# Patient Record
Sex: FEMALE | Race: WHITE | Employment: OTHER | URBAN - NONMETROPOLITAN AREA
[De-identification: names, ages, dates, MRNs, and addresses within clinical notes are randomized per-mention and may not be internally consistent; named-entity substitution may affect disease eponyms.]

---

## 2017-01-05 ENCOUNTER — TELEPHONE (OUTPATIENT)
Dept: PRIMARY CARE CLINIC | Age: 82
End: 2017-01-05

## 2017-01-12 ENCOUNTER — TELEPHONE (OUTPATIENT)
Dept: PRIMARY CARE CLINIC | Age: 82
End: 2017-01-12

## 2017-01-30 ENCOUNTER — OFFICE VISIT (OUTPATIENT)
Dept: PRIMARY CARE CLINIC | Age: 82
End: 2017-01-30
Payer: MEDICARE

## 2017-01-30 VITALS
WEIGHT: 165 LBS | HEART RATE: 71 BPM | DIASTOLIC BLOOD PRESSURE: 60 MMHG | HEIGHT: 63 IN | BODY MASS INDEX: 29.23 KG/M2 | TEMPERATURE: 97.3 F | SYSTOLIC BLOOD PRESSURE: 133 MMHG | OXYGEN SATURATION: 96 % | RESPIRATION RATE: 16 BRPM

## 2017-01-30 DIAGNOSIS — I82.402 ACUTE DEEP VEIN THROMBOSIS (DVT) OF LEFT LOWER EXTREMITY, UNSPECIFIED VEIN (HCC): Primary | ICD-10-CM

## 2017-01-30 PROCEDURE — 1090F PRES/ABSN URINE INCON ASSESS: CPT | Performed by: NURSE PRACTITIONER

## 2017-01-30 PROCEDURE — G8420 CALC BMI NORM PARAMETERS: HCPCS | Performed by: NURSE PRACTITIONER

## 2017-01-30 PROCEDURE — G8484 FLU IMMUNIZE NO ADMIN: HCPCS | Performed by: NURSE PRACTITIONER

## 2017-01-30 PROCEDURE — 99213 OFFICE O/P EST LOW 20 MIN: CPT | Performed by: NURSE PRACTITIONER

## 2017-01-30 PROCEDURE — G8427 DOCREV CUR MEDS BY ELIG CLIN: HCPCS | Performed by: NURSE PRACTITIONER

## 2017-01-30 PROCEDURE — 1036F TOBACCO NON-USER: CPT | Performed by: NURSE PRACTITIONER

## 2017-01-30 PROCEDURE — G8399 PT W/DXA RESULTS DOCUMENT: HCPCS | Performed by: NURSE PRACTITIONER

## 2017-01-30 PROCEDURE — 1123F ACP DISCUSS/DSCN MKR DOCD: CPT | Performed by: NURSE PRACTITIONER

## 2017-01-30 PROCEDURE — 4040F PNEUMOC VAC/ADMIN/RCVD: CPT | Performed by: NURSE PRACTITIONER

## 2017-02-20 RX ORDER — ERGOCALCIFEROL 1.25 MG/1
CAPSULE ORAL
Qty: 12 CAPSULE | Refills: 3 | Status: SHIPPED | OUTPATIENT
Start: 2017-02-20 | End: 2018-02-12 | Stop reason: SDUPTHER

## 2017-02-24 ENCOUNTER — OFFICE VISIT (OUTPATIENT)
Dept: PRIMARY CARE CLINIC | Age: 82
End: 2017-02-24
Payer: MEDICARE

## 2017-02-24 VITALS
SYSTOLIC BLOOD PRESSURE: 140 MMHG | OXYGEN SATURATION: 96 % | TEMPERATURE: 97 F | HEART RATE: 72 BPM | RESPIRATION RATE: 18 BRPM | BODY MASS INDEX: 28.88 KG/M2 | HEIGHT: 63 IN | WEIGHT: 163 LBS | DIASTOLIC BLOOD PRESSURE: 70 MMHG

## 2017-02-24 DIAGNOSIS — R06.2 WHEEZING: Primary | ICD-10-CM

## 2017-02-24 DIAGNOSIS — H02.9 EYELID LESION: ICD-10-CM

## 2017-02-24 PROCEDURE — 4040F PNEUMOC VAC/ADMIN/RCVD: CPT | Performed by: NURSE PRACTITIONER

## 2017-02-24 PROCEDURE — G8420 CALC BMI NORM PARAMETERS: HCPCS | Performed by: NURSE PRACTITIONER

## 2017-02-24 PROCEDURE — 96372 THER/PROPH/DIAG INJ SC/IM: CPT | Performed by: NURSE PRACTITIONER

## 2017-02-24 PROCEDURE — 94640 AIRWAY INHALATION TREATMENT: CPT | Performed by: NURSE PRACTITIONER

## 2017-02-24 PROCEDURE — 1036F TOBACCO NON-USER: CPT | Performed by: NURSE PRACTITIONER

## 2017-02-24 PROCEDURE — 99214 OFFICE O/P EST MOD 30 MIN: CPT | Performed by: NURSE PRACTITIONER

## 2017-02-24 PROCEDURE — G8399 PT W/DXA RESULTS DOCUMENT: HCPCS | Performed by: NURSE PRACTITIONER

## 2017-02-24 PROCEDURE — 1090F PRES/ABSN URINE INCON ASSESS: CPT | Performed by: NURSE PRACTITIONER

## 2017-02-24 PROCEDURE — G8427 DOCREV CUR MEDS BY ELIG CLIN: HCPCS | Performed by: NURSE PRACTITIONER

## 2017-02-24 PROCEDURE — G8484 FLU IMMUNIZE NO ADMIN: HCPCS | Performed by: NURSE PRACTITIONER

## 2017-02-24 PROCEDURE — 1123F ACP DISCUSS/DSCN MKR DOCD: CPT | Performed by: NURSE PRACTITIONER

## 2017-02-24 RX ORDER — ALBUTEROL SULFATE 1.25 MG/3ML
1 SOLUTION RESPIRATORY (INHALATION) EVERY 6 HOURS PRN
Qty: 360 ML | Refills: 3 | Status: SHIPPED | OUTPATIENT
Start: 2017-02-24 | End: 2017-06-09 | Stop reason: SDUPTHER

## 2017-02-24 RX ORDER — ACYCLOVIR 400 MG/1
400 TABLET ORAL
Qty: 56 TABLET | Refills: 1 | Status: SHIPPED | OUTPATIENT
Start: 2017-02-24 | End: 2017-04-20 | Stop reason: SDUPTHER

## 2017-02-24 RX ORDER — PREDNISONE 10 MG/1
10 TABLET ORAL DAILY
Qty: 10 TABLET | Refills: 0 | Status: SHIPPED | OUTPATIENT
Start: 2017-02-24 | End: 2017-03-06

## 2017-02-24 RX ORDER — IPRATROPIUM BROMIDE AND ALBUTEROL SULFATE 2.5; .5 MG/3ML; MG/3ML
1 SOLUTION RESPIRATORY (INHALATION) ONCE
Status: COMPLETED | OUTPATIENT
Start: 2017-02-24 | End: 2017-02-24

## 2017-02-24 RX ORDER — TRIAMCINOLONE ACETONIDE 40 MG/ML
40 INJECTION, SUSPENSION INTRA-ARTICULAR; INTRAMUSCULAR ONCE
Status: COMPLETED | OUTPATIENT
Start: 2017-02-24 | End: 2017-02-24

## 2017-02-24 RX ORDER — AMOXICILLIN AND CLAVULANATE POTASSIUM 875; 125 MG/1; MG/1
TABLET, FILM COATED ORAL
Qty: 20 TABLET | Refills: 0 | Status: SHIPPED | OUTPATIENT
Start: 2017-02-24 | End: 2017-06-09 | Stop reason: ALTCHOICE

## 2017-02-24 RX ADMIN — IPRATROPIUM BROMIDE AND ALBUTEROL SULFATE 1 AMPULE: 2.5; .5 SOLUTION RESPIRATORY (INHALATION) at 09:56

## 2017-02-24 RX ADMIN — TRIAMCINOLONE ACETONIDE 40 MG: 40 INJECTION, SUSPENSION INTRA-ARTICULAR; INTRAMUSCULAR at 10:22

## 2017-03-02 RX ORDER — GABAPENTIN 300 MG/1
CAPSULE ORAL
Qty: 270 CAPSULE | Refills: 0 | Status: SHIPPED | OUTPATIENT
Start: 2017-03-02 | End: 2017-06-04 | Stop reason: SDUPTHER

## 2017-03-04 ASSESSMENT — ENCOUNTER SYMPTOMS
EYE PAIN: 1
EYE REDNESS: 1
WHEEZING: 1
COUGH: 1

## 2017-03-13 RX ORDER — OMEPRAZOLE 40 MG/1
CAPSULE, DELAYED RELEASE ORAL
Qty: 90 CAPSULE | Refills: 3 | Status: SHIPPED | OUTPATIENT
Start: 2017-03-13 | End: 2018-03-22 | Stop reason: SDUPTHER

## 2017-03-17 ENCOUNTER — TELEPHONE (OUTPATIENT)
Dept: PRIMARY CARE CLINIC | Age: 82
End: 2017-03-17

## 2017-03-23 ENCOUNTER — HOSPITAL ENCOUNTER (OUTPATIENT)
Dept: INFUSION THERAPY | Age: 82
Setting detail: INFUSION SERIES
Discharge: HOME | End: 2017-03-23
Payer: MEDICARE

## 2017-03-23 VITALS
OXYGEN SATURATION: 98 % | RESPIRATION RATE: 17 BRPM | TEMPERATURE: 96.3 F | HEART RATE: 53 BPM | SYSTOLIC BLOOD PRESSURE: 171 MMHG | DIASTOLIC BLOOD PRESSURE: 70 MMHG

## 2017-03-23 PROCEDURE — 96365 THER/PROPH/DIAG IV INF INIT: CPT

## 2017-03-23 PROCEDURE — 6360000002 HC RX W HCPCS: Performed by: FAMILY MEDICINE

## 2017-03-23 PROCEDURE — 2580000003 HC RX 258: Performed by: FAMILY MEDICINE

## 2017-03-23 RX ADMIN — FERRIC CARBOXYMALTOSE INJECTION 750 MG: 50 INJECTION, SOLUTION INTRAVENOUS at 11:17

## 2017-03-31 ENCOUNTER — HOSPITAL ENCOUNTER (OUTPATIENT)
Dept: INFUSION THERAPY | Age: 82
Setting detail: INFUSION SERIES
Discharge: HOME | End: 2017-03-31
Payer: MEDICARE

## 2017-03-31 VITALS
HEART RATE: 68 BPM | SYSTOLIC BLOOD PRESSURE: 142 MMHG | TEMPERATURE: 97.5 F | DIASTOLIC BLOOD PRESSURE: 60 MMHG | WEIGHT: 160 LBS | HEIGHT: 63 IN | RESPIRATION RATE: 18 BRPM | BODY MASS INDEX: 28.35 KG/M2

## 2017-03-31 PROCEDURE — 2580000003 HC RX 258: Performed by: FAMILY MEDICINE

## 2017-03-31 PROCEDURE — 96365 THER/PROPH/DIAG IV INF INIT: CPT

## 2017-03-31 PROCEDURE — 6360000002 HC RX W HCPCS: Performed by: FAMILY MEDICINE

## 2017-03-31 RX ADMIN — FERRIC CARBOXYMALTOSE INJECTION 750 MG: 50 INJECTION, SOLUTION INTRAVENOUS at 13:20

## 2017-04-21 RX ORDER — ACYCLOVIR 400 MG/1
TABLET ORAL
Qty: 56 TABLET | Refills: 0 | Status: SHIPPED | OUTPATIENT
Start: 2017-04-21 | End: 2017-05-20 | Stop reason: SDUPTHER

## 2017-04-28 ENCOUNTER — OFFICE VISIT (OUTPATIENT)
Dept: PRIMARY CARE CLINIC | Age: 82
End: 2017-04-28
Payer: MEDICARE

## 2017-04-28 VITALS
WEIGHT: 167 LBS | HEART RATE: 63 BPM | SYSTOLIC BLOOD PRESSURE: 130 MMHG | TEMPERATURE: 96.8 F | RESPIRATION RATE: 16 BRPM | BODY MASS INDEX: 29.59 KG/M2 | HEIGHT: 63 IN | DIASTOLIC BLOOD PRESSURE: 70 MMHG

## 2017-04-28 DIAGNOSIS — Z45.2 ENCOUNTER FOR CARE RELATED TO PORT-A-CATH: ICD-10-CM

## 2017-04-28 DIAGNOSIS — Z95.828 PORT CATHETER IN PLACE: ICD-10-CM

## 2017-04-28 DIAGNOSIS — E61.1 LOW SERUM IRON: Primary | ICD-10-CM

## 2017-04-28 DIAGNOSIS — Z85.038 PERSONAL HISTORY OF COLON CANCER: ICD-10-CM

## 2017-04-28 PROCEDURE — 99213 OFFICE O/P EST LOW 20 MIN: CPT | Performed by: NURSE PRACTITIONER

## 2017-04-28 PROCEDURE — G8420 CALC BMI NORM PARAMETERS: HCPCS | Performed by: NURSE PRACTITIONER

## 2017-04-28 PROCEDURE — G8427 DOCREV CUR MEDS BY ELIG CLIN: HCPCS | Performed by: NURSE PRACTITIONER

## 2017-04-28 PROCEDURE — 1036F TOBACCO NON-USER: CPT | Performed by: NURSE PRACTITIONER

## 2017-04-28 PROCEDURE — G8399 PT W/DXA RESULTS DOCUMENT: HCPCS | Performed by: NURSE PRACTITIONER

## 2017-04-28 PROCEDURE — 1090F PRES/ABSN URINE INCON ASSESS: CPT | Performed by: NURSE PRACTITIONER

## 2017-04-28 PROCEDURE — 4040F PNEUMOC VAC/ADMIN/RCVD: CPT | Performed by: NURSE PRACTITIONER

## 2017-04-28 PROCEDURE — 1123F ACP DISCUSS/DSCN MKR DOCD: CPT | Performed by: NURSE PRACTITIONER

## 2017-04-28 RX ORDER — FOLIC ACID 1 MG/1
TABLET ORAL
Refills: 3 | COMMUNITY
Start: 2017-04-11 | End: 2018-05-07

## 2017-05-22 RX ORDER — ACYCLOVIR 400 MG/1
TABLET ORAL
Qty: 56 TABLET | Refills: 0 | Status: SHIPPED | OUTPATIENT
Start: 2017-05-22 | End: 2017-06-19 | Stop reason: SDUPTHER

## 2017-06-05 RX ORDER — GABAPENTIN 300 MG/1
CAPSULE ORAL
Qty: 270 CAPSULE | Refills: 3 | Status: SHIPPED | OUTPATIENT
Start: 2017-06-05 | End: 2018-06-17 | Stop reason: SDUPTHER

## 2017-06-09 ENCOUNTER — OFFICE VISIT (OUTPATIENT)
Dept: PRIMARY CARE CLINIC | Age: 82
End: 2017-06-09
Payer: MEDICARE

## 2017-06-09 VITALS
BODY MASS INDEX: 29.23 KG/M2 | TEMPERATURE: 96.5 F | SYSTOLIC BLOOD PRESSURE: 156 MMHG | OXYGEN SATURATION: 97 % | HEIGHT: 63 IN | WEIGHT: 165 LBS | DIASTOLIC BLOOD PRESSURE: 82 MMHG | HEART RATE: 64 BPM

## 2017-06-09 DIAGNOSIS — E55.9 VITAMIN D DEFICIENCY: ICD-10-CM

## 2017-06-09 DIAGNOSIS — E78.1 HYPERTRIGLYCERIDEMIA: ICD-10-CM

## 2017-06-09 DIAGNOSIS — I10 ESSENTIAL HYPERTENSION: ICD-10-CM

## 2017-06-09 DIAGNOSIS — J41.8 MIXED SIMPLE AND MUCOPURULENT CHRONIC BRONCHITIS (HCC): Primary | ICD-10-CM
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
## 2017-06-09 DIAGNOSIS — Z95.828 PORTACATH IN PLACE: ICD-10-CM

## 2017-06-09 DIAGNOSIS — N28.9 RENAL INSUFFICIENCY: ICD-10-CM

## 2017-06-09 LAB
ALBUMIN SERPL-MCNC: 4.2 G/DL (ref 3.5–5.2)
ALP BLD-CCNC: 80 U/L (ref 35–104)
ALT SERPL-CCNC: 13 U/L (ref 5–33)
ANION GAP SERPL CALCULATED.3IONS-SCNC: 17 MMOL/L (ref 7–19)
AST SERPL-CCNC: 21 U/L (ref 5–32)
BILIRUB SERPL-MCNC: 0.4 MG/DL (ref 0.2–1.2)
BUN BLDV-MCNC: 21 MG/DL (ref 8–23)
CALCIUM SERPL-MCNC: 9.3 MG/DL (ref 8.8–10.2)
CHLORIDE BLD-SCNC: 102 MMOL/L (ref 98–111)
CHOLESTEROL, TOTAL: 183 MG/DL (ref 160–199)
CO2: 21 MMOL/L (ref 22–29)
CREAT SERPL-MCNC: 1.2 MG/DL (ref 0.5–0.9)
GFR NON-AFRICAN AMERICAN: 43
GLUCOSE BLD-MCNC: 91 MG/DL (ref 74–109)
HCT VFR BLD CALC: 35.7 % (ref 37–47)
HDLC SERPL-MCNC: 50 MG/DL (ref 65–121)
HEMOGLOBIN: 12.3 G/DL (ref 12–16)
LDL CHOLESTEROL CALCULATED: 76 MG/DL
MCH RBC QN AUTO: 33.2 PG (ref 27–31)
MCHC RBC AUTO-ENTMCNC: 34.5 G/DL (ref 33–37)
MCV RBC AUTO: 96.2 FL (ref 81–99)
PDW BLD-RTO: 15.7 % (ref 11.5–14.5)
PLATELET # BLD: 282 K/UL (ref 130–400)
PMV BLD AUTO: 10.8 FL (ref 9.4–12.3)
POTASSIUM SERPL-SCNC: 4.5 MMOL/L (ref 3.5–5)
RBC # BLD: 3.71 M/UL (ref 4.2–5.4)
SODIUM BLD-SCNC: 140 MMOL/L (ref 136–145)
TOTAL PROTEIN: 6.8 G/DL (ref 6.6–8.7)
TRIGL SERPL-MCNC: 287 MG/DL (ref 150–199)
VITAMIN D 25-HYDROXY: 25 NG/ML
WBC # BLD: 6.8 K/UL (ref 4.8–10.8)

## 2017-06-09 PROCEDURE — G8926 SPIRO NO PERF OR DOC: HCPCS | Performed by: NURSE PRACTITIONER

## 2017-06-09 PROCEDURE — 4040F PNEUMOC VAC/ADMIN/RCVD: CPT | Performed by: NURSE PRACTITIONER

## 2017-06-09 PROCEDURE — 1123F ACP DISCUSS/DSCN MKR DOCD: CPT | Performed by: NURSE PRACTITIONER

## 2017-06-09 PROCEDURE — 36415 COLL VENOUS BLD VENIPUNCTURE: CPT | Performed by: NURSE PRACTITIONER

## 2017-06-09 PROCEDURE — 1036F TOBACCO NON-USER: CPT | Performed by: NURSE PRACTITIONER

## 2017-06-09 PROCEDURE — G8399 PT W/DXA RESULTS DOCUMENT: HCPCS | Performed by: NURSE PRACTITIONER

## 2017-06-09 PROCEDURE — 1090F PRES/ABSN URINE INCON ASSESS: CPT | Performed by: NURSE PRACTITIONER

## 2017-06-09 PROCEDURE — G8420 CALC BMI NORM PARAMETERS: HCPCS | Performed by: NURSE PRACTITIONER

## 2017-06-09 PROCEDURE — G8427 DOCREV CUR MEDS BY ELIG CLIN: HCPCS | Performed by: NURSE PRACTITIONER

## 2017-06-09 PROCEDURE — 3023F SPIROM DOC REV: CPT | Performed by: NURSE PRACTITIONER

## 2017-06-09 PROCEDURE — 99214 OFFICE O/P EST MOD 30 MIN: CPT | Performed by: NURSE PRACTITIONER

## 2017-06-09 PROCEDURE — 96372 THER/PROPH/DIAG INJ SC/IM: CPT | Performed by: NURSE PRACTITIONER

## 2017-06-09 RX ORDER — DOXYCYCLINE HYCLATE 100 MG/1
100 CAPSULE ORAL 2 TIMES DAILY
Qty: 20 CAPSULE | Refills: 0 | Status: SHIPPED | OUTPATIENT
Start: 2017-06-09 | End: 2017-06-19

## 2017-06-09 RX ORDER — TRIAMCINOLONE ACETONIDE 40 MG/ML
40 INJECTION, SUSPENSION INTRA-ARTICULAR; INTRAMUSCULAR ONCE
Status: COMPLETED | OUTPATIENT
Start: 2017-06-09 | End: 2017-06-09

## 2017-06-09 RX ORDER — ALBUTEROL SULFATE 1.25 MG/3ML
1 SOLUTION RESPIRATORY (INHALATION) EVERY 6 HOURS PRN
Qty: 360 ML | Refills: 3 | Status: SHIPPED | OUTPATIENT
Start: 2017-06-09 | End: 2019-07-30

## 2017-06-09 RX ADMIN — TRIAMCINOLONE ACETONIDE 40 MG: 40 INJECTION, SUSPENSION INTRA-ARTICULAR; INTRAMUSCULAR at 15:48

## 2017-06-09 ASSESSMENT — ENCOUNTER SYMPTOMS
GASTROINTESTINAL NEGATIVE: 1
COUGH: 1

## 2017-06-16 ENCOUNTER — OFFICE VISIT (OUTPATIENT)
Dept: PRIMARY CARE CLINIC | Age: 82
End: 2017-06-16
Payer: MEDICARE

## 2017-06-16 VITALS
WEIGHT: 163.7 LBS | RESPIRATION RATE: 16 BRPM | OXYGEN SATURATION: 94 % | DIASTOLIC BLOOD PRESSURE: 60 MMHG | HEIGHT: 63 IN | HEART RATE: 70 BPM | SYSTOLIC BLOOD PRESSURE: 160 MMHG | BODY MASS INDEX: 29 KG/M2 | TEMPERATURE: 98.2 F

## 2017-06-16 DIAGNOSIS — J40 BRONCHITIS: Primary | ICD-10-CM

## 2017-06-16 PROCEDURE — 99213 OFFICE O/P EST LOW 20 MIN: CPT | Performed by: NURSE PRACTITIONER

## 2017-06-16 PROCEDURE — G8427 DOCREV CUR MEDS BY ELIG CLIN: HCPCS | Performed by: NURSE PRACTITIONER

## 2017-06-16 PROCEDURE — 1123F ACP DISCUSS/DSCN MKR DOCD: CPT | Performed by: NURSE PRACTITIONER

## 2017-06-16 PROCEDURE — 1090F PRES/ABSN URINE INCON ASSESS: CPT | Performed by: NURSE PRACTITIONER

## 2017-06-16 PROCEDURE — G8419 CALC BMI OUT NRM PARAM NOF/U: HCPCS | Performed by: NURSE PRACTITIONER

## 2017-06-16 PROCEDURE — G8399 PT W/DXA RESULTS DOCUMENT: HCPCS | Performed by: NURSE PRACTITIONER

## 2017-06-16 PROCEDURE — 1036F TOBACCO NON-USER: CPT | Performed by: NURSE PRACTITIONER

## 2017-06-16 PROCEDURE — 4040F PNEUMOC VAC/ADMIN/RCVD: CPT | Performed by: NURSE PRACTITIONER

## 2017-06-16 ASSESSMENT — ENCOUNTER SYMPTOMS: COUGH: 1

## 2017-06-19 RX ORDER — ACYCLOVIR 400 MG/1
TABLET ORAL
Qty: 56 TABLET | Refills: 5 | Status: SHIPPED | OUTPATIENT
Start: 2017-06-19 | End: 2017-09-12 | Stop reason: SDUPTHER

## 2017-07-17 RX ORDER — ACYCLOVIR 400 MG/1
TABLET ORAL
Qty: 56 TABLET | Refills: 1 | Status: SHIPPED | OUTPATIENT
Start: 2017-07-17 | End: 2017-08-08 | Stop reason: SDUPTHER

## 2017-08-08 ENCOUNTER — OFFICE VISIT (OUTPATIENT)
Dept: PRIMARY CARE CLINIC | Age: 82
End: 2017-08-08

## 2017-08-08 VITALS
HEART RATE: 60 BPM | SYSTOLIC BLOOD PRESSURE: 171 MMHG | DIASTOLIC BLOOD PRESSURE: 69 MMHG | TEMPERATURE: 97.1 F | RESPIRATION RATE: 20 BRPM | BODY MASS INDEX: 29.26 KG/M2 | WEIGHT: 165.2 LBS

## 2017-08-08 DIAGNOSIS — Z45.2 ENCOUNTER FOR CARE RELATED TO PORT-A-CATH: Primary | ICD-10-CM

## 2017-08-08 DIAGNOSIS — Z95.828 PORT-A-CATH IN PLACE: ICD-10-CM

## 2017-08-08 DIAGNOSIS — Z91.81 AT HIGH RISK FOR FALLS: ICD-10-CM

## 2017-09-05 RX ORDER — TRIAMTERENE AND HYDROCHLOROTHIAZIDE 37.5; 25 MG/1; MG/1
TABLET ORAL
Qty: 90 TABLET | Refills: 5 | Status: SHIPPED | OUTPATIENT
Start: 2017-09-05 | End: 2018-12-04 | Stop reason: SDUPTHER

## 2017-09-08 ENCOUNTER — OFFICE VISIT (OUTPATIENT)
Dept: PRIMARY CARE CLINIC | Age: 82
End: 2017-09-08
Payer: MEDICARE

## 2017-09-08 VITALS
TEMPERATURE: 98.3 F | SYSTOLIC BLOOD PRESSURE: 130 MMHG | BODY MASS INDEX: 30.22 KG/M2 | OXYGEN SATURATION: 98 % | RESPIRATION RATE: 16 BRPM | WEIGHT: 170.6 LBS | DIASTOLIC BLOOD PRESSURE: 82 MMHG | HEART RATE: 60 BPM

## 2017-09-08 DIAGNOSIS — B37.2 YEAST DERMATITIS: Primary | ICD-10-CM

## 2017-09-08 DIAGNOSIS — R30.0 BURNING WITH URINATION: ICD-10-CM

## 2017-09-08 LAB
BILIRUBIN, POC: ABNORMAL
BLOOD URINE, POC: ABNORMAL
CLARITY, POC: CLEAR
COLOR, POC: YELLOW
GLUCOSE URINE, POC: ABNORMAL
KETONES, POC: ABNORMAL
LEUKOCYTE EST, POC: ABNORMAL
NITRITE, POC: ABNORMAL
PH, POC: 6
PROTEIN, POC: ABNORMAL
SPECIFIC GRAVITY, POC: 1
UROBILINOGEN, POC: 0.2

## 2017-09-08 PROCEDURE — 99213 OFFICE O/P EST LOW 20 MIN: CPT | Performed by: NURSE PRACTITIONER

## 2017-09-08 PROCEDURE — G8427 DOCREV CUR MEDS BY ELIG CLIN: HCPCS | Performed by: NURSE PRACTITIONER

## 2017-09-08 PROCEDURE — 1036F TOBACCO NON-USER: CPT | Performed by: NURSE PRACTITIONER

## 2017-09-08 PROCEDURE — 1090F PRES/ABSN URINE INCON ASSESS: CPT | Performed by: NURSE PRACTITIONER

## 2017-09-08 PROCEDURE — 81002 URINALYSIS NONAUTO W/O SCOPE: CPT | Performed by: NURSE PRACTITIONER

## 2017-09-08 PROCEDURE — 4040F PNEUMOC VAC/ADMIN/RCVD: CPT | Performed by: NURSE PRACTITIONER

## 2017-09-08 PROCEDURE — 1123F ACP DISCUSS/DSCN MKR DOCD: CPT | Performed by: NURSE PRACTITIONER

## 2017-09-08 PROCEDURE — G8399 PT W/DXA RESULTS DOCUMENT: HCPCS | Performed by: NURSE PRACTITIONER

## 2017-09-08 PROCEDURE — G8417 CALC BMI ABV UP PARAM F/U: HCPCS | Performed by: NURSE PRACTITIONER

## 2017-09-08 RX ORDER — FLUCONAZOLE 100 MG/1
100 TABLET ORAL DAILY
Qty: 7 TABLET | Refills: 0 | Status: SHIPPED | OUTPATIENT
Start: 2017-09-08 | End: 2017-09-15

## 2017-09-08 RX ORDER — NYSTATIN 100000 [USP'U]/G
POWDER TOPICAL
Qty: 1 BOTTLE | Refills: 3 | Status: SHIPPED | OUTPATIENT
Start: 2017-09-08 | End: 2018-05-07

## 2017-09-08 RX ORDER — ACETAMINOPHEN AND CODEINE PHOSPHATE 300; 30 MG/1; MG/1
TABLET ORAL
Qty: 24 TABLET | Refills: 0 | Status: SHIPPED | OUTPATIENT
Start: 2017-09-08 | End: 2018-01-19 | Stop reason: SDUPTHER

## 2017-09-12 RX ORDER — ACYCLOVIR 400 MG/1
TABLET ORAL
Qty: 56 TABLET | Refills: 3 | Status: SHIPPED | OUTPATIENT
Start: 2017-09-12 | End: 2018-01-03 | Stop reason: SDUPTHER

## 2017-09-26 ENCOUNTER — OFFICE VISIT (OUTPATIENT)
Dept: PRIMARY CARE CLINIC | Age: 82
End: 2017-09-26
Payer: MEDICARE

## 2017-09-26 VITALS
WEIGHT: 169.8 LBS | DIASTOLIC BLOOD PRESSURE: 78 MMHG | SYSTOLIC BLOOD PRESSURE: 130 MMHG | TEMPERATURE: 96.5 F | BODY MASS INDEX: 30.09 KG/M2 | RESPIRATION RATE: 16 BRPM | HEIGHT: 63 IN | HEART RATE: 80 BPM

## 2017-09-26 DIAGNOSIS — Z95.828 PORT-A-CATH IN PLACE: ICD-10-CM

## 2017-09-26 DIAGNOSIS — N18.30 CHRONIC KIDNEY DISEASE, STAGE 3 (MODERATE): Primary | ICD-10-CM

## 2017-09-26 DIAGNOSIS — Z12.31 SCREENING MAMMOGRAM, ENCOUNTER FOR: ICD-10-CM

## 2017-09-26 DIAGNOSIS — M10.371 ACUTE GOUT DUE TO RENAL IMPAIRMENT INVOLVING TOE OF RIGHT FOOT: ICD-10-CM

## 2017-09-26 DIAGNOSIS — I87.2 VENOUS INSUFFICIENCY: ICD-10-CM

## 2017-09-26 DIAGNOSIS — Z45.2 ENCOUNTER FOR CARE RELATED TO PORT-A-CATH: ICD-10-CM

## 2017-09-26 LAB
ALBUMIN SERPL-MCNC: 3.7 G/DL (ref 3.5–5.2)
ALP BLD-CCNC: 67 U/L (ref 35–104)
ALT SERPL-CCNC: 16 U/L (ref 5–33)
ANION GAP SERPL CALCULATED.3IONS-SCNC: 17 MMOL/L (ref 7–19)
AST SERPL-CCNC: 26 U/L (ref 5–32)
BILIRUB SERPL-MCNC: 0.3 MG/DL (ref 0.2–1.2)
BUN BLDV-MCNC: 26 MG/DL (ref 8–23)
CALCIUM SERPL-MCNC: 9.2 MG/DL (ref 8.8–10.2)
CHLORIDE BLD-SCNC: 100 MMOL/L (ref 98–111)
CO2: 22 MMOL/L (ref 22–29)
CREAT SERPL-MCNC: 1.4 MG/DL (ref 0.5–0.9)
GFR NON-AFRICAN AMERICAN: 36
GLUCOSE BLD-MCNC: 101 MG/DL (ref 74–109)
HCT VFR BLD CALC: 38 % (ref 37–47)
HEMOGLOBIN: 12.6 G/DL (ref 12–16)
MCH RBC QN AUTO: 32.6 PG (ref 27–31)
MCHC RBC AUTO-ENTMCNC: 33.2 G/DL (ref 33–37)
MCV RBC AUTO: 98.4 FL (ref 81–99)
PDW BLD-RTO: 12.3 % (ref 11.5–14.5)
PLATELET # BLD: 231 K/UL (ref 130–400)
PMV BLD AUTO: 10.5 FL (ref 9.4–12.3)
POTASSIUM SERPL-SCNC: 4.7 MMOL/L (ref 3.5–5)
RBC # BLD: 3.86 M/UL (ref 4.2–5.4)
SODIUM BLD-SCNC: 139 MMOL/L (ref 136–145)
TOTAL PROTEIN: 6.5 G/DL (ref 6.6–8.7)
URIC ACID, SERUM: 8.7 MG/DL (ref 2.4–5.7)
WBC # BLD: 6.9 K/UL (ref 4.8–10.8)

## 2017-09-26 PROCEDURE — 99213 OFFICE O/P EST LOW 20 MIN: CPT | Performed by: NURSE PRACTITIONER

## 2017-09-26 PROCEDURE — 1090F PRES/ABSN URINE INCON ASSESS: CPT | Performed by: NURSE PRACTITIONER

## 2017-09-26 PROCEDURE — G8417 CALC BMI ABV UP PARAM F/U: HCPCS | Performed by: NURSE PRACTITIONER

## 2017-09-26 PROCEDURE — 1036F TOBACCO NON-USER: CPT | Performed by: NURSE PRACTITIONER

## 2017-09-26 PROCEDURE — 4040F PNEUMOC VAC/ADMIN/RCVD: CPT | Performed by: NURSE PRACTITIONER

## 2017-09-26 PROCEDURE — 96372 THER/PROPH/DIAG INJ SC/IM: CPT | Performed by: NURSE PRACTITIONER

## 2017-09-26 PROCEDURE — G8427 DOCREV CUR MEDS BY ELIG CLIN: HCPCS | Performed by: NURSE PRACTITIONER

## 2017-09-26 PROCEDURE — 1123F ACP DISCUSS/DSCN MKR DOCD: CPT | Performed by: NURSE PRACTITIONER

## 2017-09-26 PROCEDURE — 36415 COLL VENOUS BLD VENIPUNCTURE: CPT | Performed by: NURSE PRACTITIONER

## 2017-09-26 PROCEDURE — G8399 PT W/DXA RESULTS DOCUMENT: HCPCS | Performed by: NURSE PRACTITIONER

## 2017-09-26 RX ORDER — FLUCONAZOLE 150 MG/1
150 TABLET ORAL ONCE
Qty: 1 TABLET | Refills: 0 | Status: SHIPPED | OUTPATIENT
Start: 2017-09-26 | End: 2017-09-26

## 2017-09-26 RX ORDER — TRIAMCINOLONE ACETONIDE 40 MG/ML
40 INJECTION, SUSPENSION INTRA-ARTICULAR; INTRAMUSCULAR ONCE
Status: COMPLETED | OUTPATIENT
Start: 2017-09-26 | End: 2017-09-26

## 2017-09-26 RX ORDER — METHYLPREDNISOLONE 4 MG/1
TABLET ORAL
Qty: 1 KIT | Refills: 0 | Status: SHIPPED | OUTPATIENT
Start: 2017-09-26 | End: 2017-10-02

## 2017-09-26 RX ORDER — TRIAMCINOLONE ACETONIDE 1 MG/G
CREAM TOPICAL
Qty: 1 TUBE | Refills: 1 | Status: SHIPPED | OUTPATIENT
Start: 2017-09-26 | End: 2020-04-25

## 2017-09-26 RX ADMIN — TRIAMCINOLONE ACETONIDE 40 MG: 40 INJECTION, SUSPENSION INTRA-ARTICULAR; INTRAMUSCULAR at 15:52

## 2017-09-29 ENCOUNTER — HOSPITAL ENCOUNTER (OUTPATIENT)
Dept: WOMENS IMAGING | Age: 82
Discharge: HOME | End: 2017-09-29
Payer: MEDICARE

## 2017-09-29 DIAGNOSIS — Z12.31 SCREENING MAMMOGRAM, ENCOUNTER FOR: ICD-10-CM

## 2017-09-29 PROCEDURE — 77063 BREAST TOMOSYNTHESIS BI: CPT

## 2017-10-20 ENCOUNTER — NURSE ONLY (OUTPATIENT)
Dept: PRIMARY CARE CLINIC | Age: 82
End: 2017-10-20
Payer: MEDICARE

## 2017-10-20 DIAGNOSIS — Z23 NEED FOR INFLUENZA VACCINATION: Primary | ICD-10-CM

## 2017-10-20 PROCEDURE — G0008 ADMIN INFLUENZA VIRUS VAC: HCPCS | Performed by: FAMILY MEDICINE

## 2017-10-20 PROCEDURE — 90688 IIV4 VACCINE SPLT 0.5 ML IM: CPT | Performed by: FAMILY MEDICINE

## 2017-12-01 ENCOUNTER — OFFICE VISIT (OUTPATIENT)
Dept: PRIMARY CARE CLINIC | Age: 82
End: 2017-12-01
Payer: MEDICARE

## 2017-12-01 VITALS
SYSTOLIC BLOOD PRESSURE: 120 MMHG | WEIGHT: 170 LBS | RESPIRATION RATE: 18 BRPM | HEIGHT: 63 IN | OXYGEN SATURATION: 96 % | BODY MASS INDEX: 30.12 KG/M2 | TEMPERATURE: 96.7 F | HEART RATE: 60 BPM | DIASTOLIC BLOOD PRESSURE: 79 MMHG

## 2017-12-01 DIAGNOSIS — S70.11XA CONTUSION OF RIGHT HIP AND THIGH, INITIAL ENCOUNTER: ICD-10-CM

## 2017-12-01 DIAGNOSIS — Z45.2 ENCOUNTER FOR CARE RELATED TO PORT-A-CATH: ICD-10-CM

## 2017-12-01 DIAGNOSIS — S70.01XA CONTUSION OF RIGHT HIP AND THIGH, INITIAL ENCOUNTER: ICD-10-CM

## 2017-12-01 DIAGNOSIS — Z95.828 PORT CATHETER IN PLACE: ICD-10-CM

## 2017-12-01 DIAGNOSIS — M1A.3720 CHRONIC GOUT OF LEFT FOOT DUE TO RENAL IMPAIRMENT WITHOUT TOPHUS: ICD-10-CM

## 2017-12-01 DIAGNOSIS — S50.01XA CONTUSION OF RIGHT ELBOW, INITIAL ENCOUNTER: ICD-10-CM
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
## 2017-12-01 DIAGNOSIS — N18.30 CHRONIC KIDNEY DISEASE, STAGE III (MODERATE) (HCC): ICD-10-CM

## 2017-12-01 LAB
ANION GAP SERPL CALCULATED.3IONS-SCNC: 15 MMOL/L (ref 7–19)
BUN BLDV-MCNC: 24 MG/DL (ref 8–23)
CALCIUM SERPL-MCNC: 9.3 MG/DL (ref 8.8–10.2)
CHLORIDE BLD-SCNC: 99 MMOL/L (ref 98–111)
CO2: 25 MMOL/L (ref 22–29)
CREAT SERPL-MCNC: 1.1 MG/DL (ref 0.5–0.9)
CREATININE URINE: 36.5 MG/DL (ref 4.2–622)
GFR NON-AFRICAN AMERICAN: 48
GLUCOSE BLD-MCNC: 98 MG/DL (ref 74–109)
HCT VFR BLD CALC: 39.6 % (ref 37–47)
HEMOGLOBIN: 12.9 G/DL (ref 12–16)
MCH RBC QN AUTO: 31.4 PG (ref 27–31)
MCHC RBC AUTO-ENTMCNC: 32.6 G/DL (ref 33–37)
MCV RBC AUTO: 96.4 FL (ref 81–99)
MICROALBUMIN UR-MCNC: <1.2 MG/DL (ref 0–19)
MICROALBUMIN/CREAT UR-RTO: NORMAL MG/G
PDW BLD-RTO: 12.7 % (ref 11.5–14.5)
PLATELET # BLD: 252 K/UL (ref 130–400)
PMV BLD AUTO: 10.1 FL (ref 9.4–12.3)
POTASSIUM SERPL-SCNC: 4 MMOL/L (ref 3.5–5)
RBC # BLD: 4.11 M/UL (ref 4.2–5.4)
SODIUM BLD-SCNC: 139 MMOL/L (ref 136–145)
WBC # BLD: 7.4 K/UL (ref 4.8–10.8)

## 2017-12-01 PROCEDURE — G8427 DOCREV CUR MEDS BY ELIG CLIN: HCPCS | Performed by: NURSE PRACTITIONER

## 2017-12-01 PROCEDURE — 1036F TOBACCO NON-USER: CPT | Performed by: NURSE PRACTITIONER

## 2017-12-01 PROCEDURE — 36415 COLL VENOUS BLD VENIPUNCTURE: CPT | Performed by: NURSE PRACTITIONER

## 2017-12-01 PROCEDURE — 4040F PNEUMOC VAC/ADMIN/RCVD: CPT | Performed by: NURSE PRACTITIONER

## 2017-12-01 PROCEDURE — 1123F ACP DISCUSS/DSCN MKR DOCD: CPT | Performed by: NURSE PRACTITIONER

## 2017-12-01 PROCEDURE — G8417 CALC BMI ABV UP PARAM F/U: HCPCS | Performed by: NURSE PRACTITIONER

## 2017-12-01 PROCEDURE — 99214 OFFICE O/P EST MOD 30 MIN: CPT | Performed by: NURSE PRACTITIONER

## 2017-12-01 PROCEDURE — G8484 FLU IMMUNIZE NO ADMIN: HCPCS | Performed by: NURSE PRACTITIONER

## 2017-12-01 PROCEDURE — G8399 PT W/DXA RESULTS DOCUMENT: HCPCS | Performed by: NURSE PRACTITIONER

## 2017-12-01 PROCEDURE — 1090F PRES/ABSN URINE INCON ASSESS: CPT | Performed by: NURSE PRACTITIONER

## 2017-12-01 RX ORDER — METHYLPREDNISOLONE 4 MG/1
TABLET ORAL
Qty: 1 KIT | Refills: 0 | Status: SHIPPED | OUTPATIENT
Start: 2017-12-01 | End: 2017-12-07

## 2017-12-01 NOTE — PROGRESS NOTES
Spoke to Giovani odonnell at [facility] and she asked to fax referral. PT is aware that [facility] with contact her to setup dates and times to get started with home health. PT voiced understanding that if she has not heard anything from them in a couple of days, that she will call them and if she has any trouble she will give us a call. Referral was faxed to Giovani odonnell.

## 2017-12-01 NOTE — PROGRESS NOTES
EXCISION      TOTAL KNEE ARTHROPLASTY      UPPER GASTROINTESTINAL ENDOSCOPY  10-    UPPER GASTROINTESTINAL ENDOSCOPY  3/2013    [facility]: peptic stricture dilated to 48F; HH; small antral mucosal elevation    UPPER GASTROINTESTINAL ENDOSCOPY  12/2014    [facility]: dilation of esophageal stricture    VARICOSE VEIN SURGERY Left 03/14/2014  [facility]    Left GSV Ablation    VARICOSE VEIN SURGERY Right 04/04/2014  [facility]    Right GSV Ablation       Family History   Problem Relation Age of Onset    Cancer Mother      Cervical Cancer    Cancer Brother      Esophageal cancer    Diabetes Brother     Esophageal Cancer Brother     Diabetes Sister     Colon Cancer Neg Hx     Colon Polyps Neg Hx     Liver Cancer Neg Hx     Liver Disease Neg Hx     Rectal Cancer Neg Hx     Stomach Cancer Neg Hx        Social History   Substance Use Topics    Smoking status: Never Smoker    Smokeless tobacco: Never Used    Alcohol use No      Current Outpatient Prescriptions   Medication Sig Dispense Refill    methylPREDNISolone (MEDROL DOSEPACK) 4 MG tablet Take by mouth. 1 kit 0    metoprolol tartrate (LOPRESSOR) 25 MG tablet TAKE ONE TABLET BY MOUTH TWICE DAILY FOR BLOOD PRESSURE AND HEART 180 tablet 3    triamcinolone (KENALOG) 0.1 % cream Apply topically 2 times daily.  External vagina 1 Tube 1    acyclovir (ZOVIRAX) 400 MG tablet TAKE 1 TABLET BY MOUTH FIVE TIMES DAILY FOR 1 WEEK THEN TAKE 1 TABLET BY MOUTH BACK TWICE DAILY 56 tablet 3    acetaminophen-codeine (TYLENOL #3) 300-30 MG per tablet TAKE 1 TABLET BY MOUTH THREE TIMES DAILY AS NEEDED FOR SEVERE PAIN 24 tablet 0    nystatin (MYCOSTATIN) 354484 UNIT/GM powder Apply 2 times a day to dry skin 1 Bottle 3    triamterene-hydrochlorothiazide (MAXZIDE-25) 37.5-25 MG per tablet TAKE 1 TABLET BY MOUTH DAILY 90 tablet 5    rivaroxaban (XARELTO) 20 MG TABS tablet Take 1 tablet by mouth daily (with breakfast) For treatment of DVT 30 tablet 11    gabapentin (NEURONTIN) right elbow, initial encounter  Internal Referral to Home Health   4. 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 Chronic kidney disease, stage III (moderate)  Basic Metabolic Panel    CBC    Microalbumin / Creatinine Urine Ratio   6. Encounter for care related to Port-a-Cath     7. Port catheter in place  MI CATH IMPL VASC ACCESS PORTAL       Plan:   I took her right hip through full range of motion. She was able to lift and bend the hip without pain. The tenderness was on palpation on the bruise. I'm going to go ahead and order physical therapy because this is the second time she is reported a fall. She was complaining of one of her toes hurting but it was not swollen at all. The Medrol Dosepak would help if it was gout and will help the hip and elbow pain. Patient given educational materials - see patient instructions. Discussed use, benefit, and side effects of prescribed medications. All patient questions answered. Pt voiced understanding. Reviewed health maintenance. Instructed to continue current medications, diet and exercise. Patient agreed with treatment plan. Follow up as directed. MEDICATIONS:  Orders Placed This Encounter   Medications    methylPREDNISolone (MEDROL DOSEPACK) 4 MG tablet     Sig: Take by mouth. Dispense:  1 kit     Refill:  0         ORDERS:  Orders Placed This Encounter   Procedures    Basic Metabolic Panel    CBC    Microalbumin / Creatinine Urine Ratio    Internal Referral to [employer] [facility] [place]       Follow-up:  Return in about 3 months (around 3/1/2018) for port flush.     PATIENT INSTRUCTIONS:  Patient Instructions   PT for falls, balance and training  Labs for Dr Johan Gallego      Electronically signed by Dolly Gupta CNP on 12/1/2017 at 5:51 PM

## 2017-12-11 ENCOUNTER — OFFICE VISIT (OUTPATIENT)
Dept: PRIMARY CARE CLINIC | Age: 82
End: 2017-12-11
Payer: MEDICARE

## 2017-12-11 VITALS
WEIGHT: 169 LBS | OXYGEN SATURATION: 98 % | SYSTOLIC BLOOD PRESSURE: 124 MMHG | BODY MASS INDEX: 29.95 KG/M2 | HEIGHT: 63 IN | RESPIRATION RATE: 16 BRPM | TEMPERATURE: 97.3 F | HEART RATE: 70 BPM | DIASTOLIC BLOOD PRESSURE: 60 MMHG

## 2017-12-11 DIAGNOSIS — M25.551 RIGHT HIP PAIN: ICD-10-CM
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
## 2017-12-11 PROCEDURE — G8484 FLU IMMUNIZE NO ADMIN: HCPCS | Performed by: NURSE PRACTITIONER

## 2017-12-11 PROCEDURE — G8417 CALC BMI ABV UP PARAM F/U: HCPCS | Performed by: NURSE PRACTITIONER

## 2017-12-11 PROCEDURE — G8399 PT W/DXA RESULTS DOCUMENT: HCPCS | Performed by: NURSE PRACTITIONER

## 2017-12-11 PROCEDURE — 1036F TOBACCO NON-USER: CPT | Performed by: NURSE PRACTITIONER

## 2017-12-11 PROCEDURE — G8427 DOCREV CUR MEDS BY ELIG CLIN: HCPCS | Performed by: NURSE PRACTITIONER

## 2017-12-11 PROCEDURE — 99213 OFFICE O/P EST LOW 20 MIN: CPT | Performed by: NURSE PRACTITIONER

## 2017-12-11 PROCEDURE — 1090F PRES/ABSN URINE INCON ASSESS: CPT | Performed by: NURSE PRACTITIONER

## 2017-12-11 PROCEDURE — 4040F PNEUMOC VAC/ADMIN/RCVD: CPT | Performed by: NURSE PRACTITIONER

## 2017-12-11 PROCEDURE — 1123F ACP DISCUSS/DSCN MKR DOCD: CPT | Performed by: NURSE PRACTITIONER

## 2017-12-11 RX ORDER — FEBUXOSTAT 40 MG/1
TABLET ORAL
COMMUNITY
Start: 2017-12-08 | End: 2018-03-26 | Stop reason: CLARIF

## 2017-12-11 NOTE — PROGRESS NOTES
[facility]  [address] [facility] [address]  [address]  Dept: [phone]  Dept Fax: [phone]  Loc: [phone]    Gena Dixon is a [de-identified] y.o. female who presents today for her medical conditions/complaints as noted below. Gena Dixon is c/o of Follow-up (Patient presents today for a follow up on her right hip.)        HPI:     HPI   Chief Complaint   Patient presents with    Follow-up     Patient presents today for a follow up on her right hip. She was seen on  and reported a fall at home. At that time she had full range of motion of her right hip. We did order physical therapy. Therapy has been there a house twice to work with her. She has continued hip and pelvic pain when she lies in bed very long or when she goes from a sitting to standing position. The bruise is still palpable on her right hip.   Past Medical History:   Diagnosis Date    Bronchitis     Colon cancer ([address]     Colon polyps     Constipation     Deep vein blood clot of left lower extremity (HCC)     Left leg     Depression     DVT (deep venous thrombosis) (HCC)     Dysphagia     Fibrocystic breast disease     Fibromyalgia     GERD (gastroesophageal reflux disease)     reflux with hx of esophageal stricture    Headache     History of colon cancer     Hormone replacement therapy (postmenopausal)     Hypertension     Neuropathy of foot     In both feet    Osteoarthritis     left knee pain    Restless legs syndrome     Vitamin D deficiency       Past Surgical History:   Procedure Laterality Date    APPENDECTOMY      BREAST BIOPSY      CATARACT REMOVAL       SECTION      CHOLECYSTECTOMY      COLECTOMY  partial    COLON SURGERY      COLONOSCOPY  2010    COLONOSCOPY  2012    Dr [facility]: unremarkable enterocolonic anastamosis; hyperplastic polyps    COLONOSCOPY  3/2013    [facility]: unremarkable post-surgical colon    COLONOSCOPY  3/11/16 Dr Santi Croft colon anastomosis, 5 yr recall    FOOT SURGERY  right foot    HAND SURGERY Right     Dr Tamika Azevedo GASTROINTESTINAL ENDOSCOPY  10-    UPPER GASTROINTESTINAL ENDOSCOPY  3/2013    [facility]: peptic stricture dilated to 48F; HH; small antral mucosal elevation    UPPER GASTROINTESTINAL ENDOSCOPY  12/2014    [facility]: dilation of esophageal stricture    VARICOSE VEIN SURGERY Left 03/14/2014  [facility]    Left GSV Ablation    VARICOSE VEIN SURGERY Right 04/04/2014  [facility]    Right GSV Ablation       Family History   Problem Relation Age of Onset    Cancer Mother      Cervical Cancer    Cancer Brother      Esophageal cancer    Diabetes Brother     Esophageal Cancer Brother     Diabetes Sister     Colon Cancer Neg Hx     Colon Polyps Neg Hx     Liver Cancer Neg Hx     Liver Disease Neg Hx     Rectal Cancer Neg Hx     Stomach Cancer Neg Hx        Social History   Substance Use Topics    Smoking status: Never Smoker    Smokeless tobacco: Never Used    Alcohol use No      Current Outpatient Prescriptions   Medication Sig Dispense Refill    ULORIC 40 MG TABS tablet       metoprolol tartrate (LOPRESSOR) 25 MG tablet TAKE ONE TABLET BY MOUTH TWICE DAILY FOR BLOOD PRESSURE AND HEART 180 tablet 3    triamcinolone (KENALOG) 0.1 % cream Apply topically 2 times daily.  External vagina 1 Tube 1    acyclovir (ZOVIRAX) 400 MG tablet TAKE 1 TABLET BY MOUTH FIVE TIMES DAILY FOR 1 WEEK THEN TAKE 1 TABLET BY MOUTH BACK TWICE DAILY 56 tablet 3    acetaminophen-codeine (TYLENOL #3) 300-30 MG per tablet TAKE 1 TABLET BY MOUTH THREE TIMES DAILY AS NEEDED FOR SEVERE PAIN 24 tablet 0    nystatin (MYCOSTATIN) 003078 UNIT/GM powder Apply 2 times a day to dry skin 1 Bottle 3    triamterene-hydrochlorothiazide (MAXZIDE-25) 37.5-25 MG per tablet TAKE 1 TABLET BY MOUTH DAILY 90 tablet 5    rivaroxaban (XARELTO) 20 MG TABS tablet Take 1 tablet by mouth daily (with breakfast) For treatment of DVT 30 tablet 11    albuterol (ACCUNEB) 1.25 MG/3ML nebulizer solution Inhale 3 mLs into the lungs every 6 hours as needed for Wheezing Dx J41.8 360 mL 3    gabapentin (NEURONTIN) 300 MG capsule TAKE 1 CAPSULE BY MOUTH THREE TIMES DAILY FOR NERVE PAIN AND LEG CRAMPS 270 capsule 3    folic acid (FOLVITE) 1 MG tablet TK 1 T PO D  3    omeprazole (PRILOSEC) 40 MG delayed release capsule TAKE 1 CAPSULE BY MOUTH DAILY 90 capsule 3    Nebulizers (AIRIAL COMPACT MINI NEBULIZER) MISC 1 Units by Does not apply route 4 times daily 1 each 0    vitamin D (ERGOCALCIFEROL) 00059 UNITS CAPS capsule TAKE 1 CAPSULE BY MOUTH ONCE A WEEK 12 capsule 3    losartan (COZAAR) 100 MG tablet TAKE 1 TABLET BY MOUTH EVERY MORNING FOR BLOOD PRESSURE 90 tablet 3    dorzolamide-timolol (COSOPT) 22.3-6.8 MG/ML ophthalmic solution PLACE 1 DROP IN RIGHT EYE BID  6    Magnesium Oxide (MAG- PO) Take by mouth      LOTEMAX 0.5 % ophthalmic gel   4    vitamin B-12 (CYANOCOBALAMIN) 1000 MCG tablet Take 1,000 mcg by mouth once a week        No current facility-administered medications for this visit. Allergies   Allergen Reactions    Zoloft [Sertraline Hcl] Other (See Comments)     Patient states that she doesn't want this medication anymore because she hallucinated and saw spiders. Patient weaned herself off and after she quit taking the medication, the hallucinations stopped. Health Maintenance   Topic Date Due    DTaP/Tdap/Td vaccine (3 - Td) 12/23/2025    Colon cancer screen colonoscopy  03/11/2026    Zostavax vaccine  Addressed    DEXA (modify frequency per FRAX score)  Completed    Flu vaccine  Completed    Pneumococcal low/med risk  Completed       Subjective:      Review of Systems   Constitutional: Positive for activity change.    Musculoskeletal:        Hip and pelvic pain       Objective:     Physical Exam   Constitutional: She is oriented to Follow-up:  Return if symptoms worsen or fail to improve. PATIENT INSTRUCTIONS:  Patient Instructions   Have xray of hip and pelvis.     Electronically signed by Sarita Araiza CNP on 12/11/2017 at 2:54 PM

## 2018-01-03 RX ORDER — ACYCLOVIR 400 MG/1
TABLET ORAL
Qty: 56 TABLET | Refills: 0 | Status: SHIPPED | OUTPATIENT
Start: 2018-01-03 | End: 2018-02-04 | Stop reason: SDUPTHER

## 2018-01-19 ENCOUNTER — OFFICE VISIT (OUTPATIENT)
Dept: PRIMARY CARE CLINIC | Age: 83
End: 2018-01-19
Payer: MEDICARE

## 2018-01-19 VITALS
BODY MASS INDEX: 30.48 KG/M2 | TEMPERATURE: 97.1 F | OXYGEN SATURATION: 97 % | DIASTOLIC BLOOD PRESSURE: 73 MMHG | RESPIRATION RATE: 16 BRPM | WEIGHT: 172 LBS | HEART RATE: 67 BPM | HEIGHT: 63 IN | SYSTOLIC BLOOD PRESSURE: 120 MMHG

## 2018-01-19 DIAGNOSIS — M25.551 RIGHT HIP PAIN: ICD-10-CM

## 2018-01-19 DIAGNOSIS — M25.659 DECREASED RANGE OF MOTION OF HIP: ICD-10-CM
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
## 2018-01-19 PROCEDURE — G8484 FLU IMMUNIZE NO ADMIN: HCPCS | Performed by: NURSE PRACTITIONER

## 2018-01-19 PROCEDURE — G8427 DOCREV CUR MEDS BY ELIG CLIN: HCPCS | Performed by: NURSE PRACTITIONER

## 2018-01-19 PROCEDURE — 1036F TOBACCO NON-USER: CPT | Performed by: NURSE PRACTITIONER

## 2018-01-19 PROCEDURE — G8399 PT W/DXA RESULTS DOCUMENT: HCPCS | Performed by: NURSE PRACTITIONER

## 2018-01-19 PROCEDURE — G8417 CALC BMI ABV UP PARAM F/U: HCPCS | Performed by: NURSE PRACTITIONER

## 2018-01-19 PROCEDURE — 99213 OFFICE O/P EST LOW 20 MIN: CPT | Performed by: NURSE PRACTITIONER

## 2018-01-19 PROCEDURE — 1090F PRES/ABSN URINE INCON ASSESS: CPT | Performed by: NURSE PRACTITIONER

## 2018-01-19 PROCEDURE — 4040F PNEUMOC VAC/ADMIN/RCVD: CPT | Performed by: NURSE PRACTITIONER

## 2018-01-19 PROCEDURE — 1123F ACP DISCUSS/DSCN MKR DOCD: CPT | Performed by: NURSE PRACTITIONER

## 2018-01-19 RX ORDER — ACETAMINOPHEN AND CODEINE PHOSPHATE 300; 30 MG/1; MG/1
TABLET ORAL
Qty: 12 TABLET | Refills: 0 | Status: SHIPPED | OUTPATIENT
Start: 2018-01-19 | End: 2018-02-19 | Stop reason: ALTCHOICE

## 2018-01-19 NOTE — PROGRESS NOTES
[address] [address] [address] [facility] [address]  [address]  Dept: [phone]  Dept Fax: [phone]  Loc: [phone]    Frantz Saravia is a [de-identified] y.o. female who presents today for her medical conditions/complaints as noted below. Frantz Saravia is c/o of Head Injury (patient presents today because she was putting a large candle up and it hit her in the head about 3 weeks ago. Patient states that she really didnt want to come about that but when she was talking to her insurance rep he suggested that she come and be seen because she is on blood thinner. Patient states that she has been having headaches since the hit to the head. Patient also states that since she is here, she is still having some right hip pain. )        HPI:     HPI   Chief Complaint   Patient presents with    Head Injury     patient presents today because she was putting a large candle up and it hit her in the head about 3 weeks ago. Patient states that she really didnt want to come about that but when she was talking to her insurance rep he suggested that she come and be seen because she is on blood thinner. Patient states that she has been having headaches since the hit to the head. Patient also states that since she is here, she is still having some right hip pain. She cant cross her right leg over left or lift her right leg without assistance. Home health did come and work with her on falls and hip pain but no improvement. She did take tylenol 3 with improvement.   Past Medical History:   Diagnosis Date    Bronchitis     [address] [address]     Colon polyps     Constipation     Deep vein blood clot of left lower extremity (HCC)     Left leg     Depression     DVT (deep venous thrombosis) (HCC)     Dysphagia     Fibrocystic breast disease     Fibromyalgia     GERD (gastroesophageal reflux disease)     reflux with hx of esophageal stricture    Headache(784.0)     History of colon cancer 2000

## 2018-01-22 ENCOUNTER — TELEPHONE (OUTPATIENT)
Dept: PRIMARY CARE CLINIC | Age: 83
End: 2018-01-22

## 2018-01-22 NOTE — TELEPHONE ENCOUNTER
PT called stating her blood thinner medication, Xarelto, is too expensive and she can not afford it. PT requests something else be called into her pharmacy that is cheaper.

## 2018-01-24 DIAGNOSIS — M25.551 RIGHT HIP PAIN: ICD-10-CM
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
## 2018-01-24 DIAGNOSIS — M25.659 DECREASED RANGE OF MOTION OF HIP: ICD-10-CM

## 2018-01-24 NOTE — TELEPHONE ENCOUNTER
Francisco Fleming,  Please call  Wilfrido Prosper. Please ask her who put her on the Xarelto to start with. We can give her samples until we figure out who put her on it and if they want us to change it. Let me know.  Thank you

## 2018-01-25 DIAGNOSIS — M87.051 AVASCULAR NECROSIS OF BONE OF HIP, RIGHT (HCC): Primary | ICD-10-CM

## 2018-02-05 RX ORDER — ACYCLOVIR 400 MG/1
TABLET ORAL
Qty: 56 TABLET | Refills: 0 | Status: SHIPPED | OUTPATIENT
Start: 2018-02-05 | End: 2018-03-06 | Stop reason: SDUPTHER

## 2018-02-12 ENCOUNTER — OFFICE VISIT (OUTPATIENT)
Dept: PRIMARY CARE CLINIC | Age: 83
End: 2018-02-12
Payer: MEDICARE

## 2018-02-12 VITALS
BODY MASS INDEX: 30.48 KG/M2 | SYSTOLIC BLOOD PRESSURE: 138 MMHG | OXYGEN SATURATION: 98 % | WEIGHT: 172 LBS | HEIGHT: 63 IN | HEART RATE: 58 BPM | DIASTOLIC BLOOD PRESSURE: 76 MMHG | TEMPERATURE: 97.7 F

## 2018-02-12 DIAGNOSIS — G89.29 CHRONIC RIGHT-SIDED LOW BACK PAIN WITH RIGHT-SIDED SCIATICA: Primary | ICD-10-CM

## 2018-02-12 DIAGNOSIS — B37.2 YEAST DERMATITIS: ICD-10-CM

## 2018-02-12 DIAGNOSIS — M54.41 CHRONIC RIGHT-SIDED LOW BACK PAIN WITH RIGHT-SIDED SCIATICA: Primary | ICD-10-CM

## 2018-02-12 DIAGNOSIS — M87.051 AVASCULAR NECROSIS OF BONE OF RIGHT HIP (HCC): ICD-10-CM

## 2018-02-12 PROCEDURE — 1090F PRES/ABSN URINE INCON ASSESS: CPT | Performed by: NURSE PRACTITIONER

## 2018-02-12 PROCEDURE — 1036F TOBACCO NON-USER: CPT | Performed by: NURSE PRACTITIONER

## 2018-02-12 PROCEDURE — G8484 FLU IMMUNIZE NO ADMIN: HCPCS | Performed by: NURSE PRACTITIONER

## 2018-02-12 PROCEDURE — 4040F PNEUMOC VAC/ADMIN/RCVD: CPT | Performed by: NURSE PRACTITIONER

## 2018-02-12 PROCEDURE — G8417 CALC BMI ABV UP PARAM F/U: HCPCS | Performed by: NURSE PRACTITIONER

## 2018-02-12 PROCEDURE — G8427 DOCREV CUR MEDS BY ELIG CLIN: HCPCS | Performed by: NURSE PRACTITIONER

## 2018-02-12 PROCEDURE — 1123F ACP DISCUSS/DSCN MKR DOCD: CPT | Performed by: NURSE PRACTITIONER

## 2018-02-12 PROCEDURE — G8399 PT W/DXA RESULTS DOCUMENT: HCPCS | Performed by: NURSE PRACTITIONER

## 2018-02-12 PROCEDURE — 99214 OFFICE O/P EST MOD 30 MIN: CPT | Performed by: NURSE PRACTITIONER

## 2018-02-12 RX ORDER — HYDROCODONE BITARTRATE AND ACETAMINOPHEN 5; 325 MG/1; MG/1
1 TABLET ORAL 2 TIMES DAILY PRN
Qty: 60 TABLET | Refills: 0 | Status: SHIPPED | OUTPATIENT
Start: 2018-02-12 | End: 2018-02-15

## 2018-02-12 RX ORDER — FLUCONAZOLE 100 MG/1
100 TABLET ORAL DAILY
Qty: 7 TABLET | Refills: 0 | Status: SHIPPED | OUTPATIENT
Start: 2018-02-12 | End: 2018-02-19 | Stop reason: ALTCHOICE

## 2018-02-12 RX ORDER — ERGOCALCIFEROL 1.25 MG/1
CAPSULE ORAL
Qty: 12 CAPSULE | Refills: 0 | Status: SHIPPED | OUTPATIENT
Start: 2018-02-12 | End: 2018-04-28 | Stop reason: SDUPTHER

## 2018-02-12 ASSESSMENT — ENCOUNTER SYMPTOMS: BACK PAIN: 1

## 2018-02-12 NOTE — PROGRESS NOTES
[facility]  [address]  [address]  Dept: [phone]  Dept Fax: [phone]  Loc: [phone]    Evan Vargas is a 82 y.o. female who presents today for her medical conditions/complaints as noted below.   Evan Vargas is c/o of Hip Pain (PT states her right hip hurts) and Back Pain (PT states the pain radiates from her lower back to her right hip)        HPI:     HPI   Chief Complaint   Patient presents with    Hip Pain     PT states her right hip hurts    Back Pain     PT states the pain radiates from her lower back to her right hip       Past Medical History:   Diagnosis Date    Bronchitis     Colon cancer ([address]     Colon polyps     Constipation     Deep vein blood clot of left lower extremity (HCC)     Left leg     Depression     DVT (deep venous thrombosis) (HCC)     Dysphagia     Fibrocystic breast disease     Fibromyalgia     GERD (gastroesophageal reflux disease)     reflux with hx of esophageal stricture    Headache(784.0)     History of colon cancer     Hormone replacement therapy (postmenopausal)     Hypertension     Neuropathy of foot     In both feet    Osteoarthritis     left knee pain    Restless legs syndrome     Vitamin D deficiency       Past Surgical History:   Procedure Laterality Date    APPENDECTOMY      BREAST BIOPSY      CATARACT REMOVAL       SECTION      CHOLECYSTECTOMY      COLECTOMY  partial    COLON SURGERY      COLONOSCOPY  2010    COLONOSCOPY  2012    Dr Cyrus Galicia: unremarkable enterocolonic anastamosis; hyperplastic polyps    COLONOSCOPY  3/2013    [facility]: unremarkable post-surgical colon    COLONOSCOPY  3/11/16    Dr Marletta Osgood colon anastomosis, 5 yr recall    FOOT SURGERY  right foot    HAND SURGERY Right     Dr Viktoria Galvan GASTROINTESTINAL ENDOSCOPY  10-    UPPER up to 3 days . Take lowest dose possible to manage pain. Dispense:  60 tablet     Refill:  0    fluconazole (DIFLUCAN) 100 MG tablet     Sig: Take 1 tablet by mouth daily for 7 days     Dispense:  7 tablet     Refill:  0         ORDERS:  Orders Placed This Encounter   Procedures    External Referral To Pain Clinic       Follow-up:  Return in about 3 months (around 5/12/2018), or if symptoms worsen or fail to improve, for 1 wk port flush. PATIENT INSTRUCTIONS:  Patient Instructions   We will do pain meds till you see pain management, Dr Carlota Cervantes  Stop the cream on rash, dry area well with blow dryer on cool and may apply powder see dr Harding and take the diflucan. Continue all meds.     Electronically signed by Alma Browne CNP on 2/12/2018 at 6:17 PM

## 2018-02-19 ENCOUNTER — OFFICE VISIT (OUTPATIENT)
Dept: PRIMARY CARE CLINIC | Age: 83
End: 2018-02-19
Payer: MEDICARE

## 2018-02-19 VITALS
SYSTOLIC BLOOD PRESSURE: 128 MMHG | OXYGEN SATURATION: 96 % | HEIGHT: 63 IN | DIASTOLIC BLOOD PRESSURE: 70 MMHG | HEART RATE: 62 BPM | BODY MASS INDEX: 30.12 KG/M2 | RESPIRATION RATE: 16 BRPM | WEIGHT: 170 LBS | TEMPERATURE: 97.2 F

## 2018-02-19 DIAGNOSIS — Z45.2 ENCOUNTER FOR CARE RELATED TO PORT-A-CATH: Primary | ICD-10-CM

## 2018-02-19 DIAGNOSIS — Z95.828 PORTACATH IN PLACE: ICD-10-CM

## 2018-02-19 PROCEDURE — 96523 IRRIG DRUG DELIVERY DEVICE: CPT | Performed by: NURSE PRACTITIONER

## 2018-02-19 RX ORDER — FLUCONAZOLE 150 MG/1
TABLET ORAL
Qty: 2 TABLET | Refills: 0 | Status: SHIPPED | OUTPATIENT
Start: 2018-02-19 | End: 2018-03-26 | Stop reason: CLARIF

## 2018-02-19 NOTE — PROGRESS NOTES
GASTROINTESTINAL ENDOSCOPY  3/2013    [facility]: peptic stricture dilated to 48F; HH; small antral mucosal elevation    UPPER GASTROINTESTINAL ENDOSCOPY  12/2014    [facility]: dilation of esophageal stricture    VARICOSE VEIN SURGERY Left 03/14/2014  [address]    Left GSV Ablation    VARICOSE VEIN SURGERY Right 04/04/2014  [address]    Right GSV Ablation       Vitals 2/19/2018 2/12/2018 1/19/2018 12/11/2017 12/1/2017 6/38/3532   SYSTOLIC 715 116 [phone] 275   DIASTOLIC 70 76 73 60 79 78   Site - Right Arm Right Arm Right Arm Right Arm Right Arm   Position - Sitting Sitting Sitting Sitting Sitting   Cuff Size - Medium Adult Medium Adult Medium Adult Medium Adult Medium Adult   Pulse 62 58 67 70 60 80   Temp 97.2 97.7 97.1 97.3 96.7 96.5   Resp 16 - 16 16 18 16   Weight 170 lb 172 lb 172 lb 169 lb 170 lb 169 lb 12.8 oz   Height 5' 3\" 5' 3\" 5' 3\" 5' 3\" 5' 3\" 5' 3\"   BMI (wt*703/ht~2) 30.11 kg/m2 30.47 kg/m2 30.47 kg/m2 29.93 kg/m2 30.11 kg/m2 30.08 kg/m2   Some recent data might be hidden       Family History   Problem Relation Age of Onset    Cancer Mother      Cervical Cancer    Cancer Brother      Esophageal cancer    Diabetes Brother     Esophageal Cancer Brother     Diabetes Sister     Colon Cancer Neg Hx     Colon Polyps Neg Hx     Liver Cancer Neg Hx     Liver Disease Neg Hx     Rectal Cancer Neg Hx     Stomach Cancer Neg Hx        Social History   Substance Use Topics    Smoking status: Never Smoker    Smokeless tobacco: Never Used    Alcohol use No      Current Outpatient Prescriptions   Medication Sig Dispense Refill    fluconazole (DIFLUCAN) 150 MG tablet Take one now and repeat in 1 wk if needed 2 tablet 0    vitamin D (ERGOCALCIFEROL) 17769 units CAPS capsule TAKE 1 CAPSULE BY MOUTH 1 TIME A WEEK 12 capsule 0    acyclovir (ZOVIRAX) 400 MG tablet TAKE 1 TABLET BY MOUTH FIVE TIMES DAILY FOR 1 WEEK THEN TAKE 1 TABLET BY MOUTH BACK TWICE DAILY (Patient taking differently: Take 1 tablet by mouth twice a day) 56 tablet 0    losartan (COZAAR) 100 MG tablet TAKE 1 TABLET BY MOUTH EVERY MORNING FOR BLOOD PRESSURE 90 tablet 3    ULORIC 40 MG TABS tablet       metoprolol tartrate (LOPRESSOR) 25 MG tablet TAKE ONE TABLET BY MOUTH TWICE DAILY FOR BLOOD PRESSURE AND HEART 180 tablet 3    triamcinolone (KENALOG) 0.1 % cream Apply topically 2 times daily. External vagina 1 Tube 1    nystatin (MYCOSTATIN) 711321 UNIT/GM powder Apply 2 times a day to dry skin 1 Bottle 3    triamterene-hydrochlorothiazide (MAXZIDE-25) 37.5-25 MG per tablet TAKE 1 TABLET BY MOUTH DAILY 90 tablet 5    rivaroxaban (XARELTO) 20 MG TABS tablet Take 1 tablet by mouth daily (with breakfast) For treatment of DVT 30 tablet 11    albuterol (ACCUNEB) 1.25 MG/3ML nebulizer solution Inhale 3 mLs into the lungs every 6 hours as needed for Wheezing Dx J41.8 360 mL 3    gabapentin (NEURONTIN) 300 MG capsule TAKE 1 CAPSULE BY MOUTH THREE TIMES DAILY FOR NERVE PAIN AND LEG CRAMPS 270 capsule 3    folic acid (FOLVITE) 1 MG tablet TK 1 T PO D  3    omeprazole (PRILOSEC) 40 MG delayed release capsule TAKE 1 CAPSULE BY MOUTH DAILY 90 capsule 3    Nebulizers (AIRIAL COMPACT MINI NEBULIZER) MISC 1 Units by Does not apply route 4 times daily 1 each 0    dorzolamide-timolol (COSOPT) 22.3-6.8 MG/ML ophthalmic solution PLACE 1 DROP IN RIGHT EYE BID  6    Magnesium Oxide (MAG- PO) Take by mouth      LOTEMAX 0.5 % ophthalmic gel   4    vitamin B-12 (CYANOCOBALAMIN) 1000 MCG tablet Take 1,000 mcg by mouth once a week        No current facility-administered medications for this visit. Allergies   Allergen Reactions    Zoloft [Sertraline Hcl] Other (See Comments)     Patient states that she doesn't want this medication anymore because she hallucinated and saw spiders. Patient weaned herself off and after she quit taking the medication, the hallucinations stopped.           Health Maintenance   Topic Date Due    Potassium monitoring  12/01/2018    and exercise. Patient agreed with treatment plan. Follow up as directed. MEDICATIONS:  Orders Placed This Encounter   Medications    fluconazole (DIFLUCAN) 150 MG tablet     Sig: Take one now and repeat in 1 wk if needed     Dispense:  2 tablet     Refill:  0         ORDERS:  Orders Placed This Encounter   Procedures    OK IRRIG IMPLANTED DRUG DELIVERY DEVICE       Follow-up:  Return in about 3 months (around 5/19/2018) for port only. PATIENT INSTRUCTIONS:  There are no Patient Instructions on file for this visit.   Electronically signed by Stephen Carmen CNP on 2/19/2018 at 2:44 PM

## 2018-03-06 RX ORDER — ACYCLOVIR 400 MG/1
TABLET ORAL
Qty: 56 TABLET | Refills: 0 | Status: SHIPPED | OUTPATIENT
Start: 2018-03-06 | End: 2018-04-04 | Stop reason: SDUPTHER

## 2018-03-23 RX ORDER — OMEPRAZOLE 40 MG/1
CAPSULE, DELAYED RELEASE ORAL
Qty: 90 CAPSULE | Refills: 1 | Status: SHIPPED | OUTPATIENT
Start: 2018-03-23 | End: 2018-09-18 | Stop reason: SDUPTHER

## 2018-03-26 ENCOUNTER — OFFICE VISIT (OUTPATIENT)
Dept: PRIMARY CARE CLINIC | Age: 83
End: 2018-03-26
Payer: MEDICARE

## 2018-03-26 VITALS
OXYGEN SATURATION: 98 % | TEMPERATURE: 97.7 F | HEIGHT: 63 IN | RESPIRATION RATE: 16 BRPM | DIASTOLIC BLOOD PRESSURE: 78 MMHG | SYSTOLIC BLOOD PRESSURE: 134 MMHG | HEART RATE: 62 BPM | WEIGHT: 174 LBS | BODY MASS INDEX: 30.83 KG/M2

## 2018-03-26 DIAGNOSIS — N18.30 STAGE 3 CHRONIC KIDNEY DISEASE (HCC): ICD-10-CM

## 2018-03-26 DIAGNOSIS — D50.9 IRON DEFICIENCY ANEMIA, UNSPECIFIED IRON DEFICIENCY ANEMIA TYPE: Primary | ICD-10-CM

## 2018-03-26 DIAGNOSIS — I10 ESSENTIAL HYPERTENSION: ICD-10-CM

## 2018-03-26 DIAGNOSIS — I87.2 VENOUS INSUFFICIENCY: ICD-10-CM

## 2018-03-26 DIAGNOSIS — Z85.038 PERSONAL HISTORY OF COLON CANCER: ICD-10-CM

## 2018-03-26 LAB
ALBUMIN SERPL-MCNC: 4.2 G/DL (ref 3.5–5.2)
ALP BLD-CCNC: 70 U/L (ref 35–104)
ALT SERPL-CCNC: 18 U/L (ref 5–33)
ANION GAP SERPL CALCULATED.3IONS-SCNC: 14 MMOL/L (ref 7–19)
AST SERPL-CCNC: 16 U/L (ref 5–32)
BASOPHILS ABSOLUTE: 0 K/UL (ref 0–0.2)
BASOPHILS RELATIVE PERCENT: 0.5 % (ref 0–1)
BILIRUB SERPL-MCNC: <0.2 MG/DL (ref 0.2–1.2)
BUN BLDV-MCNC: 19 MG/DL (ref 8–23)
CALCIUM SERPL-MCNC: 9.3 MG/DL (ref 8.8–10.2)
CHLORIDE BLD-SCNC: 100 MMOL/L (ref 98–111)
CO2: 26 MMOL/L (ref 22–29)
CREAT SERPL-MCNC: 1 MG/DL (ref 0.5–0.9)
EOSINOPHILS ABSOLUTE: 0.4 K/UL (ref 0–0.6)
EOSINOPHILS RELATIVE PERCENT: 6.5 % (ref 0–5)
FERRITIN: 289.9 NG/ML (ref 13–150)
GFR NON-AFRICAN AMERICAN: 53
GLUCOSE BLD-MCNC: 115 MG/DL (ref 74–109)
HCT VFR BLD CALC: 40.8 % (ref 37–47)
HEMOGLOBIN: 13.1 G/DL (ref 12–16)
IRON SATURATION: 17 % (ref 14–50)
IRON: 55 UG/DL (ref 37–145)
LYMPHOCYTES ABSOLUTE: 2.2 K/UL (ref 1.1–4.5)
LYMPHOCYTES RELATIVE PERCENT: 36.4 % (ref 20–40)
MCH RBC QN AUTO: 30.9 PG (ref 27–31)
MCHC RBC AUTO-ENTMCNC: 32.1 G/DL (ref 33–37)
MCV RBC AUTO: 96.2 FL (ref 81–99)
MONOCYTES ABSOLUTE: 0.6 K/UL (ref 0–0.9)
MONOCYTES RELATIVE PERCENT: 9.4 % (ref 0–10)
NEUTROPHILS ABSOLUTE: 2.8 K/UL (ref 1.5–7.5)
NEUTROPHILS RELATIVE PERCENT: 46.9 % (ref 50–65)
PDW BLD-RTO: 13 % (ref 11.5–14.5)
PLATELET # BLD: 246 K/UL (ref 130–400)
PMV BLD AUTO: 9.5 FL (ref 9.4–12.3)
POTASSIUM SERPL-SCNC: 4.4 MMOL/L (ref 3.5–5)
RBC # BLD: 4.24 M/UL (ref 4.2–5.4)
SODIUM BLD-SCNC: 140 MMOL/L (ref 136–145)
TOTAL IRON BINDING CAPACITY: 326 UG/DL (ref 250–400)
TOTAL PROTEIN: 6.8 G/DL (ref 6.6–8.7)
VITAMIN B-12: 224 PG/ML (ref 211–946)
WBC # BLD: 6 K/UL (ref 4.8–10.8)

## 2018-03-26 PROCEDURE — 4040F PNEUMOC VAC/ADMIN/RCVD: CPT | Performed by: NURSE PRACTITIONER

## 2018-03-26 PROCEDURE — 99213 OFFICE O/P EST LOW 20 MIN: CPT | Performed by: NURSE PRACTITIONER

## 2018-03-26 PROCEDURE — 1123F ACP DISCUSS/DSCN MKR DOCD: CPT | Performed by: NURSE PRACTITIONER

## 2018-03-26 PROCEDURE — G8417 CALC BMI ABV UP PARAM F/U: HCPCS | Performed by: NURSE PRACTITIONER

## 2018-03-26 PROCEDURE — G8427 DOCREV CUR MEDS BY ELIG CLIN: HCPCS | Performed by: NURSE PRACTITIONER

## 2018-03-26 PROCEDURE — G8399 PT W/DXA RESULTS DOCUMENT: HCPCS | Performed by: NURSE PRACTITIONER

## 2018-03-26 PROCEDURE — 1090F PRES/ABSN URINE INCON ASSESS: CPT | Performed by: NURSE PRACTITIONER

## 2018-03-26 PROCEDURE — G8482 FLU IMMUNIZE ORDER/ADMIN: HCPCS | Performed by: NURSE PRACTITIONER

## 2018-03-26 PROCEDURE — 1036F TOBACCO NON-USER: CPT | Performed by: NURSE PRACTITIONER

## 2018-03-26 PROCEDURE — 36415 COLL VENOUS BLD VENIPUNCTURE: CPT | Performed by: NURSE PRACTITIONER

## 2018-03-26 ASSESSMENT — PATIENT HEALTH QUESTIONNAIRE - PHQ9
2. FEELING DOWN, DEPRESSED OR HOPELESS: 0
SUM OF ALL RESPONSES TO PHQ QUESTIONS 1-9: 0
1. LITTLE INTEREST OR PLEASURE IN DOING THINGS: 0
SUM OF ALL RESPONSES TO PHQ9 QUESTIONS 1 & 2: 0

## 2018-03-26 ASSESSMENT — ENCOUNTER SYMPTOMS: SHORTNESS OF BREATH: 0

## 2018-03-26 NOTE — PROGRESS NOTES
MEDICATIONS:  No orders of the defined types were placed in this encounter. ORDERS:  Orders Placed This Encounter   Procedures    IRON AND TIBC    CBC Auto Differential    Ferritin    Vitamin B12    Comprehensive Metabolic Panel       Follow-up:  No Follow-up on file.     PATIENT INSTRUCTIONS:  Patient Instructions   Stay off xarelto for 2 days for injection  We will call you with labs    Electronically signed by Cynthia Hines CNP on 3/26/2018 at 6:30 PM

## 2018-04-04 RX ORDER — ACYCLOVIR 400 MG/1
TABLET ORAL
Qty: 56 TABLET | Refills: 0 | Status: SHIPPED | OUTPATIENT
Start: 2018-04-04 | End: 2018-05-04 | Stop reason: SDUPTHER

## 2018-04-30 RX ORDER — ERGOCALCIFEROL 1.25 MG/1
CAPSULE ORAL
Qty: 13 CAPSULE | Refills: 0 | Status: SHIPPED | OUTPATIENT
Start: 2018-04-30 | End: 2018-07-28 | Stop reason: SDUPTHER

## 2018-04-30 RX ORDER — ERGOCALCIFEROL 1.25 MG/1
CAPSULE ORAL
Qty: 12 CAPSULE | Refills: 0 | Status: SHIPPED | OUTPATIENT
Start: 2018-04-30 | End: 2018-04-30 | Stop reason: SDUPTHER

## 2018-05-04 RX ORDER — ACYCLOVIR 400 MG/1
TABLET ORAL
Qty: 56 TABLET | Refills: 0 | Status: SHIPPED | OUTPATIENT
Start: 2018-05-04 | End: 2018-06-02 | Stop reason: SDUPTHER

## 2018-05-07 ENCOUNTER — OFFICE VISIT (OUTPATIENT)
Dept: PRIMARY CARE CLINIC | Age: 83
End: 2018-05-07
Payer: MEDICARE

## 2018-05-07 VITALS
RESPIRATION RATE: 16 BRPM | DIASTOLIC BLOOD PRESSURE: 60 MMHG | HEART RATE: 64 BPM | WEIGHT: 169 LBS | HEIGHT: 63 IN | BODY MASS INDEX: 29.95 KG/M2 | OXYGEN SATURATION: 97 % | TEMPERATURE: 97.8 F | SYSTOLIC BLOOD PRESSURE: 133 MMHG

## 2018-05-07 DIAGNOSIS — D50.9 IRON DEFICIENCY ANEMIA, UNSPECIFIED IRON DEFICIENCY ANEMIA TYPE: ICD-10-CM

## 2018-05-07 DIAGNOSIS — Z85.038 PERSONAL HISTORY OF COLON CANCER: Primary | ICD-10-CM

## 2018-05-07 DIAGNOSIS — I10 ESSENTIAL HYPERTENSION: ICD-10-CM

## 2018-05-07 PROCEDURE — G8427 DOCREV CUR MEDS BY ELIG CLIN: HCPCS | Performed by: NURSE PRACTITIONER

## 2018-05-07 PROCEDURE — 99214 OFFICE O/P EST MOD 30 MIN: CPT | Performed by: NURSE PRACTITIONER

## 2018-05-07 PROCEDURE — G8399 PT W/DXA RESULTS DOCUMENT: HCPCS | Performed by: NURSE PRACTITIONER

## 2018-05-07 PROCEDURE — G8417 CALC BMI ABV UP PARAM F/U: HCPCS | Performed by: NURSE PRACTITIONER

## 2018-05-07 PROCEDURE — 1090F PRES/ABSN URINE INCON ASSESS: CPT | Performed by: NURSE PRACTITIONER

## 2018-05-07 PROCEDURE — 1036F TOBACCO NON-USER: CPT | Performed by: NURSE PRACTITIONER

## 2018-05-07 PROCEDURE — 1123F ACP DISCUSS/DSCN MKR DOCD: CPT | Performed by: NURSE PRACTITIONER

## 2018-05-07 PROCEDURE — 4040F PNEUMOC VAC/ADMIN/RCVD: CPT | Performed by: NURSE PRACTITIONER

## 2018-05-07 RX ORDER — HYDROCODONE BITARTRATE AND ACETAMINOPHEN 5; 325 MG/1; MG/1
TABLET ORAL
Refills: 0 | COMMUNITY
Start: 2018-04-04 | End: 2018-09-26 | Stop reason: DRUGHIGH

## 2018-05-07 RX ORDER — CHOLECALCIFEROL (VITAMIN D3) 125 MCG
500 CAPSULE ORAL DAILY
Qty: 30 TABLET | Refills: 3 | Status: SHIPPED | OUTPATIENT
Start: 2018-05-07 | End: 2018-09-18 | Stop reason: SDUPTHER

## 2018-05-07 RX ORDER — METHYLPREDNISOLONE 4 MG/1
TABLET ORAL
Qty: 1 KIT | Refills: 0 | Status: SHIPPED | OUTPATIENT
Start: 2018-05-07 | End: 2018-05-13

## 2018-05-07 ASSESSMENT — ENCOUNTER SYMPTOMS
DIARRHEA: 1
EYES NEGATIVE: 1
RESPIRATORY NEGATIVE: 1

## 2018-06-11 DIAGNOSIS — C18.9 MALIGNANT NEOPLASM OF COLON, UNSPECIFIED PART OF COLON (HCC): Primary | ICD-10-CM

## 2018-06-13 ENCOUNTER — CONSULT (OUTPATIENT)
Dept: ONCOLOGY | Facility: CLINIC | Age: 83
End: 2018-06-13

## 2018-06-13 ENCOUNTER — APPOINTMENT (OUTPATIENT)
Dept: LAB | Facility: HOSPITAL | Age: 83
End: 2018-06-13

## 2018-06-13 VITALS
RESPIRATION RATE: 16 BRPM | DIASTOLIC BLOOD PRESSURE: 82 MMHG | SYSTOLIC BLOOD PRESSURE: 138 MMHG | WEIGHT: 176.1 LBS | HEART RATE: 64 BPM | OXYGEN SATURATION: 98 % | TEMPERATURE: 97.8 F | HEIGHT: 63 IN | BODY MASS INDEX: 31.2 KG/M2

## 2018-06-13 DIAGNOSIS — C18.9 MALIGNANT NEOPLASM OF COLON, UNSPECIFIED PART OF COLON (HCC): Primary | ICD-10-CM

## 2018-06-13 LAB
ALBUMIN SERPL-MCNC: 3.9 G/DL (ref 3.5–5)
ALBUMIN/GLOB SERPL: 1.4 G/DL (ref 1.1–2.5)
ALP SERPL-CCNC: 82 U/L (ref 24–120)
ALT SERPL W P-5'-P-CCNC: 27 U/L (ref 0–54)
ANION GAP SERPL CALCULATED.3IONS-SCNC: 11 MMOL/L (ref 4–13)
AST SERPL-CCNC: 22 U/L (ref 7–45)
BASOPHILS # BLD AUTO: 0.06 10*3/MM3 (ref 0–0.2)
BASOPHILS NFR BLD AUTO: 1 % (ref 0–2)
BILIRUB SERPL-MCNC: 0.3 MG/DL (ref 0.1–1)
BUN BLD-MCNC: 25 MG/DL (ref 5–21)
BUN/CREAT SERPL: 20.3 (ref 7–25)
CALCIUM SPEC-SCNC: 9.1 MG/DL (ref 8.4–10.4)
CHLORIDE SERPL-SCNC: 106 MMOL/L (ref 98–110)
CO2 SERPL-SCNC: 25 MMOL/L (ref 24–31)
CREAT BLD-MCNC: 1.23 MG/DL (ref 0.5–1.4)
DEPRECATED RDW RBC AUTO: 46.2 FL (ref 40–54)
EOSINOPHIL # BLD AUTO: 0.33 10*3/MM3 (ref 0–0.7)
EOSINOPHIL NFR BLD AUTO: 5.5 % (ref 0–4)
ERYTHROCYTE [DISTWIDTH] IN BLOOD BY AUTOMATED COUNT: 13.4 % (ref 12–15)
GFR SERPL CREATININE-BSD FRML MDRD: 42 ML/MIN/1.73
GLOBULIN UR ELPH-MCNC: 2.7 GM/DL
GLUCOSE BLD-MCNC: 124 MG/DL (ref 70–100)
HCT VFR BLD AUTO: 34.4 % (ref 37–47)
HGB BLD-MCNC: 11.6 G/DL (ref 12–16)
HOLD SPECIMEN: NORMAL
HOLD SPECIMEN: NORMAL
IMM GRANULOCYTES # BLD: 0.02 10*3/MM3 (ref 0–0.03)
IMM GRANULOCYTES NFR BLD: 0.3 % (ref 0–5)
LYMPHOCYTES # BLD AUTO: 1.98 10*3/MM3 (ref 0.72–4.86)
LYMPHOCYTES NFR BLD AUTO: 32.8 % (ref 15–45)
MCH RBC QN AUTO: 31.5 PG (ref 28–32)
MCHC RBC AUTO-ENTMCNC: 33.7 G/DL (ref 33–36)
MCV RBC AUTO: 93.5 FL (ref 82–98)
MONOCYTES # BLD AUTO: 0.57 10*3/MM3 (ref 0.19–1.3)
MONOCYTES NFR BLD AUTO: 9.5 % (ref 4–12)
NEUTROPHILS # BLD AUTO: 3.07 10*3/MM3 (ref 1.87–8.4)
NEUTROPHILS NFR BLD AUTO: 50.9 % (ref 39–78)
NRBC BLD MANUAL-RTO: 0 /100 WBC (ref 0–0)
PLATELET # BLD AUTO: 245 10*3/MM3 (ref 130–400)
PMV BLD AUTO: 9.7 FL (ref 6–12)
POTASSIUM BLD-SCNC: 3.9 MMOL/L (ref 3.5–5.3)
PROT SERPL-MCNC: 6.6 G/DL (ref 6.3–8.7)
RBC # BLD AUTO: 3.68 10*6/MM3 (ref 4.2–5.4)
SODIUM BLD-SCNC: 142 MMOL/L (ref 135–145)
WBC NRBC COR # BLD: 6.03 10*3/MM3 (ref 4.8–10.8)

## 2018-06-13 PROCEDURE — 99204 OFFICE O/P NEW MOD 45 MIN: CPT | Performed by: INTERNAL MEDICINE

## 2018-06-13 PROCEDURE — 36415 COLL VENOUS BLD VENIPUNCTURE: CPT

## 2018-06-13 PROCEDURE — 85025 COMPLETE CBC W/AUTO DIFF WBC: CPT | Performed by: INTERNAL MEDICINE

## 2018-06-13 PROCEDURE — 80053 COMPREHEN METABOLIC PANEL: CPT | Performed by: INTERNAL MEDICINE

## 2018-06-13 RX ORDER — GABAPENTIN 300 MG/1
CAPSULE ORAL
COMMUNITY
Start: 2017-06-05 | End: 2022-02-24

## 2018-06-13 RX ORDER — ALBUTEROL SULFATE 1.25 MG/3ML
1.25 SOLUTION RESPIRATORY (INHALATION)
COMMUNITY
Start: 2017-06-09 | End: 2022-02-24

## 2018-06-13 RX ORDER — OMEPRAZOLE 40 MG/1
CAPSULE, DELAYED RELEASE ORAL
COMMUNITY
Start: 2018-03-23

## 2018-06-13 RX ORDER — CHOLECALCIFEROL (VITAMIN D3) 125 MCG
CAPSULE ORAL
COMMUNITY
Start: 2018-05-07 | End: 2019-05-07

## 2018-06-13 RX ORDER — ERGOCALCIFEROL 1.25 MG/1
CAPSULE ORAL
COMMUNITY
Start: 2018-04-30

## 2018-06-13 RX ORDER — ACYCLOVIR 400 MG/1
TABLET ORAL
COMMUNITY
Start: 2018-06-04 | End: 2022-02-24

## 2018-06-13 RX ORDER — TRIAMTERENE AND HYDROCHLOROTHIAZIDE 37.5; 25 MG/1; MG/1
TABLET ORAL
COMMUNITY
Start: 2017-09-05 | End: 2022-02-24

## 2018-06-13 RX ORDER — HYDROCODONE BITARTRATE AND ACETAMINOPHEN 5; 325 MG/1; MG/1
TABLET ORAL
COMMUNITY
Start: 2018-04-04 | End: 2022-02-24

## 2018-06-13 RX ORDER — LOSARTAN POTASSIUM 100 MG/1
TABLET ORAL
COMMUNITY
Start: 2018-01-11 | End: 2022-02-24

## 2018-06-13 NOTE — PROGRESS NOTES
[facility]  HEMATOLOGY & ONCOLOGY        Subjective     VISIT DIAGNOSIS:   Encounter Diagnosis   Name Primary?   • Malignant neoplasm of colon, unspecified part of colon Yes       REASON FOR VISIT:     Chief Complaint   Patient presents with   • Colon Cancer     establish care        HEMATOLOGY / ONCOLOGY HISTORY:   Oncology/Hematology History    Colon ca dz 2015  2013 dz with meningioma. Followed by neurologist. PET 2017 no evidence of recurrence.        Overlapping malignant neoplasm of colon    6/20/2018 Initial Diagnosis     Overlapping malignant neoplasm of colon        [No treatment plan]  Cancer Staging Information:  Cancer Staging  No matching staging information was found for the patient.      INTERVAL HISTORY  Patient ID: Megan Murillo is a 83 y.o. year old female Cancer Staging  Pt changing provider. Wants to be closer to home. No issues or concerns. Had unprovoked  dvt in 2017. Hypercoagulable workup negative      Review of Systems   Constitutional: Negative.    HENT: Negative.    Eyes: Negative.    Respiratory: Negative.    Cardiovascular: Negative.    Gastrointestinal: Negative.    Endocrine: Negative.    Genitourinary: Negative.    Musculoskeletal: Negative.    Skin: Negative.    Neurological: Negative.    Hematological: Negative.    Psychiatric/Behavioral: Negative.             Medications:    Current Outpatient Prescriptions   Medication Sig Dispense Refill   • acyclovir (ZOVIRAX) 400 MG tablet TAKE 1 TABLET BY MOUTH FIVE TIMES DAILY FOR 1 WEEK THEN TAKE 1 TABLET BY MOUTH BACK TWICE DAILY     • albuterol (ACCUNEB) 1.25 MG/3ML nebulizer solution Inhale 1.25 mg.     • gabapentin (NEURONTIN) 300 MG capsule TAKE 1 CAPSULE BY MOUTH THREE TIMES DAILY FOR NERVE PAIN AND LEG CRAMPS     • HYDROcodone-acetaminophen (NORCO) 5-325 MG per tablet TK 1 T PO BID PRN FOR PAIN     • losartan (COZAAR) 100 MG tablet TAKE 1 TABLET BY MOUTH EVERY MORNING FOR BLOOD PRESSURE     • metoprolol tartrate  (LOPRESSOR) 25 MG tablet TAKE ONE TABLET BY MOUTH TWICE DAILY FOR BLOOD PRESSURE AND HEART     • omeprazole (priLOSEC) 40 MG capsule TAKE 1 CAPSULE BY MOUTH DAILY     • rivaroxaban (XARELTO) 20 MG tablet Take 20 mg by mouth.     • triamterene-hydrochlorothiazide (MAXZIDE-25) 37.5-25 MG per tablet TAKE 1 TABLET BY MOUTH DAILY     • vitamin B-12 (CYANOCOBALAMIN) 500 MCG tablet Take  by mouth.     • vitamin D (ERGOCALCIFEROL) 30339 units capsule capsule TAKE 1 CAPSULE BY MOUTH 1 TIME A WEEK       No current facility-administered medications for this visit.        ALLERGIES:    Allergies   Allergen Reactions   • Sertraline Hcl Other (See Comments)     Patient states that she doesn't want this medication anymore because she hallucinated and saw spiders. Patient weaned herself off and after she quit taking the medication, the hallucinations stopped.        Objective      Vitals:    06/13/18 1306   BP: 138/82   Pulse: 64   Resp: 16   Temp: 97.8 °F (36.6 °C)   SpO2: 98%       Current Status 6/13/2018   ECOG score 1       General Appearance: Patient is awake, alert, oriented and in no acute distress. Patient is welldeveloped, wellnourished, and appears stated age.  HEENT: Normocephalic. Sclerae clear, conjunctiva pink, extraocular movements intact, pupils, round, reactive to light and  accommodation. Mouth and throat are clear with moist oral mucosa.  NECK: Supple, no jugular venous distention, thyroid not enlarged.  LYMPH: No cervical, supraclavicular, axillary, or inguinal lymphadenopathy.  CHEST: Equal bilateral expansion, AP  diameter normal, resonant percussion note  LUNGS: Good air movement, no rales, rhonchi, rubs or wheezes with auscultation  CARDIO: Regular sinus rhythm, no murmurs, gallops or rubs.  ABDOMEN: Nondistended, soft, No tenderness, no guarding, no rebound, No hepatosplenomegaly. No abdominal masses. Bowel sounds positive. No hernia  GENITALIA: Not examined.  BREASTS: Not examined.  MUSKEL: No joint  swelling, decreased motion, or inflammation  EXTREMS: No edema, clubbing, cyanosis, No varicose veins.  NEURO: Grossly nonfocal, Gait is coordinated and smooth, Cognition is preserved.  SKIN: No rashes, no ecchymoses, no petechia.  PSYCH: Oriented to time, place and person. Memory is preserved. Mood and affect appear normal      RECENT LABS:  Appointment on 06/13/2018   Component Date Value Ref Range Status   • Extra Tube 06/13/2018 Hold for add-ons.   Final    Auto resulted.   • Extra Tube 06/13/2018 Hold for add-ons.   Final    Auto resulted.   Orders Only on 06/11/2018   Component Date Value Ref Range Status   • Glucose 06/13/2018 124* 70 - 100 mg/dL Final   • BUN 06/13/2018 25* 5 - 21 mg/dL Final   • Creatinine 06/13/2018 1.23  0.50 - 1.40 mg/dL Final   • Sodium 06/13/2018 142  135 - 145 mmol/L Final   • Potassium 06/13/2018 3.9  3.5 - 5.3 mmol/L Final   • Chloride 06/13/2018 106  98 - 110 mmol/L Final   • CO2 06/13/2018 25.0  24.0 - 31.0 mmol/L Final   • Calcium 06/13/2018 9.1  8.4 - 10.4 mg/dL Final   • Total Protein 06/13/2018 6.6  6.3 - 8.7 g/dL Final   • Albumin 06/13/2018 3.90  3.50 - 5.00 g/dL Final   • ALT (SGPT) 06/13/2018 27  0 - 54 U/L Final   • AST (SGOT) 06/13/2018 22  7 - 45 U/L Final   • Alkaline Phosphatase 06/13/2018 82  24 - 120 U/L Final   • Total Bilirubin 06/13/2018 0.3  0.1 - 1.0 mg/dL Final   • eGFR Non African Amer 06/13/2018 42* >60 mL/min/1.73 Final   • Globulin 06/13/2018 2.7  gm/dL Final   • A/G Ratio 06/13/2018 1.4  1.1 - 2.5 g/dL Final   • BUN/Creatinine Ratio 06/13/2018 20.3  7.0 - 25.0 Final   • Anion Gap 06/13/2018 11.0  4.0 - 13.0 mmol/L Final   • WBC 06/13/2018 6.03  4.80 - 10.80 10*3/mm3 Final   • RBC 06/13/2018 3.68* 4.20 - 5.40 10*6/mm3 Final   • Hemoglobin 06/13/2018 11.6* 12.0 - 16.0 g/dL Final   • Hematocrit 06/13/2018 34.4* 37.0 - 47.0 % Final   • MCV 06/13/2018 93.5  82.0 - 98.0 fL Final   • MCH 06/13/2018 31.5  28.0 - 32.0 pg Final   • MCHC 06/13/2018 33.7  33.0 - 36.0  g/dL Final   • RDW 06/13/2018 13.4  12.0 - 15.0 % Final   • RDW-SD 06/13/2018 46.2  40.0 - 54.0 fl Final   • MPV 06/13/2018 9.7  6.0 - 12.0 fL Final   • Platelets 06/13/2018 245  130 - 400 10*3/mm3 Final   • Neutrophil % 06/13/2018 50.9  39.0 - 78.0 % Final   • Lymphocyte % 06/13/2018 32.8  15.0 - 45.0 % Final   • Monocyte % 06/13/2018 9.5  4.0 - 12.0 % Final   • Eosinophil % 06/13/2018 5.5* 0.0 - 4.0 % Final   • Basophil % 06/13/2018 1.0  0.0 - 2.0 % Final   • Immature Grans % 06/13/2018 0.3  0.0 - 5.0 % Final   • Neutrophils, Absolute 06/13/2018 3.07  1.87 - 8.40 10*3/mm3 Final   • Lymphocytes, Absolute 06/13/2018 1.98  0.72 - 4.86 10*3/mm3 Final   • Monocytes, Absolute 06/13/2018 0.57  0.19 - 1.30 10*3/mm3 Final   • Eosinophils, Absolute 06/13/2018 0.33  0.00 - 0.70 10*3/mm3 Final   • Basophils, Absolute 06/13/2018 0.06  0.00 - 0.20 10*3/mm3 Final   • Immature Grans, Absolute 06/13/2018 0.02  0.00 - 0.03 10*3/mm3 Final   • nRBC 06/13/2018 0.0  0.0 - 0.0 /100 WBC Final       RADIOLOGY:  No results found.         Assessment/Plan 82 yo female with recurrent colon ca, dz 2009, s/p CMT with CAPOX, then Irinotecan/KRN antibody. Abdominal wall recurrence 2010,May, s/p resection. Since then JOSE. She is here to establish care.    1. CRC: JOSE    2. Anemia: h/o LOPEZ. Will check iron panel and go from there.    3. CRI: stage III. Sees a nephrologist     4. Diarrhea: since sx. stable       Time Spent: 60 minutes; greater than  50% of time was spent in patient counseling and care coordination.     Carrington Fields MD    6/13/2018    1:53 PM

## 2018-06-19 ENCOUNTER — OFFICE VISIT (OUTPATIENT)
Dept: PRIMARY CARE CLINIC | Age: 83
End: 2018-06-19
Payer: MEDICARE

## 2018-06-19 VITALS
OXYGEN SATURATION: 96 % | HEIGHT: 63 IN | BODY MASS INDEX: 31.54 KG/M2 | DIASTOLIC BLOOD PRESSURE: 70 MMHG | WEIGHT: 178 LBS | RESPIRATION RATE: 22 BRPM | HEART RATE: 76 BPM | TEMPERATURE: 98.2 F | SYSTOLIC BLOOD PRESSURE: 166 MMHG

## 2018-06-19 DIAGNOSIS — I87.2 VENOUS INSUFFICIENCY: ICD-10-CM

## 2018-06-19 DIAGNOSIS — R60.0 LOWER EXTREMITY EDEMA: Primary | ICD-10-CM

## 2018-06-19 PROCEDURE — G8399 PT W/DXA RESULTS DOCUMENT: HCPCS | Performed by: FAMILY MEDICINE

## 2018-06-19 PROCEDURE — 1123F ACP DISCUSS/DSCN MKR DOCD: CPT | Performed by: FAMILY MEDICINE

## 2018-06-19 PROCEDURE — 4040F PNEUMOC VAC/ADMIN/RCVD: CPT | Performed by: FAMILY MEDICINE

## 2018-06-19 PROCEDURE — G8417 CALC BMI ABV UP PARAM F/U: HCPCS | Performed by: FAMILY MEDICINE

## 2018-06-19 PROCEDURE — G8427 DOCREV CUR MEDS BY ELIG CLIN: HCPCS | Performed by: FAMILY MEDICINE

## 2018-06-19 PROCEDURE — 1090F PRES/ABSN URINE INCON ASSESS: CPT | Performed by: FAMILY MEDICINE

## 2018-06-19 PROCEDURE — 1036F TOBACCO NON-USER: CPT | Performed by: FAMILY MEDICINE

## 2018-06-19 PROCEDURE — 99213 OFFICE O/P EST LOW 20 MIN: CPT | Performed by: FAMILY MEDICINE

## 2018-06-19 RX ORDER — FUROSEMIDE 20 MG/1
20 TABLET ORAL DAILY PRN
Qty: 30 TABLET | Refills: 3 | Status: SHIPPED | OUTPATIENT
Start: 2018-06-19 | End: 2022-02-23 | Stop reason: SDUPTHER

## 2018-06-20 PROBLEM — C18.8 OVERLAPPING MALIGNANT NEOPLASM OF COLON: Status: ACTIVE | Noted: 2018-06-20

## 2018-06-20 PROBLEM — I82.5Y2 CHRONIC DEEP VEIN THROMBOSIS (DVT) OF PROXIMAL VEIN OF LEFT LOWER EXTREMITY: Status: ACTIVE | Noted: 2018-06-20

## 2018-06-24 ASSESSMENT — ENCOUNTER SYMPTOMS
ABDOMINAL PAIN: 0
VOMITING: 0
COUGH: 0
BACK PAIN: 0
WHEEZING: 0
EYE DISCHARGE: 0
DIARRHEA: 0
NAUSEA: 0
COLOR CHANGE: 0

## 2018-06-25 RX ORDER — RIVAROXABAN 20 MG/1
TABLET, FILM COATED ORAL
Qty: 30 TABLET | Refills: 0 | Status: SHIPPED | OUTPATIENT
Start: 2018-06-25 | End: 2018-07-25 | Stop reason: SDUPTHER

## 2018-06-26 ENCOUNTER — OFFICE VISIT (OUTPATIENT)
Dept: PRIMARY CARE CLINIC | Age: 83
End: 2018-06-26
Payer: MEDICARE

## 2018-06-26 VITALS
DIASTOLIC BLOOD PRESSURE: 80 MMHG | WEIGHT: 171 LBS | TEMPERATURE: 97.6 F | OXYGEN SATURATION: 98 % | HEIGHT: 63 IN | SYSTOLIC BLOOD PRESSURE: 136 MMHG | BODY MASS INDEX: 30.3 KG/M2 | HEART RATE: 65 BPM

## 2018-06-26 DIAGNOSIS — I10 ESSENTIAL HYPERTENSION: ICD-10-CM

## 2018-06-26 DIAGNOSIS — R60.0 BILATERAL LEG EDEMA: ICD-10-CM

## 2018-06-26 DIAGNOSIS — R93.89 ABNORMAL MRI: ICD-10-CM

## 2018-06-26 DIAGNOSIS — G89.29 CHRONIC RIGHT-SIDED LOW BACK PAIN WITH RIGHT-SIDED SCIATICA: ICD-10-CM

## 2018-06-26 DIAGNOSIS — M25.551 RIGHT HIP PAIN: ICD-10-CM

## 2018-06-26 DIAGNOSIS — R53.83 FATIGUE, UNSPECIFIED TYPE: Primary | ICD-10-CM

## 2018-06-26 DIAGNOSIS — Z95.828 PORTACATH IN PLACE: ICD-10-CM

## 2018-06-26 DIAGNOSIS — M54.41 CHRONIC RIGHT-SIDED LOW BACK PAIN WITH RIGHT-SIDED SCIATICA: ICD-10-CM

## 2018-06-26 DIAGNOSIS — Z45.2 ENCOUNTER FOR CARE RELATED TO PORT-A-CATH: ICD-10-CM

## 2018-06-26 PROCEDURE — G8427 DOCREV CUR MEDS BY ELIG CLIN: HCPCS | Performed by: NURSE PRACTITIONER

## 2018-06-26 PROCEDURE — G8399 PT W/DXA RESULTS DOCUMENT: HCPCS | Performed by: NURSE PRACTITIONER

## 2018-06-26 PROCEDURE — 36415 COLL VENOUS BLD VENIPUNCTURE: CPT | Performed by: NURSE PRACTITIONER

## 2018-06-26 PROCEDURE — G8417 CALC BMI ABV UP PARAM F/U: HCPCS | Performed by: NURSE PRACTITIONER

## 2018-06-26 PROCEDURE — 1036F TOBACCO NON-USER: CPT | Performed by: NURSE PRACTITIONER

## 2018-06-26 PROCEDURE — 4040F PNEUMOC VAC/ADMIN/RCVD: CPT | Performed by: NURSE PRACTITIONER

## 2018-06-26 PROCEDURE — 1123F ACP DISCUSS/DSCN MKR DOCD: CPT | Performed by: NURSE PRACTITIONER

## 2018-06-26 PROCEDURE — 96372 THER/PROPH/DIAG INJ SC/IM: CPT | Performed by: NURSE PRACTITIONER

## 2018-06-26 PROCEDURE — 99215 OFFICE O/P EST HI 40 MIN: CPT | Performed by: NURSE PRACTITIONER

## 2018-06-26 PROCEDURE — 1090F PRES/ABSN URINE INCON ASSESS: CPT | Performed by: NURSE PRACTITIONER

## 2018-06-26 RX ORDER — KETOROLAC TROMETHAMINE 30 MG/ML
60 INJECTION, SOLUTION INTRAMUSCULAR; INTRAVENOUS ONCE
Status: COMPLETED | OUTPATIENT
Start: 2018-06-26 | End: 2018-06-26

## 2018-06-26 RX ORDER — PREDNISONE 10 MG/1
TABLET ORAL
Qty: 18 TABLET | Refills: 0 | Status: SHIPPED | OUTPATIENT
Start: 2018-06-26 | End: 2018-07-10 | Stop reason: CLARIF

## 2018-06-26 RX ORDER — TRIAMCINOLONE ACETONIDE 40 MG/ML
40 INJECTION, SUSPENSION INTRA-ARTICULAR; INTRAMUSCULAR ONCE
Status: COMPLETED | OUTPATIENT
Start: 2018-06-26 | End: 2018-06-26

## 2018-06-26 RX ADMIN — KETOROLAC TROMETHAMINE 60 MG: 30 INJECTION, SOLUTION INTRAMUSCULAR; INTRAVENOUS at 17:53

## 2018-06-26 RX ADMIN — TRIAMCINOLONE ACETONIDE 40 MG: 40 INJECTION, SUSPENSION INTRA-ARTICULAR; INTRAMUSCULAR at 17:53

## 2018-06-26 ASSESSMENT — PATIENT HEALTH QUESTIONNAIRE - PHQ9
2. FEELING DOWN, DEPRESSED OR HOPELESS: 0
SUM OF ALL RESPONSES TO PHQ9 QUESTIONS 1 & 2: 0
SUM OF ALL RESPONSES TO PHQ QUESTIONS 1-9: 0
1. LITTLE INTEREST OR PLEASURE IN DOING THINGS: 0

## 2018-06-27 ENCOUNTER — TELEPHONE (OUTPATIENT)
Dept: PRIMARY CARE CLINIC | Age: 83
End: 2018-06-27

## 2018-06-27 LAB
ALBUMIN SERPL-MCNC: 4 G/DL (ref 3.5–5.2)
ALP BLD-CCNC: 73 U/L (ref 35–104)
ALT SERPL-CCNC: 16 U/L (ref 5–33)
ANION GAP SERPL CALCULATED.3IONS-SCNC: 20 MMOL/L (ref 7–19)
AST SERPL-CCNC: 16 U/L (ref 5–32)
BASOPHILS ABSOLUTE: 0.1 K/UL (ref 0–0.2)
BASOPHILS RELATIVE PERCENT: 0.8 % (ref 0–1)
BILIRUB SERPL-MCNC: <0.2 MG/DL (ref 0.2–1.2)
BUN BLDV-MCNC: 23 MG/DL (ref 8–23)
CALCIUM SERPL-MCNC: 9.7 MG/DL (ref 8.8–10.2)
CHLORIDE BLD-SCNC: 99 MMOL/L (ref 98–111)
CO2: 22 MMOL/L (ref 22–29)
CREAT SERPL-MCNC: 1.2 MG/DL (ref 0.5–0.9)
EOSINOPHILS ABSOLUTE: 0.4 K/UL (ref 0–0.6)
EOSINOPHILS RELATIVE PERCENT: 6.5 % (ref 0–5)
GFR NON-AFRICAN AMERICAN: 43
GLUCOSE BLD-MCNC: 137 MG/DL (ref 74–109)
HCT VFR BLD CALC: 38.4 % (ref 37–47)
HEMOGLOBIN: 12.4 G/DL (ref 12–16)
LYMPHOCYTES ABSOLUTE: 2.4 K/UL (ref 1.1–4.5)
LYMPHOCYTES RELATIVE PERCENT: 36.8 % (ref 20–40)
MCH RBC QN AUTO: 31.2 PG (ref 27–31)
MCHC RBC AUTO-ENTMCNC: 32.3 G/DL (ref 33–37)
MCV RBC AUTO: 96.5 FL (ref 81–99)
MONOCYTES ABSOLUTE: 0.7 K/UL (ref 0–0.9)
MONOCYTES RELATIVE PERCENT: 10 % (ref 0–10)
NEUTROPHILS ABSOLUTE: 3 K/UL (ref 1.5–7.5)
NEUTROPHILS RELATIVE PERCENT: 45.6 % (ref 50–65)
PDW BLD-RTO: 13.2 % (ref 11.5–14.5)
PLATELET # BLD: 264 K/UL (ref 130–400)
PMV BLD AUTO: 10.9 FL (ref 9.4–12.3)
POTASSIUM SERPL-SCNC: 4.1 MMOL/L (ref 3.5–5)
RBC # BLD: 3.98 M/UL (ref 4.2–5.4)
SODIUM BLD-SCNC: 141 MMOL/L (ref 136–145)
TOTAL PROTEIN: 6.9 G/DL (ref 6.6–8.7)
WBC # BLD: 6.5 K/UL (ref 4.8–10.8)

## 2018-06-28 DIAGNOSIS — R93.89 ABNORMAL MRI: ICD-10-CM

## 2018-06-28 DIAGNOSIS — M25.551 RIGHT HIP PAIN: Primary | ICD-10-CM

## 2018-06-28 ASSESSMENT — ENCOUNTER SYMPTOMS
BACK PAIN: 1
RESPIRATORY NEGATIVE: 1
DIARRHEA: 1

## 2018-07-02 DIAGNOSIS — M25.551 RIGHT HIP PAIN: ICD-10-CM

## 2018-07-02 DIAGNOSIS — R93.89 ABNORMAL MRI: ICD-10-CM

## 2018-07-10 ENCOUNTER — OFFICE VISIT (OUTPATIENT)
Dept: PRIMARY CARE CLINIC | Age: 83
End: 2018-07-10
Payer: MEDICARE

## 2018-07-10 VITALS
HEART RATE: 73 BPM | OXYGEN SATURATION: 97 % | WEIGHT: 171 LBS | BODY MASS INDEX: 30.3 KG/M2 | TEMPERATURE: 97.7 F | HEIGHT: 63 IN | SYSTOLIC BLOOD PRESSURE: 142 MMHG | DIASTOLIC BLOOD PRESSURE: 64 MMHG

## 2018-07-10 DIAGNOSIS — M54.41 CHRONIC RIGHT-SIDED LOW BACK PAIN WITH RIGHT-SIDED SCIATICA: Primary | ICD-10-CM

## 2018-07-10 DIAGNOSIS — G89.29 CHRONIC RIGHT-SIDED LOW BACK PAIN WITH RIGHT-SIDED SCIATICA: Primary | ICD-10-CM

## 2018-07-10 PROCEDURE — G8417 CALC BMI ABV UP PARAM F/U: HCPCS | Performed by: NURSE PRACTITIONER

## 2018-07-10 PROCEDURE — 1036F TOBACCO NON-USER: CPT | Performed by: NURSE PRACTITIONER

## 2018-07-10 PROCEDURE — G8427 DOCREV CUR MEDS BY ELIG CLIN: HCPCS | Performed by: NURSE PRACTITIONER

## 2018-07-10 PROCEDURE — 1123F ACP DISCUSS/DSCN MKR DOCD: CPT | Performed by: NURSE PRACTITIONER

## 2018-07-10 PROCEDURE — 4040F PNEUMOC VAC/ADMIN/RCVD: CPT | Performed by: NURSE PRACTITIONER

## 2018-07-10 PROCEDURE — G8399 PT W/DXA RESULTS DOCUMENT: HCPCS | Performed by: NURSE PRACTITIONER

## 2018-07-10 PROCEDURE — 1101F PT FALLS ASSESS-DOCD LE1/YR: CPT | Performed by: NURSE PRACTITIONER

## 2018-07-10 PROCEDURE — 99214 OFFICE O/P EST MOD 30 MIN: CPT | Performed by: NURSE PRACTITIONER

## 2018-07-10 PROCEDURE — 1090F PRES/ABSN URINE INCON ASSESS: CPT | Performed by: NURSE PRACTITIONER

## 2018-07-10 ASSESSMENT — PATIENT HEALTH QUESTIONNAIRE - PHQ9
SUM OF ALL RESPONSES TO PHQ9 QUESTIONS 1 & 2: 0
2. FEELING DOWN, DEPRESSED OR HOPELESS: 0
SUM OF ALL RESPONSES TO PHQ QUESTIONS 1-9: 0
1. LITTLE INTEREST OR PLEASURE IN DOING THINGS: 0

## 2018-07-25 RX ORDER — RIVAROXABAN 20 MG/1
TABLET, FILM COATED ORAL
Qty: 30 TABLET | Refills: 3 | Status: SHIPPED | OUTPATIENT
Start: 2018-07-25 | End: 2018-12-10 | Stop reason: SDUPTHER

## 2018-07-26 NOTE — PROGRESS NOTES
[facility]  [address] [place]  [address]  Dept: [phone]  Dept Fax: [phone]  Loc: [phone]    Georgia Siegel is a 82 y.o. female who presents today for her medical conditions/complaints as noted below. Georgia Siegel is c/o of Other (2 month f/u)        HPI:     HPI   Chief Complaint   Patient presents with    Other     2 month f/u   Her back is some better but she is still having lots of pain. She has seen orthopedic pain management in the past.  She has not seen them recently.     Past Medical History:   Diagnosis Date    Bronchitis     Colon cancer ([address]     Colon polyps     Constipation     Deep vein blood clot of left lower extremity (HCC)     Left leg     Depression     DVT (deep venous thrombosis) (HCC)     Dysphagia     Fibrocystic breast disease     Fibromyalgia     GERD (gastroesophageal reflux disease)     reflux with hx of esophageal stricture    Headache(784.0)     History of colon cancer     Hormone replacement therapy (postmenopausal)     Hypertension     Neuropathy of foot     In both feet    Osteoarthritis     left knee pain    Restless legs syndrome     Vitamin D deficiency       Past Surgical History:   Procedure Laterality Date    APPENDECTOMY      BREAST BIOPSY      CATARACT REMOVAL       SECTION      CHOLECYSTECTOMY      COLECTOMY  partial    COLON SURGERY      COLONOSCOPY  2010    COLONOSCOPY  2012    Dr Jalloh Erps: unremarkable enterocolonic anastamosis; hyperplastic polyps    COLONOSCOPY  3/2013    [facility]: unremarkable post-surgical colon    COLONOSCOPY  3/11/16    Dr Natalee Milligan colon anastomosis, 5 yr recall    FOOT SURGERY  right foot    HAND SURGERY Right     Dr Adenike Rosales ENDOSCOPY  10-    UPPER GASTROINTESTINAL END[facility]  3[facility]2013    [facility]    DTaP/Tdap/Td vaccine (3 - Td) 12/23/2025    Colon cancer screen colonoscopy  03/11/2026    DEXA (modify frequency per FRAX score)  Completed    Pneumococcal low/med risk  Completed       Subjective:      Review of Systems    Objective:     Physical Exam   Constitutional: She is oriented to person, place, and time. She appears well-developed and well-nourished. HENT:   Head: Normocephalic. Right Ear: External ear normal.   Left Ear: External ear normal.   Eyes: Pupils are equal, round, and reactive to light. Neck: Normal range of motion. Cardiovascular: Normal rate, regular rhythm, normal heart sounds and intact distal pulses. Pulmonary/Chest: Effort normal and breath sounds normal.   Musculoskeletal:        Lumbar back: She exhibits decreased range of motion, tenderness and pain. She exhibits no bony tenderness, no swelling, no edema, no deformity, no laceration, no spasm and normal pulse. Neurological: She is alert and oriented to person, place, and time. Skin: Skin is warm and dry. Psychiatric: She has a normal mood and affect. Her behavior is normal. Judgment and thought content normal.   Nursing note and vitals reviewed. BP (!) 142/64 (Site: Left Arm, Position: Sitting, Cuff Size: Medium Adult)   Pulse 73   Temp 97.7 °F (36.5 °C) (Temporal)   Ht 5' 3\" (1.6 m)   Wt 171 lb (77.6 kg)   SpO2 97%   BMI 30.29 kg/m²     Assessment:       Diagnosis Orders   1. Chronic right-sided low back pain with right-sided sciatica         Plan:   Send a message to the orthopedic pain center trying to get her in sooner for an injection. At this point she is not a candidate a surgery due to other health issues. I reviewed the hip MRI with her which has not changed in 6 months there was a question about avascular necrosis. It has not gotten any worse. Patient given educational materials - see patient instructions. Discussed use, benefit, and side effects of prescribed medications.   All patient questions answered. Pt voiced understanding. Reviewed health maintenance. Instructed to continue current medications, diet and exercise. Patient agreed with treatment plan. Follow up as directed. MEDICATIONS:  No orders of the defined types were placed in this encounter. ORDERS:  No orders of the defined types were placed in this encounter. Follow-up:  No Follow-up on file. PATIENT INSTRUCTIONS:  Patient Instructions   See if Dr Joy Riggs can see her next week to help with hip and back pain  May take the norco sparingly for pain until she sees him again. May discuss tramadol for pain, is not as addicting. Electronically signed by Rodolfo Lanes, APRN - CNP on 7/26/2018 at 9:20 AM    EMR Dragon/transcription disclaimer:  Much of this encounter note is electronic transcription/translation of spoken language to printed texts. The electronic translation of spoken language may be erroneous, or at times, nonsensical words or phrases may be inadvertently transcribed.   Although I have reviewed the note for such errors, some may still exist.

## 2018-07-31 RX ORDER — ERGOCALCIFEROL 1.25 MG/1
CAPSULE ORAL
Qty: 13 CAPSULE | Refills: 0 | Status: SHIPPED | OUTPATIENT
Start: 2018-07-31 | End: 2018-11-04 | Stop reason: SDUPTHER

## 2018-08-13 ENCOUNTER — OFFICE VISIT (OUTPATIENT)
Dept: PRIMARY CARE CLINIC | Age: 83
End: 2018-08-13
Payer: MEDICARE

## 2018-08-13 VITALS
DIASTOLIC BLOOD PRESSURE: 70 MMHG | SYSTOLIC BLOOD PRESSURE: 120 MMHG | HEIGHT: 63 IN | BODY MASS INDEX: 30.12 KG/M2 | WEIGHT: 170 LBS

## 2018-08-13 DIAGNOSIS — Z95.828 PORTACATH IN PLACE: ICD-10-CM

## 2018-08-13 DIAGNOSIS — Z45.2 ENCOUNTER FOR CARE RELATED TO PORT-A-CATH: ICD-10-CM

## 2018-08-13 DIAGNOSIS — R42 VERTIGO: Primary | ICD-10-CM

## 2018-08-13 PROCEDURE — 1090F PRES/ABSN URINE INCON ASSESS: CPT | Performed by: NURSE PRACTITIONER

## 2018-08-13 PROCEDURE — 4040F PNEUMOC VAC/ADMIN/RCVD: CPT | Performed by: NURSE PRACTITIONER

## 2018-08-13 PROCEDURE — 1123F ACP DISCUSS/DSCN MKR DOCD: CPT | Performed by: NURSE PRACTITIONER

## 2018-08-13 PROCEDURE — G8399 PT W/DXA RESULTS DOCUMENT: HCPCS | Performed by: NURSE PRACTITIONER

## 2018-08-13 PROCEDURE — 99213 OFFICE O/P EST LOW 20 MIN: CPT | Performed by: NURSE PRACTITIONER

## 2018-08-13 PROCEDURE — 1101F PT FALLS ASSESS-DOCD LE1/YR: CPT | Performed by: NURSE PRACTITIONER

## 2018-08-13 PROCEDURE — G8417 CALC BMI ABV UP PARAM F/U: HCPCS | Performed by: NURSE PRACTITIONER

## 2018-08-13 PROCEDURE — 1036F TOBACCO NON-USER: CPT | Performed by: NURSE PRACTITIONER

## 2018-08-13 PROCEDURE — G8428 CUR MEDS NOT DOCUMENT: HCPCS | Performed by: NURSE PRACTITIONER

## 2018-08-13 NOTE — PATIENT INSTRUCTIONS
account, please click on the \"Sign Up Now\" link. Current as of: October 9, 2017  Content Version: 11.7  © 4949-6778 [employer]. Care instructions adapted under license by [facility] ([facility]). If you have questions about a medical condition or this instruction, always ask your healthcare professional. [address] any warranty or liability for your use of this information.

## 2018-08-13 NOTE — PROGRESS NOTES
Susanne: peptic stricture dilated to 48F; HH; small antral mucosal elevation    UPPER GASTROINTESTINAL ENDOSCOPY  12/2014    [facility]: dilation of esophageal stricture    VARICOSE VEIN SURGERY Left 03/14/2014  [facility]    Left GSV Ablation    VARICOSE VEIN SURGERY Right 04/04/2014  [facility]    Right GSV Ablation       Vitals 8/13/2018 7/10/2018 6/26/2018 6/26/2018 6/19/2018 6/2/9720   SYSTOLIC 182 135 408 757 020 214   DIASTOLIC 70 64 80 88 70 60   Site - Left Arm - Right Arm Right Arm Left Arm   Position - Sitting - Sitting Sitting Sitting   Cuff Size - Medium Adult - Large Adult Medium Adult Medium Adult   Pulse - 73 - 65 76 64   Temp - 97.7 - 97.6 98.2 97.8   Resp - - - - 22 16   Weight 170 lb 171 lb - 171 lb 178 lb 169 lb   Height 5' 3\" 5' 3\" - 5' 3\" 5' 3\" 5' 3\"   BMI (wt*703/ht~2) 30.11 kg/m2 30.29 kg/m2 - 30.29 kg/m2 31.53 kg/m2 29.93 kg/m2   Some recent data might be hidden       Family History   Problem Relation Age of Onset    Cancer Mother         Cervical Cancer    Cancer Brother         Esophageal cancer    Diabetes Brother     Esophageal Cancer Brother     Diabetes Sister     Colon Cancer Neg Hx     Colon Polyps Neg Hx     Liver Cancer Neg Hx     Liver Disease Neg Hx     Rectal Cancer Neg Hx     Stomach Cancer Neg Hx        Social History   Substance Use Topics    Smoking status: Never Smoker    Smokeless tobacco: Never Used    Alcohol use No      Current Outpatient Prescriptions   Medication Sig Dispense Refill    vitamin D (ERGOCALCIFEROL) 72741 units CAPS capsule TAKE 1 CAPSULE BY MOUTH 1 TIME A WEEK 13 capsule 0    XARELTO 20 MG TABS tablet TAKE ONE TABLET BY MOUTH DAILY WITH BREAKFAST 30 tablet 3    furosemide (LASIX) 20 MG tablet Take 1 tablet by mouth daily as needed (swelling) 30 tablet 3    gabapentin (NEURONTIN) 300 MG capsule TAKE 1 CAPSULE BY MOUTH THREE TIMES DAILY FOR NERVE PAIN AND LEG CRAMPS 90 capsule 2    acyclovir (ZOVIRAX) 400 MG tablet TAKE 1 TABLET BY MOUTH FIVE TIMES DAILY FOR 1 WEEK THEN TAKE 1 TABLET BY MOUTH BACK TWICE DAILY (Patient taking differently: TAKE 1 TABLET BY MOUTH BACK TWICE DAILY) 56 tablet 5    HYDROcodone-acetaminophen (NORCO) 5-325 MG per tablet TK 1 T PO BID PRN FOR PAIN  0    vitamin B-12 (CYANOCOBALAMIN) 500 MCG tablet Take 1 tablet by mouth daily 30 tablet 3    omeprazole (PRILOSEC) 40 MG delayed release capsule TAKE 1 CAPSULE BY MOUTH DAILY 90 capsule 1    losartan (COZAAR) 100 MG tablet TAKE 1 TABLET BY MOUTH EVERY MORNING FOR BLOOD PRESSURE 90 tablet 3    metoprolol tartrate (LOPRESSOR) 25 MG tablet TAKE ONE TABLET BY MOUTH TWICE DAILY FOR BLOOD PRESSURE AND HEART 180 tablet 3    triamcinolone (KENALOG) 0.1 % cream Apply topically 2 times daily. External vagina (Patient taking differently: Apply topically 2 times daily as needed Apply topically 2 times daily. External vagina) 1 Tube 1    triamterene-hydrochlorothiazide (MAXZIDE-25) 37.5-25 MG per tablet TAKE 1 TABLET BY MOUTH DAILY 90 tablet 5    albuterol (ACCUNEB) 1.25 MG/3ML nebulizer solution Inhale 3 mLs into the lungs every 6 hours as needed for Wheezing Dx J41.8 360 mL 3    Nebulizers ([employer] COMPACT MINI NEBULIZER) MISC 1 Units by Does not apply route 4 times daily 1 each 0    dorzolamide-timolol (COSOPT) 22.3-6.8 MG/ML ophthalmic solution PLACE 1 DROP IN RIGHT EYE BID  6    Magnesium Oxide (MAG- PO) Take by mouth      LOTEMAX 0.5 % ophthalmic gel   4     No current facility-administered medications for this visit. Allergies   Allergen Reactions    Zoloft [Sertraline Hcl] Other (See Comments)     Patient states that she doesn't want this medication anymore because she hallucinated and saw spiders. Patient weaned herself off and after she quit taking the medication, the hallucinations stopped.           Health Maintenance   Topic Date Due    Shingles Vaccine (1 of 2 - 2 Dose Series) 02/01/1987    Flu vaccine (1) 09/01/2018    Potassium monitoring  06/26/2019    Creatinine monitoring  06/26/2019    DTaP/Tdap/Td vaccine (3 - Td) 12/23/2025    Colon cancer screen colonoscopy  03/11/2026    DEXA (modify frequency per FRAX score)  Completed    Pneumococcal low/med risk  Completed       Subjective:      Review of Systems   Constitutional: Negative. HENT: Negative. Neurological: Positive for dizziness. Psychiatric/Behavioral: Negative. Objective:     Physical Exam   Constitutional: She is oriented to person, place, and time. She appears well-developed and well-nourished. HENT:   Head: Normocephalic. Right Ear: Tympanic membrane and external ear normal.   Left Ear: Tympanic membrane and external ear normal.   Nose: Nose normal.   Eyes: Pupils are equal, round, and reactive to light. Neck: Normal range of motion. Cardiovascular: Normal rate, regular rhythm, normal heart sounds and intact distal pulses. Pulmonary/Chest: Effort normal and breath sounds normal.   Neurological: She is alert and oriented to person, place, and time. Dizziness is worse lying on the left side   Skin: Skin is warm and dry. Psychiatric: She has a normal mood and affect. Her behavior is normal. Judgment and thought content normal.   Nursing note and vitals reviewed. /70   Ht 5' 3\" (1.6 m)   Wt 170 lb (77.1 kg)   Breastfeeding? No   BMI 30.11 kg/m²   Venous Access Procedure Note  Indication: maintenance flush    Procedure: The patient was placed in the appropriate position and the skin over the puncture site was prepped with betadine and draped in a sterile fashion. Intravenous access was obtained in right chest port with a Bliss needle and the site was secured appropriately. 3 cc of blood with withdrawn and discarded. The port was flushed with 10cc NS and then 3 cc heparin flush. The patient tolerated the procedure well. Complications: None    Assessment:       Diagnosis Orders   1. Vertigo     2.  Encounter for care related to Corey Kwon

## 2018-09-18 RX ORDER — CHOLECALCIFEROL (VITAMIN D3) 125 MCG
500 CAPSULE ORAL DAILY
Qty: 30 TABLET | Refills: 0 | Status: SHIPPED | OUTPATIENT
Start: 2018-09-18 | End: 2018-10-19 | Stop reason: SDUPTHER

## 2018-09-25 ENCOUNTER — HOSPITAL ENCOUNTER (EMERGENCY)
Age: 83
Discharge: HOME | End: 2018-09-26
Attending: EMERGENCY MEDICINE
Payer: MEDICARE

## 2018-09-25 DIAGNOSIS — N28.1 RENAL CYST: ICD-10-CM

## 2018-09-25 DIAGNOSIS — R07.9 CHEST PAIN OF UNKNOWN ETIOLOGY: Primary | ICD-10-CM

## 2018-09-25 LAB
APTT: 30.1 SEC (ref 26–36.2)
BASOPHILS ABSOLUTE: 0.1 K/UL (ref 0–0.2)
BASOPHILS RELATIVE PERCENT: 0.8 % (ref 0–1)
EOSINOPHILS ABSOLUTE: 0.4 K/UL (ref 0–0.6)
EOSINOPHILS RELATIVE PERCENT: 5.8 % (ref 0–5)
HCT VFR BLD CALC: 38.9 % (ref 37–47)
HEMOGLOBIN: 12.6 G/DL (ref 12–16)
LYMPHOCYTES ABSOLUTE: 2.7 K/UL (ref 1.1–4.5)
LYMPHOCYTES RELATIVE PERCENT: 35.7 % (ref 20–40)
MCH RBC QN AUTO: 30.2 PG (ref 27–31)
MCHC RBC AUTO-ENTMCNC: 32.4 G/DL (ref 33–37)
MCV RBC AUTO: 93.3 FL (ref 81–99)
MONOCYTES ABSOLUTE: 0.8 K/UL (ref 0–0.9)
MONOCYTES RELATIVE PERCENT: 10.7 % (ref 0–10)
NEUTROPHILS ABSOLUTE: 3.5 K/UL (ref 1.5–7.5)
NEUTROPHILS RELATIVE PERCENT: 46.7 % (ref 50–65)
PDW BLD-RTO: 13.3 % (ref 11.5–14.5)
PERFORMED ON: NORMAL
PLATELET # BLD: 234 K/UL (ref 130–400)
PMV BLD AUTO: 9.3 FL (ref 9.4–12.3)
POC TROPONIN I: 0 NG/ML (ref 0–0.08)
RBC # BLD: 4.17 M/UL (ref 4.2–5.4)
WBC # BLD: 7.5 K/UL (ref 4.8–10.8)

## 2018-09-25 PROCEDURE — 99285 EMERGENCY DEPT VISIT HI MDM: CPT

## 2018-09-25 PROCEDURE — 93005 ELECTROCARDIOGRAM TRACING: CPT

## 2018-09-25 ASSESSMENT — PAIN SCALES - GENERAL: PAINLEVEL_OUTOF10: 5

## 2018-09-26 ENCOUNTER — APPOINTMENT (OUTPATIENT)
Dept: GENERAL RADIOLOGY | Age: 83
End: 2018-09-26
Payer: MEDICARE

## 2018-09-26 ENCOUNTER — APPOINTMENT (OUTPATIENT)
Dept: CT IMAGING | Age: 83
End: 2018-09-26
Payer: MEDICARE

## 2018-09-26 VITALS
RESPIRATION RATE: 18 BRPM | WEIGHT: 160 LBS | HEIGHT: 63 IN | HEART RATE: 57 BPM | BODY MASS INDEX: 28.35 KG/M2 | OXYGEN SATURATION: 93 % | SYSTOLIC BLOOD PRESSURE: 112 MMHG | TEMPERATURE: 97.9 F | DIASTOLIC BLOOD PRESSURE: 58 MMHG

## 2018-09-26 LAB
ALBUMIN SERPL-MCNC: 4 G/DL (ref 3.5–5.2)
ALP BLD-CCNC: 71 U/L (ref 35–104)
ALT SERPL-CCNC: 15 U/L (ref 5–33)
ANION GAP SERPL CALCULATED.3IONS-SCNC: 11 MMOL/L (ref 7–19)
AST SERPL-CCNC: 16 U/L (ref 5–32)
BILIRUB SERPL-MCNC: 0.3 MG/DL (ref 0.2–1.2)
BUN BLDV-MCNC: 28 MG/DL (ref 8–23)
CALCIUM SERPL-MCNC: 9.5 MG/DL (ref 8.8–10.2)
CHLORIDE BLD-SCNC: 98 MMOL/L (ref 98–111)
CO2: 28 MMOL/L (ref 22–29)
CREAT SERPL-MCNC: 1.3 MG/DL (ref 0.5–0.9)
D DIMER: <0.27 UG/ML FEU (ref 0–0.48)
GFR NON-AFRICAN AMERICAN: 39
GLUCOSE BLD-MCNC: 115 MG/DL (ref 74–109)
POTASSIUM SERPL-SCNC: 4 MMOL/L (ref 3.5–5)
SODIUM BLD-SCNC: 137 MMOL/L (ref 136–145)
TOTAL PROTEIN: 6.5 G/DL (ref 6.6–8.7)
TROPONIN: <0.01 NG/ML (ref 0–0.03)
TROPONIN: <0.01 NG/ML (ref 0–0.03)

## 2018-09-26 PROCEDURE — 71275 CT ANGIOGRAPHY CHEST: CPT

## 2018-09-26 PROCEDURE — 2580000003 HC RX 258: Performed by: EMERGENCY MEDICINE

## 2018-09-26 PROCEDURE — 85025 COMPLETE CBC W/AUTO DIFF WBC: CPT

## 2018-09-26 PROCEDURE — 85379 FIBRIN DEGRADATION QUANT: CPT

## 2018-09-26 PROCEDURE — 85730 THROMBOPLASTIN TIME PARTIAL: CPT

## 2018-09-26 PROCEDURE — 36415 COLL VENOUS BLD VENIPUNCTURE: CPT

## 2018-09-26 PROCEDURE — 80053 COMPREHEN METABOLIC PANEL: CPT

## 2018-09-26 PROCEDURE — 71045 X-RAY EXAM CHEST 1 VIEW: CPT

## 2018-09-26 PROCEDURE — 96375 TX/PRO/DX INJ NEW DRUG ADDON: CPT

## 2018-09-26 PROCEDURE — 96374 THER/PROPH/DIAG INJ IV PUSH: CPT

## 2018-09-26 PROCEDURE — 6360000002 HC RX W HCPCS: Performed by: EMERGENCY MEDICINE

## 2018-09-26 PROCEDURE — 99284 EMERGENCY DEPT VISIT MOD MDM: CPT | Performed by: EMERGENCY MEDICINE

## 2018-09-26 PROCEDURE — 84484 ASSAY OF TROPONIN QUANT: CPT

## 2018-09-26 PROCEDURE — 6360000004 HC RX CONTRAST MEDICATION: Performed by: EMERGENCY MEDICINE

## 2018-09-26 RX ORDER — ONDANSETRON 2 MG/ML
4 INJECTION INTRAMUSCULAR; INTRAVENOUS EVERY 30 MIN PRN
Status: DISCONTINUED | OUTPATIENT
Start: 2018-09-26 | End: 2018-09-26 | Stop reason: HOSPADM

## 2018-09-26 RX ORDER — HYDROCODONE BITARTRATE AND ACETAMINOPHEN 5; 325 MG/1; MG/1
1 TABLET ORAL EVERY 6 HOURS PRN
Qty: 10 TABLET | Refills: 0 | Status: SHIPPED | OUTPATIENT
Start: 2018-09-26 | End: 2018-09-29

## 2018-09-26 RX ORDER — MORPHINE SULFATE/0.9% NACL/PF 1 MG/ML
2 SYRINGE (ML) INJECTION
Status: DISCONTINUED | OUTPATIENT
Start: 2018-09-26 | End: 2018-09-26 | Stop reason: HOSPADM

## 2018-09-26 RX ORDER — 0.9 % SODIUM CHLORIDE 0.9 %
500 INTRAVENOUS SOLUTION INTRAVENOUS ONCE
Status: COMPLETED | OUTPATIENT
Start: 2018-09-26 | End: 2018-09-26

## 2018-09-26 RX ADMIN — IOPAMIDOL 90 ML: 755 INJECTION, SOLUTION INTRAVENOUS at 01:07

## 2018-09-26 RX ADMIN — Medication 2 MG: at 01:30

## 2018-09-26 RX ADMIN — ONDANSETRON 4 MG: 2 INJECTION INTRAMUSCULAR; INTRAVENOUS at 01:29

## 2018-09-26 RX ADMIN — SODIUM CHLORIDE 500 ML: 9 INJECTION, SOLUTION INTRAVENOUS at 02:44

## 2018-09-26 ASSESSMENT — ENCOUNTER SYMPTOMS
SINUS PRESSURE: 0
ABDOMINAL PAIN: 0
VOICE CHANGE: 0
FACIAL SWELLING: 0
WHEEZING: 0
NAUSEA: 0
APNEA: 0
CONSTIPATION: 0
SORE THROAT: 0
BLOOD IN STOOL: 0
DIARRHEA: 0
COUGH: 0
SHORTNESS OF BREATH: 0
EYE DISCHARGE: 0
CHOKING: 0

## 2018-09-26 ASSESSMENT — PAIN SCALES - GENERAL: PAINLEVEL_OUTOF10: 8

## 2018-09-26 ASSESSMENT — HEART SCORE: ECG: 0

## 2018-09-26 NOTE — ED NOTES
Pt called for her ride     [facility]  09/26/18 2725

## 2018-09-26 NOTE — ED PROVIDER NOTES
was performed and is negative except as noted above in the HPI.        PAST MEDICAL HISTORY     Past Medical History:   Diagnosis Date    Bronchitis     Colon cancer ([address]     Colon polyps     Constipation     Deep vein blood clot of left lower extremity (HCC)     Left leg     Depression     DVT (deep venous thrombosis) (HCC)     Dysphagia     Fibrocystic breast disease     Fibromyalgia     GERD (gastroesophageal reflux disease)     reflux with hx of esophageal stricture    Headache(784.0)     History of colon cancer 2000    Hormone replacement therapy (postmenopausal)     Hypertension     Neuropathy of foot     In both feet    Osteoarthritis     left knee pain    Restless legs syndrome     Vitamin D deficiency          SURGICAL HISTORY       Past Surgical History:   Procedure Laterality Date    APPENDECTOMY      BREAST BIOPSY      CATARACT REMOVAL       SECTION      CHOLECYSTECTOMY      COLECTOMY  partial    COLON SURGERY      COLONOSCOPY  2010    COLONOSCOPY  2012    Dr Vincent Bhatt: unremarkable enterocolonic anastamosis; hyperplastic polyps    COLONOSCOPY  3/2013    [facility]: unremarkable post-surgical colon    COLONOSCOPY  3/11/16    Dr Gaby Machado colon anastomosis, 5 yr recall    FOOT SURGERY  right foot    HAND SURGERY Right     Dr Ezekiel Alvarezmo GASTROINTESTINAL ENDOSCOPY  10-    UPPER GASTROINTESTINAL ENDOSCOPY  3/2013    [facility]ley: peptic stricture dilated to 48F; HH; small antral mucosal elevation    UPPER GASTROINTESTINAL ENDOSCOPY  2014    Maurilio: dilation of esophageal stricture    VARICOSE VEIN SURGERY Left 2014  [facility]    Left GSV Ablation    VARICOSE VEIN SURGERY Right 2014  [facility]    Right GSV Ablation         CURRENT MEDICATIONS       Previous Medications    ACYCLOVIR (ZOVIRAX) 400 MG TABLET    TAKE 1 TABLET BY MOUTH FIVE TIMES DAILY FOR 1 WEEK THEN TAKE 1 TABLET BY MOUTH BACK TWICE DAILY    ALBUTEROL (ACCUNEB) 1.25 MG/3ML NEBULIZER SOLUTION    Inhale 3 mLs into the lungs every 6 hours as needed for Wheezing Dx J41.8    DORZOLAMIDE-TIMOLOL (COSOPT) 22.3-6.8 MG/ML OPHTHALMIC SOLUTION    PLACE 1 DROP IN RIGHT EYE BID    FUROSEMIDE (LASIX) 20 MG TABLET    Take 1 tablet by mouth daily as needed (swelling)    GABAPENTIN (NEURONTIN) 300 MG CAPSULE    TAKE 1 CAPSULE BY MOUTH THREE TIMES DAILY FOR NERVE PAIN AND LEG CRAMPS    LOSARTAN (COZAAR) 100 MG TABLET    TAKE 1 TABLET BY MOUTH EVERY MORNING FOR BLOOD PRESSURE    LOTEMAX 0.5 % OPHTHALMIC GEL        METOPROLOL TARTRATE (LOPRESSOR) 25 MG TABLET    TAKE ONE TABLET BY MOUTH TWICE DAILY FOR BLOOD PRESSURE AND HEART    NEBULIZERS (AIRIAL COMPACT MINI NEBULIZER) MISC    1 Units by Does not apply route 4 times daily    OMEPRAZOLE (PRILOSEC) 40 MG DELAYED RELEASE CAPSULE    TAKE 1 CAPSULE BY MOUTH DAILY    TRIAMCINOLONE (KENALOG) 0.1 % CREAM    Apply topically 2 times daily.  External vagina    TRIAMTERENE-HYDROCHLOROTHIAZIDE (MAXZIDE-25) 37.5-25 MG PER TABLET    TAKE 1 TABLET BY MOUTH DAILY    VITAMIN B-12 (CYANOCOBALAMIN) 500 MCG TABLET    TAKE 1 TABLET BY MOUTH DAILY    VITAMIN D (ERGOCALCIFEROL) 98455 UNITS CAPS CAPSULE    TAKE 1 CAPSULE BY MOUTH 1 TIME A WEEK    XARELTO 20 MG TABS TABLET    TAKE ONE TABLET BY MOUTH DAILY WITH BREAKFAST       ALLERGIES     Zoloft [sertraline hcl]    FAMILY HISTORY       Family History   Problem Relation Age of Onset    Cancer Mother         Cervical Cancer    Cancer Brother         Esophageal cancer    Diabetes Brother     Esophageal Cancer Brother     Diabetes Sister     Colon Cancer Neg Hx     Colon Polyps Neg Hx     Liver Cancer Neg Hx     Liver Disease Neg Hx     Rectal Cancer Neg Hx     Stomach Cancer Neg Hx           SOCIAL HISTORY       Social History     Social History    Marital status:      Spouse name: N/A    Number of children: N/A    Years of education: Nursing note and vitals reviewed. DIAGNOSTIC RESULTS     EKG: All EKG's are interpreted by the Emergency Department Physician who either signs or Co-signs this chart in the absence of a cardiologist.    Normal sinus rhythm Q waves present no evidence of acute ischemia ×2    RADIOLOGY:   Non-plain film images such as CT, Ultrasound and MRI are read by the radiologist. Plain radiographic images are visualized and preliminarily interpreted by the emergency physician with the below findings:    See the acute process on the chest x-ray. Stat radiology interpreted the CTA is negative. He did not mention the supersized renal cyst on the left side. I don't see any obvious complicating factors in the upper abdomen including on the CT of the chest. In addition there is a cyst in the liver. Official report will be produced today. Interpretation per the Radiologist below, if available at the time of this note:    XR CHEST PORTABLE    (Results Pending)   CTA PULMONARY W CONTRAST    (Results Pending)         ED BEDSIDE ULTRASOUND:   Performed by ED Physician - none    LABS:  Labs Reviewed   COMPREHENSIVE METABOLIC PANEL - Abnormal; Notable for the following:        Result Value    Glucose 115 (*)     BUN 28 (*)     CREATININE 1.3 (*)     GFR Non- 39 (*)     Total Protein 6.5 (*)     All other components within normal limits   CBC WITH AUTO DIFFERENTIAL - Abnormal; Notable for the following:     RBC 4.17 (*)     MCHC 32.4 (*)     MPV 9.3 (*)     Neutrophils % 46.7 (*)     Monocytes % 10.7 (*)     Eosinophils % 5.8 (*)     All other components within normal limits   APTT   TROPONIN   D-DIMER, QUANTITATIVE   TROPONIN   POCT TROPONIN       All other labs were within normal range or not returned as of this dictation.     EMERGENCY DEPARTMENT COURSE and DIFFERENTIAL DIAGNOSIS/MDM:   Vitals:    Vitals:    09/26/18 0303 09/26/18 0331 09/26/18 0402 09/26/18 0502   BP: 106/67 (!) 105/56 (!) 107/57 (!) 101/58

## 2018-09-28 LAB
EKG P AXIS: 65 DEGREES
EKG P AXIS: 79 DEGREES
EKG P-R INTERVAL: 188 MS
EKG P-R INTERVAL: 194 MS
EKG Q-T INTERVAL: 414 MS
EKG Q-T INTERVAL: 424 MS
EKG QRS DURATION: 80 MS
EKG QRS DURATION: 84 MS
EKG QTC CALCULATION (BAZETT): 419 MS
EKG QTC CALCULATION (BAZETT): 431 MS
EKG T AXIS: 52 DEGREES
EKG T AXIS: 58 DEGREES

## 2018-10-16 ENCOUNTER — OFFICE VISIT (OUTPATIENT)
Dept: PRIMARY CARE CLINIC | Age: 83
End: 2018-10-16
Payer: MEDICARE

## 2018-10-16 VITALS
WEIGHT: 171 LBS | BODY MASS INDEX: 30.3 KG/M2 | DIASTOLIC BLOOD PRESSURE: 60 MMHG | OXYGEN SATURATION: 95 % | HEIGHT: 63 IN | TEMPERATURE: 96.8 F | RESPIRATION RATE: 16 BRPM | HEART RATE: 64 BPM | SYSTOLIC BLOOD PRESSURE: 122 MMHG

## 2018-10-16 DIAGNOSIS — M79.7 FIBROMYALGIA: Primary | ICD-10-CM

## 2018-10-16 DIAGNOSIS — Z12.31 ENCOUNTER FOR SCREENING MAMMOGRAM FOR MALIGNANT NEOPLASM OF BREAST: ICD-10-CM

## 2018-10-16 DIAGNOSIS — I10 ESSENTIAL HYPERTENSION: ICD-10-CM

## 2018-10-16 DIAGNOSIS — Z23 NEED FOR INFLUENZA VACCINATION: ICD-10-CM

## 2018-10-16 PROCEDURE — 99213 OFFICE O/P EST LOW 20 MIN: CPT | Performed by: NURSE PRACTITIONER

## 2018-10-16 PROCEDURE — G8427 DOCREV CUR MEDS BY ELIG CLIN: HCPCS | Performed by: NURSE PRACTITIONER

## 2018-10-16 PROCEDURE — G8399 PT W/DXA RESULTS DOCUMENT: HCPCS | Performed by: NURSE PRACTITIONER

## 2018-10-16 PROCEDURE — 1123F ACP DISCUSS/DSCN MKR DOCD: CPT | Performed by: NURSE PRACTITIONER

## 2018-10-16 PROCEDURE — 4040F PNEUMOC VAC/ADMIN/RCVD: CPT | Performed by: NURSE PRACTITIONER

## 2018-10-16 PROCEDURE — 1090F PRES/ABSN URINE INCON ASSESS: CPT | Performed by: NURSE PRACTITIONER

## 2018-10-16 PROCEDURE — 90662 IIV NO PRSV INCREASED AG IM: CPT | Performed by: NURSE PRACTITIONER

## 2018-10-16 PROCEDURE — G8417 CALC BMI ABV UP PARAM F/U: HCPCS | Performed by: NURSE PRACTITIONER

## 2018-10-16 PROCEDURE — 1101F PT FALLS ASSESS-DOCD LE1/YR: CPT | Performed by: NURSE PRACTITIONER

## 2018-10-16 PROCEDURE — G8482 FLU IMMUNIZE ORDER/ADMIN: HCPCS | Performed by: NURSE PRACTITIONER

## 2018-10-16 PROCEDURE — 1036F TOBACCO NON-USER: CPT | Performed by: NURSE PRACTITIONER

## 2018-10-16 PROCEDURE — G0008 ADMIN INFLUENZA VIRUS VAC: HCPCS | Performed by: NURSE PRACTITIONER

## 2018-10-16 RX ORDER — NYSTATIN 100000 [USP'U]/G
POWDER TOPICAL
Qty: 1 BOTTLE | Refills: 3 | Status: SHIPPED | OUTPATIENT
Start: 2018-10-16 | End: 2018-12-18

## 2018-10-16 ASSESSMENT — ENCOUNTER SYMPTOMS: BACK PAIN: 1

## 2018-10-16 NOTE — PROGRESS NOTES
up as directed. MEDICATIONS:  Orders Placed This Encounter   Medications    nystatin (MYCOSTATIN) 278474 UNIT/GM powder     Sig: Apply 2 times a day to dry skin     Dispense:  1 Bottle     Refill:  3     Quality & Risk Score Accuracy    Last edited 10/16/18 16:38 CDT by EARLE Pathak CNP         ORDERS:  Orders Placed This Encounter   Procedures    Mammography screening bilateral    INFLUENZA, HIGH DOSE, 65 YRS +, IM, PF, PREFILL SYR, 0.5ML (FLUZONE HD)       Follow-up:  No Follow-up on file. PATIENT INSTRUCTIONS:  There are no Patient Instructions on file for this visit. Electronically signed by EAREL Pathak CNP on 10/19/2018 at 5:21 PM    EMR Dragon/transcription disclaimer:  Much of thisencounter note is electronic transcription/translation of spoken language to printed texts. The electronic translation of spoken language may be erroneous, or at times, nonsensical words or phrases may be inadvertentlytranscribed.   Although I have reviewed the note for such errors, some may still exist.

## 2018-10-26 ENCOUNTER — HOSPITAL ENCOUNTER (OUTPATIENT)
Dept: WOMENS IMAGING | Age: 83
Discharge: HOME | End: 2018-10-26
Payer: MEDICARE

## 2018-10-26 DIAGNOSIS — Z12.31 ENCOUNTER FOR SCREENING MAMMOGRAM FOR MALIGNANT NEOPLASM OF BREAST: ICD-10-CM

## 2018-10-26 PROCEDURE — 77063 BREAST TOMOSYNTHESIS BI: CPT

## 2018-11-01 ENCOUNTER — OFFICE VISIT (OUTPATIENT)
Dept: PRIMARY CARE CLINIC | Age: 83
End: 2018-11-01
Payer: MEDICARE

## 2018-11-01 VITALS
DIASTOLIC BLOOD PRESSURE: 82 MMHG | HEART RATE: 66 BPM | OXYGEN SATURATION: 96 % | SYSTOLIC BLOOD PRESSURE: 117 MMHG | TEMPERATURE: 96.8 F | RESPIRATION RATE: 16 BRPM | WEIGHT: 172.6 LBS | BODY MASS INDEX: 30.57 KG/M2

## 2018-11-01 DIAGNOSIS — Z91.81 AT HIGH RISK FOR FALLS: ICD-10-CM
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
## 2018-11-01 DIAGNOSIS — R31.9 HEMATURIA, UNSPECIFIED TYPE: ICD-10-CM

## 2018-11-01 DIAGNOSIS — R10.12 LEFT UPPER QUADRANT PAIN: Primary | ICD-10-CM

## 2018-11-01 LAB
ALBUMIN SERPL-MCNC: 3.8 G/DL (ref 3.5–5.2)
ALP BLD-CCNC: 80 U/L (ref 35–104)
ALT SERPL-CCNC: 15 U/L (ref 5–33)
ANION GAP SERPL CALCULATED.3IONS-SCNC: 16 MMOL/L (ref 7–19)
APPEARANCE FLUID: CLEAR
AST SERPL-CCNC: 16 U/L (ref 5–32)
BILIRUB SERPL-MCNC: <0.2 MG/DL (ref 0.2–1.2)
BILIRUBIN, POC: ABNORMAL
BLOOD URINE, POC: ABNORMAL
BUN BLDV-MCNC: 24 MG/DL (ref 8–23)
CALCIUM SERPL-MCNC: 9.6 MG/DL (ref 8.8–10.2)
CHLORIDE BLD-SCNC: 103 MMOL/L (ref 98–111)
CLARITY, POC: CLEAR
CO2: 23 MMOL/L (ref 22–29)
COLOR, POC: YELLOW
CREAT SERPL-MCNC: 1.3 MG/DL (ref 0.5–0.9)
GFR NON-AFRICAN AMERICAN: 39
GLUCOSE BLD-MCNC: 145 MG/DL (ref 74–109)
GLUCOSE URINE, POC: ABNORMAL
HCT VFR BLD CALC: 38.8 % (ref 37–47)
HEMOGLOBIN: 12.5 G/DL (ref 12–16)
KETONES, POC: ABNORMAL
LEUKOCYTE EST, POC: ABNORMAL
MCH RBC QN AUTO: 30.1 PG (ref 27–31)
MCHC RBC AUTO-ENTMCNC: 32.2 G/DL (ref 33–37)
MCV RBC AUTO: 93.5 FL (ref 81–99)
NITRITE, POC: ABNORMAL
PDW BLD-RTO: 13.8 % (ref 11.5–14.5)
PH, POC: 5
PLATELET # BLD: 289 K/UL (ref 130–400)
PMV BLD AUTO: 10.7 FL (ref 9.4–12.3)
POTASSIUM SERPL-SCNC: 3.7 MMOL/L (ref 3.5–5)
PROTEIN, POC: ABNORMAL
RBC # BLD: 4.15 M/UL (ref 4.2–5.4)
SODIUM BLD-SCNC: 142 MMOL/L (ref 136–145)
SPECIFIC GRAVITY, POC: 1.02
TOTAL PROTEIN: 6.9 G/DL (ref 6.6–8.7)
UROBILINOGEN, POC: 0.2
WBC # BLD: 7.6 K/UL (ref 4.8–10.8)

## 2018-11-01 PROCEDURE — 4040F PNEUMOC VAC/ADMIN/RCVD: CPT | Performed by: NURSE PRACTITIONER

## 2018-11-01 PROCEDURE — G8427 DOCREV CUR MEDS BY ELIG CLIN: HCPCS | Performed by: NURSE PRACTITIONER

## 2018-11-01 PROCEDURE — G8417 CALC BMI ABV UP PARAM F/U: HCPCS | Performed by: NURSE PRACTITIONER

## 2018-11-01 PROCEDURE — 99213 OFFICE O/P EST LOW 20 MIN: CPT | Performed by: NURSE PRACTITIONER

## 2018-11-01 PROCEDURE — 1101F PT FALLS ASSESS-DOCD LE1/YR: CPT | Performed by: NURSE PRACTITIONER

## 2018-11-01 PROCEDURE — 1090F PRES/ABSN URINE INCON ASSESS: CPT | Performed by: NURSE PRACTITIONER

## 2018-11-01 PROCEDURE — G8399 PT W/DXA RESULTS DOCUMENT: HCPCS | Performed by: NURSE PRACTITIONER

## 2018-11-01 PROCEDURE — 1036F TOBACCO NON-USER: CPT | Performed by: NURSE PRACTITIONER

## 2018-11-01 PROCEDURE — 36415 COLL VENOUS BLD VENIPUNCTURE: CPT | Performed by: NURSE PRACTITIONER

## 2018-11-01 PROCEDURE — G8482 FLU IMMUNIZE ORDER/ADMIN: HCPCS | Performed by: NURSE PRACTITIONER

## 2018-11-01 PROCEDURE — 1123F ACP DISCUSS/DSCN MKR DOCD: CPT | Performed by: NURSE PRACTITIONER

## 2018-11-01 PROCEDURE — 81002 URINALYSIS NONAUTO W/O SCOPE: CPT | Performed by: NURSE PRACTITIONER

## 2018-11-01 RX ORDER — CIPROFLOXACIN 250 MG/1
250 TABLET, FILM COATED ORAL 2 TIMES DAILY
Qty: 6 TABLET | Refills: 0 | Status: SHIPPED | OUTPATIENT
Start: 2018-11-01 | End: 2018-11-04

## 2018-11-02 ENCOUNTER — HOSPITAL ENCOUNTER (OUTPATIENT)
Dept: GENERAL RADIOLOGY | Age: 83
Discharge: HOME | End: 2018-11-02
Payer: MEDICARE

## 2018-11-02 ENCOUNTER — HOSPITAL ENCOUNTER (OUTPATIENT)
Dept: ULTRASOUND IMAGING | Age: 83
Discharge: HOME | End: 2018-11-02
Payer: MEDICARE

## 2018-11-02 DIAGNOSIS — R10.12 LEFT UPPER QUADRANT PAIN: ICD-10-CM
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
## 2018-11-02 PROCEDURE — 76705 ECHO EXAM OF ABDOMEN: CPT

## 2018-11-02 PROCEDURE — 71100 X-RAY EXAM RIBS UNI 2 VIEWS: CPT

## 2018-11-03 LAB — URINE CULTURE, ROUTINE: NORMAL

## 2018-11-05 RX ORDER — ERGOCALCIFEROL 1.25 MG/1
CAPSULE ORAL
Qty: 13 CAPSULE | Refills: 0 | Status: SHIPPED | OUTPATIENT
Start: 2018-11-05 | End: 2019-02-04 | Stop reason: SDUPTHER

## 2018-11-15 NOTE — PROGRESS NOTES
Patient weaned herself off and after she quit taking the medication, the hallucinations stopped. Health Maintenance   Topic Date Due    Shingles Vaccine (1 of 2 - 2 Dose Series) 02/01/1987    Potassium monitoring  11/01/2019    Creatinine monitoring  11/01/2019    DTaP/Tdap/Td vaccine (3 - Td) 12/23/2025    Colon cancer screen colonoscopy  03/11/2026    DEXA (modify frequency per FRAX score)  Completed    Flu vaccine  Completed    Pneumococcal low/med risk  Completed       Subjective:      Review of Systems   Constitutional: Negative. Neurological: Positive for dizziness. Fall   Psychiatric/Behavioral: The patient is nervous/anxious. Objective:     Physical Exam   Constitutional: She is oriented to person, place, and time. She appears well-developed and well-nourished. HENT:   Head: Normocephalic. Right Ear: External ear normal.   Left Ear: External ear normal.   Eyes: Pupils are equal, round, and reactive to light. Neck: Normal range of motion. Cardiovascular: Normal rate, regular rhythm, normal heart sounds and intact distal pulses. Pulmonary/Chest: Effort normal and breath sounds normal.   Abdominal: Soft. Bowel sounds are normal. There is no hepatosplenomegaly, splenomegaly or hepatomegaly. There is tenderness in the left upper quadrant. There is no rigidity, no rebound, no guarding, no CVA tenderness, no tenderness at McBurney's point and negative Bryson's sign. Neurological: She is alert and oriented to person, place, and time. Skin: Skin is warm and dry. Psychiatric: She has a normal mood and affect. Her behavior is normal. Judgment and thought content normal.   Nursing note and vitals reviewed. /82 (Site: Left Upper Arm, Position: Sitting, Cuff Size: Medium Adult)   Pulse 66   Temp 96.8 °F (36 °C) (Temporal)   Resp 16   Wt 172 lb 9.6 oz (78.3 kg)   SpO2 96%   Breastfeeding?  No   BMI 30.57 kg/m²   Results for POC orders placed in visit on

## 2018-11-26 RX ORDER — CHOLECALCIFEROL (VITAMIN D3) 125 MCG
500 CAPSULE ORAL DAILY
Qty: 30 TABLET | Refills: 0 | Status: SHIPPED | OUTPATIENT
Start: 2018-11-26 | End: 2018-12-29 | Stop reason: SDUPTHER

## 2018-12-04 RX ORDER — TRIAMTERENE AND HYDROCHLOROTHIAZIDE 37.5; 25 MG/1; MG/1
TABLET ORAL
Qty: 90 TABLET | Refills: 0 | Status: SHIPPED | OUTPATIENT
Start: 2018-12-04 | End: 2019-03-03 | Stop reason: SDUPTHER

## 2018-12-18 ENCOUNTER — OFFICE VISIT (OUTPATIENT)
Dept: PRIMARY CARE CLINIC | Age: 83
End: 2018-12-18
Payer: MEDICARE

## 2018-12-18 VITALS
WEIGHT: 171 LBS | BODY MASS INDEX: 30.3 KG/M2 | RESPIRATION RATE: 16 BRPM | HEIGHT: 63 IN | TEMPERATURE: 97.1 F | HEART RATE: 68 BPM | DIASTOLIC BLOOD PRESSURE: 60 MMHG | SYSTOLIC BLOOD PRESSURE: 132 MMHG

## 2018-12-18 DIAGNOSIS — Z85.038 PERSONAL HISTORY OF COLON CANCER: ICD-10-CM

## 2018-12-18 DIAGNOSIS — Z95.828 PORT-A-CATH IN PLACE: ICD-10-CM

## 2018-12-18 DIAGNOSIS — I10 ESSENTIAL HYPERTENSION: ICD-10-CM

## 2018-12-18 DIAGNOSIS — Z86.718 HISTORY OF DVT (DEEP VEIN THROMBOSIS): ICD-10-CM

## 2018-12-18 DIAGNOSIS — Z45.2 ENCOUNTER FOR CARE RELATED TO PORT-A-CATH: Primary | ICD-10-CM

## 2018-12-18 PROCEDURE — 1090F PRES/ABSN URINE INCON ASSESS: CPT | Performed by: NURSE PRACTITIONER

## 2018-12-18 PROCEDURE — G8427 DOCREV CUR MEDS BY ELIG CLIN: HCPCS | Performed by: NURSE PRACTITIONER

## 2018-12-18 PROCEDURE — 99213 OFFICE O/P EST LOW 20 MIN: CPT | Performed by: NURSE PRACTITIONER

## 2018-12-18 PROCEDURE — G8399 PT W/DXA RESULTS DOCUMENT: HCPCS | Performed by: NURSE PRACTITIONER

## 2018-12-18 PROCEDURE — 96523 IRRIG DRUG DELIVERY DEVICE: CPT | Performed by: NURSE PRACTITIONER

## 2018-12-18 PROCEDURE — G8482 FLU IMMUNIZE ORDER/ADMIN: HCPCS | Performed by: NURSE PRACTITIONER

## 2018-12-18 PROCEDURE — 4040F PNEUMOC VAC/ADMIN/RCVD: CPT | Performed by: NURSE PRACTITIONER

## 2018-12-18 PROCEDURE — 1101F PT FALLS ASSESS-DOCD LE1/YR: CPT | Performed by: NURSE PRACTITIONER

## 2018-12-18 PROCEDURE — G8417 CALC BMI ABV UP PARAM F/U: HCPCS | Performed by: NURSE PRACTITIONER

## 2018-12-18 PROCEDURE — 1036F TOBACCO NON-USER: CPT | Performed by: NURSE PRACTITIONER

## 2018-12-18 PROCEDURE — 1123F ACP DISCUSS/DSCN MKR DOCD: CPT | Performed by: NURSE PRACTITIONER

## 2018-12-18 ASSESSMENT — ENCOUNTER SYMPTOMS: BACK PAIN: 1

## 2018-12-31 RX ORDER — CHOLECALCIFEROL (VITAMIN D3) 125 MCG
500 CAPSULE ORAL DAILY
Qty: 30 TABLET | Refills: 0 | Status: SHIPPED | OUTPATIENT
Start: 2018-12-31 | End: 2019-02-04 | Stop reason: SDUPTHER

## 2019-01-04 ENCOUNTER — TELEPHONE (OUTPATIENT)
Dept: PRIMARY CARE CLINIC | Age: 84
End: 2019-01-04

## 2019-01-05 ENCOUNTER — HOSPITAL ENCOUNTER (EMERGENCY)
Age: 84
Discharge: HOME | End: 2019-01-05
Attending: FAMILY MEDICINE
Payer: MEDICARE

## 2019-01-05 VITALS
SYSTOLIC BLOOD PRESSURE: 151 MMHG | RESPIRATION RATE: 16 BRPM | OXYGEN SATURATION: 93 % | WEIGHT: 160 LBS | TEMPERATURE: 97 F | HEART RATE: 80 BPM | HEIGHT: 63 IN | BODY MASS INDEX: 28.35 KG/M2 | DIASTOLIC BLOOD PRESSURE: 54 MMHG

## 2019-01-05 DIAGNOSIS — L03.115 CELLULITIS OF RIGHT LOWER EXTREMITY: Primary | ICD-10-CM

## 2019-01-05 DIAGNOSIS — R60.9 PERIPHERAL EDEMA: ICD-10-CM

## 2019-01-05 LAB
ALBUMIN SERPL-MCNC: 3.8 G/DL (ref 3.5–5.2)
ALP BLD-CCNC: 78 U/L (ref 35–104)
ALT SERPL-CCNC: 12 U/L (ref 5–33)
ANION GAP SERPL CALCULATED.3IONS-SCNC: 13 MMOL/L (ref 7–19)
APTT: 66.5 SEC (ref 26–36.2)
AST SERPL-CCNC: 14 U/L (ref 5–32)
BASOPHILS ABSOLUTE: 0 K/UL (ref 0–0.2)
BASOPHILS RELATIVE PERCENT: 0.5 % (ref 0–1)
BILIRUB SERPL-MCNC: <0.2 MG/DL (ref 0.2–1.2)
BUN BLDV-MCNC: 29 MG/DL (ref 8–23)
C-REACTIVE PROTEIN: 2.89 MG/DL (ref 0–0.5)
CALCIUM SERPL-MCNC: 9.2 MG/DL (ref 8.8–10.2)
CHLORIDE BLD-SCNC: 97 MMOL/L (ref 98–111)
CO2: 28 MMOL/L (ref 22–29)
CREAT SERPL-MCNC: 1.4 MG/DL (ref 0.5–0.9)
EOSINOPHILS ABSOLUTE: 0.3 K/UL (ref 0–0.6)
EOSINOPHILS RELATIVE PERCENT: 3.7 % (ref 0–5)
GFR NON-AFRICAN AMERICAN: 36
GLUCOSE BLD-MCNC: 115 MG/DL (ref 74–109)
HCT VFR BLD CALC: 37.9 % (ref 37–47)
HEMOGLOBIN: 12.2 G/DL (ref 12–16)
INR BLD: 2.18 (ref 0.88–1.18)
LYMPHOCYTES ABSOLUTE: 2.4 K/UL (ref 1.1–4.5)
LYMPHOCYTES RELATIVE PERCENT: 28.5 % (ref 20–40)
MCH RBC QN AUTO: 29.1 PG (ref 27–31)
MCHC RBC AUTO-ENTMCNC: 32.2 G/DL (ref 33–37)
MCV RBC AUTO: 90.5 FL (ref 81–99)
MONOCYTES ABSOLUTE: 1 K/UL (ref 0–0.9)
MONOCYTES RELATIVE PERCENT: 11.4 % (ref 0–10)
NEUTROPHILS ABSOLUTE: 4.6 K/UL (ref 1.5–7.5)
NEUTROPHILS RELATIVE PERCENT: 55.7 % (ref 50–65)
PDW BLD-RTO: 13.2 % (ref 11.5–14.5)
PLATELET # BLD: 269 K/UL (ref 130–400)
PMV BLD AUTO: 9.4 FL (ref 9.4–12.3)
POTASSIUM SERPL-SCNC: 3.6 MMOL/L (ref 3.5–5)
PRO-BNP: 108 PG/ML (ref 0–1800)
PROTHROMBIN TIME: 23.5 SEC (ref 12–14.6)
RBC # BLD: 4.19 M/UL (ref 4.2–5.4)
SODIUM BLD-SCNC: 138 MMOL/L (ref 136–145)
TOTAL PROTEIN: 6.7 G/DL (ref 6.6–8.7)
WBC # BLD: 8.3 K/UL (ref 4.8–10.8)

## 2019-01-05 PROCEDURE — 80053 COMPREHEN METABOLIC PANEL: CPT

## 2019-01-05 PROCEDURE — 85730 THROMBOPLASTIN TIME PARTIAL: CPT

## 2019-01-05 PROCEDURE — 85610 PROTHROMBIN TIME: CPT

## 2019-01-05 PROCEDURE — 6360000002 HC RX W HCPCS: Performed by: EMERGENCY MEDICINE

## 2019-01-05 PROCEDURE — 86140 C-REACTIVE PROTEIN: CPT

## 2019-01-05 PROCEDURE — 6360000002 HC RX W HCPCS: Performed by: FAMILY MEDICINE

## 2019-01-05 PROCEDURE — 96375 TX/PRO/DX INJ NEW DRUG ADDON: CPT

## 2019-01-05 PROCEDURE — 83880 ASSAY OF NATRIURETIC PEPTIDE: CPT

## 2019-01-05 PROCEDURE — 99283 EMERGENCY DEPT VISIT LOW MDM: CPT

## 2019-01-05 PROCEDURE — 36415 COLL VENOUS BLD VENIPUNCTURE: CPT

## 2019-01-05 PROCEDURE — 93970 EXTREMITY STUDY: CPT

## 2019-01-05 PROCEDURE — 2580000003 HC RX 258: Performed by: FAMILY MEDICINE

## 2019-01-05 PROCEDURE — 96374 THER/PROPH/DIAG INJ IV PUSH: CPT

## 2019-01-05 PROCEDURE — 85025 COMPLETE CBC W/AUTO DIFF WBC: CPT

## 2019-01-05 RX ORDER — SULFAMETHOXAZOLE AND TRIMETHOPRIM 800; 160 MG/1; MG/1
1 TABLET ORAL 2 TIMES DAILY
Qty: 14 TABLET | Refills: 0 | Status: SHIPPED | OUTPATIENT
Start: 2019-01-05 | End: 2019-01-12

## 2019-01-05 RX ORDER — HEPARIN SODIUM (PORCINE) LOCK FLUSH IV SOLN 100 UNIT/ML 100 UNIT/ML
100 SOLUTION INTRAVENOUS ONCE
Status: DISCONTINUED | OUTPATIENT
Start: 2019-01-05 | End: 2019-01-05

## 2019-01-05 RX ORDER — DOXYCYCLINE HYCLATE 100 MG/1
100 CAPSULE ORAL 2 TIMES DAILY
COMMUNITY
End: 2019-01-14

## 2019-01-05 RX ORDER — HEPARIN SODIUM (PORCINE) LOCK FLUSH IV SOLN 100 UNIT/ML 100 UNIT/ML
500 SOLUTION INTRAVENOUS ONCE
Status: COMPLETED | OUTPATIENT
Start: 2019-01-05 | End: 2019-01-05

## 2019-01-05 RX ADMIN — HEPARIN 500 UNITS: 100 SYRINGE at 18:40

## 2019-01-05 RX ADMIN — CEFTRIAXONE 1 G: 1 INJECTION, POWDER, FOR SOLUTION INTRAMUSCULAR; INTRAVENOUS at 13:47

## 2019-01-05 ASSESSMENT — ENCOUNTER SYMPTOMS
NAUSEA: 0
SINUS PAIN: 0
COUGH: 0
VOMITING: 0
RHINORRHEA: 0
ABDOMINAL PAIN: 0
BACK PAIN: 0
WHEEZING: 0
DIARRHEA: 0
CONSTIPATION: 0
CHEST TIGHTNESS: 0
SHORTNESS OF BREATH: 0
COLOR CHANGE: 0

## 2019-01-05 ASSESSMENT — PAIN DESCRIPTION - LOCATION: LOCATION: FOOT

## 2019-01-05 ASSESSMENT — PAIN DESCRIPTION - PAIN TYPE: TYPE: ACUTE PAIN

## 2019-01-05 ASSESSMENT — PAIN SCALES - GENERAL: PAINLEVEL_OUTOF10: 8

## 2019-01-05 ASSESSMENT — PAIN DESCRIPTION - ORIENTATION: ORIENTATION: LEFT;RIGHT

## 2019-01-08 ENCOUNTER — OFFICE VISIT (OUTPATIENT)
Dept: PRIMARY CARE CLINIC | Age: 84
End: 2019-01-08
Payer: MEDICARE

## 2019-01-08 VITALS
HEIGHT: 63 IN | HEART RATE: 72 BPM | RESPIRATION RATE: 16 BRPM | BODY MASS INDEX: 29.95 KG/M2 | SYSTOLIC BLOOD PRESSURE: 132 MMHG | TEMPERATURE: 96.8 F | WEIGHT: 169 LBS | DIASTOLIC BLOOD PRESSURE: 60 MMHG

## 2019-01-08 DIAGNOSIS — L03.115 CELLULITIS OF RIGHT FOOT: Primary | ICD-10-CM

## 2019-01-08 PROCEDURE — G8399 PT W/DXA RESULTS DOCUMENT: HCPCS | Performed by: NURSE PRACTITIONER

## 2019-01-08 PROCEDURE — G8427 DOCREV CUR MEDS BY ELIG CLIN: HCPCS | Performed by: NURSE PRACTITIONER

## 2019-01-08 PROCEDURE — 1036F TOBACCO NON-USER: CPT | Performed by: NURSE PRACTITIONER

## 2019-01-08 PROCEDURE — G8482 FLU IMMUNIZE ORDER/ADMIN: HCPCS | Performed by: NURSE PRACTITIONER

## 2019-01-08 PROCEDURE — 4040F PNEUMOC VAC/ADMIN/RCVD: CPT | Performed by: NURSE PRACTITIONER

## 2019-01-08 PROCEDURE — G8417 CALC BMI ABV UP PARAM F/U: HCPCS | Performed by: NURSE PRACTITIONER

## 2019-01-08 PROCEDURE — 1090F PRES/ABSN URINE INCON ASSESS: CPT | Performed by: NURSE PRACTITIONER

## 2019-01-08 PROCEDURE — 1101F PT FALLS ASSESS-DOCD LE1/YR: CPT | Performed by: NURSE PRACTITIONER

## 2019-01-08 PROCEDURE — 1123F ACP DISCUSS/DSCN MKR DOCD: CPT | Performed by: NURSE PRACTITIONER

## 2019-01-08 PROCEDURE — 99213 OFFICE O/P EST LOW 20 MIN: CPT | Performed by: NURSE PRACTITIONER

## 2019-01-14 ENCOUNTER — HOSPITAL ENCOUNTER (OUTPATIENT)
Dept: VASCULAR LAB | Age: 84
Discharge: HOME | End: 2019-01-14
Payer: MEDICARE

## 2019-01-14 ENCOUNTER — OFFICE VISIT (OUTPATIENT)
Dept: PRIMARY CARE CLINIC | Age: 84
End: 2019-01-14
Payer: MEDICARE

## 2019-01-14 VITALS
WEIGHT: 168 LBS | RESPIRATION RATE: 16 BRPM | HEIGHT: 63 IN | SYSTOLIC BLOOD PRESSURE: 139 MMHG | DIASTOLIC BLOOD PRESSURE: 61 MMHG | TEMPERATURE: 96.7 F | HEART RATE: 66 BPM | BODY MASS INDEX: 29.77 KG/M2

## 2019-01-14 DIAGNOSIS — I73.9 CLAUDICATION OF BOTH LOWER EXTREMITIES (HCC): ICD-10-CM

## 2019-01-14 DIAGNOSIS — M79.671 PAIN IN BOTH FEET: ICD-10-CM

## 2019-01-14 DIAGNOSIS — R09.89 PROLONGED CAPILLARY REFILL TIME: ICD-10-CM

## 2019-01-14 DIAGNOSIS — R09.89 PROLONGED CAPILLARY REFILL TIME: Primary | ICD-10-CM

## 2019-01-14 DIAGNOSIS — M79.672 PAIN IN BOTH FEET: ICD-10-CM

## 2019-01-14 PROCEDURE — G8417 CALC BMI ABV UP PARAM F/U: HCPCS | Performed by: NURSE PRACTITIONER

## 2019-01-14 PROCEDURE — 1101F PT FALLS ASSESS-DOCD LE1/YR: CPT | Performed by: NURSE PRACTITIONER

## 2019-01-14 PROCEDURE — 1123F ACP DISCUSS/DSCN MKR DOCD: CPT | Performed by: NURSE PRACTITIONER

## 2019-01-14 PROCEDURE — 4040F PNEUMOC VAC/ADMIN/RCVD: CPT | Performed by: NURSE PRACTITIONER

## 2019-01-14 PROCEDURE — G8399 PT W/DXA RESULTS DOCUMENT: HCPCS | Performed by: NURSE PRACTITIONER

## 2019-01-14 PROCEDURE — G8427 DOCREV CUR MEDS BY ELIG CLIN: HCPCS | Performed by: NURSE PRACTITIONER

## 2019-01-14 PROCEDURE — 99213 OFFICE O/P EST LOW 20 MIN: CPT | Performed by: NURSE PRACTITIONER

## 2019-01-14 PROCEDURE — 93923 UPR/LXTR ART STDY 3+ LVLS: CPT

## 2019-01-14 PROCEDURE — 1090F PRES/ABSN URINE INCON ASSESS: CPT | Performed by: NURSE PRACTITIONER

## 2019-01-14 PROCEDURE — 1036F TOBACCO NON-USER: CPT | Performed by: NURSE PRACTITIONER

## 2019-01-14 PROCEDURE — G8482 FLU IMMUNIZE ORDER/ADMIN: HCPCS | Performed by: NURSE PRACTITIONER

## 2019-01-15 ENCOUNTER — OFFICE VISIT (OUTPATIENT)
Dept: VASCULAR SURGERY | Age: 84
End: 2019-01-15
Payer: MEDICARE

## 2019-01-15 VITALS
RESPIRATION RATE: 18 BRPM | HEART RATE: 78 BPM | TEMPERATURE: 96 F | SYSTOLIC BLOOD PRESSURE: 153 MMHG | DIASTOLIC BLOOD PRESSURE: 77 MMHG

## 2019-01-15 DIAGNOSIS — I73.9 PVD (PERIPHERAL VASCULAR DISEASE) (HCC): Primary | ICD-10-CM

## 2019-01-15 PROCEDURE — 1036F TOBACCO NON-USER: CPT | Performed by: PHYSICIAN ASSISTANT

## 2019-01-15 PROCEDURE — G8427 DOCREV CUR MEDS BY ELIG CLIN: HCPCS | Performed by: PHYSICIAN ASSISTANT

## 2019-01-15 PROCEDURE — G8399 PT W/DXA RESULTS DOCUMENT: HCPCS | Performed by: PHYSICIAN ASSISTANT

## 2019-01-15 PROCEDURE — 1090F PRES/ABSN URINE INCON ASSESS: CPT | Performed by: PHYSICIAN ASSISTANT

## 2019-01-15 PROCEDURE — 99203 OFFICE O/P NEW LOW 30 MIN: CPT | Performed by: PHYSICIAN ASSISTANT

## 2019-01-15 PROCEDURE — G8482 FLU IMMUNIZE ORDER/ADMIN: HCPCS | Performed by: PHYSICIAN ASSISTANT

## 2019-01-15 PROCEDURE — 1123F ACP DISCUSS/DSCN MKR DOCD: CPT | Performed by: PHYSICIAN ASSISTANT

## 2019-01-15 PROCEDURE — G8417 CALC BMI ABV UP PARAM F/U: HCPCS | Performed by: PHYSICIAN ASSISTANT

## 2019-01-15 PROCEDURE — 4040F PNEUMOC VAC/ADMIN/RCVD: CPT | Performed by: PHYSICIAN ASSISTANT

## 2019-01-15 PROCEDURE — 1101F PT FALLS ASSESS-DOCD LE1/YR: CPT | Performed by: PHYSICIAN ASSISTANT

## 2019-02-04 RX ORDER — ERGOCALCIFEROL 1.25 MG/1
CAPSULE ORAL
Qty: 13 CAPSULE | Refills: 0 | Status: SHIPPED | OUTPATIENT
Start: 2019-02-04 | End: 2019-07-27 | Stop reason: SDUPTHER

## 2019-02-04 RX ORDER — CHOLECALCIFEROL (VITAMIN D3) 125 MCG
500 CAPSULE ORAL DAILY
Qty: 30 TABLET | Refills: 0 | Status: SHIPPED | OUTPATIENT
Start: 2019-02-04 | End: 2019-03-06 | Stop reason: SDUPTHER

## 2019-02-08 ENCOUNTER — OFFICE VISIT (OUTPATIENT)
Dept: PRIMARY CARE CLINIC | Age: 84
End: 2019-02-08
Payer: MEDICARE

## 2019-02-08 VITALS
BODY MASS INDEX: 30.65 KG/M2 | TEMPERATURE: 97.5 F | DIASTOLIC BLOOD PRESSURE: 84 MMHG | SYSTOLIC BLOOD PRESSURE: 130 MMHG | OXYGEN SATURATION: 97 % | HEART RATE: 68 BPM | HEIGHT: 63 IN | RESPIRATION RATE: 16 BRPM | WEIGHT: 173 LBS

## 2019-02-08 DIAGNOSIS — G60.9 HEREDITARY AND IDIOPATHIC NEUROPATHY: ICD-10-CM

## 2019-02-08 DIAGNOSIS — M79.674 GREAT TOE PAIN, RIGHT: Primary | ICD-10-CM

## 2019-02-08 DIAGNOSIS — E55.9 VITAMIN D DEFICIENCY: ICD-10-CM

## 2019-02-08 DIAGNOSIS — G57.93 NEUROPATHIC PAIN OF BOTH FEET: ICD-10-CM

## 2019-02-08 DIAGNOSIS — M25.572 LEFT ANKLE PAIN, UNSPECIFIED CHRONICITY: ICD-10-CM

## 2019-02-08 DIAGNOSIS — N28.9 RENAL INSUFFICIENCY: ICD-10-CM

## 2019-02-08 DIAGNOSIS — I87.2 VENOUS INSUFFICIENCY: ICD-10-CM

## 2019-02-08 LAB
ALBUMIN SERPL-MCNC: 4 G/DL (ref 3.5–5.2)
ALP BLD-CCNC: 82 U/L (ref 35–104)
ALT SERPL-CCNC: 14 U/L (ref 5–33)
ANION GAP SERPL CALCULATED.3IONS-SCNC: 17 MMOL/L (ref 7–19)
AST SERPL-CCNC: 16 U/L (ref 5–32)
BILIRUB SERPL-MCNC: <0.2 MG/DL (ref 0.2–1.2)
BUN BLDV-MCNC: 21 MG/DL (ref 8–23)
CALCIUM SERPL-MCNC: 9.5 MG/DL (ref 8.8–10.2)
CHLORIDE BLD-SCNC: 103 MMOL/L (ref 98–111)
CO2: 22 MMOL/L (ref 22–29)
CREAT SERPL-MCNC: 1.1 MG/DL (ref 0.5–0.9)
FOLATE: 11.5 NG/ML (ref 4.8–37.3)
GFR NON-AFRICAN AMERICAN: 48
GLUCOSE BLD-MCNC: 136 MG/DL (ref 74–109)
HCT VFR BLD CALC: 39.2 % (ref 37–47)
HEMOGLOBIN: 12.1 G/DL (ref 12–16)
MCH RBC QN AUTO: 28.8 PG (ref 27–31)
MCHC RBC AUTO-ENTMCNC: 30.9 G/DL (ref 33–37)
MCV RBC AUTO: 93.3 FL (ref 81–99)
PDW BLD-RTO: 14.2 % (ref 11.5–14.5)
PLATELET # BLD: 285 K/UL (ref 130–400)
PMV BLD AUTO: 9.8 FL (ref 9.4–12.3)
POTASSIUM SERPL-SCNC: 4.3 MMOL/L (ref 3.5–5)
RBC # BLD: 4.2 M/UL (ref 4.2–5.4)
SODIUM BLD-SCNC: 142 MMOL/L (ref 136–145)
TOTAL PROTEIN: 7.2 G/DL (ref 6.6–8.7)
TSH SERPL DL<=0.05 MIU/L-ACNC: 1.76 UIU/ML (ref 0.27–4.2)
VITAMIN B-12: 517 PG/ML (ref 211–946)
VITAMIN D 25-HYDROXY: 38.9 NG/ML
WBC # BLD: 6.6 K/UL (ref 4.8–10.8)

## 2019-02-08 PROCEDURE — 4040F PNEUMOC VAC/ADMIN/RCVD: CPT | Performed by: FAMILY MEDICINE

## 2019-02-08 PROCEDURE — 1090F PRES/ABSN URINE INCON ASSESS: CPT | Performed by: FAMILY MEDICINE

## 2019-02-08 PROCEDURE — G8427 DOCREV CUR MEDS BY ELIG CLIN: HCPCS | Performed by: FAMILY MEDICINE

## 2019-02-08 PROCEDURE — G8417 CALC BMI ABV UP PARAM F/U: HCPCS | Performed by: FAMILY MEDICINE

## 2019-02-08 PROCEDURE — 99214 OFFICE O/P EST MOD 30 MIN: CPT | Performed by: FAMILY MEDICINE

## 2019-02-08 PROCEDURE — 36415 COLL VENOUS BLD VENIPUNCTURE: CPT | Performed by: FAMILY MEDICINE

## 2019-02-08 PROCEDURE — 1101F PT FALLS ASSESS-DOCD LE1/YR: CPT | Performed by: FAMILY MEDICINE

## 2019-02-08 PROCEDURE — G8399 PT W/DXA RESULTS DOCUMENT: HCPCS | Performed by: FAMILY MEDICINE

## 2019-02-08 PROCEDURE — 1123F ACP DISCUSS/DSCN MKR DOCD: CPT | Performed by: FAMILY MEDICINE

## 2019-02-08 PROCEDURE — G8482 FLU IMMUNIZE ORDER/ADMIN: HCPCS | Performed by: FAMILY MEDICINE

## 2019-02-08 PROCEDURE — 1036F TOBACCO NON-USER: CPT | Performed by: FAMILY MEDICINE

## 2019-02-08 ASSESSMENT — PATIENT HEALTH QUESTIONNAIRE - PHQ9
SUM OF ALL RESPONSES TO PHQ QUESTIONS 1-9: 0
SUM OF ALL RESPONSES TO PHQ QUESTIONS 1-9: 0
2. FEELING DOWN, DEPRESSED OR HOPELESS: 0
1. LITTLE INTEREST OR PLEASURE IN DOING THINGS: 0
SUM OF ALL RESPONSES TO PHQ9 QUESTIONS 1 & 2: 0

## 2019-02-09 ASSESSMENT — ENCOUNTER SYMPTOMS
GASTROINTESTINAL NEGATIVE: 1
RESPIRATORY NEGATIVE: 1

## 2019-02-12 ENCOUNTER — TELEPHONE (OUTPATIENT)
Dept: VASCULAR SURGERY | Age: 84
End: 2019-02-12

## 2019-02-13 ENCOUNTER — TELEPHONE (OUTPATIENT)
Dept: PRIMARY CARE CLINIC | Age: 84
End: 2019-02-13

## 2019-02-18 DIAGNOSIS — G62.9 PERIPHERAL POLYNEUROPATHY: Primary | ICD-10-CM

## 2019-02-18 DIAGNOSIS — R20.9 COLD RIGHT FOOT: ICD-10-CM

## 2019-02-19 ENCOUNTER — TELEPHONE (OUTPATIENT)
Dept: NEUROLOGY | Age: 84
End: 2019-02-19

## 2019-02-19 ENCOUNTER — TELEPHONE (OUTPATIENT)
Dept: PRIMARY CARE CLINIC | Age: 84
End: 2019-02-19

## 2019-03-04 RX ORDER — TRIAMTERENE AND HYDROCHLOROTHIAZIDE 37.5; 25 MG/1; MG/1
TABLET ORAL
Qty: 90 TABLET | Refills: 0 | Status: SHIPPED | OUTPATIENT
Start: 2019-03-04 | End: 2019-05-30 | Stop reason: SDUPTHER

## 2019-03-14 ENCOUNTER — OFFICE VISIT (OUTPATIENT)
Dept: PRIMARY CARE CLINIC | Age: 84
End: 2019-03-14
Payer: MEDICARE

## 2019-03-14 VITALS
HEART RATE: 65 BPM | BODY MASS INDEX: 30.83 KG/M2 | HEIGHT: 63 IN | TEMPERATURE: 97.6 F | OXYGEN SATURATION: 96 % | DIASTOLIC BLOOD PRESSURE: 82 MMHG | SYSTOLIC BLOOD PRESSURE: 120 MMHG | WEIGHT: 174 LBS

## 2019-03-14 DIAGNOSIS — R06.02 SHORT OF BREATH ON EXERTION: Primary | ICD-10-CM

## 2019-03-14 DIAGNOSIS — Z85.038 PERSONAL HISTORY OF COLON CANCER: ICD-10-CM

## 2019-03-14 DIAGNOSIS — N18.30 CHRONIC KIDNEY DISEASE, STAGE III (MODERATE) (HCC): ICD-10-CM

## 2019-03-14 DIAGNOSIS — I10 ESSENTIAL HYPERTENSION: ICD-10-CM

## 2019-03-14 DIAGNOSIS — Z00.00 ROUTINE GENERAL MEDICAL EXAMINATION AT A HEALTH CARE FACILITY: ICD-10-CM

## 2019-03-14 DIAGNOSIS — I47.1 PAROXYSMAL SUPRAVENTRICULAR TACHYCARDIA (HCC): ICD-10-CM

## 2019-03-14 PROCEDURE — 4040F PNEUMOC VAC/ADMIN/RCVD: CPT | Performed by: NURSE PRACTITIONER

## 2019-03-14 PROCEDURE — G8482 FLU IMMUNIZE ORDER/ADMIN: HCPCS | Performed by: NURSE PRACTITIONER

## 2019-03-14 PROCEDURE — G0439 PPPS, SUBSEQ VISIT: HCPCS | Performed by: NURSE PRACTITIONER

## 2019-03-14 RX ORDER — GABAPENTIN 300 MG/1
300 CAPSULE ORAL 3 TIMES DAILY PRN
Refills: 2 | COMMUNITY
Start: 2019-01-22 | End: 2019-03-19 | Stop reason: SDUPTHER

## 2019-03-14 ASSESSMENT — LIFESTYLE VARIABLES: HOW OFTEN DO YOU HAVE A DRINK CONTAINING ALCOHOL: 0

## 2019-03-14 ASSESSMENT — ANXIETY QUESTIONNAIRES: GAD7 TOTAL SCORE: 0

## 2019-03-14 ASSESSMENT — PATIENT HEALTH QUESTIONNAIRE - PHQ9
SUM OF ALL RESPONSES TO PHQ QUESTIONS 1-9: 2
SUM OF ALL RESPONSES TO PHQ QUESTIONS 1-9: 2

## 2019-03-18 ENCOUNTER — HOSPITAL ENCOUNTER (OUTPATIENT)
Dept: NON INVASIVE DIAGNOSTICS | Age: 84
Discharge: HOME | End: 2019-03-18
Payer: MEDICARE

## 2019-03-18 DIAGNOSIS — Z85.038 PERSONAL HISTORY OF COLON CANCER: ICD-10-CM

## 2019-03-18 DIAGNOSIS — I10 ESSENTIAL HYPERTENSION: ICD-10-CM

## 2019-03-18 DIAGNOSIS — R06.02 SHORT OF BREATH ON EXERTION: ICD-10-CM

## 2019-03-18 LAB
LV EF: 55 %
LVEF MODALITY: NORMAL

## 2019-03-18 PROCEDURE — 93306 TTE W/DOPPLER COMPLETE: CPT

## 2019-03-19 ENCOUNTER — OFFICE VISIT (OUTPATIENT)
Dept: NEUROSURGERY | Age: 84
End: 2019-03-19
Payer: MEDICARE

## 2019-03-19 VITALS
OXYGEN SATURATION: 98 % | WEIGHT: 177 LBS | SYSTOLIC BLOOD PRESSURE: 148 MMHG | DIASTOLIC BLOOD PRESSURE: 60 MMHG | HEART RATE: 60 BPM | BODY MASS INDEX: 31.36 KG/M2 | HEIGHT: 63 IN

## 2019-03-19 DIAGNOSIS — M79.604 PAIN IN BOTH LOWER EXTREMITIES: Primary | ICD-10-CM

## 2019-03-19 DIAGNOSIS — M79.605 PAIN IN BOTH LOWER EXTREMITIES: Primary | ICD-10-CM

## 2019-03-19 DIAGNOSIS — R20.2 NUMBNESS AND TINGLING OF BOTH FEET: ICD-10-CM

## 2019-03-19 DIAGNOSIS — R20.0 NUMBNESS AND TINGLING OF BOTH FEET: ICD-10-CM

## 2019-03-19 DIAGNOSIS — M79.604 PAIN IN BOTH LOWER EXTREMITIES: ICD-10-CM

## 2019-03-19 DIAGNOSIS — M79.605 PAIN IN BOTH LOWER EXTREMITIES: ICD-10-CM

## 2019-03-19 LAB
ALBUMIN SERPL-MCNC: 4.2 G/DL (ref 3.5–5.2)
ALP BLD-CCNC: 79 U/L (ref 35–104)
ALT SERPL-CCNC: 13 U/L (ref 5–33)
ANION GAP SERPL CALCULATED.3IONS-SCNC: 12 MMOL/L (ref 7–19)
AST SERPL-CCNC: 15 U/L (ref 5–32)
BASOPHILS ABSOLUTE: 0.1 K/UL (ref 0–0.2)
BASOPHILS RELATIVE PERCENT: 0.7 % (ref 0–1)
BILIRUB SERPL-MCNC: <0.2 MG/DL (ref 0.2–1.2)
BUN BLDV-MCNC: 24 MG/DL (ref 8–23)
C-REACTIVE PROTEIN: 0.17 MG/DL (ref 0–0.5)
CALCIUM SERPL-MCNC: 9.6 MG/DL (ref 8.8–10.2)
CHLORIDE BLD-SCNC: 102 MMOL/L (ref 98–111)
CO2: 26 MMOL/L (ref 22–29)
CREAT SERPL-MCNC: 1 MG/DL (ref 0.5–0.9)
EOSINOPHILS ABSOLUTE: 0.5 K/UL (ref 0–0.6)
EOSINOPHILS RELATIVE PERCENT: 7.2 % (ref 0–5)
GFR NON-AFRICAN AMERICAN: 53
GLUCOSE BLD-MCNC: 104 MG/DL (ref 74–109)
HCT VFR BLD CALC: 36.5 % (ref 37–47)
HEMOGLOBIN: 11.9 G/DL (ref 12–16)
LYMPHOCYTES ABSOLUTE: 2.7 K/UL (ref 1.1–4.5)
LYMPHOCYTES RELATIVE PERCENT: 36.2 % (ref 20–40)
MCH RBC QN AUTO: 29.4 PG (ref 27–31)
MCHC RBC AUTO-ENTMCNC: 32.6 G/DL (ref 33–37)
MCV RBC AUTO: 90.1 FL (ref 81–99)
MONOCYTES ABSOLUTE: 0.7 K/UL (ref 0–0.9)
MONOCYTES RELATIVE PERCENT: 8.7 % (ref 0–10)
NEUTROPHILS ABSOLUTE: 3.5 K/UL (ref 1.5–7.5)
NEUTROPHILS RELATIVE PERCENT: 46.9 % (ref 50–65)
PDW BLD-RTO: 14.6 % (ref 11.5–14.5)
PLATELET # BLD: 247 K/UL (ref 130–400)
PMV BLD AUTO: 9.8 FL (ref 9.4–12.3)
POTASSIUM SERPL-SCNC: 4.7 MMOL/L (ref 3.5–5)
RBC # BLD: 4.05 M/UL (ref 4.2–5.4)
SEDIMENTATION RATE, ERYTHROCYTE: 14 MM/HR (ref 0–25)
SODIUM BLD-SCNC: 140 MMOL/L (ref 136–145)
T4 FREE: 1.1 NG/DL (ref 0.9–1.7)
TOTAL PROTEIN: 7.2 G/DL (ref 6.6–8.7)
TSH SERPL DL<=0.05 MIU/L-ACNC: 1.2 UIU/ML (ref 0.27–4.2)
VITAMIN B-12: 544 PG/ML (ref 211–946)
WBC # BLD: 7.5 K/UL (ref 4.8–10.8)

## 2019-03-19 PROCEDURE — 1036F TOBACCO NON-USER: CPT | Performed by: NURSE PRACTITIONER

## 2019-03-19 PROCEDURE — G8417 CALC BMI ABV UP PARAM F/U: HCPCS | Performed by: NURSE PRACTITIONER

## 2019-03-19 PROCEDURE — G8399 PT W/DXA RESULTS DOCUMENT: HCPCS | Performed by: NURSE PRACTITIONER

## 2019-03-19 PROCEDURE — G8427 DOCREV CUR MEDS BY ELIG CLIN: HCPCS | Performed by: NURSE PRACTITIONER

## 2019-03-19 PROCEDURE — 1101F PT FALLS ASSESS-DOCD LE1/YR: CPT | Performed by: NURSE PRACTITIONER

## 2019-03-19 PROCEDURE — 4040F PNEUMOC VAC/ADMIN/RCVD: CPT | Performed by: NURSE PRACTITIONER

## 2019-03-19 PROCEDURE — 1123F ACP DISCUSS/DSCN MKR DOCD: CPT | Performed by: NURSE PRACTITIONER

## 2019-03-19 PROCEDURE — 99214 OFFICE O/P EST MOD 30 MIN: CPT | Performed by: NURSE PRACTITIONER

## 2019-03-19 PROCEDURE — G8482 FLU IMMUNIZE ORDER/ADMIN: HCPCS | Performed by: NURSE PRACTITIONER

## 2019-03-19 PROCEDURE — 1090F PRES/ABSN URINE INCON ASSESS: CPT | Performed by: NURSE PRACTITIONER

## 2019-03-21 ENCOUNTER — TELEPHONE (OUTPATIENT)
Dept: NEUROSURGERY | Age: 84
End: 2019-03-21

## 2019-03-23 LAB
ALBUMIN SERPL-MCNC: 4.2 G/DL (ref 3.75–5.01)
ALPHA-1-GLOBULIN: 0.31 G/DL (ref 0.19–0.46)
ALPHA-2-GLOBULIN: 0.8 G/DL (ref 0.48–1.05)
BETA GLOBULIN: 1.01 G/DL (ref 0.48–1.1)
GAMMA GLOBULIN: 0.99 G/DL (ref 0.62–1.51)
PROTEIN ELECTROPHORESIS, SERUM: NORMAL
SPE/IFE INTERPRETATION: NORMAL
TOTAL PROTEIN: 7.3 G/DL (ref 6–8.3)

## 2019-04-03 ENCOUNTER — TELEPHONE (OUTPATIENT)
Dept: NEUROSURGERY | Age: 84
End: 2019-04-03

## 2019-04-03 ENCOUNTER — TELEPHONE (OUTPATIENT)
Dept: PRIMARY CARE CLINIC | Age: 84
End: 2019-04-03

## 2019-04-03 ENCOUNTER — OFFICE VISIT (OUTPATIENT)
Dept: PRIMARY CARE CLINIC | Age: 84
End: 2019-04-03
Payer: MEDICARE

## 2019-04-03 VITALS
HEART RATE: 57 BPM | SYSTOLIC BLOOD PRESSURE: 130 MMHG | TEMPERATURE: 98 F | BODY MASS INDEX: 31.01 KG/M2 | WEIGHT: 175 LBS | HEIGHT: 63 IN | DIASTOLIC BLOOD PRESSURE: 80 MMHG | OXYGEN SATURATION: 97 %

## 2019-04-03 DIAGNOSIS — N28.9 RENAL INSUFFICIENCY: ICD-10-CM

## 2019-04-03 DIAGNOSIS — I10 ESSENTIAL HYPERTENSION: Primary | ICD-10-CM

## 2019-04-03 DIAGNOSIS — C18.9 MALIGNANT NEOPLASM OF COLON, UNSPECIFIED PART OF COLON (HCC): ICD-10-CM

## 2019-04-03 DIAGNOSIS — M87.00 AVASCULAR NECROSIS (HCC): ICD-10-CM

## 2019-04-03 DIAGNOSIS — M25.551 RIGHT HIP PAIN: ICD-10-CM

## 2019-04-03 PROCEDURE — G8427 DOCREV CUR MEDS BY ELIG CLIN: HCPCS | Performed by: NURSE PRACTITIONER

## 2019-04-03 PROCEDURE — G8399 PT W/DXA RESULTS DOCUMENT: HCPCS | Performed by: NURSE PRACTITIONER

## 2019-04-03 PROCEDURE — G8417 CALC BMI ABV UP PARAM F/U: HCPCS | Performed by: NURSE PRACTITIONER

## 2019-04-03 PROCEDURE — 99213 OFFICE O/P EST LOW 20 MIN: CPT | Performed by: NURSE PRACTITIONER

## 2019-04-03 PROCEDURE — 4040F PNEUMOC VAC/ADMIN/RCVD: CPT | Performed by: NURSE PRACTITIONER

## 2019-04-03 PROCEDURE — 1123F ACP DISCUSS/DSCN MKR DOCD: CPT | Performed by: NURSE PRACTITIONER

## 2019-04-03 PROCEDURE — 1090F PRES/ABSN URINE INCON ASSESS: CPT | Performed by: NURSE PRACTITIONER

## 2019-04-03 PROCEDURE — 1036F TOBACCO NON-USER: CPT | Performed by: NURSE PRACTITIONER

## 2019-04-03 RX ORDER — GABAPENTIN 300 MG/1
300 CAPSULE ORAL 3 TIMES DAILY
COMMUNITY
Start: 2019-04-02 | End: 2019-05-14

## 2019-04-03 NOTE — PROGRESS NOTES
[facility]  [address]  [id number]  Dept: [phone]  Dept Fax: [phone]  Loc: [phone]    Ramiro Bellamy is a 82 y.o. female who presents today for her medical conditions/complaints as noted below. Ramiro Bellamy is c/o of Hip Pain and Foot Pain (both feet)        HPI:     HPI   Chief Complaint   Patient presents with    Hip Pain    Foot Pain     both feet     She is on gabapentin and recently saw all the neurology nurse practitioner. She is seeing pain management at the [facility]. Her last hip injection was 312 and due again in 3 months. She can not walk on the hip. She uses a walker and sometime cane. This was plan from neurology: she has not been set up for test yet. PLAN:  1. Records from recent MRI lumbar spine at [facility]. If she hasn't had this done, will plan to order MRI lumbar spine   2. Neuropathy lab work as listed above   3. NCS/EMG BLE   4. Continue Gabapentin, further increases as needed   5. Return in about 3 months (around 2019) for follow up, sooner if worsening.     Past Medical History:   Diagnosis Date    Bronchitis     Colon cancer ([address]     Colon polyps     Constipation     Deep vein blood clot of left lower extremity (HCC)     Left leg     Depression     DVT (deep venous thrombosis) (HCC)     Dysphagia     Fibrocystic breast disease     Fibromyalgia     GERD (gastroesophageal reflux disease)     reflux with hx of esophageal stricture    Headache(784.0)     History of colon cancer     Hormone replacement therapy (postmenopausal)     Hypertension     Neuropathy of foot     In both feet    Osteoarthritis     left knee pain    Restless legs syndrome     Vitamin D deficiency       Past Surgical History:   Procedure Laterality Date    APPENDECTOMY      BREAST BIOPSY      CATARACT REMOVAL       SECTION      CHOLECYSTECTOMY      COLECTOMY  partial    COLON SURGERY      COLONOSCOPY  12-    COLONOSCOPY  2-    Dr Claudean Gentry: unremarkable enterocolonic anastamosis; hyperplastic polyps    COLONOSCOPY  3/2013    [facility]: unremarkable post-surgical colon    COLONOSCOPY  3/11/16    Dr Traci Hanna colon anastomosis, 5 yr recall    FOOT SURGERY  right foot    HAND SURGERY Right     Dr Gretta Taveras ENDOSCOPY  10-    UPPER GASTROINTESTINAL ENDOSCOPY  3/2013    [facility]: peptic stricture dilated to 48F; HH; small antral mucosal elevation    UPPER GASTROINTESTINAL ENDOSCOPY  12/2014    Maurilio: dilation of esophageal stricture    VARICOSE VEIN SURGERY Left [facility]/14/2014  [facility]    Left GSV Ablation    [place]ICO[place]  [facility]    Right GSV Ablation       Vitals 4/3/2019 3/19/2019 3/14/2019 2/8/2019 1/15/2019 0/85/3753   SYSTOLIC 356 231 418 150 930 585   DIASTOLIC 80 60 82 84 77 74   Site - - - - - -   Position - - - - - -   Cuff Size - - - - - -   Pulse 57 60 65 68 78 75   Temp 98 - 97.6 97.5 - 96   Resp - - - 16 18 18   Weight 175 lb 177 lb 174 lb 173 lb - -   Height 5' 3\" 5' 3\" 5' 3\" 5' 3\" - -   BMI (wt*703/ht~2) 31 kg/m2 31.35 kg/m2 30.82 kg/m2 30.64 kg/m2 - -   Pain Level - - - - - -   Some recent data might be hidden       Family History   Problem Relation Age of Onset    Cancer Mother         Cervical Cancer    Cancer Brother         Esophageal cancer    Diabetes Brother     Esophageal Cancer Brother     Diabetes Sister     Colon Cancer Neg Hx     Colon Polyps Neg Hx     Liver Cancer Neg Hx     Liver Disease Neg Hx     Rectal Cancer Neg Hx     Stomach Cancer Neg Hx        Social History     Tobacco Use    Smoking status: Never Smoker    Smokeless tobacco: Never Used   Substance Use Topics    Alcohol use: No      Current Outpatient Medications   Medication Sig Dispense Refill    gabapentin (NEURONTIN) 300 MG capsule Take 300 mg by mouth 3 times daily.  omeprazole (PRILOSEC) 40 MG delayed release capsule TAKE 1 CAPSULE BY MOUTH DAILY 90 capsule 3    vitamin B-12 (CYANOCOBALAMIN) 500 MCG tablet TAKE 1 TABLET BY MOUTH DAILY 30 tablet 11    triamterene-hydrochlorothiazide (MAXZIDE-25) 37.5-25 MG per tablet TAKE 1 TABLET BY MOUTH DAILY 90 tablet 0    vitamin D (ERGOCALCIFEROL) 98870 units CAPS capsule TAKE 1 CAPSULE BY MOUTH 1 TIME A WEEK 13 capsule 0    losartan (COZAAR) 100 MG tablet TAKE 1 TABLET BY MOUTH EVERY MORNING FOR BLOOD PRESSURE 90 tablet 3    metoprolol tartrate (LOPRESSOR) 25 MG tablet TAKE ONE TABLET BY MOUTH TWICE DAILY FOR BLOOD PRESSURE AND HEART 180 tablet 3    furosemide (LASIX) 20 MG tablet Take 1 tablet by mouth daily as needed (swelling) 30 tablet 3    acyclovir (ZOVIRAX) 400 MG tablet TAKE 1 TABLET BY MOUTH FIVE TIMES DAILY FOR 1 WEEK THEN TAKE 1 TABLET BY MOUTH BACK TWICE DAILY (Patient taking differently: TAKE 1 TABLET BY MOUTH BACK TWICE DAILY) 56 tablet 5    triamcinolone (KENALOG) 0.1 % cream Apply topically 2 times daily. External vagina (Patient taking differently: Apply topically 2 times daily as needed Apply topically 2 times daily. External vagina) 1 Tube 1    albuterol (ACCUNEB) 1.25 MG/3ML nebulizer solution Inhale 3 mLs into the lungs every 6 hours as needed for Wheezing Dx J41.8 360 mL 3    Nebulizers (AIRIAL COMPACT MINI NEBULIZER) MISC 1 Units by Does not apply route 4 times daily 1 each 0    dorzolamide-timolol (COSOPT) 22.3-6.8 MG/ML ophthalmic solution PLACE 1 DROP IN RIGHT EYE BID  6    gabapentin (NEURONTIN) 300 MG capsule TAKE 1 CAPSULE BY MOUTH THREE TIMES DAILY FOR NERVE PAIN AND LEG CRAMPS 90 capsule 2     No current facility-administered medications for this visit. Allergies   Allergen Reactions    Zoloft [Sertraline Hcl] Other (See Comments)     Patient states that she doesn't want this medication anymore because she hallucinated and saw spiders.  Patient weaned herself off and after she quit taking the medication, the hallucinations stopped. Patient states that she doesn't want this medication anymore because she hallucinated and saw spiders. Patient weaned herself off and after she quit taking the medication, the hallucinations stopped. Health Maintenance   Topic Date Due    Shingles Vaccine (1 of 2) 02/01/1987    Potassium monitoring  04/04/2020    Creatinine monitoring  04/04/2020    DTaP/Tdap/Td vaccine (3 - Td) 12/23/2025    Colon cancer screen colonoscopy  03/11/2026    DEXA (modify frequency per FRAX score)  Completed    Flu vaccine  Completed    Pneumococcal 65+ years Vaccine  Completed       Subjective:      Review of Systems   Constitutional: Positive for activity change and fatigue. Cardiovascular:        Htn   Genitourinary: Negative. Musculoskeletal:        Bilat hip pain. Neurological: Negative. Hematological: Negative. Psychiatric/Behavioral: Positive for dysphoric mood. Negative for decreased concentration. Objective:     Physical Exam   Constitutional: She is oriented to person, place, and time. She appears well-developed and well-nourished. HENT:   Head: Normocephalic. Right Ear: External ear normal.   Left Ear: External ear normal.   Eyes: Pupils are equal, round, and reactive to light. Neck: Normal range of motion. Cardiovascular: Normal rate, regular rhythm, normal heart sounds and intact distal pulses. Pulmonary/Chest: Effort normal and breath sounds normal.   Musculoskeletal:        Right hip: She exhibits decreased range of motion and decreased strength. She exhibits no tenderness, no bony tenderness, no swelling, no crepitus, no deformity and no laceration. Left hip: She exhibits decreased range of motion and decreased strength. She exhibits no tenderness, no bony tenderness, no swelling, no crepitus, no deformity and no laceration. Neurological: She is alert and oriented to person, place, and time. Using walker for ambulation   Skin: Skin is warm and dry. Psychiatric: She has a normal mood and affect. Her behavior is normal. Judgment and thought content normal.   Nursing note and vitals reviewed. /80   Pulse 57   Temp 98 °F (36.7 °C) (Temporal)   Ht 5' 3\" (1.6 m)   Wt 175 lb (79.4 kg)   SpO2 97%   BMI 31.00 kg/m²     Assessment:       Diagnosis Orders   1. Essential hypertension  CBC Auto Differential   2. Renal insufficiency  Comprehensive Metabolic Panel    CBC Auto Differential   3. Malignant neoplasm of colon, unspecified part of colon ([address]  CEA   4. Avascular necrosis [address] [address]  MRI Lower Extremity Right W JT WO Contrast    External Referral To Orthopedic Surgery   5. Right hip pain  MRI Lower Extremity Right W JT WO Contrast    External Referral To Orthopedic Surgery       Plan:        Patient given educational materials -see patient instructions. Discussed use, benefit, and side effects of prescribed medications. All patient questions answered. Pt voiced understanding. Reviewed health maintenance. Instructed to continue currentmedications, diet and exercise. Patient agreed with treatment plan. Follow up as directed. MEDICATIONS:  No orders of the defined types were placed in this encounter. ORDERS:  Orders Placed This Encounter   Procedures    MRI Lower Extremity Right W JT WO Contrast    CEA    Comprehensive Metabolic Panel    CBC Auto Differential    External Referral To Orthopedic Surgery       Follow-up:  No follow-ups on file. PATIENT INSTRUCTIONS:  Patient Instructions   Continue with neurology  Continue with pain management  MRI of the hip  If need surgery would set up with Dr. Sravan Odonnell    Electronically signed by EARLE Islas CNP on 4/8/2019 at 12:02 PM    EMR Dragon/transcription disclaimer:  Much of thisencounter note is electronic transcription/translation of spoken language to printed texts.   The electronic translation of spoken language may be erroneous, or at times, nonsensical words or phrases may be inadvertentlytranscribed.   Although I have reviewed the note for such errors, some may still exist.

## 2019-04-03 NOTE — PATIENT INSTRUCTIONS
Continue with neurology  Continue with pain management  MRI of the hip  If need surgery would set up with Dr. Shayna Huang

## 2019-04-03 NOTE — TELEPHONE ENCOUNTER
Called and spoke with the patient to let her know that her Nerve conduction study has been scheduled for 4- at 8 am for a 830 am test. Instructed the patient to refrain from putting on any lotions or creams; and to go to outpatient registration and they will help her from there. Patient voiced understanding to all.

## 2019-04-04 ENCOUNTER — NURSE ONLY (OUTPATIENT)
Dept: PRIMARY CARE CLINIC | Age: 84
End: 2019-04-04
Payer: MEDICARE

## 2019-04-04 DIAGNOSIS — Z85.038 PERSONAL HISTORY OF COLON CANCER: ICD-10-CM

## 2019-04-04 DIAGNOSIS — N28.9 RENAL INSUFFICIENCY: ICD-10-CM

## 2019-04-04 DIAGNOSIS — C18.9 MALIGNANT NEOPLASM OF COLON, UNSPECIFIED PART OF COLON (HCC): Primary | ICD-10-CM

## 2019-04-04 LAB
ALBUMIN SERPL-MCNC: 4.1 G/DL (ref 3.5–5.2)
ALP BLD-CCNC: 86 U/L (ref 35–104)
ALT SERPL-CCNC: 16 U/L (ref 5–33)
ANION GAP SERPL CALCULATED.3IONS-SCNC: 16 MMOL/L (ref 7–19)
AST SERPL-CCNC: 20 U/L (ref 5–32)
BASOPHILS ABSOLUTE: 0.1 K/UL (ref 0–0.2)
BASOPHILS RELATIVE PERCENT: 0.9 % (ref 0–1)
BILIRUB SERPL-MCNC: 0.3 MG/DL (ref 0.2–1.2)
BUN BLDV-MCNC: 22 MG/DL (ref 8–23)
CALCIUM SERPL-MCNC: 9.5 MG/DL (ref 8.8–10.2)
CEA: 3 NG/ML (ref 0–4.7)
CHLORIDE BLD-SCNC: 102 MMOL/L (ref 98–111)
CO2: 22 MMOL/L (ref 22–29)
CREAT SERPL-MCNC: 1 MG/DL (ref 0.5–0.9)
EOSINOPHILS ABSOLUTE: 0.5 K/UL (ref 0–0.6)
EOSINOPHILS RELATIVE PERCENT: 6.9 % (ref 0–5)
GFR NON-AFRICAN AMERICAN: 53
GLUCOSE BLD-MCNC: 126 MG/DL (ref 74–109)
HCT VFR BLD CALC: 42.6 % (ref 37–47)
HEMOGLOBIN: 13.2 G/DL (ref 12–16)
LYMPHOCYTES ABSOLUTE: 2.8 K/UL (ref 1.1–4.5)
LYMPHOCYTES RELATIVE PERCENT: 36.6 % (ref 20–40)
MCH RBC QN AUTO: 29.7 PG (ref 27–31)
MCHC RBC AUTO-ENTMCNC: 31 G/DL (ref 33–37)
MCV RBC AUTO: 95.9 FL (ref 81–99)
MONOCYTES ABSOLUTE: 0.7 K/UL (ref 0–0.9)
MONOCYTES RELATIVE PERCENT: 9.2 % (ref 0–10)
NEUTROPHILS ABSOLUTE: 3.5 K/UL (ref 1.5–7.5)
NEUTROPHILS RELATIVE PERCENT: 46.1 % (ref 50–65)
PDW BLD-RTO: 14.6 % (ref 11.5–14.5)
PLATELET # BLD: 268 K/UL (ref 130–400)
PMV BLD AUTO: 10.5 FL (ref 9.4–12.3)
POTASSIUM SERPL-SCNC: 4.4 MMOL/L (ref 3.5–5)
RBC # BLD: 4.44 M/UL (ref 4.2–5.4)
SODIUM BLD-SCNC: 140 MMOL/L (ref 136–145)
TOTAL PROTEIN: 7.1 G/DL (ref 6.6–8.7)
WBC # BLD: 7.5 K/UL (ref 4.8–10.8)

## 2019-04-04 PROCEDURE — 36415 COLL VENOUS BLD VENIPUNCTURE: CPT | Performed by: FAMILY MEDICINE

## 2019-04-08 DIAGNOSIS — M87.00 AVASCULAR NECROSIS (HCC): ICD-10-CM

## 2019-04-08 DIAGNOSIS — M25.551 RIGHT HIP PAIN: ICD-10-CM

## 2019-04-18 ENCOUNTER — HOSPITAL ENCOUNTER (OUTPATIENT)
Dept: NEUROLOGY | Age: 84
Discharge: HOME | End: 2019-04-18
Payer: MEDICARE

## 2019-04-18 DIAGNOSIS — M79.604 PAIN IN BOTH LOWER EXTREMITIES: ICD-10-CM

## 2019-04-18 DIAGNOSIS — R20.0 NUMBNESS AND TINGLING OF BOTH FEET: ICD-10-CM

## 2019-04-18 DIAGNOSIS — R20.2 NUMBNESS AND TINGLING OF BOTH FEET: ICD-10-CM

## 2019-04-18 DIAGNOSIS — M79.605 PAIN IN BOTH LOWER EXTREMITIES: ICD-10-CM

## 2019-04-18 PROCEDURE — 95886 MUSC TEST DONE W/N TEST COMP: CPT | Performed by: PSYCHIATRY & NEUROLOGY

## 2019-04-18 PROCEDURE — 95910 NRV CNDJ TEST 7-8 STUDIES: CPT

## 2019-04-18 PROCEDURE — 95910 NRV CNDJ TEST 7-8 STUDIES: CPT | Performed by: PSYCHIATRY & NEUROLOGY

## 2019-04-18 PROCEDURE — 95886 MUSC TEST DONE W/N TEST COMP: CPT

## 2019-05-14 ENCOUNTER — OFFICE VISIT (OUTPATIENT)
Dept: PRIMARY CARE CLINIC | Age: 84
End: 2019-05-14
Payer: MEDICARE

## 2019-05-14 VITALS
HEIGHT: 63 IN | TEMPERATURE: 97.8 F | BODY MASS INDEX: 31.36 KG/M2 | WEIGHT: 177 LBS | HEART RATE: 64 BPM | OXYGEN SATURATION: 98 % | SYSTOLIC BLOOD PRESSURE: 124 MMHG | DIASTOLIC BLOOD PRESSURE: 66 MMHG

## 2019-05-14 DIAGNOSIS — Z45.2 ENCOUNTER FOR CARE RELATED TO PORT-A-CATH: Primary | ICD-10-CM

## 2019-05-14 DIAGNOSIS — Z95.828 PORT-A-CATH IN PLACE: ICD-10-CM

## 2019-05-14 PROCEDURE — 96523 IRRIG DRUG DELIVERY DEVICE: CPT | Performed by: NURSE PRACTITIONER

## 2019-05-14 RX ORDER — GABAPENTIN 100 MG/1
400 CAPSULE ORAL 3 TIMES DAILY
COMMUNITY
Start: 2019-05-13 | End: 2019-07-11

## 2019-05-14 NOTE — PROGRESS NOTES
 UPPER GASTROINTESTINAL ENDOSCOPY  12/2014    [facility]: dilation of esophageal stricture    VARICOSE VEIN SURGERY Left 03/14/2014  [facility] [address]    Left GSV Ablation    VARICOSE [place]IN [place]  [facility] [place]    Right GSV Ablation       Vitals 5/14/2019 4/3/2019 3/19/2019 3/14/2019 2/8/2019 7/21/5523   SYSTOLIC 393 595 [phone] 033   DIASTOLIC 66 80 60 82 84 77   Pulse 64 57 60 65 68 78   Temp 97.8 98 - 97.6 97.5 -   Resp - - - - 16 18   SpO2 98 97 98 96 97 -   Weight 177 lb 175 lb 177 lb 174 lb 173 lb -   Height 5' 3\" 5' 3\" 5' 3\" 5' 3\" 5' 3\" -   BMI (wt*703/ht~2) 31.35 kg/m2 31 kg/m2 31.35 kg/m2 30.82 kg/m2 30.64 kg/m2 -   Pain Level - - - - - -   Some recent data might be hidden       Family History   Problem Relation Age of Onset    Cancer Mother         Cervical Cancer    Cancer Brother         Esophageal cancer    Diabetes Brother     Esophageal Cancer Brother     Diabetes Sister     Colon Cancer Neg Hx     Colon Polyps Neg Hx     Liver Cancer Neg Hx     Liver Disease Neg Hx     Rectal Cancer Neg Hx     Stomach Cancer Neg Hx        Social History     Tobacco Use    Smoking status: Never Smoker    Smokeless tobacco: Never Used   Substance Use Topics    Alcohol use: No      Current Outpatient Medications   Medication Sig Dispense Refill    gabapentin (NEURONTIN) 100 MG capsule Take 400 mg by mouth 3 times daily.       omeprazole (PRILOSEC) 40 MG delayed release capsule TAKE 1 CAPSULE BY MOUTH DAILY 90 capsule 3    vitamin B-12 (CYANOCOBALAMIN) 500 MCG tablet TAKE 1 TABLET BY MOUTH DAILY 30 tablet 11    triamterene-hydrochlorothiazide (MAXZIDE-25) 37.5-25 MG per tablet TAKE 1 TABLET BY MOUTH DAILY 90 tablet 0    vitamin D (ERGOCALCIFEROL) 38457 units CAPS capsule TAKE 1 CAPSULE BY MOUTH 1 TIME A WEEK 13 capsule 0    losartan (COZAAR) 100 MG tablet TAKE 1 TABLET BY MOUTH EVERY MORNING FOR BLOOD PRESSURE 90 tablet 3    metoprolol tartrate (LOPRESSOR) 25 MG tablet TAKE ONE TABLET BY MOUTH TWICE Pneumococcal 65+ years Vaccine  Completed       Subjective:      Review of Systems   Constitutional: Negative. Psychiatric/Behavioral: Negative. Objective:     Physical Exam   Constitutional: She is oriented to person, place, and time. She appears well-developed and well-nourished. Neck: Normal range of motion. Cardiovascular: Normal rate. Neurological: She is alert and oriented to person, place, and time. Skin: Skin is warm and dry. Psychiatric: She has a normal mood and affect. Her behavior is normal. Judgment and thought content normal.   Nursing note and vitals reviewed. /66   Pulse 64   Temp 97.8 °F (36.6 °C) (Temporal)   Ht 5' 3\" (1.6 m)   Wt 177 lb (80.3 kg)   SpO2 98%   BMI 31.35 kg/m²     Venous Access Procedure Note  Indication: maintenance flush    Procedure: The patient was placed in the appropriate position and the skin over the puncture site was prepped with betadine and draped in a sterile fashion. Intravenous access was obtained in right chest port with a Bliss needle and the site was secured appropriately. 3 cc of blood with withdrawn and discarded. The port was flushed with 10cc NS and then 3 cc heparin flush. The patient tolerated the procedure well. Complications: None      Assessment:       Diagnosis Orders   1. Encounter for care related to Port-a-Cath     2. Port-A-Cath in place         Plan:        Patient given educational materials -see patient instructions. Discussed use, benefit, and side effects of prescribed medications. All patient questions answered. Pt voiced understanding. Reviewed health maintenance. Instructed to continue currentmedications, diet and exercise. Patient agreed with treatment plan. Follow up as directed. MEDICATIONS:  No orders of the defined types were placed in this encounter. ORDERS:  No orders of the defined types were placed in this encounter. Follow-up:  No follow-ups on file.     PATIENT INSTRUCTIONS:  There are no Patient Instructions on file for this visit. Electronically signed by EARLE Noland CNP on 5/14/2019 at 3:51 PM    EMR Dragon/transcription disclaimer:  Much of thisencounter note is electronic transcription/translation of spoken language to printed texts. The electronic translation of spoken language may be erroneous, or at times, nonsensical words or phrases may be inadvertentlytranscribed.   Although I have reviewed the note for such errors, some may still exist.

## 2019-06-10 ENCOUNTER — OFFICE VISIT (OUTPATIENT)
Dept: PRIMARY CARE CLINIC | Age: 84
End: 2019-06-10
Payer: MEDICARE

## 2019-06-10 VITALS
WEIGHT: 171 LBS | RESPIRATION RATE: 16 BRPM | TEMPERATURE: 97.1 F | BODY MASS INDEX: 30.3 KG/M2 | DIASTOLIC BLOOD PRESSURE: 66 MMHG | HEIGHT: 63 IN | HEART RATE: 68 BPM | SYSTOLIC BLOOD PRESSURE: 144 MMHG

## 2019-06-10 DIAGNOSIS — M79.89 PAIN AND SWELLING OF LEFT LOWER LEG: ICD-10-CM

## 2019-06-10 DIAGNOSIS — M79.662 PAIN AND SWELLING OF LEFT LOWER LEG: ICD-10-CM

## 2019-06-10 DIAGNOSIS — Z86.718 HISTORY OF DVT (DEEP VEIN THROMBOSIS): Primary | ICD-10-CM

## 2019-06-10 PROCEDURE — G8417 CALC BMI ABV UP PARAM F/U: HCPCS | Performed by: NURSE PRACTITIONER

## 2019-06-10 PROCEDURE — 1036F TOBACCO NON-USER: CPT | Performed by: NURSE PRACTITIONER

## 2019-06-10 PROCEDURE — G8427 DOCREV CUR MEDS BY ELIG CLIN: HCPCS | Performed by: NURSE PRACTITIONER

## 2019-06-10 PROCEDURE — G8399 PT W/DXA RESULTS DOCUMENT: HCPCS | Performed by: NURSE PRACTITIONER

## 2019-06-10 PROCEDURE — 1123F ACP DISCUSS/DSCN MKR DOCD: CPT | Performed by: NURSE PRACTITIONER

## 2019-06-10 PROCEDURE — 99213 OFFICE O/P EST LOW 20 MIN: CPT | Performed by: NURSE PRACTITIONER

## 2019-06-10 PROCEDURE — 4040F PNEUMOC VAC/ADMIN/RCVD: CPT | Performed by: NURSE PRACTITIONER

## 2019-06-10 PROCEDURE — 1090F PRES/ABSN URINE INCON ASSESS: CPT | Performed by: NURSE PRACTITIONER

## 2019-06-10 RX ORDER — GABAPENTIN 400 MG/1
CAPSULE ORAL
Refills: 1 | COMMUNITY
Start: 2019-06-03 | End: 2019-06-10

## 2019-06-10 RX ORDER — CEPHALEXIN 500 MG/1
500 CAPSULE ORAL 3 TIMES DAILY
Qty: 21 CAPSULE | Refills: 0 | Status: SHIPPED | OUTPATIENT
Start: 2019-06-10 | End: 2019-06-17

## 2019-06-10 RX ORDER — TRAMADOL HYDROCHLORIDE 50 MG/1
TABLET ORAL
Refills: 1 | COMMUNITY
Start: 2019-05-13 | End: 2020-04-25

## 2019-06-10 NOTE — PROGRESS NOTES
[facility]  [address] [facility] [address]    Dept: [phone]  Dept Fax: [phone]  Loc: [phone]    Lilia Uribe is a 82 y.o. female who presents today for her medical conditions/complaints as noted below. Lilia Uribe is c/o of Laceration (patient presents today with c/o skin laceration on left leg that happened about 2 weeks ago. Wednesday AM she woke up with redness up to her knee and had some pain in that leg as well. )        HPI:     HPI   Chief Complaint   Patient presents with    Laceration     patient presents today with c/o skin laceration on left leg that happened about 2 weeks ago. Wednesday AM she woke up with redness up to her knee and had some pain in that leg as well.    she has not been taking her blood thinners on a regular basis but got scared and took some of her leftover xarelto last 2 days.      Past Medical History:   Diagnosis Date    Bronchitis     Colon cancer ([address]     Colon polyps     Constipation     Deep vein blood clot of left lower extremity (HCC)     Left leg     Depression     DVT (deep venous thrombosis) (HCC)     Dysphagia     Fibrocystic breast disease     Fibromyalgia     GERD (gastroesophageal reflux disease)     reflux with hx of esophageal stricture    Headache(784.0)     History of colon cancer     Hormone replacement therapy (postmenopausal)     Hypertension     Neuropathy of foot     In both feet    Osteoarthritis     left knee pain    Restless legs syndrome     Vitamin D deficiency       Past Surgical History:   Procedure Laterality Date    APPENDECTOMY      BREAST BIOPSY      CATARACT REMOVAL       SECTION      CHOLECYSTECTOMY      COLECTOMY  partial    COLON SURGERY      COLONOSCOPY  2010    COLONOSCOPY  2012    Dr Shivam Reinoso: unremarkable enterocolonic anastamosis; hyperplastic polyps    COLONOSCOPY  3/2013    [facility]: unremarkable post-surgical colon  COLONOSCOPY  3/11/16    Dr Danny Soriano colon anastomosis, 5 yr recall    FOOT SURGERY  right foot    HAND SURGERY Right     Dr Junious Lundborg GASTROINTESTINAL ENDOSCOPY  10-    UPPER GASTROINTESTINAL ENDOSCOPY  3/2013    [facility]: peptic stricture dilated to 48F; HH; small antral mucosal elevation    UPPER GASTROINTESTINAL ENDOSCOPY  12/2014    [facility]: dilation of esophageal stricture    VARICOSE VEIN SURGERY Left [facility]/14/2014  [facility] [address]    Left GSV Ablation    [place]  [facility] [place]       Vitals 6/10/2019 5/14/2019 4/3/2019 3/19/2019 3/14/2019 2/9/2381   SYSTOLIC 149 295 291 598 514 162   DIASTOLIC 66 66 80 60 82 84   Pulse 68 64 57 60 65 68   Temp 97.1 97.8 98 - 97.6 97.5   Resp 16 - - - - 16   SpO2 - 98 97 98 96 97   Weight 171 lb 177 lb 175 lb 177 lb 174 lb 173 lb   Height 5' 3\" 5' 3\" 5' 3\" 5' 3\" 5' 3\" 5' 3\"   BMI (wt*703/ht~2) 30.29 kg/m2 31.35 kg/m2 31 kg/m2 31.35 kg/m2 30.82 kg/m2 30.64 kg/m2   Pain Level - - - - - -   Some recent data might be hidden       Family History   Problem Relation Age of Onset    Cancer Mother         Cervical Cancer    Cancer Brother         Esophageal cancer    Diabetes Brother     Esophageal Cancer Brother     Diabetes Sister     Colon Cancer Neg Hx     Colon Polyps Neg Hx     Liver Cancer Neg Hx     Liver Disease Neg Hx     Rectal Cancer Neg Hx     Stomach Cancer Neg Hx        Social History     Tobacco Use    Smoking status: Never Smoker    Smokeless tobacco: Never Used   Substance Use Topics    Alcohol use: No      Current Outpatient Medications   Medication Sig Dispense Refill    traMADol (ULTRAM) 50 MG tablet TK 1 T PO Q 12 PRN  1    cephALEXin (KEFLEX) 500 MG capsule Take 1 capsule by mouth 3 times daily for 7 days 21 capsule 0    triamterene-hydrochlorothiazide (MAXZIDE-25) 37.5-25 MG per tablet TAKE 1 TABLET BY MOUTH DAILY 90 tablet 3    gabapentin (NEURONTIN) 100 MG capsule Take 400 mg by mouth 3 times daily.  omeprazole (PRILOSEC) 40 MG delayed release capsule TAKE 1 CAPSULE BY MOUTH DAILY 90 capsule 3    vitamin B-12 (CYANOCOBALAMIN) 500 MCG tablet TAKE 1 TABLET BY MOUTH DAILY 30 tablet 11    vitamin D (ERGOCALCIFEROL) 25570 units CAPS capsule TAKE 1 CAPSULE BY MOUTH 1 TIME A WEEK 13 capsule 0    losartan (COZAAR) 100 MG tablet TAKE 1 TABLET BY MOUTH EVERY MORNING FOR BLOOD PRESSURE 90 tablet 3    metoprolol tartrate (LOPRESSOR) 25 MG tablet TAKE ONE TABLET BY MOUTH TWICE DAILY FOR BLOOD PRESSURE AND HEART 180 tablet 3    furosemide (LASIX) 20 MG tablet Take 1 tablet by mouth daily as needed (swelling) 30 tablet 3    acyclovir (ZOVIRAX) 400 MG tablet TAKE 1 TABLET BY MOUTH FIVE TIMES DAILY FOR 1 WEEK THEN TAKE 1 TABLET BY MOUTH BACK TWICE DAILY (Patient taking differently: TAKE 1 TABLET BY MOUTH BACK TWICE DAILY) 56 tablet 5    triamcinolone (KENALOG) 0.1 % cream Apply topically 2 times daily. External vagina (Patient taking differently: Apply topically 2 times daily as needed Apply topically 2 times daily. External vagina) 1 Tube 1    albuterol (ACCUNEB) 1.25 MG/3ML nebulizer solution Inhale 3 mLs into the lungs every 6 hours as needed for Wheezing Dx J41.8 360 mL 3    Nebulizers (AIRIAL COMPACT MINI NEBULIZER) MISC 1 Units by Does not apply route 4 times daily 1 each 0    dorzolamide-timolol (COSOPT) 22.3-6.8 MG/ML ophthalmic solution PLACE 1 DROP IN RIGHT EYE BID  6     No current facility-administered medications for this visit. Allergies   Allergen Reactions    Zoloft [Sertraline Hcl] Other (See Comments)     Patient states that she doesn't want this medication anymore because she hallucinated and saw spiders. Patient weaned herself off and after she quit taking the medication, the hallucinations stopped. Patient states that she doesn't want this medication anymore because she hallucinated and saw spiders. Patient agreed with treatment plan. Follow up as directed. MEDICATIONS:  Orders Placed This Encounter   Medications    cephALEXin (KEFLEX) 500 MG capsule     Sig: Take 1 capsule by mouth 3 times daily for 7 days     Dispense:  21 capsule     Refill:  0         ORDERS:  Orders Placed This Encounter   Procedures    VL EXTREMITY VENOUS LEFT       Follow-up:  Return if symptoms worsen or fail to improve. PATIENT INSTRUCTIONS:  Patient Instructions   Have scan of leg  Keep leg elevated  Start antibiotics  Keep bandaid on area if it opens. Electronically signed by EARLE Soto CNP on 6/10/2019 at 6:20 PM    EMR Dragon/transcription disclaimer:  Much of thisencounter note is electronic transcription/translation of spoken language to printed texts. The electronic translation of spoken language may be erroneous, or at times, nonsensical words or phrases may be inadvertentlytranscribed.   Although I have reviewed the note for such errors, some may still exist.

## 2019-06-11 ENCOUNTER — HOSPITAL ENCOUNTER (OUTPATIENT)
Dept: VASCULAR LAB | Age: 84
Discharge: HOME | End: 2019-06-11
Payer: MEDICARE

## 2019-06-11 ENCOUNTER — OFFICE VISIT (OUTPATIENT)
Dept: PRIMARY CARE CLINIC | Age: 84
End: 2019-06-11
Payer: MEDICARE

## 2019-06-11 VITALS
RESPIRATION RATE: 16 BRPM | WEIGHT: 171 LBS | HEIGHT: 63 IN | BODY MASS INDEX: 30.3 KG/M2 | TEMPERATURE: 96.5 F | HEART RATE: 60 BPM | DIASTOLIC BLOOD PRESSURE: 75 MMHG | SYSTOLIC BLOOD PRESSURE: 148 MMHG

## 2019-06-11 DIAGNOSIS — Z86.718 HISTORY OF DVT (DEEP VEIN THROMBOSIS): ICD-10-CM

## 2019-06-11 DIAGNOSIS — M79.89 PAIN AND SWELLING OF LEFT LOWER LEG: ICD-10-CM

## 2019-06-11 DIAGNOSIS — I82.412 ACUTE DEEP VEIN THROMBOSIS (DVT) OF FEMORAL VEIN OF LEFT LOWER EXTREMITY (HCC): Primary | ICD-10-CM

## 2019-06-11 DIAGNOSIS — M79.662 PAIN AND SWELLING OF LEFT LOWER LEG: ICD-10-CM

## 2019-06-11 PROCEDURE — G8427 DOCREV CUR MEDS BY ELIG CLIN: HCPCS | Performed by: NURSE PRACTITIONER

## 2019-06-11 PROCEDURE — 1036F TOBACCO NON-USER: CPT | Performed by: NURSE PRACTITIONER

## 2019-06-11 PROCEDURE — 93971 EXTREMITY STUDY: CPT

## 2019-06-11 PROCEDURE — 1090F PRES/ABSN URINE INCON ASSESS: CPT | Performed by: NURSE PRACTITIONER

## 2019-06-11 PROCEDURE — 99213 OFFICE O/P EST LOW 20 MIN: CPT | Performed by: NURSE PRACTITIONER

## 2019-06-11 PROCEDURE — G8417 CALC BMI ABV UP PARAM F/U: HCPCS | Performed by: NURSE PRACTITIONER

## 2019-06-11 PROCEDURE — 1123F ACP DISCUSS/DSCN MKR DOCD: CPT | Performed by: NURSE PRACTITIONER

## 2019-06-11 PROCEDURE — 4040F PNEUMOC VAC/ADMIN/RCVD: CPT | Performed by: NURSE PRACTITIONER

## 2019-06-11 PROCEDURE — G8399 PT W/DXA RESULTS DOCUMENT: HCPCS | Performed by: NURSE PRACTITIONER

## 2019-06-11 NOTE — PROGRESS NOTES
[facility]  [address] [facility] [address]  [address]  Dept: [phone]  Dept Fax: [phone]  Loc: [phone]    Rasheed West is a 82 y.o. female who presents today for her medical conditions/complaints as noted below. Rasheed West is c/o of Follow-up (patient presents today for follow up on Venous scan)        HPI:     HPI   Chief Complaint   Patient presents with    Follow-up     patient presents today for follow up on Venous scan   I had seen her yesterday and she told me she had take extra xarelto she had for the past 3 days fearing she had another blood clot in her left leg. Her oncologist let her stop the xarelto a few months ago. Sees Dr Edgard Howard in [place].   Today she has a confirmed bBlood clot common femoral,left leg  Past Medical History:   Diagnosis Date    Bronchitis     Colon cancer ([address]     Colon polyps     Constipation     Deep vein blood clot of left lower extremity (HCC)     Left leg     Depression     DVT (deep venous thrombosis) (HCC)     Dysphagia     Fibrocystic breast disease     Fibromyalgia     GERD (gastroesophageal reflux disease)     reflux with hx of esophageal stricture    Headache(784.0)     History of colon cancer     Hormone replacement therapy (postmenopausal)     Hypertension     Neuropathy of foot     In both feet    Osteoarthritis     left knee pain    Restless legs syndrome     Vitamin D deficiency       Past Surgical History:   Procedure Laterality Date    APPENDECTOMY      BREAST BIOPSY      CATARACT REMOVAL       SECTION      CHOLECYSTECTOMY      COLECTOMY  partial    COLON SURGERY      COLONOSCOPY  2010    COLONOSCOPY  2012    Dr Pop Velázquez: unremarkable enterocolonic anastamosis; hyperplastic polyps    COLONOSCOPY  3/[facility]    [facility]: unremarkable post-surgical colon    COLONOSCOPY  3/11/16    Dr Anuj Ruvalcaba colon anastomosis, 5 yr recall    FOOT SURGERY  right foot    HAND SURGERY Right     Dr Davi Wise GASTROINTESTINAL ENDOSCOPY  10-    UPPER GASTROINTESTINAL ENDOSCOPY  3/2013    [facility]: peptic stricture dilated to 48F; HH; small antral mucosal elevation    UPPER GASTROINTESTINAL ENDOSCOPY  12/2014    [facility]: dilation of esophageal stricture    VARICOSE VEIN SURGERY Left 03/14/2014  [facility]    Left GSV Ablation    VARICOSE VEIN SURGERY Right 04/04/2014  [facility]    Right GSV Ablation       Vitals 6/11/2019 6/10/2019 5/14/2019 4/3/2019 3/19/2019 3/11/2588   SYSTOLIC 164 674 037 830 639 220   DIASTOLIC 75 66 66 80 60 82   Pulse 60 68 64 57 60 65   Temp 96.5 97.1 97.8 98 - 97.6   Resp 16 16 - - - -   SpO2 - - 98 97 98 96   Weight 171 lb 171 lb 177 lb 175 lb 177 lb 174 lb   Height 5' 3\" 5' 3\" 5' 3\" 5' 3\" 5' 3\" 5' 3\"   BMI (wt*703/ht~2) 30.29 kg/m2 30.29 kg/m2 31.35 kg/m2 31 kg/m2 31.35 kg/m2 30.82 kg/m2   Pain Level - - - - - -   Some recent data might be hidden       Family History   Problem Relation Age of Onset    Cancer Mother         Cervical Cancer    Cancer Brother         Esophageal cancer    Diabetes Brother     Esophageal Cancer Brother     Diabetes Sister     Colon Cancer Neg Hx     Colon Polyps Neg Hx     Liver Cancer Neg Hx     Liver Disease Neg Hx     Rectal Cancer Neg Hx     Stomach Cancer Neg Hx        Social History     Tobacco Use    Smoking status: Never Smoker    Smokeless tobacco: Never Used   Substance Use Topics    Alcohol use: No      Current Outpatient Medications   Medication Sig Dispense Refill    traMADol (ULTRAM) 50 MG tablet TK 1 T PO Q 12 PRN  1    cephALEXin (KEFLEX) 500 MG capsule Take 1 capsule by mouth 3 times daily for 7 days 21 capsule 0    triamterene-hydrochlorothiazide (MAXZIDE-25) 37.5-25 MG per tablet TAKE 1 TABLET BY MOUTH DAILY 90 tablet 3    gabapentin (NEURONTIN) 100 MG capsule Take 400 mg by mouth 3 times daily.       omeprazole (PRILOSEC) 40 MG delayed release capsule TAKE 1 CAPSULE BY MOUTH DAILY 90 capsule 3    vitamin B-12 (CYANOCOBALAMIN) 500 MCG tablet TAKE 1 TABLET BY MOUTH DAILY 30 tablet 11    vitamin D (ERGOCALCIFEROL) 79369 units CAPS capsule TAKE 1 CAPSULE BY MOUTH 1 TIME A WEEK 13 capsule 0    losartan (COZAAR) 100 MG tablet TAKE 1 TABLET BY MOUTH EVERY MORNING FOR BLOOD PRESSURE 90 tablet 3    metoprolol tartrate (LOPRESSOR) 25 MG tablet TAKE ONE TABLET BY MOUTH TWICE DAILY FOR BLOOD PRESSURE AND HEART 180 tablet 3    furosemide (LASIX) 20 MG tablet Take 1 tablet by mouth daily as needed (swelling) 30 tablet 3    acyclovir (ZOVIRAX) 400 MG tablet TAKE 1 TABLET BY MOUTH FIVE TIMES DAILY FOR 1 WEEK THEN TAKE 1 TABLET BY MOUTH BACK TWICE DAILY (Patient taking differently: TAKE 1 TABLET BY MOUTH BACK TWICE DAILY) 56 tablet 5    triamcinolone (KENALOG) 0.1 % cream Apply topically 2 times daily. External vagina (Patient taking differently: Apply topically 2 times daily as needed Apply topically 2 times daily. External vagina) 1 Tube 1    albuterol (ACCUNEB) 1.25 MG/3ML nebulizer solution Inhale 3 mLs into the lungs every 6 hours as needed for Wheezing Dx J41.8 360 mL 3    Nebulizers (AIRIAL COMPACT MINI NEBULIZER) MISC 1 Units by Does not apply route 4 times daily 1 each 0    dorzolamide-timolol (COSOPT) 22.3-6.8 MG/ML ophthalmic solution PLACE 1 DROP IN RIGHT EYE BID  6     No current facility-administered medications for this visit. Allergies   Allergen Reactions    Zoloft [Sertraline Hcl] Other (See Comments)     Patient states that she doesn't want this medication anymore because she hallucinated and saw spiders. Patient weaned herself off and after she quit taking the medication, the hallucinations stopped. Patient states that she doesn't want this medication anymore because she hallucinated and saw spiders.  Patient weaned herself off and after she quit taking the medication, the hallucinations stopped. Health Maintenance   Topic Date Due    Shingles Vaccine (1 of 2) [birth year]01/1987    Potassium monitoring  04/04/2020    Creatinine monitoring  04/04/2020    DTaP/Tdap/Td vaccine (3 - Td) 12/23/2025    Colon cancer screen colonoscopy  03/11/2026    DEXA (modify frequency per FRAX score)  Completed    Flu vaccine  Completed    Pneumococcal 65+ years Vaccine  Completed       Subjective:      Review of Systems   Constitutional: Negative. Musculoskeletal:        Left leg swelling       Objective:     Physical Exam   Constitutional: She is oriented to person, place, and time. She appears well-developed and well-nourished. HENT:   Head: Normocephalic. Neck: Normal range of motion. Cardiovascular: Normal rate, regular rhythm and normal heart sounds. Neurological: She is alert and oriented to person, place, and time. Skin: Skin is warm and dry. Psychiatric: She has a normal mood and affect. Her behavior is normal. Judgment and thought content normal.   Nursing note and vitals reviewed. BP (!) 148/75   Pulse 60   Temp 96.5 °F (35.8 °C) (Temporal)   Resp 16   Ht 5' 3\" (1.6 m)   Wt 171 lb (77.6 kg)   BMI 30.29 kg/m²     Assessment:       Diagnosis Orders   1. Acute deep vein thrombosis (DVT) of femoral vein of left lower extremity ([address]         Plan:   Gave her a sample starter pack for dvt. She is seeing oncologist Friday and we will fax a copy of report to him     Patient given educational materials -see patient instructions. Discussed use, benefit, and side effects of prescribed medications. All patient questions answered. Pt voiced understanding. Reviewed health maintenance. Instructed to continue currentmedications, diet and exercise. Patient agreed with treatment plan. Follow up as directed. MEDICATIONS:  No orders of the defined types were placed in this encounter. ORDERS:  No orders of the defined types were placed in this encounter.       Follow-up:  No follow-ups on file. PATIENT INSTRUCTIONS:  There are no Patient Instructions on file for this visit. Electronically signed by EARLE Myers CNP on 6/12/2019 at 10:04 PM    EMR Dragon/transcription disclaimer:  Much of thisencounter note is electronic transcription/translation of spoken language to printed texts. The electronic translation of spoken language may be erroneous, or at times, nonsensical words or phrases may be inadvertentlytranscribed.   Although I have reviewed the note for such errors, some may still exist.

## 2019-06-12 PROBLEM — I82.412 ACUTE DEEP VEIN THROMBOSIS (DVT) OF FEMORAL VEIN OF LEFT LOWER EXTREMITY (HCC): Status: ACTIVE | Noted: 2019-06-12

## 2019-06-21 ENCOUNTER — OFFICE VISIT (OUTPATIENT)
Dept: URGENT CARE | Age: 84
End: 2019-06-21
Payer: MEDICARE

## 2019-06-21 VITALS
SYSTOLIC BLOOD PRESSURE: 160 MMHG | DIASTOLIC BLOOD PRESSURE: 72 MMHG | RESPIRATION RATE: 18 BRPM | HEIGHT: 63 IN | WEIGHT: 169.4 LBS | HEART RATE: 74 BPM | BODY MASS INDEX: 30.02 KG/M2 | OXYGEN SATURATION: 98 % | TEMPERATURE: 98.1 F

## 2019-06-21 DIAGNOSIS — Z86.718 HISTORY OF DEEP VENOUS THROMBOSIS (DVT) OF DISTAL VEIN OF LEFT LOWER EXTREMITY: ICD-10-CM

## 2019-06-21 DIAGNOSIS — L03.032 CELLULITIS OF TOE OF LEFT FOOT: Primary | ICD-10-CM

## 2019-06-21 PROCEDURE — 99213 OFFICE O/P EST LOW 20 MIN: CPT | Performed by: NURSE PRACTITIONER

## 2019-06-21 RX ORDER — CEPHALEXIN 500 MG/1
500 CAPSULE ORAL 2 TIMES DAILY
Qty: 20 CAPSULE | Refills: 0 | Status: ON HOLD | OUTPATIENT
Start: 2019-06-21 | End: 2019-07-03 | Stop reason: HOSPADM

## 2019-06-21 ASSESSMENT — ENCOUNTER SYMPTOMS
COUGH: 0
EYES NEGATIVE: 1
SORE THROAT: 0
ABDOMINAL PAIN: 0
ALLERGIC/IMMUNOLOGIC NEGATIVE: 1
SHORTNESS OF BREATH: 0
CONSTIPATION: 0
DIARRHEA: 0
CHEST TIGHTNESS: 0
SINUS PRESSURE: 0
COLOR CHANGE: 1
VOMITING: 0
BACK PAIN: 0

## 2019-06-21 NOTE — PROGRESS NOTES
[id number] [address]  Dept: [phone]  Loc: [phone]    Stephany Gaxiola is a 82 y.o. female who presents today for her medical conditions/complaintsas noted below. Stephany Gaxiola is c/o of Foot Pain (left)        HPI:     HPI   Sharon Adams is here with complaint of redness, warmth and swelling to her left great toe. She has had pain in the toe today and is taking Xarelto for a DVT in the left leg that was found 2 weeks ago. She denies fever an chills and has had a hx of cellulitis in the left foot/ leg in the past. She is taking Xarelto bid daily for DVT.     Past Medical History:   Diagnosis Date    Bronchitis     Colon cancer ([address]     Colon polyps     Constipation     Deep vein blood clot of left lower extremity (HCC)     Left leg     Depression     DVT (deep venous thrombosis) (HCC)     Dysphagia     Fibrocystic breast disease     Fibromyalgia     GERD (gastroesophageal reflux disease)     reflux with hx of esophageal stricture    Headache(784.0)     History of colon cancer     Hormone replacement therapy (postmenopausal)     Hypertension     Neuropathy of foot     In both feet    Osteoarthritis     left knee pain    Restless legs syndrome     Vitamin D deficiency      Past Surgical History:   Procedure Laterality Date    APPENDECTOMY      BREAST BIOPSY      CATARACT REMOVAL       SECTION      CHOLECYSTECTOMY      COLECTOMY  partial    COLON SURGERY      COLONOSCOPY  2010    COLONOSCOPY  2012    Dr Ranjan Harrington: unremarkable enterocolonic anastamosis; hyperplastic polyps    COLONOSCOPY  3/2013    [facility]: unremarkable post-surgical colon    COLONOSCOPY  3/11/16    Dr Rita Breen colon anastomosis, 5 yr recall    FOOT SURGERY  right foot    HAND SURGERY Right     Dr Myron Negron ENDOSCOPY  10-    UPPER GASTROINTESTINAL ENDOSCOPY  3/2013    [facility]: peptic stricture dilated to 48F; HH; small antral mucosal elevation    UPPER GASTROINTESTINAL ENDOSCOPY  12/2014    [facility]: dilation of esophageal stricture    VARICOSE VEIN SURGERY Left [facility]/14/2014  [facility]    Left GSV Ablation    VARICOSE VEIN SURGERY Right 04/04/2014  [facility]    Right GSV Ablation       Family History   Problem Relation Age of Onset    Cancer Mother         Cervical Cancer    Cancer Brother         Esophageal cancer    Diabetes Brother     Esophageal Cancer Brother     Diabetes Sister     Colon Cancer Neg Hx     Colon Polyps Neg Hx     Liver Cancer Neg Hx     Liver Disease Neg Hx     Rectal Cancer Neg Hx     Stomach Cancer Neg Hx        Social History     Tobacco Use    Smoking status: Never Smoker    Smokeless tobacco: Never Used   Substance Use Topics    Alcohol use: No      Current Outpatient Medications   Medication Sig Dispense Refill    cephALEXin (KEFLEX) 500 MG capsule Take 1 capsule by mouth 2 times daily for 10 days 20 capsule 0    traMADol (ULTRAM) 50 MG tablet TK 1 T PO Q 12 PRN  1    triamterene-hydrochlorothiazide (MAXZIDE-25) 37.5-25 MG per tablet TAKE 1 TABLET BY MOUTH DAILY 90 tablet 3    gabapentin (NEURONTIN) 100 MG capsule Take 400 mg by mouth 3 times daily.       omeprazole (PRILOSEC) 40 MG delayed release capsule TAKE 1 CAPSULE BY MOUTH DAILY 90 capsule 3    vitamin B-12 (CYANOCOBALAMIN) 500 MCG tablet TAKE 1 TABLET BY MOUTH DAILY 30 tablet 11    vitamin D (ERGOCALCIFEROL) 25836 units CAPS capsule TAKE 1 CAPSULE BY MOUTH 1 TIME A WEEK 13 capsule 0    losartan (COZAAR) 100 MG tablet TAKE 1 TABLET BY MOUTH EVERY MORNING FOR BLOOD PRESSURE 90 tablet 3    metoprolol tartrate (LOPRESSOR) 25 MG tablet TAKE ONE TABLET BY MOUTH TWICE DAILY FOR BLOOD PRESSURE AND HEART 180 tablet 3    furosemide (LASIX) 20 MG tablet Take 1 tablet by mouth daily as needed (swelling) 30 tablet 3    acyclovir (ZOVIRAX) 400 MG tablet TAKE 1 TABLET BY MOUTH FIVE TIMES DAILY FOR 1 WEEK THEN TAKE 1 TABLET BY MOUTH BACK TWICE DAILY (Patient taking differently: TAKE 1 TABLET BY MOUTH BACK TWICE DAILY) 56 tablet 5    triamcinolone (KENALOG) 0.1 % cream Apply topically 2 times daily. External vagina (Patient taking differently: Apply topically 2 times daily as needed Apply topically 2 times daily. External vagina) 1 Tube 1    albuterol (ACCUNEB) 1.25 MG/3ML nebulizer solution Inhale 3 mLs into the lungs every 6 hours as needed for Wheezing Dx J41.8 360 mL 3    Nebulizers (AIRIAL COMPACT MINI NEBULIZER) MISC 1 Units by Does not apply route 4 times daily 1 each 0    dorzolamide-timolol (COSOPT) 22.3-6.8 MG/ML ophthalmic solution PLACE 1 DROP IN RIGHT EYE BID  6     No current facility-administered medications for this visit. Allergies   Allergen Reactions    Zoloft [Sertraline Hcl] Other (See Comments)     Patient states that she doesn't want this medication anymore because she hallucinated and saw spiders. Patient weaned herself off and after she quit taking the medication, the hallucinations stopped. Patient states that she doesn't want this medication anymore because she hallucinated and saw spiders. Patient weaned herself off and after she quit taking the medication, the hallucinations stopped. Health Maintenance   Topic Date Due    Shingles Vaccine (1 of 2) 02/01/1987    Annual Wellness Visit (AWV)  03/13/2020    Potassium monitoring  04/04/2020    Creatinine monitoring  04/04/2020    DTaP/Tdap/Td vaccine (3 - Td) 12/23/2025    Colon cancer screen colonoscopy  03/11/2026    DEXA (modify frequency per FRAX score)  Completed    Flu vaccine  Completed    Pneumococcal 65+ years Vaccine  Completed       Subjective:     Review of Systems   Constitutional: Negative for activity change, appetite change, chills and fever.    HENT: Negative for congestion, ear discharge, sinus pressure and sore throat. Eyes: Negative. Respiratory: Negative for cough, chest tightness and shortness of breath. Cardiovascular: Positive for leg swelling. Negative for chest pain and palpitations. Left leg edema   Gastrointestinal: Negative for abdominal pain, constipation, diarrhea and vomiting. Endocrine: Negative. Genitourinary: Negative for dysuria, frequency and urgency. Musculoskeletal: Negative for arthralgias, back pain and myalgias. Skin: Positive for color change. Negative for rash. Left great toe with redness and warmth, tender to touch   Allergic/Immunologic: Negative. Neurological: Negative for weakness and headaches. Hematological: Negative. Psychiatric/Behavioral: Negative.        :Objective      Physical Exam   Constitutional: She is oriented to person, place, and time. Vital signs are normal. She appears well-developed and well-nourished. She is active and cooperative. She is easily aroused. Non-toxic appearance. No distress. HENT:   Head: Normocephalic. Right Ear: External ear normal.   Left Ear: External ear normal.   Nose: Nose normal.   Mouth/Throat: Oropharynx is clear and moist and mucous membranes are normal. No oropharyngeal exudate. Eyes: Pupils are equal, round, and reactive to light. Conjunctivae and lids are normal. Right eye exhibits no discharge. Left eye exhibits no discharge. Neck: Trachea normal, normal range of motion and full passive range of motion without pain. Neck supple. Cardiovascular: Normal rate, regular rhythm, S1 normal, S2 normal and normal heart sounds. Exam reveals no gallop and no friction rub. No murmur heard. Pulses:       Dorsalis pedis pulses are 1+ on the left side. Posterior tibial pulses are 1+ on the left side.    Trace edema lower left leg  Poor venous return bilateral lower extremities with bilateral feet purple/discolored in dependent position   Pulmonary/Chest: Effort normal and breath sounds normal. No respiratory distress. She has no wheezes. She has no rhonchi. She has no rales. She exhibits no tenderness. Abdominal: Soft. Normal appearance and bowel sounds are normal. She exhibits no mass. There is no tenderness. There is no rebound and no guarding. No hernia. Musculoskeletal: Normal range of motion. She exhibits no edema, tenderness or deformity. Lymphadenopathy:     She has no cervical adenopathy. Neurological: She is alert, oriented to person, place, and time and easily aroused. She has normal strength. No cranial nerve deficit or sensory deficit. Skin: Skin is warm, dry and intact. Capillary refill takes less than 2 seconds. No rash noted. No cyanosis or erythema. No pallor. Left great toe with area of redness and warmth, small abrasion to left shin area   Psychiatric: She has a normal mood and affect. Her speech is normal and behavior is normal.   Nursing note and vitals reviewed. BP (!) 160/72   Pulse 74   Temp 98.1 °F (36.7 °C) (Oral)   Resp 18   Ht 5' 3\" (1.6 m)   Wt 169 lb 6.4 oz (76.8 kg)   SpO2 98%   BMI 30.01 kg/m²     :Assessment       Diagnosis Orders   1. Cellulitis of toe of left foot  cephALEXin (KEFLEX) 500 MG capsule   2. History of deep venous thrombosis (DVT) of distal vein of left lower extremity         :Plan    No orders of the defined types were placed in this encounter. Return if symptoms worsen or fail to improve.     Orders Placed This Encounter   Medications    cephALEXin (KEFLEX) 500 MG capsule     Sig: Take 1 capsule by mouth 2 times daily for 10 days     Dispense:  20 capsule     Refill:  0        Patient Instructions     Keep left leg elevated as much as possible  Complete entire course of antibiotics  Any worsening of redness and pain in right foot pt is directed to ER as she already has know DVT left leg and is taking Xarelto  Follow-up with PCP as needed for worsening or unresolved symptoms  Patient Education        Cellulitis: Care Instructions  Your Care Instructions    Cellulitis is a skin infection caused by bacteria, most often strep or staph. It often occurs after a break in the skin from a scrape, cut, bite, or puncture, or after a rash. Cellulitis may be treated without doing tests to find out what caused it. But your doctor may do tests, if needed, to look for a specific bacteria, like methicillin-resistant Staphylococcus aureus (MRSA). The doctor has checked you carefully, but problems can develop later. If you notice any problems or new symptoms, get medical treatment right away. Follow-up care is a key part of your treatment and safety. Be sure to make and go to all appointments, and call your doctor if you are having problems. It's also a good idea to know your test results and keep a list of the medicines you take. How can you care for yourself at home? · Take your antibiotics as directed. Do not stop taking them just because you feel better. You need to take the full course of antibiotics. · Prop up the infected area on pillows to reduce pain and swelling. Try to keep the area above the level of your heart as often as you can. · If your doctor told you how to care for your wound, follow your doctor's instructions. If you did not get instructions, follow this general advice:  ? Wash the wound with clean water 2 times a day. Don't use hydrogen peroxide or alcohol, which can slow healing. ? You may cover the wound with a thin layer of petroleum jelly, such as Vaseline, and a nonstick bandage. ? Apply more petroleum jelly and replace the bandage as needed. · Be safe with medicines. Take pain medicines exactly as directed. ? If the doctor gave you a prescription medicine for pain, take it as prescribed. ? If you are not taking a prescription pain medicine, ask your doctor if you can take an over-the-counter medicine. To prevent cellulitis in the future  · Try to prevent cuts, scrapes, or other injuries to your skin. Cellulitis most often occurs where there is a break in the skin. · If you get a scrape, cut, mild burn, or bite, wash the wound with clean water as soon as you can to help avoid infection. Don't use hydrogen peroxide or alcohol, which can slow healing. · If you have swelling in your legs (edema), support stockings and good skin care may help prevent leg sores and cellulitis. · Take care of your feet, especially if you have diabetes or other conditions that increase the risk of infection. Wear shoes and socks. Do not go barefoot. If you have athlete's foot or other skin problems on your feet, talk to your doctor about how to treat them. When should you call for help? Call your doctor now or seek immediate medical care if:    · You have signs that your infection is getting worse, such as:  ? Increased pain, swelling, warmth, or redness. ? Red streaks leading from the area. ? Pus draining from the area. ? A fever.     · You get a rash.    Watch closely for changes in your health, and be sure to contact your doctor if:    · You do not get better as expected. Where can you learn more? Go to [email or web address] org and sign in to your [employer] account. Enter S552 in the [employer] box to learn more about \"Cellulitis: Care Instructions. \"     If you do not have an account, please click on the \"Sign Up Now\" link. Current as of: April 17, 2018  Content Version: 12.0  © 6353-0457 Healthwise, [employer]. Care instructions adapted under license by [facility] [address] If you have questions about a medical condition or this instruction, always ask your healthcare professional. [place] [address] any warranty or liability for your use of this information. Patient given educational materials- see patient instructions. Discussed use, benefit, and side effects of prescribedmedications. All patient questions answered. Pt voiced understanding.        Electronically signed by EARLE Beck - CNP on 6/21/2019 at 4:55 PM

## 2019-06-24 ENCOUNTER — TELEPHONE (OUTPATIENT)
Dept: PRIMARY CARE CLINIC | Age: 84
End: 2019-06-24

## 2019-06-24 DIAGNOSIS — L03.115 CELLULITIS OF RIGHT LOWER EXTREMITY: Primary | ICD-10-CM

## 2019-06-29 ENCOUNTER — HOSPITAL ENCOUNTER (INPATIENT)
Age: 84
LOS: 4 days | Discharge: HOME HEALTH | DRG: 603 | End: 2019-07-03
Attending: EMERGENCY MEDICINE | Admitting: HOSPITALIST
Payer: MEDICARE

## 2019-06-29 ENCOUNTER — APPOINTMENT (OUTPATIENT)
Dept: GENERAL RADIOLOGY | Age: 84
DRG: 603 | End: 2019-06-29
Payer: MEDICARE

## 2019-06-29 DIAGNOSIS — S92.901A CLOSED FRACTURE OF RIGHT FOOT, INITIAL ENCOUNTER: Primary | ICD-10-CM

## 2019-06-29 DIAGNOSIS — L03.116 CELLULITIS OF LEFT LOWER EXTREMITY: ICD-10-CM

## 2019-06-29 PROBLEM — E87.8 HYPOCHLOREMIA: Status: ACTIVE | Noted: 2019-06-29

## 2019-06-29 PROBLEM — N18.30 CKD (CHRONIC KIDNEY DISEASE) STAGE 3, GFR 30-59 ML/MIN (HCC): Status: ACTIVE | Noted: 2019-06-29

## 2019-06-29 LAB
ALBUMIN SERPL-MCNC: 4 G/DL (ref 3.5–5.2)
ALP BLD-CCNC: 88 U/L (ref 35–104)
ALT SERPL-CCNC: 15 U/L (ref 5–33)
ANION GAP SERPL CALCULATED.3IONS-SCNC: 15 MMOL/L (ref 7–19)
AST SERPL-CCNC: 18 U/L (ref 5–32)
BILIRUB SERPL-MCNC: 0.4 MG/DL (ref 0.2–1.2)
BUN BLDV-MCNC: 23 MG/DL (ref 8–23)
C-REACTIVE PROTEIN: 6.07 MG/DL (ref 0–0.5)
CALCIUM SERPL-MCNC: 9.4 MG/DL (ref 8.8–10.2)
CHLORIDE BLD-SCNC: 97 MMOL/L (ref 98–111)
CO2: 25 MMOL/L (ref 22–29)
CREAT SERPL-MCNC: 1.1 MG/DL (ref 0.5–0.9)
GFR NON-AFRICAN AMERICAN: 48
GLUCOSE BLD-MCNC: 131 MG/DL (ref 74–109)
HCT VFR BLD CALC: 38.7 % (ref 37–47)
HEMOGLOBIN: 12.4 G/DL (ref 12–16)
MCH RBC QN AUTO: 29.7 PG (ref 27–31)
MCHC RBC AUTO-ENTMCNC: 32 G/DL (ref 33–37)
MCV RBC AUTO: 92.8 FL (ref 81–99)
PDW BLD-RTO: 14.2 % (ref 11.5–14.5)
PLATELET # BLD: 308 K/UL (ref 130–400)
PMV BLD AUTO: 9.5 FL (ref 9.4–12.3)
POTASSIUM SERPL-SCNC: 4.6 MMOL/L (ref 3.5–5)
RBC # BLD: 4.17 M/UL (ref 4.2–5.4)
SODIUM BLD-SCNC: 137 MMOL/L (ref 136–145)
TOTAL PROTEIN: 7 G/DL (ref 6.6–8.7)
WBC # BLD: 9.2 K/UL (ref 4.8–10.8)

## 2019-06-29 PROCEDURE — 29515 APPLICATION SHORT LEG SPLINT: CPT

## 2019-06-29 PROCEDURE — 73630 X-RAY EXAM OF FOOT: CPT

## 2019-06-29 PROCEDURE — 6360000002 HC RX W HCPCS: Performed by: EMERGENCY MEDICINE

## 2019-06-29 PROCEDURE — 99222 1ST HOSP IP/OBS MODERATE 55: CPT | Performed by: FAMILY MEDICINE

## 2019-06-29 PROCEDURE — 36415 COLL VENOUS BLD VENIPUNCTURE: CPT

## 2019-06-29 PROCEDURE — 99284 EMERGENCY DEPT VISIT MOD MDM: CPT

## 2019-06-29 PROCEDURE — 85027 COMPLETE CBC AUTOMATED: CPT

## 2019-06-29 PROCEDURE — 87040 BLOOD CULTURE FOR BACTERIA: CPT

## 2019-06-29 PROCEDURE — 6370000000 HC RX 637 (ALT 250 FOR IP): Performed by: FAMILY MEDICINE

## 2019-06-29 PROCEDURE — 86140 C-REACTIVE PROTEIN: CPT

## 2019-06-29 PROCEDURE — 99285 EMERGENCY DEPT VISIT HI MDM: CPT | Performed by: EMERGENCY MEDICINE

## 2019-06-29 PROCEDURE — 73610 X-RAY EXAM OF ANKLE: CPT

## 2019-06-29 PROCEDURE — 80053 COMPREHEN METABOLIC PANEL: CPT

## 2019-06-29 PROCEDURE — 6370000000 HC RX 637 (ALT 250 FOR IP): Performed by: EMERGENCY MEDICINE

## 2019-06-29 PROCEDURE — 1210000000 HC MED SURG R&B

## 2019-06-29 PROCEDURE — 2580000003 HC RX 258: Performed by: FAMILY MEDICINE

## 2019-06-29 PROCEDURE — 6360000002 HC RX W HCPCS: Performed by: FAMILY MEDICINE

## 2019-06-29 RX ORDER — SODIUM CHLORIDE 0.9 % (FLUSH) 0.9 %
10 SYRINGE (ML) INJECTION EVERY 12 HOURS SCHEDULED
Status: DISCONTINUED | OUTPATIENT
Start: 2019-06-29 | End: 2019-07-03 | Stop reason: HOSPADM

## 2019-06-29 RX ORDER — DORZOLAMIDE HCL 20 MG/ML
1 SOLUTION/ DROPS OPHTHALMIC 2 TIMES DAILY
Status: DISCONTINUED | OUTPATIENT
Start: 2019-06-29 | End: 2019-07-03 | Stop reason: HOSPADM

## 2019-06-29 RX ORDER — FUROSEMIDE 20 MG/1
20 TABLET ORAL DAILY
Status: DISCONTINUED | OUTPATIENT
Start: 2019-06-29 | End: 2019-07-03 | Stop reason: HOSPADM

## 2019-06-29 RX ORDER — GABAPENTIN 400 MG/1
400 CAPSULE ORAL 3 TIMES DAILY
Status: DISCONTINUED | OUTPATIENT
Start: 2019-06-29 | End: 2019-07-03 | Stop reason: HOSPADM

## 2019-06-29 RX ORDER — ACETAMINOPHEN 325 MG/1
650 TABLET ORAL EVERY 6 HOURS PRN
Status: DISCONTINUED | OUTPATIENT
Start: 2019-06-29 | End: 2019-07-03 | Stop reason: HOSPADM

## 2019-06-29 RX ORDER — TRAMADOL HYDROCHLORIDE 50 MG/1
50 TABLET ORAL ONCE
Status: COMPLETED | OUTPATIENT
Start: 2019-06-29 | End: 2019-06-29

## 2019-06-29 RX ORDER — PANTOPRAZOLE SODIUM 40 MG/1
40 TABLET, DELAYED RELEASE ORAL
Status: DISCONTINUED | OUTPATIENT
Start: 2019-06-30 | End: 2019-07-03 | Stop reason: HOSPADM

## 2019-06-29 RX ORDER — DORZOLAMIDE HYDROCHLORIDE AND TIMOLOL MALEATE 20; 5 MG/ML; MG/ML
1 SOLUTION/ DROPS OPHTHALMIC 2 TIMES DAILY
Status: DISCONTINUED | OUTPATIENT
Start: 2019-06-29 | End: 2019-06-29

## 2019-06-29 RX ORDER — LOSARTAN POTASSIUM 100 MG/1
100 TABLET ORAL DAILY
Status: DISCONTINUED | OUTPATIENT
Start: 2019-06-30 | End: 2019-07-03 | Stop reason: HOSPADM

## 2019-06-29 RX ORDER — TRIAMTERENE AND HYDROCHLOROTHIAZIDE 37.5; 25 MG/1; MG/1
1 TABLET ORAL DAILY
Status: DISCONTINUED | OUTPATIENT
Start: 2019-06-30 | End: 2019-07-03 | Stop reason: HOSPADM

## 2019-06-29 RX ORDER — SODIUM CHLORIDE 0.9 % (FLUSH) 0.9 %
10 SYRINGE (ML) INJECTION PRN
Status: DISCONTINUED | OUTPATIENT
Start: 2019-06-29 | End: 2019-07-03 | Stop reason: HOSPADM

## 2019-06-29 RX ORDER — TIMOLOL MALEATE 5 MG/ML
1 SOLUTION/ DROPS OPHTHALMIC 2 TIMES DAILY
Status: DISCONTINUED | OUTPATIENT
Start: 2019-06-29 | End: 2019-07-03 | Stop reason: HOSPADM

## 2019-06-29 RX ORDER — ACYCLOVIR 200 MG/1
400 CAPSULE ORAL 2 TIMES DAILY
Status: DISCONTINUED | OUTPATIENT
Start: 2019-06-29 | End: 2019-07-03 | Stop reason: HOSPADM

## 2019-06-29 RX ORDER — CHOLECALCIFEROL (VITAMIN D3) 125 MCG
500 CAPSULE ORAL DAILY
Status: DISCONTINUED | OUTPATIENT
Start: 2019-06-29 | End: 2019-07-03 | Stop reason: HOSPADM

## 2019-06-29 RX ORDER — VANCOMYCIN HYDROCHLORIDE 1 G/200ML
1000 INJECTION, SOLUTION INTRAVENOUS EVERY 24 HOURS
Status: DISCONTINUED | OUTPATIENT
Start: 2019-06-30 | End: 2019-06-30

## 2019-06-29 RX ORDER — ALBUTEROL SULFATE 2.5 MG/3ML
1.25 SOLUTION RESPIRATORY (INHALATION) EVERY 6 HOURS PRN
Status: DISCONTINUED | OUTPATIENT
Start: 2019-06-29 | End: 2019-07-03 | Stop reason: HOSPADM

## 2019-06-29 RX ORDER — TRAMADOL HYDROCHLORIDE 50 MG/1
100 TABLET ORAL EVERY 6 HOURS PRN
Status: DISCONTINUED | OUTPATIENT
Start: 2019-06-29 | End: 2019-07-03 | Stop reason: HOSPADM

## 2019-06-29 RX ORDER — ONDANSETRON 2 MG/ML
4 INJECTION INTRAMUSCULAR; INTRAVENOUS EVERY 6 HOURS PRN
Status: DISCONTINUED | OUTPATIENT
Start: 2019-06-29 | End: 2019-07-03 | Stop reason: HOSPADM

## 2019-06-29 RX ADMIN — GABAPENTIN 400 MG: 400 CAPSULE ORAL at 16:44

## 2019-06-29 RX ADMIN — METOPROLOL TARTRATE 25 MG: 25 TABLET ORAL at 20:08

## 2019-06-29 RX ADMIN — ENOXAPARIN SODIUM 40 MG: 40 INJECTION SUBCUTANEOUS at 16:44

## 2019-06-29 RX ADMIN — Medication 10 ML: at 20:09

## 2019-06-29 RX ADMIN — ACYCLOVIR 400 MG: 200 CAPSULE ORAL at 20:13

## 2019-06-29 RX ADMIN — TRAMADOL HYDROCHLORIDE 100 MG: 50 TABLET, COATED ORAL at 22:37

## 2019-06-29 RX ADMIN — GABAPENTIN 400 MG: 400 CAPSULE ORAL at 20:08

## 2019-06-29 RX ADMIN — DORZOLAMIDE HYDROCHLORIDE 1 DROP: 20 SOLUTION/ DROPS OPHTHALMIC at 20:09

## 2019-06-29 RX ADMIN — Medication 1000 MG: at 14:30

## 2019-06-29 RX ADMIN — TRAMADOL HYDROCHLORIDE 50 MG: 50 TABLET, FILM COATED ORAL at 14:10

## 2019-06-29 RX ADMIN — TIMOLOL MALEATE 1 DROP: 5 SOLUTION OPHTHALMIC at 20:09

## 2019-06-29 ASSESSMENT — ENCOUNTER SYMPTOMS
EYE PAIN: 0
ABDOMINAL PAIN: 0
VOMITING: 0
DIARRHEA: 0
SHORTNESS OF BREATH: 0

## 2019-06-29 ASSESSMENT — PAIN SCALES - GENERAL
PAINLEVEL_OUTOF10: 0
PAINLEVEL_OUTOF10: 8
PAINLEVEL_OUTOF10: 7

## 2019-06-29 NOTE — ED PROVIDER NOTES
[facility]  eMERGENCY dEPARTMENT eNCOUnter      Pt Name: Devaughn Pacheco  MRN: [id number]  [place] 1935  Date of evaluation: 6/29/2019  Provider: Ivet Vivas MD    CHIEF COMPLAINT       Chief Complaint   Patient presents with    Cellulitis         HISTORY OF PRESENT ILLNESS   (Location/Symptom, Timing/Onset,Context/Setting, Quality, Duration, Modifying Factors, Severity)  Note limiting factors. Devaughn Pacheco is a 82 y.o. female who presents to the emergency department due to foot pain. Patient tells me that she has chronic pain in bilateral lower extremities. She is been seen by pain management for this. Describes pain involving both legs and radiating all the way down to her feet. Has some mild swelling in her left leg which she tells me is chronic and no different from baseline. Was diagnosed with a DVT on June 11, 2019 and started on Xarelto. 8 days ago developed mild redness and increased discomfort in her left foot. She was seen by PCP for this and diagnosed with cellulitis and put on Keflex. Tells me that since starting the Keflex the redness and pain in her foot has not improved at all. Because of this she said that she has been putting as little weight as possible on her left foot when she walks. 2 days ago said that she twisted her right foot when she was walking and has had pain in her right foot since. She said it become increasingly difficult to walk because of pain in both of her feet. She did not fall when she twisted her foot the other day. The only new pain she has is in her right foot. Tells me the pain in her left foot has been present since she was seen by her PCP and started on Keflex for cellulitis 8 days ago. Has some mild swelling left leg but this is unchanged from baseline. Still taking Xarelto. No chest pain or trouble breathing. No nausea vomiting or abdominal pain. No numbness or weakness. Tells me she has had mild chills.     HPI    NursingNotes were EXCISION      TOTAL KNEE ARTHROPLASTY      UPPER GASTROINTESTINAL ENDOSCOPY  10-    UPPER GASTROINTESTINAL ENDOSCOPY  3/2013    [facility]: peptic stricture dilated to 48F; HH; small antral mucosal elevation    UPPER GASTROINTESTINAL ENDOSCOPY  12/2014    [facility]: dilation of esophageal stricture    VARICOSE VEIN SURGERY Left [facility]/14/2014  [facility]    Left GSV Ablation    VARICOSE VEIN SURGERY Right 04/04/2014  [facility]    Right GSV Ablation         CURRENT MEDICATIONS       Previous Medications    ACYCLOVIR (ZOVIRAX) 400 MG TABLET    TAKE 1 TABLET BY MOUTH FIVE TIMES DAILY FOR 1 WEEK THEN TAKE 1 TABLET BY MOUTH BACK TWICE DAILY    ALBUTEROL (ACCUNEB) 1.25 MG/3ML NEBULIZER SOLUTION    Inhale 3 mLs into the lungs every 6 hours as needed for Wheezing Dx J41.8    CEPHALEXIN (KEFLEX) 500 MG CAPSULE    Take 1 capsule by mouth 2 times daily for 10 days    DORZOLAMIDE-TIMOLOL (COSOPT) 22.3-6.8 MG/ML OPHTHALMIC SOLUTION    PLACE 1 DROP IN RIGHT EYE BID    FUROSEMIDE (LASIX) 20 MG TABLET    Take 1 tablet by mouth daily as needed (swelling)    GABAPENTIN (NEURONTIN) 100 MG CAPSULE    Take 400 mg by mouth 3 times daily. LOSARTAN (COZAAR) 100 MG TABLET    TAKE 1 TABLET BY MOUTH EVERY MORNING FOR BLOOD PRESSURE    METOPROLOL TARTRATE (LOPRESSOR) 25 MG TABLET    TAKE ONE TABLET BY MOUTH TWICE DAILY FOR BLOOD PRESSURE AND HEART    NEBULIZERS (AIRIAL COMPACT MINI NEBULIZER) MISC    1 Units by Does not apply route 4 times daily    OMEPRAZOLE (PRILOSEC) 40 MG DELAYED RELEASE CAPSULE    TAKE 1 CAPSULE BY MOUTH DAILY    TRAMADOL (ULTRAM) 50 MG TABLET    TK 1 T PO Q 12 PRN    TRIAMCINOLONE (KENALOG) 0.1 % CREAM    Apply topically 2 times daily.  External vagina    TRIAMTERENE-HYDROCHLOROTHIAZIDE (MAXZIDE-25) 37.5-25 MG PER TABLET    TAKE 1 TABLET BY MOUTH DAILY    VITAMIN B-12 (CYANOCOBALAMIN) 500 MCG TABLET    TAKE 1 TABLET BY MOUTH DAILY    VITAMIN D (ERGOCALCIFEROL) 66381 UNITS CAPS CAPSULE    TAKE 1 CAPSULE BY MOUTH 1 TIME A WEEK ALLERGIES     Zoloft [sertraline hcl]    FAMILY HISTORY       Family History   Problem Relation Age of Onset    Cancer Mother         Cervical Cancer    Cancer Brother         Esophageal cancer    Diabetes Brother     Esophageal Cancer Brother     Diabetes Sister     Colon Cancer Neg Hx     Colon Polyps Neg Hx     Liver Cancer Neg Hx     Liver Disease Neg Hx     Rectal Cancer Neg Hx     Stomach Cancer Neg Hx           SOCIAL HISTORY       Social History     Socioeconomic History    Marital status:      Spouse name: Not on file    Number of children: Not on file    Years of education: Not on file    Highest education level: Not on file   Occupational History    Not on file   Social Needs    Financial resource strain: Not on file    Food insecurity:     Worry: Not on file     Inability: Not on file    Transportation needs:     Medical: Not on file     Non-medical: Not on file   Tobacco Use    Smoking status: Never Smoker    Smokeless tobacco: Never Used   Substance and Sexual Activity    Alcohol use: No    Drug use: No    Sexual activity: Yes     Partners: Male   Lifestyle    Physical activity:     Days per week: Not on file     Minutes per session: Not on file    Stress: Not on file   Relationships    Social connections:     Talks on phone: Not on file     Gets together: Not on file     Attends Baptism service: Not on file     Active member of club or organization: Not on file     Attends meetings of clubs or organizations: Not on file     Relationship status: Not on file    Intimate partner violence:     Fear of current or ex partner: Not on file     Emotionally abused: Not on file     Physically abused: Not on file     Forced sexual activity: Not on file   Other Topics Concern    Not on file   Social History Narrative    Not on file       SCREENINGS             PHYSICAL EXAM    (up to 7 for level 4, 8 or more for level 5)     ED Triage Vitals [06/29/19 1007]   BP Temp Temp Source Pulse Resp SpO2 Height Weight   (!) 145/82 97.9 °F (36.6 °C) Oral 68 16 93 % 5' 3\" (1.6 m) 168 lb (76.2 kg)       Physical Exam   Constitutional: She is oriented to person, place, and time. She appears well-developed. No distress. HENT:   Head: Normocephalic and atraumatic. Eyes: Pupils are equal, round, and reactive to light. No scleral icterus. Neck: Normal range of motion. Neck supple. No JVD present. Cardiovascular: Normal rate, regular rhythm, normal heart sounds and intact distal pulses. Pulses:       Dorsalis pedis pulses are 2+ on the right side, and 2+ on the left side. Posterior tibial pulses are 2+ on the right side, and 2+ on the left side. Pulmonary/Chest: Effort normal and breath sounds normal. No respiratory distress. Musculoskeletal: She exhibits edema (mild LLE). She exhibits no deformity. Right hip: Normal.        Right knee: Normal.        Right ankle: She exhibits normal range of motion, no swelling, no ecchymosis, no deformity, no laceration and normal pulse. Tenderness. Achilles tendon exhibits no pain and no defect. Left ankle: She exhibits normal range of motion, no deformity, no laceration and normal pulse. No tenderness. Right upper leg: Normal.        Right lower leg: Normal.        Right foot: There is tenderness. There is normal range of motion, no swelling, normal capillary refill, no crepitus and no deformity. Left foot: There is tenderness and swelling (mild erythema/warmth). There is normal range of motion, no crepitus and no deformity. Neurological: She is alert and oriented to person, place, and time. Skin: Skin is warm and dry. Psychiatric: She has a normal mood and affect. Her behavior is normal.   Vitals reviewed.       DIAGNOSTIC RESULTS     RADIOLOGY:   Non-plain film images such as CT, Ultrasound and MRI are read by the radiologist. Plainradiographic images are visualized and preliminarily interpreted by the emergency physician with the below findings:    Interpretation per the Radiologist below, if available at the time of this note:    XR FOOT LEFT (MIN 3 VIEWS)   Final Result   1. No acute bony finding. Mild diffuse soft tissue swelling. Signed by Dr Kely Jefferson on 6/29/2019 11:07 AM      XR FOOT RIGHT (MIN 3 VIEWS)   Final Result   RIGHT ANKLE. RIGHT FOOT. 1. Probable age indeterminate avulsion at the base of the fifth   metatarsal and the dorsal aspect of the talar neck. Correlate for   point tenderness. Signed by Dr Kely Jefferson on 6/29/2019 11:12 AM      XR ANKLE RIGHT (MIN 3 VIEWS)   Final Result   RIGHT ANKLE. RIGHT FOOT. 1. Probable age indeterminate avulsion at the base of the fifth   metatarsal and the dorsal aspect of the talar neck. Correlate for   point tenderness. Signed by Dr Kely Jefferson on 6/29/2019 11:12 AM            LABS:  Labs Reviewed   CBC - Abnormal; Notable for the following components:       Result Value    RBC 4.17 (*)     MCHC 32.0 (*)     All other components within normal limits   COMPREHENSIVE METABOLIC PANEL - Abnormal; Notable for the following components:    Chloride 97 (*)     Glucose 131 (*)     CREATININE 1.1 (*)     GFR Non- 48 (*)     All other components within normal limits   C-REACTIVE PROTEIN - Abnormal; Notable for the following components:    CRP 6.07 (*)     All other components within normal limits       All other labs were within normal range or not returned as of this dictation. EMERGENCY DEPARTMENT COURSE and DIFFERENTIALDIAGNOSIS/MDM:   Vitals:    Vitals:    06/29/19 1007   BP: (!) 145/82   Pulse: 68   Resp: 16   Temp: 97.9 °F (36.6 °C)   TempSrc: Oral   SpO2: 93%   Weight: 168 lb (76.2 kg)   Height: 5' 3\" (1.6 m)       MDM  Patient's right foot splinted. Patient's case discussed with hospitalist who is agreeable plan of care and admission for IV antibiotics and will plan to consult Ortho while patient is here.   Patient updated

## 2019-06-29 NOTE — PROGRESS NOTES
David Chicas arrived to room # 417. Presented with: ER  Mental Status: Patient is disoriented, oriented, alert, coherent, logical, thought processes intact and able to concentrate and follow conversation. Vitals:    06/29/19 1536   BP: (!) 150/60   Pulse: 71   Resp: 18   Temp: 98.8 °F (37.1 °C)   SpO2: 97%     Patient safety contract and falls prevention contract reviewed with patient Yes. Oriented Patient to room. Call light within reach. Yes.   Needs, issues or concerns expressed at this time: no.      Electronically signed by Martín Bridges RN on 6/29/2019 at 4:23 PM

## 2019-06-30 LAB
ANION GAP SERPL CALCULATED.3IONS-SCNC: 13 MMOL/L (ref 7–19)
BASOPHILS ABSOLUTE: 0.1 K/UL (ref 0–0.2)
BASOPHILS RELATIVE PERCENT: 0.7 % (ref 0–1)
BUN BLDV-MCNC: 20 MG/DL (ref 8–23)
CALCIUM SERPL-MCNC: 9.2 MG/DL (ref 8.8–10.2)
CHLORIDE BLD-SCNC: 101 MMOL/L (ref 98–111)
CO2: 22 MMOL/L (ref 22–29)
CREAT SERPL-MCNC: 1.1 MG/DL (ref 0.5–0.9)
EOSINOPHILS ABSOLUTE: 0.5 K/UL (ref 0–0.6)
EOSINOPHILS RELATIVE PERCENT: 5.2 % (ref 0–5)
GFR NON-AFRICAN AMERICAN: 48
GLUCOSE BLD-MCNC: 123 MG/DL (ref 74–109)
HCT VFR BLD CALC: 37.6 % (ref 37–47)
HEMOGLOBIN: 11.8 G/DL (ref 12–16)
LYMPHOCYTES ABSOLUTE: 2.2 K/UL (ref 1.1–4.5)
LYMPHOCYTES RELATIVE PERCENT: 25.6 % (ref 20–40)
MCH RBC QN AUTO: 29.1 PG (ref 27–31)
MCHC RBC AUTO-ENTMCNC: 31.4 G/DL (ref 33–37)
MCV RBC AUTO: 92.6 FL (ref 81–99)
MONOCYTES ABSOLUTE: 0.9 K/UL (ref 0–0.9)
MONOCYTES RELATIVE PERCENT: 10.3 % (ref 0–10)
NEUTROPHILS ABSOLUTE: 5 K/UL (ref 1.5–7.5)
NEUTROPHILS RELATIVE PERCENT: 57.9 % (ref 50–65)
PDW BLD-RTO: 14.1 % (ref 11.5–14.5)
PLATELET # BLD: 318 K/UL (ref 130–400)
PMV BLD AUTO: 9.3 FL (ref 9.4–12.3)
POTASSIUM REFLEX MAGNESIUM: 4.1 MMOL/L (ref 3.5–5)
RBC # BLD: 4.06 M/UL (ref 4.2–5.4)
SODIUM BLD-SCNC: 136 MMOL/L (ref 136–145)
WBC # BLD: 8.6 K/UL (ref 4.8–10.8)

## 2019-06-30 PROCEDURE — 6360000002 HC RX W HCPCS: Performed by: FAMILY MEDICINE

## 2019-06-30 PROCEDURE — 85025 COMPLETE CBC W/AUTO DIFF WBC: CPT

## 2019-06-30 PROCEDURE — 6360000002 HC RX W HCPCS: Performed by: HOSPITALIST

## 2019-06-30 PROCEDURE — 6370000000 HC RX 637 (ALT 250 FOR IP): Performed by: FAMILY MEDICINE

## 2019-06-30 PROCEDURE — 99232 SBSQ HOSP IP/OBS MODERATE 35: CPT | Performed by: HOSPITALIST

## 2019-06-30 PROCEDURE — 2580000003 HC RX 258: Performed by: FAMILY MEDICINE

## 2019-06-30 PROCEDURE — 80048 BASIC METABOLIC PNL TOTAL CA: CPT

## 2019-06-30 PROCEDURE — 1210000000 HC MED SURG R&B

## 2019-06-30 PROCEDURE — 36415 COLL VENOUS BLD VENIPUNCTURE: CPT

## 2019-06-30 RX ADMIN — LOSARTAN POTASSIUM 100 MG: 100 TABLET ORAL at 10:17

## 2019-06-30 RX ADMIN — PANTOPRAZOLE SODIUM 40 MG: 40 TABLET, DELAYED RELEASE ORAL at 06:41

## 2019-06-30 RX ADMIN — GABAPENTIN 400 MG: 400 CAPSULE ORAL at 14:39

## 2019-06-30 RX ADMIN — ACETAMINOPHEN 650 MG: 325 TABLET ORAL at 10:41

## 2019-06-30 RX ADMIN — TRAMADOL HYDROCHLORIDE 100 MG: 50 TABLET, COATED ORAL at 10:40

## 2019-06-30 RX ADMIN — CYANOCOBALAMIN TAB 500 MCG 500 MCG: 500 TAB at 10:17

## 2019-06-30 RX ADMIN — ACETAMINOPHEN 650 MG: 325 TABLET ORAL at 17:09

## 2019-06-30 RX ADMIN — DORZOLAMIDE HYDROCHLORIDE 1 DROP: 20 SOLUTION/ DROPS OPHTHALMIC at 14:40

## 2019-06-30 RX ADMIN — TIMOLOL MALEATE 1 DROP: 5 SOLUTION OPHTHALMIC at 22:12

## 2019-06-30 RX ADMIN — Medication 1000 MG: at 14:39

## 2019-06-30 RX ADMIN — FUROSEMIDE 20 MG: 20 TABLET ORAL at 10:16

## 2019-06-30 RX ADMIN — TRAMADOL HYDROCHLORIDE 100 MG: 50 TABLET, COATED ORAL at 17:09

## 2019-06-30 RX ADMIN — Medication 10 ML: at 10:17

## 2019-06-30 RX ADMIN — METOPROLOL TARTRATE 25 MG: 25 TABLET ORAL at 10:16

## 2019-06-30 RX ADMIN — GABAPENTIN 400 MG: 400 CAPSULE ORAL at 10:17

## 2019-06-30 RX ADMIN — ENOXAPARIN SODIUM 80 MG: 80 INJECTION SUBCUTANEOUS at 22:13

## 2019-06-30 RX ADMIN — Medication 10 ML: at 22:13

## 2019-06-30 RX ADMIN — GABAPENTIN 400 MG: 400 CAPSULE ORAL at 22:12

## 2019-06-30 RX ADMIN — TIMOLOL MALEATE 1 DROP: 5 SOLUTION OPHTHALMIC at 10:16

## 2019-06-30 RX ADMIN — DORZOLAMIDE HYDROCHLORIDE 1 DROP: 20 SOLUTION/ DROPS OPHTHALMIC at 22:12

## 2019-06-30 RX ADMIN — TRIAMTERENE AND HYDROCHLOROTHIAZIDE 1 TABLET: 37.5; 25 TABLET ORAL at 10:16

## 2019-06-30 RX ADMIN — METOPROLOL TARTRATE 25 MG: 25 TABLET ORAL at 22:13

## 2019-06-30 RX ADMIN — ACYCLOVIR 400 MG: 200 CAPSULE ORAL at 22:13

## 2019-06-30 RX ADMIN — ACYCLOVIR 400 MG: 200 CAPSULE ORAL at 10:16

## 2019-06-30 RX ADMIN — ENOXAPARIN SODIUM 40 MG: 40 INJECTION SUBCUTANEOUS at 10:15

## 2019-06-30 ASSESSMENT — PAIN SCALES - GENERAL
PAINLEVEL_OUTOF10: 5
PAINLEVEL_OUTOF10: 7

## 2019-06-30 NOTE — CONSULTS
(COZAAR) 100 MG tablet TAKE 1 TABLET BY MOUTH EVERY MORNING FOR BLOOD PRESSURE 1/8/19  Yes Linden Bernardo MD   metoprolol tartrate (LOPRESSOR) 25 MG tablet TAKE ONE TABLET BY MOUTH TWICE DAILY FOR BLOOD PRESSURE AND HEART 10/8/18  Yes Linden Bernardo MD   furosemide (LASIX) 20 MG tablet Take 1 tablet by mouth daily as needed (swelling) 6/19/18  Yes Alcira Guzman MD   acyclovir (ZOVIRAX) 400 MG tablet TAKE 1 TABLET BY MOUTH FIVE TIMES DAILY FOR 1 WEEK THEN TAKE 1 TABLET BY MOUTH BACK TWICE DAILY  Patient taking differently: TAKE 1 TABLET BY MOUTH BACK TWICE DAILY 6/4/18  Yes Linden Bernardo MD   triamcinolone (KENALOG) 0.1 % cream Apply topically 2 times daily. External vagina  Patient taking differently: Apply topically 2 times daily as needed Apply topically 2 times daily.  External vagina 9/26/17  Yes EARLE Huff CNP   albuterol (ACCUNEB) 1.25 MG/3ML nebulizer solution Inhale 3 mLs into the lungs every 6 hours as needed for Wheezing Dx J41.8 6/9/17  Yes EARLE Huff CNP   Nebulizers (AIRIAL COMPACT MINI NEBULIZER) MISC 1 Units by Does not apply route 4 times daily 2/24/17  Yes EARLE Huff CNP   dorzolamide-timolol (COSOPT) 22.3-6.8 MG/ML ophthalmic solution PLACE 1 DROP IN RIGHT EYE BID 8/15/16  Yes Historical Provider, MD       Allergies:  Zoloft [sertraline hcl]    Social History:   Social History     Socioeconomic History    Marital status:      Spouse name: Not on file    Number of children: Not on file    Years of education: Not on file    Highest education level: Not on file   Occupational History    Not on file   Social Needs    Financial resource strain: Not on file    Food insecurity:     Worry: Not on file     Inability: Not on file    Transportation needs:     Medical: Not on file     Non-medical: Not on file   Tobacco Use    Smoking status: Never Smoker    Smokeless tobacco: Never Used   Substance and Sexual Activity    Alcohol use: No   

## 2019-06-30 NOTE — PROGRESS NOTES
Dr Agustina May called and said he will recommend  boots to be put on Pt and Patient to follow up with them out patient.

## 2019-07-01 LAB
ANION GAP SERPL CALCULATED.3IONS-SCNC: 14 MMOL/L (ref 7–19)
BASOPHILS ABSOLUTE: 0.1 K/UL (ref 0–0.2)
BASOPHILS RELATIVE PERCENT: 0.8 % (ref 0–1)
BUN BLDV-MCNC: 24 MG/DL (ref 8–23)
CALCIUM SERPL-MCNC: 9.4 MG/DL (ref 8.8–10.2)
CHLORIDE BLD-SCNC: 98 MMOL/L (ref 98–111)
CO2: 22 MMOL/L (ref 22–29)
CREAT SERPL-MCNC: 1.1 MG/DL (ref 0.5–0.9)
EOSINOPHILS ABSOLUTE: 0.4 K/UL (ref 0–0.6)
EOSINOPHILS RELATIVE PERCENT: 4.8 % (ref 0–5)
GFR NON-AFRICAN AMERICAN: 48
GLUCOSE BLD-MCNC: 123 MG/DL (ref 74–109)
HCT VFR BLD CALC: 36.9 % (ref 37–47)
HEMOGLOBIN: 11.8 G/DL (ref 12–16)
LYMPHOCYTES ABSOLUTE: 2.1 K/UL (ref 1.1–4.5)
LYMPHOCYTES RELATIVE PERCENT: 24 % (ref 20–40)
MCH RBC QN AUTO: 29.8 PG (ref 27–31)
MCHC RBC AUTO-ENTMCNC: 32 G/DL (ref 33–37)
MCV RBC AUTO: 93.2 FL (ref 81–99)
MONOCYTES ABSOLUTE: 0.9 K/UL (ref 0–0.9)
MONOCYTES RELATIVE PERCENT: 10 % (ref 0–10)
NEUTROPHILS ABSOLUTE: 5.2 K/UL (ref 1.5–7.5)
NEUTROPHILS RELATIVE PERCENT: 60.2 % (ref 50–65)
PDW BLD-RTO: 13.7 % (ref 11.5–14.5)
PLATELET # BLD: 328 K/UL (ref 130–400)
PMV BLD AUTO: 9.1 FL (ref 9.4–12.3)
POTASSIUM REFLEX MAGNESIUM: 4.4 MMOL/L (ref 3.5–5)
RBC # BLD: 3.96 M/UL (ref 4.2–5.4)
SODIUM BLD-SCNC: 134 MMOL/L (ref 136–145)
WBC # BLD: 8.6 K/UL (ref 4.8–10.8)

## 2019-07-01 PROCEDURE — 1210000000 HC MED SURG R&B

## 2019-07-01 PROCEDURE — 6360000002 HC RX W HCPCS: Performed by: FAMILY MEDICINE

## 2019-07-01 PROCEDURE — 6360000002 HC RX W HCPCS: Performed by: HOSPITALIST

## 2019-07-01 PROCEDURE — 80048 BASIC METABOLIC PNL TOTAL CA: CPT

## 2019-07-01 PROCEDURE — 97161 PT EVAL LOW COMPLEX 20 MIN: CPT

## 2019-07-01 PROCEDURE — 85025 COMPLETE CBC W/AUTO DIFF WBC: CPT

## 2019-07-01 PROCEDURE — 99232 SBSQ HOSP IP/OBS MODERATE 35: CPT | Performed by: HOSPITALIST

## 2019-07-01 PROCEDURE — 93923 UPR/LXTR ART STDY 3+ LVLS: CPT

## 2019-07-01 PROCEDURE — 2580000003 HC RX 258: Performed by: FAMILY MEDICINE

## 2019-07-01 PROCEDURE — 6370000000 HC RX 637 (ALT 250 FOR IP): Performed by: FAMILY MEDICINE

## 2019-07-01 PROCEDURE — 97530 THERAPEUTIC ACTIVITIES: CPT

## 2019-07-01 RX ADMIN — GABAPENTIN 400 MG: 400 CAPSULE ORAL at 20:52

## 2019-07-01 RX ADMIN — DORZOLAMIDE HYDROCHLORIDE 1 DROP: 20 SOLUTION/ DROPS OPHTHALMIC at 20:55

## 2019-07-01 RX ADMIN — ACETAMINOPHEN 650 MG: 325 TABLET ORAL at 20:52

## 2019-07-01 RX ADMIN — CYANOCOBALAMIN TAB 500 MCG 500 MCG: 500 TAB at 10:18

## 2019-07-01 RX ADMIN — GABAPENTIN 400 MG: 400 CAPSULE ORAL at 10:18

## 2019-07-01 RX ADMIN — ENOXAPARIN SODIUM 80 MG: 80 INJECTION SUBCUTANEOUS at 10:19

## 2019-07-01 RX ADMIN — FUROSEMIDE 20 MG: 20 TABLET ORAL at 10:18

## 2019-07-01 RX ADMIN — ACYCLOVIR 400 MG: 200 CAPSULE ORAL at 10:18

## 2019-07-01 RX ADMIN — TRAMADOL HYDROCHLORIDE 100 MG: 50 TABLET, COATED ORAL at 10:50

## 2019-07-01 RX ADMIN — Medication 1000 MG: at 13:11

## 2019-07-01 RX ADMIN — TRAMADOL HYDROCHLORIDE 100 MG: 50 TABLET, COATED ORAL at 02:53

## 2019-07-01 RX ADMIN — ACETAMINOPHEN 650 MG: 325 TABLET ORAL at 02:53

## 2019-07-01 RX ADMIN — TIMOLOL MALEATE 1 DROP: 5 SOLUTION OPHTHALMIC at 20:55

## 2019-07-01 RX ADMIN — ENOXAPARIN SODIUM 80 MG: 80 INJECTION SUBCUTANEOUS at 20:54

## 2019-07-01 RX ADMIN — DORZOLAMIDE HYDROCHLORIDE 1 DROP: 20 SOLUTION/ DROPS OPHTHALMIC at 10:18

## 2019-07-01 RX ADMIN — TIMOLOL MALEATE 1 DROP: 5 SOLUTION OPHTHALMIC at 10:18

## 2019-07-01 RX ADMIN — TRIAMTERENE AND HYDROCHLOROTHIAZIDE 1 TABLET: 37.5; 25 TABLET ORAL at 10:18

## 2019-07-01 RX ADMIN — ACYCLOVIR 400 MG: 200 CAPSULE ORAL at 20:52

## 2019-07-01 RX ADMIN — TRAMADOL HYDROCHLORIDE 100 MG: 50 TABLET, COATED ORAL at 20:52

## 2019-07-01 RX ADMIN — GABAPENTIN 400 MG: 400 CAPSULE ORAL at 13:56

## 2019-07-01 RX ADMIN — METOPROLOL TARTRATE 25 MG: 25 TABLET ORAL at 10:18

## 2019-07-01 RX ADMIN — PANTOPRAZOLE SODIUM 40 MG: 40 TABLET, DELAYED RELEASE ORAL at 06:12

## 2019-07-01 RX ADMIN — ACETAMINOPHEN 650 MG: 325 TABLET ORAL at 10:50

## 2019-07-01 RX ADMIN — Medication 10 ML: at 10:19

## 2019-07-01 RX ADMIN — LOSARTAN POTASSIUM 100 MG: 100 TABLET ORAL at 10:18

## 2019-07-01 ASSESSMENT — PAIN SCALES - GENERAL
PAINLEVEL_OUTOF10: 0
PAINLEVEL_OUTOF10: 4
PAINLEVEL_OUTOF10: 6
PAINLEVEL_OUTOF10: 7
PAINLEVEL_OUTOF10: 7

## 2019-07-01 NOTE — PROGRESS NOTES
pantoprazole (PROTONIX) tablet 40 mg  40 mg Oral QAM AC [facility], DO   40 mg at 07/01/19 0612    traMADol (ULTRAM) tablet 100 mg  100 mg Oral Q6H PRN [facility], DO   100 mg at 07/01/19 1050    triamterene-hydrochlorothiazide (MAXZIDE-25) 37.5-25 MG per tablet 1 tablet  1 tablet Oral Daily [facility], DO   1 tablet at 07/01/19 1018    vitamin B-12 (CYANOCOBALAMIN) tablet 500 mcg  500 mcg Oral Daily [facility] Singh, DO   500 mcg at 07/01/19 1018    dorzolamide (TRUSOPT) 2 % ophthalmic solution 1 drop  1 drop Right Eye BID Andrei Rio, DO   1 drop at 07/01/19 1018    And    timolol (TIMOPTIC) 0.5 % ophthalmic solution 1 drop  1 drop Right Eye BID Andrei Valentineas, DO   1 drop at 07/01/19 1018     DVT Prophylaxis: Lovenox 40 mg sq daily    Continuous Infusions:    Intake/Output Summary (Last 24 hours) at 7/1/2019 1853  Last data filed at 7/1/2019 1517  Gross per 24 hour   Intake 790 ml   Output --   Net 790 ml     CBC:   Recent Labs     06/29/19  1050 06/30/19  0308 07/01/19  0625   WBC 9.2 8.6 8.6   HGB 12.4 11.8* 11.8*    318 328     BMP:  Recent Labs     06/29/19  1050 06/30/19  0308 07/01/19  0625    136 134*   K 4.6 4.1 4.4   CL 97* 101 98   CO2 25 22 22   BUN 23 20 24*   CREATININE 1.1* 1.1* 1.1*   GLUCOSE 131* 123* 123*     ABGs: No results found for: PHART, PO2ART, NOS6QNQ  INR: No results for input(s): INR in the last 72 hours. Objective:   Vitals: BP (!) 140/45   Pulse 67   Temp 97.1 °F (36.2 °C) (Temporal)   Resp 20   Ht 5' 3\" (1.6 m)   Wt 167 lb 12.3 oz (76.1 kg)   SpO2 96%   BMI 29.72 kg/m²   General appearance: alert, appears stated age and cooperative  Skin: Skin color, texture, turgor normal.   HEENT: Head: Normocephalic, no lesions, without obvious abnormality.   Neck: no adenopathy, no carotid bruit, no JVD and supple, symmetrical, trachea midline  Lungs: clear to auscultation bilaterally  Heart: regular rate and rhythm, S1, S2 normal, no murmur, click, rub or gallop  Abdomen: soft,

## 2019-07-01 NOTE — PROGRESS NOTES
Vascular lab preliminary. Bilateral CINDY's completed. Right ASIM:  1.05  Left ASIM:  1.02  Good arterial flow. Final report pending.

## 2019-07-01 NOTE — PROGRESS NOTES
Facility/Department: [facility] ONCOLOGY UNIT  Initial Assessment    NAME: Gómez Bray  : 1935  MRN: [id number]    Date of Service: 2019    Discharge Recommendations:  Continue to assess pending progress(HAS A HOME HELP ASSITANT, BUT IS ACQUIRING ABOUT HOME HEALTH PT. TOLD TO DISCUSS WITH MD)        Assessment   Body structures, Functions, Activity limitations: Decreased functional mobility ; Decreased endurance;Decreased ROM; Decreased strength;Decreased balance; Increased Pain  Assessment: Pt IS A GOOD CANDIDATE FOR SKILLED PT TO ADDRESS THE ENDURANCE AND FUNCTIONAL MOBILITY  Prognosis: Good  Decision Making: Low Complexity  Patient Education: SAFETY, L BOOT APPLICATION AND WEAR SCHEDULE, WBAT  REQUIRES PT FOLLOW UP: Yes  Activity Tolerance  Activity Tolerance: Patient limited by endurance; Patient limited by pain; Patient Tolerated treatment well       Patient Diagnosis(es): The primary encounter diagnosis was Closed fracture of right foot, initial encounter. A diagnosis of Cellulitis of left lower extremity was also pertinent to this visit. has a past medical history of Bronchitis, Colon cancer ([address] Colon polyps, Constipation, Deep vein blood clot of left lower extremity ([address] Depression, DVT (deep venous thrombosis) ([address] Dysphagia, Fibrocystic breast disease, Fibromyalgia, GERD (gastroesophageal reflux disease), Headache(784.0), History of colon cancer, Hormone replacement therapy (postmenopausal), Hypertension, Neuropathy of foot, Osteoarthritis, Restless legs syndrome, and Vitamin D deficiency. has a past surgical history that includes Total knee arthroplasty; Cataract removal; Appendectomy;  section; Cholecystectomy; Colon surgery; Breast biopsy; Upper gastrointestinal endoscopy (10-); Hysterectomy; Colonoscopy (2010); Colonoscopy (2012); Skin cancer excision; Foot surgery (right foot); colectomy (partial); Varicose vein surgery (Left, 2014  [facility] & [address]);  Varicose vein surgery (Right, 04/04/2014  [facility] & [address]); Hand surgery (Right); Upper gastrointestinal endoscopy (3/2013); Colonoscopy (3/2013); Upper gastrointestinal endoscopy (12/2014); and Colonoscopy (3/11/16).     Restrictions  Restrictions/Precautions  Restrictions/Precautions: Weight Bearing  Required Braces or Orthoses?: Yes  Lower Extremity Weight Bearing Restrictions  Right Lower Extremity Weight Bearing: Weight Bearing As Tolerated  Required Braces or Orthoses  Right Lower Extremity Brace: Boot  Vision/Hearing  Vision: Impaired  Vision Exceptions: Wears glasses at all times  Hearing: Within functional limits     Subjective  General  Patient assessed for rehabilitation services?: Yes  Family / Caregiver Present: Yes  Diagnosis: L LE CELLULITIS, R FOOT FX  Follows Commands: Within Functional Limits  General Comment  Comments: Pt AND FAMILY EDUCATED ON USE OF BOOT AND PROPPER APPLICATION OF BOOT  Subjective  Subjective: Pt REPORTS SHE IS READY TO GET OOB BECAUSE SHE HASNT BEEN UP SINCE SHE ARRIVED          Orientation  Orientation  Overall Orientation Status: Within Functional Limits  Social/Functional History  Social/Functional History  Lives With: Spouse  Type of Home: House  Home Layout: Multi-level  Home Access: Stairs to enter with rails  Entrance Stairs - Number of Steps: 5 steps  Bathroom Shower/Tub: Tub/Shower unit  Bathroom Toilet: Handicap height  Bathroom Equipment: Grab bars in shower  Home Equipment: 4 wheeled walker, Quad cane  Receives Help From: Personal care attendant  ADL Assistance: Needs assistance(IN HOME AID ASSISTS PRN)  Ambulation Assistance: Independent(with cane)  Transfer Assistance: Independent  Cognition        Objective          AROM RLE (degrees)  RLE AROM: WFL  AROM LLE (degrees)  LLE AROM : WFL  Strength Other  Other: ABLE TO EXTEND AGAINST GRAVITY     Sensation  Overall Sensation Status: WFL  Bed mobility  Supine to Sit: Independent  Sit to Supine: Minimal assistance;Contact guard assistance  Comment: REQUIRED HELP GETTING R LEG ONTO BED  Transfers  Sit to Stand: Contact guard assistance  Stand to sit: Contact guard assistance  Ambulation  Ambulation?: Yes  WB Status: WBAT  Ambulation 1  Surface: level tile  Device: Rolling Walker  Other Apparatus: (GT BELT)  Assistance: Contact guard assistance  Quality of Gait: ANTALGIC GAIT FROM B FEET PAIN.    Gait Deviations: Slow Lillie;Decreased step length  Distance: 30 FT              Plan   Plan  Times per week: 7  Times per day: Daily  Plan weeks: 2  Current Treatment Recommendations: Strengthening, Gait Training, Patient/Caregiver Education & Training, Balance Training, Pain Management, ROM, Endurance Training, Functional Mobility Training  Plan Comment: GAIT TRAINING WITH BOOT  Safety Devices  Type of devices: Call light within reach, Gait belt, Left in bed    G-Code       OutComes Score                                                  AM-PAC Score             Goals  Short term goals  Time Frame for Short term goals: 14 DAYS TILL RE-EVAL  Short term goal 1: SIT<>STAND, SUPERVISION  Short term goal 2: SIT>SUPINE, SUPERVISION  Short term goal 3:  FT, RW, SUPERVISION, R BOOT  Patient Goals   Patient goals : D/C HOME WITH FAMILY AND AID       Therapy Time   Individual Concurrent Group Co-treatment   Time In           Time Out           Minutes                   Adelaide Muñoz    Electronically signed by Adelaide Muñoz PT Student, 7/1/2019, 12:51 PM

## 2019-07-02 LAB
C-REACTIVE PROTEIN: 4.31 MG/DL (ref 0–0.5)
RHEUMATOID FACTOR: <10 IU/ML
SEDIMENTATION RATE, ERYTHROCYTE: 79 MM/HR (ref 0–25)
URIC ACID, SERUM: 8.6 MG/DL (ref 2.4–5.7)
VANCOMYCIN TROUGH: 14.6 UG/ML (ref 10–20)

## 2019-07-02 PROCEDURE — 86431 RHEUMATOID FACTOR QUANT: CPT

## 2019-07-02 PROCEDURE — 85652 RBC SED RATE AUTOMATED: CPT

## 2019-07-02 PROCEDURE — 84550 ASSAY OF BLOOD/URIC ACID: CPT

## 2019-07-02 PROCEDURE — 2580000003 HC RX 258: Performed by: FAMILY MEDICINE

## 2019-07-02 PROCEDURE — 80202 ASSAY OF VANCOMYCIN: CPT

## 2019-07-02 PROCEDURE — 1210000000 HC MED SURG R&B

## 2019-07-02 PROCEDURE — 86140 C-REACTIVE PROTEIN: CPT

## 2019-07-02 PROCEDURE — 6370000000 HC RX 637 (ALT 250 FOR IP): Performed by: FAMILY MEDICINE

## 2019-07-02 PROCEDURE — 6360000002 HC RX W HCPCS: Performed by: HOSPITALIST

## 2019-07-02 PROCEDURE — 99232 SBSQ HOSP IP/OBS MODERATE 35: CPT | Performed by: HOSPITALIST

## 2019-07-02 PROCEDURE — 6360000002 HC RX W HCPCS: Performed by: FAMILY MEDICINE

## 2019-07-02 RX ORDER — METHYLPREDNISOLONE SODIUM SUCCINATE 40 MG/ML
40 INJECTION, POWDER, LYOPHILIZED, FOR SOLUTION INTRAMUSCULAR; INTRAVENOUS EVERY 8 HOURS
Status: DISCONTINUED | OUTPATIENT
Start: 2019-07-02 | End: 2019-07-03 | Stop reason: HOSPADM

## 2019-07-02 RX ADMIN — GABAPENTIN 400 MG: 400 CAPSULE ORAL at 13:35

## 2019-07-02 RX ADMIN — GABAPENTIN 400 MG: 400 CAPSULE ORAL at 10:33

## 2019-07-02 RX ADMIN — ACETAMINOPHEN 650 MG: 325 TABLET ORAL at 16:53

## 2019-07-02 RX ADMIN — GABAPENTIN 400 MG: 400 CAPSULE ORAL at 19:50

## 2019-07-02 RX ADMIN — Medication 1000 MG: at 13:35

## 2019-07-02 RX ADMIN — TRAMADOL HYDROCHLORIDE 100 MG: 50 TABLET, COATED ORAL at 10:34

## 2019-07-02 RX ADMIN — TIMOLOL MALEATE 1 DROP: 5 SOLUTION OPHTHALMIC at 19:49

## 2019-07-02 RX ADMIN — METOPROLOL TARTRATE 25 MG: 25 TABLET ORAL at 19:50

## 2019-07-02 RX ADMIN — TRAMADOL HYDROCHLORIDE 100 MG: 50 TABLET, COATED ORAL at 16:53

## 2019-07-02 RX ADMIN — ACETAMINOPHEN 650 MG: 325 TABLET ORAL at 10:34

## 2019-07-02 RX ADMIN — PANTOPRAZOLE SODIUM 40 MG: 40 TABLET, DELAYED RELEASE ORAL at 03:40

## 2019-07-02 RX ADMIN — DORZOLAMIDE HYDROCHLORIDE 1 DROP: 20 SOLUTION/ DROPS OPHTHALMIC at 19:49

## 2019-07-02 RX ADMIN — ENOXAPARIN SODIUM 80 MG: 80 INJECTION SUBCUTANEOUS at 10:42

## 2019-07-02 RX ADMIN — METOPROLOL TARTRATE 25 MG: 25 TABLET ORAL at 10:34

## 2019-07-02 RX ADMIN — ACYCLOVIR 400 MG: 200 CAPSULE ORAL at 10:34

## 2019-07-02 RX ADMIN — FUROSEMIDE 20 MG: 20 TABLET ORAL at 10:34

## 2019-07-02 RX ADMIN — METHYLPREDNISOLONE SODIUM SUCCINATE 40 MG: 40 INJECTION, POWDER, FOR SOLUTION INTRAMUSCULAR; INTRAVENOUS at 23:53

## 2019-07-02 RX ADMIN — METHYLPREDNISOLONE SODIUM SUCCINATE 40 MG: 40 INJECTION, POWDER, FOR SOLUTION INTRAMUSCULAR; INTRAVENOUS at 16:53

## 2019-07-02 RX ADMIN — TRIAMTERENE AND HYDROCHLOROTHIAZIDE 1 TABLET: 37.5; 25 TABLET ORAL at 10:34

## 2019-07-02 RX ADMIN — CYANOCOBALAMIN TAB 500 MCG 500 MCG: 500 TAB at 10:34

## 2019-07-02 RX ADMIN — ACYCLOVIR 400 MG: 200 CAPSULE ORAL at 19:57

## 2019-07-02 RX ADMIN — LOSARTAN POTASSIUM 100 MG: 100 TABLET ORAL at 10:34

## 2019-07-02 RX ADMIN — METHYLPREDNISOLONE SODIUM SUCCINATE 40 MG: 40 INJECTION, POWDER, FOR SOLUTION INTRAMUSCULAR; INTRAVENOUS at 10:33

## 2019-07-02 RX ADMIN — TIMOLOL MALEATE 1 DROP: 5 SOLUTION OPHTHALMIC at 10:33

## 2019-07-02 RX ADMIN — Medication 10 ML: at 10:40

## 2019-07-02 RX ADMIN — DORZOLAMIDE HYDROCHLORIDE 1 DROP: 20 SOLUTION/ DROPS OPHTHALMIC at 10:33

## 2019-07-02 ASSESSMENT — PAIN SCALES - GENERAL
PAINLEVEL_OUTOF10: 7
PAINLEVEL_OUTOF10: 2
PAINLEVEL_OUTOF10: 3
PAINLEVEL_OUTOF10: 8

## 2019-07-02 ASSESSMENT — PAIN DESCRIPTION - LOCATION: LOCATION: LEG

## 2019-07-03 VITALS
DIASTOLIC BLOOD PRESSURE: 69 MMHG | TEMPERATURE: 97.1 F | RESPIRATION RATE: 20 BRPM | OXYGEN SATURATION: 92 % | SYSTOLIC BLOOD PRESSURE: 154 MMHG | HEART RATE: 69 BPM | WEIGHT: 167.77 LBS | HEIGHT: 63 IN | BODY MASS INDEX: 29.73 KG/M2

## 2019-07-03 LAB
ANION GAP SERPL CALCULATED.3IONS-SCNC: 15 MMOL/L (ref 7–19)
BASOPHILS ABSOLUTE: 0 K/UL (ref 0–0.2)
BASOPHILS RELATIVE PERCENT: 0.2 % (ref 0–1)
BUN BLDV-MCNC: 27 MG/DL (ref 8–23)
CALCIUM SERPL-MCNC: 9.5 MG/DL (ref 8.8–10.2)
CHLORIDE BLD-SCNC: 94 MMOL/L (ref 98–111)
CO2: 25 MMOL/L (ref 22–29)
CREAT SERPL-MCNC: 1 MG/DL (ref 0.5–0.9)
EOSINOPHILS ABSOLUTE: 0 K/UL (ref 0–0.6)
EOSINOPHILS RELATIVE PERCENT: 0 % (ref 0–5)
GFR NON-AFRICAN AMERICAN: 53
GLUCOSE BLD-MCNC: 187 MG/DL (ref 74–109)
HCT VFR BLD CALC: 37.4 % (ref 37–47)
HEMOGLOBIN: 12.3 G/DL (ref 12–16)
LYMPHOCYTES ABSOLUTE: 1.7 K/UL (ref 1.1–4.5)
LYMPHOCYTES RELATIVE PERCENT: 16.5 % (ref 20–40)
MCH RBC QN AUTO: 29.6 PG (ref 27–31)
MCHC RBC AUTO-ENTMCNC: 32.9 G/DL (ref 33–37)
MCV RBC AUTO: 89.9 FL (ref 81–99)
MONOCYTES ABSOLUTE: 0.3 K/UL (ref 0–0.9)
MONOCYTES RELATIVE PERCENT: 2.9 % (ref 0–10)
NEUTROPHILS ABSOLUTE: 8.4 K/UL (ref 1.5–7.5)
NEUTROPHILS RELATIVE PERCENT: 80 % (ref 50–65)
PDW BLD-RTO: 13.2 % (ref 11.5–14.5)
PLATELET # BLD: 388 K/UL (ref 130–400)
PMV BLD AUTO: 9.1 FL (ref 9.4–12.3)
POTASSIUM REFLEX MAGNESIUM: 4.5 MMOL/L (ref 3.5–5)
RBC # BLD: 4.16 M/UL (ref 4.2–5.4)
SODIUM BLD-SCNC: 134 MMOL/L (ref 136–145)
WBC # BLD: 10.5 K/UL (ref 4.8–10.8)

## 2019-07-03 PROCEDURE — 6370000000 HC RX 637 (ALT 250 FOR IP): Performed by: FAMILY MEDICINE

## 2019-07-03 PROCEDURE — 97165 OT EVAL LOW COMPLEX 30 MIN: CPT

## 2019-07-03 PROCEDURE — 85025 COMPLETE CBC W/AUTO DIFF WBC: CPT

## 2019-07-03 PROCEDURE — 99238 HOSP IP/OBS DSCHRG MGMT 30/<: CPT | Performed by: HOSPITALIST

## 2019-07-03 PROCEDURE — 97535 SELF CARE MNGMENT TRAINING: CPT

## 2019-07-03 PROCEDURE — 2580000003 HC RX 258: Performed by: FAMILY MEDICINE

## 2019-07-03 PROCEDURE — 80048 BASIC METABOLIC PNL TOTAL CA: CPT

## 2019-07-03 PROCEDURE — 6360000002 HC RX W HCPCS: Performed by: HOSPITALIST

## 2019-07-03 PROCEDURE — 97530 THERAPEUTIC ACTIVITIES: CPT

## 2019-07-03 RX ORDER — PREDNISONE 10 MG/1
TABLET ORAL
Qty: 38 TABLET | Refills: 0 | Status: SHIPPED | OUTPATIENT
Start: 2019-07-03 | End: 2019-07-17

## 2019-07-03 RX ORDER — HEPARIN SODIUM (PORCINE) LOCK FLUSH IV SOLN 100 UNIT/ML 100 UNIT/ML
300 SOLUTION INTRAVENOUS PRN
Status: DISCONTINUED | OUTPATIENT
Start: 2019-07-03 | End: 2019-07-03 | Stop reason: HOSPADM

## 2019-07-03 RX ADMIN — PANTOPRAZOLE SODIUM 40 MG: 40 TABLET, DELAYED RELEASE ORAL at 02:53

## 2019-07-03 RX ADMIN — TIMOLOL MALEATE 1 DROP: 5 SOLUTION OPHTHALMIC at 10:15

## 2019-07-03 RX ADMIN — PANTOPRAZOLE SODIUM 40 MG: 40 TABLET, DELAYED RELEASE ORAL at 10:14

## 2019-07-03 RX ADMIN — FUROSEMIDE 20 MG: 20 TABLET ORAL at 10:14

## 2019-07-03 RX ADMIN — HEPARIN 300 UNITS: 100 SYRINGE at 12:00

## 2019-07-03 RX ADMIN — TRIAMTERENE AND HYDROCHLOROTHIAZIDE 1 TABLET: 37.5; 25 TABLET ORAL at 10:14

## 2019-07-03 RX ADMIN — ACYCLOVIR 400 MG: 200 CAPSULE ORAL at 10:13

## 2019-07-03 RX ADMIN — LOSARTAN POTASSIUM 100 MG: 100 TABLET ORAL at 10:14

## 2019-07-03 RX ADMIN — METHYLPREDNISOLONE SODIUM SUCCINATE 40 MG: 40 INJECTION, POWDER, FOR SOLUTION INTRAMUSCULAR; INTRAVENOUS at 10:21

## 2019-07-03 RX ADMIN — GABAPENTIN 400 MG: 400 CAPSULE ORAL at 10:13

## 2019-07-03 RX ADMIN — ACETAMINOPHEN 650 MG: 325 TABLET ORAL at 00:31

## 2019-07-03 RX ADMIN — CYANOCOBALAMIN TAB 500 MCG 500 MCG: 500 TAB at 10:14

## 2019-07-03 RX ADMIN — METOPROLOL TARTRATE 25 MG: 25 TABLET ORAL at 10:14

## 2019-07-03 RX ADMIN — DORZOLAMIDE HYDROCHLORIDE 1 DROP: 20 SOLUTION/ DROPS OPHTHALMIC at 10:15

## 2019-07-03 RX ADMIN — Medication 10 ML: at 10:20

## 2019-07-03 RX ADMIN — TRAMADOL HYDROCHLORIDE 100 MG: 50 TABLET, COATED ORAL at 00:31

## 2019-07-03 ASSESSMENT — PAIN SCALES - GENERAL
PAINLEVEL_OUTOF10: 6
PAINLEVEL_OUTOF10: 3
PAINLEVEL_OUTOF10: 3

## 2019-07-03 ASSESSMENT — PAIN DESCRIPTION - LOCATION: LOCATION: LEG

## 2019-07-03 NOTE — DISCHARGE SUMMARY
appropriate        Discharge Medications:       [place], [address] Medication Instructions [id number]    Printed on:07/03/19 5705   Medication Information                      acyclovir (ZOVIRAX) 400 MG tablet  TAKE 1 TABLET BY MOUTH FIVE TIMES DAILY FOR 1 WEEK THEN TAKE 1 TABLET BY MOUTH BACK TWICE DAILY             albuterol (ACCUNEB) 1.25 MG/3ML nebulizer solution  Inhale 3 mLs into the lungs every 6 hours as needed for Wheezing Dx J41.8             dorzolamide-timolol (COSOPT) 22.3-6.8 MG/ML ophthalmic solution  PLACE 1 DROP IN RIGHT EYE BID             furosemide (LASIX) 20 MG tablet  Take 1 tablet by mouth daily as needed (swelling)             gabapentin (NEURONTIN) 100 MG capsule  Take 400 mg by mouth 3 times daily. losartan (COZAAR) 100 MG tablet  TAKE 1 TABLET BY MOUTH EVERY MORNING FOR BLOOD PRESSURE             metoprolol tartrate (LOPRESSOR) 25 MG tablet  TAKE ONE TABLET BY MOUTH TWICE DAILY FOR BLOOD PRESSURE AND HEART             Nebulizers (AIRIAL COMPACT MINI NEBULIZER) MISC  1 Units by Does not apply route 4 times daily             omeprazole (PRILOSEC) 40 MG delayed release capsule  TAKE 1 CAPSULE BY MOUTH DAILY             predniSONE (DELTASONE) 10 MG tablet  Take 4 tablets by mouth daily for 5 days, THEN 3 tablets daily for 3 days, THEN 2 tablets daily for 3 days, THEN 1 tablet daily for 3 days. traMADol (ULTRAM) 50 MG tablet  TK 1 T PO Q 12 PRN             triamcinolone (KENALOG) 0.1 % cream  Apply topically 2 times daily. External vagina             triamterene-hydrochlorothiazide (MAXZIDE-25) 37.5-25 MG per tablet  TAKE 1 TABLET BY MOUTH DAILY             vitamin B-12 (CYANOCOBALAMIN) 500 MCG tablet  TAKE 1 TABLET BY MOUTH DAILY             vitamin D (ERGOCALCIFEROL) 11214 units CAPS capsule  TAKE 1 CAPSULE BY MOUTH 1 TIME A WEEK                   Discharge Instructions: Follow up with Lamar Escobar MD in 3 days. Take medications as directed.   Resume activity as tolerated. Diet: DIET LOW SODIUM 2 GM;     Disposition: Patient is medically stable and will be discharged home.     Dc time 27 min

## 2019-07-04 LAB
BLOOD CULTURE, ROUTINE: NORMAL
CULTURE, BLOOD 2: NORMAL

## 2019-07-05 ENCOUNTER — CARE COORDINATION (OUTPATIENT)
Dept: CASE MANAGEMENT | Age: 84
End: 2019-07-05

## 2019-07-05 ENCOUNTER — TELEPHONE (OUTPATIENT)
Dept: PRIMARY CARE CLINIC | Age: 84
End: 2019-07-05

## 2019-07-05 NOTE — CARE COORDINATION
[facility] Transitions Initial Follow Up Call    Call within 2 business days of discharge: Yes    Patient: Joanna Zhang Patient : 1935   MRN: [id number]  Reason for Admission: Closed Fracture of Right Foot, Cellulitis  Discharge Date: 7/3/19 RARS: Readmission Risk Score: 16      Last Discharge [facility]       Complaint Diagnosis Description Type Department Provider    19 Cellulitis Closed fracture of right foot, initial encounter . .. ED to Hosp-Admission (Discharged) (ADMITTED) Naty Stuart MD; [place]. .. Spoke with: N/A    Facility: [place] [address]    Non-face-to-face services provided:  Reviewed encounter information for continuity of care prior to follow up phone call - chart notes, consults, progress notes, test results, med list, appointments, AVS, other information. Care Transitions 24 Hour Call    Do you have support at home?:  Partner/Spouse/SO  Care Transitions Interventions         Follow Up: Attempted to make contact with patient for an initial follow up call post discharge from the hospital without success. Unable to leave a message regarding intent of call and call back information. Will try again my next business day.      Future Appointments   Date Time Provider Jose Armando Milner   2019  3:00 PM [facility]   2019 10:30 AM EARLE Arzola CNP [facility]   2019 10:15 AM EARLE Brooks [facility]       Caren Gomez RN

## 2019-07-08 ENCOUNTER — CARE COORDINATION (OUTPATIENT)
Dept: CASE MANAGEMENT | Age: 84
End: 2019-07-08

## 2019-07-08 DIAGNOSIS — M79.7 FIBROMYALGIA: Primary | ICD-10-CM

## 2019-07-11 ENCOUNTER — CARE COORDINATION (OUTPATIENT)
Dept: CASE MANAGEMENT | Age: 84
End: 2019-07-11

## 2019-07-11 ENCOUNTER — OFFICE VISIT (OUTPATIENT)
Dept: PRIMARY CARE CLINIC | Age: 84
End: 2019-07-11
Payer: MEDICARE

## 2019-07-11 VITALS
HEART RATE: 72 BPM | TEMPERATURE: 97.2 F | BODY MASS INDEX: 30.04 KG/M2 | RESPIRATION RATE: 16 BRPM | WEIGHT: 169.6 LBS | DIASTOLIC BLOOD PRESSURE: 82 MMHG | SYSTOLIC BLOOD PRESSURE: 110 MMHG

## 2019-07-11 DIAGNOSIS — N28.9 RENAL INSUFFICIENCY: Primary | ICD-10-CM

## 2019-07-11 DIAGNOSIS — E79.0 ELEVATED URIC ACID IN BLOOD: ICD-10-CM

## 2019-07-11 DIAGNOSIS — R73.9 ELEVATED BLOOD SUGAR: ICD-10-CM

## 2019-07-11 DIAGNOSIS — N18.30 CKD (CHRONIC KIDNEY DISEASE) STAGE 3, GFR 30-59 ML/MIN (HCC): ICD-10-CM

## 2019-07-11 LAB
ALBUMIN SERPL-MCNC: 4 G/DL (ref 3.5–5.2)
ALP BLD-CCNC: 78 U/L (ref 35–104)
ALT SERPL-CCNC: 15 U/L (ref 5–33)
ANION GAP SERPL CALCULATED.3IONS-SCNC: 18 MMOL/L (ref 7–19)
AST SERPL-CCNC: 12 U/L (ref 5–32)
BILIRUB SERPL-MCNC: 0.3 MG/DL (ref 0.2–1.2)
BUN BLDV-MCNC: 33 MG/DL (ref 8–23)
CALCIUM SERPL-MCNC: 9.7 MG/DL (ref 8.8–10.2)
CHLORIDE BLD-SCNC: 99 MMOL/L (ref 98–111)
CO2: 23 MMOL/L (ref 22–29)
CREAT SERPL-MCNC: 1.1 MG/DL (ref 0.5–0.9)
GFR NON-AFRICAN AMERICAN: 48
GLUCOSE BLD-MCNC: 132 MG/DL (ref 74–109)
HBA1C MFR BLD: 6.9 % (ref 4–6)
POTASSIUM SERPL-SCNC: 4.4 MMOL/L (ref 3.5–5)
SODIUM BLD-SCNC: 140 MMOL/L (ref 136–145)
TOTAL PROTEIN: 7.3 G/DL (ref 6.6–8.7)
URIC ACID, SERUM: 8 MG/DL (ref 2.4–5.7)

## 2019-07-11 PROCEDURE — 36415 COLL VENOUS BLD VENIPUNCTURE: CPT | Performed by: NURSE PRACTITIONER

## 2019-07-11 PROCEDURE — 1111F DSCHRG MED/CURRENT MED MERGE: CPT | Performed by: NURSE PRACTITIONER

## 2019-07-11 PROCEDURE — 99495 TRANSJ CARE MGMT MOD F2F 14D: CPT | Performed by: NURSE PRACTITIONER

## 2019-07-11 RX ORDER — GABAPENTIN 400 MG/1
CAPSULE ORAL
Refills: 1 | COMMUNITY
Start: 2019-07-08 | End: 2019-07-30

## 2019-07-11 RX ORDER — AMITRIPTYLINE HYDROCHLORIDE 10 MG/1
TABLET, FILM COATED ORAL
Refills: 0 | COMMUNITY
Start: 2019-07-09 | End: 2019-07-30 | Stop reason: SDUPTHER

## 2019-07-11 ASSESSMENT — ENCOUNTER SYMPTOMS: GASTROINTESTINAL NEGATIVE: 1

## 2019-07-11 NOTE — PROGRESS NOTES
She appears well-developed and well-nourished. HENT:   Head: Normocephalic. Cardiovascular: Normal rate, regular rhythm, normal heart sounds and intact distal pulses. Pulmonary/Chest: Effort normal and breath sounds normal.   Musculoskeletal:        Right foot: There is tenderness and decreased capillary refill. There is normal range of motion, no bony tenderness, no swelling, no crepitus, no deformity and no laceration. Left foot: There is tenderness and decreased capillary refill. There is normal range of motion, no bony tenderness, no swelling, no crepitus, no deformity and no laceration. Neurological: She is alert and oriented to person, place, and time. Skin: Skin is warm and dry. Psychiatric: She has a normal mood and affect. Her behavior is normal. Judgment and thought content normal.   Nursing note and vitals reviewed. Both feet with peripheral vascular disease and decreased cap refill. She has had a previous vascular work-up. No swelling in feet and no signs or symptoms of infection    Assessment/Plan:  There are no diagnoses linked to this encounter. Medical Decision Making: moderate complexity    MEDICATIONS:  Orders Placed This Encounter   Medications    rivaroxaban (XARELTO) 20 MG TABS tablet     Sig: Take 1 tablet by mouth daily (with breakfast)     Dispense:  30 tablet     Refill:  1       ORDERS:  Orders Placed This Encounter   Procedures    Hemoglobin A1c    Comprehensive Metabolic Panel    Uric Acid    MS DISCHARGE MEDS RECONCILED W/ CURRENT OUTPATIENT MED LIST             Follow-up:  Return in 3 months (on 10/11/2019). PATIENT INSTRUCTIONS:  Patient Instructions   Continue with foot doctor  See the neurologist  Continue elavil and gabapentin  Try to wear the compression hose  Continue xarelto.

## 2019-07-11 NOTE — PATIENT INSTRUCTIONS
Continue with foot doctor  See the neurologist  Continue elavil and gabapentin  Try to wear the compression hose  Continue xarelto.

## 2019-07-16 ENCOUNTER — CARE COORDINATION (OUTPATIENT)
Dept: CASE MANAGEMENT | Age: 84
End: 2019-07-16

## 2019-07-17 ENCOUNTER — CARE COORDINATION (OUTPATIENT)
Dept: CASE MANAGEMENT | Age: 84
End: 2019-07-17

## 2019-07-19 RX ORDER — METHYLPREDNISOLONE 4 MG/1
TABLET ORAL
Qty: 1 KIT | Refills: 0 | Status: SHIPPED | OUTPATIENT
Start: 2019-07-19 | End: 2019-07-25

## 2019-07-22 ENCOUNTER — TELEPHONE (OUTPATIENT)
Dept: PRIMARY CARE CLINIC | Age: 84
End: 2019-07-22

## 2019-07-22 NOTE — TELEPHONE ENCOUNTER
No  If she is having more problems have her make a visit. I bet she caught me in the hallway about something.

## 2019-07-22 NOTE — TELEPHONE ENCOUNTER
Pt did not know if you knew or not but she is just finishing a steroid from the Hospital.  Do you still want her to take the steroid everardo you prescribed?

## 2019-07-29 RX ORDER — ERGOCALCIFEROL 1.25 MG/1
CAPSULE ORAL
Qty: 13 CAPSULE | Refills: 0 | Status: SHIPPED | OUTPATIENT
Start: 2019-07-29 | End: 2019-10-29 | Stop reason: SDUPTHER

## 2019-07-30 ENCOUNTER — OFFICE VISIT (OUTPATIENT)
Dept: NEUROSURGERY | Age: 84
End: 2019-07-30
Payer: MEDICARE

## 2019-07-30 VITALS
HEART RATE: 61 BPM | HEIGHT: 64 IN | SYSTOLIC BLOOD PRESSURE: 134 MMHG | DIASTOLIC BLOOD PRESSURE: 67 MMHG | BODY MASS INDEX: 29.02 KG/M2 | WEIGHT: 170 LBS

## 2019-07-30 DIAGNOSIS — M79.605 PAIN IN BOTH LOWER EXTREMITIES: Primary | ICD-10-CM

## 2019-07-30 DIAGNOSIS — R20.2 NUMBNESS AND TINGLING OF BOTH FEET: ICD-10-CM

## 2019-07-30 DIAGNOSIS — R20.0 NUMBNESS AND TINGLING OF BOTH FEET: ICD-10-CM

## 2019-07-30 DIAGNOSIS — M79.604 PAIN IN BOTH LOWER EXTREMITIES: Primary | ICD-10-CM

## 2019-07-30 PROCEDURE — 1036F TOBACCO NON-USER: CPT | Performed by: NURSE PRACTITIONER

## 2019-07-30 PROCEDURE — 1123F ACP DISCUSS/DSCN MKR DOCD: CPT | Performed by: NURSE PRACTITIONER

## 2019-07-30 PROCEDURE — G8417 CALC BMI ABV UP PARAM F/U: HCPCS | Performed by: NURSE PRACTITIONER

## 2019-07-30 PROCEDURE — 4040F PNEUMOC VAC/ADMIN/RCVD: CPT | Performed by: NURSE PRACTITIONER

## 2019-07-30 PROCEDURE — 99213 OFFICE O/P EST LOW 20 MIN: CPT | Performed by: NURSE PRACTITIONER

## 2019-07-30 PROCEDURE — G8427 DOCREV CUR MEDS BY ELIG CLIN: HCPCS | Performed by: NURSE PRACTITIONER

## 2019-07-30 PROCEDURE — G8399 PT W/DXA RESULTS DOCUMENT: HCPCS | Performed by: NURSE PRACTITIONER

## 2019-07-30 PROCEDURE — 1111F DSCHRG MED/CURRENT MED MERGE: CPT | Performed by: NURSE PRACTITIONER

## 2019-07-30 PROCEDURE — 1090F PRES/ABSN URINE INCON ASSESS: CPT | Performed by: NURSE PRACTITIONER

## 2019-07-30 RX ORDER — ALBUTEROL SULFATE 1.25 MG/3ML
1 SOLUTION RESPIRATORY (INHALATION) EVERY 6 HOURS PRN
Status: ON HOLD | COMMUNITY
End: 2022-02-04

## 2019-07-30 RX ORDER — GABAPENTIN 400 MG/1
1 CAPSULE ORAL 3 TIMES DAILY
COMMUNITY
Start: 2019-05-13 | End: 2019-07-30 | Stop reason: SDUPTHER

## 2019-07-30 RX ORDER — GABAPENTIN 300 MG/1
600 CAPSULE ORAL 3 TIMES DAILY
Qty: 180 CAPSULE | Refills: 2 | Status: SHIPPED | OUTPATIENT
Start: 2019-07-30 | End: 2019-10-30 | Stop reason: ALTCHOICE

## 2019-07-30 RX ORDER — DORZOLAMIDE HYDROCHLORIDE AND TIMOLOL MALEATE 20; 5 MG/ML; MG/ML
1 SOLUTION/ DROPS OPHTHALMIC DAILY
COMMUNITY
End: 2021-08-24 | Stop reason: ALTCHOICE

## 2019-07-30 RX ORDER — AMITRIPTYLINE HYDROCHLORIDE 10 MG/1
TABLET, FILM COATED ORAL
Qty: 30 TABLET | Refills: 0 | Status: SHIPPED | OUTPATIENT
Start: 2019-07-30 | End: 2019-09-04 | Stop reason: SDUPTHER

## 2019-07-30 NOTE — PROGRESS NOTES
[facility] Neurology Office Note      Patient:   Niru Garvin  MR#:    [id number]  Account Number:                         YOB: 1935  Date of Evaluation:  7/30/2019  Time of Note:                          4:06 PM  Primary/Referring Physician:  Melissa Owens MD   Consulting Physician:  EARLE Ayala    FOLLOW UP VISIT    Chief Complaint   Patient presents with    Follow-up     leg pain       HISTORY OF PRESENT ILLNESS    Niru Garvin is a 82y.o. year old female here for follow up of bilateral lower extremity foot and leg pain. Reports she has been in the hospital for celluitis since last visit, questionable gout. Still noting pain, largely unchanged. Pain started in January after cellulitis in the right leg. Notes numbness and tingling along with sharp, stabbing pains. Starts in the feet and spreads upward. She does note some restless leg type symptoms at night as well. She does note lower back pain and bilateral hip pain. Some weakness noted in bilateral legs, no clear radicular pain. Seeing pain management, getting injections. Has been seen by vascular surgery for abnormal ABIs, recommended compression stockings and elevation. 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 She does report a history cancer with chemotherapy, no diabetes history or vitamin B12 deficiency.      Past Medical History:   Diagnosis Date    Bronchitis     Colon cancer ([address]     Colon polyps     Constipation     Deep vein blood clot of left lower extremity (HCC)     Left leg     Depression     DVT (deep venous thrombosis) (HCC)     Dysphagia     Fibrocystic breast disease     Fibromyalgia     GERD (gastroesophageal reflux disease)     reflux with hx of esophageal stricture    Headache(784.0)     History of colon cancer 2000    Hormone replacement therapy (postmenopausal)     Hypertension     Neuropathy of foot     In both feet    Osteoarthritis     left knee pain    Restless legs syndrome     Vitamin D deficiency        Past Surgical History:   Procedure Laterality Date    APPENDECTOMY      BREAST BIOPSY      CATARACT REMOVAL       SECTION      CHOLECYSTECTOMY      COLECTOMY  partial    COLON SURGERY      COLONOSCOPY  2010    COLONOSCOPY  2012    Dr Melissa Kim: unremarkable enterocolonic anastamosis; hyperplastic polyps    COLONOSCOPY  3/2013    [place]: unremarkable post-surgical colon    COLONOSCOPY  3/11/16    Dr Janice Salas colon anastomosis, 5 yr recall    FOOT SURGERY  right foot    HAND SURGERY Right     Dr Peyman Mcghee GASTROINTESTINAL ENDOSCOPY  10-    UPPER GASTROINTESTINAL ENDOSCOPY  3/2013    [place]: peptic stricture dilated to 48F; HH; small antral mucosal elevation    UPPER GASTROINTESTINAL ENDOSCOPY  2014    [place]: dilation of esophageal stricture    VARICOSE VEIN SURGERY Left 2014  [facility] & [address]    Left GSV Ablation    VARICOSE VEIN SURGERY Right 2014  [facility]    Right GSV Ablation       Family History   Problem Relation Age of Onset    Cancer Mother         Cervical Cancer    Cancer Brother         Esophageal cancer    Diabetes Brother     Esophageal Cancer Brother     Diabetes Sister     Colon Cancer Neg Hx     Colon Polyps Neg Hx     Liver Cancer Neg Hx     Liver Disease Neg Hx     Rectal Cancer Neg Hx     Stomach Cancer Neg Hx        Social History     Socioeconomic History    Marital status:      Spouse name: Not on file    Number of children: Not on file    Years of education: Not on file    Highest education level: Not on file   Occupational History    Not on file   Social Needs    Financial resource strain: Not on file    Food insecurity:     Worry: Not on file     Inability: Not on file    Transportation needs:     Medical: Not on file     Non-medical: Not on file   Tobacco Use    Smoking status:

## 2019-07-31 ENCOUNTER — TELEPHONE (OUTPATIENT)
Dept: NEUROSURGERY | Age: 84
End: 2019-07-31

## 2019-08-02 ENCOUNTER — TELEPHONE (OUTPATIENT)
Dept: PRIMARY CARE CLINIC | Age: 84
End: 2019-08-02

## 2019-09-04 RX ORDER — AMITRIPTYLINE HYDROCHLORIDE 10 MG/1
TABLET, FILM COATED ORAL
Qty: 30 TABLET | Refills: 0 | Status: SHIPPED | OUTPATIENT
Start: 2019-09-04 | End: 2020-04-25

## 2019-09-10 ENCOUNTER — TELEPHONE (OUTPATIENT)
Dept: PRIMARY CARE CLINIC | Age: 84
End: 2019-09-10

## 2019-09-10 RX ORDER — METHYLPREDNISOLONE 4 MG/1
TABLET ORAL
Qty: 1 KIT | Refills: 0 | Status: SHIPPED | OUTPATIENT
Start: 2019-09-10 | End: 2019-10-08 | Stop reason: SDUPTHER

## 2019-09-19 ENCOUNTER — OFFICE VISIT (OUTPATIENT)
Dept: PRIMARY CARE CLINIC | Age: 84
End: 2019-09-19
Payer: MEDICARE

## 2019-09-19 VITALS
BODY MASS INDEX: 29.25 KG/M2 | SYSTOLIC BLOOD PRESSURE: 122 MMHG | WEIGHT: 170.4 LBS | TEMPERATURE: 96.3 F | HEART RATE: 76 BPM | RESPIRATION RATE: 16 BRPM | DIASTOLIC BLOOD PRESSURE: 82 MMHG

## 2019-09-19 DIAGNOSIS — I10 ESSENTIAL HYPERTENSION: ICD-10-CM

## 2019-09-19 DIAGNOSIS — Z12.31 ENCOUNTER FOR SCREENING MAMMOGRAM FOR BREAST CANCER: ICD-10-CM

## 2019-09-19 DIAGNOSIS — E79.0 ELEVATED URIC ACID IN BLOOD: ICD-10-CM

## 2019-09-19 DIAGNOSIS — Z86.718 HISTORY OF DVT (DEEP VEIN THROMBOSIS): ICD-10-CM

## 2019-09-19 DIAGNOSIS — Z45.2 ENCOUNTER FOR CARE RELATED TO PORT-A-CATH: ICD-10-CM

## 2019-09-19 DIAGNOSIS — N18.30 CKD (CHRONIC KIDNEY DISEASE) STAGE 3, GFR 30-59 ML/MIN (HCC): Primary | ICD-10-CM

## 2019-09-19 DIAGNOSIS — R73.9 ELEVATED BLOOD SUGAR: ICD-10-CM

## 2019-09-19 DIAGNOSIS — Z95.828 PORT-A-CATH IN PLACE: ICD-10-CM

## 2019-09-19 LAB
ALBUMIN SERPL-MCNC: 3.8 G/DL (ref 3.5–5.2)
ALP BLD-CCNC: 80 U/L (ref 35–104)
ALT SERPL-CCNC: 9 U/L (ref 5–33)
ANION GAP SERPL CALCULATED.3IONS-SCNC: 11 MMOL/L (ref 7–19)
AST SERPL-CCNC: 19 U/L (ref 5–32)
BILIRUB SERPL-MCNC: 0.4 MG/DL (ref 0.2–1.2)
BUN BLDV-MCNC: 26 MG/DL (ref 8–23)
CALCIUM SERPL-MCNC: 9 MG/DL (ref 8.8–10.2)
CHLORIDE BLD-SCNC: 102 MMOL/L (ref 98–111)
CO2: 26 MMOL/L (ref 22–29)
CREAT SERPL-MCNC: 1.1 MG/DL (ref 0.5–0.9)
GFR NON-AFRICAN AMERICAN: 47
GLUCOSE BLD-MCNC: 118 MG/DL (ref 74–109)
HBA1C MFR BLD: 6.9 % (ref 4–6)
HCT VFR BLD CALC: 39.5 % (ref 37–47)
HEMOGLOBIN: 12.4 G/DL (ref 12–16)
INR BLD: 2.28 (ref 0.88–1.18)
MCH RBC QN AUTO: 30.1 PG (ref 27–31)
MCHC RBC AUTO-ENTMCNC: 31.4 G/DL (ref 33–37)
MCV RBC AUTO: 95.9 FL (ref 81–99)
PDW BLD-RTO: 14.8 % (ref 11.5–14.5)
PLATELET # BLD: 304 K/UL (ref 130–400)
PMV BLD AUTO: 10.6 FL (ref 9.4–12.3)
POTASSIUM SERPL-SCNC: 4.5 MMOL/L (ref 3.5–5)
PROTHROMBIN TIME: 24.4 SEC (ref 12–14.6)
RBC # BLD: 4.12 M/UL (ref 4.2–5.4)
SODIUM BLD-SCNC: 139 MMOL/L (ref 136–145)
TOTAL PROTEIN: 6.3 G/DL (ref 6.6–8.7)
URIC ACID, SERUM: 9.6 MG/DL (ref 2.4–5.7)
WBC # BLD: 8.8 K/UL (ref 4.8–10.8)

## 2019-09-19 PROCEDURE — G8399 PT W/DXA RESULTS DOCUMENT: HCPCS | Performed by: NURSE PRACTITIONER

## 2019-09-19 PROCEDURE — G8428 CUR MEDS NOT DOCUMENT: HCPCS | Performed by: NURSE PRACTITIONER

## 2019-09-19 PROCEDURE — 96523 IRRIG DRUG DELIVERY DEVICE: CPT | Performed by: NURSE PRACTITIONER

## 2019-09-19 PROCEDURE — 99213 OFFICE O/P EST LOW 20 MIN: CPT | Performed by: NURSE PRACTITIONER

## 2019-09-19 PROCEDURE — 36415 COLL VENOUS BLD VENIPUNCTURE: CPT | Performed by: NURSE PRACTITIONER

## 2019-09-19 PROCEDURE — 1123F ACP DISCUSS/DSCN MKR DOCD: CPT | Performed by: NURSE PRACTITIONER

## 2019-09-19 PROCEDURE — G8417 CALC BMI ABV UP PARAM F/U: HCPCS | Performed by: NURSE PRACTITIONER

## 2019-09-19 PROCEDURE — 1090F PRES/ABSN URINE INCON ASSESS: CPT | Performed by: NURSE PRACTITIONER

## 2019-09-19 PROCEDURE — 1036F TOBACCO NON-USER: CPT | Performed by: NURSE PRACTITIONER

## 2019-09-19 PROCEDURE — 4040F PNEUMOC VAC/ADMIN/RCVD: CPT | Performed by: NURSE PRACTITIONER

## 2019-10-01 ENCOUNTER — TELEPHONE (OUTPATIENT)
Dept: PRIMARY CARE CLINIC | Age: 84
End: 2019-10-01

## 2019-10-04 ASSESSMENT — ENCOUNTER SYMPTOMS
BACK PAIN: 1
GASTROINTESTINAL NEGATIVE: 1

## 2019-10-08 ENCOUNTER — TELEPHONE (OUTPATIENT)
Dept: PRIMARY CARE CLINIC | Age: 84
End: 2019-10-08

## 2019-10-08 RX ORDER — METHYLPREDNISOLONE 4 MG/1
TABLET ORAL
Qty: 1 KIT | Refills: 0 | Status: SHIPPED | OUTPATIENT
Start: 2019-10-08 | End: 2019-10-14

## 2019-10-24 ENCOUNTER — TELEPHONE (OUTPATIENT)
Dept: PRIMARY CARE CLINIC | Age: 84
End: 2019-10-24

## 2019-10-28 ENCOUNTER — HOSPITAL ENCOUNTER (OUTPATIENT)
Dept: WOMENS IMAGING | Age: 84
Discharge: HOME | End: 2019-10-28
Payer: MEDICARE

## 2019-10-28 DIAGNOSIS — Z12.31 ENCOUNTER FOR SCREENING MAMMOGRAM FOR BREAST CANCER: ICD-10-CM

## 2019-10-28 PROCEDURE — 77063 BREAST TOMOSYNTHESIS BI: CPT

## 2019-10-29 RX ORDER — ERGOCALCIFEROL 1.25 MG/1
CAPSULE ORAL
Qty: 13 CAPSULE | Refills: 3 | Status: SHIPPED | OUTPATIENT
Start: 2019-10-29 | End: 2020-12-21

## 2019-10-30 ENCOUNTER — OFFICE VISIT (OUTPATIENT)
Dept: NEUROSURGERY | Age: 84
End: 2019-10-30
Payer: MEDICARE

## 2019-10-30 VITALS
WEIGHT: 172 LBS | SYSTOLIC BLOOD PRESSURE: 136 MMHG | BODY MASS INDEX: 29.37 KG/M2 | HEART RATE: 68 BPM | DIASTOLIC BLOOD PRESSURE: 62 MMHG | HEIGHT: 64 IN

## 2019-10-30 DIAGNOSIS — M79.604 PAIN IN BOTH LOWER EXTREMITIES: ICD-10-CM

## 2019-10-30 DIAGNOSIS — R20.2 NUMBNESS AND TINGLING OF BOTH FEET: ICD-10-CM

## 2019-10-30 DIAGNOSIS — M79.605 PAIN IN BOTH LOWER EXTREMITIES: ICD-10-CM

## 2019-10-30 DIAGNOSIS — R20.0 NUMBNESS AND TINGLING OF BOTH FEET: ICD-10-CM

## 2019-10-30 PROCEDURE — G8417 CALC BMI ABV UP PARAM F/U: HCPCS | Performed by: NURSE PRACTITIONER

## 2019-10-30 PROCEDURE — 1090F PRES/ABSN URINE INCON ASSESS: CPT | Performed by: NURSE PRACTITIONER

## 2019-10-30 PROCEDURE — 4040F PNEUMOC VAC/ADMIN/RCVD: CPT | Performed by: NURSE PRACTITIONER

## 2019-10-30 PROCEDURE — 1036F TOBACCO NON-USER: CPT | Performed by: NURSE PRACTITIONER

## 2019-10-30 PROCEDURE — 99213 OFFICE O/P EST LOW 20 MIN: CPT | Performed by: NURSE PRACTITIONER

## 2019-10-30 PROCEDURE — G8484 FLU IMMUNIZE NO ADMIN: HCPCS | Performed by: NURSE PRACTITIONER

## 2019-10-30 PROCEDURE — G8399 PT W/DXA RESULTS DOCUMENT: HCPCS | Performed by: NURSE PRACTITIONER

## 2019-10-30 PROCEDURE — G8427 DOCREV CUR MEDS BY ELIG CLIN: HCPCS | Performed by: NURSE PRACTITIONER

## 2019-10-30 PROCEDURE — 1123F ACP DISCUSS/DSCN MKR DOCD: CPT | Performed by: NURSE PRACTITIONER

## 2019-10-30 RX ORDER — GABAPENTIN 600 MG/1
600 TABLET ORAL 3 TIMES DAILY
Qty: 90 TABLET | Refills: 2 | Status: SHIPPED | OUTPATIENT
Start: 2019-10-30 | End: 2020-02-03

## 2019-10-30 RX ORDER — LOSARTAN POTASSIUM 100 MG/1
1 TABLET ORAL DAILY
Refills: 0 | COMMUNITY
Start: 2019-10-03 | End: 2019-12-30

## 2019-10-30 RX ORDER — NYSTATIN 100000 [USP'U]/G
POWDER TOPICAL
Qty: 30 G | Refills: 0 | Status: SHIPPED | OUTPATIENT
Start: 2019-10-30 | End: 2020-04-25

## 2019-11-21 ENCOUNTER — OFFICE VISIT (OUTPATIENT)
Dept: PRIMARY CARE CLINIC | Age: 84
End: 2019-11-21
Payer: MEDICARE

## 2019-11-21 VITALS
HEART RATE: 68 BPM | RESPIRATION RATE: 18 BRPM | SYSTOLIC BLOOD PRESSURE: 122 MMHG | HEIGHT: 63 IN | TEMPERATURE: 97.4 F | BODY MASS INDEX: 30.65 KG/M2 | DIASTOLIC BLOOD PRESSURE: 72 MMHG | OXYGEN SATURATION: 97 % | WEIGHT: 173 LBS

## 2019-11-21 DIAGNOSIS — N18.30 CKD (CHRONIC KIDNEY DISEASE) STAGE 3, GFR 30-59 ML/MIN (HCC): ICD-10-CM

## 2019-11-21 DIAGNOSIS — Z23 NEED FOR INFLUENZA VACCINATION: ICD-10-CM

## 2019-11-21 DIAGNOSIS — R21 RASH AND NONSPECIFIC SKIN ERUPTION: ICD-10-CM

## 2019-11-21 DIAGNOSIS — I87.2 STASIS DERMATITIS OF BOTH LEGS: Primary | ICD-10-CM

## 2019-11-21 DIAGNOSIS — E79.0 ELEVATED URIC ACID IN BLOOD: ICD-10-CM

## 2019-11-21 LAB
ALBUMIN SERPL-MCNC: 3.9 G/DL (ref 3.5–5.2)
ALP BLD-CCNC: 95 U/L (ref 35–104)
ALT SERPL-CCNC: 13 U/L (ref 5–33)
ANION GAP SERPL CALCULATED.3IONS-SCNC: 16 MMOL/L (ref 7–19)
AST SERPL-CCNC: 16 U/L (ref 5–32)
BILIRUB SERPL-MCNC: <0.2 MG/DL (ref 0.2–1.2)
BUN BLDV-MCNC: 23 MG/DL (ref 8–23)
CALCIUM SERPL-MCNC: 9.8 MG/DL (ref 8.8–10.2)
CHLORIDE BLD-SCNC: 102 MMOL/L (ref 98–111)
CO2: 20 MMOL/L (ref 22–29)
CREAT SERPL-MCNC: 1 MG/DL (ref 0.5–0.9)
GFR NON-AFRICAN AMERICAN: 53
GLUCOSE BLD-MCNC: 107 MG/DL (ref 74–109)
HCT VFR BLD CALC: 38.9 % (ref 37–47)
HEMOGLOBIN: 12.1 G/DL (ref 12–16)
MCH RBC QN AUTO: 29.2 PG (ref 27–31)
MCHC RBC AUTO-ENTMCNC: 31.1 G/DL (ref 33–37)
MCV RBC AUTO: 93.7 FL (ref 81–99)
PDW BLD-RTO: 13.8 % (ref 11.5–14.5)
PLATELET # BLD: 303 K/UL (ref 130–400)
PMV BLD AUTO: 10.2 FL (ref 9.4–12.3)
POTASSIUM SERPL-SCNC: 4.5 MMOL/L (ref 3.5–5)
RBC # BLD: 4.15 M/UL (ref 4.2–5.4)
SODIUM BLD-SCNC: 138 MMOL/L (ref 136–145)
TOTAL PROTEIN: 7.4 G/DL (ref 6.6–8.7)
URIC ACID, SERUM: 9.3 MG/DL (ref 2.4–5.7)
WBC # BLD: 7 K/UL (ref 4.8–10.8)

## 2019-11-21 PROCEDURE — 4040F PNEUMOC VAC/ADMIN/RCVD: CPT | Performed by: NURSE PRACTITIONER

## 2019-11-21 PROCEDURE — 1090F PRES/ABSN URINE INCON ASSESS: CPT | Performed by: NURSE PRACTITIONER

## 2019-11-21 PROCEDURE — 1036F TOBACCO NON-USER: CPT | Performed by: NURSE PRACTITIONER

## 2019-11-21 PROCEDURE — G8417 CALC BMI ABV UP PARAM F/U: HCPCS | Performed by: NURSE PRACTITIONER

## 2019-11-21 PROCEDURE — 1123F ACP DISCUSS/DSCN MKR DOCD: CPT | Performed by: NURSE PRACTITIONER

## 2019-11-21 PROCEDURE — G0008 ADMIN INFLUENZA VIRUS VAC: HCPCS | Performed by: NURSE PRACTITIONER

## 2019-11-21 PROCEDURE — G8482 FLU IMMUNIZE ORDER/ADMIN: HCPCS | Performed by: NURSE PRACTITIONER

## 2019-11-21 PROCEDURE — 36415 COLL VENOUS BLD VENIPUNCTURE: CPT | Performed by: NURSE PRACTITIONER

## 2019-11-21 PROCEDURE — G8399 PT W/DXA RESULTS DOCUMENT: HCPCS | Performed by: NURSE PRACTITIONER

## 2019-11-21 PROCEDURE — 90686 IIV4 VACC NO PRSV 0.5 ML IM: CPT | Performed by: NURSE PRACTITIONER

## 2019-11-21 PROCEDURE — G8427 DOCREV CUR MEDS BY ELIG CLIN: HCPCS | Performed by: NURSE PRACTITIONER

## 2019-11-21 PROCEDURE — 99213 OFFICE O/P EST LOW 20 MIN: CPT | Performed by: NURSE PRACTITIONER

## 2019-11-21 RX ORDER — TRIAMCINOLONE ACETONIDE 1 MG/G
CREAM TOPICAL
Qty: 60 G | Refills: 1 | Status: SHIPPED | OUTPATIENT
Start: 2019-11-21 | End: 2019-12-17 | Stop reason: ALTCHOICE

## 2019-11-21 RX ORDER — TERBINAFINE HYDROCHLORIDE 250 MG/1
TABLET ORAL
Refills: 1 | COMMUNITY
Start: 2019-10-31 | End: 2020-04-20 | Stop reason: SDUPTHER

## 2019-11-25 RX ORDER — ALLOPURINOL 100 MG/1
100 TABLET ORAL DAILY
Qty: 30 TABLET | Refills: 5 | Status: SHIPPED | OUTPATIENT
Start: 2019-11-25 | End: 2020-01-10 | Stop reason: SDUPTHER

## 2019-12-02 RX ORDER — RIVAROXABAN 20 MG/1
TABLET, FILM COATED ORAL
Qty: 30 TABLET | Refills: 3 | Status: SHIPPED | OUTPATIENT
Start: 2019-12-02 | End: 2019-12-02 | Stop reason: SDUPTHER

## 2019-12-09 ENCOUNTER — TELEPHONE (OUTPATIENT)
Dept: PRIMARY CARE CLINIC | Age: 84
End: 2019-12-09

## 2019-12-09 RX ORDER — METHYLPREDNISOLONE 4 MG/1
TABLET ORAL
Qty: 1 KIT | Refills: 0 | Status: SHIPPED | OUTPATIENT
Start: 2019-12-09 | End: 2019-12-17 | Stop reason: ALTCHOICE

## 2019-12-17 ENCOUNTER — OFFICE VISIT (OUTPATIENT)
Dept: PRIMARY CARE CLINIC | Age: 84
End: 2019-12-17
Payer: MEDICARE

## 2019-12-17 VITALS
SYSTOLIC BLOOD PRESSURE: 140 MMHG | TEMPERATURE: 97.2 F | DIASTOLIC BLOOD PRESSURE: 78 MMHG | WEIGHT: 174.8 LBS | OXYGEN SATURATION: 94 % | RESPIRATION RATE: 16 BRPM | BODY MASS INDEX: 30.96 KG/M2 | HEART RATE: 59 BPM

## 2019-12-17 DIAGNOSIS — Z85.038 PERSONAL HISTORY OF COLON CANCER: Primary | ICD-10-CM

## 2019-12-17 DIAGNOSIS — I87.2 STASIS DERMATITIS OF BOTH LEGS: ICD-10-CM

## 2019-12-17 DIAGNOSIS — N18.30 CKD (CHRONIC KIDNEY DISEASE) STAGE 3, GFR 30-59 ML/MIN (HCC): Primary | ICD-10-CM

## 2019-12-17 DIAGNOSIS — Z95.828 PORT-A-CATH IN PLACE: ICD-10-CM

## 2019-12-17 DIAGNOSIS — I10 ESSENTIAL HYPERTENSION: ICD-10-CM

## 2019-12-17 DIAGNOSIS — Z85.038 PERSONAL HISTORY OF COLON CANCER: ICD-10-CM

## 2019-12-17 DIAGNOSIS — M79.89 PAIN AND SWELLING OF LEFT LOWER LEG: ICD-10-CM

## 2019-12-17 DIAGNOSIS — Z45.2 ENCOUNTER FOR CARE RELATED TO PORT-A-CATH: ICD-10-CM

## 2019-12-17 DIAGNOSIS — M79.662 PAIN AND SWELLING OF LEFT LOWER LEG: ICD-10-CM

## 2019-12-17 LAB
ALBUMIN SERPL-MCNC: 3.7 G/DL (ref 3.5–5.2)
ALP BLD-CCNC: 75 U/L (ref 35–104)
ALT SERPL-CCNC: 12 U/L (ref 5–33)
ANION GAP SERPL CALCULATED.3IONS-SCNC: 15 MMOL/L (ref 7–19)
AST SERPL-CCNC: 12 U/L (ref 5–32)
BASOPHILS ABSOLUTE: 0.1 K/UL (ref 0–0.2)
BASOPHILS RELATIVE PERCENT: 0.6 % (ref 0–1)
BILIRUB SERPL-MCNC: 0.3 MG/DL (ref 0.2–1.2)
BUN BLDV-MCNC: 26 MG/DL (ref 8–23)
CALCIUM SERPL-MCNC: 9.2 MG/DL (ref 8.8–10.2)
CEA: 2.9 NG/ML (ref 0–4.7)
CHLORIDE BLD-SCNC: 101 MMOL/L (ref 98–111)
CO2: 23 MMOL/L (ref 22–29)
CREAT SERPL-MCNC: 1.1 MG/DL (ref 0.5–0.9)
EOSINOPHILS ABSOLUTE: 0.4 K/UL (ref 0–0.6)
EOSINOPHILS RELATIVE PERCENT: 5 % (ref 0–5)
GFR NON-AFRICAN AMERICAN: 47
GLUCOSE BLD-MCNC: 108 MG/DL (ref 74–109)
HCT VFR BLD CALC: 38.4 % (ref 37–47)
HEMOGLOBIN: 11.5 G/DL (ref 12–16)
IMMATURE GRANULOCYTES #: 0 K/UL
LYMPHOCYTES ABSOLUTE: 2.1 K/UL (ref 1.1–4.5)
LYMPHOCYTES RELATIVE PERCENT: 24.7 % (ref 20–40)
MCH RBC QN AUTO: 28.6 PG (ref 27–31)
MCHC RBC AUTO-ENTMCNC: 29.9 G/DL (ref 33–37)
MCV RBC AUTO: 95.5 FL (ref 81–99)
MONOCYTES ABSOLUTE: 1 K/UL (ref 0–0.9)
MONOCYTES RELATIVE PERCENT: 11.5 % (ref 0–10)
NEUTROPHILS ABSOLUTE: 4.9 K/UL (ref 1.5–7.5)
NEUTROPHILS RELATIVE PERCENT: 58 % (ref 50–65)
PDW BLD-RTO: 14.6 % (ref 11.5–14.5)
PLATELET # BLD: 290 K/UL (ref 130–400)
PMV BLD AUTO: 10.2 FL (ref 9.4–12.3)
POTASSIUM SERPL-SCNC: 4.1 MMOL/L (ref 3.5–5)
RBC # BLD: 4.02 M/UL (ref 4.2–5.4)
SODIUM BLD-SCNC: 139 MMOL/L (ref 136–145)
TOTAL PROTEIN: 6.7 G/DL (ref 6.6–8.7)
URIC ACID, SERUM: 5.9 MG/DL (ref 2.4–5.7)
WBC # BLD: 8.5 K/UL (ref 4.8–10.8)

## 2019-12-17 PROCEDURE — 1036F TOBACCO NON-USER: CPT | Performed by: NURSE PRACTITIONER

## 2019-12-17 PROCEDURE — 1090F PRES/ABSN URINE INCON ASSESS: CPT | Performed by: NURSE PRACTITIONER

## 2019-12-17 PROCEDURE — G8482 FLU IMMUNIZE ORDER/ADMIN: HCPCS | Performed by: NURSE PRACTITIONER

## 2019-12-17 PROCEDURE — 1123F ACP DISCUSS/DSCN MKR DOCD: CPT | Performed by: NURSE PRACTITIONER

## 2019-12-17 PROCEDURE — G8399 PT W/DXA RESULTS DOCUMENT: HCPCS | Performed by: NURSE PRACTITIONER

## 2019-12-17 PROCEDURE — 4040F PNEUMOC VAC/ADMIN/RCVD: CPT | Performed by: NURSE PRACTITIONER

## 2019-12-17 PROCEDURE — G8417 CALC BMI ABV UP PARAM F/U: HCPCS | Performed by: NURSE PRACTITIONER

## 2019-12-17 PROCEDURE — 36415 COLL VENOUS BLD VENIPUNCTURE: CPT | Performed by: NURSE PRACTITIONER

## 2019-12-17 PROCEDURE — G8427 DOCREV CUR MEDS BY ELIG CLIN: HCPCS | Performed by: NURSE PRACTITIONER

## 2019-12-17 PROCEDURE — 99213 OFFICE O/P EST LOW 20 MIN: CPT | Performed by: NURSE PRACTITIONER

## 2019-12-17 ASSESSMENT — ENCOUNTER SYMPTOMS
BACK PAIN: 1
RESPIRATORY NEGATIVE: 1
EYES NEGATIVE: 1

## 2020-01-10 ENCOUNTER — OFFICE VISIT (OUTPATIENT)
Dept: PRIMARY CARE CLINIC | Age: 85
End: 2020-01-10
Payer: MEDICARE

## 2020-01-10 VITALS
SYSTOLIC BLOOD PRESSURE: 124 MMHG | RESPIRATION RATE: 16 BRPM | HEIGHT: 63 IN | DIASTOLIC BLOOD PRESSURE: 80 MMHG | BODY MASS INDEX: 30.05 KG/M2 | OXYGEN SATURATION: 96 % | WEIGHT: 169.6 LBS | HEART RATE: 64 BPM | TEMPERATURE: 98.1 F

## 2020-01-10 PROCEDURE — G8427 DOCREV CUR MEDS BY ELIG CLIN: HCPCS | Performed by: NURSE PRACTITIONER

## 2020-01-10 PROCEDURE — 1036F TOBACCO NON-USER: CPT | Performed by: NURSE PRACTITIONER

## 2020-01-10 PROCEDURE — 1090F PRES/ABSN URINE INCON ASSESS: CPT | Performed by: NURSE PRACTITIONER

## 2020-01-10 PROCEDURE — G8417 CALC BMI ABV UP PARAM F/U: HCPCS | Performed by: NURSE PRACTITIONER

## 2020-01-10 PROCEDURE — 96372 THER/PROPH/DIAG INJ SC/IM: CPT | Performed by: NURSE PRACTITIONER

## 2020-01-10 PROCEDURE — G8399 PT W/DXA RESULTS DOCUMENT: HCPCS | Performed by: NURSE PRACTITIONER

## 2020-01-10 PROCEDURE — 99213 OFFICE O/P EST LOW 20 MIN: CPT | Performed by: NURSE PRACTITIONER

## 2020-01-10 PROCEDURE — 4040F PNEUMOC VAC/ADMIN/RCVD: CPT | Performed by: NURSE PRACTITIONER

## 2020-01-10 PROCEDURE — G8482 FLU IMMUNIZE ORDER/ADMIN: HCPCS | Performed by: NURSE PRACTITIONER

## 2020-01-10 PROCEDURE — 1123F ACP DISCUSS/DSCN MKR DOCD: CPT | Performed by: NURSE PRACTITIONER

## 2020-01-10 RX ORDER — ALLOPURINOL 100 MG/1
200 TABLET ORAL DAILY
Qty: 60 TABLET | Refills: 5 | Status: SHIPPED | OUTPATIENT
Start: 2020-01-10 | End: 2021-02-23

## 2020-01-10 RX ORDER — PREDNISONE 10 MG/1
TABLET ORAL
Qty: 18 TABLET | Refills: 0 | Status: SHIPPED | OUTPATIENT
Start: 2020-01-10 | End: 2020-04-25

## 2020-01-10 RX ORDER — TRIAMCINOLONE ACETONIDE 40 MG/ML
40 INJECTION, SUSPENSION INTRA-ARTICULAR; INTRAMUSCULAR ONCE
Status: COMPLETED | OUTPATIENT
Start: 2020-01-10 | End: 2020-01-10

## 2020-01-10 RX ADMIN — TRIAMCINOLONE ACETONIDE 40 MG: 40 INJECTION, SUSPENSION INTRA-ARTICULAR; INTRAMUSCULAR at 12:13

## 2020-01-10 ASSESSMENT — PATIENT HEALTH QUESTIONNAIRE - PHQ9
SUM OF ALL RESPONSES TO PHQ QUESTIONS 1-9: 0
2. FEELING DOWN, DEPRESSED OR HOPELESS: 0
SUM OF ALL RESPONSES TO PHQ QUESTIONS 1-9: 0
SUM OF ALL RESPONSES TO PHQ9 QUESTIONS 1 & 2: 0
1. LITTLE INTEREST OR PLEASURE IN DOING THINGS: 0

## 2020-01-10 NOTE — PROGRESS NOTES
[address] [address] [place] [address]  Dept: [phone]  Dept Fax: [phone]  Loc: [phone]    Jean Marie Emmanuel is a 82 y.o. female who presents today for her medical conditions/complaints as noted below. Jean Marie Emmanuel is c/o of Cough; Back Pain (upper back); and Finger Pain (right pointer finger)        HPI:     HPI   Chief Complaint   Patient presents with    Cough    Back Pain     upper back    Finger Pain     right pointer finger   she has had gout in finger for 3 weeks, she  She is on xarelto.   Past Medical History:   Diagnosis Date    Bronchitis     Colon cancer ([address]     Colon polyps     Constipation     Deep vein blood clot of left lower extremity (HCC)     Left leg     Depression     DVT (deep venous thrombosis) (HCC)     Dysphagia     Fibrocystic breast disease     Fibromyalgia     GERD (gastroesophageal reflux disease)     reflux with hx of esophageal stricture    Headache(784.0)     History of colon cancer     Hormone replacement therapy (postmenopausal)     Hypertension     Neuropathy of foot     In both feet    Osteoarthritis     left knee pain    Restless legs syndrome     Vitamin D deficiency       Past Surgical History:   Procedure Laterality Date    APPENDECTOMY      BREAST BIOPSY      CATARACT REMOVAL       SECTION      CHOLECYSTECTOMY      COLECTOMY  partial    COLON SURGERY      COLONOSCOPY  2010    COLONOSCOPY  2012    Dr Davila : unremarkable enterocolonic anastamosis; hyperplastic polyps    COLONOSCOPY  3/2013    [facility]: unremarkable post-surgical colon    COLONOSCOPY  3/11/16    Dr Katelynn Dial colon anastomosis, 5 yr recall    FOOT SURGERY  right foot    HAND SURGERY Right     Dr Winchester Congress ENDOSCOPY  10-    UPPER GASTROINTESTINAL ENDOSCOPY  3/2013 [facility]: peptic stricture dilated to 48F; HH; small antral mucosal elevation    UPPER GASTROINTESTINAL ENDOSCOPY  12/2014    [facility]: dilation of esophageal stricture    VARICOSE VEIN SURGERY Left 03/14/2014  [address]    Left GSV Ablation    VARICOSE VEIN SURGERY Right 04/04/2014  [address]    Right GSV Ablation       Vitals 1/10/2020 12/17/2019 11/21/2019 10/30/2019 9/19/2019 5/03/8342   SYSTOLIC 384 735 845 219 968 251   DIASTOLIC 80 78 72 62 82 67   Site - Left Upper Arm - - Left Upper Arm -   Position - Sitting - - Sitting -   Cuff Size - Medium Adult - - Medium Adult -   Pulse 64 59 68 68 76 61   Temp 98.1 97.2 97.4 - 96.3 -   Resp 16 16 18 - 16 -   SpO2 96 94 97 - - -   Weight 169 lb 9.6 oz 174 lb 12.8 oz 173 lb 172 lb 170 lb 6.4 oz -   Height 5' 3\" - 5' 3\" 5' 3.5\" - -   BMI (wt*703/ht~2) 30.04 kg/m2 - 30.64 kg/m2 29.99 kg/m2 - -   Pain Level - - - - - -   Some recent data might be hidden       Family History   Problem Relation Age of Onset    Cancer Mother         Cervical Cancer    Cancer Brother         Esophageal cancer    Diabetes Brother     Esophageal Cancer Brother     Diabetes Sister     Colon Cancer Neg Hx     Colon Polyps Neg Hx     Liver Cancer Neg Hx     Liver Disease Neg Hx     Rectal Cancer Neg Hx     Stomach Cancer Neg Hx        Social History     Tobacco Use    Smoking status: Never Smoker    Smokeless tobacco: Never Used   Substance Use Topics    Alcohol use: No      Current Outpatient Medications   Medication Sig Dispense Refill    predniSONE (DELTASONE) 10 MG tablet Days 1-3 take 1 PO TID, days 4-6 take 1 PO BID, days 7-9 take 1 PO daily.  18 tablet 0    allopurinol (ZYLOPRIM) 100 MG tablet Take 2 tablets by mouth daily For gout -prevention 60 tablet 5    losartan (COZAAR) 100 MG tablet TAKE 1 TABLET BY MOUTH EVERY MORNING FOR BLOOD PRESSURE 90 tablet 3    rivaroxaban (XARELTO) 20 MG TABS tablet Take 1 tablet by mouth daily (with breakfast) 30 tablet 3    terbinafine (LAMISIL) 250 MG tablet TAKE ONE TABLET PO ONCE DAILY  1    gabapentin (NEURONTIN) 600 MG tablet Take 1 tablet by mouth 3 times daily for 90 days. 90 tablet 2    nystatin (NYSTATIN) 281543 UNIT/GM powder APPLY TWICE DAILY TO DRY SKIN 30 g 0    vitamin D (ERGOCALCIFEROL) 1.25 MG (43184 UT) CAPS capsule TAKE 1 CAPSULE BY MOUTH 1 TIME A WEEK 13 capsule 3    metoprolol tartrate (LOPRESSOR) 25 MG tablet TAKE ONE TABLET BY MOUTH TWICE DAILY FOR BLOOD PRESSURE AND HEART 180 tablet 3    amitriptyline (ELAVIL) 10 MG tablet TAKE 1 TABLET BY MOUTH EVERY NIGHT AT BEDTIME 30 tablet 0    dorzolamide-timolol (COSOPT) 22.3-6.8 MG/ML ophthalmic solution Place 1 Dose into both eyes daily      triamterene-hydrochlorothiazide (DYAZIDE) 50-25 MG per capsule Take 1 capsule by mouth daily      traMADol (ULTRAM) 50 MG tablet TK 1 T PO Q 12 PRN  1    omeprazole (PRILOSEC) 40 MG delayed release capsule TAKE 1 CAPSULE BY MOUTH DAILY 90 capsule 3    vitamin B-12 (CYANOCOBALAMIN) 500 MCG tablet TAKE 1 TABLET BY MOUTH DAILY 30 tablet 11    furosemide (LASIX) 20 MG tablet Take 1 tablet by mouth daily as needed (swelling) 30 tablet 3    acyclovir (ZOVIRAX) 400 MG tablet TAKE 1 TABLET BY MOUTH FIVE TIMES DAILY FOR 1 WEEK THEN TAKE 1 TABLET BY MOUTH BACK TWICE DAILY (Patient taking differently: TAKE 1 TABLET BY MOUTH BACK TWICE DAILY) 56 tablet 5    triamcinolone (KENALOG) 0.1 % cream Apply topically 2 times daily. External vagina (Patient taking differently: Apply topically 2 times daily as needed Apply topically 2 times daily. External vagina) 1 Tube 1    Nebulizers (AIRIAL COMPACT MINI NEBULIZER) MISC 1 Units by Does not apply route 4 times daily 1 each 0    lidocaine 4 % external patch Place 1 patch onto the skin daily 30 patch 0    albuterol (ACCUNEB) 1.25 MG/3ML nebulizer solution Inhale 1 mL into the lungs daily       No current facility-administered medications for this visit.       Allergies   Allergen Reactions    Zoloft [Sertraline

## 2020-01-10 NOTE — PATIENT INSTRUCTIONS
Steroid shot today , start oral tomorrow   Should help gout and pleurisy pain  If any chest pain go to ER. Get the cxr soon.    Increase the allupurinol to 200mg a day  Recheck cmp labs in feb and uric acid

## 2020-01-15 ENCOUNTER — HOSPITAL ENCOUNTER (OUTPATIENT)
Dept: GENERAL RADIOLOGY | Age: 85
Discharge: HOME | End: 2020-01-15
Payer: MEDICARE

## 2020-01-15 PROCEDURE — 71046 X-RAY EXAM CHEST 2 VIEWS: CPT

## 2020-01-16 ENCOUNTER — TELEPHONE (OUTPATIENT)
Dept: PRIMARY CARE CLINIC | Age: 85
End: 2020-01-16

## 2020-01-16 RX ORDER — LIDOCAINE 4 G/G
1 PATCH TOPICAL DAILY
Qty: 30 PATCH | Refills: 0 | Status: SHIPPED | OUTPATIENT
Start: 2020-01-16 | End: 2020-02-15

## 2020-01-16 NOTE — TELEPHONE ENCOUNTER
I did send some lidocaine patches but I would prefer she go to the emergency room if she is having chest pain.

## 2020-01-21 ENCOUNTER — TELEPHONE (OUTPATIENT)
Dept: PRIMARY CARE CLINIC | Age: 85
End: 2020-01-21

## 2020-01-21 NOTE — TELEPHONE ENCOUNTER
Patient called stating that her hand is not any better. She wants to know what she needs to do. She saw you on 1/10 for gout.

## 2020-01-21 NOTE — TELEPHONE ENCOUNTER
Patient notified and voiced understanding. Patient says that she is still short of breath and has pain in her chest and back. Since her chest x-ray was normal she wanted to know what else could be done.

## 2020-01-21 NOTE — TELEPHONE ENCOUNTER
Call her and have put an x-ray and she can go by [facility] and have the x-ray done in the morning and I will let her know something by the afternoon.

## 2020-01-23 NOTE — TELEPHONE ENCOUNTER
Patient notified and voiced understanding. Patient says that she is feeling some better today but if she gets worse she will go to the ER.

## 2020-01-28 ASSESSMENT — ENCOUNTER SYMPTOMS: COUGH: 1

## 2020-02-03 RX ORDER — GABAPENTIN 600 MG/1
TABLET ORAL
Qty: 90 TABLET | Refills: 0 | Status: SHIPPED | OUTPATIENT
Start: 2020-02-03 | End: 2020-03-04 | Stop reason: SDUPTHER

## 2020-02-03 NOTE — TELEPHONE ENCOUNTER
Requested Prescriptions     Pending Prescriptions Disp Refills    gabapentin (NEURONTIN) 600 MG tablet [Pharmacy Med Name: GABAPENTIN 600MG TABLETS] 90 tablet      Sig: TAKE 1 TABLET BY MOUTH THREE TIMES DAILY       Last Office Visit:  10/30/2019  Next Office Visit:  3/4/2020  Last Medication Refill:  10/30/2019 with 2 refills   Peri Serge up to date:   1/2020    *RX updated to reflect   2/3/2020 fill date*

## 2020-02-14 ENCOUNTER — TELEPHONE (OUTPATIENT)
Dept: PRIMARY CARE CLINIC | Age: 85
End: 2020-02-14

## 2020-02-20 ENCOUNTER — OFFICE VISIT (OUTPATIENT)
Dept: PRIMARY CARE CLINIC | Age: 85
End: 2020-02-20
Payer: MEDICARE

## 2020-02-20 VITALS
TEMPERATURE: 97.3 F | OXYGEN SATURATION: 96 % | SYSTOLIC BLOOD PRESSURE: 110 MMHG | BODY MASS INDEX: 28.85 KG/M2 | HEIGHT: 64 IN | WEIGHT: 169 LBS | RESPIRATION RATE: 18 BRPM | HEART RATE: 56 BPM | DIASTOLIC BLOOD PRESSURE: 78 MMHG

## 2020-02-20 LAB
BASOPHILS ABSOLUTE: 0.1 K/UL (ref 0–0.2)
BASOPHILS RELATIVE PERCENT: 0.7 % (ref 0–1)
CALCIUM SERPL-MCNC: 9 MG/DL (ref 8.8–10.2)
EOSINOPHILS ABSOLUTE: 0.3 K/UL (ref 0–0.6)
EOSINOPHILS RELATIVE PERCENT: 3 % (ref 0–5)
HCT VFR BLD CALC: 39 % (ref 37–47)
HEMOGLOBIN: 11.8 G/DL (ref 12–16)
IMMATURE GRANULOCYTES #: 0 K/UL
LYMPHOCYTES ABSOLUTE: 2.7 K/UL (ref 1.1–4.5)
LYMPHOCYTES RELATIVE PERCENT: 32.2 % (ref 20–40)
MCH RBC QN AUTO: 28.8 PG (ref 27–31)
MCHC RBC AUTO-ENTMCNC: 30.3 G/DL (ref 33–37)
MCV RBC AUTO: 95.1 FL (ref 81–99)
MONOCYTES ABSOLUTE: 0.8 K/UL (ref 0–0.9)
MONOCYTES RELATIVE PERCENT: 9.2 % (ref 0–10)
NEUTROPHILS ABSOLUTE: 4.6 K/UL (ref 1.5–7.5)
NEUTROPHILS RELATIVE PERCENT: 54.7 % (ref 50–65)
PDW BLD-RTO: 15.4 % (ref 11.5–14.5)
PLATELET # BLD: 300 K/UL (ref 130–400)
PMV BLD AUTO: 10.5 FL (ref 9.4–12.3)
RBC # BLD: 4.1 M/UL (ref 4.2–5.4)
VITAMIN D 25-HYDROXY: 45 NG/ML
WBC # BLD: 8.4 K/UL (ref 4.8–10.8)

## 2020-02-20 PROCEDURE — 96523 IRRIG DRUG DELIVERY DEVICE: CPT | Performed by: NURSE PRACTITIONER

## 2020-02-20 RX ORDER — HYDROCODONE BITARTRATE AND ACETAMINOPHEN 7.5; 325 MG/1; MG/1
1 TABLET ORAL EVERY 6 HOURS PRN
COMMUNITY
Start: 2020-02-10 | End: 2020-09-22

## 2020-02-20 NOTE — PROGRESS NOTES
[address] [address] [place] [address]  Dept: [phone]  Dept Fax: [phone]  Loc: [phone]    Federico Tucker is a 82 y.o. female who presents today for her medical conditions/complaints as noted below.   Federico Tucker is c/o of [facility]        HPI:     HPI   Chief Complaint   Patient presents with    [facility]   She is here to have her port flushed she needs some labs drawn for the orthopedic [place] because she has been told she has low vitamin D and needs calcium replacement  Past Medical History:   Diagnosis Date    Bronchitis     Colon cancer ([address]     Colon polyps     Constipation     Deep vein blood clot of left lower extremity (HCC)     Left leg     Depression     DVT (deep venous thrombosis) (HCC)     Dysphagia     Fibrocystic breast disease     Fibromyalgia     GERD (gastroesophageal reflux disease)     reflux with hx of esophageal stricture    Headache(784.0)     History of colon cancer     Hormone replacement therapy (postmenopausal)     Hypertension     Neuropathy of foot     In both feet    Osteoarthritis     left knee pain    Restless legs syndrome     Vitamin D deficiency       Past Surgical History:   Procedure Laterality Date    APPENDECTOMY      BREAST BIOPSY      CATARACT REMOVAL       SECTION      CHOLECYSTECTOMY      COLECTOMY  partial    COLON SURGERY      COLONOSCOPY  2010    COLONOSCOPY  2012    Dr Maycol Newell: unremarkable enterocolonic anastamosis; hyperplastic polyps    COLONOSCOPY  3/2013    [facility]: unremarkable post-surgical colon    COLONOSCOPY  3/11/16    Dr Gordon Cummings colon anastomosis, 5 yr recall    FOOT SURGERY  right foot    HAND SURGERY Right     Dr Cris Guerrero ENDOSCOPY  10-    UPPER GASTROINTESTINAL ENDOSCOPY  3/2013    [facility]: peptic stricture tablet TAKE 1 TABLET BY MOUTH EVERY MORNING FOR BLOOD PRESSURE 90 tablet 3    rivaroxaban (XARELTO) 20 MG TABS tablet Take 1 tablet by mouth daily (with breakfast) 30 tablet 3    terbinafine (LAMISIL) 250 MG tablet TAKE ONE TABLET PO ONCE DAILY  1    nystatin (NYSTATIN) 818893 UNIT/GM powder APPLY TWICE DAILY TO DRY SKIN 30 g 0    vitamin D (ERGOCALCIFEROL) 1.25 MG (29949 UT) CAPS capsule TAKE 1 CAPSULE BY MOUTH 1 TIME A WEEK 13 capsule 3    metoprolol tartrate (LOPRESSOR) 25 MG tablet TAKE ONE TABLET BY MOUTH TWICE DAILY FOR BLOOD PRESSURE AND HEART 180 tablet 3    amitriptyline (ELAVIL) 10 MG tablet TAKE 1 TABLET BY MOUTH EVERY NIGHT AT BEDTIME 30 tablet 0    albuterol (ACCUNEB) 1.25 MG/3ML nebulizer solution Inhale 1 mL into the lungs daily      dorzolamide-timolol (COSOPT) 22.3-6.8 MG/ML ophthalmic solution Place 1 Dose into both eyes daily      triamterene-hydrochlorothiazide (DYAZIDE) 50-25 MG per capsule Take 1 capsule by mouth daily      traMADol (ULTRAM) 50 MG tablet TK 1 T PO Q 12 PRN  1    omeprazole (PRILOSEC) 40 MG delayed release capsule TAKE 1 CAPSULE BY MOUTH DAILY 90 capsule 3    vitamin B-12 (CYANOCOBALAMIN) 500 MCG tablet TAKE 1 TABLET BY MOUTH DAILY 30 tablet 11    furosemide (LASIX) 20 MG tablet Take 1 tablet by mouth daily as needed (swelling) 30 tablet 3    acyclovir (ZOVIRAX) 400 MG tablet TAKE 1 TABLET BY MOUTH FIVE TIMES DAILY FOR 1 WEEK THEN TAKE 1 TABLET BY MOUTH BACK TWICE DAILY (Patient taking differently: TAKE 1 TABLET BY MOUTH BACK TWICE DAILY) 56 tablet 5    triamcinolone (KENALOG) 0.1 % cream Apply topically 2 times daily. External vagina (Patient taking differently: Apply topically 2 times daily as needed Apply topically 2 times daily. External vagina) 1 Tube 1    Nebulizers (AIRSpodly COMPACT MINI NEBULIZER) MISC 1 Units by Does not apply route 4 times daily 1 each 0     No current facility-administered medications for this visit.       Allergies   Allergen Reactions

## 2020-03-02 RX ORDER — OMEPRAZOLE 40 MG/1
CAPSULE, DELAYED RELEASE ORAL
Qty: 90 CAPSULE | Refills: 3 | Status: SHIPPED | OUTPATIENT
Start: 2020-03-02 | End: 2020-12-21

## 2020-03-02 RX ORDER — TRIAMTERENE AND HYDROCHLOROTHIAZIDE 37.5; 25 MG/1; MG/1
TABLET ORAL
Qty: 90 TABLET | Refills: 3 | Status: SHIPPED | OUTPATIENT
Start: 2020-03-02 | End: 2021-03-09 | Stop reason: SDUPTHER

## 2020-03-04 ENCOUNTER — OFFICE VISIT (OUTPATIENT)
Dept: NEUROSURGERY | Age: 85
End: 2020-03-04
Payer: MEDICARE

## 2020-03-04 VITALS
BODY MASS INDEX: 30.41 KG/M2 | HEART RATE: 53 BPM | OXYGEN SATURATION: 97 % | HEIGHT: 63 IN | DIASTOLIC BLOOD PRESSURE: 62 MMHG | WEIGHT: 171.6 LBS | SYSTOLIC BLOOD PRESSURE: 145 MMHG

## 2020-03-04 LAB
AMPHETAMINE SCREEN, URINE: NORMAL
BARBITURATE SCREEN, URINE: NORMAL
BENZODIAZEPINE SCREEN, URINE: NORMAL
BUPRENORPHINE URINE: NORMAL
COCAINE METABOLITE SCREEN URINE: NORMAL
GABAPENTIN SCREEN, URINE: NORMAL
MDMA URINE: NORMAL
METHADONE SCREEN, URINE: NORMAL
METHAMPHETAMINE, URINE: NORMAL
OPIATE SCREEN URINE: NORMAL
OXYCODONE SCREEN URINE: NORMAL
PHENCYCLIDINE SCREEN URINE: NORMAL
PROPOXYPHENE SCREEN, URINE: NORMAL
THC SCREEN, URINE: NORMAL
TRICYCLIC ANTIDEPRESSANTS, UR: NORMAL

## 2020-03-04 PROCEDURE — 1036F TOBACCO NON-USER: CPT | Performed by: NURSE PRACTITIONER

## 2020-03-04 PROCEDURE — 80305 DRUG TEST PRSMV DIR OPT OBS: CPT | Performed by: NURSE PRACTITIONER

## 2020-03-04 PROCEDURE — 1090F PRES/ABSN URINE INCON ASSESS: CPT | Performed by: NURSE PRACTITIONER

## 2020-03-04 PROCEDURE — G8482 FLU IMMUNIZE ORDER/ADMIN: HCPCS | Performed by: NURSE PRACTITIONER

## 2020-03-04 PROCEDURE — 99213 OFFICE O/P EST LOW 20 MIN: CPT | Performed by: NURSE PRACTITIONER

## 2020-03-04 PROCEDURE — G8427 DOCREV CUR MEDS BY ELIG CLIN: HCPCS | Performed by: NURSE PRACTITIONER

## 2020-03-04 PROCEDURE — G8417 CALC BMI ABV UP PARAM F/U: HCPCS | Performed by: NURSE PRACTITIONER

## 2020-03-04 PROCEDURE — 1123F ACP DISCUSS/DSCN MKR DOCD: CPT | Performed by: NURSE PRACTITIONER

## 2020-03-04 PROCEDURE — G8399 PT W/DXA RESULTS DOCUMENT: HCPCS | Performed by: NURSE PRACTITIONER

## 2020-03-04 PROCEDURE — 4040F PNEUMOC VAC/ADMIN/RCVD: CPT | Performed by: NURSE PRACTITIONER

## 2020-03-04 RX ORDER — GABAPENTIN 600 MG/1
TABLET ORAL
Qty: 90 TABLET | Refills: 5 | Status: SHIPPED | OUTPATIENT
Start: 2020-03-04 | End: 2020-09-10

## 2020-03-04 NOTE — PROGRESS NOTES
Smoker    Smokeless tobacco: Never Used   Substance and Sexual Activity    Alcohol use: No    Drug use: No    Sexual activity: Yes     Partners: Male   Lifestyle    Physical activity:     Days per week: Not on file     Minutes per session: Not on file    Stress: Not on file   Relationships    Social connections:     Talks on phone: Not on file     Gets together: Not on file     Attends [employer] service: Not on file     Active member of club or organization: Not on file     Attends meetings of clubs or organizations: Not on file     Relationship status: Not on file    Intimate partner violence:     Fear of current or ex partner: Not on file     Emotionally abused: Not on file     Physically abused: Not on file     Forced sexual activity: Not on file   Other Topics Concern    Not on file   Social History Narrative    Not on file       Current Outpatient Medications   Medication Sig Dispense Refill    omeprazole (PRILOSEC) 40 MG delayed release capsule TAKE 1 CAPSULE BY MOUTH DAILY 90 capsule 3    triamterene-hydrochlorothiazide (MAXZIDE-25) 37.5-25 MG per tablet TAKE 1 TABLET BY MOUTH DAILY 90 tablet 3    HYDROcodone-acetaminophen (NORCO) 7.5-325 MG per tablet TK 1 T PO Q 12 H PRN P      gabapentin (NEURONTIN) 600 MG tablet TAKE 1 TABLET BY MOUTH THREE TIMES DAILY 90 tablet 0    predniSONE (DELTASONE) 10 MG tablet Days 1-3 take 1 PO TID, days 4-6 take 1 PO BID, days 7-9 take 1 PO daily.  18 tablet 0    allopurinol (ZYLOPRIM) 100 MG tablet Take 2 tablets by mouth daily For gout -prevention 60 tablet 5    losartan (COZAAR) 100 MG tablet TAKE 1 TABLET BY MOUTH EVERY MORNING FOR BLOOD PRESSURE 90 tablet 3    rivaroxaban (XARELTO) 20 MG TABS tablet Take 1 tablet by mouth daily (with breakfast) 30 tablet 3    terbinafine (LAMISIL) 250 MG tablet TAKE ONE TABLET PO ONCE DAILY  1    nystatin (NYSTATIN) 242193 UNIT/GM powder APPLY TWICE DAILY TO DRY SKIN 30 g 0    vitamin D (ERGOCALCIFEROL) 1.25 MG (33124 UT) CAPS capsule TAKE 1 CAPSULE BY MOUTH 1 TIME A WEEK 13 capsule 3    metoprolol tartrate (LOPRESSOR) 25 MG tablet TAKE ONE TABLET BY MOUTH TWICE DAILY FOR BLOOD PRESSURE AND HEART 180 tablet 3    amitriptyline (ELAVIL) 10 MG tablet TAKE 1 TABLET BY MOUTH EVERY NIGHT AT BEDTIME 30 tablet 0    albuterol (ACCUNEB) 1.25 MG/3ML nebulizer solution Inhale 1 mL into the lungs daily      dorzolamide-timolol (COSOPT) 22.3-6.8 MG/ML ophthalmic solution Place 1 Dose into both eyes daily      traMADol (ULTRAM) 50 MG tablet TK 1 T PO Q 12 PRN  1    vitamin B-12 (CYANOCOBALAMIN) 500 MCG tablet TAKE 1 TABLET BY MOUTH DAILY 30 tablet 11    furosemide (LASIX) 20 MG tablet Take 1 tablet by mouth daily as needed (swelling) 30 tablet 3    acyclovir (ZOVIRAX) 400 MG tablet TAKE 1 TABLET BY MOUTH FIVE TIMES DAILY FOR 1 WEEK THEN TAKE 1 TABLET BY MOUTH BACK TWICE DAILY (Patient taking differently: TAKE 1 TABLET BY MOUTH BACK TWICE DAILY) 56 tablet 5    triamcinolone (KENALOG) 0.1 % cream Apply topically 2 times daily. External vagina (Patient taking differently: Apply topically 2 times daily as needed Apply topically 2 times daily. External vagina) 1 Tube 1    Nebulizers (AIRIAL COMPACT MINI NEBULIZER) MISC 1 Units by Does not apply route 4 times daily 1 each 0     No current facility-administered medications for this visit. Allergies   Allergen Reactions    Zoloft [Sertraline Hcl] Other (See Comments)     Patient states that she doesn't want this medication anymore because she hallucinated and saw spiders. Patient weaned herself off and after she quit taking the medication, the hallucinations stopped. Patient states that she doesn't want this medication anymore because she hallucinated and saw spiders. Patient weaned herself off and after she quit taking the medication, the hallucinations stopped. REVIEW OF SYSTEMS  Constitutional: []? Fever []? Sweat []? Chills []? Recent Injury [x]?  Denies all unless cyanosis or edema  Skin - Warm, dry, and intact. No rash, erythema, or pallor  Psychiatric - Mood, affect, and behavior appear normal      NEUROLOGICAL EXAM     Mental status   [x]Awake, alert, oriented   [x]Affect attention and concentration appear appropriate  [x]Recent and remote memory appears unremarkable  [x]Speech normal without dysarthria or aphasia, comprehension and repetition intact. COMMENTS: mild cognitive impairment     Cranial Nerves [x]No VF deficit to confrontation,  no papilledema on fundoscopic exam.  [x]PERRLA, EOMI, no nystagmus, conjugate eye movements, no ptosis  [x]Face symmetric  [x]Facial sensation intact  [x]Tongue midline no atrophy or fasciculations present  [x]Palate midline, hearing to finger rub normal bilaterally  [x]Shoulder shrug and SCM testing normal bilaterally  COMMENTS:    Motor   []5/5 strength x 4 extremities  [x]Normal bulk and tone  [x]No tremor present  [x]No rigidity or bradykinesia noted  COMMENTS: 4/5 left dorsiflexion; 4/5 bilateral hip flexors    Sensory  []Sensation intact to light touch, pin prick, vibration, and proprioception BLE  [x]Sensation intact to light touch, pin prick, vibration, and proprioception BUE  COMMENTS: decreased PP, vibration BLE    Coordination [x]FTN normal bilaterally   [x]HTS normal bilaterally  [x]GRICELDA normal bilaterally.    COMMENTS:   Reflexes  []Symmetric and non-pathological  [x]Toes down going bilaterally  [x]No clonus present  COMMENTS: hypoactive throughout    Gait                  []Normal steady gait    []Ataxic    []Spastic     []Magnetic     []Shuffling  COMMENTS: antalgic        LABS RECORD AND IMAGING REVIEW (As below and per HPI)    Lab Results   Component Value Date    [id number] 544 03/19/2019     Lab Results   Component Value Date    WBC 8.4 02/20/2020    HGB 11.8 (L) 02/20/2020    HCT 39.0 02/20/2020    MCV 95.1 02/20/2020     02/20/2020     Lab Results   Component Value Date     12/17/2019    K 4.1

## 2020-03-09 RX ORDER — CHOLECALCIFEROL (VITAMIN D3) 125 MCG
500 CAPSULE ORAL DAILY
Qty: 30 TABLET | Refills: 11 | Status: SHIPPED | OUTPATIENT
Start: 2020-03-09 | End: 2021-03-09

## 2020-04-20 ENCOUNTER — OFFICE VISIT (OUTPATIENT)
Dept: PRIMARY CARE CLINIC | Age: 85
End: 2020-04-20
Payer: MEDICARE

## 2020-04-20 VITALS
SYSTOLIC BLOOD PRESSURE: 130 MMHG | HEIGHT: 64 IN | TEMPERATURE: 97.6 F | BODY MASS INDEX: 29.02 KG/M2 | HEART RATE: 67 BPM | DIASTOLIC BLOOD PRESSURE: 72 MMHG | WEIGHT: 170 LBS | OXYGEN SATURATION: 95 % | RESPIRATION RATE: 16 BRPM

## 2020-04-20 PROBLEM — C18.9 MALIGNANT NEOPLASM OF COLON (HCC): Status: ACTIVE | Noted: 2020-04-20

## 2020-04-20 PROCEDURE — G8417 CALC BMI ABV UP PARAM F/U: HCPCS | Performed by: NURSE PRACTITIONER

## 2020-04-20 PROCEDURE — G8399 PT W/DXA RESULTS DOCUMENT: HCPCS | Performed by: NURSE PRACTITIONER

## 2020-04-20 PROCEDURE — 1090F PRES/ABSN URINE INCON ASSESS: CPT | Performed by: NURSE PRACTITIONER

## 2020-04-20 PROCEDURE — 99213 OFFICE O/P EST LOW 20 MIN: CPT | Performed by: NURSE PRACTITIONER

## 2020-04-20 PROCEDURE — 1036F TOBACCO NON-USER: CPT | Performed by: NURSE PRACTITIONER

## 2020-04-20 PROCEDURE — 4040F PNEUMOC VAC/ADMIN/RCVD: CPT | Performed by: NURSE PRACTITIONER

## 2020-04-20 PROCEDURE — 96523 IRRIG DRUG DELIVERY DEVICE: CPT | Performed by: NURSE PRACTITIONER

## 2020-04-20 PROCEDURE — 1123F ACP DISCUSS/DSCN MKR DOCD: CPT | Performed by: NURSE PRACTITIONER

## 2020-04-20 PROCEDURE — G8427 DOCREV CUR MEDS BY ELIG CLIN: HCPCS | Performed by: NURSE PRACTITIONER

## 2020-04-20 RX ORDER — TERBINAFINE HYDROCHLORIDE 250 MG/1
TABLET ORAL
Qty: 90 TABLET | Refills: 0 | Status: SHIPPED | OUTPATIENT
Start: 2020-04-20 | End: 2020-12-09 | Stop reason: ALTCHOICE

## 2020-04-20 ASSESSMENT — ENCOUNTER SYMPTOMS: EYES NEGATIVE: 1

## 2020-04-20 NOTE — PROGRESS NOTES
enterocolonic anastamosis; hyperplastic polyps    COLONOSCOPY  3/2013    [facility]: unremarkable post-surgical colon    COLONOSCOPY  3/11/16    Dr Aliyah Sparks colon anastomosis, 5 yr recall    FOOT SURGERY  right foot    HAND SURGERY Right     Dr Gautam Maier ENDOSCOPY  10-    UPPER GASTROINTESTINAL ENDOSCOPY  3/2013    [facility]: peptic stricture dilated to 48F; HH; small antral mucosal elevation    UPPER GASTROINTESTINAL ENDOSCOPY  12/2014    [facility]: dilation of esophageal stricture    VARICOSE VEIN SURGERY Left 03/14/[address]  [address]    Left GSV Ablation    VARICOSE VEIN SURGERY Right 04/04/2014  [address]    Right GSV Ablation       Vitals 4/20/2020 3/4/2020 2/20/2020 1/10/2020 12/17/2019 61/73/4513   SYSTOLIC 851 726 497 400 278 397   DIASTOLIC 72 62 78 80 78 72   Site - - Left Upper Arm - Left Upper Arm -   Position - - Sitting - Sitting -   Cuff Size - - Large Adult - Medium Adult -   Pulse 67 53 56 64 59 68   Temp 97.6 - 97.3 98.1 97.2 97.4   Resp 16 - 18 16 16 18   SpO2 95 97 96 96 94 97   Weight 170 lb 171 lb 9.6 oz 169 lb 169 lb 9.6 oz 174 lb 12.8 oz 173 lb   Height 5' 4\" 5' 3\" 5' 4\" 5' 3\" - 5' 3\"   BMI (wt*703/ht~2) 29.18 kg/m2 30.39 kg/m2 29.01 kg/m2 30.04 kg/m2 - 30.64 kg/m2   Pain Level - - - - - -   Some recent data might be hidden       Family History   Problem Relation Age of Onset    Cancer Mother         Cervical Cancer    Cancer Brother         Esophageal cancer    Diabetes Brother     Esophageal Cancer Brother     Diabetes Sister     Colon Cancer Neg Hx     Colon Polyps Neg Hx     Liver Cancer Neg Hx     Liver Disease Neg Hx     Rectal Cancer Neg Hx     Stomach Cancer Neg Hx        Social History     Tobacco Use    Smoking status: Never Smoker    Smokeless tobacco: Never Used   Substance Use Topics    Alcohol use: No      Current Outpatient Medications   Medication Sig Dispense Cardiovascular:      Rate and Rhythm: Normal rate and regular rhythm. Heart sounds: Normal heart sounds. Pulmonary:      Effort: Pulmonary effort is normal.      Breath sounds: Normal breath sounds. Skin:     General: Skin is warm and dry. Neurological:      Mental Status: She is alert and oriented to person, place, and time. Psychiatric:         Behavior: Behavior normal.         Thought Content: Thought content normal.         Judgment: Judgment normal.       /72   Pulse 67   Temp 97.6 °F (36.4 °C)   Resp 16   Ht 5' 4\" (1.626 m)   Wt 170 lb (77.1 kg)   SpO2 95%   BMI 29.18 kg/m²   Venous Access Procedure Note  Indication: maintenance flush    Procedure: The patient was placed in the appropriate position and the skin over the puncture site was prepped with betadine and draped in a sterile fashion. Intravenous access was obtained in right chest port with a Bliss needle and the site was secured appropriately. 3 cc of blood with withdrawn and discarded. The port was flushed with 10cc NS and then 3 cc heparin flush. The patient tolerated the procedure well. Complications: None    Assessment:       Diagnosis Orders   1. Stasis dermatitis of both legs     2. Port-A-Cath in place  OR [facility] IMPLANTED DRUG DELIVERY DEVICE   3. Encounter for care related to Port-a-Cath  OR [facility] DEVICE   4. History of DVT (deep vein thrombosis)     5. Malignant neoplasm of colon, unspecified part of colon ([facility] [address]     6. Acute deep vein thrombosis (DVT) of femoral vein of left lower extremity (HCC)           Plan:   More than 50% of the time was spent counseling and coordinating care for a total time of 15 min face to face. I reviewed her last labs and her liver function was fine. We will go ahead and renew the Lamisil and asked see her again in 2 months for the port/we can draw the CMP for that. I have encouraged her to go ahead and get the over-the-counter medicine as well. She is also out of her Xarelto blood thinner and needs samples if we have any. I did provide her with a months worth today. I reviewed her labs from the last visit which showed all of her cancer labs are negative. We did forward those to her oncologist.     Patient given educational materials -see patient instructions. Discussed use, benefit, and side effects of prescribed medications. All patient questions answered. Pt voiced understanding. Reviewed health maintenance. Instructed to continue currentmedications, diet and exercise. Patient agreed with treatment plan. Follow up as directed. MEDICATIONS:  Orders Placed This Encounter   Medications    terbinafine (LAMISIL) 250 MG tablet     Sig: TAKE ONE TABLET PO ONCE DAILY     Dispense:  90 tablet     Refill:  0         ORDERS:  Orders Placed This Encounter   Procedures    NM [facility] IMPLANTED DRUG DELIVERY DEVICE       Follow-up:  Return in about 2 months (around 6/20/2020). PATIENT INSTRUCTIONS:  Patient Instructions   Get the otc cream for legs  Start back on the pill lamisil and we can check labs in 2 months cmp      Electronically signed by EARLE Albarado CNP on 4/20/2020 at 10:30 AM    EMR Dragon/transcription disclaimer:  Much of thisencounter note is electronic transcription/translation of spoken language to printed texts. The electronic translation of spoken language may be erroneous, or at times, nonsensical words or phrases may be inadvertentlytranscribed.   Although I have reviewed the note for such errors, some may still exist.

## 2020-04-25 ENCOUNTER — HOSPITAL ENCOUNTER (EMERGENCY)
Age: 85
Discharge: HOME | End: 2020-04-25
Attending: EMERGENCY MEDICINE
Payer: MEDICARE

## 2020-04-25 VITALS
SYSTOLIC BLOOD PRESSURE: 146 MMHG | HEART RATE: 75 BPM | OXYGEN SATURATION: 97 % | WEIGHT: 170 LBS | BODY MASS INDEX: 30.12 KG/M2 | HEIGHT: 63 IN | TEMPERATURE: 98.7 F | RESPIRATION RATE: 20 BRPM | DIASTOLIC BLOOD PRESSURE: 78 MMHG

## 2020-04-25 LAB
ALBUMIN SERPL-MCNC: 3.7 G/DL (ref 3.5–5.2)
ALP BLD-CCNC: 73 U/L (ref 35–104)
ALT SERPL-CCNC: 13 U/L (ref 5–33)
ANION GAP SERPL CALCULATED.3IONS-SCNC: 13 MMOL/L (ref 7–19)
AST SERPL-CCNC: 13 U/L (ref 5–32)
BASOPHILS ABSOLUTE: 0.1 K/UL (ref 0–0.2)
BASOPHILS RELATIVE PERCENT: 0.6 % (ref 0–1)
BILIRUB SERPL-MCNC: 0.3 MG/DL (ref 0.2–1.2)
BUN BLDV-MCNC: 22 MG/DL (ref 8–23)
CALCIUM SERPL-MCNC: 9 MG/DL (ref 8.8–10.2)
CHLORIDE BLD-SCNC: 100 MMOL/L (ref 98–111)
CO2: 25 MMOL/L (ref 22–29)
CREAT SERPL-MCNC: 1 MG/DL (ref 0.5–0.9)
EOSINOPHILS ABSOLUTE: 0.4 K/UL (ref 0–0.6)
EOSINOPHILS RELATIVE PERCENT: 5.4 % (ref 0–5)
GFR NON-AFRICAN AMERICAN: 53
GLUCOSE BLD-MCNC: 106 MG/DL (ref 74–109)
HCT VFR BLD CALC: 34.9 % (ref 37–47)
HEMOGLOBIN: 10.9 G/DL (ref 12–16)
IMMATURE GRANULOCYTES #: 0 K/UL
LYMPHOCYTES ABSOLUTE: 2.1 K/UL (ref 1.1–4.5)
LYMPHOCYTES RELATIVE PERCENT: 26.6 % (ref 20–40)
MCH RBC QN AUTO: 28.1 PG (ref 27–31)
MCHC RBC AUTO-ENTMCNC: 31.2 G/DL (ref 33–37)
MCV RBC AUTO: 89.9 FL (ref 81–99)
MONOCYTES ABSOLUTE: 0.9 K/UL (ref 0–0.9)
MONOCYTES RELATIVE PERCENT: 11.1 % (ref 0–10)
NEUTROPHILS ABSOLUTE: 4.5 K/UL (ref 1.5–7.5)
NEUTROPHILS RELATIVE PERCENT: 56.1 % (ref 50–65)
PDW BLD-RTO: 14.4 % (ref 11.5–14.5)
PLATELET # BLD: 248 K/UL (ref 130–400)
PMV BLD AUTO: 9.6 FL (ref 9.4–12.3)
POTASSIUM REFLEX MAGNESIUM: 4.2 MMOL/L (ref 3.5–5)
RBC # BLD: 3.88 M/UL (ref 4.2–5.4)
SODIUM BLD-SCNC: 138 MMOL/L (ref 136–145)
TOTAL PROTEIN: 6.4 G/DL (ref 6.6–8.7)
WBC # BLD: 8 K/UL (ref 4.8–10.8)

## 2020-04-25 PROCEDURE — 85025 COMPLETE CBC W/AUTO DIFF WBC: CPT

## 2020-04-25 PROCEDURE — 6360000002 HC RX W HCPCS: Performed by: EMERGENCY MEDICINE

## 2020-04-25 PROCEDURE — 80053 COMPREHEN METABOLIC PANEL: CPT

## 2020-04-25 PROCEDURE — 96375 TX/PRO/DX INJ NEW DRUG ADDON: CPT

## 2020-04-25 PROCEDURE — 99284 EMERGENCY DEPT VISIT MOD MDM: CPT

## 2020-04-25 PROCEDURE — 96374 THER/PROPH/DIAG INJ IV PUSH: CPT

## 2020-04-25 PROCEDURE — 2580000003 HC RX 258: Performed by: EMERGENCY MEDICINE

## 2020-04-25 PROCEDURE — 36415 COLL VENOUS BLD VENIPUNCTURE: CPT

## 2020-04-25 PROCEDURE — 99999 PR OFFICE/OUTPT VISIT,PROCEDURE ONLY: CPT | Performed by: EMERGENCY MEDICINE

## 2020-04-25 PROCEDURE — 93971 EXTREMITY STUDY: CPT

## 2020-04-25 RX ORDER — HEPARIN SODIUM (PORCINE) LOCK FLUSH IV SOLN 100 UNIT/ML 100 UNIT/ML
300 SOLUTION INTRAVENOUS ONCE
Status: COMPLETED | OUTPATIENT
Start: 2020-04-25 | End: 2020-04-25

## 2020-04-25 RX ORDER — SULFAMETHOXAZOLE AND TRIMETHOPRIM 800; 160 MG/1; MG/1
1 TABLET ORAL 2 TIMES DAILY
Qty: 20 TABLET | Refills: 0 | Status: SHIPPED | OUTPATIENT
Start: 2020-04-25 | End: 2020-05-05

## 2020-04-25 RX ORDER — CEPHALEXIN 500 MG/1
500 CAPSULE ORAL 4 TIMES DAILY
Qty: 40 CAPSULE | Refills: 0 | Status: SHIPPED | OUTPATIENT
Start: 2020-04-25 | End: 2020-05-05

## 2020-04-25 RX ADMIN — CEFAZOLIN 1 G: 1 INJECTION, POWDER, FOR SOLUTION INTRAMUSCULAR; INTRAVENOUS at 19:08

## 2020-04-25 RX ADMIN — HEPARIN 300 UNITS: 100 SYRINGE at 21:02

## 2020-04-25 ASSESSMENT — ENCOUNTER SYMPTOMS
ABDOMINAL PAIN: 0
RHINORRHEA: 0
SHORTNESS OF BREATH: 0
VOICE CHANGE: 0
COUGH: 0
EYE REDNESS: 0
EYE PAIN: 0
VOMITING: 0
DIARRHEA: 0

## 2020-04-25 ASSESSMENT — PAIN DESCRIPTION - ORIENTATION: ORIENTATION: LEFT;RIGHT

## 2020-04-25 ASSESSMENT — PAIN SCALES - GENERAL: PAINLEVEL_OUTOF10: 2

## 2020-04-25 ASSESSMENT — PAIN DESCRIPTION - LOCATION: LOCATION: LEG

## 2020-04-25 ASSESSMENT — PAIN DESCRIPTION - DESCRIPTORS: DESCRIPTORS: PATIENT UNABLE TO DESCRIBE

## 2020-04-25 NOTE — ED PROVIDER NOTES
[facility] EMERGENCY DEPT  EMERGENCY DEPARTMENT ENCOUNTER      Pt Name: Dawna Dakin  MRN: [id number]  [place] 1935  Date of evaluation: 4/25/2020  Provider: Yudi Yanez MD    [address] [place] [address]       Chief Complaint   Patient presents with    Rash     BILAT LEGS         HISTORY OF PRESENT ILLNESS   (Location/Symptom, Timing/Onset,Context/Setting, Quality, Duration, Modifying Factors, Severity)  Note limiting factors. Dawna Dakin is a 82 y.o. female who presents to the emergency department with complaint of lower extremity rash, pain, and swelling. Patient states she initially had a wound to her anterior left lower extremity 2 years ago that never healed. Over the last several weeks, she has had surrounding redness to the area. She was started on antifungal cream and oral medication 1 week ago by her primary care provider and has had continued worsening since then. Mainly symptoms are in the left lower extremity, but there is also a similar rash to the right lower extremity. 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 Denies any fevers or other systemic symptoms but states the pain in her left leg has become more severe and feels deeper recently. HPI    NursingNotes were reviewed. REVIEW OF SYSTEMS    (2-9 systems for level 4, 10 or more for level 5)     Review of Systems   Constitutional: Negative for fatigue and fever. HENT: Negative for congestion, rhinorrhea and voice change. Eyes: Negative for pain and redness. Respiratory: Negative for cough and shortness of breath. Cardiovascular: Positive for leg swelling. Negative for chest pain. Gastrointestinal: Negative for abdominal pain, diarrhea and vomiting. Endocrine: Negative. Genitourinary: Negative. Musculoskeletal: Positive for myalgias. Negative for arthralgias and gait problem. Skin: Positive for rash. Negative for wound.    Neurological: RESULTS     EKG: All EKG's are interpreted by the Emergency Department Physician who either signs or Co-signs this chart in the absence of a cardiologist.      RADIOLOGY:   Non-plain film images such as CT, Ultrasound and MRI are read by the radiologist. Jose Oxford images are visualized and preliminarily interpreted by the emergency physician with the below findings:      Interpretation per the Radiologist below, if available at the time of this note:    VL Extremity Venous Left               ED BEDSIDE ULTRASOUND:   Performed by ED Physician - none    LABS:  Labs Reviewed   CBC WITH AUTO DIFFERENTIAL - Abnormal; Notable for the following components:       Result Value    RBC 3.88 (*)     Hemoglobin 10.9 (*)     Hematocrit 34.9 (*)     MCHC 31.2 (*)     Monocytes % 11.1 (*)     Eosinophils % 5.4 (*)     All other components within normal limits   COMPREHENSIVE METABOLIC PANEL W/ REFLEX TO MG FOR LOW K - Abnormal; Notable for the following components:    CREATININE 1.0 (*)     GFR Non- 53 (*)     Total Protein 6.4 (*)     All other components within normal limits       All other labs were within normal range or not returned as of this dictation. Medications   ceFAZolin (ANCEF) 1 g in sterile water 10 mL IV syringe (1 g Intravenous Given 4/25/20 1908)   heparin flush 100 UNIT/ML injection 300 Units (300 Units Intravenous Given 4/25/20 2102)       EMERGENCY DEPARTMENT COURSE and DIFFERENTIALDIAGNOSIS/MDM:   Vitals:    Vitals:    04/25/20 1747 04/25/20 1945   BP: (!) 168/64 (!) 146/78   Pulse: 78 75   Resp: 20 20   Temp: 98.7 °F (37.1 °C)    TempSrc: Oral    SpO2: 97% 97%   Weight: 170 lb (77.1 kg)    Height: 5' 3\" (1.6 m)        MDM  Labs here are unremarkable. Ultrasound shows no signs of DVT. Stable for discharge home with antibiotics for cellulitis treatment. Patient is agreeable.   Advised return precautions if symptoms worsening after 24 to 48 hours on antibiotics or if there is no change after 2-3 days on the antibiotics. Patient to continue antifungals as it does seem possible that patient had an initial fungal dermatitis that then secondarily became infected. Evaluation and work-up here revealed no signs of emergent or life-threatening pathology that would necessitate admission for further work-up or management at this time. It is felt to be stable for discharge home with return precautions for worsening of the condition or development of new concerning symptoms. Patient was encouraged to follow-up with their primary care doctor in the appropriate timeframe. Necessary prescriptions and information have been provided for treatment at home. Patient voices understanding and agreement with the plan. CONSULTS:  None    PROCEDURES:  Unless otherwise notedbelow, none     Procedures      FINAL IMPRESSION     1. Left leg pain    2. Leg swelling    3. Cellulitis of left lower extremity    4. History of DVT in adulthood    5.  On continuous oral anticoagulation          DISPOSITION/PLAN   DISPOSITION        PATIENT REFERRED TO:  [facility] EMERGENCY DEPT  [facility]  [phone]    If symptoms worsen    Ganesh Moscoso MD  [address] [id number] [phone]      As needed      DISCHARGE MEDICATIONS:  Discharge Medication List as of 4/25/2020  8:27 PM      START taking these medications    Details   cephALEXin (KEFLEX) 500 MG capsule Take 1 capsule by mouth 4 times daily for 10 days, Disp-40 capsule, R-0Print      sulfamethoxazole-trimethoprim (BACTRIM DS) 800-160 MG per tablet Take 1 tablet by mouth 2 times daily for 10 days, Disp-20 tablet, R-0Print                (Please note that portions of this note were completed with a voice recognition program.  Efforts were made to edit the dictations butoccasionally words are mis-transcribed.)    Susy Beltre MD (electronically signed)  AttendingEmergency Physician          Devang Dunaway Ml Evans MD  04/25/20 2137       Baldemar Eilzondo., MD  04/25/20 2139

## 2020-04-27 ENCOUNTER — CARE COORDINATION (OUTPATIENT)
Dept: CARE COORDINATION | Age: 85
End: 2020-04-27

## 2020-04-27 NOTE — CARE COORDINATION
Patient contacted regarding recent discharge and COVID-19 risk   Care Transition Nurse/ Ambulatory Care Manager contacted the patient by telephone to perform post discharge assessment. Verified name and  with patient as identifiers. Patient has following risk factors of: DM, Age, Seen in ED. CTN/ACM reviewed discharge instructions, medical action plan and red flags related to discharge diagnosis. Reviewed and educated them on any new and changed medications related to discharge diagnosis. Advised obtaining a 90-day supply of all daily and as-needed medications. Education provided regarding infection prevention, and signs and symptoms of COVID-19 and when to seek medical attention with patient who verbalized understanding. Discussed exposure protocols and quarantine from [address] you at higher risk for severe illness  and given an opportunity for questions and concerns. The patient agrees to contact the COVID-19 hotline [phone] or PCP office for questions related to their healthcare. CTN/ACM provided contact information for future reference. Patient/family/caregiver given information for [facility] and agrees to enroll no  Patient's preferred e-mail:    Patient's preferred phone number:   Based on Loop alert triggers, patient will be contacted by nurse care manager for worsening symptoms. Spoke with Barbie Waite via phone call today for ED f/u. She has been taking her medications, but the abx are making her nauseous. She is still having some pain, swelling and redness in her BLE but feels they are slightly improving. States this all began about 1 year ago with a wound on her ankle that would not heal.  She does not see [facility], but is agreeable to me sending a message to her PCP to inquire if this is something that she may benefit from. We discussed her risk factors for COVID-19, symptoms to watch for, cough/hand hygiene, home sanitization, and social distancing practices.   Provided

## 2020-04-30 ENCOUNTER — TELEPHONE (OUTPATIENT)
Dept: PRIMARY CARE CLINIC | Age: 85
End: 2020-04-30

## 2020-05-01 ENCOUNTER — OFFICE VISIT (OUTPATIENT)
Dept: PRIMARY CARE CLINIC | Age: 85
End: 2020-05-01
Payer: MEDICARE

## 2020-05-01 VITALS
RESPIRATION RATE: 16 BRPM | HEART RATE: 60 BPM | WEIGHT: 170.2 LBS | BODY MASS INDEX: 29.06 KG/M2 | HEIGHT: 64 IN | OXYGEN SATURATION: 97 % | SYSTOLIC BLOOD PRESSURE: 122 MMHG | DIASTOLIC BLOOD PRESSURE: 82 MMHG | TEMPERATURE: 97.7 F

## 2020-05-01 PROCEDURE — G8417 CALC BMI ABV UP PARAM F/U: HCPCS | Performed by: NURSE PRACTITIONER

## 2020-05-01 PROCEDURE — G8427 DOCREV CUR MEDS BY ELIG CLIN: HCPCS | Performed by: NURSE PRACTITIONER

## 2020-05-01 PROCEDURE — 1036F TOBACCO NON-USER: CPT | Performed by: NURSE PRACTITIONER

## 2020-05-01 PROCEDURE — 4040F PNEUMOC VAC/ADMIN/RCVD: CPT | Performed by: NURSE PRACTITIONER

## 2020-05-01 PROCEDURE — 1123F ACP DISCUSS/DSCN MKR DOCD: CPT | Performed by: NURSE PRACTITIONER

## 2020-05-01 PROCEDURE — 1090F PRES/ABSN URINE INCON ASSESS: CPT | Performed by: NURSE PRACTITIONER

## 2020-05-01 PROCEDURE — 99214 OFFICE O/P EST MOD 30 MIN: CPT | Performed by: NURSE PRACTITIONER

## 2020-05-01 PROCEDURE — G8399 PT W/DXA RESULTS DOCUMENT: HCPCS | Performed by: NURSE PRACTITIONER

## 2020-05-04 ENCOUNTER — OFFICE VISIT (OUTPATIENT)
Dept: PRIMARY CARE CLINIC | Age: 85
End: 2020-05-04
Payer: MEDICARE

## 2020-05-04 VITALS
BODY MASS INDEX: 28.99 KG/M2 | WEIGHT: 169.8 LBS | OXYGEN SATURATION: 95 % | RESPIRATION RATE: 16 BRPM | HEIGHT: 64 IN | HEART RATE: 63 BPM | SYSTOLIC BLOOD PRESSURE: 118 MMHG | TEMPERATURE: 97.4 F | DIASTOLIC BLOOD PRESSURE: 80 MMHG

## 2020-05-04 PROBLEM — J41.8 MIXED SIMPLE AND MUCOPURULENT CHRONIC BRONCHITIS (HCC): Status: ACTIVE | Noted: 2020-05-04

## 2020-05-04 PROCEDURE — G8417 CALC BMI ABV UP PARAM F/U: HCPCS | Performed by: NURSE PRACTITIONER

## 2020-05-04 PROCEDURE — 3023F SPIROM DOC REV: CPT | Performed by: NURSE PRACTITIONER

## 2020-05-04 PROCEDURE — 1090F PRES/ABSN URINE INCON ASSESS: CPT | Performed by: NURSE PRACTITIONER

## 2020-05-04 PROCEDURE — G8399 PT W/DXA RESULTS DOCUMENT: HCPCS | Performed by: NURSE PRACTITIONER

## 2020-05-04 PROCEDURE — 1123F ACP DISCUSS/DSCN MKR DOCD: CPT | Performed by: NURSE PRACTITIONER

## 2020-05-04 PROCEDURE — 99214 OFFICE O/P EST MOD 30 MIN: CPT | Performed by: NURSE PRACTITIONER

## 2020-05-04 PROCEDURE — G8926 SPIRO NO PERF OR DOC: HCPCS | Performed by: NURSE PRACTITIONER

## 2020-05-04 PROCEDURE — 1036F TOBACCO NON-USER: CPT | Performed by: NURSE PRACTITIONER

## 2020-05-04 PROCEDURE — 4040F PNEUMOC VAC/ADMIN/RCVD: CPT | Performed by: NURSE PRACTITIONER

## 2020-05-04 PROCEDURE — G8427 DOCREV CUR MEDS BY ELIG CLIN: HCPCS | Performed by: NURSE PRACTITIONER

## 2020-05-04 NOTE — PROGRESS NOTES
[address] [place] [address] Shira Cloud [address]  Dept: [phone]  Dept Fax: [phone]  Loc: [phone]    Marcos Agustin is a 82 y.o. female who presents today for her medical conditions/complaints as noted below. Marcos Agustin is c/o of Follow-up        HPI:     HPI   Chief Complaint   Patient presents with    Follow-up   Her caregiver is with her today. They have been changing the dressing on her left lower leg once a day cleansing and reapplying Silvadene and a compression dressing. The caregiver thinks it looks much better. Some have the skin has sloughed off. The patient has not had any fever. The patient says the area does hurt at times after she has had the dressing changed. She is not had any lung problems and not had any palpitations. He has not had any fever or congestion. She is basically quarantine herself away from other people. Her caregiver wears a mask when she comes to her house. She did start her Lamisil back for the lower legs. She just started it on Friday.   Past Medical History:   Diagnosis Date    Bronchitis     Colon cancer ([address]     Colon polyps     Constipation     Deep vein blood clot of left lower extremity (HCC)     Left leg     Depression     DVT (deep venous thrombosis) (HCC)     Dysphagia     Fibrocystic breast disease     Fibromyalgia     GERD (gastroesophageal reflux disease)     reflux with hx of esophageal stricture    Headache(784.0)     History of colon cancer     Hormone replacement therapy (postmenopausal)     Hypertension     Neuropathy of foot     In both feet    Osteoarthritis     left knee pain    Restless legs syndrome     Vitamin D deficiency       Past Surgical History:   Procedure Laterality Date    APPENDECTOMY      BREAST BIOPSY      CATARACT REMOVAL       SECTION      CHOLECYSTECTOMY      COLECTOMY  partial    COLON SURGERY      COLONOSCOPY  2010    1 Units by Does not apply route 4 times daily 1 each 0     No current facility-administered medications for this visit. Allergies   Allergen Reactions    Zoloft [Sertraline Hcl] Other (See Comments)     Patient states that she doesn't want this medication anymore because she hallucinated and saw spiders. Patient weaned herself off and after she quit taking the medication, the hallucinations stopped. Patient states that she doesn't want this medication anymore because she hallucinated and saw spiders. Patient weaned herself off and after she quit taking the medication, the hallucinations stopped. Health Maintenance   Topic Date Due    Shingles Vaccine (1 of 2) 02/01/1987    Annual Wellness Visit (AWV)  05/29/2019    Potassium monitoring  04/25/2021    Creatinine monitoring  04/25/2021    DTaP/Tdap/Td vaccine (3 - Td) 12/23/2025    Colon cancer screen colonoscopy  03/11/2026    DEXA (modify frequency per FRAX score)  Completed    Flu vaccine  Completed    Pneumococcal 65+ years Vaccine  Completed    Hepatitis A vaccine  Aged Out    Hepatitis B vaccine  Aged Out    Hib vaccine  Aged Out    Meningococcal (ACWY) vaccine  Aged Out       Subjective:      Review of Systems   Constitutional: Negative. Cardiovascular: Positive for leg swelling. Skin: Positive for wound. Psychiatric/Behavioral: Negative. Objective:     Physical Exam  Vitals signs and nursing note reviewed. Constitutional:       Appearance: She is well-developed. HENT:      Head: Normocephalic. Eyes:      Pupils: Pupils are equal, round, and reactive to light. Neck:      Musculoskeletal: Normal range of motion. Cardiovascular:      Rate and Rhythm: Normal rate and regular rhythm. Heart sounds: Normal heart sounds. Pulmonary:      Effort: Pulmonary effort is normal.      Breath sounds: Normal breath sounds. Skin:     General: Skin is warm and dry.           Neurological:      Mental Status: She is She is to finish the oral antibiotic and stay on the Lamisil. We will continue the Silvadene changes for now. Patient given educational materials -see patient instructions. Discussed use, benefit, and side effects of prescribed medications. All patient questions answered. Pt voiced understanding. Reviewed health maintenance. Instructed to continue currentmedications, diet and exercise. Patient agreed with treatment plan. Follow up as directed. MEDICATIONS:  No orders of the defined types were placed in this encounter. ORDERS:  No orders of the defined types were placed in this encounter. Follow-up:  No follow-ups on file. PATIENT INSTRUCTIONS:  There are no Patient Instructions on file for this visit. Electronically signed by EARLE Dowell CNP on 5/4/2020 at 11:04 AM    EMR Dragon/transcription disclaimer:  Much of thisencounter note is electronic transcription/translation of spoken language to printed texts. The electronic translation of spoken language may be erroneous, or at times, nonsensical words or phrases may be inadvertentlytranscribed.   Although I have reviewed the note for such errors, some may still exist.

## 2020-05-07 ENCOUNTER — OFFICE VISIT (OUTPATIENT)
Dept: PRIMARY CARE CLINIC | Age: 85
End: 2020-05-07
Payer: MEDICARE

## 2020-05-07 ENCOUNTER — HOSPITAL ENCOUNTER (OUTPATIENT)
Dept: WOUND CARE | Age: 85
Discharge: HOME | End: 2020-05-07
Payer: MEDICARE

## 2020-05-07 VITALS
TEMPERATURE: 97.3 F | WEIGHT: 170 LBS | DIASTOLIC BLOOD PRESSURE: 72 MMHG | HEIGHT: 64 IN | HEART RATE: 58 BPM | SYSTOLIC BLOOD PRESSURE: 144 MMHG | BODY MASS INDEX: 29.02 KG/M2 | RESPIRATION RATE: 18 BRPM

## 2020-05-07 VITALS
WEIGHT: 170 LBS | OXYGEN SATURATION: 93 % | SYSTOLIC BLOOD PRESSURE: 112 MMHG | DIASTOLIC BLOOD PRESSURE: 73 MMHG | RESPIRATION RATE: 18 BRPM | HEART RATE: 73 BPM | TEMPERATURE: 97.7 F | HEIGHT: 64 IN | BODY MASS INDEX: 29.02 KG/M2

## 2020-05-07 PROBLEM — I83.11 LIPODERMATOSCLEROSIS OF BOTH LOWER EXTREMITIES DUE TO VARICOSE VEINS: Status: ACTIVE | Noted: 2020-05-07

## 2020-05-07 PROBLEM — I83.12 LIPODERMATOSCLEROSIS OF BOTH LOWER EXTREMITIES DUE TO VARICOSE VEINS: Status: ACTIVE | Noted: 2020-05-07

## 2020-05-07 PROBLEM — B36.9 FUNGAL DERMATITIS: Status: ACTIVE | Noted: 2020-05-07

## 2020-05-07 PROCEDURE — 1036F TOBACCO NON-USER: CPT | Performed by: NURSE PRACTITIONER

## 2020-05-07 PROCEDURE — 1123F ACP DISCUSS/DSCN MKR DOCD: CPT | Performed by: NURSE PRACTITIONER

## 2020-05-07 PROCEDURE — 99213 OFFICE O/P EST LOW 20 MIN: CPT

## 2020-05-07 PROCEDURE — 99213 OFFICE O/P EST LOW 20 MIN: CPT | Performed by: NURSE PRACTITIONER

## 2020-05-07 PROCEDURE — 99215 OFFICE O/P EST HI 40 MIN: CPT | Performed by: NURSE PRACTITIONER

## 2020-05-07 PROCEDURE — G8417 CALC BMI ABV UP PARAM F/U: HCPCS | Performed by: NURSE PRACTITIONER

## 2020-05-07 PROCEDURE — G8427 DOCREV CUR MEDS BY ELIG CLIN: HCPCS | Performed by: NURSE PRACTITIONER

## 2020-05-07 PROCEDURE — G8399 PT W/DXA RESULTS DOCUMENT: HCPCS | Performed by: NURSE PRACTITIONER

## 2020-05-07 PROCEDURE — 4040F PNEUMOC VAC/ADMIN/RCVD: CPT | Performed by: NURSE PRACTITIONER

## 2020-05-07 PROCEDURE — 1090F PRES/ABSN URINE INCON ASSESS: CPT | Performed by: NURSE PRACTITIONER

## 2020-05-07 ASSESSMENT — PAIN DESCRIPTION - DESCRIPTORS: DESCRIPTORS: PATIENT UNABLE TO DESCRIBE

## 2020-05-07 ASSESSMENT — PAIN DESCRIPTION - LOCATION: LOCATION: LEG

## 2020-05-07 ASSESSMENT — VISUAL ACUITY: OU: 1

## 2020-05-07 ASSESSMENT — PAIN SCALES - GENERAL: PAINLEVEL_OUTOF10: 2

## 2020-05-07 ASSESSMENT — ENCOUNTER SYMPTOMS: COLOR CHANGE: 1

## 2020-05-07 ASSESSMENT — PAIN DESCRIPTION - ORIENTATION: ORIENTATION: LEFT;RIGHT

## 2020-05-07 NOTE — PROGRESS NOTES
Inhale 1 mL into the lungs daily      dorzolamide-timolol (COSOPT) 22.3-6.8 MG/ML ophthalmic solution Place 1 Dose into both eyes daily      furosemide (LASIX) 20 MG tablet Take 1 tablet by mouth daily as needed (swelling) 30 tablet 3    acyclovir (ZOVIRAX) 400 MG tablet TAKE 1 TABLET BY MOUTH FIVE TIMES DAILY FOR 1 WEEK THEN TAKE 1 TABLET BY MOUTH BACK TWICE DAILY (Patient taking differently: TAKE 1 TABLET BY MOUTH BACK TWICE DAILY) 56 tablet 5    Nebulizers (AIRIAL COMPACT MINI NEBULIZER) MISC 1 Units by Does not apply route 4 times daily 1 each 0     No current facility-administered medications for this encounter.       Allergies: Zoloft [sertraline hcl]  Past Medical History:   Diagnosis Date    Bronchitis     [facility] [address] [address]     Colon polyps     Constipation     Deep vein blood clot of left lower extremity (HCC)     Left leg     Depression     DVT (deep venous thrombosis) (HCC)     Dysphagia     Fibrocystic breast disease     Fibromyalgia     GERD (gastroesophageal reflux disease)     reflux with hx of esophageal stricture    Headache(784.0)     History of colon cancer     Hormone replacement therapy (postmenopausal)     Hypertension     Neuropathy of foot     In both feet    Osteoarthritis     left knee pain    Restless legs syndrome     Vitamin D deficiency        Past Surgical History:   Procedure Laterality Date    APPENDECTOMY      BREAST BIOPSY      CATARACT REMOVAL       SECTION      CHOLECYSTECTOMY      COLECTOMY  partial    COLON SURGERY      COLONOSCOPY  2010    COLONOSCOPY  2012    Dr Hoa Dutta: unremarkable enterocolonic anastamosis; hyperplastic polyps    [facility] unremark[facility] colon    COLONOSCOPY  3/11/16    Dr Vimal Kraus colon anastomosis, 5 yr recall    FOOT SURGERY  right foot    HAND SURGERY Right     Dr Hernando Barnett study    Planfor wound - Dress per physician order  Treatment:     Compression : Yes   Offloading : No     Dressing Orders: Left lower leg:   Wash with soap and water. Apply Restore AG to wound bed. Cover with [employer]. Apply Zinc Coflex Unnaboot from toes to knee. Change once weekly . Discussed importance of leg elevation and wound care plan of care. Patient understanding and questions answered. I spent a total of  60 minutes face to face with the patient. Over 75% of that time was spent on counseling and care coordination. Patient was told that if symptoms worsen or new symptoms develop they are to go to the emergency department immediately. Patient was educated on diagnosis and treatment plan. All of patient's questions were answered, and the patient understands the discharge plan. Discussed appropriate home care of this wound. Wound redressed. Patient instructions were given.   Recommend no smoking  Offloading instructions given

## 2020-05-07 NOTE — PROGRESS NOTES
BY MOUTH DAILY 30 tablet 11    gabapentin (NEURONTIN) 600 MG tablet TAKE 1 TABLET BY MOUTH THREE TIMES DAILY 90 tablet 5    omeprazole (PRILOSEC) 40 MG delayed release capsule TAKE 1 CAPSULE BY MOUTH DAILY 90 capsule 3    triamterene-hydrochlorothiazide (MAXZIDE-25) 37.5-25 MG per tablet TAKE 1 TABLET BY MOUTH DAILY 90 tablet 3    HYDROcodone-acetaminophen (NORCO) 7.5-325 MG per tablet TK 1 T PO Q 12 H PRN P      allopurinol (ZYLOPRIM) 100 MG tablet Take 2 tablets by mouth daily For gout -prevention 60 tablet 5    losartan (COZAAR) 100 MG tablet TAKE 1 TABLET BY MOUTH EVERY MORNING FOR BLOOD PRESSURE 90 tablet 3    rivaroxaban (XARELTO) 20 MG TABS tablet Take 1 tablet by mouth daily (with breakfast) 30 tablet 3    vitamin D (ERGOCALCIFEROL) 1.25 MG (42923 UT) CAPS capsule TAKE 1 CAPSULE BY MOUTH 1 TIME A WEEK 13 capsule 3    metoprolol tartrate (LOPRESSOR) 25 MG tablet TAKE ONE TABLET BY MOUTH TWICE DAILY FOR BLOOD PRESSURE AND HEART 180 tablet 3    albuterol (ACCUNEB) 1.25 MG/3ML nebulizer solution Inhale 1 mL into the lungs daily      dorzolamide-timolol (COSOPT) 22.3-6.8 MG/ML ophthalmic solution Place 1 Dose into both eyes daily      furosemide (LASIX) 20 MG tablet Take 1 tablet by mouth daily as needed (swelling) 30 tablet 3    acyclovir (ZOVIRAX) 400 MG tablet TAKE 1 TABLET BY MOUTH FIVE TIMES DAILY FOR 1 WEEK THEN TAKE 1 TABLET BY MOUTH BACK TWICE DAILY (Patient taking differently: TAKE 1 TABLET BY MOUTH BACK TWICE DAILY) 56 tablet 5    Nebulizers (AIRIAL COMPACT MINI NEBULIZER) MISC 1 Units by Does not apply route 4 times daily 1 each 0     No current facility-administered medications for this visit. Allergies   Allergen Reactions    Zoloft [Sertraline Hcl] Other (See Comments)     Patient states that she doesn't want this medication anymore because she hallucinated and saw spiders. Patient weaned herself off and after she quit taking the medication, the hallucinations stopped.    Patient states that she doesn't want this medication anymore because she hallucinated and saw spiders. Patient weaned herself off and after she quit taking the medication, the hallucinations stopped. Health Maintenance   Topic Date Due    Shingles Vaccine (1 of 2) 02/01/1987    Annual Wellness Visit (AWV)  05/29/2019    Potassium monitoring  04/25/2021    Creatinine monitoring  04/25/2021    DTaP/Tdap/Td vaccine (3 - Td) 12/23/2025    Colon cancer screen colonoscopy  03/11/2026    DEXA (modify frequency per FRAX score)  Completed    Flu vaccine  Completed    Pneumococcal 65+ years Vaccine  Completed    Hepatitis A vaccine  Aged Out    Hepatitis B vaccine  Aged Out    Hib vaccine  Aged Out    Meningococcal (ACWY) vaccine  Aged Out       Subjective:      Review of Systems   Constitutional: Negative. Skin: Positive for rash and wound. Objective:     Physical Exam  Vitals signs and nursing note reviewed. Constitutional:       Appearance: She is well-developed. HENT:      Head: Normocephalic. Eyes:      Pupils: Pupils are equal, round, and reactive to light. Neck:      Musculoskeletal: Normal range of motion. Cardiovascular:      Rate and Rhythm: Normal rate. Skin:     General: Skin is warm and dry. Neurological:      Mental Status: She is alert and oriented to person, place, and time. Psychiatric:         Behavior: Behavior normal.         Thought Content: Thought content normal.         Judgment: Judgment normal.       /73   Pulse 73   Temp 97.7 °F (36.5 °C) (Temporal)   Resp 18   Ht 5' 3.5\" (1.613 m)   Wt 170 lb (77.1 kg)   SpO2 93%   BMI 29.64 kg/m²   See pic above in [facility] addendum  Assessment:       Diagnosis Orders   1. Stasis dermatitis of both legs     2. Cellulitis of left leg     3. Fungal skin infection           Plan:   More than 50% of the time was spent counseling and coordinating care for a total time of 15min face to face.   I did call over

## 2020-05-12 ENCOUNTER — CARE COORDINATION (OUTPATIENT)
Dept: CARE COORDINATION | Age: 85
End: 2020-05-12

## 2020-05-13 ENCOUNTER — HOSPITAL ENCOUNTER (OUTPATIENT)
Dept: WOUND CARE | Age: 85
Discharge: HOME | End: 2020-05-13
Payer: MEDICARE

## 2020-05-13 ENCOUNTER — HOSPITAL ENCOUNTER (OUTPATIENT)
Dept: NON INVASIVE DIAGNOSTICS | Age: 85
Discharge: HOME | End: 2020-05-13
Payer: MEDICARE

## 2020-05-13 VITALS
BODY MASS INDEX: 29.02 KG/M2 | HEART RATE: 63 BPM | HEIGHT: 64 IN | WEIGHT: 170 LBS | DIASTOLIC BLOOD PRESSURE: 69 MMHG | TEMPERATURE: 96.8 F | SYSTOLIC BLOOD PRESSURE: 140 MMHG | RESPIRATION RATE: 20 BRPM

## 2020-05-13 PROCEDURE — 99213 OFFICE O/P EST LOW 20 MIN: CPT

## 2020-05-13 PROCEDURE — 99213 OFFICE O/P EST LOW 20 MIN: CPT | Performed by: NURSE PRACTITIONER

## 2020-05-13 PROCEDURE — 93971 EXTREMITY STUDY: CPT

## 2020-05-13 RX ORDER — METHYLPREDNISOLONE 4 MG/1
TABLET ORAL
Qty: 1 KIT | Refills: 0 | Status: SHIPPED | OUTPATIENT
Start: 2020-05-13 | End: 2020-05-20

## 2020-05-13 NOTE — PROGRESS NOTES
[address]   Progress Note and Procedure Note      Alma Rosa Ivey RECORD NUMBER:  [id number]  AGE: 82 y.o. GENDER: female  : 1935  EPISODE DATE:  2020    Subjective:     Chief Complaint   Patient presents with    Wound Check     bilateral leg wound         HISTORY of PRESENT ILLNESS TOMMY Devlin Husbands is a 82 y.o. female who presents today for wound/ulcer evaluation.    History of Wound Context: follow up left leg  Ulcer Identification:  Ulcer Type: venous and dermatitis  Contributing Factors: edema and venous stasis  PAST MEDICAL HISTORY        Diagnosis Date    Bronchitis     Colon cancer (HCC)     Colon polyps     Constipation     Deep vein blood clot of left lower extremity (HCC)     Left leg     Depression     DVT (deep venous thrombosis) (HCC)     Dysphagia     Fibrocystic breast disease     Fibromyalgia     GERD (gastroesophageal reflux disease)     reflux with hx of esophageal stricture    Headache(784.0)     History of colon cancer     Hormone replacement therapy (postmenopausal)     Hypertension     Neuropathy of foot     In both feet    Osteoarthritis     left knee pain    Restless legs syndrome     Vitamin D deficiency        PAST SURGICAL HISTORY    Past Surgical History:   Procedure Laterality Date    APPENDECTOMY      BREAST BIOPSY      CATARACT REMOVAL       SECTION      CHOLECYSTECTOMY      COLECTOMY  partial    COLON SURGERY      COLONOSCOPY  2010    COLONOSCOPY  2012    Dr Irene Fry: unremarkable enterocolonic anastamosis; hyperplastic polyps    COLONOSCOPY  3/2013    [facility]: unremarkable post-surgical colon    COLONOSCOPY  3/11/16    Dr Scott Ospina colon anastomosis, 5 yr recall    FOOT SURGERY  right foot    HAND SURGERY Right     Dr Hernandez Matter ENDOSCOPY  10-    UPPER GASTROINTESTINAL ENDOSCOPY  3/2013    [facility]: peptic stricture dilated to 48F; HH; small antral mucosal elevation    UPPER GASTROINTESTINAL ENDOSCOPY  12/2014    [facility]: dilation of esophageal stricture    VARICOSE VEIN SURGERY Left [facility]/14/2014  [facility]    Left GSV Ablation    VARICOSE VEIN SURGERY Right 04/04/2014  [facility]    Right GSV Ablation       FAMILY HISTORY    Family History   Problem Relation Age of Onset    Cancer Mother         Cervical Cancer    Cancer Brother         Esophageal cancer    Diabetes Brother     Esophageal Cancer Brother     Diabetes Sister     Colon Cancer Neg Hx     Colon Polyps Neg Hx     Liver Cancer Neg Hx     Liver Disease Neg Hx     Rectal Cancer Neg Hx     Stomach Cancer Neg Hx        SOCIAL HISTORY    Social History     Tobacco Use    Smoking status: Never Smoker    Smokeless tobacco: Never Used   Substance Use Topics    Alcohol use: No    Drug use: No       ALLERGIES    Allergies   Allergen Reactions    Zoloft [Sertraline Hcl] Other (See Comments)     Patient states that she doesn't want this medication anymore because she hallucinated and saw spiders. Patient weaned herself off and after she quit taking the medication, the hallucinations stopped. Patient states that she doesn't want this medication anymore because she hallucinated and saw spiders. Patient weaned herself off and after she quit taking the medication, the hallucinations stopped.           MEDICATIONS    Current Outpatient Medications on File Prior to Encounter   Medication Sig Dispense Refill    terbinafine (LAMISIL) 250 MG tablet TAKE ONE TABLET PO ONCE DAILY 90 tablet 0    vitamin B-12 (CYANOCOBALAMIN) 500 MCG tablet TAKE 1 TABLET BY MOUTH DAILY 30 tablet 11    gabapentin (NEURONTIN) 600 MG tablet TAKE 1 TABLET BY MOUTH THREE TIMES DAILY 90 tablet 5    omeprazole (PRILOSEC) 40 MG delayed release capsule TAKE 1 CAPSULE BY MOUTH DAILY 90 capsule 3    triamterene-hydrochlorothiazide (MAXZIDE-25) 37.5-25 MG per acute distress  Skin: warm and dry, no rash or erythema  Head: normocephalic and atraumatic  Eyes: pupils equal, round, and reactive to light, extraocular eye movements intact, conjunctivae normal  ENT: tympanic membrane, external ear and ear canal normal bilaterally, nose without deformity, nasal mucosa and turbinates normal without polyps  Neck: supple and non-tender without mass, no thyromegaly or thyroid nodules, no cervical lymphadenopathy  Pulmonary/Chest: clear to auscultation bilaterally- no wheezes, rales or rhonchi, normal air movement, no respiratory distress  Extremities: no cyanosis, clubbing or edema  Musculoskeletal: normal range of motion, no joint swelling, deformity or tenderness  Neurologic: reflexes normal and symmetric, no cranial nerve deficit, gait, coordination and speech normal      Assessment:      Patient Active Problem List   Diagnosis Code    Personal history of colon cancer Z85.038    Family history of esophageal cancer Z80.0    Fibromyalgia M79.7    Renal bruit R09.89    Varicose veins of left lower extremity with inflammation, 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 ([facility]) I83.221, L97.121    Venous insufficiency I87.2    Hypertriglyceridemia E78.1    Dysphagia R13.10    Paroxysmal supraventricular tachycardia ([facility]) I47.1    Vitamin D deficiency E55.9    Encounter for care related to Port-a-Cath Z45.2    Essential hypertension I10    History of colon polyps Z86.010    Port-A-Cath in place Z95.828    History of DVT (deep vein thrombosis) Z86.718    Avascular necrosis ([facility]) M87.00    Pain in both lower extremities M79.604, M79.605    Numbness and tingling of both feet R20.0, R20.2    Acute deep vein thrombosis (DVT) of femoral vein of left lower extremity ([facility]) I82.412    Cellulitis of left lower limb L03. 116    Fracture of right foot S92.901A    Hypochloremia E87.8    CKD (chronic kidney disease) stage 3, GFR 30-59 ml/min ([facility]) N18.3    Malignant

## 2020-05-13 NOTE — PLAN OF CARE
Multilayer Compression Wrap   (Not Unna) Below the Knee    NAME:  Crista Vang  YOB: 1935  MEDICAL RECORD NUMBER:  [id number]  DATE:  5/13/2020       [x] Removed old Multilayer wrap if indicated and wash leg with mild soap/water.  [x] Applied moisturizing agent to dry skin as needed.  [x] Applied primary and secondary dressing as ordered    [x] Applied multilayered dressing below the knee to Left lower leg(s).  [x] Instructed patient/caregiver not to remove dressing and to keep it clean and dry.  [x] Instructed patient/caregiver on complications to report to provider, such as pain, numbness in toes, heavy drainage, and slippage of dressing.  [x] Instructed patient on purpose of compression dressing and on activity and exercise recommendations.     Electronically signed by Leatha Burns RN on 5/13/2020 at 3:47 PM

## 2020-05-20 ENCOUNTER — HOSPITAL ENCOUNTER (OUTPATIENT)
Dept: WOUND CARE | Age: 85
Discharge: HOME | End: 2020-05-20
Payer: MEDICARE

## 2020-05-20 VITALS
RESPIRATION RATE: 18 BRPM | HEART RATE: 59 BPM | WEIGHT: 170 LBS | HEIGHT: 64 IN | SYSTOLIC BLOOD PRESSURE: 136 MMHG | BODY MASS INDEX: 29.02 KG/M2 | DIASTOLIC BLOOD PRESSURE: 55 MMHG | TEMPERATURE: 97.1 F

## 2020-05-20 PROCEDURE — 99213 OFFICE O/P EST LOW 20 MIN: CPT

## 2020-05-20 PROCEDURE — 99212 OFFICE O/P EST SF 10 MIN: CPT | Performed by: NURSE PRACTITIONER

## 2020-05-20 RX ORDER — TIZANIDINE 2 MG/1
2 TABLET ORAL EVERY 6 HOURS PRN
Qty: 30 TABLET | Refills: 0 | Status: SHIPPED | OUTPATIENT
Start: 2020-05-20 | End: 2020-08-20

## 2020-05-20 ASSESSMENT — PAIN SCALES - GENERAL: PAINLEVEL_OUTOF10: 0

## 2020-05-20 NOTE — PROGRESS NOTES
release capsule TAKE 1 CAPSULE BY MOUTH DAILY 90 capsule 3    triamterene-hydrochlorothiazide (MAXZIDE-25) 37.5-25 MG per tablet TAKE 1 TABLET BY MOUTH DAILY 90 tablet 3    HYDROcodone-acetaminophen (NORCO) 7.5-325 MG per tablet TK 1 T PO Q 12 H PRN P      allopurinol (ZYLOPRIM) 100 MG tablet Take 2 tablets by mouth daily For gout -prevention 60 tablet 5    losartan (COZAAR) 100 MG tablet TAKE 1 TABLET BY MOUTH EVERY MORNING FOR BLOOD PRESSURE 90 tablet 3    rivaroxaban (XARELTO) 20 MG TABS tablet Take 1 tablet by mouth daily (with breakfast) 30 tablet 3    vitamin D (ERGOCALCIFEROL) 1.25 MG (34261 UT) CAPS capsule TAKE 1 CAPSULE BY MOUTH 1 TIME A WEEK 13 capsule 3    metoprolol tartrate (LOPRESSOR) 25 MG tablet TAKE ONE TABLET BY MOUTH TWICE DAILY FOR BLOOD PRESSURE AND HEART 180 tablet 3    albuterol (ACCUNEB) 1.25 MG/3ML nebulizer solution Inhale 1 mL into the lungs daily      dorzolamide-timolol (COSOPT) 22.3-6.8 MG/ML ophthalmic solution Place 1 Dose into both eyes daily      furosemide (LASIX) 20 MG tablet Take 1 tablet by mouth daily as needed (swelling) 30 tablet 3    acyclovir (ZOVIRAX) 400 MG tablet TAKE 1 TABLET BY MOUTH FIVE TIMES DAILY FOR 1 WEEK THEN TAKE 1 TABLET BY MOUTH BACK TWICE DAILY (Patient taking differently: TAKE 1 TABLET BY MOUTH BACK TWICE DAILY) 56 tablet 5    Nebulizers (AIRIAL COMPACT MINI NEBULIZER) MISC 1 Units by Does not apply route 4 times daily 1 each 0     No current facility-administered medications on file prior to encounter. REVIEW OF SYSTEMS    A comprehensive review of systems was negative.     Objective:      BP (!) 136/55   Pulse 59   Temp 97.1 °F (36.2 °C) (Temporal)   Resp 18   Ht 5' 3.5\" (1.613 m)   Wt 170 lb (77.1 kg)   BMI 29.64 kg/m²     Wt Readings from Last 3 Encounters:   05/20/20 170 lb (77.1 kg)   05/13/20 170 lb (77.1 kg)   05/07/20 170 lb (77.1 kg)       PHYSICAL EXAM    General Appearance: alert and oriented to person, place and time,

## 2020-05-20 NOTE — LETTER
Wound Assessment Intact; Pink 5/20/2020  9:13 AM   Drainage Amount Moderate 5/20/2020  9:13 AM   Drainage Description Serous 5/20/2020  9:13 AM   Odor None 5/20/2020  9:13 AM   Margins Attached edges 5/20/2020  9:13 AM   Janelle-wound Assessment Dry; Intact 5/20/2020  9:13 AM   [place]%Wound Bed 50 5/20/2020  9:13 AM   Red%Wound Bed 50 5/20/2020  9:13 AM   Number of days: 13                           Supplies Requested :        ADDITIONAL ITEMS:  [] Gloves Small  [x] Gloves Medium [] Gloves Large [] Gloves Alonzo Hun  [] Tape 1\" [x] Tape 2\" [] Tape 3\"  [] Medipore Tape  [x] Saline  [] Skin Prep   [] Adhesive Remover   [] Cotton Tip Applicators   [x] Other: Circaids or [address] Wrap  Patient Wound(s) Debrided: [] Yes   [x] No    Debribement Type: none    Debridement Date: none    Patient currently being seen by Home Health: [] Yes   [x] No    Duration for needed supplies:  []15  [x]30  []60  []90 Days    Provider Information:      PROVIDER'S NAME: EARLE Perez    NPI: [id number]

## 2020-05-27 ENCOUNTER — HOSPITAL ENCOUNTER (OUTPATIENT)
Dept: WOUND CARE | Age: 85
Discharge: HOME | End: 2020-05-27
Payer: MEDICARE

## 2020-05-27 VITALS
RESPIRATION RATE: 18 BRPM | TEMPERATURE: 96.3 F | HEIGHT: 64 IN | DIASTOLIC BLOOD PRESSURE: 74 MMHG | BODY MASS INDEX: 29.02 KG/M2 | WEIGHT: 170 LBS | SYSTOLIC BLOOD PRESSURE: 141 MMHG | HEART RATE: 64 BPM

## 2020-05-27 PROCEDURE — 99213 OFFICE O/P EST LOW 20 MIN: CPT

## 2020-05-27 PROCEDURE — 99213 OFFICE O/P EST LOW 20 MIN: CPT | Performed by: NURSE PRACTITIONER

## 2020-05-27 RX ORDER — PREDNISONE 20 MG/1
40 TABLET ORAL DAILY
Qty: 28 TABLET | Refills: 0 | Status: SHIPPED | OUTPATIENT
Start: 2020-05-27 | End: 2020-06-10

## 2020-05-27 ASSESSMENT — PAIN DESCRIPTION - DESCRIPTORS: DESCRIPTORS: BURNING

## 2020-05-27 ASSESSMENT — PAIN DESCRIPTION - LOCATION: LOCATION: LEG

## 2020-05-27 ASSESSMENT — PAIN DESCRIPTION - ORIENTATION: ORIENTATION: LEFT;RIGHT

## 2020-05-27 ASSESSMENT — PAIN DESCRIPTION - PAIN TYPE: TYPE: CHRONIC PAIN

## 2020-05-27 ASSESSMENT — PAIN DESCRIPTION - ONSET: ONSET: ON-GOING

## 2020-05-27 ASSESSMENT — PAIN SCALES - GENERAL: PAINLEVEL_OUTOF10: 7

## 2020-05-27 ASSESSMENT — PAIN DESCRIPTION - FREQUENCY: FREQUENCY: INTERMITTENT

## 2020-05-27 NOTE — PROGRESS NOTES
extremities due to varicose veins              Treatment Note please see attached Discharge Instructions    In my professional opinion this patient would benefit from HBO Therapy: No    Written patient dismissal instructions given to patient and signed by patient or POA. Ms. Schmidt's wound is worsening after the steroids were completed. I am unsure what inflammatory process is going on. I know that dermatology biopsied her wound finding fungus, she is on an anti-fungal cream and oral medication. I want to try a high dose of steroids to keep her on to see if we can clear this condition up. I am questioning if I need to send her back to dermatology? I am going to continue compression for her venous insufficiency and pedal edema. I will have her follow up in 1 week and I want the family to contact me if her legs do not improve quickly with higher dose of steroids. Discharge Instructions       Visit Discharge/Physician Orders    Discharge condition: Stable    Discharge to: Home    Left via:Private automobile    Accompanied by:  caregiver    ECF/HHA: none    Dressing Orders: Left lower leg:   Wash with soap and water. Apply Adaptic to wound bed. Secure with kerlix. Apply Setopress (brown squares) from toes to knee. Change daily . Treatment Orders: Protein rich diet (unless restricted by your physician); Multivitamin daily; Elevate legs when sitting, avoid standing for long periods of time. Take steriods as prescribed. [facility] followup visit _____1 week________________________  (Please note your next appointment above and if you are unable to keep, kindly give a 24 hour notice.  Thank you.)          If you experience any of the following, please call the [address] [place] [address] Road during business hours:    * Increase in Pain  * Temperature over 101  * Increase in drainage from your wound  * Drainage with a foul odor  * Bleeding  * Increase in swelling  * Need for compression bandage changes due to slippage,

## 2020-05-28 RX ORDER — TRIAMCINOLONE ACETONIDE 1 MG/G
CREAM TOPICAL
Qty: 240 G | Refills: 3 | Status: SHIPPED | OUTPATIENT
Start: 2020-05-28 | End: 2020-06-11

## 2020-06-03 ENCOUNTER — HOSPITAL ENCOUNTER (OUTPATIENT)
Dept: WOUND CARE | Age: 85
Discharge: HOME | End: 2020-06-03
Payer: MEDICARE

## 2020-06-03 VITALS
BODY MASS INDEX: 29.02 KG/M2 | HEART RATE: 59 BPM | TEMPERATURE: 97.4 F | RESPIRATION RATE: 18 BRPM | WEIGHT: 170 LBS | DIASTOLIC BLOOD PRESSURE: 68 MMHG | HEIGHT: 64 IN | SYSTOLIC BLOOD PRESSURE: 140 MMHG

## 2020-06-03 PROCEDURE — 99212 OFFICE O/P EST SF 10 MIN: CPT | Performed by: NURSE PRACTITIONER

## 2020-06-03 PROCEDURE — 99213 OFFICE O/P EST LOW 20 MIN: CPT

## 2020-06-03 ASSESSMENT — PAIN DESCRIPTION - LOCATION: LOCATION: LEG

## 2020-06-03 ASSESSMENT — PAIN DESCRIPTION - PAIN TYPE: TYPE: CHRONIC PAIN

## 2020-06-03 ASSESSMENT — PAIN SCALES - GENERAL: PAINLEVEL_OUTOF10: 3

## 2020-06-03 ASSESSMENT — PAIN DESCRIPTION - ORIENTATION: ORIENTATION: LEFT;RIGHT

## 2020-06-03 ASSESSMENT — PAIN DESCRIPTION - DESCRIPTORS: DESCRIPTORS: BURNING

## 2020-06-03 ASSESSMENT — PAIN DESCRIPTION - FREQUENCY: FREQUENCY: INTERMITTENT

## 2020-06-03 ASSESSMENT — PAIN DESCRIPTION - ONSET: ONSET: ON-GOING

## 2020-06-03 NOTE — PROGRESS NOTES
ARTHROPLASTY      UPPER GASTROINTESTINAL ENDOSCOPY  10-    UPPER GASTROINTESTINAL ENDOSCOPY  3/2013    [facility]: peptic stricture dilated to 48F; HH; small antral mucosal elevation    UPPER GASTROINTESTINAL ENDOSCOPY  12/2014    [facility]: dilation of esophageal stricture    VARICOSE VEIN SURGERY Left [facility]/14/2014  [facility]    Left GSV Ablation    VARICOSE VEIN SURGERY Right 04/04/2014  [facility]    Right GSV Ablation       FAMILY HISTORY    Family History   Problem Relation Age of Onset    Cancer Mother         Cervical Cancer    Cancer Brother         Esophageal cancer    Diabetes Brother     Esophageal Cancer Brother     Diabetes Sister     Colon Cancer Neg Hx     Colon Polyps Neg Hx     Liver Cancer Neg Hx     Liver Disease Neg Hx     Rectal Cancer Neg Hx     Stomach Cancer Neg Hx        SOCIAL HISTORY    Social History     Tobacco Use    Smoking status: Never Smoker    Smokeless tobacco: Never Used   Substance Use Topics    Alcohol use: No    Drug use: No       ALLERGIES    Allergies   Allergen Reactions    Zoloft [Sertraline Hcl] Other (See Comments)     Patient states that she doesn't want this medication anymore because she hallucinated and saw spiders. Patient weaned herself off and after she quit taking the medication, the hallucinations stopped. Patient states that she doesn't want this medication anymore because she hallucinated and saw spiders. Patient weaned herself off and after she quit taking the medication, the hallucinations stopped.           MEDICATIONS    Current Outpatient Medications on File Prior to Encounter   Medication Sig Dispense Refill    triamcinolone (KENALOG) 0.1 % cream APPLY TOPICALLY TWICE DAILY TO LEGS 240 g 3    predniSONE (DELTASONE) 20 MG tablet Take 2 tablets by mouth daily for 14 days 28 tablet 0    tiZANidine (ZANAFLEX) 2 MG tablet Take 1 tablet by mouth every 6 hours as needed (1 or 2 tablets as needed for relief) 30 tablet 0    silver sulfADIAZINE (SILVADENE) 1 % cream To leg Apply topically daily. 60 g 0    terbinafine (LAMISIL) 250 MG tablet TAKE ONE TABLET PO ONCE DAILY 90 tablet 0    vitamin B-12 (CYANOCOBALAMIN) 500 MCG tablet TAKE 1 TABLET BY MOUTH DAILY 30 tablet 11    gabapentin (NEURONTIN) 600 MG tablet TAKE 1 TABLET BY MOUTH THREE TIMES DAILY 90 tablet 5    omeprazole (PRILOSEC) 40 MG delayed release capsule TAKE 1 CAPSULE BY MOUTH DAILY 90 capsule 3    triamterene-hydrochlorothiazide (MAXZIDE-25) 37.5-25 MG per tablet TAKE 1 TABLET BY MOUTH DAILY 90 tablet 3    HYDROcodone-acetaminophen (NORCO) 7.5-325 MG per tablet TK 1 T PO Q 12 H PRN P      allopurinol (ZYLOPRIM) 100 MG tablet Take 2 tablets by mouth daily For gout -prevention 60 tablet 5    losartan (COZAAR) 100 MG tablet TAKE 1 TABLET BY MOUTH EVERY MORNING FOR BLOOD PRESSURE 90 tablet 3    rivaroxaban (XARELTO) 20 MG TABS tablet Take 1 tablet by mouth daily (with breakfast) 30 tablet 3    vitamin D (ERGOCALCIFEROL) 1.25 MG (60260 UT) CAPS capsule TAKE 1 CAPSULE BY MOUTH 1 TIME A WEEK 13 capsule 3    metoprolol tartrate (LOPRESSOR) 25 MG tablet TAKE ONE TABLET BY MOUTH TWICE DAILY FOR BLOOD PRESSURE AND HEART 180 tablet 3    albuterol (ACCUNEB) 1.25 MG/3ML nebulizer solution Inhale 1 mL into the lungs daily      dorzolamide-timolol (COSOPT) 22.3-6.8 MG/ML ophthalmic solution Place 1 Dose into both eyes daily      furosemide (LASIX) 20 MG tablet Take 1 tablet by mouth daily as needed (swelling) 30 tablet 3    acyclovir (ZOVIRAX) 400 MG tablet TAKE 1 TABLET BY MOUTH FIVE TIMES DAILY FOR 1 WEEK THEN TAKE 1 TABLET BY MOUTH BACK TWICE DAILY (Patient taking differently: TAKE 1 TABLET BY MOUTH BACK TWICE DAILY) 56 tablet 5    Nebulizers (AIRIAL COMPACT MINI NEBULIZER) MISC 1 Units by Does not apply route 4 times daily 1 each 0     No current facility-administered medications on file prior to encounter.         REVIEW OF SYSTEMS    A comprehensive review of systems was negative.     Objective:      BP (!) 140/68   Pulse 59   Temp 97.4 °F (36.3 °C) (Temporal)   Resp 18   Ht 5' 3.5\" (1.613 m)   Wt 170 lb (77.1 kg)   BMI 29.64 kg/m²     Wt Readings from Last 3 Encounters:   06/03/20 170 lb (77.1 kg)   05/27/20 170 lb (77.1 kg)   05/20/20 170 lb (77.1 kg)       PHYSICAL EXAM    General Appearance: alert and oriented to person, place and time, well developed and well- nourished, in no acute distress  Skin: warm and dry, no rash or erythema  Head: normocephalic and atraumatic  Eyes: pupils equal, round, and reactive to light, extraocular eye movements intact, conjunctivae normal  ENT: tympanic membrane, external ear and ear canal normal bilaterally, nose without deformity, nasal mucosa and turbinates normal without polyps  Neck: supple and non-tender without mass, no thyromegaly or thyroid nodules, no cervical lymphadenopathy  Pulmonary/Chest: clear to auscultation bilaterally- no wheezes, rales or rhonchi, normal air movement, no respiratory distress  Extremities: no cyanosis, clubbing or edema  Musculoskeletal: normal range of motion, no joint swelling, deformity or tenderness  Neurologic: reflexes normal and symmetric, no cranial nerve deficit, gait, coordination and speech normal      Assessment:      Patient Active Problem List   Diagnosis Code    Personal history of colon cancer Z85.038    Family history of esophageal cancer Z80.0    Fibromyalgia M79.7    Renal bruit R09.89    Varicose veins of left lower extremity with inflammation, 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 (HCC) I83.221, L97.121    Venous insufficiency I87.2    Hypertriglyceridemia E78.1    Dysphagia R13.10    Paroxysmal supraventricular tachycardia (HCC) I47.1    Vitamin D deficiency E55.9    Encounter for care related to Port-a-Cath Z45.2    Essential hypertension I10    History of colon polyps Z86.010    Port-A-Cath in place Z95.828    History of DVT (deep vein thrombosis) Z86.718  Avascular necrosis ([facility]) M87.00    Pain in both lower extremities M79.604, M79.605    Numbness and tingling of both feet R20.0, R20.2    Acute deep vein thrombosis (DVT) of femoral vein of left lower extremity ([facility]) I82.412    Cellulitis of left lower limb L03. 116    Fracture of right foot S92.901A    Hypochloremia E87.8    CKD (chronic kidney disease) stage 3, GFR 30-59 ml/min ([facility]) N18.3    Malignant neoplasm of colon ([facility]) C18.9    Mixed simple and mucopurulent chronic bronchitis ([facility]) J41.8    Fungal dermatitis B36.9    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 I83.11, I83.12                 Wound 05/07/20 Leg Left; Anterior WOUND 1 LEFT LOWER LEG VENOUS  (Active)   Wound Image    6/3/2020  8:46 AM   Wound Venous 6/3/2020  8:46 AM   Dressing Status Old drainage; Intact 6/3/2020  8:46 AM   Dressing Changed Changed/New 6/3/2020  8:46 AM   Wound Cleansed Soap and water 6/3/2020  8:46 AM   Wound Length (cm) 0.1 cm 6/3/2020  8:46 AM   Wound Width (cm) 0.1 cm 6/3/2020  8:46 AM   Wound Depth (cm) 0.1 cm 6/3/2020  8:46 AM   Wound Surface Area (cm^2) 0.01 cm^2 6/3/2020  8:46 AM   Change in Wound Size % (l*w) 99.97 6/3/2020  8:46 AM   Wound Volume (cm^3) 0 cm^3 6/3/2020  8:46 AM   Wound Healing % 100 6/3/2020  8:46 AM   Post-Procedure Length (cm) 0.5 cm 5/20/2020  9:13 AM   Post-Procedure Width (cm) 0.5 cm 5/20/2020  9:13 AM   Post-Procedure Depth (cm) 0.1 cm 5/20/2020  9:13 AM   Post-Procedure Surface Area (cm^2) 0.25 cm^2 5/20/2020  9:13 AM   Post-Procedure Volume (cm^3) 0.02 cm^3 5/20/2020  9:13 AM   Wound Assessment Pink 6/3/2020  8:46 AM   Drainage Amount Small 6/3/2020  8:46 AM   Drainage Description Serous 6/3/2020  8:46 AM   Odor None 6/3/2020  8:46 AM   Margins Attached edges 6/3/2020  8:46 AM   Janelle-wound Assessment Dry; Intact 6/3/2020  8:46 AM   Non-staged Wound Description Full thickness 6/3/2020  8:46 AM   [place]%Wound Bed 45 6/3/2020  8:46 AM   Red%Wound Bed 45 6/3/2020

## 2020-06-04 RX ORDER — PREDNISONE 20 MG/1
20 TABLET ORAL DAILY
Qty: 10 TABLET | Refills: 0 | Status: SHIPPED | OUTPATIENT
Start: 2020-06-04 | End: 2020-06-18

## 2020-06-11 ENCOUNTER — HOSPITAL ENCOUNTER (OUTPATIENT)
Dept: WOUND CARE | Age: 85
Discharge: HOME | End: 2020-06-11
Payer: MEDICARE

## 2020-06-11 VITALS
HEART RATE: 55 BPM | TEMPERATURE: 97.3 F | BODY MASS INDEX: 29.02 KG/M2 | WEIGHT: 170 LBS | SYSTOLIC BLOOD PRESSURE: 165 MMHG | RESPIRATION RATE: 20 BRPM | DIASTOLIC BLOOD PRESSURE: 69 MMHG | HEIGHT: 64 IN

## 2020-06-11 PROCEDURE — 99213 OFFICE O/P EST LOW 20 MIN: CPT

## 2020-06-11 PROCEDURE — 99212 OFFICE O/P EST SF 10 MIN: CPT | Performed by: NURSE PRACTITIONER

## 2020-06-11 RX ORDER — PREDNISONE 1 MG/1
5 TABLET ORAL DAILY
Qty: 7 TABLET | Refills: 0 | Status: SHIPPED | OUTPATIENT
Start: 2020-06-11 | End: 2020-06-18

## 2020-06-11 NOTE — PROGRESS NOTES
[address]   Progress Note and Procedure Note      Alma Rosa Ivey RECORD NUMBER:  [id number]  AGE: 82 y.o. GENDER: female  : 1935  EPISODE DATE:  2020    Subjective:     Chief Complaint   Patient presents with    Wound Check     left leg follow up-healed         HISTORY of PRESENT ILLNESS HPI     Bob Leiva is a 82 y.o. female who presents today for wound/ulcer evaluation.    History of Wound Context: left leg follow up      PAST MEDICAL HISTORY        Diagnosis Date    Bronchitis     Colon cancer ([address]     Colon polyps     Constipation     Deep vein blood clot of left lower extremity (HCC)     Left leg     Depression     DVT (deep venous thrombosis) (HCC)     Dysphagia     Fibrocystic breast disease     Fibromyalgia     GERD (gastroesophageal reflux disease)     reflux with hx of esophageal stricture    Headache(784.0)     History of colon cancer     Hormone replacement therapy (postmenopausal)     Hypertension     Neuropathy of foot     In both feet    Osteoarthritis     left knee pain    Restless legs syndrome     Vitamin D deficiency        PAST SURGICAL HISTORY    Past Surgical History:   Procedure Laterality Date    APPENDECTOMY      BREAST BIOPSY      CATARACT REMOVAL       SECTION      CHOLECYSTECTOMY      COLECTOMY  partial    COLON SURGERY      COLONOSCOPY  2010    COLONOSCOPY  2012    Dr Goran Blackwood: unremarkable enterocolonic anastamosis; hyperplastic polyps    COLONOSCOPY  3/2013    [facility]: unremarkable post-surgical colon    COLONOSCOPY  3/11/16    Dr Tate Gonzalez colon anastomosis, 5 yr recall    FOOT SURGERY  right foot    HAND SURGERY Right     Dr Mariaa Llanes ENDOSCOPY  10-    UPPER GASTROINTESTINAL ENDOSCOPY  3/2013    [facility]: peptic stricture dilated to 48F; HH; small antral mucosal well developed and well- nourished, in no acute distress  Skin: warm and dry, no rash or erythema  Head: normocephalic and atraumatic  Eyes: pupils equal, round, and reactive to light, extraocular eye movements intact, conjunctivae normal  ENT: tympanic membrane, external ear and ear canal normal bilaterally, nose without deformity, nasal mucosa and turbinates normal without polyps  Neck: supple and non-tender without mass, no thyromegaly or thyroid nodules, no cervical lymphadenopathy  Pulmonary/Chest: clear to auscultation bilaterally- no wheezes, rales or rhonchi, normal air movement, no respiratory distress  Extremities: no cyanosis, clubbing or edema  Musculoskeletal: normal range of motion, no joint swelling, deformity or tenderness  Neurologic: reflexes normal and symmetric, no cranial nerve deficit, gait, coordination and speech normal      Assessment:      Patient Active Problem List   Diagnosis Code    Personal history of colon cancer Z85.038    Family history of esophageal cancer Z80.0    Fibromyalgia M79.7    Renal bruit R09.89    Varicose veins of left lower extremity with inflammation, 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 (HCC) I83.221, L97.121    Venous insufficiency I87.2    Hypertriglyceridemia E78.1    Dysphagia R13.10    Paroxysmal supraventricular tachycardia (HCC) I47.1    Vitamin D deficiency E55.9    Encounter for care related to Port-a-Cath Z45.2    Essential hypertension I10    History of colon polyps Z86.010    Port-A-Cath in place Z95.828    History of DVT (deep vein thrombosis) Z86.718    Avascular necrosis (HCC) M87.00    Pain in both lower extremities M79.604, M79.605    Numbness and tingling of both feet R20.0, R20.2    Acute deep vein thrombosis (DVT) of femoral vein of left lower extremity ([facility]) I82.412    Cellulitis of left lower limb L03. 116    Fracture of right foot S92.901A    Hypochloremia E87.8    CKD (chronic kidney disease) stage 3, GFR Therapy: No    Written patient dismissal instructions given to patient and signed by patient or POA. Ms. Elizabeth Record looks great we will begin to wean her off of her steroids. She will follow up with dermatology next week and Connie Runner APRN. Discharge [address] and Hyperbaric Oxygen Therapy   Physician Orders and Discharge Instructions  [address] [place]  [place], [place]  Telephone: [phone]    NAME:  Snow Somers OF BIRTH:  1935  MEDICAL RECORD NUMBER:  [id number]  DATE:  6/11/2020    Discharge condition: Stable    Discharge to: Home    Left via:Private automobile    Accompanied by:  caregiver    ECF/HHA: none    Dressing Orders: Left lower leg:   Wash with soap and water. Apply Adaptic to wound bed. Secure with kerlix. Apply Setopress (brown squares) from toes to knee. Change daily . Treatment Orders: Protein rich diet (unless restricted by your physician); Multivitamin daily; Elevate legs when sitting, avoid standing for long periods of time. Take steriods as prescribed. Please purchase compression stockings at [facility] (Ask for Kenya Louis to measure your legs for appropriate sizing)  [address] [facility]. [facility], [address]    Or     Please purchase compression stockings at [place] [id number] (ask for an associate to measure your leg)  [facility]  [address] [facility], [place], [address]    [facility] followup visit _______as needed- follow up with dermatology and Jayna OG______________________  (Please note your next appointment above and if you are unable to keep, kindly give a 24 hour notice.  Thank you.)          If you experience any of the following, please call the [address] during business hours:    * Increase in Pain  * Temperature over 101  * Increase in drainage from your wound  * Drainage with a foul odor  * Bleeding  * Increase in

## 2020-06-11 NOTE — PLAN OF CARE
Problem: Wound:  Goal: Will show signs of wound healing; wound closure and no evidence of infection  Description: Will show signs of wound healing; wound closure and no evidence of infection  Outcome: Ongoing     Problem: Venous:  Goal: Signs of wound healing will improve  Description: Signs of wound healing will improve  Outcome: Ongoing     Problem: Compression therapy:  Goal: Will be free from complications associated with compression therapy  Description: Will be free from complications associated with compression therapy  Outcome: Ongoing

## 2020-06-14 ENCOUNTER — APPOINTMENT (OUTPATIENT)
Dept: CT IMAGING | Age: 85
End: 2020-06-14
Payer: MEDICARE

## 2020-06-14 ENCOUNTER — HOSPITAL ENCOUNTER (EMERGENCY)
Age: 85
Discharge: HOME | End: 2020-06-14
Payer: MEDICARE

## 2020-06-14 ENCOUNTER — APPOINTMENT (OUTPATIENT)
Dept: GENERAL RADIOLOGY | Age: 85
End: 2020-06-14
Payer: MEDICARE

## 2020-06-14 VITALS
RESPIRATION RATE: 18 BRPM | WEIGHT: 170 LBS | DIASTOLIC BLOOD PRESSURE: 74 MMHG | TEMPERATURE: 97.6 F | SYSTOLIC BLOOD PRESSURE: 144 MMHG | BODY MASS INDEX: 30.12 KG/M2 | OXYGEN SATURATION: 96 % | HEART RATE: 68 BPM | HEIGHT: 63 IN

## 2020-06-14 LAB
ALBUMIN SERPL-MCNC: 3.5 G/DL (ref 3.5–5.2)
ALP BLD-CCNC: 68 U/L (ref 35–104)
ALT SERPL-CCNC: 27 U/L (ref 5–33)
ANION GAP SERPL CALCULATED.3IONS-SCNC: 10 MMOL/L (ref 7–19)
AST SERPL-CCNC: 25 U/L (ref 5–32)
BASOPHILS ABSOLUTE: 0 K/UL (ref 0–0.2)
BASOPHILS RELATIVE PERCENT: 0.4 % (ref 0–1)
BILIRUB SERPL-MCNC: <0.2 MG/DL (ref 0.2–1.2)
BUN BLDV-MCNC: 24 MG/DL (ref 8–23)
CALCIUM SERPL-MCNC: 8.8 MG/DL (ref 8.8–10.2)
CHLORIDE BLD-SCNC: 103 MMOL/L (ref 98–111)
CO2: 24 MMOL/L (ref 22–29)
CREAT SERPL-MCNC: 0.9 MG/DL (ref 0.5–0.9)
EOSINOPHILS ABSOLUTE: 0.4 K/UL (ref 0–0.6)
EOSINOPHILS RELATIVE PERCENT: 3.5 % (ref 0–5)
GFR NON-AFRICAN AMERICAN: 60
GLUCOSE BLD-MCNC: 189 MG/DL (ref 74–109)
HCT VFR BLD CALC: 35.4 % (ref 37–47)
HEMOGLOBIN: 10.9 G/DL (ref 12–16)
IMMATURE GRANULOCYTES #: 0 K/UL
INR BLD: 1.48 (ref 0.88–1.18)
LYMPHOCYTES ABSOLUTE: 3.5 K/UL (ref 1.1–4.5)
LYMPHOCYTES RELATIVE PERCENT: 33.7 % (ref 20–40)
MCH RBC QN AUTO: 27.9 PG (ref 27–31)
MCHC RBC AUTO-ENTMCNC: 30.8 G/DL (ref 33–37)
MCV RBC AUTO: 90.8 FL (ref 81–99)
MONOCYTES ABSOLUTE: 0.8 K/UL (ref 0–0.9)
MONOCYTES RELATIVE PERCENT: 8 % (ref 0–10)
NEUTROPHILS ABSOLUTE: 5.6 K/UL (ref 1.5–7.5)
NEUTROPHILS RELATIVE PERCENT: 54.1 % (ref 50–65)
PDW BLD-RTO: 16.2 % (ref 11.5–14.5)
PLATELET # BLD: 257 K/UL (ref 130–400)
PMV BLD AUTO: 9.8 FL (ref 9.4–12.3)
POTASSIUM SERPL-SCNC: 4.4 MMOL/L (ref 3.5–5)
PROTHROMBIN TIME: 18.1 SEC (ref 12–14.6)
RBC # BLD: 3.9 M/UL (ref 4.2–5.4)
SODIUM BLD-SCNC: 137 MMOL/L (ref 136–145)
TOTAL PROTEIN: 6 G/DL (ref 6.6–8.7)
WBC # BLD: 10.3 K/UL (ref 4.8–10.8)

## 2020-06-14 PROCEDURE — 73502 X-RAY EXAM HIP UNI 2-3 VIEWS: CPT

## 2020-06-14 PROCEDURE — 99284 EMERGENCY DEPT VISIT MOD MDM: CPT

## 2020-06-14 PROCEDURE — 36415 COLL VENOUS BLD VENIPUNCTURE: CPT

## 2020-06-14 PROCEDURE — 6370000000 HC RX 637 (ALT 250 FOR IP): Performed by: NURSE PRACTITIONER

## 2020-06-14 PROCEDURE — 93971 EXTREMITY STUDY: CPT

## 2020-06-14 PROCEDURE — 85610 PROTHROMBIN TIME: CPT

## 2020-06-14 PROCEDURE — 72131 CT LUMBAR SPINE W/O DYE: CPT

## 2020-06-14 PROCEDURE — 85025 COMPLETE CBC W/AUTO DIFF WBC: CPT

## 2020-06-14 PROCEDURE — 80053 COMPREHEN METABOLIC PANEL: CPT

## 2020-06-14 RX ORDER — HYDROCODONE BITARTRATE AND ACETAMINOPHEN 10; 325 MG/1; MG/1
1 TABLET ORAL ONCE
Status: COMPLETED | OUTPATIENT
Start: 2020-06-14 | End: 2020-06-14

## 2020-06-14 RX ADMIN — HYDROCODONE BITARTRATE AND ACETAMINOPHEN 1 TABLET: 10; 325 TABLET ORAL at 14:58

## 2020-06-14 ASSESSMENT — ENCOUNTER SYMPTOMS
ABDOMINAL PAIN: 0
BOWEL INCONTINENCE: 0
BACK PAIN: 1

## 2020-06-14 NOTE — ED PROVIDER NOTES
was reviewed and negative.        PAST MEDICAL HISTORY     Past Medical History:   Diagnosis Date    Bronchitis     Colon cancer ([address]     Colon polyps     Constipation     Deep vein blood clot of left lower extremity (HCC)     Left leg     Depression     DVT (deep venous thrombosis) (HCC)     Dysphagia     Fibrocystic breast disease     Fibromyalgia     GERD (gastroesophageal reflux disease)     reflux with hx of esophageal stricture    Headache(784.0)     History of colon cancer     Hormone replacement therapy (postmenopausal)     Hypertension     Neuropathy of foot     In both feet    Osteoarthritis     left knee pain    Restless legs syndrome     Vitamin D deficiency          SURGICALHISTORY       Past Surgical History:   Procedure Laterality Date    APPENDECTOMY      BREAST BIOPSY      CATARACT REMOVAL       SECTION      CHOLECYSTECTOMY      COLECTOMY  partial    COLON SURGERY      COLONOSCOPY  2010    COLONOSCOPY  2012    Dr Gael Schaffernt: unremarkable enterocolonic anastamosis; hyperplastic polyps    [facility]    COLONOSCOPY  3/11/16    Dr Mauro Feng colon anastomosis, 5 yr recall    FOOT SURGERY  right foot    HAND SURGERY Right     Dr Mila Benson ENDOSCOPY  10-    UPPER GASTROINTESTINAL ENDOSCOPY  3/2013    [facility]: peptic stricture dilated to 48F; HH; small antral mucosal elevation    UPPER GASTROINTESTINAL ENDOSCOPY  2014    [facility]: dilation of esophageal stricture    VARICOSE VEIN SURGERY Left 2014  [address]    Left GSV Ablation    VARICOSE VEIN SURGERY Right 2014  [address]    Right GSV Ablation         CURRENT MEDICATIONS       Discharge Medication List as of 2020  5:15 PM      CONTINUE these medications which have NOT CHANGED    Details   tiZANidine (ZANAFLEX) 2 MG tablet

## 2020-06-18 ENCOUNTER — TELEPHONE (OUTPATIENT)
Dept: NEUROLOGY | Age: 85
End: 2020-06-18

## 2020-06-24 ENCOUNTER — OFFICE VISIT (OUTPATIENT)
Dept: NEUROSURGERY | Age: 85
End: 2020-06-24
Payer: MEDICARE

## 2020-06-24 VITALS
BODY MASS INDEX: 30.12 KG/M2 | SYSTOLIC BLOOD PRESSURE: 189 MMHG | HEART RATE: 71 BPM | HEIGHT: 63 IN | WEIGHT: 170 LBS | DIASTOLIC BLOOD PRESSURE: 88 MMHG

## 2020-06-24 PROCEDURE — 99214 OFFICE O/P EST MOD 30 MIN: CPT | Performed by: NURSE PRACTITIONER

## 2020-06-24 PROCEDURE — G8417 CALC BMI ABV UP PARAM F/U: HCPCS | Performed by: NURSE PRACTITIONER

## 2020-06-24 PROCEDURE — 1036F TOBACCO NON-USER: CPT | Performed by: NURSE PRACTITIONER

## 2020-06-24 PROCEDURE — G8427 DOCREV CUR MEDS BY ELIG CLIN: HCPCS | Performed by: NURSE PRACTITIONER

## 2020-06-24 PROCEDURE — 1123F ACP DISCUSS/DSCN MKR DOCD: CPT | Performed by: NURSE PRACTITIONER

## 2020-06-24 PROCEDURE — 4040F PNEUMOC VAC/ADMIN/RCVD: CPT | Performed by: NURSE PRACTITIONER

## 2020-06-24 PROCEDURE — G8399 PT W/DXA RESULTS DOCUMENT: HCPCS | Performed by: NURSE PRACTITIONER

## 2020-06-24 PROCEDURE — 1090F PRES/ABSN URINE INCON ASSESS: CPT | Performed by: NURSE PRACTITIONER

## 2020-06-24 RX ORDER — METHOCARBAMOL 500 MG/1
500 TABLET, FILM COATED ORAL 4 TIMES DAILY
Qty: 40 TABLET | Refills: 1 | Status: SHIPPED | OUTPATIENT
Start: 2020-06-24 | End: 2020-07-02

## 2020-06-24 NOTE — PROGRESS NOTES
Drug use: No    Sexual activity: Yes     Partners: Male   Lifestyle    Physical activity     Days per week: Not on file     Minutes per session: Not on file    Stress: Not on file   Relationships    Social connections     Talks on phone: Not on file     Gets together: Not on file     Attends Jainism service: Not on file     Active member of club or organization: Not on file     Attends meetings of clubs or organizations: Not on file     Relationship status: Not on file    Intimate partner violence     Fear of current or ex partner: Not on file     Emotionally abused: Not on file     Physically abused: Not on file     Forced sexual activity: Not on file   Other Topics Concern    Not on file   Social History Narrative    Not on file       Current Outpatient Medications   Medication Sig Dispense Refill    methocarbamol (ROBAXIN) 500 MG tablet Take 1 tablet by mouth 4 times daily for 20 days 40 tablet 1    tiZANidine (ZANAFLEX) 2 MG tablet Take 1 tablet by mouth every 6 hours as needed (1 or 2 tablets as needed for relief) 30 tablet 0    terbinafine (LAMISIL) 250 MG tablet TAKE ONE TABLET PO ONCE DAILY 90 tablet 0    vitamin B-12 (CYANOCOBALAMIN) 500 MCG tablet TAKE 1 TABLET BY MOUTH DAILY 30 tablet 11    gabapentin (NEURONTIN) 600 MG tablet TAKE 1 TABLET BY MOUTH THREE TIMES DAILY 90 tablet 5    omeprazole (PRILOSEC) 40 MG delayed release capsule TAKE 1 CAPSULE BY MOUTH DAILY 90 capsule 3    triamterene-hydrochlorothiazide (MAXZIDE-25) 37.5-25 MG per tablet TAKE 1 TABLET BY MOUTH DAILY 90 tablet 3    HYDROcodone-acetaminophen (NORCO) 7.5-325 MG per tablet TK 1 T PO Q 12 H PRN P      allopurinol (ZYLOPRIM) 100 MG tablet Take 2 tablets by mouth daily For gout -prevention 60 tablet 5    losartan (COZAAR) 100 MG tablet TAKE 1 TABLET BY MOUTH EVERY MORNING FOR BLOOD PRESSURE 90 tablet 3    rivaroxaban (XARELTO) 20 MG TABS tablet Take 1 tablet by mouth daily (with breakfast) 30 tablet 3    vitamin D []?Abdominal Pain  []? Constipation  []? Diarrhea  []? Dark Bloody Stools  [x]? Denies all unless marked  Psychiatric/Behavioral:[]? Depression []? Anxiety [x]? Denies all unless marked  Genitourinary:   []? Frequency  []? Urgency  []? Incontinence []? Pain with Urination  [x]? Denies all unless marked  Extremities: []? Pain  []? Swelling  [x]? Denies all unless marked  Musculoskeletal: []? Muscle Pain  []? Joint Pain  []? Arthritis []? Muscle Cramps []? Muscle Twitches  [x]? Denies all unless marked  Sleep: []? Insomnia []? Snoring []? Restless Legs []? Sleep Apnea  []? Daytime Sleepiness  [x]? Denies all unless marked  Skin:[]? Rash []? Skin Discoloration [x]? Denies all unless marked   Neurological: []? Visual Disturbance/Memory Loss []? Loss of Balance []? Slurred Speech/Weakness []? Seizures  []? Vertigo/Dizziness [x]? Denies all unless marked     The MA has completed the ROS with the patient. I have reviewed it in its' entirety with the patient and agree with the documentation. PHYSICAL EXAM  BP (!) 189/88   Pulse 71   Ht 5' 3\" (1.6 m)   Wt 170 lb (77.1 kg)   BMI 30.11 kg/m²       Constitutional - No acute distress    HEENT- Conjunctiva normal.  No scars, masses, or lesions over external nose or ears, no neck masses noted, no jugular vein distension, no bruit  Cardiac- Regular rate and rhythm  Pulmonary- Good expansion, normal effort without use of accessory muscles  Musculoskeletal - No significant wasting of muscles noted, no bony deformities  Extremities - No clubbing, cyanosis or edema  Skin - Warm, dry, and intact. No rash, erythema, or pallor  Psychiatric - Mood, affect, and behavior appear normal      NEUROLOGICAL EXAM     Mental status   [x]Awake, alert, oriented   [x]Affect attention and concentration appear appropriate  [x]Recent and remote memory appears unremarkable  [x]Speech normal without dysarthria or aphasia, comprehension and repetition intact.    COMMENTS: mild cognitive impairment

## 2020-06-25 ENCOUNTER — TELEPHONE (OUTPATIENT)
Dept: NEUROSURGERY | Age: 85
End: 2020-06-25

## 2020-06-25 ENCOUNTER — HOSPITAL ENCOUNTER (OUTPATIENT)
Dept: MRI IMAGING | Age: 85
Discharge: HOME | End: 2020-06-25
Payer: MEDICARE

## 2020-06-25 PROCEDURE — 72158 MRI LUMBAR SPINE W/O & W/DYE: CPT

## 2020-06-25 PROCEDURE — 6360000004 HC RX CONTRAST MEDICATION: Performed by: NURSE PRACTITIONER

## 2020-06-25 PROCEDURE — A9577 INJ MULTIHANCE: HCPCS | Performed by: NURSE PRACTITIONER

## 2020-06-25 RX ADMIN — GADOBENATE DIMEGLUMINE 15 ML: 529 INJECTION, SOLUTION INTRAVENOUS at 12:30

## 2020-06-29 ENCOUNTER — OFFICE VISIT (OUTPATIENT)
Dept: PRIMARY CARE CLINIC | Age: 85
End: 2020-06-29
Payer: MEDICARE

## 2020-06-29 VITALS
TEMPERATURE: 97.4 F | HEIGHT: 63 IN | RESPIRATION RATE: 16 BRPM | SYSTOLIC BLOOD PRESSURE: 122 MMHG | OXYGEN SATURATION: 97 % | WEIGHT: 170 LBS | DIASTOLIC BLOOD PRESSURE: 60 MMHG | BODY MASS INDEX: 30.12 KG/M2 | HEART RATE: 62 BPM

## 2020-06-29 LAB
ALBUMIN SERPL-MCNC: 3.5 G/DL (ref 3.5–5.2)
ALP BLD-CCNC: 113 U/L (ref 35–104)
ALT SERPL-CCNC: 11 U/L (ref 5–33)
ANION GAP SERPL CALCULATED.3IONS-SCNC: 14 MMOL/L (ref 7–19)
AST SERPL-CCNC: 14 U/L (ref 5–32)
BASOPHILS ABSOLUTE: 0 K/UL (ref 0–0.2)
BASOPHILS RELATIVE PERCENT: 0.5 % (ref 0–1)
BILIRUB SERPL-MCNC: <0.2 MG/DL (ref 0.2–1.2)
BUN BLDV-MCNC: 16 MG/DL (ref 8–23)
CALCIUM SERPL-MCNC: 9 MG/DL (ref 8.8–10.2)
CHLORIDE BLD-SCNC: 100 MMOL/L (ref 98–111)
CO2: 23 MMOL/L (ref 22–29)
CREAT SERPL-MCNC: 0.8 MG/DL (ref 0.5–0.9)
EOSINOPHILS ABSOLUTE: 0.3 K/UL (ref 0–0.6)
EOSINOPHILS RELATIVE PERCENT: 4.4 % (ref 0–5)
GFR NON-AFRICAN AMERICAN: >60
GLUCOSE BLD-MCNC: 98 MG/DL (ref 74–109)
HCT VFR BLD CALC: 38.7 % (ref 37–47)
HEMOGLOBIN: 11.8 G/DL (ref 12–16)
IMMATURE GRANULOCYTES #: 0 K/UL
LYMPHOCYTES ABSOLUTE: 1.8 K/UL (ref 1.1–4.5)
LYMPHOCYTES RELATIVE PERCENT: 27.5 % (ref 20–40)
MCH RBC QN AUTO: 28 PG (ref 27–31)
MCHC RBC AUTO-ENTMCNC: 30.5 G/DL (ref 33–37)
MCV RBC AUTO: 91.9 FL (ref 81–99)
MONOCYTES ABSOLUTE: 0.7 K/UL (ref 0–0.9)
MONOCYTES RELATIVE PERCENT: 10.5 % (ref 0–10)
NEUTROPHILS ABSOLUTE: 3.8 K/UL (ref 1.5–7.5)
NEUTROPHILS RELATIVE PERCENT: 56.8 % (ref 50–65)
PDW BLD-RTO: 15.7 % (ref 11.5–14.5)
PLATELET # BLD: 290 K/UL (ref 130–400)
PMV BLD AUTO: 10.8 FL (ref 9.4–12.3)
POTASSIUM SERPL-SCNC: 4.2 MMOL/L (ref 3.5–5)
RBC # BLD: 4.21 M/UL (ref 4.2–5.4)
SODIUM BLD-SCNC: 137 MMOL/L (ref 136–145)
TOTAL PROTEIN: 6.1 G/DL (ref 6.6–8.7)
WBC # BLD: 6.7 K/UL (ref 4.8–10.8)

## 2020-06-29 PROCEDURE — 1036F TOBACCO NON-USER: CPT | Performed by: NURSE PRACTITIONER

## 2020-06-29 PROCEDURE — G8427 DOCREV CUR MEDS BY ELIG CLIN: HCPCS | Performed by: NURSE PRACTITIONER

## 2020-06-29 PROCEDURE — G8417 CALC BMI ABV UP PARAM F/U: HCPCS | Performed by: NURSE PRACTITIONER

## 2020-06-29 PROCEDURE — 1090F PRES/ABSN URINE INCON ASSESS: CPT | Performed by: NURSE PRACTITIONER

## 2020-06-29 PROCEDURE — 1123F ACP DISCUSS/DSCN MKR DOCD: CPT | Performed by: NURSE PRACTITIONER

## 2020-06-29 PROCEDURE — 4040F PNEUMOC VAC/ADMIN/RCVD: CPT | Performed by: NURSE PRACTITIONER

## 2020-06-29 PROCEDURE — G8399 PT W/DXA RESULTS DOCUMENT: HCPCS | Performed by: NURSE PRACTITIONER

## 2020-06-29 PROCEDURE — 99213 OFFICE O/P EST LOW 20 MIN: CPT | Performed by: NURSE PRACTITIONER

## 2020-06-29 PROCEDURE — 96523 IRRIG DRUG DELIVERY DEVICE: CPT | Performed by: NURSE PRACTITIONER

## 2020-06-29 ASSESSMENT — ENCOUNTER SYMPTOMS
BACK PAIN: 1
GASTROINTESTINAL NEGATIVE: 1

## 2020-06-29 NOTE — PROGRESS NOTES
tablet Take 1 tablet by mouth every 6 hours as needed (1 or 2 tablets as needed for relief) 30 tablet 0    terbinafine (LAMISIL) 250 MG tablet TAKE ONE TABLET PO ONCE DAILY 90 tablet 0    vitamin B-12 (CYANOCOBALAMIN) 500 MCG tablet TAKE 1 TABLET BY MOUTH DAILY 30 tablet 11    gabapentin (NEURONTIN) 600 MG tablet TAKE 1 TABLET BY MOUTH THREE TIMES DAILY 90 tablet 5    omeprazole (PRILOSEC) 40 MG delayed release capsule TAKE 1 CAPSULE BY MOUTH DAILY 90 capsule 3    triamterene-hydrochlorothiazide (MAXZIDE-25) 37.5-25 MG per tablet TAKE 1 TABLET BY MOUTH DAILY 90 tablet 3    HYDROcodone-acetaminophen (NORCO) 7.5-325 MG per tablet TK 1 T PO Q 12 H PRN P      allopurinol (ZYLOPRIM) 100 MG tablet Take 2 tablets by mouth daily For gout -prevention 60 tablet 5    losartan (COZAAR) 100 MG tablet TAKE 1 TABLET BY MOUTH EVERY MORNING FOR BLOOD PRESSURE 90 tablet 3    rivaroxaban (XARELTO) 20 MG TABS tablet Take 1 tablet by mouth daily (with breakfast) 30 tablet 3    vitamin D (ERGOCALCIFEROL) 1.25 MG (05353 UT) CAPS capsule TAKE 1 CAPSULE BY MOUTH 1 TIME A WEEK 13 capsule 3    metoprolol tartrate (LOPRESSOR) 25 MG tablet TAKE ONE TABLET BY MOUTH TWICE DAILY FOR BLOOD PRESSURE AND HEART 180 tablet 3    albuterol (ACCUNEB) 1.25 MG/3ML nebulizer solution Inhale 1 mL into the lungs daily      dorzolamide-timolol (COSOPT) 22.3-6.8 MG/ML ophthalmic solution Place 1 Dose into both eyes daily      furosemide (LASIX) 20 MG tablet Take 1 tablet by mouth daily as needed (swelling) 30 tablet 3    acyclovir (ZOVIRAX) 400 MG tablet TAKE 1 TABLET BY MOUTH FIVE TIMES DAILY FOR 1 WEEK THEN TAKE 1 TABLET BY MOUTH BACK TWICE DAILY (Patient taking differently: TAKE 1 TABLET BY MOUTH BACK TWICE DAILY) 56 tablet 5    Nebulizers (AIRInvenQuery COMPACT MINI NEBULIZER) MISC 1 Units by Does not apply route 4 times daily 1 each 0     No current facility-administered medications for this visit.       Allergies   Allergen Reactions    Zoloft Comprehensive Metabolic Panel    LA IRRIG IMPLANTED DRUG DELIVERY DEVICE       Follow-up:  Return for 2 months. PATIENT INSTRUCTIONS:  Patient Instructions   Continue xarelto  appt with Dr monzon  We will see how labs are if we should continue the lamisil. Electronically signed by EARLE Hyde CNP on 6/29/2020 at 6:09 PM    EMR Dragon/transcription disclaimer:  Much of thisencounter note is electronic transcription/translation of spoken language to printed texts. The electronic translation of spoken language may be erroneous, or at times, nonsensical words or phrases may be inadvertentlytranscribed.   Although I have reviewed the note for such errors, some may still exist.

## 2020-06-30 ENCOUNTER — PREP FOR PROCEDURE (OUTPATIENT)
Dept: NEUROSURGERY | Age: 85
End: 2020-06-30

## 2020-06-30 ENCOUNTER — OFFICE VISIT (OUTPATIENT)
Dept: NEUROSURGERY | Age: 85
End: 2020-06-30
Payer: MEDICARE

## 2020-06-30 VITALS
SYSTOLIC BLOOD PRESSURE: 145 MMHG | WEIGHT: 170 LBS | HEART RATE: 65 BPM | OXYGEN SATURATION: 97 % | HEIGHT: 63 IN | BODY MASS INDEX: 30.12 KG/M2 | DIASTOLIC BLOOD PRESSURE: 68 MMHG

## 2020-06-30 PROCEDURE — G8399 PT W/DXA RESULTS DOCUMENT: HCPCS | Performed by: NEUROLOGICAL SURGERY

## 2020-06-30 PROCEDURE — G8417 CALC BMI ABV UP PARAM F/U: HCPCS | Performed by: NEUROLOGICAL SURGERY

## 2020-06-30 PROCEDURE — 1036F TOBACCO NON-USER: CPT | Performed by: NEUROLOGICAL SURGERY

## 2020-06-30 PROCEDURE — G8427 DOCREV CUR MEDS BY ELIG CLIN: HCPCS | Performed by: NEUROLOGICAL SURGERY

## 2020-06-30 PROCEDURE — 1123F ACP DISCUSS/DSCN MKR DOCD: CPT | Performed by: NEUROLOGICAL SURGERY

## 2020-06-30 PROCEDURE — 1090F PRES/ABSN URINE INCON ASSESS: CPT | Performed by: NEUROLOGICAL SURGERY

## 2020-06-30 PROCEDURE — 4040F PNEUMOC VAC/ADMIN/RCVD: CPT | Performed by: NEUROLOGICAL SURGERY

## 2020-06-30 PROCEDURE — 99205 OFFICE O/P NEW HI 60 MIN: CPT | Performed by: NEUROLOGICAL SURGERY

## 2020-06-30 RX ORDER — SODIUM CHLORIDE 0.9 % (FLUSH) 0.9 %
10 SYRINGE (ML) INJECTION EVERY 12 HOURS SCHEDULED
Status: CANCELLED | OUTPATIENT
Start: 2020-06-30

## 2020-06-30 RX ORDER — SODIUM CHLORIDE 0.9 % (FLUSH) 0.9 %
10 SYRINGE (ML) INJECTION PRN
Status: CANCELLED | OUTPATIENT
Start: 2020-06-30

## 2020-06-30 ASSESSMENT — ENCOUNTER SYMPTOMS
RESPIRATORY NEGATIVE: 1
EYES NEGATIVE: 1
BACK PAIN: 1
GASTROINTESTINAL NEGATIVE: 1

## 2020-06-30 NOTE — H&P (VIEW-ONLY)
[facility]  H&P      Chief Complaint   Patient presents with    Referral - General    Back Pain    Leg Pain       HISTORY OF PRESENT ILLNESS:    Palak Packer is a 82 y.o. female with severe back pain of over the last year who presents with severe back and bilateral lower extremity pain that has progressed over the last few weeks. The pain does radiate into right anterior thighs and anterolateral thighs . Her pain is mostly located in the legs. The patient complains of numbness in her feet she has a history of neuropathy. Her pain is worsened when going from a seated to standing position. Her pain is worsened with walking. Her pain is worsened when lying flat. Overall, indicative that the patient does have a mechanical nature to their pain. She states that 20% of her pain is located in the back and 80% is leg pain. She see Dr. Katarina Bearden with pain management. She was scheduled for an injection. The patient has underwent a non-operative treatment course that has included:  NSAIDs   APAP  Muscle Relaxers  Opiates - Norco 7.5 for about 2-3 months      Of note she does not use tobacco and does take blood thinning medications, XARELTO for DVT left leg. She denies any LLE pain or swelling today. She has a history of colon cancer, she is s/p multiple operations in [place], s/p chemotharpy.                  Past Medical History:   Diagnosis Date    Bronchitis     Colon cancer ([facility] [address]     Colon polyps     Constipation     Deep vein blood clot of left lower extremity (HCC)     Left leg     Depression     DVT (deep venous thrombosis) (HCC)     Dysphagia     Fibrocystic breast disease     Fibromyalgia     GERD (gastroesophageal reflux disease)     reflux with hx of esophageal stricture    Headache(784.0)     History of colon cancer 2000    Hormone replacement therapy (postmenopausal)     Hypertension     Neuropathy of foot     In both feet    Osteoarthritis     left knee pain    Restless legs syndrome     Vitamin D deficiency        Past Surgical History:   Procedure Laterality Date    APPENDECTOMY      BREAST BIOPSY      CATARACT REMOVAL       SECTION      CHOLECYSTECTOMY      COLECTOMY  partial    COLON SURGERY      COLONOSCOPY  2010    COLONOSCOPY  2012    Dr Ana Laura Reyes: unremarkable enterocolonic anastamosis; hyperplastic polyps    COLONOSCOPY  3/2013    [facility]: unremarkable post-surgical colon    COLONOSCOPY  3/11/16    Dr Ashkan Roberson colon anastomosis, 5 yr recall    FOOT SURGERY  right foot    HAND SURGERY Right     Dr Martín Gann GASTROINTESTINAL ENDOSCOPY  10-    UPPER GASTROINTESTINAL ENDOSCOPY  3/2013    [place]: peptic stricture dilated to 48F; HH; small antral mucosal elevation    UPPER GASTROINTESTINAL ENDOSCOPY  2014    Maurilio: dilation of esophageal stricture    VARICOSE VEIN SURGERY Left 2014  [facility] & [address]    Left GSV Ablation    [place]  [facility] [place]    Right GSV Ablation       Current Outpatient Medications   Medication Sig Dispense Refill    methocarbamol (ROBAXIN) 500 MG tablet Take 1 tablet by mouth 4 times daily for 20 days 40 tablet 1    tiZANidine (ZANAFLEX) 2 MG tablet Take 1 tablet by mouth every 6 hours as needed (1 or 2 tablets as needed for relief) 30 tablet 0    terbinafine (LAMISIL) 250 MG tablet TAKE ONE TABLET PO ONCE DAILY 90 tablet 0    vitamin B-12 (CYANOCOBALAMIN) 500 MCG tablet TAKE 1 TABLET BY MOUTH DAILY 30 tablet 11    gabapentin (NEURONTIN) 600 MG tablet TAKE 1 TABLET BY MOUTH THREE TIMES DAILY 90 tablet 5    omeprazole (PRILOSEC) 40 MG delayed release capsule TAKE 1 CAPSULE BY MOUTH DAILY 90 capsule 3    triamterene-hydrochlorothiazide (MAXZIDE-25) 37.5-25 MG per tablet TAKE 1 TABLET BY MOUTH DAILY 90 tablet 3    HYDROcodone-acetaminophen (NORCO) 7.5-325 MG per tablet TK 1 T PO Q 12 H PRN P      allopurinol (ZYLOPRIM) 100 MG tablet Take 2 tablets by mouth daily For gout -prevention 60 tablet 5    losartan (COZAAR) 100 MG tablet TAKE 1 TABLET BY MOUTH EVERY MORNING FOR BLOOD PRESSURE 90 tablet 3    rivaroxaban (XARELTO) 20 MG TABS tablet Take 1 tablet by mouth daily (with breakfast) 30 tablet 3    vitamin D (ERGOCALCIFEROL) 1.25 MG (36584 UT) CAPS capsule TAKE 1 CAPSULE BY MOUTH 1 TIME A WEEK 13 capsule 3    metoprolol tartrate (LOPRESSOR) 25 MG tablet TAKE ONE TABLET BY MOUTH TWICE DAILY FOR BLOOD PRESSURE AND HEART 180 tablet 3    albuterol (ACCUNEB) 1.25 MG/3ML nebulizer solution Inhale 1 mL into the lungs daily      dorzolamide-timolol (COSOPT) 22.3-6.8 MG/ML ophthalmic solution Place 1 Dose into both eyes daily      furosemide (LASIX) 20 MG tablet Take 1 tablet by mouth daily as needed (swelling) 30 tablet 3    acyclovir (ZOVIRAX) 400 MG tablet TAKE 1 TABLET BY MOUTH FIVE TIMES DAILY FOR 1 WEEK THEN TAKE 1 TABLET BY MOUTH BACK TWICE DAILY (Patient taking differently: TAKE 1 TABLET BY MOUTH BACK TWICE DAILY) 56 tablet 5    Nebulizers (AIRIAL COMPACT MINI NEBULIZER) MISC 1 Units by Does not apply route 4 times daily 1 each 0     No current facility-administered medications for this visit. Allergies:  Zoloft [sertraline hcl]    Social History:   Social History     Tobacco Use   Smoking Status Never Smoker   Smokeless Tobacco Never Used     Social History     Substance and Sexual Activity   Alcohol Use No         Family History:   Family History   Problem Relation Age of Onset    Cancer Mother         Cervical Cancer    Cancer Brother         Esophageal cancer    Diabetes Brother     Esophageal Cancer Brother     Diabetes Sister     Colon Cancer Neg Hx     Colon Polyps Neg Hx     Liver Cancer Neg Hx     Liver Disease Neg Hx     Rectal Cancer Neg Hx     Stomach Cancer Neg Hx        REVIEW OF SYSTEMS:  Constitutional: Negative. HENT: Negative. Eyes: Negative. Respiratory: Negative. Cardiovascular: Negative. Gastrointestinal: Negative. Genitourinary: Negative. Musculoskeletal: Positive for back pain, joint pain and myalgias. Skin: Negative. Neurological: Positive for tingling. Psychiatric/Behavioral: Negative. PHYSICAL EXAM:  Vitals:    06/30/20 0937   BP: (!) 145/68   Pulse: 65   SpO2: 97%     Constitutional: appears well-developed and well-nourished. Eyes - conjunctiva normal.  Pupils react to light  Ear, nose, throat - hearing intact to finger rub, No scars, masses, or lesions over external nose or ears, no atrophy oftongue  Neck- symmetric, no masses noted, no jugular vein distension  Respiration- chest wall appears symmetric, good expansion, normal effort without use of accessory muscles  Musculoskeletal - no significant wasting of muscles noted, no bony deformities, gait no gross ataxia  Extremities- no clubbing, cyanosis oredema  Skin - warm, dry, and intact.  + LLE erythema noted from resolving cellulitis, no swell or calf tenderness  Psychiatric - mood, affect, and behavior appear normal.     Neurologic Examination  Awake, Alert and oriented x 4  Normal speech pattern, following commands    Motor:  RIGHT: iliopsoas 4/5 pain limited    knee flexor 5/5    knee extension 5/5    EHL/dorsiflexion 5/5    plantar flexion 5/5    LEFT:   iliopsoas 4/5 pain limited    knee flexor 5/5    knee extension 5/5    EHL/dorsiflexion 5/5    plantar flexion 5/5    Deficits to light touch or pinprick sensation bilateral lower extremities, neuorpathy  Reflexes are 1+ and symmetric    No myofacial tenderness to palpation  Unable to ambulate due to extreme pain        DATA and IMAGING:    Nursing/pcp notes, imaging, labs, and vitals reviewed.      PT,OT and/or speech notes reviewed    Lab Results   Component Value Date    WBC 6.7 06/29/2020    HGB 11.8 (L) 06/29/2020    HCT 38.7 06/29/2020    MCV 91.9 06/29/2020     06/29/2020     Lab Results L5   vertebral body which is likely reactive from fracture. Severe multilevel degenerative changes characterized by diffuse disc   desiccation, multilevel disc height loss, multiple disc bulges, mixed   Modic type endplate changes, endplate osteophytes, and facet   arthropathy. Effects upon the central canal and neural foramen are   described below. L1-L2: Central canal and neural foramina are widely patent. L2-L3: Disc bulge causes mild narrowing the central canal. Along with   facet arthropathy, there is moderate RIGHT and mild LEFT neural   foraminal narrowing. L3-L4: Disc bulge and facet arthropathy cause moderate narrowing of   the central canal. The same factors cause moderate to severe RIGHT   neural foraminal narrowing. Mild LEFT neural foraminal narrowing. L4-L5: Large disc bulge and severe facet arthropathy cause severe   central canal stenosis with partial CSF effacement. The same factors   cause moderate to severe RIGHT and LEFT neural foraminal narrowing   with apparent abutment of both exiting L4 nerve roots. L5-S1: Disc bulge and facet arthropathy cause mild narrowing the   central canal. The same factors cause severe LEFT neural foraminal   stenosis with possible impingement of the exiting LEFT L5 nerve root. Only mild RIGHT neural foraminal narrowing.       Impression   1.  Acute L5 compression fracture with involvement of the anterior and   posterior vertebral body but no evidence of posterior element   involvement. Approximate 15% height loss. No change in appearance   compared to a CT from 6/14/2020. No other fracture identified. 2.  Severe multilevel degenerative change, as described above. Notably, there is severe central canal stenosis at L4-L5 and severe   LEFT neural foraminal stenosis at L5-S1. 3.  Tiny 3 mm enhancing nodule located along one of the nerve roots of   the cauda equina.  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 Recommend MRI of the brain to exclude CNS neoplasm. Signed by Dr Vesta Catherine on 6/25/2020 1:06 PM       I have personally reviewed these images and my interpretation is: There is an acute compression fracture of the L5 vertebral body with about 15% height loss. Behind the L2 vertebral body there is a small 2-3 mm spherical mass that involves one of the nerve rootlets - likely benign     There is a levoscoliosis  There is DDD throughout the lumbar spine  L3-4 mild right foraminal stenosis  L4-5 mild to moderate canal stenosis      ASSESSMENT:    Ochoa Soriano is a 82 y.o. female with severe low back and lower extremity pain. ICD-10-CM    1. [facility] S32.050A    2. Neuropathy G62.9    3. Pain in both lower extremities M79.604     M79.605    4. Other chronic pain  G89.29    5. Numbness and tingling of both feet R20.0     R20.2        PLAN:  We have discussed and reviewed the results of the MRI with Mrs. Schmidt at length. We explained that she has an acute compression fracture at L5 that is resulting in some her severe pain. I explained that the severe back pain could be addressed with a kyphoplasty but the lower extremity pain would likely not improve. We discussed more conservative treatments as well. She states she cannot walk due to severe pain. She states she would like to move forward with the kyphoplasty at L5. We will need to send a specimend due to her history of colon cancer. She will need to stop her Xarelto at least 2-3 days prior to the procedure. Note: A total of >50% (35 minutes) of 60 minutes was spent discussing the pathophysiology and treatment and/or coordination of care of the above diagnoses.       Jimmy Chavez,

## 2020-06-30 NOTE — H&P
Restless legs syndrome     Vitamin D deficiency        Past Surgical History:   Procedure Laterality Date    APPENDECTOMY      BREAST BIOPSY      CATARACT REMOVAL       SECTION      CHOLECYSTECTOMY      COLECTOMY  partial    COLON SURGERY      COLONOSCOPY  2010    COLONOSCOPY  2012    Dr Katy Armstrong: unremarkable enterocolonic anastamosis; hyperplastic polyps    COLONOSCOPY  3/2013    [facility]: unremarkable post-surgical colon    COLONOSCOPY  3/11/16    Dr Juan Ramon Valle colon anastomosis, 5 yr recall    FOOT SURGERY  right foot    HAND SURGERY Right     Dr Roselyn Frazier GASTROINTESTINAL ENDOSCOPY  10-    UPPER GASTROINTESTINAL ENDOSCOPY  3/2013    [place]: peptic stricture dilated to 48F; HH; small antral mucosal elevation    UPPER GASTROINTESTINAL ENDOSCOPY  2014    [facility]: dilation of esophageal stricture    VARICOSE VEIN SURGERY Left 2014  [facility] & [address]    Left GSV Ablation    VARICOSE VEIN SURGERY Right 2014  [facility] [place]    Right GSV Ablation       Current Outpatient Medications   Medication Sig Dispense Refill    methocarbamol (ROBAXIN) 500 MG tablet Take 1 tablet by mouth 4 times daily for 20 days 40 tablet 1    tiZANidine (ZANAFLEX) 2 MG tablet Take 1 tablet by mouth every 6 hours as needed (1 or 2 tablets as needed for relief) 30 tablet 0    terbinafine (LAMISIL) 250 MG tablet TAKE ONE TABLET PO ONCE DAILY 90 tablet 0    vitamin B-12 (CYANOCOBALAMIN) 500 MCG tablet TAKE 1 TABLET BY MOUTH DAILY 30 tablet 11    gabapentin (NEURONTIN) 600 MG tablet TAKE 1 TABLET BY MOUTH THREE TIMES DAILY 90 tablet 5    omeprazole (PRILOSEC) 40 MG delayed release capsule TAKE 1 CAPSULE BY MOUTH DAILY 90 capsule 3    triamterene-hydrochlorothiazide (MAXZIDE-25) 37.5-25 MG per tablet TAKE 1 TABLET BY MOUTH DAILY 90 tablet 3    HYDROcodone-acetaminophen (NORCO) 7.5-325 MG per tablet TK 1 T PO Q 12 H PRN Respiratory: Negative. Cardiovascular: Negative. Gastrointestinal: Negative. Genitourinary: Negative. Musculoskeletal: Positive for back pain, joint pain and myalgias. Skin: Negative. Neurological: Positive for tingling. Psychiatric/Behavioral: Negative. PHYSICAL EXAM:  Vitals:    06/30/20 0937   BP: (!) 145/68   Pulse: 65   SpO2: 97%     Constitutional: appears well-developed and well-nourished. Eyes - conjunctiva normal.  Pupils react to light  Ear, nose, throat - hearing intact to finger rub, No scars, masses, or lesions over external nose or ears, no atrophy oftongue  Neck- symmetric, no masses noted, no jugular vein distension  Respiration- chest wall appears symmetric, good expansion, normal effort without use of accessory muscles  Musculoskeletal - no significant wasting of muscles noted, no bony deformities, gait no gross ataxia  Extremities- no clubbing, cyanosis oredema  Skin - warm, dry, and intact.  + LLE erythema noted from resolving cellulitis, no swell or calf tenderness  Psychiatric - mood, affect, and behavior appear normal.     Neurologic Examination  Awake, Alert and oriented x 4  Normal speech pattern, following commands    Motor:  RIGHT: iliopsoas 4/5 pain limited    knee flexor 5/5    knee extension 5/5    EHL/dorsiflexion 5/5    plantar flexion 5/5    LEFT:   iliopsoas 4/5 pain limited    knee flexor 5/5    knee extension 5/5    EHL/dorsiflexion 5/5    plantar flexion 5/5    Deficits to light touch or pinprick sensation bilateral lower extremities, neuorpathy  Reflexes are 1+ and symmetric    No myofacial tenderness to palpation  Unable to ambulate due to extreme pain        DATA and IMAGING:    Nursing/pcp notes, imaging, labs, and vitals reviewed.      PT,OT and/or speech notes reviewed    Lab Results   Component Value Date    WBC 6.7 06/29/2020    HGB 11.8 (L) 06/29/2020    HCT 38.7 06/29/2020    MCV 91.9 06/29/2020     06/29/2020     Lab Results Component Value Date     06/29/2020    K 4.2 06/29/2020     06/29/2020    CO2 23 06/29/2020    BUN 16 06/29/2020    CREATININE 0.8 06/29/2020    GLUCOSE 98 06/29/2020    CALCIUM 9.0 06/29/2020    PROT 6.1 (L) 06/29/2020    LABALBU 3.5 06/29/2020    BILITOT <0.2 06/29/2020    ALKPHOS 113 (H) 06/29/2020    AST 14 06/29/2020    ALT 11 06/29/2020    LABGLOM >60 06/29/2020    GLOB 2.5 10/12/2016     Lab Results   Component Value Date    INR 1.48 (H) 06/14/2020    INR 2.28 (H) 09/19/2019    INR 2.18 (H) 01/05/2019    PROTIME 18.1 (H) 06/14/2020    PROTIME 24.4 (H) 09/19/2019    PROTIME 23.5 (H) 01/05/2019     MRI LUMBAR SPINE W WO CONTRAST - 6/25/2020 10:25 AM   Indication: Severe back pain, LEFT leg pain, difficulty walking,   compression fracture of L5   Comparison: CT 6/14/2020   Findings: This report assumes 5 lumbar-type vertebral bodies. Levocurvature of the lumbar spine centered at L3-L4. Lumbar lordosis   and alignment are otherwise maintained. No acute subluxations. Redemonstrated acute fracture involving the superior endplate of L5   involving both the anterior and posterior vertebral body with   approximate 15% height loss. Associated increased or signal/marrow   edema is present. Degree of height loss appears unchanged from the CT   performed 6/14/2020. No evidence of extension into the posterior   elements. No other acute or chronic vertebral body fracture   identified. L2 vertebral body hemangioma is noted. No other focal vertebral body   lesion. The conus terminates at L2 and demonstrates normal signal   intensity. There is a 3 mm enhancing nodule located along the nerve   roots of the cauda equina (series 11 image 80). No paravertebral soft tissue edema. Large bilateral renal cysts are   partially imaged. A LEFT renal cyst measures 8.3 cm in greatest   dimension. Normal caliber abdominal aorta.  On administration of IV   contrast. There is soft tissue enhancement surrounding the L5   vertebral body which is likely reactive from fracture. Severe multilevel degenerative changes characterized by diffuse disc   desiccation, multilevel disc height loss, multiple disc bulges, mixed   Modic type endplate changes, endplate osteophytes, and facet   arthropathy. Effects upon the central canal and neural foramen are   described below. L1-L2: Central canal and neural foramina are widely patent. L2-L3: Disc bulge causes mild narrowing the central canal. Along with   facet arthropathy, there is moderate RIGHT and mild LEFT neural   foraminal narrowing. L3-L4: Disc bulge and facet arthropathy cause moderate narrowing of   the central canal. The same factors cause moderate to severe RIGHT   neural foraminal narrowing. Mild LEFT neural foraminal narrowing. L4-L5: Large disc bulge and severe facet arthropathy cause severe   central canal stenosis with partial CSF effacement. The same factors   cause moderate to severe RIGHT and LEFT neural foraminal narrowing   with apparent abutment of both exiting L4 nerve roots. L5-S1: Disc bulge and facet arthropathy cause mild narrowing the   central canal. The same factors cause severe LEFT neural foraminal   stenosis with possible impingement of the exiting LEFT L5 nerve root. Only mild RIGHT neural foraminal narrowing.       Impression   1.  Acute L5 compression fracture with involvement of the anterior and   posterior vertebral body but no evidence of posterior element   involvement. Approximate 15% height loss. No change in appearance   compared to a CT from 6/14/2020. No other fracture identified. 2.  Severe multilevel degenerative change, as described above. Notably, there is severe central canal stenosis at L4-L5 and severe   LEFT neural foraminal stenosis at L5-S1. 3.  Tiny 3 mm enhancing nodule located along one of the nerve roots of   the cauda equina.  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

## 2020-06-30 NOTE — PROGRESS NOTES
[facility]  Office Visit      Chief Complaint   Patient presents with    Referral - General    Back Pain    Leg Pain       HISTORY OF PRESENT ILLNESS:    Lebron Pearson is a 82 y.o. female with severe back pain of over the last year who presents with severe back and bilateral lower extremity pain that has progressed over the last few weeks. The pain does radiate into right anterior thighs and anterolateral thighs . Her pain is mostly located in the legs. The patient complains of numbness in her feet she has a history of neuropathy. Her pain is worsened when going from a seated to standing position. Her pain is worsened with walking. Her pain is worsened when lying flat. Overall, indicative that the patient does have a mechanical nature to their pain. She states that 20% of her pain is located in the back and 80% is leg pain. She see Dr. Marc Griggs with pain management. She was scheduled for an injection. The patient has underwent a non-operative treatment course that has included:  NSAIDs   APAP  Muscle Relaxers  Opiates - Norco 7.5 for about 2-3 months      Of note she does not use tobacco and does take blood thinning medications, XARELTO for DVT left leg. She denies any LLE pain or swelling today. She has a history of colon cancer, she is s/p multiple operations in [place], s/p chemotharpy.                  Past Medical History:   Diagnosis Date    Bronchitis     Colon cancer ([facility] [address]     Colon polyps     Constipation     Deep vein blood clot of left lower extremity (HCC)     Left leg     Depression     DVT (deep venous thrombosis) (HCC)     Dysphagia     Fibrocystic breast disease     Fibromyalgia     GERD (gastroesophageal reflux disease)     reflux with hx of esophageal stricture    Headache(784.0)     History of colon cancer 2000    Hormone replacement therapy (postmenopausal)     Hypertension     Neuropathy of foot     In both feet    Osteoarthritis     left knee pain    Restless legs syndrome     Vitamin D deficiency        Past Surgical History:   Procedure Laterality Date    APPENDECTOMY      BREAST BIOPSY      CATARACT REMOVAL       SECTION      CHOLECYSTECTOMY      COLECTOMY  partial    COLON SURGERY      COLONOSCOPY  2010    COLONOSCOPY  2012    Dr Katy Armstrong: unremarkable enterocolonic anastamosis; hyperplastic polyps    COLONOSCOPY  3/2013    [facility]: unremarkable post-surgical colon    COLONOSCOPY  3/11/16    Dr Juan Ramon Valle colon anastomosis, 5 yr recall    FOOT SURGERY  right foot    HAND SURGERY Right     Dr Roselyn Frazier GASTROINTESTINAL ENDOSCOPY  10-    UPPER GASTROINTESTINAL ENDOSCOPY  3/2013    Bodnicole: peptic stricture dilated to 48F; HH; small antral mucosal elevation    UPPER GASTROINTESTINAL ENDOSCOPY  2014    Maurilio: dilation of esophageal stricture    VARICOSE VEIN SURGERY Left 2014  [facility] & [address]    Left GSV Ablation    VARICOSE VEIN SURGERY Right 2014  [facility]    Right GSV Ablation       Current Outpatient Medications   Medication Sig Dispense Refill    methocarbamol (ROBAXIN) 500 MG tablet Take 1 tablet by mouth 4 times daily for 20 days 40 tablet 1    tiZANidine (ZANAFLEX) 2 MG tablet Take 1 tablet by mouth every 6 hours as needed (1 or 2 tablets as needed for relief) 30 tablet 0    terbinafine (LAMISIL) 250 MG tablet TAKE ONE TABLET PO ONCE DAILY 90 tablet 0    vitamin B-12 (CYANOCOBALAMIN) 500 MCG tablet TAKE 1 TABLET BY MOUTH DAILY 30 tablet 11    gabapentin (NEURONTIN) 600 MG tablet TAKE 1 TABLET BY MOUTH THREE TIMES DAILY 90 tablet 5    omeprazole (PRILOSEC) 40 MG delayed release capsule TAKE 1 CAPSULE BY MOUTH DAILY 90 capsule 3    triamterene-hydrochlorothiazide (MAXZIDE-25) 37.5-25 MG per tablet TAKE 1 TABLET BY MOUTH DAILY 90 tablet 3    HYDROcodone-acetaminophen (NORCO) 7.5-325 MG per tablet TK 1 T PO Q 12 H PRN P      allopurinol (ZYLOPRIM) 100 MG tablet Take 2 tablets by mouth daily For gout -prevention 60 tablet 5    losartan (COZAAR) 100 MG tablet TAKE 1 TABLET BY MOUTH EVERY MORNING FOR BLOOD PRESSURE 90 tablet 3    rivaroxaban (XARELTO) 20 MG TABS tablet Take 1 tablet by mouth daily (with breakfast) 30 tablet 3    vitamin D (ERGOCALCIFEROL) 1.25 MG (43035 UT) CAPS capsule TAKE 1 CAPSULE BY MOUTH 1 TIME A WEEK 13 capsule 3    metoprolol tartrate (LOPRESSOR) 25 MG tablet TAKE ONE TABLET BY MOUTH TWICE DAILY FOR BLOOD PRESSURE AND HEART 180 tablet 3    albuterol (ACCUNEB) 1.25 MG/3ML nebulizer solution Inhale 1 mL into the lungs daily      dorzolamide-timolol (COSOPT) 22.3-6.8 MG/ML ophthalmic solution Place 1 Dose into both eyes daily      furosemide (LASIX) 20 MG tablet Take 1 tablet by mouth daily as needed (swelling) 30 tablet 3    acyclovir (ZOVIRAX) 400 MG tablet TAKE 1 TABLET BY MOUTH FIVE TIMES DAILY FOR 1 WEEK THEN TAKE 1 TABLET BY MOUTH BACK TWICE DAILY (Patient taking differently: TAKE 1 TABLET BY MOUTH BACK TWICE DAILY) 56 tablet 5    Nebulizers (AIRIAL COMPACT MINI NEBULIZER) MISC 1 Units by Does not apply route 4 times daily 1 each 0     No current facility-administered medications for this visit. Allergies:  Zoloft [sertraline hcl]    Social History:   Social History     Tobacco Use   Smoking Status Never Smoker   Smokeless Tobacco Never Used     Social History     Substance and Sexual Activity   Alcohol Use No         Family History:   Family History   Problem Relation Age of Onset    Cancer Mother         Cervical Cancer    Cancer Brother         Esophageal cancer    Diabetes Brother     Esophageal Cancer Brother     Diabetes Sister     Colon Cancer Neg Hx     Colon Polyps Neg Hx     Liver Cancer Neg Hx     Liver Disease Neg Hx     Rectal Cancer Neg Hx     Stomach Cancer Neg Hx        REVIEW OF SYSTEMS:  Constitutional: Negative. HENT: Negative. Eyes: Negative.

## 2020-07-01 ENCOUNTER — TELEPHONE (OUTPATIENT)
Dept: NEUROSURGERY | Age: 85
End: 2020-07-01

## 2020-07-01 NOTE — TELEPHONE ENCOUNTER
Neurosurgical New Patient Questionnaire         Reason for referral?         Back pain    Who is completing questionnaire? Patient     Has the patient had any previous spinal/brain surgeries? no      MRI images obtained?        yes      If yes,  where was the MRI performed?      [facility]    Note: If scan was performed at a facility other than [facility], the disk will need to be brought to appointment. Patient agreed to bring disk?      no     What part of the body?       lumbar     When was it performed? 6-    Has the patient had a NCV/EMG?  no      Physical Therapy?         no        Pain Management? yes     If yes, where was pain management therapy? ?     Is the patient still under contract with pain management? yes     Who is the physician?        tai jordan     Is the patient currently taking anything for pain control?   norco 7.5/325 mg tid     Employment Status? retired         Symptoms?           Low back pain radiating down bilat legs causing foot numbness and weakness, -s/p lasix 40 IVP x1, R insulin 7 U x2 ; k+ decreased to 6.7 -- now in 5s  -monitor on telemetry

## 2020-07-02 ENCOUNTER — HOSPITAL ENCOUNTER (OUTPATIENT)
Dept: PREADMISSION TESTING | Age: 85
Discharge: HOME | End: 2020-07-06
Payer: MEDICARE

## 2020-07-02 ENCOUNTER — OFFICE VISIT (OUTPATIENT)
Age: 85
End: 2020-07-02

## 2020-07-02 ENCOUNTER — TELEPHONE (OUTPATIENT)
Dept: NEUROSURGERY | Age: 85
End: 2020-07-02

## 2020-07-02 VITALS — WEIGHT: 170 LBS | HEIGHT: 63 IN | BODY MASS INDEX: 30.12 KG/M2

## 2020-07-02 VITALS — HEART RATE: 61 BPM | OXYGEN SATURATION: 96 % | TEMPERATURE: 97.9 F

## 2020-07-02 PROCEDURE — 93005 ELECTROCARDIOGRAM TRACING: CPT

## 2020-07-02 RX ORDER — ACYCLOVIR 400 MG/1
400 TABLET ORAL 2 TIMES DAILY
COMMUNITY
End: 2021-11-16 | Stop reason: SDUPTHER

## 2020-07-02 NOTE — PROGRESS NOTES
Patient was not seen//assessed by provider in the flu clinic today. COVID swab was order in compliance with requirement for testing prior to surgery or invasive procedure. Nasopharyngeal swab was collected and ordered through [employer] vendor.

## 2020-07-03 LAB
EKG P AXIS: 53 DEGREES
EKG P-R INTERVAL: 176 MS
EKG Q-T INTERVAL: 430 MS
EKG QRS DURATION: 76 MS
EKG QTC CALCULATION (BAZETT): 429 MS
EKG T AXIS: 67 DEGREES
SARS-COV-2, PCR: NOT DETECTED

## 2020-07-03 PROCEDURE — 93010 ELECTROCARDIOGRAM REPORT: CPT | Performed by: INTERNAL MEDICINE

## 2020-07-06 ENCOUNTER — HOSPITAL ENCOUNTER (OUTPATIENT)
Age: 85
Setting detail: OUTPATIENT SURGERY
Discharge: HOME | End: 2020-07-06
Attending: NEUROLOGICAL SURGERY | Admitting: NEUROLOGICAL SURGERY
Payer: MEDICARE

## 2020-07-06 ENCOUNTER — ANESTHESIA EVENT (OUTPATIENT)
Dept: OPERATING ROOM | Age: 85
End: 2020-07-06
Payer: MEDICARE

## 2020-07-06 ENCOUNTER — APPOINTMENT (OUTPATIENT)
Dept: GENERAL RADIOLOGY | Age: 85
End: 2020-07-06
Attending: NEUROLOGICAL SURGERY
Payer: MEDICARE

## 2020-07-06 ENCOUNTER — ANESTHESIA (OUTPATIENT)
Dept: OPERATING ROOM | Age: 85
End: 2020-07-06
Payer: MEDICARE

## 2020-07-06 VITALS
DIASTOLIC BLOOD PRESSURE: 46 MMHG | OXYGEN SATURATION: 100 % | SYSTOLIC BLOOD PRESSURE: 88 MMHG | TEMPERATURE: 95.2 F | RESPIRATION RATE: 26 BRPM

## 2020-07-06 VITALS
BODY MASS INDEX: 30.12 KG/M2 | HEIGHT: 63 IN | WEIGHT: 170 LBS | TEMPERATURE: 97 F | OXYGEN SATURATION: 100 % | HEART RATE: 76 BPM | RESPIRATION RATE: 16 BRPM | DIASTOLIC BLOOD PRESSURE: 77 MMHG | SYSTOLIC BLOOD PRESSURE: 174 MMHG

## 2020-07-06 LAB
ABO/RH: NORMAL
ALBUMIN SERPL-MCNC: 3.7 G/DL (ref 3.5–5.2)
ALP BLD-CCNC: 93 U/L (ref 35–104)
ALT SERPL-CCNC: 11 U/L (ref 5–33)
ANION GAP SERPL CALCULATED.3IONS-SCNC: 10 MMOL/L (ref 7–19)
ANTIBODY SCREEN: NORMAL
APTT: 46.5 SEC (ref 26–36.2)
AST SERPL-CCNC: 13 U/L (ref 5–32)
BILIRUB SERPL-MCNC: 0.3 MG/DL (ref 0.2–1.2)
BUN BLDV-MCNC: 13 MG/DL (ref 8–23)
CALCIUM SERPL-MCNC: 9.6 MG/DL (ref 8.8–10.2)
CHLORIDE BLD-SCNC: 105 MMOL/L (ref 98–111)
CO2: 26 MMOL/L (ref 22–29)
CREAT SERPL-MCNC: 0.7 MG/DL (ref 0.5–0.9)
GFR NON-AFRICAN AMERICAN: >60
GLUCOSE BLD-MCNC: 120 MG/DL (ref 74–109)
INR BLD: 0.93 (ref 0.88–1.18)
POTASSIUM SERPL-SCNC: 4 MMOL/L (ref 3.5–4.9)
PROTHROMBIN TIME: 12.3 SEC (ref 12–14.6)
SODIUM BLD-SCNC: 141 MMOL/L (ref 136–145)
TOTAL PROTEIN: 6.5 G/DL (ref 6.6–8.7)

## 2020-07-06 PROCEDURE — 2580000003 HC RX 258: Performed by: NURSE ANESTHETIST, CERTIFIED REGISTERED

## 2020-07-06 PROCEDURE — 2500000003 HC RX 250 WO HCPCS: Performed by: NURSE ANESTHETIST, CERTIFIED REGISTERED

## 2020-07-06 PROCEDURE — 6360000002 HC RX W HCPCS: Performed by: NURSE ANESTHETIST, CERTIFIED REGISTERED

## 2020-07-06 PROCEDURE — 7100000011 HC PHASE II RECOVERY - ADDTL 15 MIN: Performed by: NEUROLOGICAL SURGERY

## 2020-07-06 PROCEDURE — 86900 BLOOD TYPING SEROLOGIC ABO: CPT

## 2020-07-06 PROCEDURE — 7100000000 HC PACU RECOVERY - FIRST 15 MIN: Performed by: NEUROLOGICAL SURGERY

## 2020-07-06 PROCEDURE — 88307 TISSUE EXAM BY PATHOLOGIST: CPT

## 2020-07-06 PROCEDURE — 7100000001 HC PACU RECOVERY - ADDTL 15 MIN: Performed by: NEUROLOGICAL SURGERY

## 2020-07-06 PROCEDURE — 85730 THROMBOPLASTIN TIME PARTIAL: CPT

## 2020-07-06 PROCEDURE — C1894 INTRO/SHEATH, NON-LASER: HCPCS | Performed by: NEUROLOGICAL SURGERY

## 2020-07-06 PROCEDURE — 36415 COLL VENOUS BLD VENIPUNCTURE: CPT

## 2020-07-06 PROCEDURE — 86901 BLOOD TYPING SEROLOGIC RH(D): CPT

## 2020-07-06 PROCEDURE — 88311 DECALCIFY TISSUE: CPT

## 2020-07-06 PROCEDURE — 6370000000 HC RX 637 (ALT 250 FOR IP): Performed by: NEUROLOGICAL SURGERY

## 2020-07-06 PROCEDURE — C1713 ANCHOR/SCREW BN/BN,TIS/BN: HCPCS | Performed by: NEUROLOGICAL SURGERY

## 2020-07-06 PROCEDURE — 2720000010 HC SURG SUPPLY STERILE: Performed by: NEUROLOGICAL SURGERY

## 2020-07-06 PROCEDURE — 3600000015 HC SURGERY LEVEL 5 ADDTL 15MIN: Performed by: NEUROLOGICAL SURGERY

## 2020-07-06 PROCEDURE — 7100000010 HC PHASE II RECOVERY - FIRST 15 MIN: Performed by: NEUROLOGICAL SURGERY

## 2020-07-06 PROCEDURE — 2709999900 HC NON-CHARGEABLE SUPPLY: Performed by: NEUROLOGICAL SURGERY

## 2020-07-06 PROCEDURE — 6360000002 HC RX W HCPCS: Performed by: NEUROLOGICAL SURGERY

## 2020-07-06 PROCEDURE — 86850 RBC ANTIBODY SCREEN: CPT

## 2020-07-06 PROCEDURE — 3700000001 HC ADD 15 MINUTES (ANESTHESIA): Performed by: NEUROLOGICAL SURGERY

## 2020-07-06 PROCEDURE — 3209999900 FLUORO FOR SURGICAL PROCEDURES

## 2020-07-06 PROCEDURE — 3600000005 HC SURGERY LEVEL 5 BASE: Performed by: NEUROLOGICAL SURGERY

## 2020-07-06 PROCEDURE — 3700000000 HC ANESTHESIA ATTENDED CARE: Performed by: NEUROLOGICAL SURGERY

## 2020-07-06 PROCEDURE — 85610 PROTHROMBIN TIME: CPT

## 2020-07-06 PROCEDURE — 22514 PERQ VERTEBRAL AUGMENTATION: CPT | Performed by: NEUROLOGICAL SURGERY

## 2020-07-06 PROCEDURE — 80053 COMPREHEN METABOLIC PANEL: CPT

## 2020-07-06 DEVICE — BONE CEMENT C01B HV-R WITH MIXER  US
Type: IMPLANTABLE DEVICE | Site: SPINE LUMBAR | Status: FUNCTIONAL
Brand: KYPHON® HV-R® BONE CEMENT AND KYPHON® MIXER PACK

## 2020-07-06 DEVICE — CEMENT C01A KYPHX HV-R BONE CEMENT EN
Type: IMPLANTABLE DEVICE | Site: SPINE LUMBAR | Status: FUNCTIONAL
Brand: KYPHON® HV-R® BONE CEMENT

## 2020-07-06 RX ORDER — ROCURONIUM BROMIDE 10 MG/ML
INJECTION, SOLUTION INTRAVENOUS PRN
Status: DISCONTINUED | OUTPATIENT
Start: 2020-07-06 | End: 2020-07-06 | Stop reason: SDUPTHER

## 2020-07-06 RX ORDER — LABETALOL HYDROCHLORIDE 5 MG/ML
5 INJECTION, SOLUTION INTRAVENOUS EVERY 10 MIN PRN
Status: DISCONTINUED | OUTPATIENT
Start: 2020-07-06 | End: 2020-07-06 | Stop reason: HOSPADM

## 2020-07-06 RX ORDER — HYDROMORPHONE HYDROCHLORIDE 1 MG/ML
0.25 INJECTION, SOLUTION INTRAMUSCULAR; INTRAVENOUS; SUBCUTANEOUS EVERY 5 MIN PRN
Status: DISCONTINUED | OUTPATIENT
Start: 2020-07-06 | End: 2020-07-06 | Stop reason: HOSPADM

## 2020-07-06 RX ORDER — METOCLOPRAMIDE HYDROCHLORIDE 5 MG/ML
10 INJECTION INTRAMUSCULAR; INTRAVENOUS
Status: DISCONTINUED | OUTPATIENT
Start: 2020-07-06 | End: 2020-07-06 | Stop reason: HOSPADM

## 2020-07-06 RX ORDER — ONDANSETRON 2 MG/ML
INJECTION INTRAMUSCULAR; INTRAVENOUS PRN
Status: DISCONTINUED | OUTPATIENT
Start: 2020-07-06 | End: 2020-07-06 | Stop reason: SDUPTHER

## 2020-07-06 RX ORDER — MORPHINE SULFATE 4 MG/ML
4 INJECTION, SOLUTION INTRAMUSCULAR; INTRAVENOUS EVERY 5 MIN PRN
Status: DISCONTINUED | OUTPATIENT
Start: 2020-07-06 | End: 2020-07-06 | Stop reason: HOSPADM

## 2020-07-06 RX ORDER — FENTANYL CITRATE 50 UG/ML
INJECTION, SOLUTION INTRAMUSCULAR; INTRAVENOUS PRN
Status: DISCONTINUED | OUTPATIENT
Start: 2020-07-06 | End: 2020-07-06 | Stop reason: SDUPTHER

## 2020-07-06 RX ORDER — MEPERIDINE HYDROCHLORIDE 50 MG/ML
12.5 INJECTION INTRAMUSCULAR; INTRAVENOUS; SUBCUTANEOUS EVERY 5 MIN PRN
Status: DISCONTINUED | OUTPATIENT
Start: 2020-07-06 | End: 2020-07-06 | Stop reason: HOSPADM

## 2020-07-06 RX ORDER — KETOROLAC TROMETHAMINE 30 MG/ML
INJECTION, SOLUTION INTRAMUSCULAR; INTRAVENOUS PRN
Status: DISCONTINUED | OUTPATIENT
Start: 2020-07-06 | End: 2020-07-06 | Stop reason: SDUPTHER

## 2020-07-06 RX ORDER — SODIUM CHLORIDE 0.9 % (FLUSH) 0.9 %
10 SYRINGE (ML) INJECTION PRN
Status: DISCONTINUED | OUTPATIENT
Start: 2020-07-06 | End: 2020-07-06 | Stop reason: HOSPADM

## 2020-07-06 RX ORDER — SODIUM CHLORIDE, SODIUM LACTATE, POTASSIUM CHLORIDE, CALCIUM CHLORIDE 600; 310; 30; 20 MG/100ML; MG/100ML; MG/100ML; MG/100ML
INJECTION, SOLUTION INTRAVENOUS CONTINUOUS PRN
Status: DISCONTINUED | OUTPATIENT
Start: 2020-07-06 | End: 2020-07-06 | Stop reason: SDUPTHER

## 2020-07-06 RX ORDER — MORPHINE SULFATE 4 MG/ML
2 INJECTION, SOLUTION INTRAMUSCULAR; INTRAVENOUS EVERY 5 MIN PRN
Status: DISCONTINUED | OUTPATIENT
Start: 2020-07-06 | End: 2020-07-06 | Stop reason: HOSPADM

## 2020-07-06 RX ORDER — HYDROCODONE BITARTRATE AND ACETAMINOPHEN 7.5; 325 MG/1; MG/1
1 TABLET ORAL
Status: COMPLETED | OUTPATIENT
Start: 2020-07-06 | End: 2020-07-06

## 2020-07-06 RX ORDER — DIPHENHYDRAMINE HYDROCHLORIDE 50 MG/ML
12.5 INJECTION INTRAMUSCULAR; INTRAVENOUS
Status: DISCONTINUED | OUTPATIENT
Start: 2020-07-06 | End: 2020-07-06 | Stop reason: HOSPADM

## 2020-07-06 RX ORDER — LIDOCAINE HYDROCHLORIDE 10 MG/ML
INJECTION, SOLUTION EPIDURAL; INFILTRATION; INTRACAUDAL; PERINEURAL PRN
Status: DISCONTINUED | OUTPATIENT
Start: 2020-07-06 | End: 2020-07-06 | Stop reason: SDUPTHER

## 2020-07-06 RX ORDER — SODIUM CHLORIDE 0.9 % (FLUSH) 0.9 %
10 SYRINGE (ML) INJECTION EVERY 12 HOURS SCHEDULED
Status: DISCONTINUED | OUTPATIENT
Start: 2020-07-06 | End: 2020-07-06 | Stop reason: HOSPADM

## 2020-07-06 RX ORDER — PROMETHAZINE HYDROCHLORIDE 25 MG/ML
6.25 INJECTION, SOLUTION INTRAMUSCULAR; INTRAVENOUS
Status: DISCONTINUED | OUTPATIENT
Start: 2020-07-06 | End: 2020-07-06 | Stop reason: HOSPADM

## 2020-07-06 RX ORDER — SUCCINYLCHOLINE CHLORIDE 20 MG/ML
INJECTION INTRAMUSCULAR; INTRAVENOUS PRN
Status: DISCONTINUED | OUTPATIENT
Start: 2020-07-06 | End: 2020-07-06 | Stop reason: SDUPTHER

## 2020-07-06 RX ORDER — DEXAMETHASONE SODIUM PHOSPHATE 10 MG/ML
INJECTION, SOLUTION INTRAMUSCULAR; INTRAVENOUS PRN
Status: DISCONTINUED | OUTPATIENT
Start: 2020-07-06 | End: 2020-07-06 | Stop reason: SDUPTHER

## 2020-07-06 RX ORDER — ENALAPRILAT 2.5 MG/2ML
1.25 INJECTION INTRAVENOUS
Status: DISCONTINUED | OUTPATIENT
Start: 2020-07-06 | End: 2020-07-06 | Stop reason: HOSPADM

## 2020-07-06 RX ORDER — HYDRALAZINE HYDROCHLORIDE 20 MG/ML
5 INJECTION INTRAMUSCULAR; INTRAVENOUS EVERY 10 MIN PRN
Status: DISCONTINUED | OUTPATIENT
Start: 2020-07-06 | End: 2020-07-06 | Stop reason: HOSPADM

## 2020-07-06 RX ORDER — HYDROMORPHONE HYDROCHLORIDE 1 MG/ML
0.5 INJECTION, SOLUTION INTRAMUSCULAR; INTRAVENOUS; SUBCUTANEOUS EVERY 5 MIN PRN
Status: DISCONTINUED | OUTPATIENT
Start: 2020-07-06 | End: 2020-07-06 | Stop reason: HOSPADM

## 2020-07-06 RX ORDER — PROPOFOL 10 MG/ML
INJECTION, EMULSION INTRAVENOUS PRN
Status: DISCONTINUED | OUTPATIENT
Start: 2020-07-06 | End: 2020-07-06 | Stop reason: SDUPTHER

## 2020-07-06 RX ADMIN — ONDANSETRON HYDROCHLORIDE 4 MG: 2 INJECTION, SOLUTION INTRAMUSCULAR; INTRAVENOUS at 09:51

## 2020-07-06 RX ADMIN — Medication 2 G: at 09:55

## 2020-07-06 RX ADMIN — SODIUM CHLORIDE, SODIUM LACTATE, POTASSIUM CHLORIDE, AND CALCIUM CHLORIDE: 600; 310; 30; 20 INJECTION, SOLUTION INTRAVENOUS at 09:44

## 2020-07-06 RX ADMIN — KETOROLAC TROMETHAMINE 30 MG: 30 INJECTION, SOLUTION INTRAMUSCULAR; INTRAVENOUS at 09:51

## 2020-07-06 RX ADMIN — SUGAMMADEX 200 MG: 100 INJECTION, SOLUTION INTRAVENOUS at 10:45

## 2020-07-06 RX ADMIN — LIDOCAINE HYDROCHLORIDE 5 ML: 10 INJECTION, SOLUTION EPIDURAL; INFILTRATION; INTRACAUDAL; PERINEURAL at 09:51

## 2020-07-06 RX ADMIN — HYDRALAZINE HYDROCHLORIDE 5 MG: 20 INJECTION INTRAMUSCULAR; INTRAVENOUS at 11:34

## 2020-07-06 RX ADMIN — FENTANYL CITRATE 50 MCG: 50 INJECTION INTRAMUSCULAR; INTRAVENOUS at 09:51

## 2020-07-06 RX ADMIN — HYDROMORPHONE HYDROCHLORIDE 0.25 MG: 1 INJECTION, SOLUTION INTRAMUSCULAR; INTRAVENOUS; SUBCUTANEOUS at 11:54

## 2020-07-06 RX ADMIN — SODIUM CHLORIDE, SODIUM LACTATE, POTASSIUM CHLORIDE, AND CALCIUM CHLORIDE: 600; 310; 30; 20 INJECTION, SOLUTION INTRAVENOUS at 10:45

## 2020-07-06 RX ADMIN — ROCURONIUM BROMIDE 10 MG: 10 INJECTION, SOLUTION INTRAVENOUS at 09:51

## 2020-07-06 RX ADMIN — ROCURONIUM BROMIDE 40 MG: 10 INJECTION, SOLUTION INTRAVENOUS at 09:57

## 2020-07-06 RX ADMIN — HYDROMORPHONE HYDROCHLORIDE 0.25 MG: 1 INJECTION, SOLUTION INTRAMUSCULAR; INTRAVENOUS; SUBCUTANEOUS at 11:12

## 2020-07-06 RX ADMIN — HYDROCODONE BITARTRATE AND ACETAMINOPHEN 1 TABLET: 7.5; 325 TABLET ORAL at 12:34

## 2020-07-06 RX ADMIN — HYDROMORPHONE HYDROCHLORIDE 0.25 MG: 1 INJECTION, SOLUTION INTRAMUSCULAR; INTRAVENOUS; SUBCUTANEOUS at 11:35

## 2020-07-06 RX ADMIN — FENTANYL CITRATE 25 MCG: 50 INJECTION INTRAMUSCULAR; INTRAVENOUS at 10:50

## 2020-07-06 RX ADMIN — SUCCINYLCHOLINE CHLORIDE 100 MG: 20 INJECTION, SOLUTION INTRAMUSCULAR; INTRAVENOUS at 09:51

## 2020-07-06 RX ADMIN — PROPOFOL 100 MG: 10 INJECTION, EMULSION INTRAVENOUS at 09:51

## 2020-07-06 RX ADMIN — DEXAMETHASONE SODIUM PHOSPHATE 10 MG: 10 INJECTION, SOLUTION INTRAMUSCULAR; INTRAVENOUS at 09:51

## 2020-07-06 ASSESSMENT — PAIN DESCRIPTION - FREQUENCY
FREQUENCY: CONTINUOUS
FREQUENCY: INTERMITTENT

## 2020-07-06 ASSESSMENT — PAIN DESCRIPTION - DESCRIPTORS
DESCRIPTORS: SORE;BURNING
DESCRIPTORS: SORE
DESCRIPTORS: ACHING

## 2020-07-06 ASSESSMENT — PAIN SCALES - GENERAL
PAINLEVEL_OUTOF10: 6
PAINLEVEL_OUTOF10: 2
PAINLEVEL_OUTOF10: 6
PAINLEVEL_OUTOF10: 5
PAINLEVEL_OUTOF10: 4
PAINLEVEL_OUTOF10: 6
PAINLEVEL_OUTOF10: 6

## 2020-07-06 ASSESSMENT — PAIN DESCRIPTION - ONSET
ONSET: AWAKENED FROM SLEEP
ONSET: AWAKENED FROM SLEEP

## 2020-07-06 ASSESSMENT — PAIN DESCRIPTION - PAIN TYPE
TYPE: SURGICAL PAIN
TYPE: SURGICAL PAIN

## 2020-07-06 ASSESSMENT — PAIN DESCRIPTION - LOCATION
LOCATION: BACK
LOCATION: BACK

## 2020-07-06 ASSESSMENT — PAIN - FUNCTIONAL ASSESSMENT
PAIN_FUNCTIONAL_ASSESSMENT: PREVENTS OR INTERFERES SOME ACTIVE ACTIVITIES AND ADLS
PAIN_FUNCTIONAL_ASSESSMENT: 0-10

## 2020-07-06 NOTE — ANESTHESIA PRE PROCEDURE
Department of Anesthesiology  Preprocedure Note       Name:  Tera Pardo   Age:  82 y.o.  :  1935                                          MRN:  [id number]         Date:  2020      Surgeon: Sana Cai):  Darshana Wong DO    Procedure: Procedure(s):  KYPHOPLASTY L5    Medications prior to admission:   Prior to Admission medications    Medication Sig Start Date End Date Taking? Authorizing Provider   tiZANidine (ZANAFLEX) 2 MG tablet Take 1 tablet by mouth every 6 hours as needed (1 or 2 tablets as needed for relief) 20  Yes EARLE Ball CNP   terbinafine (LAMISIL) 250 MG tablet TAKE ONE TABLET PO ONCE DAILY 20  Yes EARLE Fish CNP   vitamin B-12 (CYANOCOBALAMIN) 500 MCG tablet TAKE 1 TABLET BY MOUTH DAILY 3/9/20 3/9/21 Yes Allison Campos MD   gabapentin (NEURONTIN) 600 MG tablet TAKE 1 TABLET BY MOUTH THREE TIMES DAILY  Patient taking differently: Take 600 mg by mouth 3 times daily. 3/4/20 8/4/20 Yes EARLE Carrero   omeprazole (PRILOSEC) 40 MG delayed release capsule TAKE 1 CAPSULE BY MOUTH DAILY 3/2/20  Yes Allison Campos MD   triamterene-hydrochlorothiazide (MAXZIDE-25) 37.5-25 MG per tablet TAKE 1 TABLET BY MOUTH DAILY 3/2/20  Yes Allison Campos MD   HYDROcodone-acetaminophen (NORCO) 7.5-325 MG per tablet Take 1 tablet by mouth every 6 hours as needed.   2/10/20  Yes Historical Provider, MD   allopurinol (ZYLOPRIM) 100 MG tablet Take 2 tablets by mouth daily For gout -prevention 1/10/20  Yes EARLE Fish CNP   losartan (COZAAR) 100 MG tablet TAKE 1 TABLET BY MOUTH EVERY MORNING FOR BLOOD PRESSURE  Patient taking differently: Take 100 mg by mouth daily  19  Yes Allison Campos MD   rivaroxaban (XARELTO) 20 MG TABS tablet Take 1 tablet by mouth daily (with breakfast) 19  Yes Allison Campos MD   vitamin D (ERGOCALCIFEROL) 1.25 MG (92849 UT) CAPS capsule TAKE 1 CAPSULE BY MOUTH 1 TIME A WEEK 10/29/19  Yes Allison Campos MD   metoprolol tartrate (LOPRESSOR) 25 MG tablet TAKE ONE TABLET BY MOUTH TWICE DAILY FOR BLOOD PRESSURE AND HEART 10/3/19  Yes EARLE Villegas CNP   dorzolamide-timolol (COSOPT) 22.3-6.8 MG/ML ophthalmic solution Place 1 Dose into both eyes daily   Yes Historical Provider, MD   furosemide (LASIX) 20 MG tablet Take 1 tablet by mouth daily as needed (swelling) 6/19/18  Yes Alcira Guzman MD   Nebulizers (AIRIAL COMPACT MINI NEBULIZER) MISC 1 Units by Does not apply route 4 times daily 2/24/17  Yes EARLE Villegas CNP   acyclovir (ZOVIRAX) 400 MG tablet Take 400 mg by mouth 2 times daily history of shingles in her eye    Historical Provider, MD   albuterol (ACCUNEB) 1.25 MG/3ML nebulizer solution Inhale 1 mL into the lungs every 6 hours as needed     Historical Provider, MD       Current medications:    Current Facility-Administered Medications   Medication Dose Route Frequency Provider Last Rate Last Dose    sodium chloride flush 0.9 % injection 10 mL  10 mL Intravenous 2 times per day Jose Robertonatalia Hicks, DO        sodium chloride flush 0.9 % injection 10 mL  10 mL Intravenous PRN Jose Roberto Hicks, DO        HYDROmorphone HCl PF (DILAUDID) injection 0.25 mg  0.25 mg Intravenous Q5 Min PRN Roselee Kiang, APRN - CRNA        HYDROmorphone HCl PF (DILAUDID) injection 0.5 mg  0.5 mg Intravenous Q5 Min PRN Roselee Kiang, APRN - CRNA        morphine injection 2 mg  2 mg Intravenous Q5 Min PRN Roselee Kiang, APRN - CRNA        morphine injection 4 mg  4 mg Intravenous Q5 Min PRN Roselee Kiang, APRN - CRNA        diphenhydrAMINE (BENADRYL) injection 12.5 mg  12.5 mg Intravenous Once PRN Roselee Kiang, APRN - CRNA        promethazine (PHENERGAN) injection 6.25 mg  6.25 mg Intravenous Once PRN Roselee Kiang, APRN - CRNA        metoclopramide (REGLAN) injection 10 mg  10 mg Intravenous Once PRN Roselee Kiang, APRN - CRNA        labetalol (NORMODYNE;TRANDATE) injection 5 mg  5 mg Intravenous Q10 Min PRN Rose Felling, APRN - CRNA        hydrALAZINE (APRESOLINE) injection 5 mg  5 mg Intravenous Q10 Min PRN Rose Felling, APRN - CRNA        enalaprilat (VASOTEC) injection 1.25 mg  1.25 mg Intravenous Once PRN [place] Felling, APRN - CRNA        meperidine (DEMEROL) injection 12.5 mg  12.5 mg Intravenous Q5 Min PRN [place] Felling, APRN - CRNA         Facility-Administered Medications Ordered in Other Encounters   Medication Dose Route Frequency Provider Last Rate Last Dose    propofol injection    PRN Rose Felling, APRN - CRNA   100 mg at 07/06/20 0951    lidocaine PF 1 % injection    PRN [place] Felling, APRN - CRNA   5 mL at 07/06/20 0951    rocuronium (ZEMURON) injection    PRN Rose Felling, APRN - CRNA   40 mg at 07/06/20 0957    succinylcholine (ANECTINE) injection    PRN Rose Felling, APRN - CRNA   100 mg at 07/06/20 0951    fentaNYL (SUBLIMAZE) injection    PRN Rose Felling, APRN - CRNA   50 mcg at 07/06/20 0951    dexamethasone (PF) (DECADRON) injection    PRN Rose Felling, APRN - CRNA   10 mg at 07/06/20 0951    ketorolac (TORADOL) injection    PRN [place] Felling, APRN - CRNA   30 mg at 07/06/20 0951    ondansetron (ZOFRAN) injection    PRN [place] Felling, APRN - CRNA   4 mg at 07/06/20 3245    lactated ringers infusion    Continuous PRN Rose Felling, APRN - CRNA           Allergies: Allergies   Allergen Reactions    Zoloft [Sertraline Hcl] Other (See Comments)     Patient states that she doesn't want this medication anymore because she hallucinated and saw spiders. Patient weaned herself off and after she quit taking the medication, the hallucinations stopped. Patient states that she doesn't want this medication anymore because she hallucinated and saw spiders. Patient weaned herself off and after she quit taking the medication, the hallucinations stopped.           Problem List:    Patient Active Problem List   Diagnosis Code    Personal history of colon cancer Z85.038    Family history of esophageal cancer Z80.0    Fibromyalgia M79.7    Renal bruit R09.89    Varicose veins of left lower extremity with inflammation, 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 ([facility] [address] I83.221, L97.121    Venous insufficiency I87.2    Hypertriglyceridemia E78.1    Dysphagia R13.10    Paroxysmal supraventricular tachycardia (HCC) I47.1    Vitamin D deficiency E55.9    Encounter for care related to Port-a-Cath Z45.2    Essential hypertension I10    History of colon polyps Z86.010    Port-A-Cath in place Z95.828    History of DVT (deep vein thrombosis) Z86.718    Avascular necrosis (HCC) M87.00    Pain in both lower extremities M79.604, M79.605    Numbness and tingling of both feet R20.0, R20.2    Acute deep vein thrombosis (DVT) of femoral vein of left lower extremity (HCC) I82.412    Cellulitis of left lower limb L03. 116    Fracture of right foot S92.901A    Hypochloremia E87.8    CKD (chronic kidney disease) stage 3, GFR 30-59 ml/min ([facility]) N18.3    Malignant neoplasm of colon (HCC) C18.9    Mixed simple and mucopurulent chronic bronchitis (HCC) J41.8    Fungal dermatitis B36.9    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 I83.11, I83.12       Past Medical History:        Diagnosis Date    Bronchitis     Colon cancer ([facility]     Colon polyps     Constipation     Deep vein blood clot of left lower extremity (HCC)     Left leg     Depression     DVT (deep venous thrombosis) (HCC)     Dysphagia     Fibrocystic breast disease     Fibromyalgia     GERD (gastroesophageal reflux disease)     reflux with hx of esophageal stricture    Headache(784.0)     History of colon cancer 2000    Hormone replacement therapy (postmenopausal)     Hypertension     Neuropathy of foot     In both feet    Osteoarthritis     left knee pain    Restless legs syndrome     Vitamin D deficiency        Past Surgical History:        Procedure Laterality Date    APPENDECTOMY      BREAST BIOPSY      CATARACT REMOVAL       SECTION      CHOLECYSTECTOMY      COLECTOMY  partial    COLON SURGERY      COLONOSCOPY  2010    COLONOSCOPY  2012    Dr Gem Escobedo: unremarkable enterocolonic anastamosis; hyperplastic polyps    COLONOSCOPY  3/2013    [place]: unremarkable post-surgical colon    COLONOSCOPY  3/11/16    Dr Allyssa Davila colon anastomosis, 5 yr recall    FOOT SURGERY  right foot    HAND SURGERY Right     Dr Soler Dense ENDOSCOPY  10-    UPPER GASTROINTESTINAL ENDOSCOPY  3/2013    Bodnarch: peptic stricture dilated to 48F; HH; small antral mucosal elevation    UPPER GASTROINTESTINAL ENDOSCOPY  2014    Maurilio: dilation of esophageal stricture    VARICOSE VEIN SURGERY Left 2014  [facility] & [address]    Left GSV Ablation    VARICOSE VEIN SURGERY Right 2014  [address]    Right GSV Ablation       Social History:    Social History     Tobacco Use    Smoking status: Never Smoker    Smokeless tobacco: Never Used   Substance Use Topics    Alcohol use:  No                                Counseling given: Not Answered      Vital Signs (Current):   Vitals:    20 0921   Pulse: 64   Resp: 20   Temp: 98.1 °F (36.7 °C)   TempSrc: Tympanic   SpO2: 99%   Weight: 170 lb (77.1 kg)   Height: 5' 3\" (1.6 m)                                              BP Readings from Last 3 Encounters:   20 (!) 153/71   20 (!) 145/68   20 122/60       NPO Status: Time of last liquid consumption: 2300                        Time of last solid consumption:                         Date of last liquid consumption: 20                        Date of last solid food consumption: 20    BMI:   Wt Readings from Last 3 Encounters:   20 170 lb (77.1 kg)   20 170 lb (77.1 kg)   20 170 lb (77.1 kg)     Body mass index is 30.11 kg/m². CBC:   Lab Results   Component Value Date    WBC 7.8 06/30/2020    RBC 4.38 06/30/2020    HGB 12.1 06/30/2020    HCT 40.1 06/30/2020    MCV 91.6 06/30/2020    RDW 15.6 06/30/2020     06/30/2020       CMP:   Lab Results   Component Value Date     07/06/2020    K 4.0 07/06/2020    K 4.2 04/25/2020     07/06/2020    CO2 26 07/06/2020    BUN 13 07/06/2020    CREATININE 0.7 07/06/2020    LABGLOM >60 07/06/2020    GLUCOSE 120 07/06/2020    PROT 6.5 07/06/2020    PROT 6.9 10/31/2012    CALCIUM 9.6 07/06/2020    BILITOT 0.3 07/06/2020    ALKPHOS 93 07/06/2020    AST 13 07/06/2020    ALT 11 07/06/2020       POC Tests: No results for input(s): POCGLU, POCNA, POCK, POCCL, POCBUN, POCHEMO, POCHCT in the last 72 hours.     Coags:   Lab Results   Component Value Date    PROTIME 12.3 07/06/2020    PROTIME 15.92 12/02/2011    INR 0.93 07/06/2020    APTT 46.5 07/06/2020       HCG (If Applicable): No results found for: PREGTESTUR, PREGSERUM, HCG, HCGQUANT     ABGs: No results found for: PHART, PO2ART, ZEH2AJK, MKW8HFW, BEART, V1QQVEOH     Type & Screen (If Applicable):  No results found for: LABABO, LABRH    Drug/Infectious Status (If Applicable):  No results found for: HIV, HEPCAB    COVID-19 Screening (If Applicable):   Lab Results   Component Value Date    COVID19 Not Detected 07/02/2020         Anesthesia Evaluation  Patient summary reviewed  Airway: Mallampati: II  TM distance: >3 FB     Mouth opening: > = 3 FB Dental:    (+) upper dentures and partials      Pulmonary:Negative Pulmonary ROS and normal exam                               Cardiovascular:    (+) hypertension: no interval change,          Beta Blocker:  Dose within 24 Hrs         Neuro/Psych:   (+) neuromuscular disease:, headaches: cluster headaches, tension headaches, psychiatric history: stable without treatment             ROS comment: Fibromyalgia  GI/Hepatic/Renal:   (+) GERD: no interval change,           Endo/Other: Negative Endo/Other ROS                    Abdominal:           Vascular:   + DVT, .        ROS comment: Left leg x2 . Anesthesia Plan      general     ASA 2       Induction: intravenous. MIPS: Postoperative opioids intended and Prophylactic antiemetics administered. Anesthetic plan and risks discussed with patient.                       EARLE Tatum - CRNA   7/6/2020

## 2020-07-06 NOTE — INTERVAL H&P NOTE
Update History & Physical    The patient's History and Physical of June 30, 2020 was reviewed with the patient and I examined the patient. There was no change. The surgical site was confirmed by the patient and me. Plan: The risks, benefits, expected outcome, and alternative to the recommended procedure have been discussed with the patient. Patient understands and wants to proceed with the procedure.      Electronically signed by Talita Hastings DO on 7/6/2020 at 9:16 AM

## 2020-07-06 NOTE — BRIEF OP NOTE
Brief Postoperative Note      Patient: Antonieta Millville  YOB: 1935  MRN: [id number]    Date of Procedure: 2020    Pre-Op Diagnosis: L5 compression fracture    Post-Op Diagnosis: Same       Procedure(s):  KYPHOPLASTY L5    Surgeon(s):  Ev Hall DO    Assistant:  * No surgical staff found *    Anesthesia: General    Estimated Blood Loss (mL): Minimal    Complications: None    Specimens:   ID Type Source Tests Collected by Time Destination   A : L5 Vertebral Body Bone Spine SURGICAL PATHOLOGY Ev Hall DO 2020 1045        Implants:  Implant Name Type Inv. Item Serial No.  Lot No. LRB No. Used Action   KIT CEMENT BONE W/KYPHON MIXER TUBE FUNNL Cement KIT CEMENT BONE W/KYPHON MIXER TUBE [address] [id number] N/A 1 Implanted   CEMENT [facility] [employer] SPINAL KT Spine CEMENT [address] [facility] [id number] N/A 1 Implanted         Drains: * No LDAs found *    Findings: No major issues. 3 cc placed.     Electronically signed by Ev Hall DO on 2020 at 10:46 AM

## 2020-07-06 NOTE — ANESTHESIA POSTPROCEDURE EVALUATION
Department of Anesthesiology  Postprocedure Note    Patient: Yony Barrera  MRN: [id number]  YOB: 1935  Date of evaluation: 7/6/2020  Time:  11:03 AM     Procedure Summary     Date:  07/06/20 Room / Location:  [address]    Anesthesia Start:  2666 Anesthesia Stop:  7084    Procedure:  KYPHOPLASTY L5 (N/A ) Diagnosis:  (L5 compression fracture)    Surgeon:  Alfredo Burton DO Responsible Provider:  EARLE Villa CRNA    Anesthesia Type:  general ASA Status:  2          Anesthesia Type: No value filed. Abbie Phase I: Abbie Score: 9    Abbie Phase II:      Last vitals: Reviewed and per EMR flowsheets.        Anesthesia Post Evaluation    Patient location during evaluation: bedside  Patient participation: complete - patient participated  Level of consciousness: awake and alert  Pain score: 0  Airway patency: patent  Nausea & Vomiting: no nausea  Complications: no  Cardiovascular status: hemodynamically stable  Respiratory status: acceptable  Hydration status: euvolemic

## 2020-07-06 NOTE — OP NOTE
Operative Note    NEUROSURGICAL DEPARTMENT REPORT       DATE OF SERVICE: 7/6/2020     PREOPERATIVE DIAGNOSES     Compression fracture L5     POSTOPERATIVE DIAGNOSES    Same     OPERATIVE PROCEDURES  Percutaneous vertebral augmentation (kyphoplasty) L5     SURGEON  Audi Rodriguez,      FIRST ASSIST    None    Estimated blood loss:  Minimal     DESCRIPTION OF PROCEDURE    The patient was brought to the operating room where sedation was introduced. The patient was positioned on the open table in the prone position. All pressure points were carefully checked and padded. The back was clipped and prepared for 10 minutes with Betadine scrub, Betadine paint and DuraPrep. The patient was draped in the usual sterile fashion. The C-arm fluoroscope was draped. After the patient's skin was prepped and draped, the L5 level was localized fluoroscopically and marked. Local was injected. A 15 blade was used to create a small stab incision. A pedicle accessing needle was inserted into the stab incision and using AP and lateral fluoroscopy navigated through the pedicle into the vertebral body. This process was repeated again on the contralateral side. The balloon device was tested and then subsequently inserted through both needles. Under x-ray guidance the balloons were confirmed to be in good condition and subsequently inflated. They were then removed. 3-4 cc of polymethmethacrylate (PMMA) was inserted into the vetebral body. Once a satisfactory amount of PMMA was placed it was packed and the needles were removed. The wound was closed in layered fashion. A sterile dressing was applied. The patient was returned to the stretcher in the supine position and then returned recovery room in a stable condition.

## 2020-07-10 ENCOUNTER — HOSPITAL ENCOUNTER (OUTPATIENT)
Dept: LAB | Age: 85
Discharge: HOME | End: 2020-07-10
Payer: MEDICARE

## 2020-07-10 ENCOUNTER — OFFICE VISIT (OUTPATIENT)
Dept: PRIMARY CARE CLINIC | Age: 85
End: 2020-07-10
Payer: MEDICARE

## 2020-07-10 VITALS
RESPIRATION RATE: 16 BRPM | SYSTOLIC BLOOD PRESSURE: 134 MMHG | HEART RATE: 64 BPM | HEIGHT: 63 IN | DIASTOLIC BLOOD PRESSURE: 63 MMHG | OXYGEN SATURATION: 98 % | BODY MASS INDEX: 30.3 KG/M2 | TEMPERATURE: 98.1 F | WEIGHT: 171 LBS

## 2020-07-10 LAB — SARS-COV-2, NAAT: NOT DETECTED

## 2020-07-10 PROCEDURE — 99215 OFFICE O/P EST HI 40 MIN: CPT | Performed by: NURSE PRACTITIONER

## 2020-07-10 PROCEDURE — 1123F ACP DISCUSS/DSCN MKR DOCD: CPT | Performed by: NURSE PRACTITIONER

## 2020-07-10 PROCEDURE — 1036F TOBACCO NON-USER: CPT | Performed by: NURSE PRACTITIONER

## 2020-07-10 PROCEDURE — G8399 PT W/DXA RESULTS DOCUMENT: HCPCS | Performed by: NURSE PRACTITIONER

## 2020-07-10 PROCEDURE — 4040F PNEUMOC VAC/ADMIN/RCVD: CPT | Performed by: NURSE PRACTITIONER

## 2020-07-10 PROCEDURE — G8427 DOCREV CUR MEDS BY ELIG CLIN: HCPCS | Performed by: NURSE PRACTITIONER

## 2020-07-10 PROCEDURE — G8417 CALC BMI ABV UP PARAM F/U: HCPCS | Performed by: NURSE PRACTITIONER

## 2020-07-10 PROCEDURE — 1090F PRES/ABSN URINE INCON ASSESS: CPT | Performed by: NURSE PRACTITIONER

## 2020-07-10 RX ORDER — METHOCARBAMOL 500 MG/1
TABLET, FILM COATED ORAL
COMMUNITY
Start: 2020-07-02 | End: 2020-08-20

## 2020-07-10 ASSESSMENT — ENCOUNTER SYMPTOMS
RESPIRATORY NEGATIVE: 1
BACK PAIN: 1
GASTROINTESTINAL NEGATIVE: 1

## 2020-07-10 NOTE — PROGRESS NOTES
1 [facility]  [address]  [address] [address]  Dept: [phone]  Dept Fax: [phone]  Loc: [phone]    Patel Flood is a 82 y.o. female who presents today for her medical conditions/complaints as noted below. Patel Flood is c/o of Advice Only (patient presents today because she just had back surgery and has some questions for Jayna. Patient states that she has problems with cellulitis as well. )        HPI:   To face visit done today. Dr. Vicky Andino was present in the office. HPI   Chief Complaint   Patient presents with    Advice Only     patient presents today because she just had back surgery and has some questions for Jayna. Patient states that she has problems with cellulitis as well. The patient fell on June 14 and had an L5 compression fracture. She saw neurosurgery shortly after that on 6/24/2020 and surgery was scheduled for a kyphoplasty. I have also seen the patient since that time on follow-up on her venous stasis dermatitis. Her legs were healing well until recently she got a skin tear on her left leg and it has some pus coming out of it today. She would like me to look at that. The patient had a COVID test 1 week ago that was negative for her to have the procedure of the kyphoplasty. She has not had any exposure to anyone except for her paid caregiver and  since then. The patient is tearful today and that she is still having a lot of pain in her legs and has not been ambulatory since the surgery on July 6. She said she did not have any physical therapy set up and at this point she is willing to go inpatient somewhere for rehab. She cannot care for herself. Her  is also in his [de-identified] and they have no family here. She spends most of her day sitting up in a chair and transferring to the wheelchair only to go to the bathroom she cannot walk given the pain that she is in.   He has seen pain management and does have pain medication which she is almost out of. She also has a muscle relaxer she sometimes takes. That her legs feel extremely weak like she cannot stand on them and she feels like she needs to be in rehab where someone can help her to do simple tasks even go to the bathroom. Pain is in her lower back and in both of her upper legs. It was the same as before she had the surgery. She denies any fever. She denies any nausea vomiting or diarrhea.   Past Medical History:   Diagnosis Date    Bronchitis     Colon cancer ([address]     Colon polyps     Constipation     Deep vein blood clot of left lower extremity (HCC)     Left leg     Depression     DVT (deep venous thrombosis) (HCC)     Dysphagia     Fibrocystic breast disease     Fibromyalgia     GERD (gastroesophageal reflux disease)     reflux with hx of esophageal stricture    Headache(784.0)     History of colon cancer     Hormone replacement therapy (postmenopausal)     Hypertension     Neuropathy of foot     In both feet    Osteoarthritis     left knee pain    Restless legs syndrome     Vitamin D deficiency       Past Surgical History:   Procedure Laterality Date    APPENDECTOMY      BREAST BIOPSY      CATARACT REMOVAL       SECTION      CHOLECYSTECTOMY      COLECTOMY  partial    COLON SURGERY      COLONOSCOPY  2010    COLONOSCOPY  2012    Dr Hillary Hammans: unremarkable enterocolonic anastamosis; hyperplastic polyps    COLONOSCOPY  3/2013    [facility]: unremarkable post-surgical colon    COLONOSCOPY  3/11/16    Dr Hilaria Luong colon anastomosis, 5 yr recall    FOOT SURGERY  right foot    HAND SURGERY Right     Dr Esteves Shoulder N/A 2020    KYPHOPLASTY L5 performed by Mary Slade DO at [address]      TOTAL KNEE ARTHROPLASTY      UPPER GASTROINTESTINAL ENDOSCOPY  10-    UPPER GASTROINTESTINAL ENDOSCOPY  3/2013    [facility]: peptic stricture dilated to 48F; HH; small antral mucosal elevation    UPPER GASTROINTESTINAL ENDOSCOPY  12/2014    [facility]: dilation of esophageal stricture    VARICOSE VEIN SURGERY Left 03/14/2014  [facility]    Left GSV Ablation    VARICOSE VEIN SURGERY Right 04/04/2014  [facility]    Right GSV Ablation       Vitals 7/10/2020 7/6/2020 7/6/2020 7/6/2020 7/6/2020 7/6/5276   SYSTOLIC 489 - - - - -   DIASTOLIC 63 - - - - -   Pulse 64 - - - - -   Temp 98.1 - - - 95.2 95.4   Resp 16 26 18 4 31 17   SpO2 98 100 100 100 100 99   Weight 171 lb - - - - -   Height 5' 3\" - - - - -   BMI (wt*703/ht~2) 30.29 kg/m2 - - - - -   Some recent data might be hidden       Family History   Problem Relation Age of Onset    Cancer Mother         Cervical Cancer    Cancer Brother         Esophageal cancer    Diabetes Brother     Esophageal Cancer Brother     Diabetes Sister     Colon Cancer Neg Hx     Colon Polyps Neg Hx     Liver Cancer Neg Hx     Liver Disease Neg Hx     Rectal Cancer Neg Hx     Stomach Cancer Neg Hx        Social History     Tobacco Use    Smoking status: Never Smoker    Smokeless tobacco: Never Used   Substance Use Topics    Alcohol use: No      Current Outpatient Medications   Medication Sig Dispense Refill    methocarbamol (ROBAXIN) 500 MG tablet TK 1 T PO QID FOR 20 DAYS      acyclovir (ZOVIRAX) 400 MG tablet Take 400 mg by mouth 2 times daily history of shingles in her eye      tiZANidine (ZANAFLEX) 2 MG tablet Take 1 tablet by mouth every 6 hours as needed (1 or 2 tablets as needed for relief) 30 tablet 0    terbinafine (LAMISIL) 250 MG tablet TAKE ONE TABLET PO ONCE DAILY 90 tablet 0    vitamin B-12 (CYANOCOBALAMIN) 500 MCG tablet TAKE 1 TABLET BY MOUTH DAILY 30 tablet 11    gabapentin (NEURONTIN) 600 MG tablet TAKE 1 TABLET BY MOUTH THREE TIMES DAILY (Patient taking differently: Take 600 mg by mouth 3 times daily. ) 90 tablet 5    omeprazole (PRILOSEC) 40 MG delayed release capsule TAKE 1 CAPSULE BY MOUTH DAILY 90 capsule 3    (1) 09/01/2020    Potassium monitoring  07/06/2021    Creatinine monitoring  07/06/2021    DTaP/Tdap/Td vaccine (3 - Td) 12/23/2025    Colon cancer screen colonoscopy  03/11/2026    DEXA (modify frequency per FRAX score)  Completed    Pneumococcal 65+ years Vaccine  Completed    Hepatitis A vaccine  Aged Out    Hepatitis B vaccine  Aged Out    Hib vaccine  Aged Out    Meningococcal (ACWY) vaccine  Aged Out       Subjective:      Review of Systems   Constitutional: Positive for activity change and fatigue. Negative for fever. HENT: Negative. Respiratory: Negative. Cardiovascular: Negative. Gastrointestinal: Negative. Musculoskeletal: Positive for back pain. Skin: Positive for rash. Skin tear leg   Neurological: Positive for weakness. Psychiatric/Behavioral: Positive for dysphoric mood. The patient is nervous/anxious. Objective:     Physical Exam  Vitals signs and nursing note reviewed. Constitutional:       Appearance: She is well-developed. HENT:      Head: Normocephalic. Eyes:      Pupils: Pupils are equal, round, and reactive to light. Neck:      Musculoskeletal: Normal range of motion. Cardiovascular:      Rate and Rhythm: Normal rate and regular rhythm. Heart sounds: Normal heart sounds. Pulmonary:      Effort: Pulmonary effort is normal.      Breath sounds: Normal breath sounds. Musculoskeletal:      Lumbar back: She exhibits decreased range of motion, tenderness and pain. She exhibits no bony tenderness, no swelling, no edema, no deformity, no laceration, no spasm and normal pulse. Skin:     General: Skin is warm and dry. Neurological:      General: No focal deficit present. Mental Status: She is alert and oriented to person, place, and time. Psychiatric:         Mood and Affect: Mood normal.         Behavior: Behavior normal.         Thought Content:  Thought content normal.         Judgment: Judgment normal.      Comments: Tearful discussing fatigue and pain       /63   Pulse 64   Temp 98.1 °F (36.7 °C) (Temporal)   Resp 16   Ht 5' 3\" (1.6 m)   Wt 171 lb (77.6 kg)   SpO2 98%   BMI 30.29 kg/m²     Assessment:       Diagnosis Orders   1. Physical deconditioning  External Referral To Physical Therapy   2. Cellulitis of left lower limb  External Referral To Physical Therapy   3. Compression fracture of L5 vertebra with delayed healing, subsequent encounter  External Referral To Physical Therapy   4. COVID-19 ruled out by laboratory testing  COVID-19 Ambulatory         Plan:   More than 50% of the time was spent counseling and coordinating care for a total time of 60min face to face. The patient lives here in this area and would like to go to [address] [place] [address] for rehabilitation. I did call [facility] the  there and gave her the patient's information and they do have a bed available. The patient would need to get COVID tested today before she can enter the nursing home has to be within the last 48 hours the one 1 week ago will not suffice. I did call and spoke with the  at the hospital Dr. Be Muro in the ER charge nurse Tuyet Liao and was able to facilitate a same-day COVID test so that the patient can be admitted into the nursing home today For rehab. I have sent the patient over to the hospital for the COVID-19 test.  Fax all the information over to [facility] at [address] [place] [address] for rehabilitation. Patient given educational materials -see patient instructions. Discussed use, benefit, and side effects of prescribed medications. All patient questions answered. Pt voiced understanding. Reviewed health maintenance. Instructed to continue currentmedications, diet and exercise. Patient agreed with treatment plan. Follow up as directed. MEDICATIONS:  No orders of the defined types were placed in this encounter.         ORDERS:  Orders Placed This Encounter   Procedures    COVID-19 Ambulatory    COVID-19    External Referral To Physical Therapy       Follow-up:  No follow-ups on file. PATIENT INSTRUCTIONS:  There are no Patient Instructions on file for this visit. Electronically signed by EARLE Cisneros CNP on 7/10/2020 at 12:44 PM    EMR Dragon/transcription disclaimer:  Much of thisencounter note is electronic transcription/translation of spoken language to printed texts. The electronic translation of spoken language may be erroneous, or at times, nonsensical words or phrases may be inadvertentlytranscribed.   Although I have reviewed the note for such errors, some may still exist.

## 2020-07-13 LAB
GRAM STAIN RESULT: ABNORMAL
ORGANISM: ABNORMAL
ORGANISM: ABNORMAL
WOUND/ABSCESS: ABNORMAL
WOUND/ABSCESS: ABNORMAL

## 2020-08-20 ENCOUNTER — APPOINTMENT (OUTPATIENT)
Dept: GENERAL RADIOLOGY | Age: 85
End: 2020-08-20
Payer: MEDICARE

## 2020-08-20 ENCOUNTER — HOSPITAL ENCOUNTER (EMERGENCY)
Age: 85
Discharge: HOME | End: 2020-08-20
Attending: EMERGENCY MEDICINE
Payer: MEDICARE

## 2020-08-20 VITALS
DIASTOLIC BLOOD PRESSURE: 83 MMHG | HEIGHT: 63 IN | SYSTOLIC BLOOD PRESSURE: 158 MMHG | TEMPERATURE: 99 F | RESPIRATION RATE: 21 BRPM | OXYGEN SATURATION: 95 % | WEIGHT: 165 LBS | BODY MASS INDEX: 29.23 KG/M2 | HEART RATE: 77 BPM

## 2020-08-20 PROCEDURE — 96372 THER/PROPH/DIAG INJ SC/IM: CPT

## 2020-08-20 PROCEDURE — 73502 X-RAY EXAM HIP UNI 2-3 VIEWS: CPT

## 2020-08-20 PROCEDURE — 99283 EMERGENCY DEPT VISIT LOW MDM: CPT

## 2020-08-20 PROCEDURE — 99284 EMERGENCY DEPT VISIT MOD MDM: CPT

## 2020-08-20 PROCEDURE — 6360000002 HC RX W HCPCS: Performed by: EMERGENCY MEDICINE

## 2020-08-20 RX ORDER — CYCLOBENZAPRINE HCL 10 MG
10 TABLET ORAL NIGHTLY PRN
Qty: 10 TABLET | Refills: 0 | Status: SHIPPED | OUTPATIENT
Start: 2020-08-20 | End: 2020-08-30

## 2020-08-20 RX ORDER — ORPHENADRINE CITRATE 30 MG/ML
60 INJECTION INTRAMUSCULAR; INTRAVENOUS ONCE
Status: COMPLETED | OUTPATIENT
Start: 2020-08-20 | End: 2020-08-20

## 2020-08-20 RX ADMIN — ORPHENADRINE CITRATE 60 MG: 30 INJECTION INTRAMUSCULAR; INTRAVENOUS at 13:46

## 2020-08-20 ASSESSMENT — PAIN DESCRIPTION - LOCATION: LOCATION: HIP

## 2020-08-20 ASSESSMENT — PAIN SCALES - GENERAL: PAINLEVEL_OUTOF10: 9

## 2020-08-20 ASSESSMENT — PAIN DESCRIPTION - PROGRESSION: CLINICAL_PROGRESSION: GRADUALLY WORSENING

## 2020-08-20 ASSESSMENT — ENCOUNTER SYMPTOMS
GASTROINTESTINAL NEGATIVE: 1
RESPIRATORY NEGATIVE: 1

## 2020-08-20 ASSESSMENT — PAIN DESCRIPTION - ORIENTATION: ORIENTATION: LEFT

## 2020-08-20 ASSESSMENT — PAIN DESCRIPTION - PAIN TYPE: TYPE: ACUTE PAIN

## 2020-08-20 NOTE — ED PROVIDER NOTES
[facility] EMERGENCY DEPT  EMERGENCY DEPARTMENT ENCOUNTER      Pt Name: Kamryn Tanner  MRN: [id number]  [place] 1935  Date of evaluation: 8/20/2020  Provider: Seema Min MD    [address] [place] [address]       Chief Complaint   Patient presents with    Hip Pain     Pt was giving self a sponge bath, felt hip pop on left side and it has been hurting ever since. Happened at 58         HISTORY OF PRESENT ILLNESS   (Location/Symptom, Timing/Onset, Context/Setting, Quality, Duration, Modifying Factors, Severity)  Note limiting factors. Kamryn Tanner is a 82 y.o. female who presents to the emergency department     Pt presents with hip pain after she sat in chair. Pt reports pain in right hip and thigh for several weeks. Today while sitting down in chair she had sudden severe pain to left hip. Trouble getting up after. Nursing Notes were reviewed. REVIEW OF SYSTEMS    (2-9 systems for level 4, 10 or more for level 5)     Review of Systems   Constitutional: Negative. HENT: Negative. Respiratory: Negative. Cardiovascular: Negative. Gastrointestinal: Negative. Neurological: Negative. Except as noted above the remainder of the review of systems was reviewed and negative.        PAST MEDICAL HISTORY     Past Medical History:   Diagnosis Date    Bronchitis     [facility] [address] [address]     Colon polyps     Constipation     Deep vein blood clot of left lower extremity (HCC)     Left leg     Depression     DVT (deep venous thrombosis) (HCC)     Dysphagia     Fibrocystic breast disease     Fibromyalgia     GERD (gastroesophageal reflux disease)     reflux with hx of esophageal stricture    Headache(784.0)     History of colon cancer 2000    Hormone replacement therapy (postmenopausal)     Hypertension     Neuropathy of foot     In both feet    Osteoarthritis     left knee pain    Restless legs syndrome     Vitamin D deficiency          SURGICAL HISTORY       Past Surgical History:   Procedure Laterality Date    APPENDECTOMY      BREAST BIOPSY      CATARACT REMOVAL       SECTION      CHOLECYSTECTOMY      COLECTOMY  partial    COLON SURGERY      COLONOSCOPY  2010    COLONOSCOPY  2012    Dr Raza Alonso: unremarkable enterocolonic anastamosis; hyperplastic polyps    COLONOSCOPY  3/2013    [facility]: unremarkable post-surgical colon    COLONOSCOPY  3/11/16    Dr Peter Meade colon anastomosis, 5 yr recall    FOOT SURGERY  right foot    HAND SURGERY Right     Dr Davie Marie N/A 2020    KYPHOPLASTY L5 performed by Darshana Wong DO at [facility] TOTAL KNEE ARTHROPLASTY      UPPER GASTROINTESTINAL ENDOSCOPY  10-    UPPER GASTROINTESTINAL ENDOSCOPY  3/2013    [facility]: peptic stricture dilated to 48F; HH; small antral mucosal elevation    UPPER GASTROINTESTINAL ENDOSCOPY  2014    [facility]: dilation of esophageal stricture    VARICOSE VEIN SURGERY Left 2014  [facility]    Left GSV Ablation    VARICOSE VEIN SURGERY Right 2014  [facility]    Right GSV Ablation         CURRENT MEDICATIONS       Previous Medications    ACYCLOVIR (ZOVIRAX) 400 MG TABLET    Take 400 mg by mouth 2 times daily history of shingles in her eye    ALBUTEROL (ACCUNEB) 1.25 MG/3ML NEBULIZER SOLUTION    Inhale 1 mL into the lungs every 6 hours as needed     ALLOPURINOL (ZYLOPRIM) 100 MG TABLET    Take 2 tablets by mouth daily For gout -prevention    DORZOLAMIDE-TIMOLOL (COSOPT) 22.3-6.8 MG/ML OPHTHALMIC SOLUTION    Place 1 Dose into both eyes daily    FUROSEMIDE (LASIX) 20 MG TABLET    Take 1 tablet by mouth daily as needed (swelling)    GABAPENTIN (NEURONTIN) 600 MG TABLET    TAKE 1 TABLET BY MOUTH THREE TIMES DAILY    HYDROCODONE-ACETAMINOPHEN (NORCO) 7.5-325 MG PER TABLET    Take 1 tablet by mouth every 6 hours as needed.      LOSARTAN (COZAAR) 100 MG TABLET    TAKE 1 TABLET BY MOUTH EVERY MORNING FOR BLOOD PRESSURE    METOPROLOL TARTRATE (LOPRESSOR) 25 MG TABLET    TAKE ONE TABLET BY MOUTH TWICE DAILY FOR BLOOD PRESSURE AND HEART    NEBULIZERS (AIRIAL COMPACT MINI NEBULIZER) MISC    1 Units by Does not apply route 4 times daily    OMEPRAZOLE (PRILOSEC) 40 MG DELAYED RELEASE CAPSULE    TAKE 1 CAPSULE BY MOUTH DAILY    RIVAROXABAN (XARELTO) 20 MG TABS TABLET    Take 1 tablet by mouth daily (with breakfast)    TERBINAFINE (LAMISIL) 250 MG TABLET    TAKE ONE TABLET PO ONCE DAILY    TRIAMTERENE-HYDROCHLOROTHIAZIDE (MAXZIDE-25) 37.5-25 MG PER TABLET    TAKE 1 TABLET BY MOUTH DAILY    VITAMIN B-12 (CYANOCOBALAMIN) 500 MCG TABLET    TAKE 1 TABLET BY MOUTH DAILY    VITAMIN D (ERGOCALCIFEROL) 1.25 MG (79035 UT) CAPS CAPSULE    TAKE 1 CAPSULE BY MOUTH 1 TIME A WEEK       ALLERGIES     Zoloft [sertraline hcl]    FAMILY HISTORY       Family History   Problem Relation Age of Onset    Cancer Mother         Cervical Cancer    Cancer Brother         Esophageal cancer    Diabetes Brother     Esophageal Cancer Brother     Diabetes Sister     Colon Cancer Neg Hx     Colon Polyps Neg Hx     Liver Cancer Neg Hx     Liver Disease Neg Hx     Rectal Cancer Neg Hx     Stomach Cancer Neg Hx           SOCIAL HISTORY       Social History     Socioeconomic History    Marital status:      Spouse name: None    Number of children: None    Years of education: None    Highest education level: None   Occupational History    None   Social Needs    Financial resource strain: None    Food insecurity     Worry: None     Inability: None    Transportation needs     Medical: None     Non-medical: None   Tobacco Use    Smoking status: Never Smoker    Smokeless tobacco: Never Used   Substance and Sexual Activity    Alcohol use: No    Drug use: No    Sexual activity: Yes     Partners: Male   Lifestyle    Physical activity     Days per week: None     Minutes per session: None    Stress: None   Relationships    Social connections     Talks on phone: None Gets together: None     Attends Holiness service: None     Active member of club or organization: None     Attends meetings of clubs or organizations: None     Relationship status: None    Intimate partner violence     Fear of current or ex partner: None     Emotionally abused: None     Physically abused: None     Forced sexual activity: None   Other Topics Concern    None   Social History Narrative    None       SCREENINGS        Johana Coma Scale  Eye Opening: Spontaneous  Best Verbal Response: Oriented  Best Motor Response: Obeys commands  Johana Coma Scale Score: 15               PHYSICAL EXAM    (up to 7 for level 4, 8 or more for level 5)     ED Triage Vitals [08/20/20 1142]   BP Temp Temp Source Pulse Resp SpO2 Height Weight   (!) 158/83 99 °F (37.2 °C) Oral 71 17 93 % 5' 3\" (1.6 m) 165 lb (74.8 kg)       Physical Exam  Vitals signs and nursing note reviewed. Constitutional:       Appearance: Normal appearance. She is obese. HENT:      Head: Normocephalic and atraumatic. Neck:      Musculoskeletal: Neck supple. Cardiovascular:      Rate and Rhythm: Normal rate and regular rhythm. Pulmonary:      Effort: Pulmonary effort is normal.      Breath sounds: Normal breath sounds. Abdominal:      General: Abdomen is flat. Palpations: Abdomen is soft. Musculoskeletal:         General: Tenderness present. Skin:     General: Skin is warm and dry. Comments: Tenderness over left lateral hip. Pain with ROM   Neurological:      Mental Status: She is alert.    Psychiatric:         Mood and Affect: Mood normal.         Behavior: Behavior normal.         DIAGNOSTIC RESULTS     EKG: All EKG's are interpreted by the Emergency Department Physician who either signs or Co-signs this chart in the absence of a cardiologist.        RADIOLOGY:   Non-plain film images such as CT, Ultrasound and MRI are read by the radiologist. Plain radiographic images are visualized and preliminarily interpreted by the emergency physician with the below findings:        Interpretation per the Radiologist below, if available at the time of this note:    XR HIP 2-3 VW W PELVIS LEFT   Final Result   Impression:    1. No visualized fracture or malalignment. Bilateral mild hip joint   osteoarthritis. Signed by Dr Evelyne Castro on 8/20/2020 1:18 PM            ED BEDSIDE ULTRASOUND:   Performed by ED Physician - none    LABS:  Labs Reviewed - No data to display    All other labs were within normal range or not returned as of this dictation. EMERGENCY DEPARTMENT COURSE and DIFFERENTIAL DIAGNOSIS/MDM:   Vitals:    Vitals:    08/20/20 1142 08/20/20 1149   BP: (!) 158/83    Pulse: 71 70   Resp: 17    Temp: 99 °F (37.2 °C)    TempSrc: Oral    SpO2: 93%    Weight: 165 lb (74.8 kg)    Height: 5' 3\" (1.6 m)            MDM  Number of Diagnoses or Management Options     Amount and/or Complexity of Data Reviewed  Tests in the radiology section of CPT®: ordered and reviewed  Independent visualization of images, tracings, or specimens: yes    Risk of Complications, Morbidity, and/or Mortality  Presenting problems: low  Diagnostic procedures: low    Patient Progress  Patient progress: improved        REASSESSMENT          CRITICAL CARE TIME       CONSULTS:  None    PROCEDURES:  Unless otherwise noted below, none     Procedures        FINAL IMPRESSION      1. Strain of left hip, initial encounter          DISPOSITION/PLAN   DISPOSITION        PATIENT REFERRED TO:  Helena Cohen MD  [address] [place], [place] [id number] [phone]    In 3 days  If symptoms worsen      DISCHARGE MEDICATIONS:  New Prescriptions    CYCLOBENZAPRINE (FLEXERIL) 10 MG TABLET    Take 1 tablet by mouth nightly as needed for Muscle spasms     Controlled Substances Monitoring:     RX Monitoring 3/4/2020   Attestation The Prescription Monitoring Report for this patient was reviewed today.    Periodic Controlled Substance Monitoring Possible medication side

## 2020-08-20 NOTE — ED NOTES
Bed: 11  Expected date:   Expected time:   Means of arrival:   Comments:  [address] Frank Lopez, RN  08/20/20 9708

## 2020-08-21 ENCOUNTER — OFFICE VISIT (OUTPATIENT)
Dept: PRIMARY CARE CLINIC | Age: 85
End: 2020-08-21
Payer: MEDICARE

## 2020-08-21 VITALS
BODY MASS INDEX: 29.8 KG/M2 | RESPIRATION RATE: 16 BRPM | SYSTOLIC BLOOD PRESSURE: 150 MMHG | HEART RATE: 67 BPM | WEIGHT: 168.2 LBS | DIASTOLIC BLOOD PRESSURE: 62 MMHG | OXYGEN SATURATION: 95 % | TEMPERATURE: 96.9 F

## 2020-08-21 LAB
ALBUMIN SERPL-MCNC: 3.4 G/DL (ref 3.5–5.2)
ALP BLD-CCNC: 80 U/L (ref 35–104)
ALT SERPL-CCNC: 8 U/L (ref 5–33)
ANION GAP SERPL CALCULATED.3IONS-SCNC: 15 MMOL/L (ref 7–19)
AST SERPL-CCNC: 10 U/L (ref 5–32)
BASOPHILS ABSOLUTE: 0 K/UL (ref 0–0.2)
BASOPHILS RELATIVE PERCENT: 0.6 % (ref 0–1)
BILIRUB SERPL-MCNC: <0.2 MG/DL (ref 0.2–1.2)
BUN BLDV-MCNC: 16 MG/DL (ref 8–23)
CALCIUM SERPL-MCNC: 9 MG/DL (ref 8.8–10.2)
CHLORIDE BLD-SCNC: 104 MMOL/L (ref 98–111)
CO2: 22 MMOL/L (ref 22–29)
CREAT SERPL-MCNC: 0.8 MG/DL (ref 0.5–0.9)
EOSINOPHILS ABSOLUTE: 0.3 K/UL (ref 0–0.6)
EOSINOPHILS RELATIVE PERCENT: 5.1 % (ref 0–5)
GFR AFRICAN AMERICAN: >59
GFR NON-AFRICAN AMERICAN: >60
GLUCOSE BLD-MCNC: 138 MG/DL (ref 74–109)
HCT VFR BLD CALC: 32.4 % (ref 37–47)
HEMOGLOBIN: 9.9 G/DL (ref 12–16)
IMMATURE GRANULOCYTES #: 0 K/UL
LYMPHOCYTES ABSOLUTE: 1.7 K/UL (ref 1.1–4.5)
LYMPHOCYTES RELATIVE PERCENT: 26.1 % (ref 20–40)
MCH RBC QN AUTO: 27.4 PG (ref 27–31)
MCHC RBC AUTO-ENTMCNC: 30.6 G/DL (ref 33–37)
MCV RBC AUTO: 89.8 FL (ref 81–99)
MONOCYTES ABSOLUTE: 0.7 K/UL (ref 0–0.9)
MONOCYTES RELATIVE PERCENT: 9.8 % (ref 0–10)
NEUTROPHILS ABSOLUTE: 3.9 K/UL (ref 1.5–7.5)
NEUTROPHILS RELATIVE PERCENT: 58.1 % (ref 50–65)
PDW BLD-RTO: 14.8 % (ref 11.5–14.5)
PLATELET # BLD: 361 K/UL (ref 130–400)
PMV BLD AUTO: 10 FL (ref 9.4–12.3)
POTASSIUM SERPL-SCNC: 4.1 MMOL/L (ref 3.5–5)
RBC # BLD: 3.61 M/UL (ref 4.2–5.4)
SODIUM BLD-SCNC: 141 MMOL/L (ref 136–145)
TOTAL PROTEIN: 6.1 G/DL (ref 6.6–8.7)
WBC # BLD: 6.7 K/UL (ref 4.8–10.8)

## 2020-08-21 PROCEDURE — 4040F PNEUMOC VAC/ADMIN/RCVD: CPT | Performed by: NURSE PRACTITIONER

## 2020-08-21 PROCEDURE — 36415 COLL VENOUS BLD VENIPUNCTURE: CPT | Performed by: NURSE PRACTITIONER

## 2020-08-21 PROCEDURE — 96523 IRRIG DRUG DELIVERY DEVICE: CPT | Performed by: NURSE PRACTITIONER

## 2020-08-21 PROCEDURE — 99214 OFFICE O/P EST MOD 30 MIN: CPT | Performed by: NURSE PRACTITIONER

## 2020-08-21 PROCEDURE — G8427 DOCREV CUR MEDS BY ELIG CLIN: HCPCS | Performed by: NURSE PRACTITIONER

## 2020-08-21 PROCEDURE — G8417 CALC BMI ABV UP PARAM F/U: HCPCS | Performed by: NURSE PRACTITIONER

## 2020-08-21 PROCEDURE — G8399 PT W/DXA RESULTS DOCUMENT: HCPCS | Performed by: NURSE PRACTITIONER

## 2020-08-21 PROCEDURE — 1036F TOBACCO NON-USER: CPT | Performed by: NURSE PRACTITIONER

## 2020-08-21 PROCEDURE — 1123F ACP DISCUSS/DSCN MKR DOCD: CPT | Performed by: NURSE PRACTITIONER

## 2020-08-21 PROCEDURE — 1090F PRES/ABSN URINE INCON ASSESS: CPT | Performed by: NURSE PRACTITIONER

## 2020-08-21 RX ORDER — SULFAMETHOXAZOLE AND TRIMETHOPRIM 800; 160 MG/1; MG/1
1 TABLET ORAL 2 TIMES DAILY
Qty: 14 TABLET | Refills: 0 | Status: SHIPPED | OUTPATIENT
Start: 2020-08-21 | End: 2020-08-28

## 2020-08-21 RX ORDER — PREDNISONE 10 MG/1
TABLET ORAL
Qty: 18 TABLET | Refills: 0 | Status: SHIPPED | OUTPATIENT
Start: 2020-08-21 | End: 2020-09-22

## 2020-08-21 ASSESSMENT — ENCOUNTER SYMPTOMS
RESPIRATORY NEGATIVE: 1
GASTROINTESTINAL NEGATIVE: 1
EYES NEGATIVE: 1

## 2020-08-21 NOTE — PATIENT INSTRUCTIONS
Start steroid today  Use pain meds as needed  Start the antibiotics  Continue with the aquafor wrap. Continue the lamasil fungal meds  appt with Dr Harding, have him send me records.    Consider PT  1-2 wks

## 2020-08-21 NOTE — PROGRESS NOTES
[address] [facility]  [address]  [address] [address]  Dept: [phone]  Dept Fax: [phone]  Loc: [phone]    Michael Boucher is a 82 y.o. female who presents today for her medical conditions/complaints as noted below. Michael Boucher is c/o of Mediport (needas a flush); Cellulitis (on leg again); and Hip Pain (sat down \"wrong\" and hurt left hip went to the ER)        HPI:     HPI   Chief Complaint   Patient presents with   Giana Macias     needas a flush    Cellulitis     on leg again    Hip Pain     sat down \"wrong\" and hurt left hip went to the ER   She did go to the emergency room last night and had an x-ray of the hip which was negative. She was in the nursing home until recently when she came home. She reports that she did get to feeling better in the nursing home and got more strength but she could not leave her room because she was quarantined there and did not get much physical therapy. The main reason she went to the nursing home was for rehab for her fractured spine. Since she has been home she has not been able to get up and walk she just transfers herself from the wheelchair to the bed and she is sleeping in a recliner. Her caregiver is with her today and says that she has a stage I decubitus on her buttocks because she cannot turn her self and cannot get up and down so she is always sitting in a chair. Does have an appointment with Dr. Harding on August 26 about the rash.   Past Medical History:   Diagnosis Date    Bronchitis     Colon cancer ([facility] [address]     Colon polyps     Constipation     Deep vein blood clot of left lower extremity (HCC)     Left leg     Depression     DVT (deep venous thrombosis) (HCC)     Dysphagia     Fibrocystic breast disease     Fibromyalgia     GERD (gastroesophageal reflux disease)     reflux with hx of esophageal stricture    Headache(784.0)     History of colon cancer 2000    Hormone replacement therapy (postmenopausal)  Colon Cancer Neg Hx     Colon Polyps Neg Hx     Liver Cancer Neg Hx     Liver Disease Neg Hx     Rectal Cancer Neg Hx     Stomach Cancer Neg Hx        Social History     Tobacco Use    Smoking status: Never Smoker    Smokeless tobacco: Never Used   Substance Use Topics    Alcohol use: No      Current Outpatient Medications   Medication Sig Dispense Refill    predniSONE (DELTASONE) 10 MG tablet Days 1-3 take 1 PO TID, days 4-6 take 1 PO BID, days 7-9 take 1 PO daily. 18 tablet 0    sulfamethoxazole-trimethoprim (BACTRIM DS;SEPTRA DS) 800-160 MG per tablet Take 1 tablet by mouth 2 times daily for 7 days 14 tablet 0   [address] Does not apply route 1 each 0    cyclobenzaprine (FLEXERIL) 10 MG tablet Take 1 tablet by mouth nightly as needed for Muscle spasms 10 tablet 0    acyclovir (ZOVIRAX) 400 MG tablet Take 400 mg by mouth 2 times daily history of shingles in her eye      terbinafine (LAMISIL) 250 MG tablet TAKE ONE TABLET PO ONCE DAILY 90 tablet 0    vitamin B-12 (CYANOCOBALAMIN) 500 MCG tablet TAKE 1 TABLET BY MOUTH DAILY 30 tablet 11    gabapentin (NEURONTIN) 600 MG tablet TAKE 1 TABLET BY MOUTH THREE TIMES DAILY (Patient taking differently: Take 600 mg by mouth 3 times daily. ) 90 tablet 5    omeprazole (PRILOSEC) 40 MG delayed release capsule TAKE 1 CAPSULE BY MOUTH DAILY 90 capsule 3    triamterene-hydrochlorothiazide (MAXZIDE-25) 37.5-25 MG per tablet TAKE 1 TABLET BY MOUTH DAILY 90 tablet 3    HYDROcodone-acetaminophen (NORCO) 7.5-325 MG per tablet Take 1 tablet by mouth every 6 hours as needed.        allopurinol (ZYLOPRIM) 100 MG tablet Take 2 tablets by mouth daily For gout -prevention 60 tablet 5    losartan (COZAAR) 100 MG tablet TAKE 1 TABLET BY MOUTH EVERY MORNING FOR BLOOD PRESSURE (Patient taking differently: Take 100 mg by mouth daily ) 90 tablet 3    rivaroxaban (XARELTO) 20 MG TABS tablet Take 1 tablet by mouth daily (with breakfast) 30 tablet 3    vitamin D (ERGOCALCIFEROL) 1.25 MG (01667 UT) CAPS capsule TAKE 1 CAPSULE BY MOUTH 1 TIME A WEEK 13 capsule 3    metoprolol tartrate (LOPRESSOR) 25 MG tablet TAKE ONE TABLET BY MOUTH TWICE DAILY FOR BLOOD PRESSURE AND HEART 180 tablet 3    albuterol (ACCUNEB) 1.25 MG/3ML nebulizer solution Inhale 1 mL into the lungs every 6 hours as needed       dorzolamide-timolol (COSOPT) 22.3-6.8 MG/ML ophthalmic solution Place 1 Dose into both eyes daily      furosemide (LASIX) 20 MG tablet Take 1 tablet by mouth daily as needed (swelling) 30 tablet 3    Nebulizers (AIRIAL COMPACT MINI NEBULIZER) MISC 1 Units by Does not apply route 4 times daily 1 each 0     No current facility-administered medications for this visit. Allergies   Allergen Reactions    Zoloft [Sertraline Hcl] Other (See Comments)     Patient states that she doesn't want this medication anymore because she hallucinated and saw spiders. Patient weaned herself off and after she quit taking the medication, the hallucinations stopped. Patient states that she doesn't want this medication anymore because she hallucinated and saw spiders. Patient weaned herself off and after she quit taking the medication, the hallucinations stopped. Health Maintenance   Topic Date Due    Shingles Vaccine (1 of 2) 02/01/1987    Annual Wellness Visit (AWV)  05/29/2019    Flu vaccine (1) 09/01/2020    Potassium monitoring  07/06/2021    Creatinine monitoring  07/06/2021    DTaP/Tdap/Td vaccine (3 - Td) 12/23/2025    Colon cancer screen colonoscopy  03/11/2026    DEXA (modify frequency per FRAX score)  Completed    Pneumococcal 65+ years Vaccine  Completed    Hepatitis A vaccine  Aged Out    Hepatitis B vaccine  Aged Out    Hib vaccine  Aged Out    Meningococcal (ACWY) vaccine  Aged Out       Subjective:      Review of Systems   Constitutional: Positive for activity change and fatigue. Negative for fever. HENT: Negative. Eyes: Negative.     Respiratory: Negative. Cardiovascular: Negative. Gastrointestinal: Negative. Genitourinary: Negative. Skin: Positive for rash and wound. Neurological: Positive for weakness. Psychiatric/Behavioral: Positive for dysphoric mood. The patient is nervous/anxious. Objective:     Physical Exam  Vitals signs and nursing note reviewed. Constitutional:       Appearance: She is well-developed. Comments: Elderly female sitting up in wheel chair. HENT:      Head: Normocephalic. Right Ear: External ear normal.      Left Ear: External ear normal.   Eyes:      Pupils: Pupils are equal, round, and reactive to light. Neck:      Musculoskeletal: Normal range of motion. Cardiovascular:      Rate and Rhythm: Normal rate and regular rhythm. Heart sounds: Normal heart sounds. Pulmonary:      Effort: Pulmonary effort is normal.      Breath sounds: Normal breath sounds. Skin:     General: Skin is warm and dry. Neurological:      Mental Status: She is alert and oriented to person, place, and time. Psychiatric:         Behavior: Behavior normal.         Thought Content: Thought content normal.         Judgment: Judgment normal.       BP (!) 150/62 (Site: Left Upper Arm, Position: Sitting, Cuff Size: Medium Adult)   Pulse 67   Temp 96.9 °F (36.1 °C) (Temporal)   Resp 16   Wt 168 lb 3.2 oz (76.3 kg)   SpO2 95%   Breastfeeding No   BMI 29.80 kg/m²   Venous Access Procedure Note  Indication: maintenance flush    Procedure: The patient was placed in the appropriate position and the skin over the puncture site was prepped with betadine and draped in a sterile fashion. Intravenous access was obtained in right chest port with a Bliss needle and the site was secured appropriately. 3 cc of blood with withdrawn and discarded. Blood was drawn for cbc and cmp The port was flushed with 10cc NS and then 3 cc heparin flush. The patient tolerated the procedure well.     Complications: None    Assessment:       Diagnosis Orders   1. Cellulitis of left lower limb     2. Stasis dermatitis of both legs     3. Port-A-Cath in place  [place] [facility] IMPLANTED DRUG DELIVERY DEVICE   4. CKD (chronic kidney disease) stage 3, GFR 30-59 ml/min ([facility])  CBC Auto Differential    Comprehensive Metabolic Panel   5. Encounter for care related to Port-a-Cath  [place] [address] DEVICE   6. Medication management  CBC Auto Differential    Comprehensive Metabolic Panel   7. Pressure injury of sacral region, stage 1     8. Fungal dermatitis     9. Physical deconditioning     10. Compression fracture of L5 vertebra with delayed healing, subsequent encounter           Plan:   More than 50% of the time was spent counseling and coordinating care for a total time of 35 min face to face. Given the fact she is still very immobile after going to the nursing home for rehab I am going to try to get her hospital bed to get her out of the recliner. She also has a stage I decubitus and those feet need to be elevated. She is unable to move herself easily to change positions so the hospital bed would be an asset to getting her off of her back. The patient requires positioning of her body that is not feasible in an ordinary bed in order to take pain and get the pressure off of her decubitus. Patient given educational materials -see patient instructions. Discussed use, benefit, and side effects of prescribed medications. All patient questions answered. Pt voiced understanding. Reviewed health maintenance. Instructed to continue currentmedications, diet and exercise. Patient agreed with treatment plan. Follow up as directed. MEDICATIONS:  Orders Placed This Encounter   Medications    predniSONE (DELTASONE) 10 MG tablet     Sig: Days 1-3 take 1 PO TID, days 4-6 take 1 PO BID, days 7-9 take 1 PO daily.      Dispense:  18 tablet     Refill:  0    sulfamethoxazole-trimethoprim (BACTRIM DS;SEPTRA DS) 800-160 MG per tablet     Sig: Take 1 tablet by mouth 2 times daily for 7 days     Dispense:  14 tablet     Refill:  0   [facility] Bed MISC     Sig: by Does not apply route     Dispense:  1 each     Refill:  0         ORDERS:  Orders Placed This Encounter   Procedures    CBC Auto Differential    Comprehensive Metabolic Panel    [place] [facility] IMPLANTED DRUG DELIVERY DEVICE       Follow-up:  No follow-ups on file. PATIENT INSTRUCTIONS:  Patient Instructions   Start steroid today  Use pain meds as needed  Start the antibiotics  Continue with the aquafor wrap. Continue the lamasil fungal meds  appt with Dr Harding, have him send me records. Consider PT  1-2 wks    Electronically signed by EARLE Barker CNP on 8/21/2020 at 2:02 PM    EMR Dragon/transcription disclaimer:  Much of thisencounter note is electronic transcription/translation of spoken language to printed texts. The electronic translation of spoken language may be erroneous, or at times, nonsensical words or phrases may be inadvertentlytranscribed.   Although I have reviewed the note for such errors, some may still exist.

## 2020-08-26 ENCOUNTER — TELEPHONE (OUTPATIENT)
Dept: PRIMARY CARE CLINIC | Age: 85
End: 2020-08-26

## 2020-08-26 NOTE — TELEPHONE ENCOUNTER
Maria Del Carmen Rueda called and stated that she needs notes for pt for a hospital bed she stated that she is faxing over on what needs to be filled out, and you need to either do option 1 2 or 3 and you have to do option 4, I will look for the fax.

## 2020-09-04 ENCOUNTER — OFFICE VISIT (OUTPATIENT)
Dept: NEUROSURGERY | Age: 85
End: 2020-09-04
Payer: MEDICARE

## 2020-09-04 ENCOUNTER — NURSE ONLY (OUTPATIENT)
Dept: PRIMARY CARE CLINIC | Age: 85
End: 2020-09-04
Payer: MEDICARE

## 2020-09-04 VITALS
HEART RATE: 61 BPM | HEIGHT: 63 IN | WEIGHT: 168 LBS | SYSTOLIC BLOOD PRESSURE: 201 MMHG | OXYGEN SATURATION: 96 % | BODY MASS INDEX: 29.77 KG/M2 | DIASTOLIC BLOOD PRESSURE: 85 MMHG

## 2020-09-04 PROCEDURE — 4040F PNEUMOC VAC/ADMIN/RCVD: CPT | Performed by: NEUROLOGICAL SURGERY

## 2020-09-04 PROCEDURE — 1123F ACP DISCUSS/DSCN MKR DOCD: CPT | Performed by: NEUROLOGICAL SURGERY

## 2020-09-04 PROCEDURE — 1036F TOBACCO NON-USER: CPT | Performed by: NEUROLOGICAL SURGERY

## 2020-09-04 PROCEDURE — G8427 DOCREV CUR MEDS BY ELIG CLIN: HCPCS | Performed by: NEUROLOGICAL SURGERY

## 2020-09-04 PROCEDURE — 99213 OFFICE O/P EST LOW 20 MIN: CPT | Performed by: NEUROLOGICAL SURGERY

## 2020-09-04 PROCEDURE — 36415 COLL VENOUS BLD VENIPUNCTURE: CPT | Performed by: FAMILY MEDICINE

## 2020-09-04 PROCEDURE — 1090F PRES/ABSN URINE INCON ASSESS: CPT | Performed by: NEUROLOGICAL SURGERY

## 2020-09-04 PROCEDURE — G8417 CALC BMI ABV UP PARAM F/U: HCPCS | Performed by: NEUROLOGICAL SURGERY

## 2020-09-04 PROCEDURE — G8399 PT W/DXA RESULTS DOCUMENT: HCPCS | Performed by: NEUROLOGICAL SURGERY

## 2020-09-04 RX ORDER — TIZANIDINE 4 MG/1
4 TABLET ORAL 3 TIMES DAILY PRN
Qty: 90 TABLET | Refills: 2 | Status: SHIPPED | OUTPATIENT
Start: 2020-09-04 | End: 2020-12-03

## 2020-09-04 ASSESSMENT — ENCOUNTER SYMPTOMS
EYES NEGATIVE: 1
RESPIRATORY NEGATIVE: 1
GASTROINTESTINAL NEGATIVE: 1

## 2020-09-04 NOTE — PROGRESS NOTES
[address] [facility] Office Visit          Chief Complaint   Patient presents with    Follow-up    Lower Back Pain    Extremity Weakness       HISTORY OF PRESENT ILLNESS:      Gamaliel Melgar is a 82 y.o. female who underwent a L5 kyphoplasty for a compression fracture and severe back pain on 2020 and now she is 2 months out from her surgery. Prior to surgery she complained of severe back, bilateral lower extremity pain with radiation into the right anterior thigh. She complained of numbness in her feet due to neuropathy. Unfortunately she failed to follow up with her wound check. Today she states that her back pain is improved. She states \"it's my legs I'm having problems with\". She states she has severe BLE muscle spasms R>L.            Past Medical History:   Diagnosis Date    Bronchitis     Colon cancer ([address]     Colon polyps     Constipation     Deep vein blood clot of left lower extremity (HCC)     Left leg     Depression     DVT (deep venous thrombosis) (HCC)     Dysphagia     Fibrocystic breast disease     Fibromyalgia     GERD (gastroesophageal reflux disease)     reflux with hx of esophageal stricture    Headache(784.0)     History of colon cancer     Hormone replacement therapy (postmenopausal)     Hypertension     Neuropathy of foot     In both feet    Osteoarthritis     left knee pain    Restless legs syndrome     Vitamin D deficiency        Past Surgical History:   Procedure Laterality Date    APPENDECTOMY      BREAST BIOPSY      CATARACT REMOVAL       SECTION      CHOLECYSTECTOMY      COLECTOMY  partial    COLON SURGERY      COLONOSCOPY  2010    COLONOSCOPY  2012    Dr Jaya Donato: unremarkable enterocolonic anastamosis; hyperplastic polyps    COLONOSCOPY  3/2013    [place]: unremarkable post-surgical colon    COLONOSCOPY  3/11/16    Dr Brenda Burch colon anastomosis, 5 yr recall    FOOT SURGERY  right foot    -prevention, Disp: 60 tablet, Rfl: 5    losartan (COZAAR) 100 MG tablet, TAKE 1 TABLET BY MOUTH EVERY MORNING FOR BLOOD PRESSURE (Patient taking differently: Take 100 mg by mouth daily ), Disp: 90 tablet, Rfl: 3    rivaroxaban (XARELTO) 20 MG TABS tablet, Take 1 tablet by mouth daily (with breakfast), Disp: 30 tablet, Rfl: 3    vitamin D (ERGOCALCIFEROL) 1.25 MG (42717 UT) CAPS capsule, TAKE 1 CAPSULE BY MOUTH 1 TIME A WEEK, Disp: 13 capsule, Rfl: 3    metoprolol tartrate (LOPRESSOR) 25 MG tablet, TAKE ONE TABLET BY MOUTH TWICE DAILY FOR BLOOD PRESSURE AND HEART, Disp: 180 tablet, Rfl: 3    albuterol (ACCUNEB) 1.25 MG/3ML nebulizer solution, Inhale 1 mL into the lungs every 6 hours as needed , Disp: , Rfl:     dorzolamide-timolol (COSOPT) 22.3-6.8 MG/ML ophthalmic solution, Place 1 Dose into both eyes daily, Disp: , Rfl:     furosemide (LASIX) 20 MG tablet, Take 1 tablet by mouth daily as needed (swelling), Disp: 30 tablet, Rfl: 3    Nebulizers (AIRIAL COMPACT MINI NEBULIZER) MISC, 1 Units by Does not apply route 4 times daily, Disp: 1 each, Rfl: 0  Zoloft [sertraline hcl]    Social History  Social History     Tobacco Use   Smoking Status Never Smoker   Smokeless Tobacco Never Used     Social History     Substance and Sexual Activity   Alcohol Use No         Family History   Problem Relation Age of Onset    Cancer Mother         Cervical Cancer    Cancer Brother         Esophageal cancer    Diabetes Brother     Esophageal Cancer Brother     Diabetes Sister     Colon Cancer Neg Hx     Colon Polyps Neg Hx     Liver Cancer Neg Hx     Liver Disease Neg Hx     Rectal Cancer Neg Hx     Stomach Cancer Neg Hx        Review of Systems:  Constitutional: Negative. HENT: Negative. Eyes: Negative. Respiratory: Negative. Cardiovascular: Negative. Gastrointestinal: Negative. Genitourinary: Negative. Musculoskeletal: Negative. Skin: Negative. Neurological: Positive for weakness. Endo/Heme/Allergies: Negative. Psychiatric/Behavioral: Negative. PHYSICAL EXAM:  Vitals:    09/04/20 1044   BP: (!) 201/85   Pulse:    SpO2:      Constitutional: The patient appears well-developed andwell-nourished. Eyes - conjunctiva normal.  Conjugate gaze  Ear, nose, throat -No scars, masses, or lesions over external nose or ears, no atrophy of tongue  Neck-symmetric, no masses noted, no jugular vein distension  Respiration- chest wall appears symmetric, goodexpansion, normal effort without use of accessory muscles  Musculoskeletal - no significant wasting of muscles noted, no bony deformities, gait no gross ataxia  Extremities-no clubbing, cyanosis or edema  Skin- warm, dry, and intact. No rash, erythema, or pallor.   Psychiatric - mood, affect, and behavior appear normal.     Neurologic Examination  Awake, Alert andoriented x 3  Normal speech pattern, following commands  Motor:  RIGHT:      iliopsoas 4+/5    knee flexor 5/5    knee extension 5/5    EHL/dorsiflexion 5/5    plantar flexion 5/5    LEFT:     iliopsoas 5/5    knee flexor 5/5    knee extension 5/5    EHL/dorsiflexion 5/5    plantar flexion 5/5      No deficits to light touch or pinprick sensation    Normal Gait pattern      Wound:  C/D/I     DATA and IMAGING:    Lab Results   Component Value Date    WBC 6.7 08/21/2020    HGB 9.9 (L) 08/21/2020    HCT 32.4 (L) 08/21/2020    MCV 89.8 08/21/2020     08/21/2020     Lab Results   Component Value Date     08/21/2020    K 4.1 08/21/2020     08/21/2020    CO2 22 08/21/2020    BUN 16 08/21/2020    CREATININE 0.8 08/21/2020    GLUCOSE 138 (H) 08/21/2020    CALCIUM 9.0 08/21/2020    PROT 6.1 (L) 08/21/2020    LABALBU 3.4 (L) 08/21/2020    BILITOT <0.2 08/21/2020    ALKPHOS 80 08/21/2020    AST 10 08/21/2020    ALT 8 08/21/2020    LABGLOM >60 08/21/2020    GFRAA >59 08/21/2020    GLOB 2.5 10/12/2016     Lab Results   Component Value Date    INR 0.93 07/06/2020    INR 2.83 (H)

## 2020-09-06 LAB
ANA IGG, ELISA: NORMAL
ENA TO SSB (LA) ANTIBODY: 0 AU/ML (ref 0–40)
SSA 52 (RO) (ENA) AB, IGG: 0 AU/ML (ref 0–40)
SSA 60 (RO) (ENA) AB, IGG: 1 AU/ML (ref 0–40)

## 2020-09-10 ENCOUNTER — OFFICE VISIT (OUTPATIENT)
Dept: NEUROSURGERY | Age: 85
End: 2020-09-10
Payer: MEDICARE

## 2020-09-10 ENCOUNTER — TELEPHONE (OUTPATIENT)
Dept: PRIMARY CARE CLINIC | Age: 85
End: 2020-09-10

## 2020-09-10 VITALS
DIASTOLIC BLOOD PRESSURE: 45 MMHG | HEIGHT: 64 IN | WEIGHT: 170 LBS | BODY MASS INDEX: 29.02 KG/M2 | HEART RATE: 54 BPM | SYSTOLIC BLOOD PRESSURE: 125 MMHG

## 2020-09-10 DIAGNOSIS — M79.605 PAIN IN BOTH LOWER EXTREMITIES: ICD-10-CM

## 2020-09-10 DIAGNOSIS — R20.2 NUMBNESS AND TINGLING OF BOTH FEET: ICD-10-CM

## 2020-09-10 DIAGNOSIS — M79.604 PAIN IN BOTH LOWER EXTREMITIES: ICD-10-CM

## 2020-09-10 DIAGNOSIS — R20.0 NUMBNESS AND TINGLING OF BOTH FEET: ICD-10-CM

## 2020-09-10 LAB
C-REACTIVE PROTEIN: 0.19 MG/DL (ref 0–0.5)
MAGNESIUM: 1.9 MG/DL (ref 1.6–2.4)
RHEUMATOID FACTOR: <10 IU/ML
SEDIMENTATION RATE, ERYTHROCYTE: 18 MM/HR (ref 0–25)
TOTAL CK: 33 U/L (ref 26–192)
VITAMIN B-12: 660 PG/ML (ref 211–946)

## 2020-09-10 PROCEDURE — 99213 OFFICE O/P EST LOW 20 MIN: CPT | Performed by: NURSE PRACTITIONER

## 2020-09-10 PROCEDURE — G8417 CALC BMI ABV UP PARAM F/U: HCPCS | Performed by: NURSE PRACTITIONER

## 2020-09-10 PROCEDURE — 1123F ACP DISCUSS/DSCN MKR DOCD: CPT | Performed by: NURSE PRACTITIONER

## 2020-09-10 PROCEDURE — 1090F PRES/ABSN URINE INCON ASSESS: CPT | Performed by: NURSE PRACTITIONER

## 2020-09-10 PROCEDURE — 1036F TOBACCO NON-USER: CPT | Performed by: NURSE PRACTITIONER

## 2020-09-10 PROCEDURE — G8427 DOCREV CUR MEDS BY ELIG CLIN: HCPCS | Performed by: NURSE PRACTITIONER

## 2020-09-10 PROCEDURE — G8399 PT W/DXA RESULTS DOCUMENT: HCPCS | Performed by: NURSE PRACTITIONER

## 2020-09-10 PROCEDURE — 4040F PNEUMOC VAC/ADMIN/RCVD: CPT | Performed by: NURSE PRACTITIONER

## 2020-09-10 RX ORDER — GABAPENTIN 600 MG/1
600 TABLET ORAL 3 TIMES DAILY
COMMUNITY
End: 2020-09-21

## 2020-09-10 NOTE — PROGRESS NOTES
REVIEW OF SYSTEMS    Constitutional: []Fever []Sweat []Chills [] Recent Injury [x] Denies all unless marked  HEENT:[]Headache  [] Head Injury/Hearing Loss  [] Sore Throat  [] Ear Ache/Dizziness  [x] Denies all unless marked  Spine:  [x] Neck pain  [x] Back pain  [] Sciaticia  [x] Denies all unless marked  Cardiovascular:[]Heart Disease []Chest Pain [] Palpitations  [x] Denies all unless marked  Pulmonary: []Shortness of Breath []Cough   [x] Denies all unless marke  Gastrointestinal: []Nausea  []Vomiting  []Abdominal Pain  []Constipation  []Diarrhea  []Dark Bloody Stools  [x] Denies all unless marked  Psychiatric/Behavioral:[x] Depression [x] Anxiety [x] Denies all unless marked  Genitourinary:   [] Frequency  [] Urgency  [] Incontinence [] Pain with Urination  [x] Denies all unless marked  Extremities: [x]Pain  [x]Swelling  [x] Denies all unless marked  Musculoskeletal: [x] Muscle Pain  [x] Joint Pain  [] Arthritis [x] Muscle Cramps [x] Muscle Twitches  [x] Denies all unless marked  Sleep: [] Insomnia [] Snoring [] Restless Legs [] Sleep Apnea  [] Daytime Sleepiness  [x] Denies all unless marked  Skin:[] Rash [] Skin Discoloration [x] Denies all unless marked   Neurological: []Visual Disturbance/Memory Loss [] Loss of Balance [] Slurred Speech/Weakness [] Seizures  [] Vertigo/Dizziness [x] Denies all unless marked

## 2020-09-10 NOTE — PROGRESS NOTES
[facility] Neurology Office Note      Patient:   Annamae Felty  MR#:    [id number]  Account Number:                         YOB: 1935  Date of Evaluation:  9/10/2020  Time of Note:                          1:29 PM  Primary/Referring Physician:  Te Valencia MD   Consulting Physician:  EARLE Arenas    FOLLOW UP VISIT    Chief Complaint   Patient presents with    Follow-up    Leg Pain     pt c/o severe pain       HISTORY OF PRESENT ILLNESS    Annamae Felty is a 82y.o. year old female here for follow up. She is s/p L5 kyphoplasty which has helped with lower back pain. She has noted worsening leg pain and spasms recently. Right leg pain is worse then left leg. Pain is on the top of her thigh with radiation into the great toe. She notes bilateral hip pain as well. Weakness noted in BLE, using a wheelchair more often. She is not on a statin. No new medications. Muscle relaxer is somewhat helpful. She denies incontinence or change in bladder/bowel. She denies saddle anesthesia. She follows with Dr. Harding for skin rash. Pain began after cellulitis in January 2019. Has seen vascular for abnormal ABIs, compression stockings and elevation recommended. She has a prior history stomach cancer about 10 years ago, treated with chemotherapy. She denies vitamin B12 deficiency or diabetes history.       Past Medical History:   Diagnosis Date    Bronchitis     Colon cancer ([facility] [address]     Colon polyps     Constipation     Deep vein blood clot of left lower extremity (HCC)     Left leg     Depression     DVT (deep venous thrombosis) (HCC)     Dysphagia     Fibrocystic breast disease     Fibromyalgia     GERD (gastroesophageal reflux disease)     reflux with hx of esophageal stricture    Headache(784.0)     History of colon cancer 2000    Hormone replacement therapy (postmenopausal)     Hypertension     Neuropathy of foot     In both feet    Osteoarthritis     left knee pain    Restless legs syndrome     file     Non-medical: Not on file   Tobacco Use    Smoking status: Never Smoker    Smokeless tobacco: Never Used   Substance and Sexual Activity    Alcohol use: No    Drug use: No    Sexual activity: Yes     Partners: Male   Lifestyle    Physical activity     Days per week: Not on file     Minutes per session: Not on file    Stress: Not on file   Relationships    Social connections     Talks on phone: Not on file     Gets together: Not on file     Attends Bahai service: Not on file     Active member of club or organization: Not on file     Attends meetings of clubs or organizations: Not on file     Relationship status: Not on file    Intimate partner violence     Fear of current or ex partner: Not on file     Emotionally abused: Not on file     Physically abused: Not on file     Forced sexual activity: Not on file   Other Topics Concern    Not on file   Social History Narrative    Not on file       Current Outpatient Medications   Medication Sig Dispense Refill    gabapentin (NEURONTIN) 600 MG tablet Take 600 mg by mouth 3 times daily.  tiZANidine (ZANAFLEX) 4 MG tablet Take 1 tablet by mouth 3 times daily as needed (muscle spasms) 90 tablet 2    predniSONE (DELTASONE) 10 MG tablet Days 1-3 take 1 PO TID, days 4-6 take 1 PO BID, days 7-9 take 1 PO daily.  18 tablet [address] by Does not apply route 1 each 0    acyclovir (ZOVIRAX) 400 MG tablet Take 400 mg by mouth 2 times daily history of shingles in her eye      terbinafine (LAMISIL) 250 MG tablet TAKE ONE TABLET PO ONCE DAILY 90 tablet 0    vitamin B-12 (CYANOCOBALAMIN) 500 MCG tablet TAKE 1 TABLET BY MOUTH DAILY 30 tablet 11    omeprazole (PRILOSEC) 40 MG delayed release capsule TAKE 1 CAPSULE BY MOUTH DAILY 90 capsule 3    triamterene-hydrochlorothiazide (MAXZIDE-25) 37.5-25 MG per tablet TAKE 1 TABLET BY MOUTH DAILY 90 tablet 3    HYDROcodone-acetaminophen (NORCO) 7.5-325 MG per tablet Take 1 tablet by mouth every 6 hours as needed.  allopurinol (ZYLOPRIM) 100 MG tablet Take 2 tablets by mouth daily For gout -prevention 60 tablet 5    losartan (COZAAR) 100 MG tablet TAKE 1 TABLET BY MOUTH EVERY MORNING FOR BLOOD PRESSURE (Patient taking differently: Take 100 mg by mouth daily ) 90 tablet 3    rivaroxaban (XARELTO) 20 MG TABS tablet Take 1 tablet by mouth daily (with breakfast) 30 tablet 3    vitamin D (ERGOCALCIFEROL) 1.25 MG (36390 UT) CAPS capsule TAKE 1 CAPSULE BY MOUTH 1 TIME A WEEK 13 capsule 3    metoprolol tartrate (LOPRESSOR) 25 MG tablet TAKE ONE TABLET BY MOUTH TWICE DAILY FOR BLOOD PRESSURE AND HEART 180 tablet 3    albuterol (ACCUNEB) 1.25 MG/3ML nebulizer solution Inhale 1 mL into the lungs every 6 hours as needed       dorzolamide-timolol (COSOPT) 22.3-6.8 MG/ML ophthalmic solution Place 1 Dose into both eyes daily      furosemide (LASIX) 20 MG tablet Take 1 tablet by mouth daily as needed (swelling) 30 tablet 3    Nebulizers ([employer] COMPACT MINI NEBULIZER) MISC 1 Units by Does not apply route 4 times daily 1 each 0     No current facility-administered medications for this visit. Allergies   Allergen Reactions    Zoloft [Sertraline Hcl] Other (See Comments)     Patient states that she doesn't want this medication anymore because she hallucinated and saw spiders. Patient weaned herself off and after she quit taking the medication, the hallucinations stopped. Patient states that she doesn't want this medication anymore because she hallucinated and saw spiders. Patient weaned herself off and after she quit taking the medication, the hallucinations stopped. REVIEW OF SYSTEMS  Constitutional: []? Fever []? Sweat []? Chills []? Recent Injury [x]? Denies all unless marked  HEENT:[]? Headache  []? Head Injury/Hearing Loss  []? Sore Throat  []? Ear Ache/Dizziness  [x]? Denies all unless marked  Spine:  [x]? Neck pain  [x]? Back pain  []? Sciaticia  [x]?  Denies all unless marked  Cardiovascular:[]? Heart Disease []? Chest Pain []? Palpitations  [x]? Denies all unless marked  Pulmonary: []? Shortness of Breath []? Cough   [x]? Denies all unless marke  Gastrointestinal: []? Nausea  []? Vomiting  []? Abdominal Pain  []? Constipation  []? Diarrhea  []? Dark Bloody Stools  [x]? Denies all unless marked  Psychiatric/Behavioral:[x]? Depression [x]? Anxiety [x]? Denies all unless marked  Genitourinary:   []? Frequency  []? Urgency  []? Incontinence []? Pain with Urination  [x]? Denies all unless marked  Extremities: [x]? Pain  [x]? Swelling  [x]? Denies all unless marked  Musculoskeletal: [x]? Muscle Pain  [x]? Joint Pain  []? Arthritis [x]? Muscle Cramps [x]? Muscle Twitches  [x]? Denies all unless marked  Sleep: []? Insomnia []? Snoring []? Restless Legs []? Sleep Apnea  []? Daytime Sleepiness  [x]? Denies all unless marked  Skin:[]? Rash []? Skin Discoloration [x]? Denies all unless marked   Neurological: []? Visual Disturbance/Memory Loss []? Loss of Balance []? Slurred Speech/Weakness []? Seizures  []? Vertigo/Dizziness [x]? Denies all unless marked     The MA has completed the ROS with the patient. I have reviewed it in its' entirety with the patient and agree with the documentation. PHYSICAL EXAM  BP (!) 125/45   Pulse 54   Ht 5' 4\" (1.626 m)   Wt 170 lb (77.1 kg)   BMI 29.18 kg/m²       Constitutional - No acute distress    HEENT- Conjunctiva normal.  No scars, masses, or lesions over external nose or ears, no neck masses noted, no jugular vein distension, no bruit  Cardiac- Regular rate and rhythm  Pulmonary- Good expansion, normal effort without use of accessory muscles  Musculoskeletal - No significant wasting of muscles noted, no bony deformities  Extremities - No clubbing, cyanosis or edema  Skin - Warm, dry, and intact.   No rash, erythema, or pallor  Psychiatric - Mood, affect, and behavior appear normal      NEUROLOGICAL EXAM     Mental status   [x]Awake, alert, oriented [x]Affect attention and concentration appear appropriate  [x]Recent and remote memory appears unremarkable  [x]Speech normal without dysarthria or aphasia, comprehension and repetition intact. COMMENTS: mild cognitive impairment     Cranial Nerves [x]No VF deficit to confrontation,  no papilledema on fundoscopic exam.  [x]PERRLA, EOMI, no nystagmus, conjugate eye movements, no ptosis  [x]Face symmetric  [x]Facial sensation intact  [x]Tongue midline no atrophy or fasciculations present  [x]Palate midline, hearing to finger rub normal bilaterally  [x]Shoulder shrug and SCM testing normal bilaterally  COMMENTS:    Motor   []5/5 strength x 4 extremities  [x]Normal bulk and tone  [x]No tremor present  [x]No rigidity or bradykinesia noted  COMMENTS: 3/5 BLE, pain limiting     Sensory  []Sensation intact to light touch, pin prick, vibration, and proprioception BLE  [x]Sensation intact to light touch, pin prick, vibration, and proprioception BUE  COMMENTS: decreased PP, vibration BLE    Coordination [x]FTN normal bilaterally   [x]HTS normal bilaterally  [x]GRICELDA normal bilaterally.    COMMENTS:   Reflexes  []Symmetric and non-pathological  [x]Toes down going bilaterally  [x]No clonus present  COMMENTS: hypoactive throughout    Gait                  []Normal steady gait    []Ataxic    []Spastic     []Magnetic     []Shuffling  COMMENTS: antalgic; in wheelchair        LABS RECORD AND IMAGING REVIEW (As below and per HPI)    Lab Results   Component Value Date    [id number] 660 09/10/2020     Lab Results   Component Value Date    WBC 6.7 08/21/2020    HGB 9.9 (L) 08/21/2020    HCT 32.4 (L) 08/21/2020    MCV 89.8 08/21/2020     08/21/2020     Lab Results   Component Value Date     08/21/2020    K 4.1 08/21/2020     08/21/2020    CO2 22 08/21/2020    BUN 16 08/21/2020    CREATININE 0.8 08/21/2020    GLUCOSE 138 (H) 08/21/2020    CALCIUM 9.0 08/21/2020    PROT 6.1 (L) 08/21/2020    LABALBU 3.4 (L) 08/21/2020 identified. 2.  Severe multilevel degenerative change, as described above. Notably, there is severe central canal stenosis at L4-L5 and severe    LEFT neural foraminal stenosis at L5-S1. 3.  Tiny 3 mm enhancing nodule located along one of the nerve roots of    the cauda equina. 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 Recommend MRI of the brain to exclude CNS neoplasm. Signed by Dr Carlos Mccloud on 6/25/2020 1:06 PM      X-ray hips (8/2020)-   Impression    Impression:    1. No visualized fracture or malalignment. Bilateral mild hip joint    osteoarthritis. Signed by Dr Gómez Vasquez on 8/20/2020 1:18 PM         Reviewed neurosurgery records     ASSESSMENT:    Arya Yanez is a 82y.o. year old female here for follow up of BLE pain and muscle spasms. Exam is largely unchanged from prior. Back pain improved since L5 kyphoplasty. On Neurontin and Norco which has been somewhat beneficial. Noting more muscle spasms recently. Will plan for further work up with lab work today and NCS. ICD-10-CM    1. Pain in both lower extremities  M79.604 C-Reactive Protein    M79.605 Electrophoresis Protein, Serum without Reflex to Immunofixation     Rheumatoid Factor     Sedimentation Rate     Vitamin B12     Nerve Conduction Test with EMG     Myoglobin, Serum     CK     Magnesium     Aldolase   2. Numbness and tingling of both feet  R20.0 C-Reactive Protein    R20.2 Electrophoresis Protein, Serum without Reflex to Immunofixation     Rheumatoid Factor     Sedimentation Rate     Vitamin B12     Nerve Conduction Test with EMG     Myoglobin, Serum     CK     Magnesium     Aldolase   3. Pain of both hip joints  M25.551 Kartik Hampton MD, Orthopaedic Surgery, [place]    M25.552        PLAN:  1. NCS/EMG BLE   2. Lab work as listed above   3. Continue follow up with pain management as previously scheduled   4.  Orthopedic referral, patient requesting Dr. Ravi Cyr. 5. Continue follow up with Dr. Harding, dermatology, as previously scheduled   6. Follow up in 6 weeks, sooner with any worsening     EARLE Douglass    Note:  A total of >50% (>8 minutes) of 15 minutes was spent discussing the pathophysiology and treatment and/or coordination of care of the above diagnoses. This dictation was generated by voice recognition computer software. Although all attempts are made to edit the dictation for accuracy, there may be errors in the transcription that are not intended.

## 2020-09-10 NOTE — TELEPHONE ENCOUNTER
Pt states [facility] has not returned her Phone call. She is trying to request her pain med. She states you told her to call if she had trouble getting it.

## 2020-09-13 LAB
ALDOLASE: 3.4 U/L (ref 1.5–8.1)
MYOGLOBIN: 33 NG/ML (ref 25–58)

## 2020-09-14 LAB
ALBUMIN SERPL-MCNC: 3.4 G/DL (ref 3.75–5.01)
ALPHA-1-GLOBULIN: 0.32 G/DL (ref 0.19–0.46)
ALPHA-2-GLOBULIN: 0.77 G/DL (ref 0.48–1.05)
BETA GLOBULIN: 0.87 G/DL (ref 0.48–1.1)
GAMMA GLOBULIN: 0.64 G/DL (ref 0.62–1.51)
PROTEIN ELECTROPHORESIS, SERUM: ABNORMAL
SPE/IFE INTERPRETATION: ABNORMAL
TOTAL PROTEIN: 6 G/DL (ref 6.3–8.2)

## 2020-09-15 ENCOUNTER — TELEPHONE (OUTPATIENT)
Dept: NEUROSURGERY | Age: 85
End: 2020-09-15

## 2020-09-15 NOTE — PROGRESS NOTES
Can you call the patient and let her know that I ordered an MRI brain? I was reviewing Dr. Tori Aranda notes and looked back over records here as well. He recommended her to see neurosurgery at [facility] and get a lumbar spine MRI. She has seen neurosurgery here so if she wants to keep following with them here that would be fine. [facility] then we can fax the most recent MRI lumbar spine which was done 6/2020.

## 2020-09-15 NOTE — TELEPHONE ENCOUNTER
EARLE iQu at 9/15/2020 10:30 AM     Status: Signed       Can you call the patient and let her know that I ordered an MRI brain? I was reviewing Dr. Eduin Baker notes and looked back over records here as well. He recommended her to see neurosurgery at [facility] and get a lumbar spine MRI. She has seen neurosurgery here so if she wants to keep following with them here that would be fine. If she wants to see [facility] spine team then we can fax the most recent MRI lumbar spine which was done 6/2020. From: Joy Mejia MA   Sent: 9/15/2020  11:07 AM CDT   To: EARLE Qiu     She wants to do whats best. She does not want to repeat MRI d/t pain while she was having it done. She wanted to know if there was anyway you could call and talk to Dr. Saurav Leonardo and explain to him that she had already had it done and dont want to have another one. Please advise. EARLE Qiu [place]               Just call over to [facility] and let them know she has had a recent MRI lumbar spine and we can fax the results if they would like. Make sure you save this documentation in the chart. Ari Wilkinson called and spoke with Remington Santo ( Dr. Eduin Baker nurse). I had faxed over MRI L-spine when I faxed referral records but I am going to re-fax the report. She said she was going to cancel the Mri order there so that the pt dont have to re-do. And she states as far as who the pt sees to follow up doesn't matter. I have tried to call the pt back and was unable to reach her.

## 2020-09-17 ENCOUNTER — HOSPITAL ENCOUNTER (OUTPATIENT)
Dept: NEUROLOGY | Age: 85
Discharge: HOME | End: 2020-09-17
Payer: MEDICARE

## 2020-09-17 PROCEDURE — 95909 NRV CNDJ TST 5-6 STUDIES: CPT

## 2020-09-17 PROCEDURE — 95909 NRV CNDJ TST 5-6 STUDIES: CPT | Performed by: PSYCHIATRY & NEUROLOGY

## 2020-09-21 NOTE — TELEPHONE ENCOUNTER
Requested Prescriptions     Pending Prescriptions Disp Refills    gabapentin (NEURONTIN) 600 MG tablet [Pharmacy Med Name: GABAPENTIN 600MG TABLETS] 90 tablet 2     Sig: TAKE 1 TABLET BY MOUTH THREE TIMES DAILY       Last Office Visit:  9/10/2020  Next Office Visit:  10/23/2020  Last Medication Refill:3/4/20 with 5 refills     Enoch up to date: 9/9/20     *RX updated to reflect   9/21/20  fill date*

## 2020-09-22 ENCOUNTER — OFFICE VISIT (OUTPATIENT)
Dept: PRIMARY CARE CLINIC | Age: 85
End: 2020-09-22
Payer: MEDICARE

## 2020-09-22 VITALS
WEIGHT: 160.4 LBS | HEART RATE: 60 BPM | RESPIRATION RATE: 18 BRPM | HEIGHT: 64 IN | TEMPERATURE: 97.8 F | DIASTOLIC BLOOD PRESSURE: 70 MMHG | BODY MASS INDEX: 27.39 KG/M2 | OXYGEN SATURATION: 98 % | SYSTOLIC BLOOD PRESSURE: 122 MMHG

## 2020-09-22 LAB
ALBUMIN SERPL-MCNC: 4 G/DL (ref 3.5–5.2)
ALP BLD-CCNC: 69 U/L (ref 35–104)
ALT SERPL-CCNC: 10 U/L (ref 5–33)
ANION GAP SERPL CALCULATED.3IONS-SCNC: 14 MMOL/L (ref 7–19)
AST SERPL-CCNC: 11 U/L (ref 5–32)
BASOPHILS ABSOLUTE: 0 K/UL (ref 0–0.2)
BASOPHILS RELATIVE PERCENT: 0.3 % (ref 0–1)
BILIRUB SERPL-MCNC: <0.2 MG/DL (ref 0.2–1.2)
BUN BLDV-MCNC: 22 MG/DL (ref 8–23)
CALCIUM SERPL-MCNC: 9.5 MG/DL (ref 8.8–10.2)
CEA: 2.5 NG/ML (ref 0–4.7)
CHLORIDE BLD-SCNC: 105 MMOL/L (ref 98–111)
CO2: 24 MMOL/L (ref 22–29)
CREAT SERPL-MCNC: 1 MG/DL (ref 0.5–0.9)
EOSINOPHILS ABSOLUTE: 0.3 K/UL (ref 0–0.6)
EOSINOPHILS RELATIVE PERCENT: 3.6 % (ref 0–5)
GFR AFRICAN AMERICAN: >59
GFR NON-AFRICAN AMERICAN: 53
GLUCOSE BLD-MCNC: 94 MG/DL (ref 74–109)
HCT VFR BLD CALC: 38.5 % (ref 37–47)
HEMOGLOBIN: 11.3 G/DL (ref 12–16)
IMMATURE GRANULOCYTES #: 0 K/UL
LYMPHOCYTES ABSOLUTE: 2.1 K/UL (ref 1.1–4.5)
LYMPHOCYTES RELATIVE PERCENT: 22.3 % (ref 20–40)
MCH RBC QN AUTO: 26.2 PG (ref 27–31)
MCHC RBC AUTO-ENTMCNC: 29.4 G/DL (ref 33–37)
MCV RBC AUTO: 89.3 FL (ref 81–99)
MONOCYTES ABSOLUTE: 1 K/UL (ref 0–0.9)
MONOCYTES RELATIVE PERCENT: 10.8 % (ref 0–10)
NEUTROPHILS ABSOLUTE: 5.9 K/UL (ref 1.5–7.5)
NEUTROPHILS RELATIVE PERCENT: 62.7 % (ref 50–65)
PDW BLD-RTO: 15.5 % (ref 11.5–14.5)
PLATELET # BLD: 317 K/UL (ref 130–400)
PMV BLD AUTO: 9.7 FL (ref 9.4–12.3)
POTASSIUM SERPL-SCNC: 4.4 MMOL/L (ref 3.5–5)
RBC # BLD: 4.31 M/UL (ref 4.2–5.4)
SODIUM BLD-SCNC: 143 MMOL/L (ref 136–145)
TOTAL PROTEIN: 6.7 G/DL (ref 6.6–8.7)
WBC # BLD: 9.4 K/UL (ref 4.8–10.8)

## 2020-09-22 PROCEDURE — 1123F ACP DISCUSS/DSCN MKR DOCD: CPT | Performed by: NURSE PRACTITIONER

## 2020-09-22 PROCEDURE — 4040F PNEUMOC VAC/ADMIN/RCVD: CPT | Performed by: NURSE PRACTITIONER

## 2020-09-22 PROCEDURE — 36415 COLL VENOUS BLD VENIPUNCTURE: CPT | Performed by: NURSE PRACTITIONER

## 2020-09-22 PROCEDURE — G8399 PT W/DXA RESULTS DOCUMENT: HCPCS | Performed by: NURSE PRACTITIONER

## 2020-09-22 PROCEDURE — 1090F PRES/ABSN URINE INCON ASSESS: CPT | Performed by: NURSE PRACTITIONER

## 2020-09-22 PROCEDURE — G8417 CALC BMI ABV UP PARAM F/U: HCPCS | Performed by: NURSE PRACTITIONER

## 2020-09-22 PROCEDURE — 99214 OFFICE O/P EST MOD 30 MIN: CPT | Performed by: NURSE PRACTITIONER

## 2020-09-22 PROCEDURE — G8427 DOCREV CUR MEDS BY ELIG CLIN: HCPCS | Performed by: NURSE PRACTITIONER

## 2020-09-22 PROCEDURE — 1036F TOBACCO NON-USER: CPT | Performed by: NURSE PRACTITIONER

## 2020-09-22 RX ORDER — GABAPENTIN 600 MG/1
TABLET ORAL
Qty: 90 TABLET | Refills: 2 | Status: SHIPPED | OUTPATIENT
Start: 2020-09-22 | End: 2021-01-13

## 2020-09-22 ASSESSMENT — ENCOUNTER SYMPTOMS
GASTROINTESTINAL NEGATIVE: 1
EYES NEGATIVE: 1
RESPIRATORY NEGATIVE: 1
BACK PAIN: 1

## 2020-09-22 NOTE — PATIENT INSTRUCTIONS
We will get the CT with contrast set up after we get her lab work back  Labs today.   If CEA is elevated will consult oncology again  Continue with neurology, keep your appointment with pain management, keep your appointment with orthopedic [place],

## 2020-09-22 NOTE — PROGRESS NOTES
Take 1 tablet by mouth 3 times daily as needed (muscle spasms) 90 tablet 2   [address] by Does not apply route 1 each 0    acyclovir (ZOVIRAX) 400 MG tablet Take 400 mg by mouth 2 times daily history of shingles in her eye      terbinafine (LAMISIL) 250 MG tablet TAKE ONE TABLET PO ONCE DAILY 90 tablet 0    vitamin B-12 (CYANOCOBALAMIN) 500 MCG tablet TAKE 1 TABLET BY MOUTH DAILY 30 tablet 11    omeprazole (PRILOSEC) 40 MG delayed release capsule TAKE 1 CAPSULE BY MOUTH DAILY 90 capsule 3    triamterene-hydrochlorothiazide (MAXZIDE-25) 37.5-25 MG per tablet TAKE 1 TABLET BY MOUTH DAILY 90 tablet 3    allopurinol (ZYLOPRIM) 100 MG tablet Take 2 tablets by mouth daily For gout -prevention 60 tablet 5    losartan (COZAAR) 100 MG tablet TAKE 1 TABLET BY MOUTH EVERY MORNING FOR BLOOD PRESSURE (Patient taking differently: Take 100 mg by mouth daily ) 90 tablet 3    rivaroxaban (XARELTO) 20 MG TABS tablet Take 1 tablet by mouth daily (with breakfast) 30 tablet 3    vitamin D (ERGOCALCIFEROL) 1.25 MG (55746 UT) CAPS capsule TAKE 1 CAPSULE BY MOUTH 1 TIME A WEEK 13 capsule 3    metoprolol tartrate (LOPRESSOR) 25 MG tablet TAKE ONE TABLET BY MOUTH TWICE DAILY FOR BLOOD PRESSURE AND HEART 180 tablet 3    albuterol (ACCUNEB) 1.25 MG/3ML nebulizer solution Inhale 1 mL into the lungs every 6 hours as needed       dorzolamide-timolol (COSOPT) 22.3-6.8 MG/ML ophthalmic solution Place 1 Dose into both eyes daily      furosemide (LASIX) 20 MG tablet Take 1 tablet by mouth daily as needed (swelling) 30 tablet 3    Nebulizers ([employer] COMPACT MINI NEBULIZER) MISC 1 Units by Does not apply route 4 times daily 1 each 0     No current facility-administered medications for this visit. Allergies   Allergen Reactions    Zoloft [Sertraline Hcl] Other (See Comments)     Patient states that she doesn't want this medication anymore because she hallucinated and saw spiders.  Patient weaned herself off and after she quit taking the medication, the hallucinations stopped. Patient states that she doesn't want this medication anymore because she hallucinated and saw spiders. Patient weaned herself off and after she quit taking the medication, the hallucinations stopped. Health Maintenance   Topic Date Due    Shingles Vaccine (1 of 2) 02/01/1987    Annual Wellness Visit (AWV)  05/29/2019    Flu vaccine (1) 09/01/2020    Potassium monitoring  08/21/2021    Creatinine monitoring  08/21/2021    DTaP/Tdap/Td vaccine (3 - Td) 12/23/2025    Colon cancer screen colonoscopy  03/11/2026    DEXA (modify frequency per FRAX score)  Completed    Pneumococcal 65+ years Vaccine  Completed    Hepatitis A vaccine  Aged Out    Hepatitis B vaccine  Aged Out    Hib vaccine  Aged Out    Meningococcal (ACWY) vaccine  Aged Out       Subjective:      Review of Systems   Constitutional: Positive for fatigue and fever. HENT: Negative. Eyes: Negative. Respiratory: Negative. Cardiovascular: Negative. Gastrointestinal: Negative. Genitourinary: Negative. Musculoskeletal: Positive for arthralgias and back pain. Skin: Positive for rash. Neurological: Negative. Psychiatric/Behavioral: Negative. Objective:     Physical Exam  Vitals signs and nursing note reviewed. Constitutional:       Appearance: She is well-developed. Comments: Sitting in wheelchair   HENT:      Head: Normocephalic. Right Ear: External ear normal.      Left Ear: External ear normal.   Eyes:      Pupils: Pupils are equal, round, and reactive to light. Neck:      Musculoskeletal: Normal range of motion. Cardiovascular:      Rate and Rhythm: Normal rate and regular rhythm. Heart sounds: Normal heart sounds. Pulmonary:      Effort: Pulmonary effort is normal.      Breath sounds: Normal breath sounds. Skin:     General: Skin is warm and dry.           Neurological:      Mental Status: She is alert and oriented to person, place, and time. Psychiatric:         Behavior: Behavior normal.         Thought Content: Thought content normal.         Judgment: Judgment normal.       /70   Pulse 60   Temp 97.8 °F (36.6 °C)   Resp 18   Ht 5' 4\" (1.626 m)   Wt 160 lb 6.4 oz (72.8 kg)   SpO2 98%   BMI 27.53 kg/m²     Assessment:       Diagnosis Orders   1. Hepatic cyst  CTA ABDOMEN PELVIS W CONTRAST    Comprehensive Metabolic Panel   2. History of colon cancer  CTA ABDOMEN PELVIS W CONTRAST    CEA    Comprehensive Metabolic Panel    CBC Auto Differential   3. Fatigue, unspecified type  CTA ABDOMEN PELVIS W CONTRAST    Comprehensive Metabolic Panel    CBC Auto Differential   4. Weight loss  CTA ABDOMEN PELVIS W CONTRAST    CEA    CBC Auto Differential   5. Right lateral abdominal pain  CTA ABDOMEN PELVIS W CONTRAST         Plan:   More than 50% of the time was spent counseling and coordinating care for a total time of 35 min face to face. Patient given educational materials -see patient instructions. Discussed use, benefit, and side effects of prescribed medications. All patient questions answered. Pt voiced understanding. Reviewed health maintenance. Instructed to continue currentmedications, diet and exercise. Patient agreed with treatment plan. Follow up as directed. MEDICATIONS:  No orders of the defined types were placed in this encounter. ORDERS:  Orders Placed This Encounter   Procedures    CTA ABDOMEN PELVIS W CONTRAST    CEA    Comprehensive Metabolic Panel    CBC Auto Differential       Follow-up:  Return for has upcoming appt for port flush. PATIENT INSTRUCTIONS:  Patient Instructions   We will get the CT with contrast set up after we get her lab work back  Labs today.   If CEA is elevated will consult oncology again  Continue with neurology, keep your appointment with pain management, keep your appointment with orthopedic [place],    Electronically signed by EARLE Payton CNP on

## 2020-09-24 ENCOUNTER — TELEPHONE (OUTPATIENT)
Dept: PRIMARY CARE CLINIC | Age: 85
End: 2020-09-24

## 2020-09-25 ENCOUNTER — HOSPITAL ENCOUNTER (OUTPATIENT)
Dept: CT IMAGING | Age: 85
Discharge: HOME | End: 2020-09-25
Payer: MEDICARE

## 2020-09-25 PROCEDURE — 6360000004 HC RX CONTRAST MEDICATION: Performed by: NURSE PRACTITIONER

## 2020-09-25 PROCEDURE — 74177 CT ABD & PELVIS W/CONTRAST: CPT

## 2020-09-25 RX ADMIN — IOPAMIDOL 90 ML: 755 INJECTION, SOLUTION INTRAVENOUS at 10:33

## 2020-10-05 ENCOUNTER — HOSPITAL ENCOUNTER (OUTPATIENT)
Dept: MRI IMAGING | Age: 85
Discharge: HOME | End: 2020-10-05
Payer: MEDICARE

## 2020-10-05 PROCEDURE — A9577 INJ MULTIHANCE: HCPCS | Performed by: NURSE PRACTITIONER

## 2020-10-05 PROCEDURE — 6360000004 HC RX CONTRAST MEDICATION: Performed by: NURSE PRACTITIONER

## 2020-10-05 PROCEDURE — 70553 MRI BRAIN STEM W/O & W/DYE: CPT

## 2020-10-05 RX ADMIN — GADOBENATE DIMEGLUMINE 13 ML: 529 INJECTION, SOLUTION INTRAVENOUS at 09:46

## 2020-10-13 ENCOUNTER — OFFICE VISIT (OUTPATIENT)
Dept: PRIMARY CARE CLINIC | Age: 85
End: 2020-10-13
Payer: MEDICARE

## 2020-10-13 VITALS
BODY MASS INDEX: 28.35 KG/M2 | RESPIRATION RATE: 16 BRPM | DIASTOLIC BLOOD PRESSURE: 80 MMHG | SYSTOLIC BLOOD PRESSURE: 120 MMHG | WEIGHT: 160 LBS | TEMPERATURE: 96.7 F | OXYGEN SATURATION: 98 % | HEIGHT: 63 IN | HEART RATE: 58 BPM

## 2020-10-13 PROCEDURE — 1090F PRES/ABSN URINE INCON ASSESS: CPT | Performed by: NURSE PRACTITIONER

## 2020-10-13 PROCEDURE — 96523 IRRIG DRUG DELIVERY DEVICE: CPT | Performed by: NURSE PRACTITIONER

## 2020-10-13 PROCEDURE — 99213 OFFICE O/P EST LOW 20 MIN: CPT | Performed by: NURSE PRACTITIONER

## 2020-10-13 PROCEDURE — 1036F TOBACCO NON-USER: CPT | Performed by: NURSE PRACTITIONER

## 2020-10-13 PROCEDURE — G8484 FLU IMMUNIZE NO ADMIN: HCPCS | Performed by: NURSE PRACTITIONER

## 2020-10-13 PROCEDURE — 1123F ACP DISCUSS/DSCN MKR DOCD: CPT | Performed by: NURSE PRACTITIONER

## 2020-10-13 PROCEDURE — G0008 ADMIN INFLUENZA VIRUS VAC: HCPCS | Performed by: NURSE PRACTITIONER

## 2020-10-13 PROCEDURE — 90694 VACC AIIV4 NO PRSRV 0.5ML IM: CPT | Performed by: NURSE PRACTITIONER

## 2020-10-13 PROCEDURE — G8399 PT W/DXA RESULTS DOCUMENT: HCPCS | Performed by: NURSE PRACTITIONER

## 2020-10-13 PROCEDURE — 96372 THER/PROPH/DIAG INJ SC/IM: CPT | Performed by: NURSE PRACTITIONER

## 2020-10-13 PROCEDURE — 4040F PNEUMOC VAC/ADMIN/RCVD: CPT | Performed by: NURSE PRACTITIONER

## 2020-10-13 PROCEDURE — G8417 CALC BMI ABV UP PARAM F/U: HCPCS | Performed by: NURSE PRACTITIONER

## 2020-10-13 PROCEDURE — G8427 DOCREV CUR MEDS BY ELIG CLIN: HCPCS | Performed by: NURSE PRACTITIONER

## 2020-10-13 RX ORDER — KETOROLAC TROMETHAMINE 30 MG/ML
60 INJECTION, SOLUTION INTRAMUSCULAR; INTRAVENOUS ONCE
Status: COMPLETED | OUTPATIENT
Start: 2020-10-13 | End: 2020-10-13

## 2020-10-13 RX ORDER — PREDNISONE 10 MG/1
10 TABLET ORAL DAILY
Qty: 10 TABLET | Refills: 0 | Status: SHIPPED | OUTPATIENT
Start: 2020-10-13 | End: 2020-10-23

## 2020-10-13 RX ADMIN — KETOROLAC TROMETHAMINE 60 MG: 30 INJECTION, SOLUTION INTRAMUSCULAR; INTRAVENOUS at 10:55

## 2020-10-13 ASSESSMENT — ENCOUNTER SYMPTOMS: BACK PAIN: 1

## 2020-10-13 NOTE — PROGRESS NOTES
Venous Access Procedure Note  Indication: maintenance flush    Procedure: The patient was placed in the appropriate position and the skin over the puncture site was prepped with betadine and draped in a sterile fashion. Intravenous access was obtained in right  chest port with a Bliss needle and the site was secured appropriately. 3 cc of blood with withdrawn and discarded. The port was flushed with 10cc NS and then 3 cc heparin flush. The patient tolerated the procedure well.     Complications: None

## 2020-10-13 NOTE — PROGRESS NOTES
[address] [facility]  [address] [place] [address]  [address] [place] [address]  Dept: [phone]  Dept Fax: [phone]  Loc: [phone]    Óscar Dean is a 82 y.o. female who presents today for her medical conditions/complaints as noted below. Óscar Dean is c/o of [facility] (patient presents today for port flush. )        HPI:     HPI   Chief Complaint   Patient presents with    [facility]     patient presents today for port flush.    while she is here she wants to talk about her back pain radiating to her right leg. She is still seeing orthopedic and taking pain pills but not getting relief. She would like to get a shot for back and right leg pain today to see if it helps. When she is on steriods she does feel better.    Past Medical History:   Diagnosis Date    Bronchitis     Colon cancer ([address]     Colon polyps     Constipation     Deep vein blood clot of left lower extremity (HCC)     Left leg     Depression     DVT (deep venous thrombosis) (HCC)     Dysphagia     Fibrocystic breast disease     Fibromyalgia     GERD (gastroesophageal reflux disease)     reflux with hx of esophageal stricture    Headache(784.0)     History of colon cancer     Hormone replacement therapy (postmenopausal)     Hypertension     Neuropathy of foot     In both feet    Osteoarthritis     left knee pain    Restless legs syndrome     Vitamin D deficiency       Past Surgical History:   Procedure Laterality Date    APPENDECTOMY      BREAST BIOPSY      CATARACT REMOVAL       SECTION      CHOLECYSTECTOMY      COLECTOMY  partial    COLON SURGERY      COLONOSCOPY  2010    COLONOSCOPY  2012    Dr Rudean Dakins: unremarkable enterocolonic anastamosis; hyperplastic polyps    COLONOSCOPY  3/2013    [facility]: unremarkable post-surgical colon    COLONOSCOPY  3/11/16    Dr Patel Fus colon anastomosis, 5 yr recall    FOOT SURGERY  right foot    HAND SURGERY Right     Dr Tollie Sever HYSTERECTOMY      KYPHOSIS SURGERY N/A 7/6/2020    KYPHOPLASTY L5 performed by Esther Lowry DO at [facility] TOTAL KNEE ARTHROPLASTY      UPPER GASTROINTESTINAL ENDOSCOPY  10-    UPPER GASTROINTESTINAL ENDOSCOPY  3/2013    [facility]: peptic stricture dilated to 48F; HH; small antral mucosal elevation    UPPER GASTROINTESTINAL ENDOSCOPY  12/2014    [facility]: dilation of esophageal stricture    VARICOSE VEIN SURGERY Left 03/14/2014  [facility]    Left GSV Ablation    VARICOSE VEIN SURGERY Right 04/04/2014  [facility]    Right GSV Ablation       Vitals 10/13/2020 9/22/2020 9/10/2020 9/4/2020 9/4/2020 7/37/2812   SYSTOLIC 861 [phone]   DIASTOLIC 80 70 45 85 75 62   Site - - - - - Left Upper Arm   Position - - - - - Sitting   Cuff Size - - - - - Medium Adult   Pulse 58 60 54 - 61 67   Temp 96.7 97.8 - - - 96.9   Resp 16 18 - - - 16   SpO2 98 98 - - 96 95   Weight 160 lb 160 lb 6.4 oz 170 lb - 168 lb 168 lb 3.2 oz   Height 5' 3\" 5' 4\" 5' 4\" - 5' 3\" -   Body mass index 28.34 kg/m2 27.53 kg/m2 29.18 kg/m2 - 29.76 kg/m2 -   Pain Level - - - - - -   Some recent data might be hidden       Family History   Problem Relation Age of Onset    Cancer Mother         Cervical Cancer    Cancer Brother         Esophageal cancer    Diabetes Brother     Esophageal Cancer Brother     Diabetes Sister     Colon Cancer Neg Hx     Colon Polyps Neg Hx     Liver Cancer Neg Hx     Liver Disease Neg Hx     Rectal Cancer Neg Hx     Stomach Cancer Neg Hx        Social History     Tobacco Use    Smoking status: Never Smoker    Smokeless tobacco: Never Used   Substance Use Topics    Alcohol use: No      Current Outpatient Medications   Medication Sig Dispense Refill    predniSONE (DELTASONE) 10 MG tablet Take 1 tablet by mouth daily for 10 days 10 tablet 0    gabapentin (NEURONTIN) 600 MG tablet TAKE 1 TABLET BY MOUTH THREE TIMES DAILY 90 tablet 2    tiZANidine (ZANAFLEX) 4 MG tablet Take 1 tablet by mouth 3 times daily as needed (muscle spasms) 90 tablet 2   [address] by Does not apply route 1 each 0    acyclovir (ZOVIRAX) 400 MG tablet Take 400 mg by mouth 2 times daily history of shingles in her eye      terbinafine (LAMISIL) 250 MG tablet TAKE ONE TABLET PO ONCE DAILY 90 tablet 0    vitamin B-12 (CYANOCOBALAMIN) 500 MCG tablet TAKE 1 TABLET BY MOUTH DAILY 30 tablet 11    omeprazole (PRILOSEC) 40 MG delayed release capsule TAKE 1 CAPSULE BY MOUTH DAILY 90 capsule 3    triamterene-hydrochlorothiazide (MAXZIDE-25) 37.5-25 MG per tablet TAKE 1 TABLET BY MOUTH DAILY 90 tablet 3    allopurinol (ZYLOPRIM) 100 MG tablet Take 2 tablets by mouth daily For gout -prevention 60 tablet 5    losartan (COZAAR) 100 MG tablet TAKE 1 TABLET BY MOUTH EVERY MORNING FOR BLOOD PRESSURE (Patient taking differently: Take 100 mg by mouth daily ) 90 tablet 3    rivaroxaban (XARELTO) 20 MG TABS tablet Take 1 tablet by mouth daily (with breakfast) 30 tablet 3    vitamin D (ERGOCALCIFEROL) 1.25 MG (30141 UT) CAPS capsule TAKE 1 CAPSULE BY MOUTH 1 TIME A WEEK 13 capsule 3    metoprolol tartrate (LOPRESSOR) 25 MG tablet TAKE ONE TABLET BY MOUTH TWICE DAILY FOR BLOOD PRESSURE AND HEART 180 tablet 3    albuterol (ACCUNEB) 1.25 MG/3ML nebulizer solution Inhale 1 mL into the lungs every 6 hours as needed       dorzolamide-timolol (COSOPT) 22.3-6.8 MG/ML ophthalmic solution Place 1 Dose into both eyes daily      furosemide (LASIX) 20 MG tablet Take 1 tablet by mouth daily as needed (swelling) 30 tablet 3    Nebulizers (AIRIAL COMPACT MINI NEBULIZER) MISC 1 Units by Does not apply route 4 times daily 1 each 0     No current facility-administered medications for this visit. Allergies   Allergen Reactions    Zoloft [Sertraline Hcl] Other (See Comments)     Patient states that she doesn't want this medication anymore because she hallucinated and saw spiders.  Patient weaned herself off and after she quit taking the medication, the hallucinations stopped. Patient states that she doesn't want this medication anymore because she hallucinated and saw spiders. Patient weaned herself off and after she quit taking the medication, the hallucinations stopped. Health Maintenance   Topic Date Due    Shingles Vaccine (1 of 2) 02/01/1987    Annual Wellness Visit (AWV)  05/29/2019    Potassium monitoring  09/22/2021    Creatinine monitoring  09/22/2021    DTaP/Tdap/Td vaccine (3 - Td) 12/23/2025    Colon cancer screen colonoscopy  03/11/2026    DEXA (modify frequency per FRAX score)  Completed    Flu vaccine  Completed    Pneumococcal 65+ years Vaccine  Completed    Hepatitis A vaccine  Aged Out    Hepatitis B vaccine  Aged Out    Hib vaccine  Aged Out    Meningococcal (ACWY) vaccine  Aged Out       Subjective:      Review of Systems   Constitutional: Positive for activity change. Musculoskeletal: Positive for back pain. Psychiatric/Behavioral: Negative. Objective:     Physical Exam  Vitals signs and nursing note reviewed. Constitutional:       Appearance: She is well-developed. Comments: Elderly lady sitting up in [facility]   HENT:      Head: Normocephalic. Right Ear: External ear normal.   Eyes:      Pupils: Pupils are equal, round, and reactive to light. Neck:      Musculoskeletal: Normal range of motion. Cardiovascular:      Rate and Rhythm: Normal rate and regular rhythm. Heart sounds: Normal heart sounds. Pulmonary:      Effort: Pulmonary effort is normal.      Breath sounds: Normal breath sounds. Skin:     General: Skin is warm and dry. Neurological:      Mental Status: She is alert and oriented to person, place, and time. Psychiatric:         Behavior: Behavior normal.         Thought Content:  Thought content normal.         Judgment: Judgment normal.       /80   Pulse 58   Temp 96.7 °F (35.9 °C) (Temporal)   Resp 16   Ht 5' 3\" (1.6 m)   Wt 160 lb (72.6 kg)   SpO2 98%   Breastfeeding No   BMI 28.34 kg/m²     Assessment:       Diagnosis Orders   1. Compression fracture of L5 vertebra with delayed healing, subsequent encounter     2. Pain  ketorolac (TORADOL) injection 60 mg   3. Need for influenza vaccination  INFLUENZA, QUADV, ADJUVANTED, 65 YRS =, IM, PF, PREFILL SYR, 0.5ML (FLUAD)   4. Encounter for care related to Port-a-Cath  [place] [facility] DEVICE   5. Port-A-Cath in place  CA [facility] IMPLANTED DRUG DELIVERY DEVICE         Plan:   More than 50% of the time was spent counseling and coordinating care for a total time of 15min face to face. Patient given educational materials -see patient instructions. Discussed use, benefit, and side effects of prescribed medications. All patient questions answered. Pt voiced understanding. Reviewed health maintenance. Instructed to continue currentmedications, diet and exercise. Patient agreed with treatment plan. Follow up as directed. MEDICATIONS:  Orders Placed This Encounter   Medications    predniSONE (DELTASONE) 10 MG tablet     Sig: Take 1 tablet by mouth daily for 10 days     Dispense:  10 tablet     Refill:  0    ketorolac (TORADOL) injection 60 mg         ORDERS:  Orders Placed This Encounter   Procedures    INFLUENZA, QUADV, ADJUVANTED, 72 YRS =, IM, PF, PREFILL SYR, 0.5ML (FLUAD)    CA IRRIG IMPLANTED DRUG DELIVERY DEVICE       Follow-up:  No follow-ups on file. PATIENT INSTRUCTIONS:  There are no Patient Instructions on file for this visit. Electronically signed by EARLE Mcgee CNP on 10/13/2020 at 5:09 PM    EMR Dragon/transcription disclaimer:  Much of thisencounter note is electronic transcription/translation of spoken language to printed texts. The electronic translation of spoken language may be erroneous, or at times, nonsensical words or phrases may be inadvertentlytranscribed.   Although I have reviewed the note for such errors, some may still exist.

## 2020-10-15 ENCOUNTER — TELEPHONE (OUTPATIENT)
Dept: NEUROSURGERY | Age: 85
End: 2020-10-15

## 2020-10-15 ENCOUNTER — TELEPHONE (OUTPATIENT)
Dept: PRIMARY CARE CLINIC | Age: 85
End: 2020-10-15

## 2020-10-15 NOTE — TELEPHONE ENCOUNTER
Called patient to r/s appointment due to provider being out of office, unable to [place]. Will call back

## 2020-10-20 ENCOUNTER — OFFICE VISIT (OUTPATIENT)
Dept: PRIMARY CARE CLINIC | Age: 85
End: 2020-10-20
Payer: MEDICARE

## 2020-10-20 VITALS
TEMPERATURE: 97.3 F | HEART RATE: 71 BPM | HEIGHT: 63 IN | RESPIRATION RATE: 18 BRPM | DIASTOLIC BLOOD PRESSURE: 80 MMHG | BODY MASS INDEX: 28.35 KG/M2 | SYSTOLIC BLOOD PRESSURE: 133 MMHG | WEIGHT: 160 LBS | OXYGEN SATURATION: 99 %

## 2020-10-20 PROCEDURE — G8399 PT W/DXA RESULTS DOCUMENT: HCPCS | Performed by: NURSE PRACTITIONER

## 2020-10-20 PROCEDURE — 1123F ACP DISCUSS/DSCN MKR DOCD: CPT | Performed by: NURSE PRACTITIONER

## 2020-10-20 PROCEDURE — G8417 CALC BMI ABV UP PARAM F/U: HCPCS | Performed by: NURSE PRACTITIONER

## 2020-10-20 PROCEDURE — 99213 OFFICE O/P EST LOW 20 MIN: CPT | Performed by: NURSE PRACTITIONER

## 2020-10-20 PROCEDURE — G8484 FLU IMMUNIZE NO ADMIN: HCPCS | Performed by: NURSE PRACTITIONER

## 2020-10-20 PROCEDURE — 1036F TOBACCO NON-USER: CPT | Performed by: NURSE PRACTITIONER

## 2020-10-20 PROCEDURE — G8427 DOCREV CUR MEDS BY ELIG CLIN: HCPCS | Performed by: NURSE PRACTITIONER

## 2020-10-20 PROCEDURE — 4040F PNEUMOC VAC/ADMIN/RCVD: CPT | Performed by: NURSE PRACTITIONER

## 2020-10-20 PROCEDURE — 1090F PRES/ABSN URINE INCON ASSESS: CPT | Performed by: NURSE PRACTITIONER

## 2020-10-20 RX ORDER — CLINDAMYCIN HYDROCHLORIDE 300 MG/1
300 CAPSULE ORAL 3 TIMES DAILY
Qty: 30 CAPSULE | Refills: 0 | Status: SHIPPED | OUTPATIENT
Start: 2020-10-20 | End: 2020-10-30

## 2020-10-20 ASSESSMENT — ENCOUNTER SYMPTOMS
EYES NEGATIVE: 1
FACIAL SWELLING: 1
RESPIRATORY NEGATIVE: 1
GASTROINTESTINAL NEGATIVE: 1

## 2020-10-20 NOTE — PROGRESS NOTES
[address] [facility]  [address] [place] [address]  [address] [place] [address]  Dept: [phone]  Dept Fax: [phone]  Loc: [phone]    Dana Rodrigues is a 82 y.o. female who presents today for her medical conditions/complaints as noted below. Dana Rodrigues is c/o of Facial Swelling (patient presents today with c/o right sided facial swelling that started yesterday. Patient states that she has an appt tomorrow with Dr. Telma Malone for her back. )        HPI:     HPI   Chief Complaint   Patient presents with    Facial Swelling     patient presents today with c/o right sided facial swelling that started yesterday. Patient states that she has an appt tomorrow with Dr. Telma Malone for her back. mouth Has been really dry she denies having any bad teeth. She denies any fever.   Past Medical History:   Diagnosis Date    Bronchitis     Colon cancer ([address]     Colon polyps     Constipation     Deep vein blood clot of left lower extremity (HCC)     Left leg     Depression     DVT (deep venous thrombosis) (HCC)     Dysphagia     Fibrocystic breast disease     Fibromyalgia     GERD (gastroesophageal reflux disease)     reflux with hx of esophageal stricture    Headache(784.0)     History of colon cancer     Hormone replacement therapy (postmenopausal)     Hypertension     Neuropathy of foot     In both feet    Osteoarthritis     left knee pain    Restless legs syndrome     Vitamin D deficiency       Past Surgical History:   Procedure Laterality Date    APPENDECTOMY      BREAST BIOPSY      CATARACT REMOVAL       SECTION      CHOLECYSTECTOMY      COLECTOMY  partial    COLON SURGERY      COLONOSCOPY  2010    COLONOSCOPY  2012    Dr Bam Ardon: unremarkable enterocolonic anastamosis; hyperplastic polyps    COLONOSCOPY  3/2013    [facility]: unremarkable post-surgical colon    COLONOSCOPY  3/11/16    Dr Rosalina Borjas colon anastomosis, 5 yr recall    FOOT 2 times daily 180 tablet 3    predniSONE (DELTASONE) 10 MG tablet Take 1 tablet by mouth daily for 10 days 10 tablet 0    gabapentin (NEURONTIN) 600 MG tablet TAKE 1 TABLET BY MOUTH THREE TIMES DAILY 90 tablet 2    tiZANidine (ZANAFLEX) 4 MG tablet Take 1 tablet by mouth 3 times daily as needed (muscle spasms) 90 tablet 2   [address] by Does not apply route 1 each 0    acyclovir (ZOVIRAX) 400 MG tablet Take 400 mg by mouth 2 times daily history of shingles in her eye      terbinafine (LAMISIL) 250 MG tablet TAKE ONE TABLET PO ONCE DAILY 90 tablet 0    vitamin B-12 (CYANOCOBALAMIN) 500 MCG tablet TAKE 1 TABLET BY MOUTH DAILY 30 tablet 11    omeprazole (PRILOSEC) 40 MG delayed release capsule TAKE 1 CAPSULE BY MOUTH DAILY 90 capsule 3    triamterene-hydrochlorothiazide (MAXZIDE-25) 37.5-25 MG per tablet TAKE 1 TABLET BY MOUTH DAILY 90 tablet 3    allopurinol (ZYLOPRIM) 100 MG tablet Take 2 tablets by mouth daily For gout -prevention 60 tablet 5    losartan (COZAAR) 100 MG tablet TAKE 1 TABLET BY MOUTH EVERY MORNING FOR BLOOD PRESSURE (Patient taking differently: Take 100 mg by mouth daily ) 90 tablet 3    rivaroxaban (XARELTO) 20 MG TABS tablet Take 1 tablet by mouth daily (with breakfast) 30 tablet 3    vitamin D (ERGOCALCIFEROL) 1.25 MG (02447 UT) CAPS capsule TAKE 1 CAPSULE BY MOUTH 1 TIME A WEEK 13 capsule 3    albuterol (ACCUNEB) 1.25 MG/3ML nebulizer solution Inhale 1 mL into the lungs every 6 hours as needed       dorzolamide-timolol (COSOPT) 22.3-6.8 MG/ML ophthalmic solution Place 1 Dose into both eyes daily      furosemide (LASIX) 20 MG tablet Take 1 tablet by mouth daily as needed (swelling) 30 tablet 3    Nebulizers (AIRIAL COMPACT MINI NEBULIZER) MISC 1 Units by Does not apply route 4 times daily 1 each 0     No current facility-administered medications for this visit.       Allergies   Allergen Reactions    Zoloft [Sertraline Hcl] Other (See Comments)     Patient states that she breath sounds. Skin:     General: Skin is warm and dry. Neurological:      Mental Status: She is alert and oriented to person, place, and time. Psychiatric:         Behavior: Behavior normal.         Thought Content: Thought content normal.         Judgment: Judgment normal.       There were no vitals taken for this visit. Assessment:       Diagnosis Orders   1. Parotiditis           Plan:   More than 50% of the time was spent counseling and coordinating care for a total time of 15 min face to face. Patient given educational materials -see patient instructions. Discussed use, benefit, and side effects of prescribed medications. All patient questions answered. Pt voiced understanding. Reviewed health maintenance. Instructed to continue currentmedications, diet and exercise. Patient agreed with treatment plan. Follow up as directed. MEDICATIONS:  Orders Placed This Encounter   Medications    clindamycin (CLEOCIN) 300 MG capsule     Sig: Take 1 capsule by mouth 3 times daily for 10 days     Dispense:  30 capsule     Refill:  0         ORDERS:  No orders of the defined types were placed in this encounter. Follow-up:  No follow-ups on file. PATIENT INSTRUCTIONS:  Patient Instructions       Patient Education        Parotitis: Care Instructions  Your Care Instructions     Parotitis is a painful swelling of your parotid glands, which are salivary glands located between the ear and jaw. The most common cause is a virus, such as mumps, herpes, or Sylvester-Barr. Bacterial infections, diabetes, tumors or stones in the saliva glands, and tooth problems also may cause parotitis. Follow-up care is a key part of your treatment and safety. Be sure to make and go to all appointments, and call your doctor if you are having problems. It's also a good idea to know your test results and keep a list of the medicines you take. How can you care for yourself at home?   · Use an over-the-counter pain medicine if needed, such as acetaminophen (Tylenol), ibuprofen (Advil, Motrin), or naproxen (Aleve). Be safe with medicines. Read and follow all instructions on the label. Do not give aspirin to anyone younger than 20. It has been linked to Reye syndrome, a serious illness. · Put an ice or heat pack (whichever feels better) on the swollen jaw for 10 to 20 minutes at a time. Put a thin cloth between the ice or heat pack and the skin. · Suck on ice chips or ice treats such as Popsicles. Eat soft foods that do not have to be chewed much. · If your doctor prescribed antibiotics, take them as directed. Do not stop taking them just because you feel better. You need to take the full course of antibiotics. To prevent tooth problems  · Brush and floss every day, and have regular dental checkups. · Eat a healthy diet, and avoid sugary foods and drinks. · Do not smoke or use spit tobacco. Tobacco use slows your ability to heal. It also increases your risk for gum disease and cancer of the mouth and throat. If you need help quitting, talk to your doctor about stop-smoking programs and medicines. These can increase your chances of quitting for good. When should you call for help? Call 911 anytime you think you may need emergency care. For example, call if:    · You have trouble breathing. Call your doctor now or seek immediate medical care if:    · You have new or worse symptoms of infection, such as:  ? Increased pain, swelling, warmth, or redness. ? Red streaks leading from the area. ? Pus draining from the area. ? A fever.     · You have new pain, or the pain gets worse. Watch closely for changes in your health, and be sure to contact your doctor if:    · You do not feel better as expected. Where can you learn more? Go to https://chpepiceweb.health-partners. org and sign in to your [employer] account. Enter F769 in the [facility] box to learn more about \"Parotitis: Care Instructions. \"     If you do not have an account, please click on the \"Sign Up Now\" link. Current as of: April 15, 2020               Content Version: 12.6  © 2006-2020 [employer]. Care instructions adapted under license by [facility] ([facility]). If you have questions about a medical condition or this instruction, always ask your healthcare professional. [place] [address] any warranty or liability for your use of this information. Electronically signed by EARLE May CNP on 10/20/2020 at 9:53 AM    EMR Dragon/transcription disclaimer:  Much of thisencounter note is electronic transcription/translation of spoken language to printed texts. The electronic translation of spoken language may be erroneous, or at times, nonsensical words or phrases may be inadvertentlytranscribed.   Although I have reviewed the note for such errors, some may still exist.

## 2020-10-20 NOTE — PATIENT INSTRUCTIONS
Patient Education        Parotitis: Care Instructions  Your Care Instructions     Parotitis is a painful swelling of your parotid glands, which are salivary glands located between the ear and jaw. The most common cause is a virus, such as mumps, herpes, or Sylvester-Barr. Bacterial infections, diabetes, tumors or stones in the saliva glands, and tooth problems also may cause parotitis. Follow-up care is a key part of your treatment and safety. Be sure to make and go to all appointments, and call your doctor if you are having problems. It's also a good idea to know your test results and keep a list of the medicines you take. How can you care for yourself at home? · Use an over-the-counter pain medicine if needed, such as acetaminophen (Tylenol), ibuprofen (Advil, Motrin), or naproxen (Aleve). Be safe with medicines. Read and follow all instructions on the label. Do not give aspirin to anyone younger than 20. It has been linked to Reye syndrome, a serious illness. · Put an ice or heat pack (whichever feels better) on the swollen jaw for 10 to 20 minutes at a time. Put a thin cloth between the ice or heat pack and the skin. · Suck on ice chips or ice treats such as Popsicles. Eat soft foods that do not have to be chewed much. · If your doctor prescribed antibiotics, take them as directed. Do not stop taking them just because you feel better. You need to take the full course of antibiotics. To prevent tooth problems  · Brush and floss every day, and have regular dental checkups. · Eat a healthy diet, and avoid sugary foods and drinks. · Do not smoke or use spit tobacco. Tobacco use slows your ability to heal. It also increases your risk for gum disease and cancer of the mouth and throat. If you need help quitting, talk to your doctor about stop-smoking programs and medicines. These can increase your chances of quitting for good. When should you call for help?    Call 911 anytime you think you may need emergency care. For example, call if:    · You have trouble breathing. Call your doctor now or seek immediate medical care if:    · You have new or worse symptoms of infection, such as:  ? Increased pain, swelling, warmth, or redness. ? Red streaks leading from the area. ? Pus draining from the area. ? A fever.     · You have new pain, or the pain gets worse. Watch closely for changes in your health, and be sure to contact your doctor if:    · You do not feel better as expected. Where can you learn more? Go to https://[email or web address].[employer]. org and sign in to your [employer] account. Enter K402 in the [facility] box to learn more about \"Parotitis: Care Instructions. \"     If you do not have an account, please click on the \"Sign Up Now\" link. Current as of: April 15, 2020               Content Version: 12.6  © 8327-9103 [employer]. Care instructions adapted under license by [facility] ([facility]). If you have questions about a medical condition or this instruction, always ask your healthcare professional. [address] any warranty or liability for your use of this information.

## 2020-10-28 ENCOUNTER — OFFICE VISIT (OUTPATIENT)
Dept: NEUROSURGERY | Age: 85
End: 2020-10-28
Payer: MEDICARE

## 2020-10-28 VITALS
BODY MASS INDEX: 28.35 KG/M2 | HEIGHT: 63 IN | WEIGHT: 160 LBS | OXYGEN SATURATION: 96 % | DIASTOLIC BLOOD PRESSURE: 46 MMHG | SYSTOLIC BLOOD PRESSURE: 110 MMHG | HEART RATE: 59 BPM | TEMPERATURE: 97.7 F

## 2020-10-28 PROCEDURE — 1123F ACP DISCUSS/DSCN MKR DOCD: CPT | Performed by: NURSE PRACTITIONER

## 2020-10-28 PROCEDURE — G8484 FLU IMMUNIZE NO ADMIN: HCPCS | Performed by: NURSE PRACTITIONER

## 2020-10-28 PROCEDURE — G8417 CALC BMI ABV UP PARAM F/U: HCPCS | Performed by: NURSE PRACTITIONER

## 2020-10-28 PROCEDURE — 99213 OFFICE O/P EST LOW 20 MIN: CPT | Performed by: NURSE PRACTITIONER

## 2020-10-28 PROCEDURE — 4040F PNEUMOC VAC/ADMIN/RCVD: CPT | Performed by: NURSE PRACTITIONER

## 2020-10-28 PROCEDURE — G8427 DOCREV CUR MEDS BY ELIG CLIN: HCPCS | Performed by: NURSE PRACTITIONER

## 2020-10-28 PROCEDURE — G8399 PT W/DXA RESULTS DOCUMENT: HCPCS | Performed by: NURSE PRACTITIONER

## 2020-10-28 PROCEDURE — 1090F PRES/ABSN URINE INCON ASSESS: CPT | Performed by: NURSE PRACTITIONER

## 2020-10-28 PROCEDURE — 1036F TOBACCO NON-USER: CPT | Performed by: NURSE PRACTITIONER

## 2020-10-28 RX ORDER — HYDROCODONE BITARTRATE AND ACETAMINOPHEN 10; 325 MG/1; MG/1
1 TABLET ORAL EVERY 6 HOURS PRN
Status: ON HOLD | COMMUNITY
Start: 2020-10-21 | End: 2022-02-08 | Stop reason: SDUPTHER

## 2020-10-28 RX ORDER — DULOXETIN HYDROCHLORIDE 20 MG/1
20 CAPSULE, DELAYED RELEASE ORAL DAILY
Qty: 30 CAPSULE | Refills: 2 | Status: SHIPPED | OUTPATIENT
Start: 2020-10-28 | End: 2021-01-05 | Stop reason: SDUPTHER

## 2020-10-28 NOTE — PROGRESS NOTES
[id number] [facility] Neurology Office Note      Patient:   Jermaine Parks  MR#:    [id number]  Account Number:                         YOB: 1935  Date of Evaluation:  10/28/2020  Time of Note:                          12:33 PM  Primary/Referring Physician:  Deysi Christy MD   Consulting Physician:  EARLE Guerrero    FOLLOW UP VISIT    Chief Complaint   Patient presents with    Follow-up    Leg Pain     C/O R leg pain       HISTORY OF PRESENT ILLNESS    Jermaine Parks is a 82y.o. year old female here for follow up of back pain, leg pain, neuropathy. She has seen [facility], referred to pain management there. She has seen Dr. Danielle Delgadillo as well, got injections in bilateral hips with little improvement. Symptomatically doing about the same. Right leg pain is worse then left leg. Pain is on the top of her thigh with radiation into the great toe. She notes bilateral hip pain as well. Weakness noted in BLE, using a wheelchair more often. She is not on a statin. No new medications. Muscle relaxer is somewhat helpful. She denies incontinence or change in bladder/bowel. She denies saddle anesthesia. She follows with Dr. Harding for skin rash. Pain began after cellulitis in January 2019. Has seen vascular for abnormal ABIs, compression stockings and elevation recommended. She has a prior history stomach cancer about 10 years ago, treated with chemotherapy. She denies vitamin B12 deficiency or diabetes history. S/p L5 kyphoplasty which has helped with lower back pain.     Past Medical History:   Diagnosis Date    Bronchitis     Colon cancer ([facility] [address]     Colon polyps     Constipation     Deep vein blood clot of left lower extremity (HCC)     Left leg     Depression     DVT (deep venous thrombosis) (HCC)     Dysphagia     Fibrocystic breast disease     Fibromyalgia     GERD (gastroesophageal reflux disease)     reflux with hx of esophageal stricture    Headache(784.0)     History of colon cancer 2000    Hormone replacement therapy (postmenopausal)     Hypertension     Neuropathy of foot     In both feet    Osteoarthritis     left knee pain    Restless legs syndrome     Vitamin D deficiency        Past Surgical History:   Procedure Laterality Date    APPENDECTOMY      BREAST BIOPSY      CATARACT REMOVAL       SECTION      CHOLECYSTECTOMY      COLECTOMY  partial    COLON SURGERY      COLONOSCOPY  2010    COLONOSCOPY  2012    Dr Carmen Mcmullen: unremarkable enterocolonic anastamosis; hyperplastic polyps    COLONOSCOPY  3/2013    [facility]: unremarkable post-surgical colon    COLONOSCOPY  3/11/16    Dr Rita Aponte colon anastomosis, 5 yr recall    FOOT SURGERY  right foot    HAND SURGERY Right     Dr Mendez Snow N/A 2020    KYPHOPLASTY L5 performed by Bronwyn Sandhoff, DO at [address]      TOTAL KNEE ARTHROPLASTY      UPPER GASTROINTESTINAL ENDOSCOPY  10-    UPPER GASTROINTESTINAL ENDOSCOPY  3/2013    [facility]: peptic stricture dilated to 48F; HH; small antral mucosal elevation    UPPER GASTROINTESTINAL ENDOSCOPY  2014    [facility]: dilation of esophageal stricture    VARICOSE VEIN SURGERY Left 2014  [facility] [address]    Left GSV Ablation    VARICOSE VEIN SURGERY Right 2014  [facility]    Right GSV Ablation       Family History   Problem Relation Age of Onset    Cancer Mother         Cervical Cancer    Cancer Brother         Esophageal cancer    Diabetes Brother     Esophageal Cancer Brother     Diabetes Sister     Colon Cancer Neg Hx     Colon Polyps Neg Hx     Liver Cancer Neg Hx     Liver Disease Neg Hx     Rectal Cancer Neg Hx     Stomach Cancer Neg Hx        Social History     Socioeconomic History    Marital status:      Spouse name: Not on file    Number of children: Not on file    Years of education: Not on file    Highest education level: Not on file   Occupational History    Not on file   Social Needs    Financial resource strain: Not on file    Food insecurity     Worry: Not on file     Inability: Not on file    Transportation needs     Medical: Not on file     Non-medical: Not on file   Tobacco Use    Smoking status: Never Smoker    Smokeless tobacco: Never Used   Substance and Sexual Activity    Alcohol use: No    Drug use: No    Sexual activity: Yes     Partners: Male   Lifestyle    Physical activity     Days per week: Not on file     Minutes per session: Not on file    Stress: Not on file   Relationships    Social connections     Talks on phone: Not on file     Gets together: Not on file     Attends [employer] service: Not on file     Active member of club or organization: Not on file     Attends meetings of clubs or organizations: Not on file     Relationship status: Not on file    Intimate partner violence     Fear of current or ex partner: Not on file     Emotionally abused: Not on file     Physically abused: Not on file     Forced sexual activity: Not on file   Other Topics Concern    Not on file   Social History Narrative    Not on file       Current Outpatient Medications   Medication Sig Dispense Refill    HYDROcodone-acetaminophen (NORCO)  MG per tablet Take 1 tablet by mouth every 6 hours.       DULoxetine (CYMBALTA) 20 MG extended release capsule Take 1 capsule by mouth daily 30 capsule 2    clindamycin (CLEOCIN) 300 MG capsule Take 1 capsule by mouth 3 times daily for 10 days 30 capsule 0    metoprolol tartrate (LOPRESSOR) 25 MG tablet Take 1 tablet by mouth 2 times daily 180 tablet 3    gabapentin (NEURONTIN) 600 MG tablet TAKE 1 TABLET BY MOUTH THREE TIMES DAILY 90 tablet 2    tiZANidine (ZANAFLEX) 4 MG tablet Take 1 tablet by mouth 3 times daily as needed (muscle spasms) 90 tablet 2   [facility] MIS by Does not apply route 1 each 0    acyclovir (ZOVIRAX) 400 MG tablet Take 400 mg by mouth 2 times daily history of shingles in her eye      terbinafine (LAMISIL) 250 MG tablet TAKE ONE TABLET PO ONCE DAILY 90 tablet 0    vitamin B-12 (CYANOCOBALAMIN) 500 MCG tablet TAKE 1 TABLET BY MOUTH DAILY 30 tablet 11    omeprazole (PRILOSEC) 40 MG delayed release capsule TAKE 1 CAPSULE BY MOUTH DAILY 90 capsule 3    triamterene-hydrochlorothiazide (MAXZIDE-25) 37.5-25 MG per tablet TAKE 1 TABLET BY MOUTH DAILY 90 tablet 3    allopurinol (ZYLOPRIM) 100 MG tablet Take 2 tablets by mouth daily For gout -prevention 60 tablet 5    losartan (COZAAR) 100 MG tablet TAKE 1 TABLET BY MOUTH EVERY MORNING FOR BLOOD PRESSURE (Patient taking differently: Take 100 mg by mouth daily ) 90 tablet 3    rivaroxaban (XARELTO) 20 MG TABS tablet Take 1 tablet by mouth daily (with breakfast) 30 tablet 3    vitamin D (ERGOCALCIFEROL) 1.25 MG (77985 UT) CAPS capsule TAKE 1 CAPSULE BY MOUTH 1 TIME A WEEK 13 capsule 3    albuterol (ACCUNEB) 1.25 MG/3ML nebulizer solution Inhale 1 mL into the lungs every 6 hours as needed       dorzolamide-timolol (COSOPT) 22.3-6.8 MG/ML ophthalmic solution Place 1 Dose into both eyes daily      furosemide (LASIX) 20 MG tablet Take 1 tablet by mouth daily as needed (swelling) 30 tablet 3    Nebulizers (AIRIAL COMPACT MINI NEBULIZER) MISC 1 Units by Does not apply route 4 times daily 1 each 0     No current facility-administered medications for this visit. Allergies   Allergen Reactions    Zoloft [Sertraline Hcl] Other (See Comments)     Patient states that she doesn't want this medication anymore because she hallucinated and saw spiders. Patient weaned herself off and after she quit taking the medication, the hallucinations stopped. Patient states that she doesn't want this medication anymore because she hallucinated and saw spiders. Patient weaned herself off and after she quit taking the medication, the hallucinations stopped. REVIEW OF SYSTEMS  Constitutional: []? Fever []? Sweat []? Chills []? Recent Injury [x]?  Denies all unless marked  HEENT:[]? Headache  []? Head Injury/Hearing Loss  []? Sore Throat  []? Ear Ache/Dizziness  [x]? Denies all unless marked  Spine:  []? Neck pain  []? Back pain  []? Sciaticia  [x]? Denies all unless marked  Cardiovascular:[]? Heart Disease []? Chest Pain []? Palpitations  [x]? Denies all unless marked  Pulmonary: []? Shortness of Breath []? Cough   [x]? Denies all unless marke  Gastrointestinal: []? Nausea  []? Vomiting  []? Abdominal Pain  []? Constipation  []? Diarrhea  []? Dark Bloody Stools  [x]? Denies all unless marked  Psychiatric/Behavioral:[]? Depression []? Anxiety [x]? Denies all unless marked  Genitourinary:   []? Frequency  []? Urgency  []? Incontinence []? Pain with Urination  [x]? Denies all unless marked  Extremities: [x]? Pain  []? Swelling  []? Denies all unless marked  Musculoskeletal: []? Muscle Pain  []? Joint Pain  []? Arthritis []? Muscle Cramps []? Muscle Twitches  [x]? Denies all unless marked  Sleep: []? Insomnia []? Snoring []? Restless Legs []? Sleep Apnea  []? Daytime Sleepiness  [x]? Denies all unless marked  Skin:[]? Rash []? Skin Discoloration [x]? Denies all unless marked   Neurological: []? Visual Disturbance/Memory Loss []? Loss of Balance []? Slurred Speech/Weakness []? Seizures  []? Vertigo/Dizziness [x]? Denies all unless marked     The MA has completed the ROS with the patient. I have reviewed it in its' entirety with the patient and agree with the documentation.      PHYSICAL EXAM  BP (!) 110/46   Pulse 59   Temp 97.7 °F (36.5 °C)   Ht 5' 3\" (1.6 m)   Wt 160 lb (72.6 kg)   SpO2 96%   BMI 28.34 kg/m²       Constitutional - No acute distress    HEENT- Conjunctiva normal.  No scars, masses, or lesions over external nose or ears, no neck masses noted, no jugular vein distension, no bruit  Cardiac- Regular rate and rhythm  Pulmonary- Good expansion, normal effort without use of accessory muscles  Musculoskeletal - No significant wasting of muscles noted, no bony deformities  Extremities - No clubbing, cyanosis or edema  Skin - Warm, dry, and intact. No rash, erythema, or pallor  Psychiatric - Mood, affect, and behavior appear normal      NEUROLOGICAL EXAM     Mental status   [x]Awake, alert, oriented   [x]Affect attention and concentration appear appropriate  [x]Recent and remote memory appears unremarkable  [x]Speech normal without dysarthria or aphasia, comprehension and repetition intact. COMMENTS: mild cognitive impairment     Cranial Nerves [x]No VF deficit to confrontation,  no papilledema on fundoscopic exam.  [x]PERRLA, EOMI, no nystagmus, conjugate eye movements, no ptosis  [x]Face symmetric  [x]Facial sensation intact  [x]Tongue midline no atrophy or fasciculations present  [x]Palate midline, hearing to finger rub normal bilaterally  [x]Shoulder shrug and SCM testing normal bilaterally  COMMENTS:    Motor   []5/5 strength x 4 extremities  [x]Normal bulk and tone  [x]No tremor present  [x]No rigidity or bradykinesia noted  COMMENTS: 3/5 BLE, pain limiting     Sensory  []Sensation intact to light touch, pin prick, vibration, and proprioception BLE  [x]Sensation intact to light touch, pin prick, vibration, and proprioception BUE  COMMENTS: decreased PP, vibration BLE    Coordination [x]FTN normal bilaterally   [x]HTS normal bilaterally  [x]GRICELDA normal bilaterally.    COMMENTS:   Reflexes  []Symmetric and non-pathological  [x]Toes down going bilaterally  [x]No clonus present  COMMENTS: hypoactive throughout    Gait                  []Normal steady gait    []Ataxic    []Spastic     []Magnetic     []Shuffling  COMMENTS: antalgic; in wheelchair        LABS RECORD AND IMAGING REVIEW (As below and per HPI)    Lab Results   Component Value Date    [id number] 660 09/10/2020     Lab Results   Component Value Date    WBC 9.4 09/22/2020    HGB 11.3 (L) 09/22/2020    HCT 38.5 09/22/2020    MCV 89.3 09/22/2020     09/22/2020     Lab Results   Component Value Date    NA 143 09/22/2020    K 4.4 09/22/2020     09/22/2020    CO2 24 09/22/2020    BUN 22 09/22/2020    CREATININE 1.0 (H) 09/22/2020    GLUCOSE 94 09/22/2020    CALCIUM 9.5 09/22/2020    PROT 6.7 09/22/2020    LABALBU 4.0 09/22/2020    BILITOT <0.2 09/22/2020    ALKPHOS 69 09/22/2020    AST 11 09/22/2020    ALT 10 09/22/2020    LABGLOM 53 (A) 09/22/2020    GFRAA >59 09/22/2020    GLOB 2.5 10/12/2016     Lab Results   Component Value Date    CHOL 183 06/09/2017    TRIG 287 (H) 06/09/2017    HDL 50 (L) 06/09/2017    LDLCALC 76 06/09/2017     Lab Results   Component Value Date    TSH 1.200 03/19/2019    T4FREE 1.1 03/19/2019     Lab Results   Component Value Date    CRP 0.19 09/10/2020    SEDRATE 18 09/10/2020      Reviewed PCP and vascular notes. Lower extremity vascular study (1/2019)- mildly diminished flow to the bilateral lower extremity arterial system at rest. Patient likely has disease in bilateral tibial arterial segments. MRI thoracic spine (1/2019)- no spinal canal stenosis. Moderate spinal canal stenosis at L1/L2, partially imaged     EMG/NCS (4/2019)- moderate sensorimotor primarily axonal polyneuropathy    CT lumbar spine (6/2020)-   Impression    Impression:    1. There is mild levocurvature of the lumbar spine. Degenerative disc    disease is noted throughout the lumbar column. 2. There is an acute mild compression deformity involving the superior    endplate of L5. There is no involvement of the posterior elements or    compromise of the spinal canal.    3. Central and foraminal stenosis at multiple levels related to    bulging of the disc and disc protrusions combined with facet and    ligamentous hypertrophy and endplate spurring. .    4. I spoke with Martínez Perez in the emergency room at 3:40 PM    concerning the finding of an acute superior endplate fracture at L5.     Signed by Dr Naima Dael on 6/14/2020 3:43 PM      MRI lumbar spine (6/2020)-   Impression    1.  Acute L5 compression fracture with involvement of the anterior and    posterior vertebral body but no evidence of posterior element    involvement. Approximate 15% height loss. No change in appearance    compared to a CT from 2020. No other fracture identified. 2.  Severe multilevel degenerative change, as described above. Notably, there is severe central canal stenosis at L4-L5 and severe    LEFT neural foraminal stenosis at L5-S1. 3.  Tiny 3 mm enhancing nodule located along one of the nerve roots of    the cauda equina. 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 Recommend MRI of the brain to exclude CNS neoplasm. Signed by Dr Bingham Form on 2020 1:06 PM      X-ray hips (2020)-   Impression    Impression:    1. No visualized fracture or malalignment. Bilateral mild hip joint    osteoarthritis. Signed by Dr Bulmaro Harris on 2020 1:18 PM         NCS/EMG (2020)- mild to moderate, axonal, sensorimotor polyneuropathy. Left peroneal motor abnormalities felt technical- superimposed peroneal neuropathy or lumbosacral radiculopathy not excluded but abnormalities are opposite of the most symptomatic side which would suggest more of a technical source. Unable to perform needle portion of the study due to BLE cellulitis. MRI brain (10/2020)-   Impression    1. 1 cm extra-axial enhancing mass along the anterior right tentorium    most compatible with a small meningioma. 2. Moderate chronic white matter ischemic changes. 3. Otherwise negative MR imaging of the brain    Signed by Dr Darren Herrera on 10/5/2020 10:26 AM      Reviewed neurosurgery records     ASSESSMENT:    Behzad Shannon is a 82y.o. year old female here for follow up of BLE pain and muscle spasms. Largely unchanged from prior. MRI brain with small meningioma and . Hip x-ray with osteoarthritis. NCS with moderate sensorimotor polyneuropathy.  MRI lumbar spine with compression fracture (now s/p kyphoplasty) and several areas of moderate NF narrowing and canal stenosis. Lab work unremarkable. She has seen orthopedic for hip pain, now s/p injections. She was then referred to spine at [facility] who recommended pain management. She is scheduled to see pain management at [facility] [facility] in early November. Reviewed imaging here with neurosurgery as well. Suspect that pain and gait changes are multi factorial with neuropathy, lower back pain, arthritis all playing a role. Will continue Neurontin and add low dose Cymbalta today. ICD-10-CM    1. Neuropathy  G62.9    2. Pain in both lower extremities  M79.604     M79.605    3. Pain of both hip joints  M25.551     M25.552      PLAN:  1. Continue Neurontin 600mg TID. Discussed side effects with patient. 2. Add low dose Cymbalta 20mg daily. Discussed side effects with patient. 3. Continue with pain management as previously scheduled   4. Continue follow up with Dr. Harding, dermatology, as previously scheduled   5. Repeat MRI brain in 1 year to re-assess meningioma  6. Will let patient know that she can discuss MRI lumbar spine with neurosurgery team here as well if she would like to   7. Follow up in 3 months, sooner with any worsening     EARLE Lo    Note:  A total of >50% (>8 minutes) of 15 minutes was spent discussing the pathophysiology and treatment and/or coordination of care of the above diagnoses.

## 2020-10-28 NOTE — PROGRESS NOTES
REVIEW OF SYSTEMS    Constitutional: []Fever []Sweat []Chills [] Recent Injury [x] Denies all unless marked  HEENT:[]Headache  [] Head Injury/Hearing Loss  [] Sore Throat  [] Ear Ache/Dizziness  [x] Denies all unless marked  Spine:  [] Neck pain  [] Back pain  [] Sciaticia  [x] Denies all unless marked  Cardiovascular:[]Heart Disease []Chest Pain [] Palpitations  [x] Denies all unless marked  Pulmonary: []Shortness of Breath []Cough   [x] Denies all unless marke  Gastrointestinal: []Nausea  []Vomiting  []Abdominal Pain  []Constipation  []Diarrhea  []Dark Bloody Stools  [x] Denies all unless marked  Psychiatric/Behavioral:[] Depression [] Anxiety [x] Denies all unless marked  Genitourinary:   [] Frequency  [] Urgency  [] Incontinence [] Pain with Urination  [x] Denies all unless marked  Extremities: [x]Pain  []Swelling  [] Denies all unless marked  Musculoskeletal: [] Muscle Pain  [] Joint Pain  [] Arthritis [] Muscle Cramps [] Muscle Twitches  [x] Denies all unless marked  Sleep: [] Insomnia [] Snoring [] Restless Legs [] Sleep Apnea  [] Daytime Sleepiness  [x] Denies all unless marked  Skin:[] Rash [] Skin Discoloration [x] Denies all unless marked   Neurological: []Visual Disturbance/Memory Loss [] Loss of Balance [] Slurred Speech/Weakness [] Seizures  [] Vertigo/Dizziness [x] Denies all unless marked

## 2020-10-28 NOTE — Clinical Note
I tried to call the patient twice but there was no answer. Can you try to call her in a little bit and let her know that she can see the neurosurgery team here if to discuss the MRI lumbar spine/recommendations from them if she would like. Thanks!

## 2020-10-29 ENCOUNTER — TELEPHONE (OUTPATIENT)
Dept: NEUROSURGERY | Age: 85
End: 2020-10-29

## 2020-10-29 NOTE — TELEPHONE ENCOUNTER
----- Message from EARLE Medina sent at 10/28/2020  2:15 PM CDT -----  I tried to call the patient twice but there was no answer. Can you try to call her in a little bit and let her know that she can see the neurosurgery team here if to discuss the MRI lumbar spine/recommendations from them if she would like. Thanks!

## 2020-10-29 NOTE — TELEPHONE ENCOUNTER
Called pt to let her know she had an appointment set up with our neurosurgeon  On Nov. 17, at 1345. Pt had no questions and voiced understanding.

## 2020-10-29 NOTE — TELEPHONE ENCOUNTER
Called pt to inform her that she would be able to be seen by our neurosurgery team at this time. Pt stated she wanted to be seen by them. I informed pt we would call her with an appointment.  Pt voiced understanding

## 2020-11-10 ENCOUNTER — HOSPITAL ENCOUNTER (OUTPATIENT)
Dept: WOMENS IMAGING | Age: 85
Discharge: HOME | End: 2020-11-10
Payer: MEDICARE

## 2020-11-10 PROCEDURE — 77063 BREAST TOMOSYNTHESIS BI: CPT

## 2020-11-17 ENCOUNTER — OFFICE VISIT (OUTPATIENT)
Dept: NEUROSURGERY | Age: 85
End: 2020-11-17
Payer: MEDICARE

## 2020-11-17 VITALS
BODY MASS INDEX: 28.35 KG/M2 | WEIGHT: 160 LBS | OXYGEN SATURATION: 94 % | DIASTOLIC BLOOD PRESSURE: 77 MMHG | HEART RATE: 80 BPM | HEIGHT: 63 IN | SYSTOLIC BLOOD PRESSURE: 174 MMHG

## 2020-11-17 PROCEDURE — 99215 OFFICE O/P EST HI 40 MIN: CPT | Performed by: NEUROLOGICAL SURGERY

## 2020-11-17 PROCEDURE — 1036F TOBACCO NON-USER: CPT | Performed by: NEUROLOGICAL SURGERY

## 2020-11-17 PROCEDURE — 1090F PRES/ABSN URINE INCON ASSESS: CPT | Performed by: NEUROLOGICAL SURGERY

## 2020-11-17 PROCEDURE — 4040F PNEUMOC VAC/ADMIN/RCVD: CPT | Performed by: NEUROLOGICAL SURGERY

## 2020-11-17 PROCEDURE — G8417 CALC BMI ABV UP PARAM F/U: HCPCS | Performed by: NEUROLOGICAL SURGERY

## 2020-11-17 PROCEDURE — G8399 PT W/DXA RESULTS DOCUMENT: HCPCS | Performed by: NEUROLOGICAL SURGERY

## 2020-11-17 PROCEDURE — 1123F ACP DISCUSS/DSCN MKR DOCD: CPT | Performed by: NEUROLOGICAL SURGERY

## 2020-11-17 PROCEDURE — G8484 FLU IMMUNIZE NO ADMIN: HCPCS | Performed by: NEUROLOGICAL SURGERY

## 2020-11-17 PROCEDURE — G8427 DOCREV CUR MEDS BY ELIG CLIN: HCPCS | Performed by: NEUROLOGICAL SURGERY

## 2020-11-17 ASSESSMENT — ENCOUNTER SYMPTOMS
GASTROINTESTINAL NEGATIVE: 1
RESPIRATORY NEGATIVE: 1
EYES NEGATIVE: 1

## 2020-11-17 NOTE — H&P
[facility] Neurosurgery  H&P          Chief Complaint   Patient presents with    Follow-up    Results    Leg Pain       HISTORY OF PRESENT ILLNESS:      Isaac Mitchell is a 82 y.o. female who underwent a L5 kyphoplasty for a compression fracture and severe back pain on 2020. Prior to surgery she complained of severe back, bilateral lower extremity pain with radiation into the right anterior thigh. She complained of numbness in her feet due to neuropathy. Unfortunately she failed to follow up with her wound check due to Covid. Today Mrs. Alexandro Velasquez returns to clinic to discuss her back and leg pain. She states that she cannot walk very far before she develops RLE pain that radiates into the right hip, anterolateral thigh, to the knee, and down to the dorsum of the foot. The pain will improve when sitting and resting for a few minutes. She uses a walker and states that she cannot walk without it. She states the low back pain has improved since the kyphoplasty. She is on Xarelto for history of DVT.        Past Medical History:   Diagnosis Date    Bronchitis     Colon cancer ([facility] [address]     Colon polyps     Constipation     Deep vein blood clot of left lower extremity (HCC)     Left leg     Depression     DVT (deep venous thrombosis) (HCC)     Dysphagia     Fibrocystic breast disease     Fibromyalgia     GERD (gastroesophageal reflux disease)     reflux with hx of esophageal stricture    Headache(784.0)     History of colon cancer     Hormone replacement therapy (postmenopausal)     Hypertension     Neuropathy of foot     In both feet    Osteoarthritis     left knee pain    Restless legs syndrome     Vitamin D deficiency        Past Surgical History:   Procedure Laterality Date    APPENDECTOMY      BREAST BIOPSY      CATARACT REMOVAL       SECTION      CHOLECYSTECTOMY      COLECTOMY  partial    COLON SURGERY      COLONOSCOPY  2010    COLONOSCOPY  2012     CAPSULE BY MOUTH DAILY, Disp: 90 capsule, Rfl: 3    triamterene-hydrochlorothiazide (MAXZIDE-25) 37.5-25 MG per tablet, TAKE 1 TABLET BY MOUTH DAILY, Disp: 90 tablet, Rfl: 3    allopurinol (ZYLOPRIM) 100 MG tablet, Take 2 tablets by mouth daily For gout -prevention, Disp: 60 tablet, Rfl: 5    losartan (COZAAR) 100 MG tablet, TAKE 1 TABLET BY MOUTH EVERY MORNING FOR BLOOD PRESSURE (Patient taking differently: Take 100 mg by mouth daily ), Disp: 90 tablet, Rfl: 3    rivaroxaban (XARELTO) 20 MG TABS tablet, Take 1 tablet by mouth daily (with breakfast), Disp: 30 tablet, Rfl: 3    vitamin D (ERGOCALCIFEROL) 1.25 MG (70888 UT) CAPS capsule, TAKE 1 CAPSULE BY MOUTH 1 TIME A WEEK, Disp: 13 capsule, Rfl: 3    albuterol (ACCUNEB) 1.25 MG/3ML nebulizer solution, Inhale 1 mL into the lungs every 6 hours as needed , Disp: , Rfl:     dorzolamide-timolol (COSOPT) 22.3-6.8 MG/ML ophthalmic solution, Place 1 Dose into both eyes daily, Disp: , Rfl:     furosemide (LASIX) 20 MG tablet, Take 1 tablet by mouth daily as needed (swelling), Disp: 30 tablet, Rfl: 3    Nebulizers (AIRIAL COMPACT MINI NEBULIZER) MISC, 1 Units by Does not apply route 4 times daily, Disp: 1 each, Rfl: 0  Zoloft [sertraline hcl]    Social History  Social History     Tobacco Use   Smoking Status Never Smoker   Smokeless Tobacco Never Used     Social History     Substance and Sexual Activity   Alcohol Use No         Family History   Problem Relation Age of Onset    Cancer Mother         Cervical Cancer    Cancer Brother         Esophageal cancer    Diabetes Brother     Esophageal Cancer Brother     Diabetes Sister     Colon Cancer Neg Hx     Colon Polyps Neg Hx     Liver Cancer Neg Hx     Liver Disease Neg Hx     Rectal Cancer Neg Hx     Stomach Cancer Neg Hx        Review of Systems:  Constitutional: Negative. HENT: Negative. Eyes: Negative. Respiratory: Negative. Cardiovascular: Negative. Gastrointestinal: Negative. Genitourinary: Negative. Musculoskeletal: Negative. Skin: Negative. Neurological: Positive for weakness. Endo/Heme/Allergies: Negative. Psychiatric/Behavioral: Negative. PHYSICAL EXAM:  Vitals:    11/17/20 1420   BP: (!) 174/77   Pulse: 80   SpO2: 94%     Constitutional: The patient appears well-developed andwell-nourished. Eyes - conjunctiva normal.  Conjugate gaze  Ear, nose, throat -No scars, masses, or lesions over external nose or ears, no atrophy of tongue  Neck-symmetric, no masses noted, no jugular vein distension  Respiration- chest wall appears symmetric, goodexpansion, normal effort without use of accessory muscles  Musculoskeletal - no significant wasting of muscles noted, no bony deformities, gait no gross ataxia  Extremities-no clubbing, cyanosis or edema  Skin- warm, dry, and intact. No rash, erythema, or pallor.   Psychiatric - mood, affect, and behavior appear normal.     Neurologic Examination  Awake, Alert andoriented x 3  Normal speech pattern, following commands  Motor:  RIGHT:      iliopsoas 4-/5    knee flexor 5/5    knee extension 5/5    EHL/dorsiflexion 5/5    plantar flexion 5/5    LEFT:     iliopsoas 5/5    knee flexor 5/5    knee extension 5/5    EHL/dorsiflexion 5/5    plantar flexion 5/5      No deficits to light touch or pinprick sensation    Transported in a wheelchair today       Wound:  Healed    DATA and IMAGING:    Lab Results   Component Value Date    WBC 9.4 09/22/2020    HGB 11.3 (L) 09/22/2020    HCT 38.5 09/22/2020    MCV 89.3 09/22/2020     09/22/2020     Lab Results   Component Value Date     09/22/2020    K 4.4 09/22/2020     09/22/2020    CO2 24 09/22/2020    BUN 22 09/22/2020    CREATININE 1.0 (H) 09/22/2020    GLUCOSE 94 09/22/2020    CALCIUM 9.5 09/22/2020    PROT 6.7 09/22/2020    LABALBU 4.0 09/22/2020    BILITOT <0.2 09/22/2020    ALKPHOS 69 09/22/2020    AST 11 09/22/2020    ALT 10 09/22/2020    LABGLOM 53 (A) 09/22/2020 GFRAA >59 09/22/2020    GLOB 2.5 10/12/2016     Lab Results   Component Value Date    INR 0.93 07/06/2020    INR 2.83 (H) 06/30/2020    INR 1.48 (H) 06/14/2020    PROTIME 12.3 07/06/2020    PROTIME 30.5 (H) 06/30/2020    PROTIME 18.1 (H) 06/14/2020    No results found. MRI LUMBAR SPINE W WO CONTRAST - 6/25/2020 10:25 AM    Indication: Severe back pain, LEFT leg pain, difficulty walking,    compression fracture of L5    Comparison: CT 6/14/2020    Findings: This report assumes 5 lumbar-type vertebral bodies. Levocurvature of the lumbar spine centered at L3-L4. Lumbar lordosis    and alignment are otherwise maintained. No acute subluxations. Redemonstrated acute fracture involving the superior endplate of L5    involving both the anterior and posterior vertebral body with    approximate 15% height loss. Associated increased or signal/marrow    edema is present. Degree of height loss appears unchanged from the CT    performed 6/14/2020. No evidence of extension into the posterior    elements. No other acute or chronic vertebral body fracture    identified. L2 vertebral body hemangioma is noted. No other focal vertebral body    lesion. The conus terminates at L2 and demonstrates normal signal    intensity. There is a 3 mm enhancing nodule located along the nerve    roots of the cauda equina (series 11 image 80). No paravertebral soft tissue edema. Large bilateral renal cysts are    partially imaged. A LEFT renal cyst measures 8.3 cm in greatest    dimension. Normal caliber abdominal aorta. On administration of IV    contrast. There is soft tissue enhancement surrounding the L5    vertebral body which is likely reactive from fracture. Severe multilevel degenerative changes characterized by diffuse disc    desiccation, multilevel disc height loss, multiple disc bulges, mixed    Modic type endplate changes, endplate osteophytes, and facet    arthropathy.  Effects upon the central canal and neural foramen are    described below. L1-L2: Central canal and neural foramina are widely patent. L2-L3: Disc bulge causes mild narrowing the central canal. Along with    facet arthropathy, there is moderate RIGHT and mild LEFT neural    foraminal narrowing. L3-L4: Disc bulge and facet arthropathy cause moderate narrowing of    the central canal. The same factors cause moderate to severe RIGHT    neural foraminal narrowing. Mild LEFT neural foraminal narrowing. L4-L5: Large disc bulge and severe facet arthropathy cause severe    central canal stenosis with partial CSF effacement. The same factors    cause moderate to severe RIGHT and LEFT neural foraminal narrowing    with apparent abutment of both exiting L4 nerve roots. L5-S1: Disc bulge and facet arthropathy cause mild narrowing the    central canal. The same factors cause severe LEFT neural foraminal    stenosis with possible impingement of the exiting LEFT L5 nerve root. Only mild RIGHT neural foraminal narrowing.         Impression    1.  Acute L5 compression fracture with involvement of the anterior and    posterior vertebral body but no evidence of posterior element    involvement. Approximate 15% height loss. No change in appearance    compared to a CT from 6/14/2020. No other fracture identified. 2.  Severe multilevel degenerative change, as described above. Notably, there is severe central canal stenosis at L4-L5 and severe    LEFT neural foraminal stenosis at L5-S1. 3.  Tiny 3 mm enhancing nodule located along one of the nerve roots of    the cauda equina. 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 Recommend MRI of the brain to exclude CNS neoplasm. Signed by Dr Marjory Cooks on 6/25/2020 1:06 PM      I have personally reviewed the images and my interpretation is:   There is an acute compression fracture of the L5 vertebral body with about 15% height loss.    Behind the L2 vertebral body there is a small 2-3 mm spherical mass that involves one of the nerve rootlets - likely benign     There is a levoscoliosis  There is DDD throughout the lumbar spine  L3-4 mild right foraminal stenosis  L4-5 moderate to severe canal stenosis        ASSESSMENT:   Anthony Whitaker is a 82 y.o. female who underwent a L5 kyphoplasty for a compression fracture and severe back pain on 7/6/2020 . PLAN:  -We have again discussed and reviewed the results of the MRI lumbar spine with Mrs. Schmidt at length. We explained that she does have significant narrowing at L4-5 that is likely the culprit of her RLE pain. We discussed operative versus non-operative treatments. She verbalizes understanding and would like to move forward with surgery ASAP. She will need a decompressive laminectomy of L4-5 using minimally invasive technique, we will use a right side approach. She will need to stop her Xarelto at least 3 days prior to surgery and may resume afterwards      We discussed risks, complications and expectations, including but not limited to infection, paralysis, bowel and bladder dysfunction, need for revision procedure, persistent pain, spinal fluid leak, stroke and death. In addition, the benefits of the surgery were thoroughly discussed and the patient demonstrated a deep understanding. The patient wishes to proceed with surgical intervention. We will schedule for surgery in the near future. The patient was counseled at length about the risks of [place] Covid-19 during their perioperative period and any recovery window from their procedure. The patient was made aware that [place] Covid-19  may worsen their prognosis for recovering from their procedure  and lend to a higher morbidity and/or mortality risk. All material risks, benefits, and reasonable alternatives including postponing the procedure were discussed.  The patient does wish to proceed with the procedure at

## 2020-11-17 NOTE — H&P (VIEW-ONLY)
[facility]  H&P          Chief Complaint   Patient presents with    Follow-up    Results    Leg Pain       HISTORY OF PRESENT ILLNESS:      Dora Pritchard is a 82 y.o. female who underwent a L5 kyphoplasty for a compression fracture and severe back pain on 2020. Prior to surgery she complained of severe back, bilateral lower extremity pain with radiation into the right anterior thigh. She complained of numbness in her feet due to neuropathy. Unfortunately she failed to follow up with her wound check due to Covid. Today Mrs. Tri Haines returns to clinic to discuss her back and leg pain. She states that she cannot walk very far before she develops RLE pain that radiates into the right hip, anterolateral thigh, to the knee, and down to the dorsum of the foot. The pain will improve when sitting and resting for a few minutes. She uses a walker and states that she cannot walk without it. She states the low back pain has improved since the kyphoplasty. She is on Xarelto for history of DVT.        Past Medical History:   Diagnosis Date    Bronchitis     Colon cancer ([facility] [address]     Colon polyps     Constipation     Deep vein blood clot of left lower extremity (HCC)     Left leg     Depression     DVT (deep venous thrombosis) (HCC)     Dysphagia     Fibrocystic breast disease     Fibromyalgia     GERD (gastroesophageal reflux disease)     reflux with hx of esophageal stricture    Headache(784.0)     History of colon cancer     Hormone replacement therapy (postmenopausal)     Hypertension     Neuropathy of foot     In both feet    Osteoarthritis     left knee pain    Restless legs syndrome     Vitamin D deficiency        Past Surgical History:   Procedure Laterality Date    APPENDECTOMY      BREAST BIOPSY      CATARACT REMOVAL       SECTION      CHOLECYSTECTOMY      COLECTOMY  partial    COLON SURGERY      COLONOSCOPY  2010    COLONOSCOPY  2012 Dr Reina Aparicio: unremarkable enterocolonic anastamosis; hyperplastic polyps    COLONOSCOPY  3/2013    [facility]: unremarkable post-surgical colon    COLONOSCOPY  3/11/16    Dr Smith Torres colon anastomosis, 5 yr recall    FOOT SURGERY  right foot    HAND SURGERY Right     Dr Dominique Galarza N/A 7/6/2020    KYPHOPLASTY L5 performed by Leigh Ann Bull DO at [address] [facility] TOTAL KNEE ARTHROPLASTY      UPPER GASTROINTESTINAL ENDOSCOPY  10-    UPPER GASTROINTESTINAL ENDOSCOPY  3/2013    [facility]: peptic stricture dilated to 48F; HH; small antral mucosal elevation    UPPER GASTROINTESTINAL ENDOSCOPY  12/2014    [facility]: dilation of esophageal stricture    VARICOSE VEIN SURGERY Left 03/14/2014  [facility] & [address]    Left GSV Ablation    VARICOSE VEIN SURGERY Right 04/04/2014  [facility] [place]    Right GSV Ablation        Medications    Current Outpatient Medications:     HYDROcodone-acetaminophen (NORCO)  MG per tablet, Take 1 tablet by mouth every 6 hours. , Disp: , Rfl:     DULoxetine (CYMBALTA) 20 MG extended release capsule, Take 1 capsule by mouth daily, Disp: 30 capsule, Rfl: 2    metoprolol tartrate (LOPRESSOR) 25 MG tablet, Take 1 tablet by mouth 2 times daily, Disp: 180 tablet, Rfl: 3    gabapentin (NEURONTIN) 600 MG tablet, TAKE 1 TABLET BY MOUTH THREE TIMES DAILY, Disp: 90 tablet, Rfl: 2    tiZANidine (ZANAFLEX) 4 MG tablet, Take 1 tablet by mouth 3 times daily as needed (muscle spasms), Disp: 90 tablet, Rfl: 2    Hospital Bed [facility], by Does not apply route, Disp: 1 each, Rfl: 0    acyclovir (ZOVIRAX) 400 MG tablet, Take 400 mg by mouth 2 times daily history of shingles in her eye, Disp: , Rfl:     terbinafine (LAMISIL) 250 MG tablet, TAKE ONE TABLET PO ONCE DAILY, Disp: 90 tablet, Rfl: 0    vitamin B-12 (CYANOCOBALAMIN) 500 MCG tablet, TAKE 1 TABLET BY MOUTH DAILY, Disp: 30 tablet, Rfl: 11   omeprazole (PRILOSEC) 40 MG delayed release capsule, TAKE 1 CAPSULE BY MOUTH DAILY, Disp: 90 capsule, Rfl: 3    triamterene-hydrochlorothiazide (MAXZIDE-25) 37.5-25 MG per tablet, TAKE 1 TABLET BY MOUTH DAILY, Disp: 90 tablet, Rfl: 3    allopurinol (ZYLOPRIM) 100 MG tablet, Take 2 tablets by mouth daily For gout -prevention, Disp: 60 tablet, Rfl: 5    losartan (COZAAR) 100 MG tablet, TAKE 1 TABLET BY MOUTH EVERY MORNING FOR BLOOD PRESSURE (Patient taking differently: Take 100 mg by mouth daily ), Disp: 90 tablet, Rfl: 3    rivaroxaban (XARELTO) 20 MG TABS tablet, Take 1 tablet by mouth daily (with breakfast), Disp: 30 tablet, Rfl: 3    vitamin D (ERGOCALCIFEROL) 1.25 MG (77337 UT) CAPS capsule, TAKE 1 CAPSULE BY MOUTH 1 TIME A WEEK, Disp: 13 capsule, Rfl: 3    albuterol (ACCUNEB) 1.25 MG/3ML nebulizer solution, Inhale 1 mL into the lungs every 6 hours as needed , Disp: , Rfl:     dorzolamide-timolol (COSOPT) 22.3-6.8 MG/ML ophthalmic solution, Place 1 Dose into both eyes daily, Disp: , Rfl:     furosemide (LASIX) 20 MG tablet, Take 1 tablet by mouth daily as needed (swelling), Disp: 30 tablet, Rfl: 3    Nebulizers (AIRIAL COMPACT MINI NEBULIZER) MISC, 1 Units by Does not apply route 4 times daily, Disp: 1 each, Rfl: 0  Zoloft [sertraline hcl]    Social History  Social History     Tobacco Use   Smoking Status Never Smoker   Smokeless Tobacco Never Used     Social History     Substance and Sexual Activity   Alcohol Use No         Family History   Problem Relation Age of Onset    Cancer Mother         Cervical Cancer    Cancer Brother         Esophageal cancer    Diabetes Brother     Esophageal Cancer Brother     Diabetes Sister     Colon Cancer Neg Hx     Colon Polyps Neg Hx     Liver Cancer Neg Hx     Liver Disease Neg Hx     Rectal Cancer Neg Hx     Stomach Cancer Neg Hx        Review of Systems:  Constitutional: Negative. HENT: Negative. Eyes: Negative. Respiratory: Negative. Cardiovascular: Negative. Gastrointestinal: Negative. Genitourinary: Negative. Musculoskeletal: Negative. Skin: Negative. Neurological: Positive for weakness. Endo/Heme/Allergies: Negative. Psychiatric/Behavioral: Negative. PHYSICAL EXAM:  Vitals:    11/17/20 1420   BP: (!) 174/77   Pulse: 80   SpO2: 94%     Constitutional: The patient appears well-developed andwell-nourished. Eyes  conjunctiva normal.  Conjugate gaze  Ear, nose, throat -No scars, masses, or lesions over external nose or ears, no atrophy of tongue  Neck-symmetric, no masses noted, no jugular vein distension  Respiration- chest wall appears symmetric, goodexpansion, normal effort without use of accessory muscles  Musculoskeletal  no significant wasting of muscles noted, no bony deformities, gait no gross ataxia  Extremities-no clubbing, cyanosis or edema  Skin warm, dry, and intact. No rash, erythema, or pallor.   Psychiatric  mood, affect, and behavior appear normal.     Neurologic Examination  Awake, Alert andoriented x 3  Normal speech pattern, following commands  Motor:  RIGHT:      iliopsoas 4-/5    knee flexor 5/5    knee extension 5/5    EHL/dorsiflexion 5/5    plantar flexion 5/5    LEFT:     iliopsoas 5/5    knee flexor 5/5    knee extension 5/5    EHL/dorsiflexion 5/5    plantar flexion 5/5      No deficits to light touch or pinprick sensation    Transported in a wheelchair today       Wound:  Healed    DATA and IMAGING:    Lab Results   Component Value Date    WBC 9.4 09/22/2020    HGB 11.3 (L) 09/22/2020    HCT 38.5 09/22/2020    MCV 89.3 09/22/2020     09/22/2020     Lab Results   Component Value Date     09/22/2020    K 4.4 09/22/2020     09/22/2020    CO2 24 09/22/2020    BUN 22 09/22/2020    CREATININE 1.0 (H) 09/22/2020    GLUCOSE 94 09/22/2020    CALCIUM 9.5 09/22/2020    PROT 6.7 09/22/2020    LABALBU 4.0 09/22/2020    BILITOT <0.2 09/22/2020    ALKPHOS 69 09/22/2020 AST 11 09/22/2020    ALT 10 09/22/2020    LABGLOM 53 (A) 09/22/2020    GFRAA >59 09/22/2020    GLOB 2.5 10/12/2016     Lab Results   Component Value Date    INR 0.93 07/06/2020    INR 2.83 (H) 06/30/2020    INR 1.48 (H) 06/14/2020    PROTIME 12.3 07/06/2020    PROTIME 30.5 (H) 06/30/2020    PROTIME 18.1 (H) 06/14/2020    No results found. MRI LUMBAR SPINE W WO CONTRAST - 6/25/2020 10:25 AM    Indication: Severe back pain, LEFT leg pain, difficulty walking,    compression fracture of L5    Comparison: CT 6/14/2020    Findings: This report assumes 5 lumbar-type vertebral bodies. Levocurvature of the lumbar spine centered at L3-L4. Lumbar lordosis    and alignment are otherwise maintained. No acute subluxations. Redemonstrated acute fracture involving the superior endplate of L5    involving both the anterior and posterior vertebral body with    approximate 15% height loss. Associated increased or signal/marrow    edema is present. Degree of height loss appears unchanged from the CT    performed 6/14/2020. No evidence of extension into the posterior    elements. No other acute or chronic vertebral body fracture    identified. L2 vertebral body hemangioma is noted. No other focal vertebral body    lesion. The conus terminates at L2 and demonstrates normal signal    intensity. There is a 3 mm enhancing nodule located along the nerve    roots of the cauda equina (series 11 image 80). No paravertebral soft tissue edema. Large bilateral renal cysts are    partially imaged. A LEFT renal cyst measures 8.3 cm in greatest    dimension. Normal caliber abdominal aorta. On administration of IV    contrast. There is soft tissue enhancement surrounding the L5    vertebral body which is likely reactive from fracture.     Severe multilevel degenerative changes characterized by diffuse disc    desiccation, multilevel disc height loss, multiple disc bulges, mixed Modic type endplate changes, endplate osteophytes, and facet    arthropathy. Effects upon the central canal and neural foramen are    described below. L1-L2: Central canal and neural foramina are widely patent. L2-L3: Disc bulge causes mild narrowing the central canal. Along with    facet arthropathy, there is moderate RIGHT and mild LEFT neural    foraminal narrowing. L3-L4: Disc bulge and facet arthropathy cause moderate narrowing of    the central canal. The same factors cause moderate to severe RIGHT    neural foraminal narrowing. Mild LEFT neural foraminal narrowing. L4-L5: Large disc bulge and severe facet arthropathy cause severe    central canal stenosis with partial CSF effacement. The same factors    cause moderate to severe RIGHT and LEFT neural foraminal narrowing    with apparent abutment of both exiting L4 nerve roots. L5-S1: Disc bulge and facet arthropathy cause mild narrowing the    central canal. The same factors cause severe LEFT neural foraminal    stenosis with possible impingement of the exiting LEFT L5 nerve root. Only mild RIGHT neural foraminal narrowing.         Impression    1.  Acute L5 compression fracture with involvement of the anterior and    posterior vertebral body but no evidence of posterior element    involvement. Approximate 15% height loss. No change in appearance    compared to a CT from 6/14/2020. No other fracture identified. 2.  Severe multilevel degenerative change, as described above. Notably, there is severe central canal stenosis at L4-L5 and severe    LEFT neural foraminal stenosis at L5-S1. 3.  Tiny 3 mm enhancing nodule located along one of the nerve roots of    the cauda equina. 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 Recommend MRI of the brain to exclude CNS neoplasm.     Signed by Dr Marissa Ann on 6/25/2020 1:06 PM I have personally reviewed the images and my interpretation is: There is an acute compression fracture of the L5 vertebral body with about 15% height loss. Behind the L2 vertebral body there is a small 2-3 mm spherical mass that involves one of the nerve rootlets - likely benign     There is a levoscoliosis  There is DDD throughout the lumbar spine  L3-4 mild right foraminal stenosis  L4-5 moderate to severe canal stenosis        ASSESSMENT:   Elva Pena is a 82 y.o. female who underwent a L5 kyphoplasty for a compression fracture and severe back pain on 7/6/2020 . PLAN:  -We have again discussed and reviewed the results of the MRI lumbar spine with Mrs. Schmidt at length. We explained that she does have significant narrowing at L4-5 that is likely the culprit of her RLE pain. We discussed operative versus non-operative treatments. She verbalizes understanding and would like to move forward with surgery ASAP. She will need a decompressive laminectomy of L4-5 using minimally invasive technique, we will use a right side approach. She will need to stop her Xarelto at least 3 days prior to surgery and may resume afterwards      We discussed risks, complications and expectations, including but not limited to infection, paralysis, bowel and bladder dysfunction, need for revision procedure, persistent pain, spinal fluid leak, stroke and death. In addition, the benefits of the surgery were thoroughly discussed and the patient demonstrated a deep understanding. The patient wishes to proceed with surgical intervention. We will schedule for surgery in the near future. The patient was counseled at length about the risks of [place] Covid-19 during their perioperative period and any recovery window from their procedure. The patient was made aware that [place] Covid-19  may worsen their prognosis for recovering from their procedure  and lend to a higher morbidity and/or mortality risk. All material risks, benefits, and reasonable alternatives including postponing the procedure were discussed. The patient does wish to proceed with the procedure at this time. ICD-10-CM    1. Spinal stenosis of lumbar region with neurogenic claudication  M48.062    2. Lumbar foraminal stenosis  M48.061    3. DDD (degenerative disc disease), lumbar  M51.36    4.  Right leg pain  M79.604         Lucia Estrada,

## 2020-11-17 NOTE — PROGRESS NOTES
[facility] Office Visit          Chief Complaint   Patient presents with    Follow-up    Results    Leg Pain       HISTORY OF PRESENT ILLNESS:      Casi Mcknight is a 82 y.o. female who underwent a L5 kyphoplasty for a compression fracture and severe back pain on 2020. Prior to surgery she complained of severe back, bilateral lower extremity pain with radiation into the right anterior thigh. She complained of numbness in her feet due to neuropathy. Unfortunately she failed to follow up with her wound check due to Covid. Today Mrs. Samantha Prado returns to clinic to discuss her back and leg pain. She states that she cannot walk very far before she develops RLE pain that radiates into the right hip, anterolateral thigh, to the knee, and down to the dorsum of the foot. The pain will improve when sitting and resting for a few minutes. She uses a walker and states that she cannot walk without it. She states the low back pain has improved since the kyphoplasty. She is on Xarelto for history of DVT.        Past Medical History:   Diagnosis Date    Bronchitis     Colon cancer ([address]     Colon polyps     Constipation     Deep vein blood clot of left lower extremity (HCC)     Left leg     Depression     DVT (deep venous thrombosis) (HCC)     Dysphagia     Fibrocystic breast disease     Fibromyalgia     GERD (gastroesophageal reflux disease)     reflux with hx of esophageal stricture    Headache(784.0)     History of colon cancer     Hormone replacement therapy (postmenopausal)     Hypertension     Neuropathy of foot     In both feet    Osteoarthritis     left knee pain    Restless legs syndrome     Vitamin D deficiency        Past Surgical History:   Procedure Laterality Date    APPENDECTOMY      BREAST BIOPSY      CATARACT REMOVAL       SECTION      CHOLECYSTECTOMY      COLECTOMY  partial    COLON SURGERY      COLONOSCOPY  2010    COLONOSCOPY  2-    Dr Haque Staff: unremarkable enterocolonic anastamosis; hyperplastic polyps    COLONOSCOPY  3/2013    [facility]: unremarkable post-surgical colon    COLONOSCOPY  3/11/16    Dr Olivia Carter colon anastomosis, 5 yr recall    FOOT SURGERY  right foot    HAND SURGERY Right     Dr Alfonzo Pugh N/A 7/6/2020    KYPHOPLASTY L5 performed by Vi Blanco DO at [address] TOTAL KNEE ARTHROPLASTY      UPPER GASTROINTESTINAL ENDOSCOPY  10-    UPPER GASTROINTESTINAL ENDOSCOPY  3/2013    [facility]: peptic stricture dilated to 48F; HH; small antral mucosal elevation    UPPER GASTROINTESTINAL ENDOSCOPY  12/2014    [facility]: dilation of esophageal stricture    VARICOSE VEIN SURGERY Left 03/14/2014  [facility] & [address]    Left GSV Ablation    VARICOSE VEIN SURGERY Right 04/04/2014  [facility]    Right GSV Ablation        Medications    Current Outpatient Medications:     HYDROcodone-acetaminophen (NORCO)  MG per tablet, Take 1 tablet by mouth every 6 hours. , Disp: , Rfl:     DULoxetine (CYMBALTA) 20 MG extended release capsule, Take 1 capsule by mouth daily, Disp: 30 capsule, Rfl: 2    metoprolol tartrate (LOPRESSOR) 25 MG tablet, Take 1 tablet by mouth 2 times daily, Disp: 180 tablet, Rfl: 3    gabapentin (NEURONTIN) 600 MG tablet, TAKE 1 TABLET BY MOUTH THREE TIMES DAILY, Disp: 90 tablet, Rfl: 2    tiZANidine (ZANAFLEX) 4 MG tablet, Take 1 tablet by mouth 3 times daily as needed (muscle spasms), Disp: 90 tablet, Rfl: 2    Hospital Bed MISC, by Does not apply route, Disp: 1 each, Rfl: 0    acyclovir (ZOVIRAX) 400 MG tablet, Take 400 mg by mouth 2 times daily history of shingles in her eye, Disp: , Rfl:     terbinafine (LAMISIL) 250 MG tablet, TAKE ONE TABLET PO ONCE DAILY, Disp: 90 tablet, Rfl: 0    vitamin B-12 (CYANOCOBALAMIN) 500 MCG tablet, TAKE 1 TABLET BY MOUTH DAILY, Disp: 30 tablet, Rfl: 11    omeprazole (PRILOSEC) 40 MG delayed Gastrointestinal: Negative. Genitourinary: Negative. Musculoskeletal: Negative. Skin: Negative. Neurological: Positive for weakness. Endo/Heme/Allergies: Negative. Psychiatric/Behavioral: Negative. PHYSICAL EXAM:  Vitals:    11/17/20 1420   BP: (!) 174/77   Pulse: 80   SpO2: 94%     Constitutional: The patient appears well-developed andwell-nourished. Eyes - conjunctiva normal.  Conjugate gaze  Ear, nose, throat -No scars, masses, or lesions over external nose or ears, no atrophy of tongue  Neck-symmetric, no masses noted, no jugular vein distension  Respiration- chest wall appears symmetric, goodexpansion, normal effort without use of accessory muscles  Musculoskeletal - no significant wasting of muscles noted, no bony deformities, gait no gross ataxia  Extremities-no clubbing, cyanosis or edema  Skin- warm, dry, and intact. No rash, erythema, or pallor.   Psychiatric - mood, affect, and behavior appear normal.     Neurologic Examination  Awake, Alert andoriented x 3  Normal speech pattern, following commands  Motor:  RIGHT:      iliopsoas 4-/5    knee flexor 5/5    knee extension 5/5    EHL/dorsiflexion 5/5    plantar flexion 5/5    LEFT:     iliopsoas 5/5    knee flexor 5/5    knee extension 5/5    EHL/dorsiflexion 5/5    plantar flexion 5/5      No deficits to light touch or pinprick sensation    Transported in a wheelchair today       Wound:  Healed    DATA and IMAGING:    Lab Results   Component Value Date    WBC 9.4 09/22/2020    HGB 11.3 (L) 09/22/2020    HCT 38.5 09/22/2020    MCV 89.3 09/22/2020     09/22/2020     Lab Results   Component Value Date     09/22/2020    K 4.4 09/22/2020     09/22/2020    CO2 24 09/22/2020    BUN 22 09/22/2020    CREATININE 1.0 (H) 09/22/2020    GLUCOSE 94 09/22/2020    CALCIUM 9.5 09/22/2020    PROT 6.7 09/22/2020    LABALBU 4.0 09/22/2020    BILITOT <0.2 09/22/2020    ALKPHOS 69 09/22/2020    AST 11 09/22/2020    ALT 10 09/22/2020 LABGLOM 53 (A) 09/22/2020    GFRAA >59 09/22/2020    GLOB 2.5 10/12/2016     Lab Results   Component Value Date    INR 0.93 07/06/2020    INR 2.83 (H) 06/30/2020    INR 1.48 (H) 06/14/2020    PROTIME 12.3 07/06/2020    PROTIME 30.5 (H) 06/30/2020    PROTIME 18.1 (H) 06/14/2020    No results found. MRI LUMBAR SPINE W WO CONTRAST - 6/25/2020 10:25 AM    Indication: Severe back pain, LEFT leg pain, difficulty walking,    compression fracture of L5    Comparison: CT 6/14/2020    Findings: This report assumes 5 lumbar-type vertebral bodies. Levocurvature of the lumbar spine centered at L3-L4. Lumbar lordosis    and alignment are otherwise maintained. No acute subluxations. Redemonstrated acute fracture involving the superior endplate of L5    involving both the anterior and posterior vertebral body with    approximate 15% height loss. Associated increased or signal/marrow    edema is present. Degree of height loss appears unchanged from the CT    performed 6/14/2020. No evidence of extension into the posterior    elements. No other acute or chronic vertebral body fracture    identified. L2 vertebral body hemangioma is noted. No other focal vertebral body    lesion. The conus terminates at L2 and demonstrates normal signal    intensity. There is a 3 mm enhancing nodule located along the nerve    roots of the cauda equina (series 11 image 80). No paravertebral soft tissue edema. Large bilateral renal cysts are    partially imaged. A LEFT renal cyst measures 8.3 cm in greatest    dimension. Normal caliber abdominal aorta. On administration of IV    contrast. There is soft tissue enhancement surrounding the L5    vertebral body which is likely reactive from fracture. Severe multilevel degenerative changes characterized by diffuse disc    desiccation, multilevel disc height loss, multiple disc bulges, mixed    Modic type endplate changes, endplate osteophytes, and facet    arthropathy.  Effects upon the central canal and neural foramen are    described below. L1-L2: Central canal and neural foramina are widely patent. L2-L3: Disc bulge causes mild narrowing the central canal. Along with    facet arthropathy, there is moderate RIGHT and mild LEFT neural    foraminal narrowing. L3-L4: Disc bulge and facet arthropathy cause moderate narrowing of    the central canal. The same factors cause moderate to severe RIGHT    neural foraminal narrowing. Mild LEFT neural foraminal narrowing. L4-L5: Large disc bulge and severe facet arthropathy cause severe    central canal stenosis with partial CSF effacement. The same factors    cause moderate to severe RIGHT and LEFT neural foraminal narrowing    with apparent abutment of both exiting L4 nerve roots. L5-S1: Disc bulge and facet arthropathy cause mild narrowing the    central canal. The same factors cause severe LEFT neural foraminal    stenosis with possible impingement of the exiting LEFT L5 nerve root. Only mild RIGHT neural foraminal narrowing.         Impression    1.  Acute L5 compression fracture with involvement of the anterior and    posterior vertebral body but no evidence of posterior element    involvement. Approximate 15% height loss. No change in appearance    compared to a CT from 6/14/2020. No other fracture identified. 2.  Severe multilevel degenerative change, as described above. Notably, there is severe central canal stenosis at L4-L5 and severe    LEFT neural foraminal stenosis at L5-S1. 3.  Tiny 3 mm enhancing nodule located along one of the nerve roots of    the cauda equina. 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 Recommend MRI of the brain to exclude CNS neoplasm. Signed by Dr Yvon Hendrix on 6/25/2020 1:06 PM      I have personally reviewed the images and my interpretation is:   There is an acute compression fracture of the L5 vertebral body with about 15% height loss. Behind the L2 vertebral body there is a small 2-3 mm spherical mass that involves one of the nerve rootlets - likely benign     There is a levoscoliosis  There is DDD throughout the lumbar spine  L3-4 mild right foraminal stenosis  L4-5 moderate to severe canal stenosis        ASSESSMENT:   Casi Mcknight is a 82 y.o. female who underwent a L5 kyphoplasty for a compression fracture and severe back pain on 7/6/2020 . PLAN:  -We have again discussed and reviewed the results of the MRI lumbar spine with Mrs. Schmidt at length. We explained that she does have significant narrowing at L4-5 that is likely the culprit of her RLE pain. We discussed operative versus non-operative treatments. She verbalizes understanding and would like to move forward with surgery ASAP. She will need a decompressive laminectomy of L4-5 using minimally invasive technique, we will use a right side approach. She will need to stop her Xarelto at least 3 days prior to surgery and may resume afterwards      We discussed risks, complications and expectations, including but not limited to infection, paralysis, bowel and bladder dysfunction, need for revision procedure, persistent pain, spinal fluid leak, stroke and death. In addition, the benefits of the surgery were thoroughly discussed and the patient demonstrated a deep understanding. The patient wishes to proceed with surgical intervention. We will schedule for surgery in the near future. The patient was counseled at length about the risks of [place] Covid-19 during their perioperative period and any recovery window from their procedure. The patient was made aware that [place] Covid-19  may worsen their prognosis for recovering from their procedure  and lend to a higher morbidity and/or mortality risk. All material risks, benefits, and reasonable alternatives including postponing the procedure were discussed.  The patient does wish to proceed with the procedure at this time. ICD-10-CM    1. Spinal stenosis of lumbar region with neurogenic claudication  M48.062    2. Lumbar foraminal stenosis  M48.061    3. DDD (degenerative disc disease), lumbar  M51.36    4.  Right leg pain  M79.604         [facility]

## 2020-11-17 NOTE — PROGRESS NOTES
Review of Systems   Constitutional: Negative. HENT: Negative. Eyes: Negative. Respiratory: Negative. Cardiovascular: Negative. Gastrointestinal: Negative. Genitourinary: Negative. Musculoskeletal: Positive for myalgias. Skin: Negative. Neurological: Negative. Endo/Heme/Allergies: Negative. Psychiatric/Behavioral: Negative.

## 2020-11-20 NOTE — TELEPHONE ENCOUNTER
Pt is requesting refills on Xarelto but stated she wasn't sure what dosage and I see it hasn't been ordered since December of 2019. Just wanted to verify this was right for pt.

## 2020-11-24 ENCOUNTER — OFFICE VISIT (OUTPATIENT)
Dept: PRIMARY CARE CLINIC | Age: 85
End: 2020-11-24
Payer: MEDICARE

## 2020-11-24 VITALS
DIASTOLIC BLOOD PRESSURE: 80 MMHG | WEIGHT: 156 LBS | RESPIRATION RATE: 16 BRPM | OXYGEN SATURATION: 98 % | HEIGHT: 64 IN | HEART RATE: 74 BPM | SYSTOLIC BLOOD PRESSURE: 112 MMHG | BODY MASS INDEX: 26.63 KG/M2 | TEMPERATURE: 97.3 F

## 2020-11-24 PROCEDURE — 4040F PNEUMOC VAC/ADMIN/RCVD: CPT | Performed by: NURSE PRACTITIONER

## 2020-11-24 PROCEDURE — 1123F ACP DISCUSS/DSCN MKR DOCD: CPT | Performed by: NURSE PRACTITIONER

## 2020-11-24 PROCEDURE — G8484 FLU IMMUNIZE NO ADMIN: HCPCS | Performed by: NURSE PRACTITIONER

## 2020-11-24 PROCEDURE — G8427 DOCREV CUR MEDS BY ELIG CLIN: HCPCS | Performed by: NURSE PRACTITIONER

## 2020-11-24 PROCEDURE — 96523 IRRIG DRUG DELIVERY DEVICE: CPT | Performed by: NURSE PRACTITIONER

## 2020-11-24 PROCEDURE — G8399 PT W/DXA RESULTS DOCUMENT: HCPCS | Performed by: NURSE PRACTITIONER

## 2020-11-24 PROCEDURE — 1036F TOBACCO NON-USER: CPT | Performed by: NURSE PRACTITIONER

## 2020-11-24 PROCEDURE — G8417 CALC BMI ABV UP PARAM F/U: HCPCS | Performed by: NURSE PRACTITIONER

## 2020-11-24 PROCEDURE — 99213 OFFICE O/P EST LOW 20 MIN: CPT | Performed by: NURSE PRACTITIONER

## 2020-11-24 PROCEDURE — 1090F PRES/ABSN URINE INCON ASSESS: CPT | Performed by: NURSE PRACTITIONER

## 2020-11-24 ASSESSMENT — ENCOUNTER SYMPTOMS
EYES NEGATIVE: 1
GASTROINTESTINAL NEGATIVE: 1
RESPIRATORY NEGATIVE: 1

## 2020-11-24 NOTE — PROGRESS NOTES
[address] [facility]  [address] [place] [address]  [address] [place] [address]  Dept: [phone]  Dept Fax: [phone]  Loc: [phone]    Abilio Baig is a 82 y.o. female who presents today for her medical conditions/complaints as noted below. Abilio Baig is c/o of Mediport (port flush)        HPI:     HPI   Chief Complaint   Patient presents with    Mediport     port flush   The patient is going to be having back surgery on . She is still having leg pain and back pain. She is unable to walk very far. She has lost a significant amount of weight about 15 pounds in the last few months from lack of appetite. She still has her personal caregiver she is with her today. She is also here to have her port flushed.   She is not due for blood work she is having it just prior to surgery  Past Medical History:   Diagnosis Date    Bronchitis     Colon cancer ([address]     Colon polyps     Constipation     Deep vein blood clot of left lower extremity (HCC)     Left leg     Depression     DVT (deep venous thrombosis) (HCC)     Dysphagia     Fibrocystic breast disease     Fibromyalgia     GERD (gastroesophageal reflux disease)     reflux with hx of esophageal stricture    Headache(784.0)     History of colon cancer     Hormone replacement therapy (postmenopausal)     Hypertension     Neuropathy of foot     In both feet    Osteoarthritis     left knee pain    Restless legs syndrome     Vitamin D deficiency       Past Surgical History:   Procedure Laterality Date    APPENDECTOMY      BREAST BIOPSY      CATARACT REMOVAL       SECTION      CHOLECYSTECTOMY      COLECTOMY  partial    COLON SURGERY      COLONOSCOPY  2010    COLONOSCOPY  2012    Dr Pina Darnell: unremarkable enterocolonic anastamosis; hyperplastic polyps    COLONOSCOPY  3/2013    [facility]: unremarkable post-surgical colon    COLONOSCOPY  3/11/16    Dr Alyson Hancock colon anastomosis, 5 yr recall    FOOT SURGERY  right foot    HAND SURGERY Right     Dr Victoria Dwyer N/A 7/6/2020    KYPHOPLASTY L5 performed by Caitlin Ray DO at [address]      TOTAL KNEE ARTHROPLASTY      UPPER GASTROINTESTINAL ENDOSCOPY  10-    UPPER GASTROINTESTINAL ENDOSCOPY  3/2013    [facility]: peptic stricture dilated to 48F; HH; small antral mucosal elevation    UPPER GASTROINTESTINAL ENDOSCOPY  12/2014    [facility]: dilation of esophageal stricture    VARICOSE VEIN SURGERY Left 03/14/2014  [address]    Left GSV Ablation    VARICOSE VEIN SURGERY Right 04/04/2014  [address]    Right GSV Ablation       Vitals 11/24/2020 11/17/2020 10/28/2020 10/20/2020 10/13/2020 7/77/0027   SYSTOLIC 080 571 486 [phone]   DIASTOLIC 80 77 46 80 80 70   Site - - - - - -   Position - - - - - -   Cuff Size - - - - - -   Pulse 74 80 59 71 58 60   Temp 97.3 - 97.7 97.3 96.7 97.8   Resp 16 - - 18 16 18   SpO2 98 94 96 99 98 98   Weight 156 lb 160 lb 160 lb 160 lb 160 lb 160 lb 6.4 oz   Height 5' 4\" 5' 3\" 5' 3\" 5' 3\" 5' 3\" 5' 4\"   Body mass index 26.77 kg/m2 28.34 kg/m2 28.34 kg/m2 28.34 kg/m2 28.34 kg/m2 27.53 kg/m2   Pain Level - - - - - -   Some recent data might be hidden       Family History   Problem Relation Age of Onset    Cancer Mother         Cervical Cancer    Cancer Brother         Esophageal cancer    Diabetes Brother     Esophageal Cancer Brother     Diabetes Sister     Colon Cancer Neg Hx     Colon Polyps Neg Hx     Liver Cancer Neg Hx     Liver Disease Neg Hx     Rectal Cancer Neg Hx     Stomach Cancer Neg Hx        Social History     Tobacco Use    Smoking status: Never Smoker    Smokeless tobacco: Never Used   Substance Use Topics    Alcohol use: No      Current Outpatient Medications   Medication Sig Dispense Refill    rivaroxaban (XARELTO) 20 MG TABS tablet Take 1 tablet by mouth daily (with breakfast) 30 tablet 3    HYDROcodone-acetaminophen (NORCO)  MG per tablet Take 1 tablet by mouth every 6 hours.  DULoxetine (CYMBALTA) 20 MG extended release capsule Take 1 capsule by mouth daily 30 capsule 2    metoprolol tartrate (LOPRESSOR) 25 MG tablet Take 1 tablet by mouth 2 times daily 180 tablet 3    gabapentin (NEURONTIN) 600 MG tablet TAKE 1 TABLET BY MOUTH THREE TIMES DAILY 90 tablet 2    tiZANidine (ZANAFLEX) 4 MG tablet Take 1 tablet by mouth 3 times daily as needed (muscle spasms) 90 tablet 2   [address] by Does not apply route 1 each 0    acyclovir (ZOVIRAX) 400 MG tablet Take 400 mg by mouth 2 times daily history of shingles in her eye      terbinafine (LAMISIL) 250 MG tablet TAKE ONE TABLET PO ONCE DAILY 90 tablet 0    vitamin B-12 (CYANOCOBALAMIN) 500 MCG tablet TAKE 1 TABLET BY MOUTH DAILY 30 tablet 11    omeprazole (PRILOSEC) 40 MG delayed release capsule TAKE 1 CAPSULE BY MOUTH DAILY 90 capsule 3    triamterene-hydrochlorothiazide (MAXZIDE-25) 37.5-25 MG per tablet TAKE 1 TABLET BY MOUTH DAILY 90 tablet 3    allopurinol (ZYLOPRIM) 100 MG tablet Take 2 tablets by mouth daily For gout -prevention 60 tablet 5    losartan (COZAAR) 100 MG tablet TAKE 1 TABLET BY MOUTH EVERY MORNING FOR BLOOD PRESSURE (Patient taking differently: Take 100 mg by mouth daily ) 90 tablet 3    vitamin D (ERGOCALCIFEROL) 1.25 MG (81128 UT) CAPS capsule TAKE 1 CAPSULE BY MOUTH 1 TIME A WEEK 13 capsule 3    albuterol (ACCUNEB) 1.25 MG/3ML nebulizer solution Inhale 1 mL into the lungs every 6 hours as needed       dorzolamide-timolol (COSOPT) 22.3-6.8 MG/ML ophthalmic solution Place 1 Dose into both eyes daily      furosemide (LASIX) 20 MG tablet Take 1 tablet by mouth daily as needed (swelling) 30 tablet 3    Nebulizers (AIRIAL COMPACT MINI NEBULIZER) MISC 1 Units by Does not apply route 4 times daily 1 each 0     No current facility-administered medications for this visit.       Allergies   Allergen Reactions    Zoloft [Sertraline Hcl] Other (See Comments)     Patient states that she doesn't want this medication anymore because she hallucinated and saw spiders. Patient weaned herself off and after she quit taking the medication, the hallucinations stopped. Patient states that she doesn't want this medication anymore because she hallucinated and saw spiders. Patient weaned herself off and after she quit taking the medication, the hallucinations stopped. Health Maintenance   Topic Date Due    Shingles Vaccine (1 of 2) 02/01/1987    Annual Wellness Visit (AWV)  05/29/2019    Potassium monitoring  09/22/2021    Creatinine monitoring  09/22/2021    DTaP/Tdap/Td vaccine (3 - Td) 12/23/2025    Colon cancer screen colonoscopy  03/11/2026    DEXA (modify frequency per FRAX score)  Completed    Flu vaccine  Completed    Pneumococcal 65+ years Vaccine  Completed    Hepatitis A vaccine  Aged Out    Hepatitis B vaccine  Aged Out    Hib vaccine  Aged Out    Meningococcal (ACWY) vaccine  Aged Out       Subjective:      Review of Systems   Constitutional: Negative. HENT: Negative. Eyes: Negative. Respiratory: Negative. Cardiovascular: Negative. Gastrointestinal: Negative. Genitourinary: Negative. Musculoskeletal: Negative. Skin: Negative. Neurological: Negative. Psychiatric/Behavioral: Negative. Objective:     Physical Exam  Constitutional:       Appearance: Normal appearance. She is well-developed. Comments: Sitting up in wheelchair. HENT:      Head: Normocephalic. Neck:      Musculoskeletal: Normal range of motion. Cardiovascular:      Rate and Rhythm: Normal rate. Pulmonary:      Effort: Pulmonary effort is normal.   Skin:     General: Skin is warm and dry. Neurological:      General: No focal deficit present. Mental Status: She is alert and oriented to person, place, and time.    Psychiatric:         Mood and Affect: Mood normal.         Behavior: Behavior normal.         Thought Content: Thought content normal.         Judgment: Judgment normal.       /80   Pulse 74   Temp 97.3 °F (36.3 °C) (Temporal)   Resp 16   Ht 5' 4\" (1.626 m)   Wt 156 lb (70.8 kg)   SpO2 98%   BMI 26.78 kg/m²   Venous Access Procedure Note  Indication: maintenance flush    Procedure: The patient was placed in the appropriate position and the skin over the puncture site was prepped with betadine and draped in a sterile fashion. Intravenous access was obtained in right chest port with a Bliss needle and the site was secured appropriately. 3 cc of blood with withdrawn and discarded. The port was flushed with 10cc NS and then 3 cc heparin flush. The patient tolerated the procedure well. Complications: None    Assessment:       Diagnosis Orders   1. Weight loss     2. Encounter for care related to Port-a-Cath  [facility] DEVICE   3. Essential hypertension     4. History of DVT (deep vein thrombosis)     5. Port-A-Cath in place  [facility]PLANTED DRUG DELIVERY DEVICE         Plan:   More than 50% of the time was spent counseling and coordinating care for a total time of 15min face to face. I did review the notes from Dr. Janette Avendaño and she is going to have surgery in December. I have encouraged her to try to drink 1 Ensure a day to get her strength back and maintain her protein. I did give her some samples today. Patient given educational materials -see patient instructions. Discussed use, benefit, and side effects of prescribed medications. All patient questions answered. Pt voiced understanding. Reviewed health maintenance. Instructed to continue currentmedications, diet and exercise. Patient agreed with treatment plan. Follow up as directed. MEDICATIONS:  No orders of the defined types were placed in this encounter. ORDERS:  Orders Placed This Encounter   Procedures    [place] [facility] IMPLANTED DRUG DELIVERY DEVICE       Follow-up:  No follow-ups on file.     PATIENT INSTRUCTIONS:  There are no Patient Instructions on file for this visit. Electronically signed by EARLE Smith CNP on 11/24/2020 at 11:17 AM    EMR Dragon/transcription disclaimer:  Much of thisencounter note is electronic transcription/translation of spoken language to printed texts. The electronic translation of spoken language may be erroneous, or at times, nonsensical words or phrases may be inadvertentlytranscribed.   Although I have reviewed the note for such errors, some may still exist.

## 2020-12-01 ENCOUNTER — HOSPITAL ENCOUNTER (EMERGENCY)
Age: 85
Discharge: HOME | End: 2020-12-01
Attending: EMERGENCY MEDICINE
Payer: MEDICARE

## 2020-12-01 ENCOUNTER — APPOINTMENT (OUTPATIENT)
Dept: GENERAL RADIOLOGY | Age: 85
End: 2020-12-01
Payer: MEDICARE

## 2020-12-01 ENCOUNTER — APPOINTMENT (OUTPATIENT)
Dept: CT IMAGING | Age: 85
End: 2020-12-01
Payer: MEDICARE

## 2020-12-01 VITALS
SYSTOLIC BLOOD PRESSURE: 130 MMHG | RESPIRATION RATE: 16 BRPM | HEIGHT: 63 IN | BODY MASS INDEX: 27.29 KG/M2 | HEART RATE: 77 BPM | DIASTOLIC BLOOD PRESSURE: 84 MMHG | TEMPERATURE: 98.3 F | OXYGEN SATURATION: 95 % | WEIGHT: 154 LBS

## 2020-12-01 PROCEDURE — 96375 TX/PRO/DX INJ NEW DRUG ADDON: CPT

## 2020-12-01 PROCEDURE — 73502 X-RAY EXAM HIP UNI 2-3 VIEWS: CPT

## 2020-12-01 PROCEDURE — 72192 CT PELVIS W/O DYE: CPT

## 2020-12-01 PROCEDURE — 6360000002 HC RX W HCPCS: Performed by: EMERGENCY MEDICINE

## 2020-12-01 PROCEDURE — 73700 CT LOWER EXTREMITY W/O DYE: CPT

## 2020-12-01 PROCEDURE — 96374 THER/PROPH/DIAG INJ IV PUSH: CPT

## 2020-12-01 PROCEDURE — 99284 EMERGENCY DEPT VISIT MOD MDM: CPT

## 2020-12-01 RX ORDER — ONDANSETRON 2 MG/ML
4 INJECTION INTRAMUSCULAR; INTRAVENOUS ONCE
Status: COMPLETED | OUTPATIENT
Start: 2020-12-01 | End: 2020-12-01

## 2020-12-01 RX ORDER — MORPHINE SULFATE 4 MG/ML
4 INJECTION, SOLUTION INTRAMUSCULAR; INTRAVENOUS ONCE
Status: COMPLETED | OUTPATIENT
Start: 2020-12-01 | End: 2020-12-01

## 2020-12-01 RX ADMIN — ONDANSETRON HYDROCHLORIDE 4 MG: 2 SOLUTION INTRAMUSCULAR; INTRAVENOUS at 18:39

## 2020-12-01 RX ADMIN — MORPHINE SULFATE 4 MG: 4 INJECTION, SOLUTION INTRAMUSCULAR; INTRAVENOUS at 18:40

## 2020-12-01 ASSESSMENT — PAIN SCALES - GENERAL
PAINLEVEL_OUTOF10: 6
PAINLEVEL_OUTOF10: 8
PAINLEVEL_OUTOF10: 5

## 2020-12-01 ASSESSMENT — PAIN DESCRIPTION - ORIENTATION: ORIENTATION: RIGHT

## 2020-12-01 ASSESSMENT — PAIN DESCRIPTION - LOCATION: LOCATION: HIP

## 2020-12-01 ASSESSMENT — ENCOUNTER SYMPTOMS: BACK PAIN: 0

## 2020-12-01 NOTE — ED NOTES
Bed: 17  Expected date:   Expected time:   Means of arrival:   Comments:  GL fall      Hong Enciso RN  12/01/20 2843

## 2020-12-02 NOTE — ED NOTES
Patient states that she is comfortable going home, spouse called for      Suzanne Chaney, AUBREE  12/01/20 8517

## 2020-12-09 ENCOUNTER — HOSPITAL ENCOUNTER (OUTPATIENT)
Dept: PREADMISSION TESTING | Age: 85
Discharge: HOME | End: 2020-12-13
Payer: MEDICARE

## 2020-12-09 ENCOUNTER — HOSPITAL ENCOUNTER (OUTPATIENT)
Dept: GENERAL RADIOLOGY | Age: 85
Discharge: HOME | End: 2020-12-09
Payer: MEDICARE

## 2020-12-09 LAB
ABO/RH: NORMAL
ALBUMIN SERPL-MCNC: 3.9 G/DL (ref 3.5–5.2)
ALP BLD-CCNC: 91 U/L (ref 35–104)
ALT SERPL-CCNC: 14 U/L (ref 5–33)
ANION GAP SERPL CALCULATED.3IONS-SCNC: 12 MMOL/L (ref 7–19)
ANTIBODY SCREEN: NORMAL
APTT: 23.6 SEC (ref 26–36.2)
AST SERPL-CCNC: 17 U/L (ref 5–32)
BACTERIA: NEGATIVE /HPF
BILIRUB SERPL-MCNC: <0.2 MG/DL (ref 0.2–1.2)
BILIRUBIN URINE: NEGATIVE
BLOOD, URINE: NEGATIVE
BUN BLDV-MCNC: 14 MG/DL (ref 8–23)
CALCIUM SERPL-MCNC: 9.5 MG/DL (ref 8.8–10.2)
CHLORIDE BLD-SCNC: 103 MMOL/L (ref 98–111)
CLARITY: CLEAR
CO2: 24 MMOL/L (ref 22–29)
COLOR: YELLOW
CREAT SERPL-MCNC: 0.7 MG/DL (ref 0.5–0.9)
CRYSTALS, UA: ABNORMAL /HPF
EKG P AXIS: 58 DEGREES
EKG P-R INTERVAL: 174 MS
EKG Q-T INTERVAL: 374 MS
EKG QRS DURATION: 74 MS
EKG QTC CALCULATION (BAZETT): 403 MS
EKG T AXIS: 63 DEGREES
EPITHELIAL CELLS, UA: 3 /HPF (ref 0–5)
GFR AFRICAN AMERICAN: >59
GFR NON-AFRICAN AMERICAN: >60
GLUCOSE BLD-MCNC: 113 MG/DL (ref 74–109)
GLUCOSE URINE: NEGATIVE MG/DL
HCT VFR BLD CALC: 40.3 % (ref 37–47)
HEMOGLOBIN: 12.3 G/DL (ref 12–16)
HYALINE CASTS: 2 /HPF (ref 0–8)
INR BLD: 0.94 (ref 0.88–1.18)
KETONES, URINE: ABNORMAL MG/DL
LEUKOCYTE ESTERASE, URINE: ABNORMAL
MCH RBC QN AUTO: 25.7 PG (ref 27–31)
MCHC RBC AUTO-ENTMCNC: 30.5 G/DL (ref 33–37)
MCV RBC AUTO: 84.1 FL (ref 81–99)
NITRITE, URINE: NEGATIVE
PDW BLD-RTO: 15.6 % (ref 11.5–14.5)
PH UA: 5.5 (ref 5–8)
PLATELET # BLD: 337 K/UL (ref 130–400)
PMV BLD AUTO: 9.9 FL (ref 9.4–12.3)
POTASSIUM SERPL-SCNC: 4.1 MMOL/L (ref 3.5–5)
PROTEIN UA: NEGATIVE MG/DL
PROTHROMBIN TIME: 12.5 SEC (ref 12–14.6)
RBC # BLD: 4.79 M/UL (ref 4.2–5.4)
RBC UA: 1 /HPF (ref 0–4)
SODIUM BLD-SCNC: 139 MMOL/L (ref 136–145)
SPECIFIC GRAVITY UA: 1.02 (ref 1–1.03)
TOTAL PROTEIN: 6.8 G/DL (ref 6.6–8.7)
UROBILINOGEN, URINE: 0.2 E.U./DL
WBC # BLD: 9.5 K/UL (ref 4.8–10.8)
WBC UA: 4 /HPF (ref 0–5)

## 2020-12-09 PROCEDURE — 81001 URINALYSIS AUTO W/SCOPE: CPT

## 2020-12-09 PROCEDURE — 85610 PROTHROMBIN TIME: CPT

## 2020-12-09 PROCEDURE — 86901 BLOOD TYPING SEROLOGIC RH(D): CPT

## 2020-12-09 PROCEDURE — 86900 BLOOD TYPING SEROLOGIC ABO: CPT

## 2020-12-09 PROCEDURE — 85730 THROMBOPLASTIN TIME PARTIAL: CPT

## 2020-12-09 PROCEDURE — 93005 ELECTROCARDIOGRAM TRACING: CPT | Performed by: ANESTHESIOLOGY

## 2020-12-09 PROCEDURE — 86850 RBC ANTIBODY SCREEN: CPT

## 2020-12-09 PROCEDURE — 85027 COMPLETE CBC AUTOMATED: CPT

## 2020-12-09 PROCEDURE — 71046 X-RAY EXAM CHEST 2 VIEWS: CPT

## 2020-12-09 PROCEDURE — U0003 INFECTIOUS AGENT DETECTION BY NUCLEIC ACID (DNA OR RNA); SEVERE ACUTE RESPIRATORY SYNDROME CORONAVIRUS 2 (SARS-COV-2) (CORONAVIRUS DISEASE [COVID-19]), AMPLIFIED PROBE TECHNIQUE, MAKING USE OF HIGH THROUGHPUT TECHNOLOGIES AS DESCRIBED BY CMS-2020-01-R: HCPCS

## 2020-12-09 PROCEDURE — 80053 COMPREHEN METABOLIC PANEL: CPT

## 2020-12-10 LAB — SARS-COV-2, NAA: NOT DETECTED

## 2020-12-11 ENCOUNTER — TELEPHONE (OUTPATIENT)
Dept: NEUROSURGERY | Age: 85
End: 2020-12-11

## 2020-12-11 NOTE — TELEPHONE ENCOUNTER
Called and spoke with the patient. Reminded her of her surgery on Monday December 14th at 12:00 noon. Advised patient to arrive by 10:30 am to register. Patient voiced understanding to all.

## 2020-12-14 ENCOUNTER — HOSPITAL ENCOUNTER (OUTPATIENT)
Age: 85
Setting detail: OUTPATIENT SURGERY
Discharge: HOME | End: 2020-12-14
Attending: NEUROLOGICAL SURGERY | Admitting: NEUROLOGICAL SURGERY
Payer: MEDICARE

## 2020-12-14 ENCOUNTER — ANESTHESIA EVENT (OUTPATIENT)
Dept: OPERATING ROOM | Age: 85
End: 2020-12-14
Payer: MEDICARE

## 2020-12-14 ENCOUNTER — ANESTHESIA (OUTPATIENT)
Dept: OPERATING ROOM | Age: 85
End: 2020-12-14
Payer: MEDICARE

## 2020-12-14 VITALS
SYSTOLIC BLOOD PRESSURE: 171 MMHG | BODY MASS INDEX: 29.06 KG/M2 | TEMPERATURE: 97.1 F | WEIGHT: 164 LBS | HEART RATE: 84 BPM | DIASTOLIC BLOOD PRESSURE: 81 MMHG | HEIGHT: 63 IN | OXYGEN SATURATION: 97 % | RESPIRATION RATE: 16 BRPM

## 2020-12-14 VITALS
TEMPERATURE: 97 F | DIASTOLIC BLOOD PRESSURE: 66 MMHG | OXYGEN SATURATION: 100 % | RESPIRATION RATE: 5 BRPM | SYSTOLIC BLOOD PRESSURE: 146 MMHG

## 2020-12-14 LAB
ABO/RH: NORMAL
ANTIBODY SCREEN: NORMAL

## 2020-12-14 PROCEDURE — 3700000000 HC ANESTHESIA ATTENDED CARE: Performed by: NEUROLOGICAL SURGERY

## 2020-12-14 PROCEDURE — 7100000001 HC PACU RECOVERY - ADDTL 15 MIN: Performed by: NEUROLOGICAL SURGERY

## 2020-12-14 PROCEDURE — 7100000011 HC PHASE II RECOVERY - ADDTL 15 MIN: Performed by: NEUROLOGICAL SURGERY

## 2020-12-14 PROCEDURE — 6360000002 HC RX W HCPCS: Performed by: NEUROLOGICAL SURGERY

## 2020-12-14 PROCEDURE — 7100000010 HC PHASE II RECOVERY - FIRST 15 MIN: Performed by: NEUROLOGICAL SURGERY

## 2020-12-14 PROCEDURE — 2580000003 HC RX 258: Performed by: ANESTHESIOLOGY

## 2020-12-14 PROCEDURE — 2709999900 HC NON-CHARGEABLE SUPPLY: Performed by: NEUROLOGICAL SURGERY

## 2020-12-14 PROCEDURE — 6370000000 HC RX 637 (ALT 250 FOR IP): Performed by: NEUROLOGICAL SURGERY

## 2020-12-14 PROCEDURE — 86850 RBC ANTIBODY SCREEN: CPT

## 2020-12-14 PROCEDURE — 3700000001 HC ADD 15 MINUTES (ANESTHESIA): Performed by: NEUROLOGICAL SURGERY

## 2020-12-14 PROCEDURE — 86900 BLOOD TYPING SEROLOGIC ABO: CPT

## 2020-12-14 PROCEDURE — 6360000002 HC RX W HCPCS

## 2020-12-14 PROCEDURE — 3600000005 HC SURGERY LEVEL 5 BASE: Performed by: NEUROLOGICAL SURGERY

## 2020-12-14 PROCEDURE — 2580000003 HC RX 258: Performed by: NEUROLOGICAL SURGERY

## 2020-12-14 PROCEDURE — 3600000015 HC SURGERY LEVEL 5 ADDTL 15MIN: Performed by: NEUROLOGICAL SURGERY

## 2020-12-14 PROCEDURE — 2500000003 HC RX 250 WO HCPCS: Performed by: NURSE ANESTHETIST, CERTIFIED REGISTERED

## 2020-12-14 PROCEDURE — 6360000002 HC RX W HCPCS: Performed by: ANESTHESIOLOGY

## 2020-12-14 PROCEDURE — 7100000000 HC PACU RECOVERY - FIRST 15 MIN: Performed by: NEUROLOGICAL SURGERY

## 2020-12-14 PROCEDURE — C1769 GUIDE WIRE: HCPCS | Performed by: NEUROLOGICAL SURGERY

## 2020-12-14 PROCEDURE — 2720000010 HC SURG SUPPLY STERILE: Performed by: NEUROLOGICAL SURGERY

## 2020-12-14 PROCEDURE — 36415 COLL VENOUS BLD VENIPUNCTURE: CPT

## 2020-12-14 PROCEDURE — 6360000002 HC RX W HCPCS: Performed by: NURSE ANESTHETIST, CERTIFIED REGISTERED

## 2020-12-14 PROCEDURE — 63047 LAM FACETEC & FORAMOT LUMBAR: CPT | Performed by: NEUROLOGICAL SURGERY

## 2020-12-14 PROCEDURE — 86901 BLOOD TYPING SEROLOGIC RH(D): CPT

## 2020-12-14 RX ORDER — MORPHINE SULFATE 4 MG/ML
4 INJECTION, SOLUTION INTRAMUSCULAR; INTRAVENOUS
Status: DISCONTINUED | OUTPATIENT
Start: 2020-12-14 | End: 2020-12-14 | Stop reason: HOSPADM

## 2020-12-14 RX ORDER — ROCURONIUM BROMIDE 10 MG/ML
INJECTION, SOLUTION INTRAVENOUS PRN
Status: DISCONTINUED | OUTPATIENT
Start: 2020-12-14 | End: 2020-12-14 | Stop reason: SDUPTHER

## 2020-12-14 RX ORDER — PROMETHAZINE HYDROCHLORIDE 25 MG/ML
6.25 INJECTION, SOLUTION INTRAMUSCULAR; INTRAVENOUS
Status: DISCONTINUED | OUTPATIENT
Start: 2020-12-14 | End: 2020-12-14 | Stop reason: HOSPADM

## 2020-12-14 RX ORDER — SCOLOPAMINE TRANSDERMAL SYSTEM 1 MG/1
1 PATCH, EXTENDED RELEASE TRANSDERMAL ONCE
Status: DISCONTINUED | OUTPATIENT
Start: 2020-12-14 | End: 2020-12-14 | Stop reason: HOSPADM

## 2020-12-14 RX ORDER — HYDROMORPHONE HYDROCHLORIDE 1 MG/ML
0.25 INJECTION, SOLUTION INTRAMUSCULAR; INTRAVENOUS; SUBCUTANEOUS EVERY 5 MIN PRN
Status: DISCONTINUED | OUTPATIENT
Start: 2020-12-14 | End: 2020-12-14 | Stop reason: HOSPADM

## 2020-12-14 RX ORDER — MORPHINE SULFATE 4 MG/ML
2 INJECTION, SOLUTION INTRAMUSCULAR; INTRAVENOUS EVERY 5 MIN PRN
Status: DISCONTINUED | OUTPATIENT
Start: 2020-12-14 | End: 2020-12-14 | Stop reason: HOSPADM

## 2020-12-14 RX ORDER — HYDROMORPHONE HYDROCHLORIDE 1 MG/ML
0.5 INJECTION, SOLUTION INTRAMUSCULAR; INTRAVENOUS; SUBCUTANEOUS EVERY 5 MIN PRN
Status: DISCONTINUED | OUTPATIENT
Start: 2020-12-14 | End: 2020-12-14 | Stop reason: HOSPADM

## 2020-12-14 RX ORDER — HYDRALAZINE HYDROCHLORIDE 20 MG/ML
5 INJECTION INTRAMUSCULAR; INTRAVENOUS EVERY 10 MIN PRN
Status: DISCONTINUED | OUTPATIENT
Start: 2020-12-14 | End: 2020-12-14 | Stop reason: HOSPADM

## 2020-12-14 RX ORDER — DIPHENHYDRAMINE HYDROCHLORIDE 50 MG/ML
12.5 INJECTION INTRAMUSCULAR; INTRAVENOUS
Status: DISCONTINUED | OUTPATIENT
Start: 2020-12-14 | End: 2020-12-14 | Stop reason: HOSPADM

## 2020-12-14 RX ORDER — ONDANSETRON 2 MG/ML
INJECTION INTRAMUSCULAR; INTRAVENOUS PRN
Status: DISCONTINUED | OUTPATIENT
Start: 2020-12-14 | End: 2020-12-14 | Stop reason: SDUPTHER

## 2020-12-14 RX ORDER — LABETALOL HYDROCHLORIDE 5 MG/ML
5 INJECTION, SOLUTION INTRAVENOUS EVERY 10 MIN PRN
Status: DISCONTINUED | OUTPATIENT
Start: 2020-12-14 | End: 2020-12-14 | Stop reason: HOSPADM

## 2020-12-14 RX ORDER — LIDOCAINE HYDROCHLORIDE 10 MG/ML
1 INJECTION, SOLUTION EPIDURAL; INFILTRATION; INTRACAUDAL; PERINEURAL
Status: DISCONTINUED | OUTPATIENT
Start: 2020-12-14 | End: 2020-12-14 | Stop reason: HOSPADM

## 2020-12-14 RX ORDER — MIDAZOLAM HYDROCHLORIDE 1 MG/ML
2 INJECTION INTRAMUSCULAR; INTRAVENOUS
Status: DISCONTINUED | OUTPATIENT
Start: 2020-12-14 | End: 2020-12-14 | Stop reason: HOSPADM

## 2020-12-14 RX ORDER — METOCLOPRAMIDE HYDROCHLORIDE 5 MG/ML
10 INJECTION INTRAMUSCULAR; INTRAVENOUS
Status: DISCONTINUED | OUTPATIENT
Start: 2020-12-14 | End: 2020-12-14 | Stop reason: HOSPADM

## 2020-12-14 RX ORDER — SODIUM CHLORIDE 0.9 % (FLUSH) 0.9 %
10 SYRINGE (ML) INJECTION PRN
Status: DISCONTINUED | OUTPATIENT
Start: 2020-12-14 | End: 2020-12-14 | Stop reason: HOSPADM

## 2020-12-14 RX ORDER — SODIUM CHLORIDE, SODIUM LACTATE, POTASSIUM CHLORIDE, CALCIUM CHLORIDE 600; 310; 30; 20 MG/100ML; MG/100ML; MG/100ML; MG/100ML
INJECTION, SOLUTION INTRAVENOUS CONTINUOUS
Status: DISCONTINUED | OUTPATIENT
Start: 2020-12-14 | End: 2020-12-14 | Stop reason: HOSPADM

## 2020-12-14 RX ORDER — DEXAMETHASONE SODIUM PHOSPHATE 10 MG/ML
INJECTION, SOLUTION INTRAMUSCULAR; INTRAVENOUS PRN
Status: DISCONTINUED | OUTPATIENT
Start: 2020-12-14 | End: 2020-12-14 | Stop reason: SDUPTHER

## 2020-12-14 RX ORDER — FENTANYL CITRATE 50 UG/ML
50 INJECTION, SOLUTION INTRAMUSCULAR; INTRAVENOUS
Status: DISCONTINUED | OUTPATIENT
Start: 2020-12-14 | End: 2020-12-14 | Stop reason: HOSPADM

## 2020-12-14 RX ORDER — METHYLPREDNISOLONE ACETATE 40 MG/ML
INJECTION, SUSPENSION INTRA-ARTICULAR; INTRALESIONAL; INTRAMUSCULAR; SOFT TISSUE PRN
Status: DISCONTINUED | OUTPATIENT
Start: 2020-12-14 | End: 2020-12-14 | Stop reason: ALTCHOICE

## 2020-12-14 RX ORDER — SODIUM CHLORIDE 0.9 % (FLUSH) 0.9 %
10 SYRINGE (ML) INJECTION EVERY 12 HOURS SCHEDULED
Status: DISCONTINUED | OUTPATIENT
Start: 2020-12-14 | End: 2020-12-14 | Stop reason: HOSPADM

## 2020-12-14 RX ORDER — PROPOFOL 10 MG/ML
INJECTION, EMULSION INTRAVENOUS PRN
Status: DISCONTINUED | OUTPATIENT
Start: 2020-12-14 | End: 2020-12-14 | Stop reason: SDUPTHER

## 2020-12-14 RX ORDER — HYDROCODONE BITARTRATE AND ACETAMINOPHEN 10; 325 MG/1; MG/1
1 TABLET ORAL
Status: COMPLETED | OUTPATIENT
Start: 2020-12-14 | End: 2020-12-14

## 2020-12-14 RX ORDER — MEPERIDINE HYDROCHLORIDE 50 MG/ML
12.5 INJECTION INTRAMUSCULAR; INTRAVENOUS; SUBCUTANEOUS EVERY 5 MIN PRN
Status: DISCONTINUED | OUTPATIENT
Start: 2020-12-14 | End: 2020-12-14 | Stop reason: HOSPADM

## 2020-12-14 RX ORDER — MORPHINE SULFATE 4 MG/ML
4 INJECTION, SOLUTION INTRAMUSCULAR; INTRAVENOUS EVERY 5 MIN PRN
Status: DISCONTINUED | OUTPATIENT
Start: 2020-12-14 | End: 2020-12-14 | Stop reason: HOSPADM

## 2020-12-14 RX ORDER — LIDOCAINE HYDROCHLORIDE 10 MG/ML
INJECTION, SOLUTION EPIDURAL; INFILTRATION; INTRACAUDAL; PERINEURAL PRN
Status: DISCONTINUED | OUTPATIENT
Start: 2020-12-14 | End: 2020-12-14 | Stop reason: SDUPTHER

## 2020-12-14 RX ORDER — EPHEDRINE SULFATE 50 MG/ML
INJECTION, SOLUTION INTRAVENOUS PRN
Status: DISCONTINUED | OUTPATIENT
Start: 2020-12-14 | End: 2020-12-14 | Stop reason: SDUPTHER

## 2020-12-14 RX ORDER — HYDROCODONE BITARTRATE AND ACETAMINOPHEN 10; 325 MG/1; MG/1
TABLET ORAL
Status: DISCONTINUED
Start: 2020-12-14 | End: 2020-12-14 | Stop reason: HOSPADM

## 2020-12-14 RX ORDER — FENTANYL CITRATE 50 UG/ML
INJECTION, SOLUTION INTRAMUSCULAR; INTRAVENOUS PRN
Status: DISCONTINUED | OUTPATIENT
Start: 2020-12-14 | End: 2020-12-14 | Stop reason: SDUPTHER

## 2020-12-14 RX ADMIN — SUGAMMADEX 300 MG: 100 INJECTION, SOLUTION INTRAVENOUS at 13:33

## 2020-12-14 RX ADMIN — EPHEDRINE SULFATE 20 MG: 50 INJECTION INTRAMUSCULAR; INTRAVENOUS; SUBCUTANEOUS at 13:10

## 2020-12-14 RX ADMIN — ROCURONIUM BROMIDE 20 MG: 10 INJECTION, SOLUTION INTRAVENOUS at 12:57

## 2020-12-14 RX ADMIN — PROPOFOL 100 MG: 10 INJECTION, EMULSION INTRAVENOUS at 12:26

## 2020-12-14 RX ADMIN — MEPERIDINE HYDROCHLORIDE 12.5 MG: 50 INJECTION, SOLUTION INTRAMUSCULAR; INTRAVENOUS; SUBCUTANEOUS at 14:31

## 2020-12-14 RX ADMIN — LIDOCAINE HYDROCHLORIDE 50 MG: 10 INJECTION, SOLUTION EPIDURAL; INFILTRATION; INTRACAUDAL; PERINEURAL at 12:26

## 2020-12-14 RX ADMIN — ONDANSETRON HYDROCHLORIDE 4 MG: 2 INJECTION, SOLUTION INTRAMUSCULAR; INTRAVENOUS at 13:33

## 2020-12-14 RX ADMIN — SODIUM CHLORIDE, SODIUM LACTATE, POTASSIUM CHLORIDE, AND CALCIUM CHLORIDE: 600; 310; 30; 20 INJECTION, SOLUTION INTRAVENOUS at 11:22

## 2020-12-14 RX ADMIN — FENTANYL CITRATE 50 MCG: 50 INJECTION INTRAMUSCULAR; INTRAVENOUS at 12:22

## 2020-12-14 RX ADMIN — EPHEDRINE SULFATE 20 MG: 50 INJECTION INTRAMUSCULAR; INTRAVENOUS; SUBCUTANEOUS at 13:06

## 2020-12-14 RX ADMIN — EPHEDRINE SULFATE 10 MG: 50 INJECTION INTRAMUSCULAR; INTRAVENOUS; SUBCUTANEOUS at 13:13

## 2020-12-14 RX ADMIN — HYDROCODONE BITARTRATE AND ACETAMINOPHEN 1 TABLET: 10; 325 TABLET ORAL at 15:12

## 2020-12-14 RX ADMIN — HYDRALAZINE HYDROCHLORIDE 5 MG: 20 INJECTION INTRAMUSCULAR; INTRAVENOUS at 14:31

## 2020-12-14 RX ADMIN — FENTANYL CITRATE 50 MCG: 50 INJECTION INTRAMUSCULAR; INTRAVENOUS at 12:59

## 2020-12-14 RX ADMIN — DEXAMETHASONE SODIUM PHOSPHATE 8 MG: 10 INJECTION, SOLUTION INTRAMUSCULAR; INTRAVENOUS at 12:35

## 2020-12-14 RX ADMIN — HYDROMORPHONE HYDROCHLORIDE 0.5 MG: 1 INJECTION, SOLUTION INTRAMUSCULAR; INTRAVENOUS; SUBCUTANEOUS at 14:02

## 2020-12-14 RX ADMIN — MEPERIDINE HYDROCHLORIDE 12.5 MG: 50 INJECTION, SOLUTION INTRAMUSCULAR; INTRAVENOUS; SUBCUTANEOUS at 14:14

## 2020-12-14 RX ADMIN — MEPERIDINE HYDROCHLORIDE 12.5 MG: 50 INJECTION, SOLUTION INTRAMUSCULAR; INTRAVENOUS; SUBCUTANEOUS at 14:25

## 2020-12-14 RX ADMIN — Medication 2 G: at 12:37

## 2020-12-14 RX ADMIN — MEPERIDINE HYDROCHLORIDE 12.5 MG: 50 INJECTION, SOLUTION INTRAMUSCULAR; INTRAVENOUS; SUBCUTANEOUS at 14:19

## 2020-12-14 RX ADMIN — ROCURONIUM BROMIDE 50 MG: 10 INJECTION, SOLUTION INTRAVENOUS at 12:26

## 2020-12-14 ASSESSMENT — PAIN DESCRIPTION - LOCATION
LOCATION: INCISION;BACK
LOCATION: INCISION;BACK

## 2020-12-14 ASSESSMENT — PAIN SCALES - GENERAL
PAINLEVEL_OUTOF10: 8
PAINLEVEL_OUTOF10: 7
PAINLEVEL_OUTOF10: 8
PAINLEVEL_OUTOF10: 7
PAINLEVEL_OUTOF10: 7
PAINLEVEL_OUTOF10: 8
PAINLEVEL_OUTOF10: 8

## 2020-12-14 ASSESSMENT — PAIN DESCRIPTION - PAIN TYPE
TYPE: SURGICAL PAIN
TYPE: SURGICAL PAIN

## 2020-12-14 ASSESSMENT — PAIN DESCRIPTION - ORIENTATION
ORIENTATION: LOWER
ORIENTATION: RIGHT

## 2020-12-14 ASSESSMENT — PAIN DESCRIPTION - DESCRIPTORS
DESCRIPTORS: ACHING
DESCRIPTORS: ACHING

## 2020-12-14 ASSESSMENT — ENCOUNTER SYMPTOMS: SHORTNESS OF BREATH: 0

## 2020-12-14 ASSESSMENT — LIFESTYLE VARIABLES: SMOKING_STATUS: 0

## 2020-12-14 NOTE — ANESTHESIA POSTPROCEDURE EVALUATION
Department of Anesthesiology  Postprocedure Note    Patient: Chencho Johnson  MRN: [id number]  YOB: 1935  Date of evaluation: 12/14/2020  Time:  1:49 PM     Procedure Summary     Date: 12/14/20 Room / Location: [address]    Anesthesia Start: 1222 Anesthesia Stop:     Procedure: RIGHT L 4-5 DECOMPRESSIVE LAMINECTOMY (Right ) Diagnosis: (BACK PAIN)    Surgeons: Christine Mayo DO Responsible Provider: EARLE Reza CRNA    Anesthesia Type: general ASA Status: 4          Anesthesia Type: general    Abbie Phase I: Abbie Score: 10    Abbie Phase II:      Last vitals: Reviewed and per EMR flowsheets.        Anesthesia Post Evaluation    Patient location during evaluation: PACU  Patient participation: complete - patient participated  Level of consciousness: awake  Airway patency: patent  Nausea & Vomiting: no nausea and no vomiting  Complications: no  Cardiovascular status: hemodynamically stable  Respiratory status: acceptable and spontaneous ventilation  Hydration status: euvolemic

## 2020-12-14 NOTE — OP NOTE
Operative Note    NEUROSURGICAL DEPARTMENT REPORT     NAME OF SURGEON/: DAGO CASTILLO DO     DATE OF SERVICE: 12/14/2020     PREOPERATIVE DIAGNOSES    1. Lumbar spinal stenosis L4-5 with radiculopathy, recalcitrant to nonoperatively treatments. POSTOPERATIVE DIAGNOSES    same     OPERATIVE PROCEDURES  L4 and L5 hemilaminotomies with L4-5 medial facetectomy  for decompression of the thecal sac and nerve roots utilizing the operating microscope,the tubular retractor and microdissection technique. SURGEON  Nadia Rouse. DO Waldemar     FIRST ASSIST    Hilda Rosario, KELI    Estimated blood loss:  Minimal     DESCRIPTION OF PROCEDURE    The patient was brought to the operating room where general endotracheal anesthesia was introduced was positioned on the operating table in the prone position with the [facility] frame in place. All pressure points were carefully checked and padded. The C-arm fluoroscope was positioned and draped. Operating microscope was positioned and draped. The lumbosacral region was clipped and prepared for 10 minutes with Betadine scrub, Betadine paint and DuraPrep. The patient was draped in the usual sterile fashion. After the patient's skin was prepped and draped, the level of the L4-5 disc space was localized fluoroscopically utilizing a 22-gauge needle. A 16 mm incision was made 1.5 cm off midline at this level. Incision was carried out to subcutaneous tissue. The underlying fascia was penetrated with a K wire. Sequential dilators were inserted over the K wire followed by placement of a 5 cm length 16 mm diameter tubular retractor. A tubular retractor was connected to the articulating arm. The dilators were removed the operating microscope was bought brought into the field. Under microscopic view, L4 and L5 hemilaminotomies as well as a L4-5 medial facetectomy was carried out. Bone removal was performed utilizing various Kerrison rongeurs as well as the high-speed drill.  The ligamentum flavum was opened and was resected with a fine-angled Kerrison rongeur. This allowed exposure of the dural sac and the nerve roots. There was a small rent noted in the outer layer of the dura. The arachnoid was not torn so there was no CSF leak. To prevent a leak a small amount of fibrin glue was applied. Inspection with the ball tip probe revealed that the thecal sac and nerve roots to be completely decompressed. The wound was then copiously irrigated with antibiotic solution and meticulous hemostasis was assured. 40 mL of Depo-Medrol was applied. The tubular retractor was removed. The wound was closed in layered fashion. A sterile dressing was applied. The patient was returned to the stretcher in the supine position and extubated. The patient was then returned recovery room in a stable condition.

## 2020-12-14 NOTE — BRIEF OP NOTE
Brief Postoperative Note      Patient: Gloria Fay  YOB: 1935  MRN: [id number]    Date of Procedure: 12/14/2020    Pre-Op Diagnosis: Severe lumbar stenosis L4-5 with right L5 radiculopathy    Post-Op Diagnosis: Same       Procedure(s):  RIGHT L 4-5 DECOMPRESSIVE LAMINECTOMY    Surgeon(s):  Prabha Yanez DO    Assistant:  * No surgical staff found *    Anesthesia: General    Estimated Blood Loss (mL): less than 50     Complications: Other: Small rent into outside layer dura but not through arachnoid. Not a complete CSF leak. Specimens:   * No specimens in log *    Implants:  * No implants in log *      Drains: * No LDAs found *    Findings: Severe facet hypertrophy. Thecal sac decompressed nicely.       Electronically signed by Prabha Yanez DO on 12/14/2020 at 1:34 PM

## 2020-12-14 NOTE — PROGRESS NOTES
REINFORCED GAUZE DRESSING OVER WHAT APPEARS TO BE DRYING SKIN TEAR WITH GAUZE AND PAPER TAPE OVER FRONT RIGHT FOREARM. STATES SHE HAS SIGNIFICANTLY LESS PAIN THAN BEFORE SURGERY. VOIDED POST OP. NO ACUTE DISTRESS NOTED.

## 2020-12-14 NOTE — PROGRESS NOTES
ATTEMPTED TO CALL DR. CASTILLO'S OFFICE X2 TO SET UP ONE WEEK APPOINTMENT - DETAILED MESSAGED LEFT TO CALL PATIENT NAME AND MRN GIVEN AND NEED FOR 1 WEEK FOLLOW UP APPOINTMENT. PATIENT AND  DIRECTED TO CALL IF THEY DO NOT HEAR FROM OFFICE TOMORROW, VERBALIZES UNDERSTANDING.

## 2020-12-14 NOTE — INTERVAL H&P NOTE
Update History & Physical    The patient's History and Physical of November 17, 2020 was reviewed with the patient and I examined the patient. There was no change. The surgical site was confirmed by the patient and me. Plan: The risks, benefits, expected outcome, and alternative to the recommended procedure have been discussed with the patient. Patient understands and wants to proceed with the procedure.      Electronically signed by Jose Kitchen DO on 12/14/2020 at 11:39 AM

## 2020-12-14 NOTE — ANESTHESIA PRE PROCEDURE
Department of Anesthesiology  Preprocedure Note       Name:  Laurie Bill   Age:  82 y.o.  :  1935                                          MRN:  [id number]         Date:  2020      Surgeon: Marilynn Chau):  Vi Blanco DO    Procedure: Procedure(s):  RIGHT L 4-5 DECOMPRESSIVE LAMINECTOMY    Medications prior to admission:   Prior to Admission medications    Medication Sig Start Date End Date Taking? Authorizing Provider   rivaroxaban (XARELTO) 20 MG TABS tablet Take 1 tablet by mouth daily (with breakfast) 20   Sarah El MD   HYDROcodone-acetaminophen (NORCO)  MG per tablet Take 1 tablet by mouth every 6 hours.  10/21/20   Historical Provider, MD   DULoxetine (CYMBALTA) 20 MG extended release capsule Take 1 capsule by mouth daily 10/28/20   EARLE Pisano   metoprolol tartrate (LOPRESSOR) 25 MG tablet Take 1 tablet by mouth 2 times daily 10/16/20   Sarah El MD   gabapentin (NEURONTIN) 600 MG tablet TAKE 1 TABLET BY MOUTH THREE TIMES DAILY 20  Prabhu Craven [place] [facility] by Does not apply route 20   EARLE Pryor CNP   acyclovir (ZOVIRAX) 400 MG tablet Take 400 mg by mouth 2 times daily history of shingles in her eye    Historical Provider, MD   vitamin B-12 (CYANOCOBALAMIN) 500 MCG tablet TAKE 1 TABLET BY MOUTH DAILY 3/9/20 3/9/21  Sarah El MD   omeprazole (PRILOSEC) 40 MG delayed release capsule TAKE 1 CAPSULE BY MOUTH DAILY 3/2/20   Sarah El MD   triamterene-hydrochlorothiazide (MAXZIDE-25) 37.5-25 MG per tablet TAKE 1 TABLET BY MOUTH DAILY 3/2/20   Sarah El MD   allopurinol (ZYLOPRIM) 100 MG tablet Take 2 tablets by mouth daily For gout -prevention  Patient taking differently: Take 200 mg by mouth daily as needed For gout -prevention 1/10/20   EARLE Pryor CNP   losartan (COZAAR) 100 MG tablet TAKE 1 TABLET BY MOUTH EVERY MORNING FOR BLOOD PRESSURE  Patient taking differently: Take 100 mg by mouth daily  12/30/19   Netta Vela MD   vitamin D (ERGOCALCIFEROL) 1.25 MG (77631 UT) CAPS capsule TAKE 1 CAPSULE BY MOUTH 1 TIME A WEEK 10/29/19   Netta Vela MD   albuterol (ACCUNEB) 1.25 MG/3ML nebulizer solution Inhale 1 mL into the lungs every 6 hours as needed     Historical Provider, MD   dorzolamide-timolol (COSOPT) 22.3-6.8 MG/ML ophthalmic solution Place 1 Dose into both eyes daily    Historical Provider, MD   furosemide (LASIX) 20 MG tablet Take 1 tablet by mouth daily as needed (swelling) 6/19/18   Chase Guzman MD       Current medications:    No current facility-administered medications for this encounter. Allergies: Allergies   Allergen Reactions    Zoloft [Sertraline Hcl] Other (See Comments)     Patient states that she doesn't want this medication anymore because she hallucinated and saw spiders. Patient weaned herself off and after she quit taking the medication, the hallucinations stopped. Patient states that she doesn't want this medication anymore because she hallucinated and saw spiders. Patient weaned herself off and after she quit taking the medication, the hallucinations stopped.           Problem List:    Patient Active Problem List   Diagnosis Code    Personal history of colon cancer Z85.038    Family history of esophageal cancer Z80.0    Fibromyalgia M79.7    Renal bruit R09.89    Varicose veins of left lower extremity with inflammation, 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 ([facility] [address] I83.221, L97.121    Venous insufficiency I87.2    Hypertriglyceridemia E78.1    Dysphagia R13.10    Paroxysmal supraventricular tachycardia (HCC) I47.1    Vitamin D deficiency E55.9    Encounter for care related to Port-a-Cath Z45.2    Essential hypertension I10    History of colon polyps Z86.010    Port-A-Cath in place Z95.828    History of DVT (deep vein thrombosis) Z86.718    Avascular necrosis ([facility] [address] M87.00    Pain in both lower extremities M79.604, M79.605  Numbness and tingling of both feet R20.0, R20.2    Acute deep vein thrombosis (DVT) of femoral vein of left lower extremity (HCC) I82.412    Cellulitis of left lower limb L03. 116    Fracture of right foot S92.901A    Hypochloremia E87.8    CKD (chronic kidney disease) stage 3, GFR 30-59 ml/min N18.30    Malignant neoplasm of colon (HCC) C18.9    Mixed simple and mucopurulent chronic bronchitis (HCC) J41.8    Fungal dermatitis B36.9    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 I83.11, I83.12       Past Medical History:        Diagnosis Date    Bronchitis     Colon cancer ([facility] [address]     Colon polyps     Constipation     Deep vein blood clot of left lower extremity (HCC)     Left leg     Depression     DVT (deep venous thrombosis) (HCC)     Dysphagia     Fibrocystic breast disease     Fibromyalgia     GERD (gastroesophageal reflux disease)     reflux with hx of esophageal stricture    Headache(784.0)     History of colon cancer     Hormone replacement therapy (postmenopausal)     Hypertension     Neuropathy of foot     In both feet    Osteoarthritis     left knee pain    Restless legs syndrome     Vitamin D deficiency        Past Surgical History:        Procedure Laterality Date    APPENDECTOMY      BREAST BIOPSY      CATARACT REMOVAL       SECTION      CHOLECYSTECTOMY      COLECTOMY  partial    COLON SURGERY      COLONOSCOPY  2010    COLONOSCOPY  2012    Dr Purnima Boykin: unremarkable enterocolonic anastamosis; hyperplastic polyps    COLONOSCOPY  3/2013    [facility]: unremarkable post-surgical colon    COLONOSCOPY  3/11/16    Dr Ledy Wall colon anastomosis, 5 yr recall    FOOT SURGERY  right foot    HAND SURGERY Right     Dr Dov Haddad N/A 2020    KYPHOPLASTY L5 performed by Hillary Franc[facility] DO at [facility] OR    SKIN CANCER EXCISION      TOTAL KNEE ARTHROPLASTY      UPPER GASTROINTESTINAL ENDOSCOPY  10-    UPPER GASTROINTESTINAL ENDOSCOPY  3/2013    [facility]: peptic stricture dilated to 48F; HH; small antral mucosal elevation    UPPER GASTROINTESTINAL ENDOSCOPY  12/2014    [facility]: dilation of esophageal stricture    VARICOSE VEIN SURGERY Left 03/14/2014  [address]    Left GSV Ablation    VARICOSE VEIN SURGERY Right 04/04/2014  [address]    Right GSV Ablation       Social History:    Social History     Tobacco Use    Smoking status: Never Smoker    Smokeless tobacco: Never Used   Substance Use Topics    Alcohol use: No                                Counseling given: Not Answered      Vital Signs (Current): There were no vitals filed for this visit. BP Readings from Last 3 Encounters:   12/01/20 130/84   11/24/20 112/80   11/17/20 (!) 174/77       NPO Status:                                                                                 BMI:   Wt Readings from Last 3 Encounters:   12/01/20 154 lb (69.9 kg)   11/24/20 156 lb (70.8 kg)   11/17/20 160 lb (72.6 kg)     There is no height or weight on file to calculate BMI.    CBC:   Lab Results   Component Value Date    WBC 9.5 12/09/2020    RBC 4.79 12/09/2020    HGB 12.3 12/09/2020    HCT 40.3 12/09/2020    MCV 84.1 12/09/2020    RDW 15.6 12/09/2020     12/09/2020       CMP:   Lab Results   Component Value Date     12/09/2020    K 4.1 12/09/2020    K 4.2 04/25/2020     12/09/2020    CO2 24 12/09/2020    BUN 14 12/09/2020    CREATININE 0.7 12/09/2020    GFRAA >59 12/09/2020    LABGLOM >60 12/09/2020    GLUCOSE 113 12/09/2020    PROT 6.8 12/09/2020    PROT 6.9 10/31/2012    CALCIUM 9.5 12/09/2020    BILITOT <0.2 12/09/2020    ALKPHOS 91 12/09/2020    AST 17 12/09/2020    ALT 14 12/09/2020       POC Tests: No results for input(s): POCGLU, POCNA, POCK, POCCL, POCBUN, POCHEMO, POCHCT in the last 72 hours.     Coags:   Lab Results   Component Value Date    PROTIME 12.5 12/09/2020    PROTIME 15.92 12/02/2011    INR 0.94 12/09/2020    APTT 23.6 12/09/2020       HCG (If Applicable): No results found for: PREGTESTUR, PREGSERUM, HCG, HCGQUANT     ABGs: No results found for: PHART, PO2ART, XVK7OPY, HED2LEE, BEART, D7EMUVLU     Type & Screen (If Applicable):  No results found for: LABABO, LABRH    Drug/Infectious Status (If Applicable):  No results found for: HIV, HEPCAB    COVID-19 Screening (If Applicable):   Lab Results   Component Value Date    COVID19 NOT DETECTED 12/09/2020    COVID19 Not Detected 07/02/2020         Anesthesia Evaluation  Patient summary reviewed and Nursing notes reviewed no history of anesthetic complications:   Airway: Mallampati: II  TM distance: >3 FB   Neck ROM: full  Mouth opening: > = 3 FB Dental: normal exam   (+) edentulous      Pulmonary:Negative Pulmonary ROS and normal exam  breath sounds clear to auscultation      (-) shortness of breath and not a current smoker          Patient did not smoke on day of surgery. Cardiovascular:    (+) hypertension:,     (-) CAD,  angina and  CHF    NYHA Classification: III  ECG reviewed  Rhythm: regular  Rate: normal           Beta Blocker:  Dose within 24 Hrs         Neuro/Psych:   Negative Neuro/Psych ROS  (+) neuromuscular disease:, headaches:, psychiatric history:depression/anxiety    (-) seizures and CVA            ROS comment: Wheelchair bound due to pain  GI/Hepatic/Renal: Neg GI/Hepatic/Renal ROS  (+) GERD:,      (-) hiatal hernia       Endo/Other: Negative Endo/Other ROS   (+) blood dyscrasia: anticoagulation therapy:., malignancy/cancer. Pt had PAT visit. Abdominal:       Abdomen: soft. Vascular:   + PVD, aortic or cerebral, DVT, . Anesthesia Plan      general     ASA 4     (Iv zofran within 30 min of closing )  Induction: intravenous. BIS  MIPS: Postoperative opioids intended and Prophylactic antiemetics administered.   Anesthetic plan and risks discussed with

## 2020-12-21 RX ORDER — OMEPRAZOLE 40 MG/1
40 CAPSULE, DELAYED RELEASE ORAL DAILY
Qty: 90 CAPSULE | Refills: 3 | Status: SHIPPED | OUTPATIENT
Start: 2020-12-21 | End: 2022-03-28

## 2020-12-21 RX ORDER — ERGOCALCIFEROL 1.25 MG/1
50000 CAPSULE ORAL WEEKLY
Qty: 12 CAPSULE | Refills: 1 | Status: SHIPPED | OUTPATIENT
Start: 2020-12-21 | End: 2021-06-22

## 2020-12-22 ENCOUNTER — TELEPHONE (OUTPATIENT)
Dept: NEUROSURGERY | Age: 85
End: 2020-12-22

## 2020-12-22 NOTE — TELEPHONE ENCOUNTER
Patient stopped by for us to check her wound. Her wound looked well and she did not complain of pain or anything else, just a bit of itching. Per samuel, the wound looks well with just a bit of redness because of the adhesive. She can now shower and get the wound wet. I explained to please dry the wound thoroughly after showering. No bending and no lifting more than 5 pounds. Patient understood and will return to office in 3 weeks for a check up.

## 2021-01-05 ENCOUNTER — OFFICE VISIT (OUTPATIENT)
Dept: PRIMARY CARE CLINIC | Age: 86
End: 2021-01-05
Payer: MEDICARE

## 2021-01-05 VITALS
TEMPERATURE: 97.4 F | HEART RATE: 87 BPM | RESPIRATION RATE: 18 BRPM | BODY MASS INDEX: 29.06 KG/M2 | SYSTOLIC BLOOD PRESSURE: 122 MMHG | WEIGHT: 164 LBS | HEIGHT: 63 IN | OXYGEN SATURATION: 93 % | DIASTOLIC BLOOD PRESSURE: 70 MMHG

## 2021-01-05 DIAGNOSIS — Z45.2 ENCOUNTER FOR CARE RELATED TO PORT-A-CATH: ICD-10-CM

## 2021-01-05 DIAGNOSIS — Z95.828 PORT-A-CATH IN PLACE: ICD-10-CM

## 2021-01-05 DIAGNOSIS — M25.551 RIGHT HIP PAIN: ICD-10-CM

## 2021-01-05 DIAGNOSIS — I10 ESSENTIAL HYPERTENSION: Primary | ICD-10-CM

## 2021-01-05 PROCEDURE — 1090F PRES/ABSN URINE INCON ASSESS: CPT | Performed by: NURSE PRACTITIONER

## 2021-01-05 PROCEDURE — 4040F PNEUMOC VAC/ADMIN/RCVD: CPT | Performed by: NURSE PRACTITIONER

## 2021-01-05 PROCEDURE — 1036F TOBACCO NON-USER: CPT | Performed by: NURSE PRACTITIONER

## 2021-01-05 PROCEDURE — 99213 OFFICE O/P EST LOW 20 MIN: CPT | Performed by: NURSE PRACTITIONER

## 2021-01-05 PROCEDURE — 96372 THER/PROPH/DIAG INJ SC/IM: CPT | Performed by: NURSE PRACTITIONER

## 2021-01-05 PROCEDURE — G8399 PT W/DXA RESULTS DOCUMENT: HCPCS | Performed by: NURSE PRACTITIONER

## 2021-01-05 PROCEDURE — G8484 FLU IMMUNIZE NO ADMIN: HCPCS | Performed by: NURSE PRACTITIONER

## 2021-01-05 PROCEDURE — G8427 DOCREV CUR MEDS BY ELIG CLIN: HCPCS | Performed by: NURSE PRACTITIONER

## 2021-01-05 PROCEDURE — 1123F ACP DISCUSS/DSCN MKR DOCD: CPT | Performed by: NURSE PRACTITIONER

## 2021-01-05 PROCEDURE — G8417 CALC BMI ABV UP PARAM F/U: HCPCS | Performed by: NURSE PRACTITIONER

## 2021-01-05 RX ORDER — KETOROLAC TROMETHAMINE 30 MG/ML
60 INJECTION, SOLUTION INTRAMUSCULAR; INTRAVENOUS ONCE
Status: COMPLETED | OUTPATIENT
Start: 2021-01-05 | End: 2021-01-05

## 2021-01-05 RX ORDER — DULOXETIN HYDROCHLORIDE 20 MG/1
20 CAPSULE, DELAYED RELEASE ORAL DAILY
Qty: 30 CAPSULE | Refills: 5 | Status: SHIPPED | OUTPATIENT
Start: 2021-01-05 | End: 2021-01-28 | Stop reason: SDUPTHER

## 2021-01-05 RX ADMIN — KETOROLAC TROMETHAMINE 60 MG: 30 INJECTION, SOLUTION INTRAMUSCULAR; INTRAVENOUS at 10:40

## 2021-01-05 ASSESSMENT — ENCOUNTER SYMPTOMS
BACK PAIN: 1
GASTROINTESTINAL NEGATIVE: 1

## 2021-01-05 NOTE — PROGRESS NOTES
[address] [facility]  [address] [place] [address]  [address] [place] [address]  Dept: [phone]  Dept Fax: [phone]  Loc: [phone]    Melisa Larios is a 82 y.o. female who presents today for her medical conditions/complaints as noted below. Melisa Larios is c/o of [facility]        HPI:     HPI   Chief Complaint   Patient presents with   Justin Gallon   right hip pain, seeing Dr Jasmyne Link and having mri tomorrow. Had back surgery and doing well with it. She is still very fatigued. She is on her blood thinner medication. She would like a shot today of Toradol for the hip pain if we can get her 1.   Past Medical History:   Diagnosis Date    Bronchitis     Colon cancer ([facility] [address]     Colon polyps     Constipation     Deep vein blood clot of left lower extremity (HCC)     Left leg     Depression     DVT (deep venous thrombosis) (HCC)     Dysphagia     Fibrocystic breast disease     Fibromyalgia     GERD (gastroesophageal reflux disease)     reflux with hx of esophageal stricture    Headache(784.0)     History of colon cancer     Hormone replacement therapy (postmenopausal)     Hypertension     Neuropathy of foot     In both feet    Osteoarthritis     left knee pain    Restless legs syndrome     Vitamin D deficiency       Past Surgical History:   Procedure Laterality Date    APPENDECTOMY      BREAST BIOPSY      CATARACT REMOVAL       SECTION      CHOLECYSTECTOMY      COLECTOMY  partial    COLON SURGERY      COLONOSCOPY  2010    COLONOSCOPY  2012    Dr Andrade Shells: unremarkable enterocolonic anastamosis; hyperplastic polyps    COLONOSCOPY  3/2013    [facility]: unremarkable post-surgical colon    COLONOSCOPY  3/11/16    Dr Chika Foster colon anastomosis, 5 yr recall    FOOT SURGERY  right foot    HAND SURGERY Right     Dr El Console N/A 2020    KYPHOPLASTY L5 performed by Doug Butler DO at [address] [place] [address] LAMINECTOMY Right 12/14/2020    RIGHT L 4-5 DECOMPRESSIVE LAMINECTOMY performed by Leonor Tapia DO at [address]      TOTAL KNEE ARTHROPLASTY      UPPER GASTROINTESTINAL ENDOSCOPY  10-    UPPER GASTROINTESTINAL ENDOSCOPY  3/2013    [facility]ley: peptic stricture dilated to 48F; HH; small antral mucosal elevation    UPPER GASTROINTESTINAL ENDOSCOPY  12/2014    Mauriloi: dilation of esophageal stricture    VARICOSE VEIN SURGERY Left 03/14/2014  [facility] [address]    Left GSV Ablation    VARICOSE VEIN SURGERY Right [facility]/04/2014  [facility]    Right GSV Ablation       Vitals 1/5/2021 12/14/2020 12/14/2020 12/14/2020 12/14/2020 00/69/4568   SYSTOLIC 236 - - - - -   DIASTOLIC 70 - - - - -   Pulse 87 - - - - -   Temp 97.4 - - - - -   Resp 18 5 8 8 8 8   SpO2 93 100 100 97 100 100   Weight 164 lb - - - - -   Height 5' 3\" - - - - -   Body mass index 29.05 kg/m2 - - - - -   Some recent data might be hidden       Family History   Problem Relation Age of Onset    Cancer Mother         Cervical Cancer    Cancer Brother         Esophageal cancer    Diabetes Brother     Esophageal Cancer Brother     Diabetes Sister     Colon Cancer Neg Hx     Colon Polyps Neg Hx     Liver Cancer Neg Hx     Liver Disease Neg Hx     Rectal Cancer Neg Hx     Stomach Cancer Neg Hx        Social History     Tobacco Use    Smoking status: Never Smoker    Smokeless tobacco: Never Used   Substance Use Topics    Alcohol use: No      Current Outpatient Medications   Medication Sig Dispense Refill    DULoxetine (CYMBALTA) 20 MG extended release capsule Take 1 capsule by mouth daily 30 capsule 5    omeprazole (PRILOSEC) 40 MG delayed release capsule Take 1 capsule by mouth daily 90 capsule 3    vitamin D (ERGOCALCIFEROL) 1.25 MG (73081 UT) CAPS capsule Take 1 capsule by mouth once a week 12 capsule 1    rivaroxaban (XARELTO) 20 MG TABS tablet Take 1 tablet by mouth daily (with breakfast) 30 tablet 3    HYDROcodone-acetaminophen she quit taking the medication, the hallucinations stopped. Health Maintenance   Topic Date Due    Shingles Vaccine (1 of 2) 02/01/1987    Annual Wellness Visit (AWV)  05/29/2019    Potassium monitoring  12/09/2021    Creatinine monitoring  12/09/2021    DTaP/Tdap/Td vaccine (3 - Td) 12/23/2025    Colon cancer screen colonoscopy  03/11/2026    DEXA (modify frequency per FRAX score)  Completed    Flu vaccine  Completed    Pneumococcal 65+ years Vaccine  Completed    Hepatitis A vaccine  Aged Out    Hepatitis B vaccine  Aged Out    Hib vaccine  Aged Out    Meningococcal (ACWY) vaccine  Aged Out       Subjective:      Review of Systems   Constitutional: Positive for activity change. HENT: Negative. Gastrointestinal: Negative. Musculoskeletal: Positive for back pain. Pain hip right   Psychiatric/Behavioral: Negative. Objective:     Physical Exam  Vitals signs and nursing note reviewed. Constitutional:       Appearance: She is well-developed. Comments: Sitting up in wheelchair   HENT:      Head: Normocephalic. Right Ear: External ear normal.      Left Ear: External ear normal.   Eyes:      Pupils: Pupils are equal, round, and reactive to light. Neck:      Musculoskeletal: Normal range of motion. Cardiovascular:      Rate and Rhythm: Normal rate and regular rhythm. Heart sounds: Normal heart sounds. Pulmonary:      Effort: Pulmonary effort is normal.      Breath sounds: Normal breath sounds. Skin:     General: Skin is warm and dry. Neurological:      Mental Status: She is alert and oriented to person, place, and time. Psychiatric:         Behavior: Behavior normal.         Thought Content: Thought content normal.         Judgment: Judgment normal.       /70   Pulse 87   Temp 97.4 °F (36.3 °C) (Temporal)   Resp 18   Ht 5' 3\" (1.6 m)   Wt 164 lb (74.4 kg) Comment: used patients last weight. She is hurting today.   SpO2 93%   Breastfeeding No   BMI 29.05 kg/m²   Venous Access Procedure Note  Indication: maintenance flush    Procedure: The patient was placed in the appropriate position and the skin over the puncture site was prepped with betadine and draped in a sterile fashion. Intravenous access was obtained in right  chest port with a Bliss needle and the site was secured appropriately. 3 cc of blood with withdrawn and discarded. The port was flushed with 10cc NS and then 3 cc heparin flush. The patient tolerated the procedure well. Complications: None    Assessment:       Diagnosis Orders   1. Essential hypertension     2. Encounter for care related to Port-a-Cath  ME Port [place]   3. Port-A-Cath in place  ME IRRIG IMPLANTED DRUG DELIVERY DEVICE   4. Right hip pain           Plan:   More than 50% of the time was spent counseling and coordinating care for a total time of 30 min face to face. Patient given educational materials -see patient instructions. Discussed use, benefit, and side effects of prescribed medications. All patient questions answered. Pt voiced understanding. Reviewed health maintenance. Instructed to continue currentmedications, diet and exercise. Patient agreed with treatment plan. Follow up as directed. MEDICATIONS:  Orders Placed This Encounter   Medications    DULoxetine (CYMBALTA) 20 MG extended release capsule     Sig: Take 1 capsule by mouth daily     Dispense:  30 capsule     Refill:  5    ketorolac (TORADOL) injection 60 mg         ORDERS:  Orders Placed This Encounter   Procedures    ME IRRIG IMPLANTED DRUG DELIVERY DEVICE       Follow-up:  No follow-ups on file. PATIENT INSTRUCTIONS:  There are no Patient Instructions on file for this visit. Electronically signed by EARLE Quarles CNP on 1/5/2021 at 12:18 PM    EMR Dragon/transcription disclaimer:  Much of thisencounter note is electronic transcription/translation of spoken language to printed texts.   The electronic translation of spoken language may be erroneous, or at times, nonsensical words or phrases may be inadvertentlytranscribed.   Although I have reviewed the note for such errors, some may still exist.

## 2021-01-07 ENCOUNTER — HOSPITAL ENCOUNTER (EMERGENCY)
Age: 86
Discharge: HOME | End: 2021-01-07
Attending: EMERGENCY MEDICINE
Payer: MEDICARE

## 2021-01-07 VITALS
RESPIRATION RATE: 18 BRPM | HEART RATE: 72 BPM | SYSTOLIC BLOOD PRESSURE: 148 MMHG | OXYGEN SATURATION: 97 % | HEIGHT: 63 IN | TEMPERATURE: 98.1 F | BODY MASS INDEX: 28.35 KG/M2 | WEIGHT: 160 LBS | DIASTOLIC BLOOD PRESSURE: 74 MMHG

## 2021-01-07 DIAGNOSIS — M87.051 AVASCULAR NECROSIS OF FEMORAL HEAD, RIGHT (HCC): Primary | ICD-10-CM

## 2021-01-07 PROCEDURE — 6370000000 HC RX 637 (ALT 250 FOR IP): Performed by: NURSE PRACTITIONER

## 2021-01-07 PROCEDURE — 96372 THER/PROPH/DIAG INJ SC/IM: CPT

## 2021-01-07 PROCEDURE — 6360000002 HC RX W HCPCS: Performed by: NURSE PRACTITIONER

## 2021-01-07 PROCEDURE — 99284 EMERGENCY DEPT VISIT MOD MDM: CPT

## 2021-01-07 RX ORDER — HYDROMORPHONE HYDROCHLORIDE 1 MG/ML
0.5 INJECTION, SOLUTION INTRAMUSCULAR; INTRAVENOUS; SUBCUTANEOUS ONCE
Status: COMPLETED | OUTPATIENT
Start: 2021-01-07 | End: 2021-01-07

## 2021-01-07 RX ORDER — ONDANSETRON 4 MG/1
4 TABLET, ORALLY DISINTEGRATING ORAL ONCE
Status: COMPLETED | OUTPATIENT
Start: 2021-01-07 | End: 2021-01-07

## 2021-01-07 RX ADMIN — ONDANSETRON 4 MG: 4 TABLET, ORALLY DISINTEGRATING ORAL at 10:38

## 2021-01-07 RX ADMIN — HYDROMORPHONE HYDROCHLORIDE 0.5 MG: 1 INJECTION, SOLUTION INTRAMUSCULAR; INTRAVENOUS; SUBCUTANEOUS at 10:38

## 2021-01-07 ASSESSMENT — PAIN SCALES - GENERAL: PAINLEVEL_OUTOF10: 9

## 2021-01-07 ASSESSMENT — ENCOUNTER SYMPTOMS: VOMITING: 0

## 2021-01-07 NOTE — ED PROVIDER NOTES
[facility] EMERGENCY DEPT  eMERGENCY dEPARTMENT eNCOUnter      Pt Name: Sandrine Rodrigues  MRN: [id number]  [place] 1935  Date of evaluation: 1/7/2021  Provider: EARLE Cortez    CHIEF COMPLAINT       Chief Complaint   Patient presents with    Leg Pain     had appt at [facility] this am at [phone], but couldn't make it. HISTORY OF PRESENT ILLNESS   (Location/Symptom, Timing/Onset,Context/Setting, Quality, Duration, Modifying Factors, Severity)  Note limiting factors. Sandrine Rodrigues is a 82 y.o. female who presents to the emergency department with right hip pain that has been chronic in nature. Patient has seen Dr. Aurelia Karimi. She tells me she had a MRI of her hip done yesterday at the orthopedic [facility]. She takes Lortabs but has not had relief of the pain today. She does have a follow-up appointment with Dr. Aurelia Karimi on the 13th. She has been walking at home with a walker. This is gone on for several months. She denies a fever. The history is provided by the patient. Leg Pain  This is a chronic problem. The problem occurs constantly. The problem has been gradually worsening. NursingNotes were reviewed. REVIEW OF SYSTEMS    (2-9 systems for level 4, 10 or more for level 5)     Review of Systems   Constitutional: Negative for fever. Gastrointestinal: Negative for vomiting. Musculoskeletal: Positive for arthralgias and myalgias. Except as noted above the remainder of the review of systems was reviewed and negative.        PAST MEDICAL HISTORY     Past Medical History:   Diagnosis Date    Bronchitis     Colon cancer ([address]     Colon polyps     Constipation     Deep vein blood clot of left lower extremity (HCC)     Left leg     Depression     DVT (deep venous thrombosis) (HCC)     Dysphagia     Fibrocystic breast disease     Fibromyalgia     GERD (gastroesophageal reflux disease)     reflux with hx of esophageal stricture    Headache(784.0)     History of colon cancer 2000    Hormone replacement therapy (postmenopausal)     Hypertension     Neuropathy of foot     In both feet    Osteoarthritis     left knee pain    Restless legs syndrome     Vitamin D deficiency          SURGICALHISTORY       Past Surgical History:   Procedure Laterality Date    APPENDECTOMY      BREAST BIOPSY      CATARACT REMOVAL       SECTION      CHOLECYSTECTOMY      COLECTOMY  partial    COLON SURGERY      COLONOSCOPY  2010    COLONOSCOPY  2012    Dr Sean Kennedy: unremarkable enterocolonic anastamosis; hyperplastic polyps    COLONOSCOPY  3/2013    [facility]: unremarkable post-surgical colon    COLONOSCOPY  3/11/16    Dr Denece Apgar colon anastomosis, 5 yr recall    FOOT SURGERY  right foot    HAND SURGERY Right     Dr Pilar James N/A 2020    KYPHOPLASTY L5 performed by Hema Tam DO at [address]  [place] Right 2020    RIGHT L 4-5 DECOMPRESSIVE LAMINECTOMY performed by Hema Tam DO at [address] [facility] TOTAL KNEE ARTHROPLASTY      UPPER GASTROINTESTINAL ENDOSCOPY  10-    UPPER GASTROINTESTINAL ENDOSCOPY  3/2013    Anshul: peptic stricture dilated to 48F; HH; small antral mucosal elevation    UPPER GASTROINTESTINAL ENDOSCOPY  2014    Maurilio: dilation of esophageal stricture    VARICOSE VEIN SURGERY Left 2014  [address]    Left GSV Ablation    VARICOSE VEIN SURGERY Right 2014  [address]    Right GSV Ablation         CURRENT MEDICATIONS       Discharge Medication List as of 2021 11:56 AM      CONTINUE these medications which have NOT CHANGED    Details   DULoxetine (CYMBALTA) 20 MG extended release capsule Take 1 capsule by mouth daily, Disp-30 capsule, R-5Normal      omeprazole (PRILOSEC) 40 MG delayed release capsule Take 1 capsule by mouth daily, Disp-90 capsule, R-3Normal      vitamin D (ERGOCALCIFEROL) 1.25 MG (57505 UT) CAPS capsule Take 1 capsule by mouth once a week, Disp-12 capsule, R-1Normal      rivaroxaban (XARELTO) 20 MG TABS tablet Take 1 tablet by mouth daily (with breakfast), Disp-30 tablet,R-3Normal      HYDROcodone-acetaminophen (NORCO)  MG per tablet Take 1 tablet by mouth every 6 hours as needed for Pain.  Historical Med      metoprolol tartrate (LOPRESSOR) 25 MG tablet Take 1 tablet by mouth 2 times daily, Disp-180 tablet,R-3Normal      Hospital Bed [facility] Starting Fri 8/21/2020, Disp-1 each,R-0, Print      acyclovir (ZOVIRAX) 400 MG tablet Take 400 mg by mouth 2 times daily history of shingles in her eyeHistorical Med      vitamin B-12 (CYANOCOBALAMIN) 500 MCG tablet TAKE 1 TABLET BY MOUTH DAILY, Disp-30 tablet, R-11Normal      triamterene-hydrochlorothiazide (MAXZIDE-25) 37.5-25 MG per tablet TAKE 1 TABLET BY MOUTH DAILY, Disp-90 tablet, R-3Normal      losartan (COZAAR) 100 MG tablet TAKE 1 TABLET BY MOUTH EVERY MORNING FOR BLOOD PRESSURE, Disp-90 tablet, R-3Normal      dorzolamide-timolol (COSOPT) 22.3-6.8 MG/ML ophthalmic solution Place 1 Dose into both eyes dailyHistorical Med      furosemide (LASIX) 20 MG tablet Take 1 tablet by mouth daily as needed (swelling), Disp-30 tablet, R-3Normal      gabapentin (NEURONTIN) 600 MG tablet TAKE 1 TABLET BY MOUTH THREE TIMES DAILY, Disp-90 tablet, R-2Normal      allopurinol (ZYLOPRIM) 100 MG tablet Take 2 tablets by mouth daily For gout -prevention, Disp-60 tablet, R-5Normal      albuterol (ACCUNEB) 1.25 MG/3ML nebulizer solution Inhale 1 mL into the lungs every 6 hours as needed for Wheezing Historical Med             ALLERGIES     Zoloft [sertraline hcl]    FAMILY HISTORY       Family History   Problem Relation Age of Onset    Cancer Mother         Cervical Cancer    Cancer Brother         Esophageal cancer    Diabetes Brother     Esophageal Cancer Brother     Diabetes Sister     Colon Cancer Neg Hx     Colon Polyps Neg Hx     Liver Cancer Neg Hx     Liver Disease Neg Hx     Rectal Cancer Neg Hx     Stomach Cancer Neg Hx           SOCIAL HISTORY       Social History     Socioeconomic History    Marital status:      Spouse name: None    Number of children: None    Years of education: None    Highest education level: None   Occupational History    None   Social Needs    Financial resource strain: None    Food insecurity     Worry: None     Inability: None    Transportation needs     Medical: None     Non-medical: None   Tobacco Use    Smoking status: Never Smoker    Smokeless tobacco: Never Used   Substance and Sexual Activity    Alcohol use: No    Drug use: No    Sexual activity: Yes     Partners: Male   Lifestyle    Physical activity     Days per week: None     Minutes per session: None    Stress: None   Relationships    Social connections     Talks on phone: None     Gets together: None     Attends Tenriism service: None     Active member of club or organization: None     Attends meetings of clubs or organizations: None     Relationship status: None    Intimate partner violence     Fear of current or ex partner: None     Emotionally abused: None     Physically abused: None     Forced sexual activity: None   Other Topics Concern    None   Social History Narrative    None       SCREENINGS    Johana Coma Scale  Eye Opening: Spontaneous  Best Verbal Response: Oriented  Best Motor Response: Obeys commands  Johana Coma Scale Score: 15 @FLOW([id number])@      PHYSICAL EXAM    (up to 7 for level 4, 8 or more for level 5)     ED Triage Vitals [01/07/21 0908]   BP Temp Temp src Pulse Resp SpO2 Height Weight   (!) 161/85 98.2 °F (36.8 °C) -- 74 22 98 % 5' 3\" (1.6 m) 160 lb (72.6 kg)       Physical Exam  Vitals signs and nursing note reviewed. Constitutional:       Appearance: She is well-developed. HENT:      Head: Normocephalic and atraumatic. Eyes:      General: No scleral icterus. Right eye: No discharge. Left eye: No discharge.    Neck:      Musculoskeletal: Normal range of motion and neck supple. Cardiovascular:      Rate and Rhythm: Normal rate. Pulmonary:      Effort: No respiratory distress. Musculoskeletal:      Right hip: She exhibits decreased range of motion, decreased strength, tenderness and bony tenderness. She exhibits no swelling. Comments: 2+ dorsalis pedis. Neurological:      Mental Status: She is alert. Psychiatric:         Behavior: Behavior normal.         DIAGNOSTIC RESULTS     EKG: All EKG's are interpreted by the Emergency Department Physician who either signs or Co-signsthis chart in the absence of a cardiologist.        RADIOLOGY:   Non-plain filmimages such as CT, Ultrasound and MRI are read by the radiologist. Plain radiographic images are visualized and preliminarily interpreted by the emergency physician with the below findings:      Interpretation per the Radiologist below, if available at the time of this note:    No orders to display         ED BEDSIDEULTRASOUND:   Performed by ED Physician -none    LABS:  Labs Reviewed - No data to display    All other labs were within normal range or not returned as of this dictation. EMERGENCY DEPARTMENT COURSE and DIFFERENTIALDIAGNOSIS/MDM:   Vitals:    Vitals:    01/07/21 0908 01/07/21 1000 01/07/21 1100 01/07/21 1212   BP: (!) 161/85 (!) 156/82 (!) 152/78 (!) 148/74   Pulse: 74 76 74 72   Resp: 22 20 18 18   Temp: 98.2 °F (36.8 °C)   98.1 °F (36.7 °C)   TempSrc:    Oral   SpO2: 98% 98% 99% 97%   Weight: 160 lb (72.6 kg)      Height: 5' 3\" (1.6 m)              MDM MRI from the orthopedic [place] done yesterday shows a small curvilinear subchondral  fracture involving the right femoral head anteriorly and superiorly. There is avascular necrosis. Texted this information to Dr. Duran Dumont who is the orthopedic on-call. He states that she may follow-up with Dr. Moncho Clements on the 13th and use her walker at home.   Patient was advised to try to keep her weight off the right hip as much as possible until she can follow-up. Pain medication here has improved her pain quite a bit. Pt is ok with this plan      CONSULTS:  None    PROCEDURES:  Unless otherwise noted below, none     Procedures    FINAL IMPRESSION      1.  Avascular necrosis of femoral head, right [facility])        DISPOSITION/PLAN   DISPOSITION        PATIENT REFERRED TO:  Zander Melton MD  [address]  [phone]      as Scheduled      DISCHARGE MEDICATIONS:  Discharge Medication List as of 1/7/2021 11:56 AM             (Please note that portions of this note were completed with a voice recognitionprogram.  Efforts were made to edit the dictations but occasionally words are mis-transcribed.)    EARLE Waggoner (electronically signed)          EARLE Waggoner  01/09/21 6924

## 2021-01-11 DIAGNOSIS — R20.0 NUMBNESS AND TINGLING OF BOTH FEET: ICD-10-CM

## 2021-01-11 DIAGNOSIS — M79.604 PAIN IN BOTH LOWER EXTREMITIES: ICD-10-CM

## 2021-01-11 DIAGNOSIS — M79.605 PAIN IN BOTH LOWER EXTREMITIES: ICD-10-CM

## 2021-01-11 DIAGNOSIS — R20.2 NUMBNESS AND TINGLING OF BOTH FEET: ICD-10-CM

## 2021-01-12 RX ORDER — GABAPENTIN 600 MG/1
600 TABLET ORAL 3 TIMES DAILY
Qty: 90 TABLET | Refills: 0 | OUTPATIENT
Start: 2021-01-12 | End: 2021-02-11

## 2021-01-13 DIAGNOSIS — R20.2 NUMBNESS AND TINGLING OF BOTH FEET: ICD-10-CM

## 2021-01-13 DIAGNOSIS — R20.0 NUMBNESS AND TINGLING OF BOTH FEET: ICD-10-CM

## 2021-01-13 DIAGNOSIS — M79.605 PAIN IN BOTH LOWER EXTREMITIES: ICD-10-CM

## 2021-01-13 DIAGNOSIS — M79.604 PAIN IN BOTH LOWER EXTREMITIES: ICD-10-CM

## 2021-01-13 RX ORDER — GABAPENTIN 600 MG/1
TABLET ORAL
Qty: 90 TABLET | Refills: 1 | Status: SHIPPED | OUTPATIENT
Start: 2021-01-13 | End: 2021-03-17

## 2021-01-13 NOTE — TELEPHONE ENCOUNTER
Requested Prescriptions     Pending Prescriptions Disp Refills    gabapentin (NEURONTIN) 600 MG tablet [Pharmacy Med Name: GABAPENTIN 600MG TABLETS] 90 tablet      Sig: TAKE 1 TABLET BY MOUTH THREE TIMES DAILY       Last Office Visit:  12/21/2020  Next Office Visit:  1/28/2021  Last Medication Refill:  9/22/2020 2 refills  Vandana Serene up to date:  10/27/2020    *RX updated to reflect   1/13/2021  fill date*

## 2021-01-14 ENCOUNTER — OFFICE VISIT (OUTPATIENT)
Dept: NEUROSURGERY | Age: 86
End: 2021-01-14

## 2021-01-14 VITALS
BODY MASS INDEX: 29.23 KG/M2 | HEIGHT: 63 IN | DIASTOLIC BLOOD PRESSURE: 78 MMHG | OXYGEN SATURATION: 97 % | HEART RATE: 81 BPM | WEIGHT: 165 LBS | SYSTOLIC BLOOD PRESSURE: 171 MMHG

## 2021-01-14 DIAGNOSIS — Z98.890 S/P LUMBAR LAMINECTOMY: Primary | ICD-10-CM

## 2021-01-14 DIAGNOSIS — M79.604 RIGHT LEG PAIN: ICD-10-CM

## 2021-01-14 PROCEDURE — 99024 POSTOP FOLLOW-UP VISIT: CPT | Performed by: NEUROLOGICAL SURGERY

## 2021-01-14 RX ORDER — LOSARTAN POTASSIUM 100 MG/1
100 TABLET ORAL DAILY
Qty: 90 TABLET | Refills: 3 | Status: SHIPPED | OUTPATIENT
Start: 2021-01-14 | End: 2021-11-09 | Stop reason: ALTCHOICE

## 2021-01-14 ASSESSMENT — ENCOUNTER SYMPTOMS
RESPIRATORY NEGATIVE: 1
EYES NEGATIVE: 1
GASTROINTESTINAL NEGATIVE: 1

## 2021-01-14 NOTE — PROGRESS NOTES
Review of Systems   Constitutional: Negative. HENT: Negative. Eyes: Negative. Respiratory: Negative. Cardiovascular: Negative. Gastrointestinal: Negative. Genitourinary: Negative. Musculoskeletal: Positive for joint pain and myalgias. Skin: Negative. Neurological: Negative. Endo/Heme/Allergies: Negative. Psychiatric/Behavioral: Negative.

## 2021-01-14 NOTE — PROGRESS NOTES
18 [facility] Office Visit          Chief Complaint   Patient presents with    Follow-up    Leg Pain    Hip Pain       HISTORY OF PRESENT ILLNESS:      Marlene Shah is a 82 y.o. 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 Today she is here for her 1 month follow up. Initially her pain had improved. She states today she has severe right hip pain that radiates into the right anterolateral thigh, lateral leg and to the dorsum of the foot. When she came in for her 1 week wound check and she was feeling fine.   She states that it was sometime after that when she developed right knee pain, went to see Dr. Diana Cortes and then he ordered a MRI of the right hip        Past Medical History:   Diagnosis Date    Bronchitis     Colon cancer ([address]     Colon polyps     Constipation     Deep vein blood clot of left lower extremity (HCC)     Left leg     Depression     DVT (deep venous thrombosis) (HCC)     Dysphagia     Fibrocystic breast disease     Fibromyalgia     GERD (gastroesophageal reflux disease)     reflux with hx of esophageal stricture    Headache(784.0)     History of colon cancer     Hormone replacement therapy (postmenopausal)     Hypertension     Neuropathy of foot     In both feet    Osteoarthritis     left knee pain    Restless legs syndrome     Vitamin D deficiency        Past Surgical History:   Procedure Laterality Date    APPENDECTOMY      BREAST BIOPSY      CATARACT REMOVAL       SECTION      CHOLECYSTECTOMY      COLECTOMY  partial    COLON SURGERY      COLONOSCOPY  2010    COLONOSCOPY  2012    Dr Eileen Bliss: unremarkable enterocolonic anastamosis; hyperplastic polyps    COLONOSCOPY  3/2013    [facility]: unremarkable post-surgical colon    COLONOSCOPY  3/11/16    Dr Almanza Fearing colon anastomosis, 5 yr recall    FOOT SURGERY right foot    HAND SURGERY Right     Dr Shawna Lima N/A 7/6/2020    KYPHOPLASTY L5 performed by Yasir Falk DO at [address]  [place] Right 12/14/2020    RIGHT L 4-5 DECOMPRESSIVE LAMINECTOMY performed by Yasir Falk DO at [address] [facility] TOTAL KNEE ARTHROPLASTY      UPPER GASTROINTESTINAL ENDOSCOPY  10-    UPPER GASTROINTESTINAL ENDOSCOPY  3/2013    [facility]: peptic stricture dilated to 48F; HH; small antral mucosal elevation    UPPER GASTROINTESTINAL ENDOSCOPY  12/2014    [facility]: dilation of esophageal stricture    VARICOSE VEIN SURGERY Left 03/14/2014  [facility]    Left GSV Ablation    VARICOSE VEIN SURGERY Right 04/04/2014  [facility]    Right GSV Ablation        Medications    Current Outpatient Medications:     losartan (COZAAR) 100 MG tablet, Take 1 tablet by mouth daily, Disp: 90 tablet, Rfl: 3    gabapentin (NEURONTIN) 600 MG tablet, TAKE 1 TABLET BY MOUTH THREE TIMES DAILY, Disp: 90 tablet, Rfl: 1    DULoxetine (CYMBALTA) 20 MG extended release capsule, Take 1 capsule by mouth daily, Disp: 30 capsule, Rfl: 5    omeprazole (PRILOSEC) 40 MG delayed release capsule, Take 1 capsule by mouth daily, Disp: 90 capsule, Rfl: 3    vitamin D (ERGOCALCIFEROL) 1.25 MG (67442 UT) CAPS capsule, Take 1 capsule by mouth once a week, Disp: 12 capsule, Rfl: 1    rivaroxaban (XARELTO) 20 MG TABS tablet, Take 1 tablet by mouth daily (with breakfast), Disp: 30 tablet, Rfl: 3    HYDROcodone-acetaminophen (NORCO)  MG per tablet, Take 1 tablet by mouth every 6 hours as needed for Pain. , Disp: , Rfl:     metoprolol tartrate (LOPRESSOR) 25 MG tablet, Take 1 tablet by mouth 2 times daily, Disp: 180 tablet, Rfl: 3    Hospital Bed MISC, by Does not apply route, Disp: 1 each, Rfl: 0    acyclovir (ZOVIRAX) 400 MG tablet, Take 400 mg by mouth 2 times daily history of shingles in her eye, Disp: , Rfl:     vitamin B-12 (CYANOCOBALAMIN) 500 MCG tablet, TAKE 1 TABLET BY MOUTH DAILY, Disp: 30 tablet, Rfl: 11    triamterene-hydrochlorothiazide (MAXZIDE-25) 37.5-25 MG per tablet, TAKE 1 TABLET BY MOUTH DAILY (Patient taking differently: Take 1 tablet by mouth daily ), Disp: 90 tablet, Rfl: 3    allopurinol (ZYLOPRIM) 100 MG tablet, Take 2 tablets by mouth daily For gout -prevention (Patient taking differently: Take 200 mg by mouth daily as needed For gout -prevention), Disp: 60 tablet, Rfl: 5    albuterol (ACCUNEB) 1.25 MG/3ML nebulizer solution, Inhale 1 mL into the lungs every 6 hours as needed for Wheezing , Disp: , Rfl:     dorzolamide-timolol (COSOPT) 22.3-6.8 MG/ML ophthalmic solution, Place 1 Dose into both eyes daily, Disp: , Rfl:     furosemide (LASIX) 20 MG tablet, Take 1 tablet by mouth daily as needed (swelling), Disp: 30 tablet, Rfl: 3  Zoloft [sertraline hcl]    Social History  Social History     Tobacco Use   Smoking Status Never Smoker   Smokeless Tobacco Never Used     Social History     Substance and Sexual Activity   Alcohol Use No         Family History   Problem Relation Age of Onset    Cancer Mother         Cervical Cancer    Cancer Brother         Esophageal cancer    Diabetes Brother     Esophageal Cancer Brother     Diabetes Sister     Colon Cancer Neg Hx     Colon Polyps Neg Hx     Liver Cancer Neg Hx     Liver Disease Neg Hx     Rectal Cancer Neg Hx     Stomach Cancer Neg Hx        Review of Systems:  Constitutional: Negative. HENT: Negative. Eyes: Negative. Respiratory: Negative. Cardiovascular: Negative. Gastrointestinal: Negative. Genitourinary: Negative. Musculoskeletal: Positive for joint pain and myalgias. Skin: Negative. Neurological: Negative. Endo/Heme/Allergies: Negative. Psychiatric/Behavioral: Negative. PHYSICAL EXAM:  Vitals:    01/14/21 1409   BP: (!) 171/78   Pulse: 81   SpO2: 97%     Constitutional: The patient appears well-developed andwell-nourished.    Eyes - conjunctiva normal.  Conjugate gaze  Ear, nose, throat -No scars, masses, or lesions over external nose or ears, no atrophy of tongue  Neck-symmetric, no masses noted, no jugular vein distension  Respiration- chest wall appears symmetric, goodexpansion, normal effort without use of accessory muscles  Musculoskeletal - no significant wasting of muscles noted, no bony deformities, gait no gross ataxia  Extremities-no clubbing, cyanosis or edema  Skin- warm, dry, and intact. No rash, erythema, or pallor. Psychiatric - mood, affect, and behavior appear normal.     Neurologic Examination  Awake, Alert andoriented x 3  Normal speech pattern, following commands  Motor 5/5 all extremities  No deficits to light touch or pinprick sensation    Normal Gait pattern      Wound:  C/D/I     DATA and IMAGING:    Lab Results   Component Value Date    WBC 9.5 12/09/2020    HGB 12.3 12/09/2020    HCT 40.3 12/09/2020    MCV 84.1 12/09/2020     12/09/2020     Lab Results   Component Value Date     12/09/2020    K 4.1 12/09/2020     12/09/2020    CO2 24 12/09/2020    BUN 14 12/09/2020    CREATININE 0.7 12/09/2020    GLUCOSE 113 (H) 12/09/2020    CALCIUM 9.5 12/09/2020    PROT 6.8 12/09/2020    LABALBU 3.9 12/09/2020    BILITOT <0.2 12/09/2020    ALKPHOS 91 12/09/2020    AST 17 12/09/2020    ALT 14 12/09/2020    LABGLOM >60 12/09/2020    GFRAA >59 12/09/2020    GLOB 2.5 10/12/2016     Lab Results   Component Value Date    INR 0.94 12/09/2020    INR 0.93 07/06/2020    INR 2.83 (H) 06/30/2020    PROTIME 12.5 12/09/2020    PROTIME 12.3 07/06/2020    PROTIME 30.5 (H) 06/30/2020    No results found. ASSESSMENT   82year old female s/p decompressive laminectomy L4-5 with severe RLE pain highly suspicious to be associated with her hip pathology. PLAN:  Placed call to Dr. Janell Reno to discuss. Also spoke with Dr. Devonte Molina. He reviewed films and feels pain may be associated with the femoral head fracture and AVN. May consider doing an injection to see if pain improves. They are going to see her back in the next few days. No diagnosis found.      Aravind Schilling, DO

## 2021-01-28 ENCOUNTER — OFFICE VISIT (OUTPATIENT)
Dept: NEUROSURGERY | Age: 86
End: 2021-01-28
Payer: MEDICARE

## 2021-01-28 VITALS
OXYGEN SATURATION: 96 % | DIASTOLIC BLOOD PRESSURE: 75 MMHG | TEMPERATURE: 97.6 F | WEIGHT: 165 LBS | HEIGHT: 63 IN | BODY MASS INDEX: 29.23 KG/M2 | HEART RATE: 69 BPM | SYSTOLIC BLOOD PRESSURE: 183 MMHG

## 2021-01-28 DIAGNOSIS — M25.551 RIGHT HIP PAIN: ICD-10-CM

## 2021-01-28 DIAGNOSIS — G62.9 NEUROPATHY: Primary | ICD-10-CM

## 2021-01-28 DIAGNOSIS — M79.604 RIGHT LEG PAIN: ICD-10-CM

## 2021-01-28 PROCEDURE — 99213 OFFICE O/P EST LOW 20 MIN: CPT | Performed by: NURSE PRACTITIONER

## 2021-01-28 PROCEDURE — 1090F PRES/ABSN URINE INCON ASSESS: CPT | Performed by: NURSE PRACTITIONER

## 2021-01-28 PROCEDURE — G8417 CALC BMI ABV UP PARAM F/U: HCPCS | Performed by: NURSE PRACTITIONER

## 2021-01-28 PROCEDURE — 4040F PNEUMOC VAC/ADMIN/RCVD: CPT | Performed by: NURSE PRACTITIONER

## 2021-01-28 PROCEDURE — G8399 PT W/DXA RESULTS DOCUMENT: HCPCS | Performed by: NURSE PRACTITIONER

## 2021-01-28 PROCEDURE — 1123F ACP DISCUSS/DSCN MKR DOCD: CPT | Performed by: NURSE PRACTITIONER

## 2021-01-28 PROCEDURE — G8484 FLU IMMUNIZE NO ADMIN: HCPCS | Performed by: NURSE PRACTITIONER

## 2021-01-28 PROCEDURE — 1036F TOBACCO NON-USER: CPT | Performed by: NURSE PRACTITIONER

## 2021-01-28 PROCEDURE — G8427 DOCREV CUR MEDS BY ELIG CLIN: HCPCS | Performed by: NURSE PRACTITIONER

## 2021-01-28 RX ORDER — TRIAMCINOLONE ACETONIDE 1 MG/G
CREAM TOPICAL
COMMUNITY
Start: 2021-01-08 | End: 2021-02-23

## 2021-01-28 RX ORDER — BETAMETHASONE VALERATE 0.1 %
LOTION (ML) TOPICAL
COMMUNITY
Start: 2021-01-19 | End: 2021-08-24 | Stop reason: ALTCHOICE

## 2021-01-28 RX ORDER — TIZANIDINE 4 MG/1
TABLET ORAL
COMMUNITY
Start: 2021-01-18 | End: 2021-12-15

## 2021-01-28 RX ORDER — DULOXETIN HYDROCHLORIDE 20 MG/1
20 CAPSULE, DELAYED RELEASE ORAL DAILY
Qty: 30 CAPSULE | Refills: 5 | Status: SHIPPED | OUTPATIENT
Start: 2021-01-28 | End: 2021-07-14

## 2021-01-28 NOTE — PROGRESS NOTES
[facility] Office Note      Patient:   Suly Carlisle  MR#:    [id number]  Account Number:                         YOB: 1935  Date of Evaluation:  1/28/2021  Time of Note:                          1:02 PM  Primary/Referring Physician:  Tom Rendon MD   Consulting Physician:  EARLE Lovett    FOLLOW UP VISIT    Chief Complaint   Patient presents with    3 Month Follow-Up     c/o hip pain radiating down into knee    Hip Pain       HISTORY OF PRESENT ILLNESS    Suly Carlisle is a 82y.o. year old female here for follow up of back pain, leg pain and neuropathy. She has noted improvement in back pain, s/p right L4/5 decompressive laminectomy. Has noted worsening right hip pain. She did have a right hip injection after last visit with neurosurgery, no clear improvement. Right hip pain with radiation of pain into the right groin and knee. Noting muscle spasms as well in the right leg. She denies left hip or leg pain. Notes weakness in BLE, using a wheelchair more so now. Pain worsens with walking, only able to take several steps at a time, using a walker. Neuropathy symptoms unchanged. On Neurontin 600 TID. On Cymbalta 20mg daily as well. She follows with Dr. Harding for skin rash. Pain began after cellulitis in January 2019. Has seen vascular for abnormal ABIs, compression stockings and elevation recommended. She has a prior history stomach cancer about 10 years ago, treated with chemotherapy. She denies vitamin B12 deficiency or diabetes history. S/p L5 kyphoplasty as well. No other complaints.      Past Medical History:   Diagnosis Date    Bronchitis     Colon cancer ([address]     Colon polyps     Constipation     Deep vein blood clot of left lower extremity (HCC)     Left leg     Depression     DVT (deep venous thrombosis) (HCC)     Dysphagia     Fibrocystic breast disease     Fibromyalgia     GERD (gastroesophageal reflux disease)     reflux with hx of esophageal stricture    Headache(784.0)     History of colon cancer     Hormone replacement therapy (postmenopausal)     Hypertension     Neuropathy of foot     In both feet    Osteoarthritis     left knee pain    Restless legs syndrome     Vitamin D deficiency        Past Surgical History:   Procedure Laterality Date    APPENDECTOMY      BREAST BIOPSY      CATARACT REMOVAL       SECTION      CHOLECYSTECTOMY      COLECTOMY  partial    COLON SURGERY      COLONOSCOPY  2010    COLONOSCOPY  2012    Dr Omega Faust: unremarkable enterocolonic anastamosis; hyperplastic polyps    COLONOSCOPY  3/2013    [facility]: unremarkable post-surgical colon    COLONOSCOPY  3/11/16    Dr Mari Sanchez colon anastomosis, 5 yr recall    FOOT SURGERY  right foot    HAND SURGERY Right     Dr Kelvin Resendiz N/A 2020    KYPHOPLASTY L5 performed by Fareed Joseph DO at [address] [facility]  [place] Right 2020    RIGHT L 4-5 DECOMPRESSIVE LAMINECTOMY performed by Fareed Joseph DO at [address] [facility] TOTAL KNEE ARTHROPLASTY      UPPER GASTROINTESTINAL ENDOSCOPY  10-    UPPER GASTROINTESTINAL ENDOSCOPY  3/2013    [facility]: peptic stricture dilated to 48F; HH; small antral mucosal elevation    UPPER GASTROINTESTINAL ENDOSCOPY  2014    [facility]: dilation of esophageal stricture    [facility]ICOSE VEIN SURGERY Left 2014  [facility]    Left GSV Ablation    VARICOSE VEIN SURGERY Right 2014  [facility]    Right GSV Ablation       Family History   Problem Relation Age of Onset    Cancer Mother         Cervical Cancer    Cancer Brother         Esophageal cancer    Diabetes Brother     Esophageal Cancer Brother     Diabetes Sister     Colon Cancer Neg Hx     Colon Polyps Neg Hx     Liver Cancer Neg Hx     Liver Disease Neg Hx     Rectal Cancer Neg Hx     Stomach Cancer Neg Hx        Social History     Socioeconomic History    Marital status:  Spouse name: Not on file    Number of children: Not on file    Years of education: Not on file    Highest education level: Not on file   Occupational History    Not on file   Social Needs    Financial resource strain: Not on file    Food insecurity     Worry: Not on file     Inability: Not on file    Transportation needs     Medical: Not on file     Non-medical: Not on file   Tobacco Use    Smoking status: Never Smoker    Smokeless tobacco: Never Used   Substance and Sexual Activity    Alcohol use: No    Drug use: No    Sexual activity: Yes     Partners: Male   Lifestyle    Physical activity     Days per week: Not on file     Minutes per session: Not on file    Stress: Not on file   Relationships    Social connections     Talks on phone: Not on file     Gets together: Not on file     Attends [employer] service: Not on file     Active member of club or organization: Not on file     Attends meetings of clubs or organizations: Not on file     Relationship status: Not on file    Intimate partner violence     Fear of current or ex partner: Not on file     Emotionally abused: Not on file     Physically abused: Not on file     Forced sexual activity: Not on file   Other Topics Concern    Not on file   Social History Narrative    Not on file       Current Outpatient Medications   Medication Sig Dispense Refill    tiZANidine (ZANAFLEX) 4 MG tablet TAKE 1 TABLET BY MOUTH THREE TIMES DAILY AS NEEDED.  NOT TO EXCEED 3 DOSES IN 24 HOURS      betamethasone valerate (VALISONE) 0.1 % lotion APPLY EXTERNALLY TO THE SCALP ONCE EVERY DAY      triamcinolone (KENALOG) 0.1 % cream APPLY TOPICALLY TO LEGS TWICE DAILY AS DIRECTED      DULoxetine (CYMBALTA) 20 MG extended release capsule Take 1 capsule by mouth daily 30 capsule 5    losartan (COZAAR) 100 MG tablet Take 1 tablet by mouth daily 90 tablet 3    gabapentin (NEURONTIN) 600 MG tablet TAKE 1 TABLET BY MOUTH THREE TIMES DAILY 90 tablet 1    omeprazole (PRILOSEC) 40 MG delayed release capsule Take 1 capsule by mouth daily 90 capsule 3    vitamin D (ERGOCALCIFEROL) 1.25 MG (99729 UT) CAPS capsule Take 1 capsule by mouth once a week 12 capsule 1    rivaroxaban (XARELTO) 20 MG TABS tablet Take 1 tablet by mouth daily (with breakfast) 30 tablet 3    HYDROcodone-acetaminophen (NORCO)  MG per tablet Take 1 tablet by mouth every 6 hours as needed for Pain.  metoprolol tartrate (LOPRESSOR) 25 MG tablet Take 1 tablet by mouth 2 times daily 180 tablet 3   [address] Does not apply route 1 each 0    acyclovir (ZOVIRAX) 400 MG tablet Take 400 mg by mouth 2 times daily history of shingles in her eye      vitamin B-12 (CYANOCOBALAMIN) 500 MCG tablet TAKE 1 TABLET BY MOUTH DAILY 30 tablet 11    triamterene-hydrochlorothiazide (MAXZIDE-25) 37.5-25 MG per tablet TAKE 1 TABLET BY MOUTH DAILY (Patient taking differently: Take 1 tablet by mouth daily ) 90 tablet 3    allopurinol (ZYLOPRIM) 100 MG tablet Take 2 tablets by mouth daily For gout -prevention (Patient taking differently: Take 200 mg by mouth daily as needed For gout -prevention) 60 tablet 5    albuterol (ACCUNEB) 1.25 MG/3ML nebulizer solution Inhale 1 mL into the lungs every 6 hours as needed for Wheezing       dorzolamide-timolol (COSOPT) 22.3-6.8 MG/ML ophthalmic solution Place 1 Dose into both eyes daily      furosemide (LASIX) 20 MG tablet Take 1 tablet by mouth daily as needed (swelling) 30 tablet 3     No current facility-administered medications for this visit. Allergies   Allergen Reactions    Zoloft [Sertraline Hcl] Other (See Comments)     Patient states that she doesn't want this medication anymore because she hallucinated and saw spiders. Patient weaned herself off and after she quit taking the medication, the hallucinations stopped. Patient states that she doesn't want this medication anymore because she hallucinated and saw spiders.  Patient weaned herself off and without use of accessory muscles  Musculoskeletal - No significant wasting of muscles noted, no bony deformities  Extremities - No clubbing, cyanosis or edema  Skin - Warm, dry, and intact. No rash, erythema, or pallor  Psychiatric - Mood, affect, and behavior appear normal      NEUROLOGICAL EXAM     Mental status   [x]Awake, alert, oriented   [x]Affect attention and concentration appear appropriate  [x]Recent and remote memory appears unremarkable  [x]Speech normal without dysarthria or aphasia, comprehension and repetition intact. COMMENTS: mild cognitive impairment     Cranial Nerves [x]No VF deficit to confrontation,  no papilledema on fundoscopic exam.  [x]PERRLA, EOMI, no nystagmus, conjugate eye movements, no ptosis  [x]Face symmetric  [x]Facial sensation intact  [x]Tongue midline no atrophy or fasciculations present  [x]Palate midline, hearing to finger rub normal bilaterally  [x]Shoulder shrug and SCM testing normal bilaterally  COMMENTS:    Motor   []5/5 strength x 4 extremities  [x]Normal bulk and tone  [x]No tremor present  [x]No rigidity or bradykinesia noted  COMMENTS: 3/5 RLE, pain limiting; 4/5 LLE     Sensory  []Sensation intact to light touch, pin prick, vibration, and proprioception BLE  [x]Sensation intact to light touch, pin prick, vibration, and proprioception BUE  COMMENTS: decreased PP, vibration BLE    Coordination [x]FTN normal bilaterally   [x]HTS normal bilaterally  [x]GRICELDA normal bilaterally.    COMMENTS:   Reflexes  []Symmetric and non-pathological  [x]Toes down going bilaterally  [x]No clonus present  COMMENTS: hypoactive throughout    Gait                  []Normal steady gait    []Ataxic    []Spastic     []Magnetic     []Shuffling  COMMENTS: antalgic; in wheelchair        LABS RECORD AND IMAGING REVIEW (As below and per HPI)    Lab Results   Component Value Date    [id number] 660 09/10/2020     Lab Results   Component Value Date    WBC 9.5 12/09/2020    HGB 12.3 12/09/2020    HCT 40.3 12/09/2020    MCV 84.1 12/09/2020     12/09/2020     Lab Results   Component Value Date     12/09/2020    K 4.1 12/09/2020     12/09/2020    CO2 24 12/09/2020    BUN 14 12/09/2020    CREATININE 0.7 12/09/2020    GLUCOSE 113 (H) 12/09/2020    CALCIUM 9.5 12/09/2020    PROT 6.8 12/09/2020    LABALBU 3.9 12/09/2020    BILITOT <0.2 12/09/2020    ALKPHOS 91 12/09/2020    AST 17 12/09/2020    ALT 14 12/09/2020    LABGLOM >60 12/09/2020    GFRAA >59 12/09/2020    GLOB 2.5 10/12/2016     Lab Results   Component Value Date    CHOL 183 06/09/2017    TRIG 287 (H) 06/09/2017    HDL 50 (L) 06/09/2017    LDLCALC 76 06/09/2017     Lab Results   Component Value Date    TSH 1.200 03/19/2019    T4FREE 1.1 03/19/2019     Lab Results   Component Value Date    CRP 0.19 09/10/2020    SEDRATE 18 09/10/2020      Reviewed PCP and vascular notes. Lower extremity vascular study (1/2019)- mildly diminished flow to the bilateral lower extremity arterial system at rest. Patient likely has disease in bilateral tibial arterial segments. MRI thoracic spine (1/2019)- no spinal canal stenosis. Moderate spinal canal stenosis at L1/L2, partially imaged     EMG/NCS (4/2019)- moderate sensorimotor primarily axonal polyneuropathy    CT lumbar spine (6/2020)-   Impression    Impression:    1. There is mild levocurvature of the lumbar spine. Degenerative disc    disease is noted throughout the lumbar column. 2. There is an acute mild compression deformity involving the superior    endplate of L5. There is no involvement of the posterior elements or    compromise of the spinal canal.    3. Central and foraminal stenosis at multiple levels related to    bulging of the disc and disc protrusions combined with facet and    ligamentous hypertrophy and endplate spurring. .    4. I spoke with Danny Gomez in the emergency room at 3:40 PM    concerning the finding of an acute superior endplate fracture at L5.     Signed by Dr Ajit Jackson Babar on 6/14/2020 3:43 PM      MRI lumbar spine (6/2020)-   Impression    1.  Acute L5 compression fracture with involvement of the anterior and    posterior vertebral body but no evidence of posterior element    involvement. Approximate 15% height loss. No change in appearance    compared to a CT from 6/14/2020. No other fracture identified. 2.  Severe multilevel degenerative change, as described above. Notably, there is severe central canal stenosis at L4-L5 and severe    LEFT neural foraminal stenosis at L5-S1. 3.  Tiny 3 mm enhancing nodule located along one of the nerve roots of    the cauda equina. 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 Recommend MRI of the brain to exclude CNS neoplasm. Signed by Dr Ulises Cook on 6/25/2020 1:06 PM      X-ray hips (8/2020)-   Impression    Impression:    1. No visualized fracture or malalignment. Bilateral mild hip joint    osteoarthritis. Signed by Dr Mahesh aPrks on 8/20/2020 1:18 PM         NCS/EMG (9/2020)- mild to moderate, axonal, sensorimotor polyneuropathy. Left peroneal motor abnormalities felt technical- superimposed peroneal neuropathy or lumbosacral radiculopathy not excluded but abnormalities are opposite of the most symptomatic side which would suggest more of a technical source. Unable to perform needle portion of the study due to BLE cellulitis. MRI brain (10/2020)-   Impression    1. 1 cm extra-axial enhancing mass along the anterior right tentorium    most compatible with a small meningioma. 2. Moderate chronic white matter ischemic changes. 3. Otherwise negative MR imaging of the brain    Signed by Dr Prabhu Dash on 10/5/2020 10:26 AM      Reviewed neurosurgery records     ASSESSMENT:    Lev Citizen is a 82y.o. year old female here for follow up of neuropathy, back pain, right hip/groin pain.  Back pain has improved since decompressive

## 2021-01-28 NOTE — PROGRESS NOTES
REVIEW OF SYSTEMS    Constitutional: []Fever []Sweats []Chills [] Recent Injury [x] Denies all unless marked  HEENT:[]Headache  [] Head Injury [] Hearing Loss  [] Sore Throat  [] Ear Ache [x] Denies all unless marked  Spine:  [] Neck pain  [] Back pain  [] Sciaticia  [x] Denies all unless marked  Cardiovascular:[]Heart Disease []Palpitations [] Chest Pain   [x] Denies all unless marked  Pulmonary: []Shortness of Breath []Cough   [x] Denies all unless marked  Psychiatric/Behavioral:[] Depression [] Anxiety [x] Denies all unless marked  Gastrointestinal: []Nausea  []Vomiting  []Abdominal Pain  []Constipation  []Diarrhea  [x] Denies all unless marked  Genitourinary:   [] Frequency  [] Urgency  [] Dysuria [] Incontinence  [x] Denies all unless marked  Extremities: [x]Pain  []Swelling  [x] Denies all unless marked  Musculoskeletal: [x] Myalgias  [x] Joint Pain  [] Arthritis [] Muscle Cramps [] Muscle Twitches  [] Denies all unless marked  Sleep: []Insomnia[]Snoring []Restless Legs  []Sleep Apnea  []Daytime Sleepiness  [x] Denies all unless marked  Skin:[] Rash [] Color Change [x] Denies all unless marked   Neurological:[]Visual Disturbance [] Memory Loss []Loss of Balance []Slurred Speech []Weakness []Seizures  [] Dizziness [x] Denies all unless marked

## 2021-02-23 ENCOUNTER — OFFICE VISIT (OUTPATIENT)
Dept: PRIMARY CARE CLINIC | Age: 86
End: 2021-02-23
Payer: MEDICARE

## 2021-02-23 VITALS
SYSTOLIC BLOOD PRESSURE: 132 MMHG | HEIGHT: 63 IN | OXYGEN SATURATION: 98 % | RESPIRATION RATE: 16 BRPM | HEART RATE: 95 BPM | TEMPERATURE: 97.8 F | DIASTOLIC BLOOD PRESSURE: 84 MMHG | BODY MASS INDEX: 26.97 KG/M2 | WEIGHT: 152.2 LBS

## 2021-02-23 DIAGNOSIS — R20.0 NUMBNESS AND TINGLING OF BOTH FEET: ICD-10-CM

## 2021-02-23 DIAGNOSIS — Z95.828 PORT-A-CATH IN PLACE: ICD-10-CM

## 2021-02-23 DIAGNOSIS — M79.605 PAIN IN BOTH LOWER EXTREMITIES: Primary | ICD-10-CM

## 2021-02-23 DIAGNOSIS — Z45.2 ENCOUNTER FOR CARE RELATED TO PORT-A-CATH: ICD-10-CM

## 2021-02-23 DIAGNOSIS — M79.604 PAIN IN BOTH LOWER EXTREMITIES: Primary | ICD-10-CM

## 2021-02-23 DIAGNOSIS — R63.4 WEIGHT LOSS: ICD-10-CM

## 2021-02-23 DIAGNOSIS — R20.2 NUMBNESS AND TINGLING OF BOTH FEET: ICD-10-CM

## 2021-02-23 PROCEDURE — G8417 CALC BMI ABV UP PARAM F/U: HCPCS | Performed by: NURSE PRACTITIONER

## 2021-02-23 PROCEDURE — 1090F PRES/ABSN URINE INCON ASSESS: CPT | Performed by: NURSE PRACTITIONER

## 2021-02-23 PROCEDURE — 99024 POSTOP FOLLOW-UP VISIT: CPT | Performed by: NURSE PRACTITIONER

## 2021-02-23 PROCEDURE — 1036F TOBACCO NON-USER: CPT | Performed by: NURSE PRACTITIONER

## 2021-02-23 PROCEDURE — G8427 DOCREV CUR MEDS BY ELIG CLIN: HCPCS | Performed by: NURSE PRACTITIONER

## 2021-02-23 PROCEDURE — G8399 PT W/DXA RESULTS DOCUMENT: HCPCS | Performed by: NURSE PRACTITIONER

## 2021-02-23 PROCEDURE — 1123F ACP DISCUSS/DSCN MKR DOCD: CPT | Performed by: NURSE PRACTITIONER

## 2021-02-23 PROCEDURE — 4040F PNEUMOC VAC/ADMIN/RCVD: CPT | Performed by: NURSE PRACTITIONER

## 2021-02-23 PROCEDURE — G8484 FLU IMMUNIZE NO ADMIN: HCPCS | Performed by: NURSE PRACTITIONER

## 2021-02-23 RX ORDER — PERMETHRIN 50 MG/G
CREAM TOPICAL
COMMUNITY
Start: 2021-01-28 | End: 2021-05-26

## 2021-02-23 ASSESSMENT — PATIENT HEALTH QUESTIONNAIRE - PHQ9: SUM OF ALL RESPONSES TO PHQ9 QUESTIONS 1 & 2: 1

## 2021-02-23 ASSESSMENT — ENCOUNTER SYMPTOMS: BACK PAIN: 1

## 2021-02-23 NOTE — PROGRESS NOTES
[address] [facility]  [address] [place] [address]  [address] [place] [address]  Dept: [phone]  Dept Fax: [phone]  Loc: [phone]    Lev Dsouza is a 82 y.o. female who presents today for her medical conditions/complaints as noted below. Lev Dsouza is c/o of 1 Month Follow-Up (Pt is here for 6 week follo wup to have her port flushed. ) and Leg Pain (Pt states her leg pain started this morning after she had to bend down and get something. It is her right leg. )        HPI:     HPI   Chief Complaint   Patient presents with    1 Month Follow-Up     Pt is here for 6 week follo wup to have her port flushed.  Leg Pain     Pt states her leg pain started this morning after she had to bend down and get something. It is her right leg. she did see Dr kohler for second opinion on right hip and leg pain and was given a steroid injeciton in the hip. She is on gabapentin for this. She has lost weight but not on purpose. She denies any change in bowel habits. she has been feeling well.    Past Medical History:   Diagnosis Date    Bronchitis     Colon cancer ([address]     Colon polyps     Constipation     Deep vein blood clot of left lower extremity (HCC)     Left leg     Depression     DVT (deep venous thrombosis) (HCC)     Dysphagia     Fibrocystic breast disease     Fibromyalgia     GERD (gastroesophageal reflux disease)     reflux with hx of esophageal stricture    Headache(784.0)     History of colon cancer     Hormone replacement therapy (postmenopausal)     Hypertension     Neuropathy of foot     In both feet    Osteoarthritis     left knee pain    Restless legs syndrome     Vitamin D deficiency       Past Surgical History:   Procedure Laterality Date    APPENDECTOMY      BREAST BIOPSY      CATARACT REMOVAL       SECTION      CHOLECYSTECTOMY      COLECTOMY  partial    COLON SURGERY      COLONOSCOPY  2010    COLONOSCOPY  2012    Dr Patel [place]ing: unremarkable enterocolonic anastamosis; hyperplastic polyps    COLONOSCOPY  3/2013    [place]: unremarkable post-surgical colon    COLONOSCOPY  3/11/16    Dr Misbah Acevedo colon anastomosis, 5 yr recall    FOOT SURGERY  right foot    HAND SURGERY Right     Dr Steven Dwyer N/A 7/6/2020    KYPHOPLASTY L5 performed by Disha Dorantes DO at [address]  [place] Right 12/14/2020    RIGHT L 4-5 DECOMPRESSIVE LAMINECTOMY performed by Disha Dorantes DO at [address] [facility] TOTAL KNEE ARTHROPLASTY      UPPER GASTROINTESTINAL ENDOSCOPY  10-    UPPER GASTROINTESTINAL ENDOSCOPY  3/2013    [place]: peptic stricture dilated to 48F; HH; small antral mucosal elevation    UPPER GASTROINTESTINAL ENDOSCOPY  12/2014    Maurilio: dilation of esophageal stricture    VARICOSE VEIN SURGERY Left 03/14/2014  [facility]    Left GSV Ablation    VARICOSE VEIN SURGERY Right 04/04/2014  [facility]    Right GSV Ablation       Vitals 2/23/2021 1/28/2021 1/14/2021 1/7/2021 1/7/2021 1/6/2693   SYSTOLIC 815 620 [phone] 027   DIASTOLIC 84 75 78 74 78 82   Site Left Upper Arm - - - - -   Position Sitting - - - - -   Cuff Size Medium Adult - - - - -   Pulse 95 69 81 72 74 76   Temp 97.8 97.6 - 98.1 - -   Resp 16 - - 18 18 20   SpO2 98 96 97 97 99 98   Weight 152 lb 3.2 oz 165 lb 165 lb - - -   Height 5' 3\" 5' 3\" 5' 3\" - - -   Body mass index 26.96 kg/m2 29.23 kg/m2 29.23 kg/m2 - - -   Pain Level - - - 2 - -   Some recent data might be hidden       Family History   Problem Relation Age of Onset    Cancer Mother         Cervical Cancer    Cancer Brother         Esophageal cancer    Diabetes Brother     Esophageal Cancer Brother     Diabetes Sister     Colon Cancer Neg Hx     Colon Polyps Neg Hx     Liver Cancer Neg Hx     Liver Disease Neg Hx     Rectal Cancer Neg Hx     Stomach Cancer Neg Hx        Social History     Tobacco Use    Smoking status: Never Smoker    tablet 3     No current facility-administered medications for this visit. Allergies   Allergen Reactions    Zoloft [Sertraline Hcl] Other (See Comments)     Patient states that she doesn't want this medication anymore because she hallucinated and saw spiders. Patient weaned herself off and after she quit taking the medication, the hallucinations stopped. Patient states that she doesn't want this medication anymore because she hallucinated and saw spiders. Patient weaned herself off and after she quit taking the medication, the hallucinations stopped. Health Maintenance   Topic Date Due    COVID-19 Vaccine (1 of 2) 02/01/1951    Shingles Vaccine (1 of 2) 02/01/1987    Annual Wellness Visit (AWV)  05/29/2019    Potassium monitoring  12/09/2021    Creatinine monitoring  12/09/2021    DTaP/Tdap/Td vaccine (3 - Td) 12/23/2025    Colon cancer screen colonoscopy  03/11/2026    DEXA (modify frequency per FRAX score)  Completed    Flu vaccine  Completed    Pneumococcal 65+ years Vaccine  Completed    Hepatitis A vaccine  Aged Out    Hepatitis B vaccine  Aged Out    Hib vaccine  Aged Out    Meningococcal (ACWY) vaccine  Aged Out       Subjective:      Review of Systems   Constitutional: Positive for activity change and unexpected weight change. Musculoskeletal: Positive for back pain. Right hip and leg pain       Objective:     Physical Exam  Vitals signs and nursing note reviewed. Constitutional:       Appearance: She is well-developed. HENT:      Head: Normocephalic. Right Ear: External ear normal.      Left Ear: External ear normal.   Eyes:      Pupils: Pupils are equal, round, and reactive to light. Neck:      Musculoskeletal: Normal range of motion. Cardiovascular:      Rate and Rhythm: Normal rate and regular rhythm. Heart sounds: Normal heart sounds. Pulmonary:      Effort: Pulmonary effort is normal.      Breath sounds: Normal breath sounds.    Skin: General: Skin is warm and dry. Neurological:      Mental Status: She is alert and oriented to person, place, and time. Psychiatric:         Behavior: Behavior normal.         Thought Content: Thought content normal.         Judgment: Judgment normal.       /84 (Site: Left Upper Arm, Position: Sitting, Cuff Size: Medium Adult)   Pulse 95   Temp 97.8 °F (36.6 °C) (Temporal)   Resp 16   Ht 5' 3\" (1.6 m)   Wt 152 lb 3.2 oz (69 kg)   SpO2 98%   BMI 26.96 kg/m²   Venous Access Procedure Note  Indication: maintenance flush    Procedure: The patient was placed in the appropriate position and the skin over the puncture site was prepped with betadine and draped in a sterile fashion. Intravenous access was obtained in right  chest port with a Bliss needle and the site was secured appropriately. The port was flushed with 10cc NS and then 3 cc heparin flush. The patient tolerated the procedure well. Complications: None    Assessment:       Diagnosis Orders   1. Pain in both lower extremities     2. Numbness and tingling of both feet     3. Encounter for care related to Port-a-Cath  AR [facility] DEVICE   4. Port-A-Cath in place  AR [facility] IMPLANTED DRUG DELIVERY DEVICE   5. Weight loss           Plan:   More than 50% of the time was spent counseling and coordinating care for a total time of 20min face to face. She was also here for Port-A-Cath flush and we did that as well. She did mention the weight loss until after we done the port flush it was not time for any blood work but I will get some blood work in 8 weeks when she comes back for her next flush. If she loses more weight she is to let me know and we will check at the end. She is on Xarelto as a blood thinner. She has not noticed any blood in her stool. Patient given educational materials -see patient instructions. Discussed use, benefit, and side effects of prescribed medications. All patient questions answered.   Pt voiced understanding. Reviewed health maintenance. Instructed to continue currentmedications, diet and exercise. Patient agreed with treatment plan. Follow up as directed. MEDICATIONS:  No orders of the defined types were placed in this encounter. ORDERS:  Orders Placed This Encounter   Procedures    [place] IRRIG IMPLANTED DRUG DELIVERY DEVICE       Follow-up:  Return for 8 wks for mediare wellness , fasting labs and port flush. PATIENT INSTRUCTIONS:  There are no Patient Instructions on file for this visit. Electronically signed by EARLE Armas CNP on 2/23/2021 at 1:39 PM    EMR Dragon/transcription disclaimer:  Much of thisencounter note is electronic transcription/translation of spoken language to printed texts. The electronic translation of spoken language may be erroneous, or at times, nonsensical words or phrases may be inadvertentlytranscribed.   Although I have reviewed the note for such errors, some may still exist.

## 2021-03-04 DIAGNOSIS — Z12.11 SCREENING FOR COLON CANCER: Primary | ICD-10-CM

## 2021-03-09 ENCOUNTER — OFFICE VISIT (OUTPATIENT)
Dept: PRIMARY CARE CLINIC | Age: 86
End: 2021-03-09
Payer: MEDICARE

## 2021-03-09 VITALS
HEART RATE: 56 BPM | BODY MASS INDEX: 26.93 KG/M2 | DIASTOLIC BLOOD PRESSURE: 82 MMHG | WEIGHT: 152 LBS | RESPIRATION RATE: 16 BRPM | TEMPERATURE: 97.2 F | OXYGEN SATURATION: 96 % | SYSTOLIC BLOOD PRESSURE: 122 MMHG | HEIGHT: 63 IN

## 2021-03-09 DIAGNOSIS — Z86.718 HISTORY OF DVT (DEEP VEIN THROMBOSIS): ICD-10-CM

## 2021-03-09 DIAGNOSIS — M79.605 PAIN IN BOTH LOWER EXTREMITIES: ICD-10-CM

## 2021-03-09 DIAGNOSIS — M79.604 PAIN IN BOTH LOWER EXTREMITIES: ICD-10-CM

## 2021-03-09 DIAGNOSIS — I10 ESSENTIAL HYPERTENSION: Primary | ICD-10-CM

## 2021-03-09 DIAGNOSIS — M25.551 RIGHT HIP PAIN: ICD-10-CM

## 2021-03-09 PROCEDURE — G8417 CALC BMI ABV UP PARAM F/U: HCPCS | Performed by: NURSE PRACTITIONER

## 2021-03-09 PROCEDURE — 4040F PNEUMOC VAC/ADMIN/RCVD: CPT | Performed by: NURSE PRACTITIONER

## 2021-03-09 PROCEDURE — G8484 FLU IMMUNIZE NO ADMIN: HCPCS | Performed by: NURSE PRACTITIONER

## 2021-03-09 PROCEDURE — 99214 OFFICE O/P EST MOD 30 MIN: CPT | Performed by: NURSE PRACTITIONER

## 2021-03-09 PROCEDURE — 1036F TOBACCO NON-USER: CPT | Performed by: NURSE PRACTITIONER

## 2021-03-09 PROCEDURE — 1090F PRES/ABSN URINE INCON ASSESS: CPT | Performed by: NURSE PRACTITIONER

## 2021-03-09 PROCEDURE — G8399 PT W/DXA RESULTS DOCUMENT: HCPCS | Performed by: NURSE PRACTITIONER

## 2021-03-09 PROCEDURE — G8427 DOCREV CUR MEDS BY ELIG CLIN: HCPCS | Performed by: NURSE PRACTITIONER

## 2021-03-09 PROCEDURE — 1123F ACP DISCUSS/DSCN MKR DOCD: CPT | Performed by: NURSE PRACTITIONER

## 2021-03-09 RX ORDER — CYANOCOBALAMIN (VITAMIN B-12) 500 MCG
TABLET ORAL
Qty: 30 TABLET | Refills: 11 | Status: SHIPPED | OUTPATIENT
Start: 2021-03-09

## 2021-03-09 RX ORDER — TRIAMTERENE AND HYDROCHLOROTHIAZIDE 37.5; 25 MG/1; MG/1
TABLET ORAL
Qty: 90 TABLET | Refills: 3 | Status: ON HOLD | OUTPATIENT
Start: 2021-03-09 | End: 2022-01-20

## 2021-03-09 NOTE — PATIENT INSTRUCTIONS
Losartan 100mg a day  Metoprolol 25mg twice a day  Add the triam/hctz 37.5/25 every morning  Monitor your blood pressure every a.m And once random during the day. Record them. The goal is top number under 140 and bottom number under 80. if not at goal in 3 days will increase the metoprolol. Refer to GI for right stomach/hip.

## 2021-03-09 NOTE — PROGRESS NOTES
[address] [facility]  [address] [address] [place] [address]  [address] [place] [address]  Dept: [phone]  Dept Fax: [phone]  Loc: [phone]    Cierra Mariee is a 82 y.o. female who presents today for her medical conditions/complaints as noted below. Cierra Mariee is c/o of Dizziness (Pt states she has been feeling really dizzy lately and having hot flashes. She states BP has been elevated. ) and Hip Pain (PT states her left hip/leg is still hurting. She states she had an injection done last wednesday. )        HPI:     HPI   Chief Complaint   Patient presents with    Dizziness     Pt states she has been feeling really dizzy lately and having hot flashes. She states BP has been elevated.  Hip Pain     PT states her left hip/leg is still hurting. She states she had an injection done last wednesday. losartan 100mg is once a day  Lopressor is 25mg twice a day  She has not been taking the triam/hctz  She is still seeing [facility] for the hip- and it is giving her a lot of pain. She went to Dr Savannah Sandoval for a 2nd opinion and they think her pain is from her stomach. She has her caregiver with her again today.    Past Medical History:   Diagnosis Date    Bronchitis     Colon cancer ([address]     Colon polyps     Constipation     Deep vein blood clot of left lower extremity (HCC)     Left leg     Depression     DVT (deep venous thrombosis) (HCC)     Dysphagia     Fibrocystic breast disease     Fibromyalgia     GERD (gastroesophageal reflux disease)     reflux with hx of esophageal stricture    Headache(784.0)     History of colon cancer     Hormone replacement therapy (postmenopausal)     Hypertension     Neuropathy of foot     In both feet    Osteoarthritis     left knee pain    Restless legs syndrome     Vitamin D deficiency       Past Surgical History:   Procedure Laterality Date    APPENDECTOMY      BREAST BIOPSY      CATARACT REMOVAL       SECTION      CHOLECYSTECTOMY      COLECTOMY  partial    COLON SURGERY      COLONOSCOPY  12-    COLONOSCOPY  2-    Dr Sam Arias: unremarkable enterocolonic anastamosis; hyperplastic polyps    COLONOSCOPY  3/2013    [place]: unremarkable post-surgical colon    COLONOSCOPY  3/11/16    Dr Raysa Garcia colon anastomosis, 5 yr recall    FOOT SURGERY  right foot    HAND SURGERY Right     Dr Gibbs Finely N/A 7/6/2020    KYPHOPLASTY L5 performed by Anita Grover DO at [address]  [place] Right 12/14/2020    RIGHT L 4-5 DECOMPRESSIVE LAMINECTOMY performed by Anita Grover DO at [address] [facility] TOTAL KNEE ARTHROPLASTY      UPPER GASTROINTESTINAL ENDOSCOPY  10-    UPPER GASTROINTESTINAL ENDOSCOPY  3/2013    [place]: peptic stricture dilated to 48F; HH; small antral mucosal elevation    UPPER GASTROINTESTINAL ENDOSCOPY  12/2014    Maurilio: dilation of esophageal stricture    VARICOSE VEIN SURGERY Left [facility]/14/2014  [facility] [address]    Left GSV Ablation    VARICOSE VEIN SURGERY Right 04/04/2014  [facility]    Right GSV Ablation       Vitals 3/10/2021 3/9/2021 2/23/2021 1/28/2021 1/14/2021 1/1/9880   SYSTOLIC 273 238 [phone]   DIASTOLIC 62 82 84 75 78 74   Site - Left Upper Arm Left Upper Arm - - -   Position - Sitting Sitting - - -   Cuff Size - Medium Adult Medium Adult - - -   Pulse 84 56 95 69 81 72   Temp - 97.2 97.8 97.6 - 98.1   Resp - 16 16 - - 18   SpO2 96 96 98 96 97 97   Weight 149 lb 6.4 oz 152 lb 152 lb 3.2 oz 165 lb 165 lb -   Height 5' 3\" 5' 3\" 5' 3\" 5' 3\" 5' 3\" -   Body mass index 26.46 kg/m2 26.92 kg/m2 26.96 kg/m2 29.23 kg/m2 29.23 kg/m2 -   Pain Level - - - - - 2   Some recent data might be hidden       Family History   Problem Relation Age of Onset    Cancer Mother         Cervical Cancer    Cancer Brother         Esophageal cancer    Diabetes Brother     Esophageal Cancer Brother     Diabetes Sister     Colon Cancer Neg Hx     Colon Polyps Neg Hx     Liver Cancer Neg Hx     Liver Disease Neg Hx     Rectal Cancer Neg Hx     Stomach Cancer Neg Hx        Social History     Tobacco Use    Smoking status: Never Smoker    Smokeless tobacco: Never Used   Substance Use Topics    Alcohol use: No      Current Outpatient Medications   Medication Sig Dispense Refill    Cyanocobalamin (B-12) 500 MCG TABS TAKE 1 TABLET BY MOUTH DAILY 30 tablet 11    triamterene-hydroCHLOROthiazide (MAXZIDE-25) 37.5-25 MG per tablet TAKE 1 TABLET BY MOUTH DAILY 90 tablet 3    permethrin (ELIMITE) 5 % cream       tiZANidine (ZANAFLEX) 4 MG tablet TAKE 1 TABLET BY MOUTH THREE TIMES DAILY AS NEEDED. NOT TO EXCEED 3 DOSES IN 24 HOURS      betamethasone valerate (VALISONE) 0.1 % lotion APPLY EXTERNALLY TO THE SCALP ONCE EVERY DAY      DULoxetine (CYMBALTA) 20 MG extended release capsule Take 1 capsule by mouth daily 30 capsule 5    losartan (COZAAR) 100 MG tablet Take 1 tablet by mouth daily 90 tablet 3    omeprazole (PRILOSEC) 40 MG delayed release capsule Take 1 capsule by mouth daily 90 capsule 3    vitamin D (ERGOCALCIFEROL) 1.25 MG (03520 UT) CAPS capsule Take 1 capsule by mouth once a week 12 capsule 1    rivaroxaban (XARELTO) 20 MG TABS tablet Take 1 tablet by mouth daily (with breakfast) 30 tablet 3    HYDROcodone-acetaminophen (NORCO)  MG per tablet Take 1 tablet by mouth every 6 hours as needed for Pain.        metoprolol tartrate (LOPRESSOR) 25 MG tablet Take 1 tablet by mouth 2 times daily 180 tablet 3   [address] Does not apply route 1 each 0    acyclovir (ZOVIRAX) 400 MG tablet Take 400 mg by mouth 2 times daily history of shingles in her eye      albuterol (ACCUNEB) 1.25 MG/3ML nebulizer solution Inhale 1 mL into the lungs every 6 hours as needed for Wheezing       dorzolamide-timolol (COSOPT) 22.3-6.8 MG/ML ophthalmic solution Place 1 Dose into both eyes daily      furosemide (LASIX) 20 MG tablet Take 1 tablet by mouth daily as needed (swelling) 30 tablet 3    gabapentin (NEURONTIN) 600 MG tablet TAKE 1 TABLET BY MOUTH THREE TIMES DAILY 90 tablet 5     No current facility-administered medications for this visit. Allergies   Allergen Reactions    Zoloft [Sertraline Hcl] Other (See Comments)     Patient states that she doesn't want this medication anymore because she hallucinated and saw spiders. Patient weaned herself off and after she quit taking the medication, the hallucinations stopped. Patient states that she doesn't want this medication anymore because she hallucinated and saw spiders. Patient weaned herself off and after she quit taking the medication, the hallucinations stopped. Health Maintenance   Topic Date Due    COVID-19 Vaccine (1) Never done    Shingles Vaccine (1 of 2) Never done   [employer] Visit (AWV)  Never done    Potassium monitoring  12/09/2021    Creatinine monitoring  12/09/2021    DTaP/Tdap/Td vaccine (3 - Td) 12/23/2025    Colon cancer screen colonoscopy  03/11/2026    DEXA (modify frequency per FRAX score)  Completed    Flu vaccine  Completed    Pneumococcal 65+ years Vaccine  Completed    Hepatitis A vaccine  Aged Out    Hepatitis B vaccine  Aged Out    Hib vaccine  Aged Out    Meningococcal (ACWY) vaccine  Aged Out       Subjective:      Review of Systems   Constitutional: Positive for activity change. Endocrine: Positive for cold intolerance and heat intolerance. Musculoskeletal: Positive for arthralgias and back pain. Hip pain   Psychiatric/Behavioral: Positive for dysphoric mood. Negative for self-injury and suicidal ideas. The patient is nervous/anxious. Objective:     Physical Exam  Vitals signs and nursing note reviewed. Constitutional:       Appearance: She is well-developed. Comments: Sitting in wheel chair. HENT:      Head: Normocephalic.       Right Ear: External ear normal.      Left Ear: External ear normal.   Eyes:      Pupils: Pupils are equal, round, and reactive to light. Neck:      Musculoskeletal: Normal range of motion. Cardiovascular:      Rate and Rhythm: Normal rate and regular rhythm. Heart sounds: Normal heart sounds. Pulmonary:      Effort: Pulmonary effort is normal.      Breath sounds: Normal breath sounds. Abdominal:      General: Bowel sounds are normal.      Tenderness: There is no abdominal tenderness. Musculoskeletal:      Right lower leg: Edema present. Left lower leg: Edema present. Skin:     General: Skin is warm and dry. Capillary Refill: Capillary refill takes less than 2 seconds. Neurological:      General: No focal deficit present. Mental Status: She is alert and oriented to person, place, and time. Psychiatric:         Behavior: Behavior normal.         Thought Content: Thought content normal.         Judgment: Judgment normal.       /82 (Site: Left Upper Arm, Position: Sitting, Cuff Size: Medium Adult)   Pulse 56   Temp 97.2 °F (36.2 °C) (Temporal)   Resp 16   Ht 5' 3\" (1.6 m)   Wt 152 lb (68.9 kg)   SpO2 96%   BMI 26.93 kg/m²     Assessment:       Diagnosis Orders   1. Essential hypertension     2. Pain in both lower extremities     3. Right hip pain     4. History of DVT (deep vein thrombosis)           Plan:   More than 50% of the time was spent counseling and coordinating care for a total time of 30min face to face. Patient given educational materials -see patient instructions. Discussed use, benefit, and side effects of prescribed medications. All patient questions answered. Pt voiced understanding. Reviewed health maintenance. Instructed to continue currentmedications, diet and exercise. Patient agreed with treatment plan. Follow up as directed.    MEDICATIONS:  Orders Placed This Encounter   Medications    triamterene-hydroCHLOROthiazide (MAXZIDE-25) 37.5-25 MG per tablet     Sig: TAKE 1 TABLET BY MOUTH DAILY     Dispense:  90 tablet     Refill:  3 ORDERS:  No orders of the defined types were placed in this encounter. Follow-up:  Return if symptoms worsen or fail to improve. PATIENT INSTRUCTIONS:  Patient Instructions   Losartan 100mg a day  Metoprolol 25mg twice a day  Add the triam/hctz 37.5/25 every morning  Monitor your blood pressure every a.m And once random during the day. Record them. The goal is top number under 140 and bottom number under 80. if not at goal in 3 days will increase the metoprolol. Refer to GI for right stomach/hip. Electronically signed by EARLE Cordero CNP on 3/22/2021 at 8:59 PM    EMR Dragon/transcription disclaimer:  Much of thisencounter note is electronic transcription/translation of spoken language to printed texts. The electronic translation of spoken language may be erroneous, or at times, nonsensical words or phrases may be inadvertentlytranscribed.   Although I have reviewed the note for such errors, some may still exist.

## 2021-03-10 ENCOUNTER — OFFICE VISIT (OUTPATIENT)
Dept: GASTROENTEROLOGY | Age: 86
End: 2021-03-10
Payer: MEDICARE

## 2021-03-10 VITALS
HEIGHT: 63 IN | DIASTOLIC BLOOD PRESSURE: 62 MMHG | WEIGHT: 149.4 LBS | OXYGEN SATURATION: 96 % | BODY MASS INDEX: 26.47 KG/M2 | HEART RATE: 84 BPM | SYSTOLIC BLOOD PRESSURE: 152 MMHG

## 2021-03-10 DIAGNOSIS — Z85.038 HISTORY OF COLON CANCER: ICD-10-CM

## 2021-03-10 DIAGNOSIS — R10.9 RIGHT SIDED ABDOMINAL PAIN: Primary | ICD-10-CM

## 2021-03-10 DIAGNOSIS — Z90.49 HISTORY OF PARTIAL COLECTOMY: ICD-10-CM

## 2021-03-10 PROCEDURE — G8484 FLU IMMUNIZE NO ADMIN: HCPCS | Performed by: NURSE PRACTITIONER

## 2021-03-10 PROCEDURE — G8417 CALC BMI ABV UP PARAM F/U: HCPCS | Performed by: NURSE PRACTITIONER

## 2021-03-10 PROCEDURE — G8399 PT W/DXA RESULTS DOCUMENT: HCPCS | Performed by: NURSE PRACTITIONER

## 2021-03-10 PROCEDURE — 1090F PRES/ABSN URINE INCON ASSESS: CPT | Performed by: NURSE PRACTITIONER

## 2021-03-10 PROCEDURE — G8427 DOCREV CUR MEDS BY ELIG CLIN: HCPCS | Performed by: NURSE PRACTITIONER

## 2021-03-10 PROCEDURE — 4040F PNEUMOC VAC/ADMIN/RCVD: CPT | Performed by: NURSE PRACTITIONER

## 2021-03-10 PROCEDURE — 99214 OFFICE O/P EST MOD 30 MIN: CPT | Performed by: NURSE PRACTITIONER

## 2021-03-10 PROCEDURE — 1123F ACP DISCUSS/DSCN MKR DOCD: CPT | Performed by: NURSE PRACTITIONER

## 2021-03-10 PROCEDURE — 1036F TOBACCO NON-USER: CPT | Performed by: NURSE PRACTITIONER

## 2021-03-10 ASSESSMENT — ENCOUNTER SYMPTOMS
ABDOMINAL DISTENTION: 0
DIARRHEA: 0
ABDOMINAL PAIN: 1
BACK PAIN: 1
CONSTIPATION: 0
SHORTNESS OF BREATH: 0
COUGH: 0
RECTAL PAIN: 0
CHEST TIGHTNESS: 0
BLOOD IN STOOL: 0
VOMITING: 0
NAUSEA: 0
SORE THROAT: 0
VOICE CHANGE: 0

## 2021-03-10 NOTE — PROGRESS NOTES
Subjective:      Patient ID: Leticia Kinney is a 82 y.o. female  MD Nathaly Phoenix MD    HPI  Chief Complaint   Patient presents with    Abdominal Pain       Pt with history of colon cancer, colon polyps. Partial colectomy completed 2010  Last colonoscopy 3/2016. Abdominal Pain  Patient complains of abdominal pain. The pain is located right side of abdomen extending down to her knee. The pain is described as nearly constant, sharp or shock like. Onset was several months ago. Seemed to begin soon after lumbar surgery. Aggravating factors include nothing. Alleviating factors include lying down with heat. Associated symptoms include dizziness, falling, hot flashes. The patient denies constipation, diarrhea, fever, hematochezia and melena. She is on chronic anticoagulation - xarelto - for history of blood clots. No problems with clots for over one year. Assessment:      1. Right sided abdominal pain    2. History of colon cancer    3. History of partial colectomy            Plan:      Schedule colonoscopy    Clearance for discontinuing anticoagulants needed before scheduling procedure. Increased r isks may be associated with discontinuation of this medication including cva, tia, cardiac event. I have discussed this with patient. Patient voices understanding and agrees to proceed with scheduling. Instruct on bowel prep. Nothing to eat or drink after midnight the day of the exam.  Unable to drive for 24 hours after the procedure. No aspirin or nonsteroidal anti-inflammatories for 5 days before procedure. I have discussed the benefits, alternatives, and risks (including bleeding, perforation and death)  for pursuing Endoscopy (EGD/Colonscopy/EUS/ERCP) with the patient and they are willing to continue. We also discussed the need for anesthesia, IV access, proper dietary changes, medication changes if necessary, and need for bowel prep (if ordered) prior to their Endoscopic procedure. They are aware they must have someone accompany them to their scheduled procedure to drive them home - they agree to the above and are willing to continue.      Plan for anesthesia: MAC              Family History   Problem Relation Age of Onset    Cancer Mother         Cervical Cancer    Cancer Brother         Esophageal cancer    Diabetes Brother     Esophageal Cancer Brother     Diabetes Sister     Colon Cancer Neg Hx     Colon Polyps Neg Hx     Liver Cancer Neg Hx     Liver Disease Neg Hx     Rectal Cancer Neg Hx     Stomach Cancer Neg Hx        Past Medical History:   Diagnosis Date    Bronchitis     Colon cancer ([address]     Colon polyps     Constipation     Deep vein blood clot of left lower extremity (HCC)     Left leg     Depression     DVT (deep venous thrombosis) (HCC)     Dysphagia     Fibrocystic breast disease     Fibromyalgia     GERD (gastroesophageal reflux disease)     reflux with hx of esophageal stricture    Headache(784.0)     History of colon cancer     Hormone replacement therapy (postmenopausal)     Hypertension     Neuropathy of foot     In both feet    Osteoarthritis     left knee pain    Restless legs syndrome     Vitamin D deficiency        Past Surgical History:   Procedure Laterality Date    APPENDECTOMY      BREAST BIOPSY      CATARACT REMOVAL       SECTION      CHOLECYSTECTOMY      COLECTOMY  partial    COLON SURGERY      COLONOSCOPY  2010    COLONOSCOPY  2012    Dr Yadiel Tracey: unremarkable enterocolonic anastamosis; hyperplastic polyps    COLONOSCOPY  3/[facility]    [facility]: unremarkable post-surgical colon    COLONOSCOPY  3/11/16    Dr Saúl Leiva colon anastomosis, 5 yr recall    FOOT SURGERY  right foot    HAND SURGERY Right     Dr Esther Cooper N/A 2020    KYPHOPLASTY L5 performed by Austin Gunn DO at [address] Right 2020    RIGHT L 4-5 DECOMPRESSIVE LAMINECTOMY performed by Juan Alas DO at [address] [facility] TOTAL KNEE ARTHROPLASTY      UPPER GASTROINTESTINAL ENDOSCOPY  10-    UPPER GASTROINTESTINAL ENDOSCOPY  3/2013    [place]: peptic stricture dilated to 48F; HH; small antral mucosal elevation    UPPER GASTROINTESTINAL ENDOSCOPY  12/2014    [facility]: dilation of esophageal stricture    VARICOSE VEIN SURGERY Left 03/14/2014  [facility]    Left GSV Ablation    VARICOSE VEIN SURGERY Right 04/04/2014  [facility]    Right GSV Ablation       Current Outpatient Medications   Medication Sig Dispense Refill    Cyanocobalamin (B-12) 500 MCG TABS TAKE 1 TABLET BY MOUTH DAILY 30 tablet 11    triamterene-hydroCHLOROthiazide (MAXZIDE-25) 37.5-25 MG per tablet TAKE 1 TABLET BY MOUTH DAILY 90 tablet 3    permethrin (ELIMITE) 5 % cream       tiZANidine (ZANAFLEX) 4 MG tablet TAKE 1 TABLET BY MOUTH THREE TIMES DAILY AS NEEDED. NOT TO EXCEED 3 DOSES IN 24 HOURS      betamethasone valerate (VALISONE) 0.1 % lotion APPLY EXTERNALLY TO THE SCALP ONCE EVERY DAY      DULoxetine (CYMBALTA) 20 MG extended release capsule Take 1 capsule by mouth daily 30 capsule 5    losartan (COZAAR) 100 MG tablet Take 1 tablet by mouth daily 90 tablet 3    gabapentin (NEURONTIN) 600 MG tablet TAKE 1 TABLET BY MOUTH THREE TIMES DAILY 90 tablet 1    omeprazole (PRILOSEC) 40 MG delayed release capsule Take 1 capsule by mouth daily 90 capsule 3    vitamin D (ERGOCALCIFEROL) 1.25 MG (91486 UT) CAPS capsule Take 1 capsule by mouth once a week 12 capsule 1    rivaroxaban (XARELTO) 20 MG TABS tablet Take 1 tablet by mouth daily (with breakfast) 30 tablet 3    HYDROcodone-acetaminophen (NORCO)  MG per tablet Take 1 tablet by mouth every 6 hours as needed for Pain.        metoprolol tartrate (LOPRESSOR) 25 MG tablet Take 1 tablet by mouth 2 times daily 180 tablet 3   [address] by Does not apply route 1 each 0    acyclovir (ZOVIRAX) 400 MG tablet Take 400 mg by mouth 2 times daily history of shingles in her eye      albuterol (ACCUNEB) 1.25 MG/3ML nebulizer solution Inhale 1 mL into the lungs every 6 hours as needed for Wheezing       dorzolamide-timolol (COSOPT) 22.3-6.8 MG/ML ophthalmic solution Place 1 Dose into both eyes daily      furosemide (LASIX) 20 MG tablet Take 1 tablet by mouth daily as needed (swelling) 30 tablet 3     No current facility-administered medications for this visit. Allergies   Allergen Reactions    Zoloft [Sertraline Hcl] Other (See Comments)     Patient states that she doesn't want this medication anymore because she hallucinated and saw spiders. Patient weaned herself off and after she quit taking the medication, the hallucinations stopped. Patient states that she doesn't want this medication anymore because she hallucinated and saw spiders. Patient weaned herself off and after she quit taking the medication, the hallucinations stopped. reports that she has never smoked. She has never used smokeless tobacco. She reports that she does not drink alcohol or use drugs. Review of Systems   Constitutional: Negative for appetite change, fever and unexpected weight change. HENT: Negative for sore throat and voice change. Respiratory: Negative for cough, chest tightness and shortness of breath. Cardiovascular: Negative for chest pain, palpitations and leg swelling. Gastrointestinal: Positive for abdominal pain. Negative for abdominal distention, blood in stool, constipation, diarrhea, nausea, rectal pain and vomiting. Reflux   Endocrine:        Hot flashes   Musculoskeletal: Positive for arthralgias, back pain and gait problem. Skin: Negative for pallor, rash and wound. Neurological: Positive for dizziness and weakness. Negative for light-headedness. Hematological: Negative for adenopathy. Does not bruise/bleed easily. All other systems reviewed and are negative.       Objective:   Physical

## 2021-03-10 NOTE — PATIENT INSTRUCTIONS
Schedule colonoscopy. No aspirin, ibuprofen, naproxen, fish oil or vitamin E for 5 days before procedure. Do not eat or drink after midnight the day of the procedure. Allowed medications can be taken with a small sip of water. Please review your prep instructions for allowed medications. You will not be able to drive for 24 hours after the procedure due to sedation. Bring a  with you the day of the procedure. If you are on blood thinners, clearance from the prescribing physician will be obtained before your procedure is scheduled. If it is determined it is not safe to hold these medications for a short time an alternative procedure for evaluation may be recommended. Risks of colonoscopy include, but are not limited to, perforation, bleeding, and infection, Risk of perforation and bleeding are increased if there is a polyp removed. Anesthesia risks will be reviewed with you before the procedure by a member of the anesthesia department. Your physician may also schedule a follow up appointment with the nurse practitioner to discuss pathology, symptoms or to check if you have had any problems related to your procedure. If you prefer not to return to the office after your procedure please discuss this with your physician on the day of your colonoscopy. The physician will talk with you and/or your family after the procedure is completed. Final recommendations are based on the pathologist report if biopsies or specimens are taken. For Colonoscopy: You will be given specific directions regarding restrictions to diet and bowel prep instructions including laxatives. Please read these instructions one week prior to your scheduled procedure to ensure that you are prepared. If you have any questions regarding these instructions please call our office Mon through Fri from 8:00 am to 4:00 pm.     Follow prep instructions provided for bowel prep. Take all of the bowel prep as directed.  If you are having problems with nausea, stop your prep for 30-45 min to allow the nausea to subside before resuming your prep. It is important to drink plenty of fluids throughout the day before taking your laxatives. This will help to protect your kidneys, prevent dehydration and maximize the effect of the bowel prep. If polyps are removed during the procedure they will be sent to a pathologist for analysis. Unless you have a follow up appointment scheduled, you will be notified by mail of the pathology results within 4 weeks. If you have not received results after 4 weeks you may call the office to obtain this information. Your diet before a colonoscopy bowel preparation is very important to ensure a successful colon exam. It is recommended to consider certain changes to your diet three to four days prior to the procedure. Remember that your bowels need to be empty for the exam.    What foods are good to eat? Cut down on heavy solid foods three to four days before the procedure and start introducing lighter meals to your diet. The following food suggestions are a good part of your diet before a colonoscopy bowel preparation. Light meat that is easily digestible such as chicken (without the skin)   Potatoes without skin   Cheese   Eggs   A light meal of steamed white fish   Light clear soups    Foods and drinks to avoid  Avoid foods that contain too much fiber. Stay clear of dark colored beverages. They can stick to the walls of the digestive tract and make it difficult to differentiate from blood. Some of these foods are:  Red meat, rice, nuts and vegetables   Milk, other milk based fluids and cream   Most fruit and puddings   Whole grain pasta   Cereals, bran and seeds   Colored beverages, especially those that are red or purple in color   Red colored Jell-O   On the day before the colonoscopy, continue to drink plenty of clear fluids.  It is important   to keep yourself hydrated before the exam. Please follow all instructions as provided for cleansing the bowel. Failure to have an adequately prepped colon may cause you to have incomplete exam with further testing required.      [email or web address]

## 2021-03-12 ENCOUNTER — TELEPHONE (OUTPATIENT)
Dept: NEUROSURGERY | Age: 86
End: 2021-03-12

## 2021-03-12 NOTE — TELEPHONE ENCOUNTER
Patient called  Stating she  Wanted to inform us of something,  States she hs been taking gabapentin 3 times daily for awhile. States lately she has been having problems with her skin and was told she might need to come off of the gabapentin. Patient wants to make sure its ok for her to cut down to  2 pills daily. I spoke to Dr MARY monzon and he states this will be ok. I called patient and informed her of this. Patient will do this for 2 weeks and then call us back for further instructions.

## 2021-03-17 DIAGNOSIS — R20.2 NUMBNESS AND TINGLING OF BOTH FEET: ICD-10-CM

## 2021-03-17 DIAGNOSIS — R20.0 NUMBNESS AND TINGLING OF BOTH FEET: ICD-10-CM

## 2021-03-17 DIAGNOSIS — M79.605 PAIN IN BOTH LOWER EXTREMITIES: ICD-10-CM

## 2021-03-17 DIAGNOSIS — M79.604 PAIN IN BOTH LOWER EXTREMITIES: ICD-10-CM

## 2021-03-17 RX ORDER — GABAPENTIN 600 MG/1
TABLET ORAL
Qty: 90 TABLET | Refills: 5 | Status: SHIPPED | OUTPATIENT
Start: 2021-03-17 | End: 2021-05-28

## 2021-03-18 ENCOUNTER — IMMUNIZATION (OUTPATIENT)
Dept: VACCINE CLINIC | Facility: HOSPITAL | Age: 86
End: 2021-03-18

## 2021-03-18 PROCEDURE — 0011A: CPT | Performed by: OBSTETRICS & GYNECOLOGY

## 2021-03-18 PROCEDURE — 91301 HC SARSCO02 VAC 100MCG/0.5ML IM: CPT | Performed by: OBSTETRICS & GYNECOLOGY

## 2021-03-22 ASSESSMENT — ENCOUNTER SYMPTOMS: BACK PAIN: 1

## 2021-03-25 ENCOUNTER — OFFICE VISIT (OUTPATIENT)
Age: 86
End: 2021-03-25

## 2021-03-25 DIAGNOSIS — Z11.59 SCREENING FOR VIRAL DISEASE: Primary | ICD-10-CM

## 2021-03-25 LAB — SARS-COV-2, PCR: NOT DETECTED

## 2021-03-25 PROCEDURE — 99999 PR OFFICE/OUTPT VISIT,PROCEDURE ONLY: CPT | Performed by: NURSE PRACTITIONER

## 2021-03-29 ENCOUNTER — HOSPITAL ENCOUNTER (OUTPATIENT)
Age: 86
Setting detail: OUTPATIENT SURGERY
Discharge: HOME | End: 2021-03-29
Attending: INTERNAL MEDICINE | Admitting: INTERNAL MEDICINE
Payer: MEDICARE

## 2021-03-29 ENCOUNTER — ANESTHESIA (OUTPATIENT)
Dept: OPERATING ROOM | Age: 86
End: 2021-03-29

## 2021-03-29 ENCOUNTER — HOSPITAL ENCOUNTER (OUTPATIENT)
Age: 86
Setting detail: SPECIMEN
Discharge: HOME | End: 2021-03-29
Payer: MEDICARE

## 2021-03-29 ENCOUNTER — APPOINTMENT (OUTPATIENT)
Dept: OPERATING ROOM | Age: 86
End: 2021-03-29

## 2021-03-29 ENCOUNTER — ANESTHESIA EVENT (OUTPATIENT)
Dept: OPERATING ROOM | Age: 86
End: 2021-03-29

## 2021-03-29 VITALS
OXYGEN SATURATION: 100 % | SYSTOLIC BLOOD PRESSURE: 141 MMHG | RESPIRATION RATE: 20 BRPM | WEIGHT: 140 LBS | BODY MASS INDEX: 24.8 KG/M2 | HEIGHT: 63 IN | HEART RATE: 90 BPM | TEMPERATURE: 97.6 F | DIASTOLIC BLOOD PRESSURE: 81 MMHG

## 2021-03-29 VITALS — SYSTOLIC BLOOD PRESSURE: 148 MMHG | OXYGEN SATURATION: 98 % | DIASTOLIC BLOOD PRESSURE: 70 MMHG

## 2021-03-29 PROCEDURE — 45380 COLONOSCOPY AND BIOPSY: CPT | Performed by: INTERNAL MEDICINE

## 2021-03-29 PROCEDURE — 45380 COLONOSCOPY AND BIOPSY: CPT

## 2021-03-29 PROCEDURE — 88305 TISSUE EXAM BY PATHOLOGIST: CPT

## 2021-03-29 PROCEDURE — G8907 PT DOC NO EVENTS ON DISCHARG: HCPCS

## 2021-03-29 PROCEDURE — G8918 PT W/O PREOP ORDER IV AB PRO: HCPCS

## 2021-03-29 RX ORDER — LIDOCAINE HYDROCHLORIDE 10 MG/ML
INJECTION, SOLUTION INFILTRATION; PERINEURAL PRN
Status: DISCONTINUED | OUTPATIENT
Start: 2021-03-29 | End: 2021-03-29 | Stop reason: SDUPTHER

## 2021-03-29 RX ORDER — SODIUM CHLORIDE 9 MG/ML
INJECTION, SOLUTION INTRAVENOUS CONTINUOUS
Status: DISCONTINUED | OUTPATIENT
Start: 2021-03-29 | End: 2021-03-29 | Stop reason: HOSPADM

## 2021-03-29 RX ORDER — HEPARIN SODIUM,PORCINE 10 UNIT/ML
3 VIAL (ML) INTRAVENOUS PRN
Status: DISCONTINUED | OUTPATIENT
Start: 2021-03-29 | End: 2021-03-29 | Stop reason: HOSPADM

## 2021-03-29 RX ORDER — PROPOFOL 10 MG/ML
INJECTION, EMULSION INTRAVENOUS PRN
Status: DISCONTINUED | OUTPATIENT
Start: 2021-03-29 | End: 2021-03-29 | Stop reason: SDUPTHER

## 2021-03-29 RX ORDER — PROPOFOL 10 MG/ML
INJECTION, EMULSION INTRAVENOUS PRN
Status: DISCONTINUED | OUTPATIENT
Start: 2021-03-29 | End: 2021-03-29

## 2021-03-29 RX ADMIN — Medication 30 UNITS: at 11:12

## 2021-03-29 RX ADMIN — PROPOFOL 100 MG: 10 INJECTION, EMULSION INTRAVENOUS at 10:39

## 2021-03-29 RX ADMIN — SODIUM CHLORIDE: 9 INJECTION, SOLUTION INTRAVENOUS at 10:14

## 2021-03-29 RX ADMIN — LIDOCAINE HYDROCHLORIDE 40 MG: 10 INJECTION, SOLUTION INFILTRATION; PERINEURAL at 10:39

## 2021-03-29 ASSESSMENT — PAIN - FUNCTIONAL ASSESSMENT: PAIN_FUNCTIONAL_ASSESSMENT: 0-10

## 2021-03-29 NOTE — ANESTHESIA PRE PROCEDURE
(ELIMITE) 5 % cream  1/28/21   Historical Provider, MD   betamethasone valerate (VALISONE) 0.1 % lotion APPLY EXTERNALLY TO THE SCALP ONCE EVERY DAY 1/19/21   Historical Provider, MD   DULoxetine (CYMBALTA) 20 MG extended release capsule Take 1 capsule by mouth daily 1/28/21   EARLE Gotti   rivaroxaban (XARELTO) 20 MG TABS tablet Take 1 tablet by mouth daily (with breakfast) 11/20/20   Randa Caraballo MD   [address] [place] [address] by Does not apply route 8/21/20   EARLE Celestin - CNP   albuterol (ACCUNEB) 1.25 MG/3ML nebulizer solution Inhale 1 mL into the lungs every 6 hours as needed for Wheezing     Historical Provider, MD       Current medications:    Current Facility-Administered Medications   Medication Dose Route Frequency Provider Last Rate Last Admin    0.9 % sodium chloride infusion   Intravenous Continuous Carmelo Montague MD           Allergies: Allergies   Allergen Reactions    Zoloft [Sertraline Hcl] Other (See Comments)     Patient states that she doesn't want this medication anymore because she hallucinated and saw spiders. Patient weaned herself off and after she quit taking the medication, the hallucinations stopped. Patient states that she doesn't want this medication anymore because she hallucinated and saw spiders. Patient weaned herself off and after she quit taking the medication, the hallucinations stopped.           Problem List:    Patient Active Problem List   Diagnosis Code    Personal history of colon cancer Z85.038    Family history of esophageal cancer Z80.0    Fibromyalgia M79.7    Renal bruit R09.89    Varicose veins of left lower extremity with inflammation, 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 ([facility] [address] I83.221, L97.121    Venous insufficiency I87.2    Hypertriglyceridemia E78.1    Dysphagia R13.10    Paroxysmal supraventricular tachycardia (HCC) I47.1    Vitamin D deficiency E55.9    Encounter for care related to Port-a-Cath Z45.2    Essential hypertension I10    History of colon polyps Z86.010    Port-A-Cath in place Z95.828    History of DVT (deep vein thrombosis) Z86.718    Avascular necrosis (HCC) M87.00    Pain in both lower extremities M79.604, M79.605    Numbness and tingling of both feet R20.0, R20.2    Acute deep vein thrombosis (DVT) of femoral vein of left lower extremity (HCC) I82.412    Cellulitis of left lower limb L03. 116    Fracture of right foot S92.901A    Hypochloremia E87.8    CKD (chronic kidney disease) stage 3, GFR 30-59 ml/min N18.30    Malignant neoplasm of colon (HCC) C18.9    Mixed simple and mucopurulent chronic bronchitis (HCC) J41.8    Fungal dermatitis B36.9    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 I83.11, I83.12       Past Medical History:        Diagnosis Date    Bronchitis     Colon cancer ([facility] [address]     Colon polyps     Constipation     Deep vein blood clot of left lower extremity (HCC)     Left leg     Depression     DVT (deep venous thrombosis) (HCC)     Dysphagia     Fibrocystic breast disease     Fibromyalgia     GERD (gastroesophageal reflux disease)     reflux with hx of esophageal stricture    Headache(784.0)     History of colon cancer     Hormone replacement therapy (postmenopausal)     Hypertension     Neuropathy of foot     In both feet    Osteoarthritis     left knee pain    Restless legs syndrome     Vitamin D deficiency        Past Surgical History:        Procedure Laterality Date    APPENDECTOMY      BREAST BIOPSY      CATARACT REMOVAL       SECTION      CHOLECYSTECTOMY      COLECTOMY  partial    COLON SURGERY      COLONOSCOPY  2010    COLONOSCOPY  2012    Dr Lucy Smith: unremarkable enterocolonic anastamosis; hyperplastic polyps    COLONOSCOPY  3/2013    [facility]: unremarkable post-surgical colon    COLONOSCOPY  3/11/16    Dr Liv Minor colon anastomosis, 5 yr recall    FOOT SURGERY  right foot    HAND SURGERY Right     Dr Lois Grant N/A 7/6/2020    KYPHOPLASTY L5 performed by Sylvie Ayala DO at [address]  [place] Right 12/14/2020    RIGHT L 4-5 DECOMPRESSIVE LAMINECTOMY performed by Sylvie Ayala DO at [address] [facility] TOTAL KNEE ARTHROPLASTY      UPPER GASTROINTESTINAL ENDOSCOPY  10-    UPPER GASTROINTESTINAL ENDOSCOPY  3/2013    [place]: peptic stricture dilated to 48F; HH; small antral mucosal elevation    UPPER GASTROINTESTINAL ENDOSCOPY  12/2014    [facility]: dilation of esophageal stricture    VARICOSE VEIN SURGERY Left 03/14/2014  [address]    Left GSV Ablation    VARICOSE VEIN SURGERY Right 04/04/2014  [address]    Right GSV Ablation       Social History:    Social History     Tobacco Use    Smoking status: Never Smoker    Smokeless tobacco: Never Used   Substance Use Topics    Alcohol use: No                                Counseling given: Not Answered      Vital Signs (Current):   Vitals:    03/29/21 0930   BP: (!) 157/90   Pulse: 99   Resp: 20   Temp: 97.6 °F (36.4 °C)   TempSrc: Tympanic   SpO2: 99%   Weight: 140 lb (63.5 kg)   Height: 5' 3\" (1.6 m)                                              BP Readings from Last 3 Encounters:   03/29/21 (!) 157/90   03/10/21 (!) 152/62   03/09/21 122/82       NPO Status:                                                                                 BMI:   Wt Readings from Last 3 Encounters:   03/29/21 140 lb (63.5 kg)   03/10/21 149 lb 6.4 oz (67.8 kg)   03/09/21 152 lb (68.9 kg)     Body mass index is 24.8 kg/m².     CBC:   Lab Results   Component Value Date    WBC 9.5 12/09/2020    RBC 4.79 12/09/2020    HGB 12.3 12/09/2020    HCT 40.3 12/09/2020    MCV 84.1 12/09/2020    RDW 15.6 12/09/2020     12/09/2020       CMP:   Lab Results   Component Value Date     12/09/2020    K 4.1 12/09/2020    K 4.2 04/25/2020     12/09/2020    CO2 24 12/09/2020    BUN 14 12/09/2020 CREATININE 0.7 12/09/2020    GFRAA >59 12/09/2020    LABGLOM >60 12/09/2020    GLUCOSE 113 12/09/2020    PROT 6.8 12/09/2020    PROT 6.9 10/31/2012    CALCIUM 9.5 12/09/2020    BILITOT <0.2 12/09/2020    ALKPHOS 91 12/09/2020    AST 17 12/09/2020    ALT 14 12/09/2020       POC Tests: No results for input(s): POCGLU, POCNA, POCK, POCCL, POCBUN, POCHEMO, POCHCT in the last 72 hours. Coags:   Lab Results   Component Value Date    PROTIME 12.5 12/09/2020    PROTIME 15.92 12/02/2011    INR 0.94 12/09/2020    APTT 23.6 12/09/2020       HCG (If Applicable): No results found for: PREGTESTUR, PREGSERUM, HCG, HCGQUANT     ABGs: No results found for: PHART, PO2ART, FFP2PKL, EJZ6SQG, BEART, E6OEOMOQ     Type & Screen (If Applicable):  No results found for: LABABO, LABRH    Drug/Infectious Status (If Applicable):  No results found for: HIV, HEPCAB    COVID-19 Screening (If Applicable):   Lab Results   Component Value Date    COVID19 Not Detected 03/25/2021           Anesthesia Evaluation  Patient summary reviewed and Nursing notes reviewed  Airway: Mallampati: II  TM distance: >3 FB   Neck ROM: full  Mouth opening: > = 3 FB Dental: normal exam         Pulmonary:Negative Pulmonary ROS and normal exam                               Cardiovascular:    (+) hypertension:, dysrhythmias: SVT,          Beta Blocker:  Dose within 24 Hrs         Neuro/Psych:   (+) neuromuscular disease:, headaches:, psychiatric history:            GI/Hepatic/Renal:   (+) GERD:, renal disease: CRI, bowel prep,          ROS comment: H/o partial colectomy- colon CA. Endo/Other:    (+) malignancy/cancer (h/o colon CA s/p colon resection). Abdominal:           Vascular:   + DVT, . Anesthesia Plan      general and TIVA     ASA 3       Induction: intravenous. Anesthetic plan and risks discussed with patient.                     EARLE Thibodeaux - CRNA   3/29/2021

## 2021-03-29 NOTE — H&P
Patient Name: Deanna Herrera  : 1935  MRN: [id number]  DATE: 21    Allergies: Allergies   Allergen Reactions    Zoloft [Sertraline Hcl] Other (See Comments)     Patient states that she doesn't want this medication anymore because she hallucinated and saw spiders. Patient weaned herself off and after she quit taking the medication, the hallucinations stopped. Patient states that she doesn't want this medication anymore because she hallucinated and saw spiders. Patient weaned herself off and after she quit taking the medication, the hallucinations stopped. ENDOSCOPY  History and Physical    Procedure:    [x] Diagnostic Colonoscopy       [] Screening Colonoscopy  [] EGD      [] ERCP      [] EUS       [] Other    [x] Previous office notes/History and Physical reviewed from the patients chart. Please see EMR for further details of HPI. I have examined the patient's status immediately prior to the procedure and:      Indications/HPI:    [x]Abdominal Pain   [x]Cancer- GI/Lung     []Fhx of colon CA/polyps  []History of Polyps  []Barretts            []Melena  []Abnormal Imaging              []Dysphagia              []Persistent Pneumonia   []Anemia                            []Food Impaction        [x]History of Polyps  [] GI Bleed             []Pulmonary nodule/Mass   []Change in bowel habits []Heartburn/Reflux  []Rectal Bleed (BRBPR)  []Chest Pain - Non Cardiac []Heme (+) Stool []Ulcers  []Constipation  []Hemoptysis  []Varices  []Diarrhea  []Hypoxemia    []Nausea/Vomiting   []Screening   []Crohns/Colitis  []Other:     Anesthesia:   [x] MAC [] Moderate Sedation   [] General   [] None     ROS: 12 pt Review of Symptoms was negative unless mentioned above    Medications:   Prior to Admission medications    Medication Sig Start Date End Date Taking?  Authorizing Provider   gabapentin (NEURONTIN) 600 MG tablet TAKE 1 TABLET BY MOUTH THREE TIMES DAILY 3/17/21 4/16/21  EARLE Jasso Cyanocobalamin (B-12) 500 MCG TABS TAKE 1 TABLET BY MOUTH DAILY 3/9/21   Georgia Calabrese MD   triamterene-hydroCHLOROthiazide (MAXZIDE-25) 37.5-25 MG per tablet TAKE 1 TABLET BY MOUTH DAILY 3/9/21   Avalon Сергей APRN - CNP   permethrin (ELIMITE) 5 % cream  1/28/21   Historical Provider, MD   tiZANidine (ZANAFLEX) 4 MG tablet TAKE 1 TABLET BY MOUTH THREE TIMES DAILY AS NEEDED. NOT TO EXCEED 3 DOSES IN 24 HOURS 1/18/21   Historical Provider, MD   betamethasone valerate (VALISONE) 0.1 % lotion APPLY EXTERNALLY TO THE SCALP ONCE EVERY DAY 1/19/21   Historical Provider, MD   DULoxetine (CYMBALTA) 20 MG extended release capsule Take 1 capsule by mouth daily 1/28/21   EARLE Alex   losartan (COZAAR) 100 MG tablet Take 1 tablet by mouth daily 1/14/21   Georgia Calabrese MD   omeprazole (PRILOSEC) 40 MG delayed release capsule Take 1 capsule by mouth daily 12/21/20   Georgia Calabrese MD   vitamin D (ERGOCALCIFEROL) 1.25 MG (32811 UT) CAPS capsule Take 1 capsule by mouth once a week 12/21/20   Georgia Calabrese MD   rivaroxaban (XARELTO) 20 MG TABS tablet Take 1 tablet by mouth daily (with breakfast) 11/20/20   Georgia Calabrese MD   HYDROcodone-acetaminophen (NORCO)  MG per tablet Take 1 tablet by mouth every 6 hours as needed for Pain.   10/21/20   Historical Provider, MD   metoprolol tartrate (LOPRESSOR) 25 MG tablet Take 1 tablet by mouth 2 times daily 10/16/20   Georgia Calabrese MD   Hospital Bed [facility] by Does not apply route 8/21/20   Lilli EARLE Rushing CNP   acyclovir (ZOVIRAX) 400 MG tablet Take 400 mg by mouth 2 times daily history of shingles in her eye    Historical Provider, MD   albuterol (ACCUNEB) 1.25 MG/3ML nebulizer solution Inhale 1 mL into the lungs every 6 hours as needed for Wheezing     Historical Provider, MD   dorzolamide-timolol (COSOPT) 22.3-6.8 MG/ML ophthalmic solution Place 1 Dose into both eyes daily    Historical Provider, MD   furosemide (LASIX) 20 MG tablet Take 1 tablet by mouth daily as needed (swelling) 18   Cyndi Amin MD       Past Medical History:  Past Medical History:   Diagnosis Date    Bronchitis     Colon cancer ([address]     Colon polyps     Constipation     Deep vein blood clot of left lower extremity (HCC)     Left leg     Depression     DVT (deep venous thrombosis) (HCC)     Dysphagia     Fibrocystic breast disease     Fibromyalgia     GERD (gastroesophageal reflux disease)     reflux with hx of esophageal stricture    Headache(784.0)     History of colon cancer 2000    Hormone replacement therapy (postmenopausal)     Hypertension     Neuropathy of foot     In both feet    Osteoarthritis     left knee pain    Restless legs syndrome     Vitamin D deficiency        Past Surgical History:  Past Surgical History:   Procedure Laterality Date    APPENDECTOMY      BREAST BIOPSY      CATARACT REMOVAL       SECTION      CHOLECYSTECTOMY      COLECTOMY  partial    COLON SURGERY      COLONOSCOPY  2010    COLONOSCOPY  2012    Dr Homer Prader: unremarkable enterocolonic anastamosis; hyperplastic polyps    COLONOSCOPY  3/2013    [facility]: unremarkable post-surgical colon    COLONOSCOPY  3/11/16    Dr Anibal Olivera colon anastomosis, 5 yr recall    FOOT SURGERY  right foot    HAND SURGERY Right     Dr Betty Graham N/A 2020    KYPHOPLASTY L5 performed by Noman Liz DO at [address] [facility]  [place] Right 2020    RIGHT L 4-5 DECOMPRESSIVE LAMINECTOMY performed by Noman Liz DO at [address] [facility] TOTAL KNEE ARTHROPLASTY      UPPER GASTROINTESTINAL ENDOSCOPY  10-    UPPER GASTROINTESTINAL ENDOSCOPY  3/2013    [facility]: peptic stricture dilated to 48F; HH; small antral mucosal elevation    UPPER GASTROINTESTINAL ENDOSCOPY  2014    [facility]: dilation of esophageal stricture    VARICOSE VEIN SURGERY Left 2014  [facility] & [address]    Left GSV Ablation  VARICOSE VEIN SURGERY Right 04/04/2014  [facility]    Right GSV Ablation       Social History:  Social History     Tobacco Use    Smoking status: Never Smoker    Smokeless tobacco: Never Used   Substance Use Topics    Alcohol use: No    Drug use: No       Vital Signs: There were no vitals filed for this visit. Physical Exam:  Cardiac:  [x]WNL  []Comments:  Pulmonary:  [x]WNL   []Comments:  Neuro/Mental Status:  [x]WNL  []Comments:  Abdominal:  [x]WNL    []Comments:  Other:   []WNL  []Comments:    Informed Consent:  The risks and benefits of the procedure have been discussed with either the patient or if they cannot consent, their representative. Assessment:  Patient examined and appropriate for planned sedation and procedure. Plan:  Proceed with planned sedation and procedure as above. Ml Mendoza am scribing for and in the presence of Dr. Nadine Cruz MD.  Electronically signed by Michael Anderson RN on 3/29/2021 at 9:28 AM    I personally performed the services described in this documentation as scribed by Uzair Mckee, and it appears accurate and complete.      Nadine Cruz MD  3/29/2021

## 2021-03-29 NOTE — OP NOTE
Patient: Herb Lopez : 1935  Med Rec#: [id number] Acc#: [id number]   Primary Care Provider Po Collado MD    Date of Procedure:  3/29/2021    Endoscopist: Isiah Gilmore MD    Referring Provider: Po Collado MD, EARLE Savage    Operation Performed: Colonoscopy with biopsy    Indications: Abdominal pain, diarrhea, hx of colon CA    Anesthesia:  Sedation was administered by anesthesia who monitored the patient during the procedure. I met with Herb Lopez prior to procedure. We discussed the procedure itself, and I have discussed the risks of endoscopy (including-- but not limited to-- pain, discomfort, bleeding potentially requiring second endoscopic procedure and/or blood transfusion, organ perforation requiring operative repair, damage to organs near the colon, infection, aspiration, cardiopulmonary/allergic reaction), benefits, indications to endoscopy. Additionally, we discussed options other than colonoscopy. The patient expressed understanding. All questions answered. The patient decided to proceed with the procedure. Signed informed consent was placed on the chart. Blood Loss: minimal    Withdrawal time: > 6 min  Bowel Prep: adequate     Complications: no immediate complications    DESCRIPTION OF PROCEDURE:     A time out was performed. After written informed consent was obtained, the patient was placed in the left lateral position. The perianal area was inspected, and a digital rectal exam was performed. A rectal exam was performed: normal tone, no palpable lesions. At this point, a forward viewing Olympus colonoscope was inserted into the anus and carefully advanced to the right-sided anastomosis. The cecum was identified by the ileocecal valve and the appendiceal orific. The colonoscope was then slowly withdrawn with careful inspection of the mucosa in a linear and circumferential fashion. The scope was retroflexed in the rectum.  Suction was utilized during the procedure to

## 2021-03-29 NOTE — ANESTHESIA POSTPROCEDURE EVALUATION
Department of Anesthesiology  Postprocedure Note    Patient: Whitney Hernandez  MRN: [id number]  YOB: 1935  Date of evaluation: 3/29/2021  Time:  10:49 AM     Procedure Summary     Date: 03/29/21 Room / Location: [facility] ENDO 02 / [address] [place] [address]    Anesthesia Start: 1031 Anesthesia Stop:     Procedures:       COLONOSCOPY DIAGNOSTIC (N/A Abdomen)      COLONOSCOPY POLYPECTOMY SNARE/COLD BIOPSY Diagnosis: (HX COLON CA, RIGHT LAT ABD PAIN, HX PART COLECT)    Surgeons: Carmelo Montague MD Responsible Provider: EARLE Ramirez CRNA    Anesthesia Type: general, TIVA ASA Status: 3          Anesthesia Type: No value filed. Abbie Phase I:      Abbie Phase II:      Last vitals: Reviewed and per EMR flowsheets.        Anesthesia Post Evaluation    Patient location during evaluation: bedside  Patient participation: complete - patient participated  Level of consciousness: sleepy but conscious  Pain score: 0  Airway patency: patent  Nausea & Vomiting: no nausea and no vomiting  Complications: no  Cardiovascular status: hemodynamically stable and blood pressure returned to baseline  Respiratory status: acceptable and nasal cannula  Hydration status: stable

## 2021-04-15 ENCOUNTER — IMMUNIZATION (OUTPATIENT)
Dept: VACCINE CLINIC | Facility: HOSPITAL | Age: 86
End: 2021-04-15

## 2021-04-15 PROCEDURE — 0012A: CPT | Performed by: OBSTETRICS & GYNECOLOGY

## 2021-04-15 PROCEDURE — 91301 HC SARSCO02 VAC 100MCG/0.5ML IM: CPT | Performed by: OBSTETRICS & GYNECOLOGY

## 2021-04-20 ENCOUNTER — OFFICE VISIT (OUTPATIENT)
Dept: PRIMARY CARE CLINIC | Age: 86
End: 2021-04-20
Payer: MEDICARE

## 2021-04-20 VITALS
HEART RATE: 64 BPM | BODY MASS INDEX: 26.05 KG/M2 | HEIGHT: 63 IN | WEIGHT: 147 LBS | TEMPERATURE: 96.1 F | DIASTOLIC BLOOD PRESSURE: 60 MMHG | OXYGEN SATURATION: 96 % | RESPIRATION RATE: 18 BRPM | SYSTOLIC BLOOD PRESSURE: 123 MMHG

## 2021-04-20 DIAGNOSIS — I10 ESSENTIAL HYPERTENSION: Primary | ICD-10-CM

## 2021-04-20 DIAGNOSIS — F32.A DEPRESSION, UNSPECIFIED DEPRESSION TYPE: ICD-10-CM

## 2021-04-20 DIAGNOSIS — I47.1 PAROXYSMAL SUPRAVENTRICULAR TACHYCARDIA (HCC): ICD-10-CM

## 2021-04-20 DIAGNOSIS — I83.221: ICD-10-CM

## 2021-04-20 DIAGNOSIS — R23.2 HOT FLASHES: ICD-10-CM

## 2021-04-20 DIAGNOSIS — Z45.2 ENCOUNTER FOR CARE RELATED TO PORT-A-CATH: ICD-10-CM

## 2021-04-20 DIAGNOSIS — M25.551 RIGHT HIP PAIN: ICD-10-CM

## 2021-04-20 DIAGNOSIS — Z95.828 PORT-A-CATH IN PLACE: ICD-10-CM

## 2021-04-20 DIAGNOSIS — C18.9 MALIGNANT NEOPLASM OF COLON, UNSPECIFIED PART OF COLON (HCC): ICD-10-CM

## 2021-04-20 DIAGNOSIS — E61.1 IRON DEFICIENCY: ICD-10-CM

## 2021-04-20 DIAGNOSIS — E78.1 HYPERTRIGLYCERIDEMIA: ICD-10-CM

## 2021-04-20 DIAGNOSIS — E55.9 VITAMIN D DEFICIENCY: ICD-10-CM

## 2021-04-20 DIAGNOSIS — F41.9 ANXIETY DISORDER, UNSPECIFIED TYPE: ICD-10-CM

## 2021-04-20 DIAGNOSIS — L97.121: ICD-10-CM

## 2021-04-20 DIAGNOSIS — E53.8 B12 DEFICIENCY: ICD-10-CM

## 2021-04-20 DIAGNOSIS — Z00.00 ROUTINE GENERAL MEDICAL EXAMINATION AT A HEALTH CARE FACILITY: ICD-10-CM

## 2021-04-20 DIAGNOSIS — J41.8 MIXED SIMPLE AND MUCOPURULENT CHRONIC BRONCHITIS (HCC): ICD-10-CM

## 2021-04-20 LAB
ALBUMIN SERPL-MCNC: 3.6 G/DL (ref 3.5–5.2)
ALP BLD-CCNC: 94 U/L (ref 35–104)
ALT SERPL-CCNC: 9 U/L (ref 5–33)
ANION GAP SERPL CALCULATED.3IONS-SCNC: 15 MMOL/L (ref 7–19)
AST SERPL-CCNC: 15 U/L (ref 5–32)
BASOPHILS ABSOLUTE: 0.1 K/UL (ref 0–0.2)
BASOPHILS RELATIVE PERCENT: 0.6 % (ref 0–1)
BILIRUB SERPL-MCNC: <0.2 MG/DL (ref 0.2–1.2)
BUN BLDV-MCNC: 19 MG/DL (ref 8–23)
CALCIUM SERPL-MCNC: 9.5 MG/DL (ref 8.8–10.2)
CEA: 4.4 NG/ML (ref 0–4.7)
CHLORIDE BLD-SCNC: 100 MMOL/L (ref 98–111)
CHOLESTEROL, TOTAL: 174 MG/DL (ref 160–199)
CO2: 22 MMOL/L (ref 22–29)
CREAT SERPL-MCNC: 0.9 MG/DL (ref 0.5–0.9)
EOSINOPHILS ABSOLUTE: 0.6 K/UL (ref 0–0.6)
EOSINOPHILS RELATIVE PERCENT: 7 % (ref 0–5)
GFR AFRICAN AMERICAN: >59
GFR NON-AFRICAN AMERICAN: 60
GLUCOSE BLD-MCNC: 108 MG/DL (ref 74–109)
HCT VFR BLD CALC: 40 % (ref 37–47)
HDLC SERPL-MCNC: 36 MG/DL (ref 65–121)
HEMOGLOBIN: 12.5 G/DL (ref 12–16)
IMMATURE GRANULOCYTES #: 0 K/UL
IRON SATURATION: 21 % (ref 14–50)
IRON: 75 UG/DL (ref 37–145)
LDL CHOLESTEROL CALCULATED: 81 MG/DL
LYMPHOCYTES ABSOLUTE: 2.6 K/UL (ref 1.1–4.5)
LYMPHOCYTES RELATIVE PERCENT: 32.3 % (ref 20–40)
MCH RBC QN AUTO: 27.4 PG (ref 27–31)
MCHC RBC AUTO-ENTMCNC: 31.3 G/DL (ref 33–37)
MCV RBC AUTO: 87.5 FL (ref 81–99)
MONOCYTES ABSOLUTE: 0.7 K/UL (ref 0–0.9)
MONOCYTES RELATIVE PERCENT: 8.3 % (ref 0–10)
NEUTROPHILS ABSOLUTE: 4.1 K/UL (ref 1.5–7.5)
NEUTROPHILS RELATIVE PERCENT: 51.4 % (ref 50–65)
PDW BLD-RTO: 17.1 % (ref 11.5–14.5)
PLATELET # BLD: 342 K/UL (ref 130–400)
PMV BLD AUTO: 10 FL (ref 9.4–12.3)
POTASSIUM SERPL-SCNC: 3.8 MMOL/L (ref 3.5–5)
RBC # BLD: 4.57 M/UL (ref 4.2–5.4)
SODIUM BLD-SCNC: 137 MMOL/L (ref 136–145)
TOTAL IRON BINDING CAPACITY: 361 UG/DL (ref 250–400)
TOTAL PROTEIN: 7 G/DL (ref 6.6–8.7)
TRIGL SERPL-MCNC: 286 MG/DL (ref 0–149)
TSH SERPL DL<=0.05 MIU/L-ACNC: 1.88 UIU/ML (ref 0.27–4.2)
VITAMIN B-12: 639 PG/ML (ref 211–946)
VITAMIN D 25-HYDROXY: 48.8 NG/ML
WBC # BLD: 8 K/UL (ref 4.8–10.8)

## 2021-04-20 PROCEDURE — 1036F TOBACCO NON-USER: CPT | Performed by: NURSE PRACTITIONER

## 2021-04-20 PROCEDURE — 3023F SPIROM DOC REV: CPT | Performed by: NURSE PRACTITIONER

## 2021-04-20 PROCEDURE — 4040F PNEUMOC VAC/ADMIN/RCVD: CPT | Performed by: NURSE PRACTITIONER

## 2021-04-20 PROCEDURE — 36415 COLL VENOUS BLD VENIPUNCTURE: CPT | Performed by: NURSE PRACTITIONER

## 2021-04-20 PROCEDURE — G0439 PPPS, SUBSEQ VISIT: HCPCS | Performed by: NURSE PRACTITIONER

## 2021-04-20 PROCEDURE — 1090F PRES/ABSN URINE INCON ASSESS: CPT | Performed by: NURSE PRACTITIONER

## 2021-04-20 PROCEDURE — 1123F ACP DISCUSS/DSCN MKR DOCD: CPT | Performed by: NURSE PRACTITIONER

## 2021-04-20 PROCEDURE — G8926 SPIRO NO PERF OR DOC: HCPCS | Performed by: NURSE PRACTITIONER

## 2021-04-20 PROCEDURE — G8427 DOCREV CUR MEDS BY ELIG CLIN: HCPCS | Performed by: NURSE PRACTITIONER

## 2021-04-20 PROCEDURE — G8399 PT W/DXA RESULTS DOCUMENT: HCPCS | Performed by: NURSE PRACTITIONER

## 2021-04-20 PROCEDURE — 99213 OFFICE O/P EST LOW 20 MIN: CPT | Performed by: NURSE PRACTITIONER

## 2021-04-20 PROCEDURE — G8417 CALC BMI ABV UP PARAM F/U: HCPCS | Performed by: NURSE PRACTITIONER

## 2021-04-20 RX ORDER — ONDANSETRON 4 MG/1
TABLET, FILM COATED ORAL
COMMUNITY
Start: 2021-03-22 | End: 2021-05-26

## 2021-04-20 RX ORDER — TRIAMCINOLONE ACETONIDE 1 MG/G
CREAM TOPICAL
COMMUNITY
Start: 2021-03-22 | End: 2021-07-28 | Stop reason: SDUPTHER

## 2021-04-20 ASSESSMENT — PATIENT HEALTH QUESTIONNAIRE - PHQ9
SUM OF ALL RESPONSES TO PHQ9 QUESTIONS 1 & 2: 2
1. LITTLE INTEREST OR PLEASURE IN DOING THINGS: 1
2. FEELING DOWN, DEPRESSED OR HOPELESS: 1

## 2021-04-20 ASSESSMENT — ENCOUNTER SYMPTOMS
BACK PAIN: 1
EYES NEGATIVE: 1
GASTROINTESTINAL NEGATIVE: 1
RESPIRATORY NEGATIVE: 1

## 2021-04-20 ASSESSMENT — LIFESTYLE VARIABLES: HOW OFTEN DO YOU HAVE A DRINK CONTAINING ALCOHOL: 0

## 2021-04-20 NOTE — PROGRESS NOTES
[address] [facility]  [address] [address] [place] [address]  [address] [place] [address]  Dept: [phone]  Dept Fax: [phone]  Loc: [phone]    Delmar Syed is a 82 y.o. female who presents today for her medical conditions/complaints as noted below. Delmar Syed is c/o of Medicare AW (patient presents today for her MAW. )        HPI:     HPI   Chief Complaint   Patient presents with    Medicare AWV     patient presents today for her MAW.    she sees neuro at [facility]. Pain management at  and had MRI lumbar spine 2 wks ago but hasnt heard results. Was on gabapentin and as she increased it she started having reaction to it, with some hallucinations. She has weaned down to 1/2 3 times a day. She takes norco every 6 hours. Having some dizzy spells , hot flashes  She is mainly in a wheelchair because she cannot tolerate standing very long she does use a walker to get up and transfer.   Also went ahead and flush her port today so I made this a separate visit from her Medicare visit  Past Medical History:   Diagnosis Date    Bronchitis     Colon cancer ([address]     Colon polyps     Constipation     Deep vein blood clot of left lower extremity (HCC)     Left leg     Depression     DVT (deep venous thrombosis) (HCC)     Dysphagia     Fibrocystic breast disease     Fibromyalgia     GERD (gastroesophageal reflux disease)     reflux with hx of esophageal stricture    Headache(784.0)     History of colon cancer     Hormone replacement therapy (postmenopausal)     Hypertension     Neuropathy of foot     In both feet    Osteoarthritis     left knee pain    Restless legs syndrome     Vitamin D deficiency       Past Surgical History:   Procedure Laterality Date    APPENDECTOMY      BREAST BIOPSY      CATARACT REMOVAL       SECTION      CHOLECYSTECTOMY      COLECTOMY  partial    COLON SURGERY      COLONOSCOPY  2010    COLONOSCOPY  2012    Dr Homer Prader: unremarkable enterocolonic anastamosis; hyperplastic polyps    COLONOSCOPY  03/01/2013    [place]: unremarkable post-surgical colon    COLONOSCOPY  03/11/2016    Dr Barak Akhtar colon anastomosis, 5 yr recall    COLONOSCOPY N/A 03/29/2021    Dr Garcia Si and patent anastomosis in the right side-BCM, prn (age)    COLONOSCOPY  03/29/2021    Dr Garcia Si and patent anastomosis in the right side-BCM, prn (age)   [facility] FOOT SURGERY  right foot    HAND SURGERY Right     Dr Tiffanie Chaudhry N/A 07/06/2020    KYPHOPLASTY L5 performed by Abiodun Ortiz DO at [address]  [place] Right 12/14/2020    RIGHT L 4-5 DECOMPRESSIVE LAMINECTOMY performed by Abiodun Ortiz DO at [address] [facility]      UPPER GASTROINTESTINAL ENDOSCOPY  10/16/2009    UPPER GASTROINTESTINAL ENDOSCOPY  03/01/2013    Bradley: peptic stricture dilated to 48F; HH; small antral mucosal elevation    UPPER GASTROINTESTINAL ENDOSCOPY  12/01/2014    Maurilio: dilation of esophageal stricture    VARICOSE VEIN SURGERY Left 03/14/2014  [facility]    Left GSV Ablation    VARICOSE VEIN SURGERY Right 04/04/2014  [facility]    Right GSV Ablation       Vitals 4/20/2021 3/29/2021 3/29/2021 3/29/2021 3/29/2021 4/42/8883   SYSTOLIC 505 004 299 - 559 -   DIASTOLIC 60 81 74 - 70 -   Site - - - - - -   Position - - - - - -   Cuff Size - - - - - -   Pulse 64 90 98 - - -   Temp 96.1 - - - - -   Resp 18 20 20 - - -   SpO2 96 100 98 98 98 99   Weight 147 lb - - - - -   Height 5' 3\" - - - - -   Body mass index 26.04 kg/m2 - - - - -   Pain Level - - - - - -   Some recent data might be hidden       Family History   Problem Relation Age of Onset    Cancer Mother         Cervical Cancer    Cancer Brother         Esophageal cancer    Diabetes Brother     Esophageal Cancer Brother     Diabetes Sister     Colon Cancer Neg Hx     Colon Polyps Neg Hx     Liver Cancer Neg Hx     Liver Disease Neg Hx     Rectal Cancer Neg Hx     Stomach Cancer Neg Hx        Social History     Tobacco Use    Smoking status: Never Smoker    Smokeless tobacco: Never Used   Substance Use Topics    Alcohol use: No      Current Outpatient Medications   Medication Sig Dispense Refill    gabapentin (NEURONTIN) 600 MG tablet TAKE 1 TABLET BY MOUTH THREE TIMES DAILY 90 tablet 5    Cyanocobalamin (B-12) 500 MCG TABS TAKE 1 TABLET BY MOUTH DAILY 30 tablet 11    triamterene-hydroCHLOROthiazide (MAXZIDE-25) 37.5-25 MG per tablet TAKE 1 TABLET BY MOUTH DAILY 90 tablet 3    permethrin (ELIMITE) 5 % cream       tiZANidine (ZANAFLEX) 4 MG tablet TAKE 1 TABLET BY MOUTH THREE TIMES DAILY AS NEEDED. NOT TO EXCEED 3 DOSES IN 24 HOURS      betamethasone valerate (VALISONE) 0.1 % lotion APPLY EXTERNALLY TO THE SCALP ONCE EVERY DAY      DULoxetine (CYMBALTA) 20 MG extended release capsule Take 1 capsule by mouth daily 30 capsule 5    losartan (COZAAR) 100 MG tablet Take 1 tablet by mouth daily 90 tablet 3    omeprazole (PRILOSEC) 40 MG delayed release capsule Take 1 capsule by mouth daily 90 capsule 3    vitamin D (ERGOCALCIFEROL) 1.25 MG (68425 UT) CAPS capsule Take 1 capsule by mouth once a week 12 capsule 1    rivaroxaban (XARELTO) 20 MG TABS tablet Take 1 tablet by mouth daily (with breakfast) 30 tablet 3    HYDROcodone-acetaminophen (NORCO)  MG per tablet Take 1 tablet by mouth every 6 hours as needed for Pain.        metoprolol tartrate (LOPRESSOR) 25 MG tablet Take 1 tablet by mouth 2 times daily 180 tablet 3   [address] by Does not apply route 1 each 0    acyclovir (ZOVIRAX) 400 MG tablet Take 400 mg by mouth 2 times daily history of shingles in her eye      albuterol (ACCUNEB) 1.25 MG/3ML nebulizer solution Inhale 1 mL into the lungs every 6 hours as needed for Wheezing       dorzolamide-timolol (COSOPT) 22.3-6.8 MG/ML ophthalmic solution Place 1 Dose into both eyes daily      furosemide (LASIX) 20 MG tablet Take 1 tablet by mouth daily as needed (swelling) 30 tablet 3    ondansetron (ZOFRAN) 4 MG tablet TAKE ONE TABLET BY MOUTH ONCE DAILY      triamcinolone (KENALOG) 0.1 % cream APPLY TWICE DAILY TO RASH UP TO 2 WEEKS/MONTH AS NEEDED       No current facility-administered medications for this visit. Allergies   Allergen Reactions    Zoloft [Sertraline Hcl] Other (See Comments)     Patient states that she doesn't want this medication anymore because she hallucinated and saw spiders. Patient weaned herself off and after she quit taking the medication, the hallucinations stopped. Patient states that she doesn't want this medication anymore because she hallucinated and saw spiders. Patient weaned herself off and after she quit taking the medication, the hallucinations stopped. Health Maintenance   Topic Date Due    Shingles Vaccine (1 of 2) Never done   [employer] Visit (AWV)  Never done    Potassium monitoring  12/09/2021    Creatinine monitoring  12/09/2021    DTaP/Tdap/Td vaccine (3 - Td) 12/23/2025    DEXA (modify frequency per FRAX score)  Completed    Flu vaccine  Completed    Pneumococcal 65+ years Vaccine  Completed    COVID-19 Vaccine  Completed    Colon cancer screen colonoscopy  Completed    Hepatitis A vaccine  Aged Out    Hepatitis B vaccine  Aged Out    Hib vaccine  Aged Out    Meningococcal (ACWY) vaccine  Aged Out       Subjective:      Review of Systems   Constitutional: Positive for activity change. HENT: Negative. Eyes: Negative. Respiratory: Negative. Cardiovascular: Negative. Gastrointestinal: Negative. Genitourinary: Negative. Musculoskeletal: Positive for back pain. Right hip and leg pain. Skin: Negative. Neurological: Positive for dizziness. Psychiatric/Behavioral: Negative. Objective:     Physical Exam  Vitals signs and nursing note reviewed. Constitutional:       Appearance: Normal appearance.    Cardiovascular: Pulses: Normal pulses. Heart sounds: Normal heart sounds. Pulmonary:      Effort: Pulmonary effort is normal.      Breath sounds: Normal breath sounds. Musculoskeletal:      Lumbar back: She exhibits decreased range of motion, tenderness and pain. She exhibits no bony tenderness, no swelling, no edema, no deformity, no laceration, no spasm and normal pulse. Skin:     Capillary Refill: Capillary refill takes less than 2 seconds. Neurological:      General: No focal deficit present. Mental Status: She is alert. Psychiatric:         Mood and Affect: Mood normal.         Behavior: Behavior normal.         Thought Content: Thought content normal.         Judgment: Judgment normal.       /60   Pulse 64   Temp 96.1 °F (35.6 °C) (Temporal)   Resp 18   Ht 5' 3\" (1.6 m)   Wt 147 lb (66.7 kg)   SpO2 96%   Breastfeeding No   BMI 26.04 kg/m²   Venous Access Procedure Note  Indication: maintenance flush    Procedure: The patient was placed in the appropriate position and the skin over the puncture site was prepped with betadine and draped in a sterile fashion. Intravenous access was obtained in right chest port with a Bliss needle and the site was secured appropriately. 3 cc of blood with withdrawn and discarded. 10cc blood drawn for labs. The port was flushed with 10cc NS and then 3 cc heparin flush. The patient tolerated the procedure well. Complications: None    Assessment:       Diagnosis Orders   1. Essential hypertension  CBC Auto Differential    Comprehensive Metabolic Panel   2. Depression, unspecified depression type     3. Right hip pain     4. Encounter for care related to Port-a-Cath     5. Port-A-Cath in place     6. Hypertriglyceridemia  Lipid Panel   7. Malignant neoplasm of colon, unspecified part of colon ([facility] [address]  CEA   8. Vitamin D deficiency  Vitamin D 25 Hydroxy   9. Hot flashes  TSH without Reflex   10.  Varicose veins of left lower extremity with inflammation, with ulcer applicable) as well as your Preventive Care list are included within your After Visit Summary for your review. Other Preventive Recommendations:    · A preventive eye exam performed by an eye specialist is recommended every 1-2 years to screen for glaucoma; cataracts, macular degeneration, and other eye disorders. · A preventive dental visit is recommended every 6 months. · Try to get at least 150 minutes of exercise per week or 10,000 steps per day on a pedometer . · Order or download the FREE \"Exercise & Physical Activity: Your Everyday Guide\" from [employer]. Call 1-189.639.4628 or search [employer] online. · You need 6919-6813 mg of calcium and 2737-9646 IU of vitamin D per day. It is possible to meet your calcium requirement with diet alone, but a vitamin D supplement is usually necessary to meet this goal.  · When exposed to the sun, use a sunscreen that protects against both UVA and UVB radiation with an SPF of 30 or greater. Reapply every 2 to 3 hours or after sweating, drying off with a towel, or swimming. · Always wear a seat belt when traveling in a car. Always wear a helmet when riding a bicycle or motorcycle. Electronically signed by EARLE Yi CNP on 2021 at 1:20 PM    EMR Dragon/transcription disclaimer:  Much of thisencounter note is electronic transcription/translation of spoken language to printed texts. The electronic translation of spoken language may be erroneous, or at times, nonsensical words or phrases may be inadvertentlytranscribed. Although I have reviewed the note for such errors, some may still exist.Medicare Annual Wellness Visit  Name: Boris Durbin Date: 2021   MRN: [id number] Sex: Female   Age: 82 y.o.  Ethnicity: Non-/Non    : 1935 Race: Damien Aranda is here for Medicare AWV (patient presents today for her [de-identified]. )    Screenings for behavioral, psychosocial and functional/safety risks, and cognitive dysfunction are all negative except as indicated below. These results, as well as other patient data from the [address] form, are documented in Flowsheets linked to this Encounter. Allergies   Allergen Reactions    Zoloft [Sertraline Hcl] Other (See Comments)     Patient states that she doesn't want this medication anymore because she hallucinated and saw spiders. Patient weaned herself off and after she quit taking the medication, the hallucinations stopped. Patient states that she doesn't want this medication anymore because she hallucinated and saw spiders. Patient weaned herself off and after she quit taking the medication, the hallucinations stopped. Prior to Visit Medications    Medication Sig Taking? Authorizing Provider   gabapentin (NEURONTIN) 600 MG tablet TAKE 1 TABLET BY MOUTH THREE TIMES DAILY Yes EARLE Agustin   Cyanocobalamin (B-12) 500 MCG TABS TAKE 1 TABLET BY MOUTH DAILY Yes Gualberto Mejia MD   triamterene-hydroCHLOROthiazide (MAXZIDE-25) 37.5-25 MG per tablet TAKE 1 TABLET BY MOUTH DAILY Yes EARLE Rivera - CNP   permethrin (ELIMITE) 5 % cream  Yes Historical Provider, MD   tiZANidine (ZANAFLEX) 4 MG tablet TAKE 1 TABLET BY MOUTH THREE TIMES DAILY AS NEEDED.  NOT TO EXCEED 3 DOSES IN 24 HOURS Yes Historical Provider, MD   betamethasone valerate (VALISONE) 0.1 % lotion APPLY EXTERNALLY TO THE SCALP ONCE EVERY DAY Yes Historical Provider, MD   DULoxetine (CYMBALTA) 20 MG extended release capsule Take 1 capsule by mouth daily Yes EARLE Agustin   losartan (COZAAR) 100 MG tablet Take 1 tablet by mouth daily Yes Gualberto Mejia MD   omeprazole (PRILOSEC) 40 MG delayed release capsule Take 1 capsule by mouth daily Yes Gualberto Mejia MD   vitamin D (ERGOCALCIFEROL) 1.25 MG (73246 UT) CAPS capsule Take 1 capsule by mouth once a week Yes Gualberto Mejia MD   rivaroxaban (XARELTO) 20 MG TABS tablet Take 1 tablet ordered. Positive Risk Factor Screenings with Interventions:     Fall Risk:  2 or more falls in past year?: (!) yes  Fall with injury in past year?: no  Fall Risk Interventions:    · Home safety tips provided          General Health and ACP:  General  In general, how would you say your health is?: Good  In the past 7 days, have you experienced any of the following? New or Increased Pain, New or Increased Fatigue, Loneliness, Social Isolation, Stress or Anger?: (!) New or Increased Pain, New or Increased Fatigue  Do you get the social and emotional support that you need?: Yes  Do you have a Living Will?: Yes  Advance Directives     Power of  Living Will ACP-Advance Directive ACP-Power of     Not on File Filed on 03/11/16 Filed Not on File      General Health Risk Interventions:  · Pain issues: seeing OI and pain management    Health Habits/Nutrition:  Health Habits/Nutrition  Do you exercise for at least 20 minutes 2-3 times per week?: (!) No  Have you lost any weight without trying in the past 3 months?: (!) Yes  Do you eat only one meal per day?: No  Have you seen the dentist within the past year?: N/A - wear dentures  Body mass index: (!) 26.04  Health Habits/Nutrition Interventions:  · Inadequate physical activity:  patient is not ready to increase his/her physical activity level at this time    Hearing/Vision:  No exam data present  Hearing/Vision  Do you or your family notice any trouble with your hearing that hasn't been managed with hearing aids?: No  Do you have difficulty driving, watching TV, or doing any of your daily activities because of your eyesight?: (!) Yes  Have you had an eye exam within the past year?: Yes  Hearing/Vision Interventions:  · Vision concerns:  patient encouraged to make appointment with his/her eye specialist     ADL:  ADLs  In the past 7 days, did you need help from others to perform any of the following everyday activities?  Eating, dressing, grooming, bathing, toileting, or walking/balance?: (!) Dressing, Grooming, Bathing, Toileting, Walking/Balance  In the past 7 days, did you need help from others to take care of any of the following?  Laundry, housekeeping, banking/finances, shopping, telephone use, food preparation, transportation, or taking medications?: [employer], Housekeeping, Shopping, Telephone Use, Food Preparation, Transportation, Taking Medications  ADL Interventions:  · Patient declines any further evaluation/treatment for this issue    Personalized Preventive Plan   Current Health Maintenance Status  Immunization History   Administered Date(s) Administered    COVID-19, Moderna, PF, 100mcg/0.5mL 03/18/2021, 04/15/2021    Influenza Virus Vaccine 10/30/2012, 11/05/2014, 12/14/2015, 10/01/2019    Influenza, High Dose (Fluzone 65 yrs and older) 10/16/2018    Influenza, Intradermal, Preservative free 10/23/2013    Influenza, Quadv, IM, (6 mo and older Fluzone, Flulaval, Fluarix and 3 yrs and older Afluria) 10/20/2017    Influenza, Quadv, IM, PF (6 mo and older Fluzone, Flulaval, Fluarix, and 3 yrs and older Afluria) 12/19/2016, 11/21/2019    Influenza, Quadv, adjuvanted, 65 yrs +, IM, PF (Fluad) 10/13/2020    Pneumococcal Conjugate 13-valent ([id number]) 12/14/2015    Pneumococcal Polysaccharide ([id number]) 04/01/2004    Td, unspecified formulation 12/23/2015    Tdap (Boostrix, Adacel) 10/19/2011        Health Maintenance   Topic Date Due    Shingles Vaccine (1 of 2) Never done   [employer] Visit (AWV)  Never done    Potassium monitoring  12/09/2021    Creatinine monitoring  12/09/2021    DTaP/Tdap/Td vaccine (3 - Td) 12/23/2025    DEXA (modify frequency per FRAX score)  Completed    Flu vaccine  Completed    Pneumococcal 65+ years Vaccine  Completed    COVID-19 Vaccine  Completed    Colon cancer screen colonoscopy  Completed    Hepatitis A vaccine  Aged Out    Hepatitis B vaccine  Aged Out    Hib vaccine  Aged C/ Aleks[address] 19 Meningococcal (ACWY) vaccine  Aged Out     Recommendations for Preventive Services Due: see orders and patient instructions/AVS.  . Recommended screening schedule for the next 5-10 years is provided to the patient in written form: see Patient Instructions/AVS.    James Valle was seen today for medicare awv.     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

## 2021-05-26 ENCOUNTER — OFFICE VISIT (OUTPATIENT)
Dept: PRIMARY CARE CLINIC | Age: 86
End: 2021-05-26
Payer: MEDICARE

## 2021-05-26 VITALS
OXYGEN SATURATION: 99 % | HEIGHT: 63 IN | BODY MASS INDEX: 26.05 KG/M2 | HEART RATE: 75 BPM | RESPIRATION RATE: 16 BRPM | DIASTOLIC BLOOD PRESSURE: 60 MMHG | TEMPERATURE: 96.4 F | WEIGHT: 147 LBS | SYSTOLIC BLOOD PRESSURE: 103 MMHG

## 2021-05-26 DIAGNOSIS — R10.31 RIGHT LOWER QUADRANT ABDOMINAL PAIN: Primary | ICD-10-CM

## 2021-05-26 DIAGNOSIS — R20.2 NUMBNESS AND TINGLING OF BOTH FEET: ICD-10-CM

## 2021-05-26 DIAGNOSIS — C18.9 MALIGNANT NEOPLASM OF COLON, UNSPECIFIED PART OF COLON (HCC): ICD-10-CM

## 2021-05-26 DIAGNOSIS — Z91.81 AT HIGH RISK FOR FALLS: ICD-10-CM

## 2021-05-26 DIAGNOSIS — R20.0 NUMBNESS AND TINGLING OF BOTH FEET: ICD-10-CM

## 2021-05-26 DIAGNOSIS — I10 ESSENTIAL HYPERTENSION: ICD-10-CM

## 2021-05-26 DIAGNOSIS — M25.551 RIGHT HIP PAIN: ICD-10-CM

## 2021-05-26 LAB
ALBUMIN SERPL-MCNC: 4 G/DL (ref 3.5–5.2)
ALP BLD-CCNC: 97 U/L (ref 35–104)
ALT SERPL-CCNC: 10 U/L (ref 5–33)
ANION GAP SERPL CALCULATED.3IONS-SCNC: 16 MMOL/L (ref 7–19)
AST SERPL-CCNC: 16 U/L (ref 5–32)
BILIRUB SERPL-MCNC: <0.2 MG/DL (ref 0.2–1.2)
BUN BLDV-MCNC: 21 MG/DL (ref 8–23)
CALCIUM SERPL-MCNC: 9.7 MG/DL (ref 8.8–10.2)
CHLORIDE BLD-SCNC: 99 MMOL/L (ref 98–111)
CO2: 24 MMOL/L (ref 22–29)
CREAT SERPL-MCNC: 1.2 MG/DL (ref 0.5–0.9)
GFR AFRICAN AMERICAN: 52
GFR NON-AFRICAN AMERICAN: 43
GLUCOSE BLD-MCNC: 123 MG/DL (ref 74–109)
HCT VFR BLD CALC: 38.7 % (ref 37–47)
HEMOGLOBIN: 12.4 G/DL (ref 12–16)
MCH RBC QN AUTO: 28.3 PG (ref 27–31)
MCHC RBC AUTO-ENTMCNC: 32 G/DL (ref 33–37)
MCV RBC AUTO: 88.4 FL (ref 81–99)
PDW BLD-RTO: 15.2 % (ref 11.5–14.5)
PLATELET # BLD: 347 K/UL (ref 130–400)
PMV BLD AUTO: 9.6 FL (ref 9.4–12.3)
POTASSIUM SERPL-SCNC: 3.9 MMOL/L (ref 3.5–5)
RBC # BLD: 4.38 M/UL (ref 4.2–5.4)
SODIUM BLD-SCNC: 139 MMOL/L (ref 136–145)
TOTAL PROTEIN: 7.1 G/DL (ref 6.6–8.7)
WBC # BLD: 9.6 K/UL (ref 4.8–10.8)

## 2021-05-26 PROCEDURE — G8427 DOCREV CUR MEDS BY ELIG CLIN: HCPCS | Performed by: NURSE PRACTITIONER

## 2021-05-26 PROCEDURE — 1090F PRES/ABSN URINE INCON ASSESS: CPT | Performed by: NURSE PRACTITIONER

## 2021-05-26 PROCEDURE — 36415 COLL VENOUS BLD VENIPUNCTURE: CPT | Performed by: NURSE PRACTITIONER

## 2021-05-26 PROCEDURE — 1036F TOBACCO NON-USER: CPT | Performed by: NURSE PRACTITIONER

## 2021-05-26 PROCEDURE — 1123F ACP DISCUSS/DSCN MKR DOCD: CPT | Performed by: NURSE PRACTITIONER

## 2021-05-26 PROCEDURE — 99214 OFFICE O/P EST MOD 30 MIN: CPT | Performed by: NURSE PRACTITIONER

## 2021-05-26 PROCEDURE — G8399 PT W/DXA RESULTS DOCUMENT: HCPCS | Performed by: NURSE PRACTITIONER

## 2021-05-26 PROCEDURE — 96372 THER/PROPH/DIAG INJ SC/IM: CPT | Performed by: NURSE PRACTITIONER

## 2021-05-26 PROCEDURE — G8417 CALC BMI ABV UP PARAM F/U: HCPCS | Performed by: NURSE PRACTITIONER

## 2021-05-26 PROCEDURE — 4040F PNEUMOC VAC/ADMIN/RCVD: CPT | Performed by: NURSE PRACTITIONER

## 2021-05-26 RX ORDER — KETOROLAC TROMETHAMINE 30 MG/ML
60 INJECTION, SOLUTION INTRAMUSCULAR; INTRAVENOUS ONCE
Status: COMPLETED | OUTPATIENT
Start: 2021-05-26 | End: 2021-05-26

## 2021-05-26 RX ORDER — PROMETHAZINE HYDROCHLORIDE 50 MG/ML
50 INJECTION, SOLUTION INTRAMUSCULAR; INTRAVENOUS ONCE
Status: COMPLETED | OUTPATIENT
Start: 2021-05-26 | End: 2021-05-26

## 2021-05-26 RX ORDER — PROMETHAZINE HYDROCHLORIDE 50 MG/ML
50 INJECTION, SOLUTION INTRAMUSCULAR; INTRAVENOUS EVERY 6 HOURS PRN
Status: DISCONTINUED | OUTPATIENT
Start: 2021-05-26 | End: 2021-05-26

## 2021-05-26 RX ADMIN — KETOROLAC TROMETHAMINE 60 MG: 30 INJECTION, SOLUTION INTRAMUSCULAR; INTRAVENOUS at 17:26

## 2021-05-26 RX ADMIN — PROMETHAZINE HYDROCHLORIDE 50 MG: 50 INJECTION, SOLUTION INTRAMUSCULAR; INTRAVENOUS at 17:33

## 2021-05-26 ASSESSMENT — ENCOUNTER SYMPTOMS
ABDOMINAL DISTENTION: 0
NAUSEA: 0
EYES NEGATIVE: 1
ANAL BLEEDING: 0
VOMITING: 0
ABDOMINAL PAIN: 1
CONSTIPATION: 0
BLOOD IN STOOL: 0
RECTAL PAIN: 0
BACK PAIN: 1
DIARRHEA: 1

## 2021-05-26 NOTE — PROGRESS NOTES
[address] [facility]  [address] [address] [place] [address]  [address] [place] [address]  Dept: [phone]  Dept Fax: [phone]  Loc: [phone]    Mina Frazier is a 82 y.o. female who presents today for her medical conditions/complaints as noted below. Mina Frazier is c/o of Abdominal Pain (patient presents today with c/o right side pain. )        HPI:     HPI   Chief Complaint   Patient presents with    Abdominal Pain     patient presents today with c/o right side pain. Has been having chronic right lower quadrant pain with right hip pain for several months now. She underwent back surgery by Dr. Nadia Dixon for a right lumbar laminectomy on 12-14-20. Since then she has been having the pain and she has followed up with him as well. She is also been seeing Dr. Bertrand Gipson for the hip pain. He told her she needed surgery but was not physically able to undergo surgery. She did go see Dr. Carlos Viera for second opinion and they also agreed she was not able to have surgery due to her comorbidities. She said the pain in her right lower quadrant into her right hip and down her leg is severe. She would call it a 10 on a 0-to-10 scale. She has been seeing pain management at the orthopedic [facility] and is on Percocet 10 mg every 6 hours and it does not touch the pain. The only way she is comfortable is laying in the bed. She has been in the bed for the last 2 weeks. If she sets up very long the pain is excruciating. She has been having diarrhea but this is nothing new. Dr. Veronika Olson did a colonoscopy on her back in March and I have put the results down below and the findings they were all normal.  She had a CT of her abdomen on 9-25-20. The results are below. Impression   1. No acute process in the abdomen or pelvis. 2. No mass lesion or suspicious adenopathy in the abdomen or pelvis. 3. Mild liver steatosis. 4. 2.4 cm simple liver cyst.   5. Partial right colectomy.  Moderate stool in the mid to distal colon. No inflammatory changes along the bowel. 6. Bilateral simple renal cysts.    Signed by Dr Ashanti Parkinson on 2020 11:11 AM       Past Medical History:   Diagnosis Date    Bronchitis     Colon cancer ([address]     Colon polyps     Constipation     Deep vein blood clot of left lower extremity ([address]     Left leg     Depression     DVT (deep venous thrombosis) (HCC)     Dysphagia     Fibrocystic breast disease     Fibromyalgia     GERD (gastroesophageal reflux disease)     reflux with hx of esophageal stricture    Headache(784.0)     History of colon cancer     Hormone replacement therapy (postmenopausal)     Hypertension     Neuropathy of foot     In both feet    Osteoarthritis     left knee pain    Restless legs syndrome     Vitamin D deficiency       Past Surgical History:   Procedure Laterality Date    APPENDECTOMY      BREAST BIOPSY      CATARACT REMOVAL       SECTION      CHOLECYSTECTOMY      COLECTOMY  partial    COLON SURGERY      COLONOSCOPY  2010    COLONOSCOPY  2012    Dr Adelaide Salazar: unremarkable enterocolonic anastamosis; hyperplastic polyps    COLONOSCOPY  2013    [place]: unremarkable post-surgical colon    COLONOSCOPY  2016    Dr Eugene aBrdales colon anastomosis, 5 yr recall    COLONOSCOPY N/A 2021    Dr Todd Ivory and patent anastomosis in the right side-BCM, prn (age)    COLONOSCOPY  2021    Dr Todd Ivory and patent anastomosis in the right side-BCM, prn (age)   Vilma Byers FOOT SURGERY  right foot    HAND SURGERY Right     Dr Kamaljit Culp N/A 2020    KYPHOPLASTY L5 performed by Marion Rizzo DO at [facility] Right 2020    RIGHT L 4-5 DECOMPRESSIVE LAMINECTOMY performed by Marion Rizzo DO at [facility]      UPPER GASTROINTESTINAL ENDOSCOPY  10/16/2009    UPPER GASTROINTESTINAL ENDOSCOPY 03/01/2013    [place]: peptic stricture dilated to 48F; HH; small antral mucosal elevation    UPPER GASTROINTESTINAL ENDOSCOPY  12/01/2014    Maur[facility]: dilation of esophageal stricture    VARICOSE VEIN SURGERY Left 03/14/2014  [facility]    Left GSV Ablation    VARICOSE VEIN SURGERY Right 04/04/2014  [facility]    Right GSV Ablation       Vitals 5/26/2021 4/20/2021 3/29/2021 3/29/2021 3/29/2021 5/31/6992   SYSTOLIC 728 376 929 256 - 094   DIASTOLIC 60 60 81 74 - 70   Site - - - - - -   Position - - - - - -   Cuff Size - - - - - -   Pulse 75 64 90 98 - -   Temp 96.4 96.1 - - - -   Resp 16 18 20 20 - -   SpO2 99 96 100 98 98 98   Weight 147 lb 147 lb - - - -   Height 5' 3\" 5' 3\" - - - -   Body mass index 26.04 kg/m2 26.04 kg/m2 - - - -   Pain Level - - - - - -   Some recent data might be hidden       Family History   Problem Relation Age of Onset    Cancer Mother         Cervical Cancer    Cancer Brother         Esophageal cancer    Diabetes Brother     Esophageal Cancer Brother     Diabetes Sister     Colon Cancer Neg Hx     Colon Polyps Neg Hx     Liver Cancer Neg Hx     Liver Disease Neg Hx     Rectal Cancer Neg Hx     Stomach Cancer Neg Hx        Social History     Tobacco Use    Smoking status: Never Smoker    Smokeless tobacco: Never Used   Substance Use Topics    Alcohol use: No      Current Outpatient Medications on File Prior to Visit   Medication Sig Dispense Refill    triamcinolone (KENALOG) 0.1 % cream APPLY TWICE DAILY TO RASH UP TO 2 WEEKS/MONTH AS NEEDED      Cyanocobalamin (B-12) 500 MCG TABS TAKE 1 TABLET BY MOUTH DAILY 30 tablet 11    triamterene-hydroCHLOROthiazide (MAXZIDE-25) 37.5-25 MG per tablet TAKE 1 TABLET BY MOUTH DAILY 90 tablet 3    tiZANidine (ZANAFLEX) 4 MG tablet TAKE 1 TABLET BY MOUTH THREE TIMES DAILY AS NEEDED.  NOT TO EXCEED 3 DOSES IN 24 HOURS      betamethasone valerate (VALISONE) 0.1 % lotion APPLY EXTERNALLY TO THE SCALP ONCE EVERY DAY      DULoxetine (CYMBALTA) 20 MG extended release capsule Take 1 capsule by mouth daily 30 capsule 5    losartan (COZAAR) 100 MG tablet Take 1 tablet by mouth daily 90 tablet 3    omeprazole (PRILOSEC) 40 MG delayed release capsule Take 1 capsule by mouth daily 90 capsule 3    vitamin D (ERGOCALCIFEROL) 1.25 MG (85946 UT) CAPS capsule Take 1 capsule by mouth once a week 12 capsule 1    rivaroxaban (XARELTO) 20 MG TABS tablet Take 1 tablet by mouth daily (with breakfast) 30 tablet 3    metoprolol tartrate (LOPRESSOR) 25 MG tablet Take 1 tablet by mouth 2 times daily 180 tablet 3   [address] by Does not apply route 1 each 0    acyclovir (ZOVIRAX) 400 MG tablet Take 400 mg by mouth 2 times daily history of shingles in her eye      albuterol (ACCUNEB) 1.25 MG/3ML nebulizer solution Inhale 1 mL into the lungs every 6 hours as needed for Wheezing       dorzolamide-timolol (COSOPT) 22.3-6.8 MG/ML ophthalmic solution Place 1 Dose into both eyes daily      furosemide (LASIX) 20 MG tablet Take 1 tablet by mouth daily as needed (swelling) 30 tablet 3    gabapentin (NEURONTIN) 600 MG tablet TAKE 1 TABLET BY MOUTH THREE TIMES DAILY (Patient not taking: Reported on 5/26/2021) 90 tablet 5    HYDROcodone-acetaminophen (NORCO)  MG per tablet Take 1 tablet by mouth every 6 hours as needed for Pain. (Patient not taking: Reported on 5/26/2021)       No current facility-administered medications on file prior to visit. Allergies   Allergen Reactions    Zoloft [Sertraline Hcl] Other (See Comments)     Patient states that she doesn't want this medication anymore because she hallucinated and saw spiders. Patient weaned herself off and after she quit taking the medication, the hallucinations stopped. Patient states that she doesn't want this medication anymore because she hallucinated and saw spiders. Patient weaned herself off and after she quit taking the medication, the hallucinations stopped.           Health Maintenance   Topic Date Due    Shingles Vaccine (1 of 2) Never done    Potassium monitoring  04/20/2022    Creatinine monitoring  04/20/2022    Annual Wellness Visit (AWV)  04/21/2022    DTaP/Tdap/Td vaccine (3 - Td) 12/23/2025    DEXA (modify frequency per FRAX score)  Completed    Flu vaccine  Completed    Pneumococcal 65+ years Vaccine  Completed    COVID-19 Vaccine  Completed    Colon cancer screen colonoscopy  Completed    Hepatitis A vaccine  Aged Out    Hepatitis B vaccine  Aged Out    Hib vaccine  Aged Out    Meningococcal (ACWY) vaccine  Aged Out       Subjective:      Review of Systems   Constitutional: Positive for activity change. Negative for fever and unexpected weight change. HENT: Negative. Eyes: Negative. Cardiovascular: Negative. Gastrointestinal: Positive for abdominal pain and diarrhea. Negative for abdominal distention, anal bleeding, blood in stool, constipation, nausea, rectal pain and vomiting. Musculoskeletal: Positive for arthralgias and back pain. Psychiatric/Behavioral: The patient is nervous/anxious. Objective:     Physical Exam  Vitals and nursing note reviewed. Constitutional:       Appearance: Normal appearance. She is well-developed. Comments: Sitting up in a wheelchair crying stating the pain she is having is the same pain but severe   HENT:      Head: Normocephalic. Right Ear: External ear normal.      Left Ear: External ear normal.   Eyes:      Pupils: Pupils are equal, round, and reactive to light. Cardiovascular:      Rate and Rhythm: Normal rate and regular rhythm. Heart sounds: Normal heart sounds. Pulmonary:      Effort: Pulmonary effort is normal.      Breath sounds: Normal breath sounds. Abdominal:      General: Bowel sounds are normal.      Palpations: Abdomen is soft. Tenderness: There is abdominal tenderness in the right lower quadrant. There is no right CVA tenderness, left CVA tenderness, guarding or rebound.  Negative signs include Bryson's sign, Rovsing's sign, McBurney's sign, psoas sign and obturator sign. Musculoskeletal:      Cervical back: Normal range of motion. Skin:     General: Skin is warm and dry. Neurological:      Mental Status: She is alert and oriented to person, place, and time. Psychiatric:         Behavior: Behavior normal.         Thought Content: Thought content normal.         Judgment: Judgment normal.       /60   Pulse 75   Temp 96.4 °F (35.8 °C) (Temporal)   Resp 16   Ht 5' 3\" (1.6 m)   Wt 147 lb (66.7 kg)   SpO2 99%   Breastfeeding No   BMI 26.04 kg/m²     Assessment:       Diagnosis Orders   1. Right lower quadrant abdominal pain  CBC    Comprehensive Metabolic Panel    XR ABDOMEN (KUB) (SINGLE AP VIEW)   2. At high risk for falls     3. Essential hypertension     4. Right hip pain     5. Malignant neoplasm of colon, unspecified part of colon ([address]     6. Numbness and tingling of both feet           Plan:   More than 50% of the time was spent counseling and coordinating care for a total time of 40min face to face. In reviewing her chart it looks like September 22, 2020 when I saw her I ordered a CTA but I guess they would not pay for that and we had to do a CT with contrast.  The findings are above in this note. Her colonoscopy was normal.  I did a KUB today but I do not have the results back yet. I am going to send some labs tonight see if anything is changed. I think she has had a very thorough work-up. To go from here I would say I would contact the physician if pain management asked him if there is anything additional that I could do for her or call Dr. Myranda Sosa and see if he is willing to see her to make sure this is not related to a nerve problem from her back. Patient underwent colonoscopy on March 29, 2021 by Dr. Erich Parham and the findings are below.   Findings:      The mucosa appeared normal throughout the entire examined colon, there was a healthy and patent anastomosis in the right side.      Random colon biopsies were obtained for diarrhea.      Retroflexion in the rectum was normal and revealed no further abnormalities. Patient given educational materials -see patient instructions. Discussed use, benefit, and side effects of prescribed medications. All patient questions answered. Pt voiced understanding. Reviewed health maintenance. Instructed to continue currentmedications, diet and exercise. Patient agreed with treatment plan. Follow up as directed. MEDICATIONS:  Orders Placed This Encounter   Medications    DISCONTD: promethazine (PHENERGAN) injection 50 mg    ketorolac (TORADOL) injection 60 mg    promethazine (PHENERGAN) injection 50 mg         ORDERS:  Orders Placed This Encounter   Procedures    XR ABDOMEN (KUB) (SINGLE AP VIEW)    CBC    Comprehensive Metabolic Panel       Follow-up:  No follow-ups on file. PATIENT INSTRUCTIONS:  There are no Patient Instructions on file for this visit. Electronically signed by EARLE Cazares CNP on 5/26/2021 at 5:52 PM    EMR Dragon/transcription disclaimer:  Much of thisencounter note is electronic transcription/translation of spoken language to printed texts. The electronic translation of spoken language may be erroneous, or at times, nonsensical words or phrases may be inadvertentlytranscribed. Although I have reviewed the note for such errors, some may still exist.  On the basis of positive falls risk screening, assessment and plan is as follows: home safety tips provided, Patient is basically wheelchair-bound at this time. Her  is with her today.

## 2021-05-28 ENCOUNTER — OFFICE VISIT (OUTPATIENT)
Dept: NEUROSURGERY | Age: 86
End: 2021-05-28
Payer: MEDICARE

## 2021-05-28 VITALS
DIASTOLIC BLOOD PRESSURE: 51 MMHG | SYSTOLIC BLOOD PRESSURE: 114 MMHG | TEMPERATURE: 98.7 F | HEART RATE: 79 BPM | WEIGHT: 147 LBS | BODY MASS INDEX: 26.05 KG/M2 | HEIGHT: 63 IN | OXYGEN SATURATION: 97 %

## 2021-05-28 DIAGNOSIS — M79.604 PAIN IN BOTH LOWER EXTREMITIES: Primary | ICD-10-CM

## 2021-05-28 DIAGNOSIS — M79.605 PAIN IN BOTH LOWER EXTREMITIES: Primary | ICD-10-CM

## 2021-05-28 DIAGNOSIS — R20.2 NUMBNESS AND TINGLING OF BOTH FEET: ICD-10-CM

## 2021-05-28 DIAGNOSIS — R20.0 NUMBNESS AND TINGLING OF BOTH FEET: ICD-10-CM

## 2021-05-28 DIAGNOSIS — R93.7 ABNORMAL MRI, LUMBAR SPINE: ICD-10-CM

## 2021-05-28 DIAGNOSIS — M79.604 RIGHT LEG PAIN: ICD-10-CM

## 2021-05-28 PROCEDURE — 4040F PNEUMOC VAC/ADMIN/RCVD: CPT | Performed by: NURSE PRACTITIONER

## 2021-05-28 PROCEDURE — G8417 CALC BMI ABV UP PARAM F/U: HCPCS | Performed by: NURSE PRACTITIONER

## 2021-05-28 PROCEDURE — 1123F ACP DISCUSS/DSCN MKR DOCD: CPT | Performed by: NURSE PRACTITIONER

## 2021-05-28 PROCEDURE — G8399 PT W/DXA RESULTS DOCUMENT: HCPCS | Performed by: NURSE PRACTITIONER

## 2021-05-28 PROCEDURE — 99213 OFFICE O/P EST LOW 20 MIN: CPT | Performed by: NURSE PRACTITIONER

## 2021-05-28 PROCEDURE — G8427 DOCREV CUR MEDS BY ELIG CLIN: HCPCS | Performed by: NURSE PRACTITIONER

## 2021-05-28 PROCEDURE — 1090F PRES/ABSN URINE INCON ASSESS: CPT | Performed by: NURSE PRACTITIONER

## 2021-05-28 PROCEDURE — 1036F TOBACCO NON-USER: CPT | Performed by: NURSE PRACTITIONER

## 2021-05-28 RX ORDER — OXYCODONE AND ACETAMINOPHEN 10; 325 MG/1; MG/1
1 TABLET ORAL
COMMUNITY
Start: 2021-05-11 | End: 2021-07-08

## 2021-05-28 RX ORDER — PREGABALIN 50 MG/1
50 CAPSULE ORAL 2 TIMES DAILY
Qty: 60 CAPSULE | Refills: 2 | Status: SHIPPED | OUTPATIENT
Start: 2021-05-28 | End: 2021-08-24 | Stop reason: ALTCHOICE

## 2021-05-28 NOTE — PROGRESS NOTES
[facility] Neurology Office Note      Patient:   Roxana Hernández  MR#:    [id number]  Account Number:                         YOB: 1935  Date of Evaluation:  5/28/2021  Time of Note:                          10:44 AM  Primary/Referring Physician:  Jackie Farmer, EARLE - CNP   Consulting Physician:  Neto Reed DNP, APRN    FOLLOW UP VISIT    Chief Complaint   Patient presents with    Follow-up     c/o numbness and pain running from lower back down into right hip     Numbness    Hip Pain    Discuss Medications     pt states gabapentin gave her a \"rash from head to toe\" and \"awful hot flashes\"       HISTORY OF PRESENT ILLNESS    Roxana Hernández is a 82y.o. year old female here for follow up of back pain, leg pain and neuropathy. Symptoms largely unchanged from prior. Right hip pain with radiation of pain into the right groin and knee. Noting muscle spasms as well in the right leg. She denies left hip or leg pain. Notes weakness in BLE, using a wheelchair more so now. Pain worsens with walking, only able to take several steps at a time, using a walker. Neuropathy symptoms unchanged. Decreasing Gabapentin r/t rash on her arms and legs. On Cymbalta 20mg daily as well. S/p right L4/5 decompressive laminectomy and L5 kyphoplasty. Following with [facility]. Has seen [phone] ortho and Dr. Bre Isaacs for hip pain, no clear source per patient. She follows with Dr. Harding for skin rash. Pain began after cellulitis in January 2019. Has seen vascular for abnormal ABIs, compression stockings and elevation recommended. She has a prior history stomach cancer about 10 years ago, treated with chemotherapy. She denies vitamin B12 deficiency or diabetes history. No other complaints.      Past Medical History:   Diagnosis Date    Bronchitis     [facility] [address] [facility]     Colon polyps     Constipation     Deep vein blood clot of left lower extremity (HCC)     Left leg     Depression     DVT (deep venous thrombosis) ([facility]  Dysphagia     Fibrocystic breast disease     Fibromyalgia     GERD (gastroesophageal reflux disease)     reflux with hx of esophageal stricture    Headache(784.0)     History of colon cancer 2000    Hormone replacement therapy (postmenopausal)     Hypertension     Neuropathy of foot     In both feet    Osteoarthritis     left knee pain    Restless legs syndrome     Vitamin D deficiency        Past Surgical History:   Procedure Laterality Date    APPENDECTOMY      BREAST BIOPSY      CATARACT REMOVAL       SECTION      CHOLECYSTECTOMY      COLECTOMY  partial    COLON SURGERY      COLONOSCOPY  2010    COLONOSCOPY  2012    Dr Sade Elizondo: unremarkable enterocolonic anastamosis; hyperplastic polyps    COLONOSCOPY  2013    [facility]: unremarkable post-surgical colon    COLONOSCOPY  2016    Dr Deanne Toney colon anastomosis, 5 yr recall    COLONOSCOPY N/A 2021    Dr Keena Pemberton and patent anastomosis in the right side-BCM, prn (age)    COLONOSCOPY  2021    Dr Keena Pemberton and patent anastomosis in the right side-BCM, prn (age)   Wilhelminia Blazing FOOT SURGERY  right foot    HAND SURGERY Right     Dr Andres Freeman N/A 2020    KYPHOPLASTY L5 performed by Kassy Peterson DO at [facility] Right 2020    RIGHT L 4-5 DECOMPRESSIVE LAMINECTOMY performed by Kassy Peterson DO at [facility]      UPPER GASTROINTESTINAL ENDOSCOPY  10/16/2009    UPPER GASTROINTESTINAL ENDOSCOPY  2013    [facility]: peptic stricture dilated to 48F; HH; small antral mucosal elevation    UPPER GASTROINTESTINAL ENDOSCOPY  2014    [facility]: dilation of esophageal stricture    VARICOSE VEIN SURGERY Left 2014  [facility] & [address]    Left GSV Ablation    VARICOSE VEIN SURGERY Right 2014  [facility]    Right GSV Ablation       Family History   Problem Relation Age of Onset    0.1 % cream APPLY TWICE DAILY TO RASH UP TO 2 WEEKS/MONTH AS NEEDED      Cyanocobalamin (B-12) 500 MCG TABS TAKE 1 TABLET BY MOUTH DAILY 30 tablet 11    triamterene-hydroCHLOROthiazide (MAXZIDE-25) 37.5-25 MG per tablet TAKE 1 TABLET BY MOUTH DAILY 90 tablet 3    tiZANidine (ZANAFLEX) 4 MG tablet TAKE 1 TABLET BY MOUTH THREE TIMES DAILY AS NEEDED. NOT TO EXCEED 3 DOSES IN 24 HOURS      betamethasone valerate (VALISONE) 0.1 % lotion APPLY EXTERNALLY TO THE SCALP ONCE EVERY DAY      DULoxetine (CYMBALTA) 20 MG extended release capsule Take 1 capsule by mouth daily 30 capsule 5    losartan (COZAAR) 100 MG tablet Take 1 tablet by mouth daily 90 tablet 3    omeprazole (PRILOSEC) 40 MG delayed release capsule Take 1 capsule by mouth daily 90 capsule 3    vitamin D (ERGOCALCIFEROL) 1.25 MG (49868 UT) CAPS capsule Take 1 capsule by mouth once a week 12 capsule 1    rivaroxaban (XARELTO) 20 MG TABS tablet Take 1 tablet by mouth daily (with breakfast) 30 tablet 3    metoprolol tartrate (LOPRESSOR) 25 MG tablet Take 1 tablet by mouth 2 times daily 180 tablet 3   [facility] MISC by Does not apply route 1 each 0    acyclovir (ZOVIRAX) 400 MG tablet Take 400 mg by mouth 2 times daily history of shingles in her eye      albuterol (ACCUNEB) 1.25 MG/3ML nebulizer solution Inhale 1 mL into the lungs every 6 hours as needed for Wheezing       dorzolamide-timolol (COSOPT) 22.3-6.8 MG/ML ophthalmic solution Place 1 Dose into both eyes daily      furosemide (LASIX) 20 MG tablet Take 1 tablet by mouth daily as needed (swelling) 30 tablet 3    HYDROcodone-acetaminophen (NORCO)  MG per tablet Take 1 tablet by mouth every 6 hours as needed for Pain. No current facility-administered medications for this visit. Allergies   Allergen Reactions    Zoloft [Sertraline Hcl] Other (See Comments)     Patient states that she doesn't want this medication anymore because she hallucinated and saw spiders.  Patient SpO2 97%   Breastfeeding No   BMI 26.04 kg/m²       Constitutional - No acute distress    HEENT- Conjunctiva normal.  No scars, masses, or lesions over external nose or ears, no neck masses noted, no jugular vein distension, no bruit  Cardiac- Regular rate and rhythm  Pulmonary- Good expansion, normal effort without use of accessory muscles  Musculoskeletal - No significant wasting of muscles noted, no bony deformities  Extremities - No clubbing, cyanosis or edema  Skin - Warm, dry, and intact. No rash, erythema, or pallor  Psychiatric - Mood, affect, and behavior appear normal      NEUROLOGICAL EXAM     Mental status   [x]Awake, alert, oriented   [x]Affect attention and concentration appear appropriate  [x]Recent and remote memory appears unremarkable  [x]Speech normal without dysarthria or aphasia, comprehension and repetition intact. COMMENTS: mild cognitive impairment     Cranial Nerves [x]No VF deficit to confrontation,  no papilledema on fundoscopic exam.  [x]PERRLA, EOMI, no nystagmus, conjugate eye movements, no ptosis  [x]Face symmetric  [x]Facial sensation intact  [x]Tongue midline no atrophy or fasciculations present  [x]Palate midline, hearing to finger rub normal bilaterally  [x]Shoulder shrug and SCM testing normal bilaterally  COMMENTS:    Motor   []5/5 strength x 4 extremities  [x]Normal bulk and tone  [x]No tremor present  [x]No rigidity or bradykinesia noted  COMMENTS: 2/5 RLE, pain limiting; 4/5 LLE     Sensory  []Sensation intact to light touch, pin prick, vibration, and proprioception BLE  [x]Sensation intact to light touch, pin prick, vibration, and proprioception BUE  COMMENTS: decreased PP, vibration BLE    Coordination [x]FTN normal bilaterally   [x]HTS normal bilaterally  [x]GRICELDA normal bilaterally.    COMMENTS:   Reflexes  []Symmetric and non-pathological  [x]Toes down going bilaterally  [x]No clonus present  COMMENTS: hypoactive throughout    Gait                  []Normal steady gait []Ataxic    []Spastic     []Magnetic     []Shuffling  COMMENTS: antalgic; in wheelchair        LABS RECORD AND IMAGING REVIEW (As below and per HPI)    Lab Results   Component Value Date    [id number] 639 04/20/2021     Lab Results   Component Value Date    WBC 9.6 05/26/2021    HGB 12.4 05/26/2021    HCT 38.7 05/26/2021    MCV 88.4 05/26/2021     05/26/2021     Lab Results   Component Value Date     05/26/2021    K 3.9 05/26/2021    CL 99 05/26/2021    CO2 24 05/26/2021    BUN 21 05/26/2021    CREATININE 1.2 (H) 05/26/2021    GLUCOSE 123 (H) 05/26/2021    CALCIUM 9.7 05/26/2021    PROT 7.1 05/26/2021    LABALBU 4.0 05/26/2021    BILITOT <0.2 05/26/2021    ALKPHOS 97 05/26/2021    AST 16 05/26/2021    ALT 10 05/26/2021    LABGLOM 43 (A) 05/26/2021    GFRAA 52 (L) 05/26/2021    GLOB 2.5 10/12/2016     Lab Results   Component Value Date    CHOL 174 04/20/2021    TRIG 286 (H) 04/20/2021    HDL 36 (L) 04/20/2021    LDLCALC 81 04/20/2021     Lab Results   Component Value Date    TSH 1.880 04/20/2021    T4FREE 1.1 03/19/2019     Lab Results   Component Value Date    CRP 0.19 09/10/2020    SEDRATE 18 09/10/2020      Reviewed PCP and vascular notes. Lower extremity vascular study (1/2019)- mildly diminished flow to the bilateral lower extremity arterial system at rest. Patient likely has disease in bilateral tibial arterial segments. MRI thoracic spine (1/2019)- no spinal canal stenosis. Moderate spinal canal stenosis at L1/L2, partially imaged     EMG/NCS (4/2019)- moderate sensorimotor primarily axonal polyneuropathy    CT lumbar spine (6/2020)-   Impression    Impression:    1. There is mild levocurvature of the lumbar spine. Degenerative disc    disease is noted throughout the lumbar column. 2. There is an acute mild compression deformity involving the superior    endplate of L5.  There is no involvement of the posterior elements or    compromise of the spinal canal.    3. Central and foraminal changes. 3. Otherwise negative MR imaging of the brain    Signed by Dr Isiah Siemens on 10/5/2020 10:26 AM      MRI lumbar spine (3/2021)- muli level DDD; significant bilateral foraminal narrowing at L4/5. At L5/S1, left extra foraminal and foraminal disc osteophyte complex which contacts the left L5 nerve root causing mass effect on it, significant NF narrowing at L5/S1. Thecal sac narrowing at L2/3 and L3/4 with severe foraminal narrowing on the right at L3/4 and moderate foraminal narrowing at L2/3. Reviewed neurosurgery records     ASSESSMENT:    Nancy Mora is a 82y.o. year old female here for follow up of neuropathy, back pain, right hip/groin pain. Still noting quite a bit of back pain and right hip pain. Exam is unchanged. She had decompressive laminectomy in 2020 with improvement in back pain initially. Recent MRI lumbar spine with significant DDD and several levels of NF narrowing. MRI brain with small meningioma and . NCS with moderate sensorimotor polyneuropathy. 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 Has started weaning Gabapentin due to rash. Given this will switch to Lyrica today. Suspect that pain and gait changes are multi factorial with neuropathy, lower back pain, arthritis all playing a role. ICD-10-CM    1. Pain in both lower extremities  M79.604     M79.605    2. Numbness and tingling of both feet  R20.0     R20.2    3. Right leg pain  M79.604    4. Abnormal MRI, lumbar spine  R93.7        PLAN:  1. Stop Neurontin   2. Change to Lyrica 50mg BID. Discussed side effects with patient and granddaughter   3. The prescription monitoring report was reviewed today. Possible medication side effects, risk of tolerance/dependence & alternative treatments discussed. No signs of potential drug abuse or diversion identified. 4. Will refer to Dr. Lele Rincon for pain management, question if injections would be beneficial?    5.  Will have patient follow up with neurosurgery team here given recent MRI lumbar spine  6. Repeat MRI brain in 1 year to re-assess meningioma (10/2021)   7.  Follow up in 4 months, sooner with any worsening     Wanna Raad, JAYLIN, APRN

## 2021-05-28 NOTE — LETTER
CONTROLLED SUBSTANCE MEDICATION AGREEMENT     Patient Name: Yvon Maradiaga  Patient YOB: 1935   Lyrica 50mg BID  [facility] #[id number], [address] [phone] - F [phone]  I understand, that controlled substance medications may be used to help better manage my symptoms and to improve my ability to function at home, work and in social settings. However, I also understand that these medications do have risks, which have been discussed with me, including possible development of physical or psychological dependence. I understand that successful treatment requires mutual trust and honesty between me and my provider. I understand and agree that following this Medication Agreement is necessary in continuing my provider-patient relationship and the success of my treatment plan. Explanation from my Provider: Benefits and Goals of Controlled Substance Medications: There are two potential goals for your treatment: (1) decreased pain and suffering (2) improved daily life functions. There are many possible treatments for your chronic condition(s). Alternatives such as physical therapy, yoga, massage, home daily exercise, meditation, relaxation techniques, injections, chiropractic manipulations, surgery, cognitive therapy, hypnosis and many medications that are not habit-forming may be used. Use of controlled substance medications may be helpful, but they are unlikely to resolve all symptoms or restore all function. Explanation from my Provider: Risks of Controlled Substance Medications:  Opioid pain medications: These medications can lead to problems such as addiction/dependence, sedation, lightheadedness/dizziness, memory issues, falls, constipation, nausea, or vomiting. They may also impair the ability to drive or operate machinery. Additionally, these medications may lower testosterone levels, leading to loss of bone strength, stamina and sex drive.   They may cause problems with breathing, sleep apnea and reduced coughing, which is especially dangerous for patients with lung disease. Overdose or dangerous interactions with alcohol and other medications may occur, leading to death. Hyperalgesia may develop, which means patients receiving opioids for the treatment of pain may become more sensitive to certain painful stimuli, and in some cases, experience pain from ordinarily non-painful stimuli. Women between the ages of 14-53 who could become pregnant should carefully weigh the risks and benefits of opioids with their physicians, as these medications increase the risk of pregnancy complications, including miscarriage,  delivery and stillbirth. It is also possible for babies to be born addicted to opioids. Opioid dependence withdrawal symptoms may include; feelings of uneasiness, increased pain, irritability, belly pain, diarrhea, sweats and goose-flesh. Benzodiazepines and non-benzodiazepine sleep medications: These medications can lead to problems such as addiction/dependence, sedation, fatigue, lightheadedness, dizziness, incoordination, falls, depression, hallucinations, and impaired judgment, memory and concentration. The ability to drive and operate machinery may also be affected. Abnormal sleep-related behaviors have been reported, including sleepwalking, driving, making telephone calls, eating, or having sex while not fully awake. These medications can suppress breathing and worsen sleep apnea, particularly when combined with alcohol or other sedating medications, potentially leading to death. Dependence withdrawal symptoms may include tremors, anxiety, hallucinations and seizures.   Stimulants:  Common adverse effects include addiction/dependence, increased blood  pressure and heart rate, decreased appetite, nausea, involuntary weight loss, insomnia, Initials:_______   irritability, and headaches. These risks may increase when these medications are combined with other stimulants, such as caffeine pills or energy drinks, certain weight loss supplements and oral decongestants. Dependence withdrawal symptoms may include depressed mood, loss of interest, suicidal thoughts, anxiety, fatigue, appetite changes and agitation. Testosterone replacement therapy:  Potential side effects include increased risk of stroke and heart attack, blood clots, increased blood pressure, increased cholesterol, enlarged prostate, sleep apnea, irritability/aggression and other mood disorders, and decreased fertility. I agree and understand that I and my prescriber have the following rights and responsibilities regarding my treatment plan:     1. MY RIGHTS:  To be informed of my treatment and medication plan. To be an active participant in my health and wellbeing. 2. MY RESPONSIBILITY AND UNDERSTANDING FOR USE OF MEDICATIONS   I will take medications at the dose and frequency as directed. For my safety, I will not increase or change how I take my medications without the recommendation of my healthcare provider.  I will actively participate in any program recommended by my provider which may improve function, including social, physical, psychological programs.  I will not take my medications with alcohol or other drugs not prescribed to me. I understand that drinking alcohol with my medications increases the chances of side effects, including reduced breathing rate and could lead to personal injury when operating machinery.  I understand that if I have a history of substance use disorders, including alcohol or other illicit drugs, that I may be at increased risk of addiction to my medications.  I agree to notify my provider immediately if I should become pregnant so that my treatment plan can be adjusted.    I agree and understand that I shall only receive controlled substance medications from the prescriber that signed this agreement unless there is written agreement among other prescribers of controlled substances outlining the responsibility of the medications being prescribed.  I understand that the if the controlled medication is not helping to achieve goals, the dosage may be tapered and no longer prescribed. 3. MY RESPONSIBILITY FOR COMMUNICATION / PRESCRIPTION RENEWALS   I agree that all controlled substance medications that I take will be prescribed only by my provider. If another healthcare provider prescribes me medication in an emergency, I will notify my provider within seventy-two (72) hours.  I will arrange for refills at the prescribed interval ONLY during regular office hours. I will not ask for refills earlier than agreed, after-hours, on holidays or weekends. Refills may take up to 72 hours for processing and prescriptions to reach the pharmacy.  I will inform my other health care providers that I am taking these medications and of the existence of this [id number]. In the event of an emergency, I will provide the same information to the emergency department prescribers.  I will keep my provider updated on the pharmacy I am using for controlled medication prescription filling. Initials:_______  4. MY RESPONSIBILITY FOR PROTECTING MEDICATIONS   I will protect my prescriptions and medications. I understand that lost or misplaced prescriptions will not be replaced.  I will keep medications only for my own use and will not share them with others. I will keep all medications away from children.  I agree that if my medications are adjusted or discontinued, I will properly dispose of any remaining medications. I understand that I will be required to dispose of any remaining controlled medications as, directed by my prescriber, prior to being provided with any prescriptions for other controlled medications.   Medication drop box locations can be found at: [employer]    5. MY RESPONSIBILITY WITH ILLEGAL DRUGS    I will not use illegal or street drugs or another person's prescription medications not prescribed to me.  If there are identified addiction type symptoms, then referral to a program may be provided by my provider and I agree to follow through with this recommendation. 6. MY RESPONSIBILITY FOR COOPERATION WITH INVESTIGATIONS   I understand that my provider will comply with any applicable law and may discuss my use and/or possible misuse/abuse of controlled substances and alcohol, as appropriate, with any health care provider involved in my care, pharmacist, or legal authority.  I authorize my provider and pharmacy to cooperate fully with law enforcement agencies (as permitted by law) in the investigation of any possible misuse, sale, or other diversion of my controlled substances.  I agree to waive any applicable privilege or right of privacy or confidentiality with respect to these authorizations. 7. PROVIDERS RIGHT TO MONITOR FOR SAFETY: PRESCRIPTION MONITORING / DRUG TESTING   I consent to drug/toxicology screening and will submit to a drug screen upon my providers request to assure I am only taking the prescribed drugs for my safety monitoring. I understand that a drug screen is a laboratory test in which a sample of my urine, blood or saliva is checked to see what drugs I have been taking. This may entail an observed urine specimen, which means that a nurse or other health care provider may watch me provide urine, and I will cooperate if I am asked to provide an observed specimen.  I understand that my provider will check a copy of my State Prescription Monitoring Program () Report in order to safely prescribe medications.  Pill Counts: I consent to pill counts when requested.   I may be asked to bring all my prescribed controlled substance medications, in their original bottles, to all of my scheduled appointments. In addition, my provider may ask me to come to the practice at any time for a random pill count. 8. TERMINATION OF THIS AGREEMENT  For my safety, my prescriber has the right to stop prescribing controlled substance medications and may end this agreement. Initials:_______   Conditions that may result in termination of this agreement:  a. I do not show any improvement in pain, or my activity has not improved. b. I develop rapid tolerance or loss of improvement, as described in my treatment plan.  c. I develop significant side effects from the medication. d. My behavior is not consistent with the responsibilities outlined above, thereby causing safety concerns to continue prescribing controlled substance medications. e. I fail to follow the terms of this agreement. f. Other:____________________________       UNDERSTANDING THIS MEDICATION AGREEMENT:    I have read the above and have had all my questions answered. For chronic disease management, I know that my symptoms can be managed with many types of treatments. A chronic medication trial may be part of my treatment, but I must be an active participant in my care. Medication therapy is only one part of my symptom management plan. In some cases, there may be limited scientific evidence to support the chronic use of certain medications to improve symptoms and daily function. Furthermore, in certain circumstances, there may be scientific information that suggests that the use of chronic controlled substances may worsen my symptoms and increase my risk of unintentional death directly related to this medication therapy. I know that if my provider feels my risk from controlled medications is greater than my benefit, I will have my controlled substance medication(s) compassionately lowered or removed altogether.      I further

## 2021-05-28 NOTE — PROGRESS NOTES
REVIEW OF SYSTEMS    Constitutional: []Fever []Sweats []Chills [] Recent Injury [x] Denies all unless marked  HEENT:[]Headache  [] Head Injury [] Hearing Loss  [] Sore Throat  [] Ear Ache [x] Denies all unless marked  Spine:  [] Neck pain  [x] Back pain  [x] Sciaticia  [x] Denies all unless marked  Cardiovascular:[]Heart Disease []Palpitations [] Chest Pain   [x] Denies all unless marked  Pulmonary: []Shortness of Breath []Cough   [x] Denies all unless marked  Psychiatric/Behavioral:[] Depression [] Anxiety [x] Denies all unless marked  Gastrointestinal: []Nausea  []Vomiting  []Abdominal Pain  []Constipation  []Diarrhea  [x] Denies all unless marked  Genitourinary:   [] Frequency  [] Urgency  [] Dysuria [] Incontinence  [x] Denies all unless marked  Extremities: []Pain  []Swelling  [x] Denies all unless marked  Musculoskeletal: [] Myalgias  [] Joint Pain  [] Arthritis [] Muscle Cramps [] Muscle Twitches  [x] Denies all unless marked  Sleep: []Insomnia[]Snoring []Restless Legs  []Sleep Apnea  []Daytime Sleepiness  [x] Denies all unless marked  Skin:[] Rash [] Color Change [x] Denies all unless marked   Neurological:[]Visual Disturbance [] Memory Loss []Loss of Balance []Slurred Speech []Weakness []Seizures  [] Dizziness [x] Denies all unless marked

## 2021-06-01 ENCOUNTER — OFFICE VISIT (OUTPATIENT)
Dept: PRIMARY CARE CLINIC | Age: 86
End: 2021-06-01
Payer: MEDICARE

## 2021-06-01 VITALS
HEIGHT: 63 IN | DIASTOLIC BLOOD PRESSURE: 76 MMHG | OXYGEN SATURATION: 97 % | SYSTOLIC BLOOD PRESSURE: 128 MMHG | WEIGHT: 147 LBS | HEART RATE: 63 BPM | TEMPERATURE: 98.1 F | BODY MASS INDEX: 26.05 KG/M2

## 2021-06-01 DIAGNOSIS — Z45.2 ENCOUNTER FOR CARE RELATED TO PORT-A-CATH: ICD-10-CM

## 2021-06-01 DIAGNOSIS — M25.551 RIGHT HIP PAIN: Primary | ICD-10-CM

## 2021-06-01 DIAGNOSIS — Z95.828 PORT-A-CATH IN PLACE: ICD-10-CM

## 2021-06-01 DIAGNOSIS — C18.9 MALIGNANT NEOPLASM OF COLON, UNSPECIFIED PART OF COLON (HCC): ICD-10-CM

## 2021-06-01 PROCEDURE — G8417 CALC BMI ABV UP PARAM F/U: HCPCS | Performed by: NURSE PRACTITIONER

## 2021-06-01 PROCEDURE — 99213 OFFICE O/P EST LOW 20 MIN: CPT | Performed by: NURSE PRACTITIONER

## 2021-06-01 PROCEDURE — 1036F TOBACCO NON-USER: CPT | Performed by: NURSE PRACTITIONER

## 2021-06-01 PROCEDURE — 1123F ACP DISCUSS/DSCN MKR DOCD: CPT | Performed by: NURSE PRACTITIONER

## 2021-06-01 PROCEDURE — 1090F PRES/ABSN URINE INCON ASSESS: CPT | Performed by: NURSE PRACTITIONER

## 2021-06-01 PROCEDURE — G8427 DOCREV CUR MEDS BY ELIG CLIN: HCPCS | Performed by: NURSE PRACTITIONER

## 2021-06-01 PROCEDURE — G8399 PT W/DXA RESULTS DOCUMENT: HCPCS | Performed by: NURSE PRACTITIONER

## 2021-06-01 PROCEDURE — 4040F PNEUMOC VAC/ADMIN/RCVD: CPT | Performed by: NURSE PRACTITIONER

## 2021-06-01 NOTE — PROGRESS NOTES
[facility]  [address] [place] [address]  [address] [place] [address]  Dept: [phone]  Dept Fax: [phone]  Loc: [phone]    Ninoska Villagomez is a 82 y.o. female who presents today for her medical conditions/complaints as noted below. Ninoska Villagomez is c/o of Mediport (Pt is here for a port flush.)        HPI:     HPI   Chief Complaint   Patient presents with    Mediport     Pt is here for a port flush. Is also following up on pain medications. The patches have helped and they have changed her Lyrica lidocaine patches through neurosurgery. She thinks this is helped a little bit. She takes the Percocet up to 4 times a day if through pain management orthopedic [place]. Currently her pain is an 8. She took a Percocet and only came down to a 6. The pain is in her right hip that goes down to her right knee.   Past Medical History:   Diagnosis Date    Bronchitis     Colon cancer ([address]     Colon polyps     Constipation     Deep vein blood clot of left lower extremity (HCC)     Left leg     Depression     DVT (deep venous thrombosis) (HCC)     Dysphagia     Fibrocystic breast disease     Fibromyalgia     GERD (gastroesophageal reflux disease)     reflux with hx of esophageal stricture    Headache(784.0)     History of colon cancer     Hormone replacement therapy (postmenopausal)     Hypertension     Neuropathy of foot     In both feet    Osteoarthritis     left knee pain    Restless legs syndrome     Vitamin D deficiency       Past Surgical History:   Procedure Laterality Date    APPENDECTOMY      BREAST BIOPSY      CATARACT REMOVAL       SECTION      CHOLECYSTECTOMY      COLECTOMY  partial    COLON SURGERY      COLONOSCOPY  2010    COLONOSCOPY  2012    Dr Sade Elizondo: unremarkable enterocolonic anastamosis; hyperplastic polyps    COLONOSCOPY  2013    Haroldo: unremarkable post-surgical colon    COLONOSCOPY  2016     Social History     Tobacco Use    Smoking status: Never Smoker    Smokeless tobacco: Never Used   Substance Use Topics    Alcohol use: No      Current Outpatient Medications on File Prior to Visit   Medication Sig Dispense Refill    oxyCODONE-acetaminophen (PERCOCET)  MG per tablet Take 1 tablet by mouth.  pregabalin (LYRICA) 50 MG capsule Take 1 capsule by mouth 2 times daily for 90 days. 60 capsule 2    triamcinolone (KENALOG) 0.1 % cream APPLY TWICE DAILY TO RASH UP TO 2 WEEKS/MONTH AS NEEDED      Cyanocobalamin (B-12) 500 MCG TABS TAKE 1 TABLET BY MOUTH DAILY 30 tablet 11    triamterene-hydroCHLOROthiazide (MAXZIDE-25) 37.5-25 MG per tablet TAKE 1 TABLET BY MOUTH DAILY 90 tablet 3    tiZANidine (ZANAFLEX) 4 MG tablet TAKE 1 TABLET BY MOUTH THREE TIMES DAILY AS NEEDED. NOT TO EXCEED 3 DOSES IN 24 HOURS      betamethasone valerate (VALISONE) 0.1 % lotion APPLY EXTERNALLY TO THE SCALP ONCE EVERY DAY      DULoxetine (CYMBALTA) 20 MG extended release capsule Take 1 capsule by mouth daily 30 capsule 5    losartan (COZAAR) 100 MG tablet Take 1 tablet by mouth daily 90 tablet 3    omeprazole (PRILOSEC) 40 MG delayed release capsule Take 1 capsule by mouth daily 90 capsule 3    vitamin D (ERGOCALCIFEROL) 1.25 MG (54629 UT) CAPS capsule Take 1 capsule by mouth once a week 12 capsule 1    rivaroxaban (XARELTO) 20 MG TABS tablet Take 1 tablet by mouth daily (with breakfast) 30 tablet 3    HYDROcodone-acetaminophen (NORCO)  MG per tablet Take 1 tablet by mouth every 6 hours as needed for Pain.        metoprolol tartrate (LOPRESSOR) 25 MG tablet Take 1 tablet by mouth 2 times daily 180 tablet 3   [address] Does not apply route 1 each 0    acyclovir (ZOVIRAX) 400 MG tablet Take 400 mg by mouth 2 times daily history of shingles in her eye      albuterol (ACCUNEB) 1.25 MG/3ML nebulizer solution Inhale 1 mL into the lungs every 6 hours as needed for Wheezing       dorzolamide-timolol (COSOPT) 22.3-6.8 MG/ML ophthalmic solution Place 1 Dose into both eyes daily      furosemide (LASIX) 20 MG tablet Take 1 tablet by mouth daily as needed (swelling) 30 tablet 3     No current facility-administered medications on file prior to visit. Allergies   Allergen Reactions    Zoloft [Sertraline Hcl] Other (See Comments)     Patient states that she doesn't want this medication anymore because she hallucinated and saw spiders. Patient weaned herself off and after she quit taking the medication, the hallucinations stopped. Patient states that she doesn't want this medication anymore because she hallucinated and saw spiders. Patient weaned herself off and after she quit taking the medication, the hallucinations stopped.  Gabapentin Rash     Pt weaning off due to rash and hot flashes       Health Maintenance   Topic Date Due    Shingles Vaccine (1 of 2) Never done   [employer] Visit (AWV)  04/21/2022    Potassium monitoring  05/26/2022    Creatinine monitoring  05/26/2022    DTaP/Tdap/Td vaccine (3 - Td) 12/23/2025    DEXA (modify frequency per FRAX score)  Completed    Flu vaccine  Completed    Pneumococcal 65+ years Vaccine  Completed    COVID-19 Vaccine  Completed    Colon cancer screen colonoscopy  Completed    Hepatitis A vaccine  Aged Out    Hepatitis B vaccine  Aged Out    Hib vaccine  Aged Out    Meningococcal (ACWY) vaccine  Aged Out       Subjective:      Review of Systems   Constitutional: Positive for activity change. Musculoskeletal: Positive for arthralgias. Right hip pain   Psychiatric/Behavioral: The patient is nervous/anxious. Objective:     Physical Exam  Vitals and nursing note reviewed. Constitutional:       Appearance: She is well-developed. Comments: Sitting up in wheelchair   HENT:      Head: Normocephalic. Eyes:      Pupils: Pupils are equal, round, and reactive to light.    Cardiovascular:      Rate and Rhythm: Normal rate and regular rhythm. Heart sounds: Normal heart sounds. Pulmonary:      Effort: Pulmonary effort is normal.      Breath sounds: Normal breath sounds. Abdominal:      General: Bowel sounds are normal.      Palpations: Abdomen is soft. Tenderness: There is abdominal tenderness in the right lower quadrant. There is no right CVA tenderness, left CVA tenderness, guarding or rebound. Negative signs include Bryson's sign, Rovsing's sign, McBurney's sign, psoas sign and obturator sign. Musculoskeletal:      Cervical back: Normal range of motion. Skin:     General: Skin is warm and dry. Neurological:      Mental Status: She is alert and oriented to person, place, and time. Psychiatric:         Behavior: Behavior normal.         Thought Content: Thought content normal.         Judgment: Judgment normal.       /76 (Site: Left Upper Arm, Position: Sitting, Cuff Size: Medium Adult)   Pulse 63   Temp 98.1 °F (36.7 °C)   Ht 5' 3\" (1.6 m)   Wt 147 lb (66.7 kg)   SpO2 97%   BMI 26.04 kg/m²   Venous Access Procedure Note  Indication: maintenance flush    Procedure: The patient was placed in the appropriate position and the skin over the puncture site was prepped with betadine and draped in a sterile fashion. Intravenous access was obtained in right chest port with a Bliss needle and the site was secured appropriately. 3 cc of blood with withdrawn and discarded. The port was flushed with 10cc NS and then 3 cc heparin flush. The patient tolerated the procedure well. Complications: None    Assessment:       Diagnosis Orders   1. Right hip pain     2. Malignant neoplasm of colon, unspecified part of colon ([address]     3. Port-A-Cath in place  [place] IRRIG IMPLANTED DRUG DELIVERY DEVICE   4.  Encounter for care related to Port-a-Cath  VT [place]         Plan:   More than 50% of the time was spent counseling and coordinating care for a total time of 25min face to face.  I had tried to call orthopedic [place], Jazzmine Heath, the nurse practitioner by the patient was here but had to leave a message. I think the patient might benefit from some extended release morphine with something for breakthrough pain or an injection to help with back nerve pain. The patient seems receptive to this. I will continue to try to call her regarding this. Patient given educational materials -see patient instructions. Discussed use, benefit, and side effects of prescribed medications. All patient questions answered. Pt voiced understanding. Reviewed health maintenance. Instructed to continue currentmedications, diet and exercise. Patient agreed with treatment plan. Follow up as directed. MEDICATIONS:  No orders of the defined types were placed in this encounter. ORDERS:  Orders Placed This Encounter   Procedures    [place] IRRIG IMPLANTED DRUG DELIVERY DEVICE       Follow-up:  No follow-ups on file. PATIENT INSTRUCTIONS:  There are no Patient Instructions on file for this visit. Electronically signed by EARLE Montemayor CNP on 6/1/2021 at 11:48 AM    EMR Dragon/transcription disclaimer:  Much of thisencounter note is electronic transcription/translation of spoken language to printed texts. The electronic translation of spoken language may be erroneous, or at times, nonsensical words or phrases may be inadvertentlytranscribed.   Although I have reviewed the note for such errors, some may still exist.

## 2021-06-17 ENCOUNTER — OFFICE VISIT (OUTPATIENT)
Dept: PRIMARY CARE CLINIC | Age: 86
End: 2021-06-17
Payer: MEDICARE

## 2021-06-17 VITALS
BODY MASS INDEX: 26.05 KG/M2 | HEART RATE: 70 BPM | SYSTOLIC BLOOD PRESSURE: 120 MMHG | TEMPERATURE: 96.8 F | WEIGHT: 147 LBS | DIASTOLIC BLOOD PRESSURE: 80 MMHG | OXYGEN SATURATION: 96 % | HEIGHT: 63 IN | RESPIRATION RATE: 16 BRPM

## 2021-06-17 DIAGNOSIS — M79.604 PAIN IN BOTH LOWER EXTREMITIES: ICD-10-CM

## 2021-06-17 DIAGNOSIS — R20.2 NUMBNESS AND TINGLING OF BOTH FEET: ICD-10-CM

## 2021-06-17 DIAGNOSIS — M79.7 FIBROMYALGIA: Primary | ICD-10-CM

## 2021-06-17 DIAGNOSIS — I10 ESSENTIAL HYPERTENSION: ICD-10-CM

## 2021-06-17 DIAGNOSIS — M79.605 PAIN IN BOTH LOWER EXTREMITIES: ICD-10-CM

## 2021-06-17 DIAGNOSIS — C18.9 MALIGNANT NEOPLASM OF COLON, UNSPECIFIED PART OF COLON (HCC): ICD-10-CM

## 2021-06-17 DIAGNOSIS — M25.551 RIGHT HIP PAIN: Primary | ICD-10-CM

## 2021-06-17 DIAGNOSIS — R20.0 NUMBNESS AND TINGLING OF BOTH FEET: ICD-10-CM

## 2021-06-17 PROCEDURE — G8399 PT W/DXA RESULTS DOCUMENT: HCPCS | Performed by: NURSE PRACTITIONER

## 2021-06-17 PROCEDURE — G8427 DOCREV CUR MEDS BY ELIG CLIN: HCPCS | Performed by: NURSE PRACTITIONER

## 2021-06-17 PROCEDURE — 1123F ACP DISCUSS/DSCN MKR DOCD: CPT | Performed by: NURSE PRACTITIONER

## 2021-06-17 PROCEDURE — 99214 OFFICE O/P EST MOD 30 MIN: CPT | Performed by: NURSE PRACTITIONER

## 2021-06-17 PROCEDURE — 4040F PNEUMOC VAC/ADMIN/RCVD: CPT | Performed by: NURSE PRACTITIONER

## 2021-06-17 PROCEDURE — 1090F PRES/ABSN URINE INCON ASSESS: CPT | Performed by: NURSE PRACTITIONER

## 2021-06-17 PROCEDURE — 1036F TOBACCO NON-USER: CPT | Performed by: NURSE PRACTITIONER

## 2021-06-17 PROCEDURE — G8417 CALC BMI ABV UP PARAM F/U: HCPCS | Performed by: NURSE PRACTITIONER

## 2021-06-17 RX ORDER — LIDOCAINE 50 MG/G
PATCH TOPICAL
COMMUNITY
Start: 2021-05-20 | End: 2021-08-24 | Stop reason: ALTCHOICE

## 2021-06-17 NOTE — PROGRESS NOTES
Laterality Date    APPENDECTOMY      BREAST BIOPSY      CATARACT REMOVAL       SECTION      CHOLECYSTECTOMY      COLECTOMY  partial    COLON SURGERY      COLONOSCOPY  2010    COLONOSCOPY  2012    Dr Kamryn Lin: unremarkable enterocolonic anastamosis; hyperplastic polyps    COLONOSCOPY  2013    [place]: unremarkable post-surgical colon    COLONOSCOPY  2016    Dr Lesa Chairez colon anastomosis, 5 yr recall    COLONOSCOPY N/A 2021    Dr Wagner Garcia and patent anastomosis in the right side-BCM, prn (age)    COLONOSCOPY  2021    Dr Wagner Garcia and patent anastomosis in the right side-BCM, prn (age)   [place]OT SURGERY  right foot    HAND SURGERY Right     Dr Bryson Calabrese N/A 2020    KYPHOPLASTY L5 performed by Bernardo Wild DO at [facility] Right 2020    RIGHT L 4-5 DECOMPRESSIVE LAMINECTOMY performed by Bernardo Wild DO at [facility]      UPPER GASTROINTESTINAL ENDOSCOPY  10/16/2009    UPPER GASTROINTESTINAL ENDOSCOPY  2013    Bodnicole: peptic stricture dilated to 48F; HH; small antral mucosal elevation    UPPER GASTROINTESTINAL ENDOSCOPY  2014    Maurilio: dilation of esophageal stricture    VARICOSE VEIN SURGERY Left 2014  [facility] [address]    Left GSV Ablation    VARICOSE VEIN SURGERY Right 2014  [facility]    Right GSV Ablation       Vitals 2021 3/81/1880   SYSTOLIC 969 [phone]   DIASTOLIC 80 76 51 60 60 81   Site - Left Upper Arm - - - -   Position - Sitting - - - -   Cuff Size - Medium Adult - - - -   Pulse 70 63 79 75 64 90   Temp 96.8 98.1 98.7 96.4 96.1 -   Resp 16 - - 16 18 20   SpO2 96 97 97 99 96 100   Weight 147 lb 147 lb 147 lb 147 lb 147 lb -   Height 5' 3\" 5' 3\" 5' 3\" 5' 3\" 5' 3\" -   Body mass index 26.04 kg/m2 26.04 kg/m2 26.04 kg/m2 26.04 kg/m2 26.04 kg/m2 - needed for Pain.  metoprolol tartrate (LOPRESSOR) 25 MG tablet Take 1 tablet by mouth 2 times daily 180 tablet 3   [address] by Does not apply route 1 each 0    acyclovir (ZOVIRAX) 400 MG tablet Take 400 mg by mouth 2 times daily history of shingles in her eye      albuterol (ACCUNEB) 1.25 MG/3ML nebulizer solution Inhale 1 mL into the lungs every 6 hours as needed for Wheezing       dorzolamide-timolol (COSOPT) 22.3-6.8 MG/ML ophthalmic solution Place 1 Dose into both eyes daily      furosemide (LASIX) 20 MG tablet Take 1 tablet by mouth daily as needed (swelling) 30 tablet 3     No current facility-administered medications on file prior to visit. Allergies   Allergen Reactions    Zoloft [Sertraline Hcl] Other (See Comments)     Patient states that she doesn't want this medication anymore because she hallucinated and saw spiders. Patient weaned herself off and after she quit taking the medication, the hallucinations stopped. Patient states that she doesn't want this medication anymore because she hallucinated and saw spiders. Patient weaned herself off and after she quit taking the medication, the hallucinations stopped.  Gabapentin Rash     Pt weaning off due to rash and hot flashes       Health Maintenance   Topic Date Due    Shingles Vaccine (1 of 2) Never done   [employer] Visit (AWV)  04/21/2022    Potassium monitoring  05/26/2022    Creatinine monitoring  05/26/2022    DTaP/Tdap/Td vaccine (3 - Td or Tdap) 12/23/2025    DEXA (modify frequency per FRAX score)  Completed    Flu vaccine  Completed    Pneumococcal 65+ years Vaccine  Completed    COVID-19 Vaccine  Completed    Colon cancer screen colonoscopy  Completed    Hepatitis A vaccine  Aged Out    Hepatitis B vaccine  Aged Out    Hib vaccine  Aged Out    Meningococcal (ACWY) vaccine  Aged Out       Subjective:      Review of Systems   Constitutional: Positive for activity change and fatigue.  Negative for fever.   HENT: Negative. Musculoskeletal: Positive for back pain. Painhip   Psychiatric/Behavioral: Positive for dysphoric mood and sleep disturbance. The patient is nervous/anxious. Objective:     Physical Exam  Vitals and nursing note reviewed. Constitutional:       Appearance: Normal appearance. She is well-developed. Comments: Patient with her  today. In wheelchair   HENT:      Head: Normocephalic. Right Ear: External ear normal.      Left Ear: External ear normal.   Eyes:      Pupils: Pupils are equal, round, and reactive to light. Cardiovascular:      Rate and Rhythm: Normal rate and regular rhythm. Heart sounds: Normal heart sounds. Pulmonary:      Effort: Pulmonary effort is normal.      Breath sounds: Normal breath sounds. Musculoskeletal:      Cervical back: Normal range of motion. Skin:     General: Skin is warm and dry. Capillary Refill: Capillary refill takes less than 2 seconds. Neurological:      General: No focal deficit present. Mental Status: She is alert and oriented to person, place, and time. Mental status is at baseline. Psychiatric:         Mood and Affect: Mood normal.         Behavior: Behavior normal.         Thought Content: Thought content normal.         Judgment: Judgment normal.      Comments: Crying today due  To pain       /80   Pulse 70   Temp 96.8 °F (36 °C) (Temporal)   Resp 16   Ht 5' 3\" (1.6 m)   Wt 147 lb (66.7 kg)   SpO2 96%   BMI 26.04 kg/m²     Assessment:       Diagnosis Orders   1. Right hip pain     2. Essential hypertension     3. Malignant neoplasm of colon, unspecified part of colon ([address]     4. Numbness and tingling of both feet     5. Pain in both lower extremities           Plan:   More than 50% of the time was spent counseling and coordinating care for a total time of 35min face to face.   Neurology did put a referral in at the end of May for the patient at [address] [place] [address] and they have not heard anything. I did text him and he responded. He is going to check with his office about the referral and try to get the patient in tomorrow. I signed a medication agreement with the patient to scanned in her chart so I could help her with her medications until we get her bridged over to the other pain management. I really think she would benefit from some extended release pain medication and breakthrough medicine for pain. Patient given educational materials -see patient instructions. Discussed use, benefit, and side effects of prescribed medications. All patient questions answered. Pt voiced understanding. Reviewed health maintenance. Instructed to continue currentmedications, diet and exercise. Patient agreed with treatment plan. Follow up as directed. MEDICATIONS:  No orders of the defined types were placed in this encounter. ORDERS:  No orders of the defined types were placed in this encounter. Follow-up:  Return in about 3 months (around 9/17/2021). PATIENT INSTRUCTIONS:  There are no Patient Instructions on file for this visit. Electronically signed by EARLE Mcgee CNP on 6/24/2021 at 9:07 AM    EMR Dragon/transcription disclaimer:  Much of thisencounter note is electronic transcription/translation of spoken language to printed texts. The electronic translation of spoken language may be erroneous, or at times, nonsensical words or phrases may be inadvertentlytranscribed.   Although I have reviewed the note for such errors, some may still exist.

## 2021-06-22 RX ORDER — ERGOCALCIFEROL 1.25 MG/1
CAPSULE ORAL
Qty: 12 CAPSULE | Refills: 1 | Status: SHIPPED | OUTPATIENT
Start: 2021-06-22 | End: 2021-12-20

## 2021-06-23 ENCOUNTER — TELEPHONE (OUTPATIENT)
Dept: PRIMARY CARE CLINIC | Age: 86
End: 2021-06-23

## 2021-06-23 NOTE — TELEPHONE ENCOUNTER
----- Message from Nigel Porter sent at 6/23/2021 10:01 AM CDT -----  Pt asking for you to call her.  [phone]

## 2021-06-23 NOTE — TELEPHONE ENCOUNTER
Returned patients phone call. Patient states that Dr. Manda Bush wants her to do Physical Therapy at [facility] 2-3 times a week. She says that she doesn't know if she can do that. Patient is wanting to know about possibly someone coming out to the house. Not home home though. I told patient that going to Physical therapy would be good for her. If she couldn't make it 3 times a week at least start off with 1 and build up from there. Patient states that she could do that. I told patient that I would give the message to Jayna anyway and see what she says and either Jayna will call her or I would call her back.

## 2021-06-24 ASSESSMENT — ENCOUNTER SYMPTOMS: BACK PAIN: 1

## 2021-06-24 NOTE — TELEPHONE ENCOUNTER
Dr richard is wanting her to go to physical therapy in person and [facility] 3 days a week and she does not want to go she said she does not feel like she could physically do that she would not mind having physical therapy at home and she tried to explain this to him but he wants her to go to this particular one. I have asked her to discuss with him possibly decreasing that to once a week to start with to see how she does.   Also we discussed stopping the Xarelto before she has a procedure with him she is aware of this

## 2021-07-02 ENCOUNTER — TELEPHONE (OUTPATIENT)
Dept: NEUROSURGERY | Age: 86
End: 2021-07-02

## 2021-07-02 NOTE — TELEPHONE ENCOUNTER
Called patient let her know dr monzon wanted to see her per EG for her MRI finidings. Pt given appointment. Pt is already seeing dr richard.

## 2021-07-06 ENCOUNTER — OFFICE VISIT (OUTPATIENT)
Dept: NEUROSURGERY | Age: 86
End: 2021-07-06
Payer: MEDICARE

## 2021-07-06 VITALS
WEIGHT: 147 LBS | HEART RATE: 68 BPM | HEIGHT: 63 IN | SYSTOLIC BLOOD PRESSURE: 169 MMHG | DIASTOLIC BLOOD PRESSURE: 75 MMHG | BODY MASS INDEX: 26.05 KG/M2

## 2021-07-06 DIAGNOSIS — Z98.890 S/P LUMBAR LAMINECTOMY: ICD-10-CM

## 2021-07-06 DIAGNOSIS — M25.551 RIGHT HIP PAIN: Primary | ICD-10-CM

## 2021-07-06 DIAGNOSIS — M79.604 RIGHT LEG PAIN: ICD-10-CM

## 2021-07-06 PROCEDURE — 1036F TOBACCO NON-USER: CPT | Performed by: NURSE PRACTITIONER

## 2021-07-06 PROCEDURE — 1090F PRES/ABSN URINE INCON ASSESS: CPT | Performed by: NURSE PRACTITIONER

## 2021-07-06 PROCEDURE — G8417 CALC BMI ABV UP PARAM F/U: HCPCS | Performed by: NURSE PRACTITIONER

## 2021-07-06 PROCEDURE — G8399 PT W/DXA RESULTS DOCUMENT: HCPCS | Performed by: NURSE PRACTITIONER

## 2021-07-06 PROCEDURE — G8427 DOCREV CUR MEDS BY ELIG CLIN: HCPCS | Performed by: NURSE PRACTITIONER

## 2021-07-06 PROCEDURE — 99213 OFFICE O/P EST LOW 20 MIN: CPT | Performed by: NURSE PRACTITIONER

## 2021-07-06 PROCEDURE — 4040F PNEUMOC VAC/ADMIN/RCVD: CPT | Performed by: NURSE PRACTITIONER

## 2021-07-06 PROCEDURE — 1123F ACP DISCUSS/DSCN MKR DOCD: CPT | Performed by: NURSE PRACTITIONER

## 2021-07-06 ASSESSMENT — ENCOUNTER SYMPTOMS
GASTROINTESTINAL NEGATIVE: 1
RESPIRATORY NEGATIVE: 1
BACK PAIN: 1
EYES NEGATIVE: 1

## 2021-07-06 NOTE — PROGRESS NOTES
Review of Systems   Constitutional: Positive for weight loss. HENT: Negative. Eyes: Negative. Respiratory: Negative. Cardiovascular: Positive for leg swelling. Gastrointestinal: Negative. Genitourinary: Negative. Musculoskeletal: Positive for back pain and joint pain. Skin: Positive for rash. Neurological: Positive for dizziness and tremors. Endo/Heme/Allergies: Bruises/bleeds easily. Psychiatric/Behavioral: Positive for memory loss.

## 2021-07-06 NOTE — PROGRESS NOTES
18 [facility] Office Visit      Chief Complaint   Patient presents with    Follow-up       HISTORY OF PRESENT ILLNESS:      Lamonte Ignacio is a 82 y.o. 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 Today she is here for routine follow up for the RLE pain. She continues to have right hip, groin, anterior thigh pain. She has had another MRI lumbar spine done at [facility] in 2021, however, we do not have the images to view at today's visit. She states she never saw Dr. Julius Naranjo again regarding her right hip. She has been seen by Dr. Crow Loja with pain management. She is now scheduled to see him soon.          Past Medical History:   Diagnosis Date    Bronchitis     Colon cancer ([address]     Colon polyps     Constipation     Deep vein blood clot of left lower extremity (HCC)     Left leg     Depression     DVT (deep venous thrombosis) (HCC)     Dysphagia     Fibrocystic breast disease     Fibromyalgia     GERD (gastroesophageal reflux disease)     reflux with hx of esophageal stricture    Headache(784.0)     History of colon cancer     Hormone replacement therapy (postmenopausal)     Hypertension     Neuropathy of foot     In both feet    Osteoarthritis     left knee pain    Restless legs syndrome     Vitamin D deficiency        Past Surgical History:   Procedure Laterality Date    APPENDECTOMY      BREAST BIOPSY      CATARACT REMOVAL       SECTION      CHOLECYSTECTOMY      COLECTOMY  partial    COLON SURGERY      COLONOSCOPY  2010    COLONOSCOPY  2012    Dr Sebastián Cadet: unremarkable enterocolonic anastamosis; hyperplastic polyps    COLONOSCOPY  2013    [facility]: unremarkable post-surgical colon    COLONOSCOPY  2016    Dr Neha Aponte colon anastomosis, 5 yr recall    COLONOSCOPY N/A 2021    Dr Vijaya Covarrubias and patent anastomosis in the right side-BCM, prn (age)   [facility] COLONOSCOPY  03/29/2021    Dr Darren Bang and patent anastomosis in the right side-BCM, prn (age)   Floydene Ada FOOT SURGERY  right foot    HAND SURGERY Right     Dr Gabi Jackson N/A 07/06/2020    KYPHOPLASTY L5 performed by Marta Cuellar DO at [address]  [place] Right 12/14/2020    RIGHT L 4-5 DECOMPRESSIVE LAMINECTOMY performed by Marta Cuellar DO at [address] TOTAL KNEE ARTHROPLASTY      UPPER GASTROINTESTINAL ENDOSCOPY  10/16/2009    UPPER GASTROINTESTINAL ENDOSCOPY  03/01/2013    [place]: peptic stricture dilated to 48F; HH; small antral mucosal elevation    UPPER GASTROINTESTINAL ENDOSCOPY  12/01/2014    [place]: dilation of esophageal stricture    VARICOSE VEIN SURGERY Left 03/14/2014  [facility]    Left GSV Ablation    VARICOSE VEIN SURGERY Right 04/04/2014  [facility]    Right GSV Ablation        Medications    Current Outpatient Medications:     vitamin D (ERGOCALCIFEROL) 1.25 MG (54244 UT) CAPS capsule, TAKE 1 CAPSULE BY MOUTH 1 TIME A WEEK, Disp: 12 capsule, Rfl: 1    lidocaine (LIDODERM) 5 %, APPLY 1 PATCH TOPICALLY TO THE SKIN EVERY DAY. MAY WEAR UP TO 12 HOURS, Disp: , Rfl:     oxyCODONE-acetaminophen (PERCOCET)  MG per tablet, Take 1 tablet by mouth., Disp: , Rfl:     pregabalin (LYRICA) 50 MG capsule, Take 1 capsule by mouth 2 times daily for 90 days. , Disp: 60 capsule, Rfl: 2    triamcinolone (KENALOG) 0.1 % cream, APPLY TWICE DAILY TO RASH UP TO 2 WEEKS/MONTH AS NEEDED, Disp: , Rfl:     Cyanocobalamin (B-12) 500 MCG TABS, TAKE 1 TABLET BY MOUTH DAILY, Disp: 30 tablet, Rfl: 11    triamterene-hydroCHLOROthiazide (MAXZIDE-25) 37.5-25 MG per tablet, TAKE 1 TABLET BY MOUTH DAILY, Disp: 90 tablet, Rfl: 3    tiZANidine (ZANAFLEX) 4 MG tablet, TAKE 1 TABLET BY MOUTH THREE TIMES DAILY AS NEEDED.  NOT TO EXCEED 3 DOSES IN 24 HOURS, Disp: , Rfl:     betamethasone valerate (VALISONE) 0.1 % lotion, APPLY EXTERNALLY TO THE SCALP ONCE EVERY DAY, Disp: , Rfl:     DULoxetine (CYMBALTA) 20 MG extended release capsule, Take 1 capsule by mouth daily, Disp: 30 capsule, Rfl: 5    losartan (COZAAR) 100 MG tablet, Take 1 tablet by mouth daily, Disp: 90 tablet, Rfl: 3    omeprazole (PRILOSEC) 40 MG delayed release capsule, Take 1 capsule by mouth daily, Disp: 90 capsule, Rfl: 3    rivaroxaban (XARELTO) 20 MG TABS tablet, Take 1 tablet by mouth daily (with breakfast), Disp: 30 tablet, Rfl: 3    HYDROcodone-acetaminophen (NORCO)  MG per tablet, Take 1 tablet by mouth every 6 hours as needed for Pain. , Disp: , Rfl:     metoprolol tartrate (LOPRESSOR) 25 MG tablet, Take 1 tablet by mouth 2 times daily, Disp: 180 tablet, Rfl: 3    Hospital Bed MIS, by Does not apply route, Disp: 1 each, Rfl: 0    acyclovir (ZOVIRAX) 400 MG tablet, Take 400 mg by mouth 2 times daily history of shingles in her eye, Disp: , Rfl:     albuterol (ACCUNEB) 1.25 MG/3ML nebulizer solution, Inhale 1 mL into the lungs every 6 hours as needed for Wheezing , Disp: , Rfl:     dorzolamide-timolol (COSOPT) 22.3-6.8 MG/ML ophthalmic solution, Place 1 Dose into both eyes daily, Disp: , Rfl:     furosemide (LASIX) 20 MG tablet, Take 1 tablet by mouth daily as needed (swelling), Disp: 30 tablet, Rfl: 3  Zoloft [sertraline hcl] and Gabapentin    Social History  Social History     Tobacco Use   Smoking Status Never Smoker   Smokeless Tobacco Never Used     Social History     Substance and Sexual Activity   Alcohol Use No         Family History   Problem Relation Age of Onset    Cancer Mother         Cervical Cancer    Cancer Brother         Esophageal cancer    Diabetes Brother     Esophageal Cancer Brother     Diabetes Sister     Colon Cancer Neg Hx     Colon Polyps Neg Hx     Liver Cancer Neg Hx     Liver Disease Neg Hx     Rectal Cancer Neg Hx     Stomach Cancer Neg Hx        Review of Systems:  Constitutional: Negative. HENT: Negative. Eyes: Negative. Respiratory: Negative. Cardiovascular: Negative. Gastrointestinal: Negative. Genitourinary: Negative. Musculoskeletal: Positive for joint pain and myalgias. Skin: Negative. Neurological: Negative. Endo/Heme/Allergies: Negative. Psychiatric/Behavioral: Negative. PHYSICAL EXAM:  Vitals:    07/06/21 1444   BP: (!) 169/75   Pulse: 68     Constitutional: The patient appears well-developed andwell-nourished. Eyes - conjunctiva normal.  Conjugate gaze  Ear, nose, throat -No scars, masses, or lesions over external nose or ears, no atrophy of tongue  Neck-symmetric, no masses noted, no jugular vein distension  Respiration- chest wall appears symmetric, goodexpansion, normal effort without use of accessory muscles  Musculoskeletal - no significant wasting of muscles noted, no bony deformities, gait no gross ataxia  Extremities-no clubbing, cyanosis or edema  Skin- warm, dry, and intact. No rash, erythema, or pallor.   Psychiatric - mood, affect, and behavior appear normal.     Neurologic Examination  Awake, Alert and oriented x 3  Normal speech pattern, following commands  Motor 5/5 all extremities  No deficits to light touch or pinprick sensation    Transported in a wheelchair at today's visit       DATA and IMAGING:    Lab Results   Component Value Date    WBC 9.6 05/26/2021    HGB 12.4 05/26/2021    HCT 38.7 05/26/2021    MCV 88.4 05/26/2021     05/26/2021     Lab Results   Component Value Date     05/26/2021    K 3.9 05/26/2021    CL 99 05/26/2021    CO2 24 05/26/2021    BUN 21 05/26/2021    CREATININE 1.2 (H) 05/26/2021    GLUCOSE 123 (H) 05/26/2021    CALCIUM 9.7 05/26/2021    PROT 7.1 05/26/2021    LABALBU 4.0 05/26/2021    BILITOT <0.2 05/26/2021    ALKPHOS 97 05/26/2021    AST 16 05/26/2021    ALT 10 05/26/2021    LABGLOM 43 (A) 05/26/2021    GFRAA 52 (L) 05/26/2021    GLOB 2.5 10/12/2016     Lab Results   Component Value Date    INR 0.94 12/09/2020    INR 0.93 07/06/2020

## 2021-07-14 RX ORDER — DULOXETIN HYDROCHLORIDE 20 MG/1
20 CAPSULE, DELAYED RELEASE ORAL DAILY
Qty: 30 CAPSULE | Refills: 5 | Status: SHIPPED | OUTPATIENT
Start: 2021-07-14 | End: 2021-09-28 | Stop reason: SDUPTHER

## 2021-07-28 ENCOUNTER — OFFICE VISIT (OUTPATIENT)
Dept: PRIMARY CARE CLINIC | Age: 86
End: 2021-07-28
Payer: MEDICARE

## 2021-07-28 VITALS
DIASTOLIC BLOOD PRESSURE: 80 MMHG | HEART RATE: 71 BPM | TEMPERATURE: 97.8 F | BODY MASS INDEX: 26.05 KG/M2 | OXYGEN SATURATION: 98 % | WEIGHT: 147 LBS | SYSTOLIC BLOOD PRESSURE: 120 MMHG | HEIGHT: 63 IN

## 2021-07-28 DIAGNOSIS — R13.10 DYSPHAGIA, UNSPECIFIED TYPE: ICD-10-CM

## 2021-07-28 DIAGNOSIS — Z45.2 ENCOUNTER FOR CARE RELATED TO PORT-A-CATH: ICD-10-CM

## 2021-07-28 DIAGNOSIS — M25.551 RIGHT HIP PAIN: ICD-10-CM

## 2021-07-28 DIAGNOSIS — Z95.828 PORT-A-CATH IN PLACE: ICD-10-CM

## 2021-07-28 DIAGNOSIS — I10 ESSENTIAL HYPERTENSION: Primary | ICD-10-CM

## 2021-07-28 DIAGNOSIS — N18.32 STAGE 3B CHRONIC KIDNEY DISEASE (HCC): ICD-10-CM

## 2021-07-28 LAB
ALBUMIN SERPL-MCNC: 3.7 G/DL (ref 3.5–5.2)
ALP BLD-CCNC: 101 U/L (ref 35–104)
ALT SERPL-CCNC: 10 U/L (ref 5–33)
ANION GAP SERPL CALCULATED.3IONS-SCNC: 10 MMOL/L (ref 7–19)
AST SERPL-CCNC: 14 U/L (ref 5–32)
BASOPHILS ABSOLUTE: 0.1 K/UL (ref 0–0.2)
BASOPHILS RELATIVE PERCENT: 0.6 % (ref 0–1)
BILIRUB SERPL-MCNC: <0.2 MG/DL (ref 0.2–1.2)
BUN BLDV-MCNC: 15 MG/DL (ref 8–23)
CALCIUM SERPL-MCNC: 9.1 MG/DL (ref 8.8–10.2)
CHLORIDE BLD-SCNC: 101 MMOL/L (ref 98–111)
CO2: 29 MMOL/L (ref 22–29)
CREAT SERPL-MCNC: 0.9 MG/DL (ref 0.5–0.9)
EOSINOPHILS ABSOLUTE: 0.7 K/UL (ref 0–0.6)
EOSINOPHILS RELATIVE PERCENT: 7.2 % (ref 0–5)
GFR AFRICAN AMERICAN: >59
GFR NON-AFRICAN AMERICAN: 60
GLUCOSE BLD-MCNC: 120 MG/DL (ref 74–109)
HCT VFR BLD CALC: 35.8 % (ref 37–47)
HEMOGLOBIN: 11.2 G/DL (ref 12–16)
IMMATURE GRANULOCYTES #: 0 K/UL
LYMPHOCYTES ABSOLUTE: 1.9 K/UL (ref 1.1–4.5)
LYMPHOCYTES RELATIVE PERCENT: 19.8 % (ref 20–40)
MCH RBC QN AUTO: 27.5 PG (ref 27–31)
MCHC RBC AUTO-ENTMCNC: 31.3 G/DL (ref 33–37)
MCV RBC AUTO: 88 FL (ref 81–99)
MONOCYTES ABSOLUTE: 0.9 K/UL (ref 0–0.9)
MONOCYTES RELATIVE PERCENT: 9 % (ref 0–10)
NEUTROPHILS ABSOLUTE: 6.1 K/UL (ref 1.5–7.5)
NEUTROPHILS RELATIVE PERCENT: 63.1 % (ref 50–65)
PDW BLD-RTO: 13.4 % (ref 11.5–14.5)
PLATELET # BLD: 377 K/UL (ref 130–400)
PMV BLD AUTO: 10.4 FL (ref 9.4–12.3)
POTASSIUM SERPL-SCNC: 4.3 MMOL/L (ref 3.5–5)
RBC # BLD: 4.07 M/UL (ref 4.2–5.4)
SODIUM BLD-SCNC: 140 MMOL/L (ref 136–145)
TOTAL PROTEIN: 6.4 G/DL (ref 6.6–8.7)
WBC # BLD: 9.7 K/UL (ref 4.8–10.8)

## 2021-07-28 PROCEDURE — 96372 THER/PROPH/DIAG INJ SC/IM: CPT | Performed by: NURSE PRACTITIONER

## 2021-07-28 PROCEDURE — 1123F ACP DISCUSS/DSCN MKR DOCD: CPT | Performed by: NURSE PRACTITIONER

## 2021-07-28 PROCEDURE — 4040F PNEUMOC VAC/ADMIN/RCVD: CPT | Performed by: NURSE PRACTITIONER

## 2021-07-28 PROCEDURE — G8399 PT W/DXA RESULTS DOCUMENT: HCPCS | Performed by: NURSE PRACTITIONER

## 2021-07-28 PROCEDURE — 1090F PRES/ABSN URINE INCON ASSESS: CPT | Performed by: NURSE PRACTITIONER

## 2021-07-28 PROCEDURE — G8427 DOCREV CUR MEDS BY ELIG CLIN: HCPCS | Performed by: NURSE PRACTITIONER

## 2021-07-28 PROCEDURE — 99213 OFFICE O/P EST LOW 20 MIN: CPT | Performed by: NURSE PRACTITIONER

## 2021-07-28 PROCEDURE — G8417 CALC BMI ABV UP PARAM F/U: HCPCS | Performed by: NURSE PRACTITIONER

## 2021-07-28 PROCEDURE — 1036F TOBACCO NON-USER: CPT | Performed by: NURSE PRACTITIONER

## 2021-07-28 RX ORDER — PREGABALIN 100 MG/1
CAPSULE ORAL 2 TIMES DAILY
COMMUNITY
Start: 2021-07-27 | End: 2021-09-28

## 2021-07-28 RX ORDER — TRIAMCINOLONE ACETONIDE 1 MG/G
CREAM TOPICAL
Qty: 60 G | Refills: 2 | Status: SHIPPED | OUTPATIENT
Start: 2021-07-28 | End: 2022-02-23

## 2021-07-28 RX ORDER — CIPROFLOXACIN HYDROCHLORIDE 3.5 MG/ML
SOLUTION/ DROPS TOPICAL
COMMUNITY
Start: 2021-07-22 | End: 2021-08-24 | Stop reason: ALTCHOICE

## 2021-07-28 RX ORDER — NYSTATIN 100000 [USP'U]/G
POWDER TOPICAL
Qty: 1 BOTTLE | Refills: 1 | Status: SHIPPED | OUTPATIENT
Start: 2021-07-28 | End: 2022-11-01

## 2021-07-28 RX ORDER — KETOROLAC TROMETHAMINE 30 MG/ML
60 INJECTION, SOLUTION INTRAMUSCULAR; INTRAVENOUS ONCE
Status: COMPLETED | OUTPATIENT
Start: 2021-07-28 | End: 2021-07-28

## 2021-07-28 RX ADMIN — KETOROLAC TROMETHAMINE 60 MG: 30 INJECTION, SOLUTION INTRAMUSCULAR; INTRAVENOUS at 10:42

## 2021-07-28 ASSESSMENT — ENCOUNTER SYMPTOMS
NAUSEA: 0
TROUBLE SWALLOWING: 1
VOMITING: 0

## 2021-07-28 NOTE — PROGRESS NOTES
[address] [facility]  [address] [place] [address]  [address] [place] [address]  Dept: [phone]  Dept Fax: [phone]  Loc: [phone]    Elva Pena is a 82 y.o. female who presents today for her medical conditions/complaints as noted below. Elva Pena is c/o of Follow-up (for a port flush. Patient also c/o soreness/rash under her breasts due to the hot weather per patient. Kenalog cream not completely helping.  )        HPI:     HPI   Chief Complaint   Patient presents with    Follow-up     for a port flush. Patient also c/o soreness/rash under her breasts due to the hot weather per patient. Kenalog cream not completely helping. She also would like to get a shot of Toradol for chronic hip pain while she is here. She is taking pain medicine through pain management and is scheduled to get an injection actually in her hip next week. She has been having some trouble swallowing even soft foods. She thinks that she needs her esophagus stretched she is not had this done in several years. She would like to see someone for this. Rash under her breast is better but not completely healed up she thinks it is related to the heat.     Past Medical History:   Diagnosis Date    Bronchitis     Colon cancer ([address]     Colon polyps     Constipation     Deep vein blood clot of left lower extremity (HCC)     Left leg     Depression     DVT (deep venous thrombosis) (HCC)     Dysphagia     Fibrocystic breast disease     Fibromyalgia     GERD (gastroesophageal reflux disease)     reflux with hx of esophageal stricture    Headache(784.0)     History of colon cancer 2000    Hormone replacement therapy (postmenopausal)     Hypertension     Neuropathy of foot     In both feet    Osteoarthritis     left knee pain    Restless legs syndrome     Vitamin D deficiency       Past Surgical History:   Procedure Laterality Date    APPENDECTOMY      BREAST BIOPSY      CATARACT REMOVAL      Family History   Problem Relation Age of Onset    Cancer Mother         Cervical Cancer    Cancer Brother         Esophageal cancer    Diabetes Brother     Esophageal Cancer Brother     Diabetes Sister     Colon Cancer Neg Hx     Colon Polyps Neg Hx     Liver Cancer Neg Hx     Liver Disease Neg Hx     Rectal Cancer Neg Hx     Stomach Cancer Neg Hx        Social History     Tobacco Use    Smoking status: Never Smoker    Smokeless tobacco: Never Used   Substance Use Topics    Alcohol use: No      Current Outpatient Medications on File Prior to Visit   Medication Sig Dispense Refill    pregabalin (LYRICA) 100 MG capsule       DULoxetine (CYMBALTA) 20 MG extended release capsule TAKE 1 CAPSULE BY MOUTH DAILY 30 capsule 5    vitamin D (ERGOCALCIFEROL) 1.25 MG (93395 UT) CAPS capsule TAKE 1 CAPSULE BY MOUTH 1 TIME A WEEK 12 capsule 1    lidocaine (LIDODERM) 5 % APPLY 1 PATCH TOPICALLY TO THE SKIN EVERY DAY. MAY WEAR UP TO 12 HOURS      Cyanocobalamin (B-12) 500 MCG TABS TAKE 1 TABLET BY MOUTH DAILY 30 tablet 11    triamterene-hydroCHLOROthiazide (MAXZIDE-25) 37.5-25 MG per tablet TAKE 1 TABLET BY MOUTH DAILY 90 tablet 3    tiZANidine (ZANAFLEX) 4 MG tablet TAKE 1 TABLET BY MOUTH THREE TIMES DAILY AS NEEDED. NOT TO EXCEED 3 DOSES IN 24 HOURS      betamethasone valerate (VALISONE) 0.1 % lotion APPLY EXTERNALLY TO THE SCALP ONCE EVERY DAY      losartan (COZAAR) 100 MG tablet Take 1 tablet by mouth daily 90 tablet 3    omeprazole (PRILOSEC) 40 MG delayed release capsule Take 1 capsule by mouth daily 90 capsule 3    rivaroxaban (XARELTO) 20 MG TABS tablet Take 1 tablet by mouth daily (with breakfast) 30 tablet 3    HYDROcodone-acetaminophen (NORCO)  MG per tablet Take 1 tablet by mouth every 6 hours as needed for Pain.        metoprolol tartrate (LOPRESSOR) 25 MG tablet Take 1 tablet by mouth 2 times daily 180 tablet 3   [address] by Does not apply route 1 each 0    acyclovir (ZOVIRAX) 400 MG tablet Take 400 mg by mouth 2 times daily history of shingles in her eye      albuterol (ACCUNEB) 1.25 MG/3ML nebulizer solution Inhale 1 mL into the lungs every 6 hours as needed for Wheezing       dorzolamide-timolol (COSOPT) 22.3-6.8 MG/ML ophthalmic solution Place 1 Dose into both eyes daily      furosemide (LASIX) 20 MG tablet Take 1 tablet by mouth daily as needed (swelling) 30 tablet 3    ciprofloxacin (CILOXAN) 0.3 % ophthalmic solution INSTILL 1 DROP IN RIGHT EYE THREE TIMES DAILY (Patient not taking: Reported on 7/28/2021)      pregabalin (LYRICA) 50 MG capsule Take 1 capsule by mouth 2 times daily for 90 days. (Patient not taking: Reported on 7/28/2021) 60 capsule 2     No current facility-administered medications on file prior to visit. Allergies   Allergen Reactions    Zoloft [Sertraline Hcl] Other (See Comments)     Patient states that she doesn't want this medication anymore because she hallucinated and saw spiders. Patient weaned herself off and after she quit taking the medication, the hallucinations stopped. Patient states that she doesn't want this medication anymore because she hallucinated and saw spiders. Patient weaned herself off and after she quit taking the medication, the hallucinations stopped.        Gabapentin Rash     Pt weaning off due to rash and hot flashes       Health Maintenance   Topic Date Due    Shingles Vaccine (1 of 2) Never done    Flu vaccine (1) 09/01/2021    Annual Wellness Visit (AWV)  04/21/2022    Potassium monitoring  07/28/2022    Creatinine monitoring  07/28/2022    DTaP/Tdap/Td vaccine (3 - Td or Tdap) 12/23/2025    DEXA (modify frequency per FRAX score)  Completed    Pneumococcal 65+ years Vaccine  Completed    COVID-19 Vaccine  Completed    Colon cancer screen colonoscopy  Completed    Hepatitis A vaccine  Aged Out    Hepatitis B vaccine  Aged Out    Hib vaccine  Aged Out    Meningococcal (ACWY) vaccine  Aged Out Subjective:      Review of Systems   Constitutional: Negative. HENT: Positive for trouble swallowing. Gastrointestinal: Negative for nausea and vomiting. Musculoskeletal:        Back and hip  pain   Skin: Positive for rash. Psychiatric/Behavioral: Negative. Objective:     Physical Exam  Vitals and nursing note reviewed. Constitutional:       Appearance: She is well-developed. Comments: elderly female sitting up in wheelchair   HENT:      Head: Normocephalic. Right Ear: External ear normal.      Left Ear: External ear normal.   Eyes:      Pupils: Pupils are equal, round, and reactive to light. Cardiovascular:      Rate and Rhythm: Normal rate and regular rhythm. Heart sounds: Normal heart sounds. Pulmonary:      Effort: Pulmonary effort is normal.      Breath sounds: Normal breath sounds. Musculoskeletal:      Cervical back: Normal range of motion. Skin:     General: Skin is warm and dry. Capillary Refill: Capillary refill takes less than 2 seconds. Neurological:      General: No focal deficit present. Mental Status: She is alert and oriented to person, place, and time. Psychiatric:         Mood and Affect: Mood normal.         Behavior: Behavior normal.         Thought Content: Thought content normal.         Judgment: Judgment normal.       /80   Pulse 71   Temp 97.8 °F (36.6 °C)   Ht 5' 3\" (1.6 m)   Wt 147 lb (66.7 kg)   SpO2 98%   BMI 26.04 kg/m²   Venous Access Procedure Note  Indication: maintenance flush    Procedure: The patient was placed in the appropriate position and the skin over the puncture site was prepped with betadine and draped in a sterile fashion. Intravenous access was obtained in right chest port with a Bliss needle and the site was secured appropriately. 3 cc of blood with withdrawn and discarded. Labs were drawn and placed in laboratory tubes.   The port was flushed with 10cc NS and then 3 cc heparin flush.    The patient tolerated the procedure well. Complications: None    Assessment:       Diagnosis Orders   1. Essential hypertension  CBC Auto Differential    Comprehensive Metabolic Panel   2. Port-A-Cath in place     3. Encounter for care related to Port-a-Cath     4. Right hip pain  ketorolac (TORADOL) injection 60 mg   5. Stage 3b chronic kidney disease ([facility] [address]  Comprehensive Metabolic Panel   6. Dysphagia, unspecified type  EARLE Nicolas, Gastroenterology, [place]         Plan:   More than 50% of the time was spent counseling and coordinating care for a total time of 25min face to face. She can continue to use the try some alone cream on that rash as well as the rash on her lower legs. She can use the nystatin powder after she gets out of the shower in case it is yeast  Continue with pain management for chronic pain treatment of the hip but I will give her a Toradol shot today  Refer to [address] for work-up of the difficulty swallowing. Labs today drawn from port     Patient given educational materials -see patient instructions. Discussed use, benefit, and side effects of prescribed medications. All patient questions answered. Pt voiced understanding. Reviewed health maintenance. Instructed to continue currentmedications, diet and exercise. Patient agreed with treatment plan. Follow up as directed. MEDICATIONS:  Orders Placed This Encounter   Medications    ketorolac (TORADOL) injection 60 mg    triamcinolone (KENALOG) 0.1 % cream     Sig: APPLY TWICE DAILY TO RASH UP TO 2 WEEKS/MONTH AS NEEDED     Dispense:  60 g     Refill:  2    nystatin (MYCOSTATIN) 399644 UNIT/GM powder     Sig: For rash under breast. Apply 3 times daily.      Dispense:  1 Bottle     Refill:  1         ORDERS:  Orders Placed This Encounter   Procedures    CBC Auto Differential    Comprehensive Metabolic Panel    EARLE Riddle, Gastroenterology, [place]       Follow-up:  Return for 8

## 2021-08-12 RX ORDER — RIVAROXABAN 20 MG/1
TABLET, FILM COATED ORAL
Qty: 30 TABLET | Refills: 3 | Status: ON HOLD | OUTPATIENT
Start: 2021-08-12 | End: 2022-01-20

## 2021-08-16 ENCOUNTER — TELEPHONE (OUTPATIENT)
Dept: NEUROSURGERY | Age: 86
End: 2021-08-16

## 2021-08-16 NOTE — TELEPHONE ENCOUNTER
Patient was due to have a repeat MRI prior to her appt with Aimee Lieberman on 8/17/2021. MRI had not been done yet, so I called and spoke with the patient. She voiced that she would like to speak with Dr Krissy Tracey first before she repeats her MRI lumbar test. She states that she will see Dr Krissy Tracey on 8/31/2021 and would like to reschedule her appt with Aimee Lieberman until after that. Patient has been rescheduled for 9/7/2021 at 10:15 am. Patient voiced understanding.

## 2021-08-24 ENCOUNTER — OFFICE VISIT (OUTPATIENT)
Dept: GASTROENTEROLOGY | Age: 86
End: 2021-08-24
Payer: MEDICARE

## 2021-08-24 VITALS
OXYGEN SATURATION: 98 % | HEART RATE: 74 BPM | SYSTOLIC BLOOD PRESSURE: 148 MMHG | DIASTOLIC BLOOD PRESSURE: 62 MMHG | WEIGHT: 147.6 LBS | HEIGHT: 63 IN | BODY MASS INDEX: 26.15 KG/M2

## 2021-08-24 DIAGNOSIS — R13.10 DYSPHAGIA, UNSPECIFIED TYPE: Primary | ICD-10-CM

## 2021-08-24 DIAGNOSIS — Z87.19 HISTORY OF ESOPHAGEAL STRICTURE: ICD-10-CM

## 2021-08-24 PROCEDURE — G8427 DOCREV CUR MEDS BY ELIG CLIN: HCPCS | Performed by: NURSE PRACTITIONER

## 2021-08-24 PROCEDURE — G8399 PT W/DXA RESULTS DOCUMENT: HCPCS | Performed by: NURSE PRACTITIONER

## 2021-08-24 PROCEDURE — G8417 CALC BMI ABV UP PARAM F/U: HCPCS | Performed by: NURSE PRACTITIONER

## 2021-08-24 PROCEDURE — 1123F ACP DISCUSS/DSCN MKR DOCD: CPT | Performed by: NURSE PRACTITIONER

## 2021-08-24 PROCEDURE — 4040F PNEUMOC VAC/ADMIN/RCVD: CPT | Performed by: NURSE PRACTITIONER

## 2021-08-24 PROCEDURE — 1090F PRES/ABSN URINE INCON ASSESS: CPT | Performed by: NURSE PRACTITIONER

## 2021-08-24 PROCEDURE — 1036F TOBACCO NON-USER: CPT | Performed by: NURSE PRACTITIONER

## 2021-08-24 PROCEDURE — 99214 OFFICE O/P EST MOD 30 MIN: CPT | Performed by: NURSE PRACTITIONER

## 2021-08-24 ASSESSMENT — ENCOUNTER SYMPTOMS
COUGH: 0
CONSTIPATION: 0
BLOOD IN STOOL: 0
RECTAL PAIN: 0
ANAL BLEEDING: 0
BACK PAIN: 1
DIARRHEA: 0
SHORTNESS OF BREATH: 0
ABDOMINAL PAIN: 0
NAUSEA: 0
VOICE CHANGE: 0
SORE THROAT: 0
TROUBLE SWALLOWING: 1
VOMITING: 0
ABDOMINAL DISTENTION: 0

## 2021-08-24 NOTE — PROGRESS NOTES
Subjective:      Jose Jo is a82 y.o. female  Chief Complaint   Patient presents with    Dysphagia       HPI  PCP: Juaquin Landa, EARLE - CNP  Pt made an appt due to c/o dysphagia  New pt to me. Previous pt of EARLE Aj. Pt reports dysphagia. Started a few months ago. Gradually becoming more frequent. The dysphagia occurs with pills and breads. Has to drink lots of fluids to push foods/pills down. Hx of esophageal stricture requiring dil in the past.    No further GI complaints today. GI scope reports in history per MA per OV policy, I reviewed this.     Family HX:  Brother had esophageal cancer  Pt denies family hx of colon polyps, colon CA, inflammatory bowel dx, gastric CA    Past Medical History:   Diagnosis Date    Bronchitis     Colon cancer ([address]     Colon polyps     Constipation     Deep vein blood clot of left lower extremity ([address]     Left leg     Depression     DVT (deep venous thrombosis) (HCC)     Dysphagia     Fibrocystic breast disease     Fibromyalgia     GERD (gastroesophageal reflux disease)     reflux with hx of esophageal stricture    Headache(784.0)     History of colon cancer     Hormone replacement therapy (postmenopausal)     Hypertension     Neuropathy of foot     In both feet    Osteoarthritis     left knee pain    Restless legs syndrome     Vitamin D deficiency           Past Surgical History:   Procedure Laterality Date    APPENDECTOMY      BREAST BIOPSY      CATARACT REMOVAL       SECTION      CHOLECYSTECTOMY      COLECTOMY  partial    COLON SURGERY      COLONOSCOPY  2010    COLONOSCOPY  2012    Dr Reina Aparicio: unremarkable enterocolonic anastamosis; hyperplastic polyps    COLONOSCOPY  2013    [facility]: unremarkable post-surgical colon    COLONOSCOPY  2016    Dr Smith Torres colon anastomosis, 5 yr recall    COLONOSCOPY N/A 2021    Dr Calderón Fearing and patent anastomosis in the right side-BCM, prn (age)    COLONOSCOPY  03/29/2021    Dr Kendall Gagnon and patent anastomosis in the right side-BCM, prn (age)   [facility]  right foot    HAND SURGERY Right     Dr Akanksha Manning N/A 07/06/2020    KYPHOPLASTY L5 performed by Audrey Banda DO at [address]  [place] Right 12/14/2020    RIGHT L 4-5 DECOMPRESSIVE LAMINECTOMY performed by Audrey Banda DO at [address] TOTAL KNEE ARTHROPLASTY      UPPER GASTROINTESTINAL ENDOSCOPY  10/16/2009    UPPER GASTROINTESTINAL ENDOSCOPY  03/01/2013    [place]: peptic stricture dilated to 48F; HH; small antral mucosal elevation    UPPER GASTROINTESTINAL ENDOSCOPY  12/01/2014    [facility]: dilation of esophageal stricture    VARICOSE VEIN SURGERY Left 03/14/2014  [facility] [address]    Left GSV Ablation    VARICOSE VEIN SURGERY Right 04/04/2014  [facility]    Right GSV Ablation       Social History     Socioeconomic History    Marital status:      Spouse name: None    Number of children: None    Years of education: None    Highest education level: None   Occupational History    None   Tobacco Use    Smoking status: Never Smoker    Smokeless tobacco: Never Used   Vaping Use    Vaping Use: Never used   Substance and Sexual Activity    Alcohol use: No    Drug use: No    Sexual activity: Yes     Partners: Male   Other Topics Concern    None   Social History Narrative    None     Social Determinants of Health     Financial Resource Strain:     Difficulty of Paying Living Expenses:    Food Insecurity:     Worried About Running Out of Food in the Last Year:     Ran Out of Food in the Last Year:    Transportation Needs:     Lack of Transportation (Medical):      Lack of Transportation (Non-Medical):    Physical Activity:     Days of Exercise per Week:     Minutes of Exercise per Session:    Stress:     Feeling of Stress :    Social Connections:     Frequency of Communication with Friends and Family:     Frequency of Social Gatherings with Friends and Family:     Attends Protestant Services:     Active Member of Clubs or Organizations:     Attends Club or Organization Meetings:     Marital Status:    Intimate Partner Violence:     Fear of Current or Ex-Partner:     Emotionally Abused:     Physically Abused:     Sexually Abused: Allergies   Allergen Reactions    Zoloft [Sertraline Hcl] Other (See Comments)     Patient states that she doesn't want this medication anymore because she hallucinated and saw spiders. Patient weaned herself off and after she quit taking the medication, the hallucinations stopped. Patient states that she doesn't want this medication anymore because she hallucinated and saw spiders. Patient weaned herself off and after she quit taking the medication, the hallucinations stopped.  Gabapentin Rash     Pt weaning off due to rash and hot flashes       Current Outpatient Medications   Medication Sig Dispense Refill    XARELTO 20 MG TABS tablet TAKE 1 TABLET BY MOUTH DAILY WITH BREAKFAST 30 tablet 3    pregabalin (LYRICA) 100 MG capsule 2 times daily.  triamcinolone (KENALOG) 0.1 % cream APPLY TWICE DAILY TO RASH UP TO 2 WEEKS/MONTH AS NEEDED 60 g 2    nystatin (MYCOSTATIN) 095539 UNIT/GM powder For rash under breast. Apply 3 times daily. 1 Bottle 1    DULoxetine (CYMBALTA) 20 MG extended release capsule TAKE 1 CAPSULE BY MOUTH DAILY 30 capsule 5    vitamin D (ERGOCALCIFEROL) 1.25 MG (85920 UT) CAPS capsule TAKE 1 CAPSULE BY MOUTH 1 TIME A WEEK 12 capsule 1    Cyanocobalamin (B-12) 500 MCG TABS TAKE 1 TABLET BY MOUTH DAILY 30 tablet 11    triamterene-hydroCHLOROthiazide (MAXZIDE-25) 37.5-25 MG per tablet TAKE 1 TABLET BY MOUTH DAILY 90 tablet 3    tiZANidine (ZANAFLEX) 4 MG tablet TAKE 1 TABLET BY MOUTH THREE TIMES DAILY AS NEEDED.  NOT TO EXCEED 3 DOSES IN 24 HOURS      losartan (COZAAR) 100 MG tablet Take 1 tablet by mouth daily 90 tablet 3    omeprazole (PRILOSEC) 40 MG delayed release capsule Take 1 capsule by mouth daily 90 capsule 3    HYDROcodone-acetaminophen (NORCO)  MG per tablet Take 1 tablet by mouth every 6 hours as needed for Pain.  metoprolol tartrate (LOPRESSOR) 25 MG tablet Take 1 tablet by mouth 2 times daily 180 tablet 3   [address] Does not apply route 1 each 0    acyclovir (ZOVIRAX) 400 MG tablet Take 400 mg by mouth 2 times daily history of shingles in her eye      albuterol (ACCUNEB) 1.25 MG/3ML nebulizer solution Inhale 1 mL into the lungs every 6 hours as needed for Wheezing       furosemide (LASIX) 20 MG tablet Take 1 tablet by mouth daily as needed (swelling) 30 tablet 3     No current facility-administered medications for this visit. Review of Systems   Constitutional: Negative for appetite change, fatigue, fever and unexpected weight change. HENT: Positive for trouble swallowing. Negative for sore throat and voice change. Respiratory: Negative for cough and shortness of breath. Cardiovascular: Negative for chest pain, palpitations and leg swelling. Gastrointestinal: Negative for abdominal distention, abdominal pain, anal bleeding, blood in stool, constipation, diarrhea, nausea, rectal pain and vomiting. Genitourinary: Negative for hematuria. Musculoskeletal: Positive for back pain and neck pain. Negative for arthralgias. Neurological: Positive for weakness (generalized). Negative for dizziness, light-headedness and headaches. Psychiatric/Behavioral: Negative for dysphoric mood and sleep disturbance. The patient is not nervous/anxious. All other systems reviewed and are negative. Objective:     Physical Exam  Vitals and nursing note reviewed. Constitutional:       Appearance: She is well-developed. Comments: BP (!) 148/62   Pulse 74   Ht 5' 3\" (1.6 m)   Wt 147 lb 9.6 oz (67 kg)   SpO2 98%   BMI 26.15 kg/m²    Eyes:      General: No scleral icterus. Conjunctiva/sclera: Conjunctivae normal.      Pupils: Pupils are equal, round, and reactive to light. Neck:      Thyroid: No thyromegaly. Cardiovascular:      Rate and Rhythm: Normal rate and regular rhythm. Heart sounds: Normal heart sounds. No murmur heard. No friction rub. No gallop. Pulmonary:      Effort: Pulmonary effort is normal. No respiratory distress. Breath sounds: Normal breath sounds. Abdominal:      General: Bowel sounds are normal. There is no distension. Palpations: Abdomen is soft. Tenderness: There is no abdominal tenderness. There is no rebound. Musculoskeletal:         General: No deformity. Normal range of motion. Cervical back: Normal range of motion and neck supple. Comments: In wheelchair today   Neurological:      Mental Status: She is alert and oriented to person, place, and time. Cranial Nerves: No cranial nerve deficit. Psychiatric:         Judgment: Judgment normal.           Assessment:       Diagnosis Orders   1. Dysphagia, unspecified type  ESOPHAGOSCOPY / EGD   2. History of esophageal stricture  ESOPHAGOSCOPY / EGD         Plan:      1. Schedule outpatient endoscopy. Patient advised no Aspirin, Fish Oil, Vit E or NSAIDs 5 (five) days before procedure. Follow-up Visit: per Dr. Amira Duval clearance from PCP  to stop xarelto  Clearance for discontinuing anticoagulants is needed before scheduling procedure. Increased r isks may be associated with discontinuation of this medication including stroke, TIA, and cardiac event. I have discussed this with patient. Patient voices understanding and agrees to proceed with scheduling. Pt Education:   Risks, benefits, and alternatives to endoscopy were discussed. Patient voices understanding of risks of, but not limited to, perforation, bleeding, and infection. The risk of perforation is increased with esophageal dilatation. All questions answered to patient's satisfaction.  Patient is agreable to proceed.   2. Time spent-30 min

## 2021-09-01 ENCOUNTER — TELEPHONE (OUTPATIENT)
Dept: GASTROENTEROLOGY | Age: 86
End: 2021-09-01

## 2021-09-01 NOTE — TELEPHONE ENCOUNTER
Patient: Stepan Petit    YOB: 1935      Clearance was received on September 1, 2021.    for Endoscopy / Colonoscopy scheduled for: 9/16/21    Patient may discontinue the use of XARELTO  for 5  days prior to the procedure.     IS Lovenox required:  NO    PATIENT NOTIFIED ON:  9/1/21      Clearance scanned into media

## 2021-09-07 ENCOUNTER — OFFICE VISIT (OUTPATIENT)
Dept: NEUROSURGERY | Age: 86
End: 2021-09-07
Payer: MEDICARE

## 2021-09-07 ENCOUNTER — TELEPHONE (OUTPATIENT)
Dept: NEUROSURGERY | Age: 86
End: 2021-09-07

## 2021-09-07 ENCOUNTER — HOSPITAL ENCOUNTER (OUTPATIENT)
Dept: CT IMAGING | Age: 86
Discharge: HOME | End: 2021-09-07
Payer: MEDICARE

## 2021-09-07 VITALS
SYSTOLIC BLOOD PRESSURE: 168 MMHG | BODY MASS INDEX: 25.69 KG/M2 | OXYGEN SATURATION: 97 % | WEIGHT: 145 LBS | HEIGHT: 63 IN | DIASTOLIC BLOOD PRESSURE: 79 MMHG | HEART RATE: 69 BPM

## 2021-09-07 DIAGNOSIS — M48.061 LUMBAR FORAMINAL STENOSIS: ICD-10-CM

## 2021-09-07 DIAGNOSIS — G62.9 NEUROPATHY: ICD-10-CM

## 2021-09-07 DIAGNOSIS — M25.551 RIGHT HIP PAIN: ICD-10-CM

## 2021-09-07 DIAGNOSIS — M25.551 RIGHT HIP PAIN: Primary | ICD-10-CM

## 2021-09-07 DIAGNOSIS — M79.604 RIGHT LEG PAIN: ICD-10-CM

## 2021-09-07 DIAGNOSIS — M51.36 DDD (DEGENERATIVE DISC DISEASE), LUMBAR: ICD-10-CM

## 2021-09-07 DIAGNOSIS — R10.31 RIGHT GROIN PAIN: ICD-10-CM

## 2021-09-07 DIAGNOSIS — Z98.890 S/P LUMBAR LAMINECTOMY: ICD-10-CM

## 2021-09-07 PROCEDURE — 99213 OFFICE O/P EST LOW 20 MIN: CPT | Performed by: NURSE PRACTITIONER

## 2021-09-07 PROCEDURE — 1090F PRES/ABSN URINE INCON ASSESS: CPT | Performed by: NURSE PRACTITIONER

## 2021-09-07 PROCEDURE — G8417 CALC BMI ABV UP PARAM F/U: HCPCS | Performed by: NURSE PRACTITIONER

## 2021-09-07 PROCEDURE — G8399 PT W/DXA RESULTS DOCUMENT: HCPCS | Performed by: NURSE PRACTITIONER

## 2021-09-07 PROCEDURE — G8427 DOCREV CUR MEDS BY ELIG CLIN: HCPCS | Performed by: NURSE PRACTITIONER

## 2021-09-07 PROCEDURE — 4040F PNEUMOC VAC/ADMIN/RCVD: CPT | Performed by: NURSE PRACTITIONER

## 2021-09-07 PROCEDURE — 1036F TOBACCO NON-USER: CPT | Performed by: NURSE PRACTITIONER

## 2021-09-07 PROCEDURE — 73700 CT LOWER EXTREMITY W/O DYE: CPT

## 2021-09-07 PROCEDURE — 1123F ACP DISCUSS/DSCN MKR DOCD: CPT | Performed by: NURSE PRACTITIONER

## 2021-09-07 ASSESSMENT — ENCOUNTER SYMPTOMS
GASTROINTESTINAL NEGATIVE: 1
RESPIRATORY NEGATIVE: 1
EYES NEGATIVE: 1

## 2021-09-07 NOTE — TELEPHONE ENCOUNTER
Faxed CD request to [address] [place] [address] for them to send us this patient (CD) MRI images from March 2021 as soon as possible.

## 2021-09-07 NOTE — PROGRESS NOTES
[facility] Office Visit      Chief Complaint   Patient presents with    Follow-up    Leg Pain       2021: Ms. Alexandro Velasquez returns to clinic today to discuss her RLE pain. She continues to have pain that will radiates into the right hip, groin, anterior thigh, and to the knee occasionally the shin. She states being up and walking or standing will severely exacerbate the pain. She has been in PT, she is walking more but the pain has not improved. She states the right leg is very sore to touch. HISTORY OF PRESENT ILLNESS:      Isaac Mitchell is a 82 y.o. 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 Today she is here for routine follow up for the RLE pain. She continues to have right hip, groin, anterior thigh pain. She has had another MRI lumbar spine done at [facility] in 2021, however, we do not have the images to view at today's visit. She states she never saw Dr. Bakari Nova again regarding her right hip. She has been seen by Dr. Alondra Gunn with pain management. She is now scheduled to see him soon.          Past Medical History:   Diagnosis Date    Bronchitis     Colon cancer ([address]     Colon polyps     Constipation     Deep vein blood clot of left lower extremity (HCC)     Left leg     Depression     DVT (deep venous thrombosis) (HCC)     Dysphagia     Fibrocystic breast disease     Fibromyalgia     GERD (gastroesophageal reflux disease)     reflux with hx of esophageal stricture    Headache(784.0)     History of colon cancer     Hormone replacement therapy (postmenopausal)     Hypertension     Neuropathy of foot     In both feet    Osteoarthritis     left knee pain    Restless legs syndrome     Vitamin D deficiency        Past Surgical History:   Procedure Laterality Date    APPENDECTOMY      BREAST BIOPSY      CATARACT REMOVAL       SECTION      CHOLECYSTECTOMY      COLECTOMY  partial    COLON SURGERY      COLONOSCOPY  12/14/2010    COLONOSCOPY  02/27/2012    Dr Emelina Palmer: unremarkable enterocolonic anastamosis; hyperplastic polyps    COLONOSCOPY  03/01/2013    [facility]: unremarkable post-surgical colon    COLONOSCOPY  03/11/2016    Dr Daniel Pugh colon anastomosis, 5 yr recall    COLONOSCOPY N/A 03/29/2021    Dr Rafiq Suarez and patent anastomosis in the right side-BCM, prn (age)    COLONOSCOPY  03/29/2021    Dr Rafiq Suarez and patent anastomosis in the right side-BCM, prn (age)   [facility]ath[facility] Her FOOT SURGERY  right foot    HAND SURGERY Right     Dr Jaz Maciel N/A 07/06/2020    [facility] 12/14/2020    RIGHT L 4-5 DECOMPRESSIVE LAMINECTOMY performed by aGrry Che DO at [facility]      UPPER GASTROINTESTINAL ENDOSCOPY  10/16/2009    UPPER GASTROINTESTINAL ENDOSCOPY  03/01/2013    [facility]: peptic stricture dilated to 48F; HH; small antral mucosal elevation    UPPER GASTROINTESTINAL ENDOSCOPY  12/01/2014    [facility]: dilation of esophageal stricture    VARICOSE VEIN SURGERY Left 03/14/2014  [facility]    Left GSV Ablation    VARICOSE VEIN SURGERY Right 04/04/2014  [facility]    Right GSV Ablation        Medications    Current Outpatient Medications:     metoprolol tartrate (LOPRESSOR) 25 MG tablet, TAKE 1 TABLET BY MOUTH TWICE DAILY, Disp: 180 tablet, Rfl: 3    XARELTO 20 MG TABS tablet, TAKE 1 TABLET BY MOUTH DAILY WITH BREAKFAST, Disp: 30 tablet, Rfl: 3    pregabalin (LYRICA) 100 MG capsule, 2 times daily. , Disp: , Rfl:     triamcinolone (KENALOG) 0.1 % cream, APPLY TWICE DAILY TO RASH UP TO 2 WEEKS/MONTH AS NEEDED, Disp: 60 g, Rfl: 2    nystatin (MYCOSTATIN) 363919 UNIT/GM powder, For rash under breast. Apply 3 times daily. , Disp: 1 Bottle, Rfl: 1    DULoxetine (CYMBALTA) 20 MG extended release capsule, TAKE 1 CAPSULE BY MOUTH DAILY, Disp: 30 capsule, Rfl: 5    vitamin D (ERGOCALCIFEROL) 1.25 MG (85167 UT) CAPS capsule, TAKE 1 CAPSULE BY MOUTH 1 TIME A WEEK, Disp: 12 capsule, Rfl: 1    Cyanocobalamin (B-12) 500 MCG TABS, TAKE 1 TABLET BY MOUTH DAILY, Disp: 30 tablet, Rfl: 11    triamterene-hydroCHLOROthiazide (MAXZIDE-25) 37.5-25 MG per tablet, TAKE 1 TABLET BY MOUTH DAILY, Disp: 90 tablet, Rfl: 3    tiZANidine (ZANAFLEX) 4 MG tablet, TAKE 1 TABLET BY MOUTH THREE TIMES DAILY AS NEEDED. NOT TO EXCEED 3 DOSES IN 24 HOURS, Disp: , Rfl:     losartan (COZAAR) 100 MG tablet, Take 1 tablet by mouth daily, Disp: 90 tablet, Rfl: 3    omeprazole (PRILOSEC) 40 MG delayed release capsule, Take 1 capsule by mouth daily, Disp: 90 capsule, Rfl: 3    HYDROcodone-acetaminophen (NORCO)  MG per tablet, Take 1 tablet by mouth every 6 hours as needed for Pain. , Disp: , Rfl:   [address]  [facility], by Does not apply route, Disp: 1 each, Rfl: 0    acyclovir (ZOVIRAX) 400 MG tablet, Take 400 mg by mouth 2 times daily history of shingles in her eye, Disp: , Rfl:     albuterol (ACCUNEB) 1.25 MG/3ML nebulizer solution, Inhale 1 mL into the lungs every 6 hours as needed for Wheezing , Disp: , Rfl:     furosemide (LASIX) 20 MG tablet, Take 1 tablet by mouth daily as needed (swelling), Disp: 30 tablet, Rfl: 3  Zoloft [sertraline hcl] and Gabapentin    Social History  Social History     Tobacco Use   Smoking Status Never Smoker   Smokeless Tobacco Never Used     Social History     Substance and Sexual Activity   Alcohol Use No         Family History   Problem Relation Age of Onset    Cancer Mother         Cervical Cancer    Cancer Brother         Esophageal cancer    Diabetes Brother     Esophageal Cancer Brother     Diabetes Sister     Colon Cancer Neg Hx     Colon Polyps Neg Hx     Liver Cancer Neg Hx     Liver Disease Neg Hx     Rectal Cancer Neg Hx     Stomach Cancer Neg Hx        Review of Systems:  Constitutional: Negative.     HENT: Negative. Eyes: Negative. Respiratory: Negative. Cardiovascular: Negative. Gastrointestinal: Negative. Genitourinary: Negative. Musculoskeletal: Positive for joint pain and myalgias. Skin: Negative. Neurological: Negative. Endo/Heme/Allergies: Negative. Psychiatric/Behavioral: Negative. PHYSICAL EXAM:  Vitals:    09/07/21 1024   BP: (!) 168/79   Pulse: 69   SpO2: 97%     Constitutional: The patient appears well-developed andwell-nourished. Eyes - conjunctiva normal.  Conjugate gaze  Ear, nose, throat -No scars, masses, or lesions over external nose or ears, no atrophy of tongue  Neck-symmetric, no masses noted, no jugular vein distension  Respiration- chest wall appears symmetric, goodexpansion, normal effort without use of accessory muscles  Musculoskeletal - no significant wasting of muscles noted, no bony deformities, gait no gross ataxia  Extremities-no clubbing, cyanosis or edema  Skin- warm, dry, and intact. No rash, erythema, or pallor.   Psychiatric - mood, affect, and behavior appear normal.     Neurologic Examination  Awake, Alert and oriented x 3  Normal speech pattern, following commands  Motor 5/5 all extremities  No deficits to light touch or pinprick sensation    Transported in a wheelchair at today's visit       DATA and IMAGING:    Lab Results   Component Value Date    WBC 9.7 07/28/2021    HGB 11.2 (L) 07/28/2021    HCT 35.8 (L) 07/28/2021    MCV 88.0 07/28/2021     07/28/2021     Lab Results   Component Value Date     07/28/2021    K 4.3 07/28/2021     07/28/2021    CO2 29 07/28/2021    BUN 15 07/28/2021    CREATININE 0.9 07/28/2021    GLUCOSE 120 (H) 07/28/2021    CALCIUM 9.1 07/28/2021    PROT 6.4 (L) 07/28/2021    LABALBU 3.7 07/28/2021    BILITOT <0.2 07/28/2021    ALKPHOS 101 07/28/2021    AST 14 07/28/2021    ALT 10 07/28/2021    LABGLOM 60 (A) 07/28/2021    GFRAA >59 07/28/2021    GLOB 2.5 10/12/2016     Lab Results   Component

## 2021-09-15 ENCOUNTER — OFFICE VISIT (OUTPATIENT)
Age: 86
End: 2021-09-15

## 2021-09-15 DIAGNOSIS — Z11.59 SCREENING FOR VIRAL DISEASE: Primary | ICD-10-CM

## 2021-09-15 LAB — SARS-COV-2, PCR: NOT DETECTED

## 2021-09-15 PROCEDURE — 99999 PR OFFICE/OUTPT VISIT,PROCEDURE ONLY: CPT | Performed by: NURSE PRACTITIONER

## 2021-09-15 NOTE — PROGRESS NOTES
Patient was not seen/assessed by provider in the flu clinic today. COVID swab was order in compliance with requirement for testing prior to surgery or invasive procedure. Nasopharyngeal swab was collected and ordered through [address]

## 2021-09-16 ENCOUNTER — APPOINTMENT (OUTPATIENT)
Dept: GENERAL RADIOLOGY | Age: 86
DRG: 310 | End: 2021-09-16
Payer: MEDICARE

## 2021-09-16 ENCOUNTER — HOSPITAL ENCOUNTER (OUTPATIENT)
Age: 86
Setting detail: OUTPATIENT SURGERY
Discharge: HOME | End: 2021-09-16
Attending: INTERNAL MEDICINE | Admitting: INTERNAL MEDICINE

## 2021-09-16 ENCOUNTER — HOSPITAL ENCOUNTER (INPATIENT)
Age: 86
LOS: 1 days | Discharge: HOME | DRG: 310 | End: 2021-09-17
Attending: EMERGENCY MEDICINE | Admitting: HOSPITALIST
Payer: MEDICARE

## 2021-09-16 ENCOUNTER — APPOINTMENT (OUTPATIENT)
Dept: OPERATING ROOM | Age: 86
End: 2021-09-16

## 2021-09-16 ENCOUNTER — ANESTHESIA EVENT (OUTPATIENT)
Dept: OPERATING ROOM | Age: 86
End: 2021-09-16

## 2021-09-16 ENCOUNTER — ANESTHESIA (OUTPATIENT)
Dept: OPERATING ROOM | Age: 86
End: 2021-09-16

## 2021-09-16 VITALS
TEMPERATURE: 97.1 F | WEIGHT: 145 LBS | DIASTOLIC BLOOD PRESSURE: 74 MMHG | HEART RATE: 144 BPM | RESPIRATION RATE: 20 BRPM | HEIGHT: 63 IN | BODY MASS INDEX: 25.69 KG/M2 | SYSTOLIC BLOOD PRESSURE: 141 MMHG | OXYGEN SATURATION: 97 %

## 2021-09-16 DIAGNOSIS — I48.91 ATRIAL FIBRILLATION WITH RAPID VENTRICULAR RESPONSE (HCC): Primary | ICD-10-CM

## 2021-09-16 DIAGNOSIS — E83.42 HYPOMAGNESEMIA: ICD-10-CM

## 2021-09-16 LAB
ALBUMIN SERPL-MCNC: 3.7 G/DL (ref 3.5–5.2)
ALP BLD-CCNC: 91 U/L (ref 35–104)
ALT SERPL-CCNC: 9 U/L (ref 5–33)
ANION GAP SERPL CALCULATED.3IONS-SCNC: 12 MMOL/L (ref 7–19)
AST SERPL-CCNC: 16 U/L (ref 5–32)
BASOPHILS ABSOLUTE: 0.1 K/UL (ref 0–0.2)
BASOPHILS RELATIVE PERCENT: 0.6 % (ref 0–1)
BILIRUB SERPL-MCNC: 0.4 MG/DL (ref 0.2–1.2)
BUN BLDV-MCNC: 10 MG/DL (ref 8–23)
CALCIUM SERPL-MCNC: 9.3 MG/DL (ref 8.8–10.2)
CHLORIDE BLD-SCNC: 104 MMOL/L (ref 98–111)
CO2: 24 MMOL/L (ref 22–29)
CREAT SERPL-MCNC: 0.8 MG/DL (ref 0.5–0.9)
EOSINOPHILS ABSOLUTE: 0.5 K/UL (ref 0–0.6)
EOSINOPHILS RELATIVE PERCENT: 5.5 % (ref 0–5)
GFR AFRICAN AMERICAN: >59
GFR NON-AFRICAN AMERICAN: >60
GLUCOSE BLD-MCNC: 151 MG/DL (ref 74–109)
HCT VFR BLD CALC: 36.6 % (ref 37–47)
HEMOGLOBIN: 10.9 G/DL (ref 12–16)
IMMATURE GRANULOCYTES #: 0 K/UL
INR BLD: 1 (ref 0.88–1.18)
LV EF: 70 %
LVEF MODALITY: NORMAL
LYMPHOCYTES ABSOLUTE: 2.4 K/UL (ref 1.1–4.5)
LYMPHOCYTES RELATIVE PERCENT: 25.1 % (ref 20–40)
MAGNESIUM: 1.2 MG/DL (ref 1.6–2.4)
MCH RBC QN AUTO: 25.5 PG (ref 27–31)
MCHC RBC AUTO-ENTMCNC: 29.8 G/DL (ref 33–37)
MCV RBC AUTO: 85.7 FL (ref 81–99)
MONOCYTES ABSOLUTE: 0.9 K/UL (ref 0–0.9)
MONOCYTES RELATIVE PERCENT: 9.6 % (ref 0–10)
NEUTROPHILS ABSOLUTE: 5.5 K/UL (ref 1.5–7.5)
NEUTROPHILS RELATIVE PERCENT: 59 % (ref 50–65)
PDW BLD-RTO: 14.7 % (ref 11.5–14.5)
PLATELET # BLD: 354 K/UL (ref 130–400)
PMV BLD AUTO: 10.1 FL (ref 9.4–12.3)
POTASSIUM SERPL-SCNC: 3.6 MMOL/L (ref 3.5–5)
PROTHROMBIN TIME: 13.4 SEC (ref 12–14.6)
RBC # BLD: 4.27 M/UL (ref 4.2–5.4)
SARS-COV-2, NAAT: NOT DETECTED
SODIUM BLD-SCNC: 140 MMOL/L (ref 136–145)
TOTAL PROTEIN: 6.6 G/DL (ref 6.6–8.7)
TROPONIN: <0.01 NG/ML (ref 0–0.03)
TSH SERPL DL<=0.05 MIU/L-ACNC: 1.27 UIU/ML (ref 0.27–4.2)
WBC # BLD: 9.4 K/UL (ref 4.8–10.8)

## 2021-09-16 PROCEDURE — 96365 THER/PROPH/DIAG IV INF INIT: CPT

## 2021-09-16 PROCEDURE — 71045 X-RAY EXAM CHEST 1 VIEW: CPT

## 2021-09-16 PROCEDURE — 6360000002 HC RX W HCPCS: Performed by: EMERGENCY MEDICINE

## 2021-09-16 PROCEDURE — 2140000000 HC CCU INTERMEDIATE R&B

## 2021-09-16 PROCEDURE — 99222 1ST HOSP IP/OBS MODERATE 55: CPT | Performed by: INTERNAL MEDICINE

## 2021-09-16 PROCEDURE — 84484 ASSAY OF TROPONIN QUANT: CPT

## 2021-09-16 PROCEDURE — 2500000003 HC RX 250 WO HCPCS: Performed by: EMERGENCY MEDICINE

## 2021-09-16 PROCEDURE — 2500000003 HC RX 250 WO HCPCS: Performed by: NURSE PRACTITIONER

## 2021-09-16 PROCEDURE — 85025 COMPLETE CBC W/AUTO DIFF WBC: CPT

## 2021-09-16 PROCEDURE — 6370000000 HC RX 637 (ALT 250 FOR IP): Performed by: NURSE PRACTITIONER

## 2021-09-16 PROCEDURE — 2580000003 HC RX 258: Performed by: NURSE PRACTITIONER

## 2021-09-16 PROCEDURE — 85610 PROTHROMBIN TIME: CPT

## 2021-09-16 PROCEDURE — 99282 EMERGENCY DEPT VISIT SF MDM: CPT

## 2021-09-16 PROCEDURE — 93005 ELECTROCARDIOGRAM TRACING: CPT | Performed by: EMERGENCY MEDICINE

## 2021-09-16 PROCEDURE — 84443 ASSAY THYROID STIM HORMONE: CPT

## 2021-09-16 PROCEDURE — 2580000003 HC RX 258: Performed by: EMERGENCY MEDICINE

## 2021-09-16 PROCEDURE — 80053 COMPREHEN METABOLIC PANEL: CPT

## 2021-09-16 PROCEDURE — 6370000000 HC RX 637 (ALT 250 FOR IP): Performed by: INTERNAL MEDICINE

## 2021-09-16 PROCEDURE — 93306 TTE W/DOPPLER COMPLETE: CPT

## 2021-09-16 PROCEDURE — 87635 SARS-COV-2 COVID-19 AMP PRB: CPT

## 2021-09-16 PROCEDURE — 83735 ASSAY OF MAGNESIUM: CPT

## 2021-09-16 PROCEDURE — 36415 COLL VENOUS BLD VENIPUNCTURE: CPT

## 2021-09-16 RX ORDER — ACETAMINOPHEN 650 MG/1
650 SUPPOSITORY RECTAL EVERY 6 HOURS PRN
Status: DISCONTINUED | OUTPATIENT
Start: 2021-09-16 | End: 2021-09-17 | Stop reason: HOSPADM

## 2021-09-16 RX ORDER — PANTOPRAZOLE SODIUM 40 MG/1
40 TABLET, DELAYED RELEASE ORAL
Status: DISCONTINUED | OUTPATIENT
Start: 2021-09-17 | End: 2021-09-17 | Stop reason: HOSPADM

## 2021-09-16 RX ORDER — ALBUTEROL SULFATE 1.25 MG/3ML
1 SOLUTION RESPIRATORY (INHALATION) EVERY 6 HOURS PRN
Status: DISCONTINUED | OUTPATIENT
Start: 2021-09-16 | End: 2021-09-16 | Stop reason: CLARIF

## 2021-09-16 RX ORDER — POLYETHYLENE GLYCOL 3350 17 G/17G
17 POWDER, FOR SOLUTION ORAL DAILY PRN
Status: DISCONTINUED | OUTPATIENT
Start: 2021-09-16 | End: 2021-09-17 | Stop reason: HOSPADM

## 2021-09-16 RX ORDER — ERGOCALCIFEROL 1.25 MG/1
50000 CAPSULE ORAL WEEKLY
Status: DISCONTINUED | OUTPATIENT
Start: 2021-09-16 | End: 2021-09-17 | Stop reason: HOSPADM

## 2021-09-16 RX ORDER — ACETAMINOPHEN 325 MG/1
650 TABLET ORAL EVERY 6 HOURS PRN
Status: DISCONTINUED | OUTPATIENT
Start: 2021-09-16 | End: 2021-09-17 | Stop reason: HOSPADM

## 2021-09-16 RX ORDER — METOPROLOL SUCCINATE 50 MG/1
50 TABLET, EXTENDED RELEASE ORAL DAILY
Status: DISCONTINUED | OUTPATIENT
Start: 2021-09-16 | End: 2021-09-17 | Stop reason: HOSPADM

## 2021-09-16 RX ORDER — SODIUM CHLORIDE 0.9 % (FLUSH) 0.9 %
5-40 SYRINGE (ML) INJECTION EVERY 12 HOURS SCHEDULED
Status: DISCONTINUED | OUTPATIENT
Start: 2021-09-16 | End: 2021-09-17 | Stop reason: HOSPADM

## 2021-09-16 RX ORDER — SODIUM CHLORIDE 9 MG/ML
25 INJECTION, SOLUTION INTRAVENOUS PRN
Status: DISCONTINUED | OUTPATIENT
Start: 2021-09-16 | End: 2021-09-17 | Stop reason: HOSPADM

## 2021-09-16 RX ORDER — FUROSEMIDE 20 MG/1
20 TABLET ORAL DAILY PRN
Status: DISCONTINUED | OUTPATIENT
Start: 2021-09-16 | End: 2021-09-17 | Stop reason: HOSPADM

## 2021-09-16 RX ORDER — TIZANIDINE 4 MG/1
4 TABLET ORAL EVERY 8 HOURS PRN
Status: DISCONTINUED | OUTPATIENT
Start: 2021-09-16 | End: 2021-09-17 | Stop reason: HOSPADM

## 2021-09-16 RX ORDER — ONDANSETRON 4 MG/1
4 TABLET, ORALLY DISINTEGRATING ORAL EVERY 8 HOURS PRN
Status: DISCONTINUED | OUTPATIENT
Start: 2021-09-16 | End: 2021-09-17 | Stop reason: HOSPADM

## 2021-09-16 RX ORDER — DILTIAZEM HYDROCHLORIDE 120 MG/1
120 CAPSULE, COATED, EXTENDED RELEASE ORAL DAILY
Status: DISCONTINUED | OUTPATIENT
Start: 2021-09-16 | End: 2021-09-17 | Stop reason: HOSPADM

## 2021-09-16 RX ORDER — DULOXETIN HYDROCHLORIDE 20 MG/1
20 CAPSULE, DELAYED RELEASE ORAL DAILY
Status: DISCONTINUED | OUTPATIENT
Start: 2021-09-16 | End: 2021-09-17 | Stop reason: HOSPADM

## 2021-09-16 RX ORDER — DILTIAZEM HYDROCHLORIDE 120 MG/1
120 CAPSULE, COATED, EXTENDED RELEASE ORAL 2 TIMES DAILY
Status: DISCONTINUED | OUTPATIENT
Start: 2021-09-16 | End: 2021-09-16

## 2021-09-16 RX ORDER — ALBUTEROL SULFATE 2.5 MG/3ML
0.42 SOLUTION RESPIRATORY (INHALATION) EVERY 6 HOURS PRN
Status: DISCONTINUED | OUTPATIENT
Start: 2021-09-16 | End: 2021-09-17 | Stop reason: HOSPADM

## 2021-09-16 RX ORDER — PREGABALIN 50 MG/1
100 CAPSULE ORAL 2 TIMES DAILY
Status: DISCONTINUED | OUTPATIENT
Start: 2021-09-16 | End: 2021-09-17 | Stop reason: HOSPADM

## 2021-09-16 RX ORDER — MAGNESIUM SULFATE IN WATER 40 MG/ML
2000 INJECTION, SOLUTION INTRAVENOUS ONCE
Status: COMPLETED | OUTPATIENT
Start: 2021-09-16 | End: 2021-09-16

## 2021-09-16 RX ORDER — LOSARTAN POTASSIUM 100 MG/1
100 TABLET ORAL DAILY
Status: DISCONTINUED | OUTPATIENT
Start: 2021-09-16 | End: 2021-09-17 | Stop reason: HOSPADM

## 2021-09-16 RX ORDER — ACYCLOVIR 200 MG/1
400 CAPSULE ORAL 2 TIMES DAILY
Status: DISCONTINUED | OUTPATIENT
Start: 2021-09-16 | End: 2021-09-17 | Stop reason: HOSPADM

## 2021-09-16 RX ORDER — SODIUM CHLORIDE 0.9 % (FLUSH) 0.9 %
5-40 SYRINGE (ML) INJECTION PRN
Status: DISCONTINUED | OUTPATIENT
Start: 2021-09-16 | End: 2021-09-17 | Stop reason: HOSPADM

## 2021-09-16 RX ORDER — SODIUM CHLORIDE 9 MG/ML
INJECTION, SOLUTION INTRAVENOUS CONTINUOUS
Status: DISCONTINUED | OUTPATIENT
Start: 2021-09-16 | End: 2021-09-16 | Stop reason: HOSPADM

## 2021-09-16 RX ORDER — TRIAMTERENE AND HYDROCHLOROTHIAZIDE 37.5; 25 MG/1; MG/1
1 TABLET ORAL DAILY
Status: DISCONTINUED | OUTPATIENT
Start: 2021-09-16 | End: 2021-09-17 | Stop reason: HOSPADM

## 2021-09-16 RX ORDER — DILTIAZEM HYDROCHLORIDE 5 MG/ML
10 INJECTION INTRAVENOUS ONCE
Status: COMPLETED | OUTPATIENT
Start: 2021-09-16 | End: 2021-09-16

## 2021-09-16 RX ORDER — ONDANSETRON 2 MG/ML
4 INJECTION INTRAMUSCULAR; INTRAVENOUS EVERY 6 HOURS PRN
Status: DISCONTINUED | OUTPATIENT
Start: 2021-09-16 | End: 2021-09-17 | Stop reason: HOSPADM

## 2021-09-16 RX ORDER — HYDROCODONE BITARTRATE AND ACETAMINOPHEN 10; 325 MG/1; MG/1
1 TABLET ORAL EVERY 6 HOURS PRN
Status: DISCONTINUED | OUTPATIENT
Start: 2021-09-16 | End: 2021-09-17 | Stop reason: HOSPADM

## 2021-09-16 RX ADMIN — TRIAMTERENE AND HYDROCHLOROTHIAZIDE 1 TABLET: 37.5; 25 TABLET ORAL at 18:13

## 2021-09-16 RX ADMIN — DILTIAZEM HYDROCHLORIDE 10 MG: 5 INJECTION INTRAVENOUS at 12:09

## 2021-09-16 RX ADMIN — PREGABALIN 100 MG: 50 CAPSULE ORAL at 20:48

## 2021-09-16 RX ADMIN — LOSARTAN POTASSIUM 100 MG: 100 TABLET, FILM COATED ORAL at 18:14

## 2021-09-16 RX ADMIN — MAGNESIUM SULFATE HEPTAHYDRATE 2000 MG: 40 INJECTION, SOLUTION INTRAVENOUS at 13:27

## 2021-09-16 RX ADMIN — DILTIAZEM HYDROCHLORIDE 120 MG: 120 CAPSULE, COATED, EXTENDED RELEASE ORAL at 19:24

## 2021-09-16 RX ADMIN — SODIUM CHLORIDE, PRESERVATIVE FREE 10 ML: 5 INJECTION INTRAVENOUS at 20:48

## 2021-09-16 RX ADMIN — DULOXETINE HYDROCHLORIDE 20 MG: 20 CAPSULE, DELAYED RELEASE ORAL at 18:14

## 2021-09-16 RX ADMIN — DILTIAZEM HYDROCHLORIDE 5 MG/HR: 5 INJECTION INTRAVENOUS at 12:10

## 2021-09-16 RX ADMIN — RIVAROXABAN 20 MG: 20 TABLET, FILM COATED ORAL at 18:14

## 2021-09-16 RX ADMIN — MICONAZOLE NITRATE: 2 POWDER TOPICAL at 20:48

## 2021-09-16 RX ADMIN — ACYCLOVIR 400 MG: 200 CAPSULE ORAL at 20:48

## 2021-09-16 RX ADMIN — METOPROLOL SUCCINATE 50 MG: 50 TABLET, EXTENDED RELEASE ORAL at 18:25

## 2021-09-16 ASSESSMENT — ENCOUNTER SYMPTOMS
SHORTNESS OF BREATH: 1
BLOOD IN STOOL: 0
DIARRHEA: 0
BACK PAIN: 0
ABDOMINAL DISTENTION: 0
WHEEZING: 0
BACK PAIN: 1
ABDOMINAL PAIN: 0
TROUBLE SWALLOWING: 1
CHEST TIGHTNESS: 0
COUGH: 0
CONSTIPATION: 0
SORE THROAT: 0
NAUSEA: 0
EYE DISCHARGE: 0
RHINORRHEA: 0
COLOR CHANGE: 0
VOMITING: 0
SHORTNESS OF BREATH: 0

## 2021-09-16 ASSESSMENT — PAIN SCALES - GENERAL
PAINLEVEL_OUTOF10: 0
PAINLEVEL_OUTOF10: 7

## 2021-09-16 ASSESSMENT — PAIN DESCRIPTION - ORIENTATION: ORIENTATION: RIGHT

## 2021-09-16 ASSESSMENT — PAIN DESCRIPTION - LOCATION: LOCATION: LEG

## 2021-09-16 NOTE — H&P
Patient Name: Dominic Sanchez  : 1935  MRN: [id number]  DATE: 21    Allergies: Allergies   Allergen Reactions    Zoloft [Sertraline Hcl] Other (See Comments)     Patient states that she doesn't want this medication anymore because she hallucinated and saw spiders. Patient weaned herself off and after she quit taking the medication, the hallucinations stopped. Patient states that she doesn't want this medication anymore because she hallucinated and saw spiders. Patient weaned herself off and after she quit taking the medication, the hallucinations stopped.  Gabapentin Rash     Pt weaning off due to rash and hot flashes        ENDOSCOPY  History and Physical    Procedure:    [] Diagnostic Colonoscopy       [] Screening Colonoscopy  [x] EGD      [] ERCP      [] EUS       [] Other    [x] Previous office notes/History and Physical reviewed from the patients chart. Please see EMR for further details of HPI. I have examined the patient's status immediately prior to the procedure and:      Indications/HPI:    []Abdominal Pain   []Cancer- GI/Lung     []Fhx of colon CA/polyps  []History of Polyps  []Barretts            []Melena  []Abnormal Imaging              [x]Dysphagia              []Persistent Pneumonia   []Anemia                            []Food Impaction        []History of Polyps  [] GI Bleed             []Pulmonary nodule/Mass   []Change in bowel habits []Heartburn/Reflux  []Rectal Bleed (BRBPR)  []Chest Pain - Non Cardiac []Heme (+) Stool []Ulcers  []Constipation  []Hemoptysis  []Varices  []Diarrhea  []Hypoxemia    []Nausea/Vomiting   []Screening   []Crohns/Colitis  []Other:     Anesthesia:   [x] MAC [] Moderate Sedation   [] General   [] None     ROS: 12 pt Review of Symptoms was negative unless mentioned above    Medications:   Prior to Admission medications    Medication Sig Start Date End Date Taking?  Authorizing Provider   metoprolol tartrate (LOPRESSOR) 25 MG tablet TAKE 1 TABLET BY MOUTH TWICE DAILY 9/7/21   Mayra Covarrubias MD   XARELTO 20 MG TABS tablet TAKE 1 TABLET BY MOUTH DAILY WITH BREAKFAST 8/12/21   Mayra Covarrubias MD   pregabalin (LYRICA) 100 MG capsule 2 times daily. 7/27/21   Historical Provider, MD   triamcinolone (KENALOG) 0.1 % cream APPLY TWICE DAILY TO RASH UP TO 2 WEEKS/MONTH AS NEEDED 7/28/21   EARLE Dobson CNP   nystatin (MYCOSTATIN) 221214 UNIT/GM powder For rash under breast. Apply 3 times daily. 7/28/21   EARLE Dobson CNP   DULoxetine (CYMBALTA) 20 MG extended release capsule TAKE 1 CAPSULE BY MOUTH DAILY 7/14/21   EARLE Dobson CNP   vitamin D (ERGOCALCIFEROL) 1.25 MG (18077 UT) CAPS capsule TAKE 1 CAPSULE BY MOUTH 1 TIME A WEEK 6/22/21   Mayra Covarrubias MD   Cyanocobalamin (B-12) 500 MCG TABS TAKE 1 TABLET BY MOUTH DAILY 3/9/21   Mayra Covarrubias MD   triamterene-hydroCHLOROthiazide (WKZDGBJ-70) 37.5-25 MG per tablet TAKE 1 TABLET BY MOUTH DAILY 3/9/21   EARLE Dobson CNP   tiZANidine (ZANAFLEX) 4 MG tablet TAKE 1 TABLET BY MOUTH THREE TIMES DAILY AS NEEDED. NOT TO EXCEED 3 DOSES IN 24 HOURS 1/18/21   Historical Provider, MD   losartan (COZAAR) 100 MG tablet Take 1 tablet by mouth daily 1/14/21   Mayra Covarrubias MD   omeprazole (PRILOSEC) 40 MG delayed release capsule Take 1 capsule by mouth daily 12/21/20   Mayra Covarrubias MD   HYDROcodone-acetaminophen (NORCO)  MG per tablet Take 1 tablet by mouth every 6 hours as needed for Pain.   10/21/20   Historical Provider, MD   [address] [place] [address] Street by Does not apply route 8/21/20   EARLE Dobson CNP   acyclovir (ZOVIRAX) 400 MG tablet Take 400 mg by mouth 2 times daily history of shingles in her eye    Historical Provider, MD   albuterol (ACCUNEB) 1.25 MG/3ML nebulizer solution Inhale 1 mL into the lungs every 6 hours as needed for Wheezing     Historical Provider, MD   furosemide (LASIX) 20 MG tablet Take 1 tablet by mouth daily as needed (swelling) 6/19/18   Alcira STARR Duane Hess, MD       Past Medical History:  Past Medical History:   Diagnosis Date    Bronchitis     Colon cancer ([address]     Colon polyps     Constipation     Deep vein blood clot of left lower extremity (HCC)     Left leg     Depression     DVT (deep venous thrombosis) (HCC)     Dysphagia     Fibrocystic breast disease     Fibromyalgia     GERD (gastroesophageal reflux disease)     reflux with hx of esophageal stricture    Headache(784.0)     History of colon cancer     Hormone replacement therapy (postmenopausal)     Hypertension     Neuropathy of foot     In both feet    Osteoarthritis     left knee pain    Restless legs syndrome     Vitamin D deficiency        Past Surgical History:  Past Surgical History:   Procedure Laterality Date    APPENDECTOMY      BREAST BIOPSY      CATARACT REMOVAL       SECTION      CHOLECYSTECTOMY      COLECTOMY  partial    COLON SURGERY      COLONOSCOPY  2010    COLONOSCOPY  2012    Dr Rivka Chau: unremarkable enterocolonic anastamosis; hyperplastic polyps    COLONOSCOPY  2013    [facility]: unremarkable post-surgical colon    COLONOSCOPY  2016    Dr Brittnee Velazquez colon anastomosis, 5 yr recall    COLONOSCOPY N/A 2021    Dr Brent Reaves and patent anastomosis in the right side-BCM, prn (age)    COLONOSCOPY  2021    Dr Brent Reaves and patent anastomosis in the right side-BCM, prn (age)   [facility] FOOT SURGERY  right foot    HAND SURGERY Right     Dr Ale Heard N/A 2020    KYPHOPLASTY L5 performed by Ortega Herndon DO at [address]  [place] Right 2020    RIGHT L 4-5 DECOMPRESSIVE LAMINECTOMY performed by Ortega Herndon DO at [facility] TOTAL KNEE ARTHROPLASTY      UPPER GASTROINTESTINAL ENDOSCOPY  10/16/2009    UPPER GASTROINTESTINAL ENDOSCOPY  2013    [place]: peptic stricture dilated to 48F; HH; small antral mucosal elevation    UPPER GASTROINTESTINAL ENDOSCOPY  12/01/2014    [place]: dilation of esophageal stricture    VARICOSE VEIN SURGERY Left 03/14/2014  [address]    Left GSV Ablation    VARICOSE VEIN SURGERY Right 04/04/2014  [address]    Right GSV Ablation       Social History:  Social History     Tobacco Use    Smoking status: Never Smoker    Smokeless tobacco: Never Used   Vaping Use    Vaping Use: Never used   Substance Use Topics    Alcohol use: No    Drug use: No       Vital Signs: There were no vitals filed for this visit. Physical Exam:  Cardiac:  [x]WNL  []Comments:  Pulmonary:  [x]WNL   []Comments:  Neuro/Mental Status:  [x]WNL  []Comments:  Abdominal:  [x]WNL    []Comments:  Other:   []WNL  []Comments:    Informed Consent:  The risks and benefits of the procedure have been discussed with either the patient or if they cannot consent, their representative. Assessment:  Patient examined and appropriate for planned sedation and procedure. Plan:  Proceed with planned sedation and procedure as above. Erich Monge am scribing for and in the presence of Dr. Delfin Baker MD.  Electronically signed by Clayton Kellogg RN on 9/16/2021 at 10:03 AM    I personally performed the services described in this documentation as scribed by Abby Wen, and it appears accurate and complete.      Delfin Baker MD  9/16/2021

## 2021-09-16 NOTE — H&P
[facility] - History & Physical      PCP: EARLE Pena CNP    Date of Admission: 2021    Date of Service: 2021    Chief Complaint:  tachycardia    History Of Present Illness: The patient is a 82 y.o. female with a history of Colon CA, DVT, fibromyalgia, GERD, HTN, and SVT who presented to [address] [place] [address] ED complaining of tachycardia. The patient was in Endo lab today for an EGD with dilation of a stricture. The patient states she has not taken her Xarelto in 3 days. On arrival she was noted to have an elevated heart rate. EKG was obtained and patient was found to be in a. Fib RVR. She was iniatied on a cardizem drip in ED. Patient admitted to the hospitalist service for a. Fib RVR.     Past Medical History:        Diagnosis Date    Bronchitis     Colon cancer ([address]     Colon polyps     Constipation     Deep vein blood clot of left lower extremity (HCC)     Left leg     Depression     DVT (deep venous thrombosis) (HCC)     Dysphagia     Fibrocystic breast disease     Fibromyalgia     GERD (gastroesophageal reflux disease)     reflux with hx of esophageal stricture    Headache(784.0)     History of colon cancer     Hormone replacement therapy (postmenopausal)     Hypertension     Neuropathy of foot     In both feet    Osteoarthritis     left knee pain    Restless legs syndrome     Vitamin D deficiency        Past Surgical History:        Procedure Laterality Date    APPENDECTOMY      BREAST BIOPSY      CATARACT REMOVAL       SECTION      CHOLECYSTECTOMY      COLECTOMY  partial    COLON SURGERY      COLONOSCOPY  2010    COLONOSCOPY  2012    Dr Bowen Cassidy: unremarkable enterocolonic anastamosis; hyperplastic polyps    COLONOSCOPY  2013    [facility]: unremarkable post-surgical colon    COLONOSCOPY  2016    Dr Eudelia Heimlich colon anastomosis, 5 yr recall    COLONOSCOPY N/A 2021    Dr Vinh Bonilla and patent anastomosis in the right side-BCM, prn (age)    COLONOSCOPY  03/29/2021    Dr Rafiq Suarez and patent anastomosis in the right side-BCM, prn (age)   [facility] FOOT SURGERY  right foot    HAND SURGERY Right     Dr Jaz Maciel N/A 07/06/2020    KYPHOPLASTY L5 performed by Garry Che DO at [address]  [place] Right 12/14/2020    RIGHT L 4-5 DECOMPRESSIVE LAMINECTOMY performed by Garry Che DO at [address] ENDOSCOPY  10/16/2009    UPPER GASTROINTESTINAL ENDOSCOPY  03/01/2013    [place]: peptic stricture dilated to 48F; HH; small antral mucosal elevation    UPPER GASTROINTESTINAL ENDOSCOPY  12/01/2014    [place]: dilation of esophageal stricture    VARICOSE VEIN SURGERY Left 03/14/2014  [address]    Left GSV Ablation    VARICOSE VEIN SURGERY Right 04/04/2014  [address]    Right GSV Ablation       Home Medications:  Prior to Admission medications    Medication Sig Start Date End Date Taking? Authorizing Provider   metoprolol tartrate (LOPRESSOR) 25 MG tablet TAKE 1 TABLET BY MOUTH TWICE DAILY 9/7/21   Yonis Greenberg MD   XARELTO 20 MG TABS tablet TAKE 1 TABLET BY MOUTH DAILY WITH BREAKFAST 8/12/21   Yonis Greenberg MD   pregabalin (LYRICA) 100 MG capsule 2 times daily. 7/27/21   Historical Provider, MD   triamcinolone (KENALOG) 0.1 % cream APPLY TWICE DAILY TO RASH UP TO 2 WEEKS/MONTH AS NEEDED 7/28/21   EARLE Chin CNP   nystatin (MYCOSTATIN) 675507 UNIT/GM powder For rash under breast. Apply 3 times daily.  7/28/21   EARLE Chin CNP   DULoxetine (CYMBALTA) 20 MG extended release capsule TAKE 1 CAPSULE BY MOUTH DAILY 7/14/21   EARLE Chin CNP   vitamin D (ERGOCALCIFEROL) 1.25 MG (53057 UT) CAPS capsule TAKE 1 CAPSULE BY MOUTH 1 TIME A WEEK 6/22/21   Yonis Greenberg MD   Cyanocobalamin (B-12) 500 MCG TABS TAKE 1 TABLET BY MOUTH DAILY 3/9/21   Yonis Greenberg MD triamterene-hydroCHLOROthiazide (MAXZIDE-25) 37.5-25 MG per tablet TAKE 1 TABLET BY MOUTH DAILY 3/9/21   EARLE Pena CNP   tiZANidine (ZANAFLEX) 4 MG tablet TAKE 1 TABLET BY MOUTH THREE TIMES DAILY AS NEEDED. NOT TO EXCEED 3 DOSES IN 24 HOURS 1/18/21   Historical Provider, MD   losartan (COZAAR) 100 MG tablet Take 1 tablet by mouth daily 1/14/21   Jose Daniel Nunez MD   omeprazole (PRILOSEC) 40 MG delayed release capsule Take 1 capsule by mouth daily 12/21/20   Jose Daniel Nunez MD   HYDROcodone-acetaminophen (NORCO)  MG per tablet Take 1 tablet by mouth every 6 hours as needed for Pain. 10/21/20   Historical Provider, MD   [address] [place] [address] by Does not apply route 8/21/20   EARLE Pena CNP   acyclovir (ZOVIRAX) 400 MG tablet Take 400 mg by mouth 2 times daily history of shingles in her eye    Historical Provider, MD   albuterol (ACCUNEB) 1.25 MG/3ML nebulizer solution Inhale 1 mL into the lungs every 6 hours as needed for Wheezing     Historical Provider, MD   furosemide (LASIX) 20 MG tablet Take 1 tablet by mouth daily as needed (swelling) 6/19/18   Danyel Pederson MD       Allergies:    Zoloft [sertraline hcl] and Gabapentin    Social History:    The patient currently lives with her spouse  Tobacco:   reports that she has never smoked. She has never used smokeless tobacco.  Alcohol:   reports no history of alcohol use. Illicit Drugs: Never    Family History:      Problem Relation Age of Onset    Cancer Mother         Cervical Cancer    Cancer Brother         Esophageal cancer    Diabetes Brother     Esophageal Cancer Brother     Diabetes Sister     Colon Cancer Neg Hx     Colon Polyps Neg Hx     Liver Cancer Neg Hx     Liver Disease Neg Hx     Rectal Cancer Neg Hx     Stomach Cancer Neg Hx        Review of Systems:   Review of Systems   Constitutional: Negative for activity change, appetite change, chills and fatigue. HENT: Positive for trouble swallowing. Negative for congestion and sore throat. Respiratory: Positive for shortness of breath. Negative for chest tightness. Cardiovascular: Positive for palpitations. Negative for chest pain and leg swelling. Gastrointestinal: Negative for abdominal pain, blood in stool, constipation, diarrhea, nausea and vomiting. Endocrine: Negative for polyphagia and polyuria. Genitourinary: Negative for difficulty urinating, dysuria, frequency and urgency. Musculoskeletal: Positive for arthralgias (right hip, chronic, sees pain management) and back pain (low back, chronic sees pain managment). Skin: Negative for color change and wound. Neurological: Negative for dizziness, light-headedness and headaches. Hematological: Does not bruise/bleed easily. Psychiatric/Behavioral: Negative for agitation and confusion. 14 point review of systems is negative except as specifically addressed above. Physical Examination:  BP (!) 149/52   Pulse 125   Temp 98.8 °F (37.1 °C) (Oral)   Resp 20   Ht 5' 3\" (1.6 m)   Wt 145 lb (65.8 kg)   SpO2 93%   BMI 25.69 kg/m²   Physical Exam  Constitutional:       Appearance: Normal appearance. She is not ill-appearing. HENT:      Head: Normocephalic and atraumatic. Nose: Nose normal.      Mouth/Throat:      Mouth: Mucous membranes are moist.      Pharynx: Oropharynx is clear. Eyes:      Extraocular Movements: Extraocular movements intact. Conjunctiva/sclera: Conjunctivae normal.      Pupils: Pupils are equal, round, and reactive to light. Cardiovascular:      Rate and Rhythm: Normal rate. Rhythm irregular. Pulses: Normal pulses. Heart sounds: Normal heart sounds. Pulmonary:      Effort: Pulmonary effort is normal.      Breath sounds: Normal breath sounds. Abdominal:      General: Abdomen is flat. Bowel sounds are normal. There is no distension. Palpations: Abdomen is soft. Tenderness: There is no abdominal tenderness.  There is no guarding. Comments: Scarring noted   Musculoskeletal:         General: No swelling. Normal range of motion. Cervical back: Normal range of motion and neck supple. Right lower leg: No edema. Left lower leg: No edema. Skin:     General: Skin is warm and dry. Capillary Refill: Capillary refill takes less than 2 seconds. Neurological:      General: No focal deficit present. Mental Status: She is alert and oriented to person, place, and time. Psychiatric:         Mood and Affect: Mood normal.         Behavior: Behavior normal.          Diagnostic Data:  CBC:  Recent Labs     09/16/21  1202   WBC 9.4   HGB 10.9*   HCT 36.6*        BMP:  Recent Labs     09/16/21  1202      K 3.6      CO2 24   BUN 10   CREATININE 0.8   CALCIUM 9.3     Recent Labs     09/16/21  1202   AST 16   ALT 9   BILITOT 0.4   ALKPHOS 91     Coag Panel:   Recent Labs     09/16/21  1202   INR 1.00   PROTIME 13.4     Cardiac Enzymes:   Recent Labs     09/16/21  1202   TROPONINI <0.01     ABGs:No results found for: PHART, PO2ART, SMU1SSK  Urinalysis:  Lab Results   Component Value Date    NITRU Negative 12/09/2020    WBCUA 4 12/09/2020    BACTERIA NEGATIVE 12/09/2020    RBCUA 1 12/09/2020    BLOODU Negative 12/09/2020    SPECGRAV 1.021 12/09/2020    GLUCOSEU Negative 12/09/2020     A1C: No results for input(s): LABA1C in the last 72 hours. ABG:No results for input(s): PHART, SIQ5DJV, PO2ART, MLE5PMT, BEART, HGBAE, O9AGVEZF, CARBOXHGBART in the last 72 hours. XR CHEST PORTABLE    Result Date: 9/16/2021  EXAM: XR CHEST PORTABLE - 9/16/2021 12:28 PM INDICATION: Chest pain COMPARISON: 12/9/2020 FINDINGS: RIGHT chest wall port is stable in position. Cardiac silhouette is normal in size. No pleural effusion or visible pneumothorax. No focal airspace opacity. No acute osseous finding. No acute findings.  Signed by Dr Teresa Smith    EGD    Result Date: 9/16/2021  No dictation Assessment/Plan:  Principal Problem:    Atrial fibrillation with RVR (HCC)   -admit to PCU   -tele, EKG in AM and with rhythm changes   -cardizem drip    -resume lopressor   -consult cardiology   -continue xarelto   -Echo   -TSH    Active Problems:    Personal history of colon cancer   -noted, followed by outpatient GI      Fibromyalgia   -noted   -continue cymbalta and hydrocodone      Paroxysmal supraventricular tachycardia ([address]   -noted   -continue lopressor   -consult cardiology      Essential hypertension   -noted, continue home medications and monitor closely with cardizem drip in place      History of DVT (deep vein thrombosis)   -noted, continue Xarelto    Resolved Problems:    * No resolved hospital problems.  *       Signed:  Gaines Sacks, APRN - CNP, 9/16/2021 12:54 PM

## 2021-09-16 NOTE — CONSULTS
[facility]  Cardiology Consult      Requesting MD:  Ana Garrett MD   Admit Status:  Inpatient [101]       History obtained from:   ? Patient  ?  Other (specify):     PROBLEM LIST:    Patient Active Problem List    Diagnosis Date Noted    Atrial fibrillation with RVR ([facility] [address] 09/16/2021     Priority: Low    History of esophageal stricture 08/24/2021     Priority: Low    Depression 04/20/2021     Priority: Low    Fungal dermatitis 05/07/2020     Priority: Low    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 05/07/2020     Priority: Low    Mixed simple and mucopurulent chronic bronchitis ([facility] [address] 05/04/2020     Priority: Low    Malignant neoplasm of colon ([facility] [address] 04/20/2020     Priority: Low    Cellulitis of left lower limb 06/29/2019     Priority: Low    Fracture of right foot 06/29/2019     Priority: Low    Hypochloremia 06/29/2019     Priority: Low    CKD (chronic kidney disease) stage 3, GFR 30-59 ml/min ([facility]) 06/29/2019     Priority: Low    Acute deep vein thrombosis (DVT) of femoral vein of left lower extremity ([facility] [address] 06/12/2019     Priority: Low    Pain in both lower extremities      Priority: Low    Numbness and tingling of both feet      Priority: Low    Avascular necrosis ([facility] [address] 04/08/2019     Priority: Low    History of DVT (deep vein thrombosis) 12/18/2018     Priority: Low    Port-A-Cath in place 02/19/2018     Priority: Low     Overview Note:     Replacing Deprecated Diagnoses      History of colon polyps 03/11/2016     Priority: Low    Essential hypertension 08/25/2015     Priority: Low    Encounter for care related to Port-a-Cath 08/11/2015     Priority: Low    Vitamin D deficiency 03/19/2015     Priority: Low    Paroxysmal supraventricular tachycardia ([facility] [address] 02/18/2015     Priority: Low    Dysphagia 10/21/2014     Priority: Low    Hypertriglyceridemia 08/15/2014     Priority: Low    Venous insufficiency 03/20/2014     Priority: Low    Varicose veins of left lower extremity with inflammation, 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 ([facility] [address] 02/17/2014     Priority: Low    Fibromyalgia 11/02/2012     Priority: Low    Renal bruit 11/02/2012     Priority: Low     Overview Note:     bilateral      Personal history of colon cancer 11/30/2011     Priority: Low    Family history of esophageal cancer 11/30/2011     Priority: Low     1. New onset atrial fibrillation with RVR. 2.  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 3.  Hypertension. 4.  History of colon cancer with resection 10 years ago. PRESENTATION: Anthony Whitaker is a 82y.o. year old female who was referred from ambulatory care where she was supposed to undergo esophageal dilatation and noted to be in atrial fibrillation with RVR. Patient had woken up that morning feeling that her heart was racing. She feels better now. She has had a previous episode about 2 to 3 years ago with similar sensation that resolved. She is chronically on Xarelto due to recurrent DVT. She had initially had DVT after colon surgery. She had come off Xarelto briefly and had a recurrent episode and has been on Xarelto since then. Xarelto was held for 3 days for the ambulatory procedure. No chest pain or shortness of breath reported. No prior cardiac history. No history of CVA or TIA. Currently she is back in sinus rhythm. REVIEW OF SYSTEMS:  Review of Systems   Constitutional: Negative for activity change, fatigue and fever. HENT: Negative for ear pain, hearing loss and tinnitus. Eyes: Negative for discharge and visual disturbance. Respiratory: Negative for cough, shortness of breath and wheezing. Cardiovascular: Positive for palpitations. Negative for chest pain and leg swelling. Gastrointestinal: Negative for abdominal distention, blood in stool, constipation, diarrhea and vomiting.    Endocrine: Negative for cold intolerance, heat intolerance, polydipsia and polyuria. Genitourinary: Negative for dysuria and hematuria. Musculoskeletal: Negative for arthralgias, back pain and myalgias. Skin: Negative for pallor and rash. Neurological: Negative for seizures, syncope, weakness and headaches. Psychiatric/Behavioral: Negative for behavioral problems and dysphoric mood.        Past Medical History:      Diagnosis Date    Bronchitis     Colon cancer ([address]     Colon polyps     Constipation     Deep vein blood clot of left lower extremity (HCC)     Left leg     Depression     DVT (deep venous thrombosis) (HCC)     Dysphagia     Fibrocystic breast disease     Fibromyalgia     GERD (gastroesophageal reflux disease)     reflux with hx of esophageal stricture    Headache(784.0)     History of colon cancer     Hormone replacement therapy (postmenopausal)     Hypertension     Neuropathy of foot     In both feet    Osteoarthritis     left knee pain    Restless legs syndrome     Vitamin D deficiency        Past Surgical History:      Procedure Laterality Date    APPENDECTOMY      BREAST BIOPSY      CATARACT REMOVAL       SECTION      CHOLECYSTECTOMY      COLECTOMY  partial    COLON SURGERY      COLONOSCOPY  2010    COLONOSCOPY  2012    Dr Ana Paula Smith: unremarkable enterocolonic anastamosis; hyperplastic polyps    [facility] unremarkable [facility]surgical colon    COLONOSCOPY  2016    Dr Reese Hazard colon anastomosis, 5 yr recall    COLONOSCOPY N/A 2021    Dr Bethanie Baumgarten and patent anastomosis in the right side-BCM, prn (age)    COLONOSCOPY  2021    Dr Bethanie Baumgarten and patent anastomosis in the right side-BCM, prn (age)   Brigitte Me FOOT SURGERY  right foot    HAND SURGERY Right     Dr Victoria Dwyer N/A 2020    KYPHOPLASTY L5 performed by Caitlin Ray DO at [address] Right 2020    RIGHT L 4-5 DECOMPRESSIVE LAMINECTOMY performed by Jose Kitchen DO at [address]      TOTAL KNEE ARTHROPLASTY      UPPER GASTROINTESTINAL ENDOSCOPY  10/16/2009    UPPER GASTROINTESTINAL ENDOSCOPY  03/01/2013    [facility]: peptic stricture dilated to 48F; HH; small antral mucosal elevation    UPPER GASTROINTESTINAL ENDOSCOPY  12/01/2014    [facility]: dilation of esophageal stricture    VARICOSE VEIN SURGERY Left 03/14/2014  [facility] & [address]    Left GSV Ablation    VARICOSE VEIN SURGERY Right 04/04/2014  [facility]    Right GSV Ablation       Allergies:  Zoloft [sertraline hcl] and Gabapentin    Past Social History:  Social History     Socioeconomic History    Marital status:      Spouse name: Not on file    Number of children: Not on file    Years of education: Not on file    Highest education level: Not on file   Occupational History    Not on file   Tobacco Use    Smoking status: Never Smoker    Smokeless tobacco: Never Used   Vaping Use    Vaping Use: Never used   Substance and Sexual Activity    Alcohol use: No    Drug use: No    Sexual activity: Yes     Partners: Male   Other Topics Concern    Not on file   Social History Narrative    Not on file     Social Determinants of Health     Financial Resource Strain:     Difficulty of Paying Living Expenses:    Food Insecurity:     Worried About Running Out of Food in the Last Year:     Ran Out of Food in the Last Year:    Transportation Needs:     Lack of Transportation (Medical):      Lack of Transportation (Non-Medical):    Physical Activity:     Days of Exercise per Week:     Minutes of Exercise per Session:    Stress:     Feeling of Stress :    Social Connections:     Frequency of Communication with Friends and Family:     Frequency of Social Gatherings with Friends and Family:     Attends [employer] Services:     Active Member of Clubs or Organizations:     Attends Club or Organization Meetings:     Marital Status:    Intimate Partner Violence:     Fear of Current or Ex-Partner:     Emotionally Abused:     Physically Abused:     Sexually Abused:        Family History:       Problem Relation Age of Onset    Cancer Mother         Cervical Cancer    Cancer Brother         Esophageal cancer    Diabetes Brother     Esophageal Cancer Brother     Diabetes Sister     Colon Cancer Neg Hx     Colon Polyps Neg Hx     Liver Cancer Neg Hx     Liver Disease Neg Hx     Rectal Cancer Neg Hx     Stomach Cancer Neg Hx        Home Meds:  Prior to Admission medications    Medication Sig Start Date End Date Taking? Authorizing Provider   metoprolol tartrate (LOPRESSOR) 25 MG tablet TAKE 1 TABLET BY MOUTH TWICE DAILY 9/7/21   Jhonny Gaytan MD   XARELTO 20 MG TABS tablet TAKE 1 TABLET BY MOUTH DAILY WITH BREAKFAST 8/12/21   Jhonny Gaytan MD   pregabalin (LYRICA) 100 MG capsule 2 times daily. 7/27/21   Historical Provider, MD   triamcinolone (KENALOG) 0.1 % cream APPLY TWICE DAILY TO RASH UP TO 2 WEEKS/MONTH AS NEEDED 7/28/21   EARLE Saeed CNP   nystatin (MYCOSTATIN) 551738 UNIT/GM powder For rash under breast. Apply 3 times daily. 7/28/21   EARLE Saeed CNP   DULoxetine (CYMBALTA) 20 MG extended release capsule TAKE 1 CAPSULE BY MOUTH DAILY 7/14/21   EARLE Saeed CNP   vitamin D (ERGOCALCIFEROL) 1.25 MG (94146 UT) CAPS capsule TAKE 1 CAPSULE BY MOUTH 1 TIME A WEEK 6/22/21   Jhonny Gaytan MD   Cyanocobalamin (B-12) 500 MCG TABS TAKE 1 TABLET BY MOUTH DAILY 3/9/21   Jhonny Gaytan MD   triamterene-hydroCHLOROthiazide (POEZKDM-12) 37.5-25 MG per tablet TAKE 1 TABLET BY MOUTH DAILY 3/9/21   EARLE Saeed CNP   tiZANidine (ZANAFLEX) 4 MG tablet TAKE 1 TABLET BY MOUTH THREE TIMES DAILY AS NEEDED.  NOT TO EXCEED 3 DOSES IN 24 HOURS 1/18/21   Historical Provider, MD   losartan (COZAAR) 100 MG tablet Take 1 tablet by mouth daily 1/14/21   Jhonny Gaytan MD   omeprazole (PRILOSEC) 40 MG delayed release capsule Take 1 capsule by mouth daily 12/21/20   Suzy Ochoa MD   HYDROcodone-acetaminophen (NORCO)  MG per tablet Take 1 tablet by mouth every 6 hours as needed for Pain. 10/21/20   Historical Provider, MD   Hospital Bed MISC by Does not apply route 8/21/20   Ynes Whitney APRN - CNP   acyclovir (ZOVIRAX) 400 MG tablet Take 400 mg by mouth 2 times daily history of shingles in her eye    Historical Provider, MD   albuterol (ACCUNEB) 1.25 MG/3ML nebulizer solution Inhale 1 mL into the lungs every 6 hours as needed for Wheezing     Historical Provider, MD   furosemide (LASIX) 20 MG tablet Take 1 tablet by mouth daily as needed (swelling) 6/19/18   Ezra Wright MD       Current Meds:   acyclovir  400 mg Oral BID    DULoxetine  20 mg Oral Daily    losartan  100 mg Oral Daily    metoprolol tartrate  25 mg Oral BID    miconazole   Topical BID    [START ON 9/17/2021] pantoprazole  40 mg Oral QAM AC    pregabalin  100 mg Oral BID    triamterene-hydroCHLOROthiazide  1 tablet Oral Daily    vitamin D  50,000 Units Oral Weekly    rivaroxaban  20 mg Oral Daily    sodium chloride flush  5-40 mL IntraVENous 2 times per day    dilTIAZem  120 mg Oral BID       Current Infused Meds:   dilTIAZem (CARDIZEM) 125 mg in dextrose 5% 125 mL infusion 12.5 mg/hr (09/16/21 1329)    sodium chloride         Physical Exam:  Vitals:    09/16/21 1416   BP: 112/83   Pulse: 87   Resp: 20   Temp: 98.1 °F (36.7 °C)   SpO2: 98%     No intake or output data in the 24 hours ending 09/16/21 1807  Estimated body mass index is 25.69 kg/m² as calculated from the following:    Height as of this encounter: 5' 3\" (1.6 m). Weight as of this encounter: 145 lb (65.8 kg). Physical Exam  Constitutional:       General: She is not in acute distress. Appearance: She is not diaphoretic. HENT:      Mouth/Throat:      Pharynx: No oropharyngeal exudate. Eyes:      General: No scleral icterus. Right eye: No discharge.          Left eye: No discharge. Neck:      Thyroid: No thyromegaly. Vascular: No JVD. Cardiovascular:      Rate and Rhythm: Normal rate and regular rhythm. No extrasystoles are present. Heart sounds: Normal heart sounds, S1 normal and S2 normal. No murmur heard. No systolic murmur is present. No diastolic murmur is present. No friction rub. No gallop. No S3 or S4 sounds. Comments: No JVD  No edema    Pulmonary:      Effort: Pulmonary effort is normal. No respiratory distress. Breath sounds: Normal breath sounds. No wheezing or rales. Chest:      Chest wall: No tenderness. Abdominal:      General: Bowel sounds are normal. There is no distension. Palpations: Abdomen is soft. There is no mass. Tenderness: There is no abdominal tenderness. There is no guarding or rebound. Hernia: No hernia is present. Comments: No palpable organomegaly   Musculoskeletal:         General: Normal range of motion. Skin:     General: Skin is warm. Coloration: Skin is not pale. Findings: No rash. Neurological:      Mental Status: She is alert and oriented to person, place, and time. Cranial Nerves: No cranial nerve deficit. Deep Tendon Reflexes: Reflexes normal.           Labs:  Recent Labs     09/16/21  1202   WBC 9.4   HGB 10.9*          Recent Labs     09/16/21  1202      K 3.6      CO2 24   BUN 10   CREATININE 0.8   LABGLOM >60   MG 1.2*   CALCIUM 9.3       CK, CKMB, Troponin: @LABRCNT (CKTOTAL:3, CKMB:3, TROPONINI:3)@    Last 3 BNP:          IMAGING:  XR CHEST PORTABLE    Result Date: 9/16/2021  EXAM: XR CHEST PORTABLE - 9/16/2021 12:28 PM INDICATION: Chest pain COMPARISON: 12/9/2020 FINDINGS: RIGHT chest wall port is stable in position. Cardiac silhouette is normal in size. No pleural effusion or visible pneumothorax. No focal airspace opacity. No acute osseous finding. No acute findings.  Signed by Dr Marissa Andujar Date: 9/7/2021  EXAM: CT HIP RIGHT WO CONTRAST -- 9/7/2021 11:18 AM HISTORY: 82 years, Female, M25.551, right hip pain, right leg pain, right groin pain COMPARISON: 12/1/2020 DLP: 889 mGy cm. Automated exposure control was utilized to minimize patient radiation dose. TECHNIQUE: CT images were acquired then reconstructed and displayed as axial, coronal and sagittal multiplanar reformatted images. The coverage included mid pelvis to the proximal femur. FINDINGS: BONE: The bones all have normal configuration. No evidence of acute fracture or aggressive bony lesion. JOINTS: Normal alignment of the right hip. Mild to moderate degenerative changes. Trace hip joint effusion. Subchondral sclerosis at the right femoral head, which could represent avascular necrosis or degenerative changes. Appearance is similar compared to 12/1/2020. No evidence of femoral head collapse. TENDONS AND MUSCLES: Mild diffuse muscular atrophy. Otherwise the tendons and muscles have normal configuration and density, limited in assessment due to CT technique. SUBCUTANEOUS AND DEEP SOFT TISSUES: Changes of bowel resection in the partially visualized abdomen. Visualized portion of the urinary bladder and pelvis appear within normal limits. Normal caliber arterial vasculature with mild calcified atherosclerosis. Body wall venous collaterals anteriorly are partially visualized. No lymphadenopathy. No acute finding of the overlying soft tissues. 1. No evidence of acute fracture. 2. Subchondral sclerosis of the right femoral head, could represent avascular necrosis or degenerative changes. Appearance is similar to 12/1/2020. No evidence of femoral head collapse. 3. Partially visualized body wall venous collaterals, similar compared to prior. Signed by Dr Madie Salas    EGD    Result Date: 9/16/2021  No dictation           Assessment and Plan:     This is a 82y.o. year old female with past medical history of hypertension, prior colon cancer with resection 10 years ago, recurrent DVT on chronic Xarelto with new onset atrial fibrillation with RVR. 1.  She has converted spontaneously back to sinus rhythm. Maintain on Xarelto at this time. Can obtain an echocardiogram.  We will add Cardizem  mg once daily to medication regimen. We will switch Lopressor to Toprol-XL 50 mg once daily. 2.  Can follow-up as an outpatient with cardiology.       Electronically signed by Sulema Hearn MD on 9/16/2021 at 6:07 PM

## 2021-09-16 NOTE — ED NOTES
Bed: 09  Expected date:   Expected time:   Means of arrival:   Comments:  Pt in room: Omar Padron RN  09/16/21 3271

## 2021-09-16 NOTE — ANESTHESIA PRE PROCEDURE
Department of Anesthesiology  Preprocedure Note       Name:  Gianluca Boone   Age:  82 y.o.  :  1935                                          MRN:  [id number]         Date:  2021      Surgeon: Epifanio Ivory):  Ronaldo Valle MD    Procedure: Procedure(s):  EGD BIOPSY    Medications prior to admission:   Prior to Admission medications    Medication Sig Start Date End Date Taking? Authorizing Provider   metoprolol tartrate (LOPRESSOR) 25 MG tablet TAKE 1 TABLET BY MOUTH TWICE DAILY 21   Prabhu Aguero MD   XARELTO 20 MG TABS tablet TAKE 1 TABLET BY MOUTH DAILY WITH BREAKFAST 21   Prabhu Aguero MD   pregabalin (LYRICA) 100 MG capsule 2 times daily. 21   Historical Provider, MD   triamcinolone (KENALOG) 0.1 % cream APPLY TWICE DAILY TO RASH UP TO 2 WEEKS/MONTH AS NEEDED 21   Electa Locks, APRN - CNP   nystatin (MYCOSTATIN) 010744 UNIT/GM powder For rash under breast. Apply 3 times daily. 21   Electa Locks, APRN - CNP   DULoxetine (CYMBALTA) 20 MG extended release capsule TAKE 1 CAPSULE BY MOUTH DAILY 21   Electa Locks, APRN - CNP   vitamin D (ERGOCALCIFEROL) 1.25 MG (93632 UT) CAPS capsule TAKE 1 CAPSULE BY MOUTH 1 TIME A WEEK 21   Prabhu Aguero MD   Cyanocobalamin (B-12) 500 MCG TABS TAKE 1 TABLET BY MOUTH DAILY 3/9/21   Prabhu Aguero MD   triamterene-hydroCHLOROthiazide (YPAWMGI-29) 37.5-25 MG per tablet TAKE 1 TABLET BY MOUTH DAILY 3/9/21   Electa Locks, APRN - CNP   tiZANidine (ZANAFLEX) 4 MG tablet TAKE 1 TABLET BY MOUTH THREE TIMES DAILY AS NEEDED. NOT TO EXCEED 3 DOSES IN 24 HOURS 21   Historical Provider, MD   losartan (COZAAR) 100 MG tablet Take 1 tablet by mouth daily 21   Prabhu Aguero MD   omeprazole (PRILOSEC) 40 MG delayed release capsule Take 1 capsule by mouth daily 20   Prabhu Aguero MD   HYDROcodone-acetaminophen (NORCO)  MG per tablet Take 1 tablet by mouth every 6 hours as needed for Pain.   10/21/20   Historical Provider, MD in both lower extremities M79.604, M79.605    Numbness and tingling of both feet R20.0, R20.2    Acute deep vein thrombosis (DVT) of femoral vein of left lower extremity (HCC) I82.412    Cellulitis of left lower limb L03. 116    Fracture of right foot S92.901A    Hypochloremia E87.8    CKD (chronic kidney disease) stage 3, GFR 30-59 ml/min (HCC) N18.30    Malignant neoplasm of colon (HCC) C18.9    Mixed simple and mucopurulent chronic bronchitis (HCC) J41.8    Fungal dermatitis B36.9    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 I83.11, I83.12    Depression F32.9    History of esophageal stricture Z87.19       Past Medical History:        Diagnosis Date    Bronchitis     Colon cancer (HCC)     Colon polyps     Constipation     Deep vein blood clot of left lower extremity (HCC)     Left leg     Depression     DVT (deep venous thrombosis) (HCC)     Dysphagia     Fibrocystic breast disease     Fibromyalgia     GERD (gastroesophageal reflux disease)     reflux with hx of esophageal stricture    Headache(784.0)     History of colon cancer     Hormone replacement therapy (postmenopausal)     Hypertension     Neuropathy of foot     In both feet    Osteoarthritis     left knee pain    Restless legs syndrome     Vitamin D deficiency        Past Surgical History:        Procedure Laterality Date    APPENDECTOMY      BREAST BIOPSY      CATARACT REMOVAL       SECTION      CHOLECYSTECTOMY      COLECTOMY  partial    COLON SURGERY      COLONOSCOPY  2010    COLONOSCOPY  2012    Dr Toby Espino: unremarkable enterocolonic anastamosis; hyperplastic polyps    COLONOSCOPY  2013    [facility]: unremarkable post-surgical colon    COLONOSCOPY  2016    Dr Rosy Justice colon anastomosis, 5 yr recall    COLONOSCOPY N/A 2021    Dr Darren Bang and patent anastomosis in the right side-BCM, prn (age)    COLONOSCOPY  2021    Dr Jorge Rosenberg [facility] and patent anastomosis in the right side-BCM, prn (age)   Ritu Valle FOOT SURGERY  right foot    HAND SURGERY Right     Dr Merlin Graver N/A 07/06/2020    KYPHOPLASTY L5 performed by José Miguel Kennedy DO at [address]  [place] Right 12/14/2020    RIGHT L 4-5 DECOMPRESSIVE LAMINECTOMY performed by José Miguel Kennedy DO at [address] [facility] TOTAL KNEE ARTHROPLASTY      UPPER GASTROINTESTINAL ENDOSCOPY  10/16/2009    UPPER GASTROINTESTINAL ENDOSCOPY  03/01/2013    Bodnicole: peptic stricture dilated to 48F; HH; small antral mucosal elevation    UPPER GASTROINTESTINAL ENDOSCOPY  12/01/2014    Maurilio: dilation of esophageal stricture    VARICOSE VEIN SURGERY Left 03/14/2014  [address]    Left GSV Ablation    VARICOSE VEIN SURGERY Right 04/04/2014  [address]    Right GSV Ablation       Social History:    Social History     Tobacco Use    Smoking status: Never Smoker    Smokeless tobacco: Never Used   Substance Use Topics    Alcohol use: No                                Counseling given: Not Answered      Vital Signs (Current): There were no vitals filed for this visit.                                            BP Readings from Last 3 Encounters:   09/07/21 (!) 168/79   08/24/21 (!) 148/62   07/28/21 120/80       NPO Status:                                                                                 BMI:   Wt Readings from Last 3 Encounters:   09/07/21 145 lb (65.8 kg)   08/24/21 147 lb 9.6 oz (67 kg)   07/28/21 147 lb (66.7 kg)     There is no height or weight on file to calculate BMI.    CBC:   Lab Results   Component Value Date    WBC 9.7 07/28/2021    RBC 4.07 07/28/2021    HGB 11.2 07/28/2021    HCT 35.8 07/28/2021    MCV 88.0 07/28/2021    RDW 13.4 07/28/2021     07/28/2021       CMP:   Lab Results   Component Value Date     07/28/2021    K 4.3 07/28/2021    K 4.2 04/25/2020     07/28/2021    CO2 29 07/28/2021    BUN 15 07/28/2021 CREATININE 0.9 07/28/2021    GFRAA >59 07/28/2021    LABGLOM 60 07/28/2021    GLUCOSE 120 07/28/2021    PROT 6.4 07/28/2021    PROT 6.9 10/31/2012    CALCIUM 9.1 07/28/2021    BILITOT <0.2 07/28/2021    ALKPHOS 101 07/28/2021    AST 14 07/28/2021    ALT 10 07/28/2021       POC Tests: No results for input(s): POCGLU, POCNA, POCK, POCCL, POCBUN, POCHEMO, POCHCT in the last 72 hours. Coags:   Lab Results   Component Value Date    PROTIME 12.5 12/09/2020    PROTIME 15.92 12/02/2011    INR 0.94 12/09/2020    APTT 23.6 12/09/2020       HCG (If Applicable): No results found for: PREGTESTUR, PREGSERUM, HCG, HCGQUANT     ABGs: No results found for: PHART, PO2ART, DAV8ZNF, EPY6MSC, BEART, T5NDJSKN     Type & Screen (If Applicable):  No results found for: LABABO, LABRH    Drug/Infectious Status (If Applicable):  No results found for: HIV, HEPCAB    COVID-19 Screening (If Applicable):   Lab Results   Component Value Date    COVID19 Not Detected 09/15/2021           Anesthesia Evaluation  Patient summary reviewed and Nursing notes reviewed  Airway: Mallampati: II  TM distance: >3 FB   Neck ROM: full  Mouth opening: > = 3 FB Dental: normal exam         Pulmonary:Negative Pulmonary ROS and normal exam                               Cardiovascular:    (+) hypertension:, dysrhythmias: SVT,         Rhythm: irregular             Beta Blocker:  Dose within 24 Hrs         Neuro/Psych:   (+) neuromuscular disease:, headaches:, psychiatric history:             ROS comment: Chronic narc use GI/Hepatic/Renal:   (+) GERD:, renal disease: CRI,          ROS comment: H/o partial colectomy- colon CA  dysphagia. Endo/Other:    (+) malignancy/cancer (h/o colon CA s/p colon resection). Abdominal:             Vascular:   + DVT, . Other Findings:             Anesthesia Plan      general and TIVA     ASA 3       Induction: intravenous. Anesthetic plan and risks discussed with patient.                 Notified by pre-op RN that pt's HR is 130-140s  12 lead EKG obtained- 150's AFib.  Discussed with Dr. Augustina Decker - case cancelled, advise pt go to  directly to [employer], APRN - CRNA   9/16/2021

## 2021-09-16 NOTE — ED PROVIDER NOTES
thrombosis) (HCC)     Dysphagia     Fibrocystic breast disease     Fibromyalgia     GERD (gastroesophageal reflux disease)     reflux with hx of esophageal stricture    Headache(784.0)     History of colon cancer     Hormone replacement therapy (postmenopausal)     Hypertension     Neuropathy of foot     In both feet    Osteoarthritis     left knee pain    Restless legs syndrome     Vitamin D deficiency          SURGICAL HISTORY       Past Surgical History:   Procedure Laterality Date    APPENDECTOMY      BREAST BIOPSY      CATARACT REMOVAL       SECTION      CHOLECYSTECTOMY      COLECTOMY  partial    COLON SURGERY      COLONOSCOPY  2010    COLONOSCOPY  2012    Dr Purnima Boykin: unremarkable enterocolonic anastamosis; hyperplastic polyps    COLONOSCOPY  2013    [facility]: unremarkable post-surgical colon    COLONOSCOPY  2016    Dr Ledy Wall colon anastomosis, 5 yr recall    COLONOSCOPY N/A 2021    Dr Leal Forward and patent anastomosis in the right side-BCM, prn (age)    COLONOSCOPY  2021    Dr Leal Forward and patent anastomosis in the right side-BCM, prn (age)   Dossie Anita FOOT SURGERY  right foot    HAND SURGERY Right     Dr Dov Haddad N/A 2020    KYPHOPLASTY L5 performed by Hillary Francois DO at [address] [facility] Right 2020    RIGHT L 4-5 DECOMPRESSIVE LAMINECTOMY performed by Hillary Francois DO at [address]      UPPER GASTROINTESTINAL ENDOSCOPY  10/16/2009    UPPER GASTROINTESTINAL ENDOSCOPY  2013    Anshulselvin: peptic stricture dilated to 48F; HH; small antral mucosal elevation    UPPER GASTROINTESTINAL ENDOSCOPY  2014    [facility]: dilation of esophageal stricture    VARICOSE VEIN SURGERY Left 2014  [address]    Left GSV Ablation    VARICOSE VEIN SURGERY Right 2014  [address]    Right GSV Ablation         CURRENT MEDICATIONS     Current Discharge Medication List      CONTINUE these medications which have NOT CHANGED    Details   metoprolol tartrate (LOPRESSOR) 25 MG tablet TAKE 1 TABLET BY MOUTH TWICE DAILY  Qty: 180 tablet, Refills: 3      XARELTO 20 MG TABS tablet TAKE 1 TABLET BY MOUTH DAILY WITH BREAKFAST  Qty: 30 tablet, Refills: 3      pregabalin (LYRICA) 100 MG capsule 2 times daily. triamcinolone (KENALOG) 0.1 % cream APPLY TWICE DAILY TO RASH UP TO 2 WEEKS/MONTH AS NEEDED  Qty: 60 g, Refills: 2      nystatin (MYCOSTATIN) 610048 UNIT/GM powder For rash under breast. Apply 3 times daily. Qty: 1 Bottle, Refills: 1      DULoxetine (CYMBALTA) 20 MG extended release capsule TAKE 1 CAPSULE BY MOUTH DAILY  Qty: 30 capsule, Refills: 5      vitamin D (ERGOCALCIFEROL) 1.25 MG (61726 UT) CAPS capsule TAKE 1 CAPSULE BY MOUTH 1 TIME A WEEK  Qty: 12 capsule, Refills: 1      Cyanocobalamin (B-12) 500 MCG TABS TAKE 1 TABLET BY MOUTH DAILY  Qty: 30 tablet, Refills: 11      triamterene-hydroCHLOROthiazide (MAXZIDE-25) 37.5-25 MG per tablet TAKE 1 TABLET BY MOUTH DAILY  Qty: 90 tablet, Refills: 3      tiZANidine (ZANAFLEX) 4 MG tablet TAKE 1 TABLET BY MOUTH THREE TIMES DAILY AS NEEDED. NOT TO EXCEED 3 DOSES IN 24 HOURS      losartan (COZAAR) 100 MG tablet Take 1 tablet by mouth daily  Qty: 90 tablet, Refills: 3      omeprazole (PRILOSEC) 40 MG delayed release capsule Take 1 capsule by mouth daily  Qty: 90 capsule, Refills: 3      HYDROcodone-acetaminophen (NORCO)  MG per tablet Take 1 tablet by mouth every 6 hours as needed for Pain.        Hospital Bed MISC by Does not apply route  Qty: 1 each, Refills: 0      acyclovir (ZOVIRAX) 400 MG tablet Take 400 mg by mouth 2 times daily history of shingles in her eye      albuterol (ACCUNEB) 1.25 MG/3ML nebulizer solution Inhale 1 mL into the lungs every 6 hours as needed for Wheezing       furosemide (LASIX) 20 MG tablet Take 1 tablet by mouth daily as needed (swelling)  Qty: 30 tablet, Refills: 3             ALLERGIES     Zoloft [sertraline hcl] and Gabapentin    FAMILY HISTORY       Family History   Problem Relation Age of Onset    Cancer Mother         Cervical Cancer    Cancer Brother         Esophageal cancer    Diabetes Brother     Esophageal Cancer Brother     Diabetes Sister     Colon Cancer Neg Hx     Colon Polyps Neg Hx     Liver Cancer Neg Hx     Liver Disease Neg Hx     Rectal Cancer Neg Hx     Stomach Cancer Neg Hx           SOCIAL HISTORY       Social History     Socioeconomic History    Marital status:      Spouse name: None    Number of children: None    Years of education: None    Highest education level: None   Occupational History    None   Tobacco Use    Smoking status: Never Smoker    Smokeless tobacco: Never Used   Vaping Use    Vaping Use: Never used   Substance and Sexual Activity    Alcohol use: No    Drug use: No    Sexual activity: Yes     Partners: Male   Other Topics Concern    None   Social History Narrative    None     Social Determinants of Health     Financial Resource Strain:     Difficulty of Paying Living Expenses:    Food Insecurity:     Worried About Running Out of Food in the Last Year:     Ran Out of Food in the Last Year:    Transportation Needs:     Lack of Transportation (Medical):      Lack of Transportation (Non-Medical):    Physical Activity:     Days of Exercise per Week:     Minutes of Exercise per Session:    Stress:     Feeling of Stress :    Social Connections:     Frequency of Communication with Friends and Family:     Frequency of Social Gatherings with Friends and Family:     Attends Buddhist Services:     Active Member of Clubs or Organizations:     Attends Club or Organization Meetings:     Marital Status:    Intimate Partner Violence:     Fear of Current or Ex-Partner:     Emotionally Abused:     Physically Abused:     Sexually Abused:        Yanira Graham [address] Opening: Spontaneous  Best Verbal Response: Oriented  Best Motor Response: Obeys commands  Johana Coma Scale Score: 15        PHYSICAL EXAM    (up to 7 for level 4, 8 or more for level 5)     ED Triage Vitals [09/16/21 1135]   BP Temp Temp Source Pulse Resp SpO2 Height Weight   136/76 98.8 °F (37.1 °C) Oral 138 18 94 % 5' 3\" (1.6 m) 145 lb (65.8 kg)       Physical Exam  Vitals and nursing note reviewed. Constitutional:       General: She is not in acute distress. Appearance: She is well-developed. She is not ill-appearing or diaphoretic. HENT:      Head: Normocephalic and atraumatic. Right Ear: External ear normal.      Left Ear: External ear normal.      Nose: Nose normal.      Mouth/Throat:      Mouth: Mucous membranes are moist.   Eyes:      Conjunctiva/sclera: Conjunctivae normal.   Neck:      Trachea: No tracheal deviation. Cardiovascular:      Rate and Rhythm: Tachycardia present. Rhythm irregular. Heart sounds: Normal heart sounds. No murmur heard. Pulmonary:      Effort: No respiratory distress. Breath sounds: Normal breath sounds. No wheezing or rales. Abdominal:      Palpations: Abdomen is soft. There is no mass. Tenderness: There is no abdominal tenderness. Musculoskeletal:         General: Normal range of motion. Cervical back: Normal range of motion. Right lower leg: No edema. Left lower leg: No edema. Skin:     General: Skin is warm and dry. Neurological:      Mental Status: She is alert and oriented to person, place, and time. GCS: GCS eye subscore is 4. GCS verbal subscore is 5. GCS motor subscore is 6.          DIAGNOSTIC RESULTS     EKG: All EKG's areinterpreted by the Emergency Department Physician who either signs or Co-signs this chart in the absence of a cardiologist.    149 atrial fibrillation with rapid ventricular response no obvious ST changes nondiagnostic EKG    RADIOLOGY:  Non-plain film images such as CT, Ultrasound and MRI are read by the radiologist. Plain radiographic images are visualized and preliminarily interpreted bythe emergency physician with the below findings:        XR CHEST PORTABLE   Final Result   No acute findings. Signed by Dr Carlos Flores:  Labs Reviewed   CBC WITH AUTO DIFFERENTIAL - Abnormal; Notable for the following components:       Result Value    Hemoglobin 10.9 (*)     Hematocrit 36.6 (*)     MCH 25.5 (*)     MCHC 29.8 (*)     RDW 14.7 (*)     Eosinophils % 5.5 (*)     All other components within normal limits   COMPREHENSIVE METABOLIC PANEL - Abnormal; Notable for the following components:    Glucose 151 (*)     All other components within normal limits   MAGNESIUM - Abnormal; Notable for the following components:    Magnesium 1.2 (*)     All other components within normal limits   COVID-19, RAPID   TROPONIN   PROTIME-INR   TSH WITHOUT REFLEX   PROTIME-INR   COMPREHENSIVE METABOLIC PANEL   MAGNESIUM   CBC WITH AUTO DIFFERENTIAL       All other labs were within normal range or not returned as of this dictation.     EMERGENCY DEPARTMENT COURSE and DIFFERENTIAL DIAGNOSIS/MDM:   Vitals:    Vitals:    09/16/21 1253 09/16/21 1329 09/16/21 1416 09/16/21 1915   BP: (!) 149/52 (!) 146/73 112/83 (!) 163/45   Pulse: 125 112 87 88   Resp: 20 20 20 18   Temp:   98.1 °F (36.7 °C)    TempSrc:   Oral Temporal   SpO2: 93% 93% 98% 96%   Weight:       Height:           MDM  Number of Diagnoses or Management Options     Amount and/or Complexity of Data Reviewed  Clinical lab tests: ordered and reviewed  Tests in the radiology section of CPT®: ordered and reviewed  Discuss the patient with other providers: yes  Independent visualization of images, tracings, or specimens: yes      Pt in afib with rvr on arrival but HDS and well appearing, started with cardizem bolus and gtt which has been titrated to 10mg/hr, HR now around 110-115, labs overall ok, mg being replaced, d/w Dr. Dameon Delgado for admission        CONSULTS:  [address]:  Unless otherwise noted below, none     Procedures    FINAL IMPRESSION      1. Atrial fibrillation with rapid ventricular response ([facility] [address]    2. Hypomagnesemia          DISPOSITION/PLAN   DISPOSITION        PATIENT REFERRED TO:  No follow-up provider specified.     DISCHARGE MEDICATIONS:  Current Discharge Medication List             (Please note that portions of this note were completed with a voice recognition program.  Efforts were made to edit thedictations but occasionally words are mis-transcribed.)    Janet Wild MD (electronically signed)  Attending Emergency Physician        Sharon Hall MD  09/16/21 2084

## 2021-09-17 VITALS
OXYGEN SATURATION: 97 % | HEART RATE: 72 BPM | BODY MASS INDEX: 25.69 KG/M2 | RESPIRATION RATE: 16 BRPM | WEIGHT: 145 LBS | HEIGHT: 63 IN | TEMPERATURE: 98.4 F | SYSTOLIC BLOOD PRESSURE: 138 MMHG | DIASTOLIC BLOOD PRESSURE: 45 MMHG

## 2021-09-17 LAB
ALBUMIN SERPL-MCNC: 3.3 G/DL (ref 3.5–5.2)
ALP BLD-CCNC: 76 U/L (ref 35–104)
ALT SERPL-CCNC: 7 U/L (ref 5–33)
ANION GAP SERPL CALCULATED.3IONS-SCNC: 7 MMOL/L (ref 7–19)
AST SERPL-CCNC: 13 U/L (ref 5–32)
BASOPHILS ABSOLUTE: 0 K/UL (ref 0–0.2)
BASOPHILS RELATIVE PERCENT: 0.6 % (ref 0–1)
BILIRUB SERPL-MCNC: 0.3 MG/DL (ref 0.2–1.2)
BUN BLDV-MCNC: 8 MG/DL (ref 8–23)
CALCIUM SERPL-MCNC: 8.4 MG/DL (ref 8.8–10.2)
CHLORIDE BLD-SCNC: 107 MMOL/L (ref 98–111)
CO2: 25 MMOL/L (ref 22–29)
CREAT SERPL-MCNC: 0.7 MG/DL (ref 0.5–0.9)
EOSINOPHILS ABSOLUTE: 0.5 K/UL (ref 0–0.6)
EOSINOPHILS RELATIVE PERCENT: 6.5 % (ref 0–5)
GFR AFRICAN AMERICAN: >59
GFR NON-AFRICAN AMERICAN: >60
GLUCOSE BLD-MCNC: 132 MG/DL (ref 74–109)
HCT VFR BLD CALC: 31.4 % (ref 37–47)
HEMOGLOBIN: 9.2 G/DL (ref 12–16)
IMMATURE GRANULOCYTES #: 0 K/UL
INR BLD: 2.69 (ref 0.88–1.18)
LYMPHOCYTES ABSOLUTE: 1.9 K/UL (ref 1.1–4.5)
LYMPHOCYTES RELATIVE PERCENT: 27.4 % (ref 20–40)
MAGNESIUM: 1.8 MG/DL (ref 1.6–2.4)
MCH RBC QN AUTO: 24.8 PG (ref 27–31)
MCHC RBC AUTO-ENTMCNC: 29.3 G/DL (ref 33–37)
MCV RBC AUTO: 84.6 FL (ref 81–99)
MONOCYTES ABSOLUTE: 0.8 K/UL (ref 0–0.9)
MONOCYTES RELATIVE PERCENT: 11.4 % (ref 0–10)
NEUTROPHILS ABSOLUTE: 3.7 K/UL (ref 1.5–7.5)
NEUTROPHILS RELATIVE PERCENT: 54 % (ref 50–65)
PDW BLD-RTO: 14.7 % (ref 11.5–14.5)
PLATELET # BLD: 278 K/UL (ref 130–400)
PMV BLD AUTO: 9.2 FL (ref 9.4–12.3)
POTASSIUM SERPL-SCNC: 3.7 MMOL/L (ref 3.5–5)
PROTHROMBIN TIME: 28.2 SEC (ref 12–14.6)
RBC # BLD: 3.71 M/UL (ref 4.2–5.4)
SODIUM BLD-SCNC: 139 MMOL/L (ref 136–145)
TOTAL PROTEIN: 5.6 G/DL (ref 6.6–8.7)
WBC # BLD: 6.9 K/UL (ref 4.8–10.8)

## 2021-09-17 PROCEDURE — 83735 ASSAY OF MAGNESIUM: CPT

## 2021-09-17 PROCEDURE — 6360000002 HC RX W HCPCS: Performed by: HOSPITALIST

## 2021-09-17 PROCEDURE — 99232 SBSQ HOSP IP/OBS MODERATE 35: CPT | Performed by: INTERNAL MEDICINE

## 2021-09-17 PROCEDURE — 6370000000 HC RX 637 (ALT 250 FOR IP): Performed by: NURSE PRACTITIONER

## 2021-09-17 PROCEDURE — 2580000003 HC RX 258: Performed by: NURSE PRACTITIONER

## 2021-09-17 PROCEDURE — 80053 COMPREHEN METABOLIC PANEL: CPT

## 2021-09-17 PROCEDURE — 85610 PROTHROMBIN TIME: CPT

## 2021-09-17 PROCEDURE — 6370000000 HC RX 637 (ALT 250 FOR IP): Performed by: INTERNAL MEDICINE

## 2021-09-17 PROCEDURE — 85025 COMPLETE CBC W/AUTO DIFF WBC: CPT

## 2021-09-17 RX ORDER — DILTIAZEM HYDROCHLORIDE 120 MG/1
120 CAPSULE, COATED, EXTENDED RELEASE ORAL DAILY
Qty: 30 CAPSULE | Refills: 0 | Status: SHIPPED | OUTPATIENT
Start: 2021-09-17 | End: 2021-10-15

## 2021-09-17 RX ORDER — METOPROLOL SUCCINATE 50 MG/1
50 TABLET, EXTENDED RELEASE ORAL DAILY
Qty: 30 TABLET | Refills: 0 | Status: SHIPPED | OUTPATIENT
Start: 2021-09-17 | End: 2021-10-04 | Stop reason: DRUGHIGH

## 2021-09-17 RX ORDER — HEPARIN SODIUM (PORCINE) LOCK FLUSH IV SOLN 100 UNIT/ML 100 UNIT/ML
300 SOLUTION INTRAVENOUS ONCE
Status: COMPLETED | OUTPATIENT
Start: 2021-09-17 | End: 2021-09-17

## 2021-09-17 RX ADMIN — TRIAMTERENE AND HYDROCHLOROTHIAZIDE 1 TABLET: 37.5; 25 TABLET ORAL at 11:47

## 2021-09-17 RX ADMIN — DULOXETINE HYDROCHLORIDE 20 MG: 20 CAPSULE, DELAYED RELEASE ORAL at 11:47

## 2021-09-17 RX ADMIN — DILTIAZEM HYDROCHLORIDE 120 MG: 120 CAPSULE, COATED, EXTENDED RELEASE ORAL at 11:47

## 2021-09-17 RX ADMIN — HYDROCODONE BITARTRATE AND ACETAMINOPHEN 1 TABLET: 10; 325 TABLET ORAL at 16:48

## 2021-09-17 RX ADMIN — SODIUM CHLORIDE, PRESERVATIVE FREE 10 ML: 5 INJECTION INTRAVENOUS at 11:48

## 2021-09-17 RX ADMIN — METOPROLOL SUCCINATE 50 MG: 50 TABLET, EXTENDED RELEASE ORAL at 11:47

## 2021-09-17 RX ADMIN — LOSARTAN POTASSIUM 100 MG: 100 TABLET, FILM COATED ORAL at 11:48

## 2021-09-17 RX ADMIN — HEPARIN 300 UNITS: 100 SYRINGE at 16:53

## 2021-09-17 RX ADMIN — PANTOPRAZOLE SODIUM 40 MG: 40 TABLET, DELAYED RELEASE ORAL at 05:46

## 2021-09-17 RX ADMIN — ACYCLOVIR 400 MG: 200 CAPSULE ORAL at 11:47

## 2021-09-17 RX ADMIN — MICONAZOLE NITRATE: 2 POWDER TOPICAL at 11:49

## 2021-09-17 ASSESSMENT — PAIN DESCRIPTION - LOCATION: LOCATION: LEG

## 2021-09-17 ASSESSMENT — PAIN SCALES - GENERAL: PAINLEVEL_OUTOF10: 8

## 2021-09-17 ASSESSMENT — PAIN DESCRIPTION - PAIN TYPE: TYPE: CHRONIC PAIN

## 2021-09-17 ASSESSMENT — PAIN DESCRIPTION - ORIENTATION: ORIENTATION: RIGHT

## 2021-09-17 NOTE — DISCHARGE SUMMARY
Magdi Colby  :  1935  MRN:  [id number]    Admit date:  2021  Discharge date:  21    Discharging Physician:  Dr. Guanakito Donato Directive: Full Code    Consults: Jorje Smith     Primary Care Physician:  Maribell Lopez, APRN - CNP    Discharge Diagnoses:  Principal Problem:    Atrial fibrillation with RVR ([facility] [address]  Active Problems:    Personal history of colon cancer    Fibromyalgia    Paroxysmal supraventricular tachycardia ([facility] [address]    Essential hypertension    History of DVT (deep vein thrombosis)  Resolved Problems:    * No resolved hospital problems. *      Portions of this note have been copied forward, however, changed to reflect the most current clinical status of this patient. Hospital Course: The patient is a 82 y.o. female with a history of Colon CA, DVT, fibromyalgia, GERD, HTN, and SVT who presented to [facility] ED complaining of tachycardia. The patient was in Endo lab day of admission for an EGD with dilation of a stricture. The patient stated she had not taken her Xarelto in 3 days. On arrival she was noted to have an elevated heart rate. EKG was obtained and patient was found to be in a. Fib RVR. She was iniatied on a cardizem drip in ED. Patient admitted to the hospitalist service for a. Fib RVR. Patient spontaneously converted back to sinus rhythm. Pt was evaluated by cardiology. Initated by Cardizem PO and modified lopressor to Toprol XL. Patient stable for discharge and outpatient follow up with cardiology and PCP. Significant Diagnostic Studies:   XR CHEST PORTABLE    Result Date: 2021  EXAM: XR CHEST PORTABLE - 2021 12:28 PM INDICATION: Chest pain COMPARISON: 2020 FINDINGS: RIGHT chest wall port is stable in position. Cardiac silhouette is normal in size. No pleural effusion or visible pneumothorax. No focal airspace opacity. No acute osseous finding. No acute findings.  Signed by Dr Hesham Colón    EGD    Result Date: 2021  No dictation Pertinent Labs:   CBC:   Recent Labs     09/16/21  1202 09/17/21  0300   WBC 9.4 6.9   HGB 10.9* 9.2*    278     BMP:    Recent Labs     09/16/21  1202 09/17/21  0300    139   K 3.6 3.7    107   CO2 24 25   BUN 10 8   CREATININE 0.8 0.7   GLUCOSE 151* 132*     INR:   Recent Labs     09/16/21  1202 09/17/21  0300   INR 1.00 2.69*       Physical Exam:  Vital Signs: BP (!) 147/59   Pulse 65   Temp 98.1 °F (36.7 °C) (Oral)   Resp 16   Ht 5' 3\" (1.6 m)   Wt 145 lb (65.8 kg)   SpO2 95%   BMI 25.69 kg/m²   Physical Exam  Vitals and nursing note reviewed. HENT:      Head: Normocephalic. Mouth/Throat:      Mouth: Mucous membranes are moist.      Pharynx: Oropharynx is clear. Eyes:      Extraocular Movements: Extraocular movements intact. Conjunctiva/sclera: Conjunctivae normal.      Pupils: Pupils are equal, round, and reactive to light. Cardiovascular:      Rate and Rhythm: Normal rate and regular rhythm. Pulses: Normal pulses. Heart sounds: Normal heart sounds. Pulmonary:      Effort: Pulmonary effort is normal.      Breath sounds: Normal breath sounds. No wheezing. Abdominal:      General: Abdomen is flat. Bowel sounds are normal. There is no distension. Palpations: Abdomen is soft. Tenderness: There is no abdominal tenderness. There is no guarding. Comments: Scarring noted from previous surgeries    Musculoskeletal:         General: Normal range of motion. Cervical back: Normal range of motion and neck supple. Right lower leg: No edema. Left lower leg: No edema. Skin:     General: Skin is warm and dry. Capillary Refill: Capillary refill takes less than 2 seconds. Neurological:      General: No focal deficit present. Mental Status: She is alert and oriented to person, place, and time.    Psychiatric:         Mood and Affect: Mood normal.         Behavior: Behavior normal.         Discharge Medications:       Rosalita Getting R   [place]ication [place] [facility][id number]    Printed on:09/17/21 0912   Medication Information                      acyclovir (ZOVIRAX) 400 MG tablet  Take 400 mg by mouth 2 times daily history of shingles in her eye             albuterol (ACCUNEB) 1.25 MG/3ML nebulizer solution  Inhale 1 mL into the lungs every 6 hours as needed for Wheezing              Cyanocobalamin (B-12) 500 MCG TABS  TAKE 1 TABLET BY MOUTH DAILY             dilTIAZem (CARDIZEM CD) 120 MG extended release capsule  Take 1 capsule by mouth daily             DULoxetine (CYMBALTA) 20 MG extended release capsule  TAKE 1 CAPSULE BY MOUTH DAILY             furosemide (LASIX) 20 MG tablet  Take 1 tablet by mouth daily as needed (swelling)             Hospital Bed MISC  by Does not apply route             HYDROcodone-acetaminophen (NORCO)  MG per tablet  Take 1 tablet by mouth every 6 hours as needed for Pain.              losartan (COZAAR) 100 MG tablet  Take 1 tablet by mouth daily             metoprolol succinate (TOPROL XL) 50 MG extended release tablet  Take 1 tablet by mouth daily             nystatin (MYCOSTATIN) 713337 UNIT/GM powder  For rash under breast. Apply 3 times daily. omeprazole (PRILOSEC) 40 MG delayed release capsule  Take 1 capsule by mouth daily             pregabalin (LYRICA) 100 MG capsule  2 times daily. tiZANidine (ZANAFLEX) 4 MG tablet  TAKE 1 TABLET BY MOUTH THREE TIMES DAILY AS NEEDED. NOT TO EXCEED 3 DOSES IN 24 HOURS             triamcinolone (KENALOG) 0.1 % cream  APPLY TWICE DAILY TO RASH UP TO 2 WEEKS/MONTH AS NEEDED             triamterene-hydroCHLOROthiazide (MAXZIDE-25) 37.5-25 MG per tablet  TAKE 1 TABLET BY MOUTH DAILY             vitamin D (ERGOCALCIFEROL) 1.25 MG (23447 UT) CAPS capsule  TAKE 1 CAPSULE BY MOUTH 1 TIME A WEEK             XARELTO 20 MG TABS tablet  TAKE 1 TABLET BY MOUTH DAILY WITH BREAKFAST                 Discharge Instructions:    Follow up with [facility] Ahmet Hoyt CNP in 3-5 days. Take medications as directed. Resume activity as tolerated. Diet: ADULT DIET; Regular; Low Fat/Low Chol/High Fiber/MERVAT; Low Sodium (2 gm); No Caffeine     Disposition: Patient is Stableand will be discharged to Home. Time spent on discharge 32 minutes spent in assessing patient, reviewing medications, discussion with nursing, confirming safe discharge plan and preparation of discharge summary.     Signed:  EARLE Waterman CNP, 9/17/2021 9:41 AM

## 2021-09-17 NOTE — PROGRESS NOTES
Comprehensive Nutrition Assessment    Type and Reason for Visit:  Initial, Positive Nutrition Screen    Nutrition Recommendations/Plan: continue current POC    Nutrition Assessment:  Pt is adequately nourished and not at risk for nutritional compromise. Aware pt to be discharged    Malnutrition Assessment:  Malnutrition Status:  No malnutrition    Context:  Acute Illness     Findings of the 6 clinical characteristics of malnutrition:  Energy Intake:  Mild decrease in energy intake (Comment)  Weight Loss:  No significant weight loss     Body Fat Loss:  No significant body fat loss     Muscle Mass Loss:  No significant muscle mass loss    Fluid Accumulation:  No significant fluid accumulation Extremities   Strength:  Not Performed    Nutrition Related Findings:  adequately nourished      Wounds:  None       Current Nutrition Therapies:    ADULT DIET; Regular; Low Fat/Low Chol/High Fiber/MERVAT; Low Sodium (2 gm); No Caffeine    Anthropometric Measures:  · Height: 5' 3\" (160 cm)  · Current Body Weight: 145 lb (65.8 kg)   · Admission Body Weight: 145 lb (65.8 kg)    · Usual Body Weight: 147 lb (66.7 kg)     · Ideal Body Weight: 115 lbs; % Ideal Body Weight 126.1 %   · BMI: 25.7  · Adjusted Body Weight:  ; No Adjustment   · BMI Categories: Overweight (BMI 25.0-29. 9)       Nutrition Diagnosis:   · No nutrition diagnosis at this time related to   as evidenced by        Nutrition Interventions:   Food and/or Nutrient Delivery:  Continue Current Diet  Nutrition Education/Counseling:  No recommendation at this time   Coordination of Nutrition Care:  Continue to monitor while inpatient    Goals:  po intake 50% or greater.        Nutrition Monitoring and Evaluation:   Behavioral-Environmental Outcomes:  None Identified   Food/Nutrient Intake Outcomes:  Food and Nutrient Intake  Physical Signs/Symptoms Outcomes:  Biochemical Data, Weight, Skin, Nutrition Focused Physical Findings     Discharge Planning:    Continue current diet     Electronically signed by Solange Vega MS, RD, LD on 9/17/21 at 10:32 AM CDT    Contact: [phone]

## 2021-09-17 NOTE — PROGRESS NOTES
Cardiology Progress Note Hoang Davis MD      Patient:  Rachid Basurto  [id number]    Patient Active Problem List    Diagnosis Date Noted    Atrial fibrillation with RVR ([facility] [address] 09/16/2021     Priority: Low    History of esophageal stricture 08/24/2021     Priority: Low    Depression 04/20/2021     Priority: Low    Fungal dermatitis 05/07/2020     Priority: Low    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 05/07/2020     Priority: Low    Mixed simple and mucopurulent chronic bronchitis ([address] 05/04/2020     Priority: Low    Malignant neoplasm of colon ([facility] [address] 04/20/2020     Priority: Low    Cellulitis of left lower limb 06/29/2019     Priority: Low    Fracture of right foot 06/29/2019     Priority: Low    Hypochloremia 06/29/2019     Priority: Low    CKD (chronic kidney disease) stage 3, GFR 30-59 ml/min ([facility]) 06/29/2019     Priority: Low    Acute deep vein thrombosis (DVT) of femoral vein of left lower extremity ([facility] [address] 06/12/2019     Priority: Low    Pain in both lower extremities      Priority: Low    Numbness and tingling of both feet      Priority: Low    Avascular necrosis ([facility] [address] 04/08/2019     Priority: Low    History of DVT (deep vein thrombosis) 12/18/2018     Priority: Low    Port-A-Cath in place 02/19/2018     Priority: Low     Overview Note:     Replacing Deprecated Diagnoses      History of colon polyps 03/11/2016     Priority: Low    Essential hypertension 08/25/2015     Priority: Low    Encounter for care related to Port-a-Cath 08/11/2015     Priority: Low    Vitamin D deficiency 03/19/2015     Priority: Low    Paroxysmal supraventricular tachycardia ([facility] [address] 02/18/2015     Priority: Low    Dysphagia 10/21/2014     Priority: Low    Hypertriglyceridemia 08/15/2014     Priority: Low    Venous insufficiency 03/20/2014     Priority: Low    Varicose veins of left lower extremity with inflammation, 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 ([facility] [address] 02/17/2014 Priority: Low    Fibromyalgia 11/02/2012     Priority: Low    Renal bruit 11/02/2012     Priority: Low     Overview Note:     bilateral      Personal history of colon cancer 11/30/2011     Priority: Low    Family history of esophageal cancer 11/30/2011     Priority: Low       Admit Date:  9/16/2021    Admission Problem List: Present on Admission:   Atrial fibrillation with RVR ([facility] [address]   Personal history of colon cancer   Fibromyalgia   Paroxysmal supraventricular tachycardia ([facility] [address]   Essential hypertension   History of DVT (deep vein thrombosis)      Cardiac Specific Data:  Specialty Problems        Cardiology Problems    Varicose veins of left lower extremity with inflammation, 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 (HCC)        Venous insufficiency        Hypertriglyceridemia        Paroxysmal supraventricular tachycardia (HCC)        Essential hypertension        Acute deep vein thrombosis (DVT) of femoral vein of left lower extremity (HCC)        * (Principal) Atrial fibrillation with RVR (HCC)               1.  New onset atrial fibrillation with RVR, hyperdynamic LV systolic function with mild LVH. 2.  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 3.  Hypertension. 4.  History of colon cancer with resection 10 years ago. Subjective:  Ms. Ben Giron has been doing fairly well with no complaints of chest pain or shortness of breath. No palpitation. Remains in sinus rhythm. Objective:   BP (!) 147/59   Pulse 65   Temp 98.1 °F (36.7 °C) (Oral)   Resp 16   Ht 5' 3\" (1.6 m)   Wt 145 lb (65.8 kg)   SpO2 95%   BMI 25.69 kg/m²       Intake/Output Summary (Last 24 hours) at 9/17/2021 1233  Last data filed at 9/17/2021 1150  Gross per 24 hour   Intake 2174 ml   Output 200 ml   Net 1974 ml       Prior to Admission medications    Medication Sig Start Date End Date Taking?  Authorizing Provider   metoprolol succinate (TOPROL XL) 50 MG extended release tablet Take 1 tablet by mouth daily 9/17/21  Yes EARLE Guardado CNP   dilTIAZem (CARDIZEM CD) 120 MG extended release capsule Take 1 capsule by mouth daily 9/17/21  Yes EARLE Guardado CNP   XARELTO 20 MG TABS tablet TAKE 1 TABLET BY MOUTH DAILY WITH BREAKFAST 8/12/21   Andre Essex, MD   pregabalin (LYRICA) 100 MG capsule 2 times daily. 7/27/21   Historical Provider, MD   triamcinolone (KENALOG) 0.1 % cream APPLY TWICE DAILY TO RASH UP TO 2 WEEKS/MONTH AS NEEDED 7/28/21   EARLE Smith CNP   nystatin (MYCOSTATIN) 581056 UNIT/GM powder For rash under breast. Apply 3 times daily. 7/28/21   EARLE Smith CNP   DULoxetine (CYMBALTA) 20 MG extended release capsule TAKE 1 CAPSULE BY MOUTH DAILY 7/14/21   EARLE Smith CNP   vitamin D (ERGOCALCIFEROL) 1.25 MG (31131 UT) CAPS capsule TAKE 1 CAPSULE BY MOUTH 1 TIME A WEEK 6/22/21   Andre Essex, MD   Cyanocobalamin (B-12) 500 MCG TABS TAKE 1 TABLET BY MOUTH DAILY 3/9/21   Andre Essex, MD   triamterene-hydroCHLOROthiazide (QOGCHGL-51) 37.5-25 MG per tablet TAKE 1 TABLET BY MOUTH DAILY 3/9/21   EARLE Smith CNP   tiZANidine (ZANAFLEX) 4 MG tablet TAKE 1 TABLET BY MOUTH THREE TIMES DAILY AS NEEDED. NOT TO EXCEED 3 DOSES IN 24 HOURS 1/18/21   Historical Provider, MD   losartan (COZAAR) 100 MG tablet Take 1 tablet by mouth daily 1/14/21   Andre Essex, MD   omeprazole (PRILOSEC) 40 MG delayed release capsule Take 1 capsule by mouth daily 12/21/20   Andre Essex, MD   HYDROcodone-acetaminophen (NORCO)  MG per tablet Take 1 tablet by mouth every 6 hours as needed for Pain.   10/21/20   Historical Provider, MD   [address] [place] [address] by Does not apply route 8/21/20   EARLE Smith CNP   acyclovir (ZOVIRAX) 400 MG tablet Take 400 mg by mouth 2 times daily history of shingles in her eye    Historical Provider, MD   albuterol (ACCUNEB) 1.25 MG/3ML nebulizer solution Inhale 1 mL into the lungs every 6 hours as needed for Wheezing     Historical Provider, MD   furosemide (LASIX) 20 MG tablet Take 1 tablet by mouth daily as needed (swelling) 6/19/18   Ac Cates MD        acyclovir  400 mg Oral BID    DULoxetine  20 mg Oral Daily    losartan  100 mg Oral Daily    miconazole   Topical BID    pantoprazole  40 mg Oral QAM AC    pregabalin  100 mg Oral BID    triamterene-hydroCHLOROthiazide  1 tablet Oral Daily    vitamin D  50,000 Units Oral Weekly    rivaroxaban  20 mg Oral Daily    sodium chloride flush  5-40 mL IntraVENous 2 times per day    dilTIAZem  120 mg Oral Daily    metoprolol succinate  50 mg Oral Daily       TELEMETRY: Sinus     Physical Exam:      Physical Exam  Constitutional:       General: She is not in acute distress. Appearance: She is not diaphoretic. HENT:      Mouth/Throat:      Pharynx: No oropharyngeal exudate. Eyes:      General: No scleral icterus. Right eye: No discharge. Left eye: No discharge. Neck:      Thyroid: No thyromegaly. Vascular: No JVD. Cardiovascular:      Rate and Rhythm: Normal rate and regular rhythm. No extrasystoles are present. Heart sounds: Normal heart sounds, S1 normal and S2 normal. No murmur heard. No systolic murmur is present. No diastolic murmur is present. No friction rub. No gallop. No S3 or S4 sounds. Comments: No JVD  No edema    Pulmonary:      Effort: Pulmonary effort is normal. No respiratory distress. Breath sounds: Normal breath sounds. No wheezing or rales. Chest:      Chest wall: No tenderness. Abdominal:      General: Bowel sounds are normal. There is no distension. Palpations: Abdomen is soft. There is no mass. Tenderness: There is no abdominal tenderness. There is no guarding or rebound. Hernia: No hernia is present. Comments: No palpable organomegaly   Musculoskeletal:         General: Normal range of motion. Skin:     General: Skin is warm.       Coloration: Skin is not pale. Findings: No rash. Neurological:      Mental Status: She is alert and oriented to person, place, and time. Cranial Nerves: No cranial nerve deficit. Deep Tendon Reflexes: Reflexes normal.                 Lab Data:  CBC:   Recent Labs     09/16/21 1202 09/17/21  0300   WBC 9.4 6.9   HGB 10.9* 9.2*   HCT 36.6* 31.4*   MCV 85.7 84.6    278     BMP:   Recent Labs     09/16/21 1202 09/17/21  0300    139   K 3.6 3.7    107   CO2 24 25   BUN 10 8   CREATININE 0.8 0.7     LIVER PROFILE:   Recent Labs     09/16/21  1202 09/17/21  0300   AST 16 13   ALT 9 7   BILITOT 0.4 0.3   ALKPHOS 91 76     PT/INR:   Recent Labs     09/16/21 1202 09/17/21  0300   PROTIME 13.4 28.2*   INR 1.00 2.69*     APTT: No results for input(s): APTT in the last 72 hours. BNP:  No results for input(s): BNP in the last 72 hours. CK, CKMB, Troponin: @LABRCNT (CKTOTAL:3, CKMB:3, TROPONINI:3)@    IMAGING:  Echo Complete    Result Date: 9/17/2021  Transthoracic Echocardiography Report (TTE)  Demographics   Patient Name  Otoniel Calderon  Date of Study         09/16/2021   MRN           [id number]       Gender                Female   Date of Birth 1935   Room Number           MHL-0711   Age           82 year(s)   Height:       63 inches    Referring Physician   Radha OG   Weight:       145 pounds   Sonographer           Lsia King [facility]   BSA:          1.69 m^2     Interpreting          Curtis Leavenworth                             Physician   BMI:          25.69 kg/m^2  Procedure Type of Study   TTE procedure:ECHO NO CONTRAST WITH DOP/COLR. Study Location: Echo Lab Technical Quality: Adequate visualization Patient Status: Inpatient BP: 112/83 mmHg Indications:Abnormal ECG. Conclusions   Summary  Normal left ventricular size with preserved LV systolic function and  visually estimated ejection fraction of approximately 70%. No regional  wall motion abnormalities.  Mild concentric left ventricular hypertrophy. Indeterminate diastolic dysfunction with evidence for increased LVEDP. Normal right ventricular size with hyperdynamic RV systolic function. Mild tricuspid regurgitation with normal estimated RVSP. Mild pulmonic regurgitation present. Aortic root and ascending aorta are within normal limits. No evidence of significant pericardial effusion is noted. The rhythm is sinus. Compared to study report dated 3/18/2019, the LVEF has improved. Signature   ----------------------------------------------------------------  Electronically signed by Juan Stone(Interpreting physician)  on 09/17/2021 09:48 AM  ----------------------------------------------------------------   Findings   Mitral Valve  Moderate posterior annular calcification of the mitral valve with normal  leaflet mobility. No evidence of mitral valve stenosis. Trivial mitral  regurgitation. Aortic Valve  Aortic valve is not well visualized. No significant aortic regurgitation or stenosis is noted. Tricuspid Valve  Tricuspid valve is structurally normal.  Mild tricuspid regurgitation with normal estimated RVSP. Pulmonic Valve  The pulmonic valve was not well visualized. Mild pulmonic regurgitation present. Left Atrium  Normal size left atrium. No evidence of significant inter-atrial communications by color flow  Doppler only. Left Ventricle  Normal left ventricular size with mildly reduced LV systolic function and  a calculated ejection fraction of approximately 49%. Mild concentric left ventricular hypertrophy. No regional wall motion abnormalities. Indeterminate diastolic dysfunction with evidence for increased LVEDP. Right Atrium  Normal right atrial dimension. Right Ventricle  Normal right ventricular size with hyperdynamic RV systolic function. Pericardial Effusion  No evidence of significant pericardial effusion is noted. Pleural Effusion  No evidence of pleural effusion.    Miscellaneous  Aortic root and ascending aorta are within normal limits. Arch is  sub-optimally visualized. M-Mode Measurements (cm)   LVIDd: 3.42 cm                         LVIDs: 1.91 cm  IVSd: 1.27 cm  LVPWd: 1.29 cm                         AO Root Dimension: 1.6 cm  Rt. Vent. Dimension: 2.5 cm            LA: 3.3 cm  % Ejection Fraction: 51 %              LVOT: 1.9 cm  Doppler Measurements:   AV Peak Velocity:172 cm/s             MV Peak E-Wave: 74.2 cm/s  AV Peak Gradient: 11.83 mmHg          MV Peak A-Wave: 100 cm/s  AV Mean Gradient: 6 mmHg              MV E/A Ratio: 0.74 %  AV Area (Continuity):1.38 cm^2        MV Peak Gradient: 2.2 mmHg  TR Velocity:262 cm/s                  MV P1/2t: 99 msec  TR Gradient:27.46 mmHg                MVA by PHT2.22 cm^2  Estimated RAP:3 mmHg  RVSP:30 mmHg      XR CHEST PORTABLE    Result Date: 9/16/2021  EXAM: XR CHEST PORTABLE - 9/16/2021 12:28 PM INDICATION: Chest pain COMPARISON: 12/9/2020 FINDINGS: RIGHT chest wall port is stable in position. Cardiac silhouette is normal in size. No pleural effusion or visible pneumothorax. No focal airspace opacity. No acute osseous finding. No acute findings. Signed by Dr Irving Johnson    Result Date: 9/7/2021  EXAM: CT HIP RIGHT WO CONTRAST -- 9/7/2021 11:18 AM HISTORY: 82 years, Female, M25.551, right hip pain, right leg pain, right groin pain COMPARISON: 12/1/2020 DLP: 889 mGy cm. Automated exposure control was utilized to minimize patient radiation dose. TECHNIQUE: CT images were acquired then reconstructed and displayed as axial, coronal and sagittal multiplanar reformatted images. The coverage included mid pelvis to the proximal femur. FINDINGS: BONE: The bones all have normal configuration. No evidence of acute fracture or aggressive bony lesion. JOINTS: Normal alignment of the right hip. Mild to moderate degenerative changes. Trace hip joint effusion.  Subchondral sclerosis at the right femoral head, which could represent

## 2021-09-19 LAB
EKG P AXIS: 76 DEGREES
EKG P AXIS: NORMAL DEGREES
EKG P-R INTERVAL: 166 MS
EKG P-R INTERVAL: NORMAL MS
EKG Q-T INTERVAL: 292 MS
EKG Q-T INTERVAL: 382 MS
EKG QRS DURATION: 74 MS
EKG QRS DURATION: 78 MS
EKG QTC CALCULATION (BAZETT): 427 MS
EKG QTC CALCULATION (BAZETT): 444 MS
EKG T AXIS: 81 DEGREES
EKG T AXIS: 82 DEGREES

## 2021-09-19 PROCEDURE — 93010 ELECTROCARDIOGRAM REPORT: CPT | Performed by: INTERNAL MEDICINE

## 2021-09-21 ENCOUNTER — TELEPHONE (OUTPATIENT)
Dept: PRIMARY CARE CLINIC | Age: 86
End: 2021-09-21

## 2021-09-21 NOTE — TELEPHONE ENCOUNTER
[facility] Transitions Initial Follow Up Call    Outreach made within 2 business days of discharge: Yes    Patient: Lily Nicole Patient : 1935   MRN: [id number]  Reason for Admission: Atrial fibrillation Discharge Date: 21       Spoke with: Patient    Discharge department/facility: [address] [place] [address] Interactive Patient Contact:  Was patient able to fill all prescriptions: Yes  Was patient instructed to bring all medications to the follow-up visit: Yes  Is patient taking all medications as directed in the discharge summary?  Yes  Does patient understand their discharge instructions: Yes  Does patient have questions or concerns that need addressed prior to 7-14 day follow up office visit: no    Scheduled appointment with PCP within 7-14 days    Follow Up  Future Appointments   Date Time Provider Jose Armando Milner   2021 10:00 AM EARLE Blackwood [facility]   2021 10:30 AM [facility]   2021 10:30 AM EARLE Hilliard - FARAZ YATES Deyanira Never [facility]   10/4/2021 11:00 AM EARLE Maynard Cardio [facility]       Brant White

## 2021-09-27 ENCOUNTER — TELEPHONE (OUTPATIENT)
Dept: NEUROSURGERY | Age: 86
End: 2021-09-27

## 2021-09-27 NOTE — TELEPHONE ENCOUNTER
Spoke with Mrs. Schmidt to let her know that Tamara Pickett and Amalia Samayoa has wanted our office to request MRI Lumbar Spine from [address] [place] [address]. Informed patient that we haven't received it and if she could go by Orthopedic and  the disc. Patient states she isn't sure if she can. I told patient I would check with the provider to see if if is okay to come in for visit and call her back. Patient voiced understanding.

## 2021-09-28 ENCOUNTER — OFFICE VISIT (OUTPATIENT)
Dept: NEUROSURGERY | Age: 86
End: 2021-09-28
Payer: MEDICARE

## 2021-09-28 VITALS
WEIGHT: 145 LBS | BODY MASS INDEX: 25.69 KG/M2 | DIASTOLIC BLOOD PRESSURE: 59 MMHG | SYSTOLIC BLOOD PRESSURE: 150 MMHG | TEMPERATURE: 96.9 F | HEIGHT: 63 IN | HEART RATE: 61 BPM | OXYGEN SATURATION: 99 %

## 2021-09-28 DIAGNOSIS — M79.604 RIGHT LEG PAIN: ICD-10-CM

## 2021-09-28 DIAGNOSIS — G62.9 NEUROPATHY: ICD-10-CM

## 2021-09-28 DIAGNOSIS — R10.31 RIGHT GROIN PAIN: ICD-10-CM

## 2021-09-28 DIAGNOSIS — R20.0 NUMBNESS AND TINGLING OF BOTH FEET: ICD-10-CM

## 2021-09-28 DIAGNOSIS — R20.2 NUMBNESS AND TINGLING OF BOTH FEET: ICD-10-CM

## 2021-09-28 DIAGNOSIS — D32.9 MENINGIOMA (HCC): Primary | ICD-10-CM

## 2021-09-28 DIAGNOSIS — M25.551 RIGHT HIP PAIN: ICD-10-CM

## 2021-09-28 PROCEDURE — 1111F DSCHRG MED/CURRENT MED MERGE: CPT | Performed by: NURSE PRACTITIONER

## 2021-09-28 PROCEDURE — G8427 DOCREV CUR MEDS BY ELIG CLIN: HCPCS | Performed by: NURSE PRACTITIONER

## 2021-09-28 PROCEDURE — 1123F ACP DISCUSS/DSCN MKR DOCD: CPT | Performed by: NURSE PRACTITIONER

## 2021-09-28 PROCEDURE — G8417 CALC BMI ABV UP PARAM F/U: HCPCS | Performed by: NURSE PRACTITIONER

## 2021-09-28 PROCEDURE — 1036F TOBACCO NON-USER: CPT | Performed by: NURSE PRACTITIONER

## 2021-09-28 PROCEDURE — G8399 PT W/DXA RESULTS DOCUMENT: HCPCS | Performed by: NURSE PRACTITIONER

## 2021-09-28 PROCEDURE — 1090F PRES/ABSN URINE INCON ASSESS: CPT | Performed by: NURSE PRACTITIONER

## 2021-09-28 PROCEDURE — 4040F PNEUMOC VAC/ADMIN/RCVD: CPT | Performed by: NURSE PRACTITIONER

## 2021-09-28 PROCEDURE — 99213 OFFICE O/P EST LOW 20 MIN: CPT | Performed by: NURSE PRACTITIONER

## 2021-09-28 RX ORDER — DULOXETIN HYDROCHLORIDE 30 MG/1
30 CAPSULE, DELAYED RELEASE ORAL DAILY
Qty: 30 CAPSULE | Refills: 3 | Status: SHIPPED | OUTPATIENT
Start: 2021-09-28 | End: 2022-03-03

## 2021-09-28 NOTE — PROGRESS NOTES
[facility] Neurology Office Note      Patient:   Nicky Alex  MR#:    [id number]  Account Number:                         YOB: 1935  Date of Evaluation:  9/28/2021  Time of Note:                          10:25 AM  Primary/Referring Physician:  Juliana Garcia, APRN - CNP   Consulting Physician:  Ingrid Mensah DNP, APRN    FOLLOW UP VISIT    Chief Complaint   Patient presents with    Follow-up     pt states things are about the same    Numbness       HISTORY OF PRESENT ILLNESS    Nicky Alex is a 82y.o. year old female here for follow up of back pain, leg pain and neuropathy. Largely unchanged from last visit. She has seen Dr. Krissy Tracey, s/p injections which have been helpful for back pain. Still noting quite a bit of right hip/groin pain with radiation of pain into the knee. Noting muscle spasms as well in the right leg. Noting side effects with Tizanidine, she takes this intermittently. She denies left hip or leg pain. Notes weakness in BLE, using a wheelchair more so now. Pain worsens with walking, only able to take several steps at a time, using a walker. Neuropathy symptoms unchanged. Off Gabapentin related to rash. Was started on Lyrica but d/c'd while in the hospital for new onset Afib. On Xarelto now as well. On Cymbalta 20mg daily as well. S/p right L4/5 decompressive laminectomy and L5 kyphoplasty. [facility] Has [facility] and Dr. Chadd Rogers for hip pain, no clear source per patient. She follows with Dr. Harding for skin rash. Pain began after cellulitis in January 2019. Has seen vascular for abnormal ABIs, compression stockings and elevation recommended. She has a prior history stomach cancer about 10 years ago, treated with chemotherapy. She denies vitamin B12 deficiency or diabetes history. No other complaints.      Past Medical History:   Diagnosis Date    Afib [facility]) 2021    Bronchitis     Colon cancer (HCC)     Colon polyps     Constipation     Deep vein blood clot of left lower extremity (HCC)     Left leg     Depression     DVT (deep venous thrombosis) (HCC)     Dysphagia     Fibrocystic breast disease     Fibromyalgia     GERD (gastroesophageal reflux disease)     reflux with hx of esophageal stricture    Headache(784.0)     History of colon cancer     Hormone replacement therapy (postmenopausal)     Hypertension     Neuropathy of foot     In both feet    Osteoarthritis     left knee pain    Restless legs syndrome     Vitamin D deficiency        Past Surgical History:   Procedure Laterality Date    APPENDECTOMY      BREAST BIOPSY      CATARACT REMOVAL       SECTION      CHOLECYSTECTOMY      COLECTOMY  partial    COLON SURGERY      COLONOSCOPY  2010    COLONOSCOPY  2012    Dr Viridiana Hernandez: unremarkable enterocolonic anastamosis; hyperplastic polyps    COLONOSCOPY  [facility]    [facility]: unremarkable post-surgical colon    COLONOSCOPY  2016    Dr Sparks Days colon anastomosis, 5 yr recall    COLONOSCOPY N/A 2021    Dr Kline and patent anastomosis in the right side-BCM, prn (age)    COLONOSCOPY  2021    Dr Kline and patent anastomosis in the right side-BCM, prn (age)   [facility] FOOT SURGERY  right foot    HAND SURGERY Right     Dr Twan Garcia N/A 2020    KYPHOPLASTY L5 performed by Prabha Yanez DO at [address] [facility] Right 2020    RIGHT L 4-5 DECOMPRESSIVE LAMINECTOMY performed by Prabha Yanez DO at [address] [facility] [address]      UPPER GASTROINTESTINAL ENDOSCOPY  10/16/2009    UPPER GASTROINTESTINAL ENDOSCOPY  2013    [facility]: peptic stricture dilated to 48F; HH; small antral mucosal elevation    UPPER GASTROINTESTINAL ENDOSCOPY  2014    [facility]: dilation of esophageal stricture    VARICOSE VEIN SURGERY Left 2014  [facility] & [address]    Left GSV Ablation    VARICOSE VEIN SURGERY Right 04/04/2014  [facility]    Right GSV Ablation       Family History   Problem Relation Age of Onset    Cancer Mother         Cervical Cancer    Cancer Brother         Esophageal cancer    Diabetes Brother     Esophageal Cancer Brother     Diabetes Sister     Colon Cancer Neg Hx     Colon Polyps Neg Hx     Liver Cancer Neg Hx     Liver Disease Neg Hx     Rectal Cancer Neg Hx     Stomach Cancer Neg Hx        Social History     Socioeconomic History    Marital status:      Spouse name: Not on file    Number of children: Not on file    Years of education: Not on file    Highest education level: Not on file   Occupational History    Not on file   Tobacco Use    Smoking status: Never Smoker    Smokeless tobacco: Never Used   Vaping Use    Vaping Use: Never used   Substance and Sexual Activity    Alcohol use: No    Drug use: No    Sexual activity: Yes     Partners: Male   Other Topics Concern    Not on file   Social History Narrative    Not on file     Social Determinants of Health     Financial Resource Strain:     Difficulty of Paying Living Expenses:    Food Insecurity:     Worried About Running Out of Food in the Last Year:     Ran Out of Food in the Last Year:    Transportation Needs:     Lack of Transportation (Medical):      Lack of Transportation (Non-Medical):    Physical Activity:     Days of Exercise per Week:     Minutes of Exercise per Session:    Stress:     Feeling of Stress :    Social Connections:     Frequency of Communication with Friends and Family:     Frequency of Social Gatherings with Friends and Family:     Attends Gnosticist Services:     Active Member of Clubs or Organizations:     Attends Club or Organization Meetings:     Marital Status:    Intimate Partner Violence:     Fear of Current or Ex-Partner:     Emotionally Abused:     Physically Abused:     Sexually Abused:        Current Outpatient Medications   Medication Sig Dispense Refill    DULoxetine (CYMBALTA) 30 MG extended release capsule Take 1 capsule by mouth daily 30 capsule 3    metoprolol succinate (TOPROL XL) 50 MG extended release tablet Take 1 tablet by mouth daily 30 tablet 0    dilTIAZem (CARDIZEM CD) 120 MG extended release capsule Take 1 capsule by mouth daily 30 capsule 0    XARELTO 20 MG TABS tablet TAKE 1 TABLET BY MOUTH DAILY WITH BREAKFAST 30 tablet 3    triamcinolone (KENALOG) 0.1 % cream APPLY TWICE DAILY TO RASH UP TO 2 WEEKS/MONTH AS NEEDED 60 g 2    nystatin (MYCOSTATIN) 703071 UNIT/GM powder For rash under breast. Apply 3 times daily. 1 Bottle 1    vitamin D (ERGOCALCIFEROL) 1.25 MG (28746 UT) CAPS capsule TAKE 1 CAPSULE BY MOUTH 1 TIME A WEEK 12 capsule 1    Cyanocobalamin (B-12) 500 MCG TABS TAKE 1 TABLET BY MOUTH DAILY 30 tablet 11    triamterene-hydroCHLOROthiazide (MAXZIDE-25) 37.5-25 MG per tablet TAKE 1 TABLET BY MOUTH DAILY 90 tablet 3    tiZANidine (ZANAFLEX) 4 MG tablet TAKE 1 TABLET BY MOUTH THREE TIMES DAILY AS NEEDED. NOT TO EXCEED 3 DOSES IN 24 HOURS      losartan (COZAAR) 100 MG tablet Take 1 tablet by mouth daily 90 tablet 3    omeprazole (PRILOSEC) 40 MG delayed release capsule Take 1 capsule by mouth daily 90 capsule 3    HYDROcodone-acetaminophen (NORCO)  MG per tablet Take 1 tablet by mouth every 6 hours as needed for Pain. [address] by Does not apply route 1 each 0    acyclovir (ZOVIRAX) 400 MG tablet Take 400 mg by mouth 2 times daily history of shingles in her eye      albuterol (ACCUNEB) 1.25 MG/3ML nebulizer solution Inhale 1 mL into the lungs every 6 hours as needed for Wheezing       furosemide (LASIX) 20 MG tablet Take 1 tablet by mouth daily as needed (swelling) 30 tablet 3    pregabalin (LYRICA) 100 MG capsule 2 times daily. (Patient not taking: Reported on 9/28/2021)       No current facility-administered medications for this visit.        Allergies   Allergen Reactions    Zoloft [Sertraline Hcl] Other (See Comments)     Patient states that she doesn't want this medication anymore because she hallucinated and saw spiders. Patient weaned herself off and after she quit taking the medication, the hallucinations stopped. Patient states that she doesn't want this medication anymore because she hallucinated and saw spiders. Patient weaned herself off and after she quit taking the medication, the hallucinations stopped.  Gabapentin Rash     Pt weaning off due to rash and hot flashes       REVIEW OF SYSTEMS  Constitutional: []? Fever []? Sweats []? Chills []? Recent Injury [x]? Denies all unless marked  HEENT:[]? Headache  []? Head Injury []? Hearing Loss  []? Sore Throat  []? Ear Ache [x]? Denies all unless marked  Spine:  []? Neck pain  []? Back pain  []? Sciaticia  [x]? Denies all unless marked  Cardiovascular:[]? Heart Disease []? Palpitations []? Chest Pain   [x]? Denies all unless marked  Pulmonary: []? Shortness of Breath []? Cough   [x]? Denies all unless marked  Psychiatric/Behavioral:[]? Depression []? Anxiety [x]? Denies all unless marked  Gastrointestinal: []? Nausea  []? Vomiting  []? Abdominal Pain  []? Constipation  []? Diarrhea  [x]? Denies all unless marked  Genitourinary:   []? Frequency  []? Urgency  []? Dysuria []? Incontinence  [x]? Denies all unless marked  Extremities: []? Pain  []? Swelling  [x]? Denies all unless marked  Musculoskeletal: []? Myalgias  []? Joint Pain  []? Arthritis []? Muscle Cramps []? Muscle Twitches  [x]? Denies all unless marked  Sleep: []? Insomnia[]? Snoring []? Restless Legs  []? Sleep Apnea  []? Daytime Sleepiness  [x]? Denies all unless marked  Skin:[]? Rash []? Color Change [x]? Denies all unless marked   Neurological:[]? Visual Disturbance []? Memory Loss []? Loss of Balance []? Slurred Speech []? Weakness []? Seizures  []? Dizziness [x]? Denies all unless marked    The MA has completed the ROS with the patient.  I have reviewed it in its' entirety with the patient and agree with the documentation. PHYSICAL EXAM  BP (!) 150/59   Pulse 61   Temp 96.9 °F (36.1 °C)   Ht 5' 3\" (1.6 m)   Wt 145 lb (65.8 kg)   SpO2 99%   BMI 25.69 kg/m²       Constitutional - No acute distress    HEENT- Conjunctiva normal.  No scars, masses, or lesions over external nose or ears, no neck masses noted, no jugular vein distension, no bruit  Cardiac- Regular rate and rhythm  Pulmonary- Good expansion, normal effort without use of accessory muscles  Musculoskeletal - No significant wasting of muscles noted, no bony deformities  Extremities - No clubbing, cyanosis or edema  Skin - Warm, dry, and intact. No rash, erythema, or pallor  Psychiatric - Mood, affect, and behavior appear normal      NEUROLOGICAL EXAM     Mental status   [x]Awake, alert, oriented   [x]Affect attention and concentration appear appropriate  [x]Recent and remote memory appears unremarkable  [x]Speech normal without dysarthria or aphasia, comprehension and repetition intact. COMMENTS: mild cognitive impairment     Cranial Nerves [x]No VF deficit to confrontation,  no papilledema on fundoscopic exam.  [x]PERRLA, EOMI, no nystagmus, conjugate eye movements, no ptosis  [x]Face symmetric  [x]Facial sensation intact  [x]Tongue midline no atrophy or fasciculations present  [x]Palate midline, hearing to finger rub normal bilaterally  [x]Shoulder shrug and SCM testing normal bilaterally  COMMENTS:    Motor   []5/5 strength x 4 extremities  [x]Normal bulk and tone  [x]No tremor present  [x]No rigidity or bradykinesia noted  COMMENTS: 2/5 RLE, pain limiting; 4/5 LLE     Sensory  []Sensation intact to light touch, pin prick, vibration, and proprioception BLE  [x]Sensation intact to light touch, pin prick, vibration, and proprioception BUE  COMMENTS: decreased PP, vibration BLE    Coordination [x]FTN normal bilaterally   [x]HTS normal bilaterally  [x]GRICELDA normal bilaterally.    COMMENTS:   Reflexes  []Symmetric and non-pathological  [x]Toes down going L5. There is no involvement of the posterior elements or    compromise of the spinal canal.    3. Central and foraminal stenosis at multiple levels related to    bulging of the disc and disc protrusions combined with facet and    ligamentous hypertrophy and endplate spurring. .    4. I spoke with Fani Fox in the emergency room at 3:40 PM    concerning the finding of an acute superior endplate fracture at L5. Signed by Dr Lovette Schwab on 6/14/2020 3:43 PM      MRI lumbar spine (6/2020)-   Impression    1.  Acute L5 compression fracture with involvement of the anterior and    posterior vertebral body but no evidence of posterior element    involvement. Approximate 15% height loss. No change in appearance    compared to a CT from 6/14/2020. No other fracture identified. 2.  Severe multilevel degenerative change, as described above. Notably, there is severe central canal stenosis at L4-L5 and severe    LEFT neural foraminal stenosis at L5-S1. 3.  Tiny 3 mm enhancing nodule located along one of the nerve roots of    the cauda equina. 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 Recommend MRI of the brain to exclude CNS neoplasm. Signed by Dr Marissa Ann on 6/25/2020 1:06 PM      X-ray hips (8/2020)-   Impression    Impression:    1. No visualized fracture or malalignment. Bilateral mild hip joint    osteoarthritis. Signed by Dr Khushi Car on 8/20/2020 1:18 PM         NCS/EMG (9/2020)- mild to moderate, axonal, sensorimotor polyneuropathy. Left peroneal motor abnormalities felt technical- superimposed peroneal neuropathy or lumbosacral radiculopathy not excluded but abnormalities are opposite of the most symptomatic side which would suggest more of a technical source. Unable to perform needle portion of the study due to BLE cellulitis.      MRI brain (10/2020)-   Impression    1. 1 cm extra-axial enhancing mass along the anterior right tentorium    most compatible with a small meningioma. 2. Moderate chronic white matter ischemic changes. 3. Otherwise negative MR imaging of the brain    Signed by Dr Evelio Cooley on 10/5/2020 10:26 AM      MRI lumbar spine (3/2021)- muli level DDD; significant bilateral foraminal narrowing at L4/5. At L5/S1, left extra foraminal and foraminal disc osteophyte complex which contacts the left L5 nerve root causing mass effect on it, significant NF narrowing at L5/S1. Thecal sac narrowing at L2/3 and L3/4 with severe foraminal narrowing on the right at L3/4 and moderate foraminal narrowing at L2/3. Reviewed neurosurgery records     ASSESSMENT:    Jahaira Canchola is a 82y.o. year old female here for follow up of neuropathy, back pain, right hip/groin pain. Back pain has improved s/p injections with Dr. Yonis Jalloh. Still noting quite a bit of right hip and groin pain. Exam is largely unchanged. She had decompressive laminectomy in 2020 with improvement in back pain initially. Recent MRI lumbar spine with significant DDD and several levels of NF narrowing. MRI brain with small meningioma and . NCS with moderate sensorimotor polyneuropathy. 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 Off Lyrica s/p hospitalization for new onset atrial fibrillation. Previously weaned Gabapentin due to rash. Will increase Cymbalta today. Suspect that pain and gait changes are multi factorial with neuropathy, lower back pain, arthritis all playing a role. ICD-10-CM    1. Meningioma ([facility] [address]  D32.9 MRI BRAIN W WO CONTRAST   2. Right hip pain  M25.551    3. Right groin pain  R10.31    4. Right leg pain  M79.604    5. Neuropathy  G62.9    6. Numbness and tingling of both feet  R20.0     R20.2      PLAN:  1. Increase Cymbalta to 30mg daily   2. Continue follow up with pain management as previously scheduled   3. Repeat MRI brain to re-assess meningioma (10/2021)   4.  Continue follow up with cardiology regarding new onset atrial fibrillation.    5. Follow up in 6 months, sooner with any worsening     Emily Salazar, JAYLIN, APRN

## 2021-09-29 ENCOUNTER — OFFICE VISIT (OUTPATIENT)
Dept: PRIMARY CARE CLINIC | Age: 86
End: 2021-09-29
Payer: MEDICARE

## 2021-09-29 VITALS
BODY MASS INDEX: 25.37 KG/M2 | TEMPERATURE: 97.9 F | RESPIRATION RATE: 16 BRPM | SYSTOLIC BLOOD PRESSURE: 148 MMHG | OXYGEN SATURATION: 96 % | HEART RATE: 78 BPM | WEIGHT: 143.2 LBS | DIASTOLIC BLOOD PRESSURE: 60 MMHG

## 2021-09-29 DIAGNOSIS — Z23 NEED FOR INFLUENZA VACCINATION: ICD-10-CM

## 2021-09-29 DIAGNOSIS — C18.9 MALIGNANT NEOPLASM OF COLON, UNSPECIFIED PART OF COLON (HCC): ICD-10-CM

## 2021-09-29 DIAGNOSIS — D50.9 IRON DEFICIENCY ANEMIA, UNSPECIFIED IRON DEFICIENCY ANEMIA TYPE: ICD-10-CM

## 2021-09-29 DIAGNOSIS — I48.91 ATRIAL FIBRILLATION, UNSPECIFIED TYPE (HCC): Primary | ICD-10-CM

## 2021-09-29 DIAGNOSIS — Z95.828 PORT-A-CATH IN PLACE: ICD-10-CM

## 2021-09-29 DIAGNOSIS — I10 ESSENTIAL HYPERTENSION: ICD-10-CM

## 2021-09-29 DIAGNOSIS — Z45.2 ENCOUNTER FOR CARE RELATED TO PORT-A-CATH: ICD-10-CM

## 2021-09-29 DIAGNOSIS — M25.551 RIGHT HIP PAIN: ICD-10-CM

## 2021-09-29 DIAGNOSIS — N18.32 STAGE 3B CHRONIC KIDNEY DISEASE (HCC): ICD-10-CM

## 2021-09-29 LAB
ALBUMIN SERPL-MCNC: 3.8 G/DL (ref 3.5–5.2)
ALP BLD-CCNC: 106 U/L (ref 35–104)
ALT SERPL-CCNC: 13 U/L (ref 5–33)
ANION GAP SERPL CALCULATED.3IONS-SCNC: 12 MMOL/L (ref 7–19)
AST SERPL-CCNC: 12 U/L (ref 5–32)
BILIRUB SERPL-MCNC: <0.2 MG/DL (ref 0.2–1.2)
BUN BLDV-MCNC: 17 MG/DL (ref 8–23)
CALCIUM SERPL-MCNC: 9.3 MG/DL (ref 8.8–10.2)
CEA: 3.3 NG/ML (ref 0–4.7)
CHLORIDE BLD-SCNC: 103 MMOL/L (ref 98–111)
CO2: 22 MMOL/L (ref 22–29)
CREAT SERPL-MCNC: 0.8 MG/DL (ref 0.5–0.9)
GFR AFRICAN AMERICAN: >59
GFR NON-AFRICAN AMERICAN: >60
GLUCOSE BLD-MCNC: 127 MG/DL (ref 74–109)
HCT VFR BLD CALC: 35.1 % (ref 37–47)
HEMOGLOBIN: 10.3 G/DL (ref 12–16)
IRON SATURATION: 8 % (ref 14–50)
IRON: 33 UG/DL (ref 37–145)
MCH RBC QN AUTO: 24.5 PG (ref 27–31)
MCHC RBC AUTO-ENTMCNC: 29.3 G/DL (ref 33–37)
MCV RBC AUTO: 83.4 FL (ref 81–99)
PDW BLD-RTO: 15.2 % (ref 11.5–14.5)
PLATELET # BLD: 443 K/UL (ref 130–400)
PMV BLD AUTO: 10.3 FL (ref 9.4–12.3)
POTASSIUM SERPL-SCNC: 3.8 MMOL/L (ref 3.5–5)
RBC # BLD: 4.21 M/UL (ref 4.2–5.4)
SODIUM BLD-SCNC: 137 MMOL/L (ref 136–145)
TOTAL IRON BINDING CAPACITY: 408 UG/DL (ref 250–400)
TOTAL PROTEIN: 6.2 G/DL (ref 6.6–8.7)
WBC # BLD: 9.3 K/UL (ref 4.8–10.8)

## 2021-09-29 PROCEDURE — 1111F DSCHRG MED/CURRENT MED MERGE: CPT | Performed by: NURSE PRACTITIONER

## 2021-09-29 PROCEDURE — G0008 ADMIN INFLUENZA VIRUS VAC: HCPCS | Performed by: NURSE PRACTITIONER

## 2021-09-29 PROCEDURE — 90694 VACC AIIV4 NO PRSRV 0.5ML IM: CPT | Performed by: NURSE PRACTITIONER

## 2021-09-29 PROCEDURE — 99495 TRANSJ CARE MGMT MOD F2F 14D: CPT | Performed by: NURSE PRACTITIONER

## 2021-09-29 PROCEDURE — 96372 THER/PROPH/DIAG INJ SC/IM: CPT | Performed by: NURSE PRACTITIONER

## 2021-09-29 RX ORDER — KETOROLAC TROMETHAMINE 30 MG/ML
60 INJECTION, SOLUTION INTRAMUSCULAR; INTRAVENOUS ONCE
Status: COMPLETED | OUTPATIENT
Start: 2021-09-29 | End: 2021-09-29

## 2021-09-29 RX ADMIN — KETOROLAC TROMETHAMINE 60 MG: 30 INJECTION, SOLUTION INTRAMUSCULAR; INTRAVENOUS at 10:57

## 2021-09-29 NOTE — PATIENT INSTRUCTIONS
Labs today for anemia. Look for things high in protein and try protein bars and shakes  Continue with pain management  Keep heart appt.

## 2021-09-29 NOTE — PROGRESS NOTES
Post-Discharge Transitional Care Management Services or Hospital Follow Up      Kelly Abdalla   YOB: 1935    Date of Office Visit:  9/29/2021  Date of Hospital Admission: 9/16/21  Date of Hospital Discharge: 9/17/21  Readmission Risk Score(high >=14%.  Medium >=10%):Readmission Risk Score: 24      Care management risk score Rising risk (score 2-5) and Complex Care (Scores >=6): 5     Non face to face  following discharge, date last encounter closed (first attempt may have been earlier): 9/21/2021  3:40 PM 9/21/2021  3:40 PM    Call initiated 2 business days of discharge: Yes     Patient Active Problem List   Diagnosis    Personal history of colon cancer    Family history of esophageal cancer    Fibromyalgia    Renal bruit    Varicose veins of left lower extremity with inflammation, 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 ([address]    Venous insufficiency    Hypertriglyceridemia    Dysphagia    Paroxysmal supraventricular tachycardia ([address]    Vitamin D deficiency    Encounter for care related to Port-a-Cath    Essential hypertension    History of colon polyps    Port-A-Cath in place    History of DVT (deep vein thrombosis)    Avascular necrosis (HCC)    Pain in both lower extremities    Numbness and tingling of both feet    Acute deep vein thrombosis (DVT) of femoral vein of left lower extremity (HCC)    Cellulitis of left lower limb    Fracture of right foot    Hypochloremia    CKD (chronic kidney disease) stage 3, GFR 30-59 ml/min (HCC)    Malignant neoplasm of colon ([address]    Mixed simple and mucopurulent chronic bronchitis ([address]    Fungal dermatitis    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    Depression    History of esophageal stricture    Atrial fibrillation with RVR (HCC)       Allergies   Allergen Reactions    Zoloft [Sertraline Hcl] Other (See Comments)     Patient states that she doesn't want this medication anymore because she hallucinated and saw spiders. Patient weaned herself off and after she quit taking the medication, the hallucinations stopped. Patient states that she doesn't want this medication anymore because she hallucinated and saw spiders. Patient weaned herself off and after she quit taking the medication, the hallucinations stopped.  Gabapentin Rash     Pt weaning off due to rash and hot flashes       Medications listed as ordered at the time of discharge from hospital   [place], [address] Medication Instructions HALEL:    Printed on:09/29/21 3640   Medication Information                      acyclovir (ZOVIRAX) 400 MG tablet  Take 400 mg by mouth 2 times daily history of shingles in her eye             albuterol (ACCUNEB) 1.25 MG/3ML nebulizer solution  Inhale 1 mL into the lungs every 6 hours as needed for Wheezing              Cyanocobalamin (B-12) 500 MCG TABS  TAKE 1 TABLET BY MOUTH DAILY             dilTIAZem (CARDIZEM CD) 120 MG extended release capsule  Take 1 capsule by mouth daily             DULoxetine (CYMBALTA) 30 MG extended release capsule  Take 1 capsule by mouth daily             furosemide (LASIX) 20 MG tablet  Take 1 tablet by mouth daily as needed (swelling)             Hospital Bed MISC  by Does not apply route             HYDROcodone-acetaminophen (NORCO)  MG per tablet  Take 1 tablet by mouth every 6 hours as needed for Pain.              losartan (COZAAR) 100 MG tablet  Take 1 tablet by mouth daily             metoprolol succinate (TOPROL XL) 50 MG extended release tablet  Take 1 tablet by mouth daily             nystatin (MYCOSTATIN) 402724 UNIT/GM powder  For rash under breast. Apply 3 times daily. omeprazole (PRILOSEC) 40 MG delayed release capsule  Take 1 capsule by mouth daily             tiZANidine (ZANAFLEX) 4 MG tablet  TAKE 1 TABLET BY MOUTH THREE TIMES DAILY AS NEEDED.  NOT TO EXCEED 3 DOSES IN 24 HOURS             triamcinolone (KENALOG) 0.1 % cream  APPLY TWICE DAILY TO RASH UP TO 2 WEEKS/MONTH AS NEEDED             triamterene-hydroCHLOROthiazide (MAXZIDE-25) 37.5-25 MG per tablet  TAKE 1 TABLET BY MOUTH DAILY             vitamin D (ERGOCALCIFEROL) 1.25 MG (05896 UT) CAPS capsule  TAKE 1 CAPSULE BY MOUTH 1 TIME A WEEK             XARELTO 20 MG TABS tablet  TAKE 1 TABLET BY MOUTH DAILY WITH BREAKFAST                   Medications marked \"taking\" at this time  Outpatient Medications Marked as Taking for the 9/29/21 encounter (Office Visit) with EARLE Lagunas - CNP   Medication Sig Dispense Refill    DULoxetine (CYMBALTA) 30 MG extended release capsule Take 1 capsule by mouth daily 30 capsule 3    metoprolol succinate (TOPROL XL) 50 MG extended release tablet Take 1 tablet by mouth daily 30 tablet 0    dilTIAZem (CARDIZEM CD) 120 MG extended release capsule Take 1 capsule by mouth daily 30 capsule 0    XARELTO 20 MG TABS tablet TAKE 1 TABLET BY MOUTH DAILY WITH BREAKFAST 30 tablet 3    triamcinolone (KENALOG) 0.1 % cream APPLY TWICE DAILY TO RASH UP TO 2 WEEKS/MONTH AS NEEDED 60 g 2    nystatin (MYCOSTATIN) 221507 UNIT/GM powder For rash under breast. Apply 3 times daily. 1 Bottle 1    vitamin D (ERGOCALCIFEROL) 1.25 MG (10959 UT) CAPS capsule TAKE 1 CAPSULE BY MOUTH 1 TIME A WEEK 12 capsule 1    Cyanocobalamin (B-12) 500 MCG TABS TAKE 1 TABLET BY MOUTH DAILY 30 tablet 11    triamterene-hydroCHLOROthiazide (MAXZIDE-25) 37.5-25 MG per tablet TAKE 1 TABLET BY MOUTH DAILY 90 tablet 3    tiZANidine (ZANAFLEX) 4 MG tablet TAKE 1 TABLET BY MOUTH THREE TIMES DAILY AS NEEDED. NOT TO EXCEED 3 DOSES IN 24 HOURS      losartan (COZAAR) 100 MG tablet Take 1 tablet by mouth daily 90 tablet 3    omeprazole (PRILOSEC) 40 MG delayed release capsule Take 1 capsule by mouth daily 90 capsule 3    HYDROcodone-acetaminophen (NORCO)  MG per tablet Take 1 tablet by mouth every 6 hours as needed for Pain.       [address] by Does not apply route 1 each 0    acyclovir (ZOVIRAX) 400 MG tablet Take 400 mg by mouth 2 times daily history of shingles in her eye      albuterol (ACCUNEB) 1.25 MG/3ML nebulizer solution Inhale 1 mL into the lungs every 6 hours as needed for Wheezing       furosemide (LASIX) 20 MG tablet Take 1 tablet by mouth daily as needed (swelling) 30 tablet 3        Medications patient taking as of now reconciled against medications ordered at time of hospital discharge: Yes    Chief Complaint   Patient presents with    Follow-up     here for port flush    Atrial Fibrillation     pt says she was in the hospital recently for A fib    Hip Pain     wants a shot for her hip pain       HPI he was admitted to the hospital to have a endoscopy but developed atrial fib during the preop and was admitted for the atrial fib. She was started on some medications orally that had converted her heart rate. She does have a follow-up appointment with cardiology next week. She was getting much stronger before she went in the hospital and this has been a slight setback for her. She did see neurology and they wanted her to have a repeat MRI scan of a hemangioma in her brain but she does not feel it is necessary. She is still seeing Dr. Migdalia Callejas for her chronic back and hip pain. She is having a lot of pain today and would like a Toradol shot while she is here. She is also here because it is time to flush her Port-A-Cath. Inpatient course: Discharge summary reviewed- see chart. Interval history/Current status: stable    Review of Systems    Vitals:    09/29/21 1030 09/29/21 1054   BP: (!) 180/70 (!) 148/60   Pulse: 78    Resp: 16    Temp: 97.9 °F (36.6 °C)    TempSrc: Temporal    SpO2: 96%    Weight: 143 lb 3.2 oz (65 kg)      Body mass index is 25.37 kg/m².    Wt Readings from Last 3 Encounters:   09/29/21 143 lb 3.2 oz (65 kg)   09/28/21 145 lb (65.8 kg)   09/16/21 145 lb (65.8 kg)     BP Readings from Last 3 Encounters:   09/29/21 (!) 148/60   09/28/21 (!) 150/59 every 8 weeks    4. Encounter for care related to Port-a-Cath      5. Right hip pain  Toradol shot today for the pain she will continue seeing Dr. Costa Ards pain management for this    6. Stage 3b chronic kidney disease ([facility]  CMP today looking at kidney function. 7. Iron deficiency anemia, unspecified iron deficiency anemia type  WBC 6.9  4.8 - 10.8 K/uL Final 09/17/2021  3:00 AM [facility]   RBC 3.71Low   4.20 - 5.40 M/uL Final 09/17/2021  3:00 AM [facility]   Hemoglobin 9.2Low   12.0 - 16.0 g/dL Final 09/17/2021  3:00 AM [facility]   Hematocrit 31.4Low   37.0 - 47.0 % Final 09/17/2021  3:00 AM [facility]   MCV 84.6  81.0 - 99.0 fL Final 09/17/2021  3:00 AM [facility]   [facility] 24.8Low   27.0 - 31.0 pg Final 09/17/2021  3:00 AM [facility]   MCHC 29.3Low   33.0 - 37.0 g/dL Final 09/17/2021  3:00 AM [facility]   RDW 14.7High   11.5 - 14.5 % Final 09/17/2021  3:00 AM [facility]   Platelets 172  636 - 400 K/uL Final 09/17/2021  3:00 AM [facility]   MPV 9.2Low   9.4 - 12.3 fL Final 09/17/2021  3:00 AM [facility]   Neutrophils % 54.0  50.0 - 65.0 % Final 09/17/2021  3:00 AM [facility]   Lymphocytes % 27.4  20.0 - 40.0 % Final 09/17/2021  3:00 AM [facility]   Monocytes % 11.4High   0.0 - 10.0 % Final 09/17/2021  3:00 AM [facility]   Eosinophils % 6.5High             - CBC  - Iron and TIBC    8. Atrial fibrillation, unspecified type ([facility]    - ME DISCHARGE MEDS RECONCILED W/ CURRENT OUTPATIENT MED LIST  - Comprehensive Metabolic Panel    9.  Malignant neoplasm of colon, unspecified part of colon ([facility]    - CEA        Medical Decision Making: moderate complexity

## 2021-10-04 ENCOUNTER — OFFICE VISIT (OUTPATIENT)
Dept: CARDIOLOGY CLINIC | Age: 86
End: 2021-10-04
Payer: MEDICARE

## 2021-10-04 VITALS
HEART RATE: 88 BPM | BODY MASS INDEX: 25.16 KG/M2 | OXYGEN SATURATION: 96 % | WEIGHT: 142 LBS | HEIGHT: 63 IN | DIASTOLIC BLOOD PRESSURE: 80 MMHG | SYSTOLIC BLOOD PRESSURE: 162 MMHG

## 2021-10-04 DIAGNOSIS — Z79.01 CHRONIC ANTICOAGULATION: ICD-10-CM

## 2021-10-04 DIAGNOSIS — I47.1 PAROXYSMAL SUPRAVENTRICULAR TACHYCARDIA (HCC): ICD-10-CM

## 2021-10-04 DIAGNOSIS — I10 ESSENTIAL HYPERTENSION: ICD-10-CM

## 2021-10-04 DIAGNOSIS — I48.0 PAF (PAROXYSMAL ATRIAL FIBRILLATION) (HCC): Primary | ICD-10-CM

## 2021-10-04 DIAGNOSIS — Z86.718 HISTORY OF DVT (DEEP VEIN THROMBOSIS): ICD-10-CM

## 2021-10-04 PROCEDURE — G8427 DOCREV CUR MEDS BY ELIG CLIN: HCPCS | Performed by: NURSE PRACTITIONER

## 2021-10-04 PROCEDURE — 93000 ELECTROCARDIOGRAM COMPLETE: CPT | Performed by: NURSE PRACTITIONER

## 2021-10-04 PROCEDURE — 1090F PRES/ABSN URINE INCON ASSESS: CPT | Performed by: NURSE PRACTITIONER

## 2021-10-04 PROCEDURE — 1111F DSCHRG MED/CURRENT MED MERGE: CPT | Performed by: NURSE PRACTITIONER

## 2021-10-04 PROCEDURE — 1036F TOBACCO NON-USER: CPT | Performed by: NURSE PRACTITIONER

## 2021-10-04 PROCEDURE — 99214 OFFICE O/P EST MOD 30 MIN: CPT | Performed by: NURSE PRACTITIONER

## 2021-10-04 PROCEDURE — G8399 PT W/DXA RESULTS DOCUMENT: HCPCS | Performed by: NURSE PRACTITIONER

## 2021-10-04 PROCEDURE — G8484 FLU IMMUNIZE NO ADMIN: HCPCS | Performed by: NURSE PRACTITIONER

## 2021-10-04 PROCEDURE — 4040F PNEUMOC VAC/ADMIN/RCVD: CPT | Performed by: NURSE PRACTITIONER

## 2021-10-04 PROCEDURE — 1123F ACP DISCUSS/DSCN MKR DOCD: CPT | Performed by: NURSE PRACTITIONER

## 2021-10-04 PROCEDURE — G8417 CALC BMI ABV UP PARAM F/U: HCPCS | Performed by: NURSE PRACTITIONER

## 2021-10-04 RX ORDER — METOPROLOL SUCCINATE 50 MG/1
75 TABLET, EXTENDED RELEASE ORAL DAILY
Qty: 45 TABLET | Refills: 3 | Status: SHIPPED | OUTPATIENT
Start: 2021-10-04 | End: 2021-10-25 | Stop reason: SDUPTHER

## 2021-10-04 NOTE — PROGRESS NOTES
[facility], [place]. [address] [address] [facility]  [phone] office  [phone] fax      OFFICE VISIT:  10/4/2021    Otto Norman - : 1935  Reason For Visit:  Aaron Stewart is a 82 y.o. female who is here for Follow-Up from Hospital (2 week hospital follow up for Afib. ) and Atrial Fibrillation    History:   Diagnosis Orders   1. Atrial fibrillation, unspecified type ([address]  EKG 12 lead   2. History of DVT (deep vein thrombosis)     3. Paroxysmal supraventricular tachycardia ([address]     4. Essential hypertension     5. Chronic anticoagulation      Xarelto     The patient presents today for cardiology hospital follow up. She presented to ED on 21 for AF. The patient had been off Xarelto prior 3 days in preparation for esophageal dilatation. However, pre-op EKG showed AF with RVR. The patient reported being without symptomatic. The patient continues on Xarelto with no bleeding issue. The patient denies symptoms to suggest myocardial ischemia, heart failure or sensed arrhythmia. BP is currently uncontrolled. The patient's PCP monitors cholesterol. The paeitn reports family hx of CAD in a brother. She is a non smoker. Subjective  Aaron Stewart denies exertional chest pain, shortness of breath, orthopnea, paroxysmal nocturnal dyspnea, syncope, presyncope, sensed arrhythmia, edema and fatigue. The patient denies numbness or weakness to suggest cerebrovascular accident or transient ischemic attack.       Otto Norman has the following history as recorded in [facility]:  Patient Active Problem List   Diagnosis Code    Personal history of colon cancer Z85.038    Family history of esophageal cancer Z80.0    Fibromyalgia M79.7    Renal bruit R09.89    Varicose veins of left lower extremity with inflammation, 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 ([address] I83.221, L97.121    Venous insufficiency I87.2    Hypertriglyceridemia E78.1    Dysphagia R13.10    Paroxysmal supraventricular tachycardia (HCC) I47.1    Vitamin D deficiency E55.9    Encounter for care related to Port-a-Cath Z45.2    Essential hypertension I10    History of colon polyps Z86.010    Port-A-Cath in place Z95.828    History of DVT (deep vein thrombosis) Z86.718    Avascular necrosis (HCC) M87.00    Pain in both lower extremities M79.604, M79.605    Numbness and tingling of both feet R20.0, R20.2    Acute deep vein thrombosis (DVT) of femoral vein of left lower extremity (HCC) I82.412    Cellulitis of left lower limb L03. 116    Fracture of right foot S92.901A    Hypochloremia E87.8    CKD (chronic kidney disease) stage 3, GFR 30-59 ml/min (HCC) N18.30    Malignant neoplasm of colon (HCC) C18.9    Mixed simple and mucopurulent chronic bronchitis (HCC) J41.8    Fungal dermatitis B36.9    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 I83.11, I83.12    Depression F32. A    History of esophageal stricture Z87.19    Atrial fibrillation with RVR (HCC) I48.91     Past Medical History:   Diagnosis Date    Afib ([facility]     Bronchitis     Colon cancer ([facility]     Colon polyps     Constipation     Deep vein blood clot of left lower extremity (HCC)     Left leg     Depression     DVT (deep venous thrombosis) (HCC)     Dysphagia     Fibrocystic breast disease     Fibromyalgia     GERD (gastroesophageal reflux disease)     reflux with hx of esophageal stricture    Headache(784.0)     History of colon cancer     Hormone replacement therapy (postmenopausal)     Hypertension     Neuropathy of foot     In both feet    Osteoarthritis     left knee pain    Restless legs syndrome     Vitamin D deficiency      Past Surgical History:   Procedure Laterality Date    APPENDECTOMY      BREAST BIOPSY      CATARACT REMOVAL       SECTION      CHOLECYSTECTOMY      COLECTOMY  partial    COLON SURGERY      COLONOSCOPY 12/14/2010    COLONOSCOPY  02/27/2012    Dr Mark Da Silva: unremarkable enterocolonic anastamosis; hyperplastic polyps    COLONOSCOPY  03/01/2013    [place]: unremarkable post-surgical colon    COLONOSCOPY  03/11/2016    Dr Vaibhav Renae colon anastomosis, 5 yr recall    COLONOSCOPY N/A 03/29/2021    Dr Sofia Cohen and patent anastomosis in the right side-BCM, prn (age)   Kacie Spurling COLONOSCOPY  03/29/2021    Dr Sofia Cohen and patent anastomosis in the right side-BCM, prn (age)   Kacie Spurling FOOT SURGERY  right foot    HAND SURGERY Right     Dr Nish Velázquez N/A 07/06/2020    KYPHOPLASTY L5 performed by Cheryl Harrell DO at [address]  [place] Right 12/14/2020    RIGHT L 4-5 DECOMPRESSIVE LAMINECTOMY performed by Cheryl Harrell DO at [address] [facility] TOTAL KNEE ARTHROPLASTY      UPPER GASTROINTESTINAL ENDOSCOPY  10/16/2009    UPPER GASTROINTESTINAL ENDOSCOPY  03/01/2013    Anshulselvin: peptic stricture dilated to 48F; HH; small antral mucosal elevation    UPPER GASTROINTESTINAL ENDOSCOPY  12/01/2014    Maurilio: dilation of esophageal stricture    VARICOSE VEIN SURGERY Left 03/14/2014  [facility]    Left GSV Ablation    VARICOSE VEIN SURGERY Right 04/04/2014  [facility]    Right GSV Ablation     Family History   Problem Relation Age of Onset    Cancer Mother         Cervical Cancer    Cancer Brother         Esophageal cancer    Diabetes Brother     Esophageal Cancer Brother     Diabetes Sister     Colon Cancer Neg Hx     Colon Polyps Neg Hx     Liver Cancer Neg Hx     Liver Disease Neg Hx     Rectal Cancer Neg Hx     Stomach Cancer Neg Hx      Social History     Tobacco Use    Smoking status: Never Smoker    Smokeless tobacco: Never Used   Substance Use Topics    Alcohol use: No      Current Outpatient Medications   Medication Sig Dispense Refill    DULoxetine (CYMBALTA) 30 MG extended release capsule Take 1 capsule by mouth daily 30 capsule 3    rhinorrhea or epistaxis. No tinnitus or significant hearing loss. Eyes - no sudden vision change or amaurosis. No corneal arcus, xantholasma, subconjunctival hemorrhage or discharge. Respiratory - no significant wheezing, stridor, apnea or cough. No dyspnea on exertion or shortness of air. Cardiovascular - no exertional chest pain to suggest myocardial ischemia. No orthopnea or PND. No sensation of sustained arrythmia. No occurrence of slow heart rate. No palpitations. No claudication. Gastrointestinal - no abdominal swelling or pain. No blood in stool. No severe constipation, diarrhea, nausea, or vomiting. Genitourinary - no dysuria, frequency, or urgency. No flank pain or hematuria. Musculoskeletal - no back pain or myalgia. No problems with gait. Extremities - no clubbing, cyanosis or extremity edema. Skin - no color change or rash. No pallor. No new surgical incision. Neurologic - no speech difficulty, facial asymmetry or lateralizing weakness. No seizures, presyncope or syncope. No significant dizziness. Hematologic - no easy bruising or excessive bleeding. Psychiatric - no severe anxiety or insomnia. No confusion. All other review of systems are negative. Objective  Vital Signs - BP (!) 170/70   Pulse 88   Ht 5' 3\" (1.6 m)   Wt 142 lb (64.4 kg)   SpO2 96%   BMI 25.15 kg/m²   General - Edward Edu is alert, cooperative, and pleasant. Well groomed. No acute distress. Body habitus - Body mass index is 25.15 kg/m². HEENT - Head is normocephalic. No circumoral cyanosis. Dentition is normal.  EYES -   Lids normal without ptosis. No discharge, edema or subconjunctival hemorrhage. Neck - Symmetrical without apparent mass or lymphadenopathy. Respiratory - Normal respiratory effort without use of accessory muscles. Ausculatation reveals vesicular breath sounds without crackles, wheezes, rub or rhonchi. Cardiovascular - No jugular venous distention.   Auscultation reveals 35.1 (L) 09/29/2021    MCV 83.4 09/29/2021     (H) 09/29/2021     Lab Results   Component Value Date     09/29/2021    K 3.8 09/29/2021     09/29/2021    CO2 22 09/29/2021    BUN 17 09/29/2021    CREATININE 0.8 09/29/2021    GLUCOSE 127 (H) 09/29/2021    CALCIUM 9.3 09/29/2021    PROT 6.2 (L) 09/29/2021    LABALBU 3.8 09/29/2021    BILITOT <0.2 09/29/2021    ALKPHOS 106 (H) 09/29/2021    AST 12 09/29/2021    ALT 13 09/29/2021    LABGLOM >60 09/29/2021    GFRAA >59 09/29/2021    GLOB 2.5 10/12/2016     Lab Results   Component Value Date    CHOL 174 04/20/2021    CHOL 183 06/09/2017    CHOL 168 08/31/2016     Lab Results   Component Value Date    TRIG 286 (H) 04/20/2021    TRIG 287 (H) 06/09/2017    TRIG 187 08/31/2016     Lab Results   Component Value Date    HDL 36 (L) 04/20/2021    HDL 50 (L) 06/09/2017    HDL 42 (L) 08/31/2016     Lab Results   Component Value Date    LDLCALC 81 04/20/2021    LDLCALC 76 06/09/2017    LDLCALC 89 08/31/2016     EKG today reviewed: NSR 89 bpm; no acute ischemic changes or ectopy. BP Readings from Last 3 Encounters:   10/04/21 (!) 170/70   09/29/21 (!) 148/60   09/28/21 (!) 150/59    Pulse Readings from Last 3 Encounters:   10/04/21 88   09/29/21 78   09/28/21 61        Wt Readings from Last 3 Encounters:   10/04/21 142 lb (64.4 kg)   09/29/21 143 lb 3.2 oz (65 kg)   09/28/21 145 lb (65.8 kg)     Assessment/Plan:   Diagnosis Orders   1. Atrial fibrillation, unspecified type ([address]  EKG 12 lead   2. History of DVT (deep vein thrombosis)     3. Paroxysmal supraventricular tachycardia ([address]     4. Essential hypertension     5. Chronic anticoagulation      Xarelto   [id number] Score: 4  Disclaimer: Risk Score calculation is dependent on accuracy of patient problem list and past encounter diagnosis. Stable CV status without overt heart failure, sensed arrhythmia or angina. PAF - continues in NSR on Toprol XL and Cardizem CD.   No bleeding issue on Xarelto. One week Zio cardiac monitor placed to assess AF burden. HTN - uncontrolled on current regimen. Increase Toprol XL to 75 mg daily. Home BP log with follow up in 3 weeks. Goal 130/80 or less. Once BP normalized, patient needs ischemic study. Patient is compliant with medication regimen. Previous cardiac history and records reviewed. Continue current medical management for cardiac related condition. Continue other current medications as directed. Continue to follow up with primary care provider for non cardiac medical problems. If your primary care provider is outside of the [facility], please request that your labs be faxed to this office at [phone]. BP goal 130/80 or less. Call the office with any problems, questions or concerns at [phone]. Cardiology follow up as scheduled in [address] appointments. Educational included in patient instructions. Heart health.       Miranda De La Rosa, APRN

## 2021-10-04 NOTE — PATIENT INSTRUCTIONS
New instructions for today:  At some point after you have better blood pressure control, recommend you have a stress test.     Start taking Toprol XL 50 mg 1-1/2 tab daily for high blood pressure. Monitor your blood pressure twice daily and keep a log. Your blood pressure goal is 130/80 or less. We will discuss in 2 weeks by either scheduled telephone encounter or patient call back. Office phone number [phone]. Xarelto can increase your risk of bleeding. If you notice blood in urine or stool, bleeding gums, excessive bruising or cough productive of bloody sputum, notify the office. Information on this blood thinner has been included in your after visit summary. The adhesive cardiac monitor will need to stay on for week. Use the symptom marker as instructed. Mail back the monitor in the postage paid box provided. 14-day monitor (ZIO Patch)      ZIO Patch  The ZIO® Patch is a new form of ambulatory cardiac monitoring described as a wearable patch. The Pete Bellow is unique compared to traditional Holter monitors as the monitoring device has no leads, no wires and no batteries. The Pete Bellow weighs just a few ounces that is a peel and stick device that is worn for an extended monitoring period of up to 14 days. Even though the device is worn for a longer duration than a Holter monitor, the ZIO Patch is said to be preferred by nearly 80% of patients as compared to a traditional 24 Holter monitor. Please allow 8 to 10 days for results, once you have mailed off your device. Features of the ZIO Patch:  Arguably the smallest ambulatory cardiac monitor   Light weight   No lead wires   Single channel ECG recording   No batteries   Superior patient compliance with a water resistant   Up to 14 days of continuous ECG recording          Patient Instructions:  Continue current medications as prescribed. Always keep a current medication list. Bring your medications to every office visit.    Continue to follow up with primary care provider for non cardiac medical problems. Call the office with any problems, questions or concerns at [phone]. If you have been asked to keep a blood pressure log, do so for 2 weeks. Call the office to report readings to the triage nurse at [phone]. Follow up with cardiologist as scheduled. The following educational material has been included in this after visit summary for your review: Life simple 7. Heart health. Life simple 7  1) Manage blood pressure - high blood pressure is a major risk factor for heart disease and stroke. Keeping blood pressure in health range reduces strain on your heart, arteries and kidneys. Blood pressure goal is less than 130/80. 2) Control cholesterol - contributes to plaque, which can clog arteries and lead to heart disease and stroke. When you control your cholesterol you are giving your arteries their best chance to remain clear. It is recommended that you get cholesterol lab work done once a year. 3) Reduce blood sugar - most of the food we eat is turning into glucose or blood sugar that our body uses for energy. Over time, high levels of blood sugar can damage your heart, kidneys, eyes and nerves. 4) Get active - living an active life is one of the most rewarding gifts you can give yourself and those you love. Simply put, daily physical activity increases your length and quality of life. Strive to exercise 15 minutes most days of the week. 5)  Eat better - A healthy diet is one of your best weapons for fighting cardiovascular disease. When you eat a heart healthy diet, you improve your chances for feeling good and staying healthy for life. 6)  Lose weight - when you shed extra fat an unnecessary pounds, you reduce the burden on your hear, lungs, blood vessels and skeleton. You give yourself the gift of active living, you lower your blood pressure and help yourself feel better.   7) Stop smoking - cigarette smokers have a higher risk of developing cardiovascular disease. If  You smoke, quitting is the best thing you can do for your health. Check American Heart Association on line for more information on Life's Simple 7 and tips for healthy living. A Healthy Heart: Care Instructions  Your Care Instructions     Coronary artery disease, also called heart disease, occurs when a substance called plaque builds up in the vessels that supply oxygen-rich blood to your heart muscle. This can narrow the blood vessels and reduce blood flow. A heart attack happens when blood flow is completely blocked. A high-fat diet, smoking, and other factors increase the risk of heart disease. Your doctor has found that you have a chance of having heart disease. You can do lots of things to keep your heart healthy. It may not be easy, but you can change your diet, exercise more, and quit smoking. These steps really work to lower your chance of heart disease. Follow-up care is a key part of your treatment and safety. Be sure to make and go to all appointments, and call your doctor if you are having problems. It's also a good idea to know your test results and keep a list of the medicines you take. How can you care for yourself at home? Diet  · Use less salt when you cook and eat. This helps lower your blood pressure. Taste food before salting. Add only a little salt when you think you need it. With time, your taste buds will adjust to less salt. · Eat fewer snack items, fast foods, canned soups, and other high-salt, high-fat, processed foods. · Read food labels and try to avoid saturated and trans fats. They increase your risk of heart disease by raising cholesterol levels. · Limit the amount of solid fat-butter, margarine, and shortening-you eat. Use olive, peanut, or canola oil when you cook. Bake, broil, and steam foods instead of frying them. · Eat a variety of fruit and vegetables every day.  Dark green, deep orange, red, or yellow fruits and vegetables are especially good for you. Examples include spinach, carrots, peaches, and berries. · Foods high in fiber can reduce your cholesterol and provide important vitamins and minerals. High-fiber foods include whole-grain cereals and breads, oatmeal, beans, brown rice, citrus fruits, and apples. · Eat lean proteins. Heart-healthy proteins include seafood, lean meats and poultry, eggs, beans, peas, nuts, seeds, and soy products. · Limit drinks and foods with added sugar. These include candy, desserts, and soda pop. Lifestyle changes  · If your doctor recommends it, get more exercise. Walking is a good choice. Bit by bit, increase the amount you walk every day. Try for at least 30 minutes on most days of the week. You also may want to swim, bike, or do other activities. · Do not smoke. If you need help quitting, talk to your doctor about stop-smoking programs and medicines. These can increase your chances of quitting for good. Quitting smoking may be the most important step you can take to protect your heart. It is never too late to quit. · Limit alcohol to 2 drinks a day for men and 1 drink a day for women. Too much alcohol can cause health problems. · Manage other health problems such as diabetes, high blood pressure, and high cholesterol. If you think you may have a problem with alcohol or drug use, talk to your doctor. Medicines  · Take your medicines exactly as prescribed. Call your doctor if you think you are having a problem with your medicine. · If your doctor recommends aspirin, take the amount directed each day. Make sure you take aspirin and not another kind of pain reliever, such as acetaminophen (Tylenol). When should you call for help? [id number] if you have symptoms of a heart attack. These may include:  · Chest pain or pressure, or a strange feeling in the chest.  · Sweating. · Shortness of breath.   · Pain, pressure, or a strange feeling in the back, neck, jaw, or upper belly or in one or both shoulders or arms. · Lightheadedness or sudden weakness. · A fast or irregular heartbeat. After you call 911, the  may tell you to chew 1 adult-strength or 2 to 4 low-dose aspirin. Wait for an ambulance. Do not try to drive yourself. Watch closely for changes in your health, and be sure to contact your doctor if you have any problems. Where can you learn more? Go to https://SeedInvest.ChicPlace. org and sign in to your [employer] account. Enter F485 in the [employer] box to learn more about \"A Healthy Heart: Care Instructions. \"     If you do not have an account, please click on the \"Sign Up Now\" link. Current as of: December 16, 2019               Content Version: 12.5  © 8768-7161 [employer] (ChatAnd). Care instructions adapted under license by [facility] ([facility]). If you have questions about a medical condition or this instruction, always ask your healthcare professional. [place] [address] any warranty or liability for your use of this information. Patient Education        Learning About Atrial Fibrillation  What is atrial fibrillation? Atrial fibrillation (say \"AY-tree-raffaele kgd-ccta-FYZ-shun\") is a common type of irregular heartbeat (arrhythmia). Normally, the heart beats in a strong, steady rhythm. In atrial fibrillation, a problem with the heart's electrical system causes the two upper chambers of the heart (called the atria) to quiver, or fibrillate. Atrial fibrillation can be dangerous. This is because if the heartbeat isn't strong and steady, blood can collect, or pool, in the atria. And pooled blood is more likely to form clots. Clots can travel to the brain, block blood flow, and cause a stroke. Atrial fibrillation can also lead to heart failure. This condition also upsets the normal rhythm between the atria and the lower chambers of the heart.  (These chambers are called the ventricles.) The ventricles may beat fast and without a regular rhythm. What are the symptoms? Some people feel symptoms when they have episodes of atrial fibrillation. But other people don't notice any symptoms. If you have symptoms, you may feel:  · A fluttering, racing, or pounding feeling in your chest called palpitations. · Weak or tired. · Dizzy or lightheaded. · Short of breath. · Chest pain. · Confused. You may notice signs of atrial fibrillation when you check your pulse. Your pulse may seem uneven or fast.  What can you expect when you have atrial fibrillation? At first, spells of atrial fibrillation may come on suddenly and last a short time. They may go away on their own or with treatment. Over time, the spells may last longer and occur more often. They often don't go away on their own. How is it treated? Treatments can help you feel better and prevent future problems, especially stroke and heart failure. Your treatment will depend on the cause of your atrial fibrillation, your symptoms, and your risk for stroke. Types of treatment include:  · Heart rate treatment. Medicine may be used to slow your heart rate. Your heartbeat may still be irregular. But these medicines keep your heart from beating too fast. They may also help relieve symptoms. · Heart rhythm treatment. Different treatments may be used to try to stop atrial fibrillation and keep it from returning. They can also relieve symptoms. These treatments include medicine, electrical cardioversion to shock the heart back to a normal rhythm, a procedure called catheter ablation, and heart surgery. · Stroke prevention. You and your doctor can decide how to lower your risk. You may decide to take a blood-thinning medicine called an anticoagulant. What is a heart-healthy lifestyle for atrial fibrillation? You can live well and help manage atrial fibrillation by having a heart-healthy lifestyle. This lifestyle may help reduce how often you have episodes of atrial fibrillation. Don't smoke.  Avoid secondhand smoke too. Quitting smoking is the best thing you can do for your heart. Be active. Talk to your doctor about what type and level of exercise is safe for you. Eat a heart-healthy diet. These foods include vegetables, fruits, nuts, beans, lean meat, fish, and whole grains. Limit sodium, alcohol, and sugar. Avoid alcohol if it triggers symptoms. Stay at a healthy weight. Lose weight if you need to. Losing weight can help relieve symptoms. Manage other health problems. These problems include diabetes, high blood pressure, and high cholesterol. If you think you may have a problem with alcohol or drug use, talk to your doctor. Manage stress. Options like yoga, biofeedback, and meditation may help. Where can you learn more? Go to https://BeeBillion.[employer]. org and sign in to your [employer] account. Enter N980 in the [employer] box to learn more about \"Learning About Atrial Fibrillation. \"     If you do not have an account, please click on the \"Sign Up Now\" link. Current as of: April 29, 2021               Content Version: 13.0  © 9260-1258 Healthwise, Incorporated. Care instructions adapted under license by [facility] ([facility]). If you have questions about a medical condition or this instruction, always ask your healthcare professional. [address]  any warranty or liability for your use of this information.

## 2021-10-06 ENCOUNTER — TELEPHONE (OUTPATIENT)
Dept: PRIMARY CARE CLINIC | Age: 86
End: 2021-10-06

## 2021-10-06 NOTE — TELEPHONE ENCOUNTER
----- Message from Pili Boyer sent at 10/6/2021  9:23 AM CDT -----  Subject: Message to Provider    QUESTIONS  Information for Provider? Patient fell in a doorway on her behind, not in   pain and walking fine but had high bp all night. Wants to know if she   needs an appointment but doesn't think she does. ---------------------------------------------------------------------------  --------------  Omelia Counter INFO  What is the best way for the office to contact you? OK to leave message on   voicemail  Preferred Call Back Phone Number? [phone]  ---------------------------------------------------------------------------  --------------  SCRIPT ANSWERS  Relationship to Patient? Third Party  Representative Name?  Kenny Davidson (Caregiver)

## 2021-10-06 NOTE — TELEPHONE ENCOUNTER
We dont have xray today and rest of week so she would have to got to urgent care if she wants xray. As far as BP I would continue to watch it.  If the top number stays over 150 and bottom over 100 we can see her or she could go to urgent care

## 2021-10-07 ENCOUNTER — APPOINTMENT (OUTPATIENT)
Dept: CT IMAGING | Age: 86
End: 2021-10-07
Payer: MEDICARE

## 2021-10-07 ENCOUNTER — APPOINTMENT (OUTPATIENT)
Dept: GENERAL RADIOLOGY | Age: 86
End: 2021-10-07
Payer: MEDICARE

## 2021-10-07 ENCOUNTER — HOSPITAL ENCOUNTER (EMERGENCY)
Age: 86
Discharge: HOME | End: 2021-10-07
Attending: EMERGENCY MEDICINE
Payer: MEDICARE

## 2021-10-07 VITALS
TEMPERATURE: 98.1 F | DIASTOLIC BLOOD PRESSURE: 56 MMHG | HEART RATE: 92 BPM | OXYGEN SATURATION: 94 % | RESPIRATION RATE: 18 BRPM | BODY MASS INDEX: 24.8 KG/M2 | SYSTOLIC BLOOD PRESSURE: 150 MMHG | HEIGHT: 63 IN | WEIGHT: 140 LBS

## 2021-10-07 DIAGNOSIS — M54.9 ACUTE MIDLINE BACK PAIN, UNSPECIFIED BACK LOCATION: ICD-10-CM

## 2021-10-07 DIAGNOSIS — R00.2 PALPITATIONS: Primary | ICD-10-CM

## 2021-10-07 DIAGNOSIS — I48.0 PAROXYSMAL ATRIAL FIBRILLATION (HCC): ICD-10-CM
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
## 2021-10-07 LAB
ALBUMIN SERPL-MCNC: 3.6 G/DL (ref 3.5–5.2)
ALP BLD-CCNC: 108 U/L (ref 35–104)
ALT SERPL-CCNC: 9 U/L (ref 5–33)
ANION GAP SERPL CALCULATED.3IONS-SCNC: 11 MMOL/L (ref 7–19)
AST SERPL-CCNC: 14 U/L (ref 5–32)
BASOPHILS ABSOLUTE: 0.1 K/UL (ref 0–0.2)
BASOPHILS RELATIVE PERCENT: 0.7 % (ref 0–1)
BILIRUB SERPL-MCNC: 0.3 MG/DL (ref 0.2–1.2)
BUN BLDV-MCNC: 15 MG/DL (ref 8–23)
CALCIUM SERPL-MCNC: 9.4 MG/DL (ref 8.8–10.2)
CHLORIDE BLD-SCNC: 101 MMOL/L (ref 98–111)
CO2: 25 MMOL/L (ref 22–29)
CREAT SERPL-MCNC: 0.8 MG/DL (ref 0.5–0.9)
EOSINOPHILS ABSOLUTE: 0.4 K/UL (ref 0–0.6)
EOSINOPHILS RELATIVE PERCENT: 4.4 % (ref 0–5)
GFR AFRICAN AMERICAN: >59
GFR NON-AFRICAN AMERICAN: >60
GLUCOSE BLD-MCNC: 142 MG/DL (ref 74–109)
HCT VFR BLD CALC: 33.4 % (ref 37–47)
HEMOGLOBIN: 10.2 G/DL (ref 12–16)
IMMATURE GRANULOCYTES #: 0 K/UL
LYMPHOCYTES ABSOLUTE: 1.7 K/UL (ref 1.1–4.5)
LYMPHOCYTES RELATIVE PERCENT: 20.1 % (ref 20–40)
MAGNESIUM: 1.1 MG/DL (ref 1.6–2.4)
MCH RBC QN AUTO: 24.8 PG (ref 27–31)
MCHC RBC AUTO-ENTMCNC: 30.5 G/DL (ref 33–37)
MCV RBC AUTO: 81.1 FL (ref 81–99)
MONOCYTES ABSOLUTE: 0.8 K/UL (ref 0–0.9)
MONOCYTES RELATIVE PERCENT: 8.9 % (ref 0–10)
NEUTROPHILS ABSOLUTE: 5.7 K/UL (ref 1.5–7.5)
NEUTROPHILS RELATIVE PERCENT: 65.7 % (ref 50–65)
PDW BLD-RTO: 15.7 % (ref 11.5–14.5)
PLATELET # BLD: 342 K/UL (ref 130–400)
PMV BLD AUTO: 9.2 FL (ref 9.4–12.3)
POTASSIUM SERPL-SCNC: 3.6 MMOL/L (ref 3.5–5)
RBC # BLD: 4.12 M/UL (ref 4.2–5.4)
SODIUM BLD-SCNC: 137 MMOL/L (ref 136–145)
TOTAL PROTEIN: 6.2 G/DL (ref 6.6–8.7)
TROPONIN: <0.01 NG/ML (ref 0–0.03)
TROPONIN: <0.01 NG/ML (ref 0–0.03)
WBC # BLD: 8.6 K/UL (ref 4.8–10.8)

## 2021-10-07 PROCEDURE — 96366 THER/PROPH/DIAG IV INF ADDON: CPT

## 2021-10-07 PROCEDURE — 93005 ELECTROCARDIOGRAM TRACING: CPT | Performed by: EMERGENCY MEDICINE

## 2021-10-07 PROCEDURE — 6360000002 HC RX W HCPCS: Performed by: EMERGENCY MEDICINE

## 2021-10-07 PROCEDURE — 72128 CT CHEST SPINE W/O DYE: CPT

## 2021-10-07 PROCEDURE — 73521 X-RAY EXAM HIPS BI 2 VIEWS: CPT

## 2021-10-07 PROCEDURE — 70450 CT HEAD/BRAIN W/O DYE: CPT

## 2021-10-07 PROCEDURE — 84484 ASSAY OF TROPONIN QUANT: CPT

## 2021-10-07 PROCEDURE — 72125 CT NECK SPINE W/O DYE: CPT

## 2021-10-07 PROCEDURE — 96365 THER/PROPH/DIAG IV INF INIT: CPT

## 2021-10-07 PROCEDURE — 72131 CT LUMBAR SPINE W/O DYE: CPT

## 2021-10-07 PROCEDURE — 80053 COMPREHEN METABOLIC PANEL: CPT

## 2021-10-07 PROCEDURE — 85025 COMPLETE CBC W/AUTO DIFF WBC: CPT

## 2021-10-07 PROCEDURE — 83735 ASSAY OF MAGNESIUM: CPT

## 2021-10-07 PROCEDURE — 96375 TX/PRO/DX INJ NEW DRUG ADDON: CPT

## 2021-10-07 PROCEDURE — 96361 HYDRATE IV INFUSION ADD-ON: CPT

## 2021-10-07 PROCEDURE — 36415 COLL VENOUS BLD VENIPUNCTURE: CPT

## 2021-10-07 PROCEDURE — 2580000003 HC RX 258: Performed by: EMERGENCY MEDICINE

## 2021-10-07 PROCEDURE — 99285 EMERGENCY DEPT VISIT HI MDM: CPT

## 2021-10-07 PROCEDURE — 71045 X-RAY EXAM CHEST 1 VIEW: CPT

## 2021-10-07 RX ORDER — MORPHINE SULFATE 2 MG/ML
2 INJECTION, SOLUTION INTRAMUSCULAR; INTRAVENOUS ONCE
Status: COMPLETED | OUTPATIENT
Start: 2021-10-07 | End: 2021-10-07

## 2021-10-07 RX ORDER — 0.9 % SODIUM CHLORIDE 0.9 %
1000 INTRAVENOUS SOLUTION INTRAVENOUS ONCE
Status: COMPLETED | OUTPATIENT
Start: 2021-10-07 | End: 2021-10-07

## 2021-10-07 RX ORDER — ONDANSETRON 2 MG/ML
4 INJECTION INTRAMUSCULAR; INTRAVENOUS ONCE
Status: COMPLETED | OUTPATIENT
Start: 2021-10-07 | End: 2021-10-07

## 2021-10-07 RX ORDER — MAGNESIUM SULFATE IN WATER 40 MG/ML
2000 INJECTION, SOLUTION INTRAVENOUS ONCE
Status: COMPLETED | OUTPATIENT
Start: 2021-10-07 | End: 2021-10-07

## 2021-10-07 RX ADMIN — MORPHINE SULFATE 2 MG: 2 INJECTION, SOLUTION INTRAMUSCULAR; INTRAVENOUS at 17:54

## 2021-10-07 RX ADMIN — SODIUM CHLORIDE 1000 ML: 9 INJECTION, SOLUTION INTRAVENOUS at 17:54

## 2021-10-07 RX ADMIN — ONDANSETRON 4 MG: 2 INJECTION INTRAMUSCULAR; INTRAVENOUS at 17:54

## 2021-10-07 RX ADMIN — MAGNESIUM SULFATE HEPTAHYDRATE 2000 MG: 40 INJECTION, SOLUTION INTRAVENOUS at 18:25

## 2021-10-07 ASSESSMENT — ENCOUNTER SYMPTOMS
VOMITING: 0
NAUSEA: 0
COUGH: 0
BACK PAIN: 1
RHINORRHEA: 0
SORE THROAT: 0
SHORTNESS OF BREATH: 0
DIARRHEA: 0
ABDOMINAL PAIN: 0

## 2021-10-07 ASSESSMENT — PAIN SCALES - GENERAL: PAINLEVEL_OUTOF10: 7

## 2021-10-07 NOTE — ED PROVIDER NOTES
and are negative.            PAST MEDICALHISTORY     Past Medical History:   Diagnosis Date    Afib [facility])     Bronchitis     Colon cancer ([address]     Colon polyps     Constipation     Deep vein blood clot of left lower extremity (HCC)     Left leg     Depression     DVT (deep venous thrombosis) (HCC)     Dysphagia     Fibrocystic breast disease     Fibromyalgia     GERD (gastroesophageal reflux disease)     reflux with hx of esophageal stricture    Headache(784.0)     History of colon cancer 2000    Hormone replacement therapy (postmenopausal)     Hypertension     Neuropathy of foot     In both feet    Osteoarthritis     left knee pain    Restless legs syndrome     Vitamin D deficiency          SURGICAL HISTORY       Past Surgical History:   Procedure Laterality Date    APPENDECTOMY      BREAST BIOPSY      CATARACT REMOVAL       SECTION      CHOLECYSTECTOMY      COLECTOMY  partial    COLON SURGERY      COLONOSCOPY  2010    COLONOSCOPY  2012    Dr Groves Form: unremarkable enterocolonic anastamosis; hyperplastic polyps    COLONOSCOPY  2013    [facility]: unremarkable post-surgical colon    COLONOSCOPY  2016    Dr Hans Smart colon anastomosis, 5 yr recall    COLONOSCOPY N/A 2021    Dr Elizabeth Peña and patent anastomosis in the right side-BCM, prn (age)    COLONOSCOPY  2021    Dr Elizabeth Peña and patent anastomosis in the right side-BCM, prn (age)   Evan Hinders FOOT SURGERY  right foot    HAND SURGERY Right     Dr Charline Robles N/A 2020    KYPHOPLASTY L5 performed by Corine Mendiola DO at [address] [facility] [facility]  [place] Right 2020    RIGHT L 4-5 DECOMPRESSIVE LAMINECTOMY performed by Corine Mendiola DO at [address] TOTAL KNEE ARTHROPLASTY      UPPER GASTROINTESTINAL ENDOSCOPY  10/16/2009    UPPER GASTROINTESTINAL ENDOSCOPY  2013    [facility] peptic stricture dilated PHYSICAL EXAM    (up to 7 for level 4, 8 or more for level 5)     ED Triage Vitals [10/07/21 1645]   BP Temp Temp Source Pulse Resp SpO2 Height Weight   (!) 173/72 98.1 °F (36.7 °C) Oral 108 18 96 % 5' 3\" (1.6 m) 140 lb (63.5 kg)       Physical Exam  Vitals and nursing note reviewed. Constitutional:       General: She is not in acute distress. Appearance: Normal appearance. She is well-developed. She is not ill-appearing or diaphoretic. HENT:      Head: Normocephalic and atraumatic. Right Ear: External ear normal.      Left Ear: External ear normal.      Nose: Nose normal.      Mouth/Throat:      Mouth: Mucous membranes are moist.   Eyes:      Conjunctiva/sclera: Conjunctivae normal.   Neck:      Trachea: No tracheal deviation. Cardiovascular:      Rate and Rhythm: Regular rhythm. Tachycardia present. Pulses: Normal pulses. Heart sounds: Normal heart sounds. No murmur heard. Pulmonary:      Effort: Pulmonary effort is normal. No respiratory distress. Breath sounds: Normal breath sounds. No wheezing or rales. Abdominal:      Palpations: Abdomen is soft. There is no mass. Tenderness: There is no abdominal tenderness. Musculoskeletal:         General: Normal range of motion. Cervical back: Normal range of motion. No tenderness or bony tenderness. Thoracic back: Bony tenderness present. Lumbar back: Bony tenderness present. Back:       Comments: Full range of motion of all 4 extremities however she has some mild bilateral hip tenderness no obvious deformities   Skin:     General: Skin is warm and dry. Neurological:      Mental Status: She is alert and oriented to person, place, and time. GCS: GCS eye subscore is 4. GCS verbal subscore is 5. GCS motor subscore is 6.          DIAGNOSTIC RESULTS     EKG: All EKG's areinterpreted by the Emergency Department Physician who either signs or Co-signs this chart in the absence of a cardiologist.    98 10/07/21 2000   BP: (!) 157/63 (!) 157/68 (!) 170/62 (!) 150/56   Pulse: 89 91 89 92   Resp: 18 20 18 18   Temp:       TempSrc:       SpO2: 95% 93% 94% 94%   Weight:       Height:           MDM  Number of Diagnoses or Management Options     Amount and/or Complexity of Data Reviewed  Clinical lab tests: ordered and reviewed  Tests in the radiology section of CPT®: ordered and reviewed  Independent visualization of images, tracings, or specimens: yes        Pt remains in NSR here with no further symptoms, labs are reassuring, trop neg x2, had recent fall and had some back and hip pain but all imaging reassuring and no acute findings, she is well appearing here, stable for DC and outpatient follow up      CONSULTS:  None    PROCEDURES:  Unless otherwise noted below, none     Procedures    FINAL IMPRESSION      1. Palpitations    2. Paroxysmal atrial fibrillation (HCC)    3. Acute midline back pain, unspecified back location    4.  Fall, initial encounter          DISPOSITION/PLAN   DISPOSITION Decision To Discharge 10/07/2021 08:16:53 PM      PATIENT REFERRED TO:  Rupali Hernández MD  [address] [place] [address] [address] [place] [address] Jaylyn  [phone]    Schedule an appointment as soon as possible for a visit in 1 week        DISCHARGE MEDICATIONS:  New Prescriptions    No medications on file          (Please note that portions of this note were completed with a voice recognition program.  Efforts were made to edit thedictations but occasionally words are mis-transcribed.)    Mary Holman MD (electronically signed)  Attending Emergency Physician        Marcy Wright MD  10/07/21 4224

## 2021-10-10 LAB
EKG P AXIS: 85 DEGREES
EKG P-R INTERVAL: 156 MS
EKG Q-T INTERVAL: 344 MS
EKG QRS DURATION: 72 MS
EKG QTC CALCULATION (BAZETT): 408 MS
EKG T AXIS: 95 DEGREES

## 2021-10-10 PROCEDURE — 93010 ELECTROCARDIOGRAM REPORT: CPT | Performed by: INTERNAL MEDICINE

## 2021-10-15 ENCOUNTER — TELEPHONE (OUTPATIENT)
Dept: CARDIOLOGY CLINIC | Age: 86
End: 2021-10-15

## 2021-10-15 ENCOUNTER — TELEPHONE (OUTPATIENT)
Dept: CARDIOTHORACIC SURGERY | Age: 86
End: 2021-10-15

## 2021-10-15 RX ORDER — DILTIAZEM HYDROCHLORIDE 120 MG/1
CAPSULE, COATED, EXTENDED RELEASE ORAL
Qty: 180 CAPSULE | Refills: 3 | Status: SHIPPED | OUTPATIENT
Start: 2021-10-15 | End: 2022-09-29 | Stop reason: ALTCHOICE

## 2021-10-15 NOTE — TELEPHONE ENCOUNTER
Jorge increased pt diltiazem 120 mg 1 capsule by mouth daily :    Now: 120 mg 1 capsule by mouth twice a day. Med called to [address] with no refills. PT to see OMAR Carlisle on 10/25 for evaluation and instructions for continued dosing.

## 2021-10-15 NOTE — TELEPHONE ENCOUNTER
I am reviewing this Zio patch monitor for [employer]. Patient had a 5% PAF burden often with RVR. She has had an ER visit since [employer] saw her. Please have her increase her Cardizem to 120 mg twice a day until she sees [employer] for appt on 10/25.   Continue Xarelto as she is doing

## 2021-10-20 ENCOUNTER — HOSPITAL ENCOUNTER (OUTPATIENT)
Dept: MRI IMAGING | Age: 86
Discharge: HOME | End: 2021-10-20
Payer: MEDICARE

## 2021-10-20 DIAGNOSIS — D32.9 MENINGIOMA (HCC): ICD-10-CM

## 2021-10-20 LAB
GFR AFRICAN AMERICAN: >60
GFR NON-AFRICAN AMERICAN: >60
PERFORMED ON: NORMAL
POC CREATININE: 0.7 MG/DL (ref 0.3–1.3)
POC SAMPLE TYPE: NORMAL

## 2021-10-20 PROCEDURE — 6360000004 HC RX CONTRAST MEDICATION: Performed by: NURSE PRACTITIONER

## 2021-10-20 PROCEDURE — A9577 INJ MULTIHANCE: HCPCS | Performed by: NURSE PRACTITIONER

## 2021-10-20 PROCEDURE — 70553 MRI BRAIN STEM W/O & W/DYE: CPT

## 2021-10-20 PROCEDURE — 82565 ASSAY OF CREATININE: CPT

## 2021-10-20 RX ADMIN — GADOBENATE DIMEGLUMINE 13 ML: 529 INJECTION, SOLUTION INTRAVENOUS at 15:57

## 2021-10-25 ENCOUNTER — OFFICE VISIT (OUTPATIENT)
Dept: CARDIOLOGY CLINIC | Age: 86
End: 2021-10-25
Payer: MEDICARE

## 2021-10-25 VITALS
DIASTOLIC BLOOD PRESSURE: 72 MMHG | HEIGHT: 63 IN | HEART RATE: 68 BPM | WEIGHT: 140 LBS | SYSTOLIC BLOOD PRESSURE: 152 MMHG | BODY MASS INDEX: 24.8 KG/M2

## 2021-10-25 DIAGNOSIS — I48.0 PAF (PAROXYSMAL ATRIAL FIBRILLATION) (HCC): Primary | ICD-10-CM

## 2021-10-25 DIAGNOSIS — I10 ESSENTIAL HYPERTENSION: ICD-10-CM

## 2021-10-25 DIAGNOSIS — Z79.01 ON CONTINUOUS ORAL ANTICOAGULATION: ICD-10-CM

## 2021-10-25 DIAGNOSIS — R79.0 LOW MAGNESIUM LEVEL: ICD-10-CM

## 2021-10-25 DIAGNOSIS — I51.89 GRADE III DIASTOLIC DYSFUNCTION: ICD-10-CM

## 2021-10-25 DIAGNOSIS — R07.9 CHEST PAIN, UNSPECIFIED TYPE: ICD-10-CM

## 2021-10-25 PROBLEM — E11.9 TYPE 2 DIABETES MELLITUS (HCC): Status: ACTIVE | Noted: 2021-10-25

## 2021-10-25 PROCEDURE — G8399 PT W/DXA RESULTS DOCUMENT: HCPCS | Performed by: NURSE PRACTITIONER

## 2021-10-25 PROCEDURE — G8420 CALC BMI NORM PARAMETERS: HCPCS | Performed by: NURSE PRACTITIONER

## 2021-10-25 PROCEDURE — 99214 OFFICE O/P EST MOD 30 MIN: CPT | Performed by: NURSE PRACTITIONER

## 2021-10-25 PROCEDURE — 1036F TOBACCO NON-USER: CPT | Performed by: NURSE PRACTITIONER

## 2021-10-25 PROCEDURE — 93000 ELECTROCARDIOGRAM COMPLETE: CPT | Performed by: NURSE PRACTITIONER

## 2021-10-25 PROCEDURE — G8427 DOCREV CUR MEDS BY ELIG CLIN: HCPCS | Performed by: NURSE PRACTITIONER

## 2021-10-25 PROCEDURE — 1090F PRES/ABSN URINE INCON ASSESS: CPT | Performed by: NURSE PRACTITIONER

## 2021-10-25 PROCEDURE — 1123F ACP DISCUSS/DSCN MKR DOCD: CPT | Performed by: NURSE PRACTITIONER

## 2021-10-25 PROCEDURE — 4040F PNEUMOC VAC/ADMIN/RCVD: CPT | Performed by: NURSE PRACTITIONER

## 2021-10-25 PROCEDURE — G8484 FLU IMMUNIZE NO ADMIN: HCPCS | Performed by: NURSE PRACTITIONER

## 2021-10-25 RX ORDER — METOPROLOL SUCCINATE 50 MG/1
50 TABLET, EXTENDED RELEASE ORAL 2 TIMES DAILY
Qty: 60 TABLET | Refills: 5 | Status: SHIPPED | OUTPATIENT
Start: 2021-10-25 | End: 2022-03-03

## 2021-10-25 NOTE — PROGRESS NOTES
[facility], [place]. [address] [place] [address] [facility]  [phone] office  [phone] fax      OFFICE VISIT:  10/25/2021    [place]: 1935  Reason For Visit:  Ana Luisa Hylton is a 82 y.o. female who is here for Follow-up (pt still has palpitations) and Atrial Fibrillation    History:   Diagnosis Orders   1. PAF (paroxysmal atrial fibrillation) ([facility])  EKG 12 lead    Basic Metabolic Panel    CBC Auto Differential    metoprolol succinate (TOPROL XL) 50 MG extended release tablet   2. On continuous oral anticoagulation      Xarelto   3. Grade III diastolic dysfunction     4. Low magnesium level  Basic Metabolic Panel    CBC Auto Differential    Magnesium   5. Chest pain, unspecified type  NM MYOCARDIAL SPECT REST EXERCISE OR RX     The patient presents today for cardiology follow up. She was seen on 10/7/21 at [facility]) ED for palpitations. The patient wore a [employer] cardiac monitor for 6 days and 22 hours earlier this month. The underlying rhythm was NSR with 5% AF burden with RVR. Average heart rate was 89 bpm.  No VT, heart block or pauses. Occasional PAC and rare PVC noted. Subsequently, Cardizem was increased to 120 mg BID. The patient continues on Toprol XL 75 mg daily. She continues to reports palpitations with activity. Over the past month, she has experienced associated left chest tightness and left neck pain. The patient reports no bleeding problems on Xarelto. She does report today \"I think something bit my chest last night. \"    BP is elevated at times. The patient's PCP monitors cholesterol. Subjective  Ana Luisa Hylton denies shortness of breath, orthopnea, paroxysmal nocturnal dyspnea, syncope, presyncope and edema. The patient denies numbness or weakness to suggest cerebrovascular accident or transient ischemic attack. + palpitations with associated left chest and neck pain over the past month. Symptoms occur with activity. + constipation. + fatigue. Sha Camara has the following history as recorded in [facility]:  Patient Active Problem List   Diagnosis Code    Personal history of colon cancer Z85.038    Family history of esophageal cancer Z80.0    Fibromyalgia M79.7    Renal bruit R09.89    Varicose veins of left lower extremity with inflammation, 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 ([facility] I83.221, L97.121    Venous insufficiency I87.2    Hypertriglyceridemia E78.1    Dysphagia R13.10    Paroxysmal supraventricular tachycardia (HCC) I47.1    Vitamin D deficiency E55.9    Encounter for care related to Port-a-Cath Z45.2    Essential hypertension I10    History of colon polyps Z86.010    Port-A-Cath in place Z95.828    History of DVT (deep vein thrombosis) Z86.718    Avascular necrosis (HCC) M87.00    Pain in both lower extremities M79.604, M79.605    Numbness and tingling of both feet R20.0, R20.2    Acute deep vein thrombosis (DVT) of femoral vein of left lower extremity (HCC) I82.412    Cellulitis of left lower limb L03. 116    Fracture of right foot S92.901A    Hypochloremia E87.8    CKD (chronic kidney disease) stage 3, GFR 30-59 ml/min (HCC) N18.30    Malignant neoplasm of colon (HCC) C18.9    Mixed simple and mucopurulent chronic bronchitis (HCC) J41.8    Fungal dermatitis B36.9    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 I83.11, I83.12    Depression F32. A    History of esophageal stricture Z87.19    Atrial fibrillation with RVR (HCC) I48.91    Type 2 diabetes mellitus E11.9     Past Medical History:   Diagnosis Date    Afib ([facility] 2021    Bronchitis     Colon cancer (HCC)     Colon polyps     Constipation     Deep vein blood clot of left lower extremity (HCC)     Left leg     Depression     DVT (deep venous thrombosis) (HCC)     Dysphagia     Fibrocystic breast disease     Fibromyalgia     GERD (gastroesophageal reflux disease)     reflux with hx of esophageal stricture    Headache(784.0)     History of colon cancer     Hormone replacement therapy (postmenopausal)     Hypertension     Neuropathy of foot     In both feet    Osteoarthritis     left knee pain    Restless legs syndrome     Type 2 diabetes mellitus 10/25/2021    Vitamin D deficiency      Past Surgical History:   Procedure Laterality Date    APPENDECTOMY      BREAST BIOPSY      CATARACT REMOVAL       SECTION      CHOLECYSTECTOMY      COLECTOMY  partial    COLON SURGERY      COLONOSCOPY  2010    COLONOSCOPY  2012    Dr Sacha Grier: unremarkable enterocolonic anastamosis; hyperplastic polyps    COLONOSCOPY  2013    Mon[place]z: unremarkable post-surgical colon    COLONOSCOPY  2016    Dr Indigo Shirley colon anastomosis, 5 yr recall    COLONOSCOPY N/A 2021    Dr Veloz Age and patent anastomosis in the right side-BCM, prn (age)    COLONOSCOPY  2021    Dr Veloz Age and patent anastomosis in the right side-BCM, prn (age)   Ryan Lasso FOOT SURGERY  right foot    HAND SURGERY Right     Dr Marcos Finnegan N/A 2020    KYPHOPLASTY L5 performed by Donel Closs, DO at [facility] Right 2020    RIGHT L 4-5 DECOMPRESSIVE LAMINECTOMY performed by Donel Closs, DO at [facility]      UPPER GASTROINTESTINAL ENDOSCOPY  10/16/2009    UPPER GASTROINTESTINAL ENDOSCOPY  2013    Bradley: peptic stricture dilated to 48F; HH; small antral mucosal elevation    UPPER GASTROINTESTINAL ENDOSCOPY  2014    Maurilio: dilation of esophageal stricture    VARICOSE VEIN SURGERY Left 2014  [facility]    Left GSV Ablation    VARICOSE VEIN SURGERY Right 2014  [facility]    Right GSV Ablation     Family History   Problem Relation Age of Onset    Cancer Mother         Cervical Cancer    Cancer Brother         Esophageal cancer    Diabetes Brother     Esophageal Cancer Brother     Diabetes Sister     Colon Cancer Neg Hx     Colon Polyps Neg Hx     Liver Cancer Neg Hx     Liver Disease Neg Hx     Rectal Cancer Neg Hx     Stomach Cancer Neg Hx      Social History     Tobacco Use    Smoking status: Never Smoker    Smokeless tobacco: Never Used   Substance Use Topics    Alcohol use: No      Current Outpatient Medications   Medication Sig Dispense Refill    dilTIAZem (CARDIZEM CD) 120 MG extended release capsule TAKE 1 CAPSULE BY MOUTH TWICE DAILY 180 capsule 3    metoprolol succinate (TOPROL XL) 50 MG extended release tablet Take 1.5 tablets by mouth daily 45 tablet 3    DULoxetine (CYMBALTA) 30 MG extended release capsule Take 1 capsule by mouth daily 30 capsule 3    XARELTO 20 MG TABS tablet TAKE 1 TABLET BY MOUTH DAILY WITH BREAKFAST 30 tablet 3    triamcinolone (KENALOG) 0.1 % cream APPLY TWICE DAILY TO RASH UP TO 2 WEEKS/MONTH AS NEEDED 60 g 2    nystatin (MYCOSTATIN) 099916 UNIT/GM powder For rash under breast. Apply 3 times daily. 1 Bottle 1    vitamin D (ERGOCALCIFEROL) 1.25 MG (69586 UT) CAPS capsule TAKE 1 CAPSULE BY MOUTH 1 TIME A WEEK 12 capsule 1    Cyanocobalamin (B-12) 500 MCG TABS TAKE 1 TABLET BY MOUTH DAILY 30 tablet 11    triamterene-hydroCHLOROthiazide (MAXZIDE-25) 37.5-25 MG per tablet TAKE 1 TABLET BY MOUTH DAILY 90 tablet 3    tiZANidine (ZANAFLEX) 4 MG tablet TAKE 1 TABLET BY MOUTH THREE TIMES DAILY AS NEEDED. NOT TO EXCEED 3 DOSES IN 24 HOURS      losartan (COZAAR) 100 MG tablet Take 1 tablet by mouth daily 90 tablet 3    omeprazole (PRILOSEC) 40 MG delayed release capsule Take 1 capsule by mouth daily 90 capsule 3    HYDROcodone-acetaminophen (NORCO)  MG per tablet Take 1 tablet by mouth every 6 hours as needed for Pain.       [address] [facility] [address] by Does not apply route 1 each 0    acyclovir (ZOVIRAX) 400 MG tablet Take 400 mg by mouth 2 times daily history of shingles in her eye      albuterol (ACCUNEB) 1.25 MG/3ML nebulizer solution Inhale 1 mL into the lungs every 6 hours as needed for Wheezing       furosemide (LASIX) 20 MG tablet Take 1 tablet by mouth daily as needed (swelling) 30 tablet 3     No current facility-administered medications for this visit. Allergies: Zoloft [sertraline hcl] and Gabapentin    Review of Systems  Constitutional - no appetite change, or unexpected weight change. No fever, chills or diaphoresis. + fatigue. HEENT - no significant rhinorrhea or epistaxis. No tinnitus or significant hearing loss. Eyes - no sudden vision change or amaurosis. No corneal arcus, xantholasma, subconjunctival hemorrhage or discharge. Respiratory - no significant wheezing, stridor, apnea or cough. No dyspnea on exertion or shortness of air. Cardiovascular - no orthopnea or PND. No occurrence of slow heart rate. No claudication. + left chest and neck pain with activity. Associated palpitations. Onset one month ago. Gastrointestinal - no abdominal swelling or pain. No blood in stool. No severe constipation, diarrhea, nausea, or vomiting. Genitourinary - no dysuria, frequency, or urgency. No flank pain or hematuria. Musculoskeletal - no back pain or myalgia. Transported via wheelchair. Extremities - no clubbing, cyanosis or extremity edema. Skin - no color change or rash. No pallor. No new surgical incision. Neurologic - no speech difficulty, facial asymmetry or lateralizing weakness. No seizures, presyncope or syncope. No significant dizziness. Hematologic - no easy bruising or excessive bleeding. Psychiatric - no severe anxiety or insomnia. No confusion. All other review of systems are negative. Objective  Vital Signs - BP (!) 148/64   Pulse 68   Ht 5' 3\" (1.6 m)   Wt 140 lb (63.5 kg)   BMI 24.80 kg/m²   General - Chel Rod is alert, cooperative, and pleasant. Well groomed. No acute distress. Body habitus - Body mass index is 24.8 kg/m².   HEENT - Head is normocephalic. No circumoral cyanosis. Dentition is normal.  EYES -   Lids normal without ptosis. No discharge, edema or subconjunctival hemorrhage. Neck - Symmetrical without apparent mass or lymphadenopathy. Respiratory - Normal respiratory effort without use of accessory muscles. Ausculatation reveals vesicular breath sounds without crackles, wheezes, rub or rhonchi. Cardiovascular - No jugular venous distention. Auscultation reveals regular rate and rhythm. No audible clicks, gallop or rub. No murmur. No lower extremity varicosities. No carotid bruits. Abdominal -  No visible distention, mass or pulsations. Extremities - No clubbing or cyanosis. No statis dermatitis or ulcers. No edema. Musculoskeletal -   No Osler's nodes. No kyphosis or scoliosis. Transported via wheelchair. Skin -  Warm and dry; no rash or pallor. No new surgical wound. 2 skin tears noted on upper mid chest with surrounding ecchymoses probably from itching. No apparent evidence of insect bite. Neurological - No focal neurological deficits. Thought processes coherent. No apparent tremor. Oriented to person, place and time. Psychiatric -  Appropriate affect and mood. Data reviewed:  9/16/21 echo   Normal left ventricular size with preserved LV systolic function and   visually estimated ejection fraction of approximately 70%. No regional  wall motion abnormalities. Mild concentric left ventricular hypertrophy. Indeterminate diastolic dysfunction with evidence for increased LVEDP. Normal right ventricular size with hyperdynamic RV systolic function. Mild tricuspid regurgitation with normal estimated RVSP. Mild pulmonic regurgitation present. Aortic root and ascending aorta are within normal limits. No evidence of significant pericardial effusion is noted. The rhythm is sinus. Compared to study report dated 3/18/2019, the LVEF has improved.     Signature   Electronically signed by Sabina Palacios Chase(Interpreting physician)   on 09/17/2021 09:48 AM    3/18/19 echo    Normal left ventricular size with preserved LV function and an estimated  ejection fraction of approximately 55%. No evidence of left ventricular mass or thrombus noted. Mild concentric left ventricular hypertrophy. Grade III diastolic   dysfunction. Signature   Electronically signed by Juan Manuel Rich MD(Interpreting   physician) on 03/20/2019 06:49 PM    Lab Results   Component Value Date    WBC 8.6 10/07/2021    HGB 10.2 (L) 10/07/2021    HCT 33.4 (L) 10/07/2021    MCV 81.1 10/07/2021     10/07/2021     Lab Results   Component Value Date     10/07/2021    K 3.6 10/07/2021     10/07/2021    CO2 25 10/07/2021    BUN 15 10/07/2021    CREATININE 0.7 10/20/2021    GLUCOSE 142 (H) 10/07/2021    CALCIUM 9.4 10/07/2021    PROT 6.2 (L) 10/07/2021    LABALBU 3.6 10/07/2021    BILITOT 0.3 10/07/2021    ALKPHOS 108 (H) 10/07/2021    AST 14 10/07/2021    ALT 9 10/07/2021    LABGLOM >60 10/20/2021    GFRAA >60 10/20/2021    GLOB 2.5 10/12/2016       Lab Results   Component Value Date    CHOL 174 04/20/2021    CHOL 183 06/09/2017    CHOL 168 08/31/2016     Lab Results   Component Value Date    TRIG 286 (H) 04/20/2021    TRIG 287 (H) 06/09/2017    TRIG 187 08/31/2016     Lab Results   Component Value Date    HDL 36 (L) 04/20/2021    HDL 50 (L) 06/09/2017    HDL 42 (L) 08/31/2016     Lab Results   Component Value Date    LDLCALC 81 04/20/2021    LDLCALC 76 06/09/2017    LDLCALC 89 08/31/2016     Lab Results   Component Value Date    MG 1.1 10/07/2021     Lab Results   Component Value Date    TSH 1.270 09/16/2021     EKG today reviewed: NSR 68 bpm; no acute ischemic change or ectopy. QTc .404.     BP Readings from Last 3 Encounters:   10/25/21 (!) 148/64   10/07/21 (!) 150/56   10/04/21 (!) 162/80    Pulse Readings from Last 3 Encounters:   10/25/21 68   10/07/21 92   10/04/21 88        Wt Readings from Last 3 Encounters:   10/25/21 140 lb (63.5 kg)   10/07/21 140 lb (63.5 kg)   10/04/21 142 lb (64.4 kg)     Assessment/Plan:   Diagnosis Orders   1. PAF (paroxysmal atrial fibrillation) ([facility])  EKG 12 lead    Basic Metabolic Panel    CBC Auto Differential    metoprolol succinate (TOPROL XL) 50 MG extended release tablet   2. On continuous oral anticoagulation      Xarelto   3. Grade III diastolic dysfunction     4. Low magnesium level  Basic Metabolic Panel    CBC Auto Differential    Magnesium   5. Chest pain, unspecified type  NM MYOCARDIAL SPECT REST EXERCISE OR RX   6. Essential hypertension       Chest pain - scheduled Lexiscan to rule out myocardial ischemia. PAF - currently in NSR. Reports daily palpitations. Continue Cardizem  mg BID. Increase Toprol XL to 50 mg BID. Advised to monitor BP and heart rate. Continues on Xarelto with no bleeding issues. HTN - normotensive on current regimen. Goal 130/80 or less. Continue same. Grade III DD - no symptoms of HFpEF. Hx low magnesium - check labs today. Patient is compliant with medication regimen. Previous cardiac history and records reviewed. Continue current medical management for cardiac related condition. Continue other current medications as directed. Continue to follow up with primary care provider for non cardiac medical problems. If your primary care provider is outside of the [facility], please request that your labs be faxed to this office at [phone]. BP goal 130/80 or less. Call the office with any problems, questions or concerns at [phone]. Cardiology follow up as scheduled in [address] appointments. Educational included in patient instructions. Heart health.       Caitlyn Smith, APRN

## 2021-10-25 NOTE — PATIENT INSTRUCTIONS
and call your doctor if you are having problems. It's also a good idea to know your test results and keep a list of the medicines you take. How can you care for yourself at home? Diet  · Use less salt when you cook and eat. This helps lower your blood pressure. Taste food before salting. Add only a little salt when you think you need it. With time, your taste buds will adjust to less salt. · Eat fewer snack items, fast foods, canned soups, and other high-salt, high-fat, processed foods. · Read food labels and try to avoid saturated and trans fats. They increase your risk of heart disease by raising cholesterol levels. · Limit the amount of solid fat-butter, margarine, and shortening-you eat. Use olive, peanut, or canola oil when you cook. Bake, broil, and steam foods instead of frying them. · Eat a variety of fruit and vegetables every day. Dark green, deep orange, red, or yellow fruits and vegetables are especially good for you. Examples include spinach, carrots, peaches, and berries. · Foods high in fiber can reduce your cholesterol and provide important vitamins and minerals. High-fiber foods include whole-grain cereals and breads, oatmeal, beans, brown rice, citrus fruits, and apples. · Eat lean proteins. Heart-healthy proteins include seafood, lean meats and poultry, eggs, beans, peas, nuts, seeds, and soy products. · Limit drinks and foods with added sugar. These include candy, desserts, and soda pop. Lifestyle changes  · If your doctor recommends it, get more exercise. Walking is a good choice. Bit by bit, increase the amount you walk every day. Try for at least 30 minutes on most days of the week. You also may want to swim, bike, or do other activities. · Do not smoke. If you need help quitting, talk to your doctor about stop-smoking programs and medicines. These can increase your chances of quitting for good. Quitting smoking may be the most important step you can take to protect your heart.  It instructions for today:      Patient Instructions:  Continue current medications as prescribed. Always keep a current medication list. Bring your medications to every office visit. Continue to follow up with primary care provider for non cardiac medical problems. Call the office with any problems, questions or concerns at [phone]. If you have been asked to keep a blood pressure log, do so for 2 weeks. Call the office to report readings to the triage nurse at [phone]. Follow up with cardiologist as scheduled. The following educational material has been included in this after visit summary for your review: Life simple 7. Heart health. Life simple 7  1) Manage blood pressure - high blood pressure is a major risk factor for heart disease and stroke. Keeping blood pressure in health range reduces strain on your heart, arteries and kidneys. Blood pressure goal is less than 130/80. 2) Control cholesterol - contributes to plaque, which can clog arteries and lead to heart disease and stroke. When you control your cholesterol you are giving your arteries their best chance to remain clear. It is recommended that you get cholesterol lab work done once a year. 3) Reduce blood sugar - most of the food we eat is turning into glucose or blood sugar that our body uses for energy. Over time, high levels of blood sugar can damage your heart, kidneys, eyes and nerves. 4) Get active - living an active life is one of the most rewarding gifts you can give yourself and those you love. Simply put, daily physical activity increases your length and quality of life. Strive to exercise 15 minutes most days of the week. 5)  Eat better - A healthy diet is one of your best weapons for fighting cardiovascular disease. When you eat a heart healthy diet, you improve your chances for feeling good and staying healthy for life.   6)  Lose weight - when you shed extra fat an unnecessary pounds, you reduce the burden on your hear, lungs, blood vessels and skeleton. You give yourself the gift of active living, you lower your blood pressure and help yourself feel better. 7) Stop smoking - cigarette smokers have a higher risk of developing cardiovascular disease. If  You smoke, quitting is the best thing you can do for your health. Check American Heart Association on line for more information on Life's Simple 7 and tips for healthy living. A Healthy Heart: Care Instructions  Your Care Instructions     Coronary artery disease, also called heart disease, occurs when a substance called plaque builds up in the vessels that supply oxygen-rich blood to your heart muscle. This can narrow the blood vessels and reduce blood flow. A heart attack happens when blood flow is completely blocked. A high-fat diet, smoking, and other factors increase the risk of heart disease. Your doctor has found that you have a chance of having heart disease. You can do lots of things to keep your heart healthy. It may not be easy, but you can change your diet, exercise more, and quit smoking. These steps really work to lower your chance of heart disease. Follow-up care is a key part of your treatment and safety. Be sure to make and go to all appointments, and call your doctor if you are having problems. It's also a good idea to know your test results and keep a list of the medicines you take. How can you care for yourself at home? Diet  · Use less salt when you cook and eat. This helps lower your blood pressure. Taste food before salting. Add only a little salt when you think you need it. With time, your taste buds will adjust to less salt. · Eat fewer snack items, fast foods, canned soups, and other high-salt, high-fat, processed foods. · Read food labels and try to avoid saturated and trans fats. They increase your risk of heart disease by raising cholesterol levels. · Limit the amount of solid fat-butter, margarine, and shortening-you eat.  Use olive, peanut, or canola oil when you cook. Bake, broil, and steam foods instead of frying them. · Eat a variety of fruit and vegetables every day. Dark green, deep orange, red, or yellow fruits and vegetables are especially good for you. Examples include spinach, carrots, peaches, and berries. · Foods high in fiber can reduce your cholesterol and provide important vitamins and minerals. High-fiber foods include whole-grain cereals and breads, oatmeal, beans, brown rice, citrus fruits, and apples. · Eat lean proteins. Heart-healthy proteins include seafood, lean meats and poultry, eggs, beans, peas, nuts, seeds, and soy products. · Limit drinks and foods with added sugar. These include candy, desserts, and soda pop. Lifestyle changes  · If your doctor recommends it, get more exercise. Walking is a good choice. Bit by bit, increase the amount you walk every day. Try for at least 30 minutes on most days of the week. You also may want to swim, bike, or do other activities. · Do not smoke. If you need help quitting, talk to your doctor about stop-smoking programs and medicines. These can increase your chances of quitting for good. Quitting smoking may be the most important step you can take to protect your heart. It is never too late to quit. · Limit alcohol to 2 drinks a day for men and 1 drink a day for women. Too much alcohol can cause health problems. · Manage other health problems such as diabetes, high blood pressure, and high cholesterol. If you think you may have a problem with alcohol or drug use, talk to your doctor. Medicines  · Take your medicines exactly as prescribed. Call your doctor if you think you are having a problem with your medicine. · If your doctor recommends aspirin, take the amount directed each day. Make sure you take aspirin and not another kind of pain reliever, such as acetaminophen (Tylenol). When should you call for help? [id number] if you have symptoms of a heart attack.  These may include:  · Chest pain or pressure, or a strange feeling in the chest.  · Sweating. · Shortness of breath. · Pain, pressure, or a strange feeling in the back, neck, jaw, or upper belly or in one or both shoulders or arms. · Lightheadedness or sudden weakness. · A fast or irregular heartbeat. After you call 911, the  may tell you to chew 1 adult-strength or 2 to 4 low-dose aspirin. Wait for an ambulance. Do not try to drive yourself. Watch closely for changes in your health, and be sure to contact your doctor if you have any problems. Where can you learn more? Go to https://LightUp.TechflakesGB. org and sign in to your [facility] account. Enter T267 in the Pie[employer] Cake box to learn more about \"A Healthy Heart: Care Instructions. \"     If you do not have an account, please click on the \"Sign Up Now\" link. Current as of: December 16, 2019               Content Version: 12.5  © 6669-6758 [employer]. Care instructions adapted under license by [facility] ([facility]). If you have questions about a medical condition or this instruction, always ask your healthcare professional. [place] [address] any warranty or liability for your use of this information. Eddie Meraz Nuclear Stress Test     Date/Time:    [place] at the [facility] and [facility] located on the first floor of [address] [place] [address] through hospital main entrance and turn immediately to your left. Test Description:  There are two parts to a Lexiscan stress test: the rest portion and the exercise portion. For the rest portion, a radioactive tracer is injected into your arm through the IV. After 30 to 60 minutes, the process of imaging will begin. A nuclear camera will be placed on your chest area and images are taken for the next 15 to 20 minutes. For the exercise portion, a nurse will attach EKG electrodes to your chest to monitor your heart rate.   The drug Eddie Meraz is administered to simulate stress on the heart. Your heart rhythm will then be monitored for the next few minutes. Your blood pressure will also be monitored throughout the exercise portion. [place] through the exercise portion, a second round of radioactive tracer is injected into your body. Your heart rate and EKG will be monitored for another few minutes after administering the drug. Test Preparation:     Bring a list of your current medications. Do not take any of your medications the morning of the test, but bring all morning medications with you as you will take them after the stress portion of the test is completed.  No caffeine 12 hours prior to the testing. This includes: coffee, pop/soda, chocolate, cold medications, etc.  Any product that might contain caffeine.  No nicotine or alcohol 12 hours prior to your test.    Nothing to eat or drink 4-6 hours prior to appointment time. It is okay to drink small amounts of water during the four hours prior to the test.   Nitroglycerin patches must be taken off 1 hour before testing.  Wear comfortable clothing.  Please refrain from any strenuous exercise or activities the day before your test, or the day of your test.   The Nuclear Lexiscan Stress test takes about 2 ½ to 3 hours to complete. If for any reason you are unable to keep this appointment, please contact Outpatient Scheduling, [phone], as soon as possible to reschedule.

## 2021-10-27 ENCOUNTER — HOSPITAL ENCOUNTER (OUTPATIENT)
Dept: NUCLEAR MEDICINE | Age: 86
Discharge: HOME | End: 2021-10-29
Payer: MEDICARE

## 2021-10-27 DIAGNOSIS — R07.9 CHEST PAIN, UNSPECIFIED TYPE: ICD-10-CM

## 2021-10-27 LAB
LV EF: 86 %
LVEF MODALITY: NORMAL

## 2021-10-27 PROCEDURE — 3430000000 HC RX DIAGNOSTIC RADIOPHARMACEUTICAL: Performed by: NURSE PRACTITIONER

## 2021-10-27 PROCEDURE — 93017 CV STRESS TEST TRACING ONLY: CPT

## 2021-10-27 PROCEDURE — 6360000002 HC RX W HCPCS: Performed by: NURSE PRACTITIONER

## 2021-10-27 PROCEDURE — A9500 TC99M SESTAMIBI: HCPCS | Performed by: NURSE PRACTITIONER

## 2021-10-27 RX ADMIN — TETRAKIS(2-METHOXYISOBUTYLISOCYANIDE)COPPER(I) TETRAFLUOROBORATE 30 MILLICURIE: 1 INJECTION, POWDER, LYOPHILIZED, FOR SOLUTION INTRAVENOUS at 13:17

## 2021-10-27 RX ADMIN — TETRAKIS(2-METHOXYISOBUTYLISOCYANIDE)COPPER(I) TETRAFLUOROBORATE 10 MILLICURIE: 1 INJECTION, POWDER, LYOPHILIZED, FOR SOLUTION INTRAVENOUS at 13:16

## 2021-10-27 RX ADMIN — REGADENOSON 0.4 MG: 0.08 INJECTION, SOLUTION INTRAVENOUS at 12:27

## 2021-10-29 ENCOUNTER — TELEPHONE (OUTPATIENT)
Dept: NEUROSURGERY | Age: 86
End: 2021-10-29

## 2021-10-29 NOTE — TELEPHONE ENCOUNTER
----- Message from EARLE Anand sent at 10/22/2021 11:54 AM CDT -----  Meningioma looks like it has changed slightly, would like patient to follow up with neurosurgery to have them review imaging and make additional recommendations. Suspect it is benign. Can you make sure she gets on Dr. Niru Rodriguez's schedule? Thanks. Called pt gave her results as above. Pt wished to hold off on neurosurgery appointment at this time. I explained that we would like their recommendations due to the change however she does not wish to schedule at this point.

## 2021-10-29 NOTE — TELEPHONE ENCOUNTER
Yes that's fine. I'll talk with neurosurgery to see when they would recommend repeating the MRI brain given the increase in size.

## 2021-11-02 ENCOUNTER — TELEPHONE (OUTPATIENT)
Dept: NEUROSURGERY | Age: 86
End: 2021-11-02

## 2021-11-02 DIAGNOSIS — D32.9 MENINGIOMA (HCC): Primary | ICD-10-CM

## 2021-11-02 NOTE — TELEPHONE ENCOUNTER
Can you call patient and let her know that I discussed MRI brain with Dr. Destinee Lama. The meningioma has grown since MRI 1 year ago but is not causing mass effect on the brain. The growth is slow enough at this time that Dr. Renetta Boykin thinks it would be reasonable to repeat MRI brain next year. She does not need to see neurosurgery at this time.  If meningioma grows more then 3-4mm over the next year then we will need to have her see the neurosurgery team.

## 2021-11-08 ENCOUNTER — VIRTUAL VISIT (OUTPATIENT)
Dept: CARDIOLOGY CLINIC | Age: 86
End: 2021-11-08
Payer: MEDICARE

## 2021-11-08 DIAGNOSIS — Z79.01 CHRONIC ANTICOAGULATION: ICD-10-CM

## 2021-11-08 DIAGNOSIS — I48.0 PAF (PAROXYSMAL ATRIAL FIBRILLATION) (HCC): ICD-10-CM

## 2021-11-08 DIAGNOSIS — I10 ESSENTIAL HYPERTENSION: Primary | ICD-10-CM

## 2021-11-08 PROCEDURE — 99442 PR PHYS/QHP TELEPHONE EVALUATION 11-20 MIN: CPT | Performed by: NURSE PRACTITIONER

## 2021-11-08 RX ORDER — AMLODIPINE BESYLATE 5 MG/1
5 TABLET ORAL DAILY
Qty: 30 TABLET | Refills: 1 | Status: SHIPPED | OUTPATIENT
Start: 2021-11-08 | End: 2021-11-08

## 2021-11-08 NOTE — PATIENT INSTRUCTIONS
New instructions for today:  Add    Xarelto can increase your risk of bleeding. If you notice blood in urine or stool, bleeding gums, excessive bruising or cough productive of bloody sputum, notify the office. Information on this blood thinner has been included in your after visit summary. Patient Instructions:  Continue current medications as prescribed. Always keep a current medication list. Bring your medications to every office visit. Continue to follow up with primary care provider for non cardiac medical problems. Call the office with any problems, questions or concerns at [phone]. If you have been asked to keep a blood pressure log, do so for 2 weeks. Call the office to report readings to the triage nurse at [phone]. Follow up with cardiologist as scheduled. The following educational material has been included in this after visit summary for your review: Life simple 7. Heart health. Life simple 7  1) Manage blood pressure - high blood pressure is a major risk factor for heart disease and stroke. Keeping blood pressure in health range reduces strain on your heart, arteries and kidneys. Blood pressure goal is less than 130/80. 2) Control cholesterol - contributes to plaque, which can clog arteries and lead to heart disease and stroke. When you control your cholesterol you are giving your arteries their best chance to remain clear. It is recommended that you get cholesterol lab work done once a year. 3) Reduce blood sugar - most of the food we eat is turning into glucose or blood sugar that our body uses for energy. Over time, high levels of blood sugar can damage your heart, kidneys, eyes and nerves. 4) Get active - living an active life is one of the most rewarding gifts you can give yourself and those you love. Simply put, daily physical activity increases your length and quality of life. Strive to exercise 15 minutes most days of the week.   5)  Eat better - A healthy diet is one of your best weapons for fighting cardiovascular disease. When you eat a heart healthy diet, you improve your chances for feeling good and staying healthy for life. 6)  Lose weight - when you shed extra fat an unnecessary pounds, you reduce the burden on your hear, lungs, blood vessels and skeleton. You give yourself the gift of active living, you lower your blood pressure and help yourself feel better. 7) Stop smoking - cigarette smokers have a higher risk of developing cardiovascular disease. If  You smoke, quitting is the best thing you can do for your health. Check American Heart Association on line for more information on Life's Simple 7 and tips for healthy living. A Healthy Heart: Care Instructions  Your Care Instructions     Coronary artery disease, also called heart disease, occurs when a substance called plaque builds up in the vessels that supply oxygen-rich blood to your heart muscle. This can narrow the blood vessels and reduce blood flow. A heart attack happens when blood flow is completely blocked. A high-fat diet, smoking, and other factors increase the risk of heart disease. Your doctor has found that you have a chance of having heart disease. You can do lots of things to keep your heart healthy. It may not be easy, but you can change your diet, exercise more, and quit smoking. These steps really work to lower your chance of heart disease. Follow-up care is a key part of your treatment and safety. Be sure to make and go to all appointments, and call your doctor if you are having problems. It's also a good idea to know your test results and keep a list of the medicines you take. How can you care for yourself at home? Diet  · Use less salt when you cook and eat. This helps lower your blood pressure. Taste food before salting. Add only a little salt when you think you need it. With time, your taste buds will adjust to less salt.   · Eat fewer snack items, fast foods, canned soups, and other high-salt, high-fat, processed foods. · Read food labels and try to avoid saturated and trans fats. They increase your risk of heart disease by raising cholesterol levels. · Limit the amount of solid fat-butter, margarine, and shortening-you eat. Use olive, peanut, or canola oil when you cook. Bake, broil, and steam foods instead of frying them. · Eat a variety of fruit and vegetables every day. Dark green, deep orange, red, or yellow fruits and vegetables are especially good for you. Examples include spinach, carrots, peaches, and berries. · Foods high in fiber can reduce your cholesterol and provide important vitamins and minerals. High-fiber foods include whole-grain cereals and breads, oatmeal, beans, brown rice, citrus fruits, and apples. · Eat lean proteins. Heart-healthy proteins include seafood, lean meats and poultry, eggs, beans, peas, nuts, seeds, and soy products. · Limit drinks and foods with added sugar. These include candy, desserts, and soda pop. Lifestyle changes  · If your doctor recommends it, get more exercise. Walking is a good choice. Bit by bit, increase the amount you walk every day. Try for at least 30 minutes on most days of the week. You also may want to swim, bike, or do other activities. · Do not smoke. If you need help quitting, talk to your doctor about stop-smoking programs and medicines. These can increase your chances of quitting for good. Quitting smoking may be the most important step you can take to protect your heart. It is never too late to quit. · Limit alcohol to 2 drinks a day for men and 1 drink a day for women. Too much alcohol can cause health problems. · Manage other health problems such as diabetes, high blood pressure, and high cholesterol. If you think you may have a problem with alcohol or drug use, talk to your doctor. Medicines  · Take your medicines exactly as prescribed.  Call your doctor if you think you are having a problem with your medicine. · If your doctor recommends aspirin, take the amount directed each day. Make sure you take aspirin and not another kind of pain reliever, such as acetaminophen (Tylenol). When should you call for help? [id number] if you have symptoms of a heart attack. These may include:  · Chest pain or pressure, or a strange feeling in the chest.  · Sweating. · Shortness of breath. · Pain, pressure, or a strange feeling in the back, neck, jaw, or upper belly or in one or both shoulders or arms. · Lightheadedness or sudden weakness. · A fast or irregular heartbeat. After you call 911, the  may tell you to chew 1 adult-strength or 2 to 4 low-dose aspirin. Wait for an ambulance. Do not try to drive yourself. Watch closely for changes in your health, and be sure to contact your doctor if you have any problems. Where can you learn more? Go to https://Rogate.MarkITx. org and sign in to your [employer] account. Enter P858 in the [employer] box to learn more about \"A Healthy Heart: Care Instructions. \"     If you do not have an account, please click on the \"Sign Up Now\" link. Current as of: December 16, 2019               Content Version: 12.5  © 2049-4815 [employer]. Care instructions adapted under license by [facility] ([facility]). If you have questions about a medical condition or this instruction, always ask your healthcare professional. [place] [address] any warranty or liability for your use of this information.

## 2021-11-08 NOTE — PROGRESS NOTES
Fidel Soto is a 82 y.o. female evaluated via telephone on 11/8/2021. Consent:  She and/or health care decision maker is aware that that she may receive a bill for this telephone service, depending on her insurance coverage, and has provided verbal consent to proceed: Yes    Documentation:  I communicated with the patient and/or health care decision maker about Cadena Ayanr test results   Details of this discussion including any medical advice provided: Heart health. HTN. I affirm this is a Patient Initiated Episode with an Established Patient who has not had a related appointment within my department in the past 7 days or scheduled within the next 24 hours. Total Time: minutes: 11-20 minutes    Note: not billable if this call serves to triage the patient into an appointment for the relevant concern    EARLE Andino      11/8/2021    Audio Patient Encouter(During [facility]-34 public health emergency)  The telephone encourter was conducted with patient in their residence from [address] with EARLE Sierra; assistance by Lorri Clifton MA.    HPI:  [place] Derudy Roxana   Diagnosis Orders   1. Essential hypertension     2. PAF (paroxysmal atrial fibrillation) ([facility] [address]     3. Chronic anticoagulation       The patient presents today for follow up phone visit after Surinder Bettencourt. At 10/25/21 office visit, the patient reported an increase in palpitations. A recent Zio monitor showed underlying rhythm  NSR with 5% AF burden with RVR. Average heart rate was 89 bpm.  No VT, heart block or pauses. Occasional PAC and rare PVC were noted. Subsequently, Toprol XL was increased. At her previous visit, the patient reported an episode of chest discomfort. The patient reports feeling much better with no report of ongoing palpitations or chest pain. The Cadena Boer was negative for ischemia. She does report BP has been elevated at 649 systolic.   The patient denies symptoms to suggest myocardial ischemia, heart failure or arrhythmia. The patient's PCP monitors cholesterol. No bleeding issues reported on Xarelto. SUBJECTIVE:  Ayan Dover denies exertional chest pain, shortness of breath, orthopnea, paroxysmal nocturnal dyspnea, syncope, presyncope, sensed arrhythmia, edema and fatigue. The patient denies numbness or weakness to suggest cerebrovascular accident or transient ischemic attack. Review of Systems    Prior to Visit Medications    Medication Sig Taking? Authorizing Provider   metoprolol succinate (TOPROL XL) 50 MG extended release tablet Take 1 tablet by mouth 2 times daily Take (1) tab twice daily. Yes EARLE Perez   dilTIAZem (CARDIZEM CD) 120 MG extended release capsule TAKE 1 CAPSULE BY MOUTH TWICE DAILY Yes EARLE Maria   DULoxetine (CYMBALTA) 30 MG extended release capsule Take 1 capsule by mouth daily Yes EARLE Tanner   XARELTO 20 MG TABS tablet TAKE 1 TABLET BY MOUTH DAILY WITH BREAKFAST Yes Roseann Escobar MD   triamcinolone (KENALOG) 0.1 % cream APPLY TWICE DAILY TO RASH UP TO 2 WEEKS/MONTH AS NEEDED Yes EARLE Eli CNP   nystatin (MYCOSTATIN) 768179 UNIT/GM powder For rash under breast. Apply 3 times daily. Yes EARLE Eli CNP   vitamin D (ERGOCALCIFEROL) 1.25 MG (16010 UT) CAPS capsule TAKE 1 CAPSULE BY MOUTH 1 TIME A WEEK Yes Roseann Escobar MD   Cyanocobalamin (B-12) 500 MCG TABS TAKE 1 TABLET BY MOUTH DAILY Yes Roseann Escobar MD   triamterene-hydroCHLOROthiazide (SMDGOJJ-11) 37.5-25 MG per tablet TAKE 1 TABLET BY MOUTH DAILY Yes EARLE Eli CNP   tiZANidine (ZANAFLEX) 4 MG tablet TAKE 1 TABLET BY MOUTH THREE TIMES DAILY AS NEEDED.  NOT TO EXCEED 3 DOSES IN 24 HOURS Yes Historical Provider, MD   losartan (COZAAR) 100 MG tablet Take 1 tablet by mouth daily Yes Roseann Escobar MD   omeprazole (PRILOSEC) 40 MG delayed release capsule Take 1 capsule by mouth daily Yes Roseann Escobar MD   HYDROcodone-acetaminophen (NORCO)  MG per tablet Take 1 tablet by mouth every 6 hours as needed for Pain. Yes Historical Provider, MD   [address] [place] [address] by Does not apply route Yes EARLE Rodriguez - CNP   acyclovir (ZOVIRAX) 400 MG tablet Take 400 mg by mouth 2 times daily history of shingles in her eye Yes Historical Provider, MD   albuterol (ACCUNEB) 1.25 MG/3ML nebulizer solution Inhale 1 mL into the lungs every 6 hours as needed for Wheezing  Yes Historical Provider, MD   furosemide (LASIX) 20 MG tablet Take 1 tablet by mouth daily as needed (swelling) Yes Ganesh Alvarenga MD       Social History     Tobacco Use    Smoking status: Never Smoker    Smokeless tobacco: Never Used   Vaping Use    Vaping Use: Never used   Substance Use Topics    Alcohol use: No    Drug use: No        REVIEW OF SYSTEMS:  Constitutional - no appetite change, or unexpected weight change. No fever, chills or diaphoresis. No significant change in activity level or new onset of fatigue. HEENT - no significant rhinorrhea or epistaxis. No tinnitus or significant hearing loss. Eyes - no sudden vision change or amaurosis. No corneal arcus, xantholasma, subconjunctival hemorrhage or discharge. Respiratory - no significant wheezing, stridor, apnea or cough. No dyspnea on exertion or shortness of air. Cardiovascular - no exertional chest pain to suggest myocardial ischemia. No orthopnea or PND. No sensation of sustained arrythmia. No occurrence of slow heart rate. No palpitations. No claudication. Gastrointestinal - no abdominal swelling or pain. No blood in stool. No severe constipation, diarrhea, nausea, or vomiting. Genitourinary - no dysuria, frequency, or urgency. No flank pain or hematuria. Musculoskeletal - no back pain or myalgia. No problems with gait. Extremities - no clubbing, cyanosis or extremity edema. Skin - no color change or rash. No pallor. No new surgical incision.    Neurologic - no speech difficulty, facial asymmetry or lateralizing weakness. No seizures, presyncope or syncope. No significant dizziness. Hematologic - no easy bruising or excessive bleeding. Psychiatric - no severe anxiety or insomnia. No confusion. All other review of systems are negative. DATA REVIEWED:  10/27/21 Lexiscan  1. Ejection fraction 86%   2. Wall motion grossly unremarkable    3. Myocardial perfusion imaging demonstrated homogeneous uptake of the   tracer in all visualized segments no definite areas of ischemia or   infarction are identified. Summary impressions:   Normal ejection fraction 86% probably normal myocardial perfusion   study no evidence of ischemia or previous infarction. Signed by Dr Rocio Garcia     9/16/21 echo  Normal left ventricular size with preserved LV systolic function and  visually estimated ejection fraction of approximately 70%. No regional  wall motion abnormalities. Mild concentric left ventricular hypertrophy. Indeterminate diastolic dysfunction with evidence for increased LVEDP. Normal right ventricular size with hyperdynamic RV systolic function. Mild tricuspid regurgitation with normal estimated RVSP. Mild pulmonic regurgitation present. Aortic root and ascending aorta are within normal limits. No evidence of significant pericardial effusion is noted. The rhythm is sinus. Compared to study report dated 3/18/2019, the LVEF has improved.     Signature   Electronically signed by Arminda Stone(Interpreting physician)   on 09/17/2021 09:48 AM    Lab Results   Component Value Date    WBC 8.6 10/07/2021    HGB 10.2 (L) 10/07/2021    HCT 33.4 (L) 10/07/2021    MCV 81.1 10/07/2021     10/07/2021     Lab Results   Component Value Date     10/07/2021    K 3.6 10/07/2021     10/07/2021    CO2 25 10/07/2021    BUN 15 10/07/2021    CREATININE 0.7 10/20/2021    GLUCOSE 142 (H) 10/07/2021    CALCIUM 9.4 10/07/2021    PROT 6.2 (L) 10/07/2021    LABALBU 3.6 10/07/2021    BILITOT 0.3 10/07/2021 ALKPHOS 108 (H) 10/07/2021    AST 14 10/07/2021    ALT 9 10/07/2021    LABGLOM >60 10/20/2021    GFRAA >60 10/20/2021    GLOB 2.5 10/12/2016       Lab Results   Component Value Date    CHOL 174 04/20/2021    CHOL 183 06/09/2017    CHOL 168 08/31/2016     Lab Results   Component Value Date    TRIG 286 (H) 04/20/2021    TRIG 287 (H) 06/09/2017    TRIG 187 08/31/2016     Lab Results   Component Value Date    HDL 36 (L) 04/20/2021    HDL 50 (L) 06/09/2017    HDL 42 (L) 08/31/2016     Lab Results   Component Value Date    LDLCALC 81 04/20/2021    LDLCALC 76 06/09/2017    LDLCALC 89 08/31/2016     ASSESSMENT/PlAN:   Diagnosis Orders   1. Essential hypertension     2. PAF (paroxysmal atrial fibrillation) ([facility] [address]     3. Chronic anticoagulation       [id number] Score: 5  Disclaimer: Risk Score calculation is dependent on accuracy of patient problem list and past encounter diagnosis. Stable cardiac status with no overt heart failure, angina or sensed arrhythmia. HTN - uncontrolled BP on Losrtan 100 mg, Cardizem  mg BID, Toprol XL 50 mg BID and Dyazide. BP goal 130/80 or less. Stop Losartan. Substitute with Avapro 300 mg (1) tab daily. PAF - no recurrent symptomology to indicate recurrent AF. No bleeding issue on Xarelto. No recurrent chest pain - recent Lexiscan negative for ischemia. Patient compliant with medication regimen. Continue current medical management for cardiac condition. Continue current medications as prescribed. BP goal is 130/80 or less. If your primary care provider is outside of the [facility], please request your labs be faxed to this office at [phone]. Continue to follow up with primary care provider for non cardiac medical problems. Call the office with any problems, questions or concerns at [phone]. Follow up with cardiologist as scheduled in [address]   The following educational material has been included in this after visit summary for patient review:  Heart health. HTN. Mandi Smith is a 82 y.o. female being evaluated by a telephone encounter to address concerns as mentioned above. A caregiver was present when appropriate. Due to this being a TeleHealth encounter (During [facility]-96 public health emergency). Pursuant to the emergency declaration under the [address] [place] [address] and the [employer] Act, this Virtual Visit was conducted with patient's (and/or legal guardian's) consent, to reduce the patient's risk of exposure to COVID-19 and provide necessary medical care. The patient (and/or legal guardian) has also been advised to contact this office for worsening conditions or problems, and seek emergency medical treatment and/or call 911 if deemed necessary. Services were provided through a telephone discussion virtually to substitute for in-person clinic visit. Patient and provider were located at their individual homes. --EARLE Muñoz on 11/8/2021 at 9:56 AM    An electronic signature was used to authenticate this note.

## 2021-11-09 ENCOUNTER — TELEPHONE (OUTPATIENT)
Dept: CARDIOLOGY CLINIC | Age: 86
End: 2021-11-09

## 2021-11-09 RX ORDER — IRBESARTAN 300 MG/1
300 TABLET ORAL DAILY
Qty: 30 TABLET | Refills: 5 | Status: SHIPPED | OUTPATIENT
Start: 2021-11-09 | End: 2022-11-01

## 2021-11-15 NOTE — TELEPHONE ENCOUNTER
I would be suspicious for shingles as well, that could lead to the numbness/tingling type feeling. Have her see PCP regarding rash and then we can go from there.

## 2021-11-15 NOTE — TELEPHONE ENCOUNTER
Patient returned call and wishes to speak to you or Corey Patton directly please. She asked for a call back at [phone].

## 2021-11-15 NOTE — TELEPHONE ENCOUNTER
Called pt spoke with her about results of MRI below. Pt agrees this is reasonable. She however is complaining of a rash and head ache for the last 3 days. She reports this HA is different it is tingling and hurting on the top of the head. She does report rash on the hairline and behind right ear. She voiced she thinks this may be shingles. Verified with pt if she had spoke with pcp about this, she has not. Instructed pt she would need to call them to rule out Shingles. Also advised Shingles is highly contagious and did not need to be around pregnant people. Pt voiced she would call pcp at this time.

## 2021-11-16 ENCOUNTER — OFFICE VISIT (OUTPATIENT)
Dept: PRIMARY CARE CLINIC | Age: 86
End: 2021-11-16
Payer: MEDICARE

## 2021-11-16 VITALS
WEIGHT: 140 LBS | TEMPERATURE: 97.2 F | DIASTOLIC BLOOD PRESSURE: 70 MMHG | OXYGEN SATURATION: 98 % | HEART RATE: 58 BPM | BODY MASS INDEX: 24.8 KG/M2 | HEIGHT: 63 IN | SYSTOLIC BLOOD PRESSURE: 130 MMHG

## 2021-11-16 DIAGNOSIS — Z45.2 ENCOUNTER FOR CARE RELATED TO PORT-A-CATH: ICD-10-CM

## 2021-11-16 DIAGNOSIS — R53.81 PHYSICAL DECONDITIONING: ICD-10-CM

## 2021-11-16 DIAGNOSIS — E11.9 TYPE 2 DIABETES MELLITUS WITHOUT COMPLICATION, WITHOUT LONG-TERM CURRENT USE OF INSULIN (HCC): ICD-10-CM

## 2021-11-16 DIAGNOSIS — R29.6 FALLS FREQUENTLY: ICD-10-CM

## 2021-11-16 DIAGNOSIS — R79.0 LOW MAGNESIUM LEVEL: ICD-10-CM

## 2021-11-16 DIAGNOSIS — I10 ESSENTIAL HYPERTENSION: Primary | ICD-10-CM

## 2021-11-16 DIAGNOSIS — Z95.828 PORT-A-CATH IN PLACE: ICD-10-CM

## 2021-11-16 DIAGNOSIS — I48.0 PAF (PAROXYSMAL ATRIAL FIBRILLATION) (HCC): ICD-10-CM

## 2021-11-16 DIAGNOSIS — R73.9 ELEVATED BLOOD SUGAR: ICD-10-CM

## 2021-11-16 LAB
ANION GAP SERPL CALCULATED.3IONS-SCNC: 14 MMOL/L (ref 7–19)
BASOPHILS ABSOLUTE: 0.1 K/UL (ref 0–0.2)
BASOPHILS RELATIVE PERCENT: 0.7 % (ref 0–1)
BUN BLDV-MCNC: 16 MG/DL (ref 8–23)
CALCIUM SERPL-MCNC: 9.5 MG/DL (ref 8.8–10.2)
CHLORIDE BLD-SCNC: 97 MMOL/L (ref 98–111)
CO2: 23 MMOL/L (ref 22–29)
CREAT SERPL-MCNC: 0.9 MG/DL (ref 0.5–0.9)
EOSINOPHILS ABSOLUTE: 0.3 K/UL (ref 0–0.6)
EOSINOPHILS RELATIVE PERCENT: 3.4 % (ref 0–5)
GFR AFRICAN AMERICAN: >59
GFR NON-AFRICAN AMERICAN: 60
GLUCOSE BLD-MCNC: 145 MG/DL (ref 74–109)
HBA1C MFR BLD: 6.3 % (ref 4–6)
HCT VFR BLD CALC: 35.9 % (ref 37–47)
HEMOGLOBIN: 10.9 G/DL (ref 12–16)
IMMATURE GRANULOCYTES #: 0 K/UL
LYMPHOCYTES ABSOLUTE: 2 K/UL (ref 1.1–4.5)
LYMPHOCYTES RELATIVE PERCENT: 22.9 % (ref 20–40)
MAGNESIUM: 2.2 MG/DL (ref 1.6–2.4)
MCH RBC QN AUTO: 23.9 PG (ref 27–31)
MCHC RBC AUTO-ENTMCNC: 30.4 G/DL (ref 33–37)
MCV RBC AUTO: 78.6 FL (ref 81–99)
MONOCYTES ABSOLUTE: 1 K/UL (ref 0–0.9)
MONOCYTES RELATIVE PERCENT: 11.6 % (ref 0–10)
NEUTROPHILS ABSOLUTE: 5.3 K/UL (ref 1.5–7.5)
NEUTROPHILS RELATIVE PERCENT: 61.2 % (ref 50–65)
PDW BLD-RTO: 15.6 % (ref 11.5–14.5)
PLATELET # BLD: 393 K/UL (ref 130–400)
PMV BLD AUTO: 9.7 FL (ref 9.4–12.3)
POTASSIUM SERPL-SCNC: 4.4 MMOL/L (ref 3.5–5)
RBC # BLD: 4.57 M/UL (ref 4.2–5.4)
SODIUM BLD-SCNC: 134 MMOL/L (ref 136–145)
WBC # BLD: 8.6 K/UL (ref 4.8–10.8)

## 2021-11-16 PROCEDURE — 1090F PRES/ABSN URINE INCON ASSESS: CPT | Performed by: NURSE PRACTITIONER

## 2021-11-16 PROCEDURE — G8420 CALC BMI NORM PARAMETERS: HCPCS | Performed by: NURSE PRACTITIONER

## 2021-11-16 PROCEDURE — G8427 DOCREV CUR MEDS BY ELIG CLIN: HCPCS | Performed by: NURSE PRACTITIONER

## 2021-11-16 PROCEDURE — 4040F PNEUMOC VAC/ADMIN/RCVD: CPT | Performed by: NURSE PRACTITIONER

## 2021-11-16 PROCEDURE — 99214 OFFICE O/P EST MOD 30 MIN: CPT | Performed by: NURSE PRACTITIONER

## 2021-11-16 PROCEDURE — 1036F TOBACCO NON-USER: CPT | Performed by: NURSE PRACTITIONER

## 2021-11-16 PROCEDURE — 1123F ACP DISCUSS/DSCN MKR DOCD: CPT | Performed by: NURSE PRACTITIONER

## 2021-11-16 PROCEDURE — 96523 IRRIG DRUG DELIVERY DEVICE: CPT | Performed by: NURSE PRACTITIONER

## 2021-11-16 PROCEDURE — G8484 FLU IMMUNIZE NO ADMIN: HCPCS | Performed by: NURSE PRACTITIONER

## 2021-11-16 PROCEDURE — G8399 PT W/DXA RESULTS DOCUMENT: HCPCS | Performed by: NURSE PRACTITIONER

## 2021-11-16 RX ORDER — BETAMETHASONE DIPROPIONATE 0.05 %
GEL (GRAM) TOPICAL
Qty: 1 EACH | Refills: 3 | Status: SHIPPED | OUTPATIENT
Start: 2021-11-16 | End: 2022-02-23

## 2021-11-16 RX ORDER — CLINDAMYCIN HYDROCHLORIDE 300 MG/1
300 CAPSULE ORAL 2 TIMES DAILY
Qty: 14 CAPSULE | Refills: 0 | Status: SHIPPED | OUTPATIENT
Start: 2021-11-16 | End: 2021-11-23

## 2021-11-16 RX ORDER — ACYCLOVIR 400 MG/1
400 TABLET ORAL 2 TIMES DAILY
Qty: 180 TABLET | Refills: 3 | Status: ON HOLD | OUTPATIENT
Start: 2021-11-16 | End: 2022-02-08 | Stop reason: HOSPADM

## 2021-11-16 ASSESSMENT — ENCOUNTER SYMPTOMS
GASTROINTESTINAL NEGATIVE: 1
ROS SKIN COMMENTS: ITCHING SCALP
BACK PAIN: 1
RESPIRATORY NEGATIVE: 1

## 2021-11-16 NOTE — PATIENT INSTRUCTIONS
I will send your labs to cardiology  Use the solution on your hair and try not to itch it the places that are open on your scalp. Start the antibiotic for this  We will send home health with physical therapy to try to help you with your deconditioning and your falls to promote safety. Always use your walker at home  You may also inquire from home health about a  to help you for future planning.

## 2021-11-16 NOTE — PROGRESS NOTES
[address] [facility]  [address] [address]  Dept: [phone]  Dept Fax: [phone]  Loc: [phone]    Nara Baker is a 82 y.o. female who presents today for her medical conditions/complaints as noted below. Nara Baker is c/o of Skin Problem (describes blister areas - head, behind ears, neck - patient feels this could be related to shingles. Noticed over the weekend.  )        HPI:     HPI   Chief Complaint   Patient presents with    Skin Problem     describes blister areas - head, behind ears, neck - patient feels this could be related to shingles. Noticed over the weekend. She is afraid she has shingles again. It itches and she has been scratching it. She is out of her acyclovir. Her caregiver is with her today but the insurance is going to run out and the caregiver will no longer be covered and she will not have anyone to help her at home. Her elderly  does live with her but he cannot help her get up and down. She has fallen several times in the last few weeks. She does have a walker at home she uses for ambulation. She did see the cardiologist virtually and has labs pending that we could drawl today for her.   Past Medical History:   Diagnosis Date    Afib [facility]) 2021    Bronchitis     Colon cancer (HCC)     Colon polyps     Constipation     Deep vein blood clot of left lower extremity (HCC)     Left leg     Depression     DVT (deep venous thrombosis) (HCC)     Dysphagia     Fibrocystic breast disease     Fibromyalgia     GERD (gastroesophageal reflux disease)     reflux with hx of esophageal stricture    Headache(784.0)     History of colon cancer 2000    Hormone replacement therapy (postmenopausal)     Hypertension     Neuropathy of foot     In both feet    Osteoarthritis     left knee pain    Restless legs syndrome     Type 2 diabetes mellitus 10/25/2021    Vitamin D deficiency       Past Surgical History: Procedure Laterality Date    APPENDECTOMY      BREAST BIOPSY      CATARACT REMOVAL       SECTION      CHOLECYSTECTOMY      COLECTOMY  partial    COLON SURGERY      COLONOSCOPY  2010    COLONOSCOPY  2012    Dr Soraya Holley: unremarkable enterocolonic anastamosis; hyperplastic polyps    COLONOSCOPY  2013    Montez: unremarkable post-surgical colon    COLONOSCOPY  2016    Dr Gila Morris colon anastomosis, 5 yr recall    COLONOSCOPY N/A 2021    Dr Ashly Bass and patent anastomosis in the right side-BCM, prn (age)    COLONOSCOPY  2021    Dr Ashly Bass and patent anastomosis in the right side-BCM, prn (age)   [facility] FOOT SURGERY  right foot    HAND SURGERY Right     Dr Jake Araya N/A 2020    KYPHOPLASTY L5 performed by Manohar Velazquez DO at [address] [facility]  [place] Right 2020    RIGHT L 4-5 DECOMPRESSIVE LAMINECTOMY performed by Manohar Velazquez DO at [address] [facility]      UPPER GASTROINTESTINAL ENDOSCOPY  10/16/2009    UPPER GASTROINTESTINAL ENDOSCOPY  2013    Anshulselvin: peptic stricture dilated to 48F; HH; small antral mucosal elevation    UPPER GASTROINTESTINAL ENDOSCOPY  2014    Maurilio: dilation of esophageal stricture    VARICOSE VEIN SURGERY Left 2014  [facility] & [address]    Left GSV Ablation    VARICOSE VEIN SURGERY Right 2014  [facility]    Right GSV Ablation       Vitals 2021 10/25/2021 10/25/2021 10/25/2021 10/7/2021    SYSTOLIC 947 612 204 [phone]   DIASTOLIC 70 72 64 62 56 62   Pulse 58 - - 68 92 89   Temp 97.2 - - - - -   Resp - - - - 18 18   SpO2 98 - - - 94 94   Weight 140 lb - - 140 lb - -   Height 5' 3\" - - 5' 3\" - -   Body mass index 24.8 kg/m2 - - 24.8 kg/m2 - -   Pain Level - - - - - -   Some recent data might be hidden       Family History   Problem Relation Age of Onset    Cancer Mother         Cervical Cancer  Cancer Brother         Esophageal cancer    Diabetes Brother     Esophageal Cancer Brother     Diabetes Sister     Colon Cancer Neg Hx     Colon Polyps Neg Hx     Liver Cancer Neg Hx     Liver Disease Neg Hx     Rectal Cancer Neg Hx     Stomach Cancer Neg Hx        Social History     Tobacco Use    Smoking status: Never Smoker    Smokeless tobacco: Never Used   Substance Use Topics    Alcohol use: No      Current Outpatient Medications on File Prior to Visit   Medication Sig Dispense Refill    irbesartan (AVAPRO) 300 MG tablet Take 1 tablet by mouth daily 30 tablet 5    metoprolol succinate (TOPROL XL) 50 MG extended release tablet Take 1 tablet by mouth 2 times daily Take (1) tab twice daily. 60 tablet 5    dilTIAZem (CARDIZEM CD) 120 MG extended release capsule TAKE 1 CAPSULE BY MOUTH TWICE DAILY 180 capsule 3    DULoxetine (CYMBALTA) 30 MG extended release capsule Take 1 capsule by mouth daily 30 capsule 3    XARELTO 20 MG TABS tablet TAKE 1 TABLET BY MOUTH DAILY WITH BREAKFAST 30 tablet 3    triamcinolone (KENALOG) 0.1 % cream APPLY TWICE DAILY TO RASH UP TO 2 WEEKS/MONTH AS NEEDED 60 g 2    nystatin (MYCOSTATIN) 095871 UNIT/GM powder For rash under breast. Apply 3 times daily. 1 Bottle 1    vitamin D (ERGOCALCIFEROL) 1.25 MG (35145 UT) CAPS capsule TAKE 1 CAPSULE BY MOUTH 1 TIME A WEEK 12 capsule 1    Cyanocobalamin (B-12) 500 MCG TABS TAKE 1 TABLET BY MOUTH DAILY 30 tablet 11    triamterene-hydroCHLOROthiazide (MAXZIDE-25) 37.5-25 MG per tablet TAKE 1 TABLET BY MOUTH DAILY 90 tablet 3    tiZANidine (ZANAFLEX) 4 MG tablet TAKE 1 TABLET BY MOUTH THREE TIMES DAILY AS NEEDED. NOT TO EXCEED 3 DOSES IN 24 HOURS      omeprazole (PRILOSEC) 40 MG delayed release capsule Take 1 capsule by mouth daily 90 capsule 3    HYDROcodone-acetaminophen (NORCO)  MG per tablet Take 1 tablet by mouth every 6 hours as needed for Pain.       [address] [facility] [address] [facility] by Does not apply route 1 each 0    albuterol (ACCUNEB) 1.25 MG/3ML nebulizer solution Inhale 1 mL into the lungs every 6 hours as needed for Wheezing       furosemide (LASIX) 20 MG tablet Take 1 tablet by mouth daily as needed (swelling) 30 tablet 3     No current facility-administered medications on file prior to visit. Allergies   Allergen Reactions    Zoloft [Sertraline Hcl] Other (See Comments)     Patient states that she doesn't want this medication anymore because she hallucinated and saw spiders. Patient weaned herself off and after she quit taking the medication, the hallucinations stopped. Patient states that she doesn't want this medication anymore because she hallucinated and saw spiders. Patient weaned herself off and after she quit taking the medication, the hallucinations stopped.  Gabapentin Rash     Pt weaning off due to rash and hot flashes       Health Maintenance   Topic Date Due    Shingles Vaccine (1 of 2) Never done    COVID-19 Vaccine (3 - Booster for Charlevi Arn series) 10/15/2021    Annual Wellness Visit (AWV)  04/21/2022    Potassium monitoring  10/07/2022    Creatinine monitoring  10/07/2022    DTaP/Tdap/Td vaccine (3 - Td or Tdap) 12/23/2025    DEXA (modify frequency per FRAX score)  Completed    Flu vaccine  Completed    Pneumococcal 65+ years Vaccine  Completed    Colon cancer screen colonoscopy  Completed    Hepatitis A vaccine  Aged Out    Hib vaccine  Aged Out    Meningococcal (ACWY) vaccine  Aged Out       Subjective:      Review of Systems   Constitutional: Positive for fatigue. Respiratory: Negative. Cardiovascular: Negative. Gastrointestinal: Negative. Musculoskeletal: Positive for back pain. Skin: Positive for rash. Itching scalp   Neurological:        Frequent falls   Psychiatric/Behavioral: Positive for dysphoric mood. The patient is nervous/anxious. Objective:     Physical Exam  Vitals and nursing note reviewed.    Constitutional:       Appearance: Normal appearance. She is well-developed. Comments: Frail elderly lady sitting up in Wheelchair   HENT:      Head: Normocephalic. Right Ear: External ear normal.      Left Ear: External ear normal.   Eyes:      Pupils: Pupils are equal, round, and reactive to light. Cardiovascular:      Rate and Rhythm: Normal rate and regular rhythm. Pulses: Normal pulses. Heart sounds: Normal heart sounds. Pulmonary:      Effort: Pulmonary effort is normal.      Breath sounds: Normal breath sounds. Musculoskeletal:      Cervical back: Normal range of motion. Skin:     General: Skin is warm and dry. Capillary Refill: Capillary refill takes less than 2 seconds. Neurological:      General: No focal deficit present. Mental Status: She is alert and oriented to person, place, and time. Psychiatric:         Mood and Affect: Mood normal.         Behavior: Behavior normal.         Thought Content: Thought content normal.         Judgment: Judgment normal.       /70   Pulse 58   Temp 97.2 °F (36.2 °C)   Ht 5' 3\" (1.6 m)   Wt 140 lb (63.5 kg)   SpO2 98%   BMI 24.80 kg/m²   Venous Access Procedure Note  Indication: maintenance flush    Procedure: The patient was placed in the appropriate position and the skin over the puncture site was prepped with betadine and draped in a sterile fashion. Intravenous access was obtained in right chest port with a Bliss needle and the site was secured appropriately. 3 cc of blood with withdrawn and discarded. Blood was drawn for labs. The port was flushed with 10cc NS and then 3 cc heparin flush. The patient tolerated the procedure well. Complications: None    Assessment:       Diagnosis Orders   1. Essential hypertension     2. Low magnesium level  Magnesium    CBC Auto Differential    Basic Metabolic Panel   3. PAF (paroxysmal atrial fibrillation) ([facility])  CBC Auto Differential    Basic Metabolic Panel   4. Elevated blood sugar  Hemoglobin A1c   5.  Falls frequently  [address]   6. Physical deconditioning  [facility] [place]   7. Type 2 diabetes mellitus without complication, without long-term current use of insulin ([address]           Plan:   More than 50% of the time was spent counseling and coordinating care for a total time of  35min face to face. PDMP Monitoring:    Last PDMP Kadeem Rodarte as Reviewed [facility]):  Review User Review Instant Review Result          Last Controlled Substance Monitoring Documentation      Office Visit from 3/4/2020 in [facility]   Attestation The Prescription Monitoring Report for this patient was reviewed today. filed at 03/04/2020 1048   Periodic Controlled Substance Monitoring Possible medication side effects, risk of tolerance/dependence & alternative treatments discussed.,  No signs of potential drug abuse or diversion identified. filed at 03/04/2020 1048        Urine Drug Screenings (1 yr)     POCT Rapid Drug Screen  Collected: 3/4/2020 11:37 AM (Final result)    Complete Results              Medication Contract and Consent for Opioid Use Documents Filed     Patient Documents     Type of Document Status Date Received Received By Description    Medication Contract Signed 3/19/2019  2:29 PM Lupe [facility]    Medication Contract Received 2/1/2021  1:35 PM EMMA BEVERLY Medication Contract Gabapentin                 Patient given educational materials -see patient instructions. Discussed use, benefit, and side effects of prescribed medications. All patient questions answered. Pt voiced understanding. Reviewed health maintenance. Instructed to continue currentmedications, diet and exercise. Patient agreed with treatment plan. Follow up as directed.    MEDICATIONS:  Orders Placed This Encounter   Medications    acyclovir (ZOVIRAX) 400 MG tablet     Sig: Take 1 tablet by mouth 2 times daily history of shingles in her eye     Dispense:  180 tablet     Refill:  3    betamethasone, augmented, (DIPROLENE) 0.05 % gel     Sig: Apply topically 2 times daily. To scalp rash     Dispense:  1 each     Refill:  3     May sub foam or solution if not gel    clindamycin (CLEOCIN) 300 MG capsule     Sig: Take 1 capsule by mouth 2 times daily for 7 days     Dispense:  14 capsule     Refill:  0         ORDERS:  Orders Placed This Encounter   Procedures    Hemoglobin A1c    [facility] [place]       Follow-up:  Return for when due for flush. PATIENT INSTRUCTIONS:  Patient Instructions   I will send your labs to cardiology  Use the solution on your hair and try not to itch it the places that are open on your scalp. Start the antibiotic for this  We will send home health with physical therapy to try to help you with your deconditioning and your falls to promote safety. Always use your walker at home  You may also inquire from home health about a  to help you for future planning. Electronically signed by EARLE Nelson CNP on 11/16/2021 at 2:00 PM    EMR Dragon/transcription disclaimer:  Much of thisencounter note is electronic transcription/translation of spoken language to printed texts. The electronic translation of spoken language may be erroneous, or at times, nonsensical words or phrases may be inadvertentlytranscribed.   Although I have reviewed the note for such errors, some may still exist.

## 2021-11-17 ENCOUNTER — APPOINTMENT (OUTPATIENT)
Dept: CT IMAGING | Age: 86
End: 2021-11-17
Payer: MEDICARE

## 2021-11-17 ENCOUNTER — APPOINTMENT (OUTPATIENT)
Dept: GENERAL RADIOLOGY | Age: 86
End: 2021-11-17
Payer: MEDICARE

## 2021-11-17 ENCOUNTER — HOSPITAL ENCOUNTER (EMERGENCY)
Age: 86
Discharge: HOME | End: 2021-11-17
Payer: MEDICARE

## 2021-11-17 VITALS
TEMPERATURE: 99.5 F | RESPIRATION RATE: 18 BRPM | OXYGEN SATURATION: 96 % | SYSTOLIC BLOOD PRESSURE: 130 MMHG | DIASTOLIC BLOOD PRESSURE: 58 MMHG | HEART RATE: 63 BPM

## 2021-11-17 DIAGNOSIS — R51.9 NONINTRACTABLE HEADACHE, UNSPECIFIED CHRONICITY PATTERN, UNSPECIFIED HEADACHE TYPE: ICD-10-CM

## 2021-11-17 DIAGNOSIS — R21 RASH AND OTHER NONSPECIFIC SKIN ERUPTION: Primary | ICD-10-CM

## 2021-11-17 DIAGNOSIS — F41.1 ANXIETY STATE: ICD-10-CM

## 2021-11-17 LAB
ALBUMIN SERPL-MCNC: 3.9 G/DL (ref 3.5–5.2)
ALP BLD-CCNC: 143 U/L (ref 35–104)
ALT SERPL-CCNC: 6 U/L (ref 5–33)
ANION GAP SERPL CALCULATED.3IONS-SCNC: 16 MMOL/L (ref 7–19)
AST SERPL-CCNC: 16 U/L (ref 5–32)
BACTERIA: ABNORMAL /HPF
BASOPHILS ABSOLUTE: 0.1 K/UL (ref 0–0.2)
BASOPHILS RELATIVE PERCENT: 0.6 % (ref 0–1)
BILIRUB SERPL-MCNC: 0.3 MG/DL (ref 0.2–1.2)
BILIRUBIN URINE: NEGATIVE
BLOOD, URINE: NEGATIVE
BUN BLDV-MCNC: 16 MG/DL (ref 8–23)
CALCIUM SERPL-MCNC: 9.5 MG/DL (ref 8.8–10.2)
CHLORIDE BLD-SCNC: 96 MMOL/L (ref 98–111)
CLARITY: CLEAR
CO2: 23 MMOL/L (ref 22–29)
COLOR: YELLOW
CREAT SERPL-MCNC: 0.9 MG/DL (ref 0.5–0.9)
CRYSTALS, UA: ABNORMAL /HPF
EKG P AXIS: 71 DEGREES
EKG P-R INTERVAL: 172 MS
EKG Q-T INTERVAL: 410 MS
EKG QRS DURATION: 76 MS
EKG QTC CALCULATION (BAZETT): 424 MS
EKG T AXIS: 79 DEGREES
EOSINOPHILS ABSOLUTE: 0.2 K/UL (ref 0–0.6)
EOSINOPHILS RELATIVE PERCENT: 2.5 % (ref 0–5)
EPITHELIAL CELLS, UA: 3 /HPF (ref 0–5)
GFR AFRICAN AMERICAN: >59
GFR NON-AFRICAN AMERICAN: 60
GLUCOSE BLD-MCNC: 129 MG/DL (ref 74–109)
GLUCOSE URINE: NEGATIVE MG/DL
HCT VFR BLD CALC: 35.8 % (ref 37–47)
HEMOGLOBIN: 10.8 G/DL (ref 12–16)
HYALINE CASTS: 0 /HPF (ref 0–8)
IMMATURE GRANULOCYTES #: 0 K/UL
INR BLD: 1.14 (ref 0.88–1.18)
KETONES, URINE: NEGATIVE MG/DL
LACTIC ACID: 1.9 MMOL/L (ref 0.5–1.9)
LACTIC ACID: 2.9 MMOL/L (ref 0.5–1.9)
LEUKOCYTE ESTERASE, URINE: ABNORMAL
LYMPHOCYTES ABSOLUTE: 2.2 K/UL (ref 1.1–4.5)
LYMPHOCYTES RELATIVE PERCENT: 27.1 % (ref 20–40)
MCH RBC QN AUTO: 23.7 PG (ref 27–31)
MCHC RBC AUTO-ENTMCNC: 30.2 G/DL (ref 33–37)
MCV RBC AUTO: 78.5 FL (ref 81–99)
MONOCYTES ABSOLUTE: 0.9 K/UL (ref 0–0.9)
MONOCYTES RELATIVE PERCENT: 11.1 % (ref 0–10)
NEUTROPHILS ABSOLUTE: 4.6 K/UL (ref 1.5–7.5)
NEUTROPHILS RELATIVE PERCENT: 58.4 % (ref 50–65)
NITRITE, URINE: NEGATIVE
PDW BLD-RTO: 15.6 % (ref 11.5–14.5)
PH UA: 7.5 (ref 5–8)
PLATELET # BLD: 345 K/UL (ref 130–400)
PMV BLD AUTO: 8.9 FL (ref 9.4–12.3)
POTASSIUM REFLEX MAGNESIUM: 4 MMOL/L (ref 3.5–5)
PROTEIN UA: NEGATIVE MG/DL
PROTHROMBIN TIME: 14.8 SEC (ref 12–14.6)
RBC # BLD: 4.56 M/UL (ref 4.2–5.4)
RBC UA: 2 /HPF (ref 0–4)
SODIUM BLD-SCNC: 135 MMOL/L (ref 136–145)
SPECIFIC GRAVITY UA: 1.01 (ref 1–1.03)
TOTAL PROTEIN: 6.9 G/DL (ref 6.6–8.7)
UROBILINOGEN, URINE: 1 E.U./DL
WBC # BLD: 7.9 K/UL (ref 4.8–10.8)
WBC UA: 6 /HPF (ref 0–5)

## 2021-11-17 PROCEDURE — 85025 COMPLETE CBC W/AUTO DIFF WBC: CPT

## 2021-11-17 PROCEDURE — 6360000002 HC RX W HCPCS: Performed by: NURSE PRACTITIONER

## 2021-11-17 PROCEDURE — 81001 URINALYSIS AUTO W/SCOPE: CPT

## 2021-11-17 PROCEDURE — 2580000003 HC RX 258: Performed by: NURSE PRACTITIONER

## 2021-11-17 PROCEDURE — 80053 COMPREHEN METABOLIC PANEL: CPT

## 2021-11-17 PROCEDURE — 96374 THER/PROPH/DIAG INJ IV PUSH: CPT

## 2021-11-17 PROCEDURE — 85610 PROTHROMBIN TIME: CPT

## 2021-11-17 PROCEDURE — 70450 CT HEAD/BRAIN W/O DYE: CPT

## 2021-11-17 PROCEDURE — 96361 HYDRATE IV INFUSION ADD-ON: CPT

## 2021-11-17 PROCEDURE — 96376 TX/PRO/DX INJ SAME DRUG ADON: CPT

## 2021-11-17 PROCEDURE — 93005 ELECTROCARDIOGRAM TRACING: CPT | Performed by: NURSE PRACTITIONER

## 2021-11-17 PROCEDURE — 87040 BLOOD CULTURE FOR BACTERIA: CPT

## 2021-11-17 PROCEDURE — 96375 TX/PRO/DX INJ NEW DRUG ADDON: CPT

## 2021-11-17 PROCEDURE — 71045 X-RAY EXAM CHEST 1 VIEW: CPT

## 2021-11-17 PROCEDURE — 99283 EMERGENCY DEPT VISIT LOW MDM: CPT

## 2021-11-17 PROCEDURE — 83605 ASSAY OF LACTIC ACID: CPT

## 2021-11-17 PROCEDURE — 36415 COLL VENOUS BLD VENIPUNCTURE: CPT

## 2021-11-17 PROCEDURE — 87086 URINE CULTURE/COLONY COUNT: CPT

## 2021-11-17 RX ORDER — ONDANSETRON 2 MG/ML
4 INJECTION INTRAMUSCULAR; INTRAVENOUS ONCE
Status: COMPLETED | OUTPATIENT
Start: 2021-11-17 | End: 2021-11-17

## 2021-11-17 RX ORDER — FENTANYL CITRATE 50 UG/ML
25 INJECTION, SOLUTION INTRAMUSCULAR; INTRAVENOUS ONCE
Status: COMPLETED | OUTPATIENT
Start: 2021-11-17 | End: 2021-11-17

## 2021-11-17 RX ORDER — 0.9 % SODIUM CHLORIDE 0.9 %
500 INTRAVENOUS SOLUTION INTRAVENOUS ONCE
Status: COMPLETED | OUTPATIENT
Start: 2021-11-17 | End: 2021-11-17

## 2021-11-17 RX ADMIN — SODIUM CHLORIDE 500 ML: 9 INJECTION, SOLUTION INTRAVENOUS at 12:53

## 2021-11-17 RX ADMIN — ONDANSETRON 4 MG: 2 INJECTION INTRAMUSCULAR; INTRAVENOUS at 11:05

## 2021-11-17 RX ADMIN — FENTANYL CITRATE 25 MCG: 0.05 INJECTION, SOLUTION INTRAMUSCULAR; INTRAVENOUS at 11:05

## 2021-11-17 RX ADMIN — FENTANYL CITRATE 25 MCG: 0.05 INJECTION, SOLUTION INTRAMUSCULAR; INTRAVENOUS at 14:31

## 2021-11-17 ASSESSMENT — PAIN SCALES - GENERAL
PAINLEVEL_OUTOF10: 8

## 2021-11-17 ASSESSMENT — ENCOUNTER SYMPTOMS
VISUAL CHANGE: 0
BLURRED VISION: 0
VOMITING: 0
NAUSEA: 0

## 2021-11-18 NOTE — ED PROVIDER NOTES
[facility] EMERGENCY DEPT  eMERGENCY dEPARTMENT eNCOUnter      Pt Name: Evelyn Gonzales  MRN: [id number]  [place] 1935  Date of evaluation: 11/17/2021  Provider: EARLE George    CHIEF COMPLAINT       Chief Complaint   Patient presents with    Headache         HISTORY OF PRESENT ILLNESS   (Location/Symptom, Timing/Onset,Context/Setting, Quality, Duration, Modifying Factors, Severity)  Note limiting factors. Evelyn Gonzales is a 82 y.o. female who presents to the emergency department with a headche. She has a dry scaley rash to her scalp that she says is painful. Tells me she has been on acyclovir for years. Was put on it by the opthamologist.  Came by ambulance  Crying with pain. HAs had the headache for a week. No chest pain or shortness of breath. Patient does not have other pain. Patient is frail    The history is provided by the patient. Headache  Pain location:  Generalized  Quality:  Stabbing  Radiates to:  Does not radiate  Relieved by:  Nothing  Ineffective treatments:  Prescription medications  Associated symptoms: no blurred vision, no fatigue, no fever, no nausea, no neck pain, no neck stiffness, no visual change and no vomiting        NursingNotes were reviewed. REVIEW OF SYSTEMS    (2-9 systems for level 4, 10 or more for level 5)     Review of Systems   Constitutional: Negative for fatigue and fever. Eyes: Negative for blurred vision. Gastrointestinal: Negative for nausea and vomiting. Musculoskeletal: Negative for neck pain and neck stiffness. Neurological: Positive for headaches. Except as noted above the remainder of the review of systems was reviewed and negative.        PAST MEDICAL HISTORY     Past Medical History:   Diagnosis Date    Afib [facility]) 2021    Bronchitis     [facility]     Colon polyps     Constipation     Deep vein blood clot of left lower extremity (HCC)     Left leg     Depression     DVT (deep venous thrombosis) (HCC)     Dysphagia     Fibrocystic breast disease     Fibromyalgia     GERD (gastroesophageal reflux disease)     reflux with hx of esophageal stricture    Headache(784.0)     History of colon cancer     Hormone replacement therapy (postmenopausal)     Hypertension     Neuropathy of foot     In both feet    Osteoarthritis     left knee pain    Restless legs syndrome     Type 2 diabetes mellitus 10/25/2021    Vitamin D deficiency          SURGICALHISTORY       Past Surgical History:   Procedure Laterality Date    APPENDECTOMY      BREAST BIOPSY      CATARACT REMOVAL       SECTION      CHOLECYSTECTOMY      COLECTOMY  partial    COLON SURGERY      COLONOSCOPY  2010    COLONOSCOPY  2012    Dr Daly Nogueira: unremarkable enterocolonic anastamosis; hyperplastic polyps    COLONOSCOPY  2013    Montez: unremarkable post-surgical colon    COLONOSCOPY  2016    Dr Garret Abel colon anastomosis, 5 yr recall    COLONOSCOPY N/A 2021    Dr Marcio Michael and patent anastomosis in the right side-BCM, prn (age)    COLONOSCOPY  2021    Dr Marcio Michael and patent anastomosis in the right side-BCM, prn (age)   [facility] FOOT SURGERY  right foot    HAND SURGERY Right     Dr Nj Dumont N/A 2020    KYPHOPLASTY L5 performed by Nicki Almanzar DO at [address] [facility] Right 2020    RIGHT L 4-5 DECOMPRESSIVE LAMINECTOMY performed by Nicki Almanzar DO at [facility]      UPPER GASTROINTESTINAL ENDOSCOPY  10/16/2009    UPPER GASTROINTESTINAL ENDOSCOPY  2013    Bradley: peptic stricture dilated to 48F; HH; small antral mucosal elevation    UPPER GASTROINTESTINAL ENDOSCOPY  2014    Maurilio: dilation of esophageal stricture    VARICOSE VEIN SURGERY Left 2014  [facility]    Left GSV Ablation    VARICOSE VEIN SURGERY Right 2014  [facility]    Right GSV Ablation         CURRENT release capsule Take 1 capsule by mouth daily, Disp-90 capsule, R-3Normal      HYDROcodone-acetaminophen (NORCO)  MG per tablet Take 1 tablet by mouth every 6 hours as needed for Pain. [facility] Starting Fri 8/21/2020, Disp-1 each,R-0, Print      albuterol (ACCUNEB) 1.25 MG/3ML nebulizer solution Inhale 1 mL into the lungs every 6 hours as needed for Wheezing Historical Med      furosemide (LASIX) 20 MG tablet Take 1 tablet by mouth daily as needed (swelling), Disp-30 tablet, R-3Normal             ALLERGIES     Zoloft [sertraline hcl] and Gabapentin    FAMILY HISTORY       Family History   Problem Relation Age of Onset    Cancer Mother         Cervical Cancer    Cancer Brother         Esophageal cancer    Diabetes Brother     Esophageal Cancer Brother     Diabetes Sister     Colon Cancer Neg Hx     Colon Polyps Neg Hx     Liver Cancer Neg Hx     Liver Disease Neg Hx     Rectal Cancer Neg Hx     Stomach Cancer Neg Hx           SOCIAL HISTORY       Social History     Socioeconomic History    Marital status:      Spouse name: Not on file    Number of children: Not on file    Years of education: Not on file    Highest education level: Not on file   Occupational History    Not on file   Tobacco Use    Smoking status: Never Smoker    Smokeless tobacco: Never Used   Vaping Use    Vaping Use: Never used   Substance and Sexual Activity    Alcohol use: No    Drug use: No    Sexual activity: Yes     Partners: Male   Other Topics Concern    Not on file   Social History Narrative    Not on file     Social Determinants of Health     Financial Resource Strain:     Difficulty of Paying Living Expenses: Not on file   Food Insecurity:     Worried About Running Out of Food in the Last Year: Not on file    Obdulio of Food in the Last Year: Not on file   Transportation Needs:     Lack of Transportation (Medical):  Not on file    Lack of Transportation (Non-Medical): Psychiatric:         Mood and Affect: Mood is anxious. Behavior: Behavior normal.         DIAGNOSTIC RESULTS     EKG: All EKG's are interpreted by the Emergency Department Physician who either signs or Co-signsthis chart in the absence of a cardiologist.  T8 sinus rhythm old inferior infarct read at 945 by Dr. Jack Cool:   Donivan Sleek such as CT, Ultrasound and MRI are read by the radiologist. [facility] radiographic images are visualized and preliminarily interpreted by the emergency physician with the below findings:      Interpretation per the Radiologist below, if available at the time of this note:    XR CHEST PORTABLE   Final Result   Stable chest exam without acute process. No visualized infiltrate. Signed by Dr Rafaela Vásquez   Final Result   No acute intracranial abnormality.    Signed by Dr Opal Burton            ED BEDSIDEULTRASOUND:   Performed by ED Physician -none    LABS:  Labs Reviewed   CBC WITH AUTO DIFFERENTIAL - Abnormal; Notable for the following components:       Result Value    Hemoglobin 10.8 (*)     Hematocrit 35.8 (*)     MCV 78.5 (*)     MCH 23.7 (*)     MCHC 30.2 (*)     RDW 15.6 (*)     MPV 8.9 (*)     Monocytes % 11.1 (*)     All other components within normal limits   COMPREHENSIVE METABOLIC PANEL W/ REFLEX TO MG FOR LOW K - Abnormal; Notable for the following components:    Sodium 135 (*)     Chloride 96 (*)     Glucose 129 (*)     GFR Non-African American 60 (*)     Alkaline Phosphatase 143 (*)     All other components within normal limits   URINE RT REFLEX TO CULTURE - Abnormal; Notable for the following components:    Leukocyte Esterase, Urine SMALL (*)     All other components within normal limits   PROTIME-INR - Abnormal; Notable for the following components:    Protime 14.8 (*)     All other components within normal limits   LACTIC ACID, PLASMA - Abnormal; Notable for the following components:    Lactic Acid 2.9 (*)     All other components within normal limits    Narrative:     Kiya Swift tel. ,  Chemistry results called to and read back by Adiel Zhao, 11/17/2021  11:16, by OSCAR   MICROSCOPIC URINALYSIS - Abnormal; Notable for the following components:    Bacteria, UA 1+ (*)     Crystals, UA NEG (*)     WBC, UA 6 (*)     All other components within normal limits   CULTURE, BLOOD 1   CULTURE, BLOOD 2   CULTURE, URINE   LACTIC ACID, PLASMA       All other labs were within normal range or not returned as of this dictation. EMERGENCY DEPARTMENT COURSE and DIFFERENTIALDIAGNOSIS/MDM:   Vitals:    Vitals:    11/17/21 0814 11/17/21 1431   BP: (!) 127/54 (!) 130/58   Pulse: 60 63   Resp: 18 18   Temp: 98.4 °F (36.9 °C) 99.5 °F (37.5 °C)   TempSrc: Tympanic    SpO2: 99% 96%           MDM urine sent for culture although patient is not complaining of dysuria or fever. Lactic acid is better after fluid bolus. Patient's pain is much improved after pain medication. Patient will be discharged home. CONSULTS:  None    PROCEDURES:  Unless otherwise noted below, none     Procedures    FINAL IMPRESSION      1. Rash and other nonspecific skin eruption    2. Nonintractable headache, unspecified chronicity pattern, unspecified headache type    3. Anxiety state        DISPOSITION/PLAN   DISPOSITION Decision To Discharge 11/17/2021 03:19:40 PM      PATIENT REFERRED TO:  No follow-up provider specified.     DISCHARGE MEDICATIONS:  Discharge Medication List as of 11/17/2021  4:19 PM             (Please note that portions of this note were completed with a voice recognitionprogram.  Efforts were made to edit the dictations but occasionally words are mis-transcribed.)    EARLE Sutton (electronically signed)          EARLE Sutton  11/17/21 7386 [address], [place]  11/17/21 2747

## 2021-11-19 ENCOUNTER — TELEPHONE (OUTPATIENT)
Dept: PRIMARY CARE CLINIC | Age: 86
End: 2021-11-19

## 2021-11-19 LAB — URINE CULTURE, ROUTINE: NORMAL

## 2021-11-19 NOTE — TELEPHONE ENCOUNTER
Please call the home health back and tell them it is fine for her to be a DNR to send home health  out to discuss her planning care.

## 2021-11-19 NOTE — TELEPHONE ENCOUNTER
Gianluca De La Cruz with [facility] called - patient wants to be a DNR (needs your okay) and would like an okay for a  to come out.   Gianluca De La Cruz #[phone] thank you

## 2021-11-22 LAB
BLOOD CULTURE, ROUTINE: NORMAL
CULTURE, BLOOD 2: NORMAL

## 2021-11-29 ENCOUNTER — TELEPHONE (OUTPATIENT)
Dept: CARDIOLOGY CLINIC | Age: 86
End: 2021-11-29

## 2021-12-01 ENCOUNTER — TELEPHONE (OUTPATIENT)
Dept: PRIMARY CARE CLINIC | Age: 86
End: 2021-12-01

## 2021-12-01 NOTE — TELEPHONE ENCOUNTER
I put her on acyclovir and some ointment for them when she was here if she is not any better we probably need to see her tomorrow.   She can always go to her dermatologist to but it may be easier to get in here

## 2021-12-01 NOTE — TELEPHONE ENCOUNTER
----- Message from Pancho Ga sent at 12/1/2021 11:20 AM CST -----  Subject: Message to Provider    QUESTIONS  Information for Provider? Please call patient as her headache is not any   better, and the rash on her head is still there. Please CAll as soon as   possible. Thank you.   ---------------------------------------------------------------------------  --------------  CALL BACK INFO  What is the best way for the office to contact you? OK to leave message on   voicemail  Preferred Call Back Phone Number? [phone]  ---------------------------------------------------------------------------  --------------  SCRIPT ANSWERS  Relationship to Patient?  Self

## 2021-12-01 NOTE — TELEPHONE ENCOUNTER
----- Message from Amina Mercedes sent at 12/1/2021 11:20 AM CST -----  Subject: Message to Provider    QUESTIONS  Information for Provider? Please call patient as her headache is not any   better, and the rash on her head is still there. Please CAll as soon as   possible. Thank you.   ---------------------------------------------------------------------------  --------------  CALL BACK INFO  What is the best way for the office to contact you? OK to leave message on   voicemail  Preferred Call Back Phone Number? [phone]  ---------------------------------------------------------------------------  --------------  SCRIPT ANSWERS  Relationship to Patient?  Self

## 2021-12-02 ENCOUNTER — OFFICE VISIT (OUTPATIENT)
Dept: PRIMARY CARE CLINIC | Age: 86
End: 2021-12-02
Payer: MEDICARE

## 2021-12-02 VITALS
OXYGEN SATURATION: 98 % | RESPIRATION RATE: 16 BRPM | SYSTOLIC BLOOD PRESSURE: 130 MMHG | HEART RATE: 77 BPM | HEIGHT: 63 IN | DIASTOLIC BLOOD PRESSURE: 63 MMHG | WEIGHT: 136.8 LBS | TEMPERATURE: 97 F | BODY MASS INDEX: 24.24 KG/M2

## 2021-12-02 DIAGNOSIS — L21.9 SEBORRHEIC DERMATITIS OF SCALP: Primary | ICD-10-CM

## 2021-12-02 PROCEDURE — G8420 CALC BMI NORM PARAMETERS: HCPCS | Performed by: NURSE PRACTITIONER

## 2021-12-02 PROCEDURE — G8427 DOCREV CUR MEDS BY ELIG CLIN: HCPCS | Performed by: NURSE PRACTITIONER

## 2021-12-02 PROCEDURE — 1123F ACP DISCUSS/DSCN MKR DOCD: CPT | Performed by: NURSE PRACTITIONER

## 2021-12-02 PROCEDURE — 1036F TOBACCO NON-USER: CPT | Performed by: NURSE PRACTITIONER

## 2021-12-02 PROCEDURE — G8399 PT W/DXA RESULTS DOCUMENT: HCPCS | Performed by: NURSE PRACTITIONER

## 2021-12-02 PROCEDURE — 1090F PRES/ABSN URINE INCON ASSESS: CPT | Performed by: NURSE PRACTITIONER

## 2021-12-02 PROCEDURE — 99213 OFFICE O/P EST LOW 20 MIN: CPT | Performed by: NURSE PRACTITIONER

## 2021-12-02 PROCEDURE — 4040F PNEUMOC VAC/ADMIN/RCVD: CPT | Performed by: NURSE PRACTITIONER

## 2021-12-02 PROCEDURE — G8484 FLU IMMUNIZE NO ADMIN: HCPCS | Performed by: NURSE PRACTITIONER

## 2021-12-02 RX ORDER — KETOCONAZOLE 20 MG/ML
SHAMPOO TOPICAL
Qty: 1 EACH | Refills: 1 | Status: SHIPPED | OUTPATIENT
Start: 2021-12-02 | End: 2022-02-23

## 2021-12-02 NOTE — PROGRESS NOTES
enterocolonic anastamosis; hyperplastic polyps    COLONOSCOPY  03/01/2013    Montez: unremarkable post-surgical colon    COLONOSCOPY  03/11/2016    Dr Valentino Ancona colon anastomosis, 5 yr recall    COLONOSCOPY N/A 03/29/2021    Dr China Freed and patent anastomosis in the right side-BCM, prn (age)    COLONOSCOPY  03/29/2021    Dr China Freed and patent anastomosis in the right side-BCM, prn (age)   Deb Cooper FOOT SURGERY  right foot    HAND SURGERY Right     Dr George Kirby N/A 07/06/2020    KYPHOPLASTY L5 performed by Ariadne Nails DO at [address]  [place] Right 12/14/2020    RIGHT L 4-5 DECOMPRESSIVE LAMINECTOMY performed by Ariadne Nails DO at [address] [facility]      UPPER GASTROINTESTINAL ENDOSCOPY  10/16/2009    UPPER GASTROINTESTINAL ENDOSCOPY  03/01/2013    Bradley: peptic stricture dilated to 48F; HH; small antral mucosal elevation    UPPER GASTROINTESTINAL ENDOSCOPY  12/01/2014    Maurilio: dilation of esophageal stricture    VARICOSE VEIN SURGERY Left 03/14/2014  [facility]    Left GSV Ablation    VARICOSE VEIN SURGERY Right 04/04/2014  [facility]    Right GSV Ablation       Vitals 12/2/2021 11/17/2021 11/17/2021 11/17/2021 11/17/2021 90/13/2647   SYSTOLIC 513 803 - - 530 352   DIASTOLIC 63 58 - - 54 70   Pulse 77 63 - - 60 58   Temp 97 99.5 - - 98.4 97.2   Resp 16 18 - - 18 -   SpO2 98 96 - - 99 98   Weight 136 lb 12.8 oz - - - - 140 lb   Height 5' 3\" - - - - 5' 3\"   Body mass index 24.23 kg/m2 - - - - 24.8 kg/m2   Pain Level - 8 8 8 - -   Some recent data might be hidden       Family History   Problem Relation Age of Onset    Cancer Mother         Cervical Cancer    Cancer Brother         Esophageal cancer    Diabetes Brother     Esophageal Cancer Brother     Diabetes Sister     Colon Cancer Neg Hx     Colon Polyps Neg Hx     Liver Cancer Neg Hx     Liver Disease Neg Hx     Rectal Cancer Neg Hx     MG/3ML nebulizer solution Inhale 1 mL into the lungs every 6 hours as needed for Wheezing       furosemide (LASIX) 20 MG tablet Take 1 tablet by mouth daily as needed (swelling) 30 tablet 3     No current facility-administered medications on file prior to visit. Allergies   Allergen Reactions    Zoloft [Sertraline Hcl] Other (See Comments)     Patient states that she doesn't want this medication anymore because she hallucinated and saw spiders. Patient weaned herself off and after she quit taking the medication, the hallucinations stopped. Patient states that she doesn't want this medication anymore because she hallucinated and saw spiders. Patient weaned herself off and after she quit taking the medication, the hallucinations stopped.  Gabapentin Rash     Pt weaning off due to rash and hot flashes       Health Maintenance   Topic Date Due    Shingles Vaccine (1 of 2) Never done    COVID-19 Vaccine (3 - Booster for Milo Princess series) 10/15/2021    Annual Wellness Visit (AWV)  04/21/2022    Potassium monitoring  11/17/2022    Creatinine monitoring  11/17/2022    DTaP/Tdap/Td vaccine (3 - Td or Tdap) 12/23/2025    DEXA (modify frequency per FRAX score)  Completed    Flu vaccine  Completed    Pneumococcal 65+ years Vaccine  Completed    Colon cancer screen colonoscopy  Completed    Hepatitis A vaccine  Aged Out    Hib vaccine  Aged Out    Meningococcal (ACWY) vaccine  Aged Out       Subjective:      Review of Systems   Constitutional: Negative. Skin:        Rash in her hair. Psychiatric/Behavioral: Negative. Objective:     Physical Exam  Vitals and nursing note reviewed. Constitutional:       Appearance: She is well-developed. HENT:      Head: Normocephalic. Eyes:      Pupils: Pupils are equal, round, and reactive to light. Musculoskeletal:      Cervical back: Normal range of motion. Skin:     General: Skin is warm and dry.       Capillary Refill: Capillary refill takes less than 2 seconds. Neurological:      General: No focal deficit present. Mental Status: She is alert and oriented to person, place, and time. Psychiatric:         Mood and Affect: Mood normal.         Behavior: Behavior normal.         Thought Content: Thought content normal.         Judgment: Judgment normal.       /63   Pulse 77   Temp 97 °F (36.1 °C) (Temporal)   Resp 16   Ht 5' 3\" (1.6 m)   Wt 136 lb 12.8 oz (62.1 kg)   SpO2 98%   Breastfeeding No   BMI 24.23 kg/m²     Assessment:       Diagnosis Orders   1. Seborrheic dermatitis of scalp           Plan:   More than 50% of the time was spent counseling and coordinating care for a total time of 20min face to face. Today it looks more like a seborrheic dermatitis of the scalp. I am going to add ketoconazole shampoo. She picked up some steroids that was previously given to her by her dermatologist and I called the pharmacy it is 1% liquid of the same thing I gave her last week. I told her she could use that but not more than 3 times a day. She has appoint with dermatologist next week she will tell them what all she is used. PDMP Monitoring:    Last PDMP Damari Valle as Reviewed [facility]):  Review User Review Instant Review Result          Last Controlled Substance Monitoring Documentation      Office Visit from 3/4/2020 in [facility]   Attestation The Prescription Monitoring Report for this patient was reviewed today. filed at 03/04/2020 1048   Periodic Controlled Substance Monitoring Possible medication side effects, risk of tolerance/dependence & alternative treatments discussed.,  No signs of potential drug abuse or diversion identified.  filed at 03/04/2020 1048        Urine Drug Screenings (1 yr)     POCT Rapid Drug Screen  Collected: 3/4/2020 11:37 AM (Final result)    Complete Results              Medication Contract and Consent for Opioid Use Documents Filed     Patient Documents     Type of Document Status Date Received Received By Description    Medication Contract Signed 3/19/2019  2:29 PM Debbie hicks neuro    Medication Contract Received 2/1/2021  1:35 PM EMMA BEVERLY Medication Contract Gabapentin                 Patient given educational materials -see patient instructions. Discussed use, benefit, and side effects of prescribed medications. All patient questions answered. Pt voiced understanding. Reviewed health maintenance. Instructed to continue currentmedications, diet and exercise. Patient agreed with treatment plan. Follow up as directed. MEDICATIONS:  Orders Placed This Encounter   Medications    ketoconazole (NIZORAL) 2 % shampoo     Sig: Apply topically daily as needed. Dispense:  1 each     Refill:  1         ORDERS:  No orders of the defined types were placed in this encounter. Follow-up:  No follow-ups on file. PATIENT INSTRUCTIONS:  There are no Patient Instructions on file for this visit. Electronically signed by EARLE Esteban CNP on 12/2/2021 at 5:23 PM    EMR Dragon/transcription disclaimer:  Much of thisencounter note is electronic transcription/translation of spoken language to printed texts. The electronic translation of spoken language may be erroneous, or at times, nonsensical words or phrases may be inadvertentlytranscribed.   Although I have reviewed the note for such errors, some may still exist.

## 2021-12-10 ENCOUNTER — TELEPHONE (OUTPATIENT)
Dept: PRIMARY CARE CLINIC | Age: 86
End: 2021-12-10

## 2021-12-10 NOTE — TELEPHONE ENCOUNTER
Pt states she saw the specialist and was told the spot on her head is not shingles and has no idea what it is or what is causing her headache. She would like a callback to figure out what is going on.

## 2021-12-10 NOTE — TELEPHONE ENCOUNTER
She is having headaches and has a place on brain mri . I told her she needed to call Candy Cisneros and leave her message about her brain MRI and follow-up with her about the headaches. I cannot give her anything else because she has hydrocodone for the headaches. If they get severe she needs to go to the ER.  She verbalized understanding

## 2021-12-14 ENCOUNTER — TELEPHONE (OUTPATIENT)
Dept: CARDIOLOGY CLINIC | Age: 86
End: 2021-12-14

## 2021-12-15 ENCOUNTER — OFFICE VISIT (OUTPATIENT)
Dept: CARDIOLOGY CLINIC | Age: 86
End: 2021-12-15
Payer: MEDICARE

## 2021-12-15 VITALS
BODY MASS INDEX: 23.04 KG/M2 | DIASTOLIC BLOOD PRESSURE: 60 MMHG | HEIGHT: 63 IN | SYSTOLIC BLOOD PRESSURE: 134 MMHG | WEIGHT: 130 LBS | HEART RATE: 56 BPM

## 2021-12-15 DIAGNOSIS — I51.89 GRADE III DIASTOLIC DYSFUNCTION: ICD-10-CM

## 2021-12-15 DIAGNOSIS — Z79.01 CHRONIC ANTICOAGULATION: ICD-10-CM

## 2021-12-15 DIAGNOSIS — I10 ESSENTIAL HYPERTENSION: Primary | ICD-10-CM

## 2021-12-15 DIAGNOSIS — G44.52 NEW DAILY PERSISTENT HEADACHE: ICD-10-CM

## 2021-12-15 DIAGNOSIS — I48.0 PAF (PAROXYSMAL ATRIAL FIBRILLATION) (HCC): ICD-10-CM

## 2021-12-15 LAB — SEDIMENTATION RATE, ERYTHROCYTE: 25 MM/HR (ref 0–25)

## 2021-12-15 PROCEDURE — G8427 DOCREV CUR MEDS BY ELIG CLIN: HCPCS | Performed by: NURSE PRACTITIONER

## 2021-12-15 PROCEDURE — 99214 OFFICE O/P EST MOD 30 MIN: CPT | Performed by: NURSE PRACTITIONER

## 2021-12-15 PROCEDURE — G8399 PT W/DXA RESULTS DOCUMENT: HCPCS | Performed by: NURSE PRACTITIONER

## 2021-12-15 PROCEDURE — 1036F TOBACCO NON-USER: CPT | Performed by: NURSE PRACTITIONER

## 2021-12-15 PROCEDURE — 4040F PNEUMOC VAC/ADMIN/RCVD: CPT | Performed by: NURSE PRACTITIONER

## 2021-12-15 PROCEDURE — G8484 FLU IMMUNIZE NO ADMIN: HCPCS | Performed by: NURSE PRACTITIONER

## 2021-12-15 PROCEDURE — G8420 CALC BMI NORM PARAMETERS: HCPCS | Performed by: NURSE PRACTITIONER

## 2021-12-15 PROCEDURE — 1123F ACP DISCUSS/DSCN MKR DOCD: CPT | Performed by: NURSE PRACTITIONER

## 2021-12-15 PROCEDURE — 1090F PRES/ABSN URINE INCON ASSESS: CPT | Performed by: NURSE PRACTITIONER

## 2021-12-15 NOTE — PROGRESS NOTES
[facility], [place]. [address] [facility]  [phone] office  [phone] fax      OFFICE VISIT:  12/15/2021    [place]: 1935  Reason For Visit:  Melinda Crum is a 82 y.o. female who is here for Follow-up (Patient was supposed to have endoscopy and it was cancelled because of her  going into Afib. She complains of a severe HA and have had testing for it. She does have an apt with Curley Hodgkin. ) and Atrial Fibrillation    History:   Diagnosis Orders   1. Essential hypertension     2. New daily persistent headache  Sedimentation Rate   3. PAF (paroxysmal atrial fibrillation) ([address]     4. Chronic anticoagulation      Xarelto   5. Grade III diastolic dysfunction       The patient presents today for cardiology follow up regarding hypertension. The patient reports normalization of BP after change to Avapro. Her primary complaint today is severe headache localized to the right side of her head over the past several weeks. She she seems to think her vision is blurred in the right eye. She has a hordeolum of the right lower lid. The patient has followed up with her PCP and had a negative CT scan. The patient did present to the ED for the headache and a rash on 11/17/21. The patient denies symptoms to suggest myocardial ischemia, heart failure or arrhythmia. BP is well controlled on current regimen. The patient's PCP monitors cholesterol. 1/6 w/c rash    Subjective  Melinda Crum denies exertional chest pain, shortness of breath, orthopnea, paroxysmal nocturnal dyspnea, syncope, presyncope, sensed arrhythmia and edema. The patient denies numbness or weakness to suggest cerebrovascular accident or transient ischemic attack. + headache localized to right temporal and parietal region. + fatigue.      Evelyn Gonzales has the following history as recorded in [facility]:  Patient Active Problem List   Diagnosis Code    Personal history of colon cancer Z85.038    Family history of esophageal cancer Z80.0    Fibromyalgia M79.7    Renal bruit R09.89    Varicose veins of left lower extremity with inflammation, 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 ([facility] I83.221, L97.121    Venous insufficiency I87.2    Hypertriglyceridemia E78.1    Dysphagia R13.10    Paroxysmal supraventricular tachycardia (HCC) I47.1    Vitamin D deficiency E55.9    Encounter for care related to Port-a-Cath Z45.2    Essential hypertension I10    History of colon polyps Z86.010    Port-A-Cath in place Z95.828    History of DVT (deep vein thrombosis) Z86.718    Avascular necrosis (HCC) M87.00    Pain in both lower extremities M79.604, M79.605    Numbness and tingling of both feet R20.0, R20.2    Acute deep vein thrombosis (DVT) of femoral vein of left lower extremity (HCC) I82.412    Cellulitis of left lower limb L03. 116    Fracture of right foot S92.901A    Hypochloremia E87.8    CKD (chronic kidney disease) stage 3, GFR 30-59 ml/min (HCC) N18.30    Malignant neoplasm of colon (HCC) C18.9    Mixed simple and mucopurulent chronic bronchitis (HCC) J41.8    Fungal dermatitis B36.9    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 I83.11, I83.12    Depression F32. A    History of esophageal stricture Z87.19    Atrial fibrillation with RVR (HCC) I48.91    Type 2 diabetes mellitus E11.9     Past Medical History:   Diagnosis Date    Afib ([facility] 2021    Bronchitis     Colon cancer (HCC)     Colon polyps     Constipation     Deep vein blood clot of left lower extremity (HCC)     Left leg     Depression     DVT (deep venous thrombosis) (HCC)     Dysphagia     Fibrocystic breast disease     Fibromyalgia     GERD (gastroesophageal reflux disease)     reflux with hx of esophageal stricture    Headache(784.0)     History of colon cancer 2000    Hormone replacement therapy (postmenopausal)     Hypertension     Neuropathy of foot     In both feet    Osteoarthritis     left knee pain    Restless legs syndrome     Type 2 diabetes mellitus 10/25/2021    Vitamin D deficiency      Past Surgical History:   Procedure Laterality Date    APPENDECTOMY      BREAST BIOPSY      CATARACT REMOVAL       SECTION      CHOLECYSTECTOMY      COLECTOMY  partial    COLON SURGERY      COLONOSCOPY  2010    COLONOSCOPY  2012    Dr Mark Da Silva: unremarkable enterocolonic anastamosis; hyperplastic polyps    COLONOSCOPY  2013    Montez: unremarkable post-surgical colon    COLONOSCOPY  2016    Dr Vaibhav Renae colon anastomosis, 5 yr recall    COLONOSCOPY N/A 2021    Dr Sofia Cohen and patent anastomosis in the right side-BCM, prn (age)    COLONOSCOPY  2021    Dr Sofia Cohen and patent anastomosis in the right side-BCM, prn (age)   [employer] FOOT SURGERY  right foot    HAND SURGERY Right     Dr Nish Velázquez N/A 2020    KYPHOPLASTY L5 performed by Cheryl Harrell DO at [address] [facility]  [place] Right 2020    RIGHT L 4-5 DECOMPRESSIVE LAMINECTOMY performed by Cheryl Harrell DO at [facility]      UPPER GASTROINTESTINAL ENDOSCOPY  10/16/2009    UPPER GASTROINTESTINAL ENDOSCOPY  2013    Anshulselvin: peptic stricture dilated to 48F; HH; small antral mucosal elevation    UPPER GASTROINTESTINAL ENDOSCOPY  2014    Maurilio: dilation of esophageal stricture    VARICOSE VEIN SURGERY Left 2014  [facility]    Left GSV Ablation    VARICOSE VEIN SURGERY Right 2014  [facility]    Right GSV Ablation     Family History   Problem Relation Age of Onset    Cancer Mother         Cervical Cancer    Cancer Brother         Esophageal cancer    Diabetes Brother     Esophageal Cancer Brother     Diabetes Sister     Colon Cancer Neg Hx     Colon Polyps Neg Hx     Liver Cancer Neg Hx     Liver Disease Neg Hx  Rectal Cancer Neg Hx     Stomach Cancer Neg Hx      Social History     Tobacco Use    Smoking status: Never Smoker    Smokeless tobacco: Never Used   Substance Use Topics    Alcohol use: No      Current Outpatient Medications   Medication Sig Dispense Refill    AMOXICILLIN-POT CLAVULANATE PO Take 500 mg by mouth 2 times daily      ketoconazole (NIZORAL) 2 % shampoo Apply topically daily as needed. 1 each 1    acyclovir (ZOVIRAX) 400 MG tablet Take 1 tablet by mouth 2 times daily history of shingles in her eye 180 tablet 3    betamethasone, augmented, (DIPROLENE) 0.05 % gel Apply topically 2 times daily. To scalp rash 1 each 3    irbesartan (AVAPRO) 300 MG tablet Take 1 tablet by mouth daily 30 tablet 5    metoprolol succinate (TOPROL XL) 50 MG extended release tablet Take 1 tablet by mouth 2 times daily Take (1) tab twice daily. 60 tablet 5    dilTIAZem (CARDIZEM CD) 120 MG extended release capsule TAKE 1 CAPSULE BY MOUTH TWICE DAILY 180 capsule 3    DULoxetine (CYMBALTA) 30 MG extended release capsule Take 1 capsule by mouth daily 30 capsule 3    XARELTO 20 MG TABS tablet TAKE 1 TABLET BY MOUTH DAILY WITH BREAKFAST 30 tablet 3    triamcinolone (KENALOG) 0.1 % cream APPLY TWICE DAILY TO RASH UP TO 2 WEEKS/MONTH AS NEEDED 60 g 2    nystatin (MYCOSTATIN) 579733 UNIT/GM powder For rash under breast. Apply 3 times daily. 1 Bottle 1    vitamin D (ERGOCALCIFEROL) 1.25 MG (80481 UT) CAPS capsule TAKE 1 CAPSULE BY MOUTH 1 TIME A WEEK 12 capsule 1    Cyanocobalamin (B-12) 500 MCG TABS TAKE 1 TABLET BY MOUTH DAILY 30 tablet 11    triamterene-hydroCHLOROthiazide (MAXZIDE-25) 37.5-25 MG per tablet TAKE 1 TABLET BY MOUTH DAILY 90 tablet 3    omeprazole (PRILOSEC) 40 MG delayed release capsule Take 1 capsule by mouth daily 90 capsule 3    HYDROcodone-acetaminophen (NORCO)  MG per tablet Take 1 tablet by mouth every 6 hours as needed for Pain.       [address] by Does not apply route 1 each 0  albuterol (ACCUNEB) 1.25 MG/3ML nebulizer solution Inhale 1 mL into the lungs every 6 hours as needed for Wheezing       furosemide (LASIX) 20 MG tablet Take 1 tablet by mouth daily as needed (swelling) 30 tablet 3     No current facility-administered medications for this visit. Allergies: Zoloft [sertraline hcl] and Gabapentin    Review of Systems  Constitutional - no appetite change, or unexpected weight change. No fever, chills or diaphoresis. + fatigue. Madeleine Foote HEENT - no significant rhinorrhea or epistaxis. No tinnitus or significant hearing loss. Eyes - no sudden vision change or amaurosis. No corneal arcus, xantholasma, subconjunctival hemorrhage or discharge. Respiratory - no significant wheezing, stridor, apnea or cough. No dyspnea on exertion or shortness of air. Cardiovascular - no exertional chest pain to suggest myocardial ischemia. No orthopnea or PND. No sensation of sustained arrythmia. No occurrence of slow heart rate. No palpitations. No claudication. Gastrointestinal - no abdominal swelling or pain. No blood in stool. No severe constipation, diarrhea, nausea, or vomiting. Genitourinary - no dysuria, frequency, or urgency. No flank pain or hematuria. Musculoskeletal - no back pain or myalgia. Transported via wheelchair. Extremities - no clubbing, cyanosis or extremity edema. Skin - no color change or rash. No new surgical incision. Skin color pale. Excoriated scalp areas. Using medicated shampoo. Neurologic - no speech difficulty, facial asymmetry or lateralizing weakness. No seizures, presyncope or syncope. No significant dizziness. + headache localized to right temporal and parietal region. Hematologic - no easy bruising or excessive bleeding. Psychiatric - no severe anxiety or insomnia. No confusion. All other review of systems are negative.     Objective  Vital Signs - /60   Pulse 56   Ht 5' 3\" (1.6 m)   Wt 130 lb (59 kg)   BMI 23.03 kg/m² General - Melinda Crum is alert, cooperative, and pleasant. Well groomed. No acute distress. Body habitus - Body mass index is 23.03 kg/m². HEENT - Head is normocephalic. No circumoral cyanosis. Dentition is normal.  No temporal artery cording. Tenderness to palpation of right temporal and parietal region. EYES -   Lids without ptosis. No discharge or subconjunctival hemorrhage. Edema right lower inner lid with hordeolum noted. Neck - Symmetrical without apparent mass or lymphadenopathy. Respiratory - Normal respiratory effort without use of accessory muscles. Ausculatation reveals vesicular breath sounds without crackles, wheezes, rub or rhonchi. Cardiovascular - No jugular venous distention. Auscultation reveals regular rate and rhythm. No audible clicks, gallop or rub. 1/6 systolic murmur. No lower extremity varicosities. No carotid bruits. Abdominal -  No visible distention, mass or pulsations. Extremities - No clubbing or cyanosis. No statis dermatitis or ulcers. No edema. Musculoskeletal -   No Osler's nodes. No kyphosis or scoliosis. Transported via wheelchair. Skin -  Warm and dry. No new surgical wound. Skin color pale. Excoriated areas on scalp - using medicated shampoo. Neurological - No focal neurological deficits. Thought processes coherent. No apparent tremor. Oriented to person, place and time. Psychiatric -  Appropriate affect and mood. Data reviewed:  10/27/21 [facility]  1. Ejection fraction 86%   2. Wall motion grossly unremarkable    3. Myocardial perfusion imaging demonstrated homogeneous uptake of the   tracer in all visualized segments no definite areas of ischemia or   infarction are identified. Summary impressions:   Normal ejection fraction 86% probably normal myocardial perfusion   study no evidence of ischemia or previous infarction.    Signed by Dr Shabnam Cantrell     9/16/21 echo   Normal left ventricular size with preserved LV systolic function and  visually estimated ejection fraction of approximately 70%. No regional  wall motion abnormalities. Mild concentric left ventricular hypertrophy. Indeterminate diastolic dysfunction with evidence for increased LVEDP. Normal right ventricular size with hyperdynamic RV systolic function. Mild tricuspid regurgitation with normal estimated RVSP. Mild pulmonic regurgitation present. Aortic root and ascending aorta are within normal limits. No evidence of significant pericardial effusion is noted. The rhythm is sinus. Compared to study report dated 3/18/2019, the LVEF has improved.     Signature    Electronically signed by Indigo Stone(Interpreting physician)  on 09/17/2021 09:48 AM    Lab Results   Component Value Date    WBC 7.9 11/17/2021    HGB 10.8 (L) 11/17/2021    HCT 35.8 (L) 11/17/2021    MCV 78.5 (L) 11/17/2021     11/17/2021     Lab Results   Component Value Date     (L) 11/17/2021    K 4.0 11/17/2021    CL 96 (L) 11/17/2021    CO2 23 11/17/2021    BUN 16 11/17/2021    CREATININE 0.9 11/17/2021    GLUCOSE 129 (H) 11/17/2021    CALCIUM 9.5 11/17/2021    PROT 6.9 11/17/2021    LABALBU 3.9 11/17/2021    BILITOT 0.3 11/17/2021    ALKPHOS 143 (H) 11/17/2021    AST 16 11/17/2021    ALT 6 11/17/2021    LABGLOM 60 (A) 11/17/2021    GFRAA >59 11/17/2021    GLOB 2.5 10/12/2016     Lab Results   Component Value Date    CHOL 174 04/20/2021    CHOL 183 06/09/2017    CHOL 168 08/31/2016     Lab Results   Component Value Date    TRIG 286 (H) 04/20/2021    TRIG 287 (H) 06/09/2017    TRIG 187 08/31/2016     Lab Results   Component Value Date    HDL 36 (L) 04/20/2021    HDL 50 (L) 06/09/2017    HDL 42 (L) 08/31/2016     Lab Results   Component Value Date    LDLCALC 81 04/20/2021    LDLCALC 76 06/09/2017    LDLCALC 89 08/31/2016     BP Readings from Last 3 Encounters:   12/15/21 134/60   12/02/21 130/63   11/17/21 (!) 130/58    Pulse Readings from Last 3 Encounters:   12/15/21 56 12/02/21 77   11/17/21 63        Wt Readings from Last 3 Encounters:   12/15/21 130 lb (59 kg)   12/02/21 136 lb 12.8 oz (62.1 kg)   11/16/21 140 lb (63.5 kg)     Assessment/Plan:   Diagnosis Orders   1. Essential hypertension     2. New daily persistent headache  Sedimentation Rate   3. PAF (paroxysmal atrial fibrillation) ([address]     4. Chronic anticoagulation      Xarelto   5. Grade III diastolic dysfunction     [id number] Score: 5  Disclaimer: Risk Score calculation is dependent on accuracy of patient problem list and past encounter diagnosis. HTN - normotensive on current regimen. BP goall less than 130/80. Continue same. PAF - no symptomology to suggest recurrent AF. No bleeding issues on Xarelto. Continue same. Grade III DD - no symptoms to suggest HFpEF. Right sided headache over past month - check a sed rate to rule out temporal arteritis. Patient is compliant with medication regimen. Previous cardiac history and records reviewed. Continue current medical management for cardiac related condition. Continue other current medications as directed. Continue to follow up with primary care provider for non cardiac medical problems. If your primary care provider is outside of the [facility], please request that your labs be faxed to this office at [phone]. BP goal 130/80 or less. Call the office with any problems, questions or concerns at [phone]. Cardiology follow up as scheduled in [address] appointments. Educational included in patient instructions. Heart health.       EARLE Lowe

## 2021-12-15 NOTE — PATIENT INSTRUCTIONS
New instructions for today:  Xarelto can increase your risk of bleeding. If you notice blood in urine or stool, bleeding gums, excessive bruising or cough productive of bloody sputum, notify the office. Information on this blood thinner has been included in your after visit summary. Patient Instructions:  Continue current medications as prescribed. Always keep a current medication list. Bring your medications to every office visit. Continue to follow up with primary care provider for non cardiac medical problems. Call the office with any problems, questions or concerns at [phone]. If you have been asked to keep a blood pressure log, do so for 2 weeks. Call the office to report readings to the triage nurse at [phone]. Follow up with cardiologist as scheduled. The following educational material has been included in this after visit summary for your review: Life simple 7. Heart health. Life simple 7  1) Manage blood pressure - high blood pressure is a major risk factor for heart disease and stroke. Keeping blood pressure in health range reduces strain on your heart, arteries and kidneys. Blood pressure goal is less than 130/80. 2) Control cholesterol - contributes to plaque, which can clog arteries and lead to heart disease and stroke. When you control your cholesterol you are giving your arteries their best chance to remain clear. It is recommended that you get cholesterol lab work done once a year. 3) Reduce blood sugar - most of the food we eat is turning into glucose or blood sugar that our body uses for energy. Over time, high levels of blood sugar can damage your heart, kidneys, eyes and nerves. 4) Get active - living an active life is one of the most rewarding gifts you can give yourself and those you love. Simply put, daily physical activity increases your length and quality of life. Strive to exercise 15 minutes most days of the week.   5)  Eat better - A healthy diet is one of your best weapons for fighting cardiovascular disease. When you eat a heart healthy diet, you improve your chances for feeling good and staying healthy for life. 6)  Lose weight - when you shed extra fat an unnecessary pounds, you reduce the burden on your hear, lungs, blood vessels and skeleton. You give yourself the gift of active living, you lower your blood pressure and help yourself feel better. 7) Stop smoking - cigarette smokers have a higher risk of developing cardiovascular disease. If  You smoke, quitting is the best thing you can do for your health. Check American Heart Association on line for more information on Life's Simple 7 and tips for healthy living. A Healthy Heart: Care Instructions  Your Care Instructions     Coronary artery disease, also called heart disease, occurs when a substance called plaque builds up in the vessels that supply oxygen-rich blood to your heart muscle. This can narrow the blood vessels and reduce blood flow. A heart attack happens when blood flow is completely blocked. A high-fat diet, smoking, and other factors increase the risk of heart disease. Your doctor has found that you have a chance of having heart disease. You can do lots of things to keep your heart healthy. It may not be easy, but you can change your diet, exercise more, and quit smoking. These steps really work to lower your chance of heart disease. Follow-up care is a key part of your treatment and safety. Be sure to make and go to all appointments, and call your doctor if you are having problems. It's also a good idea to know your test results and keep a list of the medicines you take. How can you care for yourself at home? Diet  · Use less salt when you cook and eat. This helps lower your blood pressure. Taste food before salting. Add only a little salt when you think you need it. With time, your taste buds will adjust to less salt.   · Eat fewer snack items, fast foods, canned soups, and medicine. · If your doctor recommends aspirin, take the amount directed each day. Make sure you take aspirin and not another kind of pain reliever, such as acetaminophen (Tylenol). When should you call for help? [id number] if you have symptoms of a heart attack. These may include:  · Chest pain or pressure, or a strange feeling in the chest.  · Sweating. · Shortness of breath. · Pain, pressure, or a strange feeling in the back, neck, jaw, or upper belly or in one or both shoulders or arms. · Lightheadedness or sudden weakness. · A fast or irregular heartbeat. After you call 911, the  may tell you to chew 1 adult-strength or 2 to 4 low-dose aspirin. Wait for an ambulance. Do not try to drive yourself. Watch closely for changes in your health, and be sure to contact your doctor if you have any problems. Where can you learn more? Go to https://Posiq.IBTgames. org and sign in to your Cubeit[employer]fm account. Enter W023 in the [employer] box to learn more about \"A Healthy Heart: Care Instructions. \"     If you do not have an account, please click on the \"Sign Up Now\" link. Current as of: December 16, 2019               Content Version: 12.5  © 6819-2240 Healthwise, Incorporated. Care instructions adapted under license by [facility] ([facility]). If you have questions about a medical condition or this instruction, always ask your healthcare professional. [place] [address] any warranty or liability for your use of this information.

## 2021-12-16 ENCOUNTER — TELEPHONE (OUTPATIENT)
Dept: NEUROSURGERY | Age: 86
End: 2021-12-16

## 2021-12-16 NOTE — TELEPHONE ENCOUNTER
Mehdi Song requests that office return their call. The best time to reach her is Anytime. Patient states when she had MRI , there have been some changes and the last 2-3 weeks she has been having really bad headaches, eye problems blurry vision, she stated she recently saw eye doctor for her eyes.       Thank you

## 2021-12-20 RX ORDER — ERGOCALCIFEROL 1.25 MG/1
CAPSULE ORAL
Qty: 12 CAPSULE | Refills: 1 | Status: SHIPPED | OUTPATIENT
Start: 2021-12-20

## 2021-12-20 NOTE — TELEPHONE ENCOUNTER
Can you figure out where she had the MRI done at? I don't see any report in Epic. Schedule her for a sooner follow up. This has been going on for a month now. If the pain is severe then I would recommend ER.

## 2022-01-11 ENCOUNTER — OFFICE VISIT (OUTPATIENT)
Dept: PRIMARY CARE CLINIC | Age: 87
End: 2022-01-11
Payer: MEDICARE

## 2022-01-11 VITALS
TEMPERATURE: 96 F | OXYGEN SATURATION: 96 % | WEIGHT: 138 LBS | HEIGHT: 63 IN | SYSTOLIC BLOOD PRESSURE: 110 MMHG | RESPIRATION RATE: 16 BRPM | HEART RATE: 62 BPM | BODY MASS INDEX: 24.45 KG/M2 | DIASTOLIC BLOOD PRESSURE: 60 MMHG

## 2022-01-11 DIAGNOSIS — Z45.2 ENCOUNTER FOR CARE RELATED TO PORT-A-CATH: ICD-10-CM

## 2022-01-11 DIAGNOSIS — L29.8 OTHER PRURITUS: ICD-10-CM

## 2022-01-11 DIAGNOSIS — E55.9 VITAMIN D DEFICIENCY: ICD-10-CM

## 2022-01-11 DIAGNOSIS — Z95.828 PORT-A-CATH IN PLACE: ICD-10-CM

## 2022-01-11 DIAGNOSIS — L29.9 ITCHING: ICD-10-CM

## 2022-01-11 DIAGNOSIS — R53.83 FATIGUE, UNSPECIFIED TYPE: Primary | ICD-10-CM

## 2022-01-11 LAB
ALBUMIN SERPL-MCNC: 3.5 G/DL (ref 3.5–5.2)
ALP BLD-CCNC: 111 U/L (ref 35–104)
ALT SERPL-CCNC: 7 U/L (ref 5–33)
ANION GAP SERPL CALCULATED.3IONS-SCNC: 10 MMOL/L (ref 7–19)
ANISOCYTOSIS: ABNORMAL
AST SERPL-CCNC: 13 U/L (ref 5–32)
BASOPHILS ABSOLUTE: 0.1 K/UL (ref 0–0.2)
BASOPHILS RELATIVE PERCENT: 0.4 % (ref 0–1)
BILIRUB SERPL-MCNC: <0.2 MG/DL (ref 0.2–1.2)
BUN BLDV-MCNC: 24 MG/DL (ref 8–23)
CALCIUM SERPL-MCNC: 9 MG/DL (ref 8.8–10.2)
CHLORIDE BLD-SCNC: 100 MMOL/L (ref 98–111)
CO2: 24 MMOL/L (ref 22–29)
CREAT SERPL-MCNC: 1.1 MG/DL (ref 0.5–0.9)
EOSINOPHILS ABSOLUTE: 0.4 K/UL (ref 0–0.6)
EOSINOPHILS RELATIVE PERCENT: 3.4 % (ref 0–5)
GFR AFRICAN AMERICAN: 57
GFR NON-AFRICAN AMERICAN: 47
GLUCOSE BLD-MCNC: 123 MG/DL (ref 74–109)
HCT VFR BLD CALC: 29.4 % (ref 37–47)
HEMOGLOBIN: 8.4 G/DL (ref 12–16)
HYPOCHROMIA: ABNORMAL
IMMATURE GRANULOCYTES #: 0.1 K/UL
IRON SATURATION: 7 % (ref 14–50)
IRON: 25 UG/DL (ref 37–145)
LYMPHOCYTES ABSOLUTE: 2.5 K/UL (ref 1.1–4.5)
LYMPHOCYTES RELATIVE PERCENT: 20.6 % (ref 20–40)
MCH RBC QN AUTO: 23 PG (ref 27–31)
MCHC RBC AUTO-ENTMCNC: 28.6 G/DL (ref 33–37)
MCV RBC AUTO: 80.5 FL (ref 81–99)
MONOCYTES ABSOLUTE: 1 K/UL (ref 0–0.9)
MONOCYTES RELATIVE PERCENT: 8.6 % (ref 0–10)
NEUTROPHILS ABSOLUTE: 7.9 K/UL (ref 1.5–7.5)
NEUTROPHILS RELATIVE PERCENT: 66.6 % (ref 50–65)
OVALOCYTES: ABNORMAL
PDW BLD-RTO: 15.5 % (ref 11.5–14.5)
PLATELET # BLD: 476 K/UL (ref 130–400)
PLATELET SLIDE REVIEW: ABNORMAL
PMV BLD AUTO: 9.5 FL (ref 9.4–12.3)
POLYCHROMASIA: ABNORMAL
POTASSIUM SERPL-SCNC: 5 MMOL/L (ref 3.5–5)
RBC # BLD: 3.65 M/UL (ref 4.2–5.4)
SODIUM BLD-SCNC: 134 MMOL/L (ref 136–145)
STOMATOCYTES: ABNORMAL
TOTAL IRON BINDING CAPACITY: 380 UG/DL (ref 250–400)
TOTAL PROTEIN: 6.2 G/DL (ref 6.6–8.7)
VITAMIN D 25-HYDROXY: 45.4 NG/ML
WBC # BLD: 11.9 K/UL (ref 4.8–10.8)

## 2022-01-11 PROCEDURE — 99213 OFFICE O/P EST LOW 20 MIN: CPT | Performed by: NURSE PRACTITIONER

## 2022-01-11 PROCEDURE — 96523 IRRIG DRUG DELIVERY DEVICE: CPT | Performed by: NURSE PRACTITIONER

## 2022-01-11 RX ORDER — HYDROXYZINE HYDROCHLORIDE 10 MG/1
10 TABLET, FILM COATED ORAL 3 TIMES DAILY PRN
Qty: 90 TABLET | Refills: 1 | Status: ON HOLD | OUTPATIENT
Start: 2022-01-11 | End: 2022-01-23 | Stop reason: HOSPADM

## 2022-01-11 ASSESSMENT — ENCOUNTER SYMPTOMS
EYES NEGATIVE: 1
RESPIRATORY NEGATIVE: 1
ROS SKIN COMMENTS: ITCHING
GASTROINTESTINAL NEGATIVE: 1

## 2022-01-11 NOTE — PATIENT INSTRUCTIONS
Try to do small exercises and call home Physical therapy  Use walker   Labs today to see if anything wrong  Hydroxyzine for itching and dizzy.

## 2022-01-11 NOTE — PROGRESS NOTES
[address] [facility] [address] [address]  Dept: [phone]  Dept Fax: [phone]  Loc: [phone]    Ashlie Bustos is a 82 y.o. female who presents today for her medical conditions/complaints as noted below. Ashlie Course is c/o of Follow-up (patient presents today for follow up. )        HPI:     HPI   Chief Complaint   Patient presents with    Follow-up     patient presents today for follow up. she did see cardiology and had some meds changed. She has been feeling very weak lately over the last week. Not seeing oncology  She would like to have some blood work done today with her port flush. She feels like she is weaker than normal.  She is no longer having a private sitter or physical therapy. She cannot afford either. She was having a home health physical therapist, but she could not tolerate all of the exercises. She is still able to transfer herself from the bed to the chair and can stand up to wash dishes. She cannot walk but a few steps. She has had a few falls but has not had any injuries. Her scalp rash is much better but is still itching her mainly on the right side and she has a headache on occasion  She sees pain management for her chronic back and leg pain.   Past Medical History:   Diagnosis Date    Afib [facility]) 2021    Bronchitis     Colon cancer (HCC)     Colon polyps     Constipation     Deep vein blood clot of left lower extremity (HCC)     Left leg     Depression     DVT (deep venous thrombosis) (HCC)     Dysphagia     Fibrocystic breast disease     Fibromyalgia     GERD (gastroesophageal reflux disease)     reflux with hx of esophageal stricture    Headache(784.0)     History of colon cancer 2000    Hormone replacement therapy (postmenopausal)     Hypertension     Neuropathy of foot     In both feet    Osteoarthritis     left knee pain    Restless legs syndrome     Type 2 diabetes mellitus 10/25/2021    Vitamin D deficiency       Past Surgical History:   Procedure Laterality Date    APPENDECTOMY      BREAST BIOPSY      CATARACT REMOVAL       SECTION      CHOLECYSTECTOMY      COLECTOMY  partial    COLON SURGERY      COLONOSCOPY  2010    COLONOSCOPY  2012    Dr Jose Umana: unremarkable enterocolonic anastamosis; hyperplastic polyps    COLONOSCOPY  2013    Montez: unremarkable post-surgical colon    COLONOSCOPY  2016    Dr Avril Soto colon anastomosis, 5 yr recall    COLONOSCOPY N/A 2021    Dr Hieu Medina and patent anastomosis in the right side-BCM, prn (age)    COLONOSCOPY  2021    Dr Hieu Medina and patent anastomosis in the right side-BCM, prn (age)   Natalie Otter Tail FOOT SURGERY  right foot    HAND SURGERY Right     Dr Edgard Feng N/A 2020    KYPHOPLASTY L5 performed by Calvin Baires DO at [address] [facility] Right 2020    RIGHT L 4-5 DECOMPRESSIVE LAMINECTOMY performed by Calvin Baires DO at [address]      UPPER GASTROINTESTINAL ENDOSCOPY  10/16/2009    UPPER GASTROINTESTINAL ENDOSCOPY  2013    Bradley: peptic stricture dilated to 48F; HH; small antral mucosal elevation    UPPER GASTROINTESTINAL ENDOSCOPY  2014    Maurilio: dilation of esophageal stricture    VARICOSE VEIN SURGERY Left 2014  [facility] & [address]    Left GSV Ablation    [place]  [facility]    Right GSV Ablation       Vitals 2022 12/15/2021 2021 2021 2021    SYSTOLIC 901 002 359 187 - -   DIASTOLIC 60 60 63 58 - -   Pulse 62 56 77 63 - -   Temp 96 - 97 99.5 - -   Resp 16 - 16 18 - -   SpO2 96 - 98 96 - -   Weight 138 lb 130 lb 136 lb 12.8 oz - - -   Height 5' 3\" 5' 3\" 5' 3\" - - -   Body mass index 24.44 kg/m2 23.03 kg/m2 24.23 kg/m2 - - -   Pain Level - - - 8 8 8   Some recent data might be hidden       Family History   Problem Relation Age of Onset    Cancer Mother         Cervical Cancer    Cancer Brother         Esophageal cancer    Diabetes Brother     Esophageal Cancer Brother     Diabetes Sister     Colon Cancer Neg Hx     Colon Polyps Neg Hx     Liver Cancer Neg Hx     Liver Disease Neg Hx     Rectal Cancer Neg Hx     Stomach Cancer Neg Hx        Social History     Tobacco Use    Smoking status: Never Smoker    Smokeless tobacco: Never Used   Substance Use Topics    Alcohol use: No      Current Outpatient Medications on File Prior to Visit   Medication Sig Dispense Refill    vitamin D (ERGOCALCIFEROL) 1.25 MG (18118 UT) CAPS capsule TAKE 1 CAPSULE BY MOUTH 1 TIME A WEEK 12 capsule 1    AMOXICILLIN-POT CLAVULANATE PO Take 500 mg by mouth 2 times daily      ketoconazole (NIZORAL) 2 % shampoo Apply topically daily as needed. 1 each 1    acyclovir (ZOVIRAX) 400 MG tablet Take 1 tablet by mouth 2 times daily history of shingles in her eye 180 tablet 3    betamethasone, augmented, (DIPROLENE) 0.05 % gel Apply topically 2 times daily. To scalp rash 1 each 3    irbesartan (AVAPRO) 300 MG tablet Take 1 tablet by mouth daily 30 tablet 5    metoprolol succinate (TOPROL XL) 50 MG extended release tablet Take 1 tablet by mouth 2 times daily Take (1) tab twice daily. 60 tablet 5    dilTIAZem (CARDIZEM CD) 120 MG extended release capsule TAKE 1 CAPSULE BY MOUTH TWICE DAILY 180 capsule 3    DULoxetine (CYMBALTA) 30 MG extended release capsule Take 1 capsule by mouth daily 30 capsule 3    XARELTO 20 MG TABS tablet TAKE 1 TABLET BY MOUTH DAILY WITH BREAKFAST 30 tablet 3    triamcinolone (KENALOG) 0.1 % cream APPLY TWICE DAILY TO RASH UP TO 2 WEEKS/MONTH AS NEEDED 60 g 2    nystatin (MYCOSTATIN) 401307 UNIT/GM powder For rash under breast. Apply 3 times daily.  1 Bottle 1    Cyanocobalamin (B-12) 500 MCG TABS TAKE 1 TABLET BY MOUTH DAILY 30 tablet 11    triamterene-hydroCHLOROthiazide (MAXZIDE-25) 37.5-25 MG per tablet TAKE 1 TABLET BY MOUTH DAILY 90 tablet 3    omeprazole (PRILOSEC) 40 MG delayed release capsule Take 1 capsule by mouth daily 90 capsule 3    HYDROcodone-acetaminophen (NORCO)  MG per tablet Take 1 tablet by mouth every 6 hours as needed for Pain. [address] by Does not apply route 1 each 0    albuterol (ACCUNEB) 1.25 MG/3ML nebulizer solution Inhale 1 mL into the lungs every 6 hours as needed for Wheezing       furosemide (LASIX) 20 MG tablet Take 1 tablet by mouth daily as needed (swelling) 30 tablet 3     No current facility-administered medications on file prior to visit. Allergies   Allergen Reactions    Zoloft [Sertraline Hcl] Other (See Comments)     Patient states that she doesn't want this medication anymore because she hallucinated and saw spiders. Patient weaned herself off and after she quit taking the medication, the hallucinations stopped. Patient states that she doesn't want this medication anymore because she hallucinated and saw spiders. Patient weaned herself off and after she quit taking the medication, the hallucinations stopped.  Gabapentin Rash     Pt weaning off due to rash and hot flashes       Health Maintenance   Topic Date Due    Shingles Vaccine (1 of 2) Never done    COVID-19 Vaccine (3 - Booster for Moderna series) 10/15/2021    Depression Monitoring  04/20/2022    Annual Wellness Visit (AWV)  04/21/2022    Potassium monitoring  11/17/2022    Creatinine monitoring  11/17/2022    DTaP/Tdap/Td vaccine (3 - Td or Tdap) 12/23/2025    DEXA (modify frequency per FRAX score)  Completed    Flu vaccine  Completed    Pneumococcal 65+ years Vaccine  Completed    Colon cancer screen colonoscopy  Completed    Hepatitis A vaccine  Aged Out    Hib vaccine  Aged Out    Meningococcal (ACWY) vaccine  Aged Out       Subjective:      Review of Systems   Constitutional: Positive for fatigue. HENT: Negative. Eyes: Negative. Respiratory: Negative. Cardiovascular: Negative. Gastrointestinal: Negative. Endocrine: Negative. Genitourinary: Negative. Musculoskeletal: Negative. Skin: Positive for rash. itching   Neurological: Positive for weakness (general). Psychiatric/Behavioral: Positive for dysphoric mood. The patient is nervous/anxious. Objective:     Physical Exam  Vitals and nursing note reviewed. Constitutional:       Appearance: Normal appearance. She is well-developed. HENT:      Head: Normocephalic. Right Ear: Tympanic membrane and external ear normal.      Left Ear: Tympanic membrane and external ear normal.   Eyes:      Pupils: Pupils are equal, round, and reactive to light. Cardiovascular:      Rate and Rhythm: Normal rate and regular rhythm. Heart sounds: Normal heart sounds. Pulmonary:      Effort: Pulmonary effort is normal.      Breath sounds: Normal breath sounds. Musculoskeletal:      Cervical back: Normal range of motion. Skin:     General: Skin is warm and dry. Capillary Refill: Capillary refill takes less than 2 seconds. Neurological:      General: No focal deficit present. Mental Status: She is alert and oriented to person, place, and time. Psychiatric:         Mood and Affect: Mood normal.         Behavior: Behavior normal.         Thought Content: Thought content normal.         Judgment: Judgment normal.       /60   Pulse 62   Temp 96 °F (35.6 °C) (Temporal)   Resp 16   Ht 5' 3\" (1.6 m)   Wt 138 lb (62.6 kg)   SpO2 96%   Breastfeeding No   BMI 24.45 kg/m²   Venous Access Procedure Note  Indication: maintenance flush    Procedure: The patient was placed in the appropriate position and the skin over the puncture site was prepped with betadine and draped in a sterile fashion. Intravenous access was obtained in right chest port with a Bliss needle and the site was secured appropriately. 3 cc of blood with withdrawn and discarded. Labs were drawn.   The port was flushed with 10cc NS and then 3 cc heparin flush. The patient tolerated the procedure well. Complications: None    Assessment:       Diagnosis Orders   1. Fatigue, unspecified type  CBC Auto Differential    Comprehensive Metabolic Panel    Iron and TIBC   2. Itching  Comprehensive Metabolic Panel   3. Vitamin D deficiency  Vitamin D 25 Hydroxy   4. Port-A-Cath in place     5. Encounter for care related to Port-a-Cath     6. Other pruritus   Iron and TIBC         Plan:   More than 50% of the time was spent counseling and coordinating care for a total time of 25 min face to face. PDMP Monitoring:    Last PDMP Phillip Hornce as Reviewed [facility]):  Review User Review Instant Review Result          Last Controlled Substance Monitoring Documentation      Office Visit from 3/4/2020 in [facility]   Attestation The Prescription Monitoring Report for this patient was reviewed today. filed at 03/04/2020 1048   Periodic Controlled Substance Monitoring Possible medication side effects, risk of tolerance/dependence & alternative treatments discussed., No signs of potential drug abuse or diversion identified. filed at 03/04/2020 1048        Urine Drug Screenings (1 yr)     POCT Rapid Drug Screen  Collected: 3/4/2020 11:37 AM (Final result)    Complete Results              Medication Contract and Consent for Opioid Use Documents Filed     Patient Documents     Type of Document Status Date Received Received By Description    Medication Contract Signed 3/19/2019  2:29 PM Yomaira hicks neuro    Medication Contract Received 2/1/2021  1:35 PM EMMA BEVERLY Medication Contract Gabapentin                 Patient given educational materials -see patient instructions. Discussed use, benefit, and side effects of prescribed medications. All patient questions answered. Pt voiced understanding. Reviewed health maintenance. Instructed to continue currentmedications, diet and exercise. Patient agreed with treatment plan.  Follow up as directed. MEDICATIONS:  Orders Placed This Encounter   Medications    hydrOXYzine (ATARAX) 10 MG tablet     Sig: Take 1 tablet by mouth 3 times daily as needed for Itching     Dispense:  90 tablet     Refill:  1         ORDERS:  Orders Placed This Encounter   Procedures    CBC Auto Differential    Comprehensive Metabolic Panel    Iron and TIBC    Vitamin D 25 Hydroxy       Follow-up:  Return in about 2 months (around 3/11/2022) for port and see me. PATIENT INSTRUCTIONS:  Patient Instructions   Try to do small exercises and call home Physical therapy  Use walker   Labs today to see if anything wrong  Hydroxyzine for itching and dizzy. Electronically signed by EARLE Montemayor CNP on 1/11/2022 at 12:22 PM    EMR Dragon/transcription disclaimer:  Much of thisencounter note is electronic transcription/translation of spoken language to printed texts. The electronic translation of spoken language may be erroneous, or at times, nonsensical words or phrases may be inadvertentlytranscribed.   Although I have reviewed the note for such errors, some may still exist.

## 2022-01-13 DIAGNOSIS — D50.9 IRON DEFICIENCY ANEMIA, UNSPECIFIED IRON DEFICIENCY ANEMIA TYPE: ICD-10-CM

## 2022-01-13 RX ORDER — SODIUM CHLORIDE 9 MG/ML
INJECTION, SOLUTION INTRAVENOUS CONTINUOUS
Status: CANCELLED | OUTPATIENT
Start: 2022-01-13

## 2022-01-13 RX ORDER — ALBUTEROL SULFATE 90 UG/1
4 AEROSOL, METERED RESPIRATORY (INHALATION) PRN
Status: CANCELLED | OUTPATIENT
Start: 2022-01-13

## 2022-01-13 RX ORDER — ONDANSETRON 2 MG/ML
8 INJECTION INTRAMUSCULAR; INTRAVENOUS
Status: CANCELLED | OUTPATIENT
Start: 2022-01-13

## 2022-01-13 RX ORDER — DIPHENHYDRAMINE HYDROCHLORIDE 50 MG/ML
50 INJECTION INTRAMUSCULAR; INTRAVENOUS
Status: CANCELLED | OUTPATIENT
Start: 2022-01-13

## 2022-01-13 RX ORDER — EPINEPHRINE 1 MG/ML
0.3 INJECTION, SOLUTION, CONCENTRATE INTRAVENOUS PRN
Status: CANCELLED | OUTPATIENT
Start: 2022-01-13

## 2022-01-13 RX ORDER — ACETAMINOPHEN 325 MG/1
650 TABLET ORAL
Status: CANCELLED | OUTPATIENT
Start: 2022-01-13

## 2022-01-19 ENCOUNTER — OFFICE VISIT (OUTPATIENT)
Dept: NEUROSURGERY | Age: 87
End: 2022-01-19
Payer: MEDICARE

## 2022-01-19 VITALS
HEART RATE: 49 BPM | DIASTOLIC BLOOD PRESSURE: 50 MMHG | WEIGHT: 138 LBS | SYSTOLIC BLOOD PRESSURE: 118 MMHG | BODY MASS INDEX: 24.45 KG/M2 | HEIGHT: 63 IN | TEMPERATURE: 97 F | OXYGEN SATURATION: 100 %

## 2022-01-19 DIAGNOSIS — G62.9 NEUROPATHY: ICD-10-CM

## 2022-01-19 DIAGNOSIS — D32.9 MENINGIOMA (HCC): ICD-10-CM

## 2022-01-19 DIAGNOSIS — M79.604 RIGHT LEG PAIN: ICD-10-CM

## 2022-01-19 DIAGNOSIS — R20.2 NUMBNESS AND TINGLING OF BOTH FEET: ICD-10-CM

## 2022-01-19 DIAGNOSIS — R20.0 NUMBNESS AND TINGLING OF BOTH FEET: ICD-10-CM

## 2022-01-19 DIAGNOSIS — G44.52 NEW DAILY PERSISTENT HEADACHE: Primary | ICD-10-CM

## 2022-01-19 PROCEDURE — 99213 OFFICE O/P EST LOW 20 MIN: CPT | Performed by: NURSE PRACTITIONER

## 2022-01-19 RX ORDER — RIMEGEPANT SULFATE 75 MG/75MG
TABLET, ORALLY DISINTEGRATING ORAL
Qty: 8 TABLET | Refills: 3 | Status: ON HOLD | OUTPATIENT
Start: 2022-01-19 | End: 2022-02-04

## 2022-01-19 NOTE — PROGRESS NOTES

## 2022-01-19 NOTE — PROGRESS NOTES
no clear source per patient. She follows with Dr. Harding for skin rash. Pain began after cellulitis in 2019. Has seen vascular for abnormal ABIs, compression stockings and elevation recommended. She has a prior history stomach cancer about 10 years ago, treated with chemotherapy. She denies vitamin B12 deficiency or diabetes history. No other complaints.      Past Medical History:   Diagnosis Date    [facility])     Bronchitis     Colon cancer (HCC)     Colon polyps     Constipation     Deep vein blood clot of left lower extremity (HCC)     Left leg     Depression     DVT (deep venous thrombosis) (HCC)     Dysphagia     Fibrocystic breast disease     Fibromyalgia     GERD (gastroesophageal reflux disease)     reflux with hx of esophageal stricture    Headache(784.0)     History of colon cancer     Hormone replacement therapy (postmenopausal)     Hypertension     Neuropathy of foot     In both feet    Osteoarthritis     left knee pain    Restless legs syndrome     Type 2 diabetes mellitus 10/25/2021    Vitamin D deficiency        Past Surgical History:   Procedure Laterality Date    APPENDECTOMY      BREAST BIOPSY      CATARACT REMOVAL       SECTION      CHOLECYSTECTOMY      COLECTOMY  partial    COLON SURGERY      COLONOSCOPY  2010    COLONOSCOPY  2012    Dr Anne Mac: unremarkable enterocolonic anastamosis; hyperplastic polyps    [facility]    [facility]: unremarkable post-surgical colon    COLONOSCOPY  2016    Dr Anne Marquez colon anastomosis, 5 yr recall    COLONOSCOPY N/A 2021    Dr Rj Mendez and patent anastomosis in the right side-BCM, prn (age)    COLONOSCOPY  2021    Dr Rj Mendez and patent anastomosis in the right side-BCM, prn (age)   Theresa Bennett FOOT SURGERY  right foot    HAND SURGERY Right     Dr Vincenzo Mcdowell N/A 2020    KYPHOPLASTY L5 performed by Garlon Runner, Activity:     Days of Exercise per Week: Not on file    Minutes of Exercise per Session: Not on file   Stress:     Feeling of Stress : Not on file   Social Connections:     Frequency of Communication with Friends and Family: Not on file    Frequency of Social Gatherings with Friends and Family: Not on file    Attends Latter-day Services: Not on file    Active Member of [address] [place] [address] or Organizations: Not on file    Attends Club or Organization Meetings: Not on file    Marital Status: Not on file   Intimate Partner Violence:     Fear of Current or Ex-Partner: Not on file    Emotionally Abused: Not on file    Physically Abused: Not on file    Sexually Abused: Not on file   Housing Stability:     Unable to Pay for Housing in the Last Year: Not on file    Number of [place] in the Last Year: Not on file    Unstable Housing in the Last Year: Not on file       Current Outpatient Medications   Medication Sig Dispense Refill    Rimegepant Sulfate (NURTEC) 75 MG TBDP Take 1 tablet at the onset of migraine. Do not exceed 1 tablet in 24 hours. 8 tablet 3    hydrOXYzine (ATARAX) 10 MG tablet Take 1 tablet by mouth 3 times daily as needed for Itching 90 tablet 1    vitamin D (ERGOCALCIFEROL) 1.25 MG (24014 UT) CAPS capsule TAKE 1 CAPSULE BY MOUTH 1 TIME A WEEK 12 capsule 1    AMOXICILLIN-POT CLAVULANATE PO Take 500 mg by mouth 2 times daily      ketoconazole (NIZORAL) 2 % shampoo Apply topically daily as needed. 1 each 1    acyclovir (ZOVIRAX) 400 MG tablet Take 1 tablet by mouth 2 times daily history of shingles in her eye 180 tablet 3    betamethasone, augmented, (DIPROLENE) 0.05 % gel Apply topically 2 times daily. To scalp rash 1 each 3    irbesartan (AVAPRO) 300 MG tablet Take 1 tablet by mouth daily 30 tablet 5    metoprolol succinate (TOPROL XL) 50 MG extended release tablet Take 1 tablet by mouth 2 times daily Take (1) tab twice daily.  60 tablet 5    dilTIAZem (CARDIZEM CD) 120 MG extended release capsule TAKE 1 CAPSULE BY MOUTH TWICE DAILY 180 capsule 3    DULoxetine (CYMBALTA) 30 MG extended release capsule Take 1 capsule by mouth daily 30 capsule 3    XARELTO 20 MG TABS tablet TAKE 1 TABLET BY MOUTH DAILY WITH BREAKFAST 30 tablet 3    triamcinolone (KENALOG) 0.1 % cream APPLY TWICE DAILY TO RASH UP TO 2 WEEKS/MONTH AS NEEDED 60 g 2    nystatin (MYCOSTATIN) 009972 UNIT/GM powder For rash under breast. Apply 3 times daily. 1 Bottle 1    Cyanocobalamin (B-12) 500 MCG TABS TAKE 1 TABLET BY MOUTH DAILY 30 tablet 11    triamterene-hydroCHLOROthiazide (MAXZIDE-25) 37.5-25 MG per tablet TAKE 1 TABLET BY MOUTH DAILY 90 tablet 3    omeprazole (PRILOSEC) 40 MG delayed release capsule Take 1 capsule by mouth daily 90 capsule 3    HYDROcodone-acetaminophen (NORCO)  MG per tablet Take 1 tablet by mouth every 6 hours as needed for Pain. [address] by Does not apply route 1 each 0    albuterol (ACCUNEB) 1.25 MG/3ML nebulizer solution Inhale 1 mL into the lungs every 6 hours as needed for Wheezing       furosemide (LASIX) 20 MG tablet Take 1 tablet by mouth daily as needed (swelling) 30 tablet 3     No current facility-administered medications for this visit. Allergies   Allergen Reactions    Zoloft [Sertraline Hcl] Other (See Comments)     Patient states that she doesn't want this medication anymore because she hallucinated and saw spiders. Patient weaned herself off and after she quit taking the medication, the hallucinations stopped. Patient states that she doesn't want this medication anymore because she hallucinated and saw spiders. Patient weaned herself off and after she quit taking the medication, the hallucinations stopped.  Gabapentin Rash     Pt weaning off due to rash and hot flashes       REVIEW OF SYSTEMS  Constitutional: []? Fever []? Sweats []? Chills []? Recent Injury [x]? Denies all unless marked  HEENT:[]? Headache  []? Head Injury []? Hearing Loss  []?  Sore Throat  []? Ear Ache [x]? Denies all unless marked  Spine:  []? Neck pain  []? Back pain  []? Sciaticia  [x]? Denies all unless marked  Cardiovascular:[]? Heart Disease []? Palpitations []? Chest Pain   [x]? Denies all unless marked  Pulmonary: []? Shortness of Breath []? Cough   [x]? Denies all unless marked  Psychiatric/Behavioral:[]? Depression []? Anxiety [x]? Denies all unless marked  Gastrointestinal: []? Nausea  []? Vomiting  []? Abdominal Pain  []? Constipation  []? Diarrhea  [x]? Denies all unless marked  Genitourinary:   []? Frequency  []? Urgency  []? Dysuria []? Incontinence  [x]? Denies all unless marked  Extremities: []? Pain  []? Swelling  [x]? Denies all unless marked  Musculoskeletal: []? Myalgias  []? Joint Pain  []? Arthritis []? Muscle Cramps []? Muscle Twitches  [x]? Denies all unless marked  Sleep: []? Insomnia[]? Snoring []? Restless Legs  []? Sleep Apnea  []? Daytime Sleepiness  [x]? Denies all unless marked  Skin:[x]? Rash []? Color Change [x]? Denies all unless marked   Neurological:[]? Visual Disturbance []? Memory Loss []? Loss of Balance []? Slurred Speech [x]? Weakness []? Seizures  []? Dizziness [x]? Denies all unless marked    The MA has completed the ROS with the patient. I have reviewed it in its' entirety with the patient and agree with the documentation. PHYSICAL EXAM  BP (!) 118/50   Pulse (!) 49   Temp 97 °F (36.1 °C)   Ht 5' 3\" (1.6 m)   Wt 138 lb (62.6 kg)   SpO2 100%   BMI 24.45 kg/m²       Constitutional - No acute distress    HEENT- Conjunctiva normal.  No scars, masses, or lesions over external nose or ears, no neck masses noted, no jugular vein distension, no bruit  Cardiac- Regular rate and rhythm  Pulmonary- Good expansion, normal effort without use of accessory muscles  Musculoskeletal - No significant wasting of muscles noted, no bony deformities  Extremities - No clubbing, cyanosis or edema  Skin - Warm, dry, and intact.   No rash, erythema, or pallor  Psychiatric - Mood, affect, and behavior appear normal; anxious     NEUROLOGICAL EXAM     Mental status   [x]Awake, alert, oriented   [x]Affect attention and concentration appear appropriate  [x]Recent and remote memory appears unremarkable  [x]Speech normal without dysarthria or aphasia, comprehension and repetition intact. COMMENTS: mild cognitive impairment     Cranial Nerves [x]No VF deficit to confrontation,  no papilledema on fundoscopic exam.  [x]PERRLA, EOMI, no nystagmus, conjugate eye movements, no ptosis  [x]Face symmetric  [x]Facial sensation intact  [x]Tongue midline no atrophy or fasciculations present  [x]Palate midline, hearing to finger rub normal bilaterally  [x]Shoulder shrug and SCM testing normal bilaterally  COMMENTS:    Motor   []5/5 strength x 4 extremities  [x]Normal bulk and tone  [x]No tremor present  [x]No rigidity or bradykinesia noted  COMMENTS: 2/5 RLE, pain limiting; 4/5 LLE     Sensory  []Sensation intact to light touch, pin prick, vibration, and proprioception BLE  [x]Sensation intact to light touch, pin prick, vibration, and proprioception BUE  COMMENTS: decreased PP, vibration BLE    Coordination [x]FTN normal bilaterally   [x]HTS normal bilaterally  [x]GRICELDA normal bilaterally.    COMMENTS:   Reflexes  []Symmetric and non-pathological  [x]Toes down going bilaterally  [x]No clonus present  COMMENTS: hypoactive throughout    Gait                  []Normal steady gait    []Ataxic    []Spastic     []Magnetic     []Shuffling  COMMENTS: antalgic; in wheelchair        LABS RECORD AND IMAGING REVIEW (As below and per HPI)    Lab Results   Component Value Date    [id number] 639 04/20/2021     Lab Results   Component Value Date    WBC 11.9 (H) 01/11/2022    HGB 8.4 (L) 01/11/2022    HCT 29.4 (L) 01/11/2022    MCV 80.5 (L) 01/11/2022     (H) 01/11/2022     Lab Results   Component Value Date     (L) 01/11/2022    K 5.0 01/11/2022     01/11/2022    CO2 24 01/11/2022    BUN 24 (H) 01/11/2022 CREATININE 1.1 (H) 01/11/2022    GLUCOSE 123 (H) 01/11/2022    CALCIUM 9.0 01/11/2022    PROT 6.2 (L) 01/11/2022    LABALBU 3.5 01/11/2022    BILITOT <0.2 01/11/2022    ALKPHOS 111 (H) 01/11/2022    AST 13 01/11/2022    ALT 7 01/11/2022    LABGLOM 47 (A) 01/11/2022    GFRAA 57 (L) 01/11/2022    GLOB 2.5 10/12/2016     Lab Results   Component Value Date    CHOL 174 04/20/2021    TRIG 286 (H) 04/20/2021    HDL 36 (L) 04/20/2021    LDLCALC 81 04/20/2021     Lab Results   Component Value Date    TSH 1.270 09/16/2021    T4FREE 1.1 03/19/2019     Lab Results   Component Value Date    CRP 0.19 09/10/2020    SEDRATE 25 12/15/2021      Reviewed PCP and vascular notes. Lower extremity vascular study (1/2019)- mildly diminished flow to the bilateral lower extremity arterial system at rest. Patient likely has disease in bilateral tibial arterial segments. MRI thoracic spine (1/2019)- no spinal canal stenosis. Moderate spinal canal stenosis at L1/L2, partially imaged     EMG/NCS (4/2019)- moderate sensorimotor primarily axonal polyneuropathy    CT lumbar spine (6/2020)-   Impression    Impression:    1. There is mild levocurvature of the lumbar spine. Degenerative disc    disease is noted throughout the lumbar column. 2. There is an acute mild compression deformity involving the superior    endplate of L5. There is no involvement of the posterior elements or    compromise of the spinal canal.    3. Central and foraminal stenosis at multiple levels related to    bulging of the disc and disc protrusions combined with facet and    ligamentous hypertrophy and endplate spurring. .    4. I spoke with Dorian Harley in the emergency room at 3:40 PM    concerning the finding of an acute superior endplate fracture at L5.     Signed by Dr Pina Mustafa on 6/14/2020 3:43 PM      MRI lumbar spine (6/2020)-   Impression    1.  Acute L5 compression fracture with involvement of the anterior and    posterior vertebral body but no evidence of posterior element    involvement. Approximate 15% height loss. No change in appearance    compared to a CT from 6/14/2020. No other fracture identified. 2.  Severe multilevel degenerative change, as described above. Notably, there is severe central canal stenosis at L4-L5 and severe    LEFT neural foraminal stenosis at L5-S1. 3.  Tiny 3 mm enhancing nodule located along one of the nerve roots of    the cauda equina. 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 Recommend MRI of the brain to exclude CNS neoplasm. Signed by Dr Ml Duran on 6/25/2020 1:06 PM      X-ray hips (8/2020)-   Impression    Impression:    1. No visualized fracture or malalignment. Bilateral mild hip joint    osteoarthritis. Signed by Dr Sumaya Hannah on 8/20/2020 1:18 PM         NCS/EMG (9/2020)- mild to moderate, axonal, sensorimotor polyneuropathy. Left peroneal motor abnormalities felt technical- superimposed peroneal neuropathy or lumbosacral radiculopathy not excluded but abnormalities are opposite of the most symptomatic side which would suggest more of a technical source. Unable to perform needle portion of the study due to BLE cellulitis. MRI brain (10/2020)-   Impression    1. 1 cm extra-axial enhancing mass along the anterior right tentorium    most compatible with a small meningioma. 2. Moderate chronic white matter ischemic changes. 3. Otherwise negative MR imaging of the brain    Signed by Dr Scotty Lazaro on 10/5/2020 10:26 AM      MRI brain (10/2021)-   Impression   1. 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 2. Moderate chronic microvascular changes. Signed by Dr Juanita Sorensen     MRI lumbar spine (3/2021)- muli level DDD; significant bilateral foraminal narrowing at L4/5.  At L5/S1, left extra foraminal and foraminal disc osteophyte complex which contacts the left L5 nerve root causing mass effect on it, significant NF narrowing at L5/S1. Thecal sac narrowing at L2/3 and L3/4 with severe foraminal narrowing on the right at L3/4 and moderate foraminal narrowing at L2/3. Reviewed neurosurgery records     ASSESSMENT:    Raeann Mcmahon is a 82y.o. year old female here for sooner follow up. She has noted new onset headache about 4-6 weeks ago, fairly constant in nature. Exam is unchanged from prior. MRI in October showed a slight increase in right CPA meningioma. Will order repeat MRI brain today given symptoms. Will also add Nurtec prn. Neuropathy, back pain and right hip/groin pain is unchanged. She is getting injections with Dr. Jenn Rawls which has been beneficial for back pain. She had decompressive laminectomy in 2020 with improvement in back pain initially. Recent MRI lumbar spine with significant DDD and several levels of NF narrowing. Prior MRI brain with small meningioma and . NCS with moderate sensorimotor polyneuropathy. 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 Off Lyrica s/p hospitalization for new onset atrial fibrillation. Previously weaned Gabapentin due to rash. Continue Cymbalta. Suspect that pain and gait changes are multi factorial with neuropathy, lower back pain, arthritis all playing a role. ICD-10-CM    1. New daily persistent headache  G44.52 MRI BRAIN W WO CONTRAST   2. Meningioma ([address]  D32.9    3. Right leg pain  M79.604    4. Neuropathy  G62.9    5. Numbness and tingling of both feet  R20.0     R20.2      PLAN:  1. Repeat MRI brain. 2. Nurtec prn. Discussed side effects with patient. 3. Continue Cymbalta 30mg daily. Discussed side effects with patient. 4. Records from Dr. Cory Aleman   5. Continue follow up with pain management and cardiology as previously scheduled. 6. Maximize treatment of anxiety through PCP.   7. Follow up in 4 weeks, sooner with any worsening     Bhumika Mathew, JAYLIN, APRN

## 2022-01-20 ENCOUNTER — HOSPITAL ENCOUNTER (OUTPATIENT)
Age: 87
Setting detail: OBSERVATION
Discharge: HOME HEALTH | End: 2022-01-23
Attending: EMERGENCY MEDICINE | Admitting: HOSPITALIST
Payer: MEDICARE

## 2022-01-20 ENCOUNTER — APPOINTMENT (OUTPATIENT)
Dept: GENERAL RADIOLOGY | Age: 87
End: 2022-01-20
Payer: MEDICARE

## 2022-01-20 DIAGNOSIS — R06.00 DYSPNEA, UNSPECIFIED TYPE: Primary | ICD-10-CM

## 2022-01-20 DIAGNOSIS — U07.1 ACUTE COVID-19: ICD-10-CM

## 2022-01-20 DIAGNOSIS — R53.1 GENERALIZED WEAKNESS: ICD-10-CM

## 2022-01-20 PROBLEM — Z86.79 HISTORY OF ATRIAL FIBRILLATION: Status: ACTIVE | Noted: 2022-01-20

## 2022-01-20 LAB
ALBUMIN SERPL-MCNC: 4.3 G/DL (ref 3.5–5.2)
ALP BLD-CCNC: 130 U/L (ref 35–104)
ALT SERPL-CCNC: 12 U/L (ref 5–33)
ANION GAP SERPL CALCULATED.3IONS-SCNC: 13 MMOL/L (ref 7–19)
AST SERPL-CCNC: 16 U/L (ref 5–32)
BASE EXCESS ARTERIAL: 1.5 MMOL/L (ref -2–2)
BASOPHILS ABSOLUTE: 0.1 K/UL (ref 0–0.2)
BASOPHILS RELATIVE PERCENT: 0.7 % (ref 0–1)
BILIRUB SERPL-MCNC: 0.3 MG/DL (ref 0.2–1.2)
BUN BLDV-MCNC: 23 MG/DL (ref 8–23)
C-REACTIVE PROTEIN: <0.3 MG/DL (ref 0–0.5)
CALCIUM SERPL-MCNC: 10.2 MG/DL (ref 8.8–10.2)
CARBOXYHEMOGLOBIN ARTERIAL: 0.4 % (ref 0–5)
CHLORIDE BLD-SCNC: 99 MMOL/L (ref 98–111)
CO2: 25 MMOL/L (ref 22–29)
CREAT SERPL-MCNC: 1.1 MG/DL (ref 0.5–0.9)
D DIMER: 0.33 UG/ML FEU (ref 0–0.48)
EOSINOPHILS ABSOLUTE: 0.5 K/UL (ref 0–0.6)
EOSINOPHILS RELATIVE PERCENT: 5.5 % (ref 0–5)
FERRITIN: 11.1 NG/ML (ref 13–150)
FOLATE: 14.7 NG/ML (ref 4.8–37.3)
GFR AFRICAN AMERICAN: 57
GFR NON-AFRICAN AMERICAN: 47
GLUCOSE BLD-MCNC: 145 MG/DL (ref 74–109)
HCO3 ARTERIAL: 22.1 MMOL/L (ref 22–26)
HCT VFR BLD CALC: 32.3 % (ref 37–47)
HEMOGLOBIN, ART, EXTENDED: 8.1 G/DL (ref 12–16)
HEMOGLOBIN: 9.5 G/DL (ref 12–16)
IMMATURE GRANULOCYTES #: 0 K/UL
INR BLD: 3.81 (ref 0.88–1.18)
IRON SATURATION: 4 % (ref 14–50)
IRON: 21 UG/DL (ref 37–145)
LACTATE DEHYDROGENASE: 256 U/L (ref 91–215)
LACTIC ACID: 1.4 MMOL/L (ref 0.5–1.9)
LYMPHOCYTES ABSOLUTE: 2.5 K/UL (ref 1.1–4.5)
LYMPHOCYTES RELATIVE PERCENT: 27.6 % (ref 20–40)
MAGNESIUM: 1.9 MG/DL (ref 1.6–2.4)
MCH RBC QN AUTO: 23.2 PG (ref 27–31)
MCHC RBC AUTO-ENTMCNC: 29.4 G/DL (ref 33–37)
MCV RBC AUTO: 78.8 FL (ref 81–99)
METHEMOGLOBIN ARTERIAL: 0.1 %
MONOCYTES ABSOLUTE: 0.7 K/UL (ref 0–0.9)
MONOCYTES RELATIVE PERCENT: 8.1 % (ref 0–10)
NEUTROPHILS ABSOLUTE: 5.3 K/UL (ref 1.5–7.5)
NEUTROPHILS RELATIVE PERCENT: 57.8 % (ref 50–65)
O2 CONTENT ARTERIAL: 11.2 ML/DL
O2 SAT, ARTERIAL: 96.4 %
O2 THERAPY: ABNORMAL
PCO2 ARTERIAL: 21 MMHG (ref 35–45)
PDW BLD-RTO: 15.5 % (ref 11.5–14.5)
PH ARTERIAL: 7.63 (ref 7.35–7.45)
PLATELET # BLD: 533 K/UL (ref 130–400)
PMV BLD AUTO: 9.6 FL (ref 9.4–12.3)
PO2 ARTERIAL: 102 MMHG (ref 80–100)
POTASSIUM REFLEX MAGNESIUM: 4.4 MMOL/L (ref 3.5–5)
POTASSIUM, WHOLE BLOOD: 3.9
PRO-BNP: 662 PG/ML (ref 0–1800)
PROTHROMBIN TIME: 36.6 SEC (ref 12–14.6)
RBC # BLD: 4.1 M/UL (ref 4.2–5.4)
SARS-COV-2, NAAT: DETECTED
SODIUM BLD-SCNC: 137 MMOL/L (ref 136–145)
TOTAL IRON BINDING CAPACITY: 471 UG/DL (ref 250–400)
TOTAL PROTEIN: 7.6 G/DL (ref 6.6–8.7)
TROPONIN: <0.01 NG/ML (ref 0–0.03)
TROPONIN: <0.01 NG/ML (ref 0–0.03)
TSH SERPL DL<=0.05 MIU/L-ACNC: 1.97 UIU/ML (ref 0.27–4.2)
VITAMIN B-12: 602 PG/ML (ref 211–946)
VITAMIN D 25-HYDROXY: 46.7 NG/ML
WBC # BLD: 9.2 K/UL (ref 4.8–10.8)

## 2022-01-20 PROCEDURE — 83550 IRON BINDING TEST: CPT

## 2022-01-20 PROCEDURE — G0378 HOSPITAL OBSERVATION PER HR: HCPCS

## 2022-01-20 PROCEDURE — 86140 C-REACTIVE PROTEIN: CPT

## 2022-01-20 PROCEDURE — 36600 WITHDRAWAL OF ARTERIAL BLOOD: CPT

## 2022-01-20 PROCEDURE — 84484 ASSAY OF TROPONIN QUANT: CPT

## 2022-01-20 PROCEDURE — 82607 VITAMIN B-12: CPT

## 2022-01-20 PROCEDURE — 83605 ASSAY OF LACTIC ACID: CPT

## 2022-01-20 PROCEDURE — 6360000002 HC RX W HCPCS: Performed by: HOSPITALIST

## 2022-01-20 PROCEDURE — 6370000000 HC RX 637 (ALT 250 FOR IP): Performed by: NURSE PRACTITIONER

## 2022-01-20 PROCEDURE — 83615 LACTATE (LD) (LDH) ENZYME: CPT

## 2022-01-20 PROCEDURE — 85025 COMPLETE CBC W/AUTO DIFF WBC: CPT

## 2022-01-20 PROCEDURE — 1210000000 HC MED SURG R&B

## 2022-01-20 PROCEDURE — 83880 ASSAY OF NATRIURETIC PEPTIDE: CPT

## 2022-01-20 PROCEDURE — 85610 PROTHROMBIN TIME: CPT

## 2022-01-20 PROCEDURE — 36415 COLL VENOUS BLD VENIPUNCTURE: CPT

## 2022-01-20 PROCEDURE — 6370000000 HC RX 637 (ALT 250 FOR IP): Performed by: FAMILY MEDICINE

## 2022-01-20 PROCEDURE — 84132 ASSAY OF SERUM POTASSIUM: CPT

## 2022-01-20 PROCEDURE — 82728 ASSAY OF FERRITIN: CPT

## 2022-01-20 PROCEDURE — 71045 X-RAY EXAM CHEST 1 VIEW: CPT

## 2022-01-20 PROCEDURE — 84443 ASSAY THYROID STIM HORMONE: CPT

## 2022-01-20 PROCEDURE — 99283 EMERGENCY DEPT VISIT LOW MDM: CPT

## 2022-01-20 PROCEDURE — 96375 TX/PRO/DX INJ NEW DRUG ADDON: CPT

## 2022-01-20 PROCEDURE — 82803 BLOOD GASES ANY COMBINATION: CPT

## 2022-01-20 PROCEDURE — 82746 ASSAY OF FOLIC ACID SERUM: CPT

## 2022-01-20 PROCEDURE — 6370000000 HC RX 637 (ALT 250 FOR IP): Performed by: HOSPITALIST

## 2022-01-20 PROCEDURE — 83540 ASSAY OF IRON: CPT

## 2022-01-20 PROCEDURE — 80053 COMPREHEN METABOLIC PANEL: CPT

## 2022-01-20 PROCEDURE — 83735 ASSAY OF MAGNESIUM: CPT

## 2022-01-20 PROCEDURE — 82306 VITAMIN D 25 HYDROXY: CPT

## 2022-01-20 PROCEDURE — 85379 FIBRIN DEGRADATION QUANT: CPT

## 2022-01-20 PROCEDURE — 87635 SARS-COV-2 COVID-19 AMP PRB: CPT

## 2022-01-20 RX ORDER — ACETAMINOPHEN 650 MG/1
650 SUPPOSITORY RECTAL EVERY 6 HOURS PRN
Status: DISCONTINUED | OUTPATIENT
Start: 2022-01-20 | End: 2022-01-23 | Stop reason: HOSPADM

## 2022-01-20 RX ORDER — BUDESONIDE AND FORMOTEROL FUMARATE DIHYDRATE 160; 4.5 UG/1; UG/1
2 AEROSOL RESPIRATORY (INHALATION) 2 TIMES DAILY
Status: DISCONTINUED | OUTPATIENT
Start: 2022-01-20 | End: 2022-01-23 | Stop reason: HOSPADM

## 2022-01-20 RX ORDER — POLYETHYLENE GLYCOL 3350 17 G/17G
17 POWDER, FOR SOLUTION ORAL DAILY PRN
Status: DISCONTINUED | OUTPATIENT
Start: 2022-01-20 | End: 2022-01-23 | Stop reason: HOSPADM

## 2022-01-20 RX ORDER — CHOLECALCIFEROL (VITAMIN D3) 125 MCG
500 CAPSULE ORAL DAILY
Status: DISCONTINUED | OUTPATIENT
Start: 2022-01-20 | End: 2022-01-23 | Stop reason: HOSPADM

## 2022-01-20 RX ORDER — DEXAMETHASONE SODIUM PHOSPHATE 10 MG/ML
6 INJECTION, SOLUTION INTRAMUSCULAR; INTRAVENOUS EVERY 24 HOURS
Status: DISCONTINUED | OUTPATIENT
Start: 2022-01-20 | End: 2022-01-20

## 2022-01-20 RX ORDER — ONDANSETRON 2 MG/ML
4 INJECTION INTRAMUSCULAR; INTRAVENOUS EVERY 6 HOURS PRN
Status: DISCONTINUED | OUTPATIENT
Start: 2022-01-20 | End: 2022-01-23 | Stop reason: HOSPADM

## 2022-01-20 RX ORDER — HYDRALAZINE HYDROCHLORIDE 20 MG/ML
5 INJECTION INTRAMUSCULAR; INTRAVENOUS EVERY 4 HOURS PRN
Status: DISCONTINUED | OUTPATIENT
Start: 2022-01-20 | End: 2022-01-23 | Stop reason: HOSPADM

## 2022-01-20 RX ORDER — VITAMIN B COMPLEX
2000 TABLET ORAL DAILY
Status: DISCONTINUED | OUTPATIENT
Start: 2022-01-20 | End: 2022-01-23 | Stop reason: HOSPADM

## 2022-01-20 RX ORDER — HYDROCODONE BITARTRATE AND ACETAMINOPHEN 5; 325 MG/1; MG/1
1 TABLET ORAL ONCE
Status: COMPLETED | OUTPATIENT
Start: 2022-01-20 | End: 2022-01-20

## 2022-01-20 RX ORDER — ONDANSETRON 4 MG/1
4 TABLET, ORALLY DISINTEGRATING ORAL EVERY 8 HOURS PRN
Status: DISCONTINUED | OUTPATIENT
Start: 2022-01-20 | End: 2022-01-23 | Stop reason: HOSPADM

## 2022-01-20 RX ORDER — GUAIFENESIN 600 MG/1
600 TABLET, EXTENDED RELEASE ORAL 2 TIMES DAILY
Status: DISCONTINUED | OUTPATIENT
Start: 2022-01-20 | End: 2022-01-23 | Stop reason: HOSPADM

## 2022-01-20 RX ORDER — PANTOPRAZOLE SODIUM 40 MG/1
40 TABLET, DELAYED RELEASE ORAL
Status: DISCONTINUED | OUTPATIENT
Start: 2022-01-21 | End: 2022-01-21

## 2022-01-20 RX ORDER — MECOBALAMIN 5000 MCG
5 TABLET,DISINTEGRATING ORAL NIGHTLY
Status: DISCONTINUED | OUTPATIENT
Start: 2022-01-20 | End: 2022-01-23 | Stop reason: HOSPADM

## 2022-01-20 RX ORDER — SODIUM CHLORIDE 0.9 % (FLUSH) 0.9 %
5-40 SYRINGE (ML) INJECTION EVERY 12 HOURS SCHEDULED
Status: DISCONTINUED | OUTPATIENT
Start: 2022-01-20 | End: 2022-01-23 | Stop reason: HOSPADM

## 2022-01-20 RX ORDER — ASCORBIC ACID 500 MG
500 TABLET ORAL 2 TIMES DAILY
Status: DISCONTINUED | OUTPATIENT
Start: 2022-01-20 | End: 2022-01-23 | Stop reason: HOSPADM

## 2022-01-20 RX ORDER — ZINC SULFATE 50(220)MG
50 CAPSULE ORAL DAILY
Status: DISCONTINUED | OUTPATIENT
Start: 2022-01-20 | End: 2022-01-23 | Stop reason: HOSPADM

## 2022-01-20 RX ORDER — SODIUM CHLORIDE 0.9 % (FLUSH) 0.9 %
5-40 SYRINGE (ML) INJECTION PRN
Status: DISCONTINUED | OUTPATIENT
Start: 2022-01-20 | End: 2022-01-23 | Stop reason: HOSPADM

## 2022-01-20 RX ORDER — METOPROLOL SUCCINATE 50 MG/1
25 TABLET, EXTENDED RELEASE ORAL DAILY
Status: DISCONTINUED | OUTPATIENT
Start: 2022-01-21 | End: 2022-01-23 | Stop reason: HOSPADM

## 2022-01-20 RX ORDER — DEXAMETHASONE SODIUM PHOSPHATE 10 MG/ML
6 INJECTION, SOLUTION INTRAMUSCULAR; INTRAVENOUS EVERY 24 HOURS
Status: DISCONTINUED | OUTPATIENT
Start: 2022-01-21 | End: 2022-01-23 | Stop reason: HOSPADM

## 2022-01-20 RX ORDER — HYDROXYZINE HYDROCHLORIDE 10 MG/1
10 TABLET, FILM COATED ORAL 3 TIMES DAILY PRN
Status: DISCONTINUED | OUTPATIENT
Start: 2022-01-20 | End: 2022-01-23 | Stop reason: HOSPADM

## 2022-01-20 RX ORDER — DULOXETIN HYDROCHLORIDE 30 MG/1
30 CAPSULE, DELAYED RELEASE ORAL DAILY
Status: DISCONTINUED | OUTPATIENT
Start: 2022-01-21 | End: 2022-01-23 | Stop reason: HOSPADM

## 2022-01-20 RX ORDER — ACETAMINOPHEN 325 MG/1
650 TABLET ORAL EVERY 6 HOURS PRN
Status: DISCONTINUED | OUTPATIENT
Start: 2022-01-20 | End: 2022-01-23 | Stop reason: HOSPADM

## 2022-01-20 RX ORDER — ALBUTEROL SULFATE 90 UG/1
2 AEROSOL, METERED RESPIRATORY (INHALATION) 4 TIMES DAILY
Status: DISCONTINUED | OUTPATIENT
Start: 2022-01-20 | End: 2022-01-23 | Stop reason: HOSPADM

## 2022-01-20 RX ORDER — TRIAMTERENE AND HYDROCHLOROTHIAZIDE 37.5; 25 MG/1; MG/1
1 TABLET ORAL DAILY
Status: DISCONTINUED | OUTPATIENT
Start: 2022-01-20 | End: 2022-01-23 | Stop reason: HOSPADM

## 2022-01-20 RX ORDER — DILTIAZEM HYDROCHLORIDE 120 MG/1
120 CAPSULE, COATED, EXTENDED RELEASE ORAL DAILY
Status: DISCONTINUED | OUTPATIENT
Start: 2022-01-21 | End: 2022-01-23 | Stop reason: HOSPADM

## 2022-01-20 RX ORDER — SODIUM CHLORIDE 9 MG/ML
25 INJECTION, SOLUTION INTRAVENOUS PRN
Status: DISCONTINUED | OUTPATIENT
Start: 2022-01-20 | End: 2022-01-23 | Stop reason: HOSPADM

## 2022-01-20 RX ADMIN — CYANOCOBALAMIN TAB 500 MCG 500 MCG: 500 TAB at 21:24

## 2022-01-20 RX ADMIN — IPRATROPIUM BROMIDE 2 PUFF: 17 AEROSOL, METERED RESPIRATORY (INHALATION) at 21:10

## 2022-01-20 RX ADMIN — TRIAMTERENE AND HYDROCHLOROTHIAZIDE 1 TABLET: 37.5; 25 TABLET ORAL at 21:24

## 2022-01-20 RX ADMIN — IRON SUCROSE 200 MG: 20 INJECTION, SOLUTION INTRAVENOUS at 21:09

## 2022-01-20 RX ADMIN — ALBUTEROL SULFATE 2 PUFF: 90 AEROSOL, METERED RESPIRATORY (INHALATION) at 21:10

## 2022-01-20 RX ADMIN — OXYCODONE HYDROCHLORIDE AND ACETAMINOPHEN 500 MG: 500 TABLET ORAL at 21:09

## 2022-01-20 RX ADMIN — HYDROCODONE BITARTRATE AND ACETAMINOPHEN 1 TABLET: 5; 325 TABLET ORAL at 23:29

## 2022-01-20 RX ADMIN — Medication 5 MG: at 21:09

## 2022-01-20 RX ADMIN — BUDESONIDE AND FORMOTEROL FUMARATE DIHYDRATE 2 PUFF: 160; 4.5 AEROSOL RESPIRATORY (INHALATION) at 21:10

## 2022-01-20 RX ADMIN — GUAIFENESIN 600 MG: 600 TABLET, EXTENDED RELEASE ORAL at 21:09

## 2022-01-20 ASSESSMENT — ENCOUNTER SYMPTOMS
NAUSEA: 0
VOMITING: 0
CHEST TIGHTNESS: 1
DIARRHEA: 0
COLOR CHANGE: 0
SHORTNESS OF BREATH: 1
ABDOMINAL PAIN: 0

## 2022-01-20 ASSESSMENT — PAIN SCALES - GENERAL
PAINLEVEL_OUTOF10: 4
PAINLEVEL_OUTOF10: 3

## 2022-01-20 ASSESSMENT — PAIN DESCRIPTION - DESCRIPTORS: DESCRIPTORS: SHARP

## 2022-01-20 ASSESSMENT — PAIN DESCRIPTION - LOCATION: LOCATION: CHEST

## 2022-01-20 NOTE — Clinical Note
Patient Class: Inpatient [101]   REQUIRED: Diagnosis: COVID-19 virus infection [[id number]]   Estimated Length of Stay: Estimated stay of more than 2 midnights   Future Attending Provider: Nettie Mota [[id number]]

## 2022-01-20 NOTE — H&P
pain    Restless legs syndrome     Type 2 diabetes mellitus 10/25/2021    Vitamin D deficiency        Past Surgical History:        Procedure Laterality Date    APPENDECTOMY      BREAST BIOPSY      CATARACT REMOVAL       SECTION      CHOLECYSTECTOMY      COLECTOMY  partial    COLON SURGERY      COLONOSCOPY  2010    COLONOSCOPY  2012    Dr Raina Fong: unremarkable enterocolonic anastamosis; hyperplastic polyps    COLONOSCOPY  2013    Montez: unremarkable post-surgical colon    COLONOSCOPY  2016    Dr Vickki Severance colon anastomosis, 5 yr recall    COLONOSCOPY N/A 2021    Dr Pamela Wesley and patent anastomosis in the right side-BCM, prn (age)    COLONOSCOPY  2021    Dr Pamela Wesley and patent anastomosis in the right side-BCM, prn (age)   Tomie Grater FOOT SURGERY  right foot    HAND SURGERY Right     Dr Evin Bhandari N/A 2020    KYPHOPLASTY L5 performed by Edwardo Abarca DO at [address]  [place] Right 2020    RIGHT L 4-5 DECOMPRESSIVE LAMINECTOMY performed by Edwardo Abarca DO at [address] ENDOSCOPY  10/16/2009    UPPER GASTROINTESTINAL ENDOSCOPY  2013    Bradley: peptic stricture dilated to 48F; HH; small antral mucosal elevation    UPPER GASTROINTESTINAL ENDOSCOPY  2014    Maurilio: dilation of esophageal stricture    VARICOSE VEIN SURGERY Left 2014  [address]    Left GSV Ablation    VARICOSE VEIN SURGERY Right 2014  [address]    Right GSV Ablation       Home Medications:  Prior to Admission medications    Medication Sig Start Date End Date Taking? Authorizing Provider   Rimegepant Sulfate (NURTEC) 75 MG TBDP Take 1 tablet at the onset of migraine. Do not exceed 1 tablet in 24 hours.  22   Alonzo Musa, APRN   hydrOXYzine (ATARAX) 10 MG tablet Take 1 tablet by mouth 3 times daily as needed for Itching 1/11/22 1/21/22  EARLE Fleming CNP   vitamin D (ERGOCALCIFEROL) 1.25 MG (95423 UT) CAPS capsule TAKE 1 CAPSULE BY MOUTH 1 TIME A WEEK 12/20/21   Fahad Leyva MD   AMOXICILLIN-POT CLAVULANATE PO Take 500 mg by mouth 2 times daily    Historical Provider, MD   ketoconazole (NIZORAL) 2 % shampoo Apply topically daily as needed. 12/2/21   EARLE Fleming CNP   acyclovir (ZOVIRAX) 400 MG tablet Take 1 tablet by mouth 2 times daily history of shingles in her eye 11/16/21   EARLE Fleming CNP   betamethasone, augmented, (DIPROLENE) 0.05 % gel Apply topically 2 times daily. To scalp rash 11/16/21   EARLE Fleming CNP   irbesartan (AVAPRO) 300 MG tablet Take 1 tablet by mouth daily 11/9/21   EARLE Cortes   metoprolol succinate (TOPROL XL) 50 MG extended release tablet Take 1 tablet by mouth 2 times daily Take (1) tab twice daily. 10/25/21   EARLE Cortes   dilTIAZem (CARDIZEM CD) 120 MG extended release capsule TAKE 1 CAPSULE BY MOUTH TWICE DAILY 10/15/21   EARLE Moura   DULoxetine (CYMBALTA) 30 MG extended release capsule Take 1 capsule by mouth daily 9/28/21   EARLE Pickens   XARELTO 20 MG TABS tablet TAKE 1 TABLET BY MOUTH DAILY WITH BREAKFAST 8/12/21   Fahad Leyva MD   triamcinolone (KENALOG) 0.1 % cream APPLY TWICE DAILY TO RASH UP TO 2 WEEKS/MONTH AS NEEDED 7/28/21   EARLE Fleming CNP   nystatin (MYCOSTATIN) 081662 UNIT/GM powder For rash under breast. Apply 3 times daily.  7/28/21   EARLE Fleming CNP   Cyanocobalamin (B-12) 500 MCG TABS TAKE 1 TABLET BY MOUTH DAILY 3/9/21   Fahad Leyva MD   triamterene-hydroCHLOROthiazide (MAXZIDE-25) 37.5-25 MG per tablet TAKE 1 TABLET BY MOUTH DAILY 3/9/21   EARLE Fleming - CNP   omeprazole (PRILOSEC) 40 MG delayed release capsule Take 1 capsule by mouth daily 12/21/20   Fahad Leyva MD   HYDROcodone-acetaminophen (NORCO)  MG per tablet Take 1 tablet by mouth every 6 hours as needed for Pain. 10/21/20   Historical Provider, MD    Claire [place] by Does not apply route 8/21/20   Melanie Parkinson APRN - CNP   albuterol (ACCUNEB) 1.25 MG/3ML nebulizer solution Inhale 1 mL into the lungs every 6 hours as needed for Wheezing     Historical Provider, MD   furosemide (LASIX) 20 MG tablet Take 1 tablet by mouth daily as needed (swelling) 6/19/18   Chandra Umana MD       Allergies:    Zoloft [sertraline hcl] and Gabapentin    Social History:    The patient currently lives with  and grandson  Tobacco:   reports that she has never smoked. She has never used smokeless tobacco.  Alcohol:   reports no history of alcohol use. Illicit Drugs: none    Family History:      Problem Relation Age of Onset    Cancer Mother         Cervical Cancer    Cancer Brother         Esophageal cancer    Diabetes Brother     Esophageal Cancer Brother     Diabetes Sister     Colon Cancer Neg Hx     Colon Polyps Neg Hx     Liver Cancer Neg Hx     Liver Disease Neg Hx     Rectal Cancer Neg Hx     Stomach Cancer Neg Hx        Review of Systems:   Review of Systems   Constitutional: Negative for fever. Respiratory: Positive for cough and shortness of breath. Cardiovascular: Positive for chest pain. Gastrointestinal: Negative for abdominal pain, diarrhea and vomiting. Neurological: Positive for dizziness, weakness (generalized) and headaches. All other systems reviewed and are negative. 14 point review of systems is negative except as specifically addressed above. Physical Examination:  BP (!) 147/44   Pulse 60   Temp 96.9 °F (36.1 °C) (Oral)   Resp 15   SpO2 100%   Physical Exam  Vitals reviewed. Constitutional:       Comments: 82 yr old female in no respiratory distress not currently requiring supplemental oxygen with spo2 readings in the 90's   HENT:      Head: Normocephalic.       Right Ear: External ear normal.      Left Ear: External ear normal.   Eyes: Conjunctiva/sclera: Conjunctivae normal.      Pupils: Pupils are equal, round, and reactive to light. Cardiovascular:      Rate and Rhythm: Normal rate and regular rhythm. Heart sounds: Normal heart sounds. Pulmonary:      Effort: Pulmonary effort is normal.      Comments: Decreased breath bilateral bases  Abdominal:      General: Bowel sounds are normal.      Palpations: Abdomen is soft. Tenderness: There is no abdominal tenderness. Musculoskeletal:         General: Normal range of motion. Cervical back: Normal range of motion. Comments: 5/5 MS equal bilateral upper/lower extremities  Trace edema bilateral bases   Skin:     General: Skin is warm and dry. Neurological:      Mental Status: She is alert and oriented to person, place, and time. Diagnostic Data:  CBC:  Recent Labs     01/20/22  1149   WBC 9.2   HGB 9.5*   HCT 32.3*   *     BMP:  Recent Labs     01/20/22  1149      K 4.4   CL 99   CO2 25   BUN 23   CREATININE 1.1*   CALCIUM 10.2     Recent Labs     01/20/22  1149   AST 16   ALT 12   BILITOT 0.3   ALKPHOS 130*     Coag Panel:   Recent Labs     01/20/22  1149   INR 3.81*   PROTIME 36.6*     Cardiac Enzymes:   Recent Labs     01/20/22  1149   TROPONINI <0.01     Urinalysis:  Lab Results   Component Value Date    NITRU Negative 11/17/2021    WBCUA 6 11/17/2021    BACTERIA 1+ 11/17/2021    RBCUA 2 11/17/2021    BLOODU Negative 11/17/2021    SPECGRAV 1.013 11/17/2021    GLUCOSEU Negative 11/17/2021     XR CHEST PORTABLE    Result Date: 1/20/2022  EXAMINATION: XR CHEST PORTABLE 1/20/2022 3:12 PM HISTORY: Chest pain. Report: A portable upright frontal view of the chest was obtained. COMPARISON: Portable chest x-ray 11/17/2021. There is mild to moderate bibasilar atelectasis, no lung consolidation. Heart size is normal. No pneumothorax or definite pleural effusion is identified.  There is a right subclavian port catheter with the tip at the proximal SVC in satisfactory position, unchanged. The osseous structures and upper abdomen appear unremarkable. Bibasilar atelectasis, no acute infiltrates. Stable satisfactory position of the right subclavian port catheter. Signed by Dr Hannah Peacock      Assessment/Plan:  Principal Problem:    COVID-19 virus infection  Active Problems:    Fibromyalgia    Hypertriglyceridemia    Paroxysmal supraventricular tachycardia (HCC)    Vitamin D deficiency    Essential hypertension    History of DVT (deep vein thrombosis)    CKD (chronic kidney disease) stage 3, GFR 30-59 ml/min (HCC)    Malignant neoplasm of colon (HCC)    Depression    Type 2 diabetes mellitus    History of atrial fibrillation  Resolved Problems:    * No resolved hospital problems. *     Principal Problem:    COVID-19 virus infection   -monitor for supplemental oxygen requriements   -continuous bedside pulse oximetry   -decadron 10mg iv daily   -check inflammatory markers   -Vit D, Vit C, zinc, melatonin   -droplet precautions   -albuterol/atrovent/symbicort inhalers  Active Problems:  Type 2 diabetes mellitus   -HgA1c   -blood glucose qid   -low dose insulin   -hypoglycemic orders  Fibromyalgia/Hypertriglyceridemia/Paroxysmal supraventricular tachycardia (HCC)  Vitamin D deficiency/ History of DVT (deep vein thrombosis) Malignant neoplasm of colon/         History of atrial fibrillation/Depression   -noted   -continue home anticoagulation   -daily INR   -telemetry monitor   -HgA1c   -check lipid panel     CKD (chronic kidney disease) stage 3, GFR 30-59 ml/min (HCC)   -monitor renal function   -avoid nephrotoxic agents   -daily weights   -I's&O's   Essential hypertension   -continue home antihypertensive medication   -monitor blood pressure      Resolved Problems:    * No resolved hospital problems.  *  Signed:  EARLE Acosta CNP, 1/20/2022 3:39 PM       Attestation Statement     I have independently seen and examined this patient and agree with the asesment and plan by mid level provider                                                                 Objective:   Vitals: BP (!) 155/50   Pulse 65   Temp 96.9 °F (36.1 °C) (Oral)   Resp 15   SpO2 100%   General appearance: alert, appears stated age and cooperative  Skin: Skin color, texture, turgor normal.   HEENT: Head: Normocephalic, no lesions, without obvious abnormality. Neck: no adenopathy, no carotid bruit, no JVD and supple, symmetrical, trachea midline  Lungs: clear to auscultation bilaterally  Heart: regular rate and rhythm, S1, S2 normal, no murmur, click, rub or gallop  Abdomen: soft, non-tender; bowel sounds normal; no masses,  no organomegaly  Extremities: extremities normal, atraumatic, no cyanosis or edema  Lymphatic: No significant lymph node enlargement papable  Neurologic: Mental status: Alert, oriented, thought content appropriate      Assessment & Plan:      · covid 19 positive   · Chronic anticoagulation   · Dyspnea ? ?     Patient Active Problem List:     Personal history of colon cancer     Fibromyalgia     Renal bruit     Paroxysmal supraventricular tachycardia ([address]     Encounter for care related to Port-a-Cath     Essential hypertension     Acute deep vein thrombosis (DVT) of femoral vein of left lower extremity      History of esophageal stricture     History of Atrial fibrillation with RVR ([address]    Olya Correia MD

## 2022-01-20 NOTE — PROGRESS NOTES
pH, Arterial 7.350 - 7.450 7.630 High Panic     pCO2, Arterial 35.0 - 45.0 mmHg 21.0 Low     pO2, Arterial 80.0 - 100.0 mmHg 102. 0 High     HCO3, Arterial 22.0 - 26.0 mmol/L 22.1    Base Excess, Arterial -2.0 - 2.0 mmol/L 1.5    Hemoglobin, Art, Extended 12.0 - 16.0 g/dL 8.1 Low     O2 Sat, Arterial >92 % 96.4    Carboxyhgb, Arterial 0.0 - 5.0 % 0.4    Comment:      0.0-1.5   (Smokers 1.5-5.0)    Methemoglobin, Arterial <1.5 % 0.1    O2 Content, Arterial Not Established mL/dL 11.2    O2 Therapy  Unknown    Resulting Agency  [facility]             Narrative  Performed by: [facility]  CALL  Singh  KLE tel. ,   Arnav Avila RN was notified of panic Values, 01/20/2022 17:29, by [facility]      Specimen Collected: 01/20/22 17:26 Last Resulted: 01/20/22 17:27        Lab Flowsheet     Order Details     View Encounter     Lab and Collection Details     Routing     Result History             Result Care Coordination      Patient Communication    Released Not seen Back to Top             Testing Performed By:    [address] [facility] [address] [facility]   [address] [address]   Tel: [phone]   : Lety Hernandez M.D.                Result Information    Flag: Critical Panic   Status: Final result (Collected: 1/20/2022 17:26) Provider Status: Ordered     Blood Gas, Arterial: Patient Communication    Released Not seen   Click to Print Result      View SmartLink Info    Blood Gas, Arterial (Order #[id number]) on 1/20/22     Order Report    Order Details      AT +  RR  R/A   Arnav Avila RN was notified of panic values.

## 2022-01-20 NOTE — CARE COORDINATION
Date / Time of Evaluation: 1/20/2022 3:12 PM  Assessment Completed by: Swapnil Acosta    Patient Admission Status:     [facility]    [phone] (home)   Telephone Information:   Mobile [phone]       (Best Practice:  Have patient / caregiver verify above address and phone number by stating out loud their current address and reachable phone number.)  Is above information correct? yes      Current PCP:  EARLE Drew CNP  PCP verified? yes    Initial Assessment Completed at bedside with:  yes    Emergency Contacts:  Extended Emergency Contact Information  Primary Emergency Contact: Shelton Schmidt  Address: [address] [place] [address], [address] [place] [address] Phone: [phone]  Mobile Phone: [phone]  Relation: Spouse    Advance Directives: Code Status:  Prior    Financial:  Payor: Minta Fam / Plan: Minta Fam  / Product Type: *No Product type* /     Pre-Cert required for SNF:  no  Have Long Term Care Insurance:  no    Pharmacy:   [place] [phone] #[id number] - [address] [facility] [phone] [facility] [phone]  [facility] [address] [facility]  [address] [address]  Phone: [phone] Fax: [phone]      Potential assistance purchasing medications?  yes    ADLS:  Support System: Support from spouse    Current Home Environment:  spouse    Plans to RETURN to current housing: yes  Barriers to RETURNING to current housing: unsure at this time    Currently ACTIVE with [address]:  no  [address]:  n/a    Had HOME OXYGEN prior to admission:  no  Oxygen Company:  n/a  Informed of need to bring portable home O2 tank to hospital on day of DISCHARGE:  n/a  Name of person committed to bringing portable tank at discharge:  n/a    Active with HD/PD prior to admission: no  Nephrologist:  Ray [address]:  [address] for Discharge:  spouse    Barriers to discharge: unsure at this time      Additional CM/SW Notes: SW spoke with pt. Pt states she lives at home with her spouse. She receives some assistance from him. When asked what he assists her with, pt states cooking and household chores. Pt stated she has been active with Home[facility], however, recently lost her services due to being cut off her insurance? Informed pt on my end it shows she has Medicare. Pt stated she had another kind. She is unsure what the name of it was. She would like to resume services at [place]. She states she has a bedside commode, wheelchair, and hospital bed. Raphael Quintana and/or her family were provided with choice of provider.         [facility]   Care Management Department  Ph:  [phone]  XWW:[phone]

## 2022-01-20 NOTE — ED PROVIDER NOTES
[facility] EMERGENCY DEPT  eMERGENCY dEPARTMENT eNCOUnter      Pt Name: Craig Baldwin  MRN: [id number]  [place] 1935  Date of evaluation: 1/20/2022  Provider: Priscila Hernández MD    CHIEF COMPLAINT       Chief Complaint   Patient presents with    Shortness of Breath     x 30 minutes         HISTORY OF PRESENT ILLNESS   (Location/Symptom, Timing/Onset,Context/Setting, Quality, Duration, Modifying Factors, Severity)  Note limiting factors. Craig Baldwin is a 82 y.o. female who presents to the emergency department shortness of breath. This is an 82-year-old female who comes in for evaluation of shortness of breath    This is an 82-year-old female who comes in for evaluation of shortness of breath. She states that he she has had it before and she describes it as \"my heart and lungs are acting up\". She says she is followed by Dr. Bladimir Lees and that this morning she started having this discomfort that she describes as not being able to catch her breath. She says that she talked to a friend who is \"kind of like a nurse but really is not\" who told her what to do and suggested some deep breathing which seemed to help initially but then the symptoms recurred. She admits to a slight cough but denies any fever. There is been no abdominal pain nausea vomiting. She states she just feels short of breath and if she lays down and takes deep breaths she is okay but if she gets up to do anything any slight exertion cause resumption of the shortness of breath. NursingNotes were reviewed. REVIEW OF SYSTEMS    (2-9 systems for level 4, 10 or more for level 5)     Review of Systems   Constitutional: Negative for activity change. Respiratory: Positive for chest tightness and shortness of breath. Cardiovascular: Positive for chest pain. Negative for palpitations and leg swelling. Gastrointestinal: Negative for abdominal pain, diarrhea, nausea and vomiting. Genitourinary: Negative for dyspareunia and flank pain. Musculoskeletal: Positive for arthralgias (Chronic) and neck pain (Radiating from the occipital headache). Negative for neck stiffness. Skin: Negative for color change and pallor. Neurological: Positive for dizziness (Occasional) and headaches (Occipital headache times several weeks-seen by neurology and scheduled for MRI next week). All other systems reviewed and are negative.            PAST MEDICALHISTORY     Past Medical History:   Diagnosis Date    Afib [facility])     Bronchitis     Colon cancer (HCC)     Colon polyps     Constipation     Deep vein blood clot of left lower extremity (HCC)     Left leg     Depression     DVT (deep venous thrombosis) (HCC)     Dysphagia     Fibrocystic breast disease     Fibromyalgia     GERD (gastroesophageal reflux disease)     reflux with hx of esophageal stricture    Headache(784.0)     History of colon cancer     Hormone replacement therapy (postmenopausal)     Hypertension     Neuropathy of foot     In both feet    Osteoarthritis     left knee pain    Restless legs syndrome     Type 2 diabetes mellitus 10/25/2021    Vitamin D deficiency          SURGICAL HISTORY       Past Surgical History:   Procedure Laterality Date    APPENDECTOMY      BREAST BIOPSY      CATARACT REMOVAL       SECTION      CHOLECYSTECTOMY      COLECTOMY  partial    COLON SURGERY      COLONOSCOPY  2010    COLONOSCOPY  2012    Dr Anne Mac: unremarkable enterocolonic anastamosis; hyperplastic polyps    COLONOSCOPY  [facility]: unremarkable post-surgical colon    COLONOSCOPY  2016    Dr Anne Marquez colon anastomosis, 5 yr recall    COLONOSCOPY N/A 2021    Dr Rj Mendez and patent anastomosis in the right side-BCM, prn (age)    COLONOSCOPY  2021    Dr Rj Mendez and patent anastomosis in the right side-BCM, prn (age)   Theresa Bennett FOOT SURGERY  right foot    HAND SURGERY Right     Dr Cantrell Head  KYPHOSIS SURGERY N/A 07/06/2020    KYPHOPLASTY L5 performed by Juju Lopez DO at [address]  [place] Right 12/14/2020    RIGHT L 4-5 DECOMPRESSIVE LAMINECTOMY performed by Juju Lopez DO at [address] [facility] TOTAL KNEE ARTHROPLASTY      UPPER GASTROINTESTINAL ENDOSCOPY  10/16/2009    UPPER GASTROINTESTINAL ENDOSCOPY  03/01/2013    [facility]: peptic stricture dilated to 48F; HH; small antral mucosal elevation    UPPER GASTROINTESTINAL ENDOSCOPY  12/01/2014    [facility]: dilation of esophageal stricture    VARICOSE VEIN SURGERY Left 03/14/2014  [facility]    Left GSV Ablation    VARICOSE VEIN SURGERY Right 04/04/2014  [facility]    Right GSV Ablation         CURRENT MEDICATIONS     Previous Medications    ACYCLOVIR (ZOVIRAX) 400 MG TABLET    Take 1 tablet by mouth 2 times daily history of shingles in her eye    ALBUTEROL (ACCUNEB) 1.25 MG/3ML NEBULIZER SOLUTION    Inhale 1 mL into the lungs every 6 hours as needed for Wheezing     AMOXICILLIN-POT CLAVULANATE PO    Take 500 mg by mouth 2 times daily    BETAMETHASONE, AUGMENTED, (DIPROLENE) 0.05 % GEL    Apply topically 2 times daily. To scalp rash    CYANOCOBALAMIN (B-12) 500 MCG TABS    TAKE 1 TABLET BY MOUTH DAILY    DILTIAZEM (CARDIZEM CD) 120 MG EXTENDED RELEASE CAPSULE    TAKE 1 CAPSULE BY MOUTH TWICE DAILY    DULOXETINE (CYMBALTA) 30 MG EXTENDED RELEASE CAPSULE    Take 1 capsule by mouth daily    FUROSEMIDE (LASIX) 20 MG TABLET    Take 1 tablet by mouth daily as needed (swelling)    HOSPITAL BED MISC    by Does not apply route    HYDROCODONE-ACETAMINOPHEN (NORCO)  MG PER TABLET    Take 1 tablet by mouth every 6 hours as needed for Pain. HYDROXYZINE (ATARAX) 10 MG TABLET    Take 1 tablet by mouth 3 times daily as needed for Itching    IRBESARTAN (AVAPRO) 300 MG TABLET    Take 1 tablet by mouth daily    KETOCONAZOLE (NIZORAL) 2 % SHAMPOO    Apply topically daily as needed.     METOPROLOL SUCCINATE (TOPROL XL) 50 MG EXTENDED RELEASE TABLET    Take 1 tablet by mouth 2 times daily Take (1) tab twice daily. NYSTATIN (MYCOSTATIN) 999947 UNIT/GM POWDER    For rash under breast. Apply 3 times daily. OMEPRAZOLE (PRILOSEC) 40 MG DELAYED RELEASE CAPSULE    Take 1 capsule by mouth daily    RIMEGEPANT SULFATE (NURTEC) 75 MG TBDP    Take 1 tablet at the onset of migraine. Do not exceed 1 tablet in 24 hours.     TRIAMCINOLONE (KENALOG) 0.1 % CREAM    APPLY TWICE DAILY TO RASH UP TO 2 WEEKS/MONTH AS NEEDED    TRIAMTERENE-HYDROCHLOROTHIAZIDE (MAXZIDE-25) 37.5-25 MG PER TABLET    TAKE 1 TABLET BY MOUTH DAILY    VITAMIN D (ERGOCALCIFEROL) 1.25 MG (46301 UT) CAPS CAPSULE    TAKE 1 CAPSULE BY MOUTH 1 TIME A WEEK    XARELTO 20 MG TABS TABLET    TAKE 1 TABLET BY MOUTH DAILY WITH BREAKFAST       ALLERGIES     Zoloft [sertraline hcl] and Gabapentin    FAMILY HISTORY       Family History   Problem Relation Age of Onset    Cancer Mother         Cervical Cancer    Cancer Brother         Esophageal cancer    Diabetes Brother     Esophageal Cancer Brother     Diabetes Sister     Colon Cancer Neg Hx     Colon Polyps Neg Hx     Liver Cancer Neg Hx     Liver Disease Neg Hx     Rectal Cancer Neg Hx     Stomach Cancer Neg Hx           SOCIAL HISTORY       Social History     Socioeconomic History    Marital status:      Spouse name: None    Number of children: None    Years of education: None    Highest education level: None   Occupational History    None   Tobacco Use    Smoking status: Never Smoker    Smokeless tobacco: Never Used   Vaping Use    Vaping Use: Never used   Substance and Sexual Activity    Alcohol use: No    Drug use: No    Sexual activity: Yes     Partners: Male   Other Topics Concern    None   Social History Narrative    None     Social Determinants of Health     Financial Resource Strain:     Difficulty of Paying Living Expenses: Not on file   Food Insecurity:     Worried About Running Out of Food in the Last Year: Not on file    Ran Out of Food in the Last Year: Not on file   Transportation Needs:     Lack of Transportation (Medical): Not on file    Lack of Transportation (Non-Medical): Not on file   Physical Activity:     Days of Exercise per Week: Not on file    Minutes of Exercise per Session: Not on file   Stress:     Feeling of Stress : Not on file   Social Connections:     Frequency of Communication with Friends and Family: Not on file    Frequency of Social Gatherings with Friends and Family: Not on file    Attends Gnosticism Services: Not on file    Active Member of [address] [place] [address] [employer] or Organizations: Not on file    Attends Club or Organization Meetings: Not on file    Marital Status: Not on file   Intimate Partner Violence:     Fear of Current or Ex-Partner: Not on file    Emotionally Abused: Not on file    Physically Abused: Not on file    Sexually Abused: Not on file   Housing Stability:     Unable to Pay for Housing in the Last Year: Not on file    Number of [place] in the Last Year: Not on file    Unstable Housing in the Last Year: Not on file       SCREENINGS             PHYSICAL EXAM    (up to 7 for level 4, 8 or more for level 5)     ED Triage Vitals [01/20/22 1145]   BP Temp Temp Source Pulse Resp SpO2 Height Weight   (!) 143/44 96.9 °F (36.1 °C) Oral 55 18 98 % -- --       Physical Exam  Vitals and nursing note reviewed. Constitutional:       General: She is not in acute distress. Appearance: Normal appearance. She is not toxic-appearing. HENT:      Head: Normocephalic and atraumatic. Right Ear: External ear normal.      Left Ear: External ear normal.   Eyes:      Extraocular Movements: Extraocular movements intact. Conjunctiva/sclera: Conjunctivae normal.      Pupils: Pupils are equal, round, and reactive to light. Cardiovascular:      Rate and Rhythm: Normal rate and regular rhythm. Pulses: Normal pulses. Heart sounds: Normal heart sounds.    Pulmonary: Effort: Pulmonary effort is normal.      Breath sounds: Normal breath sounds. No wheezing, rhonchi or rales. Abdominal:      General: Abdomen is flat. Bowel sounds are normal. There is no distension. Palpations: Abdomen is soft. There is no mass. Tenderness: There is no abdominal tenderness. There is no guarding or rebound. Musculoskeletal:         General: No swelling, tenderness, deformity or signs of injury. Normal range of motion. Skin:     General: Skin is warm. Capillary Refill: Capillary refill takes less than 2 seconds. Findings: No lesion or rash. Neurological:      General: No focal deficit present. Mental Status: She is alert and oriented to person, place, and time. Cranial Nerves: No cranial nerve deficit. Sensory: No sensory deficit. Coordination: Coordination normal.      Gait: Gait normal.   Psychiatric:         Attention and Perception: Attention and perception normal.         Mood and Affect: Mood is anxious. DIAGNOSTIC RESULTS     EKG: All EKG's areinterpreted by the Emergency Department Physician who either signs or Co-signs this chart in the absence of a cardiologist.    EKG: EKG shows sinus bradycardia with a rate of 56. There are few nonspecific ST-T wave changes but no ST elevation. There is no ectopy. There is no ST depression. Mamadou Skates RADIOLOGY:  Non-plain film images such as CT, Ultrasound and MRI are read by the radiologist. Plain radiographic images are visualized and preliminarily interpreted bythe emergency physician with the below findings:          XR CHEST PORTABLE   Final Result   Bibasilar atelectasis, no acute infiltrates. Stable   satisfactory position of the right subclavian port catheter. Signed by Dr Jodi Miller.  [place]              LABS:  Results for orders placed or performed during the hospital encounter of 01/20/22   COVID-19, Rapid    Specimen: Nasopharyngeal Swab   Result Value Ref Range    SARS-CoV-2, NAAT DETECTED (AA) Not Detected   Comprehensive Metabolic Panel w/ Reflex to MG   Result Value Ref Range    Sodium 137 136 - 145 mmol/L    Potassium reflex Magnesium 4.4 3.5 - 5.0 mmol/L    Chloride 99 98 - 111 mmol/L    CO2 25 22 - 29 mmol/L    Anion Gap 13 7 - 19 mmol/L    Glucose 145 (H) 74 - 109 mg/dL    BUN 23 8 - 23 mg/dL    CREATININE 1.1 (H) 0.5 - 0.9 mg/dL    GFR Non- 47 (A) >60    GFR  57 (L) >59    Calcium 10.2 8.8 - 10.2 mg/dL    Total Protein 7.6 6.6 - 8.7 g/dL    Albumin 4.3 3.5 - 5.2 g/dL    Total Bilirubin 0.3 0.2 - 1.2 mg/dL    Alkaline Phosphatase 130 (H) 35 - 104 U/L    ALT 12 5 - 33 U/L    AST 16 5 - 32 U/L   D-Dimer, Quantitative   Result Value Ref Range    D-Dimer, Quant 0.33 0.00 - 0.48 ug/mL FEU   TROPONIN   Result Value Ref Range    Troponin <0.01 0.00 - 0.03 ng/mL   PROTIME-INR   Result Value Ref Range    Protime 36.6 (H) 12.0 - 14.6 sec    INR 3.81 (H) 0.88 - 1.18   CBC Auto Differential   Result Value Ref Range    WBC 9.2 4.8 - 10.8 K/uL    RBC 4.10 (L) 4.20 - 5.40 M/uL    Hemoglobin 9.5 (L) 12.0 - 16.0 g/dL    Hematocrit 32.3 (L) 37.0 - 47.0 %    MCV 78.8 (L) 81.0 - 99.0 fL    MCH 23.2 (L) 27.0 - 31.0 pg    MCHC 29.4 (L) 33.0 - 37.0 g/dL    RDW 15.5 (H) 11.5 - 14.5 %    Platelets 908 (H) 263 - 400 K/uL    MPV 9.6 9.4 - 12.3 fL    Neutrophils % 57.8 50.0 - 65.0 %    Lymphocytes % 27.6 20.0 - 40.0 %    Monocytes % 8.1 0.0 - 10.0 %    Eosinophils % 5.5 (H) 0.0 - 5.0 %    Basophils % 0.7 0.0 - 1.0 %    Neutrophils Absolute 5.3 1.5 - 7.5 K/uL    Immature Granulocytes # 0.0 K/uL    Lymphocytes Absolute 2.5 1.1 - 4.5 K/uL    Monocytes Absolute 0.70 0.00 - 0.90 K/uL    Eosinophils Absolute 0.50 0.00 - 0.60 K/uL    Basophils Absolute 0.10 0.00 - 0.20 K/uL   PROBNP, N-TERMINAL   Result Value Ref Range    Pro- 0 - 1,800 pg/mL         All other labs were within normal range or not returned as of this dictation.     EMERGENCY DEPARTMENT COURSE and DIFFERENTIAL DIAGNOSIS/MDM:   Vitals:    Vitals:    01/20/22 1330 01/20/22 1400 01/20/22 1430 01/20/22 1500   BP: (!) 156/54 (!) 149/42 (!) 151/51 (!) 147/44   Pulse: 59 62 59 60   Resp: 17 21 19 15   Temp:       TempSrc:       SpO2: 100% 100% 100% 100%       MDM  Number of Diagnoses or Management Options     Amount and/or Complexity of Data Reviewed  Clinical lab tests: ordered and reviewed  Tests in the medicine section of CPT®: ordered and reviewed  Review and summarize past medical records: yes  Independent visualization of images, tracings, or specimens: yes    Risk of Complications, Morbidity, and/or Mortality  Presenting problems: moderate  Diagnostic procedures: moderate  Management options: moderate        Reassessment  2:28 PM CST  Patient reassessed - she reports still with exertional dyspnea and  at bedside now reports near-syncope with the exertional dyspnea this morning which is what prompted the ED trip    CONSULTS:  3:01 PM CST Case discussed with Dr. Philip Machuca- will evaluate. Will assign bed once COVID has resulted    PROCEDURES:  Unless otherwise noted below, none     Procedures    FINAL IMPRESSION      1. Dyspnea, unspecified type    2. Acute COVID-19    3. Generalized weakness          DISPOSITION/PLAN   DISPOSITION    Admit    PATIENT REFERRED TO:  No follow-up provider specified.     DISCHARGE MEDICATIONS:  New Prescriptions    No medications on file          (Please note that portions of this note were completed with a voice recognition program.  Efforts were made to edit thedictations but occasionally words are mis-transcribed.)    Ki Singh MD (electronically signed)  Attending Emergency Physician       Ki Singh MD  01/20/22 9554

## 2022-01-21 ENCOUNTER — APPOINTMENT (OUTPATIENT)
Dept: CT IMAGING | Age: 87
End: 2022-01-21
Payer: MEDICARE

## 2022-01-21 LAB
ALBUMIN SERPL-MCNC: 3.6 G/DL (ref 3.5–5.2)
ALP BLD-CCNC: 101 U/L (ref 35–104)
ALT SERPL-CCNC: 10 U/L (ref 5–33)
ANION GAP SERPL CALCULATED.3IONS-SCNC: 17 MMOL/L (ref 7–19)
AST SERPL-CCNC: 14 U/L (ref 5–32)
BASOPHILS ABSOLUTE: 0 K/UL (ref 0–0.2)
BASOPHILS RELATIVE PERCENT: 0.3 % (ref 0–1)
BILIRUB SERPL-MCNC: 0.3 MG/DL (ref 0.2–1.2)
BUN BLDV-MCNC: 21 MG/DL (ref 8–23)
CALCIUM SERPL-MCNC: 9.2 MG/DL (ref 8.8–10.2)
CHLORIDE BLD-SCNC: 102 MMOL/L (ref 98–111)
CHOLESTEROL, TOTAL: 132 MG/DL (ref 160–199)
CO2: 21 MMOL/L (ref 22–29)
CREAT SERPL-MCNC: 0.9 MG/DL (ref 0.5–0.9)
EOSINOPHILS ABSOLUTE: 0 K/UL (ref 0–0.6)
EOSINOPHILS RELATIVE PERCENT: 0.1 % (ref 0–5)
GFR AFRICAN AMERICAN: >59
GFR NON-AFRICAN AMERICAN: 60
GLUCOSE BLD-MCNC: 195 MG/DL (ref 74–109)
HBA1C MFR BLD: 6.1 % (ref 4–6)
HCT VFR BLD CALC: 27.1 % (ref 37–47)
HDLC SERPL-MCNC: 34 MG/DL (ref 65–121)
HEMOGLOBIN: 8.4 G/DL (ref 12–16)
IMMATURE GRANULOCYTES #: 0.1 K/UL
INR BLD: 1.68 (ref 0.88–1.18)
LDL CHOLESTEROL CALCULATED: 65 MG/DL
LYMPHOCYTES ABSOLUTE: 0.3 K/UL (ref 1.1–4.5)
LYMPHOCYTES RELATIVE PERCENT: 2.5 % (ref 20–40)
MCH RBC QN AUTO: 23.3 PG (ref 27–31)
MCHC RBC AUTO-ENTMCNC: 31 G/DL (ref 33–37)
MCV RBC AUTO: 75.1 FL (ref 81–99)
MONOCYTES ABSOLUTE: 1.2 K/UL (ref 0–0.9)
MONOCYTES RELATIVE PERCENT: 8.6 % (ref 0–10)
NEUTROPHILS ABSOLUTE: 11.9 K/UL (ref 1.5–7.5)
NEUTROPHILS RELATIVE PERCENT: 88.1 % (ref 50–65)
OCCULT BLOOD QC: NORMAL
OCCULT BLOOD SCREENING: NORMAL
PDW BLD-RTO: 15.3 % (ref 11.5–14.5)
PLATELET # BLD: 349 K/UL (ref 130–400)
PMV BLD AUTO: 9.2 FL (ref 9.4–12.3)
POTASSIUM REFLEX MAGNESIUM: 3.7 MMOL/L (ref 3.5–5)
PROTHROMBIN TIME: 19.8 SEC (ref 12–14.6)
RBC # BLD: 3.61 M/UL (ref 4.2–5.4)
SODIUM BLD-SCNC: 140 MMOL/L (ref 136–145)
TOTAL PROTEIN: 5.7 G/DL (ref 6.6–8.7)
TRIGL SERPL-MCNC: 166 MG/DL (ref 0–149)
WBC # BLD: 13.6 K/UL (ref 4.8–10.8)

## 2022-01-21 PROCEDURE — 36415 COLL VENOUS BLD VENIPUNCTURE: CPT

## 2022-01-21 PROCEDURE — 6360000002 HC RX W HCPCS: Performed by: NURSE PRACTITIONER

## 2022-01-21 PROCEDURE — 6360000002 HC RX W HCPCS: Performed by: HOSPITALIST

## 2022-01-21 PROCEDURE — 6370000000 HC RX 637 (ALT 250 FOR IP): Performed by: HOSPITALIST

## 2022-01-21 PROCEDURE — 97530 THERAPEUTIC ACTIVITIES: CPT

## 2022-01-21 PROCEDURE — 83036 HEMOGLOBIN GLYCOSYLATED A1C: CPT

## 2022-01-21 PROCEDURE — 6370000000 HC RX 637 (ALT 250 FOR IP): Performed by: NURSE PRACTITIONER

## 2022-01-21 PROCEDURE — 87449 NOS EACH ORGANISM AG IA: CPT

## 2022-01-21 PROCEDURE — 87899 AGENT NOS ASSAY W/OPTIC: CPT

## 2022-01-21 PROCEDURE — 87015 SPECIMEN INFECT AGNT CONCNTJ: CPT

## 2022-01-21 PROCEDURE — G0378 HOSPITAL OBSERVATION PER HR: HCPCS

## 2022-01-21 PROCEDURE — 97161 PT EVAL LOW COMPLEX 20 MIN: CPT

## 2022-01-21 PROCEDURE — 97165 OT EVAL LOW COMPLEX 30 MIN: CPT

## 2022-01-21 PROCEDURE — 80053 COMPREHEN METABOLIC PANEL: CPT

## 2022-01-21 PROCEDURE — 74177 CT ABD & PELVIS W/CONTRAST: CPT

## 2022-01-21 PROCEDURE — A4216 STERILE WATER/SALINE, 10 ML: HCPCS | Performed by: HOSPITALIST

## 2022-01-21 PROCEDURE — 96376 TX/PRO/DX INJ SAME DRUG ADON: CPT

## 2022-01-21 PROCEDURE — 87045 FECES CULTURE AEROBIC BACT: CPT

## 2022-01-21 PROCEDURE — 6360000004 HC RX CONTRAST MEDICATION: Performed by: HOSPITALIST

## 2022-01-21 PROCEDURE — 2500000003 HC RX 250 WO HCPCS: Performed by: HOSPITALIST

## 2022-01-21 PROCEDURE — 82270 OCCULT BLOOD FECES: CPT

## 2022-01-21 PROCEDURE — 80061 LIPID PANEL: CPT

## 2022-01-21 PROCEDURE — 85610 PROTHROMBIN TIME: CPT

## 2022-01-21 PROCEDURE — 2580000003 HC RX 258: Performed by: HOSPITALIST

## 2022-01-21 PROCEDURE — 2580000003 HC RX 258: Performed by: NURSE PRACTITIONER

## 2022-01-21 PROCEDURE — 85025 COMPLETE CBC W/AUTO DIFF WBC: CPT

## 2022-01-21 PROCEDURE — 96375 TX/PRO/DX INJ NEW DRUG ADDON: CPT

## 2022-01-21 PROCEDURE — 96365 THER/PROPH/DIAG IV INF INIT: CPT

## 2022-01-21 PROCEDURE — 87427 SHIGA-LIKE TOXIN AG IA: CPT

## 2022-01-21 PROCEDURE — 87324 CLOSTRIDIUM AG IA: CPT

## 2022-01-21 PROCEDURE — C9113 INJ PANTOPRAZOLE SODIUM, VIA: HCPCS | Performed by: HOSPITALIST

## 2022-01-21 RX ORDER — CARBOXYMETHYLCELLULOSE SODIUM 5 MG/ML
1 SOLUTION/ DROPS OPHTHALMIC 3 TIMES DAILY
Status: DISCONTINUED | OUTPATIENT
Start: 2022-01-21 | End: 2022-01-23 | Stop reason: HOSPADM

## 2022-01-21 RX ORDER — PANTOPRAZOLE SODIUM 40 MG/10ML
40 INJECTION, POWDER, LYOPHILIZED, FOR SOLUTION INTRAVENOUS 2 TIMES DAILY
Status: DISCONTINUED | OUTPATIENT
Start: 2022-01-21 | End: 2022-01-23 | Stop reason: HOSPADM

## 2022-01-21 RX ORDER — SODIUM CHLORIDE 9 MG/ML
10 INJECTION INTRAVENOUS 2 TIMES DAILY
Status: DISCONTINUED | OUTPATIENT
Start: 2022-01-21 | End: 2022-01-23 | Stop reason: HOSPADM

## 2022-01-21 RX ORDER — HYDROCODONE BITARTRATE AND ACETAMINOPHEN 10; 325 MG/1; MG/1
1 TABLET ORAL EVERY 6 HOURS PRN
Status: DISCONTINUED | OUTPATIENT
Start: 2022-01-21 | End: 2022-01-23 | Stop reason: HOSPADM

## 2022-01-21 RX ADMIN — IOPAMIDOL 90 ML: 755 INJECTION, SOLUTION INTRAVENOUS at 20:38

## 2022-01-21 RX ADMIN — RIVAROXABAN 15 MG: 15 TABLET, FILM COATED ORAL at 09:10

## 2022-01-21 RX ADMIN — METOPROLOL SUCCINATE 25 MG: 50 TABLET, EXTENDED RELEASE ORAL at 09:10

## 2022-01-21 RX ADMIN — DEXAMETHASONE SODIUM PHOSPHATE 6 MG: 10 INJECTION INTRAMUSCULAR; INTRAVENOUS at 17:00

## 2022-01-21 RX ADMIN — SODIUM CHLORIDE, PRESERVATIVE FREE 10 ML: 5 INJECTION INTRAVENOUS at 20:57

## 2022-01-21 RX ADMIN — OXYCODONE HYDROCHLORIDE AND ACETAMINOPHEN 500 MG: 500 TABLET ORAL at 20:57

## 2022-01-21 RX ADMIN — BUDESONIDE AND FORMOTEROL FUMARATE DIHYDRATE 2 PUFF: 160; 4.5 AEROSOL RESPIRATORY (INHALATION) at 11:23

## 2022-01-21 RX ADMIN — ALBUTEROL SULFATE 2 PUFF: 90 AEROSOL, METERED RESPIRATORY (INHALATION) at 08:00

## 2022-01-21 RX ADMIN — Medication 2000 UNITS: at 09:10

## 2022-01-21 RX ADMIN — CYANOCOBALAMIN TAB 500 MCG 500 MCG: 500 TAB at 09:09

## 2022-01-21 RX ADMIN — TRIAMTERENE AND HYDROCHLOROTHIAZIDE 1 TABLET: 37.5; 25 TABLET ORAL at 09:14

## 2022-01-21 RX ADMIN — Medication 5 MG: at 20:57

## 2022-01-21 RX ADMIN — PANTOPRAZOLE SODIUM 40 MG: 40 INJECTION, POWDER, FOR SOLUTION INTRAVENOUS at 20:57

## 2022-01-21 RX ADMIN — ALBUTEROL SULFATE 2 PUFF: 90 AEROSOL, METERED RESPIRATORY (INHALATION) at 20:56

## 2022-01-21 RX ADMIN — OXYCODONE HYDROCHLORIDE AND ACETAMINOPHEN 500 MG: 500 TABLET ORAL at 09:09

## 2022-01-21 RX ADMIN — CARBOXYMETHYLCELLULOSE SODIUM 1 DROP: 5 SOLUTION/ DROPS OPHTHALMIC at 21:03

## 2022-01-21 RX ADMIN — ALBUTEROL SULFATE 2 PUFF: 90 AEROSOL, METERED RESPIRATORY (INHALATION) at 11:23

## 2022-01-21 RX ADMIN — SODIUM CHLORIDE, PRESERVATIVE FREE 10 ML: 5 INJECTION INTRAVENOUS at 09:16

## 2022-01-21 RX ADMIN — SODIUM CHLORIDE 1000 MG: 9 INJECTION INTRAMUSCULAR; INTRAVENOUS; SUBCUTANEOUS at 17:01

## 2022-01-21 RX ADMIN — ALBUTEROL SULFATE 2 PUFF: 90 AEROSOL, METERED RESPIRATORY (INHALATION) at 17:06

## 2022-01-21 RX ADMIN — METRONIDAZOLE 500 MG: 500 INJECTION, SOLUTION INTRAVENOUS at 17:05

## 2022-01-21 RX ADMIN — BUDESONIDE AND FORMOTEROL FUMARATE DIHYDRATE 2 PUFF: 160; 4.5 AEROSOL RESPIRATORY (INHALATION) at 20:56

## 2022-01-21 RX ADMIN — CARBOXYMETHYLCELLULOSE SODIUM 1 DROP: 5 SOLUTION/ DROPS OPHTHALMIC at 17:00

## 2022-01-21 RX ADMIN — IRON SUCROSE 200 MG: 20 INJECTION, SOLUTION INTRAVENOUS at 20:56

## 2022-01-21 RX ADMIN — IPRATROPIUM BROMIDE 2 PUFF: 17 AEROSOL, METERED RESPIRATORY (INHALATION) at 20:56

## 2022-01-21 RX ADMIN — IPRATROPIUM BROMIDE 2 PUFF: 17 AEROSOL, METERED RESPIRATORY (INHALATION) at 08:00

## 2022-01-21 RX ADMIN — DILTIAZEM HYDROCHLORIDE 120 MG: 120 CAPSULE, COATED, EXTENDED RELEASE ORAL at 09:13

## 2022-01-21 RX ADMIN — GUAIFENESIN 600 MG: 600 TABLET, EXTENDED RELEASE ORAL at 09:09

## 2022-01-21 RX ADMIN — ZINC SULFATE 220 MG (50 MG) CAPSULE 50 MG: CAPSULE at 09:13

## 2022-01-21 RX ADMIN — IPRATROPIUM BROMIDE 2 PUFF: 17 AEROSOL, METERED RESPIRATORY (INHALATION) at 17:05

## 2022-01-21 RX ADMIN — GUAIFENESIN 600 MG: 600 TABLET, EXTENDED RELEASE ORAL at 20:57

## 2022-01-21 RX ADMIN — PANTOPRAZOLE SODIUM 40 MG: 40 TABLET, DELAYED RELEASE ORAL at 09:14

## 2022-01-21 RX ADMIN — HYDROCODONE BITARTRATE AND ACETAMINOPHEN 1 TABLET: 10; 325 TABLET ORAL at 11:22

## 2022-01-21 RX ADMIN — HYDROCODONE BITARTRATE AND ACETAMINOPHEN 1 TABLET: 10; 325 TABLET ORAL at 17:13

## 2022-01-21 RX ADMIN — DULOXETINE 30 MG: 30 CAPSULE, DELAYED RELEASE ORAL at 09:14

## 2022-01-21 RX ADMIN — IPRATROPIUM BROMIDE 2 PUFF: 17 AEROSOL, METERED RESPIRATORY (INHALATION) at 11:24

## 2022-01-21 ASSESSMENT — ENCOUNTER SYMPTOMS
COUGH: 1
VOMITING: 0
ABDOMINAL PAIN: 0
DIARRHEA: 0
SHORTNESS OF BREATH: 1

## 2022-01-21 ASSESSMENT — PAIN SCALES - GENERAL
PAINLEVEL_OUTOF10: 9

## 2022-01-21 ASSESSMENT — PAIN DESCRIPTION - DESCRIPTORS
DESCRIPTORS: ACHING;DISCOMFORT;HEADACHE
DESCRIPTORS: ACHING;DISCOMFORT;HEADACHE

## 2022-01-21 NOTE — ACP (ADVANCE CARE PLANNING)
Advance Care Planning     Advance Care Planning Activator (Inpatient)  Conversation Note      Date of ACP Conversation: 1/21/2022     Conversation Conducted with: Spouse, Sanchez Maurer,   ACP Activator: [address] Decision Maker:     Current Designated Health Care Decision Maker:     Primary Decision Maker: Natalya Ochoa Spouse - [phone]      Care Preferences    Ventilation: \"If you were in your present state of health and suddenly became very ill and were unable to breathe on your own, what would your preference be about the use of a ventilator (breathing machine) if it were available to you? \"      Would the patient desire the use of ventilator (breathing machine)?: YES          Resuscitation    \"In the event your heart stopped as a result of an underlying serious health condition, would you want attempts to be made to restart your heart (answer \"yes\" for attempt to resuscitate) or would you prefer a natural death (answer \"no\" for do not attempt to resuscitate)? \" NO      Length of ACP Conversation in minutes:      Conversation Outcomes:  [x] ACP discussion completed       Spouse stated that his wife would want to be on a ventilator, but that she does not want to be on life support. After clarification, he said said Yes to ventilator to help her breathe with this COVID. He stated that she has been adamant about no CPR. He stated that they have Living [place] on file; I found only one document related but was not a Living Will. Asked him to bring a copy for us to scan and he affirmed that he would.         Electronically signed by Oscar Craig on 1/21/2022 at 1:24 PM

## 2022-01-21 NOTE — PROGRESS NOTES
Occupational Therapy   Occupational Therapy Initial Assessment  Date: 2022   Patient Name: Duy Messer  MRN: [id number]     : 1935    Date of Service: 2022    Discharge Recommendations:          Assessment   Performance deficits / Impairments: Decreased functional mobility ; Decreased endurance;Decreased ADL status; Decreased balance  Assessment: Will progress as tolerated  Treatment Diagnosis: Covid  Prognosis: Good  Decision Making: Low Complexity  REQUIRES OT FOLLOW UP: Yes  Activity Tolerance  Activity Tolerance: Patient Tolerated treatment well           Patient Diagnosis(es): The primary encounter diagnosis was Dyspnea, unspecified type. Diagnoses of Acute COVID-19 and Generalized weakness were also pertinent to this visit. has a past medical history of Afib ([address] Bronchitis, Colon cancer ([address] Colon polyps, Constipation, Deep vein blood clot of left lower extremity ([address] Depression, DVT (deep venous thrombosis) ([address] Dysphagia, Fibrocystic breast disease, Fibromyalgia, GERD (gastroesophageal reflux disease), Headache(784.0), History of colon cancer, Hormone replacement therapy (postmenopausal), Hypertension, Neuropathy of foot, Osteoarthritis, Restless legs syndrome, Type 2 diabetes mellitus, and Vitamin D deficiency. has a past surgical history that includes Total knee arthroplasty; Cataract removal; Appendectomy;  section; Cholecystectomy; Colon surgery; Breast biopsy; Upper gastrointestinal endoscopy (10/16/2009); Hysterectomy; Colonoscopy (2010); Colonoscopy (2012); Skin cancer excision; Foot surgery (right foot); colectomy (partial); Varicose vein surgery (Left, 2014  [facility]); Varicose vein surgery (Right, 2014  [facility]); Hand surgery (Right); Upper gastrointestinal endoscopy (2013); Colonoscopy (2013); Upper gastrointestinal endoscopy (2014); Colonoscopy (2016);  Kyphosis surgery (N/A, 2020); lumbar laminectomy (Right, 12/14/2020); Colonoscopy (N/A, 03/29/2021); and Colonoscopy (03/29/2021).     Treatment Diagnosis: Covid      Restrictions       Subjective   General  Chart Reviewed: Yes  Patient assessed for rehabilitation services?: Yes  Family / Caregiver Present: No  Diagnosis: Covid, short of breath  Pain Assessment  Pain Level: 9  Vital Signs  Temp: 97.7 °F (36.5 °C)  Temp Source: Temporal  Pulse: 95  Heart Rate Source: Monitor  Resp: 20  BP: (!) 146/54  BP Location: Right upper arm  MAP (mmHg): 75  Oxygen Therapy  SpO2: 100 %  O2 Device: None (Room air)  Social/Functional History  Social/Functional History  Lives With: Spouse  Type of Home: House  Home Equipment: Hospital bed,Wheelchair-manual,Rolling walker  ADL Assistance: Independent  Ambulation Assistance: Independent  Transfer Assistance: Independent       Objective   Vision: Within Functional Limits  Hearing: Within functional limits    Orientation  Overall Orientation Status: Within Normal Limits        ADL  Feeding: Independent  Grooming: Supervision  UE Bathing: Supervision  LE Bathing: Minimal assistance  UE Dressing: Supervision  LE Dressing: Minimal assistance  Toileting: Contact guard assistance        Bed mobility  Supine to Sit: Supervision  Sit to Supine: Supervision  Transfers  Stand Step Transfers: Contact guard assistance  Sit to stand: Contact guard assistance  Transfer Comments: RW     Cognition  Overall Cognitive Status: WFL                 LUE AROM (degrees)  LUE AROM : WFL  RUE AROM (degrees)  RUE AROM : WFL  LUE Strength  Gross LUE Strength: WFL  RUE Strength  Gross RUE Strength: WFL                   Plan   Plan  Times per week: 3-5x/week  Times per day: Daily    G-Code     OutComes Score                                                  AM-PAC Score             Goals  Short term goals  Time Frame for Short term goals: 1 week  Short term goal 1: Supervision with toilet tffrancisco  Short term goal 2: Supervision with LB dsg  Short term goal 3: Tolerate light ambulatory ADLs with no shortness of breath  Long term goals  Long term goal 1: Return to PLOF       Therapy Time   Individual Concurrent Group Co-treatment   Time In           Time Out           Minutes                   Tatianna Myers, OT

## 2022-01-22 LAB
ALBUMIN SERPL-MCNC: 3.9 G/DL (ref 3.5–5.2)
ALP BLD-CCNC: 100 U/L (ref 35–104)
ALT SERPL-CCNC: 10 U/L (ref 5–33)
ANION GAP SERPL CALCULATED.3IONS-SCNC: 14 MMOL/L (ref 7–19)
AST SERPL-CCNC: 14 U/L (ref 5–32)
BASOPHILS ABSOLUTE: 0 K/UL (ref 0–0.2)
BASOPHILS RELATIVE PERCENT: 0.2 % (ref 0–1)
BILIRUB SERPL-MCNC: <0.2 MG/DL (ref 0.2–1.2)
BUN BLDV-MCNC: 19 MG/DL (ref 8–23)
C DIFF TOXIN/ANTIGEN: NORMAL
CALCIUM SERPL-MCNC: 9.4 MG/DL (ref 8.8–10.2)
CHLORIDE BLD-SCNC: 99 MMOL/L (ref 98–111)
CO2: 24 MMOL/L (ref 22–29)
CREAT SERPL-MCNC: 0.9 MG/DL (ref 0.5–0.9)
EOSINOPHILS ABSOLUTE: 0 K/UL (ref 0–0.6)
EOSINOPHILS RELATIVE PERCENT: 0 % (ref 0–5)
GFR AFRICAN AMERICAN: >59
GFR NON-AFRICAN AMERICAN: 60
GLUCOSE BLD-MCNC: 208 MG/DL (ref 74–109)
HCT VFR BLD CALC: 27.4 % (ref 37–47)
HEMOGLOBIN: 8.3 G/DL (ref 12–16)
IMMATURE GRANULOCYTES #: 0 K/UL
LYMPHOCYTES ABSOLUTE: 1 K/UL (ref 1.1–4.5)
LYMPHOCYTES RELATIVE PERCENT: 14.5 % (ref 20–40)
MCH RBC QN AUTO: 23.2 PG (ref 27–31)
MCHC RBC AUTO-ENTMCNC: 30.3 G/DL (ref 33–37)
MCV RBC AUTO: 76.8 FL (ref 81–99)
MONOCYTES ABSOLUTE: 0.2 K/UL (ref 0–0.9)
MONOCYTES RELATIVE PERCENT: 3.5 % (ref 0–10)
NEUTROPHILS ABSOLUTE: 5.4 K/UL (ref 1.5–7.5)
NEUTROPHILS RELATIVE PERCENT: 81.5 % (ref 50–65)
PDW BLD-RTO: 15.8 % (ref 11.5–14.5)
PLATELET # BLD: 387 K/UL (ref 130–400)
PMV BLD AUTO: 9.6 FL (ref 9.4–12.3)
POTASSIUM REFLEX MAGNESIUM: 4.1 MMOL/L (ref 3.5–5)
RBC # BLD: 3.57 M/UL (ref 4.2–5.4)
SODIUM BLD-SCNC: 137 MMOL/L (ref 136–145)
TOTAL PROTEIN: 6.1 G/DL (ref 6.6–8.7)
WBC # BLD: 6.6 K/UL (ref 4.8–10.8)

## 2022-01-22 PROCEDURE — 2500000003 HC RX 250 WO HCPCS: Performed by: HOSPITALIST

## 2022-01-22 PROCEDURE — 6360000002 HC RX W HCPCS: Performed by: HOSPITALIST

## 2022-01-22 PROCEDURE — 96376 TX/PRO/DX INJ SAME DRUG ADON: CPT

## 2022-01-22 PROCEDURE — 36415 COLL VENOUS BLD VENIPUNCTURE: CPT

## 2022-01-22 PROCEDURE — 6370000000 HC RX 637 (ALT 250 FOR IP): Performed by: HOSPITALIST

## 2022-01-22 PROCEDURE — 6360000002 HC RX W HCPCS: Performed by: NURSE PRACTITIONER

## 2022-01-22 PROCEDURE — G0378 HOSPITAL OBSERVATION PER HR: HCPCS

## 2022-01-22 PROCEDURE — 2580000003 HC RX 258: Performed by: NURSE PRACTITIONER

## 2022-01-22 PROCEDURE — A4216 STERILE WATER/SALINE, 10 ML: HCPCS | Performed by: HOSPITALIST

## 2022-01-22 PROCEDURE — 80053 COMPREHEN METABOLIC PANEL: CPT

## 2022-01-22 PROCEDURE — 99220 PR INITIAL OBSERVATION CARE/DAY 70 MINUTES: CPT | Performed by: PSYCHIATRY & NEUROLOGY

## 2022-01-22 PROCEDURE — 85025 COMPLETE CBC W/AUTO DIFF WBC: CPT

## 2022-01-22 PROCEDURE — 96366 THER/PROPH/DIAG IV INF ADDON: CPT

## 2022-01-22 PROCEDURE — 6370000000 HC RX 637 (ALT 250 FOR IP): Performed by: NURSE PRACTITIONER

## 2022-01-22 PROCEDURE — C9113 INJ PANTOPRAZOLE SODIUM, VIA: HCPCS | Performed by: HOSPITALIST

## 2022-01-22 PROCEDURE — 2580000003 HC RX 258: Performed by: HOSPITALIST

## 2022-01-22 RX ORDER — BUPIVACAINE HYDROCHLORIDE 2.5 MG/ML
30 INJECTION, SOLUTION INFILTRATION; PERINEURAL ONCE
Status: COMPLETED | OUTPATIENT
Start: 2022-01-22 | End: 2022-01-23

## 2022-01-22 RX ADMIN — SODIUM CHLORIDE 1000 MG: 9 INJECTION INTRAMUSCULAR; INTRAVENOUS; SUBCUTANEOUS at 18:42

## 2022-01-22 RX ADMIN — GUAIFENESIN 600 MG: 600 TABLET, EXTENDED RELEASE ORAL at 20:19

## 2022-01-22 RX ADMIN — ALBUTEROL SULFATE 2 PUFF: 90 AEROSOL, METERED RESPIRATORY (INHALATION) at 20:18

## 2022-01-22 RX ADMIN — IPRATROPIUM BROMIDE 2 PUFF: 17 AEROSOL, METERED RESPIRATORY (INHALATION) at 18:42

## 2022-01-22 RX ADMIN — Medication 2000 UNITS: at 08:58

## 2022-01-22 RX ADMIN — ALBUTEROL SULFATE 2 PUFF: 90 AEROSOL, METERED RESPIRATORY (INHALATION) at 18:42

## 2022-01-22 RX ADMIN — BUDESONIDE AND FORMOTEROL FUMARATE DIHYDRATE 2 PUFF: 160; 4.5 AEROSOL RESPIRATORY (INHALATION) at 20:18

## 2022-01-22 RX ADMIN — IPRATROPIUM BROMIDE 2 PUFF: 17 AEROSOL, METERED RESPIRATORY (INHALATION) at 08:59

## 2022-01-22 RX ADMIN — ZINC SULFATE 220 MG (50 MG) CAPSULE 50 MG: CAPSULE at 08:58

## 2022-01-22 RX ADMIN — CARBOXYMETHYLCELLULOSE SODIUM 1 DROP: 5 SOLUTION/ DROPS OPHTHALMIC at 08:59

## 2022-01-22 RX ADMIN — CYANOCOBALAMIN TAB 500 MCG 500 MCG: 500 TAB at 08:58

## 2022-01-22 RX ADMIN — HYDROCODONE BITARTRATE AND ACETAMINOPHEN 1 TABLET: 10; 325 TABLET ORAL at 20:18

## 2022-01-22 RX ADMIN — Medication 5 MG: at 20:18

## 2022-01-22 RX ADMIN — RIVAROXABAN 15 MG: 15 TABLET, FILM COATED ORAL at 08:59

## 2022-01-22 RX ADMIN — ALBUTEROL SULFATE 2 PUFF: 90 AEROSOL, METERED RESPIRATORY (INHALATION) at 08:59

## 2022-01-22 RX ADMIN — IRON SUCROSE 200 MG: 20 INJECTION, SOLUTION INTRAVENOUS at 20:18

## 2022-01-22 RX ADMIN — METRONIDAZOLE 500 MG: 500 INJECTION, SOLUTION INTRAVENOUS at 09:07

## 2022-01-22 RX ADMIN — SODIUM CHLORIDE, PRESERVATIVE FREE 10 ML: 5 INJECTION INTRAVENOUS at 20:19

## 2022-01-22 RX ADMIN — PANTOPRAZOLE SODIUM 40 MG: 40 INJECTION, POWDER, FOR SOLUTION INTRAVENOUS at 20:18

## 2022-01-22 RX ADMIN — SODIUM CHLORIDE, PRESERVATIVE FREE 10 ML: 5 INJECTION INTRAVENOUS at 09:00

## 2022-01-22 RX ADMIN — CARBOXYMETHYLCELLULOSE SODIUM 1 DROP: 5 SOLUTION/ DROPS OPHTHALMIC at 20:19

## 2022-01-22 RX ADMIN — IPRATROPIUM BROMIDE 2 PUFF: 17 AEROSOL, METERED RESPIRATORY (INHALATION) at 20:18

## 2022-01-22 RX ADMIN — DILTIAZEM HYDROCHLORIDE 120 MG: 120 CAPSULE, COATED, EXTENDED RELEASE ORAL at 08:58

## 2022-01-22 RX ADMIN — SODIUM CHLORIDE, PRESERVATIVE FREE 10 ML: 5 INJECTION INTRAVENOUS at 20:18

## 2022-01-22 RX ADMIN — DEXAMETHASONE SODIUM PHOSPHATE 6 MG: 10 INJECTION INTRAMUSCULAR; INTRAVENOUS at 18:42

## 2022-01-22 RX ADMIN — GUAIFENESIN 600 MG: 600 TABLET, EXTENDED RELEASE ORAL at 08:58

## 2022-01-22 RX ADMIN — HYDROCODONE BITARTRATE AND ACETAMINOPHEN 1 TABLET: 10; 325 TABLET ORAL at 02:11

## 2022-01-22 RX ADMIN — METRONIDAZOLE 500 MG: 500 INJECTION, SOLUTION INTRAVENOUS at 02:18

## 2022-01-22 RX ADMIN — OXYCODONE HYDROCHLORIDE AND ACETAMINOPHEN 500 MG: 500 TABLET ORAL at 08:58

## 2022-01-22 RX ADMIN — DULOXETINE 30 MG: 30 CAPSULE, DELAYED RELEASE ORAL at 09:00

## 2022-01-22 RX ADMIN — METOPROLOL SUCCINATE 25 MG: 50 TABLET, EXTENDED RELEASE ORAL at 08:58

## 2022-01-22 RX ADMIN — METRONIDAZOLE 500 MG: 500 INJECTION, SOLUTION INTRAVENOUS at 18:42

## 2022-01-22 RX ADMIN — IPRATROPIUM BROMIDE 2 PUFF: 17 AEROSOL, METERED RESPIRATORY (INHALATION) at 11:55

## 2022-01-22 RX ADMIN — BUDESONIDE AND FORMOTEROL FUMARATE DIHYDRATE 2 PUFF: 160; 4.5 AEROSOL RESPIRATORY (INHALATION) at 08:59

## 2022-01-22 RX ADMIN — HYDROCODONE BITARTRATE AND ACETAMINOPHEN 1 TABLET: 10; 325 TABLET ORAL at 13:48

## 2022-01-22 RX ADMIN — PANTOPRAZOLE SODIUM 40 MG: 40 INJECTION, POWDER, FOR SOLUTION INTRAVENOUS at 08:59

## 2022-01-22 RX ADMIN — ALBUTEROL SULFATE 2 PUFF: 90 AEROSOL, METERED RESPIRATORY (INHALATION) at 11:55

## 2022-01-22 RX ADMIN — OXYCODONE HYDROCHLORIDE AND ACETAMINOPHEN 500 MG: 500 TABLET ORAL at 20:19

## 2022-01-22 RX ADMIN — TRIAMTERENE AND HYDROCHLOROTHIAZIDE 1 TABLET: 37.5; 25 TABLET ORAL at 08:58

## 2022-01-22 RX ADMIN — CARBOXYMETHYLCELLULOSE SODIUM 1 DROP: 5 SOLUTION/ DROPS OPHTHALMIC at 13:49

## 2022-01-22 ASSESSMENT — PAIN SCALES - GENERAL
PAINLEVEL_OUTOF10: 7
PAINLEVEL_OUTOF10: 9
PAINLEVEL_OUTOF10: 8

## 2022-01-22 ASSESSMENT — PAIN DESCRIPTION - LOCATION: LOCATION: NECK;HEAD

## 2022-01-22 ASSESSMENT — PAIN DESCRIPTION - DESCRIPTORS: DESCRIPTORS: ACHING;DISCOMFORT

## 2022-01-22 ASSESSMENT — PAIN DESCRIPTION - PAIN TYPE: TYPE: ACUTE PAIN

## 2022-01-22 NOTE — PROGRESS NOTES
[facility] MEDICINE  - PROGRESS NOTE    Admit Date: 1/20/2022         CC: dyspnea     Subjective: dyspnea resolved, has severe headache, 10 out 10, no N/V, has some mild abd pain with diarrhea and dark stools, no fever or chills, no chest pain, palpitations, cough, urinary issues    Objective: moderate to severe distress due to headache, on RA    Diet: ADULT DIET;  Regular  Pain is:Severe  Nausea:None  Bowel Movement/Flatus yes    Data:   Scheduled Meds: Reviewed  Current Facility-Administered Medications   Medication Dose Route Frequency Provider Last Rate Last Admin    HYDROcodone-acetaminophen (NORCO)  MG per tablet 1 tablet  1 tablet Oral Q6H PRN Michael Sutton MD   1 tablet at 01/21/22 1713    carboxymethylcellulose (REFRESH PLUS) 0.5 % ophthalmic solution 1 drop  1 drop Both Eyes TID Michael Sutton MD   1 drop at 01/21/22 1700    metronidazole (FLAGYL) 500 mg in NaCl 100 mL IVPB premix  500 mg IntraVENous Q8H Michael Sutton MD   Stopped at 01/21/22 1816    cefTRIAXone (ROCEPHIN) 1,000 mg in sodium chloride (PF) 10 mL IV syringe  1,000 mg IntraVENous Q24H Michael Sutton MD   1,000 mg at 01/21/22 1701    pantoprazole (PROTONIX) injection 40 mg  40 mg IntraVENous BID Michael Sutton MD        And    sodium chloride (PF) 0.9 % injection 10 mL  10 mL IntraVENous Daily Michael Sutton MD        sodium chloride flush 0.9 % injection 5-40 mL  5-40 mL IntraVENous 2 times per day EARLE Karimi CNP   10 mL at 01/21/22 0916    sodium chloride flush 0.9 % injection 5-40 mL  5-40 mL IntraVENous PRN EARLE Karimi CNP        0.9 % sodium chloride infusion  25 mL IntraVENous PRN EARLE Karimi CNP        ondansetron (ZOFRAN-ODT) disintegrating tablet 4 mg  4 mg Oral Q8H PRN EARLE Karimi CNP        Or    ondansetron (ZOFRAN) injection 4 mg  4 mg IntraVENous Q6H PRN Kevyn Karimi, APRN - CNP        polyethylene glycol (GLYCOLAX) packet 17 g  17 g Oral Daily PRN EARLE Cage CNP        acetaminophen (TYLENOL) tablet 650 mg  650 mg Oral Q6H PRN EARLE Cage CNP        Or    acetaminophen (TYLENOL) suppository 650 mg  650 mg Rectal Q6H PRN EARLE Cage CNP        guaiFENesin [facility]) extended release tablet 600 mg  600 mg Oral BID EARLE Cage CNP   600 mg at 01/21/22 0909    albuterol sulfate  (90 Base) MCG/ACT inhaler 2 puff  2 puff Inhalation 4x daily EARLE Cage CNP   2 puff at 01/21/22 1706    ipratropium (ATROVENT HFA) 17 MCG/ACT inhaler 2 puff  2 puff Inhalation 4x daily EARLE Cage CNP   2 puff at 01/21/22 1705    Vitamin D (CHOLECALCIFEROL) tablet 2,000 Units  2,000 Units Oral Daily EARLE Cage CNP   2,000 Units at 01/21/22 0910    budesonide-formoterol (SYMBICORT) 160-4.5 MCG/ACT inhaler 2 puff  2 puff Inhalation BID EARLE Cage CNP   2 puff at 01/21/22 1123    ascorbic acid (VITAMIN C) tablet 500 mg  500 mg Oral BID EARLE Cage CNP   500 mg at 01/21/22 3333    zinc sulfate (ZINCATE) capsule 50 mg  50 mg Oral Daily EARLE Cage CNP   50 mg at 01/21/22 0913    melatonin disintegrating tablet 5 mg  5 mg Oral Nightly EARLE Cage CNP   5 mg at 01/20/22 2109    dexamethasone (PF) (DECADRON) injection 6 mg  6 mg IntraVENous Q24H EARLE Cage CNP   6 mg at 01/21/22 1700    vitamin B-12 (CYANOCOBALAMIN) tablet 500 mcg  500 mcg Oral Daily Julio Cesar Chicas MD   500 mcg at 01/21/22 3474    dilTIAZem (CARDIZEM CD) extended release capsule 120 mg  120 mg Oral Daily Julio Cesar Chicas MD   120 mg at 01/21/22 0913    DULoxetine (CYMBALTA) extended release capsule 30 mg  30 mg Oral Daily Julio Cesar Chicas MD   30 mg at 01/21/22 0914    hydrOXYzine (ATARAX) tablet 10 mg  10 mg Oral TID PRN Julio Cesar Chicas MD        metoprolol succinate (TOPROL XL) extended release tablet 25 mg  25 mg Oral Daily Dustin Ashraf MD   25 mg at 01/21/22 0910    triamterene-hydroCHLOROthiazide (MAXZIDE-25) 37.5-25 MG per tablet 1 tablet  1 tablet Oral Daily Dustin Ashraf MD   1 tablet at 01/21/22 0914    rivaroxaban (XARELTO) tablet 15 mg  15 mg Oral Daily with breakfast Dustin Ashraf MD   15 mg at 01/21/22 0910    hydrALAZINE (APRESOLINE) injection 5 mg  5 mg IntraVENous Q4H PRN Dustin Ashraf MD        iron sucrose (VENOFER) injection 200 mg  200 mg IntraVENous Nightly Dustin Ashraf MD   200 mg at 01/20/22 2109     DVT Prophylaxis: xaretlo    Continuous Infusions:   sodium chloride         Intake/Output Summary (Last 24 hours) at 1/21/2022 1834  Last data filed at 1/21/2022 1245  Gross per 24 hour   Intake 730 ml   Output --   Net 730 ml     CBC:   Recent Labs     01/20/22  1149 01/21/22  0434   WBC 9.2 13.6*   HGB 9.5* 8.4*   * 349     BMP:  Recent Labs     01/20/22  1149 01/20/22  1726 01/21/22  0434     --  140   K 4.4 3.9 3.7   CL 99  --  102   CO2 25  --  21*   BUN 23  --  21   CREATININE 1.1*  --  0.9   GLUCOSE 145*  --  195*     ABGs:   Lab Results   Component Value Date    PHART 7.630 01/20/2022    PO2ART 102.0 01/20/2022    KGM2VOC 21.0 01/20/2022     INR:   Recent Labs     01/20/22  1149 01/21/22  0434   INR 3.81* 1.68*         Objective:   Vitals: BP (!) 124/56   Pulse 65   Temp 97.5 °F (36.4 °C) (Temporal)   Resp 20   SpO2 97%   General appearance: alert, appears stated age and cooperative  Skin: Skin color, texture, turgor normal.   HEENT: Head: Normocephalic, no lesions, without obvious abnormality.   Neck: no adenopathy, no carotid bruit, no JVD and supple, symmetrical, trachea midline  Lungs: clear to auscultation bilaterally  Heart: regular rate and rhythm, S1, S2 normal, no murmur, click, rub or gallop  Abdomen: soft, non-tender; bowel sounds normal; no masses,  no organomegaly  Extremities: extremities normal, atraumatic, no cyanosis or edema  Lymphatic: No significant lymph node enlargement papable  Neurologic: Mental status: Alert, oriented, thought content appropriate        Assessment & Plan:    · Headache, severe, abnormal MRI brain- neurology consult   · Diarrhea - CT abdomen, stool studies- IV ab  · Anemia with Hg 8.4- rule out GI bleed- stool for occult blood- PPI iv   · Iron def anemia- start venofer   · covid 19 positive - no pna- on RA  · Chronic anticoagulation     Patient Active Problem List:     Personal history of colon cancer     Fibromyalgia     Renal bruit     Paroxysmal supraventricular tachycardia (HCC)     Encounter for care related to Port-a-Cath     Essential hypertension     Acute deep vein thrombosis (DVT) of femoral vein of left lower extremity      History of esophageal stricture     History of Atrial fibrillation with RVR ([address]        See orders   Disposition: TBD    Sania Bruce MD

## 2022-01-22 NOTE — CONSULTS
[id number] [facility] Neurology Consult  ? ? Patient: Mary Lou Love  MR#: [id number]  Account Number: [de-identified]   Room: [facility]/703-20   YOB: 1935  Date of Progress Note: 1/22/2022  Time of Note 9:57 AM  Attending Physician: Hugh Tovar, *  Consulting Physician: Gregorio Khan M.D.  ?  ? CHIEF COMPLAINT: Headache  ? HISTORY OF PRESENT ILLNESS:   This 82 y.o. female who presents with respiratory issues and has been diagnosed with COVID positivity. She has been vaccinated but has not had the booster. She sees Odalys Alvarenga in our office for headaches and other chronic pain difficulties. Has a small right cerebellar pontine angle meningioma. Those records and films are reviewed. She has been complaining of a pain primarily in the right occipital region for a couple of months. It is fairly steady. Is a little better today with narcotics. Says it began following a dental procedure. Otherwise has any focal signs or symptoms. Old records are reviewed in detail. Case discussed with PCP. REVIEW OF SYSTEMS:  Constitutional - No fever or chills. No diaphoresis or significant fatigue. HENT - No tinnitus or significant hearing loss. Eyes - no sudden vision change or eye pain  Respiratory - no significant shortness of breath or cough  Cardiovascular - no chest pain No palpitations or significant leg swelling  Gastrointestinal - no abdominal swelling or pain. Genitourinary - No difficulty urinating, dysuria  Musculoskeletal - no back pain or myalgia. Skin - no color change or rash  Neurologic - No seizures. No lateralizing weakness. Hematologic - no easy bruising or excessive bleeding. Psychiatric - no severe anxiety or nervousness. All other review of systems are negative. ?   Past Medical History:      Diagnosis Date    Af[facility]) 2021    Bronchitis     [facility]     Colon polyps     Constipation     Deep vein blood clot of left lower extremity (HCC)     Left leg     Depression     DVT (deep venous thrombosis) (HCC)     Dysphagia     Fibrocystic breast disease     Fibromyalgia     GERD (gastroesophageal reflux disease)     reflux with hx of esophageal stricture    Headache(784.0)     History of colon cancer     Hormone replacement therapy (postmenopausal)     Hypertension     Neuropathy of foot     In both feet    Osteoarthritis     left knee pain    Restless legs syndrome     Type 2 diabetes mellitus 10/25/2021    Vitamin D deficiency      Past Surgical History:      Procedure Laterality Date    APPENDECTOMY      BREAST BIOPSY      CATARACT REMOVAL       SECTION      CHOLECYSTECTOMY      COLECTOMY  partial    COLON SURGERY      COLONOSCOPY  2010    COLONOSCOPY  2012    Dr Dylan Means: unremarkable enterocolonic anastamosis; hyperplastic polyps    COLONOSCOPY  2013    [place]: unremarkable post-surgical colon    COLONOSCOPY  2016    Dr Rebeka Clinton colon anastomosis, 5 yr recall    COLONOSCOPY N/A 2021    Dr Steff Acosta and patent anastomosis in the right side-BCM, prn (age)    COLONOSCOPY  2021    Dr Steff Acosta and patent anastomosis in the right side-BCM, prn (age)   [place] FOOT SURGERY  right foot    HAND SURGERY Right     Dr Pavithra Browne N/A 2020    KYPHOPLASTY L5 performed by Evelio Penny DO at [address] [facility]  [place] Right 2020    RIGHT L 4-5 DECOMPRESSIVE LAMINECTOMY performed by Evelio Penny DO at [address]      UPPER GASTROINTESTINAL ENDOSCOPY  10/16/2009    UPPER GASTROINTESTINAL ENDOSCOPY  2013    Susanne: peptic stricture dilated to 48F; HH; small antral mucosal elevation    UPPER GASTROINTESTINAL ENDOSCOPY  2014    Maurilio: dilation of esophageal stricture    VARICOSE VEIN SURGERY Left 2014  [address]    Left GSV Ablation    VARICOSE VEIN SURGERY Right 2014  [address]    Right GSV Ablation     Medications in Hospital:     Current Facility-Administered Medications:     HYDROcodone-acetaminophen (NORCO)  MG per tablet 1 tablet, 1 tablet, Oral, Q6H PRN, Kenneth Loco MD, 1 tablet at 01/22/22 0211    carboxymethylcellulose (REFRESH PLUS) 0.5 % ophthalmic solution 1 drop, 1 drop, Both Eyes, TID, Kenneth Loco MD, 1 drop at 01/22/22 0859    metronidazole (FLAGYL) 500 mg in NaCl 100 mL IVPB premix, 500 mg, IntraVENous, Q8H, Kenneth Loco MD, Last Rate: 100 mL/hr at 01/22/22 0907, 500 mg at 01/22/22 0907    cefTRIAXone (ROCEPHIN) 1,000 mg in sodium chloride (PF) 10 mL IV syringe, 1,000 mg, IntraVENous, Q24H, Kenneth Loco MD, 1,000 mg at 01/21/22 1701    pantoprazole (PROTONIX) injection 40 mg, 40 mg, IntraVENous, BID, 40 mg at 01/22/22 0859 **AND** sodium chloride (PF) 0.9 % injection 10 mL, 10 mL, IntraVENous, BID, Kenneth Loco MD, 10 mL at 01/22/22 0900    sodium chloride flush 0.9 % injection 5-40 mL, 5-40 mL, IntraVENous, 2 times per day, Bernard Singletary, APRN - CNP, 10 mL at 01/22/22 0900    sodium chloride flush 0.9 % injection 5-40 mL, 5-40 mL, IntraVENous, PRN, Bernard Romp, APRN - CNP    0.9 % sodium chloride infusion, 25 mL, IntraVENous, PRN, Bernard Romp, APRN - CNP    ondansetron (ZOFRAN-ODT) disintegrating tablet 4 mg, 4 mg, Oral, Q8H PRN **OR** ondansetron (ZOFRAN) injection 4 mg, 4 mg, IntraVENous, Q6H PRN, Bernard Romp, APRN - CNP    polyethylene glycol (GLYCOLAX) packet 17 g, 17 g, Oral, Daily PRN, Bernard Romp, APRN - CNP    acetaminophen (TYLENOL) tablet 650 mg, 650 mg, Oral, Q6H PRN **OR** acetaminophen (TYLENOL) suppository 650 mg, 650 mg, Rectal, Q6H PRN, EARLE Gutierrez CNP    guaiFENesin [facility]) extended release tablet 600 mg, 600 mg, Oral, BID, EARLE Gutierrez CNP, 600 mg at 01/22/22 0858    albuterol sulfate  (90 Base) MCG/ACT inhaler 2 puff, 2 puff, Inhalation, 4x daily, Bernard Singletary, APRN - CNP, 2 puff at 01/22/22 0859    ipratropium (ATROVENT HFA) 17 MCG/ACT inhaler 2 puff, 2 puff, Inhalation, 4x daily, Serenity Morrisonh, APRN - CNP, 2 puff at 01/22/22 0859    Vitamin D (CHOLECALCIFEROL) tablet 2,000 Units, 2,000 Units, Oral, Daily, Serenity Santana, APRN - CNP, 2,000 Units at 01/22/22 0858    budesonide-formoterol (SYMBICORT) 160-4.5 MCG/ACT inhaler 2 puff, 2 puff, Inhalation, BID, Serenity Morrisonh, APRN - CNP, 2 puff at 01/22/22 0859    ascorbic acid (VITAMIN C) tablet 500 mg, 500 mg, Oral, BID, Serenity Santana, APRN - CNP, 500 mg at 01/22/22 8917    zinc sulfate (ZINCATE) capsule 50 mg, 50 mg, Oral, Daily, Serenity Santana, APRN - CNP, 50 mg at 01/22/22 0858    melatonin disintegrating tablet 5 mg, 5 mg, Oral, Nightly, Serenity Santana, APRN - CNP, 5 mg at 01/21/22 2057    dexamethasone (PF) (DECADRON) injection 6 mg, 6 mg, IntraVENous, Q24H, Serenity Santana, APRN - CNP, 6 mg at 01/21/22 1700    vitamin B-12 (CYANOCOBALAMIN) tablet 500 mcg, 500 mcg, Oral, Daily, Audie Bales MD, 500 mcg at 01/22/22 0858    dilTIAZem (CARDIZEM CD) extended release capsule 120 mg, 120 mg, Oral, Daily, Audie Bales MD, 120 mg at 01/22/22 0858    DULoxetine (CYMBALTA) extended release capsule 30 mg, 30 mg, Oral, Daily, Audie Bales MD, 30 mg at 01/22/22 0900    hydrOXYzine (ATARAX) tablet 10 mg, 10 mg, Oral, TID PRN, Audie Bales MD    metoprolol succinate (TOPROL XL) extended release tablet 25 mg, 25 mg, Oral, Daily, Audie Bales MD, 25 mg at 01/22/22 0858    triamterene-hydroCHLOROthiazide (MAXZIDE-25) 37.5-25 MG per tablet 1 tablet, 1 tablet, Oral, Daily, Audie Bales MD, 1 tablet at 01/22/22 0858    rivaroxaban (XARELTO) tablet 15 mg, 15 mg, Oral, Daily with breakfast, Audie Bales MD, 15 mg at 01/22/22 0859    hydrALAZINE (APRESOLINE) injection 5 mg, 5 mg, IntraVENous, Q4H PRN, Audie Bales MD    iron sucrose (VENOFER) injection 200 mg, 200 mg, IntraVENous, Nightly, Ana Paige MD, 200 mg at 01/21/22 2056  Allergies: Zoloft [sertraline hcl] and Gabapentin  Social History:   TOBACCO:  reports that she has never smoked. She has never used smokeless tobacco.  ETOH:  reports no history of alcohol use. Family History:       Problem Relation Age of Onset    Cancer Mother         Cervical Cancer    Cancer Brother         Esophageal cancer    Diabetes Brother     Esophageal Cancer Brother     Diabetes Sister     Colon Cancer Neg Hx     Colon Polyps Neg Hx     Liver Cancer Neg Hx     Liver Disease Neg Hx     Rectal Cancer Neg Hx     Stomach Cancer Neg Hx      ? ?  Physical Exam:    Vitals: BP (!) 147/63   Pulse 90   Temp 97.2 °F (36.2 °C) (Temporal)   Resp 14   SpO2 96%   Constitutional - well developed, well nourished. Eyes - conjunctiva normal. Pupils react to light  Ear, nose, throat -hearing intact to finger rub No scars, masses, or lesions over external nose or ears, no atrophy of tongue  Neck-symmetric, no masses noted, no jugular vein distension. Right greater than left suboccipital notch tenderness to palpation  Respiration- chest wall appears symmetric, good expansion,   normal effort without use of accessory muscles  Musculoskeletal - no significant wasting of muscles noted, no bony deformities  Extremities-no clubbing, cyanosis or edema  Skin - warm, dry, and intact. No rash, erythema, or pallor.   Psychiatric - mood, affect, and behavior appear normal.   Neurological exam  Awake, alert, fluent oriented x 3 appropriate affect  Attention and concentration appear appropriate  Recent and remote memory appears unremarkable  Speech normal without dysarthria  No clear issues with language of fund of knowledge  Cranial Nerve Exam   CN II- Visual fields grossly unremarkable  CN III, IV,VI-EOMI, No nystagmus, conjugate eye movements, no ptosis  CN VII-no facial assymetry  CN VIII-Hearing intact to voice  CN IX and X- Palate elevates in midline  CN XI-good shoulder shrug  CN XII-Tongue midline with no fasciculations or fibrillations  Motor Exam  V/V throughout upper and lower extremities bilaterally, no cogwheeling, normal tone  Sensory Exam  Sensation intact to light touch and temperature upper and lower extremities bilaterally  Reflexes   Trace throughout  Tremors- no tremors in hands or head noted  Gait  Not tested  Coordination  Finger to nose-unremarkable  ? ?  CBC:   Recent Labs     01/20/22  1149 01/21/22  0434 01/22/22  0304   WBC 9.2 13.6* 6.6   HGB 9.5* 8.4* 8.3*   * 349 387     BMP:   Recent Labs     01/20/22  1149 01/20/22  1726 01/21/22  0434 01/22/22  0304     --  140 137   K 4.4 3.9 3.7 4.1   CL 99  --  102 99   CO2 25  --  21* 24   BUN 23  --  21 19   CREATININE 1.1*  --  0.9 0.9   GLUCOSE 145*  --  195* 208*     Hepatic:   Recent Labs     01/20/22  1149 01/21/22  0434 01/22/22  0304   AST 16 14 14   ALT 12 10 10   BILITOT 0.3 0.3 <0.2   ALKPHOS 130* 101 100     Lipids:   Recent Labs     01/21/22  0434   CHOL 132*   HDL 34*     INR:   Recent Labs     01/20/22  1149 01/21/22  0434   INR 3.81* 1.68*     ?  ? Assessment and Plan   1. Persistent headache for several months in the posterior regions. History and exam are very suggestive of occipital neuralgia. Discussed with nursing staff. We will try to get Christina up to the floor for bilateral occipital nerve blocks. I do not believe her small meningioma has anything to do with her headaches. Should she fail to respond to occipital nerve blocks and other recommendations will be forthcoming. ?  ?       Donald Nagy MD  Board Certified in Neurology

## 2022-01-22 NOTE — PROGRESS NOTES
[facility] injection 4 mg  4 mg IntraVENous Q6H PRN Daniel Lacy, APRN - CNP        polyethylene glycol (GLYCOLAX) packet 17 g  17 g Oral Daily PRN Daniel Lacy, APRN - CNP        acetaminophen (TYLENOL) tablet 650 mg  650 mg Oral Q6H PRN Daniel Lacy, APRN - CNP        Or    acetaminophen (TYLENOL) suppository 650 mg  650 mg Rectal Q6H PRN Daniel Lacy, APRN - CNP        guaiFENesin [facility]) extended release tablet 600 mg  600 mg Oral BID Daniel Lacy, APRN - CNP   600 mg at 01/22/22 0858    albuterol sulfate  (90 Base) MCG/ACT inhaler 2 puff  2 puff Inhalation 4x daily Daniel Lacy, APRN - CNP   2 puff at 01/22/22 1155    ipratropium (ATROVENT HFA) 17 MCG/ACT inhaler 2 puff  2 puff Inhalation 4x daily Daniel Lacy, APRN - CNP   2 puff at 01/22/22 1155    Vitamin D (CHOLECALCIFEROL) tablet 2,000 Units  2,000 Units Oral Daily Daniel Lacy, APRN - CNP   2,000 Units at 01/22/22 0858    budesonide-formoterol (SYMBICORT) 160-4.5 MCG/ACT inhaler 2 puff  2 puff Inhalation BID Daniel Lacy, APRN - CNP   2 puff at 01/22/22 0859    ascorbic acid (VITAMIN C) tablet 500 mg  500 mg Oral BID Daniel Lacy, APRN - CNP   500 mg at 01/22/22 9079    zinc sulfate (ZINCATE) capsule 50 mg  50 mg Oral Daily Dainel Lacy, APRN - CNP   50 mg at 01/22/22 7757    melatonin disintegrating tablet 5 mg  5 mg Oral Nightly Daniel Lacy, APRN - CNP   5 mg at 01/21/22 2057    dexamethasone (PF) (DECADRON) injection 6 mg  6 mg IntraVENous Q24H Daniel Lacy, APRN - CNP   6 mg at 01/21/22 1700    vitamin B-12 (CYANOCOBALAMIN) tablet 500 mcg  500 mcg Oral Daily Alcides Aranda MD   500 mcg at 01/22/22 0858    dilTIAZem (CARDIZEM CD) extended release capsule 120 mg  120 mg Oral Daily Alcides Aranda MD   120 mg at 01/22/22 0858    DULoxetine (CYMBALTA) extended release capsule 30 mg  30 mg Oral Daily Alcides Aranda MD   30 mg at 01/22/22 0900    hydrOXYzine (ATARAX) tablet 10 mg  10 mg Oral TID PRN Camila Sherman MD        metoprolol succinate (TOPROL XL) extended release tablet 25 mg  25 mg Oral Daily Camila Sherman MD   25 mg at 01/22/22 0858    triamterene-hydroCHLOROthiazide (MAXZIDE-25) 37.5-25 MG per tablet 1 tablet  1 tablet Oral Daily Camila Sherman MD   1 tablet at 01/22/22 0858    rivaroxaban (XARELTO) tablet 15 mg  15 mg Oral Daily with breakfast Camila Sherman MD   15 mg at 01/22/22 0859    hydrALAZINE (APRESOLINE) injection 5 mg  5 mg IntraVENous Q4H PRN Camila Sherman MD        iron sucrose (VENOFER) injection 200 mg  200 mg IntraVENous Nightly Camila Sherman MD   200 mg at 01/21/22 2056     DVT Prophylaxis: xaretlo    Continuous Infusions:   sodium chloride         Intake/Output Summary (Last 24 hours) at 1/22/2022 1800  Last data filed at 1/22/2022 0349  Gross per 24 hour   Intake 100 ml   Output --   Net 100 ml     CBC:   Recent Labs     01/20/22  1149 01/21/22  0434 01/22/22  0304   WBC 9.2 13.6* 6.6   HGB 9.5* 8.4* 8.3*   * 349 387     BMP:  Recent Labs     01/20/22  1149 01/20/22  1726 01/21/22  0434 01/22/22  0304     --  140 137   K 4.4 3.9 3.7 4.1   CL 99  --  102 99   CO2 25  --  21* 24   BUN 23  --  21 19   CREATININE 1.1*  --  0.9 0.9   GLUCOSE 145*  --  195* 208*     ABGs:   Lab Results   Component Value Date    PHART 7.630 01/20/2022    PO2ART 102.0 01/20/2022    PDR0EDJ 21.0 01/20/2022     INR:   Recent Labs     01/20/22  1149 01/21/22  0434   INR 3.81* 1.68*         Objective:   Vitals: BP (!) 137/52   Pulse 76   Temp 98.1 °F (36.7 °C) (Temporal)   Resp 18   SpO2 98%   General appearance: alert, appears stated age and cooperative  Skin: Skin color, texture, turgor normal.   HEENT: Head: Normocephalic, no lesions, without obvious abnormality.   Neck: no adenopathy, no carotid bruit, no JVD and supple, symmetrical, trachea midline  Lungs: clear to auscultation bilaterally  Heart: regular rate and rhythm, S1, S2 normal, no murmur, click, rub or gallop  Abdomen: soft, non-tender; bowel sounds normal; no masses,  no organomegaly  Extremities: extremities normal, atraumatic, no cyanosis or edema  Lymphatic: No significant lymph node enlargement papable  Neurologic: Mental status: Alert, oriented, thought content appropriate        Assessment & Plan:    · Headache, severe, abnormal MRI brain- neurology consult - HA improved   · Diarrhea - CT abdomen, stool studies- IV ab  · Anemia with Hg 8.4- rule out GI bleed- stool for occult blood- PPI iv   · Iron def anemia-  venofer   · covid 19 positive - no pna- on RA  · Chronic anticoagulation   · Personal history of colon cancer treated with chemotherapy   · Essential hypertension  · History of esophageal stricture  · History of Atrial fibrillation with RVR   · Chronic anticoagulation with Xarelto  · Deconditioning -  for PT/OT            See orders   Disposition: dc home tomorrow if cleared by neurology     Maris Fu MD

## 2022-01-22 NOTE — PROGRESS NOTES
Physical Therapy Assessment    [facility]  [id number]       01/21/22 Xiomy PALMA [address]   Hearing   Hearing WFL   General   Patient assessed for rehabilitation services? Yes   Diagnosis COVID   Follows Commands WFL   Subjective   Subjective Pt states she is ready to work with PT to improve her strength and endurance. c/o soa with activity   Pain Screening   Patient Currently in Pain No   Pain Assessment   Response to Pain Intervention Patient Satisfied   Vital Signs   BP Location Right upper arm   Oxygen Therapy   O2 Device None (Room air)   Social/Functional History   Lives With Spouse   Type of [place] [address] bed; Wheelchair-manual;Rolling walker   ADL Assistance Independent   Ambulation Assistance Independent   Transfer Assistance Independent   AROM RLE (degrees)   RLE AROM WFL   AROM LLE (degrees)   LLE AROM  WFL   Strength RLE   Strength RLE WFL   Strength LLE   Strength LLE WFL   Transfers   Sit to Stand Contact guard assistance;Stand by assistance   Stand to sit Stand by assistance;Contact guard assistance   Ambulation   Ambulation? Yes   Ambulation 1   Surface level tile   Device No Device;Hand-Held Assist   Assistance Contact guard assistance;Minimal assistance   Quality of Gait flexed posture, mildly unsteady. rw would improve stability with ambulation   Gait Deviations Slow Lillie;Decreased step length;Decreased step height   Distance 15 ft   Comments soa, difficulty obtaining spo2 reading but appeared to remain in 90's despite c/o soa   Short term goals   Time Frame for Short term goals 2 wks   Short term goal 1 amb 100 ft with AD as indicated, SBA   Conditions Requiring Skilled Therapeutic Intervention   Body structures, Functions, Activity limitations Decreased functional mobility ; Decreased endurance;Decreased balance;Decreased posture   Assessment Pt would benefit from skilled PT in this setting to address her mobility/endurance deficits   Prognosis Good   Decision Making Low Complexity   REQUIRES PT FOLLOW UP Yes   Treatment Initiated  gt training, endurance, standing balance,   Discharge Recommendations Continue to assess pending progress   Activity Tolerance   Activity Tolerance Patient Tolerated treatment well   PT Equipment Recommendations   Other assessing needs   Plan   Times per week 4-5   Plan weeks 2   Current Treatment Recommendations Strengthening; Functional Mobility Training;Transfer Training;Gait Training;Pain Management;Positioning;Equipment Evaluation, Education, & procurement;Patient/Caregiver Education & Training; Safety Education & Training; Endurance Training;Balance Training   Plan Comment progressive mobility as tolerated   Safety Devices   Type of devices Bed alarm in place;Call light within reach;Gait belt;Left in bed   PT Whiteboard Notes   Therapy Whiteboard covid re 2-4     Electronically signed by Imelda Ca, PT on 1/22/2022 at 7:21 AM  Eval completed and note written by Esther Hidalgo, PT

## 2022-01-23 VITALS
SYSTOLIC BLOOD PRESSURE: 153 MMHG | HEART RATE: 71 BPM | DIASTOLIC BLOOD PRESSURE: 57 MMHG | TEMPERATURE: 97.2 F | RESPIRATION RATE: 19 BRPM | OXYGEN SATURATION: 97 %

## 2022-01-23 LAB
ALBUMIN SERPL-MCNC: 4 G/DL (ref 3.5–5.2)
ALP BLD-CCNC: 98 U/L (ref 35–104)
ALT SERPL-CCNC: 10 U/L (ref 5–33)
ANION GAP SERPL CALCULATED.3IONS-SCNC: 20 MMOL/L (ref 7–19)
AST SERPL-CCNC: 15 U/L (ref 5–32)
BASOPHILS ABSOLUTE: 0 K/UL (ref 0–0.2)
BASOPHILS RELATIVE PERCENT: 0.2 % (ref 0–1)
BILIRUB SERPL-MCNC: <0.2 MG/DL (ref 0.2–1.2)
BUN BLDV-MCNC: 14 MG/DL (ref 8–23)
CALCIUM SERPL-MCNC: 9.2 MG/DL (ref 8.8–10.2)
CAMPYLOBACTER ANTIGEN: NORMAL
CHLORIDE BLD-SCNC: 99 MMOL/L (ref 98–111)
CO2: 18 MMOL/L (ref 22–29)
CREAT SERPL-MCNC: 0.9 MG/DL (ref 0.5–0.9)
CULTURE, STOOL: NORMAL
E COLI SHIGA TOXIN ASSAY: NORMAL
EOSINOPHILS ABSOLUTE: 0 K/UL (ref 0–0.6)
EOSINOPHILS RELATIVE PERCENT: 0 % (ref 0–5)
GFR AFRICAN AMERICAN: >59
GFR NON-AFRICAN AMERICAN: 60
GLUCOSE BLD-MCNC: 181 MG/DL (ref 74–109)
HCT VFR BLD CALC: 29.1 % (ref 37–47)
HEMOGLOBIN: 8.5 G/DL (ref 12–16)
IMMATURE GRANULOCYTES #: 0 K/UL
LYMPHOCYTES ABSOLUTE: 1.4 K/UL (ref 1.1–4.5)
LYMPHOCYTES RELATIVE PERCENT: 21.8 % (ref 20–40)
MCH RBC QN AUTO: 22.7 PG (ref 27–31)
MCHC RBC AUTO-ENTMCNC: 29.2 G/DL (ref 33–37)
MCV RBC AUTO: 77.8 FL (ref 81–99)
MONOCYTES ABSOLUTE: 0.3 K/UL (ref 0–0.9)
MONOCYTES RELATIVE PERCENT: 5 % (ref 0–10)
NEUTROPHILS ABSOLUTE: 4.5 K/UL (ref 1.5–7.5)
NEUTROPHILS RELATIVE PERCENT: 72.4 % (ref 50–65)
PDW BLD-RTO: 15.8 % (ref 11.5–14.5)
PLATELET # BLD: 435 K/UL (ref 130–400)
PMV BLD AUTO: 9.4 FL (ref 9.4–12.3)
POTASSIUM REFLEX MAGNESIUM: 4.2 MMOL/L (ref 3.5–5)
RBC # BLD: 3.74 M/UL (ref 4.2–5.4)
SODIUM BLD-SCNC: 137 MMOL/L (ref 136–145)
TOTAL PROTEIN: 6.2 G/DL (ref 6.6–8.7)
WBC # BLD: 6.2 K/UL (ref 4.8–10.8)

## 2022-01-23 PROCEDURE — 80053 COMPREHEN METABOLIC PANEL: CPT

## 2022-01-23 PROCEDURE — G0378 HOSPITAL OBSERVATION PER HR: HCPCS

## 2022-01-23 PROCEDURE — 6360000002 HC RX W HCPCS: Performed by: HOSPITALIST

## 2022-01-23 PROCEDURE — 6370000000 HC RX 637 (ALT 250 FOR IP): Performed by: NURSE PRACTITIONER

## 2022-01-23 PROCEDURE — A4216 STERILE WATER/SALINE, 10 ML: HCPCS | Performed by: HOSPITALIST

## 2022-01-23 PROCEDURE — 2580000003 HC RX 258: Performed by: HOSPITALIST

## 2022-01-23 PROCEDURE — 96375 TX/PRO/DX INJ NEW DRUG ADDON: CPT

## 2022-01-23 PROCEDURE — 85025 COMPLETE CBC W/AUTO DIFF WBC: CPT

## 2022-01-23 PROCEDURE — 2500000003 HC RX 250 WO HCPCS: Performed by: PSYCHIATRY & NEUROLOGY

## 2022-01-23 PROCEDURE — 6370000000 HC RX 637 (ALT 250 FOR IP): Performed by: HOSPITALIST

## 2022-01-23 PROCEDURE — 96376 TX/PRO/DX INJ SAME DRUG ADON: CPT

## 2022-01-23 PROCEDURE — 2500000003 HC RX 250 WO HCPCS: Performed by: HOSPITALIST

## 2022-01-23 PROCEDURE — 2580000003 HC RX 258: Performed by: NURSE PRACTITIONER

## 2022-01-23 PROCEDURE — 96366 THER/PROPH/DIAG IV INF ADDON: CPT

## 2022-01-23 PROCEDURE — 99225 PR SBSQ OBSERVATION CARE/DAY 25 MINUTES: CPT | Performed by: PSYCHIATRY & NEUROLOGY

## 2022-01-23 PROCEDURE — 64405 NJX AA&/STRD GR OCPL NRV: CPT | Performed by: PSYCHIATRY & NEUROLOGY

## 2022-01-23 PROCEDURE — 6360000002 HC RX W HCPCS: Performed by: NURSE PRACTITIONER

## 2022-01-23 PROCEDURE — C9113 INJ PANTOPRAZOLE SODIUM, VIA: HCPCS | Performed by: HOSPITALIST

## 2022-01-23 RX ORDER — METRONIDAZOLE 500 MG/1
500 TABLET ORAL 3 TIMES DAILY
Qty: 15 TABLET | Refills: 0 | Status: SHIPPED | OUTPATIENT
Start: 2022-01-23 | End: 2022-01-28

## 2022-01-23 RX ORDER — LACTOBACILLUS RHAMNOSUS GG 10B CELL
1 CAPSULE ORAL DAILY
Status: DISCONTINUED | OUTPATIENT
Start: 2022-01-23 | End: 2022-01-23 | Stop reason: HOSPADM

## 2022-01-23 RX ORDER — SODIUM BICARBONATE 650 MG/1
650 TABLET ORAL 3 TIMES DAILY
Qty: 9 TABLET | Refills: 0 | Status: SHIPPED | OUTPATIENT
Start: 2022-01-23 | End: 2022-01-26

## 2022-01-23 RX ORDER — ASCORBIC ACID 500 MG
500 TABLET ORAL 2 TIMES DAILY
Qty: 60 TABLET | Refills: 0 | Status: SHIPPED | OUTPATIENT
Start: 2022-01-23 | End: 2022-11-01

## 2022-01-23 RX ORDER — HEPARIN SODIUM (PORCINE) LOCK FLUSH IV SOLN 100 UNIT/ML 100 UNIT/ML
3 SOLUTION INTRAVENOUS PRN
Status: DISCONTINUED | OUTPATIENT
Start: 2022-01-23 | End: 2022-01-23 | Stop reason: HOSPADM

## 2022-01-23 RX ORDER — LACTOBACILLUS RHAMNOSUS GG 10B CELL
1 CAPSULE ORAL DAILY
Qty: 15 CAPSULE | Refills: 0 | Status: SHIPPED | OUTPATIENT
Start: 2022-01-23 | End: 2022-02-23

## 2022-01-23 RX ORDER — FERROUS SULFATE 325(65) MG
325 TABLET ORAL 2 TIMES DAILY WITH MEALS
Status: DISCONTINUED | OUTPATIENT
Start: 2022-01-23 | End: 2022-01-23 | Stop reason: HOSPADM

## 2022-01-23 RX ORDER — NORTRIPTYLINE HYDROCHLORIDE 10 MG/1
10 CAPSULE ORAL NIGHTLY
Status: DISCONTINUED | OUTPATIENT
Start: 2022-01-23 | End: 2022-01-23 | Stop reason: HOSPADM

## 2022-01-23 RX ORDER — ZINC SULFATE 50(220)MG
50 CAPSULE ORAL DAILY
Qty: 30 CAPSULE | Refills: 0 | COMMUNITY
Start: 2022-01-24 | End: 2022-11-01

## 2022-01-23 RX ORDER — SODIUM BICARBONATE 650 MG/1
650 TABLET ORAL 4 TIMES DAILY
Status: DISCONTINUED | OUTPATIENT
Start: 2022-01-23 | End: 2022-01-23 | Stop reason: HOSPADM

## 2022-01-23 RX ORDER — FERROUS SULFATE 325(65) MG
325 TABLET ORAL 2 TIMES DAILY WITH MEALS
Qty: 60 TABLET | Refills: 0 | Status: SHIPPED | OUTPATIENT
Start: 2022-01-23 | End: 2022-02-23

## 2022-01-23 RX ORDER — HEPARIN SODIUM (PORCINE) LOCK FLUSH IV SOLN 100 UNIT/ML 100 UNIT/ML
300 SOLUTION INTRAVENOUS PRN
Status: DISCONTINUED | OUTPATIENT
Start: 2022-01-23 | End: 2022-01-23 | Stop reason: HOSPADM

## 2022-01-23 RX ORDER — MECOBALAMIN 5000 MCG
5 TABLET,DISINTEGRATING ORAL NIGHTLY
Qty: 30 TABLET | Refills: 0 | Status: SHIPPED | OUTPATIENT
Start: 2022-01-23 | End: 2022-11-01

## 2022-01-23 RX ORDER — NORTRIPTYLINE HYDROCHLORIDE 10 MG/1
10 CAPSULE ORAL NIGHTLY
Qty: 30 CAPSULE | Refills: 0 | Status: SHIPPED | OUTPATIENT
Start: 2022-01-23 | End: 2022-04-26

## 2022-01-23 RX ORDER — METHYLPREDNISOLONE 4 MG/1
TABLET ORAL
Qty: 1 KIT | Refills: 0 | Status: SHIPPED | OUTPATIENT
Start: 2022-01-23 | End: 2022-01-29

## 2022-01-23 RX ADMIN — GUAIFENESIN 600 MG: 600 TABLET, EXTENDED RELEASE ORAL at 09:25

## 2022-01-23 RX ADMIN — SODIUM BICARBONATE 650 MG: 650 TABLET ORAL at 09:24

## 2022-01-23 RX ADMIN — METRONIDAZOLE 500 MG: 500 INJECTION, SOLUTION INTRAVENOUS at 00:17

## 2022-01-23 RX ADMIN — OXYCODONE HYDROCHLORIDE AND ACETAMINOPHEN 500 MG: 500 TABLET ORAL at 09:25

## 2022-01-23 RX ADMIN — HEPARIN 300 UNITS: 100 SYRINGE at 17:35

## 2022-01-23 RX ADMIN — TRIAMTERENE AND HYDROCHLOROTHIAZIDE 1 TABLET: 37.5; 25 TABLET ORAL at 09:25

## 2022-01-23 RX ADMIN — DULOXETINE 30 MG: 30 CAPSULE, DELAYED RELEASE ORAL at 09:24

## 2022-01-23 RX ADMIN — CARBOXYMETHYLCELLULOSE SODIUM 1 DROP: 5 SOLUTION/ DROPS OPHTHALMIC at 09:25

## 2022-01-23 RX ADMIN — IPRATROPIUM BROMIDE 2 PUFF: 17 AEROSOL, METERED RESPIRATORY (INHALATION) at 12:04

## 2022-01-23 RX ADMIN — ZINC SULFATE 220 MG (50 MG) CAPSULE 50 MG: CAPSULE at 09:24

## 2022-01-23 RX ADMIN — Medication 2000 UNITS: at 09:24

## 2022-01-23 RX ADMIN — ALBUTEROL SULFATE 2 PUFF: 90 AEROSOL, METERED RESPIRATORY (INHALATION) at 09:25

## 2022-01-23 RX ADMIN — METRONIDAZOLE 500 MG: 500 INJECTION, SOLUTION INTRAVENOUS at 09:35

## 2022-01-23 RX ADMIN — ALBUTEROL SULFATE 2 PUFF: 90 AEROSOL, METERED RESPIRATORY (INHALATION) at 12:04

## 2022-01-23 RX ADMIN — PANTOPRAZOLE SODIUM 40 MG: 40 INJECTION, POWDER, FOR SOLUTION INTRAVENOUS at 09:24

## 2022-01-23 RX ADMIN — SODIUM BICARBONATE 650 MG: 650 TABLET ORAL at 12:04

## 2022-01-23 RX ADMIN — METOPROLOL SUCCINATE 25 MG: 50 TABLET, EXTENDED RELEASE ORAL at 09:24

## 2022-01-23 RX ADMIN — DILTIAZEM HYDROCHLORIDE 120 MG: 120 CAPSULE, COATED, EXTENDED RELEASE ORAL at 09:24

## 2022-01-23 RX ADMIN — SODIUM CHLORIDE, PRESERVATIVE FREE 10 ML: 5 INJECTION INTRAVENOUS at 09:29

## 2022-01-23 RX ADMIN — CARBOXYMETHYLCELLULOSE SODIUM 1 DROP: 5 SOLUTION/ DROPS OPHTHALMIC at 12:04

## 2022-01-23 RX ADMIN — BUDESONIDE AND FORMOTEROL FUMARATE DIHYDRATE 2 PUFF: 160; 4.5 AEROSOL RESPIRATORY (INHALATION) at 09:25

## 2022-01-23 RX ADMIN — ONDANSETRON 4 MG: 2 INJECTION INTRAMUSCULAR; INTRAVENOUS at 09:29

## 2022-01-23 RX ADMIN — ALBUTEROL SULFATE 2 PUFF: 90 AEROSOL, METERED RESPIRATORY (INHALATION) at 16:00

## 2022-01-23 RX ADMIN — HYDROCODONE BITARTRATE AND ACETAMINOPHEN 1 TABLET: 10; 325 TABLET ORAL at 09:31

## 2022-01-23 RX ADMIN — IPRATROPIUM BROMIDE 2 PUFF: 17 AEROSOL, METERED RESPIRATORY (INHALATION) at 09:25

## 2022-01-23 RX ADMIN — SODIUM CHLORIDE, PRESERVATIVE FREE 10 ML: 5 INJECTION INTRAVENOUS at 09:26

## 2022-01-23 RX ADMIN — CYANOCOBALAMIN TAB 500 MCG 500 MCG: 500 TAB at 09:24

## 2022-01-23 RX ADMIN — BUPIVACAINE HYDROCHLORIDE 75 MG: 2.5 INJECTION, SOLUTION INFILTRATION; PERINEURAL at 08:28

## 2022-01-23 RX ADMIN — IPRATROPIUM BROMIDE 2 PUFF: 17 AEROSOL, METERED RESPIRATORY (INHALATION) at 18:48

## 2022-01-23 ASSESSMENT — PAIN SCALES - GENERAL
PAINLEVEL_OUTOF10: 0
PAINLEVEL_OUTOF10: 7

## 2022-01-23 NOTE — CARE COORDINATION
Home Health Referral received over weekend by [facility] Intake. Patient set up with [facility].   Electronically signed by Ahmet Butts on 1/23/22 at 4:07 PM CST

## 2022-01-23 NOTE — PROGRESS NOTES
Patient wants [facility] at discharge. Patient states she has used [facility] in the past. SW notified.        Electronically signed by Stanley Plasencia RN on 1/23/2022 at 3:49 PM     Electronically signed by Sadia Keating RN on 1/23/2022 at 4:05 PM

## 2022-01-23 NOTE — PROGRESS NOTES
Patient:   Negra Santos  MR#:    [id number]   Room:    [facility]/090-   YOB: 1935  Date of Progress Note: 1/23/2022  Time of Note                           8:48 AM  Consulting Physician:   Jahaira Potter M.D. Attending Physician:  Zac Brown, *       CHIEF COMPLAINT: Headache  Subjective  This 82 y.o. female who presents with respiratory issues and has been diagnosed with COVID positivity. She has been vaccinated but has not had the booster. She sees Karena Hall in our office for headaches and other chronic pain difficulties. Has a small right cerebellar pontine angle meningioma. She has been complaining of a pain primarily in the right occipital region for a couple of months. It is fairly steady. Is a little better today with narcotics. Says it began following a dental procedure. Otherwise has any focal signs or symptoms  No change overnight  REVIEW OF SYSTEMS:  Constitutional: No fevers No chills  Neck:No stiffness  Respiratory: No shortness of breath  Cardiovascular: No chest pain No palpitations  Gastrointestinal: No abdominal pain    Genitourinary: No Dysuria  Neurological: No lateralizing weakness, no confusion      PHYSICAL EXAM:  BP (!) 148/63   Pulse 76   Temp 96.7 °F (35.9 °C) (Temporal)   Resp 18   SpO2 97%     Constitutional: she appears well-developed and well-nourished. Eyes - conjunctiva normal.  Pupils react to light  Ear, nose, throat -hearing intact to voice. No scars, masses, or lesions over external nose or ears, no atrophy of tongue  Neck-symmetric, no masses noted, no jugular vein distension  Respiration- chest wall appears symmetric, good expansion,   normal effort without use of accessory muscles  Cardiovascular- RRR  Musculoskeletal - no significant wasting of muscles noted, no bony deformities, gait no gross ataxia  Extremities-no clubbing, cyanosis or edema  Skin - warm, dry, and intact. No rash, erythema, or pallor.   Psychiatric - mood, affect, and behavior appear normal.      Neurology  NEUROLOGICAL EXAM:      Mental status   Awake, alert, fluent oriented x 3 appropriate affect  Attention and concentration appear appropriate  Recent and remote memory appears unremarkable  Speech normal without dysarthria  No clear issues with language       Cranial Nerves   CN II- Visual fields grossly unremarkable  CN III, IV,VI-EOMI, No nystagmus, conjugate eye movements, no ptosis  CN VII-no facial asymmetry  CN VIII-Hearing intact      Motor function  Antigravity x 4             Tremor None present     Gait                  Not tested           Nursing/pcp notes, imaging,labs and vitals reviewed. PT,OT and/or speech notes reviewed    Lab Results   Component Value Date    WBC 6.2 01/23/2022    HGB 8.5 (L) 01/23/2022    HCT 29.1 (L) 01/23/2022    MCV 77.8 (L) 01/23/2022     (H) 01/23/2022     Lab Results   Component Value Date     01/23/2022    K 4.2 01/23/2022    CL 99 01/23/2022    CO2 18 (L) 01/23/2022    BUN 14 01/23/2022    CREATININE 0.9 01/23/2022    GLUCOSE 181 (H) 01/23/2022    CALCIUM 9.2 01/23/2022    PROT 6.2 (L) 01/23/2022    LABALBU 4.0 01/23/2022    BILITOT <0.2 01/23/2022    ALKPHOS 98 01/23/2022    AST 15 01/23/2022    ALT 10 01/23/2022    LABGLOM 60 (A) 01/23/2022    GFRAA >59 01/23/2022    GLOB 2.5 10/12/2016     Lab Results   Component Value Date    INR 1.68 (H) 01/21/2022    INR 3.81 (H) 01/20/2022    INR 1.14 11/17/2021     Lab Results   Component Value Date    CRP <0.30 01/20/2022       Procedure note: Consent was signed and on the chart. Risks, benefits and alternatives were discussed. 5 mL of 0.25% Marcaine were injected into each suboccipital notch without complication. The patient tolerated the procedure well. There were no complications. RECORD REVIEW: Previous medical records, medications were reviewed at today's visit    IMPRESSION:   1. Persistent headache for several months in the posterior regions.   History and exam are very suggestive of occipital neuralgia. Discussed with nursing staff. Given bilateral occipital nerve blocks today. She can follow-up with Caryle Sprang in our office. She sees her already. We will also add nortriptyline 10 mg at bedtime.   2.  COVID positivity-stable  Okay to go home today if okay with others

## 2022-01-23 NOTE — DISCHARGE SUMMARY
Physician Discharge Summary     Patient ID:  Jaja Mckeon  [id number]  21 y.o.  1935    Admit date: 1/20/2022    Discharge date and time: 1/23/2022     Admitting Physician: Kamryn Matthews MD     Discharge Physician: Kamryn Matthews MD     Discharge Diagnoses:     · Headache- neurology evaluated- follow up as OP  · Diarrhea - CT abdomen neg, stool studies neg- follow up as OP  · Anemia with Hg 8.4- GI bleed ruled out   · Iron def anemia- treated with venofer   · covid 19 positive - no PNA- on RA  · Chronic anticoagulation   · Personal history of colon cancer treated with chemotherapy   · Essential hypertension  · History of esophageal stricture  · History of Atrial fibrillation with RVR   · Chronic anticoagulation with Xarelto  · Deconditioning - HH for PT/OT    Discharged Condition: good    Indication for Admission: dyspnea, fatique     Hospital Course:     81 yo female presented with dyspnea, she has been on RA since the admission, saturating in the %. CXR neg for PNA. She is Covid positive. Treated per protocol. Discharged on Medrol pack. DD neg. Pt already on anticoagulation for PAF and history of DVT. Recent stress test normal EF. Troponin neg. Denies chest pain. No wheezing. Complained of severe headache. Neurology evaluated. Has a small right cerebellar pontine angle meningioma. Headache much better with narcotics. Per neurology 5 mL of 0.25% Marcaine were injected into each suboccipital notch without complication. The patient tolerated the procedure well. There were no complications. Patient complained of diarrhea with dark stools. Stool cultures neg. Stool occult blood neg. Hg stable. Anemia work showed iron def. Treated with Venofer. CT abd neg for colitis. Today diarrhea improved. Apparently she had it on and off since she underwent chemotherapy. Patient participated with PT/OT and is cleared for DC home with [place] with PT/OT with OP follow up with her PCP and neurology.        Consults: neurology    Significant Diagnostic Studies:     CT ABDOMEN PELVIS W IV CONTRAST Additional Contrast? Oral [[id number]] Resulted: 01/21/22 2051      Order Status: Completed Updated: 01/21/22 2053     Narrative:       CT ABDOMEN PELVIS W IV CONTRAST 1/21/2022 8:32 PM   HISTORY: Abdominal pain, diarrhea   COMPARISON: CT scan dated 12/1/2020. DLP: 1041 mGy cm   TECHNIQUE: Following the uneventful administration of intravenous   iodinated contrast, helical CT tomographic images of the abdomen and   pelvis were acquired. Coronal reformatted images were also provided   for review. Automated exposure control was also utilized to decrease   patient radiation dose. FINDINGS:   The lung bases and base of the heart are unremarkable. LIVER: Liver steatosis. Segment 4 cyst. No suspicious liver lesion. The hepatic vasculature is patent. BILIARY SYSTEM: The gallbladder is surgically absent. No intrahepatic   or extrahepatic ductal dilatation. PANCREAS: Diffuse atrophy. SPLEEN: Normal size. Tiny splenic cysts. KIDNEYS AND ADRENALS: Adrenal glands are unremarkable. Bilateral   simple renal cysts. No hydronephrosis. The ureters are decompressed   and normal in appearance. RETROPERITONEUM: No mass, lymphadenopathy or hemorrhage. GI TRACT: Stomach is completely decompressed. The small bowel is   nondilated. Mild fluid identified in the mid and distal small bowel   loops and extending into the colon. Prior partial colectomy. Colon is   nondistended. No abnormal wall thickening identified. OTHER: There is no mesenteric mass, lymphadenopathy or fluid   collection. The abdominopelvic vasculature is patent. Advanced lumbar   spine degenerative change including an old L5 compression deformity   with kyphoplasty cement. No acute bony abnormality. PELVIS: No mass lesion, fluid collection or significant   lymphadenopathy is seen in the pelvis. The urinary bladder is normal   in appearance.     Impression:       1.  No acute process in the abdomen or pelvis. 2. Diarrheal illness by history. There are colonic diverticula without   acute diverticulitis. No colitis identified. 3. Prior partial colectomy. 4. No viscus perforation or peritoneal abscess. Signed by Dr Joao Fischer [[id number]] Resulted: 01/20/22 1416     Order Status: Completed Updated: 01/20/22 1418     Narrative:       EXAMINATION: XR CHEST PORTABLE 1/20/2022 3:12 PM   HISTORY: Chest pain. Report: A portable upright frontal view of the chest was obtained. COMPARISON: Portable chest x-ray 11/17/2021. There is mild to moderate bibasilar atelectasis, no lung   consolidation. Heart size is normal. No pneumothorax or definite   pleural effusion is identified. There is a right subclavian port   catheter with the tip at the proximal SVC in satisfactory position,   unchanged. The osseous structures and upper abdomen appear   unremarkable.     Impression:       Bibasilar atelectasis, no acute infiltrates. Stable   satisfactory position of the right subclavian port catheter. Signed by Dr Colleen Lang. [place]       Stress test 10/27/2022  Review of rest and stress images obtained utilizing a gated SPECT   acquisition protocol along with review of the polar plot revealed:   1. Ejection fraction 86%   2. Wall motion grossly unremarkable    3. Myocardial perfusion imaging demonstrated homogeneous uptake of the   tracer in all visualized segments no definite areas of ischemia or   infarction are identified. Summary impressions:   Normal ejection fraction 86% probably normal myocardial perfusion   study no evidence of ischemia or previous infarction.    Signed by Dr Dennise Hsieh         Discharge Exam:  BP (!) 153/57   Pulse 71   Temp 97.2 °F (36.2 °C) (Temporal)   Resp 19   SpO2 97%     General Appearance:    Alert, cooperative, no distress, appears stated age   Head:    Normocephalic, without obvious abnormality, atraumatic Nose:   Nares normal, septum midline, mucosa normal, no drainage    or sinus tenderness       Neck:   Supple, symmetrical, trachea midline, no adenopathy;     thyroid:  no enlargement/tenderness/nodules; no carotid    bruit or JVD       Lungs:     Clear to auscultation bilaterally, respirations unlabored        Heart:    Regular rate and rhythm, S1 and S2 normal, no murmur, rub   or gallop       Abdomen:     Soft, non-tender, bowel sounds active all four quadrants,     no masses, no organomegaly           Extremities:   Extremities normal, atraumatic, no cyanosis or edema   Pulses:   2+ and symmetric all extremities   Skin:   Skin color, texture, turgor normal, no rashes or lesions       Neurologic:   CNII-XII intact, normal strength, sensation and reflexes     throughout       Discharge Medications:         Medication List      START taking these medications    ascorbic acid 500 MG tablet  Commonly known as: VITAMIN C  Take 1 tablet by mouth 2 times daily     ferrous sulfate 325 (65 Fe) MG tablet  Commonly known as: IRON 325  Take 1 tablet by mouth 2 times daily (with meals)     lactobacillus Caps capsule  Take 1 capsule by mouth daily for 15 days     melatonin 5 MG Tbdp disintegrating tablet  Take 1 tablet by mouth nightly     methylPREDNISolone 4 MG tablet  Commonly known as: MEDROL DOSEPACK  Take by mouth.     metroNIDAZOLE 500 MG tablet  Commonly known as: FLAGYL  Take 1 tablet by mouth 3 times daily for 5 days     nortriptyline 10 MG capsule  Commonly known as: PAMELOR  Take 1 capsule by mouth nightly     sodium bicarbonate 650 MG tablet  Take 1 tablet by mouth 3 times daily for 3 days     zinc sulfate 220 (50 Zn) MG capsule  Commonly known as: ZINCATE  Take 1 capsule by mouth daily  Start taking on: January 24, 2022        CONTINUE taking these medications    acyclovir 400 MG tablet  Commonly known as: ZOVIRAX  Take 1 tablet by mouth 2 times daily history of shingles in her eye     albuterol 1.25 MG/3ML nebulizer solution  Commonly known as: ACCUNEB     B-12 500 MCG Tabs  TAKE 1 TABLET BY MOUTH DAILY     betamethasone (augmented) 0.05 % gel  Commonly known as: DIPROLENE  Apply topically 2 times daily. To scalp rash     dilTIAZem 120 MG extended release capsule  Commonly known as: CARDIZEM CD  TAKE 1 CAPSULE BY MOUTH TWICE DAILY     DULoxetine 30 MG extended release capsule  Commonly known as: CYMBALTA  Take 1 capsule by mouth daily     furosemide 20 MG tablet  Commonly known as: Lasix  Take 1 tablet by mouth daily as needed (swelling)     Hospital Bed Mis  by Does not apply route     irbesartan 300 MG tablet  Commonly known as: AVAPRO  Take 1 tablet by mouth daily     ketoconazole 2 % shampoo  Commonly known as: Nizoral  Apply topically daily as needed. metoprolol succinate 50 MG extended release tablet  Commonly known as: TOPROL XL  Take 1 tablet by mouth 2 times daily Take (1) tab twice daily. Norco  MG per tablet  Generic drug: HYDROcodone-acetaminophen     Nurtec 75 MG Tbdp  Generic drug: Rimegepant Sulfate  Take 1 tablet at the onset of migraine. Do not exceed 1 tablet in 24 hours. nystatin 495998 UNIT/GM powder  Commonly known as: MYCOSTATIN  For rash under breast. Apply 3 times daily.      omeprazole 40 MG delayed release capsule  Commonly known as: PRILOSEC  Take 1 capsule by mouth daily     triamcinolone 0.1 % cream  Commonly known as: KENALOG  APPLY TWICE DAILY TO RASH UP TO 2 WEEKS/MONTH AS NEEDED     vitamin D 1.25 MG (01604 UT) Caps capsule  Commonly known as: ERGOCALCIFEROL  TAKE 1 CAPSULE BY MOUTH 1 TIME A WEEK        STOP taking these medications    hydrOXYzine 10 MG tablet  Commonly known as: ATARAX           Where to Get Your Medications      These medications were sent to [place] [id number] #[id number], [address] [facility]  [id number] [place] [address], [address] [address]    Phone: [phone]   · ascorbic acid 500 MG tablet  · ferrous sulfate 325 (65 Fe) MG tablet  · lactobacillus Caps capsule  · melatonin 5 MG Tbdp disintegrating tablet  · methylPREDNISolone 4 MG tablet  · metroNIDAZOLE 500 MG tablet  · nortriptyline 10 MG capsule  · sodium bicarbonate 650 MG tablet     You can get these medications from any pharmacy    You don't need a prescription for these medications  · zinc sulfate 220 (50 Zn) MG capsule           Discharge Instructions: Follow up with EARLE Duncan CNP in 2 days. Take medications as directed. Resume activity as tolerated. Follow up with neurology in one week. Diet: ADULT DIET; Regular; No Caffeine     Disposition: Patient is medically stable and will be discharged home with [facility] for PT/OT.     Dc time 40 min

## 2022-01-24 ENCOUNTER — TELEPHONE (OUTPATIENT)
Dept: INFUSION THERAPY | Age: 87
End: 2022-01-24

## 2022-01-24 ENCOUNTER — HOSPITAL ENCOUNTER (OUTPATIENT)
Dept: MRI IMAGING | Age: 87
Discharge: HOME | End: 2022-01-24
Payer: MEDICARE

## 2022-01-24 ENCOUNTER — CARE COORDINATION (OUTPATIENT)
Dept: CASE MANAGEMENT | Age: 87
End: 2022-01-24

## 2022-01-24 DIAGNOSIS — G44.52 NEW DAILY PERSISTENT HEADACHE: ICD-10-CM

## 2022-01-24 PROCEDURE — 6360000004 HC RX CONTRAST MEDICATION: Performed by: NURSE PRACTITIONER

## 2022-01-24 PROCEDURE — 70553 MRI BRAIN STEM W/O & W/DYE: CPT

## 2022-01-24 PROCEDURE — A9577 INJ MULTIHANCE: HCPCS | Performed by: NURSE PRACTITIONER

## 2022-01-24 RX ADMIN — GADOBENATE DIMEGLUMINE 13 ML: 529 INJECTION, SOLUTION INTRAVENOUS at 08:27

## 2022-01-24 NOTE — CARE COORDINATION
[facility] 45 Transitions Initial Follow Up Call    Call within 2 business days of discharge: Yes    Patient: Abdoul Perez Patient : 1935   MRN: [id number]  Reason for Admission: Covid  Discharge Date: 22 RARS: Readmission Risk Score: 18.3 ( )      Last Discharge [facility]       Complaint Diagnosis Description Type Department Provider    22 Shortness of Breath Dyspnea, unspecified type . .. ED to Hosp-Admission (Discharged) (ADMITTED) Marquetta Soulier, MD; [place]. .. Spoke with: family member, patient at doctor's appt. Facility: [place] [address]    Non-face-to-face services provided:  Reviewed encounter information for continuity of care prior to follow up phone call - chart notes, consults, progress notes, test results, med list, appointments, AVS, other information. Care Transitions 24 Hour Call    Do you have all of your prescriptions and are they filled?: Yes  Post Acute Services: Home Health  Do you have support at home?: Partner/Spouse/SO  Care Transitions Interventions         Follow Up  : Placed call to patient's number and she was not home, family said she was at Dr. Omayra Kumar. CTN will follow up at a later time.    Future Appointments   Date Time Provider Jose Armando Milner   2022 11:00 AM SCHEDULE, [facility] IN[facility]   2022 11:00 AM SCHEDULE, [facility]   2022  8:00 AM EARLE Wilkerson [facility]   3/11/2022  3:00 PM [facility]   3/29/2022 10:00 AM EARLE Wilkerson [facility]   2022  1:30 PM DO BIANKA Pitt [facility] [place]       Cassandra Coffey RN

## 2022-01-25 ENCOUNTER — TELEPHONE (OUTPATIENT)
Dept: PRIMARY CARE CLINIC | Age: 87
End: 2022-01-25

## 2022-01-25 ENCOUNTER — CARE COORDINATION (OUTPATIENT)
Dept: CASE MANAGEMENT | Age: 87
End: 2022-01-25

## 2022-01-25 NOTE — TELEPHONE ENCOUNTER
It looks like she has been in the hospital with Covid until the 23rd some not surprised about the fever.   I would call her on Wednesday and see how she is feeling and if she is worse she should go back to the hospital.

## 2022-01-25 NOTE — CARE COORDINATION
[facility] 45 Transitions Initial Follow Up Call    Call within 2 business days of discharge: Yes    Patient: Laquita Castillo Patient : 1935   MRN: [id number]  Reason for Admission:   Discharge Date: 22 RARS: Readmission Risk Score: 18.3 ( )      Last Discharge  [place]       Complaint Diagnosis Description Type Department Provider    22 Shortness of Breath Dyspnea, unspecified type . .. ED to Hosp-Admission (Discharged) (ADMITTED) Destiny Calderón MD; [place]. .. Spoke with: N/A    Facility: [place] [address]    Non-face-to-face services provided:  Reviewed encounter information for continuity of care prior to follow up phone call - chart notes, consults, progress notes, test results, med list, appointments, AVS, other information. Care Transitions 24 Hour Call    Do you have all of your prescriptions and are they filled?: Yes  Post Acute Services: Home Health  Do you have support at home?: Partner/Spouse/SO  Care Transitions Interventions         Follow Up ; Attempted to make contact with Lucas Hoffmanid initial follow up call post discharge from the hospital without success. Unable to leave a message regarding intent of call and call back information. Will try again my next business day.        Future Appointments   Date Time Provider Jose Armando Milner   2022 11:00 AM SCHEDULE, [facility] OP INFUSION [facility]   2022 11:00 AM SCHEDULE, [facility] OP INF[facility]ION [facility]   2022  8:00 AM EARLE NaikNetungur [facility]   3/11/2022  3:00 PM EARLE Farley CNP [facility]   3/29/2022 10:00 AM EARLE Naik [facility]   2022  1:30 PM DO BIANKA Mckeon Cardio [facility]       Leslie Orellana RN

## 2022-01-26 ENCOUNTER — CARE COORDINATION (OUTPATIENT)
Dept: CASE MANAGEMENT | Age: 87
End: 2022-01-26

## 2022-01-26 ENCOUNTER — OFFICE VISIT (OUTPATIENT)
Dept: PRIMARY CARE CLINIC | Age: 87
End: 2022-01-26
Payer: MEDICARE

## 2022-01-26 VITALS
HEIGHT: 63 IN | OXYGEN SATURATION: 100 % | DIASTOLIC BLOOD PRESSURE: 60 MMHG | RESPIRATION RATE: 18 BRPM | TEMPERATURE: 97.4 F | WEIGHT: 135.2 LBS | SYSTOLIC BLOOD PRESSURE: 130 MMHG | HEART RATE: 66 BPM | BODY MASS INDEX: 23.96 KG/M2

## 2022-01-26 DIAGNOSIS — D50.9 IRON DEFICIENCY ANEMIA, UNSPECIFIED IRON DEFICIENCY ANEMIA TYPE: ICD-10-CM

## 2022-01-26 DIAGNOSIS — R53.83 FATIGUE, UNSPECIFIED TYPE: Primary | ICD-10-CM

## 2022-01-26 DIAGNOSIS — R73.9 ELEVATED BLOOD SUGAR: ICD-10-CM

## 2022-01-26 DIAGNOSIS — R50.9 FEVER, UNSPECIFIED FEVER CAUSE: ICD-10-CM

## 2022-01-26 LAB
ALBUMIN SERPL-MCNC: 3.5 G/DL (ref 3.5–5.2)
ALP BLD-CCNC: 98 U/L (ref 35–104)
ALT SERPL-CCNC: 16 U/L (ref 5–33)
ANION GAP SERPL CALCULATED.3IONS-SCNC: 13 MMOL/L (ref 7–19)
ANISOCYTOSIS: ABNORMAL
AST SERPL-CCNC: 23 U/L (ref 5–32)
BASOPHILS ABSOLUTE: 0 K/UL (ref 0–0.2)
BASOPHILS RELATIVE PERCENT: 0.2 % (ref 0–1)
BILIRUB SERPL-MCNC: 0.3 MG/DL (ref 0.2–1.2)
BUN BLDV-MCNC: 14 MG/DL (ref 8–23)
CALCIUM SERPL-MCNC: 8.7 MG/DL (ref 8.8–10.2)
CHLORIDE BLD-SCNC: 98 MMOL/L (ref 98–111)
CO2: 21 MMOL/L (ref 22–29)
CREAT SERPL-MCNC: 0.8 MG/DL (ref 0.5–0.9)
EOSINOPHILS ABSOLUTE: 0.3 K/UL (ref 0–0.6)
EOSINOPHILS RELATIVE PERCENT: 3.5 % (ref 0–5)
GFR AFRICAN AMERICAN: >59
GFR NON-AFRICAN AMERICAN: >60
GLUCOSE BLD-MCNC: 167 MG/DL (ref 74–109)
HCT VFR BLD CALC: 31.2 % (ref 37–47)
HEMOGLOBIN: 8.9 G/DL (ref 12–16)
HYPOCHROMIA: ABNORMAL
IMMATURE GRANULOCYTES #: 0 K/UL
LYMPHOCYTES ABSOLUTE: 0.9 K/UL (ref 1.1–4.5)
LYMPHOCYTES RELATIVE PERCENT: 10.9 % (ref 20–40)
MACRO-OVALOCYTES: ABNORMAL
MCH RBC QN AUTO: 23.1 PG (ref 27–31)
MCHC RBC AUTO-ENTMCNC: 28.5 G/DL (ref 33–37)
MCV RBC AUTO: 80.8 FL (ref 81–99)
MICROCYTES: ABNORMAL
MONOCYTES ABSOLUTE: 0.8 K/UL (ref 0–0.9)
MONOCYTES RELATIVE PERCENT: 8.7 % (ref 0–10)
NEUTROPHILS ABSOLUTE: 6.5 K/UL (ref 1.5–7.5)
NEUTROPHILS RELATIVE PERCENT: 76.2 % (ref 50–65)
OVALOCYTES: ABNORMAL
PDW BLD-RTO: 18.4 % (ref 11.5–14.5)
PLATELET # BLD: 326 K/UL (ref 130–400)
PLATELET SLIDE REVIEW: ADEQUATE
PMV BLD AUTO: 9.4 FL (ref 9.4–12.3)
POLYCHROMASIA: ABNORMAL
POTASSIUM SERPL-SCNC: 4.1 MMOL/L (ref 3.5–5)
RBC # BLD: 3.86 M/UL (ref 4.2–5.4)
SARS-COV-2, PCR: DETECTED
SODIUM BLD-SCNC: 132 MMOL/L (ref 136–145)
STOMATOCYTES: ABNORMAL
TOTAL PROTEIN: 6.3 G/DL (ref 6.6–8.7)
WBC # BLD: 8.6 K/UL (ref 4.8–10.8)

## 2022-01-26 PROCEDURE — 99214 OFFICE O/P EST MOD 30 MIN: CPT | Performed by: NURSE PRACTITIONER

## 2022-01-26 NOTE — PATIENT INSTRUCTIONS
I will get home health to come out and reassess your medications  Continue the hydrocodone for pain every 6 hours through pain management. Set up appointment with Dr. Emma Haji at pain management and I am going to send the notes to him about your recent hospitalization to see if he can do anything for her headaches I will include the nerve block given by Dr. Calhoun Romberg your appointment with Ronn Brenner at neurology  Keep your appointment with cardiology  Continue the nortriptyline that Dr. Kaylie Valencia put you on at night to see if this helps. Go to the emergency room if symptoms worsen and asked to speak to  while you are in the hospital about a nursing home.

## 2022-01-26 NOTE — CARE COORDINATION
Maryan 45 Transitions Initial Follow Up Call    Call within 2 business days of discharge: Yes    Patient: Monik Noel Patient : 1935   MRN: [id number]  Reason for Admission: Covid  Discharge Date: 22 RARS: Readmission Risk Score: 18.3 ( )      Last Discharge [facility]       Complaint Diagnosis Description Type Department Provider    22 Shortness of Breath Dyspnea, unspecified type . .. ED to Hosp-Admission (Discharged) (ADMITTED) Judith May MD; [place]. .. Spoke with: N/A    Facility: [place] [address]    Non-face-to-face services provided:  Reviewed encounter information for continuity of care prior to follow up phone call - chart notes, consults, progress notes, test results, med list, appointments, AVS, other information. Care Transitions 24 Hour Call    Do you have all of your prescriptions and are they filled?: Yes  Post Acute Services: Home Health  Do you have support at home?: Partner/Spouse/SO  Care Transitions Interventions         Follow Up ; Attempt #3 to make contact with Naima Maier for an initial follow up call post discharge from the hospital without success. Unable to leave a message regarding intent of call and call back information. Will discharge from CTN services at this time due to inability to make contact.     Future Appointments   Date Time Provider Jose Armando Milner   2022 11:15 AM EARLE Youssef CNP [facility]   2022 11:00 AM SCHEDULE, [facility]   2022 11:00 AM SCHEDULE, [facility] INF[facility]   2022  8:00 AM EARLE [facility]   3/11/2022  3:00 PM EARLE Long CNP [facility]   3/29/2022 10:00 AM EARLE West [facility]   2022  1:30 PM DO BIANKA Posada Cardio [facility]       Coreen Durán RN

## 2022-01-26 NOTE — PROGRESS NOTES
[address] [facility]  [address]  [address] [address]  Dept: [phone]  Dept Fax: [phone]  Loc: [phone]    Isiah Guaman is a 82 y.o. female who presents today for her medical conditions/complaints as noted below. Isiah Guaman is c/o of Follow-up (Patient presents today for follow up from hospital. Patient states that she was told by hospital that she did not have [place]. That is was a false positive. She would like to go over the MRI that she had. Dotty Caraballo ordered the MRI. Patient says that she did have a fever yesterday.)        HPI:     HPI   Chief Complaint   Patient presents with    Follow-up     Patient presents today for follow up from hospital. Patient states that she was told by hospital that she did not have COVID. That is was a false positive. She would like to go over the MRI that she had. Dotty Caraballo ordered the MRI. Patient says that she did have a fever yesterday. In reviewing her chart her Covid test was positive and there is no PCR to prove that she does not have Covid. I received a call from home health yesterday she had a fever of 101.5. She is here because she wants to go over her MRI results from neurology. In reviewing her hemoglobin hematocrit she was anemic during her hospital stay she underwent several test.  She was hospitalized from the 20th to the 23rd. She is still on norco for pain every 6 hours. It has not helped with headache. Headache is 8 on 0 -10  After norco down to 5 and she naps. She sees Dr Stan Gutiérrez for her hip pain.  She has not seen him for headaches    I have included the hospital discharge summary below    Admit date: 1/20/2022     Discharge date and time: 1/23/2022      Admitting Physician: Ezra Matias MD      Discharge Physician: Ezra Matias MD      Discharge Diagnoses:      · Headache- neurology evaluated- follow up as OP  · Diarrhea - CT abdomen neg, stool studies neg- follow up as OP  · Anemia with Hg 8.4- GI bleed ruled out   · Iron def anemia- treated with venofer   · covid 19 positive - no PNA- on RA  · Chronic anticoagulation   · Personal history of colon cancer treated with chemotherapy   · Essential hypertension  · History of esophageal stricture  · History of Atrial fibrillation with RVR   · Chronic anticoagulation with Xarelto  · Deconditioning - HH for PT/OT     Discharged Condition: good     Indication for Admission: dyspnea, fatique      Hospital Course:      81 yo female presented with dyspnea, she has been on RA since the admission, saturating in the %. CXR neg for PNA. She is Covid positive. Treated per protocol. Discharged on Medrol pack. DD neg. Pt already on anticoagulation for PAF and history of DVT. Recent stress test normal EF. Troponin neg. Denies chest pain. No wheezing. Complained of severe headache. Neurology evaluated. Has a small right cerebellar pontine angle meningioma. Headache much better with narcotics. Per neurology 5 mL of 0.25% Marcaine were injected into each suboccipital notch without complication. The patient tolerated the procedure well. There were no complications. Patient complained of diarrhea with dark stools. Stool cultures neg. Stool occult blood neg. Hg stable. Anemia work showed iron def. Treated with Venofer. CT abd neg for colitis. Today diarrhea improved. Apparently she had it on and off since she underwent chemotherapy.  Patient participated with PT/OT and is cleared for DC home with [place] with PT/OT with OP follow up with her pcp and neurology  Past Medical History:   Diagnosis Date    [facility]) 2021    Bronchitis     Colon cancer ([facility] [address]     Colon polyps     Constipation     Deep vein blood clot of left lower extremity (HCC)     Left leg     Depression     DVT (deep venous thrombosis) (HCC)     Dysphagia     Fibrocystic breast disease     Fibromyalgia     GERD (gastroesophageal reflux disease)     reflux with hx of esophageal stricture    Headache(784.0)     History of colon cancer     Hormone replacement therapy (postmenopausal)     Hypertension     Neuropathy of foot     In both feet    Osteoarthritis     left knee pain    Restless legs syndrome     Type 2 diabetes mellitus 10/25/2021    Vitamin D deficiency       Past Surgical History:   Procedure Laterality Date    APPENDECTOMY      BREAST BIOPSY      CATARACT REMOVAL       SECTION      CHOLECYSTECTOMY      COLECTOMY  partial    COLON SURGERY      COLONOSCOPY  2010    COLONOSCOPY  2012    Dr Lita Delgado: unremarkable enterocolonic anastamosis; hyperplastic polyps    COLONOSCOPY  2013    Montez: unremarkable post-surgical colon    COLONOSCOPY  2016    Dr Eloy Sanabria colon anastomosis, 5 yr recall    COLONOSCOPY N/A 2021    Dr Dionicio Ceballos and patent anastomosis in the right side-BCM, prn (age)    COLONOSCOPY  2021    Dr Dionicio Ceballos and patent anastomosis in the right side-BCM, prn (age)   Gosia Larger FOOT SURGERY  right foot    HAND SURGERY Right     Dr Alba Hurt N/A 2020    KYPHOPLASTY L5 performed by Tonio Macias DO at [address] [facility]  [place] Right 2020    RIGHT L 4-5 DECOMPRESSIVE LAMINECTOMY performed by Tonio Macias DO at [address] [facility]      UPPER GASTROINTESTINAL ENDOSCOPY  10/16/2009    UPPER GASTROINTESTINAL ENDOSCOPY  2013    Bradley: peptic stricture dilated to 48F; HH; small antral mucosal elevation    UPPER GASTROINTESTINAL ENDOSCOPY  2014    Maurilio: dilation of esophageal stricture    VARICOSE VEIN SURGERY Left 2014  [facility]    Left GSV Ablation    VARICOSE VEIN SURGERY Right 2014  [facility]    Right GSV Ablation       Vitals 2022    SYSTOLIC 645 988 - 592 420 -   DIASTOLIC 60 57 - 63 53 -   Pulse 66 71 - 76 69 68   Temp 97.4 97.2 - 96.7 97 -   Resp 18 19 - 18 18 -   SpO2 100 97 - 97 95 -   Weight 135 lb 3.2 oz - - - - -   Height 5' 3\" - - - - -   Body mass index 23.95 kg/m2 - - - - -   Pain Level - - 7 - 0 -   Some recent data might be hidden       Family History   Problem Relation Age of Onset    Cancer Mother         Cervical Cancer    Cancer Brother         Esophageal cancer    Diabetes Brother     Esophageal Cancer Brother     Diabetes Sister     Colon Cancer Neg Hx     Colon Polyps Neg Hx     Liver Cancer Neg Hx     Liver Disease Neg Hx     Rectal Cancer Neg Hx     Stomach Cancer Neg Hx        Social History     Tobacco Use    Smoking status: Never Smoker    Smokeless tobacco: Never Used   Substance Use Topics    Alcohol use: No      Current Outpatient Medications on File Prior to Visit   Medication Sig Dispense Refill    sodium bicarbonate 650 MG tablet Take 1 tablet by mouth 3 times daily for 3 days 9 tablet 0    nortriptyline (PAMELOR) 10 MG capsule Take 1 capsule by mouth nightly 30 capsule 0    melatonin 5 MG TBDP disintegrating tablet Take 1 tablet by mouth nightly 30 tablet 0    zinc sulfate (ZINCATE) 220 (50 Zn) MG capsule Take 1 capsule by mouth daily 30 capsule 0    ascorbic acid (VITAMIN C) 500 MG tablet Take 1 tablet by mouth 2 times daily 60 tablet 0    metroNIDAZOLE (FLAGYL) 500 MG tablet Take 1 tablet by mouth 3 times daily for 5 days 15 tablet 0    methylPREDNISolone (MEDROL DOSEPACK) 4 MG tablet Take by mouth. 1 kit 0    lactobacillus (CULTURELLE) CAPS capsule Take 1 capsule by mouth daily for 15 days 15 capsule 0    ferrous sulfate (IRON 325) 325 (65 Fe) MG tablet Take 1 tablet by mouth 2 times daily (with meals) 60 tablet 0    rivaroxaban (XARELTO) 20 MG TABS tablet Take 1 tablet by mouth daily (with breakfast) for 30 doses 30 tablet 0    Rimegepant Sulfate (NURTEC) 75 MG TBDP Take 1 tablet at the onset of migraine. Do not exceed 1 tablet in 24 hours.  8 tablet 3    vitamin D (ERGOCALCIFEROL) 1.25 MG (19661 UT) CAPS capsule TAKE 1 CAPSULE BY MOUTH 1 TIME A WEEK 12 capsule 1    ketoconazole (NIZORAL) 2 % shampoo Apply topically daily as needed. 1 each 1    acyclovir (ZOVIRAX) 400 MG tablet Take 1 tablet by mouth 2 times daily history of shingles in her eye 180 tablet 3    betamethasone, augmented, (DIPROLENE) 0.05 % gel Apply topically 2 times daily. To scalp rash 1 each 3    irbesartan (AVAPRO) 300 MG tablet Take 1 tablet by mouth daily 30 tablet 5    metoprolol succinate (TOPROL XL) 50 MG extended release tablet Take 1 tablet by mouth 2 times daily Take (1) tab twice daily. 60 tablet 5    dilTIAZem (CARDIZEM CD) 120 MG extended release capsule TAKE 1 CAPSULE BY MOUTH TWICE DAILY 180 capsule 3    DULoxetine (CYMBALTA) 30 MG extended release capsule Take 1 capsule by mouth daily 30 capsule 3    triamcinolone (KENALOG) 0.1 % cream APPLY TWICE DAILY TO RASH UP TO 2 WEEKS/MONTH AS NEEDED 60 g 2    nystatin (MYCOSTATIN) 749619 UNIT/GM powder For rash under breast. Apply 3 times daily. 1 Bottle 1    Cyanocobalamin (B-12) 500 MCG TABS TAKE 1 TABLET BY MOUTH DAILY 30 tablet 11    omeprazole (PRILOSEC) 40 MG delayed release capsule Take 1 capsule by mouth daily 90 capsule 3    HYDROcodone-acetaminophen (NORCO)  MG per tablet Take 1 tablet by mouth every 6 hours as needed for Pain. [address] by Does not apply route 1 each 0    albuterol (ACCUNEB) 1.25 MG/3ML nebulizer solution Inhale 1 mL into the lungs every 6 hours as needed for Wheezing       furosemide (LASIX) 20 MG tablet Take 1 tablet by mouth daily as needed (swelling) 30 tablet 3     No current facility-administered medications on file prior to visit. Allergies   Allergen Reactions    Zoloft [Sertraline Hcl] Other (See Comments)     Patient states that she doesn't want this medication anymore because she hallucinated and saw spiders.  Patient weaned herself off and after she quit taking the medication, the hallucinations stopped. Patient states that she doesn't want this medication anymore because she hallucinated and saw spiders. Patient weaned herself off and after she quit taking the medication, the hallucinations stopped.  Gabapentin Rash     Pt weaning off due to rash and hot flashes       Health Maintenance   Topic Date Due    Shingles Vaccine (1 of 2) Never done    COVID-19 Vaccine (3 - Booster for Moderna series) 09/15/2021    Depression Monitoring  04/20/2022    Annual Wellness Visit (AWV)  04/21/2022    Potassium monitoring  01/23/2023    Creatinine monitoring  01/23/2023    DTaP/Tdap/Td vaccine (3 - Td or Tdap) 12/23/2025    DEXA (modify frequency per FRAX score)  Completed    Flu vaccine  Completed    Pneumococcal 65+ years Vaccine  Completed    Colon cancer screen colonoscopy  Completed    Hepatitis A vaccine  Aged Out    Hib vaccine  Aged Out    Meningococcal (ACWY) vaccine  Aged Out       Subjective:      Review of Systems   Constitutional: Positive for fatigue. HENT: Negative. Cardiovascular: Negative. Musculoskeletal: Positive for arthralgias. Right hip pain and back pain. Neurological: Positive for headaches. Psychiatric/Behavioral: Positive for dysphoric mood. Objective:     Physical Exam  Vitals and nursing note reviewed. Constitutional:       Appearance: She is well-developed. Comments: Sitting up in wheelchair. HENT:      Head: Normocephalic. Right Ear: Tympanic membrane and external ear normal.      Left Ear: Tympanic membrane and external ear normal.   Eyes:      Pupils: Pupils are equal, round, and reactive to light. Cardiovascular:      Rate and Rhythm: Normal rate and regular rhythm. Heart sounds: Normal heart sounds. Pulmonary:      Effort: Pulmonary effort is normal.      Breath sounds: Normal breath sounds. Musculoskeletal:      Cervical back: Normal range of motion.    Skin:     General: Skin is warm uneventful administration of intravenous iodinated contrast, helical CT tomographic images of the abdomen and pelvis were acquired. Coronal reformatted images were also provided for review. Automated exposure control was also utilized to decrease patient radiation dose. FINDINGS: The lung bases and base of the heart are unremarkable. LIVER: Liver steatosis. Segment 4 cyst. No suspicious liver lesion. The hepatic vasculature is patent. BILIARY SYSTEM: The gallbladder is surgically absent. No intrahepatic or extrahepatic ductal dilatation. PANCREAS: Diffuse atrophy. SPLEEN: Normal size. Tiny splenic cysts. KIDNEYS AND ADRENALS: Adrenal glands are unremarkable. Bilateral simple renal cysts. No hydronephrosis. The ureters are decompressed and normal in appearance. RETROPERITONEUM: No mass, lymphadenopathy or hemorrhage. GI TRACT: Stomach is completely decompressed. The small bowel is nondilated. Mild fluid identified in the mid and distal small bowel loops and extending into the colon. Prior partial colectomy. Colon is nondistended. No abnormal wall thickening identified. OTHER: There is no mesenteric mass, lymphadenopathy or fluid collection. The abdominopelvic vasculature is patent. Advanced lumbar spine degenerative change including an old L5 compression deformity with kyphoplasty cement. No acute bony abnormality. PELVIS: No mass lesion, fluid collection or significant lymphadenopathy is seen in the pelvis. The urinary bladder is normal in appearance. 1. No acute process in the abdomen or pelvis. 2. Diarrheal illness by history. There are colonic diverticula without acute diverticulitis. No colitis identified. 3. Prior partial colectomy. 4. No viscus perforation or peritoneal abscess. Signed by Dr Jelly Livingston    XR CHEST PORTABLE    Result Date: 1/20/2022  EXAMINATION: XR CHEST PORTABLE 1/20/2022 3:12 PM HISTORY: Chest pain. Report: A portable upright frontal view of the chest was obtained.  COMPARISON: Portable chest x-ray 11/17/2021. There is mild to moderate bibasilar atelectasis, no lung consolidation. Heart size is normal. No pneumothorax or definite pleural effusion is identified. There is a right subclavian port catheter with the tip at the proximal SVC in satisfactory position, unchanged. The osseous structures and upper abdomen appear unremarkable. Bibasilar atelectasis, no acute infiltrates. Stable satisfactory position of the right subclavian port catheter. S[facility] Dr Col[facility]. [facility]    MRI BRAIN W WO CONTRAST    Result Date: 1/24/2022  MRI brain without and with contrast 1/24/2022 7:40 AM History: G44.52 Comparison: 10/20/2021  Technique: Multiplanar imaging of the brain was performed in a routine fashion before and after the intravenous injection of gadolinium contrast. Findings: Midline structures are nondisplaced. The ventricles are normal in configuration. There is mild age-appropriate atrophy with prominence of the subarachnoid spaces and ventricular enlargement. . There is no significant mass effect or hydrocephalus. Basilar cisterns are preserved. Extensive abnormal T2 abnormalities are present in the periventricular and higher white matter tracts consistent with moderate small vessel ischemic disease. This is stable from the previous exam.. No abnormal extra axial fluid collections are noted. No diffusion restriction is demonstrated. There is a 12 x 9 mm broad-based extra-axial mass associated with the right tentorium cerebelli at the cerebellopontine angle consistent with a meningioma. This is stable to slightly increased in size from the previous exam. There is no associated mass effect. No additional sites of abnormal enhancement are present. . Proximal cervical spinal cord, brainstem, and cerebellum are unremarkable. Normal cerebrovascular flow voids are seen. Bilateral globes and orbits are normal in appearance. Mild left-sided mastoid effusion is present.  The mastoid air cells and paranasal sinuses are otherwise clear. No air-fluid levels are present. .     Impression: 1. Stable extra-axial mass within the right posterior cranial fossa. This has a broad-based dural attachment to the right tentorium cerebelli and is consistent with a meningioma. It is stable to slightly increased from the previous exam. No evidence of associated mass effect. 2. No evidence of diffusion restriction to suggest acute ischemia or infarct. No additional sites of abnormal enhancement are present. 3. Mild atrophy. Moderate small vessel ischemic changes are noted involving the periventricular and higher white matter tracts stable from the previous study. Signed by Dr Laura Gonzáles  PDMP Monitoring:    Last PDMP [facility] as Reviewed [facility]):  Review User Review Instant Review Result          Last Controlled Substance Monitoring Documentation      Office Visit from 3/4/2020 in [facility]   Attestation The Prescription Monitoring Report for this patient was reviewed today. filed at 03/04/2020 1048   Periodic Controlled Substance Monitoring Possible medication side effects, risk of tolerance/dependence & alternative treatments discussed., No signs of potential drug abuse or diversion identified. filed at 03/04/2020 1048        Urine Drug Screenings (1 yr)     POCT Rapid Drug Screen  Collected: 3/4/2020 11:37 AM (Final result)    Complete Results              Medication Contract and Consent for Opioid Use Documents Filed     Patient Documents     Type of Document Status Date Received Received By Description    Medication Contract Signed 3/19/2019  2:29 PM Nikos hicks neuro    Medication Contract Received 2/1/2021  1:35 PM [facility]LY Medication Contract Gabapentin                 Patient given educational materials -see patient instructions. Discussed use, benefit, and side effects of prescribed medications. All patient questions answered. Pt voiced understanding. Reviewed health maintenance.   Instructed to continue currentmedications, diet and exercise. Patient agreed with treatment plan. Follow up as directed. MEDICATIONS:  No orders of the defined types were placed in this encounter. ORDERS:  Orders Placed This Encounter   Procedures    CBC Auto Differential    Comprehensive Metabolic Panel    [id number]       Follow-up:  No follow-ups on file. PATIENT INSTRUCTIONS:  Patient Instructions   I will get home health to come out and reassess your medications  Continue the hydrocodone for pain every 6 hours through pain management. Set up appointment with Dr. Ruy Henriquez at pain management and I am going to send the notes to him about your recent hospitalization to see if he can do anything for her headaches I will include the nerve block given by Dr. Flora Mchugh your appointment with Jeanie Don at neurology  Keep your appointment with cardiology  Continue the nortriptyline that Dr. Mague Tellez put you on at night to see if this helps. Go to the emergency room if symptoms worsen and asked to speak to  while you are in the hospital about a nursing home. Electronically signed by EARLE Cunningham CNP on 1/26/2022 at 1:54 PM    EMR Dragon/transcription disclaimer:  Much of thisencounter note is electronic transcription/translation of spoken language to printed texts. The electronic translation of spoken language may be erroneous, or at times, nonsensical words or phrases may be inadvertentlytranscribed.   Although I have reviewed the note for such errors, some may still exist.

## 2022-01-27 ENCOUNTER — TELEPHONE (OUTPATIENT)
Dept: PRIMARY CARE CLINIC | Age: 87
End: 2022-01-27

## 2022-01-27 NOTE — TELEPHONE ENCOUNTER
----- Message from Rincon sent at 1/27/2022  4:35 PM CST -----  Subject: Message to Provider    QUESTIONS  Information for Provider? nurse visited pt today saw that there was a note   to call office if she was out of meds. ike were not in home wanted to   make doc aware. she has three meds and didn't feel like she was taking the   right but got them in order in her medi set.   ---------------------------------------------------------------------------  --------------  [address]  What is the best way for the office to contact you? OK to leave message on   voicemail  Preferred Call Back Phone Number? [phone]  ---------------------------------------------------------------------------  --------------  SCRIPT ANSWERS  Relationship to Patient?  Third Party  Representative Name? jerrica li

## 2022-02-01 ENCOUNTER — HOSPITAL ENCOUNTER (INPATIENT)
Age: 87
LOS: 7 days | Discharge: NURSING, REHAB OR LONG-TERM CARE | DRG: 177 | End: 2022-02-08
Attending: HOSPITALIST | Admitting: HOSPITALIST
Payer: MEDICARE

## 2022-02-01 ENCOUNTER — APPOINTMENT (OUTPATIENT)
Dept: GENERAL RADIOLOGY | Age: 87
DRG: 177 | End: 2022-02-01
Payer: MEDICARE

## 2022-02-01 DIAGNOSIS — I48.0 PAROXYSMAL ATRIAL FIBRILLATION (HCC): ICD-10-CM

## 2022-02-01 DIAGNOSIS — R05.9 COUGH: ICD-10-CM

## 2022-02-01 DIAGNOSIS — Z51.5 PALLIATIVE CARE PATIENT: ICD-10-CM

## 2022-02-01 DIAGNOSIS — U07.1 COVID-19: ICD-10-CM

## 2022-02-01 DIAGNOSIS — R09.02 HYPOXIA: Primary | ICD-10-CM

## 2022-02-01 PROBLEM — J96.01 ACUTE HYPOXEMIC RESPIRATORY FAILURE DUE TO COVID-19 (HCC): Status: ACTIVE | Noted: 2022-02-01

## 2022-02-01 LAB
ALBUMIN SERPL-MCNC: 3.1 G/DL (ref 3.5–5.2)
ALP BLD-CCNC: 93 U/L (ref 35–104)
ALT SERPL-CCNC: 8 U/L (ref 5–33)
ANION GAP SERPL CALCULATED.3IONS-SCNC: 17 MMOL/L (ref 7–19)
AST SERPL-CCNC: 15 U/L (ref 5–32)
BASE EXCESS ARTERIAL: 2.5 MMOL/L (ref -2–2)
BASOPHILS ABSOLUTE: 0 K/UL (ref 0–0.2)
BASOPHILS RELATIVE PERCENT: 0.1 % (ref 0–1)
BILIRUB SERPL-MCNC: 0.3 MG/DL (ref 0.2–1.2)
BUN BLDV-MCNC: 12 MG/DL (ref 8–23)
C-REACTIVE PROTEIN: 5.35 MG/DL (ref 0–0.5)
CALCIUM SERPL-MCNC: 9.2 MG/DL (ref 8.8–10.2)
CARBOXYHEMOGLOBIN ARTERIAL: 1.7 % (ref 0–5)
CHLORIDE BLD-SCNC: 99 MMOL/L (ref 98–111)
CO2: 21 MMOL/L (ref 22–29)
CREAT SERPL-MCNC: 0.8 MG/DL (ref 0.5–0.9)
D DIMER: 0.47 UG/ML FEU (ref 0–0.48)
EOSINOPHILS ABSOLUTE: 0.4 K/UL (ref 0–0.6)
EOSINOPHILS RELATIVE PERCENT: 3.5 % (ref 0–5)
FERRITIN: 668.5 NG/ML (ref 13–150)
FIBRINOGEN: 676 MG/DL (ref 238–505)
GFR AFRICAN AMERICAN: >59
GFR NON-AFRICAN AMERICAN: >60
GLUCOSE BLD-MCNC: 171 MG/DL (ref 74–109)
HCO3 ARTERIAL: 25.5 MMOL/L (ref 22–26)
HCT VFR BLD CALC: 32.4 % (ref 37–47)
HEMOGLOBIN, ART, EXTENDED: 8.4 G/DL (ref 12–16)
HEMOGLOBIN: 9.4 G/DL (ref 12–16)
IMMATURE GRANULOCYTES #: 0 K/UL
LACTATE DEHYDROGENASE: 282 U/L (ref 91–215)
LACTIC ACID: 2.9 MMOL/L (ref 0.5–1.9)
LYMPHOCYTES ABSOLUTE: 1.7 K/UL (ref 1.1–4.5)
LYMPHOCYTES RELATIVE PERCENT: 16.3 % (ref 20–40)
MCH RBC QN AUTO: 23.5 PG (ref 27–31)
MCHC RBC AUTO-ENTMCNC: 29 G/DL (ref 33–37)
MCV RBC AUTO: 81 FL (ref 81–99)
METHEMOGLOBIN ARTERIAL: 1.1 %
MONOCYTES ABSOLUTE: 0.8 K/UL (ref 0–0.9)
MONOCYTES RELATIVE PERCENT: 8.3 % (ref 0–10)
NEUTROPHILS ABSOLUTE: 7.2 K/UL (ref 1.5–7.5)
NEUTROPHILS RELATIVE PERCENT: 71.4 % (ref 50–65)
O2 CONTENT ARTERIAL: 10.4 ML/DL
O2 SAT, ARTERIAL: 87.4 %
O2 THERAPY: ABNORMAL
PCO2 ARTERIAL: 32 MMHG (ref 35–45)
PDW BLD-RTO: 21.4 % (ref 11.5–14.5)
PH ARTERIAL: 7.51 (ref 7.35–7.45)
PLATELET # BLD: 392 K/UL (ref 130–400)
PMV BLD AUTO: 9.3 FL (ref 9.4–12.3)
PO2 ARTERIAL: 51 MMHG (ref 80–100)
POTASSIUM REFLEX MAGNESIUM: 3.7 MMOL/L (ref 3.5–5)
POTASSIUM, WHOLE BLOOD: 3.5
PRO-BNP: 566 PG/ML (ref 0–1800)
RBC # BLD: 4 M/UL (ref 4.2–5.4)
SARS-COV-2, NAAT: DETECTED
SODIUM BLD-SCNC: 137 MMOL/L (ref 136–145)
TOTAL PROTEIN: 6.5 G/DL (ref 6.6–8.7)
TROPONIN: <0.01 NG/ML (ref 0–0.03)
VITAMIN D 25-HYDROXY: 51.6 NG/ML
WBC # BLD: 10.1 K/UL (ref 4.8–10.8)

## 2022-02-01 PROCEDURE — 1210000000 HC MED SURG R&B

## 2022-02-01 PROCEDURE — 93005 ELECTROCARDIOGRAM TRACING: CPT | Performed by: NURSE PRACTITIONER

## 2022-02-01 PROCEDURE — 83605 ASSAY OF LACTIC ACID: CPT

## 2022-02-01 PROCEDURE — 2580000003 HC RX 258: Performed by: NURSE PRACTITIONER

## 2022-02-01 PROCEDURE — 6360000002 HC RX W HCPCS: Performed by: NURSE PRACTITIONER

## 2022-02-01 PROCEDURE — 85379 FIBRIN DEGRADATION QUANT: CPT

## 2022-02-01 PROCEDURE — 82803 BLOOD GASES ANY COMBINATION: CPT

## 2022-02-01 PROCEDURE — 71045 X-RAY EXAM CHEST 1 VIEW: CPT

## 2022-02-01 PROCEDURE — 83880 ASSAY OF NATRIURETIC PEPTIDE: CPT

## 2022-02-01 PROCEDURE — 85384 FIBRINOGEN ACTIVITY: CPT

## 2022-02-01 PROCEDURE — 84484 ASSAY OF TROPONIN QUANT: CPT

## 2022-02-01 PROCEDURE — 6370000000 HC RX 637 (ALT 250 FOR IP): Performed by: NURSE PRACTITIONER

## 2022-02-01 PROCEDURE — 99285 EMERGENCY DEPT VISIT HI MDM: CPT

## 2022-02-01 PROCEDURE — 83615 LACTATE (LD) (LDH) ENZYME: CPT

## 2022-02-01 PROCEDURE — 84132 ASSAY OF SERUM POTASSIUM: CPT

## 2022-02-01 PROCEDURE — 87635 SARS-COV-2 COVID-19 AMP PRB: CPT

## 2022-02-01 PROCEDURE — 80053 COMPREHEN METABOLIC PANEL: CPT

## 2022-02-01 PROCEDURE — 86140 C-REACTIVE PROTEIN: CPT

## 2022-02-01 PROCEDURE — 85025 COMPLETE CBC W/AUTO DIFF WBC: CPT

## 2022-02-01 PROCEDURE — 3E0333Z INTRODUCTION OF ANTI-INFLAMMATORY INTO PERIPHERAL VEIN, PERCUTANEOUS APPROACH: ICD-10-PCS | Performed by: HOSPITALIST

## 2022-02-01 PROCEDURE — 36415 COLL VENOUS BLD VENIPUNCTURE: CPT

## 2022-02-01 PROCEDURE — 82728 ASSAY OF FERRITIN: CPT

## 2022-02-01 PROCEDURE — 82306 VITAMIN D 25 HYDROXY: CPT

## 2022-02-01 RX ORDER — SODIUM CHLORIDE 0.9 % (FLUSH) 0.9 %
5-40 SYRINGE (ML) INJECTION EVERY 12 HOURS SCHEDULED
Status: DISCONTINUED | OUTPATIENT
Start: 2022-02-01 | End: 2022-02-08 | Stop reason: HOSPADM

## 2022-02-01 RX ORDER — ACETAMINOPHEN 325 MG/1
650 TABLET ORAL EVERY 6 HOURS PRN
Status: DISCONTINUED | OUTPATIENT
Start: 2022-02-01 | End: 2022-02-08 | Stop reason: HOSPADM

## 2022-02-01 RX ORDER — SODIUM CHLORIDE 0.9 % (FLUSH) 0.9 %
5-40 SYRINGE (ML) INJECTION PRN
Status: DISCONTINUED | OUTPATIENT
Start: 2022-02-01 | End: 2022-02-08 | Stop reason: HOSPADM

## 2022-02-01 RX ORDER — METOPROLOL SUCCINATE 50 MG/1
50 TABLET, EXTENDED RELEASE ORAL 2 TIMES DAILY
Status: DISCONTINUED | OUTPATIENT
Start: 2022-02-02 | End: 2022-02-08 | Stop reason: HOSPADM

## 2022-02-01 RX ORDER — PANTOPRAZOLE SODIUM 40 MG/1
40 TABLET, DELAYED RELEASE ORAL
Status: DISCONTINUED | OUTPATIENT
Start: 2022-02-02 | End: 2022-02-08 | Stop reason: HOSPADM

## 2022-02-01 RX ORDER — DILTIAZEM HYDROCHLORIDE 120 MG/1
120 CAPSULE, COATED, EXTENDED RELEASE ORAL 2 TIMES DAILY
Status: DISCONTINUED | OUTPATIENT
Start: 2022-02-01 | End: 2022-02-08 | Stop reason: HOSPADM

## 2022-02-01 RX ORDER — METOPROLOL SUCCINATE 50 MG/1
50 TABLET, EXTENDED RELEASE ORAL DAILY
Status: DISCONTINUED | OUTPATIENT
Start: 2022-02-01 | End: 2022-02-01 | Stop reason: ALTCHOICE

## 2022-02-01 RX ORDER — GUAIFENESIN 600 MG/1
600 TABLET, EXTENDED RELEASE ORAL 2 TIMES DAILY
Status: DISCONTINUED | OUTPATIENT
Start: 2022-02-01 | End: 2022-02-08 | Stop reason: HOSPADM

## 2022-02-01 RX ORDER — BENZONATATE 100 MG/1
100 CAPSULE ORAL 3 TIMES DAILY PRN
Status: DISCONTINUED | OUTPATIENT
Start: 2022-02-01 | End: 2022-02-08 | Stop reason: HOSPADM

## 2022-02-01 RX ORDER — SODIUM CHLORIDE 9 MG/ML
25 INJECTION, SOLUTION INTRAVENOUS PRN
Status: DISCONTINUED | OUTPATIENT
Start: 2022-02-01 | End: 2022-02-08 | Stop reason: HOSPADM

## 2022-02-01 RX ORDER — DILTIAZEM HYDROCHLORIDE 120 MG/1
120 CAPSULE, EXTENDED RELEASE ORAL 2 TIMES DAILY
Status: DISCONTINUED | OUTPATIENT
Start: 2022-02-01 | End: 2022-02-01 | Stop reason: SDUPTHER

## 2022-02-01 RX ORDER — ASCORBIC ACID 500 MG
500 TABLET ORAL 2 TIMES DAILY
Status: DISCONTINUED | OUTPATIENT
Start: 2022-02-01 | End: 2022-02-08 | Stop reason: HOSPADM

## 2022-02-01 RX ORDER — ZINC SULFATE 50(220)MG
50 CAPSULE ORAL DAILY
Status: DISCONTINUED | OUTPATIENT
Start: 2022-02-01 | End: 2022-02-08 | Stop reason: HOSPADM

## 2022-02-01 RX ORDER — VITAMIN B COMPLEX
2000 TABLET ORAL DAILY
Status: DISCONTINUED | OUTPATIENT
Start: 2022-02-01 | End: 2022-02-08 | Stop reason: HOSPADM

## 2022-02-01 RX ORDER — ACETAMINOPHEN 650 MG/1
650 SUPPOSITORY RECTAL EVERY 6 HOURS PRN
Status: DISCONTINUED | OUTPATIENT
Start: 2022-02-01 | End: 2022-02-08 | Stop reason: HOSPADM

## 2022-02-01 RX ORDER — ONDANSETRON 4 MG/1
4 TABLET, ORALLY DISINTEGRATING ORAL EVERY 8 HOURS PRN
Status: DISCONTINUED | OUTPATIENT
Start: 2022-02-01 | End: 2022-02-08 | Stop reason: HOSPADM

## 2022-02-01 RX ORDER — ONDANSETRON 2 MG/ML
4 INJECTION INTRAMUSCULAR; INTRAVENOUS EVERY 6 HOURS PRN
Status: DISCONTINUED | OUTPATIENT
Start: 2022-02-01 | End: 2022-02-08 | Stop reason: HOSPADM

## 2022-02-01 RX ORDER — POLYETHYLENE GLYCOL 3350 17 G/17G
17 POWDER, FOR SOLUTION ORAL DAILY PRN
Status: DISCONTINUED | OUTPATIENT
Start: 2022-02-01 | End: 2022-02-08 | Stop reason: HOSPADM

## 2022-02-01 RX ORDER — MECOBALAMIN 5000 MCG
5 TABLET,DISINTEGRATING ORAL NIGHTLY
Status: DISCONTINUED | OUTPATIENT
Start: 2022-02-01 | End: 2022-02-08 | Stop reason: HOSPADM

## 2022-02-01 RX ORDER — DEXAMETHASONE SODIUM PHOSPHATE 10 MG/ML
6 INJECTION, SOLUTION INTRAMUSCULAR; INTRAVENOUS EVERY 24 HOURS
Status: DISCONTINUED | OUTPATIENT
Start: 2022-02-01 | End: 2022-02-08 | Stop reason: HOSPADM

## 2022-02-01 RX ORDER — BUDESONIDE AND FORMOTEROL FUMARATE DIHYDRATE 160; 4.5 UG/1; UG/1
2 AEROSOL RESPIRATORY (INHALATION) 2 TIMES DAILY
Status: DISCONTINUED | OUTPATIENT
Start: 2022-02-01 | End: 2022-02-08 | Stop reason: HOSPADM

## 2022-02-01 RX ORDER — NORTRIPTYLINE HYDROCHLORIDE 10 MG/1
10 CAPSULE ORAL NIGHTLY
Status: DISCONTINUED | OUTPATIENT
Start: 2022-02-01 | End: 2022-02-08 | Stop reason: HOSPADM

## 2022-02-01 RX ORDER — FERROUS SULFATE 325(65) MG
325 TABLET ORAL 2 TIMES DAILY WITH MEALS
Status: DISCONTINUED | OUTPATIENT
Start: 2022-02-01 | End: 2022-02-08 | Stop reason: HOSPADM

## 2022-02-01 RX ORDER — DULOXETIN HYDROCHLORIDE 30 MG/1
30 CAPSULE, DELAYED RELEASE ORAL DAILY
Status: DISCONTINUED | OUTPATIENT
Start: 2022-02-01 | End: 2022-02-08 | Stop reason: HOSPADM

## 2022-02-01 RX ORDER — ALBUTEROL SULFATE 90 UG/1
2 AEROSOL, METERED RESPIRATORY (INHALATION) 4 TIMES DAILY
Status: DISCONTINUED | OUTPATIENT
Start: 2022-02-01 | End: 2022-02-08 | Stop reason: HOSPADM

## 2022-02-01 RX ADMIN — Medication 2000 UNITS: at 22:36

## 2022-02-01 RX ADMIN — METOPROLOL SUCCINATE 50 MG: 50 TABLET, EXTENDED RELEASE ORAL at 19:13

## 2022-02-01 RX ADMIN — DEXAMETHASONE SODIUM PHOSPHATE 6 MG: 10 INJECTION INTRAMUSCULAR; INTRAVENOUS at 22:36

## 2022-02-01 RX ADMIN — BENZONATATE 100 MG: 100 CAPSULE ORAL at 19:13

## 2022-02-01 RX ADMIN — ALBUTEROL SULFATE 2 PUFF: 90 AEROSOL, METERED RESPIRATORY (INHALATION) at 22:38

## 2022-02-01 RX ADMIN — Medication 5 MG: at 22:36

## 2022-02-01 RX ADMIN — DILTIAZEM HYDROCHLORIDE 120 MG: 120 CAPSULE, COATED, EXTENDED RELEASE ORAL at 22:38

## 2022-02-01 RX ADMIN — GUAIFENESIN 600 MG: 600 TABLET, EXTENDED RELEASE ORAL at 22:37

## 2022-02-01 RX ADMIN — FERROUS SULFATE TAB 325 MG (65 MG ELEMENTAL FE) 325 MG: 325 (65 FE) TAB at 22:37

## 2022-02-01 RX ADMIN — NORTRIPTYLINE HYDROCHLORIDE 10 MG: 10 CAPSULE ORAL at 22:37

## 2022-02-01 RX ADMIN — SODIUM CHLORIDE, PRESERVATIVE FREE 10 ML: 5 INJECTION INTRAVENOUS at 22:37

## 2022-02-01 RX ADMIN — IPRATROPIUM BROMIDE 2 PUFF: 17 AEROSOL, METERED RESPIRATORY (INHALATION) at 22:39

## 2022-02-01 RX ADMIN — BUDESONIDE AND FORMOTEROL FUMARATE DIHYDRATE 2 PUFF: 160; 4.5 AEROSOL RESPIRATORY (INHALATION) at 22:38

## 2022-02-01 RX ADMIN — OXYCODONE HYDROCHLORIDE AND ACETAMINOPHEN 500 MG: 500 TABLET ORAL at 22:37

## 2022-02-01 ASSESSMENT — PAIN SCALES - GENERAL
PAINLEVEL_OUTOF10: 0
PAINLEVEL_OUTOF10: 4

## 2022-02-01 ASSESSMENT — ENCOUNTER SYMPTOMS
SHORTNESS OF BREATH: 1
DIARRHEA: 0
COUGH: 1
ABDOMINAL PAIN: 0
VOMITING: 0

## 2022-02-01 NOTE — PROGRESS NOTES
BLOOD GAS, ARTERIAL [[id number]] (Abnormal) Collected: 02/01/22 1639     Specimen: Blood gases Updated: 02/01/22 1643      pH, Arterial 7.510      pCO2, Arterial 32.0 mmHg       pO2, Arterial 51.0 mmHg       HCO3, Arterial 25.5 mmol/L       Base Excess, Arterial 2.5 mmol/L       Hemoglobin, Art, Extended 8.4 g/dL       O2 Sat, Arterial 87.4 %       Carboxyhgb, Arterial 1.7 %       Methemoglobin, Arterial 1.1 %       O2 Content, Arterial 10.4 mL/dL       O2 Therapy Unknown     Narrative:       CALL  Singh Nathan Hurt RN was notified, 02/01/2022 16:43, by Ashley Bah     AT +  R/A  LR  Michael Torres RN was notified of panic values.

## 2022-02-02 PROBLEM — Z51.5 PALLIATIVE CARE PATIENT: Status: ACTIVE | Noted: 2022-02-02

## 2022-02-02 LAB
ALBUMIN SERPL-MCNC: 3.1 G/DL (ref 3.5–5.2)
ALP BLD-CCNC: 92 U/L (ref 35–104)
ALT SERPL-CCNC: 7 U/L (ref 5–33)
ANION GAP SERPL CALCULATED.3IONS-SCNC: 15 MMOL/L (ref 7–19)
AST SERPL-CCNC: 11 U/L (ref 5–32)
BASOPHILS ABSOLUTE: 0 K/UL (ref 0–0.2)
BASOPHILS RELATIVE PERCENT: 0.1 % (ref 0–1)
BILIRUB SERPL-MCNC: 0.3 MG/DL (ref 0.2–1.2)
BUN BLDV-MCNC: 10 MG/DL (ref 8–23)
CALCIUM SERPL-MCNC: 8.8 MG/DL (ref 8.8–10.2)
CHLORIDE BLD-SCNC: 100 MMOL/L (ref 98–111)
CO2: 21 MMOL/L (ref 22–29)
CREAT SERPL-MCNC: 0.7 MG/DL (ref 0.5–0.9)
EKG P AXIS: NORMAL DEGREES
EKG P-R INTERVAL: NORMAL MS
EKG Q-T INTERVAL: 378 MS
EKG QRS DURATION: 82 MS
EKG QTC CALCULATION (BAZETT): 416 MS
EKG T AXIS: 60 DEGREES
EOSINOPHILS ABSOLUTE: 0 K/UL (ref 0–0.6)
EOSINOPHILS RELATIVE PERCENT: 0.4 % (ref 0–5)
GFR AFRICAN AMERICAN: >59
GFR NON-AFRICAN AMERICAN: >60
GLUCOSE BLD-MCNC: 169 MG/DL (ref 70–99)
GLUCOSE BLD-MCNC: 208 MG/DL (ref 70–99)
GLUCOSE BLD-MCNC: 249 MG/DL (ref 74–109)
HCT VFR BLD CALC: 30.3 % (ref 37–47)
HEMOGLOBIN: 8.9 G/DL (ref 12–16)
IMMATURE GRANULOCYTES #: 0.1 K/UL
INR BLD: 0.97 (ref 0.88–1.18)
LYMPHOCYTES ABSOLUTE: 0.4 K/UL (ref 1.1–4.5)
LYMPHOCYTES RELATIVE PERCENT: 5.8 % (ref 20–40)
MCH RBC QN AUTO: 23.6 PG (ref 27–31)
MCHC RBC AUTO-ENTMCNC: 29.4 G/DL (ref 33–37)
MCV RBC AUTO: 80.4 FL (ref 81–99)
MONOCYTES ABSOLUTE: 0.2 K/UL (ref 0–0.9)
MONOCYTES RELATIVE PERCENT: 2.5 % (ref 0–10)
NEUTROPHILS ABSOLUTE: 6.9 K/UL (ref 1.5–7.5)
NEUTROPHILS RELATIVE PERCENT: 90.4 % (ref 50–65)
PDW BLD-RTO: 21.3 % (ref 11.5–14.5)
PERFORMED ON: ABNORMAL
PERFORMED ON: ABNORMAL
PLATELET # BLD: 355 K/UL (ref 130–400)
PMV BLD AUTO: 9.4 FL (ref 9.4–12.3)
POTASSIUM REFLEX MAGNESIUM: 3.9 MMOL/L (ref 3.5–5)
PROTHROMBIN TIME: 13.1 SEC (ref 12–14.6)
RBC # BLD: 3.77 M/UL (ref 4.2–5.4)
SODIUM BLD-SCNC: 136 MMOL/L (ref 136–145)
TOTAL PROTEIN: 5.6 G/DL (ref 6.6–8.7)
WBC # BLD: 7.6 K/UL (ref 4.8–10.8)

## 2022-02-02 PROCEDURE — 97161 PT EVAL LOW COMPLEX 20 MIN: CPT

## 2022-02-02 PROCEDURE — 82947 ASSAY GLUCOSE BLOOD QUANT: CPT

## 2022-02-02 PROCEDURE — 2580000003 HC RX 258: Performed by: NURSE PRACTITIONER

## 2022-02-02 PROCEDURE — 85025 COMPLETE CBC W/AUTO DIFF WBC: CPT

## 2022-02-02 PROCEDURE — 80053 COMPREHEN METABOLIC PANEL: CPT

## 2022-02-02 PROCEDURE — 6370000000 HC RX 637 (ALT 250 FOR IP): Performed by: HOSPITALIST

## 2022-02-02 PROCEDURE — 93010 ELECTROCARDIOGRAM REPORT: CPT | Performed by: INTERNAL MEDICINE

## 2022-02-02 PROCEDURE — 85610 PROTHROMBIN TIME: CPT

## 2022-02-02 PROCEDURE — 6370000000 HC RX 637 (ALT 250 FOR IP): Performed by: NURSE PRACTITIONER

## 2022-02-02 PROCEDURE — 6360000002 HC RX W HCPCS: Performed by: NURSE PRACTITIONER

## 2022-02-02 PROCEDURE — 97165 OT EVAL LOW COMPLEX 30 MIN: CPT

## 2022-02-02 PROCEDURE — 97116 GAIT TRAINING THERAPY: CPT

## 2022-02-02 PROCEDURE — 36415 COLL VENOUS BLD VENIPUNCTURE: CPT

## 2022-02-02 PROCEDURE — XW0DXM6 INTRODUCTION OF BARICITINIB INTO MOUTH AND PHARYNX, EXTERNAL APPROACH, NEW TECHNOLOGY GROUP 6: ICD-10-PCS | Performed by: HOSPITALIST

## 2022-02-02 PROCEDURE — 1210000000 HC MED SURG R&B

## 2022-02-02 PROCEDURE — 97530 THERAPEUTIC ACTIVITIES: CPT

## 2022-02-02 PROCEDURE — 2700000000 HC OXYGEN THERAPY PER DAY

## 2022-02-02 RX ORDER — DEXTROSE MONOHYDRATE 25 G/50ML
12.5 INJECTION, SOLUTION INTRAVENOUS PRN
Status: DISCONTINUED | OUTPATIENT
Start: 2022-02-02 | End: 2022-02-02

## 2022-02-02 RX ORDER — INSULIN GLARGINE 100 [IU]/ML
10 INJECTION, SOLUTION SUBCUTANEOUS 2 TIMES DAILY
Status: DISCONTINUED | OUTPATIENT
Start: 2022-02-02 | End: 2022-02-08 | Stop reason: HOSPADM

## 2022-02-02 RX ORDER — NICOTINE POLACRILEX 4 MG
15 LOZENGE BUCCAL PRN
Status: DISCONTINUED | OUTPATIENT
Start: 2022-02-02 | End: 2022-02-08 | Stop reason: HOSPADM

## 2022-02-02 RX ORDER — HYDROCODONE BITARTRATE AND ACETAMINOPHEN 10; 325 MG/1; MG/1
1 TABLET ORAL EVERY 6 HOURS PRN
Status: DISCONTINUED | OUTPATIENT
Start: 2022-02-02 | End: 2022-02-08 | Stop reason: HOSPADM

## 2022-02-02 RX ORDER — DEXTROSE MONOHYDRATE 50 MG/ML
100 INJECTION, SOLUTION INTRAVENOUS PRN
Status: DISCONTINUED | OUTPATIENT
Start: 2022-02-02 | End: 2022-02-08 | Stop reason: HOSPADM

## 2022-02-02 RX ORDER — SENNA PLUS 8.6 MG/1
1 TABLET ORAL NIGHTLY
Status: DISCONTINUED | OUTPATIENT
Start: 2022-02-02 | End: 2022-02-08 | Stop reason: HOSPADM

## 2022-02-02 RX ADMIN — METOPROLOL SUCCINATE 50 MG: 50 TABLET, EXTENDED RELEASE ORAL at 18:40

## 2022-02-02 RX ADMIN — PANTOPRAZOLE SODIUM 40 MG: 40 TABLET, DELAYED RELEASE ORAL at 06:12

## 2022-02-02 RX ADMIN — BUDESONIDE AND FORMOTEROL FUMARATE DIHYDRATE 2 PUFF: 160; 4.5 AEROSOL RESPIRATORY (INHALATION) at 22:58

## 2022-02-02 RX ADMIN — DULOXETINE 30 MG: 30 CAPSULE, DELAYED RELEASE ORAL at 09:13

## 2022-02-02 RX ADMIN — HYDROCODONE BITARTRATE AND ACETAMINOPHEN 1 TABLET: 10; 325 TABLET ORAL at 18:40

## 2022-02-02 RX ADMIN — BARICITINIB 4 MG: 2 TABLET, FILM COATED ORAL at 09:13

## 2022-02-02 RX ADMIN — OXYCODONE HYDROCHLORIDE AND ACETAMINOPHEN 500 MG: 500 TABLET ORAL at 09:13

## 2022-02-02 RX ADMIN — SODIUM CHLORIDE, PRESERVATIVE FREE 10 ML: 5 INJECTION INTRAVENOUS at 09:14

## 2022-02-02 RX ADMIN — IPRATROPIUM BROMIDE 2 PUFF: 17 AEROSOL, METERED RESPIRATORY (INHALATION) at 13:24

## 2022-02-02 RX ADMIN — ACETAMINOPHEN 650 MG: 325 TABLET ORAL at 00:20

## 2022-02-02 RX ADMIN — DILTIAZEM HYDROCHLORIDE 120 MG: 120 CAPSULE, COATED, EXTENDED RELEASE ORAL at 09:13

## 2022-02-02 RX ADMIN — IPRATROPIUM BROMIDE 2 PUFF: 17 AEROSOL, METERED RESPIRATORY (INHALATION) at 09:12

## 2022-02-02 RX ADMIN — IPRATROPIUM BROMIDE 2 PUFF: 17 AEROSOL, METERED RESPIRATORY (INHALATION) at 22:58

## 2022-02-02 RX ADMIN — BUDESONIDE AND FORMOTEROL FUMARATE DIHYDRATE 2 PUFF: 160; 4.5 AEROSOL RESPIRATORY (INHALATION) at 09:13

## 2022-02-02 RX ADMIN — ALBUTEROL SULFATE 2 PUFF: 90 AEROSOL, METERED RESPIRATORY (INHALATION) at 13:24

## 2022-02-02 RX ADMIN — ALBUTEROL SULFATE 2 PUFF: 90 AEROSOL, METERED RESPIRATORY (INHALATION) at 22:58

## 2022-02-02 RX ADMIN — SODIUM CHLORIDE, PRESERVATIVE FREE 10 ML: 5 INJECTION INTRAVENOUS at 22:59

## 2022-02-02 RX ADMIN — FERROUS SULFATE TAB 325 MG (65 MG ELEMENTAL FE) 325 MG: 325 (65 FE) TAB at 09:19

## 2022-02-02 RX ADMIN — IPRATROPIUM BROMIDE 2 PUFF: 17 AEROSOL, METERED RESPIRATORY (INHALATION) at 17:04

## 2022-02-02 RX ADMIN — ALBUTEROL SULFATE 2 PUFF: 90 AEROSOL, METERED RESPIRATORY (INHALATION) at 17:04

## 2022-02-02 RX ADMIN — DEXAMETHASONE SODIUM PHOSPHATE 6 MG: 10 INJECTION INTRAMUSCULAR; INTRAVENOUS at 22:58

## 2022-02-02 RX ADMIN — METOPROLOL SUCCINATE 50 MG: 50 TABLET, EXTENDED RELEASE ORAL at 09:13

## 2022-02-02 RX ADMIN — ALBUTEROL SULFATE 2 PUFF: 90 AEROSOL, METERED RESPIRATORY (INHALATION) at 09:13

## 2022-02-02 RX ADMIN — RIVAROXABAN 20 MG: 20 TABLET, FILM COATED ORAL at 09:13

## 2022-02-02 RX ADMIN — GUAIFENESIN 600 MG: 600 TABLET, EXTENDED RELEASE ORAL at 09:13

## 2022-02-02 RX ADMIN — INSULIN LISPRO 1 UNITS: 100 INJECTION, SOLUTION INTRAVENOUS; SUBCUTANEOUS at 23:01

## 2022-02-02 RX ADMIN — NORTRIPTYLINE HYDROCHLORIDE 10 MG: 10 CAPSULE ORAL at 22:59

## 2022-02-02 RX ADMIN — Medication 5 MG: at 22:58

## 2022-02-02 RX ADMIN — GUAIFENESIN 600 MG: 600 TABLET, EXTENDED RELEASE ORAL at 22:59

## 2022-02-02 RX ADMIN — BENZONATATE 100 MG: 100 CAPSULE ORAL at 22:59

## 2022-02-02 RX ADMIN — ZINC SULFATE 220 MG (50 MG) CAPSULE 50 MG: CAPSULE at 09:13

## 2022-02-02 RX ADMIN — INSULIN LISPRO 4 UNITS: 100 INJECTION, SOLUTION INTRAVENOUS; SUBCUTANEOUS at 18:47

## 2022-02-02 RX ADMIN — OXYCODONE HYDROCHLORIDE AND ACETAMINOPHEN 500 MG: 500 TABLET ORAL at 22:58

## 2022-02-02 RX ADMIN — FERROUS SULFATE TAB 325 MG (65 MG ELEMENTAL FE) 325 MG: 325 (65 FE) TAB at 18:40

## 2022-02-02 RX ADMIN — INSULIN GLARGINE 10 UNITS: 100 INJECTION, SOLUTION SUBCUTANEOUS at 23:00

## 2022-02-02 RX ADMIN — Medication 2000 UNITS: at 09:13

## 2022-02-02 RX ADMIN — DILTIAZEM HYDROCHLORIDE 120 MG: 120 CAPSULE, COATED, EXTENDED RELEASE ORAL at 22:59

## 2022-02-02 ASSESSMENT — PAIN SCALES - GENERAL
PAINLEVEL_OUTOF10: 4
PAINLEVEL_OUTOF10: 2
PAINLEVEL_OUTOF10: 8
PAINLEVEL_OUTOF10: 0
PAINLEVEL_OUTOF10: 0

## 2022-02-02 NOTE — ED PROVIDER NOTES
[facility] 3 JOSE/VAS/MED  eMERGENCY dEPARTMENT eNCOUnter      Pt Name: Laquita Castillo  MRN: [id number]  [place] 1935  Date of evaluation: 2/1/2022  Provider: EARLE Bar    CHIEF COMPLAINT       Chief Complaint   Patient presents with    Shortness of Breath     started months ago, worsening lately. Pt thinks she has had COVID for 2 weeks         HISTORY OF PRESENT ILLNESS   (Location/Symptom, Timing/Onset,Context/Setting, Quality, Duration, Modifying Factors, Severity)  Note limiting factors. Laquita Castillo is a 82 y.o. female who presents to the emergency department with shortness of breath this gotten worse recently. Patient had Covid and was hospitalized and discharged on 1/23. She has followed up with her primary care provider [facility] 3 days later. She continues to cough and feels lightheaded. Home health told her to come back to the hospital.      The history is provided by the patient. Cough  Cough characteristics:  Dry  Severity:  Moderate  Onset quality:  Sudden  Duration:  1 week  Timing:  Constant  Chronicity:  New  Smoker: no    Associated symptoms: headaches    Associated symptoms: no fever        NursingNotes were reviewed. REVIEW OF SYSTEMS    (2-9 systems for level 4, 10 or more for level 5)     Review of Systems   Constitutional: Negative for fever. Respiratory: Positive for cough. Neurological: Positive for headaches. Except as noted above the remainder of the review of systems was reviewed and negative.        PAST MEDICAL HISTORY     Past Medical History:   Diagnosis Date    Afib [facility]) 2021    Bronchitis     Colon cancer ([address]     Colon polyps     Constipation     Deep vein blood clot of left lower extremity (HCC)     Left leg     Depression     DVT (deep venous thrombosis) (HCC)     Dysphagia     Fibrocystic breast disease     Fibromyalgia     GERD (gastroesophageal reflux disease)     reflux with hx of esophageal stricture    Headache(784.0)     History of colon cancer     Hormone replacement therapy (postmenopausal)     Hypertension     Neuropathy of foot     In both feet    Osteoarthritis     left knee pain    Restless legs syndrome     Type 2 diabetes mellitus 10/25/2021    Vitamin D deficiency          SURGICALHISTORY       Past Surgical History:   Procedure Laterality Date    APPENDECTOMY      BREAST BIOPSY      CATARACT REMOVAL       SECTION      CHOLECYSTECTOMY      COLECTOMY  partial    COLON SURGERY      COLONOSCOPY  2010    COLONOSCOPY  2012    Dr Duglas Tobar: unremarkable enterocolonic anastamosis; hyperplastic polyps    [facility]    COLONOSCOPY  2016    Dr Nalini Mena colon anastomosis, 5 yr recall    COLONOSCOPY N/A 2021    Dr Diallo Esteban and patent anastomosis in the right side-BCM, prn (age)    COLONOSCOPY  2021    Dr Diallo Esteban and patent anastomosis in the right side-BCM, prn (age)   [facility] FOOT SURGERY  right foot    HAND SURGERY Right     Dr Lamont Macias N/A 2020    KYPHOPLASTY L5 performed by Chi Norris DO at [facility] Right 2020    RIGHT L 4-5 DECOMPRESSIVE LAMINECTOMY performed by Chi Norris DO at [facility]      UPPER GASTROINTESTINAL ENDOSCOPY  10/16/2009    UPPER GASTROINTESTINAL ENDOSCOPY  2013    Bradley: peptic stricture dilated to 48F; HH; small antral mucosal elevation    UPPER GASTROINTESTINAL ENDOSCOPY  2014    Maurilio: dilation of esophageal stricture    VARICOSE VEIN SURGERY Left 2014  [address]    Left GSV Ablation    VARICOSE VEIN SURGERY Right 2014  [address]    Right GSV Ablation         CURRENT MEDICATIONS       Current Discharge Medication List      CONTINUE these medications which have NOT CHANGED    Details   nortriptyline (PAMELOR) 10 MG capsule Take 1 capsule by mouth nightly  Qty: 30 capsule, Refills: 0      melatonin 5 MG TBDP disintegrating tablet Take 1 tablet by mouth nightly  Qty: 30 tablet, Refills: 0      zinc sulfate (ZINCATE) 220 (50 Zn) MG capsule Take 1 capsule by mouth daily  Qty: 30 capsule, Refills: 0      ascorbic acid (VITAMIN C) 500 MG tablet Take 1 tablet by mouth 2 times daily  Qty: 60 tablet, Refills: 0      lactobacillus (CULTURELLE) CAPS capsule Take 1 capsule by mouth daily for 15 days  Qty: 15 capsule, Refills: 0      ferrous sulfate (IRON 325) 325 (65 Fe) MG tablet Take 1 tablet by mouth 2 times daily (with meals)  Qty: 60 tablet, Refills: 0      rivaroxaban (XARELTO) 20 MG TABS tablet Take 1 tablet by mouth daily (with breakfast) for 30 doses  Qty: 30 tablet, Refills: 0      acyclovir (ZOVIRAX) 400 MG tablet Take 1 tablet by mouth 2 times daily history of shingles in her eye  Qty: 180 tablet, Refills: 3      irbesartan (AVAPRO) 300 MG tablet Take 1 tablet by mouth daily  Qty: 30 tablet, Refills: 5    Comments: Stop losartan.      metoprolol succinate (TOPROL XL) 50 MG extended release tablet Take 1 tablet by mouth 2 times daily Take (1) tab twice daily. Qty: 60 tablet, Refills: 5    Comments: Dose increase  Associated Diagnoses: PAF (paroxysmal atrial fibrillation) ([facility])      dilTIAZem (CARDIZEM CD) 120 MG extended release capsule TAKE 1 CAPSULE BY MOUTH TWICE DAILY  Qty: 180 capsule, Refills: 3    Comments: **Patient requests 90 days supply**      DULoxetine (CYMBALTA) 30 MG extended release capsule Take 1 capsule by mouth daily  Qty: 30 capsule, Refills: 3      Cyanocobalamin (B-12) 500 MCG TABS TAKE 1 TABLET BY MOUTH DAILY  Qty: 30 tablet, Refills: 11      omeprazole (PRILOSEC) 40 MG delayed release capsule Take 1 capsule by mouth daily  Qty: 90 capsule, Refills: 3      HYDROcodone-acetaminophen (NORCO)  MG per tablet Take 1 tablet by mouth every 6 hours as needed for Pain.        albuterol (ACCUNEB) 1.25 MG/3ML nebulizer solution Inhale 1 mL into the lungs every 6 hours as needed for Wheezing       furosemide (LASIX) 20 MG tablet Take 1 tablet by mouth daily as needed (swelling)  Qty: 30 tablet, Refills: 3      Rimegepant Sulfate (NURTEC) 75 MG TBDP Take 1 tablet at the onset of migraine. Do not exceed 1 tablet in 24 hours. Qty: 8 tablet, Refills: 3      vitamin D (ERGOCALCIFEROL) 1.25 MG (37665 UT) CAPS capsule TAKE 1 CAPSULE BY MOUTH 1 TIME A WEEK  Qty: 12 capsule, Refills: 1      ketoconazole (NIZORAL) 2 % shampoo Apply topically daily as needed. Qty: 1 each, Refills: 1      betamethasone, augmented, (DIPROLENE) 0.05 % gel Apply topically 2 times daily. To scalp rash  Qty: 1 each, Refills: 3    Comments: May sub foam or solution if not gel      triamcinolone (KENALOG) 0.1 % cream APPLY TWICE DAILY TO RASH UP TO 2 WEEKS/MONTH AS NEEDED  Qty: 60 g, Refills: 2      nystatin (MYCOSTATIN) 598148 UNIT/GM powder For rash under breast. Apply 3 times daily.   Qty: 1 Bottle, Refills: 1      Hospital Bed MISC by Does not apply route  Qty: 1 each, Refills: 0             ALLERGIES     Zoloft [sertraline hcl] and Gabapentin    FAMILY HISTORY       Family History   Problem Relation Age of Onset    Cancer Mother         Cervical Cancer    Cancer Brother         Esophageal cancer    Diabetes Brother     Esophageal Cancer Brother     Diabetes Sister     Colon Cancer Neg Hx     Colon Polyps Neg Hx     Liver Cancer Neg Hx     Liver Disease Neg Hx     Rectal Cancer Neg Hx     Stomach Cancer Neg Hx           SOCIAL HISTORY       Social History     Socioeconomic History    Marital status:      Spouse name: None    Number of children: None    Years of education: None    Highest education level: None   Occupational History    None   Tobacco Use    Smoking status: Never Smoker    Smokeless tobacco: Never Used   Vaping Use    Vaping Use: Never used   Substance and Sexual Activity    Alcohol use: No    Drug use: No    Sexual activity: Yes     Partners: Male   Other Topics Concern    None   Social History Narrative    None     Social Determinants of Health     Financial Resource Strain:     Difficulty of Paying Living Expenses: Not on file   Food Insecurity:     Worried About Running Out of Food in the Last Year: Not on file    Obdulio of Food in the Last Year: Not on file   Transportation Needs:     Lack of Transportation (Medical): Not on file    Lack of Transportation (Non-Medical): Not on file   Physical Activity:     Days of Exercise per Week: Not on file    Minutes of Exercise per Session: Not on file   Stress:     Feeling of Stress : Not on file   Social Connections:     Frequency of Communication with Friends and Family: Not on file    Frequency of Social Gatherings with Friends and Family: Not on file    Attends Bahai Services: Not on file    Active Member of [address] [place] [address] [employer] or Organizations: Not on file    Attends Club or Organization Meetings: Not on file    Marital Status: Not on file   Intimate Partner Violence:     Fear of Current or Ex-Partner: Not on file    Emotionally Abused: Not on file    Physically Abused: Not on file    Sexually Abused: Not on file   Housing Stability:     Unable to Pay for Housing in the Last Year: Not on file    Number of [place] in the Last Year: Not on file    Unstable Housing in the Last Year: Not on file       SCREENINGS    [place] Coma Scale  Eye Opening: Spontaneous  Best Verbal Response: Oriented  Best Motor Response: Obeys commands  [place] Coma Scale Score: 15 @FLOW([id number])@      PHYSICAL EXAM    (up to 7 for level 4, 8 or more for level 5)     ED Triage Vitals [02/01/22 1542]   BP Temp Temp Source Pulse Resp SpO2 Height Weight   (!) 135/118 98.4 °F (36.9 °C) Oral 97 24 (!) 88 % 5' 3\" (1.6 m) 135 lb (61.2 kg)       Physical Exam  Vitals and nursing note reviewed. Constitutional:       Appearance: She is well-developed.    HENT:      Head: Normocephalic and atraumatic. Eyes:      General: No scleral icterus. Right eye: No discharge. Left eye: No discharge. Cardiovascular:      Rate and Rhythm: Tachycardia present. Rhythm irregular. Pulmonary:      Effort: No respiratory distress. Breath sounds: Rhonchi present. Musculoskeletal:      Cervical back: Normal range of motion and neck supple. Neurological:      General: No focal deficit present. Mental Status: She is alert. Psychiatric:         Behavior: Behavior normal.         DIAGNOSTIC RESULTS     EKG: All EKG's are interpreted by the Emergency Department Physician who either signs or Co-signsthis chart in the absence of a cardiologist.  78 A. fib left ventricular hypertrophy old inferior infarct read at 1716 by Dr. Tavo Street:   [place] such as CT, Ultrasound and MRI are read by the radiologist. Christo Schaefer radiographic images are visualized and preliminarily interpreted by the emergency physician with the below findings:      Interpretation per the Radiologist below, if available at the time of this note:    XR CHEST PORTABLE   Final Result   Gross hypoaeration of the lungs with increased bibasilar   infiltrates, no focal consolidation. No pneumothorax or pleural   effusion is identified. The right subclavian port catheter remains in   good position. Signed by Dr Dangelo Gabriel.  [facility]            ED BEDSIDEULTRASOUND:   Performed by ED Physician -none    LABS:  Labs Reviewed   COVID-19, RAPID - Abnormal; Notable for the following components:       Result Value    SARS-CoV-2, NAAT DETECTED (*)     All other components within normal limits    Narrative:     CALL  Singh  KLED tel. ,  Microbiology results called to and read back by kai nguyen, 02/01/2022  16:34, by REYBR   CBC WITH AUTO DIFFERENTIAL - Abnormal; Notable for the following components:    RBC 4.00 (*)     Hemoglobin 9.4 (*)     Hematocrit 32.4 (*)     MCH 23.5 (*)     MCHC 29.0 (*)     RDW 21.4 (*)     MPV 9.3 (*)     Neutrophils % 71.4 (*)     Lymphocytes % 16.3 (*)     All other components within normal limits   COMPREHENSIVE METABOLIC PANEL W/ REFLEX TO MG FOR LOW K - Abnormal; Notable for the following components:    CO2 21 (*)     Glucose 171 (*)     Total Protein 6.5 (*)     Albumin 3.1 (*)     All other components within normal limits   BLOOD GAS, ARTERIAL - Abnormal; Notable for the following components:    pH, Arterial 7.510 (*)     pCO2, Arterial 32.0 (*)     pO2, Arterial 51.0 (*)     Base Excess, Arterial 2.5 (*)     Hemoglobin, Art, Extended 8.4 (*)     O2 Sat, Arterial 87.4 (*)     All other components within normal limits    Narrative:     Samantha Velázquez tel. ,  Nery Hill RN was notified, 02/01/2022 16:43, by Mario López - Abnormal; Notable for the following components:    Fibrinogen 676 (*)     All other components within normal limits   C-REACTIVE PROTEIN - Abnormal; Notable for the following components:    CRP 5.35 (*)     All other components within normal limits   LACTATE DEHYDROGENASE - Abnormal; Notable for the following components:     (*)     All other components within normal limits   FERRITIN - Abnormal; Notable for the following components:    Ferritin 668.5 (*)     All other components within normal limits   LACTIC ACID, PLASMA - Abnormal; Notable for the following components:    Lactic Acid 2.9 (*)     All other components within normal limits    Narrative:     Reg Jaime tel. [phone],  Chemistry results called to and read back by Marcus Campos RN on 3rd, 02/01/2022  22:06, by [address], WHOLE BLOOD    Narrative:     Samantha Velázquez tel. ,  Nery Hill RN was notified, 02/01/2022 16:43, by [facility]   D-DIMER, QUANTITATIVE   VITAMIN D 25 HYDROXY   COMPREHENSIVE METABOLIC PANEL W/ REFLEX TO MG FOR LOW K   PROTIME-INR       All other labs were within normal range or not returned as of this dictation.     EMERGENCY DEPARTMENT COURSE and DIFFERENTIALDIAGNOSIS/MDM:   Vitals:    Vitals:    02/01/22 2030 02/01/22 2051 02/02/22 0001 02/02/22 0011   BP:  (!) 149/57  (!) 147/51   Pulse:  86  81   Resp:  16  20   Temp:  97.5 °F (36.4 °C)  97.3 °F (36.3 °C)   TempSrc:  Temporal  Temporal   SpO2:  92% (!) 86% 93%   Weight: 133 lb 9 oz (60.6 kg)      Height: 5' 3\" (1.6 m)              MDM spoke to Dr. Gabi Echevarria who accepted patient for admission. Pt on 2 liters of O2      CONSULTS:  PHARMACY TO DOSE MEDICATION    PROCEDURES:  Unless otherwise noted below, none     Procedures    FINAL IMPRESSION      1. Hypoxia    2. COVID-19    3. Paroxysmal atrial fibrillation (HCC)    4. Cough        DISPOSITION/PLAN   DISPOSITION Admitted 02/01/2022 06:25:12 PM      PATIENT REFERRED TO:  No follow-up provider specified.     DISCHARGE MEDICATIONS:  Current Discharge Medication List             (Please note that portions of this note were completed with a voice recognitionprogram.  Efforts were made to edit the dictations but occasionally words are mis-transcribed.)    EARLE Sevilla (electronically signed)          EARLE Sevilla  02/02/22 7040

## 2022-02-02 NOTE — PROGRESS NOTES
Patient called out with SOB. O2 saturation was 86% at 2 L NC. Patient repositioned with little improvement. O2 increased to 4 L. O2 saturation increased to 93%.

## 2022-02-02 NOTE — PROGRESS NOTES
Antwon Espitia arrived to room # 335. Presented with: Covid/ hypoxia  Mental Status: Patient is oriented, alert, coherent, logical, thought processes intact and able to concentrate and follow conversation. Vitals:    02/01/22 2015   BP:    Pulse: 84   Resp:    Temp:    SpO2: 96%     Patient safety contract and falls prevention contract reviewed with patient Yes. Oriented Patient to room. Call light within reach. Yes.   Needs, issues or concerns expressed at this time: no.      Electronically signed by Dayna Russo RN on 2/1/2022 at 8:36 PM

## 2022-02-02 NOTE — PROGRESS NOTES
Progress Note  Date:2022       Room:0335/335-01  Patient Maty Greenwood     YOB: 1935     Age:87 y.o. Subjective    Subjective: 82 y.o. female who presented to [facility] ED complaining of worsening shortness of breath having been discharged after evaluation of covid-19 infection from this facility . At that time, patient was not requiring supplemental oxygen use at home. She reports worsening shortness of breath at rest and with minimal activity. In ED, patient was found to be hypoxic with PO2 of 51/spo2 of 87%. CXR imaging with gross hypoaeration of the lungs with increased bibasilar infiltrates, no focal consolidation. Pt was admitted to hospitalist service for further evaluation of acute hypoxic respiratory failure due to covid-19 infection. Patient was examined in her room, says she is feeling a lot better than when she initially presented. Denied any chest pain or worsening dyspnea. Denied any nausea vomiting abdominal pain. Review of Systems: 12 point system review negative suggested above.      :         Vitals Last 24 Hours:  TEMPERATURE:  Temp  Av.3 °F (36.3 °C)  Min: 96.4 °F (35.8 °C)  Max: 97.9 °F (36.6 °C)  RESPIRATIONS RANGE: Resp  Av  Min: 16  Max: 20  PULSE OXIMETRY RANGE: SpO2  Av.5 %  Min: 86 %  Max: 99 %  PULSE RANGE: Pulse  Av.3  Min: 80  Max: 86  BLOOD PRESSURE RANGE: Systolic (04XIC), QFR:884 , Min:147 , VMX:847   ; Diastolic (75PYP), CLZ:75, Min:51, Max:66    I/O (24Hr): Intake/Output Summary (Last 24 hours) at 2022 1752  Last data filed at 2022 0931  Gross per 24 hour   Intake 240 ml   Output 1325 ml   Net -1085 ml     \    Physical Examination:  General: Well-developed, no acute distress lying comfortably in bed.   HEENT: Atraumatic normocephalic, range of motion normal   Cardiac: Normal S1-S2   Respiratory: clear To auscultation bilaterally, + rhonchi, no wheezing  Abdomen: Soft, positive bowel sounds in all quadrants, no distention, nontender to palpation  Extremities: no tenderness, no edema, moves all extremities  Psych: Affect normal and good eye contact, behavioral normal.        Labs/Imaging/Diagnostics    Labs:  CBC:  Recent Labs     02/01/22  1606 02/02/22  0205   WBC 10.1 7.6   RBC 4.00* 3.77*   HGB 9.4* 8.9*   HCT 32.4* 30.3*   MCV 81.0 80.4*   RDW 21.4* 21.3*    355     CHEMISTRIES:  Recent Labs     02/01/22  1606 02/01/22  1639 02/02/22  0205     --  136   K 3.7 3.5 3.9   CL 99  --  100   CO2 21*  --  21*   BUN 12  --  10   CREATININE 0.8  --  0.7   GLUCOSE 171*  --  249*     PT/INR:  Recent Labs     02/02/22 0205   PROTIME 13.1   INR 0.97     APTT:No results for input(s): APTT in the last 72 hours. LIVER PROFILE:  Recent Labs     02/01/22  1606 02/02/22  0205   AST 15 11   ALT 8 7   BILITOT 0.3 0.3   ALKPHOS 93 92       Imaging Last 24 Hours:  XR CHEST PORTABLE    Result Date: 2/1/2022  EXAMINATION: XR CHEST PORTABLE 2/1/2022 5:32 PM HISTORY: Shortness of breath. Report: A portable upright frontal view of the chest was obtained. COMPARISON: Portable chest x-ray 1/20/2022. The lungs are grossly hypoaerated and there are increased infiltrates in both lung bases without consolidation. Much of the basilar lung opacity may be due to atelectasis. Heart size is normal. No pneumothorax or pleural effusion is identified. The right subclavian port catheter appears in good position as before. There is mild dextroscoliosis of the thoracic spine. Cholecystectomy clips are present. Gross hypoaeration of the lungs with increased bibasilar infiltrates, no focal consolidation. No pneumothorax or pleural effusion is identified. The right subclavian port catheter remains in good position. Signed by Dr Betsy Peña.  [facility]    Assessment//Plan           Hospital Problems           Last Modified POA    * (Principal) Acute hypoxemic respiratory failure due to COVID-19 [facility]) 2/1/2022 Yes    Essential hypertension 2/1/2022 Yes History of DVT (deep vein thrombosis) 2/1/2022 Yes    History of atrial fibrillation 2/1/2022 Yes        Assessment & Plan      Acute hypoxic respiratory failure secondary to Covid  Hypertension  DVT  Atrial fibrillation  GERD  Fibromyalgia  Bilateral lower extremity neuropathy  Restless syndrome  Type 2 diabetes  Vitamin D deficiency. Plan:  Pharmacy to dose baricitinib  Continue steroids as ordered  Insulin regimen as currently ordered  Droplet precautions  Continue adjuvant therapy  Inhalers  Vitamin D supplementation  Home O2 eval prior to discharge from the hospital  PT OT evaluation. Electronically signed by   Chay Granados MD   Internal Medicine Hospitalist  On 2/2/2022  At 5:59 PM    EMR Dragon/Transcription disclaimer:   Much of this encounter note is an electronic transcription/translation of spoken language to printed text.  The electronic translation of spoken language may permit erroneous, or at times, nonsensical words or phrases to be inadvertently transcribed; although attempts have made to review the note for such errors, some may still exist.

## 2022-02-02 NOTE — CONSULTS
Palliative Care:    Palliative care RN spoke with pt via phone. SHe presents to ED with SOA, worsened, as well as cough and lightheadedness. She tells me she feels a little better today. Pt is current with home health. Past Medical History:        Past Medical History:   Diagnosis Date    Afib [facility]) 2021    Bronchitis     Colon cancer (HCC)     Colon polyps     Constipation     Deep vein blood clot of left lower extremity (HCC)     Left leg     Depression     DVT (deep venous thrombosis) (HCC)     Dysphagia     Fibrocystic breast disease     Fibromyalgia     GERD (gastroesophageal reflux disease)     reflux with hx of esophageal stricture    Headache(784.0)     History of colon cancer 2000    Hormone replacement therapy (postmenopausal)     Hypertension     Neuropathy of foot     In both feet    Osteoarthritis     left knee pain    Restless legs syndrome     Type 2 diabetes mellitus 10/25/2021    Vitamin D deficiency        Advance Directives:   DNR-CC reviewed. No changes. Pain/Other Symptoms:   Pt states pain equal \"5\" left side of head. Pt is on Norco 10/325 mg every 6 hrs PRN at home per [facility] records. Dr. Jacquie Joiner is the prescriber. Activity:  As bell, needs assist           Psychological/Spiritual:   Pt  still works, she has a 15 yr old grandson who lives there but is not a helper to her. States good spiritual support. Plan:    Medical management    Patient/family discussion r/t goals: Pt is current with [facility] but admits she may need more assistance once she returns home. She needs assist with ADL's and \"no one to help me\". Spoke with her about possible NF for rehab. Pt willing. This RN reached out to SW to assist with pt needs and share information. SW will assess. Palliative will follow for support, goc.     Review of any needed services:    SNF          Electronically signed by Radha Francois RN on 2/2/2022 at 2:21 PM

## 2022-02-02 NOTE — PROGRESS NOTES
Occupational Therapy   Occupational Therapy Initial Assessment  Date: 2022   Patient Name: Marylene Redhead  MRN: [id number]     : 1935    Date of Service: 2022    Discharge Recommendations:          Assessment   Performance deficits / Impairments: Decreased functional mobility ; Decreased endurance;Decreased ADL status  Assessment: Pt. did not desat on 4 liters O2 but was only able to be up 30 seconds in standing and walk less than 10 feet before becoming shortness of breath. Currently, pt. would be unable to safely return home and stay by herself for extended periods of time. Treatment Diagnosis: A-fib, Covid  Prognosis: Good  Decision Making: Low Complexity  REQUIRES OT FOLLOW UP: Yes  Activity Tolerance  Activity Tolerance: Patient limited by fatigue           Patient Diagnosis(es): The primary encounter diagnosis was Hypoxia. Diagnoses of COVID-19, Paroxysmal atrial fibrillation ([address] and Cough were also pertinent to this visit. has a past medical history of Afib ([address] Bronchitis, Colon cancer ([address] Colon polyps, Constipation, Deep vein blood clot of left lower extremity ([address] Depression, DVT (deep venous thrombosis) ([address] Dysphagia, Fibrocystic breast disease, Fibromyalgia, GERD (gastroesophageal reflux disease), Headache(784.0), History of colon cancer, Hormone replacement therapy (postmenopausal), Hypertension, Neuropathy of foot, Osteoarthritis, Restless legs syndrome, Type 2 diabetes mellitus, and Vitamin D deficiency. has a past surgical history that includes Total knee arthroplasty; Cataract removal; Appendectomy;  section; Cholecystectomy; Colon surgery; Breast biopsy; Upper gastrointestinal endoscopy (10/16/2009); Hysterectomy; Colonoscopy (2010); Colonoscopy (2012); Skin cancer excision; Foot surgery (right foot); colectomy (partial); Varicose vein surgery (Left, 2014  [address]); Varicose vein surgery (Right, 2014  [address]); Hand surgery (Right);  Upper gastrointestinal endoscopy (03/01/2013); Colonoscopy (03/01/2013); Upper gastrointestinal endoscopy (12/01/2014); Colonoscopy (03/11/2016); Kyphosis surgery (N/A, 07/06/2020); lumbar laminectomy (Right, 12/14/2020); Colonoscopy (N/A, 03/29/2021); and Colonoscopy (03/29/2021). Treatment Diagnosis: A-fib, Covid      Restrictions       Subjective   General  Chart Reviewed: Yes  Patient assessed for rehabilitation services?: Yes  Family / Caregiver Present: No  Diagnosis: A-fib, Covid positive  General Comment  Comments: No O2 at home when recently d/c home with grandson.   Now on 4 liters O2  Patient Currently in Pain: No  Vital Signs  Patient Currently in Pain: No  Social/Functional History  Social/Functional History  Lives With: Family  Type of Home: House  Home Equipment: 4 wheeled walker  ADL Assistance: Independent  Ambulation Assistance: Independent  Transfer Assistance: Independent       Objective   Vision: Within Functional Limits  Hearing: Within functional limits    Orientation  Overall Orientation Status: Within Normal Limits        ADL  Feeding: Independent  Grooming: Supervision  UE Bathing: Supervision  LE Bathing: Minimal assistance  UE Dressing: Supervision  LE Dressing: Minimal assistance  Toileting: Minimal assistance  Additional Comments: [place] for standing ADLs as well as short of breath           Transfers  Stand Step Transfers: Contact guard assistance  Sit to stand: Minimal assistance  Transfer Comments: Moved around room with RW and 4 liters O2     Cognition  Overall Cognitive Status: WNL                 LUE AROM (degrees)  LUE AROM : WFL  Right Hand AROM (degrees)  Right Hand AROM: WFL  LUE Strength  Gross LUE Strength: WFL  RUE Strength  Gross RUE Strength: WFL                   Plan   Plan  Times per week: 3-5x/week  Times per day: Daily    G-Code     OutComes Score                                                  AM-PAC Score             Goals  Short term goals  Time Frame for Short term goals: 1 week  Short term goal 1: Supervision with toilet tfers without shortness of breath  Short term goal 2: Supervision with LB ADLs without shortness of breath  Short term goal 3: Supervision with light ambulatory ADLs without shortness of breath.   Long term goals  Long term goal 1: Return to PLOF       Therapy Time   Individual Concurrent Group Co-treatment   Time In           Time Out           Minutes                   Claudetta Hammed, OT

## 2022-02-02 NOTE — ACP (ADVANCE CARE PLANNING)
Advance Care Planning     Advance Care Planning Activator (Inpatient)  Conversation Note      Date of ACP Conversation: 2/2/2022     Conversation Conducted with: Patient    ACP Activator: Radha Francois RN    Health Care Decision Maker:     Current Designated Health Care Decision Maker:     Primary Decision Maker: Elisa John  [phone]      Care Preferences    Pt is already a DNR-CC. Reviewed with pt. No changes.     Electronically signed by Radha Francois RN on 2/2/2022 at 2:22 PM

## 2022-02-02 NOTE — PROGRESS NOTES
Physical Therapy    Facility/Department: [facility] JOSE/VAS/MED  Initial Assessment    NAME: Pal Michael  : 1935  MRN: [id number]    Date of Service: 2022    Discharge Recommendations:  Continue to assess pending progress (HOME WITH ASSIT VS FURTHER REHAB)        Assessment   Body structures, Functions, Activity limitations: Decreased functional mobility ; Decreased ADL status; Decreased strength;Decreased endurance;Decreased balance  Assessment: Pt AMB SHORT DISTANCE IN ROOM WITH ASSIST. MODERATE DYSPNEA WITH ANY ACTIVITY. WILL PROGRESS AS TOLERATED. PT Education: PT Role;Plan of Care  REQUIRES PT FOLLOW UP: Yes  Activity Tolerance  Activity Tolerance: Patient limited by endurance  Activity Tolerance: MODERATE DYSPNEA       Patient Diagnosis(es): The primary encounter diagnosis was Hypoxia. Diagnoses of COVID-19, Paroxysmal atrial fibrillation ([address] and Cough were also pertinent to this visit. has a past medical history of Afib ([address] Bronchitis, Colon cancer ([address] Colon polyps, Constipation, Deep vein blood clot of left lower extremity ([address] Depression, DVT (deep venous thrombosis) ([address] Dysphagia, Fibrocystic breast disease, Fibromyalgia, GERD (gastroesophageal reflux disease), Headache(784.0), History of colon cancer, Hormone replacement therapy (postmenopausal), Hypertension, Neuropathy of foot, Osteoarthritis, Restless legs syndrome, Type 2 diabetes mellitus, and Vitamin D deficiency. has a past surgical history that includes Total knee arthroplasty; Cataract removal; Appendectomy;  section; Cholecystectomy; Colon surgery; Breast biopsy; Upper gastrointestinal endoscopy (10/16/2009); Hysterectomy; Colonoscopy (2010); Colonoscopy (2012); Skin cancer excision; Foot surgery (right foot); colectomy (partial); Varicose vein surgery (Left, 2014  [address]); Varicose vein surgery (Right, 2014  [address]); Hand surgery (Right); Upper gastrointestinal endoscopy (2013);  Colonoscopy (03/01/2013); Upper gastrointestinal endoscopy (12/01/2014); Colonoscopy (03/11/2016); Kyphosis surgery (N/A, 07/06/2020); lumbar laminectomy (Right, 12/14/2020); Colonoscopy (N/A, 03/29/2021); and Colonoscopy (03/29/2021).     Restrictions  Restrictions/Precautions  Restrictions/Precautions: Fall Risk,Isolation  Vision/Hearing        Subjective  General  Patient assessed for rehabilitation services?: Yes  Diagnosis: RESPIRATORY FAILURE  Subjective  Subjective: Pt WILLING TO PARTICIPATE  Pain Screening  Patient Currently in Pain: No  Vital Signs  Patient Currently in Pain: No  Oxygen Therapy  SpO2: 92 % (WITH ACTIVITY)  O2 Device: Nasal cannula  O2 Flow Rate (L/min): 4 L/min       Orientation  Orientation  Overall Orientation Status: Within Normal Limits  Social/Functional History  Social/Functional History  Lives With:  (REPORTS BEING HOME ALONE OFTEN)  Type of Home: House  Home Equipment: 4 wheeled walker  ADL Assistance: Independent  Ambulation Assistance: Independent  Transfer Assistance: Independent  Cognition        Objective          AROM RLE (degrees)  RLE AROM: WFL  AROM LLE (degrees)  LLE AROM : WFL  Strength RLE  Comment: GROSSLY 4/5  Strength LLE  Comment: GROSSLY 4/5        Bed mobility  Supine to Sit: Stand by assistance;Contact guard assistance  Sit to Supine: Stand by assistance;Contact guard assistance  Transfers  Sit to Stand: Minimal Assistance  Stand to sit: Minimal Assistance  Bed to Chair: Minimal assistance  Ambulation 1  Device: Hand-Held Assist  Assistance: Minimal assistance  Quality of Gait: UNSTEADY OVERALL  Gait Deviations: Slow Lillie;Decreased step length  Distance: 12'  Comments: WILL BENEFIT FROM RW     Balance  Sitting - Dynamic: Good  Standing - Dynamic: [address]  Times per week: AT LEAST 4-6  Current Treatment Recommendations: Strengthening,Balance Training,Functional Mobility Training,Transfer [facility] Education & Training,Patient/Caregiver Education & Training  Safety Devices  Type of devices: Bed alarm in place,Call light within reach    G-Code       OutComes Score                                                  AM-PAC Score             Goals  Short term goals  Time Frame for Short term goals: 14 DAYS  Short term goal 1: BED MOB MOD IND  Short term goal 2: TRANSFERS SUPERVISION  Short term goal 3: ' RW SUPERVISION       Therapy Time   Individual Concurrent Group Co-treatment   Time In           Time Out           Minutes                   Ericka Zepeda, PT

## 2022-02-02 NOTE — CARE COORDINATION
Date / Time of Evaluation:   2/2/2022    2:23 PM  Assessment Completed by:   MERVAT Damico      Patient Name:   Laquita Castillo  MRN:   [id number]  [place]:   1935    Patient Admission Status:   Inpatient [101]    Patient Contact Information:    [facility]  [phone] (home)   Telephone Information:   Mobile [phone]     Above information verified? [x]   Yes  []   No    (Best Practice:   Have patient/caregiver verify above address and phone number by stating out loud their current address and reachable phone number. Initial Assessment Completed at bedside with:   Pt via phone call to her room     [x]   Patient  []   Family/Caregiver/Guardian   []   Other:      Current PCP:    EARLE Pratt CNP    PCP verified? [x]   Yes  []   No    Emergency Contacts:    Extended Emergency Contact Information  Primary Emergency Contact: Shelton Schmidt  Address: [address] [place] [address] Phone: [phone]  Mobile Phone: [phone]  Relation: Spouse    Advance Directives:    Does Ms. Schmidt have an advance directive in her electronic medical record? [x]   Yes  []   No    Code Status:   DNR-CC      Have you been vaccinated for COVID-19 (SARS-CoV-2)? [x]   Yes  []   No                   If so, when?     Which :         []   Pf[employer]  [x]   Moderna  []   [employer]  []   Other:       Do you have any of the following unmet social needs that would keep you from returning home safely:    []   Yes  []   No                    Unmet Social Needs:           []   Living Situation/Housing  []   Food  []   Stroke Education   []   Utilities  []   Personal Safety  []   Financial Strain  []   Employment  []   Mental Health  []   Substance Abuse  []   Transportation Barriers    Additional Unmet Social Needs Notes:           Financial:    Payor: Benjamin Medeiros / Plan: Benjamin Medeiros  / Product Type: *No Product type* /     Pre-Cert required for SNF:     [x]   Yes  []   No    Have Long Term Care Insurance:      []   Yes  [x]   No      Pharmacy:    [facility] [address] [place] [address], [address] [phone] Alessia Chapman [phone]  [phone]  Phone: [phone] Fax: [phone]    Potential assistance purchasing medications? []   Yes  [x]   No      ADLS:    Needs some help at home; uses her walker she stated     Support System:   Home Care Staff,Family Members      Current Home Environment:   Home with spouse and grandson, but they are not home for extended periods of time. Steps:       []   Yes  [x]   No    If yes, how many? Plans to RETURN to current housing:     []   Yes  [x]   No    Barriers to RETURNING to current housing:        Currently ACTIVE with Home Health CARE:      [x]   Yes  []   No    Home Health Care Agency:  [facility]      DARON Provider:    No pref      Had HOME OXYGEN prior to admission:  Likely to need at d/c     []   Yes  [x]   No           Active with HD/PD prior to admission:             []   Yes  [x]   No    Nephrologist:     HD Center:         Transition Plan:    plan is for SNF at d/c     Transportation PLAN for Discharge:  uncertain    Factors facilitating achievement of predicted outcomes:     Barriers to discharge:        Patient Deficits:    []   Yes   [x]   No    If yes:    []   Confusion/Memory  []   Visual  []   Motor/Sensory         []   Right arm         []   Right leg         []   Left arm         []   Left leg  []   Language/Speech         []   Aphasia         []   Dysarthria         []   Swallow         [place] Coma Scale  Eye Opening: Spontaneous  Best Verbal Response: Oriented  Best Motor Response: Obeys commands  [place] Coma Scale Score: 15    Patient Deficit Notes:            Additional CM/SW Notes: SW placed phone call to Pt to her room via covid will; she is agreeable to SNF at d/c; would like referrals to

## 2022-02-02 NOTE — CARE COORDINATION
Patient is current with [facility] for [address]. Will follow. Please advise when patient discharges. WILL NEED RESUMPTION OF CARE ORDERS TO RESUME HH SERVICES WHEN PATIENT DISCHARGES. Thank you. [address] [place] [address] [phone]. [phone].     Nella Hagan RN, Home Care Liaison  [phone] P  Electronically signed by Nella Hagan on 2/2/2022 at 9:07 AM

## 2022-02-02 NOTE — PROGRESS NOTES
Pharmacy Consult      Elizabeth Tijerina is a 82 y.o. female for whom pharmacy has been consulted to dose baricitinib. 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)    Venous insufficiency    Hypertriglyceridemia    Dysphagia    Paroxysmal supraventricular tachycardia (HCC)    Vitamin D deficiency    Encounter for care related to Port-a-Cath    Essential hypertension    History of colon polyps    Port-A-Cath in place    History of DVT (deep vein thrombosis)    Avascular necrosis (HCC)    Pain in both lower extremities    Numbness and tingling of both feet    Acute deep vein thrombosis (DVT) of femoral vein of left lower extremity (HCC)    Cellulitis of left lower limb    Fracture of right foot    Hypochloremia    CKD (chronic kidney disease) stage 3, GFR 30-59 ml/min (HCC)    Malignant neoplasm of colon (HCC)    Mixed simple and mucopurulent chronic bronchitis (HCC)    Fungal dermatitis    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    Depression    History of esophageal stricture    Atrial fibrillation with RVR (HCC)    Type 2 diabetes mellitus    Iron deficiency anemia    COVID-19 virus infection    History of atrial fibrillation    COVID-19    Acute hypoxemic respiratory failure due to COVID-19 ([facility]       Allergies:  Zoloft [sertraline hcl] and Gabapentin     Ht/Wt:   Ht Readings from Last 1 Encounters:   02/01/22 5' 3\" (1.6 m)        Wt Readings from Last 1 Encounters:   02/01/22 133 lb 9 oz (60.6 kg)         Does the patient meet all of the criteria for receiving baricitinib (see below)? Yes. Has a daily CMP (or LFTs) and CBC with auto differential been ordered? Yes.   If either are not ordered, the pharmacist should enter CMP and CBC with auto differential daily (per pharmacy practice) under the physician authorizing baricitinib. Recent Labs     02/01/22  1606   LABGLOM >60   GFRAA >59   LYMPHSABS 1.7   NEUTROABS 7.2   ALT 8   AST 15           Assessment/Plan:    Start patient on baricitinib 4 mg daily for 14 days of total treatment or until hospital discharge, whichever is first. Pharmacy will continue to follow GFR, ALC, ANC and aminotransferases. Thank you for the consult.      Electronically signed by Valencia Rosales [facility] on 2/1/2022 at 11:56 PM

## 2022-02-02 NOTE — H&P
[facility] - History & Physical      PCP: EARLE Tai CNP    Date of Admission: 2022    Date of Service: 2022    Chief Complaint:  Shortness of breath    History Of Present Illness: The patient is a 82 y.o. female who presented to [address] [place] [address] ED complaining of worsening shortness of breath having been discharged after evaluation of covid-19 infection from this facility . At that time, patient was not requiring supplemental oxygen use at home. She reports worsening shortness of breath at rest and with minimal activity. She continues with cough, generalized weakness and fatigue. In ED, patient was found to be hypoxic with PO2 of 51/spo2 of 87%. CXR imaging with gross hypoaeration of the lungs with increased bibasilar infiltrates, no focal consolidation. Wbc 10.1/hgb 9.4, normal electrolytes and renal fxn. Pt was admitted to hospitalist service for further evaluation of acute hypoxic respiratory failure due to covid-19 infection.      Past Medical History:        Diagnosis Date    Afib [facility])     Bronchitis     Colon cancer (HCC)     Colon polyps     Constipation     Deep vein blood clot of left lower extremity (HCC)     Left leg     Depression     DVT (deep venous thrombosis) (HCC)     Dysphagia     Fibrocystic breast disease     Fibromyalgia     GERD (gastroesophageal reflux disease)     reflux with hx of esophageal stricture    Headache(784.0)     History of colon cancer     Hormone replacement therapy (postmenopausal)     Hypertension     Neuropathy of foot     In both feet    Osteoarthritis     left knee pain    Restless legs syndrome     Type 2 diabetes mellitus 10/25/2021    Vitamin D deficiency        Past Surgical History:        Procedure Laterality Date    APPENDECTOMY      BREAST BIOPSY      CATARACT REMOVAL       SECTION      CHOLECYSTECTOMY      COLECTOMY  partial    COLON SURGERY      COLONOSCOPY  2010    COLONOSCOPY  02/27/2012    Dr Duglas Tobar: unremarkable enterocolonic anastamosis; hyperplastic polyps    COLONOSCOPY  03/01/2013    Haroldo: unremarkable post-surgical colon    COLONOSCOPY  03/11/2016    Dr Nalini Mena colon anastomosis, 5 yr recall    COLONOSCOPY N/A 03/29/2021    Dr Diallo Esteban and patent anastomosis in the right side-BCM, prn (age)    COLONOSCOPY  03/29/2021    Dr Diallo Esteban and patent anastomosis in the right side-BCM, prn (age)   Lolis Ye FOOT SURGERY  right foot    HAND SURGERY Right     Dr Lamont Macias N/A 07/06/2020    KYPHOPLASTY L5 performed by Chi Norris DO at [address]  [place] Right 12/14/2020    RIGHT L 4-5 DECOMPRESSIVE LAMINECTOMY performed by Chi Norris DO at [address] ENDOSCOPY  10/16/2009    UPPER GASTROINTESTINAL ENDOSCOPY  03/01/2013    Bradley: peptic stricture dilated to 48F; HH; small antral mucosal elevation    UPPER GASTROINTESTINAL ENDOSCOPY  12/01/2014    Maurilio: dilation of esophageal stricture    VARICOSE VEIN SURGERY Left 03/14/2014  [address]    Left GSV Ablation    VARICOSE VEIN SURGERY Right 04/04/2014  [address]    Right GSV Ablation       Home Medications:  Prior to Admission medications    Medication Sig Start Date End Date Taking?  Authorizing Provider   nortriptyline (PAMELOR) 10 MG capsule Take 1 capsule by mouth nightly 1/23/22 2/22/22  Dar Gongora MD   melatonin 5 MG TBDP disintegrating tablet Take 1 tablet by mouth nightly 1/23/22 2/22/22  Dar Gongora MD   zinc sulfate (ZINCATE) 220 (50 Zn) MG capsule Take 1 capsule by mouth daily 1/24/22   Dar Gongora MD   ascorbic acid (VITAMIN C) 500 MG tablet Take 1 tablet by mouth 2 times daily 1/23/22 2/22/22  Dar Gongora MD   lactobacillus (CULTURELLE) CAPS capsule Take 1 capsule by mouth daily for 15 days 1/23/22 2/7/22  Jennifer Alves Laura Hairston MD   ferrous sulfate (IRON 325) 325 (65 Fe) MG tablet Take 1 tablet by mouth 2 times daily (with meals) 1/23/22 2/22/22  Dennis Almanzar MD   rivaroxaban (XARELTO) 20 MG TABS tablet Take 1 tablet by mouth daily (with breakfast) for 30 doses 1/24/22 2/23/22  Dennis Almanzar MD   Rimegepant Sulfate (NURTEC) 75 MG TBDP Take 1 tablet at the onset of migraine. Do not exceed 1 tablet in 24 hours. 1/19/22   EARLE Camargo   vitamin D (ERGOCALCIFEROL) 1.25 MG (32793 UT) CAPS capsule TAKE 1 CAPSULE BY MOUTH 1 TIME A WEEK 12/20/21   Luisana Alfaro MD   ketoconazole (NIZORAL) 2 % shampoo Apply topically daily as needed. 12/2/21   EARLE Duncan CNP   acyclovir (ZOVIRAX) 400 MG tablet Take 1 tablet by mouth 2 times daily history of shingles in her eye 11/16/21   EARLE Duncan CNP   betamethasone, augmented, (DIPROLENE) 0.05 % gel Apply topically 2 times daily. To scalp rash 11/16/21   EARLE Duncan CNP   irbesartan (AVAPRO) 300 MG tablet Take 1 tablet by mouth daily 11/9/21   EARLE Manzo   metoprolol succinate (TOPROL XL) 50 MG extended release tablet Take 1 tablet by mouth 2 times daily Take (1) tab twice daily. 10/25/21   EARLE Manzo   dilTIAZem (CARDIZEM CD) 120 MG extended release capsule TAKE 1 CAPSULE BY MOUTH TWICE DAILY 10/15/21   EARLE Rivera   DULoxetine (CYMBALTA) 30 MG extended release capsule Take 1 capsule by mouth daily 9/28/21   EARLE Camargo   triamcinolone (KENALOG) 0.1 % cream APPLY TWICE DAILY TO RASH UP TO 2 WEEKS/MONTH AS NEEDED 7/28/21   EARLE Duncan CNP   nystatin (MYCOSTATIN) 667084 UNIT/GM powder For rash under breast. Apply 3 times daily.  7/28/21   Janessa Irmo, APRN - CNP   Cyanocobalamin (B-12) 500 MCG TABS TAKE 1 TABLET BY MOUTH DAILY 3/9/21   Luisana Alfaro MD   omeprazole (PRILOSEC) 40 MG delayed release capsule Take 1 capsule by mouth daily 12/21/20   Luisana Alfaro MD HYDROcodone-acetaminophen (NORCO)  MG per tablet Take 1 tablet by mouth every 6 hours as needed for Pain. 10/21/20   Historical Provider, MD    [facility] by Does not apply route 8/21/20   EARLE Youssef - CNP   albuterol (ACCUNEB) 1.25 MG/3ML nebulizer solution Inhale 1 mL into the lungs every 6 hours as needed for Wheezing     Historical Provider, MD   furosemide (LASIX) 20 MG tablet Take 1 tablet by mouth daily as needed (swelling) 6/19/18   Brittany Rm MD       Allergies:    Zoloft [sertraline hcl] and Gabapentin    Social History:    The patient currently lives at home  Tobacco:   reports that she has never smoked. She has never used smokeless tobacco.  Alcohol:   reports no history of alcohol use. Illicit Drugs: none    Family History:      Problem Relation Age of Onset    Cancer Mother         Cervical Cancer    Cancer Brother         Esophageal cancer    Diabetes Brother     Esophageal Cancer Brother     Diabetes Sister     Colon Cancer Neg Hx     Colon Polyps Neg Hx     Liver Cancer Neg Hx     Liver Disease Neg Hx     Rectal Cancer Neg Hx     Stomach Cancer Neg Hx        Review of Systems:   Review of Systems   Constitutional: Positive for activity change, appetite change and fatigue. Negative for fever. Respiratory: Positive for cough and shortness of breath. Gastrointestinal: Negative for abdominal pain, diarrhea and vomiting. Neurological: Positive for weakness (global). All other systems reviewed and are negative. 14 point review of systems is negative except as specifically addressed above. Physical Examination:  BP (!) 151/53   Pulse 97   Temp 98.4 °F (36.9 °C) (Oral)   Resp 24   Ht 5' 3\" (1.6 m)   Wt 135 lb (61.2 kg)   SpO2 96%   BMI 23.91 kg/m²   Physical Exam  Vitals reviewed.    Constitutional:       Comments: 82 yr old female with oxygen in place at 2L per nasal prongs appears in no respiratory distress   HENT:      Head: Normocephalic. Right Ear: External ear normal.      Left Ear: External ear normal.   Eyes:      Conjunctiva/sclera: Conjunctivae normal.      Pupils: Pupils are equal, round, and reactive to light. Cardiovascular:      Rate and Rhythm: Normal rate and regular rhythm. Heart sounds: Normal heart sounds. Pulmonary:      Effort: Pulmonary effort is normal.      Comments: Decreased breath sounds bilateral bases  Abdominal:      General: Bowel sounds are normal.      Palpations: Abdomen is soft. Musculoskeletal:         General: Normal range of motion. Cervical back: Normal range of motion. Skin:     General: Skin is warm and dry. Neurological:      Mental Status: She is alert and oriented to person, place, and time. Diagnostic Data:  CBC:  Recent Labs     02/01/22  1606   WBC 10.1   HGB 9.4*   HCT 32.4*        BMP:  Recent Labs     02/01/22  1606 02/01/22  1639     --    K 3.7 3.5   CL 99  --    CO2 21*  --    BUN 12  --    CREATININE 0.8  --    CALCIUM 9.2  --      Recent Labs     02/01/22  1606   AST 15   ALT 8   BILITOT 0.3   ALKPHOS 93     Coag Panel: No results for input(s): INR, PROTIME, APTT in the last 72 hours. Cardiac Enzymes:   Recent Labs     02/01/22  1606   TROPONINI <0.01     ABGs:  Lab Results   Component Value Date    PHART 7.510 02/01/2022    PO2ART 51.0 02/01/2022    DXW9GNU 32.0 02/01/2022     Urinalysis:  Lab Results   Component Value Date    NITRU Negative 11/17/2021    WBCUA 6 11/17/2021    BACTERIA 1+ 11/17/2021    RBCUA 2 11/17/2021    BLOODU Negative 11/17/2021    SPECGRAV 1.013 11/17/2021    GLUCOSEU Negative 11/17/2021     A1C: No results for input(s): LABA1C in the last 72 hours.   ABG:  Recent Labs     02/01/22  1639   PHART 7.510*   MUF4YGB 32.0*   PO2ART 51.0*   NGX0YRU 25.5   BEART 2.5*   HGBAE 8.4*   S1XYGSQA 87.4*   CARBOXHGBART 1.7       XR CHEST PORTABLE    Result Date: 2/1/2022  EXAMINATION: XR CHEST PORTABLE 2/1/2022 5:32 PM HISTORY: Shortness of breath. Report: A portable upright frontal view of the chest was obtained. COMPARISON: Portable chest x-ray 1/20/2022. The lungs are grossly hypoaerated and there are increased infiltrates in both lung bases without consolidation. Much of the basilar lung opacity may be due to atelectasis. Heart size is normal. No pneumothorax or pleural effusion is identified. The right subclavian port catheter appears in good position as before. There is mild dextroscoliosis of the thoracic spine. Cholecystectomy clips are present. Gross hypoaeration of the lungs with increased bibasilar infiltrates, no focal consolidation. No pneumothorax or pleural effusion is identified. The right subclavian port catheter remains in good position. Signed by Dr Nupur Gonzalez. [facility]      Assessment/Plan:  Principal Problem:    Acute hypoxemic respiratory failure due to COVID-19 [facility])  Active Problems:    Essential hypertension    History of DVT (deep vein thrombosis)    History of atrial fibrillation  Resolved Problems:    * No resolved hospital problems. *     Principal Problem:    Acute hypoxemic respiratory failure due to COVID-19 ([address]   -monitor for increasing supplemental oxygen need   -continuous pulse oximetry   -droplet precautions   -decadron 6mg iv q24hrs   -pharmacy to dose baricitinib   -supportive treatment   -albuterol mdi   -atrovent inhaler   -symbicort inhaler   -mucinex 600mg bid   -incentive spirometry   -Vit D, Vit C, Zinc, melatonin  Active Problems:    Essential hypertension   -monitor blood pressure   -continue antihypertensive meds    History of DVT/History of atrial fibrillation   -continue rate control meds   -continue anticoagulation  Resolved Problems:    * No resolved hospital problems.  *  Signed:  EARLE Ahmadi - CNP, 2/1/2022 7:57 PM

## 2022-02-03 LAB
ALBUMIN SERPL-MCNC: 3.2 G/DL (ref 3.5–5.2)
ALP BLD-CCNC: 89 U/L (ref 35–104)
ALT SERPL-CCNC: 6 U/L (ref 5–33)
ANION GAP SERPL CALCULATED.3IONS-SCNC: 14 MMOL/L (ref 7–19)
ANISOCYTOSIS: ABNORMAL
AST SERPL-CCNC: 11 U/L (ref 5–32)
BASOPHILS ABSOLUTE: 0 K/UL (ref 0–0.2)
BASOPHILS RELATIVE PERCENT: 0.2 % (ref 0–1)
BILIRUB SERPL-MCNC: <0.2 MG/DL (ref 0.2–1.2)
BUN BLDV-MCNC: 14 MG/DL (ref 8–23)
CALCIUM SERPL-MCNC: 8.9 MG/DL (ref 8.8–10.2)
CHLORIDE BLD-SCNC: 101 MMOL/L (ref 98–111)
CO2: 22 MMOL/L (ref 22–29)
CREAT SERPL-MCNC: 0.7 MG/DL (ref 0.5–0.9)
EOSINOPHILS ABSOLUTE: 0 K/UL (ref 0–0.6)
EOSINOPHILS RELATIVE PERCENT: 0 % (ref 0–5)
GFR AFRICAN AMERICAN: >59
GFR NON-AFRICAN AMERICAN: >60
GLUCOSE BLD-MCNC: 158 MG/DL (ref 70–99)
GLUCOSE BLD-MCNC: 172 MG/DL (ref 70–99)
GLUCOSE BLD-MCNC: 218 MG/DL (ref 70–99)
GLUCOSE BLD-MCNC: 226 MG/DL (ref 74–109)
GLUCOSE BLD-MCNC: 304 MG/DL (ref 70–99)
HCT VFR BLD CALC: 30.7 % (ref 37–47)
HEMOGLOBIN: 8.7 G/DL (ref 12–16)
HYPOCHROMIA: ABNORMAL
IMMATURE GRANULOCYTES #: 0 K/UL
LYMPHOCYTES ABSOLUTE: 0.8 K/UL (ref 1.1–4.5)
LYMPHOCYTES RELATIVE PERCENT: 14.6 % (ref 20–40)
MCH RBC QN AUTO: 23.7 PG (ref 27–31)
MCHC RBC AUTO-ENTMCNC: 28.3 G/DL (ref 33–37)
MCV RBC AUTO: 83.7 FL (ref 81–99)
MONOCYTES ABSOLUTE: 0.2 K/UL (ref 0–0.9)
MONOCYTES RELATIVE PERCENT: 3.4 % (ref 0–10)
NEUTROPHILS ABSOLUTE: 4.5 K/UL (ref 1.5–7.5)
NEUTROPHILS RELATIVE PERCENT: 81.1 % (ref 50–65)
OVALOCYTES: ABNORMAL
PDW BLD-RTO: 21.8 % (ref 11.5–14.5)
PERFORMED ON: ABNORMAL
PLATELET # BLD: 395 K/UL (ref 130–400)
PLATELET SLIDE REVIEW: ADEQUATE
PMV BLD AUTO: 9.3 FL (ref 9.4–12.3)
POIKILOCYTES: ABNORMAL
POLYCHROMASIA: ABNORMAL
POTASSIUM REFLEX MAGNESIUM: 4.2 MMOL/L (ref 3.5–5)
RBC # BLD: 3.67 M/UL (ref 4.2–5.4)
SODIUM BLD-SCNC: 137 MMOL/L (ref 136–145)
TARGET CELLS: ABNORMAL
TOTAL PROTEIN: 5.3 G/DL (ref 6.6–8.7)
WBC # BLD: 5.6 K/UL (ref 4.8–10.8)

## 2022-02-03 PROCEDURE — 2700000000 HC OXYGEN THERAPY PER DAY

## 2022-02-03 PROCEDURE — 80053 COMPREHEN METABOLIC PANEL: CPT

## 2022-02-03 PROCEDURE — 1210000000 HC MED SURG R&B

## 2022-02-03 PROCEDURE — 97530 THERAPEUTIC ACTIVITIES: CPT

## 2022-02-03 PROCEDURE — 6360000002 HC RX W HCPCS: Performed by: NURSE PRACTITIONER

## 2022-02-03 PROCEDURE — 6370000000 HC RX 637 (ALT 250 FOR IP): Performed by: NURSE PRACTITIONER

## 2022-02-03 PROCEDURE — 2580000003 HC RX 258: Performed by: NURSE PRACTITIONER

## 2022-02-03 PROCEDURE — 6370000000 HC RX 637 (ALT 250 FOR IP): Performed by: HOSPITALIST

## 2022-02-03 PROCEDURE — 36415 COLL VENOUS BLD VENIPUNCTURE: CPT

## 2022-02-03 PROCEDURE — 85025 COMPLETE CBC W/AUTO DIFF WBC: CPT

## 2022-02-03 PROCEDURE — 82947 ASSAY GLUCOSE BLOOD QUANT: CPT

## 2022-02-03 PROCEDURE — 97116 GAIT TRAINING THERAPY: CPT

## 2022-02-03 RX ORDER — HYDRALAZINE HYDROCHLORIDE 25 MG/1
25 TABLET, FILM COATED ORAL EVERY 8 HOURS SCHEDULED
Status: DISCONTINUED | OUTPATIENT
Start: 2022-02-03 | End: 2022-02-08 | Stop reason: HOSPADM

## 2022-02-03 RX ADMIN — INSULIN LISPRO 2 UNITS: 100 INJECTION, SOLUTION INTRAVENOUS; SUBCUTANEOUS at 17:29

## 2022-02-03 RX ADMIN — OXYCODONE HYDROCHLORIDE AND ACETAMINOPHEN 500 MG: 500 TABLET ORAL at 21:27

## 2022-02-03 RX ADMIN — DILTIAZEM HYDROCHLORIDE 120 MG: 120 CAPSULE, COATED, EXTENDED RELEASE ORAL at 10:21

## 2022-02-03 RX ADMIN — HYDRALAZINE HYDROCHLORIDE 25 MG: 25 TABLET, FILM COATED ORAL at 21:27

## 2022-02-03 RX ADMIN — HYDRALAZINE HYDROCHLORIDE 25 MG: 25 TABLET, FILM COATED ORAL at 16:49

## 2022-02-03 RX ADMIN — SODIUM CHLORIDE, PRESERVATIVE FREE 10 ML: 5 INJECTION INTRAVENOUS at 10:22

## 2022-02-03 RX ADMIN — INSULIN GLARGINE 10 UNITS: 100 INJECTION, SOLUTION SUBCUTANEOUS at 21:28

## 2022-02-03 RX ADMIN — ALBUTEROL SULFATE 2 PUFF: 90 AEROSOL, METERED RESPIRATORY (INHALATION) at 21:28

## 2022-02-03 RX ADMIN — ZINC SULFATE 220 MG (50 MG) CAPSULE 50 MG: CAPSULE at 10:22

## 2022-02-03 RX ADMIN — BARICITINIB 4 MG: 2 TABLET, FILM COATED ORAL at 10:21

## 2022-02-03 RX ADMIN — SENNOSIDES 8.6 MG: 8.6 TABLET, COATED ORAL at 21:27

## 2022-02-03 RX ADMIN — FERROUS SULFATE TAB 325 MG (65 MG ELEMENTAL FE) 325 MG: 325 (65 FE) TAB at 16:49

## 2022-02-03 RX ADMIN — GUAIFENESIN 600 MG: 600 TABLET, EXTENDED RELEASE ORAL at 21:27

## 2022-02-03 RX ADMIN — METOPROLOL SUCCINATE 50 MG: 50 TABLET, EXTENDED RELEASE ORAL at 16:49

## 2022-02-03 RX ADMIN — DULOXETINE 30 MG: 30 CAPSULE, DELAYED RELEASE ORAL at 10:21

## 2022-02-03 RX ADMIN — ALBUTEROL SULFATE 2 PUFF: 90 AEROSOL, METERED RESPIRATORY (INHALATION) at 16:51

## 2022-02-03 RX ADMIN — INSULIN LISPRO 8 UNITS: 100 INJECTION, SOLUTION INTRAVENOUS; SUBCUTANEOUS at 12:40

## 2022-02-03 RX ADMIN — FERROUS SULFATE TAB 325 MG (65 MG ELEMENTAL FE) 325 MG: 325 (65 FE) TAB at 10:22

## 2022-02-03 RX ADMIN — BUDESONIDE AND FORMOTEROL FUMARATE DIHYDRATE 2 PUFF: 160; 4.5 AEROSOL RESPIRATORY (INHALATION) at 21:28

## 2022-02-03 RX ADMIN — IPRATROPIUM BROMIDE 2 PUFF: 17 AEROSOL, METERED RESPIRATORY (INHALATION) at 21:28

## 2022-02-03 RX ADMIN — IPRATROPIUM BROMIDE 2 PUFF: 17 AEROSOL, METERED RESPIRATORY (INHALATION) at 16:51

## 2022-02-03 RX ADMIN — DEXAMETHASONE SODIUM PHOSPHATE 6 MG: 10 INJECTION INTRAMUSCULAR; INTRAVENOUS at 22:24

## 2022-02-03 RX ADMIN — Medication 2000 UNITS: at 10:22

## 2022-02-03 RX ADMIN — OXYCODONE HYDROCHLORIDE AND ACETAMINOPHEN 500 MG: 500 TABLET ORAL at 10:22

## 2022-02-03 RX ADMIN — RIVAROXABAN 20 MG: 20 TABLET, FILM COATED ORAL at 12:39

## 2022-02-03 RX ADMIN — SODIUM CHLORIDE, PRESERVATIVE FREE 10 ML: 5 INJECTION INTRAVENOUS at 21:29

## 2022-02-03 RX ADMIN — INSULIN LISPRO 1 UNITS: 100 INJECTION, SOLUTION INTRAVENOUS; SUBCUTANEOUS at 21:27

## 2022-02-03 RX ADMIN — BUDESONIDE AND FORMOTEROL FUMARATE DIHYDRATE 2 PUFF: 160; 4.5 AEROSOL RESPIRATORY (INHALATION) at 10:32

## 2022-02-03 RX ADMIN — GUAIFENESIN 600 MG: 600 TABLET, EXTENDED RELEASE ORAL at 10:21

## 2022-02-03 RX ADMIN — HYDROCODONE BITARTRATE AND ACETAMINOPHEN 1 TABLET: 10; 325 TABLET ORAL at 16:49

## 2022-02-03 RX ADMIN — NORTRIPTYLINE HYDROCHLORIDE 10 MG: 10 CAPSULE ORAL at 21:26

## 2022-02-03 RX ADMIN — IPRATROPIUM BROMIDE 2 PUFF: 17 AEROSOL, METERED RESPIRATORY (INHALATION) at 10:32

## 2022-02-03 RX ADMIN — Medication 5 MG: at 21:27

## 2022-02-03 RX ADMIN — METOPROLOL SUCCINATE 50 MG: 50 TABLET, EXTENDED RELEASE ORAL at 10:21

## 2022-02-03 RX ADMIN — DILTIAZEM HYDROCHLORIDE 120 MG: 120 CAPSULE, COATED, EXTENDED RELEASE ORAL at 21:27

## 2022-02-03 RX ADMIN — PANTOPRAZOLE SODIUM 40 MG: 40 TABLET, DELAYED RELEASE ORAL at 05:50

## 2022-02-03 RX ADMIN — ALBUTEROL SULFATE 2 PUFF: 90 AEROSOL, METERED RESPIRATORY (INHALATION) at 10:26

## 2022-02-03 ASSESSMENT — PAIN SCALES - GENERAL
PAINLEVEL_OUTOF10: 7
PAINLEVEL_OUTOF10: 0

## 2022-02-03 NOTE — PROGRESS NOTES
Physical Therapy  Name: Jose Urbina  MRN:  [id number]  Date of service:  2/3/2022     02/03/22 1427   Restrictions/Precautions   Restrictions/Precautions Fall Risk;Isolation   Subjective   Subjective Pt in bed, agrees to work with therapy. Pain Screening   Patient Currently in Pain No   Oxygen Therapy   O2 Device Nasal cannula   Bed Mobility   Supine to Sit Stand by assistance   Transfers   Sit to Stand Contact guard assistance   Stand to sit Contact guard assistance   Ambulation   Ambulation? Yes   Ambulation 1   Surface level tile   Device Hand-Held Assist;Rolling Walker   Assistance Contact guard assistance   Quality of Gait more steady with rwx   Gait Deviations Slow Lillie;Decreased step length   Distance 5' HH 15' rwx   Short term goals   Time Frame for Short term goals 14 DAYS   Short term goal 1 BED MOB MOD IND   Short term goal 2 TRANSFERS SUPERVISION   Short term goal 3 ' RW SUPERVISION   Conditions Requiring Skilled Therapeutic Intervention   Body structures, Functions, Activity limitations Decreased functional mobility ; Decreased ADL status; Decreased strength;Decreased endurance;Decreased balance   Assessment Pt more steady amb with rwx, amb short distance in room then up to recliner and positioned for comfort with all needs in reach. Activity Tolerance   Activity Tolerance Patient Tolerated treatment well   Safety Devices   Type of devices Call light within reach; Chair alarm in place;Gait belt;Left in chair         Electronically signed by Mira Anderson PTA on 2/3/2022 at 2:37 PM

## 2022-02-03 NOTE — PROGRESS NOTES
Occupational Therapy  Facility/Department: [facility] 3 JOSE/VAS/MED  Daily Treatment Note  NAME: Shama Dasilva  : 1935  MRN: [id number]    Date of Service: 2/3/2022    Discharge Recommendations:          Assessment   Performance deficits / Impairments: Decreased functional mobility ; Decreased endurance;Decreased ADL status  Assessment: Pt in bed and willing to get up and move around room and sit up in chair. Pt completed supine to sit with SBA and functional mobility with CGA using RW. Pt continues to benefit from skilled OT services. Treatment Diagnosis: A-fib, Covid  Prognosis: Good  REQUIRES OT FOLLOW UP: Yes  Activity Tolerance  Activity Tolerance: Patient Tolerated treatment well         Patient Diagnosis(es): The primary encounter diagnosis was Hypoxia. Diagnoses of COVID-19, Paroxysmal atrial fibrillation ([address] and Cough were also pertinent to this visit. has a past medical history of Afib ([address] Bronchitis, Colon cancer ([address] Colon polyps, Constipation, Deep vein blood clot of left lower extremity ([address] Depression, DVT (deep venous thrombosis) ([address] Dysphagia, Fibrocystic breast disease, Fibromyalgia, GERD (gastroesophageal reflux disease), Headache(784.0), History of colon cancer, Hormone replacement therapy (postmenopausal), Hypertension, Neuropathy of foot, Osteoarthritis, Restless legs syndrome, Type 2 diabetes mellitus, and Vitamin D deficiency. has a past surgical history that includes Total knee arthroplasty; Cataract removal; Appendectomy;  section; Cholecystectomy; Colon surgery; Breast biopsy; Upper gastrointestinal endoscopy (10/16/2009); Hysterectomy; Colonoscopy (2010); Colonoscopy (2012); Skin cancer excision; Foot surgery (right foot); colectomy (partial); Varicose vein surgery (Left, 2014  [address]); Varicose vein surgery (Right, 2014  [address]); Hand surgery (Right); Upper gastrointestinal endoscopy (2013); Colonoscopy (2013);  Upper gastrointestinal endoscopy (12/01/2014); Colonoscopy (03/11/2016); Kyphosis surgery (N/A, 07/06/2020); lumbar laminectomy (Right, 12/14/2020); Colonoscopy (N/A, 03/29/2021); and Colonoscopy (03/29/2021). Restrictions  Restrictions/Precautions  Restrictions/Precautions: Fall Risk,Isolation  Subjective   General  Chart Reviewed: Yes  Patient assessed for rehabilitation services?: Yes  Response to previous treatment: Patient with no complaints from previous session  Family / Caregiver Present: No  Diagnosis: A-fib, Covid positive  General Comment  Comments: No O2 at home when recently d/c home with grandson. Now on 4 liters O2  Vital Signs  Patient Currently in Pain: No   Orientation     Objective             Balance  Sitting Balance: Independent  Standing Balance: Contact guard assistance  Bed mobility  Supine to Sit: Stand by assistance  Transfers  Stand Step Transfers: Contact guard assistance  Sit to stand: Contact guard assistance  Stand to sit: Contact guard assistance                                                           Plan   Plan  Times per week: 3-5x/week  Times per day: Daily  Goals  Short term goals  Time Frame for Short term goals: 1 week  Short term goal 1: Supervision with toilet tfers without shortness of breath  Short term goal 2: Supervision with LB ADLs without shortness of breath  Short term goal 3: Supervision with light ambulatory ADLs without shortness of breath.   Long term goals  Long term goal 1: Return to PLOF       Therapy Time   Individual Concurrent Group Co-treatment   Time In           Time Out           Minutes              BRANDYN Macdonald  Electronically signed by BRANDYN Macdonald on 2/3/2022 at 3:13 PM

## 2022-02-04 LAB
ALBUMIN SERPL-MCNC: 3.1 G/DL (ref 3.5–5.2)
ALP BLD-CCNC: 97 U/L (ref 35–104)
ALT SERPL-CCNC: 8 U/L (ref 5–33)
ANION GAP SERPL CALCULATED.3IONS-SCNC: 13 MMOL/L (ref 7–19)
ANISOCYTOSIS: ABNORMAL
AST SERPL-CCNC: 12 U/L (ref 5–32)
BASOPHILS ABSOLUTE: 0 K/UL (ref 0–0.2)
BASOPHILS RELATIVE PERCENT: 0 % (ref 0–1)
BILIRUB SERPL-MCNC: <0.2 MG/DL (ref 0.2–1.2)
BUN BLDV-MCNC: 20 MG/DL (ref 8–23)
CALCIUM SERPL-MCNC: 8.9 MG/DL (ref 8.8–10.2)
CHLORIDE BLD-SCNC: 104 MMOL/L (ref 98–111)
CO2: 23 MMOL/L (ref 22–29)
CREAT SERPL-MCNC: 0.7 MG/DL (ref 0.5–0.9)
EOSINOPHILS ABSOLUTE: 0 K/UL (ref 0–0.6)
EOSINOPHILS RELATIVE PERCENT: 0 % (ref 0–5)
GFR AFRICAN AMERICAN: >59
GFR NON-AFRICAN AMERICAN: >60
GLUCOSE BLD-MCNC: 135 MG/DL (ref 70–99)
GLUCOSE BLD-MCNC: 143 MG/DL (ref 70–99)
GLUCOSE BLD-MCNC: 173 MG/DL (ref 70–99)
GLUCOSE BLD-MCNC: 183 MG/DL (ref 74–109)
GLUCOSE BLD-MCNC: 230 MG/DL (ref 70–99)
HCT VFR BLD CALC: 30.1 % (ref 37–47)
HEMOGLOBIN: 8.7 G/DL (ref 12–16)
HYPOCHROMIA: ABNORMAL
IMMATURE GRANULOCYTES #: 0 K/UL
LYMPHOCYTES ABSOLUTE: 0.9 K/UL (ref 1.1–4.5)
LYMPHOCYTES RELATIVE PERCENT: 12.6 % (ref 20–40)
MCH RBC QN AUTO: 23.8 PG (ref 27–31)
MCHC RBC AUTO-ENTMCNC: 28.9 G/DL (ref 33–37)
MCV RBC AUTO: 82.5 FL (ref 81–99)
MONOCYTES ABSOLUTE: 0.4 K/UL (ref 0–0.9)
MONOCYTES RELATIVE PERCENT: 5.7 % (ref 0–10)
NEUTROPHILS ABSOLUTE: 5.7 K/UL (ref 1.5–7.5)
NEUTROPHILS RELATIVE PERCENT: 81.1 % (ref 50–65)
OVALOCYTES: ABNORMAL
PDW BLD-RTO: 22.2 % (ref 11.5–14.5)
PERFORMED ON: ABNORMAL
PLATELET # BLD: 431 K/UL (ref 130–400)
PLATELET SLIDE REVIEW: ABNORMAL
PMV BLD AUTO: 8.9 FL (ref 9.4–12.3)
POIKILOCYTES: ABNORMAL
POLYCHROMASIA: ABNORMAL
POTASSIUM REFLEX MAGNESIUM: 4.6 MMOL/L (ref 3.5–5)
RBC # BLD: 3.65 M/UL (ref 4.2–5.4)
SODIUM BLD-SCNC: 140 MMOL/L (ref 136–145)
TARGET CELLS: ABNORMAL
TOTAL PROTEIN: 5.2 G/DL (ref 6.6–8.7)
WBC # BLD: 7 K/UL (ref 4.8–10.8)

## 2022-02-04 PROCEDURE — 6370000000 HC RX 637 (ALT 250 FOR IP): Performed by: HOSPITALIST

## 2022-02-04 PROCEDURE — 85025 COMPLETE CBC W/AUTO DIFF WBC: CPT

## 2022-02-04 PROCEDURE — 6370000000 HC RX 637 (ALT 250 FOR IP): Performed by: NURSE PRACTITIONER

## 2022-02-04 PROCEDURE — 2580000003 HC RX 258: Performed by: NURSE PRACTITIONER

## 2022-02-04 PROCEDURE — 2700000000 HC OXYGEN THERAPY PER DAY

## 2022-02-04 PROCEDURE — 6360000002 HC RX W HCPCS: Performed by: NURSE PRACTITIONER

## 2022-02-04 PROCEDURE — 36415 COLL VENOUS BLD VENIPUNCTURE: CPT

## 2022-02-04 PROCEDURE — 97110 THERAPEUTIC EXERCISES: CPT

## 2022-02-04 PROCEDURE — 97530 THERAPEUTIC ACTIVITIES: CPT

## 2022-02-04 PROCEDURE — 82947 ASSAY GLUCOSE BLOOD QUANT: CPT

## 2022-02-04 PROCEDURE — 80053 COMPREHEN METABOLIC PANEL: CPT

## 2022-02-04 PROCEDURE — 97116 GAIT TRAINING THERAPY: CPT

## 2022-02-04 PROCEDURE — 1210000000 HC MED SURG R&B

## 2022-02-04 RX ADMIN — IPRATROPIUM BROMIDE 2 PUFF: 17 AEROSOL, METERED RESPIRATORY (INHALATION) at 16:41

## 2022-02-04 RX ADMIN — SODIUM CHLORIDE, PRESERVATIVE FREE 10 ML: 5 INJECTION INTRAVENOUS at 07:34

## 2022-02-04 RX ADMIN — Medication 2000 UNITS: at 07:33

## 2022-02-04 RX ADMIN — INSULIN LISPRO 2 UNITS: 100 INJECTION, SOLUTION INTRAVENOUS; SUBCUTANEOUS at 10:33

## 2022-02-04 RX ADMIN — SENNOSIDES 8.6 MG: 8.6 TABLET, COATED ORAL at 22:03

## 2022-02-04 RX ADMIN — HYDRALAZINE HYDROCHLORIDE 25 MG: 25 TABLET, FILM COATED ORAL at 22:03

## 2022-02-04 RX ADMIN — INSULIN LISPRO 4 UNITS: 100 INJECTION, SOLUTION INTRAVENOUS; SUBCUTANEOUS at 16:50

## 2022-02-04 RX ADMIN — IPRATROPIUM BROMIDE 2 PUFF: 17 AEROSOL, METERED RESPIRATORY (INHALATION) at 12:37

## 2022-02-04 RX ADMIN — METOPROLOL SUCCINATE 50 MG: 50 TABLET, EXTENDED RELEASE ORAL at 16:42

## 2022-02-04 RX ADMIN — IPRATROPIUM BROMIDE 2 PUFF: 17 AEROSOL, METERED RESPIRATORY (INHALATION) at 22:04

## 2022-02-04 RX ADMIN — GUAIFENESIN 600 MG: 600 TABLET, EXTENDED RELEASE ORAL at 22:03

## 2022-02-04 RX ADMIN — ALBUTEROL SULFATE 2 PUFF: 90 AEROSOL, METERED RESPIRATORY (INHALATION) at 22:06

## 2022-02-04 RX ADMIN — HYDROCODONE BITARTRATE AND ACETAMINOPHEN 1 TABLET: 10; 325 TABLET ORAL at 16:48

## 2022-02-04 RX ADMIN — DULOXETINE 30 MG: 30 CAPSULE, DELAYED RELEASE ORAL at 07:34

## 2022-02-04 RX ADMIN — Medication 5 MG: at 22:03

## 2022-02-04 RX ADMIN — RIVAROXABAN 20 MG: 20 TABLET, FILM COATED ORAL at 07:33

## 2022-02-04 RX ADMIN — DILTIAZEM HYDROCHLORIDE 120 MG: 120 CAPSULE, COATED, EXTENDED RELEASE ORAL at 07:33

## 2022-02-04 RX ADMIN — ZINC SULFATE 220 MG (50 MG) CAPSULE 50 MG: CAPSULE at 07:34

## 2022-02-04 RX ADMIN — FERROUS SULFATE TAB 325 MG (65 MG ELEMENTAL FE) 325 MG: 325 (65 FE) TAB at 16:41

## 2022-02-04 RX ADMIN — OXYCODONE HYDROCHLORIDE AND ACETAMINOPHEN 500 MG: 500 TABLET ORAL at 22:03

## 2022-02-04 RX ADMIN — FERROUS SULFATE TAB 325 MG (65 MG ELEMENTAL FE) 325 MG: 325 (65 FE) TAB at 07:33

## 2022-02-04 RX ADMIN — DILTIAZEM HYDROCHLORIDE 120 MG: 120 CAPSULE, COATED, EXTENDED RELEASE ORAL at 22:03

## 2022-02-04 RX ADMIN — ALBUTEROL SULFATE 2 PUFF: 90 AEROSOL, METERED RESPIRATORY (INHALATION) at 07:33

## 2022-02-04 RX ADMIN — GUAIFENESIN 600 MG: 600 TABLET, EXTENDED RELEASE ORAL at 07:33

## 2022-02-04 RX ADMIN — BARICITINIB 4 MG: 2 TABLET, FILM COATED ORAL at 07:33

## 2022-02-04 RX ADMIN — HYDRALAZINE HYDROCHLORIDE 25 MG: 25 TABLET, FILM COATED ORAL at 16:41

## 2022-02-04 RX ADMIN — METOPROLOL SUCCINATE 50 MG: 50 TABLET, EXTENDED RELEASE ORAL at 07:33

## 2022-02-04 RX ADMIN — DEXAMETHASONE SODIUM PHOSPHATE 6 MG: 10 INJECTION INTRAMUSCULAR; INTRAVENOUS at 22:04

## 2022-02-04 RX ADMIN — BUDESONIDE AND FORMOTEROL FUMARATE DIHYDRATE 2 PUFF: 160; 4.5 AEROSOL RESPIRATORY (INHALATION) at 07:33

## 2022-02-04 RX ADMIN — IPRATROPIUM BROMIDE 2 PUFF: 17 AEROSOL, METERED RESPIRATORY (INHALATION) at 07:33

## 2022-02-04 RX ADMIN — ALBUTEROL SULFATE 2 PUFF: 90 AEROSOL, METERED RESPIRATORY (INHALATION) at 12:37

## 2022-02-04 RX ADMIN — SODIUM CHLORIDE, PRESERVATIVE FREE 10 ML: 5 INJECTION INTRAVENOUS at 22:04

## 2022-02-04 RX ADMIN — HYDRALAZINE HYDROCHLORIDE 25 MG: 25 TABLET, FILM COATED ORAL at 06:17

## 2022-02-04 RX ADMIN — ALBUTEROL SULFATE 2 PUFF: 90 AEROSOL, METERED RESPIRATORY (INHALATION) at 16:40

## 2022-02-04 RX ADMIN — OXYCODONE HYDROCHLORIDE AND ACETAMINOPHEN 500 MG: 500 TABLET ORAL at 07:33

## 2022-02-04 RX ADMIN — BUDESONIDE AND FORMOTEROL FUMARATE DIHYDRATE 2 PUFF: 160; 4.5 AEROSOL RESPIRATORY (INHALATION) at 22:06

## 2022-02-04 RX ADMIN — PANTOPRAZOLE SODIUM 40 MG: 40 TABLET, DELAYED RELEASE ORAL at 06:17

## 2022-02-04 RX ADMIN — NORTRIPTYLINE HYDROCHLORIDE 10 MG: 10 CAPSULE ORAL at 22:03

## 2022-02-04 RX ADMIN — INSULIN GLARGINE 10 UNITS: 100 INJECTION, SOLUTION SUBCUTANEOUS at 10:32

## 2022-02-04 ASSESSMENT — PAIN SCALES - GENERAL
PAINLEVEL_OUTOF10: 0
PAINLEVEL_OUTOF10: 5
PAINLEVEL_OUTOF10: 0
PAINLEVEL_OUTOF10: 6

## 2022-02-04 ASSESSMENT — PAIN DESCRIPTION - PAIN TYPE: TYPE: ACUTE PAIN

## 2022-02-04 ASSESSMENT — PAIN DESCRIPTION - LOCATION: LOCATION: HEAD

## 2022-02-04 NOTE — PROGRESS NOTES
Occupational Therapy     02/04/22 1314   Restrictions/Precautions   Restrictions/Precautions Fall Risk;Isolation  (Simultaneous filing. User may not have seen previous data.)   Vision   Vision [facility]   Hearing   Hearing [facility]   General   Chart Reviewed Yes   Patient assessed for rehabilitation services? Yes   Response to previous treatment Patient with no complaints from previous session   Family / Caregiver Present No   Diagnosis A-fib, Covid positive   Subjective   Subjective Pt in bed upon arrival for therapy and agreeable to participate. Pain Assessment   Patient Currently in Pain Denies   Oxygen Therapy   O2 Device Nasal cannula   O2 Flow Rate (L/min) 4 L/min   Orientation   Overall Orientation Status WNL   ADL   Feeding Setup; Independent   Grooming Setup; Independent  (sitting)   UE Bathing Supervision   LE Bathing Minimal assistance   UE Dressing Supervision   LE Dressing Minimal assistance   Toileting Minimal assistance   Additional Comments [place] for standing ADLs as well as short of breath   Balance   Sitting Balance Independent   Standing Balance Contact guard assistance   Functional Mobility   Functional - Mobility Device Rolling Walker   Activity Other  (bed>doorway>recliner)   Assist Level Contact guard assistance   Bed mobility   Supine to Sit Supervision   Transfers   Sit to stand Contact guard assistance   Stand to sit Contact guard assistance   Transfer Comments Moved around room with RW and 4 liters O2   Type of ROM/Therapeutic Exercise   Type of ROM/Therapeutic Exercise AROM   Comment Pt instructed on AROM exercises while sitting with frequent rest breaks to promote strength and endurance. Assessment   Performance deficits / Impairments Decreased functional mobility ; Decreased endurance;Decreased ADL status   Assessment Pt progressing as tolerated; mostly limited by fatigue and SOB.     Treatment Diagnosis A-fib, Covid   Prognosis Good   REQUIRES OT FOLLOW UP Yes   Treatment Initiated  Tx focused on functional mobility and AROM exercises while seated. Discharge Recommendations Patient would benefit from continued therapy after discharge   Activity Tolerance   Activity Tolerance Patient Tolerated treatment well   Safety Devices   Safety Devices in place Yes   Type of devices Left in chair;Call light within reach; Chair alarm in place   Plan   Times per week 3-5x/week   Times per day Daily   Electronically signed by BRANDYN Davis on 2/4/2022 at 1:21 PM

## 2022-02-04 NOTE — PROGRESS NOTES
nontender to palpation  Extremities: no tenderness, no edema, moves all extremities  Psych: Affect normal and good eye contact, behavioral normal.        Labs/Imaging/Diagnostics    Labs:  CBC:  Recent Labs     02/02/22 0205 02/03/22 0337 02/04/22 0307   WBC 7.6 5.6 7.0   RBC 3.77* 3.67* 3.65*   HGB 8.9* 8.7* 8.7*   HCT 30.3* 30.7* 30.1*   MCV 80.4* 83.7 82.5   RDW 21.3* 21.8* 22.2*    395 431*     CHEMISTRIES:  Recent Labs     02/02/22 0205 02/03/22 0337 02/04/22 0307    137 140   K 3.9 4.2 4.6    101 104   CO2 21* 22 23   BUN 10 14 20   CREATININE 0.7 0.7 0.7   GLUCOSE 249* 226* 183*     PT/INR:  Recent Labs     02/02/22 0205   PROTIME 13.1   INR 0.97     APTT:No results for input(s): APTT in the last 72 hours. LIVER PROFILE:  Recent Labs     02/02/22 0205 02/03/22 0337 02/04/22  0307   AST 11 11 12   ALT 7 6 8   BILITOT 0.3 <0.2 <0.2   ALKPHOS 92 89 97       Imaging Last 24 Hours:  XR CHEST PORTABLE    Result Date: 2/1/2022  EXAMINATION: XR CHEST PORTABLE 2/1/2022 5:32 PM HISTORY: Shortness of breath. Report: A portable upright frontal view of the chest was obtained. COMPARISON: Portable chest x-ray 1/20/2022. The lungs are grossly hypoaerated and there are increased infiltrates in both lung bases without consolidation. Much of the basilar lung opacity may be due to atelectasis. Heart size is normal. No pneumothorax or pleural effusion is identified. The right subclavian port catheter appears in good position as before. There is mild dextroscoliosis of the thoracic spine. Cholecystectomy clips are present. Gross hypoaeration of the lungs with increased bibasilar infiltrates, no focal consolidation. No pneumothorax or pleural effusion is identified. The right subclavian port catheter remains in good position. Signed by Dr Smita Martinez.  [facility]    Assessment//Plan           Hospital Problems           Last Modified POA    * (Principal) Acute hypoxemic respiratory failure due to COVID-19 ([facility] 2/1/2022 Yes    Essential hypertension 2/1/2022 Yes    History of DVT (deep vein thrombosis) 2/1/2022 Yes    History of atrial fibrillation 2/1/2022 Yes        Assessment & Plan      Acute hypoxic respiratory failure secondary to Covid  Hypertension  DVT  Atrial fibrillation  GERD  Fibromyalgia  Bilateral lower extremity neuropathy  Restless syndrome  Type 2 diabetes  Vitamin D deficiency. Plan:  Wean oxygen as tolerated  Pharmacy to dose baricitinib  Continue steroids as ordered  Insulin regimen as currently ordered  Droplet precautions  Continue adjuvant therapy  Inhalers  Vitamin D supplementation  Home O2 eval prior to discharge from the hospital  PT OT following      Disposition: SNF placement . Electronically signed by   Max Rodriguez MD   Internal Medicine Hospitalist  On 2/4/2022  At 2:11 PM    EMR Dragon/Transcription disclaimer:   Much of this encounter note is an electronic transcription/translation of spoken language to printed text.  The electronic translation of spoken language may permit erroneous, or at times, nonsensical words or phrases to be inadvertently transcribed; although attempts have made to review the note for such errors, some may still exist.

## 2022-02-04 NOTE — PROGRESS NOTES
Physical Therapy  Name: Negra Santos  MRN:  [id number]  Date of service:  2/4/2022 02/04/22 1358   Restrictions/Precautions   Restrictions/Precautions Fall Risk;Isolation   Subjective   Subjective Pt agreed to therapy. Pain Screening   Patient Currently in Pain Denies   Transfers   Sit to Stand Contact guard assistance   Stand to sit Contact guard assistance   Ambulation   Ambulation? Yes   Ambulation 1   Surface level tile   Device Rolling Walker   Assistance Contact guard assistance   Quality of Gait became SOA quickly    Gait Deviations Slow Lillie;Decreased step length   Distance 15'   Exercises   Hip Flexion 10   Knee Long Arc Quad 10   Upper Extremity 10   Comments BLE sitting exercise   Short term goals   Time Frame for Short term goals 14 DAYS   Short term goal 1 BED MOB MOD IND   Short term goal 2 TRANSFERS SUPERVISION   Short term goal 3 ' RW SUPERVISION   Conditions Requiring Skilled Therapeutic Intervention   Body structures, Functions, Activity limitations Decreased functional mobility ; Decreased ADL status; Decreased strength;Decreased endurance;Decreased balance   Assessment Pt fatigues and becomes SOA quickly. Pt able to amb short distance and then to chair. Pt tolerated sitting exercise well. Pt in chair with all needs in reach. Activity Tolerance   Activity Tolerance Patient limited by endurance   Safety Devices   Type of devices Call light within reach; Chair alarm in place; Left in chair         Electronically signed by Halima Forrest PTA on 2/4/2022 at 2:08 PM

## 2022-02-05 LAB
ALBUMIN SERPL-MCNC: 3.6 G/DL (ref 3.5–5.2)
ALP BLD-CCNC: 125 U/L (ref 35–104)
ALT SERPL-CCNC: 15 U/L (ref 5–33)
ANION GAP SERPL CALCULATED.3IONS-SCNC: 15 MMOL/L (ref 7–19)
ANISOCYTOSIS: ABNORMAL
AST SERPL-CCNC: 15 U/L (ref 5–32)
BASOPHILS ABSOLUTE: 0 K/UL (ref 0–0.2)
BASOPHILS RELATIVE PERCENT: 0.1 % (ref 0–1)
BILIRUB SERPL-MCNC: 0.3 MG/DL (ref 0.2–1.2)
BUN BLDV-MCNC: 22 MG/DL (ref 8–23)
CALCIUM SERPL-MCNC: 9.5 MG/DL (ref 8.8–10.2)
CHLORIDE BLD-SCNC: 100 MMOL/L (ref 98–111)
CO2: 22 MMOL/L (ref 22–29)
CREAT SERPL-MCNC: 0.7 MG/DL (ref 0.5–0.9)
EOSINOPHILS ABSOLUTE: 0 K/UL (ref 0–0.6)
EOSINOPHILS RELATIVE PERCENT: 0 % (ref 0–5)
GFR AFRICAN AMERICAN: >59
GFR NON-AFRICAN AMERICAN: >60
GLUCOSE BLD-MCNC: 106 MG/DL (ref 70–99)
GLUCOSE BLD-MCNC: 183 MG/DL (ref 70–99)
GLUCOSE BLD-MCNC: 189 MG/DL (ref 70–99)
GLUCOSE BLD-MCNC: 203 MG/DL (ref 70–99)
GLUCOSE BLD-MCNC: 205 MG/DL (ref 74–109)
HCT VFR BLD CALC: 37.1 % (ref 37–47)
HEMOGLOBIN: 10.5 G/DL (ref 12–16)
HYPOCHROMIA: ABNORMAL
IMMATURE GRANULOCYTES #: 0.1 K/UL
LYMPHOCYTES ABSOLUTE: 0.8 K/UL (ref 1.1–4.5)
LYMPHOCYTES RELATIVE PERCENT: 9.2 % (ref 20–40)
MCH RBC QN AUTO: 23.8 PG (ref 27–31)
MCHC RBC AUTO-ENTMCNC: 28.3 G/DL (ref 33–37)
MCV RBC AUTO: 83.9 FL (ref 81–99)
MONOCYTES ABSOLUTE: 0.3 K/UL (ref 0–0.9)
MONOCYTES RELATIVE PERCENT: 3.2 % (ref 0–10)
NEUTROPHILS ABSOLUTE: 7.4 K/UL (ref 1.5–7.5)
NEUTROPHILS RELATIVE PERCENT: 86.7 % (ref 50–65)
PDW BLD-RTO: 23.1 % (ref 11.5–14.5)
PERFORMED ON: ABNORMAL
PLATELET # BLD: 543 K/UL (ref 130–400)
PLATELET SLIDE REVIEW: ABNORMAL
PMV BLD AUTO: 8.8 FL (ref 9.4–12.3)
POIKILOCYTES: ABNORMAL
POLYCHROMASIA: ABNORMAL
POTASSIUM REFLEX MAGNESIUM: 5 MMOL/L (ref 3.5–5)
RBC # BLD: 4.42 M/UL (ref 4.2–5.4)
SODIUM BLD-SCNC: 137 MMOL/L (ref 136–145)
TOTAL PROTEIN: 6.3 G/DL (ref 6.6–8.7)
WBC # BLD: 8.5 K/UL (ref 4.8–10.8)

## 2022-02-05 PROCEDURE — 6370000000 HC RX 637 (ALT 250 FOR IP): Performed by: HOSPITALIST

## 2022-02-05 PROCEDURE — 2700000000 HC OXYGEN THERAPY PER DAY

## 2022-02-05 PROCEDURE — 6370000000 HC RX 637 (ALT 250 FOR IP): Performed by: NURSE PRACTITIONER

## 2022-02-05 PROCEDURE — 80053 COMPREHEN METABOLIC PANEL: CPT

## 2022-02-05 PROCEDURE — 6360000002 HC RX W HCPCS: Performed by: NURSE PRACTITIONER

## 2022-02-05 PROCEDURE — 85025 COMPLETE CBC W/AUTO DIFF WBC: CPT

## 2022-02-05 PROCEDURE — 1210000000 HC MED SURG R&B

## 2022-02-05 PROCEDURE — 2580000003 HC RX 258: Performed by: NURSE PRACTITIONER

## 2022-02-05 PROCEDURE — 36415 COLL VENOUS BLD VENIPUNCTURE: CPT

## 2022-02-05 PROCEDURE — 82947 ASSAY GLUCOSE BLOOD QUANT: CPT

## 2022-02-05 RX ADMIN — DILTIAZEM HYDROCHLORIDE 120 MG: 120 CAPSULE, COATED, EXTENDED RELEASE ORAL at 10:48

## 2022-02-05 RX ADMIN — OXYCODONE HYDROCHLORIDE AND ACETAMINOPHEN 500 MG: 500 TABLET ORAL at 10:48

## 2022-02-05 RX ADMIN — INSULIN LISPRO 4 UNITS: 100 INJECTION, SOLUTION INTRAVENOUS; SUBCUTANEOUS at 10:48

## 2022-02-05 RX ADMIN — INSULIN LISPRO 4 UNITS: 100 INJECTION, SOLUTION INTRAVENOUS; SUBCUTANEOUS at 18:37

## 2022-02-05 RX ADMIN — HYDRALAZINE HYDROCHLORIDE 25 MG: 25 TABLET, FILM COATED ORAL at 20:43

## 2022-02-05 RX ADMIN — DILTIAZEM HYDROCHLORIDE 120 MG: 120 CAPSULE, COATED, EXTENDED RELEASE ORAL at 20:44

## 2022-02-05 RX ADMIN — SODIUM CHLORIDE, PRESERVATIVE FREE 10 ML: 5 INJECTION INTRAVENOUS at 10:48

## 2022-02-05 RX ADMIN — HYDRALAZINE HYDROCHLORIDE 25 MG: 25 TABLET, FILM COATED ORAL at 06:33

## 2022-02-05 RX ADMIN — PANTOPRAZOLE SODIUM 40 MG: 40 TABLET, DELAYED RELEASE ORAL at 06:33

## 2022-02-05 RX ADMIN — FERROUS SULFATE TAB 325 MG (65 MG ELEMENTAL FE) 325 MG: 325 (65 FE) TAB at 18:10

## 2022-02-05 RX ADMIN — IPRATROPIUM BROMIDE 2 PUFF: 17 AEROSOL, METERED RESPIRATORY (INHALATION) at 21:28

## 2022-02-05 RX ADMIN — INSULIN GLARGINE 10 UNITS: 100 INJECTION, SOLUTION SUBCUTANEOUS at 10:48

## 2022-02-05 RX ADMIN — ALBUTEROL SULFATE 2 PUFF: 90 AEROSOL, METERED RESPIRATORY (INHALATION) at 10:45

## 2022-02-05 RX ADMIN — INSULIN GLARGINE 10 UNITS: 100 INJECTION, SOLUTION SUBCUTANEOUS at 21:26

## 2022-02-05 RX ADMIN — Medication 5 MG: at 20:43

## 2022-02-05 RX ADMIN — ALBUTEROL SULFATE 2 PUFF: 90 AEROSOL, METERED RESPIRATORY (INHALATION) at 20:49

## 2022-02-05 RX ADMIN — RIVAROXABAN 20 MG: 20 TABLET, FILM COATED ORAL at 14:40

## 2022-02-05 RX ADMIN — BUDESONIDE AND FORMOTEROL FUMARATE DIHYDRATE 2 PUFF: 160; 4.5 AEROSOL RESPIRATORY (INHALATION) at 10:45

## 2022-02-05 RX ADMIN — GUAIFENESIN 600 MG: 600 TABLET, EXTENDED RELEASE ORAL at 10:48

## 2022-02-05 RX ADMIN — GUAIFENESIN 600 MG: 600 TABLET, EXTENDED RELEASE ORAL at 20:43

## 2022-02-05 RX ADMIN — SODIUM CHLORIDE, PRESERVATIVE FREE 10 ML: 5 INJECTION INTRAVENOUS at 20:43

## 2022-02-05 RX ADMIN — IPRATROPIUM BROMIDE 2 PUFF: 17 AEROSOL, METERED RESPIRATORY (INHALATION) at 10:45

## 2022-02-05 RX ADMIN — ALBUTEROL SULFATE 2 PUFF: 90 AEROSOL, METERED RESPIRATORY (INHALATION) at 18:13

## 2022-02-05 RX ADMIN — METOPROLOL SUCCINATE 50 MG: 50 TABLET, EXTENDED RELEASE ORAL at 10:47

## 2022-02-05 RX ADMIN — HYDRALAZINE HYDROCHLORIDE 25 MG: 25 TABLET, FILM COATED ORAL at 14:40

## 2022-02-05 RX ADMIN — HYDROCODONE BITARTRATE AND ACETAMINOPHEN 1 TABLET: 10; 325 TABLET ORAL at 10:49

## 2022-02-05 RX ADMIN — METOPROLOL SUCCINATE 50 MG: 50 TABLET, EXTENDED RELEASE ORAL at 18:09

## 2022-02-05 RX ADMIN — ZINC SULFATE 220 MG (50 MG) CAPSULE 50 MG: CAPSULE at 10:48

## 2022-02-05 RX ADMIN — BARICITINIB 4 MG: 2 TABLET, FILM COATED ORAL at 10:47

## 2022-02-05 RX ADMIN — OXYCODONE HYDROCHLORIDE AND ACETAMINOPHEN 500 MG: 500 TABLET ORAL at 20:43

## 2022-02-05 RX ADMIN — NORTRIPTYLINE HYDROCHLORIDE 10 MG: 10 CAPSULE ORAL at 20:43

## 2022-02-05 RX ADMIN — IPRATROPIUM BROMIDE 2 PUFF: 17 AEROSOL, METERED RESPIRATORY (INHALATION) at 18:15

## 2022-02-05 RX ADMIN — BUDESONIDE AND FORMOTEROL FUMARATE DIHYDRATE 2 PUFF: 160; 4.5 AEROSOL RESPIRATORY (INHALATION) at 20:49

## 2022-02-05 RX ADMIN — IPRATROPIUM BROMIDE 2 PUFF: 17 AEROSOL, METERED RESPIRATORY (INHALATION) at 14:44

## 2022-02-05 RX ADMIN — DULOXETINE 30 MG: 30 CAPSULE, DELAYED RELEASE ORAL at 10:48

## 2022-02-05 RX ADMIN — Medication 2000 UNITS: at 10:47

## 2022-02-05 RX ADMIN — ALBUTEROL SULFATE 2 PUFF: 90 AEROSOL, METERED RESPIRATORY (INHALATION) at 14:43

## 2022-02-05 RX ADMIN — SENNOSIDES 8.6 MG: 8.6 TABLET, COATED ORAL at 20:43

## 2022-02-05 RX ADMIN — DEXAMETHASONE SODIUM PHOSPHATE 6 MG: 10 INJECTION INTRAMUSCULAR; INTRAVENOUS at 20:43

## 2022-02-05 RX ADMIN — HYDROCODONE BITARTRATE AND ACETAMINOPHEN 1 TABLET: 10; 325 TABLET ORAL at 20:57

## 2022-02-05 ASSESSMENT — PAIN DESCRIPTION - DESCRIPTORS: DESCRIPTORS: CONSTANT;DISCOMFORT;ACHING

## 2022-02-05 ASSESSMENT — PAIN DESCRIPTION - PAIN TYPE: TYPE: CHRONIC PAIN

## 2022-02-05 ASSESSMENT — PAIN SCALES - GENERAL
PAINLEVEL_OUTOF10: 5
PAINLEVEL_OUTOF10: 6
PAINLEVEL_OUTOF10: 2

## 2022-02-05 ASSESSMENT — PAIN DESCRIPTION - ONSET: ONSET: ON-GOING

## 2022-02-05 ASSESSMENT — PAIN DESCRIPTION - PROGRESSION: CLINICAL_PROGRESSION: NOT CHANGED

## 2022-02-05 ASSESSMENT — PAIN DESCRIPTION - FREQUENCY: FREQUENCY: CONTINUOUS

## 2022-02-05 ASSESSMENT — PAIN DESCRIPTION - LOCATION: LOCATION: BACK

## 2022-02-05 ASSESSMENT — PAIN - FUNCTIONAL ASSESSMENT: PAIN_FUNCTIONAL_ASSESSMENT: PREVENTS OR INTERFERES SOME ACTIVE ACTIVITIES AND ADLS

## 2022-02-05 ASSESSMENT — PAIN DESCRIPTION - ORIENTATION: ORIENTATION: LOWER;MID

## 2022-02-05 NOTE — PROGRESS NOTES
Progress Note  Date:2022       Room:0439/439-02  Patient John Rockwell     YOB: 1935     Age:87 y.o. Subjective    Subjective: 82 y.o. female who presented to [address] [place] [address] ED complaining of worsening shortness of breath having been discharged after evaluation of covid-19 infection from this facility . At that time, patient was not requiring supplemental oxygen use at home. She reports worsening shortness of breath at rest and with minimal activity. In ED, patient was found to be hypoxic with PO2 of 51/spo2 of 87%. CXR imaging with gross hypoaeration of the lungs with increased bibasilar infiltrates, no focal consolidation. Pt was admitted to hospitalist service for further evaluation of acute hypoxic respiratory failure due to covid-19 infection. Currently been weaned off oxygen and currently on 2L. Patient was examined in her room, stated she feel better. Denied any chest pain or worsening dyspnea. Denied any nausea vomiting abdominal pain. Review of Systems: 12 point system review negative suggested above.      :         Vitals Last 24 Hours:  TEMPERATURE:  Temp  Av.1 °F (36.2 °C)  Min: 96.6 °F (35.9 °C)  Max: 98 °F (36.7 °C)  RESPIRATIONS RANGE: Resp  Av  Min: 18  Max: 18  PULSE OXIMETRY RANGE: SpO2  Av %  Min: 94 %  Max: 100 %  PULSE RANGE: Pulse  Av.6  Min: 54  Max: 66  BLOOD PRESSURE RANGE: Systolic (75IJZ), ILD:329 , Min:120 , HRH:991   ; Diastolic (58SPG), MXF:81, Min:50, Max:74    I/O (24Hr): Intake/Output Summary (Last 24 hours) at 2022 1221  Last data filed at 2022 8428  Gross per 24 hour   Intake 800 ml   Output --   Net 800 ml     \    Physical Examination:  General: Well-developed, no acute distress lying comfortably in bed.   HEENT: Atraumatic normocephalic, range of motion normal   Cardiac: Normal S1-S2   Respiratory: clear To auscultation bilaterally, + rhonchi, no wheezing  Abdomen: Soft, positive bowel sounds in all quadrants, no distention, nontender to palpation  Extremities: no tenderness, no edema, moves all extremities  Psych: Affect normal and good eye contact, behavioral normal.        Labs/Imaging/Diagnostics    Labs:  CBC:  Recent Labs     02/03/22 0337 02/04/22 0307 02/05/22  0517   WBC 5.6 7.0 8.5   RBC 3.67* 3.65* 4.42   HGB 8.7* 8.7* 10.5*   HCT 30.7* 30.1* 37.1   MCV 83.7 82.5 83.9   RDW 21.8* 22.2* 23.1*    431* 543*     CHEMISTRIES:  Recent Labs     02/03/22 0337 02/04/22 0307 02/05/22  0517    140 137   K 4.2 4.6 5.0    104 100   CO2 22 23 22   BUN 14 20 22   CREATININE 0.7 0.7 0.7   GLUCOSE 226* 183* 205*     PT/INR:  No results for input(s): PROTIME, INR in the last 72 hours. APTT:No results for input(s): APTT in the last 72 hours. LIVER PROFILE:  Recent Labs     02/03/22 0337 02/04/22 0307 02/05/22  0517   AST 11 12 15   ALT 6 8 15   BILITOT <0.2 <0.2 0.3   ALKPHOS 89 97 125*       Imaging Last 24 Hours:  XR CHEST PORTABLE    Result Date: 2/1/2022  EXAMINATION: XR CHEST PORTABLE 2/1/2022 5:32 PM HISTORY: Shortness of breath. Report: A portable upright frontal view of the chest was obtained. COMPARISON: Portable chest x-ray 1/20/2022. The lungs are grossly hypoaerated and there are increased infiltrates in both lung bases without consolidation. Much of the basilar lung opacity may be due to atelectasis. Heart size is normal. No pneumothorax or pleural effusion is identified. The right subclavian port catheter appears in good position as before. There is mild dextroscoliosis of the thoracic spine. Cholecystectomy clips are present. Gross hypoaeration of the lungs with increased bibasilar infiltrates, no focal consolidation. No pneumothorax or pleural effusion is identified. The right subclavian port catheter remains in good position. Signed by Dr Katerina Kevin.  [facility]    Assessment//Plan           Hospital Problems           Last Modified POA    * (Principal) Acute hypoxemic respiratory failure due to COVID-19 [facility]) 2/1/2022 Yes    Essential hypertension 2/1/2022 Yes    History of DVT (deep vein thrombosis) 2/1/2022 Yes    History of atrial fibrillation 2/1/2022 Yes        Assessment & Plan      Acute hypoxic respiratory failure secondary to Covid  Hypertension  DVT  Atrial fibrillation  GERD  Fibromyalgia  Bilateral lower extremity neuropathy  Restless syndrome  Type 2 diabetes  Vitamin D deficiency. Plan:  Wean oxygen as tolerated  Pharmacy to dose baricitinib  Continue steroids as ordered  Insulin regimen as currently ordered  Droplet precautions  Continue adjuvant therapy  Inhalers  Vitamin D supplementation  Home O2 eval prior to discharge from the hospital  PT OT following      Disposition: SNF placement . Electronically signed by   Sandip Boyd MD   Internal Medicine Hospitalist  On 2/5/2022  At 12:21 PM    EMR Dragon/Transcription disclaimer:   Much of this encounter note is an electronic transcription/translation of spoken language to printed text.  The electronic translation of spoken language may permit erroneous, or at times, nonsensical words or phrases to be inadvertently transcribed; although attempts have made to review the note for such errors, some may still exist.

## 2022-02-05 NOTE — PLAN OF CARE
Problem: Airway Clearance - Ineffective  Goal: Achieve or maintain patent airway  Outcome: Ongoing     Problem: Gas Exchange - Impaired  Goal: Absence of hypoxia  Outcome: Ongoing  Goal: Promote optimal lung function  Outcome: Ongoing     Problem: Breathing Pattern - Ineffective  Goal: Ability to achieve and maintain a regular respiratory rate  Outcome: Ongoing     Problem: Body Temperature -  Risk of, Imbalanced  Goal: Ability to maintain a body temperature within defined limits  Outcome: Ongoing  Goal: Will regain or maintain usual level of consciousness  Outcome: Ongoing  Goal: Complications related to the disease process, condition or treatment will be avoided or minimized  Outcome: Ongoing     Problem: Isolation Precautions - Risk of Spread of Infection  Goal: Prevent transmission of infection  Outcome: Ongoing     Problem: Nutrition Deficits  Goal: Optimize nutritional status  Outcome: Ongoing     Problem: Risk for Fluid Volume Deficit  Goal: Maintain normal heart rhythm  Outcome: Ongoing  Goal: Maintain absence of muscle cramping  Outcome: Ongoing  Goal: Maintain normal serum potassium, sodium, calcium, phosphorus, and pH  Outcome: Ongoing     Problem: Loneliness or Risk for Loneliness  Goal: Demonstrate positive use of time alone when socialization is not possible  Outcome: Ongoing     Problem: Fatigue  Goal: Verbalize increase energy and improved vitality  Outcome: Ongoing     Problem: Patient Education: Go to Patient Education Activity  Goal: Patient/Family Education  Outcome: Ongoing     Problem: Falls - Risk of:  Goal: Will remain free from falls  Description: Will remain free from falls  Outcome: Ongoing  Goal: Absence of physical injury  Description: Absence of physical injury  Outcome: Ongoing     Problem: Pain:  Description: Pain management should include both nonpharmacologic and pharmacologic interventions.   Goal: Pain level will decrease  Description: Pain level will decrease  Outcome: Ongoing  Goal: Control of acute pain  Description: Control of acute pain  Outcome: Ongoing  Goal: Control of chronic pain  Description: Control of chronic pain  Outcome: Ongoing

## 2022-02-06 LAB
ALBUMIN SERPL-MCNC: 3.1 G/DL (ref 3.5–5.2)
ALP BLD-CCNC: 103 U/L (ref 35–104)
ALT SERPL-CCNC: 12 U/L (ref 5–33)
ANION GAP SERPL CALCULATED.3IONS-SCNC: 11 MMOL/L (ref 7–19)
ANISOCYTOSIS: ABNORMAL
AST SERPL-CCNC: 10 U/L (ref 5–32)
BASOPHILS ABSOLUTE: 0 K/UL (ref 0–0.2)
BASOPHILS RELATIVE PERCENT: 0 % (ref 0–1)
BILIRUB SERPL-MCNC: <0.2 MG/DL (ref 0.2–1.2)
BUN BLDV-MCNC: 21 MG/DL (ref 8–23)
CALCIUM SERPL-MCNC: 9.2 MG/DL (ref 8.8–10.2)
CHLORIDE BLD-SCNC: 100 MMOL/L (ref 98–111)
CO2: 26 MMOL/L (ref 22–29)
CREAT SERPL-MCNC: 0.7 MG/DL (ref 0.5–0.9)
EOSINOPHILS ABSOLUTE: 0 K/UL (ref 0–0.6)
EOSINOPHILS RELATIVE PERCENT: 0 % (ref 0–5)
GFR AFRICAN AMERICAN: >59
GFR NON-AFRICAN AMERICAN: >60
GLUCOSE BLD-MCNC: 146 MG/DL (ref 70–99)
GLUCOSE BLD-MCNC: 146 MG/DL (ref 70–99)
GLUCOSE BLD-MCNC: 153 MG/DL (ref 70–99)
GLUCOSE BLD-MCNC: 214 MG/DL (ref 70–99)
GLUCOSE BLD-MCNC: 220 MG/DL (ref 74–109)
HCT VFR BLD CALC: 33.1 % (ref 37–47)
HEMOGLOBIN: 9.8 G/DL (ref 12–16)
HYPOCHROMIA: ABNORMAL
IMMATURE GRANULOCYTES #: 0.1 K/UL
LYMPHOCYTES ABSOLUTE: 0.7 K/UL (ref 1.1–4.5)
LYMPHOCYTES RELATIVE PERCENT: 12 % (ref 20–40)
MCH RBC QN AUTO: 24.3 PG (ref 27–31)
MCHC RBC AUTO-ENTMCNC: 29.6 G/DL (ref 33–37)
MCV RBC AUTO: 82.1 FL (ref 81–99)
MONOCYTES ABSOLUTE: 0.2 K/UL (ref 0–0.9)
MONOCYTES RELATIVE PERCENT: 3.1 % (ref 0–10)
NEUTROPHILS ABSOLUTE: 5.1 K/UL (ref 1.5–7.5)
NEUTROPHILS RELATIVE PERCENT: 83.9 % (ref 50–65)
OVALOCYTES: ABNORMAL
PDW BLD-RTO: 23 % (ref 11.5–14.5)
PERFORMED ON: ABNORMAL
PLATELET # BLD: 446 K/UL (ref 130–400)
PLATELET SLIDE REVIEW: ABNORMAL
PMV BLD AUTO: 8.7 FL (ref 9.4–12.3)
POIKILOCYTES: ABNORMAL
POLYCHROMASIA: ABNORMAL
POTASSIUM REFLEX MAGNESIUM: 4.7 MMOL/L (ref 3.5–5)
RBC # BLD: 4.03 M/UL (ref 4.2–5.4)
SODIUM BLD-SCNC: 137 MMOL/L (ref 136–145)
TARGET CELLS: ABNORMAL
TOTAL PROTEIN: 6 G/DL (ref 6.6–8.7)
WBC # BLD: 6.1 K/UL (ref 4.8–10.8)

## 2022-02-06 PROCEDURE — 97116 GAIT TRAINING THERAPY: CPT

## 2022-02-06 PROCEDURE — 6360000002 HC RX W HCPCS: Performed by: NURSE PRACTITIONER

## 2022-02-06 PROCEDURE — 1210000000 HC MED SURG R&B

## 2022-02-06 PROCEDURE — 6370000000 HC RX 637 (ALT 250 FOR IP): Performed by: NURSE PRACTITIONER

## 2022-02-06 PROCEDURE — 36415 COLL VENOUS BLD VENIPUNCTURE: CPT

## 2022-02-06 PROCEDURE — 80053 COMPREHEN METABOLIC PANEL: CPT

## 2022-02-06 PROCEDURE — 6370000000 HC RX 637 (ALT 250 FOR IP): Performed by: HOSPITALIST

## 2022-02-06 PROCEDURE — 2580000003 HC RX 258: Performed by: NURSE PRACTITIONER

## 2022-02-06 PROCEDURE — 82947 ASSAY GLUCOSE BLOOD QUANT: CPT

## 2022-02-06 PROCEDURE — 2700000000 HC OXYGEN THERAPY PER DAY

## 2022-02-06 PROCEDURE — 85025 COMPLETE CBC W/AUTO DIFF WBC: CPT

## 2022-02-06 RX ADMIN — INSULIN GLARGINE 10 UNITS: 100 INJECTION, SOLUTION SUBCUTANEOUS at 09:47

## 2022-02-06 RX ADMIN — DILTIAZEM HYDROCHLORIDE 120 MG: 120 CAPSULE, COATED, EXTENDED RELEASE ORAL at 23:25

## 2022-02-06 RX ADMIN — ALBUTEROL SULFATE 2 PUFF: 90 AEROSOL, METERED RESPIRATORY (INHALATION) at 21:41

## 2022-02-06 RX ADMIN — GUAIFENESIN 600 MG: 600 TABLET, EXTENDED RELEASE ORAL at 09:48

## 2022-02-06 RX ADMIN — HYDRALAZINE HYDROCHLORIDE 25 MG: 25 TABLET, FILM COATED ORAL at 15:51

## 2022-02-06 RX ADMIN — ALBUTEROL SULFATE 2 PUFF: 90 AEROSOL, METERED RESPIRATORY (INHALATION) at 17:10

## 2022-02-06 RX ADMIN — PANTOPRAZOLE SODIUM 40 MG: 40 TABLET, DELAYED RELEASE ORAL at 06:15

## 2022-02-06 RX ADMIN — Medication 2000 UNITS: at 09:48

## 2022-02-06 RX ADMIN — METOPROLOL SUCCINATE 50 MG: 50 TABLET, EXTENDED RELEASE ORAL at 09:48

## 2022-02-06 RX ADMIN — NORTRIPTYLINE HYDROCHLORIDE 10 MG: 10 CAPSULE ORAL at 21:41

## 2022-02-06 RX ADMIN — GUAIFENESIN 600 MG: 600 TABLET, EXTENDED RELEASE ORAL at 21:41

## 2022-02-06 RX ADMIN — INSULIN LISPRO 1 UNITS: 100 INJECTION, SOLUTION INTRAVENOUS; SUBCUTANEOUS at 21:44

## 2022-02-06 RX ADMIN — IPRATROPIUM BROMIDE 2 PUFF: 17 AEROSOL, METERED RESPIRATORY (INHALATION) at 21:41

## 2022-02-06 RX ADMIN — FERROUS SULFATE TAB 325 MG (65 MG ELEMENTAL FE) 325 MG: 325 (65 FE) TAB at 17:09

## 2022-02-06 RX ADMIN — HYDROCODONE BITARTRATE AND ACETAMINOPHEN 1 TABLET: 10; 325 TABLET ORAL at 09:58

## 2022-02-06 RX ADMIN — OXYCODONE HYDROCHLORIDE AND ACETAMINOPHEN 500 MG: 500 TABLET ORAL at 09:48

## 2022-02-06 RX ADMIN — METOPROLOL SUCCINATE 50 MG: 50 TABLET, EXTENDED RELEASE ORAL at 17:10

## 2022-02-06 RX ADMIN — IPRATROPIUM BROMIDE 2 PUFF: 17 AEROSOL, METERED RESPIRATORY (INHALATION) at 17:10

## 2022-02-06 RX ADMIN — OXYCODONE HYDROCHLORIDE AND ACETAMINOPHEN 500 MG: 500 TABLET ORAL at 21:41

## 2022-02-06 RX ADMIN — Medication 5 MG: at 21:41

## 2022-02-06 RX ADMIN — DEXAMETHASONE SODIUM PHOSPHATE 6 MG: 10 INJECTION INTRAMUSCULAR; INTRAVENOUS at 21:41

## 2022-02-06 RX ADMIN — SODIUM CHLORIDE, PRESERVATIVE FREE 10 ML: 5 INJECTION INTRAVENOUS at 21:51

## 2022-02-06 RX ADMIN — SODIUM CHLORIDE, PRESERVATIVE FREE 10 ML: 5 INJECTION INTRAVENOUS at 09:49

## 2022-02-06 RX ADMIN — ZINC SULFATE 220 MG (50 MG) CAPSULE 50 MG: CAPSULE at 09:48

## 2022-02-06 RX ADMIN — DILTIAZEM HYDROCHLORIDE 120 MG: 120 CAPSULE, COATED, EXTENDED RELEASE ORAL at 09:48

## 2022-02-06 RX ADMIN — BARICITINIB 4 MG: 2 TABLET, FILM COATED ORAL at 09:48

## 2022-02-06 RX ADMIN — BUDESONIDE AND FORMOTEROL FUMARATE DIHYDRATE 2 PUFF: 160; 4.5 AEROSOL RESPIRATORY (INHALATION) at 09:47

## 2022-02-06 RX ADMIN — IPRATROPIUM BROMIDE 2 PUFF: 17 AEROSOL, METERED RESPIRATORY (INHALATION) at 13:10

## 2022-02-06 RX ADMIN — DULOXETINE 30 MG: 30 CAPSULE, DELAYED RELEASE ORAL at 09:48

## 2022-02-06 RX ADMIN — SENNOSIDES 8.6 MG: 8.6 TABLET, COATED ORAL at 21:41

## 2022-02-06 RX ADMIN — BUDESONIDE AND FORMOTEROL FUMARATE DIHYDRATE 2 PUFF: 160; 4.5 AEROSOL RESPIRATORY (INHALATION) at 21:41

## 2022-02-06 RX ADMIN — FERROUS SULFATE TAB 325 MG (65 MG ELEMENTAL FE) 325 MG: 325 (65 FE) TAB at 09:48

## 2022-02-06 RX ADMIN — IPRATROPIUM BROMIDE 2 PUFF: 17 AEROSOL, METERED RESPIRATORY (INHALATION) at 09:49

## 2022-02-06 RX ADMIN — HYDROCODONE BITARTRATE AND ACETAMINOPHEN 1 TABLET: 10; 325 TABLET ORAL at 15:50

## 2022-02-06 RX ADMIN — RIVAROXABAN 20 MG: 20 TABLET, FILM COATED ORAL at 09:49

## 2022-02-06 RX ADMIN — INSULIN GLARGINE 10 UNITS: 100 INJECTION, SOLUTION SUBCUTANEOUS at 21:44

## 2022-02-06 RX ADMIN — ALBUTEROL SULFATE 2 PUFF: 90 AEROSOL, METERED RESPIRATORY (INHALATION) at 13:10

## 2022-02-06 RX ADMIN — HYDRALAZINE HYDROCHLORIDE 25 MG: 25 TABLET, FILM COATED ORAL at 06:14

## 2022-02-06 RX ADMIN — INSULIN LISPRO 2 UNITS: 100 INJECTION, SOLUTION INTRAVENOUS; SUBCUTANEOUS at 09:46

## 2022-02-06 RX ADMIN — ALBUTEROL SULFATE 2 PUFF: 90 AEROSOL, METERED RESPIRATORY (INHALATION) at 09:47

## 2022-02-06 RX ADMIN — HYDRALAZINE HYDROCHLORIDE 25 MG: 25 TABLET, FILM COATED ORAL at 21:41

## 2022-02-06 ASSESSMENT — PAIN SCALES - GENERAL
PAINLEVEL_OUTOF10: 8
PAINLEVEL_OUTOF10: 8

## 2022-02-06 NOTE — PROGRESS NOTES
Physical Therapy  Name: Woody Venegas  MRN:  [id number]  Date of service:  2/6/2022 02/06/22 1719   Restrictions/Precautions   Restrictions/Precautions Fall Risk;Isolation   Subjective   Subjective Pt agreed to therapy. Pain Screening   Patient Currently in Pain No   Bed Mobility   Supine to Sit Stand by assistance   Sit to Supine Stand by assistance   Transfers   Sit to Stand Stand by assistance   Stand to sit Stand by assistance   Ambulation   Ambulation? Yes   Ambulation 1   Surface level tile   Device Rolling [place]   Other Apparatus O2   Assistance Stand by assistance   Gait Deviations Slow Lillie;Decreased step length   Distance 30'   Comments amb in room   Exercises   Knee Long Arc Quad 10   Upper Extremity 10   Short term goals   Time Frame for Short term goals 14 DAYS   Short term goal 1 BED MOB MOD IND   Short term goal 2 TRANSFERS SUPERVISION   Short term goal 3 ' RW SUPERVISION   Conditions Requiring Skilled Therapeutic Intervention   Body structures, Functions, Activity limitations Decreased functional mobility ; Decreased ADL status; Decreased strength;Decreased endurance;Decreased balance   Assessment Pt doing well with mobility overall. Pt able to tolerate increased amb distance and was steady with no LOB. Pt returned to bed with all needs in reach. Activity Tolerance   Activity Tolerance Patient Tolerated treatment well   Safety Devices   Type of devices Call light within reach; Left in bed         Electronically signed by Nathalia Alvares PTA on 2/6/2022 at 5:23 PM

## 2022-02-06 NOTE — PROGRESS NOTES
Progress Note  Date:2022       Room:0439/439-02  Patient Gamaliel Alcantar     YOB: 1935     Age:87 y.o. Subjective    Subjective: 82 y.o. female who presented to [address] [place] [address] ED complaining of worsening shortness of breath having been discharged after evaluation of covid-19 infection from this facility . At that time, patient was not requiring supplemental oxygen use at home. She reports worsening shortness of breath at rest and with minimal activity. In ED, patient was found to be hypoxic with PO2 of 51/spo2 of 87%. CXR imaging with gross hypoaeration of the lungs with increased bibasilar infiltrates, no focal consolidation. Pt was admitted to hospitalist service for further evaluation of acute hypoxic respiratory failure due to covid-19 infection. Currently been weaned off oxygen and currently on 1.5L. Patient was examined in her room, stated she feel better. Denied any chest pain or worsening dyspnea. Denied any nausea vomiting abdominal pain. Complaining of some swelling and numbness in her right scalp ongoing since Nov.       Review of Systems: 12 point system review negative suggested above.      :         Vitals Last 24 Hours:  TEMPERATURE:  Temp  Av.7 °F (35.9 °C)  Min: 96 °F (35.6 °C)  Max: 97.5 °F (36.4 °C)  RESPIRATIONS RANGE: Resp  Av.5  Min: 16  Max: 18  PULSE OXIMETRY RANGE: SpO2  Av.4 %  Min: 86 %  Max: 100 %  PULSE RANGE: Pulse  Av.3  Min: 57  Max: 62  BLOOD PRESSURE RANGE: Systolic (09VLN), BUC:200 , Min:150 , CMI:422   ; Diastolic (45WAE), KNF:46, Min:45, Max:60    I/O (24Hr): No intake or output data in the 24 hours ending 22 1331      Physical Examination:  General: Well-developed, no acute distress lying comfortably in bed.   HEENT: Atraumatic normocephalic, range of motion normal   Cardiac: Normal S1-S2   Respiratory: clear To auscultation bilaterally, no rhonchi, no wheezing  Abdomen: Soft, positive bowel sounds in all quadrants, no distention, nontender to palpation  Extremities: no tenderness, no edema, moves all extremities  Psych: Affect normal and good eye contact, behavioral normal.        Labs/Imaging/Diagnostics    Labs:  CBC:  Recent Labs     02/04/22 0307 02/05/22 0517 02/06/22 0259   WBC 7.0 8.5 6.1   RBC 3.65* 4.42 4.03*   HGB 8.7* 10.5* 9.8*   HCT 30.1* 37.1 33.1*   MCV 82.5 83.9 82.1   RDW 22.2* 23.1* 23.0*   * 543* 446*     CHEMISTRIES:  Recent Labs     02/04/22 0307 02/05/22 0517 02/06/22 0259    137 137   K 4.6 5.0 4.7    100 100   CO2 23 22 26   BUN 20 22 21   CREATININE 0.7 0.7 0.7   GLUCOSE 183* 205* 220*     PT/INR:  No results for input(s): PROTIME, INR in the last 72 hours. APTT:No results for input(s): APTT in the last 72 hours. LIVER PROFILE:  Recent Labs     02/04/22 0307 02/05/22 0517 02/06/22  0259   AST 12 15 10   ALT 8 15 12   BILITOT <0.2 0.3 <0.2   ALKPHOS 97 125* 103       Imaging Last 24 Hours:  XR CHEST PORTABLE    Result Date: 2/1/2022  EXAMINATION: XR CHEST PORTABLE 2/1/2022 5:32 PM HISTORY: Shortness of breath. Report: A portable upright frontal view of the chest was obtained. COMPARISON: Portable chest x-ray 1/20/2022. The lungs are grossly hypoaerated and there are increased infiltrates in both lung bases without consolidation. Much of the basilar lung opacity may be due to atelectasis. Heart size is normal. No pneumothorax or pleural effusion is identified. The right subclavian port catheter appears in good position as before. There is mild dextroscoliosis of the thoracic spine. Cholecystectomy clips are present. Gross hypoaeration of the lungs with increased bibasilar infiltrates, no focal consolidation. No pneumothorax or pleural effusion is identified. The right subclavian port catheter remains in good position. Signed by Dr Tao Cox.  [facility]    Assessment//Plan           Hospital Problems           Last Modified POA    * (Principal) Acute hypoxemic respiratory failure

## 2022-02-07 ENCOUNTER — TELEPHONE (OUTPATIENT)
Dept: INFUSION THERAPY | Age: 87
End: 2022-02-07

## 2022-02-07 LAB
GLUCOSE BLD-MCNC: 113 MG/DL (ref 70–99)
GLUCOSE BLD-MCNC: 162 MG/DL (ref 70–99)
GLUCOSE BLD-MCNC: 309 MG/DL (ref 70–99)
PERFORMED ON: ABNORMAL

## 2022-02-07 PROCEDURE — 6370000000 HC RX 637 (ALT 250 FOR IP): Performed by: HOSPITALIST

## 2022-02-07 PROCEDURE — 97110 THERAPEUTIC EXERCISES: CPT

## 2022-02-07 PROCEDURE — 2700000000 HC OXYGEN THERAPY PER DAY

## 2022-02-07 PROCEDURE — 6370000000 HC RX 637 (ALT 250 FOR IP): Performed by: NURSE PRACTITIONER

## 2022-02-07 PROCEDURE — 82947 ASSAY GLUCOSE BLOOD QUANT: CPT

## 2022-02-07 PROCEDURE — 1210000000 HC MED SURG R&B

## 2022-02-07 PROCEDURE — 97535 SELF CARE MNGMENT TRAINING: CPT

## 2022-02-07 PROCEDURE — 97116 GAIT TRAINING THERAPY: CPT

## 2022-02-07 PROCEDURE — 6360000002 HC RX W HCPCS: Performed by: NURSE PRACTITIONER

## 2022-02-07 PROCEDURE — 2580000003 HC RX 258: Performed by: NURSE PRACTITIONER

## 2022-02-07 RX ADMIN — HYDRALAZINE HYDROCHLORIDE 25 MG: 25 TABLET, FILM COATED ORAL at 21:28

## 2022-02-07 RX ADMIN — ALBUTEROL SULFATE 2 PUFF: 90 AEROSOL, METERED RESPIRATORY (INHALATION) at 17:41

## 2022-02-07 RX ADMIN — RIVAROXABAN 20 MG: 20 TABLET, FILM COATED ORAL at 08:53

## 2022-02-07 RX ADMIN — METOPROLOL SUCCINATE 50 MG: 50 TABLET, EXTENDED RELEASE ORAL at 17:42

## 2022-02-07 RX ADMIN — IPRATROPIUM BROMIDE 2 PUFF: 17 AEROSOL, METERED RESPIRATORY (INHALATION) at 12:51

## 2022-02-07 RX ADMIN — ALBUTEROL SULFATE 2 PUFF: 90 AEROSOL, METERED RESPIRATORY (INHALATION) at 08:56

## 2022-02-07 RX ADMIN — ZINC SULFATE 220 MG (50 MG) CAPSULE 50 MG: CAPSULE at 08:53

## 2022-02-07 RX ADMIN — Medication 2000 UNITS: at 08:53

## 2022-02-07 RX ADMIN — ALBUTEROL SULFATE 2 PUFF: 90 AEROSOL, METERED RESPIRATORY (INHALATION) at 12:51

## 2022-02-07 RX ADMIN — INSULIN LISPRO 2 UNITS: 100 INJECTION, SOLUTION INTRAVENOUS; SUBCUTANEOUS at 09:01

## 2022-02-07 RX ADMIN — IPRATROPIUM BROMIDE 2 PUFF: 17 AEROSOL, METERED RESPIRATORY (INHALATION) at 08:56

## 2022-02-07 RX ADMIN — HYDRALAZINE HYDROCHLORIDE 25 MG: 25 TABLET, FILM COATED ORAL at 05:57

## 2022-02-07 RX ADMIN — OXYCODONE HYDROCHLORIDE AND ACETAMINOPHEN 500 MG: 500 TABLET ORAL at 21:28

## 2022-02-07 RX ADMIN — NORTRIPTYLINE HYDROCHLORIDE 10 MG: 10 CAPSULE ORAL at 21:26

## 2022-02-07 RX ADMIN — SENNOSIDES 8.6 MG: 8.6 TABLET, COATED ORAL at 21:27

## 2022-02-07 RX ADMIN — PANTOPRAZOLE SODIUM 40 MG: 40 TABLET, DELAYED RELEASE ORAL at 05:57

## 2022-02-07 RX ADMIN — GUAIFENESIN 600 MG: 600 TABLET, EXTENDED RELEASE ORAL at 08:53

## 2022-02-07 RX ADMIN — ALBUTEROL SULFATE 2 PUFF: 90 AEROSOL, METERED RESPIRATORY (INHALATION) at 21:25

## 2022-02-07 RX ADMIN — HYDROCODONE BITARTRATE AND ACETAMINOPHEN 1 TABLET: 10; 325 TABLET ORAL at 12:48

## 2022-02-07 RX ADMIN — FERROUS SULFATE TAB 325 MG (65 MG ELEMENTAL FE) 325 MG: 325 (65 FE) TAB at 08:53

## 2022-02-07 RX ADMIN — OXYCODONE HYDROCHLORIDE AND ACETAMINOPHEN 500 MG: 500 TABLET ORAL at 08:53

## 2022-02-07 RX ADMIN — BUDESONIDE AND FORMOTEROL FUMARATE DIHYDRATE 2 PUFF: 160; 4.5 AEROSOL RESPIRATORY (INHALATION) at 08:57

## 2022-02-07 RX ADMIN — BARICITINIB 4 MG: 2 TABLET, FILM COATED ORAL at 08:53

## 2022-02-07 RX ADMIN — DILTIAZEM HYDROCHLORIDE 120 MG: 120 CAPSULE, COATED, EXTENDED RELEASE ORAL at 08:53

## 2022-02-07 RX ADMIN — HYDROCODONE BITARTRATE AND ACETAMINOPHEN 1 TABLET: 10; 325 TABLET ORAL at 21:33

## 2022-02-07 RX ADMIN — Medication 5 MG: at 21:28

## 2022-02-07 RX ADMIN — FERROUS SULFATE TAB 325 MG (65 MG ELEMENTAL FE) 325 MG: 325 (65 FE) TAB at 17:41

## 2022-02-07 RX ADMIN — HYDRALAZINE HYDROCHLORIDE 25 MG: 25 TABLET, FILM COATED ORAL at 12:48

## 2022-02-07 RX ADMIN — DILTIAZEM HYDROCHLORIDE 120 MG: 120 CAPSULE, COATED, EXTENDED RELEASE ORAL at 21:26

## 2022-02-07 RX ADMIN — SODIUM CHLORIDE, PRESERVATIVE FREE 10 ML: 5 INJECTION INTRAVENOUS at 08:56

## 2022-02-07 RX ADMIN — SODIUM CHLORIDE, PRESERVATIVE FREE 10 ML: 5 INJECTION INTRAVENOUS at 21:29

## 2022-02-07 RX ADMIN — INSULIN LISPRO 8 UNITS: 100 INJECTION, SOLUTION INTRAVENOUS; SUBCUTANEOUS at 12:51

## 2022-02-07 RX ADMIN — IPRATROPIUM BROMIDE 2 PUFF: 17 AEROSOL, METERED RESPIRATORY (INHALATION) at 17:41

## 2022-02-07 RX ADMIN — INSULIN GLARGINE 10 UNITS: 100 INJECTION, SOLUTION SUBCUTANEOUS at 09:01

## 2022-02-07 RX ADMIN — DULOXETINE 30 MG: 30 CAPSULE, DELAYED RELEASE ORAL at 08:53

## 2022-02-07 RX ADMIN — GUAIFENESIN 600 MG: 600 TABLET, EXTENDED RELEASE ORAL at 21:28

## 2022-02-07 RX ADMIN — BUDESONIDE AND FORMOTEROL FUMARATE DIHYDRATE 2 PUFF: 160; 4.5 AEROSOL RESPIRATORY (INHALATION) at 21:25

## 2022-02-07 RX ADMIN — METOPROLOL SUCCINATE 50 MG: 50 TABLET, EXTENDED RELEASE ORAL at 08:53

## 2022-02-07 RX ADMIN — IPRATROPIUM BROMIDE 2 PUFF: 17 AEROSOL, METERED RESPIRATORY (INHALATION) at 21:25

## 2022-02-07 RX ADMIN — INSULIN GLARGINE 10 UNITS: 100 INJECTION, SOLUTION SUBCUTANEOUS at 21:35

## 2022-02-07 RX ADMIN — DEXAMETHASONE SODIUM PHOSPHATE 6 MG: 10 INJECTION INTRAMUSCULAR; INTRAVENOUS at 21:28

## 2022-02-07 ASSESSMENT — PAIN DESCRIPTION - PAIN TYPE: TYPE: CHRONIC PAIN

## 2022-02-07 ASSESSMENT — PAIN SCALES - GENERAL
PAINLEVEL_OUTOF10: 5
PAINLEVEL_OUTOF10: 5
PAINLEVEL_OUTOF10: 3

## 2022-02-07 NOTE — TELEPHONE ENCOUNTER
Called patient about missed appt today for Ghassan. States she is in hospital with Rei. Anticipates discharge today and will be going to [facility]. She has currently been getting Iron while in hospital.  Did not want to reschedule at this time.

## 2022-02-07 NOTE — CARE COORDINATION
Bed offer from [facility]. They will initiate pre-cert.    [place]  [phone]  Electronically signed by Jeff Giraldo on 2/7/2022 at 11:54 AM

## 2022-02-07 NOTE — PROGRESS NOTES
Nutrition Assessment     Type and Reason for Visit: Initial,RD Nutrition Re-Screen/LOS    Nutrition Recommendations/Plan:   Add CHO control to diet order. Nutrition Assessment:  LOS day 6. Pt adequately nourished AEb adequate po intakes since admission and stable wt hx. Hx of DM noted, glucose 146-220, pt recieving decadron. Will add CHO control to diet order. Malnutrition Assessment:  Malnutrition Status: No malnutrition    Nutrition Related Findings: trace BLE edema; NC      Current Nutrition Therapies:    ADULT DIET; Easy to Chew    Anthropometric Measures:  · Height: 5' 3\" (160 cm)  · Current Body Wt: 133 lb (60.3 kg)   · BMI: 23.6    Nutrition Interventions:   Food and/or Nutrient Delivery:  Modify Current Diet   Coordination of Nutrition Care:  Continue to monitor while inpatient    Goals:  Po intake remains >50% of meals. Glucose < 200.        Nutrition Monitoring and Evaluation:   Food/Nutrient Intake Outcomes:  Food and Nutrient Intake  Physical Signs/Symptoms Outcomes:  Biochemical Data,Weight     Discharge Planning:    Continue current diet     Electronically signed by Nita Martinez MS, RD, LD on 2/7/22 at 10:59 AM CST    Contact: [phone]

## 2022-02-07 NOTE — PROGRESS NOTES
02/07/22 1550   Restrictions/Precautions   Restrictions/Precautions Fall Risk;Isolation   General   Chart Reviewed Yes   Subjective   Subjective Patient in recliner agrees to walk and go back to bed   Pain Screening   Patient Currently in Pain No   Vital Signs   Level of Consciousness Alert (0)   Oxygen Therapy   O2 Device Nasal cannula   O2 Flow Rate (L/min) 1 L/min   Bed Mobility   Sit to Supine Stand by assistance; Independent   Scooting Supervision   Transfers   Sit to [employer] guard assistance  (From recliner)   Stand to sit Stand by assistance   Bed to Chair Stand by assistance   Ambulation   Ambulation? Yes   Ambulation 1   Other Apparatus O2   Assistance Stand by assistance   Quality of Gait Steady no LOB   Gait Deviations Slow Lillie   Distance 70'   Comments Patient BTB   Exercises   Heelslides 10   Hip Flexion 10   Knee Long Arc Quad 10   Knee Active Range of Motion yes   Ankle Pumps 20   Activity Tolerance   Activity Tolerance Patient Tolerated treatment well   Safety Devices   Type of devices Left in bed;Call light within reach; Bed alarm in place   Physical Therapy    Electronically signed by Jose Alfredo Grove PTA on 2/7/2022 at 4:03 PM

## 2022-02-07 NOTE — PROGRESS NOTES
Progress Note  WPSZ:[id number]       Room:[address] [place] [address]  Patient Alexandra Whitmore     YOB: 1935     Age:87 y.o. Subjective    Subjective: 82 y.o. female who presented to [facility] ED complaining of worsening shortness of breath having been discharged after evaluation of covid-19 infection from this facility . At that time, patient was not requiring supplemental oxygen use at home. She reports worsening shortness of breath at rest and with minimal activity. In ED, patient was found to be hypoxic with PO2 of 51/spo2 of 87%. CXR imaging with gross hypoaeration of the lungs with increased bibasilar infiltrates, no focal consolidation. Pt was admitted to hospitalist service for further evaluation of acute hypoxic respiratory failure due to covid-19 infection. Currently been weaned off oxygen and currently on 1L. Patient was examined in her room, stated she feel better. Denied any chest pain or worsening dyspnea. Denied any nausea vomiting abdominal pain. Review of Systems: 12 point system review negative suggested above.      :         Vitals Last 24 Hours:  TEMPERATURE:  Temp  Av.1 °F (36.2 °C)  Min: 96.4 °F (35.8 °C)  Max: 98.3 °F (36.8 °C)  RESPIRATIONS RANGE: Resp  Av.3  Min: 18  Max: 19  PULSE OXIMETRY RANGE: SpO2  Av.3 %  Min: 96 %  Max: 99 %  PULSE RANGE: Pulse  Av  Min: 55  Max: 59  BLOOD PRESSURE RANGE: Systolic (34MNL), RSK:015 , Min:148 , HNC:838   ; Diastolic (98OUV), APX:58, Min:52, Max:67    I/O (24Hr): No intake or output data in the 24 hours ending 22 1408      Physical Examination:  General: Well-developed, no acute distress lying comfortably in bed.   HEENT: Atraumatic normocephalic, range of motion normal   Cardiac: Normal S1-S2   Respiratory: clear To auscultation bilaterally, no rhonchi, no wheezing  Abdomen: Soft, positive bowel sounds in all quadrants, no distention, nontender to palpation  Extremities: no tenderness, no edema, moves all extremities  Psych: Affect normal and good eye contact, behavioral normal.        Labs/Imaging/Diagnostics    Labs:  CBC:  Recent Labs     02/05/22 0517 02/06/22 0259   WBC 8.5 6.1   RBC 4.42 4.03*   HGB 10.5* 9.8*   HCT 37.1 33.1*   MCV 83.9 82.1   RDW 23.1* 23.0*   * 446*     CHEMISTRIES:  Recent Labs     02/05/22 0517 02/06/22 0259    137   K 5.0 4.7    100   CO2 22 26   BUN 22 21   CREATININE 0.7 0.7   GLUCOSE 205* 220*     PT/INR:  No results for input(s): PROTIME, INR in the last 72 hours. APTT:No results for input(s): APTT in the last 72 hours. LIVER PROFILE:  Recent Labs     02/05/22 0517 02/06/22 0259   AST 15 10   ALT 15 12   BILITOT 0.3 <0.2   ALKPHOS 125* 103       Imaging Last 24 Hours:  XR CHEST PORTABLE    Result Date: 2/1/2022  EXAMINATION: XR CHEST PORTABLE 2/1/2022 5:32 PM HISTORY: Shortness of breath. Report: A portable upright frontal view of the chest was obtained. COMPARISON: Portable chest x-ray 1/20/2022. The lungs are grossly hypoaerated and there are increased infiltrates in both lung bases without consolidation. Much of the basilar lung opacity may be due to atelectasis. Heart size is normal. No pneumothorax or pleural effusion is identified. The right subclavian port catheter appears in good position as before. There is mild dextroscoliosis of the thoracic spine. Cholecystectomy clips are present. Gross hypoaeration of the lungs with increased bibasilar infiltrates, no focal consolidation. No pneumothorax or pleural effusion is identified. The right subclavian port catheter remains in good position. Signed by Dr Marcos Randolph.  [facility]    Assessment//Plan           Hospital Problems           Last Modified POA    * (Principal) Acute hypoxemic respiratory failure due to COVID-19 [facility]) 2/1/2022 Yes    Essential hypertension 2/1/2022 Yes    History of DVT (deep vein thrombosis) 2/1/2022 Yes    History of atrial fibrillation 2/1/2022 Yes        Assessment & Plan      Acute hypoxic respiratory failure secondary to Covid  Hypertension  DVT  Atrial fibrillation  GERD  Fibromyalgia  Bilateral lower extremity neuropathy  Restless syndrome  Type 2 diabetes  Vitamin D deficiency. Plan:  Wean oxygen as tolerated  Pharmacy to dose baricitinib  Continue steroids as ordered  Insulin regimen as currently ordered  Droplet precautions  Continue adjuvant therapy  Inhalers  Vitamin D supplementation  Home O2 eval prior to discharge from the hospital  PT OT following      Disposition: SNF placement once precert approved. Electronically signed by   Odalis Lacey MD   Internal Medicine Hospitalist  On 2/7/2022  At 2:08 PM    EMR Dragon/Transcription disclaimer:   Much of this encounter note is an electronic transcription/translation of spoken language to printed text.  The electronic translation of spoken language may permit erroneous, or at times, nonsensical words or phrases to be inadvertently transcribed; although attempts have made to review the note for such errors, some may still exist.

## 2022-02-07 NOTE — PROGRESS NOTES
Occupational Therapy     02/07/22 1322   Restrictions/Precautions   Restrictions/Precautions Fall Risk;Isolation   Vision   Vision [facility]   Hearing   Hearing [facility]   General   Chart Reviewed Yes   Patient assessed for rehabilitation services? Yes   Response to previous treatment Patient with no complaints from previous session   Family / Caregiver Present No   Diagnosis A-fib, Covid positive   Subjective   Subjective Pt in bed upon arrival for therapy. Pt agreeable to participate. Pt states she may be going to a SNF for continued therapy today. Pain Assessment   Patient Currently in Pain No   Oxygen Therapy   O2 Device Nasal cannula   O2 Flow Rate (L/min) 1 L/min   Orientation   Overall Orientation Status WNL   ADL   Feeding Setup; Independent   Grooming Setup; Independent   UE Bathing Supervision   LE Bathing Contact guard assistance   UE Dressing Supervision   LE Dressing Contact guard assistance   Toileting Contact guard assistance;Stand by assistance   Additional Comments CGA-SBA for standing ADLs this date. Pt demo some SOB during ADLs but was able to recover. Balance   Sitting Balance Independent   Standing Balance Contact guard assistance   Functional Mobility   Functional - Mobility Device Rolling [place]   Activity To/from bathroom   Assist Level Stand by assistance   Functional Mobility Comments CGA-SBA   Bed mobility   Supine to Sit Supervision   Scooting Supervision   Transfers   Sit to stand Contact guard assistance;Stand by assistance   Stand to sit Contact guard assistance;Stand by assistance   Toilet Transfers   Toilet - Technique Ambulating   Equipment Used Grab bars   Toilet Transfer Contact guard assistance;Stand by assistance   Toilet Transfers Comments Some assistance with O2 line. Assessment   Performance deficits / Impairments Decreased functional mobility ; Decreased endurance;Decreased ADL status   Assessment Pt progressing as tolerated; mostly limited by fatigue and SOB.     Treatment Diagnosis A-fib, Covid   Prognosis Good   REQUIRES OT FOLLOW UP Yes   Treatment Initiated  Tx focused on basin bath and dressing techniques sitting EOB, toileting task at RW level and hand hygiene at sink; patient agreeable to sit up in recliner this date for lunch. Discharge Recommendations Patient would benefit from continued therapy after discharge   Activity Tolerance   Activity Tolerance Patient Tolerated treatment well   Safety Devices   Safety Devices in place Yes   Type of devices Left in chair;Call light within reach; Chair alarm in place   Plan   Times per week 3-5x/week   Times per day Daily   Electronically signed by BRANDYN Aguilar on 2/7/2022 at 1:29 PM

## 2022-02-08 VITALS
OXYGEN SATURATION: 96 % | RESPIRATION RATE: 15 BRPM | HEART RATE: 58 BPM | DIASTOLIC BLOOD PRESSURE: 63 MMHG | HEIGHT: 63 IN | BODY MASS INDEX: 23.66 KG/M2 | SYSTOLIC BLOOD PRESSURE: 145 MMHG | TEMPERATURE: 96.1 F | WEIGHT: 133.56 LBS

## 2022-02-08 PROCEDURE — 6370000000 HC RX 637 (ALT 250 FOR IP): Performed by: HOSPITALIST

## 2022-02-08 PROCEDURE — 6370000000 HC RX 637 (ALT 250 FOR IP): Performed by: NURSE PRACTITIONER

## 2022-02-08 PROCEDURE — 2580000003 HC RX 258: Performed by: NURSE PRACTITIONER

## 2022-02-08 PROCEDURE — 97535 SELF CARE MNGMENT TRAINING: CPT

## 2022-02-08 PROCEDURE — 2700000000 HC OXYGEN THERAPY PER DAY

## 2022-02-08 RX ORDER — DEXAMETHASONE 6 MG/1
6 TABLET ORAL
Qty: 3 TABLET | Refills: 0
Start: 2022-02-08 | End: 2022-02-11

## 2022-02-08 RX ORDER — HYDROCODONE BITARTRATE AND ACETAMINOPHEN 10; 325 MG/1; MG/1
1 TABLET ORAL EVERY 6 HOURS PRN
Qty: 12 TABLET | Refills: 0 | Status: SHIPPED | OUTPATIENT
Start: 2022-02-08 | End: 2022-02-11

## 2022-02-08 RX ADMIN — SODIUM CHLORIDE, PRESERVATIVE FREE 10 ML: 5 INJECTION INTRAVENOUS at 10:41

## 2022-02-08 RX ADMIN — ALBUTEROL SULFATE 2 PUFF: 90 AEROSOL, METERED RESPIRATORY (INHALATION) at 10:41

## 2022-02-08 RX ADMIN — OXYCODONE HYDROCHLORIDE AND ACETAMINOPHEN 500 MG: 500 TABLET ORAL at 10:41

## 2022-02-08 RX ADMIN — ZINC SULFATE 220 MG (50 MG) CAPSULE 50 MG: CAPSULE at 10:40

## 2022-02-08 RX ADMIN — IPRATROPIUM BROMIDE 2 PUFF: 17 AEROSOL, METERED RESPIRATORY (INHALATION) at 10:41

## 2022-02-08 RX ADMIN — HYDROCODONE BITARTRATE AND ACETAMINOPHEN 1 TABLET: 10; 325 TABLET ORAL at 15:31

## 2022-02-08 RX ADMIN — Medication 2000 UNITS: at 10:40

## 2022-02-08 RX ADMIN — DULOXETINE 30 MG: 30 CAPSULE, DELAYED RELEASE ORAL at 10:40

## 2022-02-08 RX ADMIN — PANTOPRAZOLE SODIUM 40 MG: 40 TABLET, DELAYED RELEASE ORAL at 05:55

## 2022-02-08 RX ADMIN — BUDESONIDE AND FORMOTEROL FUMARATE DIHYDRATE 2 PUFF: 160; 4.5 AEROSOL RESPIRATORY (INHALATION) at 10:41

## 2022-02-08 RX ADMIN — RIVAROXABAN 20 MG: 20 TABLET, FILM COATED ORAL at 10:40

## 2022-02-08 RX ADMIN — INSULIN GLARGINE 10 UNITS: 100 INJECTION, SOLUTION SUBCUTANEOUS at 10:42

## 2022-02-08 RX ADMIN — METOPROLOL SUCCINATE 50 MG: 50 TABLET, EXTENDED RELEASE ORAL at 10:40

## 2022-02-08 RX ADMIN — HYDRALAZINE HYDROCHLORIDE 25 MG: 25 TABLET, FILM COATED ORAL at 05:55

## 2022-02-08 RX ADMIN — BARICITINIB 4 MG: 2 TABLET, FILM COATED ORAL at 10:40

## 2022-02-08 RX ADMIN — GUAIFENESIN 600 MG: 600 TABLET, EXTENDED RELEASE ORAL at 10:40

## 2022-02-08 RX ADMIN — DILTIAZEM HYDROCHLORIDE 120 MG: 120 CAPSULE, COATED, EXTENDED RELEASE ORAL at 10:41

## 2022-02-08 RX ADMIN — FERROUS SULFATE TAB 325 MG (65 MG ELEMENTAL FE) 325 MG: 325 (65 FE) TAB at 10:40

## 2022-02-08 ASSESSMENT — PAIN SCALES - GENERAL
PAINLEVEL_OUTOF10: 3
PAINLEVEL_OUTOF10: 8

## 2022-02-08 ASSESSMENT — PAIN DESCRIPTION - LOCATION: LOCATION: HEAD

## 2022-02-08 ASSESSMENT — PAIN DESCRIPTION - DESCRIPTORS: DESCRIPTORS: HEADACHE

## 2022-02-08 NOTE — PLAN OF CARE
Problem: Airway Clearance - Ineffective  Goal: Achieve or maintain patent airway  2/8/2022 0832 by Tejas Gutierrez RN  Outcome: Ongoing  2/7/2022 2311 by Julisa Cote RN  Outcome: Ongoing     Problem: Gas Exchange - Impaired  Goal: Absence of hypoxia  2/8/2022 0832 by Tejas Gutierrez RN  Outcome: Ongoing  2/7/2022 2311 by Julisa Cote RN  Outcome: Ongoing  Goal: Promote optimal lung function  2/8/2022 0832 by Tejas Gutierrez RN  Outcome: Ongoing  2/7/2022 2311 by Julisa Cote RN  Outcome: Ongoing     Problem: Breathing Pattern - Ineffective  Goal: Ability to achieve and maintain a regular respiratory rate  2/8/2022 0832 by Tejas Gutierrez RN  Outcome: Ongoing  2/7/2022 2311 by Julisa Cote RN  Outcome: Ongoing     Problem:  Body Temperature -  Risk of, Imbalanced  Goal: Ability to maintain a body temperature within defined limits  2/8/2022 0832 by Tejas Gutierrez RN  Outcome: Ongoing  2/7/2022 2311 by Julisa Cote RN  Outcome: Ongoing  Goal: Will regain or maintain usual level of consciousness  2/8/2022 0832 by Tejas Gutierrez RN  Outcome: Ongoing  2/7/2022 2311 by Julisa Cote RN  Outcome: Ongoing  Goal: Complications related to the disease process, condition or treatment will be avoided or minimized  2/8/2022 0832 by Tejas Gutierrez RN  Outcome: Ongoing  2/7/2022 2311 by Julisa Cote RN  Outcome: Ongoing     Problem: Isolation Precautions - Risk of Spread of Infection  Goal: Prevent transmission of infection  2/8/2022 0832 by Tejas Gutierrez RN  Outcome: Ongoing  2/7/2022 2311 by Julisa Cote RN  Outcome: Ongoing     Problem: Nutrition Deficits  Goal: Optimize nutritional status  2/8/2022 0832 by Tejas Gutierrez RN  Outcome: Ongoing  2/7/2022 2311 by Julisa Cote RN  Outcome: Ongoing     Problem: Risk for Fluid Volume Deficit  Goal: Maintain normal heart rhythm  2/8/2022 0832 by Tejas Gutierrez RN  Outcome: Ongoing  2/7/2022 2311 by Julisa Cote RN  Outcome: Ongoing  Goal: Maintain absence of muscle cramping  2/8/2022 0832 by Espinoza Shin RN  Outcome: Ongoing  2/7/2022 2311 by Kathleen Means RN  Outcome: Ongoing  Goal: Maintain normal serum potassium, sodium, calcium, phosphorus, and pH  2/8/2022 0832 by Espinoza Shin RN  Outcome: Ongoing  2/7/2022 2311 by Kathleen Means RN  Outcome: Ongoing     Problem: Loneliness or Risk for Loneliness  Goal: Demonstrate positive use of time alone when socialization is not possible  2/8/2022 0832 by Espinoza Shin RN  Outcome: Ongoing  2/7/2022 2311 by Kathleen Means RN  Outcome: Ongoing     Problem: Fatigue  Goal: Verbalize increase energy and improved vitality  2/8/2022 0832 by Espinoza Shin RN  Outcome: Ongoing  2/7/2022 2311 by Kathleen Means RN  Outcome: Ongoing     Problem: Patient Education: Go to Patient Education Activity  Goal: Patient/Family Education  2/8/2022 7544 by Espinoza Shin RN  Outcome: Ongoing  2/7/2022 2311 by Kathleen Means RN  Outcome: Ongoing     Problem: Falls - Risk of:  Goal: Will remain free from falls  2/8/2022 0832 by Espinoza Sihn RN  Outcome: Ongoing  2/7/2022 2311 by Kathleen Means RN  Outcome: Ongoing  Goal: Absence of physical injury  2/8/2022 0832 by Espinoza Shin RN  Outcome: Ongoing  2/7/2022 2311 by Kathleen Means RN  Outcome: Ongoing     Problem: Pain:  Description: Pain management should include both nonpharmacologic and pharmacologic interventions.   Goal: Pain level will decrease  2/8/2022 0832 by Espinoza Shin RN  Outcome: Ongoing  2/7/2022 2311 by Kathleen Means RN  Outcome: Ongoing  Goal: Control of acute pain  2/8/2022 0832 by Espinoza Shin RN  Outcome: Ongoing  2/7/2022 2311 by Kathleen Means RN  Outcome: Ongoing  Goal: Control of chronic pain  2/8/2022 0832 by Espinoza Shin RN  Outcome: Ongoing  2/7/2022 2311 by Kathleen Means RN  Outcome: Ongoing

## 2022-02-08 NOTE — PROGRESS NOTES
Occupational Therapy     02/08/22 1319   Restrictions/Precautions   Restrictions/Precautions Fall Risk;Isolation   Vision   Vision [facility]   Hearing   Hearing [facility]   General   Chart Reviewed Yes   Patient assessed for rehabilitation services? Yes   Response to previous treatment Patient with no complaints from previous session   Family / Caregiver Present No   Diagnosis A-fib, Covid positive   Subjective   Subjective Pt in bed upon arrival for therapy. Pt agreeable to get ready for the day and sit up in recliner for lunch this date. Pain Assessment   Patient Currently in Pain No   Oxygen Therapy   O2 Device Nasal cannula   O2 Flow Rate (L/min) 1 L/min   Orientation   Overall Orientation Status WNL   ADL   Feeding Setup; Independent   Grooming Setup; Independent   UE Bathing Supervision   LE Bathing Stand by assistance;Supervision   UE Dressing Supervision   LE Dressing Stand by assistance;Supervision   Toileting Stand by assistance;Supervision   Additional Comments Increased SOB this date; requiring rest breaks. Balance   Sitting Balance Independent   Standing Balance Stand by assistance   Functional Mobility   Functional - Mobility Device Rolling Walker   Activity To/from bathroom   Assist Level Stand by assistance   Functional Mobility Comments SUP-SBA   Bed mobility   Supine to Sit Supervision   Sit to Supine Supervision   Scooting Supervision   Transfers   Sit to stand Stand by assistance;Supervision   Stand to sit Stand by assistance;Supervision   Toilet Transfers   Toilet - Technique Ambulating   Equipment Used Grab bars   Toilet Transfer Stand by [employer] Comments Some assistance with O2 line. Assessment   Performance deficits / Impairments Decreased functional mobility ; Decreased endurance;Decreased ADL status   Assessment Pt progressing as tolerated; mostly limited by fatigue and SOB.     Treatment Diagnosis A-fib, Covid   Prognosis Good   REQUIRES OT FOLLOW UP Yes Treatment Initiated  Tx focused on dressing techniques sitting EOB, toileting task and h/g at sink in standing at RW level, functional mobility at RW level in room and to recliner. Discharge Recommendations Patient would benefit from continued therapy after discharge   Activity Tolerance   Activity Tolerance Patient Tolerated treatment well   Activity Tolerance Somewhat limited by SOB.     Safety Devices   Safety Devices in place Yes   Plan   Times per week 3-5x/week   Times per day Daily   Electronically signed by BRANDYN Luther on 2/8/2022 at 1:26 PM

## 2022-02-08 NOTE — DISCHARGE SUMMARY
Discharge Summary      Date:2/8/2022        Patient John Rockwell     YOB: 1935     Age:87 y.o. Admit Date:2/1/2022   Admission Condition:fair   Discharged Condition:stable  Discharge Date: 02/08/22       Discharge Diagnoses   Principal Problem:    Acute hypoxemic respiratory failure due to COVID-19 [facility])  Active Problems:    Essential hypertension    History of DVT (deep vein thrombosis)    History of atrial fibrillation  Resolved Problems:    * No resolved hospital problems. [facility] Stay   Narrative of Hospital Course:       82 y. o. female who presented to [facility] ED complaining of worsening shortness of breath having been discharged after evaluation of covid-19 infection from this facility 1/23. At that time, patient was not requiring supplemental oxygen use at home. She reports worsening shortness of breath at rest and with minimal activity. In ED, patient was found to be hypoxic with PO2 of 51/spo2 of 87%. CXR imaging with gross hypoaeration of the lungs with increased bibasilar infiltrates, no focal consolidation. Pt was admitted to hospitalist service for further evaluation of acute hypoxic respiratory failure due to covid-19 infection. Patient was initiated on steroids, baricitinib and already on xarelto prior to admission. In house was weaned down to 1L of oxygen. Patient improved overall but still physically deconditioned. Seen by therapy rec SNF. Patient to complete 3 more days of steroid at SNF  Patient complaining of right sided scalp tingling and swelling that has been ongoing since shingles breakout to her right eye in NOV. No evidence of vesicles, could be post infection neuralgia. Recent MRI with no new finding done. Physical Examination:  General: Well-developed, no acute distress lying comfortably in bed.   HEENT: Atraumatic normocephalic, range of motion normal   Cardiac: Normal S1-S2   Respiratory: Clear To auscultation bilaterally, no rhonchi, no wheezing  Abdomen: Soft, positive bowel sounds in all quadrants, no distention, nontender to palpation  Extremities: no tenderness, no edema, moves all extremities  Psych: Affect normal and good eye contact, behavioral normal.        Consultants:   PHARMACY TO DOSE MEDICATION  PALLIATIVE CARE EVAL    Time Spent on Discharge:  35 minutes were spent in patient examination, evaluation, counseling as well as medication reconciliation, prescriptions for required medications, discharge plan and follow up. Surgeries/Procedures Performed:  NONE      Significant Diagnostic Studies:   Recent Labs:  CBC:   Lab Results   Component Value Date    WBC 6.1 02/06/2022    RBC 4.03 02/06/2022    HGB 9.8 02/06/2022    HCT 33.1 02/06/2022    MCV 82.1 02/06/2022    MCH 24.3 02/06/2022    MCHC 29.6 02/06/2022    RDW 23.0 02/06/2022     02/06/2022     BMP:    Lab Results   Component Value Date    GLUCOSE 220 02/06/2022     02/06/2022    K 4.7 02/06/2022     02/06/2022    CO2 26 02/06/2022    ANIONGAP 11 02/06/2022    BUN 21 02/06/2022    CREATININE 0.7 02/06/2022    CALCIUM 9.2 02/06/2022    LABGLOM >60 02/06/2022    GFRAA >59 02/06/2022       Radiology Last 7 Days:  XR CHEST PORTABLE    Result Date: 2/1/2022  Gross hypoaeration of the lungs with increased bibasilar infiltrates, no focal consolidation. No pneumothorax or pleural effusion is identified. The right subclavian port catheter remains in good position. Signed by Dr Philip Peacock      Discharge Plan   Disposition: To a non-[facility] facility    Provider Follow-Up:   No follow-up provider specified.          Patient Instructions   Diet: cardiac diet and diabetic diet    Activity: activity as tolerated      Discharge Medications         Medication List      START taking these medications    dexamethasone 6 MG tablet  Commonly known as: DECADRON  Take 1 tablet by mouth daily (with breakfast) for 3 days        CONTINUE taking these medications    ascorbic acid 500 MG tablet  Commonly known as: VITAMIN C  Take 1 tablet by mouth 2 times daily     B-12 500 MCG Tabs  TAKE 1 TABLET BY MOUTH DAILY     betamethasone (augmented) 0.05 % gel  Commonly known as: DIPROLENE  Apply topically 2 times daily. To scalp rash     dilTIAZem 120 MG extended release capsule  Commonly known as: CARDIZEM CD  TAKE 1 CAPSULE BY MOUTH TWICE DAILY     DULoxetine 30 MG extended release capsule  Commonly known as: CYMBALTA  Take 1 capsule by mouth daily     ferrous sulfate 325 (65 Fe) MG tablet  Commonly known as: IRON 325  Take 1 tablet by mouth 2 times daily (with meals)     furosemide 20 MG tablet  Commonly known as: Lasix  Take 1 tablet by mouth daily as needed (swelling)     [address]  by Does not apply route     HYDROcodone-acetaminophen  MG per tablet  Commonly known as: Norco  Take 1 tablet by mouth every 6 hours as needed for Pain for up to 3 days. irbesartan 300 MG tablet  Commonly known as: AVAPRO  Take 1 tablet by mouth daily     ketoconazole 2 % shampoo  Commonly known as: Nizoral  Apply topically daily as needed. lactobacillus Caps capsule  Take 1 capsule by mouth daily for 15 days     melatonin 5 MG Tbdp disintegrating tablet  Take 1 tablet by mouth nightly     metoprolol succinate 50 MG extended release tablet  Commonly known as: TOPROL XL  Take 1 tablet by mouth 2 times daily Take (1) tab twice daily. nortriptyline 10 MG capsule  Commonly known as: PAMELOR  Take 1 capsule by mouth nightly     nystatin 419567 UNIT/GM powder  Commonly known as: MYCOSTATIN  For rash under breast. Apply 3 times daily.      omeprazole 40 MG delayed release capsule  Commonly known as: PRILOSEC  Take 1 capsule by mouth daily     rivaroxaban 20 MG Tabs tablet  Commonly known as: XARELTO  Take 1 tablet by mouth daily (with breakfast) for 30 doses     triamcinolone 0.1 % cream  Commonly known as: KENALOG  APPLY TWICE DAILY TO RASH UP TO 2 WEEKS/MONTH AS NEEDED     vitamin D 1.25 MG (59452 UT) Caps capsule  Commonly known as: ERGOCALCIFEROL  TAKE 1 CAPSULE BY MOUTH 1 TIME A WEEK     zinc sulfate 220 (50 Zn) MG capsule  Commonly known as: ZINCATE  Take 1 capsule by mouth daily        STOP taking these medications    acyclovir 400 MG tablet  Commonly known as: ZOVIRAX           Where to Get Your Medications      You can get these medications from any pharmacy    Bring a paper prescription for each of these medications  · HYDROcodone-acetaminophen  MG per tablet     Information about where to get these medications is not yet available    Ask your nurse or doctor about these medications  · dexamethasone 6 MG tablet         Electronically signed by Edwina Lott MD on 2/8/22 at 2:33 PM CST

## 2022-02-23 ENCOUNTER — TELEPHONE (OUTPATIENT)
Dept: PRIMARY CARE CLINIC | Age: 87
End: 2022-02-23

## 2022-02-23 ENCOUNTER — OFFICE VISIT (OUTPATIENT)
Dept: PRIMARY CARE CLINIC | Age: 87
End: 2022-02-23
Payer: MEDICARE

## 2022-02-23 VITALS
TEMPERATURE: 97.1 F | OXYGEN SATURATION: 94 % | HEART RATE: 108 BPM | RESPIRATION RATE: 16 BRPM | WEIGHT: 134 LBS | BODY MASS INDEX: 23.74 KG/M2 | HEIGHT: 63 IN

## 2022-02-23 DIAGNOSIS — N18.32 STAGE 3B CHRONIC KIDNEY DISEASE (HCC): ICD-10-CM

## 2022-02-23 DIAGNOSIS — Z45.2 ENCOUNTER FOR CARE RELATED TO PORT-A-CATH: ICD-10-CM

## 2022-02-23 DIAGNOSIS — E11.9 TYPE 2 DIABETES MELLITUS WITHOUT COMPLICATION, WITHOUT LONG-TERM CURRENT USE OF INSULIN (HCC): Primary | ICD-10-CM

## 2022-02-23 DIAGNOSIS — R29.6 FALLS FREQUENTLY: ICD-10-CM

## 2022-02-23 DIAGNOSIS — R73.9 ELEVATED BLOOD SUGAR: ICD-10-CM

## 2022-02-23 DIAGNOSIS — D50.9 IRON DEFICIENCY ANEMIA, UNSPECIFIED IRON DEFICIENCY ANEMIA TYPE: ICD-10-CM

## 2022-02-23 DIAGNOSIS — R53.81 PHYSICAL DECONDITIONING: ICD-10-CM

## 2022-02-23 LAB
ALBUMIN SERPL-MCNC: 2.9 G/DL (ref 3.5–5.2)
ALP BLD-CCNC: 120 U/L (ref 35–104)
ALT SERPL-CCNC: 13 U/L (ref 5–33)
ANION GAP SERPL CALCULATED.3IONS-SCNC: 13 MMOL/L (ref 7–19)
AST SERPL-CCNC: 14 U/L (ref 5–32)
BILIRUB SERPL-MCNC: <0.2 MG/DL (ref 0.2–1.2)
BUN BLDV-MCNC: 16 MG/DL (ref 8–23)
CALCIUM SERPL-MCNC: 8.2 MG/DL (ref 8.8–10.2)
CHLORIDE BLD-SCNC: 100 MMOL/L (ref 98–111)
CO2: 21 MMOL/L (ref 22–29)
CREAT SERPL-MCNC: 0.9 MG/DL (ref 0.5–0.9)
GFR AFRICAN AMERICAN: >59
GFR NON-AFRICAN AMERICAN: 59
GLUCOSE BLD-MCNC: 96 MG/DL (ref 74–109)
HCT VFR BLD CALC: 30.6 % (ref 37–47)
HEMOGLOBIN: 8.9 G/DL (ref 12–16)
IRON SATURATION: 22 % (ref 14–50)
IRON: 41 UG/DL (ref 37–145)
MCH RBC QN AUTO: 26.6 PG (ref 27–31)
MCHC RBC AUTO-ENTMCNC: 29.1 G/DL (ref 33–37)
MCV RBC AUTO: 91.3 FL (ref 81–99)
PLATELET # BLD: 254 K/UL (ref 130–400)
PMV BLD AUTO: 9.1 FL (ref 9.4–12.3)
POTASSIUM SERPL-SCNC: 4.1 MMOL/L (ref 3.5–5)
RBC # BLD: 3.35 M/UL (ref 4.2–5.4)
SODIUM BLD-SCNC: 134 MMOL/L (ref 136–145)
TOTAL IRON BINDING CAPACITY: 190 UG/DL (ref 250–400)
TOTAL PROTEIN: 5.7 G/DL (ref 6.6–8.7)
WBC # BLD: 6.9 K/UL (ref 4.8–10.8)

## 2022-02-23 PROCEDURE — 96523 IRRIG DRUG DELIVERY DEVICE: CPT | Performed by: NURSE PRACTITIONER

## 2022-02-23 PROCEDURE — 99214 OFFICE O/P EST MOD 30 MIN: CPT | Performed by: NURSE PRACTITIONER

## 2022-02-23 PROCEDURE — 1111F DSCHRG MED/CURRENT MED MERGE: CPT | Performed by: NURSE PRACTITIONER

## 2022-02-23 RX ORDER — HYDROCODONE BITARTRATE AND ACETAMINOPHEN 10; 325 MG/1; MG/1
TABLET ORAL
COMMUNITY
Start: 2022-02-11

## 2022-02-23 RX ORDER — METFORMIN HYDROCHLORIDE 500 MG/1
500 TABLET, EXTENDED RELEASE ORAL
Qty: 30 TABLET | Refills: 3 | Status: SHIPPED | OUTPATIENT
Start: 2022-02-23 | End: 2022-06-22

## 2022-02-23 RX ORDER — FUROSEMIDE 20 MG/1
20 TABLET ORAL DAILY PRN
Qty: 30 TABLET | Refills: 3 | Status: SHIPPED | OUTPATIENT
Start: 2022-02-23

## 2022-02-23 RX ORDER — PREGABALIN 150 MG/1
CAPSULE ORAL
COMMUNITY
Start: 2022-02-11

## 2022-02-23 NOTE — PROGRESS NOTES
Post-Discharge Transitional Care Management Progress Note      Amarilis Park   YOB: 1935    Date of Office Visit:  2/23/2022  Date of Hospital Admission: 2-1-22  Date of Hospital Discharge: 2-8-22    Care management risk score Rising risk (score 2-5) and Complex Care (Scores >=6): 6     Non face to face  following discharge, date last encounter closed (first attempt may have been earlier): *No documented post hospital discharge outreach found in the last 14 days *No documented post hospital discharge outreach found in the last 14 days    Call initiated 2 business days of discharge: *No response recorded in the last 14 days    ASSESSMENT/PLAN:   Elevated blood sugar  -     MA DISCHARGE MEDS RECONCILED W/ CURRENT OUTPATIENT MED LIST  Iron deficiency anemia, unspecified iron deficiency anemia type  -     MA DISCHARGE MEDS RECONCILED W/ CURRENT OUTPATIENT MED LIST  Stage 3b chronic kidney disease ([facility] [address]  -     MA DISCHARGE MEDS RECONCILED W/ CURRENT OUTPATIENT MED LIST  Type 2 diabetes mellitus without complication, without long-term current use of insulin ([facility] [address]  -     MA DISCHARGE MEDS RECONCILED W/ CURRENT OUTPATIENT MED LIST  Start metformin every am with breakfast for blood sugar  Continue samples of xarelto for blood thinner  Add back lasix only as needed for leg swelling. Continue the duloxitine because it helps with mood and pain  Continue the new lyrica for nerve pain  Continue with Dr Aubrey Hampton for pain meds. Stop the acyclovir for now may restart if out break of shingles. Continue the topical for rash on head  Continue exercise at home started at nursing. Medical Decision Making: moderate complexity  Return in 1 month (on 3/23/2022). On this date 2/23/2022 I have spent 35 minutes reviewing previous notes, test results and face to face with the patient discussing the diagnosis and importance of compliance with the treatment plan as well as documenting on the day of the visit.      Subjective: HPI:  Follow up of Hospital problems/diagnosis(es): fatigue, deconditioning health. She was in [facility] until February 8 and she was released to the nursing home and insurance would only play for a week of rehab. She says she did very well there and was undergoing physical therapy that was really helping her. She says she is eating good at home. She is never been diabetic in the past and has no way to check her blood sugar and she does not think she can learn how. Her blood sugar was elevated when she was in the hospital.    Inpatient course: Discharge summary reviewed- see chart.     Interval history/Current status: guarded    Patient Active Problem List   Diagnosis    Personal history of colon cancer    Family history of esophageal cancer    Fibromyalgia    Renal bruit    Varicose veins of left lower extremity with inflammation, 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 ([address]    Venous insufficiency    Hypertriglyceridemia    Dysphagia    Paroxysmal supraventricular tachycardia (HCC)    Vitamin D deficiency    Encounter for care related to Port-a-Cath    Essential hypertension    History of colon polyps    Port-A-Cath in place    History of DVT (deep vein thrombosis)    Avascular necrosis (HCC)    Pain in both lower extremities    Numbness and tingling of both feet    Acute deep vein thrombosis (DVT) of femoral vein of left lower extremity (HCC)    Cellulitis of left lower limb    Fracture of right foot    Hypochloremia    CKD (chronic kidney disease) stage 3, GFR 30-59 ml/min (HCC)    Malignant neoplasm of colon (HCC)    Mixed simple and mucopurulent chronic bronchitis (HCC)    Fungal dermatitis    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    Depression    History of esophageal stricture    Atrial fibrillation with RVR (HCC)    Type 2 diabetes mellitus    Iron deficiency anemia    COVID-19 virus infection    History of atrial fibrillation    COVID-19    Acute hypoxemic respiratory failure due to COVID-19 [facility])    Palliative care patient       Medications listed as ordered at the time of discharge from hospital  [unfilled]      Medications marked \"taking\" at this time  Outpatient Medications Marked as Taking for the 2/23/22 encounter (Office Visit) with EARLE Pratt CNP   Medication Sig Dispense Refill    HYDROcodone-acetaminophen (NORCO)  MG per tablet TAKE 1 TABLET BY MOUTH EVERY 6 HOURS AS NEEDED      pregabalin (LYRICA) 150 MG capsule TAKE 1 CAPSULE BY MOUTH EVERY 12 HOURS AS NEEDED      nortriptyline (PAMELOR) 10 MG capsule Take 1 capsule by mouth nightly 30 capsule 0    melatonin 5 MG TBDP disintegrating tablet Take 1 tablet by mouth nightly 30 tablet 0    zinc sulfate (ZINCATE) 220 (50 Zn) MG capsule Take 1 capsule by mouth daily 30 capsule 0    ascorbic acid (VITAMIN C) 500 MG tablet Take 1 tablet by mouth 2 times daily 60 tablet 0    rivaroxaban (XARELTO) 20 MG TABS tablet Take 1 tablet by mouth daily (with breakfast) for 30 doses 30 tablet 0    vitamin D (ERGOCALCIFEROL) 1.25 MG (72051 UT) CAPS capsule TAKE 1 CAPSULE BY MOUTH 1 TIME A WEEK 12 capsule 1    irbesartan (AVAPRO) 300 MG tablet Take 1 tablet by mouth daily 30 tablet 5    metoprolol succinate (TOPROL XL) 50 MG extended release tablet Take 1 tablet by mouth 2 times daily Take (1) tab twice daily. 60 tablet 5    dilTIAZem (CARDIZEM CD) 120 MG extended release capsule TAKE 1 CAPSULE BY MOUTH TWICE DAILY 180 capsule 3    DULoxetine (CYMBALTA) 30 MG extended release capsule Take 1 capsule by mouth daily 30 capsule 3    nystatin (MYCOSTATIN) 368503 UNIT/GM powder For rash under breast. Apply 3 times daily.  1 Bottle 1    Cyanocobalamin (B-12) 500 MCG TABS TAKE 1 TABLET BY MOUTH DAILY 30 tablet 11    omeprazole (PRILOSEC) 40 MG delayed release capsule Take 1 capsule by mouth daily 90 capsule 3   [facility] Bed MISC by Does not apply route 1 each 0    furosemide (LASIX) 20 MG tablet Take 1 tablet by mouth daily as needed (swelling) 30 tablet 3        Medications patient taking as of now reconciled against medications ordered at time of hospital discharge: Yes    A comprehensive review of systems was negative except for what was noted in the HPI. Objective:    Pulse 108   Temp 97.1 °F (36.2 °C) (Temporal)   Resp 16   Ht 5' 3\" (1.6 m)   Wt 134 lb (60.8 kg)   SpO2 94%   Breastfeeding No   BMI 23.74 kg/m²   General Appearance: alert and oriented to person, place and time, elderly female sitting up in wheelchair. Skin: warm and dry, no rash or erythema. Rash on head is resolved at this time. Head: normocephalic and atraumatic  Eyes: pupils equal, round, and reactive to light, extraocular eye movements intact, conjunctivae normal  ENT: tympanic membrane, external ear and ear canal normal bilaterally, nose without deformity, nasal mucosa and turbinates normal without polyps  Neck: supple and non-tender without mass, no thyromegaly or thyroid nodules, no cervical lymphadenopathy  Pulmonary/Chest: clear to auscultation bilaterally- no wheezes, rales or rhonchi, normal air movement, no respiratory distress  Cardiovascular: normal rate, regular rhythm, normal S1 and S2, no murmurs, rubs, clicks, or gallops, distal pulses intact, no carotid bruits  Abdomen: soft, non-tender, non-distended, normal bowel sounds, no masses or organomegaly  Extremities: no cyanosis, clubbing or edema  Musculoskeletal: normal range of motion, no joint swelling, deformity or tenderness  Neurologic: reflexes normal and symmetric, no cranial nerve deficit, gait, coordination and speech normal  Venous Access Procedure Note  Indication: maintenance flush    Procedure: The patient was placed in the appropriate position and the skin over the puncture site was prepped with betadine and draped in a sterile fashion.  Intravenous access was obtained in right chest port with a Bliss needle and the site was secured appropriately. 3 cc of blood with withdrawn and discarded. Labs were drawn for processing. The port was flushed with 10cc NS and then 3 cc heparin flush. The patient tolerated the procedure well. Complications: None    An electronic signature was used to authenticate this note.   --EARLE Cazares - CNP

## 2022-02-23 NOTE — TELEPHONE ENCOUNTER
[facility] called stating they need [facility] Referral faxed to [phone]

## 2022-02-23 NOTE — PATIENT INSTRUCTIONS
Start metformin every am with breakfast for blood sugar  Continue samples of xarelto for blood thinner  Add back lasix only as needed for leg swelling. Continue the duloxitine because it helps with mood and pain  Continue the new lyrica for nerve pain  Continue with Dr Ashley Hancock for pain meds. Stop the acyclovir for now may restart if out break of shingles. Continue the topical for rash on head  Continue exercise at home started at nursing.

## 2022-02-24 ENCOUNTER — TRANSCRIBE ORDERS (OUTPATIENT)
Dept: HOME HEALTH SERVICES | Facility: HOME HEALTHCARE | Age: 87
End: 2022-02-24

## 2022-02-24 ENCOUNTER — HOME HEALTH ADMISSION (OUTPATIENT)
Dept: HOME HEALTH SERVICES | Facility: HOME HEALTHCARE | Age: 87
End: 2022-02-24

## 2022-02-24 ENCOUNTER — HOME CARE VISIT (OUTPATIENT)
Dept: HOME HEALTH SERVICES | Facility: CLINIC | Age: 87
End: 2022-02-24

## 2022-02-24 VITALS
BODY MASS INDEX: 23.67 KG/M2 | SYSTOLIC BLOOD PRESSURE: 138 MMHG | TEMPERATURE: 97.4 F | OXYGEN SATURATION: 97 % | HEART RATE: 90 BPM | WEIGHT: 133.6 LBS | RESPIRATION RATE: 16 BRPM | DIASTOLIC BLOOD PRESSURE: 50 MMHG

## 2022-02-24 DIAGNOSIS — E11.65 TYPE 2 DIABETES MELLITUS WITH HYPERGLYCEMIA, WITHOUT LONG-TERM CURRENT USE OF INSULIN: Primary | ICD-10-CM

## 2022-02-24 PROCEDURE — G0299 HHS/HOSPICE OF RN EA 15 MIN: HCPCS

## 2022-02-24 NOTE — HOME HEALTH
FOCUS OF CARE/SKILLED NEED: Admission. Teaching/Education.    TEACHING/INTERVENTIONS: Pt/cg agreeable to homecare admission under the care of JOSHUA Garcia. Admission agreement and safety plan reviewed and signed by the pt. Medication reconciliation completed and no issues found. Pt had no further questions regarding medication and/or plan of care. A&O X 4. Pt c/o chronic back pain. VSS. Pt gets SOA with minimal exertion. Pt is still recovering from COVID. Lungs were clear.    PROGRESS TOWARD GOALS: Ongoing, progressing    PHYSICIAN CONTACT: I am going to contact JOSHUA Garcia for an order for SLP.    INSURANCE CHANGES? No recent changes.    FALLS? The pt has fallen in the past 3 months.    PLAN FOR NEXT VISIT: Assess the pt's bottom for redness. Has the pt got a new cushion and been turning frequently? Did the pt get refills of her medications? Any improvement in SOA and weakness? Assess swelling.

## 2022-02-25 ENCOUNTER — HOME CARE VISIT (OUTPATIENT)
Dept: HOME HEALTH SERVICES | Facility: CLINIC | Age: 87
End: 2022-02-25

## 2022-02-25 ENCOUNTER — TELEPHONE (OUTPATIENT)
Dept: PRIMARY CARE CLINIC | Age: 87
End: 2022-02-25

## 2022-02-25 PROCEDURE — G0151 HHCP-SERV OF PT,EA 15 MIN: HCPCS

## 2022-02-25 NOTE — TELEPHONE ENCOUNTER
Jaqueline from [facility] is asking for verbal order for speech therapy for pt to help with her swallowing. AS per Tyson Regan, verbal order was given and Jose Armando Hickey voiced understanding.

## 2022-02-27 VITALS
DIASTOLIC BLOOD PRESSURE: 56 MMHG | TEMPERATURE: 97.6 F | SYSTOLIC BLOOD PRESSURE: 142 MMHG | HEART RATE: 105 BPM | OXYGEN SATURATION: 95 % | RESPIRATION RATE: 16 BRPM

## 2022-02-27 NOTE — HOME HEALTH
86 yo female Primary Dx: Type 2 diabetes mellitus with hyperglycemia . Pt reports hospitalized x 2  with SNF over past 1 month returned home 2-21-22   ( Covid and shingles during )    PmHx: afib - L TKA - chronic back pain with BLE varied  ( limiting all activity - pain management ) - chronic HA / generalized pain ( per shingles ) - R eye blind     PLOF : household amb rw - community amb AD / A   Home environment : lives with family   DME needs : pulse ox    Homebound status assessment : Pt presents with altered gait , balance , strength and endurance per dx     Skills :Education in ther ex  to faciltate functional strength for transfers and mobility - reduce fall risk      Education in transfers with hand and AD placement for safety    Education in AD use to promote reduced fall risk - safe functional gait     Education on proper hand/foot placement, transitional movements, and safety awareness to decrease risk of falls      Pt requires VCs - TCs with teachback method requiring review and reitaration for pt and PSP to promote safe functional mobility   Tx established  to promote safe mobility ,ability to attend to ADLs ,to reduce fall risk and to minimize need for further hospitalization .

## 2022-02-28 ENCOUNTER — HOME CARE VISIT (OUTPATIENT)
Dept: HOME HEALTH SERVICES | Facility: CLINIC | Age: 87
End: 2022-02-28

## 2022-02-28 VITALS
SYSTOLIC BLOOD PRESSURE: 124 MMHG | RESPIRATION RATE: 18 BRPM | TEMPERATURE: 97.5 F | DIASTOLIC BLOOD PRESSURE: 64 MMHG | OXYGEN SATURATION: 96 % | HEART RATE: 69 BPM

## 2022-02-28 PROCEDURE — G0152 HHCP-SERV OF OT,EA 15 MIN: HCPCS

## 2022-03-01 ENCOUNTER — HOME CARE VISIT (OUTPATIENT)
Dept: HOME HEALTH SERVICES | Facility: CLINIC | Age: 87
End: 2022-03-01

## 2022-03-01 VITALS
TEMPERATURE: 97.6 F | DIASTOLIC BLOOD PRESSURE: 50 MMHG | SYSTOLIC BLOOD PRESSURE: 114 MMHG | RESPIRATION RATE: 16 BRPM | OXYGEN SATURATION: 96 % | HEART RATE: 67 BPM

## 2022-03-01 PROCEDURE — G0495 RN CARE TRAIN/EDU IN HH: HCPCS

## 2022-03-01 NOTE — HOME HEALTH
OCCUPATIONAL THERAPY EVALUATION    REASON FOR REFERRAL-  Patient referred to  due to recent stay in hospital and SNF.  PRIMARY DIAGNOSIS- DM  SECONDARY DIAGNOSIS-  Covid-19, shingles  SURGICAL PROCEDURES-  none    PREVIOUS OCCUPATIONAL THERAPY- no  OXYGEN USE- none    CLINICAL FINDINGS:  WOUND / SKIN CONDITION- no issues, reports sore tail bone. Nurse has checked it.  EDEMA-  L leg--red and slightly swollen, Spoke with nurse re: cellulitis.  PAIN-  4/10--headache  MENTAL STATUS-  A/Ox3   COGNITION- WNL    VISION-  glasses   HEARING LOSS-  WNL  HEAD CONTROL-  WNL           TRUNK STRENGTH- fair        EQUIPMENT : BSC, wheelchair, hospital bed, rollator, shower seat and walk in shower.            FUNCTIONAL ENDURANCE FOR SAFE ACTIVITIES OF DAILY LIVING / INSTRUMENTAL ACTIVITIES OF DAILY LIVING: fair     BALANCE: see PT eval for formal assessment  SITTING BALANCE-  fair  STANDING BALANCE- fair-      WEIGHT BEARING STATUS/PRECAUTIONS-   WBAT  ASSISTIVE DEVICE-  Rollator     ACTIVITIES OF DAILY LIVING MOBILITY/FALLS RISK ASSESSMENT- at risk for falls due to weakness, impaired gait and balance, dependence on AD      MEDICAL NECESSITY- Skilled OT medically necessary to aide in increased strength, ADL's and AE education.    PATIENT GOAL FOR THIS EPISODE OF CARE- to be able to access shower    TODAY'S INTERVENTIONS- Initial eval completed. Goals and POC discussed with patient and family and ADL training initiated. Patient educated on ways to access shower on lower level safely, such as grab bar on side of door for step up and down and keeping another walker on lower level. Patient has one step down in order to access bathroom.m Currently using BSC next to bed. Patient verbalized understanding of education.    REHAB POTENTIAL-   good for stated goals    DATE OF NEXT APPOINTMENT WITH DOCTOR- Dr. Hogan, and Dr. Gan 3/7/22  ANTICIPATED DISCHARGE PLAN-  d/c when goals met or max rehab potential achieved.   PATIENT /  CAREGIVER AGREE WITH DISCHARGE PLAN- yes  COMMUNICATION / CARE COORDINATION- staff re: YI limon.

## 2022-03-01 NOTE — HOME HEALTH
FOCUS OF CARE/SKILLED NEED: Teaching/Education, CPA    TEACHING/INTERVENTIONS: A&O X 4. Pt c/o back pain. VSS. Redness on the pt's bottom has improved. The pt has been turning frequently and using a cushion. The pt is still trying to get her meds refilled, thers have been issues, but pt is currently dealing with it. Pt fell backwards when messing with a drawer on 2/28. The pt stated she fell on her rt hip. Pt stated she has just been sore and no injuries. The pt has a redness that has just started since last friday in her left leg from her ankle to the middle of her calf. The area is more tender and warmer than the rest of her leg. I am going to call Marianna Norman's office to notify her of these things. The pt was placed on clindamycin by her PCP.    PROGRESS TOWARD GOALS: Ongoing, progressing    PHYSICIAN CONTACT: Yes    INSURANCE CHANGES? No    FALLS SINCE LAST VISIT? Yes    MEDICATION CHANGES SINCE LAST VISIT? No    PLAN FOR NEXT VISIT: Teaching/education. Any more falls? How is the redness on the pt's left lower leg? Did the pt get her medications? Has the pt taken her clindamycin?

## 2022-03-02 ENCOUNTER — TELEPHONE (OUTPATIENT)
Dept: PRIMARY CARE CLINIC | Age: 87
End: 2022-03-02

## 2022-03-02 ENCOUNTER — HOME CARE VISIT (OUTPATIENT)
Dept: HOME HEALTH SERVICES | Facility: CLINIC | Age: 87
End: 2022-03-02

## 2022-03-02 VITALS
RESPIRATION RATE: 18 BRPM | SYSTOLIC BLOOD PRESSURE: 110 MMHG | HEART RATE: 57 BPM | TEMPERATURE: 97.4 F | DIASTOLIC BLOOD PRESSURE: 48 MMHG | OXYGEN SATURATION: 98 %

## 2022-03-02 PROCEDURE — G0157 HHC PT ASSISTANT EA 15: HCPCS

## 2022-03-02 RX ORDER — CLINDAMYCIN HYDROCHLORIDE 300 MG/1
300 CAPSULE ORAL 2 TIMES DAILY
Qty: 14 CAPSULE | Refills: 0 | Status: SHIPPED | OUTPATIENT
Start: 2022-03-02 | End: 2022-04-01 | Stop reason: SDUPTHER

## 2022-03-02 NOTE — TELEPHONE ENCOUNTER
Call the home health nurse back and ask her does not look infected if it does I will send in an antibiotic.

## 2022-03-02 NOTE — TELEPHONE ENCOUNTER
Pasquale Fletcher from [facility] states that pt's leg from the middle of her shin to her ankle is red. She does have 2+ edema on her leg, but the area that is red is a little more swollen and is warm and tender. She sates that pt mentioned that she had had cellulitis before. [facility] also wanted to inform you that pt fell on Monday. She was bending over too pull out a lower drawer and the drawer fell out and she fel backwards and landed on her hip. Pt is only a little sore, but no other injuries. [facility] would like a call back if there are any suggestions or anything you want done about her leg.

## 2022-03-02 NOTE — TELEPHONE ENCOUNTER
Please call the patient let her know I sent a prescription of antibiotic in for her.   I want her to elevate the leg often using cool compresses on it and if it is from her rash that she generally gets on her legs tell her to use the cream on it

## 2022-03-03 ENCOUNTER — HOME CARE VISIT (OUTPATIENT)
Dept: HOME HEALTH SERVICES | Facility: CLINIC | Age: 87
End: 2022-03-03

## 2022-03-03 ENCOUNTER — HOME CARE VISIT (OUTPATIENT)
Dept: HOME HEALTH SERVICES | Facility: HOME HEALTHCARE | Age: 87
End: 2022-03-03

## 2022-03-03 DIAGNOSIS — I48.0 PAF (PAROXYSMAL ATRIAL FIBRILLATION) (HCC): ICD-10-CM

## 2022-03-03 RX ORDER — METOPROLOL SUCCINATE 50 MG/1
TABLET, EXTENDED RELEASE ORAL
Qty: 60 TABLET | Refills: 5 | Status: SHIPPED | OUTPATIENT
Start: 2022-03-03 | End: 2022-09-01

## 2022-03-03 RX ORDER — DULOXETIN HYDROCHLORIDE 30 MG/1
30 CAPSULE, DELAYED RELEASE ORAL DAILY
Qty: 30 CAPSULE | Refills: 3 | Status: SHIPPED | OUTPATIENT
Start: 2022-03-03 | End: 2022-03-14 | Stop reason: SDUPTHER

## 2022-03-03 NOTE — CASE COMMUNICATION
Patient missed a MSW home visit from [facility] on 3/3/22    Reason: No insurance approval for the home visit. MSW to provide home visit when visit approved.    For your records only.   As per home health protocol, MD must be notified of missed/cancelled visits; therefore the prescribed frequency was not met.

## 2022-03-03 NOTE — TELEPHONE ENCOUNTER
Requested Prescriptions     Pending Prescriptions Disp Refills    DULoxetine (CYMBALTA) 30 MG extended release capsule [Pharmacy Med Name: DULOXETINE DR 30MG CAPSULES] 30 capsule 3     Sig: TAKE 1 CAPSULE BY MOUTH DAILY       Last Office Visit: 1/19/2022  Next Office Visit: 3/29/2022  Last Medication Refill: 9/28/21 with 3 refills

## 2022-03-04 ENCOUNTER — HOME CARE VISIT (OUTPATIENT)
Dept: HOME HEALTH SERVICES | Facility: CLINIC | Age: 87
End: 2022-03-04

## 2022-03-04 VITALS
OXYGEN SATURATION: 97 % | DIASTOLIC BLOOD PRESSURE: 60 MMHG | HEART RATE: 61 BPM | SYSTOLIC BLOOD PRESSURE: 130 MMHG | RESPIRATION RATE: 16 BRPM | TEMPERATURE: 97.3 F

## 2022-03-04 VITALS
DIASTOLIC BLOOD PRESSURE: 58 MMHG | RESPIRATION RATE: 18 BRPM | TEMPERATURE: 97.8 F | HEART RATE: 59 BPM | SYSTOLIC BLOOD PRESSURE: 122 MMHG | OXYGEN SATURATION: 99 %

## 2022-03-04 PROCEDURE — G0152 HHCP-SERV OF OT,EA 15 MIN: HCPCS

## 2022-03-04 PROCEDURE — G0157 HHC PT ASSISTANT EA 15: HCPCS

## 2022-03-04 NOTE — HOME HEALTH
Subjective: Patient states she thinks she may have overdone it the last few days. Patient sitting in recliner with legs elevated. Patient states her  is going to build her a small ramp so that she can easily access the bathroom.    Falls:none    Medication changes: none    Insurance changes: none    Edema: L LE red and warm --physician contacted-antibiotic called in to pharmacy.    Medical necessity for continued visits: Patient continues to require skills of OT to address ADL deficits and safety in the home.    Next MD appt:  Dr. Hogan 3/7/22    Plan for next visit: shower and dressing education.

## 2022-03-08 ENCOUNTER — HOME CARE VISIT (OUTPATIENT)
Dept: HOME HEALTH SERVICES | Facility: CLINIC | Age: 87
End: 2022-03-08

## 2022-03-08 ENCOUNTER — TELEPHONE (OUTPATIENT)
Dept: PRIMARY CARE CLINIC | Age: 87
End: 2022-03-08

## 2022-03-08 VITALS
OXYGEN SATURATION: 99 % | HEART RATE: 78 BPM | SYSTOLIC BLOOD PRESSURE: 154 MMHG | TEMPERATURE: 97.3 F | RESPIRATION RATE: 16 BRPM | DIASTOLIC BLOOD PRESSURE: 60 MMHG

## 2022-03-08 VITALS
SYSTOLIC BLOOD PRESSURE: 164 MMHG | DIASTOLIC BLOOD PRESSURE: 72 MMHG | HEART RATE: 78 BPM | OXYGEN SATURATION: 99 % | RESPIRATION RATE: 18 BRPM | TEMPERATURE: 97.7 F

## 2022-03-08 PROCEDURE — G0152 HHCP-SERV OF OT,EA 15 MIN: HCPCS

## 2022-03-08 PROCEDURE — G0299 HHS/HOSPICE OF RN EA 15 MIN: HCPCS

## 2022-03-08 NOTE — TELEPHONE ENCOUNTER
I am not overly concerned about the top number running a little high, the bottom number im  more concerned about but it looks good so make sure she is taking her blood pressure medicine and have them recheck it when they are there

## 2022-03-08 NOTE — HOME HEALTH
FOCUS OF CARE/SKILLED NEED: Teaching/Education, CPA   TEACHING/INTERVENTIONS: A&O X 4. Pt c/o back pain. Pt is having high blood pressure, but all other VSS. The redness on the pt's left leg is improving after the pt started her clindamycin.  PROGRESS TOWARD GOALS: Ongoing, progressing   PHYSICIAN CONTACT: Yes, [facility] office was notified of the pt's high blood pressure. Waiting on a call back.  INSURANCE CHANGES? No   FALLS SINCE LAST VISIT? Yes   MEDICATION CHANGES SINCE LAST VISIT? No   PLAN FOR NEXT VISIT: Teaching/education. Did the pt finish her clindamycin? Is the redness on the pt's leg improving? Did the pt get a new seat cushion?

## 2022-03-09 ENCOUNTER — HOME CARE VISIT (OUTPATIENT)
Dept: HOME HEALTH SERVICES | Facility: CLINIC | Age: 87
End: 2022-03-09

## 2022-03-09 VITALS
SYSTOLIC BLOOD PRESSURE: 138 MMHG | TEMPERATURE: 97.7 F | RESPIRATION RATE: 18 BRPM | HEART RATE: 56 BPM | DIASTOLIC BLOOD PRESSURE: 62 MMHG | OXYGEN SATURATION: 99 %

## 2022-03-09 PROCEDURE — G0153 HHCP-SVS OF S/L PATH,EA 15MN: HCPCS

## 2022-03-09 NOTE — HOME HEALTH
Subjective: Patient states her leg is better and cellulitis is improving.    Falls:none    Medication changes:none    Insurance changes:none    Edema:slight B LE    Medical necessity for continued visits: Patient continues to require skills of OT to address ADL safety and UB HEP.    Next MD appt: pending    Plan for next visit:Plan to address HEP and shower transfer.

## 2022-03-10 ENCOUNTER — HOME CARE VISIT (OUTPATIENT)
Dept: HOME HEALTH SERVICES | Facility: CLINIC | Age: 87
End: 2022-03-10

## 2022-03-10 PROCEDURE — G0155 HHCP-SVS OF CSW,EA 15 MIN: HCPCS

## 2022-03-11 ENCOUNTER — HOME CARE VISIT (OUTPATIENT)
Dept: HOME HEALTH SERVICES | Facility: CLINIC | Age: 87
End: 2022-03-11

## 2022-03-11 NOTE — HOME HEALTH
SKILLED SERVICES PROVIDED / MEDICAL   ASSESSMENT OF SOCIAL AND EMOTIONAL FACTORS  EMOTIONAL SUPPORT  COMMUNITY RESOURCE PLANNING    HOMEBOUND STATUS-taxing effort to get out of home, falls risk, weak, uses walker for ambulation and wheelchair if goes to the Dr.    HOME/SOCIAL ENVIRONMENT- Patient lives in a home with spouse and grandson. Patient has ramp exiting home.    CASE COMMUNICATION- Orin Maria RN

## 2022-03-14 ENCOUNTER — HOME CARE VISIT (OUTPATIENT)
Dept: HOME HEALTH SERVICES | Facility: CLINIC | Age: 87
End: 2022-03-14

## 2022-03-14 ENCOUNTER — HOME CARE VISIT (OUTPATIENT)
Dept: HOME HEALTH SERVICES | Facility: HOME HEALTHCARE | Age: 87
End: 2022-03-14

## 2022-03-14 VITALS
DIASTOLIC BLOOD PRESSURE: 76 MMHG | SYSTOLIC BLOOD PRESSURE: 178 MMHG | RESPIRATION RATE: 18 BRPM | HEART RATE: 70 BPM | TEMPERATURE: 97.9 F | OXYGEN SATURATION: 98 %

## 2022-03-14 PROCEDURE — G0300 HHS/HOSPICE OF LPN EA 15 MIN: HCPCS

## 2022-03-14 PROCEDURE — 81003 URINALYSIS AUTO W/O SCOPE: CPT | Performed by: FAMILY MEDICINE

## 2022-03-14 PROCEDURE — G0152 HHCP-SERV OF OT,EA 15 MIN: HCPCS

## 2022-03-14 RX ORDER — DULOXETIN HYDROCHLORIDE 30 MG/1
30 CAPSULE, DELAYED RELEASE ORAL DAILY
Qty: 30 CAPSULE | Refills: 3 | Status: SHIPPED | OUTPATIENT
Start: 2022-03-14 | End: 2022-07-28

## 2022-03-15 ENCOUNTER — HOME CARE VISIT (OUTPATIENT)
Dept: HOME HEALTH SERVICES | Facility: CLINIC | Age: 87
End: 2022-03-15

## 2022-03-15 ENCOUNTER — LAB REQUISITION (OUTPATIENT)
Dept: LAB | Facility: HOSPITAL | Age: 87
End: 2022-03-15

## 2022-03-15 VITALS
RESPIRATION RATE: 16 BRPM | TEMPERATURE: 97 F | OXYGEN SATURATION: 99 % | SYSTOLIC BLOOD PRESSURE: 118 MMHG | HEART RATE: 65 BPM | DIASTOLIC BLOOD PRESSURE: 42 MMHG

## 2022-03-15 DIAGNOSIS — Z00.00 ENCOUNTER FOR GENERAL ADULT MEDICAL EXAMINATION WITHOUT ABNORMAL FINDINGS: ICD-10-CM

## 2022-03-15 LAB
BILIRUB UR QL STRIP: NEGATIVE
CLARITY UR: CLEAR
COLOR UR: YELLOW
GLUCOSE UR STRIP-MCNC: NEGATIVE MG/DL
HGB UR QL STRIP.AUTO: NEGATIVE
KETONES UR QL STRIP: NEGATIVE
LEUKOCYTE ESTERASE UR QL STRIP.AUTO: NEGATIVE
NITRITE UR QL STRIP: NEGATIVE
PH UR STRIP.AUTO: 8 [PH] (ref 5–8)
PROT UR QL STRIP: NEGATIVE
SP GR UR STRIP: 1.01 (ref 1–1.03)
UROBILINOGEN UR QL STRIP: NORMAL

## 2022-03-15 PROCEDURE — G0153 HHCP-SVS OF S/L PATH,EA 15MN: HCPCS

## 2022-03-15 NOTE — HOME HEALTH
FOCUS OF CARE/SKILLED NEED: SKILLED ST NECESSARY TO MAXIMIZE SAFETY WITH SWALLOW FUNCTION WITH DECREASED RISK OF ASPIRATION AND TO MAXIMIZE VERBAL COMMUNICATION.    TEACHING/INTERVENTIONS: INSTRUCTION OF SWALLOWING PRECAUTIONS, STRATEGIES, ASPIRATION. INSTRUCTION FOR WORD FINDING STRATEGIES.    PROGRESS TOWARD GOALS: ALL DYSPHAGIA GOALS MET AND PT. IS (I) WITH DYSPHAGIA HEP, TRAINING AND INSTRUCTION PROVIDED BY SLP.  ALL REMAINING GOALS WILL CONTINUE.    PHYSICIAN CONTACT: NONE    INSURANCE CHANGES? NONE    FALLS SINCE LAST VISIT?  NONE    MEDICATION CHANGES SINCE LAST VISIT?  NONE    PLAN FOR NEXT VISIT DYSPHAGIA TREATMENT; APHASIA TREATMENT; T/I AS NECESSARY

## 2022-03-15 NOTE — HOME HEALTH
Subjective:Phoned patient and she sounded as if she was having difficulty finding words. Went by patient's home to check on her. Patient's  and grandson with her today.  states patient is not feeling well. Patient sitting in chair eating lunch. Patient states she got [place] to [facility] [place] and could not control her bladder. Patient states she urinated all over herself and the carpet. Patient states she is hurting when she urinates and has been having diarrhea since last night. Vitals assessed (see vitals ) and BP elevated. Patient states she has not had her morning meds. Patient took morning meds during visit. Phoned [facility]. Left message on nurse line for Marianna Norman, which is not in today. Kelsea from [facility], left message on my phone after I left patient's home. Kelsea asked if a UA was completed and if not would we get one. Phoned office to have Clinical managers put in order for UA.     Falls:none    Medication changes:none    Insurance changes:none    Edema:B ankles    Medical necessity for continued visits: Skilled OT medically necessary to aide in ADL completion and UB strengthening.    Next MD appt:pending    Plan for next visit:Address shower and toilet transfer, since patient has ramp built to bathroom area now.

## 2022-03-16 ENCOUNTER — HOME CARE VISIT (OUTPATIENT)
Dept: HOME HEALTH SERVICES | Facility: CLINIC | Age: 87
End: 2022-03-16

## 2022-03-16 VITALS
RESPIRATION RATE: 16 BRPM | DIASTOLIC BLOOD PRESSURE: 58 MMHG | TEMPERATURE: 97.3 F | OXYGEN SATURATION: 95 % | HEART RATE: 48 BPM | SYSTOLIC BLOOD PRESSURE: 152 MMHG

## 2022-03-16 PROCEDURE — G0151 HHCP-SERV OF PT,EA 15 MIN: HCPCS

## 2022-03-16 PROCEDURE — G0153 HHCP-SVS OF S/L PATH,EA 15MN: HCPCS

## 2022-03-16 NOTE — HOME HEALTH
Completed GI/GY assessment with patient. Patient states she is disgnosed with frequent UTIs. Today patient is c/o mild burning with urination, no c/o urgency, retention, feeling of needing to urinate and can't. Obtained a urine specimen. Urine is light yellow, clear, no sediment noted, no odor noted. Patient has a hx of colon cancer. She states her lower abdomen is hurting rated 4/10. Patient states she had diarrhea the last few days, her last BM was early this am. Patient noted to have adequate skin turgor, very dry skin and ends of endy appear to be brittle. Patient states she has not been drinking the amount of water that she usually consumes. Encouraged patient to consume at least 64 oz of water based fluids - not including coffee, carbinated drinks or alcohol. Patient verb understanding of instructions. UA was labeled infrom of patient and then transported to [facility] lab.

## 2022-03-16 NOTE — HOME HEALTH
FOCUS OF CARE/SKILLED NEED: SKILLED ST NECESSARY TO MAXIMIZE VERBAL COMMUNICATION OF WANTS, NEEDS, PAIN AND SAFETY    TEACHING/INTERVENTIONS:  INSTRUCTION FOR WORD FINDING STRATEGIES/HEP    PROGRESS TOWARD GOALS: IMPROVED WORD FINDING ABILITY AT CONVERSATIONAL LEVEL, HOWEVER, PT. CONT. TO PERIODICALLY PRESENT WITH LATENT RESPONSE, CIRCUMLOCUTIONS AND LITERAL PARAPHASIAS.    PHYSICIAN CONTACT: NONE    INSURANCE CHANGES? NONE    FALLS SINCE LAST VISIT?  NONE    MEDICATION CHANGES SINCE LAST VISIT?  NONE    PLAN FOR NEXT VISIT:  APHASIA TREATMENT; T/I AS NECESSARY FOR WORD FINDING STRATEGIES AND HEP

## 2022-03-17 ENCOUNTER — HOME CARE VISIT (OUTPATIENT)
Dept: HOME HEALTH SERVICES | Facility: HOME HEALTHCARE | Age: 87
End: 2022-03-17

## 2022-03-17 ENCOUNTER — APPOINTMENT (OUTPATIENT)
Dept: HOME HEALTH SERVICES | Facility: CLINIC | Age: 87
End: 2022-03-17

## 2022-03-17 VITALS
SYSTOLIC BLOOD PRESSURE: 130 MMHG | TEMPERATURE: 97.2 F | DIASTOLIC BLOOD PRESSURE: 56 MMHG | OXYGEN SATURATION: 98 % | RESPIRATION RATE: 18 BRPM | HEART RATE: 52 BPM

## 2022-03-17 VITALS
DIASTOLIC BLOOD PRESSURE: 58 MMHG | TEMPERATURE: 97.3 F | HEART RATE: 48 BPM | RESPIRATION RATE: 16 BRPM | SYSTOLIC BLOOD PRESSURE: 152 MMHG | OXYGEN SATURATION: 95 %

## 2022-03-17 PROCEDURE — G0152 HHCP-SERV OF OT,EA 15 MIN: HCPCS

## 2022-03-17 NOTE — HOME HEALTH
pt/psp report getting better and more mobile but feel cont tx appropriate to help gain strength and be more active           86 yo female Primary Dx: Type 2 diabetes mellitus with hyperglycemia . Pt reports hospitalized x 2 with SNF over past 1 month returned home 2-21-22   ( Covid and shingles during )   PmHx: afib - L TKA - chronic back pain with BLE varied ( limiting all activity - pain management ) - chronic HA / generalized pain ( per shingles ) - R eye blind   PLOF : household amb rw - community amb AD / A    Homebound status assessment : Pt presents with altered gait , balance , strength and endurance per dx     Skills :Education in ther ex  to faciltate functional strength for transfers and mobility - reduce fall risk      Education in transfers with hand and AD placement for safety    Education in AD use to promote reduced fall risk - safe functional gait     Education on proper hand/foot placement, transitional movements, and safety awareness to decrease risk of falls      Pt requires VCs - TCs with teachback method requiring review and reitaration for pt and PSP to promote safe functional mobility   Tx ongoing   to promote safe mobility ,ability to attend to ADLs ,to reduce fall risk and to minimize need for further hospitalization .    Pt goal to achieve prior level of mobility /  activity

## 2022-03-18 NOTE — HOME HEALTH
Subjective: Patient states she is feeling so much better since having her issues with diarrhea and urination.    Falls: none reported    Medication changes: none reported    Insurance changes: none reported    Edema:slight B LE pitting.    Medical necessity for continued visits: Patient continues to require skills of OT to increase ADL skills. Will reassess this date for continued visits due to recent issues with BP and not feeling well    Next MD appt: Malik on Monday    Plan for next visit: Plan to address showe skills and UB HEP.

## 2022-03-22 ENCOUNTER — HOME CARE VISIT (OUTPATIENT)
Dept: HOME HEALTH SERVICES | Facility: CLINIC | Age: 87
End: 2022-03-22

## 2022-03-22 PROCEDURE — G0300 HHS/HOSPICE OF LPN EA 15 MIN: HCPCS

## 2022-03-23 ENCOUNTER — HOME CARE VISIT (OUTPATIENT)
Dept: HOME HEALTH SERVICES | Facility: CLINIC | Age: 87
End: 2022-03-23

## 2022-03-23 VITALS
RESPIRATION RATE: 18 BRPM | HEART RATE: 54 BPM | OXYGEN SATURATION: 96 % | SYSTOLIC BLOOD PRESSURE: 140 MMHG | DIASTOLIC BLOOD PRESSURE: 70 MMHG | TEMPERATURE: 97.8 F

## 2022-03-23 VITALS
TEMPERATURE: 96.1 F | RESPIRATION RATE: 18 BRPM | HEART RATE: 73 BPM | SYSTOLIC BLOOD PRESSURE: 158 MMHG | OXYGEN SATURATION: 97 % | DIASTOLIC BLOOD PRESSURE: 64 MMHG

## 2022-03-23 VITALS
DIASTOLIC BLOOD PRESSURE: 64 MMHG | HEART RATE: 73 BPM | SYSTOLIC BLOOD PRESSURE: 158 MMHG | RESPIRATION RATE: 18 BRPM | TEMPERATURE: 96.8 F | OXYGEN SATURATION: 97 %

## 2022-03-23 VITALS
OXYGEN SATURATION: 97 % | RESPIRATION RATE: 18 BRPM | DIASTOLIC BLOOD PRESSURE: 68 MMHG | SYSTOLIC BLOOD PRESSURE: 158 MMHG | TEMPERATURE: 96 F | HEART RATE: 73 BPM

## 2022-03-23 PROCEDURE — G0157 HHC PT ASSISTANT EA 15: HCPCS

## 2022-03-23 PROCEDURE — G0152 HHCP-SERV OF OT,EA 15 MIN: HCPCS

## 2022-03-23 PROCEDURE — G0153 HHCP-SVS OF S/L PATH,EA 15MN: HCPCS

## 2022-03-23 NOTE — HOME HEALTH
Instructed pt to take meds as prescribed. Assess BLE's daily for any lesions. Keep skin clean and dry paying close attention between toes, where protective footgear when ambulating.  Instructed pt on bleeding precautions and s/s of bleeding such as emesis that appear as coffee grounds, black, tarry stool, unusual bleeding under the skin or from the orifices and when to seek medical attention. PT v/u of teaching via teachings back method.

## 2022-03-23 NOTE — HOME HEALTH
ST D/C SUMMARY    FOCUS OF CARE/SKILLED NEED: SKILLED ST NECESSARY TO MAXIMIZE VERBAL COMMUNICATION OF WANTS, NEEDS, PAIN AND SAFETY; REVIEW GOALS AND PROGRESS.  D/C FROM ST WITH ALL DYSPHAGIA AND APHASIA GOALS MET.    TEACHING/INTERVENTIONS:  INSTRUCTION FOR WORD FINDING STRATEGIES/HEP    PROGRESS TOWARD GOALS: ALL APHASIA GOALS MET THIS VISIT AND ALL DYSPHAGIA GOALS MET WK OF 3/14/22    PHYSICIAN CONTACT: D/C SUMMARY    INSURANCE CHANGES? NONE    FALLS SINCE LAST VISIT?  NONE    MEDICATION CHANGES SINCE LAST VISIT?  YES.  AMOXICILLIN 500MG.  ADDED TO MEDICATION PROFILE AND PT. HAS NO QUESTION REGARDING PURPOSE, DOSE OR SIDE EFFECTS AT THIS TIME.    PLAN FOR NEXT VISIT: N/A; D/C WITH ALL GOALS MET    SERVICES CONTINUING: PT/OT

## 2022-03-24 ENCOUNTER — HOME CARE VISIT (OUTPATIENT)
Dept: HOME HEALTH SERVICES | Facility: CLINIC | Age: 87
End: 2022-03-24

## 2022-03-24 VITALS
RESPIRATION RATE: 16 BRPM | SYSTOLIC BLOOD PRESSURE: 120 MMHG | DIASTOLIC BLOOD PRESSURE: 50 MMHG | TEMPERATURE: 97.3 F | OXYGEN SATURATION: 98 % | HEART RATE: 47 BPM

## 2022-03-24 PROCEDURE — G0157 HHC PT ASSISTANT EA 15: HCPCS

## 2022-03-24 NOTE — HOME HEALTH
Subjective:    Falls:    Medication changes:    Insurance changes:    Edema:    Medical necessity for continued visits    Next MD appt:    Plan for next visit:

## 2022-03-24 NOTE — HOME HEALTH
Patient complains with R LE pain referred from her back.  No new medicaitons  No falls  No changes to her insurance company

## 2022-03-25 ENCOUNTER — HOME CARE VISIT (OUTPATIENT)
Dept: HOME HEALTH SERVICES | Facility: HOME HEALTHCARE | Age: 87
End: 2022-03-25

## 2022-03-25 ENCOUNTER — HOME CARE VISIT (OUTPATIENT)
Dept: HOME HEALTH SERVICES | Facility: CLINIC | Age: 87
End: 2022-03-25

## 2022-03-25 NOTE — CASE COMMUNICATION
Patient missed a MSW home visit from [facility] on 3/25/22    Reason: No insurance approval for the home visit. MSW assisted patient over the phone with social service needs. MSW will continue to assist over the phone as needed.    For your records only.   As per home health protocol, MD must be notified of missed/cancelled visits; therefore the prescribed frequency was not met.

## 2022-03-28 ENCOUNTER — HOME CARE VISIT (OUTPATIENT)
Dept: HOME HEALTH SERVICES | Facility: CLINIC | Age: 87
End: 2022-03-28

## 2022-03-28 VITALS
OXYGEN SATURATION: 99 % | SYSTOLIC BLOOD PRESSURE: 110 MMHG | TEMPERATURE: 97.2 F | DIASTOLIC BLOOD PRESSURE: 48 MMHG | HEART RATE: 49 BPM

## 2022-03-28 PROCEDURE — G0157 HHC PT ASSISTANT EA 15: HCPCS

## 2022-03-28 PROCEDURE — G0152 HHCP-SERV OF OT,EA 15 MIN: HCPCS

## 2022-03-28 RX ORDER — OMEPRAZOLE 40 MG/1
40 CAPSULE, DELAYED RELEASE ORAL DAILY
Qty: 90 CAPSULE | Refills: 3 | Status: SHIPPED | OUTPATIENT
Start: 2022-03-28

## 2022-03-29 ENCOUNTER — OFFICE VISIT (OUTPATIENT)
Dept: NEUROSURGERY | Age: 87
End: 2022-03-29
Payer: MEDICARE

## 2022-03-29 VITALS
DIASTOLIC BLOOD PRESSURE: 64 MMHG | SYSTOLIC BLOOD PRESSURE: 128 MMHG | HEART RATE: 66 BPM | TEMPERATURE: 97.4 F | OXYGEN SATURATION: 98 % | RESPIRATION RATE: 16 BRPM

## 2022-03-29 VITALS
HEART RATE: 57 BPM | SYSTOLIC BLOOD PRESSURE: 123 MMHG | WEIGHT: 134 LBS | OXYGEN SATURATION: 97 % | TEMPERATURE: 97.1 F | HEIGHT: 63 IN | BODY MASS INDEX: 23.74 KG/M2 | DIASTOLIC BLOOD PRESSURE: 60 MMHG

## 2022-03-29 DIAGNOSIS — R51.9 NONINTRACTABLE EPISODIC HEADACHE, UNSPECIFIED HEADACHE TYPE: ICD-10-CM

## 2022-03-29 DIAGNOSIS — G62.9 NEUROPATHY: Primary | ICD-10-CM

## 2022-03-29 DIAGNOSIS — D32.9 MENINGIOMA (HCC): ICD-10-CM

## 2022-03-29 PROCEDURE — 99213 OFFICE O/P EST LOW 20 MIN: CPT | Performed by: NURSE PRACTITIONER

## 2022-03-29 RX ORDER — AMOXICILLIN 500 MG/1
CAPSULE ORAL
COMMUNITY
Start: 2022-03-21 | End: 2022-04-07 | Stop reason: ALTCHOICE

## 2022-03-29 NOTE — PROGRESS NOTES
[facility] Neurology Office Note      Patient:   Willi Shafer  MR#:    [id number]  Account Number:                         YOB: 1935  Date of Evaluation:  3/29/2022  Time of Note:                          10:24 AM  Primary/Referring Physician:  EARLE Rivera - CNP   Consulting Physician:  Eileen Ramirez DNP, APRN    FOLLOW UP VISIT    Chief Complaint   Patient presents with    6 Month Follow-Up     pt states things are better    Headache       HISTORY OF PRESENT ILLNESS    Willi Shafer is a 82y.o. year old female here for follow up. Has noted improvement in headaches since last visit. She was diagnosed with shingles on the scalp/near the right ear and Covid 19 several days after she last saw me in the office. She notes mild burning and itching over the right side of her head still. She does note a mild headache as well. Notes intermittent nausea. She notes light/sound sensitivity. She denies focal symptoms. Feels weak and dizzy as well. She is on chronic opioid therapy, has noted some improvement in headache when she takes [address] given in the hospital without improvement. On Lyrica for underlying neuropathy, leg pain. Back and leg pain are unchanged. Still noting right hip/groin pain with radiation of pain into the knee. Noting muscle spasms as well in the right leg. Noting side effects with Tizanidine, she takes this intermittently, typically at night d/t sleepiness. She denies left hip or leg pain. Notes weakness in BLE. Pain worsens with walking, has been walking more with home health recently, using a walker. Neuropathy symptoms unchanged. Off Gabapentin related to rash. On Xarelto for atrial fibrillation. On Cymbalta 30mg daily as well. Following with Dr. Susan Gay, s/p injections which have been helpful. S/p right L4/5 decompressive laminectomy and L5 kyphoplasty. Has seen [facility] and Dr. Kumar Ortega for hip pain, no clear source per patient. She follows with Dr. Harding for skin rash.  Pain began after cellulitis in 2019. Has seen vascular for abnormal ABIs, compression stockings and elevation recommended. She has a prior history stomach cancer about 10 years ago, treated with chemotherapy. She denies vitamin B12 deficiency or diabetes history. No other complaints.      Past Medical History:   Diagnosis Date    Afib [facility])     Bronchitis     Colon cancer (HCC)     Colon polyps     Constipation     Deep vein blood clot of left lower extremity (HCC)     Left leg     Depression     DVT (deep venous thrombosis) (HCC)     Dysphagia     Fibrocystic breast disease     Fibromyalgia     GERD (gastroesophageal reflux disease)     reflux with hx of esophageal stricture    Headache(784.0)     History of colon cancer     Hormone replacement therapy (postmenopausal)     Hypertension     Neuropathy of foot     In both feet    Osteoarthritis     left knee pain    Restless legs syndrome     Type 2 diabetes mellitus 10/25/2021    Vitamin D deficiency        Past Surgical History:   Procedure Laterality Date    APPENDECTOMY      BREAST BIOPSY      CATARACT REMOVAL       SECTION      CHOLECYSTECTOMY      COLECTOMY  partial    COLON SURGERY      COLONOSCOPY  2010    COLONOSCOPY  2012    Dr Rodriguez Cárdenas: unremarkable enterocolonic anastamosis; hyperplastic polyps    COLONOSCOPY  2013    [facility]: unremarkable post-surgical colon    COLONOSCOPY  2016    Dr Barak Akhtar colon anastomosis, 5 yr recall    COLONOSCOPY N/A 2021    Dr Garcia Si and patent anastomosis in the right side-BCM, prn (age)    COLONOSCOPY  2021    Dr Garcia Si and patent anastomosis in the right side-BCM, prn (age)   Francisco Ashers FOOT SURGERY  right foot    HAND SURGERY Right     Dr Tiffanie Chaudhry N/A 2020    KYPHOPLASTY L5 performed by Abiodun Ortiz DO at [address] Right 2020    RIGHT L 4-5 DECOMPRESSIVE LAMINECTOMY performed by Alice Negron DO at [address]      TOTAL KNEE ARTHROPLASTY      UPPER GASTROINTESTINAL ENDOSCOPY  10/16/2009    UPPER GASTROINTESTINAL ENDOSCOPY  03/01/2013    [place]: peptic stricture dilated to 48F; HH; small antral mucosal elevation    UPPER GASTROINTESTINAL ENDOSCOPY  12/01/2014    [place]: dilation of esophageal stricture    VARICOSE VEIN SURGERY Left 03/14/2014  [facility]    Left GSV Ablation    VARICOSE VEIN SURGERY Right 04/04/2014  [facility]    Right GSV Ablation       Family History   Problem Relation Age of Onset    Cancer Mother         Cervical Cancer    Cancer Brother         Esophageal cancer    Diabetes Brother     Esophageal Cancer Brother     Diabetes Sister     Colon Cancer Neg Hx     Colon Polyps Neg Hx     Liver Cancer Neg Hx     Liver Disease Neg Hx     Rectal Cancer Neg Hx     Stomach Cancer Neg Hx        Social History     Socioeconomic History    Marital status:      Spouse name: Not on file    Number of children: Not on file    Years of education: Not on file    Highest education level: Not on file   Occupational History    Not on file   Tobacco Use    Smoking status: Never Smoker    Smokeless tobacco: Never Used   Vaping Use    Vaping Use: Never used   Substance and Sexual Activity    Alcohol use: No    Drug use: No    Sexual activity: Yes     Partners: Male   Other Topics Concern    Not on file   Social History Narrative    Not on file     Social Determinants of Health     Financial Resource Strain:     Difficulty of Paying Living Expenses: Not on file   Food Insecurity:     Worried About Running Out of Food in the Last Year: Not on file    Obdulio of Food in the Last Year: Not on file   Transportation Needs:     Lack of Transportation (Medical): Not on file    Lack of Transportation (Non-Medical):  Not on file   Physical Activity:     Days of Exercise per Week: Not on file    Minutes of Exercise per Session: Not on file   Stress:     Feeling of Stress : Not on file   Social Connections:     Frequency of Communication with Friends and Family: Not on file    Frequency of Social Gatherings with Friends and Family: Not on file    Attends Anabaptist Services: Not on file    Active Member of [address] [place] [address] or Organizations: Not on file    Attends Club or Organization Meetings: Not on file    Marital Status: Not on file   Intimate Partner Violence:     Fear of Current or Ex-Partner: Not on file    Emotionally Abused: Not on file    Physically Abused: Not on file    Sexually Abused: Not on file   Housing Stability:     Unable to Pay for Housing in the Last Year: Not on file    Number of Della in the Last Year: Not on file    Unstable Housing in the Last Year: Not on file       Current Outpatient Medications   Medication Sig Dispense Refill    amoxicillin (AMOXIL) 500 MG capsule       omeprazole (PRILOSEC) 40 MG delayed release capsule TAKE 1 CAPSULE BY MOUTH DAILY 90 capsule 3    DULoxetine (CYMBALTA) 30 MG extended release capsule Take 1 capsule by mouth daily 30 capsule 3    metoprolol succinate (TOPROL XL) 50 MG extended release tablet TAKE 1 TABLET BY MOUTH TWICE DAILY 60 tablet 5    HYDROcodone-acetaminophen (NORCO)  MG per tablet TAKE 1 TABLET BY MOUTH EVERY 6 HOURS AS NEEDED      pregabalin (LYRICA) 150 MG capsule TAKE 1 CAPSULE BY MOUTH EVERY 12 HOURS AS NEEDED      metFORMIN (GLUCOPHAGE XR) 500 MG extended release tablet Take 1 tablet by mouth daily (with breakfast) 30 tablet 3    furosemide (LASIX) 20 MG tablet Take 1 tablet by mouth daily as needed (swelling) 30 tablet 3    nortriptyline (PAMELOR) 10 MG capsule Take 1 capsule by mouth nightly 30 capsule 0    melatonin 5 MG TBDP disintegrating tablet Take 1 tablet by mouth nightly 30 tablet 0    zinc sulfate (ZINCATE) 220 (50 Zn) MG capsule Take 1 capsule by mouth daily 30 capsule 0    ascorbic acid (VITAMIN C) 500 MG tablet Take 1 tablet by mouth 2 times daily 60 tablet 0    rivaroxaban (XARELTO) 20 MG TABS tablet Take 1 tablet by mouth daily (with breakfast) for 30 doses 30 tablet 0    vitamin D (ERGOCALCIFEROL) 1.25 MG (10903 UT) CAPS capsule TAKE 1 CAPSULE BY MOUTH 1 TIME A WEEK 12 capsule 1    irbesartan (AVAPRO) 300 MG tablet Take 1 tablet by mouth daily 30 tablet 5    dilTIAZem (CARDIZEM CD) 120 MG extended release capsule TAKE 1 CAPSULE BY MOUTH TWICE DAILY 180 capsule 3    nystatin (MYCOSTATIN) 750117 UNIT/GM powder For rash under breast. Apply 3 times daily. 1 Bottle 1    Cyanocobalamin (B-12) 500 MCG TABS TAKE 1 TABLET BY MOUTH DAILY 30 tablet [address] by Does not apply route 1 each 0     No current facility-administered medications for this visit. Allergies   Allergen Reactions    Zoloft [Sertraline Hcl] Other (See Comments)     Patient states that she doesn't want this medication anymore because she hallucinated and saw spiders. Patient weaned herself off and after she quit taking the medication, the hallucinations stopped. Patient states that she doesn't want this medication anymore because she hallucinated and saw spiders. Patient weaned herself off and after she quit taking the medication, the hallucinations stopped.  Gabapentin Rash     Pt weaning off due to rash and hot flashes     REVIEW OF SYSTEMS  Constitutional: []? Fever []? Sweats []? Chills []? Recent Injury [x]? Denies all unless marked  HEENT:[x]? Headache  []? Head Injury []? Hearing Loss  []? Sore Throat  []? Ear Ache [x]? Denies all unless marked  Spine:  []? Neck pain  []? Back pain  []? Sciaticia  [x]? Denies all unless marked  Cardiovascular:[]? Heart Disease []? Palpitations []? Chest Pain   [x]? Denies all unless marked  Pulmonary: []? Shortness of Breath []? Cough   [x]? Denies all unless marked  Psychiatric/Behavioral:[]? Depression []? Anxiety [x]? Denies all unless marked  Gastrointestinal: []? Nausea  []? Vomiting []?Abdominal Pain  []? Constipation  []? Diarrhea  [x]? Denies all unless marked  Genitourinary:   []? Frequency  []? Urgency  []? Dysuria []? Incontinence  [x]? Denies all unless marked  Extremities: []? Pain  []? Swelling  [x]? Denies all unless marked  Musculoskeletal: []? Myalgias  []? Joint Pain  []? Arthritis []? Muscle Cramps []? Muscle Twitches  [x]? Denies all unless marked  Sleep: []? Insomnia[]? Snoring []? Restless Legs  []? Sleep Apnea  []? Daytime Sleepiness  [x]? Denies all unless marked  Skin:[]? Rash []? Color Change [x]? Denies all unless marked   Neurological:[]? Visual Disturbance []? Memory Loss []? Loss of Balance []? Slurred Speech []? Weakness []? Seizures  []? Dizziness [x]? Denies all unless marked    The MA has completed the ROS with the patient. I have reviewed it in its' entirety with the patient and agree with the documentation. PHYSICAL EXAM  /60   Pulse 57   Temp 97.1 °F (36.2 °C)   Ht 5' 3\" (1.6 m)   Wt 134 lb (60.8 kg)   SpO2 97%   BMI 23.74 kg/m²       Constitutional - No acute distress    HEENT- Conjunctiva normal.  No scars, masses, or lesions over external nose or ears, no neck masses noted, no jugular vein distension, no bruit  Cardiac- Regular rate and rhythm  Pulmonary- Good expansion, normal effort without use of accessory muscles  Musculoskeletal - No significant wasting of muscles noted, no bony deformities  Extremities - No clubbing, cyanosis or edema  Skin - Warm, dry, and intact. No rash, erythema, or pallor; LLE (calf) with changes c/w cellulitis   Psychiatric - Mood, affect, and behavior appear normal; anxious     NEUROLOGICAL EXAM     Mental status   [x]Awake, alert, oriented   [x]Affect attention and concentration appear appropriate  [x]Recent and remote memory appears unremarkable  [x]Speech normal without dysarthria or aphasia, comprehension and repetition intact.    COMMENTS: mild cognitive impairment     Cranial Nerves [x]No VF deficit to confrontation,  no papilledema on fundoscopic exam.  [x]PERRLA, EOMI, no nystagmus, conjugate eye movements, no ptosis  [x]Face symmetric  [x]Facial sensation intact  [x]Tongue midline no atrophy or fasciculations present  [x]Palate midline, hearing to finger rub normal bilaterally  [x]Shoulder shrug and SCM testing normal bilaterally  COMMENTS:    Motor   []5/5 strength x 4 extremities  [x]Normal bulk and tone  [x]No tremor present  [x]No rigidity or bradykinesia noted  COMMENTS: 2/5 RLE, pain limiting; 4/5 LLE     Sensory  []Sensation intact to light touch, pin prick, vibration, and proprioception BLE  [x]Sensation intact to light touch, pin prick, vibration, and proprioception BUE  COMMENTS: decreased PP, vibration BLE    Coordination [x]FTN normal bilaterally   [x]HTS normal bilaterally  [x]GRICELDA normal bilaterally.    COMMENTS:   Reflexes  []Symmetric and non-pathological  [x]Toes down going bilaterally  [x]No clonus present  COMMENTS: hypoactive throughout    Gait                  []Normal steady gait    []Ataxic    []Spastic     []Magnetic     []Shuffling  COMMENTS: antalgic; in wheelchair        LABS RECORD AND IMAGING REVIEW (As below and per HPI)    Lab Results   Component Value Date    [id number] 602 01/20/2022     Lab Results   Component Value Date    WBC 6.9 02/23/2022    HGB 8.9 (L) 02/23/2022    HCT 30.6 (L) 02/23/2022    MCV 91.3 02/23/2022     02/23/2022     Lab Results   Component Value Date     (L) 02/23/2022    K 4.1 02/23/2022     02/23/2022    CO2 21 (L) 02/23/2022    BUN 16 02/23/2022    CREATININE 0.9 02/23/2022    GLUCOSE 96 02/23/2022    CALCIUM 8.2 (L) 02/23/2022    PROT 5.7 (L) 02/23/2022    LABALBU 2.9 (L) 02/23/2022    BILITOT <0.2 02/23/2022    ALKPHOS 120 (H) 02/23/2022    AST 14 02/23/2022    ALT 13 02/23/2022    LABGLOM 59 (A) 02/23/2022    GFRAA >59 02/23/2022    GLOB 2.5 10/12/2016     Lab Results   Component Value Date    CHOL 132 (L) 01/21/2022    TRIG 166 (H) 01/21/2022    HDL 34 (L) 01/21/2022    LDLCALC 65 01/21/2022     Lab Results   Component Value Date    TSH 1.970 01/20/2022    T4FREE 1.1 03/19/2019     Lab Results   Component Value Date    CRP 5.35 (H) 02/01/2022    SEDRATE 25 12/15/2021      Reviewed PCP and vascular notes. Lower extremity vascular study (1/2019)- mildly diminished flow to the bilateral lower extremity arterial system at rest. Patient likely has disease in bilateral tibial arterial segments. MRI thoracic spine (1/2019)- no spinal canal stenosis. Moderate spinal canal stenosis at L1/L2, partially imaged     EMG/NCS (4/2019)- moderate sensorimotor primarily axonal polyneuropathy    CT lumbar spine (6/2020)-   Impression    Impression:    1. There is mild levocurvature of the lumbar spine. Degenerative disc    disease is noted throughout the lumbar column. 2. There is an acute mild compression deformity involving the superior    endplate of L5. There is no involvement of the posterior elements or    compromise of the spinal canal.    3. Central and foraminal stenosis at multiple levels related to    bulging of the disc and disc protrusions combined with facet and    ligamentous hypertrophy and endplate spurring. .    4. I spoke with Kelly Cheung in the emergency room at 3:40 PM    concerning the finding of an acute superior endplate fracture at L5. Signed by Dr Ara Montiel on 6/14/2020 3:43 PM      MRI lumbar spine (6/2020)-   Impression    1.  Acute L5 compression fracture with involvement of the anterior and    posterior vertebral body but no evidence of posterior element    involvement. Approximate 15% height loss. No change in appearance    compared to a CT from 6/14/2020. No other fracture identified. 2.  Severe multilevel degenerative change, as described above. Notably, there is severe central canal stenosis at L4-L5 and severe    LEFT neural foraminal stenosis at L5-S1.     3.  Tiny 3 mm enhancing nodule located along one of the nerve roots of the cauda equina. 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 Recommend MRI of the brain to exclude CNS neoplasm. Signed by Dr Lennox Brier on 6/25/2020 1:06 PM      X-ray hips (8/2020)-   Impression    Impression:    1. No visualized fracture or malalignment. Bilateral mild hip joint    osteoarthritis. Signed by Dr Erik Beard on 8/20/2020 1:18 PM         NCS/EMG (9/2020)- mild to moderate, axonal, sensorimotor polyneuropathy. Left peroneal motor abnormalities felt technical- superimposed peroneal neuropathy or lumbosacral radiculopathy not excluded but abnormalities are opposite of the most symptomatic side which would suggest more of a technical source. Unable to perform needle portion of the study due to BLE cellulitis. MRI brain (10/2020)-   Impression    1. 1 cm extra-axial enhancing mass along the anterior right tentorium    most compatible with a small meningioma. 2. Moderate chronic white matter ischemic changes. 3. Otherwise negative MR imaging of the brain    Signed by Dr Cameron Moyer on 10/5/2020 10:26 AM      MRI brain (10/2021)-   Impression   1. 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 2. Moderate chronic microvascular changes. Signed by Dr Caryn Sue     MRI brain (1/2022)-   Impression   Impression:    1. Stable extra-axial mass within the right posterior cranial fossa. This has a broad-based dural attachment to the right tentorium   cerebelli and is consistent with a meningioma. It is stable to   slightly increased from the previous exam. No evidence of associated   mass effect. 2. No evidence of diffusion restriction to suggest acute ischemia or   infarct. No additional sites of abnormal enhancement are present. 3. Mild atrophy.  Moderate small vessel ischemic changes are noted   involving the periventricular and higher white matter tracts stable   from the previous study. Signed by Dr Candace Rosales     MRI lumbar spine (3/2021)- muli level DDD; significant bilateral foraminal narrowing at L4/5. At L5/S1, left extra foraminal and foraminal disc osteophyte complex which contacts the left L5 nerve root causing mass effect on it, significant NF narrowing at L5/S1. Thecal sac narrowing at L2/3 and L3/4 with severe foraminal narrowing on the right at L3/4 and moderate foraminal narrowing at L2/3. Reviewed neurosurgery records     ASSESSMENT:    Brendan Vásquez is a 82y.o. year old female here for follow up. She has been doing better since last seen in the office. She was treated for shingles on her scalp and COVID 19 shortly after I saw her in the office. Headaches are overall improved, back on Lyrica, on low dose Nortriptyline as well. MRI brain with stable meningioma, likely not playing a role in headaches. Suspect post herpetic neuralgia component to headaches. 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 Will plan to follow this with serial imaging and if it increases in size then will have patient see neurosurgery. Neuropathy, back pain and right hip/groin pain is somewhat improved today. She is getting injections with Dr. Thom Glover which has been beneficial for back pain. She had decompressive laminectomy in 12/2020 with improvement in back pain initially. Recent MRI lumbar spine with significant DDD and several levels of NF narrowing. NCS with moderate sensorimotor polyneuropathy. 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 On Lyrica again. Previously weaned Gabapentin due to rash. Continue Cymbalta. Suspect that pain and gait changes are multi factorial with neuropathy, lower back pain, arthritis all playing a role. ICD-10-CM    1. Neuropathy  G62.9    2. Meningioma ([facility] [address]  D32.9    3. Nonintractable episodic headache, unspecified headache type  R51.9      PLAN:  1. Continue Nortriptyline, low dose   2.  Continue Cymbalta 30mg daily   3. Continue Lyrica   4. Continue follow up with pain management and cardiology as previously scheduled. 5. Maximize treatment of anxiety through PCP.   6. Follow up in 6 months, sooner with any worsening     Norris Armenta, JAYLIN, APRN

## 2022-03-29 NOTE — HOME HEALTH
Subjective: Patient reports she is feeling better and is able to get to the bathroom easier. Patient noticed her L leg was red, warm and swollen like cellulitis. Patient has appointment in the am and will ask her doctor to look at it.    Falls:none    Medication changes:none    Insurance changes:none    Edema: L LE red, warm and swollen. Patient to see physician in the am.    Medical necessity for continued visits: Skilled OT recommended to continue in order to enforce safety and UB HEP prior to d/c    Next MD appt: 3/29/22    Plan for next visit: HEP and safety education.

## 2022-03-31 ENCOUNTER — HOME CARE VISIT (OUTPATIENT)
Dept: HOME HEALTH SERVICES | Facility: CLINIC | Age: 87
End: 2022-03-31

## 2022-03-31 VITALS
SYSTOLIC BLOOD PRESSURE: 126 MMHG | HEART RATE: 64 BPM | DIASTOLIC BLOOD PRESSURE: 58 MMHG | RESPIRATION RATE: 16 BRPM | OXYGEN SATURATION: 97 % | TEMPERATURE: 97.3 F

## 2022-03-31 PROCEDURE — G0151 HHCP-SERV OF PT,EA 15 MIN: HCPCS

## 2022-04-01 ENCOUNTER — TELEPHONE (OUTPATIENT)
Dept: CARDIOLOGY CLINIC | Age: 87
End: 2022-04-01

## 2022-04-01 ENCOUNTER — TELEPHONE (OUTPATIENT)
Dept: PRIMARY CARE CLINIC | Age: 87
End: 2022-04-01

## 2022-04-01 RX ORDER — CLINDAMYCIN HYDROCHLORIDE 300 MG/1
300 CAPSULE ORAL 2 TIMES DAILY
Qty: 14 CAPSULE | Refills: 0 | Status: SHIPPED | OUTPATIENT
Start: 2022-04-01 | End: 2022-05-10 | Stop reason: SDUPTHER

## 2022-04-01 NOTE — TELEPHONE ENCOUNTER
Pt states she has cellulitis in her leg again and wanting to know if somethig can be called in for her.

## 2022-04-04 ENCOUNTER — HOME CARE VISIT (OUTPATIENT)
Dept: HOME HEALTH SERVICES | Facility: CLINIC | Age: 87
End: 2022-04-04

## 2022-04-04 VITALS
TEMPERATURE: 97.9 F | HEART RATE: 66 BPM | OXYGEN SATURATION: 99 % | DIASTOLIC BLOOD PRESSURE: 58 MMHG | SYSTOLIC BLOOD PRESSURE: 160 MMHG | RESPIRATION RATE: 18 BRPM

## 2022-04-04 PROCEDURE — G0152 HHCP-SERV OF OT,EA 15 MIN: HCPCS

## 2022-04-05 NOTE — HOME HEALTH
Subjective: Patient coming back from pharmacy on arrival. Patient agreeable to d/c this date    Falls:none    Medication changes:none    Insurance changes:none    Edema:slight B LE    Medical necessity for continued visits: d/c this date    Next MD appt:pending

## 2022-04-07 ENCOUNTER — CARE COORDINATION (OUTPATIENT)
Dept: CARE COORDINATION | Age: 87
End: 2022-04-07

## 2022-04-07 NOTE — CARE COORDINATION
Ambulatory Care Coordination Note  4/7/2022  CM Risk Score: 6  Charlson 10 Year Mortality Risk Score: 100%     ACC: Milagro Mazariegos, RN    Summary Note: ACM spoke to patient today. RAED 54%, Afib, DM(A1c 6.1 January). Huy Quiros stated she has been very weak and deconditioned because of COVID 19 and Shingles early this year. Pt was in and out of the hospital several times. All of this wore her down and she has been receiving [facility] services since home but just discharged from those services this week. She has relied on [facility] for improving her strength and stated she will continue to try and do her exercises to become stronger. For now, pt is using a rollator walker, has a BSC for night time urination and sleeps in a hospital bed. Her  still works - he fixes her breakfast and she gets MOW through the weekdays,  will fix or purchase supper for them. Her 15 yo grandson also lives in the home and she has a house dog - [place]. Patient said a friend comes over every Sunday afternoon and fixes her med box for the week, will stay and get supper for her also. She has a cleaning lady that comes weekly but said her health is poor and she cannot do the amount of work that Huy Quiros needs done. Huy Quiros is not able to do the heavier housecleaning that is needed. Patient takes Metformin for DM dx - January A1c 6.1. Pt does not check her blood sugar, does not have a meter. Huy Quiros is on an antibiotic for cellulitis of her left leg - it is less red but still has edema. Patient continues to experience some scalp pain after her Shingles. She is taking Lyrica. Patient was not able to review her medications, stated her friend could call [facility] and do that. She was able to confirm a few medications but did not perform full review. Esther's main concerns are getting stronger and finding a good . Actions: We discussed the exercises left by [facility] therapists. Patient will try to continue them.   We discussed circulation in BLE and how elevation can help reduce swelling - pt is willing to try leg elevation TID for at least 45 minutes each. Discussed that after the cellulitis is resolved, pt may want to consider wearing compression knee socks to help her circulation and reduce frequency of more cellulitis. Patient is willing to check her BP on MWF now that [facility] has discharged patient. [facility] will mail a local sitter/ list to patient for her review. ACM contacted patient's pharmacy and confirmed she refilled Xarelto on 4/4/22 and has 1 refill left. Next Call:  Review left leg cellulitis mgmt/symptoms. If infection is resolved, discuss wearing compression socks. Verify pt is elevating BLE tid. Review BP checks. Confirm patient received sitter list mailed on 4/8/22. Work with patient and her friend to review medications being taken. Goals       Patient Stated (pt-stated)       Patient would like to get stronger and find a     Barriers: impairment:  physical: medically deconditioned, lack of support, and overwhelmed by complexity of regimen  Plan for overcoming my barriers:   Patient is willing to work with AC to improve knowledge of self-care actions that support optimal health outcomes at home. Patient is willing to use community resources to locate/obtain satisfactory housekeeping help  Patient is willing to make lifestyle changes to support safety and improve physical wellbeing  Confidence: 7/10  Anticipated Goal Completion Date: 7/7/22            Diabetes Assessment    Medic Alert ID: No  Meal Planning: None   How often do you test your blood sugar?: No Testing   Do you have barriers with adherence to non-pharmacologic self-management interventions?  (Nutrition/Exercise/Self-Monitoring): No   Have you ever had to go to the ED for symptoms of low blood sugar?: No       No patient-reported symptoms          Ambulatory Care Coordination Assessment    Care Coordination Protocol  Program Enrollment: Complex Care  Referral from Primary Care Provider: Nancy  Week 1 - Initial Assessment     Do you have all of your prescriptions and are they filled?: Yes  Are you able to afford your medications?: Yes  How often do you have trouble taking your medications the way you have been told to take them?: I always take them as prescribed. Do you have Home O2 Therapy?: No      Ability to seek help/take action for Emergent Urgent situations i.e. fire, crime, inclement weather or health crisis.: Needs Assistance  Ability to ambulate to restroom: Independent  Ability handle personal hygeine needs (bathing/dressing/grooming): Independent  Ability to manage Medications: Needs Assistance  Ability to prepare Food Preparation: Dependent  Ability to maintain home (clean home, laundry): Needs Assistance  Ability to drive and/or has transportation: Dependent  Ability to do shopping: Dependent  Ability to manage finances: Independent  Is patient able to live independently?: No     Current Housing: Private Residence        Per the Fall Risk Screening, did the patient have 2 or more falls or 1 fall with injury in the past year?: No     Frequent urination at night?: Yes     Are you experiencing loss of meaning?: No  Are you experiencing loss of hope and peace?: No     Thinking about your patient's physical health needs, are there any symptoms or problems (risk indicators) you are unsure about that require further investigation?: Moderate to severe symptoms or problems that impact on daily life   Are the patients physical health problems impacting on their mental well-being?: Moderate to severe impact upon mental well-being and preventing enjoyment of usual activities   Are there any problems with your patients lifestyle behaviors (alcohol, drugs, diet, exercise) that are impacting on physical or mental well-being?: Some mild concern of potential negative impact on well-being   Do you have any other concerns about your patients mental well-being?  How would you rate their severity and impact on the patient?: Mild problems - don't interfere with function   How would you rate their home environment in terms of safety and stability (including domestic violence, insecure housing, neighbor harassment)?: Consistently safe, supportive, stable, no identified problems   How do daily activities impact on the patient's well-being? (include current or anticipated unemployment, work, caregiving, access to transportation or other): Contributes to low mood or stress at times   How would you rate their social network (family, work, friends)?: Adequate participation with social networks   How would you rate their financial resources (including ability to afford all required medical care)?: Financially secure, resources adequate, no identified problems   How wells does the patient now understand their health and well-being (symptoms, signs or risk factors) and what they need to do to manage their health?: Reasonable to good understanding and already engages in managing health or is willing to undertake better management   How well do you think your patient can engage in healthcare discussions? (Barriers include language, deafness, aphasia, alcohol or drug problems, learning difficulties, concentration): Adequate communication, with or without minor barriers   Do other services need to be involved to help this patient?: Other care/services not in place and required   Are current services involved with this patient well-coordinated? (Include coordination with other services you are now recommendation): Required care/services in place and adequately coordinated   Suggested Interventions and [address]: Completed (Comment: 4/7/22: Pt just discharged from [facility] services this week.)   Meals on Wheels: Completed. 4/7/22: Pt began MOW past 10 days or so. Other Services or Interventions: 4/7/22: Will mail private sitter list to patient.    Medi Set or Pill Pack: Completed. 4/7/22: Pt's friend sets up wkly med box   Zone Management Tools: In Process. 4/7/22: Will log MWF BP, elev legs TID. Will discuss comp socks after cellulitis is cleared up. Schedule an appointment with the patient's PCP, Set up/Review Goals, Set up/Review an Education Plan            Prior to Admission medications    Medication Sig Start Date End Date Taking?  Authorizing Provider   clindamycin (CLEOCIN) 300 MG capsule Take 1 capsule by mouth 2 times daily for 7 days 4/1/22 4/8/22 Yes Cari Lennette Rinne, APRN - CNP   pregabalin (LYRICA) 150 MG capsule TAKE 1 CAPSULE BY MOUTH EVERY 12 HOURS AS NEEDED 2/11/22  Yes Historical Provider, MD   [address] [place] [address] by Does not apply route 8/21/20  Yes EARLE Erazo CNP   omeprazole (PRILOSEC) 40 MG delayed release capsule TAKE 1 CAPSULE BY MOUTH DAILY 3/28/22   Vianey Lomax MD   DULoxetine (CYMBALTA) 30 MG extended release capsule Take 1 capsule by mouth daily 3/14/22   EARLE Erazo CNP   metoprolol succinate (TOPROL XL) 50 MG extended release tablet TAKE 1 TABLET BY MOUTH TWICE DAILY 3/3/22   EARLE Davis   HYDROcodone-acetaminophen (NORCO)  MG per tablet TAKE 1 TABLET BY MOUTH EVERY 6 HOURS AS NEEDED 2/11/22   Historical Provider, MD   metFORMIN (GLUCOPHAGE XR) 500 MG extended release tablet Take 1 tablet by mouth daily (with breakfast) 2/23/22   EARLE Erazo CNP   furosemide (LASIX) 20 MG tablet Take 1 tablet by mouth daily as needed (swelling) 2/23/22   EARLE Erazo CNP   nortriptyline (PAMELOR) 10 MG capsule Take 1 capsule by mouth nightly 1/23/22 3/29/22  Jose Nicole MD   melatonin 5 MG TBDP disintegrating tablet Take 1 tablet by mouth nightly 1/23/22 3/29/22  Jose Nicole MD   zinc sulfate (ZINCATE) 220 (50 Zn) MG capsule Take 1 capsule by mouth daily 1/24/22   Jose Nicole MD   ascorbic acid (VITAMIN C) 500 MG tablet Take 1 tablet by mouth 2 times daily 1/23/22 3/29/22  Trina Meyers MD   rivaroxaban (XARELTO) 20 MG TABS tablet Take 1 tablet by mouth daily (with breakfast) for 30 doses 1/24/22 3/29/22  Trina Meyers MD   vitamin D (ERGOCALCIFEROL) 1.25 MG (06288 UT) CAPS capsule TAKE 1 CAPSULE BY MOUTH 1 TIME A WEEK 12/20/21   James Ocampo MD   irbesartan (AVAPRO) 300 MG tablet Take 1 tablet by mouth daily 11/9/21   Gladystine EARLE Pelletier   dilTIAZem (CARDIZEM CD) 120 MG extended release capsule TAKE 1 CAPSULE BY MOUTH TWICE DAILY 10/15/21   EARLE Evans   nystatin (MYCOSTATIN) 483110 UNIT/GM powder For rash under breast. Apply 3 times daily.  7/28/21   EARLE Orourke CNP   Cyanocobalamin (B-12) 500 MCG TABS TAKE 1 TABLET BY MOUTH DAILY 3/9/21   James Ocampo MD       Future Appointments   Date Time Provider Jose Armando Milner   4/26/2022  9:30 AM EARLE Orourke CNP [facility]   5/9/2022  1:00 PM MD BIANKA Luna Cardio [facility]   9/29/2022 10:[facility]

## 2022-04-18 ENCOUNTER — TELEPHONE (OUTPATIENT)
Dept: PRIMARY CARE CLINIC | Age: 87
End: 2022-04-18

## 2022-04-19 NOTE — TELEPHONE ENCOUNTER
Please let the patient know her insurance has denied and she can take otc iron and recheck level in 3 months

## 2022-04-26 ENCOUNTER — OFFICE VISIT (OUTPATIENT)
Dept: PRIMARY CARE CLINIC | Age: 87
End: 2022-04-26
Payer: MEDICARE

## 2022-04-26 VITALS
OXYGEN SATURATION: 98 % | HEART RATE: 78 BPM | BODY MASS INDEX: 24.63 KG/M2 | HEIGHT: 63 IN | WEIGHT: 139 LBS | DIASTOLIC BLOOD PRESSURE: 80 MMHG | SYSTOLIC BLOOD PRESSURE: 130 MMHG | TEMPERATURE: 97.4 F

## 2022-04-26 DIAGNOSIS — N18.32 STAGE 3B CHRONIC KIDNEY DISEASE (HCC): ICD-10-CM

## 2022-04-26 DIAGNOSIS — Z95.828 PORT-A-CATH IN PLACE: ICD-10-CM

## 2022-04-26 DIAGNOSIS — Z45.2 ENCOUNTER FOR CARE RELATED TO PORT-A-CATH: ICD-10-CM

## 2022-04-26 DIAGNOSIS — R53.81 PHYSICAL DECONDITIONING: ICD-10-CM

## 2022-04-26 DIAGNOSIS — E11.9 TYPE 2 DIABETES MELLITUS WITHOUT COMPLICATION, WITHOUT LONG-TERM CURRENT USE OF INSULIN (HCC): ICD-10-CM

## 2022-04-26 DIAGNOSIS — L03.116 CELLULITIS OF LEFT LEG: Primary | ICD-10-CM

## 2022-04-26 PROCEDURE — 3044F HG A1C LEVEL LT 7.0%: CPT | Performed by: NURSE PRACTITIONER

## 2022-04-26 PROCEDURE — 99213 OFFICE O/P EST LOW 20 MIN: CPT | Performed by: NURSE PRACTITIONER

## 2022-04-26 RX ORDER — CEPHALEXIN 500 MG/1
500 CAPSULE ORAL 3 TIMES DAILY
Qty: 21 CAPSULE | Refills: 0 | Status: SHIPPED | OUTPATIENT
Start: 2022-04-26 | End: 2022-05-03

## 2022-04-26 RX ORDER — PREGABALIN 100 MG/1
CAPSULE ORAL
COMMUNITY
Start: 2022-03-28 | End: 2022-09-01

## 2022-04-26 ASSESSMENT — PATIENT HEALTH QUESTIONNAIRE - PHQ9
2. FEELING DOWN, DEPRESSED OR HOPELESS: 0
SUM OF ALL RESPONSES TO PHQ QUESTIONS 1-9: 0
9. THOUGHTS THAT YOU WOULD BE BETTER OFF DEAD, OR OF HURTING YOURSELF: 0
5. POOR APPETITE OR OVEREATING: 0
7. TROUBLE CONCENTRATING ON THINGS, SUCH AS READING THE NEWSPAPER OR WATCHING TELEVISION: 0
6. FEELING BAD ABOUT YOURSELF - OR THAT YOU ARE A FAILURE OR HAVE LET YOURSELF OR YOUR FAMILY DOWN: 0
3. TROUBLE FALLING OR STAYING ASLEEP: 0
8. MOVING OR SPEAKING SO SLOWLY THAT OTHER PEOPLE COULD HAVE NOTICED. OR THE OPPOSITE, BEING SO FIGETY OR RESTLESS THAT YOU HAVE BEEN MOVING AROUND A LOT MORE THAN USUAL: 0
4. FEELING TIRED OR HAVING LITTLE ENERGY: 0
SUM OF ALL RESPONSES TO PHQ QUESTIONS 1-9: 0

## 2022-04-26 ASSESSMENT — ENCOUNTER SYMPTOMS: BACK PAIN: 1

## 2022-04-26 NOTE — PROGRESS NOTES
Neuropathy of foot     In both feet    Osteoarthritis     left knee pain    Restless legs syndrome     Type 2 diabetes mellitus 10/25/2021    Vitamin D deficiency       Past Surgical History:   Procedure Laterality Date    APPENDECTOMY      BREAST BIOPSY      CATARACT REMOVAL       SECTION      CHOLECYSTECTOMY      COLECTOMY  partial    COLON SURGERY      COLONOSCOPY  2010    COLONOSCOPY  2012    Dr Huan Doty: unremarkable enterocolonic anastamosis; hyperplastic polyps    COLONOSCOPY  2013    Montez: unremarkable post-surgical colon    COLONOSCOPY  2016    Dr Cristina Shane colon anastomosis, 5 yr recall    COLONOSCOPY N/A 2021    Dr Renato Raygoza and patent anastomosis in the right side-BCM, prn (age)    COLONOSCOPY  2021    Dr Renato Raygoza and patent anastomosis in the right side-BCM, prn (age)   [place] FOOT SURGERY  right foot    HAND SURGERY Right     Dr Kaela Aponte N/A 2020    KYPHOPLASTY L5 performed by Young [place], DO at [address] [facility]  [place] Right 2020    RIGHT L 4-5 DECOMPRESSIVE LAMINECTOMY performed by [employer] [place], DO at [address] [facility]      UPPER GASTROINTESTINAL ENDOSCOPY  10/16/2009    UPPER GASTROINTESTINAL ENDOSCOPY  2013    Bradley: peptic stricture dilated to 48F; HH; small antral mucosal elevation    UPPER GASTROINTESTINAL ENDOSCOPY  2014    Muarilio: dilation of esophageal stricture    VARICOSE VEIN SURGERY Left 2014  [facility]    Left GSV Ablation    VARICOSE VEIN SURGERY Right 2014  [facility]    Right GSV Ablation       Vitals 2022 3/29/2022 2022 2022 2022 1634   SYSTOLIC 095 421 - - 168 -   DIASTOLIC 80 60 - - 63 -   Pulse 78 57 108 - 58 -   Temp 97.4 97.1 97.1 - 96.1 -   Resp - - 16 - 15 -   SpO2 98 97 94 - 96 -   Weight 139 lb 134 lb 134 lb - - -   Height 5' 3\" 5' 3\" 5' 3\" - - - Body mass index 24.62 kg/m2 23.73 kg/m2 23.73 kg/m2 - - -   Pain Level - - - 8 - 3   Some recent data might be hidden       Family History   Problem Relation Age of Onset    Cancer Mother         Cervical Cancer    Cancer Brother         Esophageal cancer    Diabetes Brother     Esophageal Cancer Brother     Diabetes Sister     Colon Cancer Neg Hx     Colon Polyps Neg Hx     Liver Cancer Neg Hx     Liver Disease Neg Hx     Rectal Cancer Neg Hx     Stomach Cancer Neg Hx        Social History     Tobacco Use    Smoking status: Never Smoker    Smokeless tobacco: Never Used   Substance Use Topics    Alcohol use: No      Current Outpatient Medications on File Prior to Visit   Medication Sig Dispense Refill    omeprazole (PRILOSEC) 40 MG delayed release capsule TAKE 1 CAPSULE BY MOUTH DAILY 90 capsule 3    DULoxetine (CYMBALTA) 30 MG extended release capsule Take 1 capsule by mouth daily 30 capsule 3    metoprolol succinate (TOPROL XL) 50 MG extended release tablet TAKE 1 TABLET BY MOUTH TWICE DAILY 60 tablet 5    HYDROcodone-acetaminophen (NORCO)  MG per tablet TAKE 1 TABLET BY MOUTH EVERY 6 HOURS AS NEEDED      pregabalin (LYRICA) 150 MG capsule TAKE 1 CAPSULE BY MOUTH EVERY 12 HOURS AS NEEDED      metFORMIN (GLUCOPHAGE XR) 500 MG extended release tablet Take 1 tablet by mouth daily (with breakfast) 30 tablet 3    furosemide (LASIX) 20 MG tablet Take 1 tablet by mouth daily as needed (swelling) 30 tablet 3    melatonin 5 MG TBDP disintegrating tablet Take 1 tablet by mouth nightly 30 tablet 0    zinc sulfate (ZINCATE) 220 (50 Zn) MG capsule Take 1 capsule by mouth daily 30 capsule 0    ascorbic acid (VITAMIN C) 500 MG tablet Take 1 tablet by mouth 2 times daily 60 tablet 0    rivaroxaban (XARELTO) 20 MG TABS tablet Take 1 tablet by mouth daily (with breakfast) for 30 doses 30 tablet 0    vitamin D (ERGOCALCIFEROL) 1.25 MG (44677 UT) CAPS capsule TAKE 1 CAPSULE BY MOUTH 1 TIME A WEEK 12 capsule 1    irbesartan (AVAPRO) 300 MG tablet Take 1 tablet by mouth daily 30 tablet 5    dilTIAZem (CARDIZEM CD) 120 MG extended release capsule TAKE 1 CAPSULE BY MOUTH TWICE DAILY 180 capsule 3    Cyanocobalamin (B-12) 500 MCG TABS TAKE 1 TABLET BY MOUTH DAILY 30 tablet [address] by Does not apply route 1 each 0    pregabalin (LYRICA) 100 MG capsule TAKE 1 CAPSULE BY MOUTH EVERY 12 HOURS AS NEEDED (Patient not taking: Reported on 4/26/2022)      nystatin (MYCOSTATIN) 838718 UNIT/GM powder For rash under breast. Apply 3 times daily. (Patient not taking: Reported on 4/26/2022) 1 Bottle 1     No current facility-administered medications on file prior to visit. Allergies   Allergen Reactions    Zoloft [Sertraline Hcl] Other (See Comments)     Patient states that she doesn't want this medication anymore because she hallucinated and saw spiders. Patient weaned herself off and after she quit taking the medication, the hallucinations stopped. Patient states that she doesn't want this medication anymore because she hallucinated and saw spiders. Patient weaned herself off and after she quit taking the medication, the hallucinations stopped.  Gabapentin Rash     Pt weaning off due to rash and hot flashes       Health Maintenance   Topic Date Due    Shingles Vaccine (1 of 2) Never done    Depression Monitoring  04/20/2022    Annual Wellness Visit (AWV)  04/21/2022    Potassium  02/23/2023    Creatinine  02/23/2023    DTaP/Tdap/Td vaccine (3 - Td or Tdap) 12/23/2025    DEXA (modify frequency per FRAX score)  Completed    Colorectal Cancer Screen  Completed    Flu vaccine  Completed    Pneumococcal 65+ years Vaccine  Completed    COVID-19 Vaccine  Completed    Hepatitis A vaccine  Aged Out    Hib vaccine  Aged Out    Meningococcal (ACWY) vaccine  Aged Out       Subjective:      Review of Systems   Constitutional: Negative.     Endocrine:        Diabetes   Musculoskeletal: Positive for arthralgias and back pain. Hip pain   Skin: Positive for wound. Psychiatric/Behavioral: Positive for dysphoric mood. The patient is nervous/anxious. Objective:     Physical Exam  Vitals and nursing note reviewed. Constitutional:       Appearance: She is well-developed. Comments: Sitting up in wheelchair   HENT:      Head: Normocephalic. Right Ear: External ear normal.      Left Ear: External ear normal.   Eyes:      Pupils: Pupils are equal, round, and reactive to light. Cardiovascular:      Rate and Rhythm: Normal rate and regular rhythm. Pulmonary:      Effort: Pulmonary effort is normal.      Breath sounds: Normal breath sounds. Musculoskeletal:      Cervical back: Normal range of motion. Skin:     General: Skin is warm and dry. Capillary Refill: Capillary refill takes less than 2 seconds. Neurological:      General: No focal deficit present. Mental Status: She is alert and oriented to person, place, and time. Psychiatric:         Mood and Affect: Mood normal.         Behavior: Behavior normal.         Thought Content: Thought content normal.         Judgment: Judgment normal.       /80   Pulse 78   Temp 97.4 °F (36.3 °C)   Ht 5' 3\" (1.6 m)   Wt 139 lb (63 kg)   SpO2 98%   BMI 24.62 kg/m²   Venous Access Procedure Note  Indication: maintenance flush    Procedure: The patient was placed in the appropriate position and the skin over the puncture site was prepped with betadine and draped in a sterile fashion. Intravenous access was obtained in right chest port with a Bliss needle and the site was secured appropriately. The port was flushed with 10cc NS and then 3 cc heparin flush. Needle removed. The patient tolerated the procedure well. Complications: None    Assessment:       Diagnosis Orders   1. Cellulitis of left leg     2.  Type 2 diabetes mellitus without complication, without long-term current use of insulin (HCC)     3. Stage 3b chronic kidney disease ([facility] [address]     4. Physical deconditioning     5. Encounter for care related to Port-a-Cath  NE [place]   6. Port-A-Cath in place  NE IRRIG IMPLANTED DRUG DELIVERY DEVICE         Plan:   More than 50% of the time was spent counseling and coordinating care for a total time [id number] face to face. I encouraged her to make an appointment with Dr. Harding for her lower extremity. I reviewed her blood work from February which was all okay and we did not repeat any today we can repeat it with her next port flush. I have asked her to elevate her legs often and she can apply warm moist heat. PDMP Monitoring:    Last PDMP Maddy Samples as Reviewed [facility]):  Review User Review Instant Review Result          Last Controlled Substance Monitoring Documentation      Office Visit from 3/4/2020 in [facility]   Attestation The Prescription Monitoring Report for this patient was reviewed today. filed at 03/04/2020 1048   Periodic Controlled Substance Monitoring Possible medication side effects, risk of tolerance/dependence & alternative treatments discussed., No signs of potential drug abuse or diversion identified. filed at 03/04/2020 1048        Urine Drug Screenings (1 yr)     POCT Rapid Drug Screen  Collected: 3/4/2020 11:37 AM (Final result)    Complete Results              Medication Contract and Consent for Opioid Use Documents Filed     Patient Documents     Type of Document Status Date Received Received By Description    Medication Contract Signed 3/19/2019  2:29 PM Billie hicks neuro    Medication Contract Received 2/1/2021  1:35 PM EMMA BEVERLY Medication Contract Gabapentin                 Patient given educational materials -see patient instructions. Discussed use, benefit, and side effects of prescribed medications. All patient questions answered. Pt voiced understanding. Reviewed health maintenance. Instructed to continue currentmedications, diet and exercise. Patient agreed with treatment plan. Follow up as directed. MEDICATIONS:  Orders Placed This Encounter   Medications    cephALEXin (KEFLEX) 500 MG capsule     Sig: Take 1 capsule by mouth 3 times daily for 7 days     Dispense:  21 capsule     Refill:  0         ORDERS:  Orders Placed This Encounter   Procedures    CA IRRIG IMPLANTED DRUG DELIVERY DEVICE       Follow-up:  Return if symptoms worsen or fail to improve, for 2 months. PATIENT INSTRUCTIONS:  There are no Patient Instructions on file for this visit. Electronically signed by EARLE Orourke CNP on 4/26/2022 at 1:34 PM    EMR Dragon/transcription disclaimer:  Much of thisencounter note is electronic transcription/translation of spoken language to printed texts. The electronic translation of spoken language may be erroneous, or at times, nonsensical words or phrases may be inadvertentlytranscribed.   Although I have reviewed the note for such errors, some may still exist.

## 2022-05-09 ENCOUNTER — OFFICE VISIT (OUTPATIENT)
Dept: CARDIOLOGY CLINIC | Age: 87
End: 2022-05-09
Payer: MEDICARE

## 2022-05-09 VITALS
SYSTOLIC BLOOD PRESSURE: 122 MMHG | HEART RATE: 64 BPM | BODY MASS INDEX: 24.45 KG/M2 | WEIGHT: 138 LBS | HEIGHT: 63 IN | OXYGEN SATURATION: 97 % | DIASTOLIC BLOOD PRESSURE: 50 MMHG

## 2022-05-09 DIAGNOSIS — I48.0 PAF (PAROXYSMAL ATRIAL FIBRILLATION) (HCC): Primary | ICD-10-CM

## 2022-05-09 PROCEDURE — 99214 OFFICE O/P EST MOD 30 MIN: CPT | Performed by: INTERNAL MEDICINE

## 2022-05-09 PROCEDURE — 93000 ELECTROCARDIOGRAM COMPLETE: CPT | Performed by: INTERNAL MEDICINE

## 2022-05-09 ASSESSMENT — ENCOUNTER SYMPTOMS
WHEEZING: 0
SHORTNESS OF BREATH: 0
APNEA: 0
EYE PAIN: 0
EYE DISCHARGE: 0
CONSTIPATION: 0
BLOOD IN STOOL: 0
SORE THROAT: 0
COUGH: 0
DIARRHEA: 0
FACIAL SWELLING: 0
CHEST TIGHTNESS: 0
EYE REDNESS: 0
NAUSEA: 0
ABDOMINAL PAIN: 0
VOMITING: 0
ABDOMINAL DISTENTION: 0

## 2022-05-09 NOTE — PROGRESS NOTES
Cardiology Office Visit Note  [address]  [id number]  Phone: [phone]  Fax: [phone]                            Date:  5/9/2022  Patient: Jahaira Human  Age:  82 y.o., 1935    Referral: No ref.  provider found    REASON FOR VISIT:  Follow-up (Essential hypertension, PAF (paroxysmal atrial fibrillation) ([facility]))         PROBLEM LIST:    Patient Active Problem List    Diagnosis Date Noted    Palliative care patient 02/02/2022     Priority: Low    Acute hypoxemic respiratory failure due to COVID-19 ([facility] 02/01/2022     Priority: Low    COVID-19 virus infection 01/20/2022     Priority: Low    History of atrial fibrillation 01/20/2022     Priority: Low    COVID-19 01/20/2022     Priority: Low    Iron deficiency anemia 01/13/2022     Priority: Low    Type 2 diabetes mellitus 10/25/2021     Priority: Low    Atrial fibrillation with RVR ([facility] 09/16/2021     Priority: Low    History of esophageal stricture 08/24/2021     Priority: Low    Depression 04/20/2021     Priority: Low    Fungal dermatitis 05/07/2020     Priority: Low    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 05/07/2020     Priority: Low    Mixed simple and mucopurulent chronic bronchitis ([facility] 05/04/2020     Priority: Low    Malignant neoplasm of colon ([facility] 04/20/2020     Priority: Low    Cellulitis of left lower limb 06/29/2019     Priority: Low    Fracture of right foot 06/29/2019     Priority: Low    Hypochloremia 06/29/2019     Priority: Low    CKD (chronic kidney disease) stage 3, GFR 30-59 ml/min ([facility]) 06/29/2019     Priority: Low    Acute deep vein thrombosis (DVT) of femoral vein of left lower extremity ([facility] 06/12/2019     Priority: Low    Pain in both lower extremities      Priority: Low    Numbness and tingling of both feet      Priority: Low    Avascular necrosis ([facility] 04/08/2019     Priority: Low    History of DVT (deep vein thrombosis) 12/18/2018 Priority: Low    Port-A-Cath in place 02/19/2018     Priority: Low     Overview Note:     Replacing Deprecated Diagnoses      History of colon polyps 03/11/2016     Priority: Low    Essential hypertension 08/25/2015     Priority: Low    Encounter for care related to Port-a-Cath 08/11/2015     Priority: Low    Vitamin D deficiency 03/19/2015     Priority: Low    Paroxysmal supraventricular tachycardia ([facility] 02/18/2015     Priority: Low    Dysphagia 10/21/2014     Priority: Low    Hypertriglyceridemia 08/15/2014     Priority: Low    Venous insufficiency 03/20/2014     Priority: Low    Varicose veins of left lower extremity with inflammation, 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 ([facility] 02/17/2014     Priority: Low    Fibromyalgia 11/02/2012     Priority: Low    Renal bruit 11/02/2012     Priority: Low     Overview Note:     bilateral      Personal history of colon cancer 11/30/2011     Priority: Low    Family history of esophageal cancer 11/30/2011     Priority: Low         PRESENTATION: Crystal Washington is a 82y.o. year old female has a past medical history significant for atrial fibrillation, hypertension and DVT on chronic anticoagulation therapy. She was recently hospitalized with COVID-19 infection, not requiring ventilation. Her hospital course was complicated by shingles. No mention of cardiac instability. She is compliant with her medications which include diltiazem, irbesartan and metoprolol. She is steadily progressing well and has no new complaints at this time. REVIEW OF SYSTEMS:  Review of Systems   Constitutional: Negative for chills, fatigue and fever. HENT: Negative for congestion, facial swelling, hearing loss and sore throat. Eyes: Negative for pain, discharge, redness and visual disturbance. Respiratory: Negative for apnea, cough, chest tightness, shortness of breath and wheezing. Cardiovascular: Negative for chest pain, palpitations and leg swelling. Gastrointestinal: Negative for abdominal distention, abdominal pain, blood in stool, constipation, diarrhea, nausea and vomiting. Endocrine: Negative for polydipsia, polyphagia and polyuria. Genitourinary: Negative for dysuria, flank pain, frequency and hematuria. Musculoskeletal: Negative for joint swelling, myalgias and neck pain. Skin: Negative for pallor and rash. Neurological: Negative for dizziness, syncope, speech difficulty, light-headedness, numbness and headaches. Psychiatric/Behavioral: Negative for confusion, hallucinations and sleep disturbance.        Past Medical History:      Diagnosis Date    [facility])     Bronchitis     Colon cancer (HCC)     Colon polyps     Constipation     Deep vein blood clot of left lower extremity (HCC)     Left leg     Depression     DVT (deep venous thrombosis) (HCC)     Dysphagia     Fibrocystic breast disease     Fibromyalgia     GERD (gastroesophageal reflux disease)     reflux with hx of esophageal stricture    Headache(784.0)     History of colon cancer     Hormone replacement therapy (postmenopausal)     Hypertension     Neuropathy of foot     In both feet    Osteoarthritis     left knee pain    Restless legs syndrome     Type 2 diabetes mellitus 10/25/2021    Vitamin D deficiency        Past Surgical History:      Procedure Laterality Date    APPENDECTOMY      BREAST BIOPSY      CATARACT REMOVAL       SECTION      CHOLECYSTECTOMY      COLECTOMY  partial    COLON SURGERY      COLONOSCOPY  2010    COLONOSCOPY  2012    Dr Susan Pak: unremarkable enterocolonic anastamosis; hyperplastic polyps    COLONOSCOPY  2013    [facility]: unremarkable post-surgical colon    COLONOSCOPY  2016    Dr Ninoska Mccann colon anastomosis, 5 yr recall    COLONOSCOPY N/A 2021    Dr Kendall Gagnon and patent anastomosis in the right side-BCM, prn (age)    COLONOSCOPY  2021    Dr Kendall Gagnon and patent anastomosis in the right side-BCM, prn (age)   [address]  right foot    HAND SURGERY Right     Dr Aaron Williamson N/A 07/06/2020    KYPHOPLASTY L5 performed by Yosi Hooks DO at [address]  [place] Right 12/14/2020    RIGHT L 4-5 DECOMPRESSIVE LAMINECTOMY performed by Yosi Hooks DO at [address] [facility] TOTAL KNEE ARTHROPLASTY      UPPER GASTROINTESTINAL ENDOSCOPY  10/16/2009    UPPER GASTROINTESTINAL ENDOSCOPY  03/01/2013    [place]: peptic stricture dilated to 48F; HH; small antral mucosal elevation    UPPER GASTROINTESTINAL ENDOSCOPY  12/01/2014    [place]: dilation of esophageal stricture    VARICOSE VEIN SURGERY Left 03/14/2014  [facility]    Left GSV Ablation    VARICOSE VEIN SURGERY Right 04/04/2014  [facility]    Right GSV Ablation       Medications:  Current Outpatient Medications   Medication Sig Dispense Refill    omeprazole (PRILOSEC) 40 MG delayed release capsule TAKE 1 CAPSULE BY MOUTH DAILY 90 capsule 3    DULoxetine (CYMBALTA) 30 MG extended release capsule Take 1 capsule by mouth daily 30 capsule 3    metoprolol succinate (TOPROL XL) 50 MG extended release tablet TAKE 1 TABLET BY MOUTH TWICE DAILY 60 tablet 5    HYDROcodone-acetaminophen (NORCO)  MG per tablet TAKE 1 TABLET BY MOUTH EVERY 6 HOURS AS NEEDED      pregabalin (LYRICA) 150 MG capsule TAKE 1 CAPSULE BY MOUTH EVERY 12 HOURS AS NEEDED      metFORMIN (GLUCOPHAGE XR) 500 MG extended release tablet Take 1 tablet by mouth daily (with breakfast) 30 tablet 3    furosemide (LASIX) 20 MG tablet Take 1 tablet by mouth daily as needed (swelling) 30 tablet 3    melatonin 5 MG TBDP disintegrating tablet Take 1 tablet by mouth nightly 30 tablet 0    zinc sulfate (ZINCATE) 220 (50 Zn) MG capsule Take 1 capsule by mouth daily 30 capsule 0    ascorbic acid (VITAMIN C) 500 MG tablet Take 1 tablet by mouth 2 times daily 60 tablet 0    rivaroxaban (XARELTO) 20 MG TABS tablet Take 1 tablet by mouth daily (with breakfast) for 30 doses 30 tablet 0    vitamin D (ERGOCALCIFEROL) 1.25 MG (35737 UT) CAPS capsule TAKE 1 CAPSULE BY MOUTH 1 TIME A WEEK 12 capsule 1    irbesartan (AVAPRO) 300 MG tablet Take 1 tablet by mouth daily 30 tablet 5    dilTIAZem (CARDIZEM CD) 120 MG extended release capsule TAKE 1 CAPSULE BY MOUTH TWICE DAILY 180 capsule 3    Cyanocobalamin (B-12) 500 MCG TABS TAKE 1 TABLET BY MOUTH DAILY 30 tablet [address] [facility] Bed MISC by Does not apply route 1 each 0    pregabalin (LYRICA) 100 MG capsule TAKE 1 CAPSULE BY MOUTH EVERY 12 HOURS AS NEEDED (Patient not taking: Reported on 4/26/2022)      nystatin (MYCOSTATIN) 598341 UNIT/GM powder For rash under breast. Apply 3 times daily. (Patient not taking: Reported on 4/26/2022) 1 Bottle 1     No current facility-administered medications for this visit. Allergies:  Zoloft [sertraline hcl] and Gabapentin    Social History:  Social History     Occupational History    Not on file   Tobacco Use    Smoking status: Never Smoker    Smokeless tobacco: Never Used   Vaping Use    Vaping Use: Never used   Substance and Sexual Activity    Alcohol use: No    Drug use: No    Sexual activity: Yes     Partners: Male         Family History:       Problem Relation Age of Onset    Cancer Mother         Cervical Cancer    Cancer Brother         Esophageal cancer    Diabetes Brother     Esophageal Cancer Brother     Diabetes Sister     Colon Cancer Neg Hx     Colon Polyps Neg Hx     Liver Cancer Neg Hx     Liver Disease Neg Hx     Rectal Cancer Neg Hx     Stomach Cancer Neg Hx          Physical Examination:  BP (!) 122/50   Ht 5' 3\" (1.6 m)   Wt 138 lb (62.6 kg)   BMI 24.45 kg/m²   Physical Exam  Vitals reviewed. Constitutional:       General: She is not in acute distress. Appearance: Normal appearance. She is not ill-appearing, toxic-appearing or diaphoretic.    HENT:      Head: Normocephalic and atraumatic. Eyes:      General: No scleral icterus. Right eye: No discharge. Left eye: No discharge. Conjunctiva/sclera: Conjunctivae normal.   Neck:      Vascular: No carotid bruit. Cardiovascular:      Rate and Rhythm: Normal rate and regular rhythm. No extrasystoles are present. Chest Wall: PMI is not displaced. No thrill. Heart sounds: S1 normal and S2 normal. No murmur heard. No friction rub. No gallop. Pulmonary:      Effort: Pulmonary effort is normal. No tachypnea or respiratory distress. Breath sounds: Normal breath sounds. No stridor. No wheezing, rhonchi or rales. Chest:      Chest wall: No tenderness. Abdominal:      General: Bowel sounds are normal. There is no distension. Palpations: Abdomen is soft. There is no mass. Tenderness: There is no abdominal tenderness. There is no guarding. Musculoskeletal:         General: No swelling. Cervical back: Normal range of motion and neck supple. No rigidity. Right lower leg: No edema. Left lower leg: No edema. Skin:     General: Skin is warm and dry. Coloration: Skin is not jaundiced. Findings: No erythema or rash. Neurological:      General: No focal deficit present. Mental Status: She is alert and oriented to person, place, and time. Mental status is at baseline. Psychiatric:         Mood and Affect: Mood normal.         Behavior: Behavior normal.         Thought Content:  Thought content normal.           Labs:   CBC: No results found for: CBC   BMP: No results found for: BMP    BNP: No results found for: BNPINT   PT/INR:   Prothrombin Time   Date Value Ref Range Status   12/02/2011 15.92 (H) 9.7 - 12.5 SEC Final     Protime   Date Value Ref Range Status   02/02/2022 13.1 12.0 - 14.6 sec Final     INR   Date Value Ref Range Status   02/02/2022 0.97 0.88 - 1.18 Final     Comment:     INR  < or = 1.3  Normal  INR = 2.0 - 3.0  Therapeutic  INR = 2.5 - 3.5  Therapeutic for patients with mechanical  prosthetic heart valve & MI prophylaxis  INR  > or = 3.5  Abnormal/Elevated  INR  > or = 5.0  Critical (requires immediate physician  notification)         APTT:    aPTT   Date Value Ref Range Status   12/09/2020 23.6 (L) 26.0 - 36.2 sec Final      CARDIAC ENZYMES:   Total CK   Date Value Ref Range Status   09/10/2020 33 26 - 192 U/L Final     Troponin   Date Value Ref Range Status   02/01/2022 <0.01 0.00 - 0.03 ng/mL Final     Comment:     <0.030 ng/ml       No measurable cardiac damage. 0.030-0.099 ng/ml  Values of troponin in this range suggest  possible myocardial damage. Repeat assay  4 to 6 hours after the current specimen.    >= 0.100 ng/ml     Indicative of myocardial damage. Recommend continued monitoring of  patient status and cardiac markers. LIPID PANEL: No results found for: Wendie Cheatham  LIVER PROFILE:   AST   Date Value Ref Range Status   02/23/2022 14 5 - 32 U/L Final     ALT   Date Value Ref Range Status   02/23/2022 13 5 - 33 U/L Final     Albumin   Date Value Ref Range Status   02/23/2022 2.9 (L) 3.5 - 5.2 g/dL Final          EKG: Sinus rhythm 64 bpm      Nuclear medicine stress test 10/27/2021  1. Ejection fraction 86%   2. Wall motion grossly unremarkable    3. Myocardial perfusion imaging demonstrated homogeneous uptake of the   tracer in all visualized segments no definite areas of ischemia or   infarction are identified. Summary impressions:   Normal ejection fraction 86% probably normal myocardial perfusion   study no evidence of ischemia or previous infarction. Signed by Dr Tex Ellis     Type of Study  TTE procedure: ECHO NO CONTRAST WITH DOP/COLR. BP: 112/83 mmHg  Conclusions  Summary  Normal left ventricular size with preserved LV systolic function and visually estimated ejection fraction of approximately 70%. No regional wall motion abnormalities. Mild concentric left ventricular hypertrophy.   Indeterminate diastolic dysfunction with evidence for increased LVEDP. Normal right ventricular size with hyperdynamic RV systolic function. Mild tricuspid regurgitation with normal estimated RVSP. Mild pulmonic regurgitation present. Aortic root and ascending aorta are within normal limits. No evidence of significant pericardial effusion is noted. The rhythm is sinus. Compared to study report dated 3/18/2019, the LVEF has improved. Signature  Electronically signed by Juan Stone(Interpreting physician) on 09/17/2021 09:48 AM        ASSESSMENT and PLAN:    Paroxysmal atrial fibrillation, presently maintaining sinus rhythm, last known EF 70%  History of DVT with chronic intermittent left lower extremity edema  Continue anticoagulation therapy for antithrombotic risk reduction    Valvular heart disease, mild tricuspid and pulmonary regurgitation  Surveillance echocardiogram in 2- 3 years    Recent COVID-19 infection, resolved                Electronically signed by Sunny Hill MD on 5/9/2022 at 2:18 PM    Sunny Hill MD, [place], [facility] [place]  Noninvasive Cardiology Consultant    This dictation was generated by voice recognition computer software. Although all attempts are made to edit the dictation for accuracy, there may be errors in the transcription that are not intended.

## 2022-05-10 ENCOUNTER — TELEPHONE (OUTPATIENT)
Dept: PRIMARY CARE CLINIC | Age: 87
End: 2022-05-10

## 2022-05-10 RX ORDER — CLINDAMYCIN HYDROCHLORIDE 300 MG/1
300 CAPSULE ORAL 2 TIMES DAILY
Qty: 14 CAPSULE | Refills: 0 | Status: SHIPPED | OUTPATIENT
Start: 2022-05-10 | End: 2022-07-20 | Stop reason: SDUPTHER

## 2022-05-10 NOTE — TELEPHONE ENCOUNTER
I did send her an antibiotic to the pharmacy but it is not getting better she needs to call the dermatologist again.

## 2022-06-16 ENCOUNTER — OFFICE VISIT (OUTPATIENT)
Dept: PRIMARY CARE CLINIC | Age: 87
End: 2022-06-16
Payer: MEDICARE

## 2022-06-16 VITALS
TEMPERATURE: 97.6 F | SYSTOLIC BLOOD PRESSURE: 130 MMHG | WEIGHT: 138 LBS | HEART RATE: 47 BPM | BODY MASS INDEX: 24.45 KG/M2 | OXYGEN SATURATION: 98 % | DIASTOLIC BLOOD PRESSURE: 40 MMHG | HEIGHT: 63 IN | RESPIRATION RATE: 18 BRPM

## 2022-06-16 DIAGNOSIS — E11.9 TYPE 2 DIABETES MELLITUS WITHOUT COMPLICATION, WITHOUT LONG-TERM CURRENT USE OF INSULIN (HCC): ICD-10-CM

## 2022-06-16 DIAGNOSIS — L29.9 ITCHING: ICD-10-CM

## 2022-06-16 DIAGNOSIS — L03.116 CELLULITIS OF LEFT LOWER LEG: Primary | ICD-10-CM

## 2022-06-16 PROCEDURE — 99214 OFFICE O/P EST MOD 30 MIN: CPT | Performed by: NURSE PRACTITIONER

## 2022-06-16 PROCEDURE — 1123F ACP DISCUSS/DSCN MKR DOCD: CPT | Performed by: NURSE PRACTITIONER

## 2022-06-16 PROCEDURE — 3044F HG A1C LEVEL LT 7.0%: CPT | Performed by: NURSE PRACTITIONER

## 2022-06-16 RX ORDER — PREDNISONE 10 MG/1
10 TABLET ORAL DAILY
Qty: 14 TABLET | Refills: 0 | Status: SHIPPED | OUTPATIENT
Start: 2022-06-16 | End: 2022-09-01 | Stop reason: SDUPTHER

## 2022-06-16 RX ORDER — TERBINAFINE HYDROCHLORIDE 250 MG/1
250 TABLET ORAL DAILY
Qty: 14 TABLET | Refills: 0 | Status: SHIPPED | OUTPATIENT
Start: 2022-06-16 | End: 2022-06-30

## 2022-06-16 RX ORDER — CIPROFLOXACIN HYDROCHLORIDE 3.5 MG/ML
SOLUTION/ DROPS TOPICAL
COMMUNITY
Start: 2022-06-09

## 2022-06-16 NOTE — PATIENT INSTRUCTIONS
Dressing Orders: Left lower leg:   Wash with soap and water. Apply Adaptic to wound bed. Secure with kerlix. Apply Setopress (brown squares) from toes to knee if available. Change daily   Daily steroid and lamisil for fungus x 14 days  Home health to do dressing. Wear the BESSY hose if possible and elevate feet often.

## 2022-06-16 NOTE — PROGRESS NOTES
[address] [facility]  [address] [address]  Dept: [phone]  Dept Fax: [phone]  Loc: [phone]    Lisa Kang is a 82 y.o. female who presents today for her medical conditions/complaints as noted below. Lisa Kang is c/o of Leg Swelling (patient presents today with c/o left leg swelling. )        HPI:     HPI   Chief Complaint   Patient presents with    Leg Swelling     patient presents today with c/o left leg swelling. She has been seeing Dr. Harding for the rash on her lower leg in the past.  I saw her back in April 26for the same thing cellulitis of the left lower leg. At that appointment I did start her on Keflex. She called back and on May 10 we gave her clindamycin. I encouraged her to keep her appointment with Dr. Harding for follow-up . She did in fact and saw Matias Avila at Dr Harding and give compound of antibacteria, aloe vera, lidocaine and mupirocin. She has been wearing her BESSY house is much as possible but now her leg is weeping. In the past we have given her Lamisil and oral steroids and this is healed up. I reviewed the notes from wound care from 2020. The patient has to use a walker to ambulate and does not drive. She is dependent on her  to take her anywhere she needs to go.       Past Medical History:   Diagnosis Date    Afib [facility]) 2021    Bronchitis     Colon cancer (HCC)     Colon polyps     Constipation     Deep vein blood clot of left lower extremity (HCC)     Left leg     Depression     DVT (deep venous thrombosis) (HCC)     Dysphagia     Fibrocystic breast disease     Fibromyalgia     GERD (gastroesophageal reflux disease)     reflux with hx of esophageal stricture    Headache(784.0)     History of colon cancer 2000    Hormone replacement therapy (postmenopausal)     Hypertension     Neuropathy of foot     In both feet    Osteoarthritis     left knee pain    Restless legs syndrome     Type 2 diabetes mellitus 10/25/2021    Vitamin D deficiency       Past Surgical History:   Procedure Laterality Date    APPENDECTOMY      BREAST BIOPSY      CATARACT REMOVAL       SECTION      CHOLECYSTECTOMY      COLECTOMY  partial    COLON SURGERY      COLONOSCOPY  2010    COLONOSCOPY  2012    Dr Carmen Mcmullen: unremarkable enterocolonic anastamosis; hyperplastic polyps    COLONOSCOPY  2013    Montez: unremarkable post-surgical colon    COLONOSCOPY  2016    Dr Rita Aponte colon anastomosis, 5 yr recall    COLONOSCOPY N/A 2021    Dr Charo Moore and patent anastomosis in the right side-BCM, prn (age)    COLONOSCOPY  2021    Dr Charo Moore and patent anastomosis in the right side-BCM, prn (age)   [place] [facility]  right foot    HAND SURGERY Right     Dr Ladan Delgadillo (CERVIX STATUS UNKNOWN)     [address] N/A 2020    KYPHOPLASTY L5 performed by Bronwyn Sandhoff, DO at [address] [facility]  [place] Right 2020    RIGHT L 4-5 DECOMPRESSIVE LAMINECTOMY performed by Bronwyn Sandhoff, DO at [address] [facility]      UPPER GASTROINTESTINAL ENDOSCOPY  10/16/2009    UPPER GASTROINTESTINAL ENDOSCOPY  2013    Bradley: peptic stricture dilated to 48F; HH; small antral mucosal elevation    UPPER GASTROINTESTINAL ENDOSCOPY  2014    Maurilio: dilation of esophageal stricture    VARICOSE VEIN SURGERY Left 2014  [facility]    Left GSV Ablation    VARICOSE VEIN SURGERY Right 2014  [facility]    Right GSV Ablation       Vitals 2022 2022 2022 3/29/2022 2022 6152   SYSTOLIC 516 834 087 272 - -   DIASTOLIC 40 50 80 60 - -   Pulse 47 64 78 57 108 -   Temp 97.6 - 97.4 97.1 97.1 -   Resp 18 - - - 16 -   SpO2 98 97 98 97 94 -   Weight 138 lb 138 lb 139 lb 134 lb 134 lb -   Height 5' 3\" 5' 3\" 5' 3\" 5' 3\" 5' 3\" -   Body mass index 24.44 kg/m2 24.44 kg/m2 24.62 kg/m2 23.73 kg/m2 23.73 kg/m2 - Pain Level - - - - - 8   Some recent data might be hidden       Family History   Problem Relation Age of Onset    Cancer Mother         Cervical Cancer    Cancer Brother         Esophageal cancer    Diabetes Brother     Esophageal Cancer Brother     Diabetes Sister     Colon Cancer Neg Hx     Colon Polyps Neg Hx     Liver Cancer Neg Hx     Liver Disease Neg Hx     Rectal Cancer Neg Hx     Stomach Cancer Neg Hx        Social History     Tobacco Use    Smoking status: Never Smoker    Smokeless tobacco: Never Used   Substance Use Topics    Alcohol use: No      Current Outpatient Medications on File Prior to Visit   Medication Sig Dispense Refill    pregabalin (LYRICA) 100 MG capsule TAKE 1 CAPSULE BY MOUTH EVERY 12 HOURS AS NEEDED      omeprazole (PRILOSEC) 40 MG delayed release capsule TAKE 1 CAPSULE BY MOUTH DAILY 90 capsule 3    DULoxetine (CYMBALTA) 30 MG extended release capsule Take 1 capsule by mouth daily 30 capsule 3    metoprolol succinate (TOPROL XL) 50 MG extended release tablet TAKE 1 TABLET BY MOUTH TWICE DAILY 60 tablet 5    HYDROcodone-acetaminophen (NORCO)  MG per tablet TAKE 1 TABLET BY MOUTH EVERY 6 HOURS AS NEEDED      pregabalin (LYRICA) 150 MG capsule TAKE 1 CAPSULE BY MOUTH EVERY 12 HOURS AS NEEDED      metFORMIN (GLUCOPHAGE XR) 500 MG extended release tablet Take 1 tablet by mouth daily (with breakfast) 30 tablet 3    furosemide (LASIX) 20 MG tablet Take 1 tablet by mouth daily as needed (swelling) 30 tablet 3    melatonin 5 MG TBDP disintegrating tablet Take 1 tablet by mouth nightly 30 tablet 0    zinc sulfate (ZINCATE) 220 (50 Zn) MG capsule Take 1 capsule by mouth daily 30 capsule 0    rivaroxaban (XARELTO) 20 MG TABS tablet Take 1 tablet by mouth daily (with breakfast) for 30 doses 30 tablet 0    vitamin D (ERGOCALCIFEROL) 1.25 MG (69615 UT) CAPS capsule TAKE 1 CAPSULE BY MOUTH 1 TIME A WEEK 12 capsule 1    irbesartan (AVAPRO) 300 MG tablet Take 1 tablet by mouth daily 30 tablet 5    dilTIAZem (CARDIZEM CD) 120 MG extended release capsule TAKE 1 CAPSULE BY MOUTH TWICE DAILY 180 capsule 3    nystatin (MYCOSTATIN) 194233 UNIT/GM powder For rash under breast. Apply 3 times daily. 1 Bottle 1    Cyanocobalamin (B-12) 500 MCG TABS TAKE 1 TABLET BY MOUTH DAILY 30 tablet [address] by Does not apply route 1 each 0    ciprofloxacin (CILOXAN) 0.3 % ophthalmic solution INSTILL 1 DROP IN RIGHT EYE TWICE DAILY      ascorbic acid (VITAMIN C) 500 MG tablet Take 1 tablet by mouth 2 times daily 60 tablet 0     No current facility-administered medications on file prior to visit. Allergies   Allergen Reactions    Zoloft [Sertraline Hcl] Other (See Comments)     Patient states that she doesn't want this medication anymore because she hallucinated and saw spiders. Patient weaned herself off and after she quit taking the medication, the hallucinations stopped. Patient states that she doesn't want this medication anymore because she hallucinated and saw spiders. Patient weaned herself off and after she quit taking the medication, the hallucinations stopped.  Gabapentin Rash     Pt weaning off due to rash and hot flashes       Health Maintenance   Topic Date Due    Shingles vaccine (1 of 2) Never done   [employer] Visit (AWV)  04/21/2022    Depression Monitoring  04/26/2023    DTaP/Tdap/Td vaccine (3 - Td or Tdap) 12/23/2025    DEXA (modify frequency per FRAX score)  Completed    Colorectal Cancer Screen  Completed    Flu vaccine  Completed    Pneumococcal 65+ years Vaccine  Completed    COVID-19 Vaccine  Completed    Hepatitis A vaccine  Aged Out    Hib vaccine  Aged Out    Meningococcal (ACWY) vaccine  Aged Out       Subjective:      Review of Systems   Constitutional: Negative. Cardiovascular: Positive for leg swelling. Skin: Positive for wound. Psychiatric/Behavioral: Negative.         Objective:     Physical Exam  Vitals and nursing note reviewed. Constitutional:       Appearance: Normal appearance. She is well-developed. HENT:      Head: Normocephalic. Eyes:      Pupils: Pupils are equal, round, and reactive to light. Cardiovascular:      Rate and Rhythm: Regular rhythm. Bradycardia present. Pulmonary:      Effort: Pulmonary effort is normal.      Breath sounds: Normal breath sounds. Musculoskeletal:      Cervical back: Normal range of motion. Skin:     General: Skin is warm and dry. Capillary Refill: Capillary refill takes less than 2 seconds. Neurological:      General: No focal deficit present. Mental Status: She is alert and oriented to person, place, and time. Psychiatric:         Mood and Affect: Mood normal.         Behavior: Behavior normal.         Thought Content: Thought content normal.         Judgment: Judgment normal.       BP (!) 130/40   Pulse (!) 47   Temp 97.6 °F (36.4 °C) (Temporal)   Resp 18   Ht 5' 3\" (1.6 m)   Wt 138 lb (62.6 kg)   SpO2 98%   Breastfeeding No   BMI 24.45 kg/m²         Assessment:       Diagnosis Orders   1. Cellulitis of left lower leg  [address] [place] [address]   2. Type 2 diabetes mellitus without complication, without long-term current use of insulin ([facility] [address]     3. Itching           Plan:   More than 50% of the time was spent counseling and coordinating care for a total time of 30min face to face. I reviewed the notes from wound care from 2020 and repeated the same wound care for the outer leg. I did go ahead and start her on a antifungal and an antiinflammatory prednisone. She has been on 2 recent antibiotics and I am not sure she needs antibiotic I think she just may need something to heal up the leg as far as inflammation and fungus. I did repeat the wound care that was given via wound care in 2020.   I will get home health out to keep an eye on her leg if it is healing they can discharge her but if it is getting worse I will probably put her on antibiotics. PDMP Monitoring:    Last PDMP Reggie Curtis as Reviewed [facility]):  Review User Review Instant Review Result          Last Controlled Substance Monitoring Documentation      Office Visit from 3/4/2020 in [facility]   Attestation The Prescription Monitoring Report for this patient was reviewed today. filed at 03/04/2020 1048   Periodic Controlled Substance Monitoring Possible medication side effects, risk of tolerance/dependence & alternative treatments discussed., No signs of potential drug abuse or diversion identified. filed at 03/04/2020 1048        Urine Drug Screenings (1 yr)     POCT Rapid Drug Screen  Collected: 3/4/2020 11:37 AM (Final result)    Complete Results              Medication Contract and Consent for Opioid Use Documents Filed     Patient Documents     Type of Document Status Date Received Received By Description    Medication Contract Signed 3/19/2019  2:29 PM Katty hicks neuro    Medication Contract Received 2/1/2021  1:35 PM EMMA BEVERLY Medication Contract Gabapentin                 Patient given educational materials -see patient instructions. Discussed use, benefit, and side effects of prescribed medications. All patient questions answered. Pt voiced understanding. Reviewed health maintenance. Instructed to continue currentmedications, diet and exercise. Patient agreed with treatment plan. Follow up as directed. MEDICATIONS:  Orders Placed This Encounter   Medications    terbinafine (LAMISIL) 250 MG tablet     Sig: Take 1 tablet by mouth daily for 14 days     Dispense:  14 tablet     Refill:  0    predniSONE (DELTASONE) 10 MG tablet     Sig: Take 1 tablet by mouth daily for 14 days     Dispense:  14 tablet     Refill:  0         ORDERS:  Orders Placed This Encounter   Procedures   [address] [place]       Follow-up:  No follow-ups on file.     PATIENT INSTRUCTIONS:  Patient Instructions   Dressing Orders: Left lower leg:   Wash with soap and water. Apply Adaptic to wound bed. Secure with kerlix. Apply Setopress (brown squares) from toes to knee if available. Change daily   Daily steroid and lamisil for fungus x 14 days  Home health to do dressing. Wear the BESSY hose if possible and elevate feet often. Electronically signed by EARLE Pena CNP on 6/16/2022 at 11:57 AM    EMR Dragon/transcription disclaimer:  Much of thisencounter note is electronic transcription/translation of spoken language to printed texts. The electronic translation of spoken language may be erroneous, or at times, nonsensical words or phrases may be inadvertentlytranscribed.   Although I have reviewed the note for such errors, some may still exist.

## 2022-06-17 ENCOUNTER — TELEPHONE (OUTPATIENT)
Dept: PRIMARY CARE CLINIC | Age: 87
End: 2022-06-17

## 2022-06-17 LAB — HBA1C MFR BLD: 5.8 % (ref 4–6)

## 2022-06-17 NOTE — TELEPHONE ENCOUNTER
Does she need a written DNR or just a verbal?  If it is verbal you can tell her yes she is a DNR and it is documented in this note if she needs written please let me know or if they have a form to send over I will sign it

## 2022-06-22 RX ORDER — METFORMIN HYDROCHLORIDE 500 MG/1
TABLET, EXTENDED RELEASE ORAL
Qty: 30 TABLET | Refills: 3 | Status: SHIPPED | OUTPATIENT
Start: 2022-06-22 | End: 2022-10-24

## 2022-06-28 ENCOUNTER — OFFICE VISIT (OUTPATIENT)
Dept: PRIMARY CARE CLINIC | Age: 87
End: 2022-06-28
Payer: MEDICARE

## 2022-06-28 VITALS
RESPIRATION RATE: 16 BRPM | TEMPERATURE: 97 F | SYSTOLIC BLOOD PRESSURE: 130 MMHG | HEART RATE: 58 BPM | HEIGHT: 63 IN | WEIGHT: 138 LBS | DIASTOLIC BLOOD PRESSURE: 60 MMHG | BODY MASS INDEX: 24.45 KG/M2 | OXYGEN SATURATION: 97 %

## 2022-06-28 DIAGNOSIS — C18.9 MALIGNANT NEOPLASM OF COLON, UNSPECIFIED PART OF COLON (HCC): ICD-10-CM

## 2022-06-28 DIAGNOSIS — D50.9 IRON DEFICIENCY ANEMIA, UNSPECIFIED IRON DEFICIENCY ANEMIA TYPE: ICD-10-CM

## 2022-06-28 DIAGNOSIS — Z45.2 ENCOUNTER FOR CARE RELATED TO PORT-A-CATH: ICD-10-CM

## 2022-06-28 DIAGNOSIS — N18.32 STAGE 3B CHRONIC KIDNEY DISEASE (HCC): Primary | ICD-10-CM

## 2022-06-28 DIAGNOSIS — Z95.828 PORT-A-CATH IN PLACE: ICD-10-CM

## 2022-06-28 LAB
ALBUMIN SERPL-MCNC: 3.7 G/DL (ref 3.5–5.2)
ALP BLD-CCNC: 142 U/L (ref 35–104)
ALT SERPL-CCNC: 20 U/L (ref 5–33)
ANION GAP SERPL CALCULATED.3IONS-SCNC: 10 MMOL/L (ref 7–19)
ANISOCYTOSIS: ABNORMAL
AST SERPL-CCNC: 14 U/L (ref 5–32)
BASOPHILS ABSOLUTE: 0 K/UL (ref 0–0.2)
BASOPHILS RELATIVE PERCENT: 0.3 % (ref 0–1)
BILIRUB SERPL-MCNC: <0.2 MG/DL (ref 0.2–1.2)
BUN BLDV-MCNC: 19 MG/DL (ref 8–23)
CALCIUM SERPL-MCNC: 9 MG/DL (ref 8.8–10.2)
CEA: 3.7 NG/ML (ref 0–4.7)
CHLORIDE BLD-SCNC: 105 MMOL/L (ref 98–111)
CO2: 25 MMOL/L (ref 22–29)
CREAT SERPL-MCNC: 0.9 MG/DL (ref 0.5–0.9)
EOSINOPHILS ABSOLUTE: 0.1 K/UL (ref 0–0.6)
EOSINOPHILS RELATIVE PERCENT: 0.8 % (ref 0–5)
GFR AFRICAN AMERICAN: >59
GFR NON-AFRICAN AMERICAN: 59
GLUCOSE BLD-MCNC: 94 MG/DL (ref 74–109)
HCT VFR BLD CALC: 34.9 % (ref 37–47)
HEMOGLOBIN: 10.1 G/DL (ref 12–16)
HYPOCHROMIA: ABNORMAL
IMMATURE GRANULOCYTES #: 0 K/UL
IRON SATURATION: 10 % (ref 14–50)
IRON: 40 UG/DL (ref 37–145)
LYMPHOCYTES ABSOLUTE: 3.4 K/UL (ref 1.1–4.5)
LYMPHOCYTES RELATIVE PERCENT: 37.3 % (ref 20–40)
MCH RBC QN AUTO: 22.9 PG (ref 27–31)
MCHC RBC AUTO-ENTMCNC: 28.9 G/DL (ref 33–37)
MCV RBC AUTO: 79 FL (ref 81–99)
MICROCYTES: ABNORMAL
MONOCYTES ABSOLUTE: 0.9 K/UL (ref 0–0.9)
MONOCYTES RELATIVE PERCENT: 9.5 % (ref 0–10)
NEUTROPHILS ABSOLUTE: 4.8 K/UL (ref 1.5–7.5)
NEUTROPHILS RELATIVE PERCENT: 51.7 % (ref 50–65)
PDW BLD-RTO: 18.5 % (ref 11.5–14.5)
PLATELET # BLD: 288 K/UL (ref 130–400)
PLATELET SLIDE REVIEW: ADEQUATE
PMV BLD AUTO: 11.5 FL (ref 9.4–12.3)
POTASSIUM SERPL-SCNC: 4.2 MMOL/L (ref 3.5–5)
RBC # BLD: 4.42 M/UL (ref 4.2–5.4)
SODIUM BLD-SCNC: 140 MMOL/L (ref 136–145)
TOTAL IRON BINDING CAPACITY: 419 UG/DL (ref 250–400)
TOTAL PROTEIN: 6.5 G/DL (ref 6.6–8.7)
WBC # BLD: 9.2 K/UL (ref 4.8–10.8)

## 2022-06-28 PROCEDURE — 99213 OFFICE O/P EST LOW 20 MIN: CPT | Performed by: NURSE PRACTITIONER

## 2022-06-28 PROCEDURE — 1123F ACP DISCUSS/DSCN MKR DOCD: CPT | Performed by: NURSE PRACTITIONER

## 2022-06-28 ASSESSMENT — ENCOUNTER SYMPTOMS
EYES NEGATIVE: 1
GASTROINTESTINAL NEGATIVE: 1
RESPIRATORY NEGATIVE: 1

## 2022-06-28 NOTE — PROGRESS NOTES
[address] [facility]  [address] [address]  Dept: [phone]  Dept Fax: [phone]  Loc: [phone]    Michelle Dubon is a 82 y.o. female who presents today for her medical conditions/complaints as noted below. Michelle Dubon is c/o of [facility] (patient presents today for 2 month follow up. She will need port flushed. She also thinks that she may need labs.)        HPI:     HPI   Chief Complaint   Patient presents with    [facility]     patient presents today for 2 month follow up. She will need port flushed. She also thinks that she may need labs. she did see eye doctor in [place] and has f/u there. They are not sure they can do anything for her eye. She is not been seeing the oncologist I have just been doing her CEAs for her colon cancer. She is on Xarelto as a blood thinner. She has not noticed any change in her stools. She was supposed to be taking iron but she said she cannot tolerate it because it hurts her stomach. She is here for port flush but thinks it is time to have her labs done as well. We did do a hemoglobin A1c earlier this month and it was good.   Past Medical History:   Diagnosis Date    Afib [facility]) 2021    Bronchitis     Colon cancer (HCC)     Colon polyps     Constipation     Deep vein blood clot of left lower extremity (HCC)     Left leg     Depression     DVT (deep venous thrombosis) (HCC)     Dysphagia     Fibrocystic breast disease     Fibromyalgia     GERD (gastroesophageal reflux disease)     reflux with hx of esophageal stricture    Headache(784.0)     History of colon cancer 2000    Hormone replacement therapy (postmenopausal)     Hypertension     Neuropathy of foot     In both feet    Osteoarthritis     left knee pain    Restless legs syndrome     Type 2 diabetes mellitus 10/25/2021    Vitamin D deficiency       Past Surgical History:   Procedure Laterality Date    APPENDECTOMY      BREAST BIOPSY      CATARACT REMOVAL       SECTION      CHOLECYSTECTOMY      COLECTOMY  partial    COLON SURGERY      COLONOSCOPY  2010    COLONOSCOPY  2012    Dr Griselda Zuñiga: unremarkable enterocolonic anastamosis; hyperplastic polyps    COLONOSCOPY  2013    Montez: unremarkable post-surgical colon    COLONOSCOPY  2016    Dr Julia Baldwin colon anastomosis, 5 yr recall    COLONOSCOPY N/A 2021    Dr Regino Armijo and patent anastomosis in the right side-BCM, prn (age)    COLONOSCOPY  2021    Dr Regino Armijo and patent anastomosis in the right side-BCM, prn (age)   [address]  right foot    HAND SURGERY Right     Dr Jose Waite (CERVIX STATUS UNKNOWN)     [address] N/A 2020    KYPHOPLASTY L5 performed by Mariann Torres DO at [facility]  [place] Right 2020    RIGHT L 4-5 DECOMPRESSIVE LAMINECTOMY performed by Mariann Torres DO at [address]      UPPER GASTROINTESTINAL ENDOSCOPY  10/16/2009    UPPER GASTROINTESTINAL ENDOSCOPY  2013    Bodnicole: peptic stricture dilated to 48F; HH; small antral mucosal elevation    UPPER GASTROINTESTINAL ENDOSCOPY  2014    Maurilio: dilation of esophageal stricture    VARICOSE VEIN SURGERY Left 2014  [facility]    Left GSV Ablation    VARICOSE VEIN SURGERY Right 2014  [facility]    Right GSV Ablation       Vitals 2022 2022 2022 2022 3/29/2022 4493   SYSTOLIC 704 608 681 773 017 -   DIASTOLIC 60 40 50 80 60 -   Pulse 58 47 64 78 57 108   Temp 97 97.6 - 97.4 97.1 97.1   Resp 16 18 - - - 16   SpO2 97 98 97 98 97 94   Weight 138 lb 138 lb 138 lb 139 lb 134 lb 134 lb   Height 5' 3\" 5' 3\" 5' 3\" 5' 3\" 5' 3\" 5' 3\"   Body mass index 24.44 kg/m2 24.44 kg/m2 24.44 kg/m2 24.62 kg/m2 23.73 kg/m2 23.73 kg/m2   Pain Level - - - - - -   Some recent data might be hidden       Family History   Problem Relation Age of Onset    Cancer Mother         Cervical Cancer    Cancer Brother         Esophageal cancer    Diabetes Brother     Esophageal Cancer Brother     Diabetes Sister     Colon Cancer Neg Hx     Colon Polyps Neg Hx     Liver Cancer Neg Hx     Liver Disease Neg Hx     Rectal Cancer Neg Hx     Stomach Cancer Neg Hx        Social History     Tobacco Use    Smoking status: Never Smoker    Smokeless tobacco: Never Used   Substance Use Topics    Alcohol use: No      Current Outpatient Medications on File Prior to Visit   Medication Sig Dispense Refill    metFORMIN (GLUCOPHAGE-XR) 500 MG extended release tablet TAKE 1 TABLET BY MOUTH DAILY WITH BREAKFAST 30 tablet 3    ciprofloxacin (CILOXAN) 0.3 % ophthalmic solution INSTILL 1 DROP IN RIGHT EYE TWICE DAILY      terbinafine (LAMISIL) 250 MG tablet Take 1 tablet by mouth daily for 14 days 14 tablet 0    predniSONE (DELTASONE) 10 MG tablet Take 1 tablet by mouth daily for 14 days 14 tablet 0    pregabalin (LYRICA) 100 MG capsule TAKE 1 CAPSULE BY MOUTH EVERY 12 HOURS AS NEEDED      omeprazole (PRILOSEC) 40 MG delayed release capsule TAKE 1 CAPSULE BY MOUTH DAILY 90 capsule 3    DULoxetine (CYMBALTA) 30 MG extended release capsule Take 1 capsule by mouth daily 30 capsule 3    metoprolol succinate (TOPROL XL) 50 MG extended release tablet TAKE 1 TABLET BY MOUTH TWICE DAILY 60 tablet 5    HYDROcodone-acetaminophen (NORCO)  MG per tablet TAKE 1 TABLET BY MOUTH EVERY 6 HOURS AS NEEDED      pregabalin (LYRICA) 150 MG capsule TAKE 1 CAPSULE BY MOUTH EVERY 12 HOURS AS NEEDED      furosemide (LASIX) 20 MG tablet Take 1 tablet by mouth daily as needed (swelling) 30 tablet 3    melatonin 5 MG TBDP disintegrating tablet Take 1 tablet by mouth nightly 30 tablet 0    zinc sulfate (ZINCATE) 220 (50 Zn) MG capsule Take 1 capsule by mouth daily 30 capsule 0    ascorbic acid (VITAMIN C) 500 MG tablet Take 1 tablet by mouth 2 times daily 60 tablet 0    rivaroxaban (XARELTO) 20 MG TABS tablet Take 1 tablet by mouth daily (with breakfast) for 30 doses 30 tablet 0    vitamin D (ERGOCALCIFEROL) 1.25 MG (03898 UT) CAPS capsule TAKE 1 CAPSULE BY MOUTH 1 TIME A WEEK 12 capsule 1    irbesartan (AVAPRO) 300 MG tablet Take 1 tablet by mouth daily 30 tablet 5    dilTIAZem (CARDIZEM CD) 120 MG extended release capsule TAKE 1 CAPSULE BY MOUTH TWICE DAILY 180 capsule 3    nystatin (MYCOSTATIN) 672348 UNIT/GM powder For rash under breast. Apply 3 times daily. 1 Bottle 1    Cyanocobalamin (B-12) 500 MCG TABS TAKE 1 TABLET BY MOUTH DAILY 30 tablet [address] by Does not apply route 1 each 0     No current facility-administered medications on file prior to visit. Allergies   Allergen Reactions    Duloxetine     Zoloft [Sertraline Hcl] Other (See Comments)     Patient states that she doesn't want this medication anymore because she hallucinated and saw spiders. Patient weaned herself off and after she quit taking the medication, the hallucinations stopped. Patient states that she doesn't want this medication anymore because she hallucinated and saw spiders. Patient weaned herself off and after she quit taking the medication, the hallucinations stopped.  Gabapentin Rash     Pt weaning off due to rash and hot flashes       Health Maintenance   Topic Date Due    Shingles vaccine (1 of 2) Never done   [employer] Visit (AWV)  04/21/2022    Depression Monitoring  04/26/2023    DTaP/Tdap/Td vaccine (3 - Td or Tdap) 12/23/2025    DEXA (modify frequency per FRAX score)  Completed    Colorectal Cancer Screen  Completed    Flu vaccine  Completed    Pneumococcal 65+ years Vaccine  Completed    COVID-19 Vaccine  Completed    Hepatitis A vaccine  Aged Out    Hib vaccine  Aged Out    Meningococcal (ACWY) vaccine  Aged Out       Subjective:      Review of Systems   Constitutional: Negative. HENT: Negative. Eyes: Negative. Respiratory: Negative.     Cardiovascular: well.    Complications: None    Assessment:       Diagnosis Orders   1. Stage 3b chronic kidney disease ([facility] [address]     2. Port-A-Cath in place  ND IRRIG IMPLANTED DRUG DELIVERY DEVICE   3. Encounter for care related to Port-a-Cath  ND [address] DEVICE   4. Iron deficiency anemia, unspecified iron deficiency anemia type  Iron and TIBC   5. Malignant neoplasm of colon, unspecified part of colon ([address]  CBC with Auto Differential    Comprehensive Metabolic Panel    CEA         Plan:   More than 50% of the time was spent counseling and coordinating care for a total time of 25min face to face. Said she could not tolerate the iron pills. I advised her to try [place] vitamins 1 twice a day the chewables. I did redraw her blood work today including the CEA for cancer. Her hemoglobin A1c was really good at 5.8 the last time. I did look at her leg it looks much better. And she had hose on so I didn't remove them . PDMP Monitoring:    Last PDMP Reggie Curtis as Reviewed [facility]):  Review User Review Instant Review Result          Last Controlled Substance Monitoring Documentation      Office Visit from 3/4/2020 in [facility]   Attestation The Prescription Monitoring Report for this patient was reviewed today. filed at 03/04/2020 1048   Periodic Controlled Substance Monitoring Possible medication side effects, risk of tolerance/dependence & alternative treatments discussed., No signs of potential drug abuse or diversion identified.  filed at 03/04/2020 1048        Urine Drug Screenings (1 yr)     POCT Rapid Drug Screen  Collected: 3/4/2020 11:37 AM (Final result)    Complete Results              Medication Contract and Consent for Opioid Use Documents Filed     Patient Documents     Type of Document Status Date Received Received By Description    Medication Contract Signed 3/19/2019  2:29 PM Katty hicks neuro    Medication Contract Received 2/1/2021  1:35 PM EMMA BEVERLY Medication Contract Gabapentin Patient given educational materials -see patient instructions. Discussed use, benefit, and side effects of prescribed medications. All patient questions answered. Pt voiced understanding. Reviewed health maintenance. Instructed to continue currentmedications, diet and exercise. Patient agreed with treatment plan. Follow up as directed. MEDICATIONS:  No orders of the defined types were placed in this encounter. ORDERS:  Orders Placed This Encounter   Procedures    CBC with Auto Differential    Comprehensive Metabolic Panel    Iron and TIBC    CEA    HI IRRIG IMPLANTED DRUG DELIVERY DEVICE       Follow-up:  Return in about 2 months (around 8/28/2022) for maw and port flush. PATIENT INSTRUCTIONS:  There are no Patient Instructions on file for this visit. Electronically signed by EARLE Watson CNP on 6/28/2022 at 10:41 AM    EMR Dragon/transcription disclaimer:  Much of thisencounter note is electronic transcription/translation of spoken language to printed texts. The electronic translation of spoken language may be erroneous, or at times, nonsensical words or phrases may be inadvertentlytranscribed.   Although I have reviewed the note for such errors, some may still exist.

## 2022-07-19 ENCOUNTER — TELEPHONE (OUTPATIENT)
Dept: PRIMARY CARE CLINIC | Age: 87
End: 2022-07-19

## 2022-07-19 NOTE — TELEPHONE ENCOUNTER
----- Message from Lisa Merchant sent at 7/19/2022 11:53 AM CDT -----  Subject: Refill Request    QUESTIONS  Name of Medication? predniSONE (DELTASONE) 10 MG tablet  Patient-reported dosage and instructions? Take 1 tablet by mouth daily for   14 days  How many days do you have left? 0  [facility]erred [facility]? [id number]  Pharmacy phone number (if available)? [phone]  Additional Information for Provider? Pt calling in b/c she asks that PCP   please prescribe her something for her cellulitis. Pt says she cannot get   rid of it it keeps coming back. Pt and PCP does already know about this   condition  ---------------------------------------------------------------------------  --------------  CALL BACK INFO  What is the best way for the office to contact you? Do not leave any   message, patient will call back for answer  Preferred Call Back Phone Number? [phone]  ---------------------------------------------------------------------------  --------------  SCRIPT ANSWERS  Relationship to Patient?  Self

## 2022-07-20 RX ORDER — CLINDAMYCIN HYDROCHLORIDE 300 MG/1
300 CAPSULE ORAL 2 TIMES DAILY
Qty: 20 CAPSULE | Refills: 0 | Status: SHIPPED | OUTPATIENT
Start: 2022-07-20 | End: 2022-07-30

## 2022-07-20 NOTE — TELEPHONE ENCOUNTER
I sent 10 days of the clindamycin and again if its not getting better I need to get her into wound care

## 2022-07-22 ENCOUNTER — TELEPHONE (OUTPATIENT)
Dept: PRIMARY CARE CLINIC | Age: 87
End: 2022-07-22

## 2022-07-22 NOTE — TELEPHONE ENCOUNTER
Kristi Drain with [facility] called voicing that they have discharged the patient because her insurance will not pay for them to come and wrap her left leg anymore without any open lacerations. She is currently taking Clindamycin.

## 2022-07-28 RX ORDER — DULOXETIN HYDROCHLORIDE 30 MG/1
30 CAPSULE, DELAYED RELEASE ORAL DAILY
Qty: 30 CAPSULE | Refills: 3 | Status: SHIPPED | OUTPATIENT
Start: 2022-07-28 | End: 2022-09-01 | Stop reason: DRUGHIGH

## 2022-07-28 NOTE — TELEPHONE ENCOUNTER
Requested Prescriptions     Pending Prescriptions Disp Refills    DULoxetine (CYMBALTA) 30 MG extended release capsule [Pharmacy Med Name: DULOXETINE DR 30MG CAPSULES] 30 capsule 3     Sig: TAKE 1 CAPSULE BY MOUTH DAILY       Last Office Visit: 3/29/2022  Next Office Visit: 9/29/2022  Last Medication Refill: 3/14/2022 with 3 RF

## 2022-08-03 ENCOUNTER — OFFICE VISIT (OUTPATIENT)
Dept: PRIMARY CARE CLINIC | Age: 87
End: 2022-08-03
Payer: MEDICARE

## 2022-08-03 VITALS
TEMPERATURE: 96.3 F | RESPIRATION RATE: 16 BRPM | SYSTOLIC BLOOD PRESSURE: 110 MMHG | BODY MASS INDEX: 25.34 KG/M2 | OXYGEN SATURATION: 97 % | WEIGHT: 143 LBS | DIASTOLIC BLOOD PRESSURE: 60 MMHG | HEART RATE: 48 BPM | HEIGHT: 63 IN

## 2022-08-03 DIAGNOSIS — R53.81 PHYSICAL DECONDITIONING: ICD-10-CM

## 2022-08-03 DIAGNOSIS — I10 ESSENTIAL HYPERTENSION: ICD-10-CM

## 2022-08-03 DIAGNOSIS — N18.32 STAGE 3B CHRONIC KIDNEY DISEASE (HCC): ICD-10-CM

## 2022-08-03 DIAGNOSIS — R13.12 OROPHARYNGEAL DYSPHAGIA: Primary | ICD-10-CM

## 2022-08-03 PROCEDURE — 99214 OFFICE O/P EST MOD 30 MIN: CPT | Performed by: NURSE PRACTITIONER

## 2022-08-03 PROCEDURE — 1123F ACP DISCUSS/DSCN MKR DOCD: CPT | Performed by: NURSE PRACTITIONER

## 2022-08-03 RX ORDER — VALACYCLOVIR HYDROCHLORIDE 1 G/1
TABLET, FILM COATED ORAL
COMMUNITY
Start: 2022-06-23 | End: 2022-11-01

## 2022-08-03 ASSESSMENT — PATIENT HEALTH QUESTIONNAIRE - PHQ9
SUM OF ALL RESPONSES TO PHQ QUESTIONS 1-9: 0
9. THOUGHTS THAT YOU WOULD BE BETTER OFF DEAD, OR OF HURTING YOURSELF: 0
SUM OF ALL RESPONSES TO PHQ QUESTIONS 1-9: 0
3. TROUBLE FALLING OR STAYING ASLEEP: 1
SUM OF ALL RESPONSES TO PHQ QUESTIONS 1-9: 6
1. LITTLE INTEREST OR PLEASURE IN DOING THINGS: 0
SUM OF ALL RESPONSES TO PHQ QUESTIONS 1-9: 6
5. POOR APPETITE OR OVEREATING: 1
6. FEELING BAD ABOUT YOURSELF - OR THAT YOU ARE A FAILURE OR HAVE LET YOURSELF OR YOUR FAMILY DOWN: 0
7. TROUBLE CONCENTRATING ON THINGS, SUCH AS READING THE NEWSPAPER OR WATCHING TELEVISION: 2
4. FEELING TIRED OR HAVING LITTLE ENERGY: 1
SUM OF ALL RESPONSES TO PHQ QUESTIONS 1-9: 6
10. IF YOU CHECKED OFF ANY PROBLEMS, HOW DIFFICULT HAVE THESE PROBLEMS MADE IT FOR YOU TO DO YOUR WORK, TAKE CARE OF THINGS AT HOME, OR GET ALONG WITH OTHER PEOPLE: 1
SUM OF ALL RESPONSES TO PHQ9 QUESTIONS 1 & 2: 0
2. FEELING DOWN, DEPRESSED OR HOPELESS: 0
8. MOVING OR SPEAKING SO SLOWLY THAT OTHER PEOPLE COULD HAVE NOTICED. OR THE OPPOSITE, BEING SO FIGETY OR RESTLESS THAT YOU HAVE BEEN MOVING AROUND A LOT MORE THAN USUAL: 1
SUM OF ALL RESPONSES TO PHQ QUESTIONS 1-9: 6
SUM OF ALL RESPONSES TO PHQ QUESTIONS 1-9: 0
SUM OF ALL RESPONSES TO PHQ QUESTIONS 1-9: 0
2. FEELING DOWN, DEPRESSED OR HOPELESS: 0

## 2022-08-03 NOTE — PROGRESS NOTES
[address] [facility]  [address] [address]  Dept: [phone]  Dept Fax: [phone]  Loc: [phone]    Costa Levy is a 82 y.o. female who presents today for her medical conditions/complaints as noted below. Costa Levy is c/o of Dysphagia (Patient presents today with c/o having trouble swallowing. Patient states that she has been dealing with this for about a week now. /)        HPI:     HPI   Chief Complaint   Patient presents with    Dysphagia     Patient presents today with c/o having trouble swallowing. Patient states that she has been dealing with this for about a week now. Having trouble swallowing both water and food. Meat does seem to be worse and she tries to chew it really well. She feels like she gets choked on and has to cough it back up. She has not had this problem in the past this is something new for her.   Past Medical History:   Diagnosis Date    Afib ([address]     Bronchitis     Colon cancer ([address]     Colon polyps     Constipation     Deep vein blood clot of left lower extremity (HCC)     Left leg     Depression     DVT (deep venous thrombosis) (HCC)     Dysphagia     Fibrocystic breast disease     Fibromyalgia     GERD (gastroesophageal reflux disease)     reflux with hx of esophageal stricture    Headache(784.0)     History of colon cancer     Hormone replacement therapy (postmenopausal)     Hypertension     Neuropathy of foot     In both feet    Osteoarthritis     left knee pain    Restless legs syndrome     Type 2 diabetes mellitus 10/25/2021    Vitamin D deficiency       Past Surgical History:   Procedure Laterality Date    APPENDECTOMY      BREAST BIOPSY      CATARACT REMOVAL       SECTION      CHOLECYSTECTOMY      COLECTOMY  partial    COLON SURGERY      COLONOSCOPY  2010    COLONOSCOPY  2012    Dr Julita Dawson: unremarkable enterocolonic anastamosis; hyperplastic polyps    COLONOSCOPY  2013 Cancer Neg Hx        Social History     Tobacco Use    Smoking status: Never    Smokeless tobacco: Never   Substance Use Topics    Alcohol use: No      Current Outpatient Medications on File Prior to Visit   Medication Sig Dispense Refill    DULoxetine (CYMBALTA) 30 MG extended release capsule TAKE 1 CAPSULE BY MOUTH DAILY 30 capsule 3    metFORMIN (GLUCOPHAGE-XR) 500 MG extended release tablet TAKE 1 TABLET BY MOUTH DAILY WITH BREAKFAST 30 tablet 3    ciprofloxacin (CILOXAN) 0.3 % ophthalmic solution INSTILL 1 DROP IN RIGHT EYE TWICE DAILY      pregabalin (LYRICA) 100 MG capsule TAKE 1 CAPSULE BY MOUTH EVERY 12 HOURS AS NEEDED      omeprazole (PRILOSEC) 40 MG delayed release capsule TAKE 1 CAPSULE BY MOUTH DAILY 90 capsule 3    metoprolol succinate (TOPROL XL) 50 MG extended release tablet TAKE 1 TABLET BY MOUTH TWICE DAILY 60 tablet 5    HYDROcodone-acetaminophen (NORCO)  MG per tablet TAKE 1 TABLET BY MOUTH EVERY 6 HOURS AS NEEDED      pregabalin (LYRICA) 150 MG capsule TAKE 1 CAPSULE BY MOUTH EVERY 12 HOURS AS NEEDED      furosemide (LASIX) 20 MG tablet Take 1 tablet by mouth daily as needed (swelling) 30 tablet 3    melatonin 5 MG TBDP disintegrating tablet Take 1 tablet by mouth nightly 30 tablet 0    zinc sulfate (ZINCATE) 220 (50 Zn) MG capsule Take 1 capsule by mouth daily 30 capsule 0    ascorbic acid (VITAMIN C) 500 MG tablet Take 1 tablet by mouth 2 times daily 60 tablet 0    rivaroxaban (XARELTO) 20 MG TABS tablet Take 1 tablet by mouth daily (with breakfast) for 30 doses 30 tablet 0    vitamin D (ERGOCALCIFEROL) 1.25 MG (27280 UT) CAPS capsule TAKE 1 CAPSULE BY MOUTH 1 TIME A WEEK 12 capsule 1    irbesartan (AVAPRO) 300 MG tablet Take 1 tablet by mouth daily 30 tablet 5    dilTIAZem (CARDIZEM CD) 120 MG extended release capsule TAKE 1 CAPSULE BY MOUTH TWICE DAILY 180 capsule 3    nystatin (MYCOSTATIN) 972566 UNIT/GM powder For rash under breast. Apply 3 times daily.  1 Bottle 1    Cyanocobalamin (B-12) 500 MCG TABS TAKE 1 TABLET BY MOUTH DAILY 30 tablet 11    Hospital Bed MISC by Does not apply route 1 each 0    valACYclovir (VALTREX) 1 g tablet TAKE 1 TABLET BY MOUTH TWICE DAILY       No current facility-administered medications on file prior to visit. Allergies   Allergen Reactions    Duloxetine     Zoloft [Sertraline Hcl] Other (See Comments)     Patient states that she doesn't want this medication anymore because she hallucinated and saw spiders. Patient weaned herself off and after she quit taking the medication, the hallucinations stopped. Patient states that she doesn't want this medication anymore because she hallucinated and saw spiders. Patient weaned herself off and after she quit taking the medication, the hallucinations stopped. Gabapentin Rash     Pt weaning off due to rash and hot flashes       Health Maintenance   Topic Date Due    Shingles vaccine (1 of 2) Never done    COVID-19 Vaccine (4 - Booster for Moderna series) 02/15/2022    Annual Wellness Visit (AWV)  04/21/2022    Flu vaccine (1) 09/01/2022    Depression Monitoring  08/03/2023    DTaP/Tdap/Td vaccine (3 - Td or Tdap) 12/23/2025    DEXA (modify frequency per FRAX score)  Completed    Colorectal Cancer Screen  Completed    Pneumococcal 65+ years Vaccine  Completed    Hepatitis A vaccine  Aged Out    Hib vaccine  Aged Out    Meningococcal (ACWY) vaccine  Aged Out       Subjective:      Review of Systems   Constitutional: Negative. HENT:  Positive for trouble swallowing. Gastrointestinal:  Positive for vomiting. Objective:     Physical Exam  Vitals and nursing note reviewed. Constitutional:       Appearance: Normal appearance. She is well-developed. HENT:      Head: Normocephalic. Eyes:      Pupils: Pupils are equal, round, and reactive to light. Cardiovascular:      Rate and Rhythm: Normal rate and regular rhythm. Heart sounds: Normal heart sounds.    Pulmonary:      Effort: Pulmonary effort is normal.      Breath sounds: Normal breath sounds. Abdominal:      General: Abdomen is flat. Bowel sounds are normal.   Musculoskeletal:      Cervical back: Normal range of motion. Skin:     General: Skin is warm and dry. Capillary Refill: Capillary refill takes less than 2 seconds. Neurological:      General: No focal deficit present. Mental Status: She is alert and oriented to person, place, and time. Mental status is at baseline. Psychiatric:         Mood and Affect: Mood normal.         Behavior: Behavior normal.         Thought Content: Thought content normal.         Judgment: Judgment normal.     /60   Pulse (!) 48   Temp (!) 96.3 °F (35.7 °C) (Temporal)   Resp 16   Ht 5' 3\" (1.6 m)   Wt 143 lb (64.9 kg)   SpO2 97%   BMI 25.33 kg/m²     Assessment:       Diagnosis Orders   1. Oropharyngeal dysphagia  FL ESOPHAGRAM            Plan:   More than 50% of the time was spent counseling and coordinating care for a total time of 35 min face to face. Her eye doctor want to refer her to someone in [place] so she has an appointment but its not till later this month. Dr Kailyn warren [place]   [address]   [facility] [address]  [phone]  PDMP Monitoring:    Last PDMP Damari Valle as Reviewed [facility]):  Review User Review Instant Review Result          Last Controlled Substance Monitoring Documentation      [address] Office Visit from 3/4/2020 in [facility] Neurosurgery   Attestation The Prescription Monitoring Report for this patient was reviewed today. filed at 03/04/2020 1048   Periodic Controlled Substance Monitoring Possible medication side effects, risk of tolerance/dependence & alternative treatments discussed., No signs of potential drug abuse or diversion identified.  filed at 03/04/2020 1048          Urine Drug Screenings (1 yr)       POCT Rapid Drug Screen  Collected: 3/4/2020 11:37 AM (Final result)                  Medication Contract and Consent for Opioid Use Documents Filed Patient Documents       Type of Document Status Date Received Received By Description    Medication Contract Signed 3/19/2019  2:29 PM Venita hicks neuro    Medication Contract Received 2/1/2021  1:35 PM EMMA BEVERLY Medication Contract Gabapentin                     Patient given educational materials -see patient instructions. Discussed use, benefit, and side effects of prescribed medications. All patient questions answered. Pt voiced understanding. Reviewed health maintenance. Instructed to continue currentmedications, diet and exercise. Patient agreed with treatment plan. Follow up as directed. MEDICATIONS:  No orders of the defined types were placed in this encounter. ORDERS:  Orders Placed This Encounter   Procedures    FL ESOPHAGRAM       Follow-up:  No follow-ups on file. PATIENT INSTRUCTIONS:  Patient Instructions   Metoprolol 50mg cut in half to 25mg twice a day  Monitor your blood pressure every a.m And once random during the day. Record them. The goal is top number under 140 and bottom number under 80. And write the heart rate down too  Refer to  [facility] in [place] has appt sept 28  Have swallow test  Small bites and chew well if getting worse need to get appt moved up with  [facility]. Electronically signed by EARLE Stewart CNP on 8/5/2022 at 12:28 PM    EMR Dragon/transcription disclaimer:  Much of thisencounter note is electronic transcription/translation of spoken language to printed texts. The electronic translation of spoken language may be erroneous, or at times, nonsensical words or phrases may be inadvertentlytranscribed.   Although I have reviewed the note for such errors, some may still exist.

## 2022-08-05 ENCOUNTER — HOSPITAL ENCOUNTER (OUTPATIENT)
Dept: GENERAL RADIOLOGY | Age: 87
Discharge: HOME | End: 2022-08-05
Payer: MEDICARE

## 2022-08-05 DIAGNOSIS — R13.12 OROPHARYNGEAL DYSPHAGIA: ICD-10-CM

## 2022-08-05 PROCEDURE — 74220 X-RAY XM ESOPHAGUS 1CNTRST: CPT | Performed by: RADIOLOGY

## 2022-08-05 PROCEDURE — 74220 X-RAY XM ESOPHAGUS 1CNTRST: CPT

## 2022-08-05 RX ORDER — RIVAROXABAN 20 MG/1
TABLET, FILM COATED ORAL
Qty: 30 TABLET | Refills: 5 | Status: SHIPPED | OUTPATIENT
Start: 2022-08-05

## 2022-08-05 ASSESSMENT — ENCOUNTER SYMPTOMS
TROUBLE SWALLOWING: 1
VOMITING: 1

## 2022-08-10 DIAGNOSIS — R13.12 OROPHARYNGEAL DYSPHAGIA: Primary | ICD-10-CM

## 2022-09-01 ENCOUNTER — OFFICE VISIT (OUTPATIENT)
Dept: PRIMARY CARE CLINIC | Age: 87
End: 2022-09-01
Payer: MEDICARE

## 2022-09-01 VITALS
WEIGHT: 144 LBS | DIASTOLIC BLOOD PRESSURE: 60 MMHG | TEMPERATURE: 96.8 F | HEIGHT: 63 IN | SYSTOLIC BLOOD PRESSURE: 130 MMHG | BODY MASS INDEX: 25.52 KG/M2 | HEART RATE: 46 BPM | OXYGEN SATURATION: 99 % | RESPIRATION RATE: 16 BRPM

## 2022-09-01 DIAGNOSIS — Z00.00 MEDICARE ANNUAL WELLNESS VISIT, SUBSEQUENT: Primary | ICD-10-CM

## 2022-09-01 DIAGNOSIS — Z95.828 PORT-A-CATH IN PLACE: ICD-10-CM

## 2022-09-01 DIAGNOSIS — N18.32 STAGE 3B CHRONIC KIDNEY DISEASE (HCC): ICD-10-CM

## 2022-09-01 DIAGNOSIS — L03.116 CELLULITIS OF LEFT LOWER LEG: ICD-10-CM

## 2022-09-01 DIAGNOSIS — Z45.2 ENCOUNTER FOR CARE RELATED TO PORT-A-CATH: ICD-10-CM

## 2022-09-01 DIAGNOSIS — I48.0 PAF (PAROXYSMAL ATRIAL FIBRILLATION) (HCC): ICD-10-CM

## 2022-09-01 DIAGNOSIS — E11.9 TYPE 2 DIABETES MELLITUS WITHOUT COMPLICATION, WITHOUT LONG-TERM CURRENT USE OF INSULIN (HCC): ICD-10-CM

## 2022-09-01 DIAGNOSIS — I10 ESSENTIAL HYPERTENSION: ICD-10-CM

## 2022-09-01 DIAGNOSIS — C18.9 MALIGNANT NEOPLASM OF COLON, UNSPECIFIED PART OF COLON (HCC): ICD-10-CM

## 2022-09-01 PROCEDURE — 1123F ACP DISCUSS/DSCN MKR DOCD: CPT | Performed by: NURSE PRACTITIONER

## 2022-09-01 PROCEDURE — G0439 PPPS, SUBSEQ VISIT: HCPCS | Performed by: NURSE PRACTITIONER

## 2022-09-01 PROCEDURE — 3044F HG A1C LEVEL LT 7.0%: CPT | Performed by: NURSE PRACTITIONER

## 2022-09-01 RX ORDER — PREDNISONE 10 MG/1
10 TABLET ORAL DAILY
Qty: 14 TABLET | Refills: 0 | Status: SHIPPED | OUTPATIENT
Start: 2022-09-01 | End: 2022-09-15

## 2022-09-01 RX ORDER — METOPROLOL SUCCINATE 50 MG/1
TABLET, EXTENDED RELEASE ORAL
Qty: 30 TABLET | Refills: 5 | Status: SHIPPED | OUTPATIENT
Start: 2022-09-01 | End: 2022-09-29 | Stop reason: ALTCHOICE

## 2022-09-01 RX ORDER — DULOXETIN HYDROCHLORIDE 60 MG/1
60 CAPSULE, DELAYED RELEASE ORAL DAILY
Qty: 30 CAPSULE | Refills: 5 | Status: ON HOLD | OUTPATIENT
Start: 2022-09-01 | End: 2022-09-07 | Stop reason: HOSPADM

## 2022-09-01 SDOH — ECONOMIC STABILITY: FOOD INSECURITY: WITHIN THE PAST 12 MONTHS, YOU WORRIED THAT YOUR FOOD WOULD RUN OUT BEFORE YOU GOT MONEY TO BUY MORE.: SOMETIMES TRUE

## 2022-09-01 SDOH — ECONOMIC STABILITY: FOOD INSECURITY: WITHIN THE PAST 12 MONTHS, THE FOOD YOU BOUGHT JUST DIDN'T LAST AND YOU DIDN'T HAVE MONEY TO GET MORE.: SOMETIMES TRUE

## 2022-09-01 ASSESSMENT — COLUMBIA-SUICIDE SEVERITY RATING SCALE - C-SSRS
1. WITHIN THE PAST MONTH, HAVE YOU WISHED YOU WERE DEAD OR WISHED YOU COULD GO TO SLEEP AND NOT WAKE UP?: YES
6. HAVE YOU EVER DONE ANYTHING, STARTED TO DO ANYTHING, OR PREPARED TO DO ANYTHING TO END YOUR LIFE?: NO
2. HAVE YOU ACTUALLY HAD ANY THOUGHTS OF KILLING YOURSELF?: NO

## 2022-09-01 ASSESSMENT — PATIENT HEALTH QUESTIONNAIRE - PHQ9
10. IF YOU CHECKED OFF ANY PROBLEMS, HOW DIFFICULT HAVE THESE PROBLEMS MADE IT FOR YOU TO DO YOUR WORK, TAKE CARE OF THINGS AT HOME, OR GET ALONG WITH OTHER PEOPLE: 1
8. MOVING OR SPEAKING SO SLOWLY THAT OTHER PEOPLE COULD HAVE NOTICED. OR THE OPPOSITE, BEING SO FIGETY OR RESTLESS THAT YOU HAVE BEEN MOVING AROUND A LOT MORE THAN USUAL: 1
7. TROUBLE CONCENTRATING ON THINGS, SUCH AS READING THE NEWSPAPER OR WATCHING TELEVISION: 0
5. POOR APPETITE OR OVEREATING: 0
3. TROUBLE FALLING OR STAYING ASLEEP: 1
SUM OF ALL RESPONSES TO PHQ QUESTIONS 1-9: 7
SUM OF ALL RESPONSES TO PHQ9 QUESTIONS 1 & 2: 1
2. FEELING DOWN, DEPRESSED OR HOPELESS: 1
9. THOUGHTS THAT YOU WOULD BE BETTER OFF DEAD, OR OF HURTING YOURSELF: 0
SUM OF ALL RESPONSES TO PHQ QUESTIONS 1-9: 7
4. FEELING TIRED OR HAVING LITTLE ENERGY: 3
SUM OF ALL RESPONSES TO PHQ QUESTIONS 1-9: 7
1. LITTLE INTEREST OR PLEASURE IN DOING THINGS: 0
6. FEELING BAD ABOUT YOURSELF - OR THAT YOU ARE A FAILURE OR HAVE LET YOURSELF OR YOUR FAMILY DOWN: 1
SUM OF ALL RESPONSES TO PHQ QUESTIONS 1-9: 7

## 2022-09-01 ASSESSMENT — LIFESTYLE VARIABLES
HOW MANY STANDARD DRINKS CONTAINING ALCOHOL DO YOU HAVE ON A TYPICAL DAY: PATIENT DOES NOT DRINK
HOW OFTEN DO YOU HAVE A DRINK CONTAINING ALCOHOL: NEVER

## 2022-09-01 ASSESSMENT — SOCIAL DETERMINANTS OF HEALTH (SDOH): HOW HARD IS IT FOR YOU TO PAY FOR THE VERY BASICS LIKE FOOD, HOUSING, MEDICAL CARE, AND HEATING?: SOMEWHAT HARD

## 2022-09-01 NOTE — PROGRESS NOTES
Medicare Annual Wellness Visit    [place] Guero is here for Medicare AWV (Patient presents today for her maw. She states that she last ate around 630 this morning.)    Assessment & Plan   Medicare annual wellness visit, subsequent  Stage 3b chronic kidney disease ([address]  Essential hypertension  Port-A-Cath in place  -     AR Jose Armando Barakat  Encounter for care related to [address] [facility] DEVICE  Malignant neoplasm of colon, unspecified part of colon ([facility] [address] [facility]  Cellulitis of left lower leg  Type 2 diabetes mellitus without complication, without long-term current use of insulin (HCC)  PAF (paroxysmal atrial fibrillation) (HCC)  -     metoprolol succinate (TOPROL XL) 50 MG extended release tablet; TAKE 1 TABLET BY once a day, Disp-30 tablet, R-5Normal    Recommendations for Preventive Services Due: see orders and patient instructions/AVS.  Call [facility] if still on cymbalta going t o increase for depression  Steroid oral for legs, watch blood sugar if high stop the steroid  Metoprolol decrease from 50mg twice a day to 50mg once a day, this should help heart rate  Talk with cardiology about coming off the blood thinner. Look at new insurance at first of year to make sure meds are covered. Might get a list of your med cost and take with you to see cardiologist.   Call if leg looks infected for antibiotics. If needed due to swelling take a fluid pill lasix. Recommended screening schedule for the next 5-10 years is provided to the patient in written form: see Patient Instructions/AVS.     Return in 3 weeks (on 9/22/2022) for reche ck heart rate. Subjective   The following acute and/or chronic problems were also addressed today:  Port a cath flush  She is seeing cardiology but she is concerned that her heart rate is running low in the 40s. She has not had any recent falls but she does feel unsteady on her feet.   Her legs are starting to bother her again with a rash that she gets it is a dermatitis that she seen Dr. Harding the dermatologist for and it only gets better with oral steroids. She does feel like the leg is little more swollen than normal today. Patient's complete Health Risk Assessment and screening values have been reviewed and are found in Flowsheets. The following problems were reviewed today and where indicated follow up appointments were made and/or referrals ordered.     Positive Risk Factor Screenings with Interventions:    Fall Risk:  Do you feel unsteady or are you worried about falling? : (!) yes  2 or more falls in past year?: no  Fall with injury in past year?: no   Fall Risk Interventions:    Home safety tips provided  Home exercises provided to promote strength and balance  Recently had physical therapy     Depression:  PHQ-2 Score: 1  PHQ-9 Total Score: 7    Severity:1-4 = minimal depression, 5-9 = mild depression, 10-14 = moderate depression, 15-19 = moderately severe depression, 20-27 = severe depression  Depression Interventions:  --------------------------------------------------------------------------------          General Health and ACP:  General  In general, how would you say your health is?: Fair  In the past 7 days, have you experienced any of the following: New or Increased Pain, New or Increased Fatigue, Loneliness, Social Isolation, Stress or Anger?: (!) Yes  Select all that apply: (!) New or Increased Pain, New or Increased Fatigue, Loneliness, Social Isolation, Stress, Anger  Do you get the social and emotional support that you need?: (!) No  Do you have a Living Will?: Yes    Advance Directives       Power of  Living Will ACP-Advance Directive ACP-Power of     Not on File Filed on 03/11/16 Filed Not on File        General Health Risk Interventions:  No Living Will: Advance Care Planning addressed with patient today    Health Habits/Nutrition:  Physical Activity: Inactive    Days of Exercise per Week: 0 days right.  Head: normocephalic and atraumatic  Eyes: pupils equal, round, and reactive to light, extraocular eye movements intact, conjunctivae normal  ENT: tympanic membrane, external ear and ear canal normal bilaterally, nose without deformity, nasal mucosa and turbinates normal without polyps  Neck: supple and non-tender without mass, no thyromegaly or thyroid nodules, no cervical lymphadenopathy  Pulmonary/Chest: clear to auscultation bilaterally- no wheezes, rales or rhonchi, normal air movement, no respiratory distress  Cardiovascular: Bradycardia, murmur present. Rubs, clicks, or gallops, distal pulses intact,  Abdomen: soft, non-tender, non-distended, normal bowel sounds, no masses or organomegaly  Extremities: no cyanosis, clubbing or edema  Musculoskeletal: normal range of motion, no joint swelling, deformity or tenderness  Neurologic: reflexes normal and symmetric, no cranial nerve deficit, gait, coordination and speech normal       Allergies   Allergen Reactions    Duloxetine     Zoloft [Sertraline Hcl] Other (See Comments)     Patient states that she doesn't want this medication anymore because she hallucinated and saw spiders. Patient weaned herself off and after she quit taking the medication, the hallucinations stopped. Patient states that she doesn't want this medication anymore because she hallucinated and saw spiders. Patient weaned herself off and after she quit taking the medication, the hallucinations stopped. Gabapentin Rash     Pt weaning off due to rash and hot flashes     Prior to Visit Medications    Medication Sig Taking?  Authorizing Provider   predniSONE (DELTASONE) 10 MG tablet Take 1 tablet by mouth daily for 14 days Yes Farideh Side, APRN - CNP   metoprolol succinate (TOPROL XL) 50 MG extended release tablet TAKE 1 TABLET BY once a day Yes Farideh Side, APRN - CNP   XARELTO 20 MG TABS tablet TAKE 1 TABLET BY MOUTH DAILY WITH BREAKFAST Yes Farideh Side, APRN - CNP metFORMIN (GLUCOPHAGE-XR) 500 MG extended release tablet TAKE 1 TABLET BY MOUTH DAILY WITH BREAKFAST Yes EARLE Ritter CNP   ciprofloxacin (CILOXAN) 0.3 % ophthalmic solution INSTILL 1 DROP IN RIGHT EYE TWICE DAILY Yes Historical Provider, MD   omeprazole (PRILOSEC) 40 MG delayed release capsule TAKE 1 CAPSULE BY MOUTH DAILY Yes Kamari Mcgregor MD   HYDROcodone-acetaminophen (NORCO)  MG per tablet TAKE 1 TABLET BY MOUTH EVERY 6 HOURS AS NEEDED Yes Historical Provider, MD   pregabalin (LYRICA) 150 MG capsule TAKE 1 CAPSULE BY MOUTH EVERY 12 HOURS AS NEEDED Yes Historical Provider, MD   furosemide (LASIX) 20 MG tablet Take 1 tablet by mouth daily as needed (swelling) Yes EARLE Ritter CNP   melatonin 5 MG TBDP disintegrating tablet Take 1 tablet by mouth nightly Yes Queenie Pond MD   zinc sulfate (ZINCATE) 220 (50 Zn) MG capsule Take 1 capsule by mouth daily Yes Queenie Pond MD   ascorbic acid (VITAMIN C) 500 MG tablet Take 1 tablet by mouth 2 times daily Yes Queenie Pond MD   vitamin D (ERGOCALCIFEROL) 1.25 MG (45435 UT) CAPS capsule TAKE 1 CAPSULE BY MOUTH 1 TIME A WEEK Yes Kamari Mcgregor MD   irbesartan (AVAPRO) 300 MG tablet Take 1 tablet by mouth daily Yes EARLE Hernandez   dilTIAZem (CARDIZEM CD) 120 MG extended release capsule TAKE 1 CAPSULE BY MOUTH TWICE DAILY Yes Gerhardt Snowman, APRN   nystatin (MYCOSTATIN) 971818 UNIT/GM powder For rash under breast. Apply 3 times daily.  Yes EARLE Ritter CNP   Cyanocobalamin (B-12) 500 MCG TABS TAKE 1 TABLET BY MOUTH DAILY Yes Kamari Mcgregor MD   Hospital Bed [facility] by Does not apply route Yes EARLE Ritter CNP   valACYclovir (VALTREX) 1 g tablet TAKE 1 TABLET BY MOUTH TWICE DAILY  Historical Provider, MD Tripathi (Including outside providers/suppliers regularly involved in providing care):   Patient Care Team:  EARLE Ritter CNP as PCP - General (Nurse Practitioner)  Landen Vazquez Nohemy Clark - FARAZ as PCP - [facility]  EARLE Tanner as Advanced Practice Nurse (Neurology)  EARLE Eli - CNP (Nurse Practitioner)  EARLE Ng as Advanced Practice Nurse (Family Nurse Practitioner)  Corine Mendiola DO as Consulting Physician (Neurosurgery)  EARLE Perez as Nurse Practitioner (Family Nurse Practitioner)  Kathy Lambert RN as Ambulatory Care Manager     Reviewed and updated this visit:  Tobacco  Allergies  Meds  Med Hx  Surg Hx  Soc Hx  Fam Hx

## 2022-09-01 NOTE — PATIENT INSTRUCTIONS
Call [facility] if still on cymbalta going t o increase for depression  Steroid oral for legs, watch blood sugar if high stop the steroid  Metoprolol decrease from 50mg twice a day to 50mg once a day, this should help heart rate  Talk with cardiology about coming off the blood thinner. Look at new insurance at first of year to make sure meds are covered. Might get a list of your med cost and take with you to see cardiologist.   Call if leg looks infected for antibiotics. If needed due to swelling take a fluid pill lasix. Personalized Preventive Plan for Jesus Orona - 9/1/2022  Medicare offers a range of preventive health benefits. Some of the tests and screenings are paid in full while other may be subject to a deductible, co-insurance, and/or copay. Some of these benefits include a comprehensive review of your medical history including lifestyle, illnesses that may run in your family, and various assessments and screenings as appropriate. After reviewing your medical record and screening and assessments performed today your provider may have ordered immunizations, labs, imaging, and/or referrals for you. A list of these orders (if applicable) as well as your Preventive Care list are included within your After Visit Summary for your review. Other Preventive Recommendations:    A preventive eye exam performed by an eye specialist is recommended every 1-2 years to screen for glaucoma; cataracts, macular degeneration, and other eye disorders. A preventive dental visit is recommended every 6 months. Try to get at least 150 minutes of exercise per week or 10,000 steps per day on a pedometer . Order or download the FREE \"Exercise & Physical Activity: Your Everyday Guide\" from [employer]. Call 2-444.522.6533 or search [employer] online. You need 4093-0676 mg of calcium and 0433-0413 IU of vitamin D per day.  It is possible to meet your calcium requirement with diet alone, but a vitamin D supplement is usually necessary to meet this goal.  When exposed to the sun, use a sunscreen that protects against both UVA and UVB radiation with an SPF of 30 or greater. Reapply every 2 to 3 hours or after sweating, drying off with a towel, or swimming. Always wear a seat belt when traveling in a car. Always wear a helmet when riding a bicycle or motorcycle.

## 2022-09-05 ENCOUNTER — APPOINTMENT (OUTPATIENT)
Dept: CT IMAGING | Age: 87
End: 2022-09-05
Payer: MEDICARE

## 2022-09-05 ENCOUNTER — HOSPITAL ENCOUNTER (OUTPATIENT)
Age: 87
Setting detail: OBSERVATION
Discharge: HOME HEALTH | End: 2022-09-07
Attending: PEDIATRICS
Payer: MEDICARE

## 2022-09-05 DIAGNOSIS — R07.9 CHEST PAIN, UNSPECIFIED TYPE: ICD-10-CM

## 2022-09-05 DIAGNOSIS — G45.9 TIA (TRANSIENT ISCHEMIC ATTACK): Primary | ICD-10-CM

## 2022-09-05 LAB
ALBUMIN SERPL-MCNC: 4 G/DL (ref 3.5–5.2)
ALP BLD-CCNC: 138 U/L (ref 35–104)
ALT SERPL-CCNC: 22 U/L (ref 5–33)
ANION GAP SERPL CALCULATED.3IONS-SCNC: 11 MMOL/L (ref 7–19)
AST SERPL-CCNC: 20 U/L (ref 5–32)
BASOPHILS ABSOLUTE: 0 K/UL (ref 0–0.2)
BASOPHILS RELATIVE PERCENT: 0.4 % (ref 0–1)
BILIRUB SERPL-MCNC: <0.2 MG/DL (ref 0.2–1.2)
BUN BLDV-MCNC: 32 MG/DL (ref 8–23)
CALCIUM SERPL-MCNC: 9.2 MG/DL (ref 8.8–10.2)
CHLORIDE BLD-SCNC: 102 MMOL/L (ref 98–111)
CO2: 26 MMOL/L (ref 22–29)
CREAT SERPL-MCNC: 1.2 MG/DL (ref 0.5–0.9)
EOSINOPHILS ABSOLUTE: 0.1 K/UL (ref 0–0.6)
EOSINOPHILS RELATIVE PERCENT: 1.3 % (ref 0–5)
GFR AFRICAN AMERICAN: 52
GFR NON-AFRICAN AMERICAN: 43
GLUCOSE BLD-MCNC: 108 MG/DL (ref 70–99)
GLUCOSE BLD-MCNC: 111 MG/DL (ref 74–109)
HCT VFR BLD CALC: 34.5 % (ref 37–47)
HEMOGLOBIN: 10.2 G/DL (ref 12–16)
IMMATURE GRANULOCYTES #: 0 K/UL
INR BLD: 0.96 (ref 0.88–1.18)
LYMPHOCYTES ABSOLUTE: 3 K/UL (ref 1.1–4.5)
LYMPHOCYTES RELATIVE PERCENT: 35.8 % (ref 20–40)
MCH RBC QN AUTO: 22.1 PG (ref 27–31)
MCHC RBC AUTO-ENTMCNC: 29.6 G/DL (ref 33–37)
MCV RBC AUTO: 74.7 FL (ref 81–99)
MONOCYTES ABSOLUTE: 0.8 K/UL (ref 0–0.9)
MONOCYTES RELATIVE PERCENT: 10 % (ref 0–10)
NEUTROPHILS ABSOLUTE: 4.4 K/UL (ref 1.5–7.5)
NEUTROPHILS RELATIVE PERCENT: 52.3 % (ref 50–65)
PDW BLD-RTO: 17.7 % (ref 11.5–14.5)
PERFORMED ON: ABNORMAL
PLATELET # BLD: 325 K/UL (ref 130–400)
PMV BLD AUTO: 9.7 FL (ref 9.4–12.3)
POTASSIUM SERPL-SCNC: 4.3 MMOL/L (ref 3.5–5)
PROTHROMBIN TIME: 12.6 SEC (ref 12–14.6)
RBC # BLD: 4.62 M/UL (ref 4.2–5.4)
SODIUM BLD-SCNC: 139 MMOL/L (ref 136–145)
TOTAL PROTEIN: 6.9 G/DL (ref 6.6–8.7)
TROPONIN: <0.01 NG/ML (ref 0–0.03)
WBC # BLD: 8.3 K/UL (ref 4.8–10.8)

## 2022-09-05 PROCEDURE — 85610 PROTHROMBIN TIME: CPT

## 2022-09-05 PROCEDURE — 83880 ASSAY OF NATRIURETIC PEPTIDE: CPT

## 2022-09-05 PROCEDURE — 84100 ASSAY OF PHOSPHORUS: CPT

## 2022-09-05 PROCEDURE — 80061 LIPID PANEL: CPT

## 2022-09-05 PROCEDURE — 36415 COLL VENOUS BLD VENIPUNCTURE: CPT

## 2022-09-05 PROCEDURE — 84443 ASSAY THYROID STIM HORMONE: CPT

## 2022-09-05 PROCEDURE — 83036 HEMOGLOBIN GLYCOSYLATED A1C: CPT

## 2022-09-05 PROCEDURE — 70498 CT ANGIOGRAPHY NECK: CPT | Performed by: RADIOLOGY

## 2022-09-05 PROCEDURE — 80053 COMPREHEN METABOLIC PANEL: CPT

## 2022-09-05 PROCEDURE — 70450 CT HEAD/BRAIN W/O DYE: CPT | Performed by: RADIOLOGY

## 2022-09-05 PROCEDURE — 84484 ASSAY OF TROPONIN QUANT: CPT

## 2022-09-05 PROCEDURE — 70496 CT ANGIOGRAPHY HEAD: CPT | Performed by: RADIOLOGY

## 2022-09-05 PROCEDURE — 82947 ASSAY GLUCOSE BLOOD QUANT: CPT

## 2022-09-05 PROCEDURE — 70496 CT ANGIOGRAPHY HEAD: CPT

## 2022-09-05 PROCEDURE — 93005 ELECTROCARDIOGRAM TRACING: CPT | Performed by: PEDIATRICS

## 2022-09-05 PROCEDURE — 85025 COMPLETE CBC W/AUTO DIFF WBC: CPT

## 2022-09-05 PROCEDURE — 99285 EMERGENCY DEPT VISIT HI MDM: CPT

## 2022-09-05 PROCEDURE — 83735 ASSAY OF MAGNESIUM: CPT

## 2022-09-05 PROCEDURE — 70450 CT HEAD/BRAIN W/O DYE: CPT

## 2022-09-06 ENCOUNTER — APPOINTMENT (OUTPATIENT)
Dept: MRI IMAGING | Age: 87
End: 2022-09-06
Payer: MEDICARE

## 2022-09-06 PROBLEM — R07.9 CHEST PAIN: Status: ACTIVE | Noted: 2022-09-06
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
## 2022-09-06 PROBLEM — R53.1 WEAKNESS: Status: ACTIVE | Noted: 2022-09-06

## 2022-09-06 LAB
CHOLESTEROL, TOTAL: 158 MG/DL (ref 160–199)
EKG P AXIS: 60 DEGREES
EKG P-R INTERVAL: 182 MS
EKG Q-T INTERVAL: 430 MS
EKG QRS DURATION: 82 MS
EKG QTC CALCULATION (BAZETT): 449 MS
EKG T AXIS: 47 DEGREES
HBA1C MFR BLD: 6.2 % (ref 4–6)
HDLC SERPL-MCNC: 63 MG/DL (ref 65–121)
LDL CHOLESTEROL CALCULATED: 68 MG/DL
LV EF: 58 %
LVEF MODALITY: NORMAL
MAGNESIUM: 1.7 MG/DL (ref 1.6–2.4)
PHOSPHORUS: 3.6 MG/DL (ref 2.5–4.5)
PRO-BNP: 1060 PG/ML (ref 0–1800)
REASON FOR REJECTION: NORMAL
REJECTED TEST: NORMAL
SARS-COV-2, NAAT: NOT DETECTED
TRIGL SERPL-MCNC: 137 MG/DL (ref 0–149)
TROPONIN: <0.01 NG/ML (ref 0–0.03)
TSH REFLEX FT4: 1 UIU/ML (ref 0.35–5.5)

## 2022-09-06 PROCEDURE — 97530 THERAPEUTIC ACTIVITIES: CPT

## 2022-09-06 PROCEDURE — 6360000004 HC RX CONTRAST MEDICATION: Performed by: HOSPITALIST

## 2022-09-06 PROCEDURE — G0378 HOSPITAL OBSERVATION PER HR: HCPCS

## 2022-09-06 PROCEDURE — 87635 SARS-COV-2 COVID-19 AMP PRB: CPT

## 2022-09-06 PROCEDURE — 97110 THERAPEUTIC EXERCISES: CPT

## 2022-09-06 PROCEDURE — 93010 ELECTROCARDIOGRAM REPORT: CPT | Performed by: INTERNAL MEDICINE

## 2022-09-06 PROCEDURE — 99220 PR INITIAL OBSERVATION CARE/DAY 70 MINUTES: CPT | Performed by: PSYCHIATRY & NEUROLOGY

## 2022-09-06 PROCEDURE — 97535 SELF CARE MNGMENT TRAINING: CPT

## 2022-09-06 PROCEDURE — 6370000000 HC RX 637 (ALT 250 FOR IP): Performed by: HOSPITALIST

## 2022-09-06 PROCEDURE — 97165 OT EVAL LOW COMPLEX 30 MIN: CPT

## 2022-09-06 PROCEDURE — 99214 OFFICE O/P EST MOD 30 MIN: CPT | Performed by: INTERNAL MEDICINE

## 2022-09-06 PROCEDURE — 70553 MRI BRAIN STEM W/O & W/DYE: CPT

## 2022-09-06 PROCEDURE — 36415 COLL VENOUS BLD VENIPUNCTURE: CPT

## 2022-09-06 PROCEDURE — A9577 INJ MULTIHANCE: HCPCS | Performed by: HOSPITALIST

## 2022-09-06 PROCEDURE — 97162 PT EVAL MOD COMPLEX 30 MIN: CPT

## 2022-09-06 PROCEDURE — C8929 TTE W OR WO FOL WCON,DOPPLER: HCPCS

## 2022-09-06 PROCEDURE — 93880 EXTRACRANIAL BILAT STUDY: CPT

## 2022-09-06 PROCEDURE — 2580000003 HC RX 258: Performed by: HOSPITALIST

## 2022-09-06 PROCEDURE — 84484 ASSAY OF TROPONIN QUANT: CPT

## 2022-09-06 RX ORDER — POTASSIUM CHLORIDE 20 MEQ/1
40 TABLET, EXTENDED RELEASE ORAL PRN
Status: DISCONTINUED | OUTPATIENT
Start: 2022-09-06 | End: 2022-09-07 | Stop reason: HOSPADM

## 2022-09-06 RX ORDER — ACYCLOVIR 800 MG/1
800 TABLET ORAL
Status: DISCONTINUED | OUTPATIENT
Start: 2022-09-06 | End: 2022-09-07 | Stop reason: HOSPADM

## 2022-09-06 RX ORDER — ONDANSETRON 2 MG/ML
4 INJECTION INTRAMUSCULAR; INTRAVENOUS EVERY 6 HOURS PRN
Status: DISCONTINUED | OUTPATIENT
Start: 2022-09-06 | End: 2022-09-07 | Stop reason: HOSPADM

## 2022-09-06 RX ORDER — PREDNISONE 10 MG/1
10 TABLET ORAL DAILY
Status: DISCONTINUED | OUTPATIENT
Start: 2022-09-06 | End: 2022-09-07 | Stop reason: HOSPADM

## 2022-09-06 RX ORDER — ASPIRIN 300 MG/1
300 SUPPOSITORY RECTAL DAILY
Status: DISCONTINUED | OUTPATIENT
Start: 2022-09-06 | End: 2022-09-07 | Stop reason: HOSPADM

## 2022-09-06 RX ORDER — ACETAMINOPHEN 650 MG/1
650 SUPPOSITORY RECTAL EVERY 6 HOURS PRN
Status: DISCONTINUED | OUTPATIENT
Start: 2022-09-06 | End: 2022-09-07 | Stop reason: HOSPADM

## 2022-09-06 RX ORDER — MECOBALAMIN 5000 MCG
5 TABLET,DISINTEGRATING ORAL NIGHTLY
Status: DISCONTINUED | OUTPATIENT
Start: 2022-09-06 | End: 2022-09-07 | Stop reason: HOSPADM

## 2022-09-06 RX ORDER — SODIUM CHLORIDE 0.9 % (FLUSH) 0.9 %
5-40 SYRINGE (ML) INJECTION PRN
Status: DISCONTINUED | OUTPATIENT
Start: 2022-09-06 | End: 2022-09-07 | Stop reason: HOSPADM

## 2022-09-06 RX ORDER — METOPROLOL SUCCINATE 50 MG/1
50 TABLET, EXTENDED RELEASE ORAL DAILY
Status: DISCONTINUED | OUTPATIENT
Start: 2022-09-06 | End: 2022-09-07 | Stop reason: HOSPADM

## 2022-09-06 RX ORDER — LABETALOL HYDROCHLORIDE 5 MG/ML
10 INJECTION, SOLUTION INTRAVENOUS EVERY 10 MIN PRN
Status: DISCONTINUED | OUTPATIENT
Start: 2022-09-06 | End: 2022-09-07 | Stop reason: HOSPADM

## 2022-09-06 RX ORDER — ZINC SULFATE 50(220)MG
50 CAPSULE ORAL DAILY
Status: DISCONTINUED | OUTPATIENT
Start: 2022-09-06 | End: 2022-09-07 | Stop reason: HOSPADM

## 2022-09-06 RX ORDER — ONDANSETRON 4 MG/1
4 TABLET, ORALLY DISINTEGRATING ORAL EVERY 8 HOURS PRN
Status: DISCONTINUED | OUTPATIENT
Start: 2022-09-06 | End: 2022-09-07 | Stop reason: HOSPADM

## 2022-09-06 RX ORDER — POTASSIUM CHLORIDE 7.45 MG/ML
10 INJECTION INTRAVENOUS PRN
Status: DISCONTINUED | OUTPATIENT
Start: 2022-09-06 | End: 2022-09-07 | Stop reason: HOSPADM

## 2022-09-06 RX ORDER — ASPIRIN 81 MG/1
81 TABLET ORAL DAILY
Status: DISCONTINUED | OUTPATIENT
Start: 2022-09-06 | End: 2022-09-07 | Stop reason: HOSPADM

## 2022-09-06 RX ORDER — PREGABALIN 150 MG/1
150 CAPSULE ORAL 2 TIMES DAILY
Status: DISCONTINUED | OUTPATIENT
Start: 2022-09-06 | End: 2022-09-07 | Stop reason: HOSPADM

## 2022-09-06 RX ORDER — ASCORBIC ACID 500 MG
500 TABLET ORAL 2 TIMES DAILY
Status: DISCONTINUED | OUTPATIENT
Start: 2022-09-06 | End: 2022-09-07 | Stop reason: HOSPADM

## 2022-09-06 RX ORDER — DULOXETIN HYDROCHLORIDE 30 MG/1
60 CAPSULE, DELAYED RELEASE ORAL DAILY
Status: DISCONTINUED | OUTPATIENT
Start: 2022-09-06 | End: 2022-09-07 | Stop reason: HOSPADM

## 2022-09-06 RX ORDER — MAGNESIUM SULFATE IN WATER 40 MG/ML
2000 INJECTION, SOLUTION INTRAVENOUS PRN
Status: DISCONTINUED | OUTPATIENT
Start: 2022-09-06 | End: 2022-09-07 | Stop reason: HOSPADM

## 2022-09-06 RX ORDER — DILTIAZEM HYDROCHLORIDE 120 MG/1
120 CAPSULE, COATED, EXTENDED RELEASE ORAL 2 TIMES DAILY
Status: DISCONTINUED | OUTPATIENT
Start: 2022-09-06 | End: 2022-09-07 | Stop reason: HOSPADM

## 2022-09-06 RX ORDER — ATORVASTATIN CALCIUM 40 MG/1
80 TABLET, FILM COATED ORAL NIGHTLY
Status: DISCONTINUED | OUTPATIENT
Start: 2022-09-06 | End: 2022-09-07 | Stop reason: HOSPADM

## 2022-09-06 RX ORDER — NITROGLYCERIN 0.4 MG/1
0.4 TABLET SUBLINGUAL EVERY 5 MIN PRN
Status: DISCONTINUED | OUTPATIENT
Start: 2022-09-06 | End: 2022-09-07 | Stop reason: HOSPADM

## 2022-09-06 RX ORDER — SODIUM CHLORIDE 0.9 % (FLUSH) 0.9 %
5-40 SYRINGE (ML) INJECTION EVERY 12 HOURS SCHEDULED
Status: DISCONTINUED | OUTPATIENT
Start: 2022-09-06 | End: 2022-09-07 | Stop reason: HOSPADM

## 2022-09-06 RX ORDER — PANTOPRAZOLE SODIUM 40 MG/1
40 TABLET, DELAYED RELEASE ORAL
Refills: 3 | Status: DISCONTINUED | OUTPATIENT
Start: 2022-09-06 | End: 2022-09-07 | Stop reason: HOSPADM

## 2022-09-06 RX ORDER — SODIUM CHLORIDE 9 MG/ML
INJECTION, SOLUTION INTRAVENOUS PRN
Status: DISCONTINUED | OUTPATIENT
Start: 2022-09-06 | End: 2022-09-07 | Stop reason: HOSPADM

## 2022-09-06 RX ORDER — CHOLECALCIFEROL (VITAMIN D3) 125 MCG
500 CAPSULE ORAL DAILY
Status: DISCONTINUED | OUTPATIENT
Start: 2022-09-06 | End: 2022-09-07 | Stop reason: HOSPADM

## 2022-09-06 RX ORDER — ACETAMINOPHEN 325 MG/1
650 TABLET ORAL EVERY 6 HOURS PRN
Status: DISCONTINUED | OUTPATIENT
Start: 2022-09-06 | End: 2022-09-07 | Stop reason: HOSPADM

## 2022-09-06 RX ORDER — LOSARTAN POTASSIUM 50 MG/1
100 TABLET ORAL DAILY
Refills: 5 | Status: DISCONTINUED | OUTPATIENT
Start: 2022-09-06 | End: 2022-09-07 | Stop reason: HOSPADM

## 2022-09-06 RX ADMIN — PREGABALIN 150 MG: 150 CAPSULE ORAL at 19:55

## 2022-09-06 RX ADMIN — OXYCODONE HYDROCHLORIDE AND ACETAMINOPHEN 500 MG: 500 TABLET ORAL at 08:45

## 2022-09-06 RX ADMIN — ACYCLOVIR 800 MG: 800 TABLET ORAL at 11:46

## 2022-09-06 RX ADMIN — Medication 5 MG: at 19:55

## 2022-09-06 RX ADMIN — GADOBENATE DIMEGLUMINE 12.5 ML: 529 INJECTION, SOLUTION INTRAVENOUS at 13:08

## 2022-09-06 RX ADMIN — PANTOPRAZOLE SODIUM 40 MG: 40 TABLET, DELAYED RELEASE ORAL at 06:00

## 2022-09-06 RX ADMIN — CYANOCOBALAMIN TAB 500 MCG 500 MCG: 500 TAB at 08:45

## 2022-09-06 RX ADMIN — SODIUM CHLORIDE, PRESERVATIVE FREE 10 ML: 5 INJECTION INTRAVENOUS at 19:56

## 2022-09-06 RX ADMIN — Medication 5000 UNITS: at 08:46

## 2022-09-06 RX ADMIN — ACYCLOVIR 800 MG: 800 TABLET ORAL at 18:17

## 2022-09-06 RX ADMIN — LOSARTAN POTASSIUM 100 MG: 50 TABLET, FILM COATED ORAL at 08:45

## 2022-09-06 RX ADMIN — ASPIRIN 81 MG: 81 TABLET, COATED ORAL at 08:45

## 2022-09-06 RX ADMIN — ACYCLOVIR 800 MG: 800 TABLET ORAL at 15:23

## 2022-09-06 RX ADMIN — METOPROLOL SUCCINATE 50 MG: 50 TABLET, EXTENDED RELEASE ORAL at 08:46

## 2022-09-06 RX ADMIN — OXYCODONE HYDROCHLORIDE AND ACETAMINOPHEN 500 MG: 500 TABLET ORAL at 02:37

## 2022-09-06 RX ADMIN — ACYCLOVIR 800 MG: 800 TABLET ORAL at 23:28

## 2022-09-06 RX ADMIN — ACYCLOVIR 800 MG: 800 TABLET ORAL at 06:00

## 2022-09-06 RX ADMIN — RIVAROXABAN 15 MG: 15 TABLET, FILM COATED ORAL at 08:45

## 2022-09-06 RX ADMIN — PREGABALIN 150 MG: 150 CAPSULE ORAL at 02:37

## 2022-09-06 RX ADMIN — ZINC SULFATE 220 MG (50 MG) CAPSULE 50 MG: CAPSULE at 08:45

## 2022-09-06 RX ADMIN — DULOXETINE 60 MG: 30 CAPSULE, DELAYED RELEASE ORAL at 08:45

## 2022-09-06 RX ADMIN — ATORVASTATIN CALCIUM 80 MG: 40 TABLET, FILM COATED ORAL at 19:55

## 2022-09-06 RX ADMIN — PREDNISONE 10 MG: 10 TABLET ORAL at 08:45

## 2022-09-06 RX ADMIN — PREGABALIN 150 MG: 150 CAPSULE ORAL at 08:45

## 2022-09-06 RX ADMIN — DILTIAZEM HYDROCHLORIDE 120 MG: 120 CAPSULE, COATED, EXTENDED RELEASE ORAL at 08:45

## 2022-09-06 RX ADMIN — DILTIAZEM HYDROCHLORIDE 120 MG: 120 CAPSULE, COATED, EXTENDED RELEASE ORAL at 19:56

## 2022-09-06 RX ADMIN — PERFLUTREN 1.5 ML: 6.52 INJECTION, SUSPENSION INTRAVENOUS at 09:54

## 2022-09-06 RX ADMIN — SODIUM CHLORIDE, PRESERVATIVE FREE 10 ML: 5 INJECTION INTRAVENOUS at 08:46

## 2022-09-06 RX ADMIN — OXYCODONE HYDROCHLORIDE AND ACETAMINOPHEN 500 MG: 500 TABLET ORAL at 19:55

## 2022-09-06 RX ADMIN — Medication 5 MG: at 02:37

## 2022-09-06 ASSESSMENT — ENCOUNTER SYMPTOMS
BACK PAIN: 0
COLOR CHANGE: 0
RHINORRHEA: 0
SHORTNESS OF BREATH: 0
ABDOMINAL PAIN: 0
COUGH: 0
SHORTNESS OF BREATH: 1
EYE DISCHARGE: 0
NAUSEA: 0
VOMITING: 0

## 2022-09-06 NOTE — PROGRESS NOTES
Vascular lab preliminary. Bilateral carotid duplex scan completed. Right ICA <50% stenosis  Left ICA 50-69% stenosis due to tortuousity. B/l Vertebrals antegrade flow. Final report pending.

## 2022-09-06 NOTE — CONSULTS
[facility] Neurology Consult        Patient:   Costa Levy  MR#:    [id number]  Account Number:                   [id number]      Room:    [address] [place] [address]   YOB: 1935  Date of Progress Note: 9/6/2022  Time of Note                           9:20 AM  Attending Physician:  Wally Reyes MD  Consulting Physician:   Mandy Obrien M.D.        [address]: Right-sided weakness/Fall      HISTORY OF PRESENT ILLNESS:   This 82 y.o. female with a past medical history significant for atrial fibrillation on Xarelto history of DVT, hypertension, diabetes and neuropathy evaluation of possible right-sided weakness. The patient is a poor historian. History is obtained from the chart patient indicates she got up from a chair to go to the kitchen and her right leg gave out. She indicates there is some shaking of the right leg. She came in for evaluation. There was some chest pain as well. The patient denies any previous history of stroke or TIA. She denies diplopia, dysarthria, dysphagia. 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 She denies missing any doses of Xarelto. REVIEW OF SYSTEMS:  Constitutional - No fever or chills. No diaphoresis or significant fatigue. HENT -  No tinnitus or significant hearing loss. Eyes - no sudden vision change or eye pain  Respiratory - no significant shortness of breath or cough  Cardiovascular - no chest pain No palpitations or significant leg swelling  Gastrointestinal - no abdominal swelling or pain. Genitourinary - No difficulty urinating, dysuria  Musculoskeletal - no back pain or myalgia. Skin - no color change or rash  Neurologic - No seizures. No lateralizing weakness. Hematologic - no easy bruising or excessive bleeding. Psychiatric - no severe anxiety or nervousness. All other review of systems are negative.       Past Medical History:      Diagnosis Date    Afib ([facility] [address] 2021    Bronchitis     Colon cancer VARICOSE VEIN SURGERY Left 03/14/2014  [address]    Left GSV Ablation    VARICOSE VEIN SURGERY Right 04/04/2014  [address]    Right GSV Ablation       Medications in Hospital:      Current Facility-Administered Medications:     aspirin EC tablet 81 mg, 81 mg, Oral, Daily, 81 mg at 09/06/22 0845 **OR** aspirin suppository 300 mg, 300 mg, Rectal, Daily, Aydee Mixon MD    sodium chloride flush 0.9 % injection 5-40 mL, 5-40 mL, IntraVENous, 2 times per day, Aydee Mixon MD, 10 mL at 09/06/22 0846    sodium chloride flush 0.9 % injection 5-40 mL, 5-40 mL, IntraVENous, PRN, Aydee Mixon MD    0.9 % sodium chloride infusion, , IntraVENous, PRN, Aydee Mixon MD    ondansetron (ZOFRAN-ODT) disintegrating tablet 4 mg, 4 mg, Oral, Q8H PRN **OR** ondansetron (ZOFRAN) injection 4 mg, 4 mg, IntraVENous, Q6H PRN, Aydee Mixon MD    acetaminophen (TYLENOL) tablet 650 mg, 650 mg, Oral, Q6H PRN **OR** acetaminophen (TYLENOL) suppository 650 mg, 650 mg, Rectal, Q6H PRN, Aydee Mixon MD    perflutren lipid microspheres (DEFINITY) injection 1.65 mg, 1.5 mL, IntraVENous, ONCE PRN, Aydee Mixon MD    potassium chloride (KLOR-CON M) extended release tablet 40 mEq, 40 mEq, Oral, PRN **OR** potassium bicarb-citric acid (EFFER-K) effervescent tablet 40 mEq, 40 mEq, Oral, PRN **OR** potassium chloride 10 mEq/100 mL IVPB (Peripheral Line), 10 mEq, IntraVENous, PRN, Aydee Mixon MD    magnesium sulfate 2000 mg in 50 mL IVPB premix, 2,000 mg, IntraVENous, PRN, Aydee Mixon MD    atorvastatin (LIPITOR) tablet 80 mg, 80 mg, Oral, Nightly, Aydee Mixon MD    labetalol (NORMODYNE;TRANDATE) injection 10 mg, 10 mg, IntraVENous, Q10 Min PRN, Aydee Mioxn MD    nitroGLYCERIN (NITROSTAT) SL tablet 0.4 mg, 0.4 mg, SubLINGual, Q5 Min PRN, Aydee Mixon MD    ascorbic acid (VITAMIN C) tablet 500 mg, 500 mg, Oral, BID, Aydee Mixon MD, 500 mg at 09/06/22 0845    vitamin B-12 (CYANOCOBALAMIN) tablet 500 mcg, 500 mcg, Oral, Daily, Arn Lint, MD, 500 mcg at 09/06/22 0845    dilTIAZem (CARDIZEM CD) extended release capsule 120 mg, 120 mg, Oral, BID, oRger Godoy MD, 120 mg at 09/06/22 0845    DULoxetine (CYMBALTA) extended release capsule 60 mg, 60 mg, Oral, Daily, Roger Godoy MD, 60 mg at 09/06/22 0845    losartan (COZAAR) tablet 100 mg, 100 mg, Oral, Daily, Roger Godoy MD, 100 mg at 09/06/22 0845    melatonin disintegrating tablet 5 mg, 5 mg, Oral, Nightly, Roger Godoy MD, 5 mg at 09/06/22 0849    [Held by provider] metoprolol succinate (TOPROL XL) extended release tablet 50 mg, 50 mg, Oral, Daily, Roger Godoy MD, 50 mg at 09/06/22 0846    pantoprazole (PROTONIX) tablet 40 mg, 40 mg, Oral, QAM AC, Roger Godoy MD, 40 mg at 09/06/22 0600    predniSONE (DELTASONE) tablet 10 mg, 10 mg, Oral, Daily, Roger Godoy MD, 10 mg at 09/06/22 0845    pregabalin (LYRICA) capsule 150 mg, 150 mg, Oral, BID, Roger Godoy MD, 150 mg at 09/06/22 0845    zinc sulfate (ZINCATE) capsule 50 mg, 50 mg, Oral, Daily, Roger Godoy MD, 50 mg at 09/06/22 0845    vitamin D (CHOLECALCIFEROL) capsule 5,000 Units, 5,000 Units, Oral, Daily, Roger Godoy MD, 5,000 Units at 09/06/22 0846    acyclovir (ZOVIRAX) tablet 800 mg, 800 mg, Oral, 5x Daily, Roger Godoy MD, 800 mg at 09/06/22 0600    rivaroxaban (XARELTO) tablet 15 mg, 15 mg, Oral, Daily with breakfast, Roger Godoy MD, 15 mg at 09/06/22 0845    iopamidol (ISOVUE-370) 76 % injection 90 mL, 90 mL, IntraVENous, ONCE PRN, Elmalcolme Caroli, MD    Allergies:  Gabapentin and Zoloft [sertraline hcl]    Social History:   TOBACCO:   reports that she has never smoked. She has never used smokeless tobacco.  ETOH:   reports no history of alcohol use.     Family History:       Problem Relation Age of Onset    Cancer Mother         Cervical Cancer    No Known Problems Father     Diabetes Sister     No Known Problems Sister     No Known Problems Sister     Diabetes Brother     Esophageal Cancer Brother     No Known Problems Brother     No Known Problems Brother     Cancer Son     Other Daughter         still born    Colon Cancer Neg Hx     Colon Polyps Neg Hx     Liver Cancer Neg Hx     Liver Disease Neg Hx     Rectal Cancer Neg Hx     Stomach Cancer Neg Hx            Physical Exam:    Vitals: BP (!) 147/52   Pulse 60   Temp (!) 96.6 °F (35.9 °C)   Resp 16   Ht 5' 3\" (1.6 m)   Wt 138 lb 12.8 oz (63 kg)   SpO2 99%   BMI 24.59 kg/m²     Constitutional - well developed, well nourished. Eyes - conjunctiva normal.  Pupils not tested  Ear, nose, throat -hearing  intact to finger rub No scars, masses, or lesions over external nose or ears, no atrophy of tongue  Neck-symmetric, no masses noted, no jugular vein distension  Respiration- chest wall appears symmetric, good expansion,   normal effort without use of accessory muscles  Musculoskeletal - no significant wasting of muscles noted, no bony deformities  Extremities-no clubbing, cyanosis or edema  Skin - warm, dry, and intact. No rash, erythema, or pallor.   Psychiatric - mood, affect, and behavior appear normal.      Neurological exam  Awake, alert, fluent oriented x 3 appropriate affect  Attention and concentration appear appropriate  Recent and remote memory appears unremarkable  Speech normal without dysarthria  No clear issues with language of fund of knowledge    Cranial Nerve Exam   CN II- Visual fields grossly unremarkable blindness in the right eye  CN III, IV,VI-EOMI, No nystagmus, conjugate eye movements, no ptosis  CN V-sensation intact to LT over face  CN VII-no facial assymetry  CN VIII-Hearing  intact to finger rub  CN IX and X- Palate not tested  CN XI-not test shoulder shrug  CN XII-Tongue midline with no fasciculations or fibrillations    Motor Exam  Generalized giveaway weakness throughout upper and lower extremities bilaterally, no cogwheeling, normal tone    Sensory Exam  Sensation intact to light touch and temperature upper and lower extremities bilaterally    Reflexes   Not tested    Tremors- no tremors in hands or head noted    Gait  Not tested    Coordination  Finger to nose-unremarkable        CBC:   Recent Labs     09/05/22 2227   WBC 8.3   HGB 10.2*          BMP:    Recent Labs     09/05/22 2227      K 4.3      CO2 26   BUN 32*   CREATININE 1.2*   GLUCOSE 111*       Hepatic:   Recent Labs     09/05/22 2227   AST 20   ALT 22   BILITOT <0.2   ALKPHOS 138*       Lipids:   Recent Labs     09/05/22 2227   CHOL 158*   HDL 63*       INR:   Recent Labs     09/05/22 2227   INR 0.96           Assessment and Plan     Possible right-sided weakness with a history of atrial fibrillation on chronic anticoagulation with Xarelto-TIA/stroke certainly possible    Examination  Awake, alert, oriented  Chronic blindness of the right eye  Generalized giveaway weakness      Plan  MRI of the brain/carotid ultrasound/2D echo/telemetry    On aspirin/Xarelto/statin  Hyperlipidemia-on statin LDL-68  Diabetes-hemoglobin A1c 6.2  Hypertension-on medications monitor  Fibromyalgia/neuropathy-Cymbalta/Lyrica  Atrial fibrillation-Xarelto/Cardizem/Toprol on hold  GI-Protonix  Mood disorder-Cymbalta      PT/OT/speech    Supportive care    Please feel free to call with any questions   [phone] (cell phone)    Dr Trudy Ruth certified in Neurology  Board Certified in [place] [address] Neurophysiology  Fellowship Trained in [place] [address] Neurophysiology    EMR Dragon/transcription disclaimer:Significant part of this  encounter note is electronic transcription/translation of spoken language to printed text. The electronic translation of spoken language may be erroneous, or at times, nonsensical words or phrases may be inadvertently transcribed.  Although I have reviewed the note for such errors, some may still exist.

## 2022-09-06 NOTE — ED NOTES
Code Stroke called by Dr[facility] Robert Salazar, [facility]        Rafat Rosales RN  09/05/22 7570

## 2022-09-06 NOTE — ED PROVIDER NOTES
[facility]  eMERGENCY dEPARTMENT eNCOUnter      Pt Name: Nara Baker  MRN: [id number]  [place] 1935  Date of evaluation: 9/5/2022  Provider: Shira Shields MD    [address] [place] [address]       Chief Complaint   Patient presents with    Extremity Weakness     R sided, onset 2100    Chest Pain         HISTORY OF PRESENT ILLNESS   (Location/Symptom, Timing/Onset,Context/Setting, Quality, Duration, Modifying Factors, Severity)  Note limiting factors. Nara Baker is a 82 y.o. female who presents to the emergency department with right-sided weakness. Patient and  give history. Patient states that weakness began at 9 PM this evening. Patient states that she was in the kitchen at the time. Patient states that she experienced some confusion. Patient began having chest pain. Pain is described as tightness in my mid chest.\"  Pain radiated to right shoulder. Patient cannot remember any exacerbating or alleviating factors. Patient gave severity level is a 5 out of 10. Patient denies difficulty breathing, nausea, vomiting, sweatiness, palpitations, slurred speech, facial droop, or acute vision changes. Patient has a history of atrial fibrillation. HPI    NursingNotes were reviewed. REVIEW OF SYSTEMS    (2-9 systems for level 4, 10 or more for level 5)     Review of Systems   Constitutional:  Negative for chills and fever. HENT:  Negative for congestion and rhinorrhea. Eyes:  Positive for visual disturbance (Chronic vision loss and right eye secondary to shingles). Negative for discharge. Respiratory:  Negative for cough and shortness of breath. Cardiovascular:  Positive for chest pain. Negative for palpitations. Gastrointestinal:  Negative for abdominal pain, nausea and vomiting. Genitourinary:  Negative for difficulty urinating and dysuria. Musculoskeletal:  Negative for back pain and neck pain. Skin:  Positive for pallor and rash. Negative for color change.    Neurological: Positive for weakness (Right-sided). Negative for dizziness, syncope, facial asymmetry, speech difficulty and light-headedness. Psychiatric/Behavioral:  Positive for confusion. Negative for agitation, decreased concentration and suicidal ideas. All other systems reviewed and are negative.          PAST MEDICALHISTORY     Past Medical History:   Diagnosis Date    Afib ([facility] [address]     Bronchitis     Colon cancer ([facility] [address]     Colon polyps     Constipation     Deep vein blood clot of left lower extremity (HCC)     Left leg     Depression     DVT (deep venous thrombosis) (HCC)     Dysphagia     Fibrocystic breast disease     Fibromyalgia     GERD (gastroesophageal reflux disease)     reflux with hx of esophageal stricture    Headache(784.0)     History of colon cancer     Hormone replacement therapy (postmenopausal)     Hypertension     Neuropathy of foot     In both feet    Osteoarthritis     left knee pain    Restless legs syndrome     Type 2 diabetes mellitus 10/25/2021    Vitamin D deficiency          SURGICAL HISTORY       Past Surgical History:   Procedure Laterality Date    APPENDECTOMY      BREAST BIOPSY      CATARACT REMOVAL       SECTION      CHOLECYSTECTOMY      COLECTOMY  partial    COLON SURGERY      COLONOSCOPY  2010    COLONOSCOPY  2012    Dr Priti Johnson: unremarkable enterocolonic anastamosis; hyperplastic polyps    COLONOSCOPY  2013    [facility]: unremarkable post-surgical colon    COLONOSCOPY  2016    Dr Lonnie Flores colon anastomosis, 5 yr recall    COLONOSCOPY N/A 2021    Dr Asad Mabry and patent anastomosis in the right side-BCM, prn (age)    COLONOSCOPY  2021    Dr Asad Mabry and patent anastomosis in the right side-BCM, prn (age)    FOOT SURGERY  right foot    HAND SURGERY Right     Dr Michael Hsieh (CERVIX STATUS UNKNOWN)      KYPHOSIS SURGERY N/A 2020    KYPHOPLASTY L5 performed by Yoana Montenegro DO at [facility] LAMINECTOMY Right 12/14/2020    RIGHT L 4-5 DECOMPRESSIVE LAMINECTOMY performed by Hanna Nunez DO at [address] [facility] [address] ARTHROPLASTY      UPPER GASTROINTESTINAL ENDOSCOPY  10/16/2009    UPPER GASTROINTESTINAL ENDOSCOPY  03/01/2013    [facility]: peptic stricture dilated to 48F; HH; small antral mucosal elevation    UPPER GASTROINTESTINAL ENDOSCOPY  12/01/2014    [facility]: dilation of esophageal stricture    VARICOSE VEIN SURGERY Left 03/14/2014  [address]    Left GSV Ablation    VARICOSE VEIN SURGERY Right 04/04/2014  [address]    Right GSV Ablation         CURRENT MEDICATIONS     Current Discharge Medication List        CONTINUE these medications which have NOT CHANGED    Details   predniSONE (DELTASONE) 10 MG tablet Take 1 tablet by mouth daily for 14 days  Qty: 14 tablet, Refills: 0      metoprolol succinate (TOPROL XL) 50 MG extended release tablet TAKE 1 TABLET BY once a day  Qty: 30 tablet, Refills: 5    Associated Diagnoses: PAF (paroxysmal atrial fibrillation) (HCC)      DULoxetine (CYMBALTA) 60 MG extended release capsule Take 1 capsule by mouth daily , increase in dose  Qty: 30 capsule, Refills: 5      XARELTO 20 MG TABS tablet TAKE 1 TABLET BY MOUTH DAILY WITH BREAKFAST  Qty: 30 tablet, Refills: 5      valACYclovir (VALTREX) 1 g tablet TAKE 1 TABLET BY MOUTH TWICE DAILY      metFORMIN (GLUCOPHAGE-XR) 500 MG extended release tablet TAKE 1 TABLET BY MOUTH DAILY WITH BREAKFAST  Qty: 30 tablet, Refills: 3      ciprofloxacin (CILOXAN) 0.3 % ophthalmic solution INSTILL 1 DROP IN RIGHT EYE TWICE DAILY      omeprazole (PRILOSEC) 40 MG delayed release capsule TAKE 1 CAPSULE BY MOUTH DAILY  Qty: 90 capsule, Refills: 3      HYDROcodone-acetaminophen (NORCO)  MG per tablet TAKE 1 TABLET BY MOUTH EVERY 6 HOURS AS NEEDED      pregabalin (LYRICA) 150 MG capsule TAKE 1 CAPSULE BY MOUTH EVERY 12 HOURS AS NEEDED      furosemide (LASIX) 20 MG tablet Take 1 tablet by mouth daily as needed (swelling)  Qty: 30 tablet, Refills: 3      melatonin 5 MG TBDP disintegrating tablet Take 1 tablet by mouth nightly  Qty: 30 tablet, Refills: 0      zinc sulfate (ZINCATE) 220 (50 Zn) MG capsule Take 1 capsule by mouth daily  Qty: 30 capsule, Refills: 0      ascorbic acid (VITAMIN C) 500 MG tablet Take 1 tablet by mouth 2 times daily  Qty: 60 tablet, Refills: 0      vitamin D (ERGOCALCIFEROL) 1.25 MG (83749 UT) CAPS capsule TAKE 1 CAPSULE BY MOUTH 1 TIME A WEEK  Qty: 12 capsule, Refills: 1      irbesartan (AVAPRO) 300 MG tablet Take 1 tablet by mouth daily  Qty: 30 tablet, Refills: 5    Comments: Stop losartan. dilTIAZem (CARDIZEM CD) 120 MG extended release capsule TAKE 1 CAPSULE BY MOUTH TWICE DAILY  Qty: 180 capsule, Refills: 3    Comments: **Patient requests 90 days supply**      nystatin (MYCOSTATIN) 833126 UNIT/GM powder For rash under breast. Apply 3 times daily.   Qty: 1 Bottle, Refills: 1      Cyanocobalamin (B-12) 500 MCG TABS TAKE 1 TABLET BY MOUTH DAILY  Qty: 30 tablet, Refills: [address] Does not apply route  Qty: 1 each, Refills: 0             ALLERGIES     Gabapentin and Zoloft [sertraline hcl]    FAMILY HISTORY       Family History   Problem Relation Age of Onset    Cancer Mother         Cervical Cancer    No Known Problems Father     Diabetes Sister     No Known Problems Sister     No Known Problems Sister     Diabetes Brother     Esophageal Cancer Brother     No Known Problems Brother     No Known Problems Brother     Cancer Son     Other Daughter         still born    Colon Cancer Neg Hx     Colon Polyps Neg Hx     Liver Cancer Neg Hx     Liver Disease Neg Hx     Rectal Cancer Neg Hx     Stomach Cancer Neg Hx           SOCIAL HISTORY       Social History     Socioeconomic History    Marital status:      Spouse name: Mr. 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 Virginia Arce, [phone] (cellular)    AMBULATES: uses a cane    DOMICILED: has 4-5 stairs inside of the home but she does not use them normally, lives with her  and a grand son an a pet dog        Lives single family home.  works. 15 yo grandson lives in home. [employer]. Pt does not drive. Must arrange appts around husbands work schedule. Social Determinants of Health     Financial Resource Strain: Medium Risk    Difficulty of Paying Living Expenses: Somewhat hard   Food Insecurity: Food Insecurity Present    Worried About Running Out of Food in the Last Year: Sometimes true    Ran Out of Food in the Last Year: Sometimes true   Physical Activity: Inactive    Days of Exercise per Week: 0 days    Minutes of Exercise per Session: 0 min       SCREENINGS   NIH Stroke Scale  Interval: Baseline  Level of Consciousness (1a): Alert  LOC Questions (1b): Answers both correctly  LOC Commands (1c): Performs both tasks correctly  Best Gaze (2): Normal  Visual (3): No visual loss  Facial Palsy (4): Normal symmetrical movement  Motor Arm, Left (5a): No drift  Motor Arm, Right (5b): No drift  Motor Leg, Left (6a): No drift  Motor Leg, Right (6b):  No drift  Limb Ataxia (7): Absent  Sensory (8): Normal  Best Language (9): No aphasia  Dysarthria (10): Normal  Extinction and Inattention (11): No abnormality  Total: 0Glasgow Coma Scale  Eye Opening: Spontaneous  Best Verbal Response: Oriented  Best Motor Response: Obeys commands  Johana Coma Scale Score: 15        PHYSICAL EXAM    (up to 7 for level 4, 8 or more for level 5)     ED Triage Vitals   BP Temp Temp Source Heart Rate Resp SpO2 Height Weight   09/05/22 2215 09/05/22 2215 09/05/22 2215 09/05/22 2215 09/05/22 2215 09/05/22 2215 09/06/22 0157 09/05/22 2215   (!) 159/70 97.6 °F (36.4 °C) Oral 68 17 100 % 5' 3\" (1.6 m) 144 lb (65.3 kg)       Physical Exam  Vitals and nursing note reviewed. Constitutional:       General: She is not in acute distress. HENT:      Head: Normocephalic and atraumatic. Right Ear: External ear normal.      Left Ear: External ear normal.      Nose: Nose normal.      Mouth/Throat:      Mouth: Mucous membranes are moist.      Pharynx: Oropharynx is clear. No oropharyngeal exudate. Eyes:      General: No scleral icterus. Conjunctiva/sclera: Conjunctivae normal.      Pupils: Pupils are equal, round, and reactive to light. Comments: Right eye with chronic vision loss. Cardiovascular:      Rate and Rhythm: Normal rate and regular rhythm. Pulses: Normal pulses. Heart sounds: Normal heart sounds. Pulmonary:      Effort: Pulmonary effort is normal. No respiratory distress. Breath sounds: Normal breath sounds. No stridor. No wheezing, rhonchi or rales. Abdominal:      General: Bowel sounds are normal. There is no distension. Palpations: Abdomen is soft. Tenderness: There is no abdominal tenderness. There is no guarding or rebound. Musculoskeletal:         General: No tenderness or deformity. Cervical back: Neck supple. No rigidity. Right lower leg: No edema. Left lower leg: No edema. Skin:     General: Skin is warm and dry. Capillary Refill: Capillary refill takes less than 2 seconds. Coloration: Skin is not jaundiced. Findings: Rash (Lower extremity rash-chronic) present. Neurological:      General: No focal deficit present.       Mental Status: She is alert and oriented to person, place, and time. Mental status is at baseline. Cranial Nerves: Cranial nerve deficit (Chronic right-sided vision loss, no facial droop.) present. Sensory: No sensory deficit. Motor: Weakness (Generalized) present. Coordination: Coordination normal.   Psychiatric:         Mood and Affect: Mood normal.         Behavior: Behavior normal.       DIAGNOSTIC RESULTS     EKG: All EKG's areinterpreted by the Emergency Department Physician who either signs or Co-signs this chart in the absence of a cardiologist.    EKG dated 9/5/2022 at 20 2:30 PM: Normal sinus rhythm, rate 72. Ventricular trigeminy. Possible left atrial enlargement. , QRS 93, QTc 463. RADIOLOGY:  Non-plain film images such as CT, Ultrasound and MRI are read by the radiologist. Plain radiographic images are visualized and preliminarily interpreted bythe emergency physician with the below findings:      CTA HEAD NECK W CONTRAST   Final Result   CTA of intracranial arteries unremarkable. Recommendation:   Follow up as clinically indicated. All CT scans at this facility utilize dose modulation, iterative reconstruction, and/or weight based dosing when appropriate to reduce radiation dose to as low as reasonably achievable. Exam: CTA OF THE CAROTID ARTERIES   Clinical data:Right sided weakness. Technique: Axial CT angiography of the carotid arteries within the neck was performed with the administration of intravenous contrast for evaluation of the carotid bifurcation. Oral contrast was not utilized. Coronal, sagittal, and 3D volume    reconstructions of the carotid arteries were performed. Reformatted/3D-MPR images were performed. NASCET Criteria was used in evaluating the study. Radiation Dose:  CTDIvol = 68.66 mGy   DLP = 1410 mGy x cm. Prior studies: No prior studies submitted. Findings:   No definite abnormality seen on 3D reformatted images.     CCA, Carotid Bulb, ICA, and origin of the ECA are well opacified. Atherosclerotic mixed plaque are seen in bilateral carotid bulbs however not causing significant luminal narrowing. Vertebral arteries are well opacified. Jugular veins are well opacified   Included great vessels of the aortic arch are well opacified. Atherosclerotic calcifications are seen in arch of the aorta and at the origin of its branches however not causing significant luminal narrowing. Included lung apices are grossly unremarkable. IMPRESSION:   Carotid arteries and vertebral arteries well opacified with contrast.   Atherosclerotic mixed plaques in bilateral carotid bulbs not causing significant luminal narrowing. Recommendation:   Follow up as clinically indicated. All CT scans at this facility utilize dose modulation, iterative reconstruction, and/or weight based dosing when appropriate to reduce radiation dose to as low as reasonably achievable. Electronically Signed by Ryan Nguyen DO at 06-Sep-2022 12:22:32 AM               CT HEAD WO CONTRAST   Final Result   1. No acute intracranial pathology. Recommendation: Follow up as clinically indicated. All CT scans at this facility utilize dose modulation, iterative reconstruction, and/or weight based dosing when appropriate to reduce radiation dose to as low as reasonably achievable.    Amended by Ryan Nguyen DO at 06-Sep-2022 12:05:56 AM   Electronically Signed by Ryan Nguyen DO at 05-Sep-2022 11:58:43 PM               Vascular carotid duplex bilateral    (Results Pending)   MRI BRAIN W WO CONTRAST    (Results Pending)   NM MYOCARDIAL SPECT REST EXERCISE OR RX    (Results Pending)           LABS:  Labs Reviewed   CBC WITH AUTO DIFFERENTIAL - Abnormal; Notable for the following components:       Result Value    Hemoglobin 10.2 (*)     Hematocrit 34.5 (*)     MCV 74.7 (*)     MCH 22.1 (*)     MCHC 29.6 (*)     RDW 17.7 (*)     All other components within normal limits   COMPREHENSIVE METABOLIC PANEL - Abnormal; Notable for the following components:    Glucose 111 (*)     BUN 32 (*)     Creatinine 1.2 (*)     GFR Non- 43 (*)     GFR African American 52 (*)     Alkaline Phosphatase 138 (*)     All other components within normal limits   HEMOGLOBIN A1C - Abnormal; Notable for the following components:    Hemoglobin A1C 6.2 (*)     All other components within normal limits   LIPID PANEL - Abnormal; Notable for the following components:    Cholesterol, Total 158 (*)     HDL 63 (*)     All other components within normal limits   POCT GLUCOSE - Abnormal; Notable for the following components:    POC Glucose 108 (*)     All other components within normal limits   COVID-19, RAPID   PROTIME-INR   TROPONIN   TSH WITH REFLEX TO FT4   MAGNESIUM   PHOSPHORUS   BRAIN NATRIURETIC PEPTIDE   TROPONIN   TROPONIN   TROPONIN   TROPONIN   POCT GLUCOSE       All other labs were within normal range or not returned as of this dictation. EMERGENCY DEPARTMENT COURSE and DIFFERENTIAL DIAGNOSIS/MDM:   Vitals:    Vitals:    09/06/22 0132 09/06/22 0157 09/06/22 0558 09/06/22 0846   BP: (!) 143/63 (!) 184/59 137/89 (!) 147/52   Pulse: 61 57 54 60   Resp: 17 18 16    Temp:  (!) 96.7 °F (35.9 °C) (!) 96.6 °F (35.9 °C)    TempSrc:  Temporal     SpO2: 97% 98% 99%    Weight:  138 lb 12.8 oz (63 kg)     Height:  5' 3\" (1.6 m)         MDM     Amount and/or Complexity of Data Reviewed  Clinical lab tests: reviewed  Tests in the radiology section of CPT®: reviewed    56-year-old female presents after experiencing right-sided weakness followed by chest pain. Lab, EKG, and radiology results reviewed. Discussed with Dr. Shyanne Cohen, hospitalist, who will admit patient for further evaluation and treatment. CONSULTS:  IP CONSULT TO CARDIOLOGY  IP CONSULT TO NEUROLOGY    PROCEDURES:  Unless otherwise noted below, none     Procedures    FINAL IMPRESSION      1. TIA (transient ischemic attack)    2.  Chest pain, unspecified type DISPOSITION/PLAN   DISPOSITION Admitted 09/06/2022 01:06:26 AM      PATIENT REFERRED TO:  No follow-up provider specified.     DISCHARGE MEDICATIONS:  Current Discharge Medication List             (Please note that portions of this note were completed with a voice recognition program.  Efforts were made to edit thedictations but occasionally words are mis-transcribed.)    Derek Brunson MD (electronically signed)  Attending Emergency Physician          Derek Brunson MD  09/06/22 3943

## 2022-09-06 NOTE — PLAN OF CARE
Patient admitted this a.m. Writer assumes care of patient this AM.    Patient seen and examined. No new complaints. Sitting comfortably in bed in no apparent acute distress. Denies any acute complaints or distress at this time.   Continue current management  Cardiology and Neurology consulted on admission

## 2022-09-06 NOTE — ED NOTES
Awaiting room assignment and admission orders      [facility]  09/06/22 [id number]

## 2022-09-06 NOTE — PROGRESS NOTES
Direct oral anticoagulant (DOAC) - Pharmacy Review    Patient chart reviewed for indication and appropriateness of dose and therapy of Rivaroxaban.:    Body mass index is 25.51 kg/m². Wt Readings from Last 1 Encounters:   09/05/22 144 lb (65.3 kg)     Some sources recommend that DOACs be avoided in weight < 50 or > 120 kg, or BMI > 40 whenever possible; however. some smaller studies suggest that DOACs may be safe and efficacious in this population. Recent Labs     09/05/22 2227   CREATININE 1.2*     Estimated Creatinine Clearance: 31 mL/min (A) (based on SCr of 1.2 mg/dL (H)). Recent Labs     09/05/22 2227   HGB 10.2*   HCT 34.5*        Recent Labs     09/05/22 2227   INR 0.96         ASSESSMENT     Indication: AFib. Dose is appropriate for indication, age, and renal function:  Due to renal function decreased dose to 15mg Daily . Taper dosing is ordered appropriately (if necessary): not applicable. Start date/time is appropriate: yes. Drug interactions (since admission) reviewed: No interactions/no new drug interactions identified requiring action. Other anticoagulants on MAR: No concurrent anticoagulants ordered.       PLAN     Changed home dose of Xarelto 20m gDaily to 15mg Daily due to decline in renal function      Electronically signed by ANJALI Ortez [facility] on 9/6/2022 at 1:53 AM

## 2022-09-06 NOTE — PROGRESS NOTES
Physical Therapy  Facility/Department: [facility] PROGRESSIVE CARE  Physical Therapy Initial Assessment    Name: Sha Camara  : 1935  MRN: [id number]  Date of Service: 2022    Discharge Recommendations:  Continue to assess pending progress, Patient would benefit from continued therapy after discharge, Home with assist PRN          Patient Diagnosis(es): The primary encounter diagnosis was TIA (transient ischemic attack). A diagnosis of Chest pain, unspecified type was also pertinent to this visit. Past Medical History:  has a past medical history of Afib ([address] Bronchitis, Colon cancer ([address] Colon polyps, Constipation, Deep vein blood clot of left lower extremity ([address] Depression, DVT (deep venous thrombosis) ([address] Dysphagia, Fibrocystic breast disease, Fibromyalgia, GERD (gastroesophageal reflux disease), Headache(784.0), History of colon cancer, Hormone replacement therapy (postmenopausal), Hypertension, Neuropathy of foot, Osteoarthritis, Restless legs syndrome, Type 2 diabetes mellitus, and Vitamin D deficiency. Past Surgical History:  has a past surgical history that includes Total knee arthroplasty; Cataract removal; Appendectomy;  section; Cholecystectomy; Colon surgery; Breast biopsy; Upper gastrointestinal endoscopy (10/16/2009); Hysterectomy; Colonoscopy (2010); Colonoscopy (2012); Skin cancer excision; Foot surgery (right foot); colectomy (partial); Varicose vein surgery (Left, 2014  [address]); Varicose vein surgery (Right, 2014  [address]); Hand surgery (Right); Upper gastrointestinal endoscopy (2013); Colonoscopy (2013); Upper gastrointestinal endoscopy (2014); Colonoscopy (2016); Kyphosis surgery (N/A, 2020); lumbar laminectomy (Right, 2020); Colonoscopy (N/A, 2021); and Colonoscopy (2021).     Assessment   Body Structures, Functions, Activity Limitations Requiring Skilled Therapeutic Intervention: Decreased functional mobility Standard  Bathroom Equipment: Shower chair, 3-in-1 commode, Grab bars around toilet, Grab bars in shower  Home Equipment: Dylanhoracio Levy  Has the patient had two or more falls in the past year or any fall with injury in the past year?: Yes  Receives Help From: Family  ADL Assistance:  (has been sponge bathing)  Toileting: Independent  Ambulation Assistance: Independent  Transfer Assistance: Independent  Active : No (states she did til her eyesight got bad)  Additional Comments: spouse A with cooking and cleaning  Vision/Hearing  Vision  Vision: Impaired  Vision Exceptions: Wears glasses at all times  Hearing  Hearing: Within functional limits    Cognition   Orientation  Overall Orientation Status: Within Functional Limits  Cognition  Overall Cognitive Status: WFL     Objective   Heart Rate: 60  Heart Rate Source: Monitor  BP: (!) 147/52  BP Location: Right upper arm  BP Method: Automatic  Patient Position: Semi fowlers  MAP (Calculated): 83.67  Resp: 16  SpO2: 99 %  O2 Device: None (Room air)     Observation/Palpation  Posture: Good  Gross Assessment  Sensation: Impaired (neuropathy B hands and feet)     AROM RLE (degrees)  RLE AROM: WFL  AROM LLE (degrees)  LLE AROM : WFL  Strength RLE  Strength RLE: WFL  Comment: grossly 4/5  Strength LLE  Strength LLE: WFL  Comment: grossly 4/5           Bed mobility  Supine to Sit: Unable to assess  Sit to Supine: Minimal assistance  Transfers  Sit to Stand: Minimal Assistance (from low toilet)  Stand to sit: Contact guard assistance  Ambulation  Surface: level tile  Device: No Device;Hand-Held Assist  Assistance: Contact guard assistance  Quality of Gait: unsteady  Gait Deviations: Slow Lillie;Decreased step height;Decreased step length  Distance: 25', 5'  Comments: once in the chair, pt to stretcher to go to ECHO     Balance  Posture: Good  Sitting - Static: Good;+  Sitting - Dynamic: Good;-  Standing - Static: Fair;+  Standing - Dynamic: Fair;-           OutComes Score                                                  AM-PAC Score             Tinneti Score       Goals  Short Term Goals  Time Frame for Short term goals: 2 wks  Short term goal 1: supine to sit indep  Short term goal 2: sit to stand indep  Short term goal 3: amb. 100' with RW SBA  Patient Goals   Patient goals : get better, go home       Education  Patient Education  Education Given To: Patient  Education Provided: Role of Therapy;Plan of Care;Transfer Training  Education Provided Comments: use of call light  Education Method: Verbal  Barriers to Learning: None  Education Outcome: Verbalized understanding;Continued education needed      Therapy Time   Individual Concurrent Group Co-treatment   Time In           Time Out           Minutes                   Gena Jaffe PT   Electronically signed by Gena Jaffe PT on 9/6/2022 at 9:37 AM

## 2022-09-06 NOTE — H&P
[facility] History and Physical    Patient Information:  Patient: Michael Grant  MRN: [id number]   Acct: [de-identified]  YOB: 1935  Admit Date: 9/6/2022  Primary Care Physician: EARLE Bowser - CNP  Advance Directive: DNR  Health Care Proxy: her , Mr. Gely Wells, [phone] (cellular)        SUBJECTIVE:    Chief Complaint   Patient presents with    Extremity Weakness     R sided, onset 2100    Chest Pain     EP Sign Out:  Chest pain after came for TIA symptoms that are now resolved  Had stress test in 2021, last TTE ~350 days ago  Tn negativ x1  EKG without ST changes    HPI:  Mrs. Ana Maria Pham is a pleasant lady of 82 years. She was admitted to the [facility] overnight. She has been having right sided weakness since 9pm. She states that the arm, weakness has resolved. She does not answer further questions related to the arm. She states that she had chest pain while being seen by the EP in the ED. The chest pain was substernal. There was no radiation. It began when she was at rest. There was no exacerbating factor. She states that being quiet helped. She states that the chest pain she mentioned was not last night, She states that she has seen her doctor and they changed her medications and that it has been better since. Review of Systems:   Review of Systems   Constitutional:  Positive for diaphoresis and fatigue. Negative for chills, fever and unexpected weight change. HENT:  Negative for sneezing. Respiratory:  Positive for shortness of breath. Negative for cough. Cardiovascular:  Positive for chest pain and leg swelling (chronic). Negative for palpitations. Endorses chest pressure   Gastrointestinal:  Negative for nausea. Neurological:  Positive for weakness and light-headedness. Negative for syncope and headaches. Psychiatric/Behavioral:  Negative for confusion.       Past Medical History:   Diagnosis Date    Afib [address]     Bronchitis     Colon cancer ([facility] [facility]     Colon polyps     Constipation     Deep vein blood clot of left lower extremity (HCC)     Left leg     Depression     DVT (deep venous thrombosis) (HCC)     Dysphagia     Fibrocystic breast disease     Fibromyalgia     GERD (gastroesophageal reflux disease)     reflux with hx of esophageal stricture    Headache(784.0)     History of colon cancer     Hormone replacement therapy (postmenopausal)     Hypertension     Neuropathy of foot     In both feet    Osteoarthritis     left knee pain    Restless legs syndrome     Type 2 diabetes mellitus 10/25/2021    Vitamin D deficiency      Past Psychiatric History:  As per above    Past Surgical History:   Procedure Laterality Date    APPENDECTOMY      BREAST BIOPSY      CATARACT REMOVAL       SECTION      CHOLECYSTECTOMY      COLECTOMY  partial    COLON SURGERY      COLONOSCOPY  2010    COLONOSCOPY  2012    Dr Hebert Raines: unremarkable enterocolonic anastamosis; hyperplastic polyps    COL[facility]COPY  [facility]: unremarkable post-surgical colon    COLONOSCOPY  2016    Dr Morales Halo colon anastomosis, 5 yr recall    COLONOSCOPY N/A 2021    Dr Zurita Agent and patent anastomosis in the right side-BCM, prn (age)    COLONOSCOPY  2021    Dr Zurita Agent and patent anastomosis in the right side-BCM, prn (age)    FOOT SURGERY  right foot    HAND SURGERY Right     Dr Macey Veronica (CERVIX STATUS UNKNOWN)      KYPHOSIS SURGERY N/A 2020    KYPHOPLASTY L5 performed by Naya Acharya DO at [address] [facility] Right 2020    RIGHT L 4-5 DECOMPRESSIVE LAMINECTOMY performed by Naya Acharya DO at [address] [facility] ENDOSCOPY  10/16/2009    UPPER GASTROINTESTINAL ENDOSCOPY  2013    [facility]: peptic stricture dilated to 48F; HH; small antral mucosal elevation    UPPER GASTROINTESTINAL ENDOSCOPY  12/01/2014    [facility]: dilation of esophageal stricture    VARICOSE VEIN SURGERY Left 03/14/2014  [address]    Left GSV Ablation    VARICOSE VEIN SURGERY Right 04/04/2014  [address]    Right GSV Ablation     Social History       Tobacco History       Smoking Status  Never      Smokeless Tobacco Use  Never              Alcohol History       Alcohol Use Status  Never Comment  \"did not like it\"              Drug Use       Drug Use Status  Never Comment  only pain medicine from the doctor              Sexual Activity       Sexually Active  Yes Partners  Male Comment  had 2 kids (they both passed)             CODE STATUS: DNR  HEALTH CARE PROXY: her , Mr. Naveen Ewing, [phone] (cellular)  AMBULATES: uses a cane  DOMICILED: has 4-5 stairs inside of the home but she does not use them normally, lives with her  and a grand son an a pet dog     Family History   Problem Relation Age of Onset    Cancer Mother         Cervical Cancer    No Known Problems Father     Diabetes Sister     No Known Problems Sister     No Known Problems Sister     Diabetes Brother     Esophageal Cancer Brother     No Known Problems Brother     No Known Problems Brother     Cancer Son     Other Daughter         still born    Colon Cancer Neg Hx     Colon Polyps Neg Hx     Liver Cancer Neg Hx     Liver Disease Neg Hx     Rectal Cancer Neg Hx     Stomach Cancer Neg Hx      Allergies   Allergen Reactions    Gabapentin Rash     Pt weaning off due to rash and hot flashes    Zoloft [Sertraline Hcl] Other (See Comments)     Patient states that she doesn't want this medication anymore because she hallucinated and saw spiders. Patient weaned herself off and after she quit taking the medication, the hallucinations stopped. Patient states that she doesn't want this medication anymore because she hallucinated and saw spiders. Patient weaned herself off and after she quit taking the medication, the hallucinations stopped. Home Medications:  Prior to Admission medications    Medication Sig Start Date End Date Taking?  Authorizing Provider   predniSONE (DELTASONE) 10 MG tablet Take 1 tablet by mouth daily for 14 days 9/1/22 9/15/22  Sharrie Cooks, APRN - CNP   metoprolol succinate (TOPROL XL) 50 MG extended release tablet TAKE 1 TABLET BY once a day 9/1/22   Sharrie Cooks, APRN - CNP   DULoxetine (CYMBALTA) 60 MG extended release capsule Take 1 capsule by mouth daily , increase in dose  Patient not taking: Reported on 9/6/2022 9/1/22   Sharrie Cooks, APRN - CNP   XARELTO 20 MG TABS tablet TAKE 1 TABLET BY MOUTH DAILY WITH BREAKFAST 8/5/22   Sharrie Cooks, APRN - CNP   valACYclovir (VALTREX) 1 g tablet TAKE 1 TABLET BY MOUTH TWICE DAILY 6/23/22   Historical Provider, MD   metFORMIN (GLUCOPHAGE-XR) 500 MG extended release tablet TAKE 1 TABLET BY MOUTH DAILY WITH BREAKFAST 6/22/22   Sharrie Cooks, APRN - CNP   ciprofloxacin (CILOXAN) 0.3 % ophthalmic solution INSTILL 1 DROP IN RIGHT EYE TWICE DAILY 6/9/22   Historical Provider, MD   omeprazole (PRILOSEC) 40 MG delayed release capsule TAKE 1 CAPSULE BY MOUTH DAILY 3/28/22   Shabbir Garcia MD   HYDROcodone-acetaminophen (NORCO)  MG per tablet TAKE 1 TABLET BY MOUTH EVERY 6 HOURS AS NEEDED 2/11/22   Historical Provider, MD   pregabalin (LYRICA) 150 MG capsule TAKE 1 CAPSULE BY MOUTH EVERY 12 HOURS AS NEEDED 2/11/22   Historical Provider, MD   furosemide (LASIX) 20 MG tablet Take 1 tablet by mouth daily as needed (swelling) 2/23/22   Sharrie Cooks, APRN - CNP   melatonin 5 MG TBDP disintegrating tablet Take 1 tablet by mouth nightly 1/23/22 9/1/22  Alaina Celestin MD   zinc sulfate (ZINCATE) 220 (50 Zn) MG capsule Take 1 capsule by mouth daily 1/24/22   Alaina Celestin MD   ascorbic acid (VITAMIN C) 500 MG tablet Take 1 tablet by mouth 2 times daily 1/23/22 9/1/22  Alaina Celestin MD   vitamin D (ERGOCALCIFEROL) 1.25 MG (64670 UT) CAPS capsule TAKE 1 CAPSULE BY MOUTH 1 TIME A WEEK 12/20/21   Ashley Roblero MD   irbesartan (AVAPRO) 300 MG tablet Take 1 tablet by mouth daily 11/9/21   EARLE Andino   dilTIAZem (CARDIZEM CD) 120 MG extended release capsule TAKE 1 CAPSULE BY MOUTH TWICE DAILY 10/15/21   EARLE Quiroga   nystatin (MYCOSTATIN) 700156 UNIT/GM powder For rash under breast. Apply 3 times daily. 7/28/21   EARLE Mckoy CNP   Cyanocobalamin (B-12) 500 MCG TABS TAKE 1 TABLET BY MOUTH DAILY 3/9/21   Ashley Roblero MD   Hospital Bed MIS by Does not apply route 8/21/20   EARLE Mckoy CNP         OBJECTIVE:    Vitals:    09/06/22 0558   BP: 137/89   Pulse: 54   Resp: 16   Temp: (!) 96.6 °F (35.9 °C)   SpO2: 99%   Breathing room air    /89   Pulse 54   Temp (!) 96.6 °F (35.9 °C)   Resp 16   Ht 5' 3\" (1.6 m)   Wt 138 lb 12.8 oz (63 kg)   SpO2 99%   BMI 24.59 kg/m²     No intake or output data in the 24 hours ending 09/06/22 9095    Physical Exam  Vitals reviewed. Constitutional:       General: She is not in acute distress. Appearance: Normal appearance. She is normal weight. She is not ill-appearing or toxic-appearing. HENT:      Head: Normocephalic and atraumatic. Nose: No congestion or rhinorrhea. Eyes:      General:         Right eye: No discharge. Left eye: No discharge. Neck:      Comments: Supple, breathing room air  Cardiovascular:      Rate and Rhythm: Normal rate and regular rhythm. Heart sounds: No murmur heard. No friction rub. No gallop. Pulmonary:      Effort: No accessory muscle usage, respiratory distress or retractions. Breath sounds: No stridor. No wheezing, rhonchi or rales. Comments: Poor inspiratory effort  Chest:      Chest wall: No tenderness. Abdominal:      General: Abdomen is flat. Bowel sounds are normal.      Tenderness: There is no abdominal tenderness. There is no guarding or rebound. Musculoskeletal:      Right lower leg: No edema. Left lower leg: No edema. Comments: No wasting of fat or muscle stores   Skin:     General: Skin is warm. Comments: Nondiaphoretic, skin over distal legs looks thickened and hardened and with loss of secondary skin structures but NOT with hemosiderosis   Neurological:      Motor: No tremor or seizure activity. Comments: Refused to open eyes so could not perform EOM testing or for testing of accomodation, tongue deviates to the left side, denied sensation was different between left and right over the face and UEs and LEs, muscle strength bilaterally symmetric between shoulders and UEs and LEs   Psychiatric:         Mood and Affect: Mood normal.         Behavior: Behavior normal.       LABORATORY DATA:    CBC:   Recent Labs     09/05/22 2227   WBC 8.3   HGB 10.2*   HCT 34.5*        BMP:   Recent Labs     09/05/22 2227      K 4.3      CO2 26   BUN 32*   CREATININE 1.2*   CALCIUM 9.2   PHOS 3.6     Hepatic Profile:   Recent Labs     09/05/22 2227   AST 20   ALT 22   BILITOT <0.2   ALKPHOS 138*     Coag Panel:   Recent Labs     09/05/22 2227   INR 0.96   PROTIME 12.6     Cardiac Enzymes:   Recent Labs     09/05/22 2227 09/06/22  0200 09/06/22  0500   TROPONINI <0.01 <0.01 <0.01     Pro-BNP:   Recent Labs     09/05/22 2227   PROBNP 1,060     A1C:   Recent Labs     09/05/22 2227   LABA1C 6.2*     TSH: Invalid input(s): LABTSH  ABG: No results for input(s): PHART, EBL3JMO, PO2ART, IQQ2BJM, BEART, HGBAE, A5PQUEKU, CARBOXHGBART in the last 72 hours. Urinalysis:   Lab Results   Component Value Date/Time    NITRU Negative 11/17/2021 12:50 PM    [address] 11/17/2021 12:50 PM    BACTERIA 1+ 11/17/2021 12:50 PM    RBCUA 2 11/17/2021 12:50 PM    BLOODU Negative 11/17/2021 12:50 PM    SPECGRAV 1.013 11/17/2021 12:50 PM    GLUCOSEU Negative 11/17/2021 12:50 PM       EKG:  Not available    IMAGING:  CT HEAD WO CONTRAST  Result Date: 9/5/2022  1. No acute intracranial pathology. Recommendation: Follow up as clinically indicated. All CT scans at this facility utilize dose modulation, iterative reconstruction, and/or weight based dosing when appropriate to reduce radiation dose to as low as reasonably achievable. Amended by Hemanth Bartlett DO at 06-Sep-2022 12:05:56 AM Electronically Signed by Hemanth Bartlett DO at 05-Sep-2022 11:58:43 PM           CTA HEAD NECK W CONTRAST  Result Date: 9/5/2022  CTA of intracranial arteries unremarkable. Recommendation: Follow up as clinically indicated. All CT scans at this facility utilize dose modulation, iterative reconstruction, and/or weight based dosing when appropriate to reduce radiation dose to as low as reasonably achievable. Exam: CTA OF THE CAROTID ARTERIES Clinical data:Right sided weakness. Technique: Axial CT angiography of the carotid arteries within the neck was performed with the administration of intravenous contrast for evaluation of the carotid bifurcation. Oral contrast was not utilized. Coronal, sagittal, and 3D volume reconstructions of the carotid arteries were performed. Reformatted/3D-MPR images were performed. NASCET Criteria was used in evaluating the study. Radiation Dose:  CTDIvol = 68.66 mGy   DLP = 1410 mGy x cm. Prior studies: No prior studies submitted. Findings: No definite abnormality seen on 3D reformatted images. CCA, Carotid Bulb, ICA, and origin of the ECA are well opacified. Atherosclerotic mixed plaque are seen in bilateral carotid bulbs however not causing significant luminal narrowing. Vertebral arteries are well opacified. Jugular veins are well opacified Included great vessels of the aortic arch are well opacified. Atherosclerotic calcifications are seen in arch of the aorta and at the origin of its branches however not causing significant luminal narrowing. Included lung apices are grossly unremarkable.  IMPRESSION: Carotid arteries and vertebral arteries well opacified with contrast. Atherosclerotic mixed plaques in bilateral carotid bulbs not causing significant luminal narrowing. Recommendation: Follow up as clinically indicated. All CT scans at this facility utilize dose modulation, iterative reconstruction, and/or weight based dosing when appropriate to reduce radiation dose to as low as reasonably achievable.  Electronically Signed by Naz Tse DO at 06-Sep-2022 12:22:32 AM                 ASESSMENTS & PLANS:    Chest Pain:  Tele  place to cardiac jackson as OBSERVATION status patient  Cardio consult in AM  Trend TnI  Mag, Phos, TSH, Lipid Panel, HbA1c - will run as add ons to ED labs if able  CBC and BMP with Reflex for following AM  ASA as per protocol (324-325mg chewed STAT unless on 81ASA daily in which case 162mg chewed STAT if not yet given, THEN from following AM 81mg Daily.)  Statin as per protocol (High intensity Statin, if already on high intensity Stain of Simvasttain 80 continue it, if Lipitor 40+ then Lipitor 80 (if less than 40 just double so long as >=40), if Rosuvastatin 20mg+ then Rosuvastatin 40mg PO QHS (if less than 20 just double so long as >=20mg)  NG SL PRN CP  TTE in AM  Continue   dilTIAZem  120 mg Oral BID   Metoprolol succinate    50 mg                     Oral                           Daily  losartan  100 mg Oral Daily     TIA with resolution of symptoms already:  consult neuro in AM  PT/OT/SpeechTherapy as per protocol  NPO until/unless passes RN swallow screen, still NPO after until Cardio weighs in on whether they need an invasive test or intervention  HbA1c, TSH, Lipid Panel all to be added on  Fall Precuations  Bed Alarm  ASA and Statin as per protocol  MRI Brain  Labetalol PRN for now, will allow permissive HTN  TTE in AM to look for thromboembolic causes  Carotid US to look for stenotic disease    Chronic Medical Problems: continue home regimen  Hypovitaminosis:  ascorbic acid  500 mg Oral BID   vitamin B-12  500 mcg Oral Daily   zinc sulfate 50 mg Oral Daily   vitamin D  5,000 Units Oral Daily   Insomnia:  melatonin  5 mg Oral Nightly   Depression +/- Anxiety:   DULoxetine 60 mg Oral    Daily   HSV Suppression:  acyclovir  800 mg Oral 5x Daily   Neuropathic Pain:  pregabalin  150 mg Oral BID   Arthritis resistant to other treatments most likely:  predniSONE  10 mg Oral Daily   GERD:  pantoprazole  40 mg Oral QAM AC     Supoportive and Prophylactic Txx:  DVT Prophylaxis: on Xarelto as per home regimen  GI (PUD) Ppx: not indicated  PT: as per above  Diet NPO Exceptions are: Sips of Water with Meds  sodium chloride flush, sodium chloride, ondansetron **OR** ondansetron, acetaminophen **OR** acetaminophen, perflutren lipid microspheres, potassium chloride **OR** potassium alternative oral replacement **OR** potassium chloride, magnesium sulfate, labetalol, nitroGLYCERIN, iopamidol      Care time of >40 minutes  Pt seen/examined and \"placed\" to OBServation status. Inpatient status is used for patients with an expected LOS extending past two midnights due to medical therapy and or critical care needs, otherwise patients are placed to OBServation status. Signed:  Electronically signed by Will Hairston MD on 9/6/22 at 7:20 AM CDT.

## 2022-09-06 NOTE — PLAN OF CARE
Problem: Discharge Planning  Goal: Discharge to home or other facility with appropriate resources  9/6/2022 1345 by Nimesh Jones RN  Outcome: Progressing  9/6/2022 0307 by Theron Nichole RN  Outcome: Progressing  Flowsheets (Taken 9/6/2022 0208)  Discharge to home or other facility with appropriate resources:   Identify barriers to discharge with patient and caregiver   Identify discharge learning needs (meds, wound care, etc)   Refer to discharge planning if patient needs post-hospital services based on physician order or complex needs related to functional status, cognitive ability or social support system   Arrange for needed discharge resources and transportation as appropriate     Problem: Safety - Adult  Goal: Free from fall injury  9/6/2022 1345 by Nimesh Jones RN  Outcome: Progressing  9/6/2022 0307 by Theron Nichole RN  Outcome: Progressing     Problem: ABCDS Injury Assessment  Goal: Absence of physical injury  9/6/2022 1345 by Nimesh Jones RN  Outcome: Progressing  9/6/2022 0307 by Theron Nichole RN  Outcome: Progressing

## 2022-09-06 NOTE — PROGRESS NOTES
Occupational Therapy  Facility/Department: [facility] PROGRESSIVE CARE  Occupational Therapy Initial Assessment    Name: Yesica Daniel  : 1935  MRN: [id number]  Date of Service: 2022    Discharge Recommendations:  Patient would benefit from continued therapy after discharge          Patient Diagnosis(es): The primary encounter diagnosis was TIA (transient ischemic attack). A diagnosis of Chest pain, unspecified type was also pertinent to this visit. Past Medical History:  has a past medical history of Afib ([address] Bronchitis, Colon cancer ([address] Colon polyps, Constipation, Deep vein blood clot of left lower extremity ([address] Depression, DVT (deep venous thrombosis) ([address] Dysphagia, Fibrocystic breast disease, Fibromyalgia, GERD (gastroesophageal reflux disease), Headache(784.0), History of colon cancer, Hormone replacement therapy (postmenopausal), Hypertension, Neuropathy of foot, Osteoarthritis, Restless legs syndrome, Type 2 diabetes mellitus, and Vitamin D deficiency. Past Surgical History:  has a past surgical history that includes Total knee arthroplasty; Cataract removal; Appendectomy;  section; Cholecystectomy; Colon surgery; Breast biopsy; Upper gastrointestinal endoscopy (10/16/2009); Hysterectomy; Colonoscopy (2010); Colonoscopy (2012); Skin cancer excision; Foot surgery (right foot); colectomy (partial); Varicose vein surgery (Left, 2014  [address]); Varicose vein surgery (Right, 2014  [address]); Hand surgery (Right); Upper gastrointestinal endoscopy (2013); Colonoscopy (2013); Upper gastrointestinal endoscopy (2014); Colonoscopy (2016); Kyphosis surgery (N/A, 2020); lumbar laminectomy (Right, 2020); Colonoscopy (N/A, 2021); and Colonoscopy (2021). Treatment Diagnosis: chest pain      Assessment   Assessment: Eval complete and treatment initated.  Pt at cga level for ADL and functional mobility for standing balance and safety to navigate through room. Pt tolerates treatment and is motiviated but fatigues quickly. Pt would benefit from further skilled OT to increase independence and safety of ADL, functional mobility, improve tolerance for functional activity. Treatment Diagnosis: chest pain  REQUIRES OT FOLLOW-UP: Yes  Activity Tolerance  Activity Tolerance: Patient Tolerated treatment well        Plan   Plan  Times per Week: 3-5     Restrictions  Restrictions/Precautions  Restrictions/Precautions: NPO, Fall Risk (NPO for cardiology.)  Required Braces or Orthoses?: No    Subjective   General  Chart Reviewed: Yes  Patient assessed for rehabilitation services?: Yes  Family / Caregiver Present: No     Social/Functional History  Social/Functional History  Lives With: Spouse  Type of Home: House  Home Layout: Able to Live on Main level with bedroom/bathroom (split level)  Home Access: Ramped entrance  Bathroom Shower/Tub: Walk-in shower  Bathroom Toilet: Standard  Bathroom Equipment: Shower chair, 3-in-1 commode, Grab bars around toilet, Grab bars in shower  Home Equipment: Rollator, Cane  Has the patient had two or more falls in the past year or any fall with injury in the past year?: Yes  Receives Help From: Family (pt's  and her friend with some household needs)  ADL Assistance: Needs assistance (has been sponge baths.  will assist if she showers)  Toileting: Independent  Homemaking Assistance: Needs assistance (pt's  cooks.  pt able to do light cleaning)  Ambulation Assistance: Independent  Transfer Assistance: Independent  Active : No (states she did til her eyesight got bad)  Additional Comments: pt's  works during the day       Objective   Heart Rate: 57  Heart Rate Source: Monitor  BP: (!) 150/48  BP Location: Right upper arm  MAP (Calculated): 82  Resp: 16  SpO2: 95 %  O2 Device: None (Room air)             Safety Devices  Type of Devices: Bed alarm in place;Call light within reach;Gait belt;Patient at risk for falls; Left in bed     Toilet Transfers  Toilet - Technique: Ambulating (pt ambulates with WR to bathroom, cga)  Equipment Used: Raised toilet seat with rails  Toilet Transfer: Contact guard assistance;Minimal assistance  Strength:  (L UE 3+/5  R UE 3+/5)  Coordination:  (B UE. slowed, but functional. affected by visual impairment)  Sensation: Intact (light touch intact B UE)  ADL  Feeding: Independent;Setup  Grooming: Independent;Setup (seated)  UE Bathing: Modified independent ;Setup (seated)  LE Bathing: Contact guard assistance (for standing aspects per clinical observation)  UE Dressing: Setup (per clinical observation)  LE Dressing: Contact guard assistance (for standing aspects/ balance)  Toileting: Contact guard assistance (for clothing management/hygiene)        Bed mobility  Supine to Sit: Modified independent  Sit to Supine: Supervision  Scooting: Modified independent     Vision  Vision: Impaired  Vision Exceptions: Wears glasses at all times (R eye blindness. Noted depth perception deficits.)  Hearing  Hearing: Within functional limits  Cognition  Overall Cognitive Status: WFL  Orientation  Overall Orientation Status: Within Functional Limits  Perception  Overall Perceptual Status: Impaired (R eye blindness, depth perception impaired. Pt reports running into objects on right side somewhat frequently prior to admission)                  LUE AROM (degrees)  LUE General AROM: shoulder limited to approx 90 ROM, distal wfl  RUE AROM (degrees)  RUE General AROM: shoulder limited to approx 90 degrees ROM, distally wfl                       Goals  Short Term Goals  Time Frame for Short term goals: 1-2 weeks  Short Term Goal 1: Toilet with mod I  Short Term Goal 2: Toilet transfer mod I  Short Term Goal 3: Pt will perform functional activity x 5 min from ambulatory level with no cues for safety/visual scanning to navigate through room, no LOB.   Short Term Goal 4: Pt will dress with mod I    Tx initiated: ed pt in visual strategies for safely navigating room, energy conservation strategies, functional transfer/safety/DME (25 min)       Bud Flaherty OT  Electronically signed by Bud Flaherty OT on 9/6/2022 at 4:56 PM

## 2022-09-07 ENCOUNTER — TELEPHONE (OUTPATIENT)
Dept: PRIMARY CARE CLINIC | Age: 87
End: 2022-09-07

## 2022-09-07 VITALS
OXYGEN SATURATION: 96 % | RESPIRATION RATE: 20 BRPM | TEMPERATURE: 96.8 F | DIASTOLIC BLOOD PRESSURE: 49 MMHG | WEIGHT: 136.8 LBS | HEIGHT: 63 IN | BODY MASS INDEX: 24.24 KG/M2 | SYSTOLIC BLOOD PRESSURE: 140 MMHG | HEART RATE: 62 BPM

## 2022-09-07 DIAGNOSIS — R07.9 CHEST PAIN IN ADULT: ICD-10-CM

## 2022-09-07 DIAGNOSIS — G44.52 NEW DAILY PERSISTENT HEADACHE: Primary | ICD-10-CM

## 2022-09-07 DIAGNOSIS — I48.0 PAF (PAROXYSMAL ATRIAL FIBRILLATION) (HCC): ICD-10-CM

## 2022-09-07 PROBLEM — G45.9 TIA (TRANSIENT ISCHEMIC ATTACK): Status: ACTIVE | Noted: 2022-09-07

## 2022-09-07 LAB
ANION GAP SERPL CALCULATED.3IONS-SCNC: 9 MMOL/L (ref 7–19)
BASOPHILS ABSOLUTE: 0.1 K/UL (ref 0–0.2)
BASOPHILS RELATIVE PERCENT: 0.6 % (ref 0–1)
BUN BLDV-MCNC: 21 MG/DL (ref 8–23)
CALCIUM SERPL-MCNC: 9.1 MG/DL (ref 8.8–10.2)
CHLORIDE BLD-SCNC: 106 MMOL/L (ref 98–111)
CO2: 27 MMOL/L (ref 22–29)
CREAT SERPL-MCNC: 0.8 MG/DL (ref 0.5–0.9)
EKG P AXIS: 55 DEGREES
EKG P-R INTERVAL: 190 MS
EKG Q-T INTERVAL: 420 MS
EKG QRS DURATION: 78 MS
EKG QTC CALCULATION (BAZETT): 412 MS
EKG T AXIS: 41 DEGREES
EOSINOPHILS ABSOLUTE: 0.5 K/UL (ref 0–0.6)
EOSINOPHILS RELATIVE PERCENT: 4.7 % (ref 0–5)
GFR AFRICAN AMERICAN: >59
GFR NON-AFRICAN AMERICAN: >60
GLUCOSE BLD-MCNC: 97 MG/DL (ref 74–109)
HCT VFR BLD CALC: 36.5 % (ref 37–47)
HEMOGLOBIN: 10.8 G/DL (ref 12–16)
IMMATURE GRANULOCYTES #: 0 K/UL
LYMPHOCYTES ABSOLUTE: 3 K/UL (ref 1.1–4.5)
LYMPHOCYTES RELATIVE PERCENT: 27.6 % (ref 20–40)
MCH RBC QN AUTO: 22.4 PG (ref 27–31)
MCHC RBC AUTO-ENTMCNC: 29.6 G/DL (ref 33–37)
MCV RBC AUTO: 75.7 FL (ref 81–99)
MONOCYTES ABSOLUTE: 1.2 K/UL (ref 0–0.9)
MONOCYTES RELATIVE PERCENT: 11.3 % (ref 0–10)
NEUTROPHILS ABSOLUTE: 5.9 K/UL (ref 1.5–7.5)
NEUTROPHILS RELATIVE PERCENT: 55.6 % (ref 50–65)
PDW BLD-RTO: 17.8 % (ref 11.5–14.5)
PLATELET # BLD: 305 K/UL (ref 130–400)
PMV BLD AUTO: 9.7 FL (ref 9.4–12.3)
POTASSIUM REFLEX MAGNESIUM: 4.4 MMOL/L (ref 3.5–5)
RBC # BLD: 4.82 M/UL (ref 4.2–5.4)
SODIUM BLD-SCNC: 142 MMOL/L (ref 136–145)
WBC # BLD: 10.7 K/UL (ref 4.8–10.8)

## 2022-09-07 PROCEDURE — 2580000003 HC RX 258: Performed by: HOSPITALIST

## 2022-09-07 PROCEDURE — 99226 PR SBSQ OBSERVATION CARE/DAY 35 MINUTES: CPT | Performed by: PSYCHIATRY & NEUROLOGY

## 2022-09-07 PROCEDURE — 85025 COMPLETE CBC W/AUTO DIFF WBC: CPT

## 2022-09-07 PROCEDURE — G0378 HOSPITAL OBSERVATION PER HR: HCPCS

## 2022-09-07 PROCEDURE — 92523 SPEECH SOUND LANG COMPREHEN: CPT

## 2022-09-07 PROCEDURE — 97530 THERAPEUTIC ACTIVITIES: CPT

## 2022-09-07 PROCEDURE — 93005 ELECTROCARDIOGRAM TRACING: CPT | Performed by: HOSPITALIST

## 2022-09-07 PROCEDURE — 97116 GAIT TRAINING THERAPY: CPT

## 2022-09-07 PROCEDURE — 93010 ELECTROCARDIOGRAM REPORT: CPT | Performed by: INTERNAL MEDICINE

## 2022-09-07 PROCEDURE — 6370000000 HC RX 637 (ALT 250 FOR IP): Performed by: HOSPITALIST

## 2022-09-07 PROCEDURE — 92610 EVALUATE SWALLOWING FUNCTION: CPT

## 2022-09-07 PROCEDURE — 99213 OFFICE O/P EST LOW 20 MIN: CPT | Performed by: INTERNAL MEDICINE

## 2022-09-07 PROCEDURE — 80048 BASIC METABOLIC PNL TOTAL CA: CPT

## 2022-09-07 PROCEDURE — 36415 COLL VENOUS BLD VENIPUNCTURE: CPT

## 2022-09-07 RX ORDER — HEPARIN SODIUM (PORCINE) LOCK FLUSH IV SOLN 100 UNIT/ML 100 UNIT/ML
3 SOLUTION INTRAVENOUS PRN
Status: DISCONTINUED | OUTPATIENT
Start: 2022-09-07 | End: 2022-09-07 | Stop reason: HOSPADM

## 2022-09-07 RX ORDER — ATORVASTATIN CALCIUM 80 MG/1
80 TABLET, FILM COATED ORAL NIGHTLY
Qty: 30 TABLET | Refills: 0 | Status: SHIPPED | OUTPATIENT
Start: 2022-09-07 | End: 2022-10-14 | Stop reason: SDUPTHER

## 2022-09-07 RX ORDER — ASPIRIN 81 MG/1
81 TABLET ORAL DAILY
Qty: 30 TABLET | Refills: 0 | Status: SHIPPED | OUTPATIENT
Start: 2022-09-08 | End: 2022-11-01

## 2022-09-07 RX ADMIN — ACYCLOVIR 800 MG: 800 TABLET ORAL at 05:42

## 2022-09-07 RX ADMIN — SODIUM CHLORIDE, PRESERVATIVE FREE 10 ML: 5 INJECTION INTRAVENOUS at 09:13

## 2022-09-07 RX ADMIN — LOSARTAN POTASSIUM 100 MG: 50 TABLET, FILM COATED ORAL at 09:11

## 2022-09-07 RX ADMIN — ACYCLOVIR 800 MG: 800 TABLET ORAL at 13:21

## 2022-09-07 RX ADMIN — RIVAROXABAN 15 MG: 15 TABLET, FILM COATED ORAL at 09:11

## 2022-09-07 RX ADMIN — PREDNISONE 10 MG: 10 TABLET ORAL at 09:11

## 2022-09-07 RX ADMIN — CYANOCOBALAMIN TAB 500 MCG 500 MCG: 500 TAB at 09:11

## 2022-09-07 RX ADMIN — ASPIRIN 81 MG: 81 TABLET, COATED ORAL at 09:12

## 2022-09-07 RX ADMIN — Medication 5000 UNITS: at 09:12

## 2022-09-07 RX ADMIN — PREGABALIN 150 MG: 150 CAPSULE ORAL at 09:12

## 2022-09-07 RX ADMIN — DULOXETINE 60 MG: 30 CAPSULE, DELAYED RELEASE ORAL at 09:11

## 2022-09-07 RX ADMIN — DILTIAZEM HYDROCHLORIDE 120 MG: 120 CAPSULE, COATED, EXTENDED RELEASE ORAL at 09:12

## 2022-09-07 RX ADMIN — PANTOPRAZOLE SODIUM 40 MG: 40 TABLET, DELAYED RELEASE ORAL at 05:43

## 2022-09-07 RX ADMIN — OXYCODONE HYDROCHLORIDE AND ACETAMINOPHEN 500 MG: 500 TABLET ORAL at 09:11

## 2022-09-07 RX ADMIN — ZINC SULFATE 220 MG (50 MG) CAPSULE 50 MG: CAPSULE at 09:11

## 2022-09-07 ASSESSMENT — ENCOUNTER SYMPTOMS
RESPIRATORY NEGATIVE: 1
EYES NEGATIVE: 1
DIARRHEA: 0
VOMITING: 0
GASTROINTESTINAL NEGATIVE: 1
NAUSEA: 0
SHORTNESS OF BREATH: 0

## 2022-09-07 NOTE — CARE COORDINATION
Spoke with patient regarding MD orders for [facility] services. Patient agreeable and has chosen [facility]. Referral Faxed. [address] [place] [address] [phone]. [phone]. Please notify [facility] when patient discharges and fax DC Summary,  DC med list and any new [facility] orders. The Patient was provided with a choice of provider and agrees with the discharge plan. [x] Yes [] No    Freedom of choice list was provided with basic dialogue that supports the patient's individualized plan of care/goals, treatment preferences and shares the quality data associated with the providers.  [x] Yes [] No  Electronically signed by Chele Adair RN on 9/7/2022 at 12:23 PM

## 2022-09-07 NOTE — PROGRESS NOTES
Cardiology Daily Note Fidel Trujillo MD      Patient:  Henry Walter  [id number]    Patient Active Problem List    Diagnosis Date Noted    TIA (transient ischemic attack) 09/07/2022    Chest pain 09/06/2022    Weakness 09/06/2022    Fall 09/06/2022    Palliative care patient 02/02/2022    Acute hypoxemic respiratory failure due to COVID-19 ([address] 02/01/2022    COVID-19 virus infection 01/20/2022    History of atrial fibrillation 01/20/2022    COVID-19 01/20/2022    Iron deficiency anemia 01/13/2022    Type 2 diabetes mellitus 10/25/2021    Atrial fibrillation with RVR ([address] 09/16/2021    History of esophageal stricture 08/24/2021    Depression 04/20/2021    Fungal dermatitis 05/07/2020    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 05/07/2020    Mixed simple and mucopurulent chronic bronchitis ([address] 05/04/2020    Malignant neoplasm of colon ([address] 04/20/2020    Cellulitis of left lower limb 06/29/2019    Fracture of right foot 06/29/2019    Hypochloremia 06/29/2019    CKD (chronic kidney disease) stage 3, GFR 30-59 ml/min (HCC) 06/29/2019    Acute deep vein thrombosis (DVT) of femoral vein of left lower extremity ([address] 06/12/2019    Pain in both lower extremities     Numbness and tingling of both feet     Avascular necrosis ([address] 04/08/2019    History of DVT (deep vein thrombosis) 12/18/2018    Port-A-Cath in place 02/19/2018    History of colon polyps 03/11/2016    Essential hypertension 08/25/2015    Encounter for care related to Port-a-Cath 08/11/2015    Vitamin D deficiency 03/19/2015    Paroxysmal supraventricular tachycardia ([address] 02/18/2015    Dysphagia 10/21/2014    Hypertriglyceridemia 08/15/2014    Venous insufficiency 03/20/2014    Varicose veins of left lower extremity with inflammation, 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 ([address] 02/17/2014    Fibromyalgia 11/02/2012    Renal bruit 11/02/2012    Personal history of colon cancer 11/30/2011    Family history of esophageal cancer 11/30/2011       Admit Date:  9/5/2022    Admission Problem List: Present on Admission:   Chest pain   Personal history of colon cancer   Venous insufficiency   Fibromyalgia   Hypertriglyceridemia   Essential hypertension   History of DVT (deep vein thrombosis)   Numbness and tingling of both feet   Depression   Type 2 diabetes mellitus   History of atrial fibrillation   Weakness   Fall   TIA (transient ischemic attack)      Cardiac Specific Data:  Specialty Problems          Cardiology Problems    TIA (transient ischemic attack)        * (Principal) Chest pain        Varicose veins of left lower extremity with inflammation, 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 (HCC)        Venous insufficiency        Hypertriglyceridemia        Paroxysmal supraventricular tachycardia (HCC)        Essential hypertension        Acute deep vein thrombosis (DVT) of femoral vein of left lower extremity (HCC)        Atrial fibrillation with RVR (HCC)           Subjective:  Ms. Joe Coleman seen today resting comfortably. Apparently given coffee this morning therefore Lexiscan will have to be postponed. She denies any further chest pain all troponins are negative. Echocardiogram demonstrated normal left ventricular function without wall motion abnormalities. Blood pressure 147/60 heart 61. Objective:   BP (!) 147/60   Pulse 61   Temp (!) 96.4 °F (35.8 °C) (Temporal)   Resp 18   Ht 5' 3\" (1.6 m)   Wt 136 lb 12.8 oz (62.1 kg)   SpO2 97%   BMI 24.23 kg/m²       Intake/Output Summary (Last 24 hours) at 9/7/2022 1030  Last data filed at 9/6/2022 1323  Gross per 24 hour   Intake 240 ml   Output --   Net 240 ml       Prior to Admission medications    Medication Sig Start Date End Date Taking?  Authorizing Provider   predniSONE (DELTASONE) 10 MG tablet Take 1 tablet by mouth daily for 14 days 9/1/22 9/15/22  EARLE Mckoy - CNP   metoprolol succinate (TOPROL XL) 50 MG extended release tablet TAKE 1 TABLET BY once a day 9/1/22   EARLE Quintero CNP   DULoxetine (CYMBALTA) 60 MG extended release capsule Take 1 capsule by mouth daily , increase in dose  Patient not taking: Reported on 9/6/2022 9/1/22   EARLE Quintero CNP   XARELTO 20 MG TABS tablet TAKE 1 TABLET BY MOUTH DAILY WITH BREAKFAST 8/5/22   EARLE Quintero CNP   valACYclovir (VALTREX) 1 g tablet TAKE 1 TABLET BY MOUTH TWICE DAILY 6/23/22   Historical Provider, MD   metFORMIN (GLUCOPHAGE-XR) 500 MG extended release tablet TAKE 1 TABLET BY MOUTH DAILY WITH BREAKFAST 6/22/22   EARLE Quintero CNP   ciprofloxacin (CILOXAN) 0.3 % ophthalmic solution INSTILL 1 DROP IN RIGHT EYE TWICE DAILY 6/9/22   Historical Provider, MD   omeprazole (PRILOSEC) 40 MG delayed release capsule TAKE 1 CAPSULE BY MOUTH DAILY 3/28/22   Indira Stevenson MD   HYDROcodone-acetaminophen (Allen Passy)  MG per tablet TAKE 1 TABLET BY MOUTH EVERY 6 HOURS AS NEEDED 2/11/22   Historical Provider, MD   pregabalin (LYRICA) 150 MG capsule TAKE 1 CAPSULE BY MOUTH EVERY 12 HOURS AS NEEDED 2/11/22   Historical Provider, MD   furosemide (LASIX) 20 MG tablet Take 1 tablet by mouth daily as needed (swelling) 2/23/22   EARLE Quintero CNP   melatonin 5 MG TBDP disintegrating tablet Take 1 tablet by mouth nightly 1/23/22 9/1/22  Pedro Pablo Nelson MD   zinc sulfate (ZINCATE) 220 (50 Zn) MG capsule Take 1 capsule by mouth daily 1/24/22   Pedro Pablo Nelson MD   ascorbic acid (VITAMIN C) 500 MG tablet Take 1 tablet by mouth 2 times daily 1/23/22 9/1/22  Pedro Pablo Nelson MD   vitamin D (ERGOCALCIFEROL) 1.25 MG (75013 UT) CAPS capsule TAKE 1 CAPSULE BY MOUTH 1 TIME A WEEK 12/20/21   Indira Stevenson MD   irbesartan (AVAPRO) 300 MG tablet Take 1 tablet by mouth daily 11/9/21   Jody PepEARLE   dilTIAZem (CARDIZEM CD) 120 MG extended release capsule TAKE 1 CAPSULE BY MOUTH TWICE DAILY 10/15/21   Charlette Ng, APRN   nystatin (MYCOSTATIN) 126526 UNIT/GM powder For rash under breast. Apply 3 times daily. 7/28/21   EARLE Mayes CNP   Cyanocobalamin (B-12) 500 MCG TABS TAKE 1 TABLET BY MOUTH DAILY 3/9/21   Francine Galvez MD   Hospital Bed MISC by Does not apply route 8/21/20   EARLE Mayes CNP        aspirin  81 mg Oral Daily    Or    aspirin  300 mg Rectal Daily    sodium chloride flush  5-40 mL IntraVENous 2 times per day    atorvastatin  80 mg Oral Nightly    ascorbic acid  500 mg Oral BID    vitamin B-12  500 mcg Oral Daily    dilTIAZem  120 mg Oral BID    DULoxetine  60 mg Oral Daily    losartan  100 mg Oral Daily    melatonin  5 mg Oral Nightly    [Held by provider] metoprolol succinate  50 mg Oral Daily    pantoprazole  40 mg Oral QAM AC    predniSONE  10 mg Oral Daily    pregabalin  150 mg Oral BID    zinc sulfate  50 mg Oral Daily    vitamin D  5,000 Units Oral Daily    acyclovir  800 mg Oral 5x Daily    rivaroxaban  15 mg Oral Daily with breakfast       TELEMETRY: Sinus     Physical Exam:      Physical Exam      General:  Awake, alert, NAD  Skin:  Warm and dry  Neck:  no jvd , no carotid bruits  Chest:  Clear to auscultation, no wheezing or rales  Cardiovascular:  RRR L1W9 no murmurs, clicks, gallups, or rubs  Abdomen:  Soft nontender, nondistended, bowel sounds present  Extremities:  Edema: none         Lab Data:  CBC:   Recent Labs     09/05/22 2227 09/07/22  0838   WBC 8.3 10.7   HGB 10.2* 10.8*   HCT 34.5* 36.5*   MCV 74.7* 75.7*    305     BMP:   Recent Labs     09/05/22 2227 09/07/22  0838    142   K 4.3 4.4    106   CO2 26 27   PHOS 3.6  --    BUN 32* 21   CREATININE 1.2* 0.8     LIVER PROFILE:   Recent Labs     09/05/22 2227   AST 20   ALT 22   BILITOT <0.2   ALKPHOS 138*     PT/INR:   Recent Labs     09/05/22 2227   PROTIME 12.6   INR 0.96     APTT: No results for input(s): APTT in the last 72 hours. BNP:  No results for input(s): BNP in the last 72 hours.   CK, CKMB, Troponin: @LABRCNT (CKTOTAL:3, CKMB:3, TROPONINI:3)@    IMAGING:  Echo Complete    Result Date: 9/6/2022  Transthoracic Echocardiography Report (TTE)  Demographics   Patient Name  Kasia Marion  Date of Study          09/06/2022   MRN           [id number]       Gender                 Female   Date of Birth 1935   Room Number            MHL-0708   Age           82 year(s)   Height:       63 inches    Referring Physician    Lisa Justice MD   Weight:       138 pounds   Sonographer            [facility]   BSA:          1.65 m^2     Interpreting Physician Rogelio Garza MD   BMI:          24.45 kg/m^2  Procedure Type of Study   TTE procedure:ECHO 2D W/DOPPLER/COLOR/CONTRAST. Study Location: Echo Lab Technical Quality: Adequate visualization Patient Status: Inpatient Contrast Medium: Definity. Amount - 8 ml Rhythm: Within normal limits BP: 137/89 mmHg Indications:Chest pain. Conclusions   Summary  Mitral valve leaflets are mildly thickened with preserved leaflet  mobility. Mitral annular calcification is present. Mild to moderate mitral regurgitation is present. Mildly thickened aortic valve leaflets with preserved leaflet mobility. Tricuspid valve is structurally normal.  Mild tricuspid regurgitation with estimated RVSP of 39 mm Hg. The pulmonic valve was not well visualized . Mildly dilated left atrium. Normal left ventricular size with preserved LV function and an estimated  ejection fraction of approximately 55-60%. Mild concentric left ventricular hypertrophy. No regional wall motion abnormalities. Grade II diastolic dysfunction   Signature   ----------------------------------------------------------------  Electronically signed by Rogelio Garza MD(Interpreting  physician) on 09/06/2022 02:37 PM  ----------------------------------------------------------------   Findings   Mitral Valve  Mitral valve leaflets are mildly thickened with preserved leaflet  mobility. Mitral annular calcification is present.   Mild to moderate mitral regurgitation is present. Aortic Valve  Mildly thickened aortic valve leaflets with preserved leaflet mobility. Tricuspid Valve  Tricuspid valve is structurally normal.  Mild tricuspid regurgitation with estimated RVSP of 39 mm Hg. Pulmonic Valve  The pulmonic valve was not well visualized . Left Atrium  Mildly dilated left atrium. Left Ventricle  Normal left ventricular size with preserved LV function and an estimated  ejection fraction of approximately 55-60%. Mild concentric left ventricular hypertrophy. No regional wall motion abnormalities. Grade II diastolic dysfunction   Right Atrium  Normal right atrial dimension with no evidence of thrombus or mass noted. Right Ventricle  Normal right ventricular size with preserved RV function. Pericardial Effusion  No evidence of significant pericardial effusion is noted. Pleural Effusion  No evidence of pleural effusion. Miscellaneous  Definity utilized  2D Measurements and Calculations(cm)   LVIDd: 3.87 cm                      LVIDs: 2.53 cm  IVSd: 1.22 cm  LVPWd: 1.14 cm                      AO Root Dimension: 2.2 cm  Rt. Vent.  Dimension: 2.82 cm        LA Dimension: 3.9 cm  % Ejection Fraction: 60 %           LA Area: 24 cm^2  LA Volume: 77.4 ml                  LV Systolic dimension: 3.85VX  LA Volume Index: 47 ml/m^2          LV PW diastolic: 8.98QM  LV dimension: 3.87 cm               LVOT diameter: 1.9 cm                                      RA Systolic pressure: 3mmHg                                      RV Diastolic dimension: 7.95EJ  Ascending Aorta: 2.6 cm  Doppler Measurements and Calculations   AV Peak Velocity:229 cm/s              MV Peak E-Wave: 132 cm/s  AV Mean Velocity:149 cm/s              MV Peak A-Wave: 115 cm/s  AV Peak Gradient: 20.98 mmHg           MV E/A Ratio: 1.15 %  AV Mean Gradient: 10 mmHg              MV Mean velocity: 80.2cm/s  AV Area (Continuity):1.11 cm^2         MV Peak Gradient: 6.97 mmHg  AV VTI:61.8 cm/s MV Mean gradient: 3 mmHg  TR Velocity:276 cm/s  TR Gradient:30.47 mmHg  Estimated RAP:3 mmHg  RVSP:39 mmHg   MV E' septal velocity: 6.74cm/s  MV E' lateral velocity:7.72 cm/s      CT HEAD WO CONTRAST    Result Date: 9/5/2022  NO PRIOR REPORT AVAILABLE <Addendum Signed by KENTRELL Suggs DO at 06-Sep-2022 12:05:56 AM Confirmation for patient:  Ana Garcia Findings were communicated to Person Luis Yanez RN, on 09/06/2022 at 12:04 am who confirms having received the report. Luis Yanez takes responsibility for providing the report to the referring clinician. No further action required by the reading radiologist. If the referring clinician has any further question's please call [facility] service [phone], option 1. Critical Documentation Completed. (SAB) Addendum End> Exam: CT OF THE BRAIN WITHOUT CONTRAST Clinical data: Stroke protocol. Technique: Contiguous axial images are obtained from the skull base to vertex without intravenous contrast. Reformatted/MPR images were performed. Radiation dose: CTDIvol =68.66 mGy, DLP =1410 mGy x cm. Prior studies: No prior studies submitted. Findings: No acute intracranial abnormality is present. No evidence of acute cortical infarction, hemorrhage, mass or mass effect. No hydrocephalus or abnormal extra-axial fluid collections are present. The posterior fossa is unremarkable. Mild diffuse chronic involutional changes of the brain. The skull base and calvarium are intact. The included portions of the paranasal sinuses and mastoid air cells are clear. 1.  No acute intracranial pathology. Recommendation: Follow up as clinically indicated. All CT scans at this facility utilize dose modulation, iterative reconstruction, and/or weight based dosing when appropriate to reduce radiation dose to as low as reasonably achievable.  Amended by Mykel Yin DO at 06-Sep-2022 12:05:56 AM Electronically Signed by Mykel Yin DO at 05-Sep-2022 11:58:43 PM             Vascular carotid duplex bilateral    Result Date: 9/6/2022  Vascular Carotid Procedure  Demographics   Patient Name   Dayami Enamorado Age                 82   Patient Number [id number]      Gender              Female   Visit Number   [id number]   Interpreting        Elif Esqueda                             Physician   Date of Birth  1935  Referring Physician Beverlie Dubin, MD   Accession      [id number]  [address]  Number                                         RVS, RCS  Procedure Type of Study:   Cerebral:Carotid, VL CAROTID BILATERAL. Indications for Study:TIA (Transient Ischemic Attack). Risk Factors   - The patient's risk factor(s) include: diabetes mellitus, dyslipidemia     and arterial hypertension. Impression   There is mixed plaque visualized in the bilateral internal carotid  artery(ies). There is less than 50% stenosis in the right internal carotid artery. There is 50-69% stenosis of the left internal carotid artery, however this  is only at the distal ICA where mostly tortuous, so this could be falsely  elevated. Clinical correlation recommended. There is normal antegrade flow in the bilateral vertebral artery(ies). Signature   ----------------------------------------------------------------  Electronically signed by Zenobia Bernheim Victoria(Interpreting  physician) on 09/06/2022 01:32 PM  ----------------------------------------------------------------  Blood Pressure:Right arm 172/60 mmHg. Left arm 180/68 mmHg. Velocities are measured in cm/s ; Diameters are measured in mm Carotid Right Measurements +------------+-------+-------+--------+-------+------------+---------------+ ! Location    ! PSV    ! EDV    ! Angle   ! RI     !%Stenosis   ! Tortuosity     ! +------------+-------+-------+--------+-------+------------+---------------+ ! Prox CCA    !78.9   !10.6   ! 60      !0.87   !            !               ! +------------+-------+-------+--------+-------+------------+---------------+ ! Mid CCA !75.2   !8.78   !60      !0.88   !            !               ! +------------+-------+-------+--------+-------+------------+---------------+ ! Prox ICA    !124    !18.9   !60      !0.85   !            !               ! +------------+-------+-------+--------+-------+------------+---------------+ ! Mid ICA     !106    !17.5   !60      !0.83   !            !               ! +------------+-------+-------+--------+-------+------------+---------------+ ! Dist ICA    !110    !22.4   !60      !0.8    ! !               ! +------------+-------+-------+--------+-------+------------+---------------+ ! Prox ECA    !82.9   !       !60      !       !            !               ! +------------+-------+-------+--------+-------+------------+---------------+ ! Vertebral   !76.8   !13.7   !60      !0.82   !            !               ! +------------+-------+-------+--------+-------+------------+---------------+   - There is antegrade vertebral flow noted on the right side. - Additional Measurements:ICAPSV/CCAPSV 1.57. ICAEDV/CCAEDV 2.11. Carotid Left Measurements +------------+-------+-------+--------+-------+------------+---------------+ ! Location    ! PSV    ! EDV    ! Angle   ! RI     !%Stenosis   ! Tortuosity     ! +------------+-------+-------+--------+-------+------------+---------------+ ! Prox CCA    !88.4   !13.7   !60      !0.85   !            !               ! +------------+-------+-------+--------+-------+------------+---------------+ ! Mid CCA     !84     !15.4   !60      !0.82   !            !               ! +------------+-------+-------+--------+-------+------------+---------------+ ! Prox ICA    !81.8   !19.8   !60      !0.76   !            !               ! +------------+-------+-------+--------+-------+------------+---------------+ ! Mid ICA     ! 122    !29     !60      !0.76   !            !               ! +------------+-------+-------+--------+-------+------------+---------------+ ! Dist ICA    !189    !30     !60 Follow up as clinically indicated. All CT scans at this facility utilize dose modulation, iterative reconstruction, and/or weight based dosing when appropriate to reduce radiation dose to as low as reasonably achievable. Exam: CTA OF THE CAROTID ARTERIES Clinical data:Right sided weakness. Technique: Axial CT angiography of the carotid arteries within the neck was performed with the administration of intravenous contrast for evaluation of the carotid bifurcation. Oral contrast was not utilized. Coronal, sagittal, and 3D volume reconstructions of the carotid arteries were performed. Reformatted/3D-MPR images were performed. NASCET Criteria was used in evaluating the study. Radiation Dose:  CTDIvol = 68.66 mGy   DLP = 1410 mGy x cm. Prior studies: No prior studies submitted. Findings: No definite abnormality seen on 3D reformatted images. CCA, Carotid Bulb, ICA, and origin of the ECA are well opacified. Atherosclerotic mixed plaque are seen in bilateral carotid bulbs however not causing significant luminal narrowing. Vertebral arteries are well opacified. Jugular veins are well opacified Included great vessels of the aortic arch are well opacified. Atherosclerotic calcifications are seen in arch of the aorta and at the origin of its branches however not causing significant luminal narrowing. Included lung apices are grossly unremarkable. IMPRESSION: Carotid arteries and vertebral arteries well opacified with contrast. Atherosclerotic mixed plaques in bilateral carotid bulbs not causing significant luminal narrowing. Recommendation: Follow up as clinically indicated. All CT scans at this facility utilize dose modulation, iterative reconstruction, and/or weight based dosing when appropriate to reduce radiation dose to as low as reasonably achievable.  Electronically Signed by Dameon Wilkerson DO at 06-Sep-2022 12:22:32 AM                 Assessment:  Admission 9/6/2022 right-sided weakness since 9 PM arm weakness has resolved. Also had complaints of chest pain. Plaints of chest pain  Fibromyalgia  Hyperlipidemia  Venous insufficiency  Hypertension  History of DVT  Depression  Diabetes mellitus type 2  History of atrial fibrillation  Varicose veins  History of colon polyps  History of esophageal stricture  Iron deficiency anemia  History of neuropathy of foot  Fibrocystic breast disease  Gastroesophageal reflux disease  Restless leg syndrome  Osteoarthritis  CT of the head 9/5/2022 no acute findings  CTA head and neck 9/5/2022 atherosclerotic mixed plaque bilateral carotid bulbs not causing significant luminal narrowing follow-up as indicated  CT abdomen pelvis 1/21/2022 no acute findings prior partial colectomy  MRI the brain 1/24/2022 stable extra axial mass right posterior cranial fossa broad-based dural attachment to the right tentorium cerebelli consistent with meningioma stable to slightly increased from previous exam no associated mass-effect mild atrophy moderate small vessel ischemic changes  Lexiscan 10/27/2021 normal ejection fraction 86% normal perfusion study  Echocardiogram 9/16/2021 normal left ventricular function EF 70% mild concentric LVH mild TR mild WA  Echocardiogram 9/6/2022 mild to moderate MR mitral and calcification mild TR RVSP 39 mildly dilated left atrium normal left ventricular function EF 55 to 60% mild concentric LVH grade 2 diastolic dysfunction      Plan:  As the patient drank coffee this morning Lexiscan will need to be postponed. She is stable from our standpoint could be discharged today we will arrange for outpatient Lacy Curet.     Aries Gautam MD, MD 9/7/2022 10:30 AM

## 2022-09-07 NOTE — PROGRESS NOTES
Patient:   Raiza Quintana  MR#:    [id number]   Room:    [address] [place] [address]   YOB: 1935  Date of Progress Note: 9/7/2022  Time of Note                           7:12 AM  Consulting Physician:   Octavia Farmer M.D. Attending Physician:  Bubba Muhammad MD     Chief complaint Right-sided weakness/Fall     S:This 82 y.o.  with a past medical history significant for atrial fibrillation on Xarelto history of DVT, hypertension, diabetes and neuropathy evaluation of possible right-sided weakness. The patient is a poor historian. History is obtained from the chart patient indicates she got up from a chair to go to the kitchen and her right leg gave out. She indicates there is some shaking of the right leg. She came in for evaluation. There was some chest pain as well. The patient denies any previous history of stroke or TIA. She denies diplopia, dysarthria, dysphagia. 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 She denies missing any doses of Xarelto. No complaints overnight. Feels well this morning.     REVIEW OF SYSTEMS:  Constitutional: No fevers No chills  Neck:No stiffness  Respiratory: No shortness of breath  Cardiovascular: No chest pain No palpitations  Gastrointestinal: No abdominal pain    Genitourinary: No Dysuria  Neurological: No headache, no confusion    Past Medical History:      Diagnosis Date    Afib ([address] 2021    Bronchitis     Colon cancer ([facility] [address]     Colon polyps     Constipation     Deep vein blood clot of left lower extremity (HCC)     Left leg     Depression     DVT (deep venous thrombosis) (HCC)     Dysphagia     Fibrocystic breast disease     Fibromyalgia     GERD (gastroesophageal reflux disease)     reflux with hx of esophageal stricture    Headache(784.0)     History of colon cancer 2000    Hormone replacement therapy (postmenopausal)     Hypertension     Neuropathy of foot     In both feet    Osteoarthritis     left knee pain    Restless % injection 5-40 mL, 5-40 mL, IntraVENous, PRN, Sunny Amezquita MD    0.9 % sodium chloride infusion, , IntraVENous, PRN, Sunny Amezquita MD    ondansetron (ZOFRAN-ODT) disintegrating tablet 4 mg, 4 mg, Oral, Q8H PRN **OR** ondansetron (ZOFRAN) injection 4 mg, 4 mg, IntraVENous, Q6H PRN, Sunny Amezquita MD    acetaminophen (TYLENOL) tablet 650 mg, 650 mg, Oral, Q6H PRN **OR** acetaminophen (TYLENOL) suppository 650 mg, 650 mg, Rectal, Q6H PRN, Sunny Amezquita MD    potassium chloride (KLOR-CON M) extended release tablet 40 mEq, 40 mEq, Oral, PRN **OR** potassium bicarb-citric acid (EFFER-K) effervescent tablet 40 mEq, 40 mEq, Oral, PRN **OR** potassium chloride 10 mEq/100 mL IVPB (Peripheral Line), 10 mEq, IntraVENous, PRN, Sunny Amezquita MD    magnesium sulfate 2000 mg in 50 mL IVPB premix, 2,000 mg, IntraVENous, PRN, Sunny Amezquita MD    atorvastatin (LIPITOR) tablet 80 mg, 80 mg, Oral, Nightly, Sunny Amezquita MD, 80 mg at 09/06/22 1955    labetalol (NORMODYNE;TRANDATE) injection 10 mg, 10 mg, IntraVENous, Q10 Min PRN, Sunny Amezquita MD    nitroGLYCERIN (NITROSTAT) SL tablet 0.4 mg, 0.4 mg, SubLINGual, Q5 Min PRN, Sunny Amezquita MD    ascorbic acid (VITAMIN C) tablet 500 mg, 500 mg, Oral, BID, Sunny Amezquita MD, 500 mg at 09/06/22 1955    vitamin B-12 (CYANOCOBALAMIN) tablet 500 mcg, 500 mcg, Oral, Daily, Sunny Amezquita MD, 500 mcg at 09/06/22 0845    dilTIAZem (CARDIZEM CD) extended release capsule 120 mg, 120 mg, Oral, BID, Sunny Amezquita MD, 120 mg at 09/06/22 1956    DULoxetine (CYMBALTA) extended release capsule 60 mg, 60 mg, Oral, Daily, Sunny Amezquita MD, 60 mg at 09/06/22 0845    losartan (COZAAR) tablet 100 mg, 100 mg, Oral, Daily, Sunny Amezquita MD, 100 mg at 09/06/22 0845    melatonin disintegrating tablet 5 mg, 5 mg, Oral, Nightly, Sunny Amezquita MD, 5 mg at 09/06/22 1955    [Held by provider] metoprolol succinate (TOPROL XL) extended release tablet 50 mg, 50 mg, Oral, Daily, Sunny Amezquita MD, 50 mg at 09/06/22 0846    pantoprazole (PROTONIX) tablet 40 mg, 40 mg, Oral, QAM AC, Lisa Justice MD, 40 mg at 09/07/22 0543    predniSONE (DELTASONE) tablet 10 mg, 10 mg, Oral, Daily, Lisa Justice MD, 10 mg at 09/06/22 0845    pregabalin (LYRICA) capsule 150 mg, 150 mg, Oral, BID, Lisa Justice MD, 150 mg at 09/06/22 1955    zinc sulfate (ZINCATE) capsule 50 mg, 50 mg, Oral, Daily, Lisa Justice MD, 50 mg at 09/06/22 0845    vitamin D (CHOLECALCIFEROL) capsule 5,000 Units, 5,000 Units, Oral, Daily, Lisa Justice MD, 5,000 Units at 09/06/22 0846    acyclovir (ZOVIRAX) tablet 800 mg, 800 mg, Oral, 5x Daily, Lisa Justice MD, 800 mg at 09/07/22 0542    rivaroxaban (XARELTO) tablet 15 mg, 15 mg, Oral, Daily with breakfast, Lisa Justice MD, 15 mg at 09/06/22 0845    iopamidol (ISOVUE-370) 76 % injection 90 mL, 90 mL, IntraVENous, ONCE PRN, Lavern Saunders MD    Allergies:  Gabapentin and Zoloft [sertraline hcl]    Social History:   TOBACCO:   reports that she has never smoked. She has never used smokeless tobacco.  ETOH:   reports no history of alcohol use. Family History:       Problem Relation Age of Onset    Cancer Mother         Cervical Cancer    No Known Problems Father     Diabetes Sister     No Known Problems Sister     No Known Problems Sister     Diabetes Brother     Esophageal Cancer Brother     No Known Problems Brother     No Known Problems Brother     Cancer Son     Other Daughter         still born    Colon Cancer Neg Hx     Colon Polyps Neg Hx     Liver Cancer Neg Hx     Liver Disease Neg Hx     Rectal Cancer Neg Hx     Stomach Cancer Neg Hx          PHYSICAL EXAM:  BP (!) 147/60   Pulse 61   Temp (!) 96.4 °F (35.8 °C) (Temporal)   Resp 18   Ht 5' 3\" (1.6 m)   Wt 136 lb 12.8 oz (62.1 kg)   SpO2 97%   BMI 24.23 kg/m²     Constitutional - well developed, well nourished.    Eyes - conjunctiva normal.   Ear, nose, throat - No scars, masses, or lesions over external nose or ears, no atrophy of tongue  Neck-symmetric, no masses noted, no jugular vein distension  Respiration- chest wall appears symmetric, good expansion,   normal effort without use of accessory muscles  Musculoskeletal - no significant wasting of muscles noted, no bony deformities  Extremities-no clubbing, cyanosis or edema  Skin - warm, dry, and intact. No rash, erythema, or pallor. Psychiatric - mood, affect, and behavior appear normal.      Neurological exam  Awake, alert, fluent oriented appropriate affect  Attention and concentration appear appropriate  Recent and remote memory appears unremarkable  Speech normal without dysarthria  No clear issues with language of fund of knowledge     Cranial Nerve Exam     CN III, IV,VI-EOMI, No nystagmus, conjugate eye movements, no ptosis    CN VII-no facial assymetry       Motor Exam  antigravity throughout upper and lower extremities bilaterally      Tremors- no tremors in hands or head noted     Gait  Not tested     Nursing/pcp notes, imaging,labs and vitals reviewed.      PT,OT and/or speech notes reviewed    Lab Results   Component Value Date    WBC 8.3 09/05/2022    HGB 10.2 (L) 09/05/2022    HCT 34.5 (L) 09/05/2022    MCV 74.7 (L) 09/05/2022     09/05/2022     Lab Results   Component Value Date     09/05/2022    K 4.3 09/05/2022     09/05/2022    CO2 26 09/05/2022    BUN 32 (H) 09/05/2022    CREATININE 1.2 (H) 09/05/2022    GLUCOSE 111 (H) 09/05/2022    CALCIUM 9.2 09/05/2022    PROT 6.9 09/05/2022    LABALBU 4.0 09/05/2022    BILITOT <0.2 09/05/2022    ALKPHOS 138 (H) 09/05/2022    AST 20 09/05/2022    ALT 22 09/05/2022    LABGLOM 43 (A) 09/05/2022    GFRAA 52 (L) 09/05/2022    GLOB 2.5 10/12/2016     Lab Results   Component Value Date    INR 0.96 09/05/2022    INR 0.97 02/02/2022    INR 1.68 (H) 01/21/2022    PROTIME 12.6 09/05/2022    PROTIME 13.1 02/02/2022    PROTIME 19.8 (H) 01/21/2022       Vascular carotid duplex bilateral [[id number]]    Collected: 09/06/22 1008 Updated: 09/06/22 1332    Narrative:     Vascular Carotid Procedure      Demographics        Patient Name   Stephany Tidwell Age                 82        Patient Number [id number]      Gender              Female        Visit Number   [id number]   Interpreting        Brett Novak                               Physician        Date of Birth  1935  Referring Physician Dorene Best MD        Accession      [id number]  [address]    Number                                         RVS, RCS       Procedure   Type of Study:        Cerebral:Carotid, VL CAROTID BILATERAL. Indications for Study:TIA (Transient Ischemic Attack). Risk Factors       - The patient's risk factor(s) include: diabetes mellitus, dyslipidemia       and arterial hypertension. Impression        There is mixed plaque visualized in the bilateral internal carotid    artery(ies). There is less than 50% stenosis in the right internal carotid artery. There is 50-69% stenosis of the left internal carotid artery, however this    is only at the distal ICA where mostly tortuous, so this could be falsely    elevated. Clinical correlation recommended. There is normal antegrade flow in the bilateral vertebral artery(ies).         Signature        ----------------------------------------------------------------    Electronically signed by Ta sanchze) on 09/06/2022 01:32 PM    ----------------------------------------------------------------      Echo Complete [[id number]]    Collected: 09/06/22 1022    Updated: 09/06/22 1437    Narrative:     Transthoracic Echocardiography Report (TTE)      Demographics        Patient Name  Stephany Tidwell  Date of Study          09/06/2022        MRN           [id number]       Gender                 Female        Date of Birth 1935   Room Number            MHL-0708        Age           82 year(s)        Height:       63 inches    Referring Physician    Ismael Carroll MD        Weight:       138 pounds   Sonographer            Courtney Zita [facility]        BSA:          1.65 m^2     Interpreting Physician Juliana Russell MD        BMI:          24.45 kg/m^2       Procedure     Type of Study        TTE procedure:ECHO 2D W/DOPPLER/COLOR/CONTRAST. Study Location: Echo Lab   Technical Quality: Adequate visualization     Patient Status: Inpatient     Contrast Medium: Definity. Amount - 8 ml     Rhythm: Within normal limits BP: 137/89 mmHg     Indications:Chest pain. Conclusions        Summary    Mitral valve leaflets are mildly thickened with preserved leaflet    mobility. Mitral annular calcification is present. Mild to moderate mitral regurgitation is present. Mildly thickened aortic valve leaflets with preserved leaflet mobility. Tricuspid valve is structurally normal.    Mild tricuspid regurgitation with estimated RVSP of 39 mm Hg. The pulmonic valve was not well visualized . Mildly dilated left atrium. Normal left ventricular size with preserved LV function and an estimated    ejection fraction of approximately 55-60%. Mild concentric left ventricular hypertrophy. No regional wall motion abnormalities.     Grade II diastolic dysfunction        Signature        ----------------------------------------------------------------    Electronically signed by Juilana Area MD(Interpreting    physician) on 09/06/2022 02:37 PM    ----------------------------------------------------------------        RECORD REVIEW: Previous medical records, medications were reviewed at today's visit    IMPRESSION:   Possible right-sided weakness with a history of atrial fibrillation on chronic anticoagulation with Xarelto-TIA/stroke certainly possible     Initial examination  Awake, alert, oriented  Chronic blindness of the right eye  Generalized giveaway weakness        Plan  MRI of the brain  Carotid ultrasound-50 to 69% left carotid possibly false elevation due to tortuosity-less than 50% of the right internal carotid  2D echo-unremarkable-left ventricular ejection fraction 55 to 60%/mildly dilated left atrium  Telemetry     On aspirin/Xarelto/statin  Hyperlipidemia-on statin LDL-68  Diabetes-hemoglobin A1c 6.2  Hypertension-on medications monitor  Fibromyalgia/neuropathy-Cymbalta/Lyrica  Atrial fibrillation-Xarelto/Cardizem/Toprol on hold  GI-Protonix  Mood disorder-Cymbalta        PT/OT/speech     Supportive care        CALL WITH ANY QUESTIONS  [phone] CELL  Dr Sergio Mireles

## 2022-09-07 NOTE — CARE COORDINATION
CM met with patient at the bedside. 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 Pt reports, she has shingles in there eyes, R eye blind, L eye she is receiving treatments. Friend assists her with medication s/u. Lady , dtr in law, helps with tasks that require \"reading and writing\" business. Pt has MOW M-F. Spouse is limited in cooking and generally after p/u grandson from school goes out to eat. CM has recommended [facility], [facility]. RN/PT/HHA/MSW for patient.  has also provided patient with list for private pay help, to assist patient with personal care, and housekeeping. Pt fell at home \"R leg gave out\"    09/07/22 1125   Service Assessment   Patient Orientation Alert and Oriented   Cognition Alert   History Provided By Patient   Primary [address] [id number] is:   (not on chart)   Last Visit to PCP Within last 3 months   Prior Functional Level Assistance with the following:   Current Functional Level Assistance with the following:   Can patient return to prior living arrangement Yes   Ability to make needs known: Good   Family able to assist with home care needs: Yes  (lives with spouse, who is 79 yo, and still working)   Financial Resources Medicare   Community Resources   (MOW)   CM/CAMELIA Referral   (referral to [facility], [facility].)   Social/Functional History   Lives With [address] [place] [address] One level   Bathroom Shower/Tub Tub/Shower unit   Bathroom Toilet Standard   Bathroom Accessibility Accessible   Home Equipment Cane;Walker, rolling   Receives Help From Family  (dtr in law)   ADL Assistance Needs assistance   [address] Needs assistance   Ambulation Assistance Independent  (uses cane/walker)   Active  No   Occupation Retired   Discharge Planning   Living Arrangements Spouse/Significant Other   Current Services Prior To Admission [facility]   DME Ordered? No   Type of Home Care Services None   Patient expects to be discharged to: [facility] Discharge   Transition of Care Consult (CM Consult) [address] Mason NP  Patient Contact Information:    [address]  [address]  [phone] (home)   Telephone Information:   Mobile [phone]     Above information verified? [x]   Yes  []   No      Emergency Contacts:    Extended Emergency Contact Information  Primary Emergency Contact: Shelton Schmidt  Address: [facility] [facility], [address] [place] [address] Phone: [phone]  Mobile Phone: [phone]  Relation: Spouse  Secondary Emergency Contact: Shan Jacqueline [address] Phone: [phone]  Mobile Phone: [phone]  Relation: Daughter-in-Law      Have you been vaccinated for COVID-19 (SARS-CoV-2)? [x]   Yes  []   No                   If so, when? Which :         []   Pfizer-BioNTech  [x]   Moderna  []   Carlyon Gip  []   Other:         Pharmacy:  [facility] [address][id number] - [address], [address] [phone] Adinashayla Bartlett [phone]  [address] [address] [address]  Phone: [phone] Fax: [phone]          Patient Deficits:    []   Yes   [x]   No    If yes:    []   Confusion/Memory  []   Visual  []   Motor/Sensory         []   Right arm         []   Right leg         []   Left arm         []   Left leg  []   Language/Speech         []   Aphasia         []   Dysarthria         []   Swallow    NIH Stroke Scale  Interval: Baseline  Level of Consciousness (1a): Alert  LOC Questions (1b):  Answers both correctly  LOC Commands (1c): Performs both tasks correctly  Best Gaze (2): Normal  Visual (3): No visual loss  Facial Palsy (4): Normal symmetrical movement  Motor Arm, Left (5a): No drift  Motor Arm, Right (5b): No drift  Motor Leg, Left (6a): No drift  Motor Leg, Right (6b):  No drift  Limb Ataxia (7): Absent  Sensory (8): Normal  Best Language (9): No aphasia  Dysarthria (10): Normal  Extinction and Inattention (11): No abnormality  Total: 0    Johana Coma Scale  Eye Opening: Spontaneous  Best Verbal Response: Oriented  Best Motor Response: Obeys commands  [place] Coma Scale Score: 15    Electronically signed by Leslie Jaquez RN on 9/7/2022 at 11:39 AM

## 2022-09-07 NOTE — CONSULTS
Palliative Care:      Pt known to Palliative Care. 83 y/o lady who presents with right extremity weakness and chest pain. Cardiology consult and neurology consulted. Pt lives at home with her . She uses a walker whenever she goes out. Receives meals on wheels weekdays. Pt talks about having COVID Feb 2022 and she has been working to regain strength and gain back weight ever since. Noted neurology recommendations/treatment and pt plans to have Brian Hector as outpt. She is hopeful to go home later today. She has a referral to home health. Past Medical History:        Past Medical History:   Diagnosis Date    Afib ([facility] [address] 2021    Bronchitis     Colon cancer ([facility] [address]     Colon polyps     Constipation     Deep vein blood clot of left lower extremity (HCC)     Left leg     Depression     DVT (deep venous thrombosis) (HCC)     Dysphagia     Fibrocystic breast disease     Fibromyalgia     GERD (gastroesophageal reflux disease)     reflux with hx of esophageal stricture    Headache(784.0)     History of colon cancer 2000    Hormone replacement therapy (postmenopausal)     Hypertension     Neuropathy of foot     In both feet    Osteoarthritis     left knee pain    Palliative care patient 02/01/2022    Restless legs syndrome     Type 2 diabetes mellitus 10/25/2021    Vitamin D deficiency        Advance Directives:    DNR  ACP note completed     Pain/Other Symptoms:    Pt denies pain. Only complaint is weakness    Activity:       as tolerated      Psychological/Spiritual:   Good spiritual support from her Buddhist and from a good friend. Patient/family discussion r/t goals:  Plan to return home, has all needed DME and [facility] to follow. Encouragement and support provided.       Electronically signed by Kyle Loo RN on 9/7/2022 at 1:43 PM

## 2022-09-07 NOTE — CONSULTS
[facility]  Cardiology Consult      Requesting MD:  Jaleesa Toney MD   Admit Status:         History obtained from:   [] Patient  [] Other (specify):     Patient:  Jeremy Christina  [id number]     Chief Complaint:   Chief Complaint   Patient presents with    Extremity Weakness     R sided, onset 2100    Chest Pain         HPI: Ms. Nj Holley is a 82 y.o. female with no definite prior cardiac history admitted 9/6/2022 right sided weakness onset 2100 hrs. also complaints of chest pain mid substernal while being evaluated by the physician in the emergency department. Does not describe typical exertional angina. Had negative Lexiscan 10/27/2021 ejection fraction 86% normal perfusion study. Cardiology consulted pain-free at the present time. All troponins are negative. proBNP 1060. Review of Systems:  Review of Systems   Constitutional: Negative. Negative for chills, fever and unexpected weight change. HENT: Negative. Eyes: Negative. Respiratory: Negative. Negative for shortness of breath. Cardiovascular: Negative. Negative for chest pain. Gastrointestinal: Negative. Negative for diarrhea, nausea and vomiting. Endocrine: Negative. Genitourinary: Negative. Musculoskeletal: Negative. Skin: Negative. Neurological: Negative. All other systems reviewed and are negative.     Cardiac Specific Data:  Specialty Problems          Cardiology Problems    * (Principal) Chest pain        Varicose veins of left lower extremity with inflammation, 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 (HCC)        Venous insufficiency        Hypertriglyceridemia        Paroxysmal supraventricular tachycardia (HCC)        Essential hypertension        Acute deep vein thrombosis (DVT) of femoral vein of left lower extremity (HCC)        Atrial fibrillation with RVR ([facility] [address]           Past Medical History:  Past Medical History:   Diagnosis Date    Afib ([address] 2021    Bronchitis     Colon cancer [facility])     Colon polyps     Constipation     Deep vein blood clot of left lower extremity (HCC)     Left leg     Depression     DVT (deep venous thrombosis) (HCC)     Dysphagia     Fibrocystic breast disease     Fibromyalgia     GERD (gastroesophageal reflux disease)     reflux with hx of esophageal stricture    Headache(784.0)     History of colon cancer     Hormone replacement therapy (postmenopausal)     Hypertension     Neuropathy of foot     In both feet    Osteoarthritis     left knee pain    Palliative care patient 2022    Restless legs syndrome     Type 2 diabetes mellitus 10/25/2021    Vitamin D deficiency         Past Surgical History:  Past Surgical History:   Procedure Laterality Date    APPENDECTOMY      BREAST BIOPSY      CATARACT REMOVAL       SECTION      CHOLECYSTECTOMY      COLECTOMY  partial    COLON SURGERY      COLONOSCOPY  2010    COLONOSCOPY  2012    Dr Priti Johnson: unremarkable enterocolonic anastamosis; hyperplastic polyps    COLONOSCOPY  2013    [facility]: unremarkable post-surgical colon    COLONOSCOPY  2016    Dr Lonnie Flores colon anastomosis, 5 yr recall    COLONOSCOPY N/A 2021    Dr Asad Mabry and patent anastomosis in the right side-BCM, prn (age)    COLONOSCOPY  2021    Dr Asad Mabry and patent anastomosis in the right side-BCM, prn (age)    FOOT SURGERY  right foot    HAND SURGERY Right     Dr Michael Hsieh ([facility])      KYPHOSIS SURGERY N/A 2020    KYPHOPLASTY L5 performed by Yoana Montenegro DO at [address] [facility] Right 2020    RIGHT L 4-5 DECOMPRESSIVE LAMINECTOMY performed by Yoana Montenegro DO at [address] [facility] [address] ENDOSCOPY  10/16/2009    [facility] peptic stricture dilated to 48F; HH; small antral mucosal elevation    UPPER GASTROINTESTINAL ENDOSCOPY  12/01/2014    [facility]: dilation of esophageal stricture    VARICOSE VEIN SURGERY Left 03/14/2014  [facility] & [address]    Left GSV Ablation    VARICOSE VEIN SURGERY Right 04/04/2014  [facility]    Right GSV Ablation       Past Family History:  Family History   Problem Relation Age of Onset    Cancer Mother         Cervical Cancer    No Known Problems Father     Diabetes Sister     No Known Problems Sister     No Known Problems Sister     Diabetes Brother     Esophageal Cancer Brother     No Known Problems Brother     No Known Problems Brother     Cancer Son     Other Daughter         still born    Colon Cancer Neg Hx     Colon Polyps Neg Hx     Liver Cancer Neg Hx     Liver Disease Neg Hx     Rectal Cancer Neg Hx     Stomach Cancer Neg Hx        Past Social History:  Social History     Socioeconomic History    Marital status:      Spouse name: Mr. 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 Robina Serrano, [phone] (cellular)    AMBULATES: uses a cane    DOMICILED: has 4-5 stairs inside of the home but she does not use them normally, lives with her  and a grand son an a pet dog        Lives single family home.  works. 15 yo grandson lives in home. [employer]. Pt does not drive. Must arrange appts around husbands work schedule. Social Determinants of Health     Financial Resource Strain: Medium Risk    Difficulty of Paying Living Expenses: Somewhat hard   Food Insecurity: Food Insecurity Present    Worried About Running Out of Food in the Last Year: Sometimes true    Ran Out of Food in the Last Year: Sometimes true   Transportation Needs: Not on file   Physical Activity: Inactive    Days of Exercise per Week: 0 days    Minutes of Exercise per Session: 0 min   Stress: Not on file   Social Connections: Not on file   Intimate Partner Violence: Not on file   Housing Stability: Not on file       Allergies: Allergies   Allergen Reactions    Gabapentin Rash     Pt weaning off due to rash and hot flashes    Zoloft [Sertraline Hcl] Other (See Comments)     Patient states that she doesn't want this medication anymore because she hallucinated and saw spiders. Patient weaned herself off and after she quit taking the medication, the hallucinations stopped. Patient states that she doesn't want this medication anymore because she hallucinated and saw spiders. Patient weaned herself off and after she quit taking the medication, the hallucinations stopped. Home Meds:  Prior to Admission medications    Medication Sig Start Date End Date Taking?  Authorizing Provider   predniSONE (DELTASONE) 10 MG tablet Take 1 tablet by mouth daily for 14 days 9/1/22 9/15/22  EARLE Ca CNP   metoprolol succinate (TOPROL XL) 50 MG extended release tablet TAKE 1 TABLET BY once a day 9/1/22   EARLE Ca CNP   DULoxetine (CYMBALTA) 60 MG extended release capsule Take 1 capsule by mouth daily , increase in dose  Patient not taking: Reported on 9/6/2022 9/1/22   EARLE Ca CNP   XARELTO 20 MG TABS tablet TAKE 1 TABLET BY MOUTH DAILY WITH BREAKFAST 8/5/22   EARLE Ca CNP   valACYclovir (VALTREX) 1 g tablet TAKE 1 TABLET BY MOUTH TWICE DAILY 6/23/22   Historical Provider, MD   metFORMIN (GLUCOPHAGE-XR) 500 MG extended release tablet TAKE 1 TABLET BY MOUTH DAILY WITH BREAKFAST 6/22/22   EARLE Dimas CNP   ciprofloxacin (CILOXAN) 0.3 % ophthalmic solution INSTILL 1 DROP IN RIGHT EYE TWICE DAILY 6/9/22   Historical Provider, MD   omeprazole (PRILOSEC) 40 MG delayed release capsule TAKE 1 CAPSULE BY MOUTH DAILY 3/28/22   Tiffany Valencia MD   HYDROcodone-acetaminophen ([address])  MG per tablet TAKE 1 TABLET BY MOUTH EVERY 6 HOURS AS NEEDED 2/11/22   Historical Provider, MD   pregabalin (LYRICA) 150 MG capsule TAKE 1 CAPSULE BY MOUTH EVERY 12 HOURS AS NEEDED 2/11/22   Historical Provider, MD   furosemide (LASIX) 20 MG tablet Take 1 tablet by mouth daily as needed (swelling) 2/23/22   EARLE Dimas CNP   melatonin 5 MG TBDP disintegrating tablet Take 1 tablet by mouth nightly 1/23/22 9/1/22  Jose George MD   zinc sulfate (ZINCATE) 220 (50 Zn) MG capsule Take 1 capsule by mouth daily 1/24/22   Jose George MD   ascorbic acid (VITAMIN C) 500 MG tablet Take 1 tablet by mouth 2 times daily 1/23/22 9/1/22  Jose George MD   vitamin D (ERGOCALCIFEROL) 1.25 MG (86068 UT) CAPS capsule TAKE 1 CAPSULE BY MOUTH 1 TIME A WEEK 12/20/21   Tiffany Valencia MD   irbesartan (AVAPRO) 300 MG tablet Take 1 tablet by mouth daily 11/9/21   EARLE Sotomayor   dilTIAZem (CARDIZEM CD) 120 MG extended release capsule TAKE 1 CAPSULE BY MOUTH TWICE DAILY 10/15/21   EARLE Garcia   nystatin (MYCOSTATIN) 551982 UNIT/GM powder For rash under breast. Apply 3 times daily.  7/28/21   EARLE Dimas CNP   Cyanocobalamin (B-12) 500 MCG TABS TAKE 1 TABLET BY MOUTH DAILY 3/9/21   Tiffany Valencia MD   [facility] [place] by Does not apply route 8/21/20   EARLE Dimas CNP       Current Meds:   aspirin  81 mg Oral Daily    Or    aspirin  300 mg Rectal Daily    sodium chloride flush  5-40 mL IntraVENous 2 times per day    atorvastatin  80 mg Oral Nightly    ascorbic acid  500 mg Oral BID wall: No tenderness. Abdominal:      General: Abdomen is flat. Bowel sounds are normal. There is no distension. Palpations: Abdomen is soft. There is no mass. Tenderness: There is no abdominal tenderness. There is no right CVA tenderness, left CVA tenderness, guarding or rebound. Hernia: No hernia is present. Musculoskeletal:         General: No swelling, tenderness, deformity or signs of injury. Cervical back: Normal range of motion and neck supple. No rigidity or tenderness. Right lower leg: No edema. Left lower leg: No edema. Lymphadenopathy:      Cervical: No cervical adenopathy. Skin:     General: Skin is warm and dry. Neurological:      General: No focal deficit present. Mental Status: She is alert and oriented to person, place, and time. Mental status is at baseline. Cranial Nerves: No cranial nerve deficit. Sensory: No sensory deficit. Motor: No weakness. Coordination: Coordination normal.   Psychiatric:         Mood and Affect: Mood normal.         Behavior: Behavior normal.         Thought Content: Thought content normal.         Judgment: Judgment normal.       Labs:  Recent Labs     09/05/22 2227 09/07/22  0838   WBC 8.3 10.7   HGB 10.2* 10.8*    305       Recent Labs     09/05/22 2227 09/07/22  0838    142   K 4.3 4.4    106   CO2 26 27   BUN 32* 21   CREATININE 1.2* 0.8   LABGLOM 43* >60   MG 1.7  --    CALCIUM 9.2 9.1   PHOS 3.6  --        CK, CKMB, Troponin: @LABRCNT (CKTOTAL:3, CKMB:3, TROPONINI:3)@    Last 3 BNP:  No results for input(s): BNP in the last 72 hours.         IMAGING:  Echo Complete    Result Date: 9/6/2022  Transthoracic Echocardiography Report (TTE)  Demographics   Patient Name  Kelly Fuentes  Date of Study          09/06/2022   MRN           [id number]       Gender                 Female   Date of Birth 1935   Room Number            MHL-0708   Age           82 year(s)   Height:       63 inches Referring Physician    Del Carbone MD   Weight:       138 pounds   Sonographer            Courtney Ahumada, [facility]   BSA:          1.65 m^2     Interpreting Physician Alfreda García MD   BMI:          24.45 kg/m^2  Procedure Type of Study   TTE procedure:ECHO 2D W/DOPPLER/COLOR/CONTRAST. Study Location: Echo Lab Technical Quality: Adequate visualization Patient Status: Inpatient Contrast Medium: Definity. Amount - 8 ml Rhythm: Within normal limits BP: 137/89 mmHg Indications:Chest pain. Conclusions   Summary  Mitral valve leaflets are mildly thickened with preserved leaflet  mobility. Mitral annular calcification is present. Mild to moderate mitral regurgitation is present. Mildly thickened aortic valve leaflets with preserved leaflet mobility. Tricuspid valve is structurally normal.  Mild tricuspid regurgitation with estimated RVSP of 39 mm Hg. The pulmonic valve was not well visualized . Mildly dilated left atrium. Normal left ventricular size with preserved LV function and an estimated  ejection fraction of approximately 55-60%. Mild concentric left ventricular hypertrophy. No regional wall motion abnormalities. Grade II diastolic dysfunction   Signature   ----------------------------------------------------------------  Electronically signed by Alfreda García MD(Interpreting  physician) on 09/06/2022 02:37 PM  ----------------------------------------------------------------   Findings   Mitral Valve  Mitral valve leaflets are mildly thickened with preserved leaflet  mobility. Mitral annular calcification is present. Mild to moderate mitral regurgitation is present. Aortic Valve  Mildly thickened aortic valve leaflets with preserved leaflet mobility. Tricuspid Valve  Tricuspid valve is structurally normal.  Mild tricuspid regurgitation with estimated RVSP of 39 mm Hg. Pulmonic Valve  The pulmonic valve was not well visualized . Left Atrium  Mildly dilated left atrium.    Left Ventricle  Normal left ventricular size with preserved LV function and an estimated  ejection fraction of approximately 55-60%. Mild concentric left ventricular hypertrophy. No regional wall motion abnormalities. Grade II diastolic dysfunction   Right Atrium  Normal right atrial dimension with no evidence of thrombus or mass noted. Right Ventricle  Normal right ventricular size with preserved RV function. Pericardial Effusion  No evidence of significant pericardial effusion is noted. Pleural Effusion  No evidence of pleural effusion. Miscellaneous  Definity utilized  2D Measurements and Calculations(cm)   LVIDd: 3.87 cm                      LVIDs: 2.53 cm  IVSd: 1.22 cm  LVPWd: 1.14 cm                      AO Root Dimension: 2.2 cm  Rt. Vent.  Dimension: 2.82 cm        LA Dimension: 3.9 cm  % Ejection Fraction: 60 %           LA Area: 24 cm^2  LA Volume: 77.4 ml                  LV Systolic dimension: 5.18KW  LA Volume Index: 47 ml/m^2          LV PW diastolic: 4.23BR  LV dimension: 3.87 cm               LVOT diameter: 1.9 cm                                      RA Systolic pressure: 3mmHg                                      RV Diastolic dimension: 8.73YO  Ascending Aorta: 2.6 cm  Doppler Measurements and Calculations   AV Peak Velocity:229 cm/s              MV Peak E-Wave: 132 cm/s  AV Mean Velocity:149 cm/s              MV Peak A-Wave: 115 cm/s  AV Peak Gradient: 20.98 mmHg           MV E/A Ratio: 1.15 %  AV Mean Gradient: 10 mmHg              MV Mean velocity: 80.2cm/s  AV Area (Continuity):1.11 cm^2         MV Peak Gradient: 6.97 mmHg  AV VTI:61.8 cm/s                       MV Mean gradient: 3 mmHg  TR Velocity:276 cm/s  TR Gradient:30.47 mmHg  Estimated RAP:3 mmHg  RVSP:39 mmHg   MV E' septal velocity: 6.74cm/s  MV E' lateral velocity:7.72 cm/s      CT HEAD WO CONTRAST    Result Date: 9/5/2022  NO PRIOR REPORT AVAILABLE <Addendum Signed by KENTRELL Curran DO at 06-Sep-2022 12:05:56 AM Confirmation for patient:  Sarah Leyva MICHELLE Findings were communicated to Person Ap Chaudhari RN, on 09/06/2022 at 12:04 am who confirms having received the report. Ap Chaudhari takes responsibility for providing the report to the referring clinician. No further action required by the reading radiologist. If the referring clinician has any further question's please call customer service [phone], option 1. Critical Documentation Completed. (SAB) Addendum End> Exam: CT OF THE BRAIN WITHOUT CONTRAST Clinical data: Stroke protocol. Technique: Contiguous axial images are obtained from the skull base to vertex without intravenous contrast. Reformatted/MPR images were performed. Radiation dose: CTDIvol =68.66 mGy, DLP =1410 mGy x cm. Prior studies: No prior studies submitted. Findings: No acute intracranial abnormality is present. No evidence of acute cortical infarction, hemorrhage, mass or mass effect. No hydrocephalus or abnormal extra-axial fluid collections are present. The posterior fossa is unremarkable. Mild diffuse chronic involutional changes of the brain. The skull base and calvarium are intact. The included portions of the paranasal sinuses and mastoid air cells are clear. 1.  No acute intracranial pathology. Recommendation: Follow up as clinically indicated. All CT scans at this facility utilize dose modulation, iterative reconstruction, and/or weight based dosing when appropriate to reduce radiation dose to as low as reasonably achievable.  Amended by Naz Tse DO at 06-Sep-2022 12:05:56 AM Electronically Signed by Naz Tse DO at 05-Sep-2022 11:58:43 PM             Vascular carotid duplex bilateral    Result Date: 9/6/2022  Vascular Carotid Procedure  Demographics   Patient Name   Geri Avendano Age                 82   Patient Number [id number]      Gender              Female   Visit Number   [id number]   Interpreting        Zulema Bergeron                             Physician   Date of Birth  1935  Referring Physician Victorine Angelucci, MD Accession      [id number]  [address] [facility]  Number                                         RVS, RCS  Procedure Type of Study:   Cerebral:Carotid, VL CAROTID BILATERAL. Indications for Study:TIA (Transient Ischemic Attack). Risk Factors   - The patient's risk factor(s) include: diabetes mellitus, dyslipidemia     and arterial hypertension. Impression   There is mixed plaque visualized in the bilateral internal carotid  artery(ies). There is less than 50% stenosis in the right internal carotid artery. There is 50-69% stenosis of the left internal carotid artery, however this  is only at the distal ICA where mostly tortuous, so this could be falsely  elevated. Clinical correlation recommended. There is normal antegrade flow in the bilateral vertebral artery(ies). Signature   ----------------------------------------------------------------  Electronically signed by Spencer Mathias(Interpreting  physician) on 09/06/2022 01:32 PM  ----------------------------------------------------------------  Blood Pressure:Right arm 172/60 mmHg. Left arm 180/68 mmHg. Velocities are measured in cm/s ; Diameters are measured in mm Carotid Right Measurements +------------+-------+-------+--------+-------+------------+---------------+ ! Location    ! PSV    ! EDV    ! Angle   ! RI     !%Stenosis   ! Tortuosity     ! +------------+-------+-------+--------+-------+------------+---------------+ ! Prox CCA    !78.9   !10.6   ! 60      !0.87   !            !               ! +------------+-------+-------+--------+-------+------------+---------------+ ! Mid CCA     !75.2   !8.78   !60      !0.88   !            !               ! +------------+-------+-------+--------+-------+------------+---------------+ ! Prox ICA    !124    !18.9   !60      !0.85   !            !               ! +------------+-------+-------+--------+-------+------------+---------------+ ! Mid ICA     !106    !17.5   !60      !0.83   !            ! ! +------------+-------+-------+--------+-------+------------+---------------+ ! Dist ICA    !110    !22.4   !60      !0.8    ! !               ! +------------+-------+-------+--------+-------+------------+---------------+ ! Prox ECA    !82.9   !       !60      !       !            !               ! +------------+-------+-------+--------+-------+------------+---------------+ ! Vertebral   !76.8   !13.7   !60      !0.82   !            !               ! +------------+-------+-------+--------+-------+------------+---------------+   - There is antegrade vertebral flow noted on the right side. - Additional Measurements:ICAPSV/CCAPSV 1.57. ICAEDV/CCAEDV 2.11. Carotid Left Measurements +------------+-------+-------+--------+-------+------------+---------------+ ! Location    ! PSV    ! EDV    ! Angle   ! RI     !%Stenosis   ! Tortuosity     ! +------------+-------+-------+--------+-------+------------+---------------+ ! Prox CCA    !88.4   !13.7   !60      !0.85   !            !               ! +------------+-------+-------+--------+-------+------------+---------------+ ! Mid CCA     !84     !15.4   !60      !0.82   !            !               ! +------------+-------+-------+--------+-------+------------+---------------+ ! Prox ICA    !81.8   !19.8   !60      !0.76   !            !               ! +------------+-------+-------+--------+-------+------------+---------------+ ! Mid ICA     ! 122    !29     !60      !0.76   !            !               ! +------------+-------+-------+--------+-------+------------+---------------+ ! Dist ICA    !189    !30     !60      !0.84   !            !               ! +------------+-------+-------+--------+-------+------------+---------------+ ! Prox ECA    !88.4   !       !60      !       !            !               ! +------------+-------+-------+--------+-------+------------+---------------+ ! Vertebral   !47.6   !10.9   !60      !0.77   !            !               ! +------------+-------+-------+--------+-------+------------+---------------+   - There is antegrade vertebral flow noted on the left side. - Additional Measurements:ICAPSV/CCAPSV 2.14. ICAEDV/CCAEDV 2.19. CTA HEAD NECK W CONTRAST    Result Date: 9/5/2022  NO PRIOR REPORT AVAILABLE CTA HEAD, CTA NECK Exam: CTA OF THEINTRACRANIAL ARTERIES (COW) Clinical data:Right sided weakness. Technique: Axial CT angiography of the intracranial arteries within the brain was performed with administration of intravenous contrast. Coronal, sagittal, and 3D volume reconstructions of theintracranial arteries were performed. Reformatted/3D-MPR images were performed. Radiation Dose:  CTDIvol = 68.66 mGy   DLP = 1410 mGy x cm. Prior studies: CT scan of the head done on same date. Findings: No definite abnormality seen on 3D reformatted images. Anterior cerebral arteries demonstrate enhance normally. A1 segment of the right anterior cerebral artery is hypoplastic. Anterior communicating artery is opacified. Middle Cerebral arteries are unremarkable. Posterior communicating arteries are opacified. Posterior cerebral arteries are intact. Vertebral arteries are visualized. Basilar artery is unremarkable. Intracranial internal carotid arteries demonstrate normal enhancement. Left internal carotid artery enlarged in caliber than the right internal carotid artery. Mild atherosclerotic calcifications are seen in cavernous portion of bilateral internal carotid arteries. No evidence of aneurysm (greater than 4 mm) orarteriovenous lesion. CTA of intracranial arteries unremarkable. Recommendation: Follow up as clinically indicated. All CT scans at this facility utilize dose modulation, iterative reconstruction, and/or weight based dosing when appropriate to reduce radiation dose to as low as reasonably achievable. Exam: CTA OF THE CAROTID ARTERIES Clinical data:Right sided weakness.  Technique: Axial CT angiography of the carotid arteries within the neck was performed with the administration of intravenous contrast for evaluation of the carotid bifurcation. Oral contrast was not utilized. Coronal, sagittal, and 3D volume reconstructions of the carotid arteries were performed. Reformatted/3D-MPR images were performed. NASCET Criteria was used in evaluating the study. Radiation Dose:  CTDIvol = 68.66 mGy   DLP = 1410 mGy x cm. Prior studies: No prior studies submitted. Findings: No definite abnormality seen on 3D reformatted images. CCA, Carotid Bulb, ICA, and origin of the ECA are well opacified. Atherosclerotic mixed plaque are seen in bilateral carotid bulbs however not causing significant luminal narrowing. Vertebral arteries are well opacified. Jugular veins are well opacified Included great vessels of the aortic arch are well opacified. Atherosclerotic calcifications are seen in arch of the aorta and at the origin of its branches however not causing significant luminal narrowing. Included lung apices are grossly unremarkable. IMPRESSION: Carotid arteries and vertebral arteries well opacified with contrast. Atherosclerotic mixed plaques in bilateral carotid bulbs not causing significant luminal narrowing. Recommendation: Follow up as clinically indicated. All CT scans at this facility utilize dose modulation, iterative reconstruction, and/or weight based dosing when appropriate to reduce radiation dose to as low as reasonably achievable. Electronically Signed by Coreen Alvarez DO at 06-Sep-2022 12:22:32 AM               Assessment:  Admission 9/6/2022 right-sided weakness since 9 PM arm weakness has resolved. Also had complaints of chest pain.   Plaints of chest pain  Fibromyalgia  Hyperlipidemia  Venous insufficiency  Hypertension  History of DVT  Depression  Diabetes mellitus type 2  History of atrial fibrillation  Varicose veins  History of colon polyps  History of esophageal stricture  Iron deficiency anemia  History of neuropathy of foot  Fibrocystic breast disease  Gastroesophageal reflux disease  Restless leg syndrome  Osteoarthritis  CT of the head 9/5/2022 no acute findings  CTA head and neck 9/5/2022 atherosclerotic mixed plaque bilateral carotid bulbs not causing significant luminal narrowing follow-up as indicated  CT abdomen pelvis 1/21/2022 no acute findings prior partial colectomy  MRI the brain 1/24/2022 stable extra axial mass right posterior cranial fossa broad-based dural attachment to the right tentorium cerebelli consistent with meningioma stable to slightly increased from previous exam no associated mass-effect mild atrophy moderate small vessel ischemic changes  Lexiscan 10/27/2021 normal ejection fraction 86% normal perfusion study  Echocardiogram 9/16/2021 normal left ventricular function EF 70% mild concentric LVH mild TR mild NC  Echocardiogram 9/6/2022 mild to moderate MR mitral and calcification mild TR RVSP 39 mildly dilated left atrium normal left ventricular function EF 55 to 60% mild concentric LVH grade 2 diastolic dysfunction      Recommendations:  Lexiscan  Further comments to follow

## 2022-09-07 NOTE — TELEPHONE ENCOUNTER
Pt was admitted to [facility] 2 days ago for chest pain. She is being d/c with [facility] set up.

## 2022-09-07 NOTE — PROGRESS NOTES
Speech Language Pathology  Facility/Department: [address] CARE   CLINICAL BEDSIDE SWALLOW EVALUATION   and Speech Language Cognitive Evaluation    NAME: Yesica Daniel  : 1935  MRN: [id number]  ADMISSION DATE: 2022  Date of Eval: 2022  Evaluating Therapist: Fauzia Khoury MS CCC-SLP      ADMITTING DIAGNOSIS:   has Personal history of colon cancer; Family history of esophageal cancer; Fibromyalgia; Renal bruit; Varicose veins of left lower extremity with inflammation, 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 ([address] Venous insufficiency; Hypertriglyceridemia; Dysphagia; Paroxysmal supraventricular tachycardia ([address] Vitamin D deficiency; Encounter for care related to Port-a-Cath; Essential hypertension; History of colon polyps; Port-A-Cath in place; History of DVT (deep vein thrombosis); Avascular necrosis ([address] Pain in both lower extremities; Numbness and tingling of both feet; Acute deep vein thrombosis (DVT) of femoral vein of left lower extremity ([address] Cellulitis of left lower limb; Fracture of right foot; Hypochloremia; CKD (chronic kidney disease) stage 3, GFR 30-59 ml/min ([address] Malignant neoplasm of colon ([address] Mixed simple and mucopurulent chronic bronchitis ([address] Fungal dermatitis; 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; Depression; History of esophageal stricture; Atrial fibrillation with RVR ([address] Type 2 diabetes mellitus; Iron deficiency anemia; COVID-19 virus infection; History of atrial fibrillation; COVID-19; Acute hypoxemic respiratory failure due to COVID-19 [facility]); Palliative care patient; Chest pain; Weakness; and Fall on their problem list.    History:  Per physician: This 82 y.o.  with a past medical history significant for atrial fibrillation on Xarelto history of DVT, hypertension, diabetes and neuropathy evaluation of possible right-sided weakness. The patient is a poor historian.   History is obtained from the chart patient indicates she got up from a chair to go to the kitchen and her right leg gave out. She indicates there is some shaking of the right leg. She came in for evaluation. There was some chest pain as well. The patient denies any previous history of stroke or TIA. She denies diplopia, dysarthria, dysphagia. 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 She denies missing any doses of Xarelto. No complaints overnight. Feels well this morning. Esophagram 8/3/22:  Narrative   EXAMINATION: Esophagram A5 2022   Fluoroscopy time: 1.19 minutes. Dose: 0.3236 mGym2. 42 images are submitted for interpretation. HISTORY: Difficulty swallowing. FINDINGS: A barium swallow was performed with oral ingestion of   contrast material. There is some delay in initiation of swallowing   mechanics. There is dysmotility of the esophagus with a poor primary   peristaltic wave resulting in some stasis of the brain, the middle and   distal esophageal segment. Minimal tertiary contractions are present. There is a sliding-type hiatal hernia with mild GE reflux into the   distal esophageal segment in the recumbent position with the water   siphon test.       Impression   1.. Dysmotility of the esophagus with a poor primary peristaltic wave. This results in some stasis of the barium column with the middle and   distal esophageal segments. 2. Small sliding-type hiatal hernia with mild gastroesophageal reflux. 3. Mild delay in initiation of swallowing mechanics.    Signed by Dr Rustam Beard level:  Current Diet : Regular      Pain:  Pain Assessment  Pain Assessment: None - Denies Pain    Reason for Referral  Otto Norman was referred for a bedside swallow evaluation to assess the efficiency of her swallow function, identify signs and symptoms of aspiration and make recommendations regarding safe dietary consistencies, effective compensatory strategies, and safe eating environment. Impression  Dysphagia Impression : Oral and pharyngeal phases of the swallow were within functional limits this date. No difficulty with oral transit or labial spillage. No difficulty with mastication. No overt s/s of aspiration observed wtih thin liquids, pureed foods, soft solids or solids. Clear voicing was observed after all p.o. presentations. Good hyolaryngeal elevation and timley pharyngeal swallow responses were noted. She is recommended to continue a regular diet with thin liquids at this time. Medications may be whole with water. Dysphagia Outcome Severity Scale: Level 6: Within functional limits/Modified independence     Treatment Plan  Requires SLP Intervention: No     D/C Recommendations: No follow up therapy recommended post discharge       Recommended Diet and Intervention  Regular diet  Thin liquids  Medications whole with water    Compensatory Swallowing Strategies  Compensatory Swallowing Strategies : Upright as possible for all oral intake;Remain upright for 30-45 minutes after meals; Alternate solids and liquids;Eat/Feed slowly; Small bites/sips      General  Chart Reviewed: Yes  Behavior/Cognition: Alert; Cooperative  Temperature Spikes Noted: No  Respiratory Status: O2 via nasual cannula  Communication Observation: Functional (Pt reports history of intermittent anomia)  Follows Directions: Simple  Dentition: Dentures bottom; Dentures top  Patient Positioning: Upright in bed  Baseline Vocal Quality: Normal  Volitional Cough: Strong  Volitional Swallow:  (Timely)      Prior Dysphagia History:   The patient completed an esophagram on 8/3/22 which revealed esophageal dysmotility, a hiatal hernia, and delayed initiation of the swallow. No history of MBSS was located in the electronic medical record. She reports onset of her dysphagia sometime this year. She reports medications become lodged in the lower esophagus.  Sips of water or bites of applesauce are effective in clearing the meds from the esophagus per her report. She did state that meds and foods becomed lodged in the pharynx occasionally. She reports occasional difficulty swallowing liquids. She does report a history of reflux. She states she consumes mostly a soft diet at home. She tends to eat smaller meals throughout the day rather than three large meals. She does have meals on wheels during the weekdays, but some of the foods can be tough or dry and difficult for her to swallow. Oral Motor Deficits  Oral/Motor  Oral Hygiene: Moist    Oral Phase Dysfunction  Oral Phase  Oral Phase: Exceptions  Oral Phase  Oral Phase - Comment: Oral phase was wtihin functional limits. No difficulty with labial seal or oral transit. No difficulty with mastication or clearing the oral cavity. No oral residue or buccal cavity pocketing was noted. Indicators of Pharyngeal Phase Dysfunction   Pharyngeal Phase   Pharyngeal Phase: Pharyngeal phase was within functional limits. Good hyolaryngeal elevation and timely swallow responses with all presentations. No overt s/s of aspiration observed with thin liquids, puree, soft solids or solids. Speech/language/cognitive evaluation:  Expressive language was within functional limits (naming pictures, divergent naming, answering open ended questions). Receptive language was within functional limits (answering yes/no questions, left right discrimination, following commands). Cognition was within functional limits (orientation, short term recall). No voicing difficulty and no motor speech deficits.     Prognosis  Good    Education  Reviewed assessment with the patient         Electronically Signed By:  Tommie Knox M.S. [id number] [facility]  9/7/2022,10:15 AM.   9/7/2022 10:08 AM

## 2022-09-07 NOTE — PROGRESS NOTES
Physical Therapy  Name: Sherlyn Quintana  MRN:  [id number]  Date of service:  9/7/2022 09/07/22 1521   Restrictions/Precautions   Restrictions/Precautions Fall Risk   Required Braces or Orthoses? No   Subjective   Subjective Pt agreed to therapy. Pain Assessment   Pain Assessment None - Denies Pain   Transfers   Sit to Stand Stand by assistance   Stand to sit Stand by assistance   Ambulation   Surface level tile   Device Rolling Walker   Assistance Contact guard assistance   Quality of Gait steady overall   Gait Deviations Slow Lillie;Decreased step height;Decreased step length   Distance 76'   Patient Goals    Patient goals  get better, go home   Short Term Goals   Time Frame for Short term goals 2 wks   Short term goal 1 supine to sit indep   Short term goal 2 sit to stand indep   Short term goal 3 amb. 100' with RW SBA   Conditions Requiring Skilled Therapeutic Intervention   Body Structures, Functions, Activity Limitations Requiring Skilled Therapeutic Intervention Decreased functional mobility ; Decreased strength;Decreased balance   Assessment Pt able to increase amb distance and fairly steady using RW. Tolerated mobility well without SOA, states she has previously experienced this. Pt sitting EOB with all needs in reach. Activity Tolerance   Activity Tolerance Patient tolerated treatment well   PT Plan of Care   Wednesday X   Safety Devices   Type of Devices Call light within reach; Left in bed         Electronically signed by Nancy Walter PTA on 9/7/2022 at 3:25 PM

## 2022-09-07 NOTE — PROGRESS NOTES
Occupational Therapy     09/07/22 1500   Subjective   Subjective Pt in bed upon arrival for therapy. Pt agreeable to participate. Cognition   Overall Cognitive Status WFL   Orientation   Overall Orientation Status WFL   Bed Mobility Training   Bed Mobility Training Yes   Overall Level of Assistance Stand-by assistance   Interventions Verbal cues   Transfer Training   Transfer Training Yes   Overall Level of Assistance Contact-guard assistance;Stand-by assistance   Interventions Verbal cues; Tactile cues   Sit to Stand Contact-guard assistance;Stand-by assistance   Stand to Sit Stand-by assistance   Balance   Sitting Intact   Standing With support  (RW)   ADL   Feeding Independent;Setup   Grooming Independent;Setup   UE Bathing Modified independent ;Setup   LE Bathing Contact guard assistance;Stand by assistance   UE Dressing Independent;Setup   LE Dressing Contact guard assistance;Stand by assistance   Toileting Contact guard assistance;Stand by assistance   Assessment   Assessment Tx focused on functional mobility at RW level in hallway and in room.    Activity Tolerance Patient tolerated treatment well   Discharge Recommendations Patient would benefit from continued therapy after discharge   Plan   Times per Week 3-5   Times per Day Daily   OT Plan of Care   Wednesday X   Electronically signed by BRANDYN Kate on 9/7/2022 at 3:15 PM

## 2022-09-07 NOTE — ACP (ADVANCE CARE PLANNING)
Advance Care Planning     Advance Care Planning Activator (Inpatient)  Conversation Note      Date of ACP Conversation: 9/7/2022     Conversation Conducted with:   Pt: Annalee Loving    ACP Activator: Lonnie Spicer RN        Health Care Decision Maker:     Current Designated Health Care Decision Maker:     Primary Decision Maker: Shelton Schmidt - Spouse - [phone]    Supplemental (Other) Decision Maker: Patti Forrest - Daughter-in-Law - [phone]    Care Preferences    Ventilation: \"If you were in your present state of health and suddenly became very ill and were unable to breathe on your own, what would your preference be about the use of a ventilator (breathing machine) if it were available to you? \"      Would the patient desire the use of ventilator (breathing machine)?:     NO      Resuscitation  \"CPR works best to restart the heart when there is a sudden event, like a heart attack, in someone who is otherwise healthy. Unfortunately, CPR does not typically restart the heart for people who have serious health conditions or who are very sick. \"    \"In the event your heart stopped as a result of an underlying serious health condition, would you want attempts to be made to restart your heart (answer \"yes\" for attempt to resuscitate) or would you prefer a natural death (answer \"no\" for do not attempt to resuscitate)? \"      NO         Conversation Outcomes:  [x] ACP discussion completed

## 2022-09-07 NOTE — DISCHARGE SUMMARY
[facility] Hospitalists    Discharge Summary      Costa Levy  :  1935  MRN:  [id number]    Admit date:  2022  Discharge date:   2022    Discharging Physician:  Dr. Rajiv Alarcon     Advance Directive: DNR    Consults: cardiology and neurology    Primary Care Physician:  Emile Richardson, APRN - CNP    Discharge Diagnoses:  Principal Problem:    Chest pain  Active Problems:    TIA (transient ischemic attack)    Weakness    Fall    Personal history of colon cancer    Fibromyalgia    Venous insufficiency    Hypertriglyceridemia    Essential hypertension    History of DVT (deep vein thrombosis)    Numbness and tingling of both feet    Depression    Type 2 diabetes mellitus    History of atrial fibrillation  Resolved Problems:    * No resolved hospital problems. *      Portions of this note have been copied forward, however, changed to reflect the most current clinical status of this patient. Hospital Course: The patient is a 29-year-old female with past medical history of A. fib on Xarelto, hypertension, diabetes, neuropathy who presented to [facility] ED with complaints of right-sided weakness and chest pain radiating to right shoulder. Reported having right-sided weakness that began on evening of admission. Stated she experienced some confusion. Denied shortness of breath, nausea, vomiting, palpitation, slurred speech, facial droop, or acute vision changes. Denied previous history of stroke or TIA. Patient was admitted to hospital medicine for chest pain and right-sided weakness with cardiology and neurology consultations. Cardiology recommended Lexiscan, however patient drank coffee this morning. Cardiology okay with outpatient Caryn Nguyen. Neurology recommended echo and MRI. Echo indicated normal LV size with preserved EF of 55 to 60%, mild concentric LVH, mild dilated LA, mild tricuspid regurgitation with estimated RVSP of 39 mmHg, grade 2 diastolic dysfunction.  MRI indicated diffuse cerebellar and cerebral atrophy, no evidence of intracranial mass or hemorrhage, no acute hemorrhagic or ischemic infarction, findings consistent with small vessel ischemic changes although nonspecific, demyelinating process or postinflammatory gliosis also considered. Patient is being discharged on ASA and Lipitor. She has been advised to follow up with PCP, Cardiology and neurology as recommended. Patient is currently in stable condition to be discharged home with home health. Significant Diagnostic Studies:     Echo Complete  Result Date: 9/6/2022    Summary  Mitral valve leaflets are mildly thickened with preserved leaflet  mobility. Mitral annular calcification is present. Mild to moderate mitral regurgitation is present. Mildly thickened aortic valve leaflets with preserved leaflet mobility. Tricuspid valve is structurally normal.  Mild tricuspid regurgitation with estimated RVSP of 39 mm Hg. The pulmonic valve was not well visualized . Mildly dilated left atrium. Normal left ventricular size with preserved LV function and an estimated  ejection fraction of approximately 55-60%. Mild concentric left ventricular hypertrophy. No regional wall motion abnormalities. Grade II diastolic dysfunction   Signature   ----------------------------------------------------------------  Electronically signed by Eldon Griffith MD(Interpreting  physician) on 09/06/2022 02:37 PM        CT HEAD WO CONTRAST  Result Date: 9/5/2022    1. No acute intracranial pathology. Recommendation: Follow up as clinically indicated. All CT scans at this facility utilize dose modulation, iterative reconstruction, and/or weight based dosing when appropriate to reduce radiation dose to as low as reasonably achievable.  Amended by Ben Yen DO at 06-Sep-2022 12:05:56 AM Electronically Signed by Ben Yen DO at 05-Sep-2022 11:58:43 PM               Vascular carotid duplex bilateral  Result Date: 9/6/2022    Impression   There is mixed plaque visualized in the bilateral internal carotid  artery(ies). There is less than 50% stenosis in the right internal carotid artery. There is 50-69% stenosis of the left internal carotid artery, however this  is only at the distal ICA where mostly tortuous, so this could be falsely  elevated. Clinical correlation recommended. There is normal antegrade flow in the bilateral vertebral artery(ies). Signature   ----------------------------------------------------------------  Electronically signed by Sheldon Mathias(Interpreting  physician) on 09/06/2022 01:32 PM        CTA HEAD NECK W CONTRAST  Result Date: 9/5/2022    CTA of intracranial arteries unremarkable. Recommendation: Follow up as clinically indicated. All CT scans at this facility utilize dose modulation, iterative reconstruction, and/or weight based dosing when appropriate to reduce radiation dose to as low as reasonably achievable. Exam: CTA OF THE CAROTID ARTERIES Clinical data:Right sided weakness. Technique: Axial CT angiography of the carotid arteries within the neck was performed with the administration of intravenous contrast for evaluation of the carotid bifurcation. Oral contrast was not utilized. Coronal, sagittal, and 3D volume reconstructions of the carotid arteries were performed. Reformatted/3D-MPR images were performed. NASCET Criteria was used in evaluating the study. Radiation Dose:  CTDIvol = 68.66 mGy   DLP = 1410 mGy x cm. Prior studies: No prior studies submitted. Findings: No definite abnormality seen on 3D reformatted images. CCA, Carotid Bulb, ICA, and origin of the ECA are well opacified. Atherosclerotic mixed plaque are seen in bilateral carotid bulbs however not causing significant luminal narrowing. Vertebral arteries are well opacified. Jugular veins are well opacified Included great vessels of the aortic arch are well opacified.   Atherosclerotic calcifications are seen in arch of the aorta and at the origin of its branches however not causing significant luminal narrowing. Included lung apices are grossly unremarkable. IMPRESSION: Carotid arteries and vertebral arteries well opacified with contrast. Atherosclerotic mixed plaques in bilateral carotid bulbs not causing significant luminal narrowing. Recommendation: Follow up as clinically indicated. All CT scans at this facility utilize dose modulation, iterative reconstruction, and/or weight based dosing when appropriate to reduce radiation dose to as low as reasonably achievable. Electronically Signed by Candelario Tierney DO at 06-Sep-2022 12:22:32 AM               MRI BRAIN W WO CONTRAST  Result Date: 9/7/2022      Diffuse cerebellar and cerebral atrophy. No MRI evidence of intracranial mass or hemorrhage. No abnormal enhancement. No acute hemorrhagic or ischemic infarction. Severe periventricular and subcortical white matter signal abnormalities consistent with small vessel ischemic changes although not specific. Demyelinating process or post inflammatory gliosis also considered. Pertinent Labs:   CBC:   Recent Labs     09/05/22 2227 09/07/22  0838   WBC 8.3 10.7   HGB 10.2* 10.8*    305     BMP:    Recent Labs     09/05/22 2227 09/07/22  0838    142   K 4.3 4.4    106   CO2 26 27   BUN 32* 21   CREATININE 1.2* 0.8   GLUCOSE 111* 97     INR:   Recent Labs     09/05/22 2227   INR 0.96       Physical Exam:   Vital Signs: BP (!) 144/49   Pulse 62   Temp (!) 96.6 °F (35.9 °C) (Temporal)   Resp 20   Ht 5' 3\" (1.6 m)   Wt 136 lb 12.8 oz (62.1 kg)   SpO2 97%   BMI 24.23 kg/m²   General appearance:. Alert and Cooperative   HEENT: Normocephalic. Chest: Lung sounds clear bilaterally without wheezes or rhonchi. Cardiac: RRR, S1, S2 normal. No murmurs, gallops, or rubs auscultated. Abdomen: soft, non-tender; non-distended normal bowel sounds no masses, no organomegaly. Extremities: No clubbing or cyanosis. No peripheral edema.  Peripheral pulses palpable. Neurologic: Grossly intact. Discharge Medications:          Medication List        START taking these medications      aspirin 81 MG EC tablet  Take 1 tablet by mouth daily  Start taking on: September 8, 2022     atorvastatin 80 MG tablet  Commonly known as: LIPITOR  Take 1 tablet by mouth nightly            CONTINUE taking these medications      ascorbic acid 500 MG tablet  Commonly known as: VITAMIN C  Take 1 tablet by mouth 2 times daily     B-12 500 MCG Tabs  TAKE 1 TABLET BY MOUTH DAILY     ciprofloxacin 0.3 % ophthalmic solution  Commonly known as: CILOXAN     dilTIAZem 120 MG extended release capsule  Commonly known as: CARDIZEM CD  TAKE 1 CAPSULE BY MOUTH TWICE DAILY     furosemide 20 MG tablet  Commonly known as: Lasix  Take 1 tablet by mouth daily as needed (swelling)     Hospital Bed Misc  by Does not apply route     HYDROcodone-acetaminophen  MG per tablet  Commonly known as: NORCO     irbesartan 300 MG tablet  Commonly known as: AVAPRO  Take 1 tablet by mouth daily     melatonin 5 MG Tbdp disintegrating tablet  Take 1 tablet by mouth nightly     metFORMIN 500 MG extended release tablet  Commonly known as: GLUCOPHAGE-XR  TAKE 1 TABLET BY MOUTH DAILY WITH BREAKFAST     metoprolol succinate 50 MG extended release tablet  Commonly known as: TOPROL XL  TAKE 1 TABLET BY once a day     nystatin 914981 UNIT/GM powder  Commonly known as: MYCOSTATIN  For rash under breast. Apply 3 times daily.      omeprazole 40 MG delayed release capsule  Commonly known as: PRILOSEC  TAKE 1 CAPSULE BY MOUTH DAILY     predniSONE 10 MG tablet  Commonly known as: DELTASONE  Take 1 tablet by mouth daily for 14 days     pregabalin 150 MG capsule  Commonly known as: LYRICA     valACYclovir 1 g tablet  Commonly known as: VALTREX     vitamin D 1.25 MG (88593 UT) Caps capsule  Commonly known as: ERGOCALCIFEROL  TAKE 1 CAPSULE BY MOUTH 1 TIME A WEEK     Xarelto 20 MG Tabs tablet  Generic drug: rivaroxaban  TAKE 1 TABLET BY MOUTH DAILY WITH BREAKFAST     zinc sulfate 220 (50 Zn) MG capsule  Commonly known as: ZINCATE  Take 1 capsule by mouth daily            STOP taking these medications      DULoxetine 60 MG extended release capsule  Commonly known as: CYMBALTA               Where to Get Your Medications        These medications were sent to [id number], [address]  [address] [facility], [facility] [place] [address]      Phone: [phone]   aspirin 81 MG EC tablet  atorvastatin 80 MG tablet            Discharge Instructions: Follow up with EARLE Hodges CNP in 7 days. Follow-up with cardiology and neurology as recommended. Take medications as directed. Resume activity as tolerated. Diet: ADULT DIET; Regular; Low Fat/Low Chol/High Fiber/2 gm Na     Disposition: Patient is medically stable and will be discharged home. Time spent on discharge 38 minutes spent in assessing patient, reviewing medications, discussion with nursing, confirming safe discharge plan and preparation of discharge summary. Signed:  Electronically signed by EARLE Muse CNP on 9/7/22 at 4:19 PM CDT         EMR Dragon/Transcription disclaimer:   Much of this encounter note is an electronic transcription/translation of spoken language to printed text.  The electronic translation of spoken language may permit erroneous, or at times, nonsensical words or phrases to be inadvertently transcribed; although attempts have made to review the note for such errors, some may still exist.

## 2022-09-09 ENCOUNTER — TELEPHONE (OUTPATIENT)
Dept: PRIMARY CARE CLINIC | Age: 87
End: 2022-09-09

## 2022-09-09 NOTE — TELEPHONE ENCOUNTER
[facility] Transitions Initial Follow Up Call    Outreach made within 2 business days of discharge: Yes    Patient: Gibran Briscoe Patient : 1935   MRN: [id number]  Reason for Admission: chest pain, right sided weakness  Discharge Date: 22       Spoke with: Patient    Discharge department/facility: [address] [place] [address] Interactive Patient Contact:  Was patient able to fill all prescriptions: Yes  Was patient instructed to bring all medications to the follow-up visit: Yes  Is patient taking all medications as directed in the discharge summary?  Yes  Does patient understand their discharge instructions: Yes  Does patient have questions or concerns that need addressed prior to 7-14 day follow up office visit: no    Scheduled appointment with PCP within 7-14 days    Follow Up  Future Appointments   Date Time Provider Jose Armando Milner   9/15/2022 11:30 AM EARLE Wright CNP [facility]   2022 10:45 AM EARLE Wright CNP [facility]   2022  1:15 PM MD BIANKA Aguilar [facility]   10/6/2022 12:30 PM MD BIANKA Yuan [facility]URO [facility]   2022  1:30 PM MD BIANKA Aguilar [facility]

## 2022-09-15 ENCOUNTER — OFFICE VISIT (OUTPATIENT)
Dept: PRIMARY CARE CLINIC | Age: 87
End: 2022-09-15
Payer: MEDICARE

## 2022-09-15 VITALS
SYSTOLIC BLOOD PRESSURE: 110 MMHG | TEMPERATURE: 97.4 F | HEIGHT: 63 IN | DIASTOLIC BLOOD PRESSURE: 70 MMHG | WEIGHT: 138 LBS | BODY MASS INDEX: 24.45 KG/M2 | HEART RATE: 72 BPM | OXYGEN SATURATION: 94 % | RESPIRATION RATE: 18 BRPM

## 2022-09-15 DIAGNOSIS — R94.4 DECREASED GFR: Primary | ICD-10-CM

## 2022-09-15 DIAGNOSIS — Z09 HOSPITAL DISCHARGE FOLLOW-UP: ICD-10-CM

## 2022-09-15 DIAGNOSIS — N18.32 STAGE 3B CHRONIC KIDNEY DISEASE (HCC): ICD-10-CM

## 2022-09-15 LAB
ALBUMIN SERPL-MCNC: 3.8 G/DL (ref 3.5–5.2)
ALP BLD-CCNC: 135 U/L (ref 35–104)
ALT SERPL-CCNC: 44 U/L (ref 5–33)
ANION GAP SERPL CALCULATED.3IONS-SCNC: 10 MMOL/L (ref 7–19)
AST SERPL-CCNC: 24 U/L (ref 5–32)
BILIRUB SERPL-MCNC: 0.3 MG/DL (ref 0.2–1.2)
BUN BLDV-MCNC: 23 MG/DL (ref 8–23)
CALCIUM SERPL-MCNC: 9.2 MG/DL (ref 8.8–10.2)
CHLORIDE BLD-SCNC: 104 MMOL/L (ref 98–111)
CO2: 27 MMOL/L (ref 22–29)
CREAT SERPL-MCNC: 0.9 MG/DL (ref 0.5–0.9)
GFR AFRICAN AMERICAN: >59
GFR NON-AFRICAN AMERICAN: 59
GLUCOSE BLD-MCNC: 171 MG/DL (ref 74–109)
POTASSIUM SERPL-SCNC: 4.4 MMOL/L (ref 3.5–5)
SODIUM BLD-SCNC: 141 MMOL/L (ref 136–145)
TOTAL PROTEIN: 6.6 G/DL (ref 6.6–8.7)

## 2022-09-15 PROCEDURE — 99495 TRANSJ CARE MGMT MOD F2F 14D: CPT | Performed by: NURSE PRACTITIONER

## 2022-09-15 PROCEDURE — 1111F DSCHRG MED/CURRENT MED MERGE: CPT | Performed by: NURSE PRACTITIONER

## 2022-09-15 RX ORDER — PREDNISONE 10 MG/1
10 TABLET ORAL DAILY
Qty: 14 TABLET | Refills: 0 | Status: SHIPPED | OUTPATIENT
Start: 2022-09-15 | End: 2022-09-29

## 2022-09-15 RX ORDER — DULOXETIN HYDROCHLORIDE 60 MG/1
60 CAPSULE, DELAYED RELEASE ORAL DAILY
Qty: 30 CAPSULE | Refills: 0 | OUTPATIENT
Start: 2022-09-15

## 2022-09-15 RX ORDER — DOXYCYCLINE HYCLATE 100 MG
100 TABLET ORAL 2 TIMES DAILY
Qty: 20 TABLET | Refills: 0 | Status: SHIPPED | OUTPATIENT
Start: 2022-09-15 | End: 2022-09-25

## 2022-09-15 NOTE — PROGRESS NOTES
Post-Discharge Transitional Care  Follow Up      Costa Levy   YOB: 1935    Date of Office Visit:  9/15/2022  Date of Hospital Admission: 9/5/22  Date of Hospital Discharge: 9/7/22  Risk of hospital readmission (high >=14%. Medium >=10%) :Readmission Risk Score: 21.2 ( )      Care management risk score Rising risk (score 2-5) and Complex Care (Scores >=6): No Risk Score On File     Non face to face  following discharge, date last encounter closed (first attempt may have been earlier): 09/09/2022    Call initiated 2 business days of discharge: Yes    ASSESSMENT/PLAN:   Decreased GFR  -     Comprehensive Metabolic Panel  Hospital discharge follow-up  -     [place] DISCHARGE MEDS RECONCILED W/ CURRENT OUTPATIENT MED LIST  Stage 3b chronic kidney disease [facility])    Medical Decision Making: moderate complexity  Return for change appt to 2 month port flush. On this date 9/15/2022 I have spent 30 minutes reviewing previous notes, test results and face to face with the patient discussing the diagnosis and importance of compliance with the treatment plan as well as documenting on the day of the visit. Keep taking the cymbalta  May take mucinex and claritin  Home health will check bp  Keep appt with neurology and cardiology  Keep taking aspirin and xarelto  Start the antibiotic for lungs and sinus  Star the steroid if need for lungs or rash. Subjective:   HPI:  Follow up of Hospital problems/diagnosis(es): She had gone to the ER with some right-sided weakness and low heart rate. She verbalizes that she was taking her Xarelto during this time. She does have an appointment scheduled for follow-up with neurology. The MRI showed diffuse cerebral and cerebellar atrophy no evidence of mass or hemorrhage. They had taken the patient off Cymbalta when she was in the hospital and she was not sure why but she started taking it when she got out because she felt like it helps her with her pain and her mood.   She is using a cane for ambulation. Hospital Course: The patient is a 82-year-old female with past medical history of A. fib on Xarelto, hypertension, diabetes, neuropathy who presented to [facility] ED with complaints of right-sided weakness and chest pain radiating to right shoulder. Reported having right-sided weakness that began on evening of admission. Stated she experienced some confusion. Denied shortness of breath, nausea, vomiting, palpitation, slurred speech, facial droop, or acute vision changes. Denied previous history of stroke or TIA. Patient was admitted to hospital medicine for chest pain and right-sided weakness with cardiology and neurology consultations. Cardiology recommended Lexiscan, however patient drank coffee this morning. Cardiology okay with outpatient Jose Martin Richmond. Neurology recommended echo and MRI. Echo indicated normal LV size with preserved EF of 55 to 60%, mild concentric LVH, mild dilated LA, mild tricuspid regurgitation with estimated RVSP of 39 mmHg, grade 2 diastolic dysfunction. MRI indicated diffuse cerebellar and cerebral atrophy, no evidence of intracranial mass or hemorrhage, no acute hemorrhagic or ischemic infarction, findings consistent with small vessel ischemic changes although nonspecific, demyelinating process or postinflammatory gliosis also considered. Patient is being discharged on ASA and Lipitor. She has been advised to follow up with PCP, Cardiology and neurology as recommended. Patient is currently in stable condition to be discharged home with home health. Inpatient course: Discharge summary reviewed- see chart.     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)    Venous insufficiency    Hypertriglyceridemia Dysphagia    Paroxysmal supraventricular tachycardia (HCC)    Vitamin D deficiency    Encounter for care related to Port-a-Cath    Essential hypertension    History of colon polyps    Port-A-Cath in place    History of DVT (deep vein thrombosis)    Avascular necrosis (HCC)    Pain in both lower extremities    Numbness and tingling of both feet    Acute deep vein thrombosis (DVT) of femoral vein of left lower extremity (HCC)    Cellulitis of left lower limb    Fracture of right foot    Hypochloremia    CKD (chronic kidney disease) stage 3, GFR 30-59 ml/min (HCC)    Malignant neoplasm of colon (HCC)    Mixed simple and mucopurulent chronic bronchitis (HCC)    Fungal dermatitis    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    Depression    History of esophageal stricture    Atrial fibrillation with RVR (HCC)    Type 2 diabetes mellitus    Iron deficiency anemia    COVID-19 virus infection    History of atrial fibrillation    COVID-19    Acute hypoxemic respiratory failure due to COVID-19 [facility])    Palliative care patient    Chest pain    Weakness    Fall    TIA (transient ischemic attack)       Medications listed as ordered at the time of discharge from hospital     Medication List            Accurate as of September 15, 2022  1:38 PM. If you have any questions, ask your nurse or doctor. START taking these medications      doxycycline hyclate 100 MG tablet  Commonly known as: VIBRA-TABS  Take 1 tablet by mouth 2 times daily for 10 days  Started by: EARLE Gilliam CNP     DULoxetine 60 MG extended release capsule  Commonly known as: CYMBALTA  Take 1 capsule by mouth daily , increase in dose  Started by: EARLE Gilliam CNP            CHANGE how you take these medications      * predniSONE 10 MG tablet  Commonly known as: DELTASONE  Take 1 tablet by mouth daily for 14 days  What changed: Another medication with the same name was added.  Make sure you understand how and when to take each. Changed by: EARLE Rodriguez CNP     * predniSONE 10 MG tablet  Commonly known as: DELTASONE  Take 1 tablet by mouth daily for 14 days  What changed: You were already taking a medication with the same name, and this prescription was added. Make sure you understand how and when to take each. Changed by: EARLE Rodriguez CNP           * This list has 2 medication(s) that are the same as other medications prescribed for you. Read the directions carefully, and ask your doctor or other care provider to review them with you. CONTINUE taking these medications      ascorbic acid 500 MG tablet  Commonly known as: VITAMIN C  Take 1 tablet by mouth 2 times daily     aspirin 81 MG EC tablet  Take 1 tablet by mouth daily     atorvastatin 80 MG tablet  Commonly known as: LIPITOR  Take 1 tablet by mouth nightly     B-12 500 MCG Tabs  TAKE 1 TABLET BY MOUTH DAILY     ciprofloxacin 0.3 % ophthalmic solution  Commonly known as: CILOXAN     dilTIAZem 120 MG extended release capsule  Commonly known as: CARDIZEM CD  TAKE 1 CAPSULE BY MOUTH TWICE DAILY     furosemide 20 MG tablet  Commonly known as: Lasix  Take 1 tablet by mouth daily as needed (swelling)     Hospital Bed Misc  by Does not apply route     HYDROcodone-acetaminophen  MG per tablet  Commonly known as: NORCO     irbesartan 300 MG tablet  Commonly known as: AVAPRO  Take 1 tablet by mouth daily     melatonin 5 MG Tbdp disintegrating tablet  Take 1 tablet by mouth nightly     metFORMIN 500 MG extended release tablet  Commonly known as: GLUCOPHAGE-XR  TAKE 1 TABLET BY MOUTH DAILY WITH BREAKFAST     metoprolol succinate 50 MG extended release tablet  Commonly known as: TOPROL XL  TAKE 1 TABLET BY once a day     nystatin 028404 UNIT/GM powder  Commonly known as: MYCOSTATIN  For rash under breast. Apply 3 times daily.      omeprazole 40 MG delayed release capsule  Commonly known as: PRILOSEC  TAKE 1 CAPSULE BY MOUTH DAILY metFORMIN (GLUCOPHAGE-XR) 500 MG extended release tablet TAKE 1 TABLET BY MOUTH DAILY WITH BREAKFAST 30 tablet 3    ciprofloxacin (CILOXAN) 0.3 % ophthalmic solution INSTILL 1 DROP IN RIGHT EYE TWICE DAILY      HYDROcodone-acetaminophen (NORCO)  MG per tablet TAKE 1 TABLET BY MOUTH EVERY 6 HOURS AS NEEDED      pregabalin (LYRICA) 150 MG capsule TAKE 1 CAPSULE BY MOUTH EVERY 12 HOURS AS NEEDED      furosemide (LASIX) 20 MG tablet Take 1 tablet by mouth daily as needed (swelling) 30 tablet 3    melatonin 5 MG TBDP disintegrating tablet Take 1 tablet by mouth nightly 30 tablet 0    zinc sulfate (ZINCATE) 220 (50 Zn) MG capsule Take 1 capsule by mouth daily 30 capsule 0    vitamin D (ERGOCALCIFEROL) 1.25 MG (94235 UT) CAPS capsule TAKE 1 CAPSULE BY MOUTH 1 TIME A WEEK 12 capsule 1    irbesartan (AVAPRO) 300 MG tablet Take 1 tablet by mouth daily 30 tablet 5    dilTIAZem (CARDIZEM CD) 120 MG extended release capsule TAKE 1 CAPSULE BY MOUTH TWICE DAILY 180 capsule 3    nystatin (MYCOSTATIN) 909908 UNIT/GM powder For rash under breast. Apply 3 times daily. 1 Bottle 1    Cyanocobalamin (B-12) 500 MCG TABS TAKE 1 TABLET BY MOUTH DAILY 30 tablet 11    Hospital Bed MISC by Does not apply route 1 each 0        Medications patient taking as of now reconciled against medications ordered at time of hospital discharge: Yes    A comprehensive review of systems was negative except for what was noted in the HPI.     Objective:    /70   Pulse 72   Temp 97.4 °F (36.3 °C) (Temporal)   Resp 18   Ht 5' 3\" (1.6 m)   Wt 138 lb (62.6 kg)   SpO2 94%   BMI 24.45 kg/m²   General Appearance: alert and oriented to person, place and time, well developed and well- nourished, in no acute distress  Skin: warm and dry, no rash or erythema  Head: normocephalic and atraumatic  Pulmonary/Chest: clear to auscultation bilaterally- no wheezes, rales or rhonchi, normal air movement, no respiratory distress  Cardiovascular: Heart rate is slightly irregular but rate is less than 100. Rate is about 60-70. Extremities: no cyanosis, clubbing or edema. Rash lower left leg improved  Musculoskeletal: normal range of motion, no joint swelling, deformity or tenderness  Neurologic: reflexes normal and symmetric, no cranial nerve deficit, gait, coordination and speech normal  Using a walker for ambulation. An electronic signature was used to authenticate this note.   --Jonathan Matta, APRN - CNP

## 2022-09-15 NOTE — PATIENT INSTRUCTIONS
Keep taking the cymbalta  May take mucinex and claritin  Home health will check bp  Keep appt with neurology and cardiology  Keep taking aspirin and xarelto  Start the antibiotic for lungs and sinus  Star the steroid if need for lungs or rash.

## 2022-09-20 ENCOUNTER — CARE COORDINATION (OUTPATIENT)
Dept: CARE COORDINATION | Age: 87
End: 2022-09-20

## 2022-09-20 NOTE — CARE COORDINATION
Ambulatory Care Coordination Note  9/20/2022    ACC: Michael Guzman RN    Summary Note: Spoke to patient. Jose Christine saw PCP last week, is taking antibiotic and steroid for URI. She is feeling better, still has a lingering cough. Patient is receiving [facility] services for PT. She is taking her medications, assisted by a friend who helps keep them organized for her. Patient said she is out of her Lyrica from Pain Mgmt. She is eating and drinking well, moving well today - uses a cane or a walker. No falls. She has some better days some not as good but said today is better. Patient is aware of her upcoming appointments and has no conflicts with any. Patient stated the [facility] nurse mentioned a person who does housekeeping and pt may contact her. She would like to get someone to come in weekly for cleaning. Patient receives [facility] Mon-Fri. Actions:  Patient is doing well, recovering from TIA with support HH, family and friend support. She is med compliant and is keeping appointments as well. Patient was instructed will need to contact Dr. Monserrat Giraldo for any refills on her pain medications. She voiced understanding. Will close this episode of care management. Patient was encouraged to call if new needs arise. Care Coordination Interventions    Program Enrollment: Complex Care  Referral from Primary Care Provider: No  Suggested Interventions and Community Resources  Home Health Services: Completed (Comment: [address] [facility])  Meals on Wheels: Completed (Comment: [facility])  Medi Set or Pill Pack: Completed (Comment: Pt's friend sets up wkly med box)  Physical Therapy: Completed  Zone Management Tools: Completed (Comment: RAED 50%;  Afib, CKD 3, DM(6.2))          Goals Addressed                      This Visit's Progress      COMPLETED: Patient Stated (pt-stated)   Improving      Patient would like to get stronger and find a     Barriers: impairment:  physical: medically deconditioned, lack of support, and overwhelmed by complexity of regimen  Plan for overcoming my barriers:   Patient is willing to work with ACM to improve knowledge of self-care actions that support optimal health outcomes at home. Patient is willing to use community resources to locate/obtain satisfactory housekeeping help  Patient is willing to make lifestyle changes to support safety and improve physical wellbeing  Confidence: 7/10  Anticipated Goal Completion Date: 7/7/22              Prior to Admission medications    Medication Sig Start Date End Date Taking?  Authorizing Provider   DULoxetine (CYMBALTA) 60 MG extended release capsule Take 1 capsule by mouth daily , increase in dose 9/15/22  Yes EARLE Green CNP   predniSONE (DELTASONE) 10 MG tablet Take 1 tablet by mouth daily for 14 days 9/15/22 9/29/22 Yes EARLE Romo CNP   doxycycline hyclate (VIBRA-TABS) 100 MG tablet Take 1 tablet by mouth 2 times daily for 10 days 9/15/22 9/25/22 Yes EARLE Romo CNP   aspirin 81 MG EC tablet Take 1 tablet by mouth daily 9/8/22  Yes EARLE Noel CNP   atorvastatin (LIPITOR) 80 MG tablet Take 1 tablet by mouth nightly 9/7/22  Yes EARLE Noel CNP   metoprolol succinate (TOPROL XL) 50 MG extended release tablet TAKE 1 TABLET BY once a day 9/1/22  Yes EARLE Chavez CNP   XARELTO 20 MG TABS tablet TAKE 1 TABLET BY MOUTH DAILY WITH BREAKFAST 8/5/22  Yes EARLE Green CNP   valACYclovir (VALTREX) 1 g tablet TAKE 1 TABLET BY MOUTH TWICE DAILY 6/23/22  Yes Historical Provider, MD   metFORMIN (GLUCOPHAGE-XR) 500 MG extended release tablet TAKE 1 TABLET BY MOUTH DAILY WITH BREAKFAST 6/22/22  Yes EARLE Romo CNP   ciprofloxacin (CILOXAN) 0.3 % ophthalmic solution INSTILL 1 DROP IN RIGHT EYE TWICE DAILY 6/9/22  Yes Historical Provider, MD   omeprazole (PRILOSEC) 40 MG delayed release capsule TAKE 1 CAPSULE BY MOUTH DAILY 3/28/22  Yes Neftali Gudino MD   HYDROcodone-acetaminophen (NORCO)  MG per tablet TAKE 1 TABLET BY MOUTH EVERY 6 HOURS AS NEEDED 2/11/22  Yes Lee Nugent DO   pregabalin (LYRICA) 150 MG capsule TAKE 1 CAPSULE BY MOUTH EVERY 12 HOURS AS NEEDED 2/11/22  Yes Lee Nugent DO   furosemide (LASIX) 20 MG tablet Take 1 tablet by mouth daily as needed (swelling) 2/23/22  Yes EARLE Stapleton - CNP   zinc sulfate (ZINCATE) 220 (50 Zn) MG capsule Take 1 capsule by mouth daily 1/24/22  Yes Erin Roche MD   vitamin D (ERGOCALCIFEROL) 1.25 MG (00340 UT) CAPS capsule TAKE 1 CAPSULE BY MOUTH 1 TIME A WEEK 12/20/21  Yes Nathan Davidson MD   irbesartan (AVAPRO) 300 MG tablet Take 1 tablet by mouth daily 11/9/21  Yes EARLE Arambula   dilTIAZem (CARDIZEM CD) 120 MG extended release capsule TAKE 1 CAPSULE BY MOUTH TWICE DAILY 10/15/21  Yes EARLE Haider   nystatin (MYCOSTATIN) 450911 UNIT/GM powder For rash under breast. Apply 3 times daily.  7/28/21  Yes EARLE Stapleton CNP   Cyanocobalamin (B-12) 500 MCG TABS TAKE 1 TABLET BY MOUTH DAILY 3/9/21  Yes Nathan Davidson MD   [facility] Bed [facility] by Does not apply route 8/21/20  Yes EARLE Stapleton CNP   melatonin 5 MG TBDP disintegrating tablet Take 1 tablet by mouth nightly 1/23/22 9/15/22  Erin Roche MD   ascorbic acid (VITAMIN C) 500 MG tablet Take 1 tablet by mouth 2 times daily 1/23/22 9/1/22  Erin Roche MD       Future Appointments   Date Time Provider Jose Armando Milner   9/29/2022  1:15 PM MD BIANKA Martinez [facility]   10/6/2022 12:30 PM MD BIANKA Mccarty [facility]   11/8/2022  1:30 PM MD BIANKA Martinez [facility]   11/15/2022  3:30 PM EARLE Stapleton - [place] [address]

## 2022-09-29 ENCOUNTER — OFFICE VISIT (OUTPATIENT)
Dept: CARDIOLOGY CLINIC | Age: 87
End: 2022-09-29
Payer: MEDICARE

## 2022-09-29 VITALS
BODY MASS INDEX: 24.27 KG/M2 | DIASTOLIC BLOOD PRESSURE: 56 MMHG | SYSTOLIC BLOOD PRESSURE: 138 MMHG | WEIGHT: 137 LBS | HEIGHT: 63 IN | OXYGEN SATURATION: 98 % | HEART RATE: 49 BPM

## 2022-09-29 DIAGNOSIS — I48.0 PAF (PAROXYSMAL ATRIAL FIBRILLATION) (HCC): Primary | ICD-10-CM

## 2022-09-29 PROCEDURE — 99213 OFFICE O/P EST LOW 20 MIN: CPT | Performed by: INTERNAL MEDICINE

## 2022-09-29 PROCEDURE — 1123F ACP DISCUSS/DSCN MKR DOCD: CPT | Performed by: INTERNAL MEDICINE

## 2022-09-29 PROCEDURE — 93000 ELECTROCARDIOGRAM COMPLETE: CPT | Performed by: INTERNAL MEDICINE

## 2022-09-29 RX ORDER — AMLODIPINE BESYLATE 2.5 MG/1
2.5 TABLET ORAL DAILY
Qty: 30 TABLET | Refills: 5 | Status: SHIPPED | OUTPATIENT
Start: 2022-09-29 | End: 2022-11-01

## 2022-09-29 ASSESSMENT — ENCOUNTER SYMPTOMS
CONSTIPATION: 0
ABDOMINAL PAIN: 0
EYE REDNESS: 0
SHORTNESS OF BREATH: 0
DIARRHEA: 0
APNEA: 0
SORE THROAT: 0
NAUSEA: 0
BLOOD IN STOOL: 0
WHEEZING: 0
ABDOMINAL DISTENTION: 0
COUGH: 0
EYE PAIN: 0
CHEST TIGHTNESS: 0
FACIAL SWELLING: 0
VOMITING: 0
EYE DISCHARGE: 0

## 2022-09-30 NOTE — PROGRESS NOTES
Cardiology Office Visit Note  Joni ChávezmoAnnemarie [address]  [address]  Phone: [phone]  Fax: [phone]                            Date:  9/29/2022  Patient: Bella Stallings  Age:  82 y.o., 1935    Referral: No ref. provider found    REASON FOR VISIT:  Follow-up (PAF (paroxysmal atrial fibrillation) ([facility] Patient states her blood pressure has been staying okay but her heart rate is dropping down into the 30's and frequently in the 40's. )         PROBLEM LIST:    Patient Active Problem List    Diagnosis Date Noted    TIA (transient ischemic attack) 09/07/2022     Priority: High    Chest pain 09/06/2022     Priority: Medium    Weakness 09/06/2022     Priority: Medium    Fall 09/06/2022     Priority: Medium    Palliative care patient 02/02/2022     Priority: Low    Acute hypoxemic respiratory failure due to COVID-19 ([facility] 02/01/2022     Priority: Low    COVID-19 virus infection 01/20/2022     Priority: Low    History of atrial fibrillation 01/20/2022     Priority: Low    COVID-19 01/20/2022     Priority: Low    Iron deficiency anemia 01/13/2022     Priority: Low    Type 2 diabetes mellitus 10/25/2021     Priority: Low    Atrial fibrillation with RVR ([facility] 09/16/2021     Priority: Low    History of esophageal stricture 08/24/2021     Priority: Low    Depression 04/20/2021     Priority: Low    Fungal dermatitis 05/07/2020     Priority: Low    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 05/07/2020     Priority: Low    Mixed simple and mucopurulent chronic bronchitis ([facility] 05/04/2020     Priority: Low    Malignant neoplasm of colon ([facility] 04/20/2020     Priority: Low    Cellulitis of left lower limb 06/29/2019     Priority: Low    Fracture of right foot 06/29/2019     Priority: Low    Hypochloremia 06/29/2019     Priority: Low    CKD (chronic kidney disease) stage 3, GFR 30-59 ml/min ([facility]) 06/29/2019     Priority: Low    Acute deep vein thrombosis (DVT) of femoral vein of Chief Complaint   Patient presents with   • Headache     Headache started yesterday and last night is when all the other symptoms started.    • Fever   • Vomiting   • Sore Throat     Throat is red and swollen.    • Dizziness       Subjective          Kristina Parson presents to [facility]    History of Present Illness  She is here to be seen for headache that started yesterday, fever, vomiting, sore throat that is red and swollen, and dizziness.       Past History:  Medical History: has a past medical history of Anxiety, Closed displaced fracture of fifth metatarsal bone (09/26/2018), Femur fracture (HCC), POLI (generalized anxiety disorder), Hand injury, Hand pain, right, Migraine, Nausea (07/11/2021), Otalgia, and Otitis media.   Surgical History: has a past surgical history that includes Femur fracture surgery (2014) and [place] tooth extraction (2020).   Family History: family history includes Diabetes in her paternal grandfather and paternal grandmother; No Known Problems in her father and mother; Stroke in her paternal grandfather and paternal grandmother.   Social History: reports that she has never smoked. She has never used smokeless tobacco. She reports that she does not drink alcohol and does not use drugs.  Allergies: Wellbutrin [bupropion]  (Not in a hospital admission)       Social History     Socioeconomic History   • Marital status: Single   Tobacco Use   • Smoking status: Never Smoker   • Smokeless tobacco: Never Used   Vaping Use   • Vaping Use: Every day   • Substances: Nicotine, Flavoring   • Devices: Disposable   Substance and Sexual Activity   • Alcohol use: Never   • Drug use: Never   • Sexual activity: Yes     Partners: Male     Birth control/protection: None       There are no preventive care reminders to display for this patient.    Objective     Vital Signs:   /58 (BP Location: Right arm, Patient Position: Sitting)   Pulse 108   Temp 98.4 °F (36.9 °C)  "(Infrared)   Ht 157.5 cm (62\")   Wt 64.4 kg (142 lb)   SpO2 98%   BMI 25.97 kg/m²       Physical Exam  Constitutional:       Appearance: Normal appearance.   HENT:      Nose: Nose normal.      Mouth/Throat:      Mouth: Mucous membranes are moist.   Cardiovascular:      Rate and Rhythm: Normal rate and regular rhythm.      Pulses: Normal pulses.   Pulmonary:      Effort: Pulmonary effort is normal.      Breath sounds: Normal breath sounds.   Skin:     General: Skin is warm and dry.   Neurological:      General: No focal deficit present.      Mental Status: She is alert and oriented to person, place, and time.   Psychiatric:         Mood and Affect: Mood normal.         Behavior: Behavior normal.          Review of Systems   All other systems reviewed and are negative.       Result Review :                 Assessment and Plan    Diagnoses and all orders for this visit:    1. Medication management (Primary)  -     POC Urine Drug Screen, Triage    2. Sore throat  -     POCT SARS-CoV-2 Antigen MIKE  -     POCT Influenza A/B  -     POCT rapid strep A  -     COVID-19,APTIMA PANTHER(TIMA),[facility], NP/OP SWAB IN UTM/VTM/SALINE TRANSPORT MEDIA,24 HR TAT - Swab, Nasopharynx; Future  -     methylPREDNISolone (MEDROL) 4 MG dose pack; Take as directed on package instructions.  Dispense: 1 each; Refill: 0  -     COVID-19,APTIMA PANTHER(TIMA),[facility], NP/OP SWAB IN UTM/VTM/SALINE TRANSPORT MEDIA,24 HR TAT - Swab, Nasopharynx    3. Fever, unspecified fever cause  -     POCT SARS-CoV-2 Antigen MIKE  -     POCT Influenza A/B  -     COVID-19,APTIMA PANTHER(TIMA),[facility], NP/OP SWAB IN UTM/VTM/SALINE TRANSPORT MEDIA,24 HR TAT - Swab, Nasopharynx; Future  -     methylPREDNISolone (MEDROL) 4 MG dose pack; Take as directed on package instructions.  Dispense: 1 each; Refill: 0  -     COVID-19,APTIMA PANTHER(TIMA),[facility], NP/OP SWAB IN UTM/VTM/SALINE TRANSPORT MEDIA,24 HR TAT - Swab, Nasopharynx    4. Nonintractable " headache, unspecified chronicity pattern, unspecified headache type  -     POCT SARS-CoV-2 Antigen MIKE  -     POCT Influenza A/B  -     COVID-19,APTIMA PANTHER(TIMA),[facility], NP/OP SWAB IN UTM/VTM/SALINE TRANSPORT MEDIA,24 HR TAT - Swab, Nasopharynx; Future  -     methylPREDNISolone (MEDROL) 4 MG dose pack; Take as directed on package instructions.  Dispense: 1 each; Refill: 0  -     COVID-19,APTIMA PANTHER(TIMA),[facility], NP/OP SWAB IN UTM/VTM/SALINE TRANSPORT MEDIA,24 HR TAT - Swab, Nasopharynx    5. Upper respiratory tract infection, unspecified type    6. Late menses  -     POCT pregnancy, urine    She is negative for covid and flu as well as strep. She does want to do a send off covid to make sure that's not what it is. She is going to be treated with steroids for now as a viral URI      Pt thought to be clinically stable at this time.    Follow Up   Return if symptoms worsen or fail to improve.  Patient was given instructions and counseling regarding her condition or for health maintenance advice. Please see specific information pulled into the AVS if appropriate.        hypertension, history of DVT, type 2 diabetes mellitus, fibromyalgia, dyslipidemia and paroxysmal atrial fibrillation. Her main complaint today is that of generalized weakness. She also notes that she has been observing low heart rate episodes on her home heart rate/BP monitoring device; heart rate as low as in the 30s. She has since been instructed to stay off metoprolol XL 50 mg and has been off the drug for at least a week. Her heart rate in the clinic today by EKG is 53 bpm (sinus bradycardia). She remains on Cardizem CD1 120 mg p.o. daily. REVIEW OF SYSTEMS:  Review of Systems   Constitutional:  Positive for fatigue. Negative for chills and fever. HENT:  Negative for congestion, facial swelling, hearing loss and sore throat. Eyes:  Negative for pain, discharge, redness and visual disturbance. Respiratory:  Negative for apnea, cough, chest tightness, shortness of breath and wheezing. Cardiovascular:  Negative for chest pain, palpitations and leg swelling. Gastrointestinal:  Negative for abdominal distention, abdominal pain, blood in stool, constipation, diarrhea, nausea and vomiting. Endocrine: Negative for polydipsia, polyphagia and polyuria. Genitourinary:  Negative for dysuria, flank pain, frequency and hematuria. Musculoskeletal:  Negative for joint swelling, myalgias and neck pain. Skin:  Negative for pallor and rash. Neurological:  Negative for dizziness, syncope, speech difficulty, light-headedness, numbness and headaches. Psychiatric/Behavioral:  Negative for confusion, hallucinations and sleep disturbance.       Past Medical History:      Diagnosis Date    Afib ([facility] 2021    Bronchitis     [facility]     Colon polyps     Constipation     Deep vein blood clot of left lower extremity (HCC)     Left leg     Depression     DVT (deep venous thrombosis) (HCC)     Dysphagia     Fibrocystic breast disease     Fibromyalgia     GERD (gastroesophageal reflux disease) reflux with hx of esophageal stricture    Headache(784.0)     History of colon cancer     Hormone replacement therapy (postmenopausal)     Hypertension     Neuropathy of foot     In both feet    Osteoarthritis     left knee pain    Palliative care patient 2022    Restless legs syndrome     Type 2 diabetes mellitus 10/25/2021    Vitamin D deficiency        Past Surgical History:      Procedure Laterality Date    APPENDECTOMY      BREAST BIOPSY      CATARACT REMOVAL       SECTION      CHOLECYSTECTOMY      COLECTOMY  partial    COLON SURGERY      COLONOSCOPY  2010    COLONOSCOPY  2012    Dr Askew Ace: unremarkable enterocolonic anastamosis; hyperplastic polyps    COLONOSCOPY  2013    [facility]: unremarkable post-surgical colon    COLONOSCOPY  2016    Dr Shahnaz Fuentes colon anastomosis, 5 yr recall    COLONOSCOPY N/A 2021    Dr Meri Olivas and patent anastomosis in the right side-BCM, prn (age)    COLONOSCOPY  2021    Dr Meri Olivas and patent anastomosis in the right side-BCM, prn (age)    FOOT SURGERY  right foot    HAND SURGERY Right     Dr Miguelito Graff ([facility])      KYPHOSIS SURGERY N/A 2020    KYPHOPLASTY L5 performed by Tamra Ferreira DO at [address] [facility] Right 2020    RIGHT L 4-5 DECOMPRESSIVE LAMINECTOMY performed by Tamra Ferreira DO at [address] [facility] ENDOSCOPY  10/16/2009    UPPER GASTROINTESTINAL ENDOSCOPY  2013    [facility]sh[facility]vin: peptic stricture dilated to 48F; HH; small antral mucosal elevation    UPPER GASTROINTESTINAL ENDOSCOPY  2014    [facility]: dilation of esophageal stricture    VARICOSE VEIN SURGERY Left 2014  [facility] [address]    Left GSV Ablation    VARICOSE VEIN SURGERY Right 2014  [facility]    Right GSV Ablation       Medications:  Current Outpatient Medications   Medication Sig Dispense Refill    predniSONE (DELTASONE) 10 MG tablet Take 1 tablet by mouth daily for 14 days 14 tablet 0    aspirin 81 MG EC tablet Take 1 tablet by mouth daily 30 tablet 0    atorvastatin (LIPITOR) 80 MG tablet Take 1 tablet by mouth nightly 30 tablet 0    metoprolol succinate (TOPROL XL) 50 MG extended release tablet TAKE 1 TABLET BY once a day 30 tablet 5    XARELTO 20 MG TABS tablet TAKE 1 TABLET BY MOUTH DAILY WITH BREAKFAST 30 tablet 5    valACYclovir (VALTREX) 1 g tablet TAKE 1 TABLET BY MOUTH TWICE DAILY      metFORMIN (GLUCOPHAGE-XR) 500 MG extended release tablet TAKE 1 TABLET BY MOUTH DAILY WITH BREAKFAST 30 tablet 3    ciprofloxacin (CILOXAN) 0.3 % ophthalmic solution INSTILL 1 DROP IN RIGHT EYE TWICE DAILY      omeprazole (PRILOSEC) 40 MG delayed release capsule TAKE 1 CAPSULE BY MOUTH DAILY 90 capsule 3    HYDROcodone-acetaminophen (NORCO)  MG per tablet TAKE 1 TABLET BY MOUTH EVERY 6 HOURS AS NEEDED      pregabalin (LYRICA) 150 MG capsule TAKE 1 CAPSULE BY MOUTH EVERY 12 HOURS AS NEEDED      furosemide (LASIX) 20 MG tablet Take 1 tablet by mouth daily as needed (swelling) 30 tablet 3    melatonin 5 MG TBDP disintegrating tablet Take 1 tablet by mouth nightly 30 tablet 0    zinc sulfate (ZINCATE) 220 (50 Zn) MG capsule Take 1 capsule by mouth daily 30 capsule 0    ascorbic acid (VITAMIN C) 500 MG tablet Take 1 tablet by mouth 2 times daily 60 tablet 0    vitamin D (ERGOCALCIFEROL) 1.25 MG (62340 UT) CAPS capsule TAKE 1 CAPSULE BY MOUTH 1 TIME A WEEK 12 capsule 1    irbesartan (AVAPRO) 300 MG tablet Take 1 tablet by mouth daily 30 tablet 5    dilTIAZem (CARDIZEM CD) 120 MG extended release capsule TAKE 1 CAPSULE BY MOUTH TWICE DAILY 180 capsule 3    nystatin (MYCOSTATIN) 648902 UNIT/GM powder For rash under breast. Apply 3 times daily.  1 Bottle 1    Cyanocobalamin (B-12) 500 MCG TABS TAKE 1 TABLET BY MOUTH DAILY 30 tablet 11    Hospital Bed MISC by Does not apply route 1 each 0    DULoxetine (CYMBALTA) 60 MG extended release capsule Take 1 capsule by mouth daily , increase in dose (Patient not taking: Reported on 9/29/2022) 30 capsule 0     No current facility-administered medications for this visit. Allergies:  Gabapentin and Zoloft [sertraline hcl]    Social History:  Social History     Occupational History    Occupation: raised kids and took care of a home, did do some work in [employer] and other work in a factory   Tobacco Use    Smoking status: Never    Smokeless tobacco: Never   Vaping Use    Vaping Use: Never used   Substance and Sexual Activity    Alcohol use: Never     Comment: \"did not like it\"    Drug use: Never     Comment: only pain medicine from the doctor    Sexual activity: Yes     Partners: Male     Comment: had 2 kids (they both passed)         Family History:       Problem Relation Age of Onset    Cancer Mother         Cervical Cancer    No Known Problems Father     Diabetes Sister     No Known Problems Sister     No Known Problems Sister     Diabetes Brother     Esophageal Cancer Brother     No Known Problems Brother     No Known Problems Brother     Cancer Son     Other Daughter         still born    Colon Cancer Neg Hx     Colon Polyps Neg Hx     Liver Cancer Neg Hx     Liver Disease Neg Hx     Rectal Cancer Neg Hx     Stomach Cancer Neg Hx          Physical Examination:  BP (!) 138/56   Pulse (!) 49   Ht 5' 3\" (1.6 m)   Wt 137 lb (62.1 kg)   SpO2 98%   BMI 24.27 kg/m²   Physical Exam  Vitals reviewed. Constitutional:       General: She is not in acute distress. Appearance: Normal appearance. She is not ill-appearing, toxic-appearing or diaphoretic. HENT:      Head: Normocephalic and atraumatic. Eyes:      General: No scleral icterus. Right eye: No discharge. Left eye: No discharge. Conjunctiva/sclera: Conjunctivae normal.   Neck:      Vascular: No carotid bruit. Cardiovascular:      Rate and Rhythm: Normal rate and regular rhythm.  No extrasystoles are present. Chest Wall: PMI is not displaced. No thrill. Heart sounds: S1 normal and S2 normal. No murmur heard. No friction rub. No gallop. Pulmonary:      Effort: Pulmonary effort is normal. No tachypnea or respiratory distress. Breath sounds: Normal breath sounds. No stridor. No wheezing, rhonchi or rales. Chest:      Chest wall: No tenderness. Abdominal:      General: Bowel sounds are normal. There is no distension. Palpations: Abdomen is soft. There is no mass. Tenderness: There is no abdominal tenderness. There is no guarding. Musculoskeletal:         General: No swelling. Cervical back: Normal range of motion and neck supple. No rigidity. Right lower leg: No edema. Left lower leg: No edema. Skin:     General: Skin is warm and dry. Coloration: Skin is not jaundiced. Findings: No erythema or rash. Neurological:      General: No focal deficit present. Mental Status: She is alert and oriented to person, place, and time. Mental status is at baseline. Psychiatric:         Mood and Affect: Mood normal.         Behavior: Behavior normal.         Thought Content:  Thought content normal.         Labs:   CBC: No results found for: CBC   BMP: No results found for: BMP    BNP: No results found for: BNPINT   PT/INR:   Prothrombin Time   Date Value Ref Range Status   12/02/2011 15.92 (H) 9.7 - 12.5 SEC Final     Protime   Date Value Ref Range Status   09/05/2022 12.6 12.0 - 14.6 sec Final     INR   Date Value Ref Range Status   09/05/2022 0.96 0.88 - 1.18 Final     Comment:     INR  < or = 1.3  Normal  INR = 2.0 - 3.0  Therapeutic  INR = 2.5 - 3.5  Therapeutic for patients with mechanical  prosthetic heart valve & MI prophylaxis  INR  > or = 3.5  Abnormal/Elevated  INR  > or = 5.0  Critical (requires immediate physician  notification)         APTT:    aPTT   Date Value Ref Range Status   12/09/2020 23.6 (L) 26.0 - 36.2 sec Final      CARDIAC ENZYMES: Total CK   Date Value Ref Range Status   09/10/2020 33 26 - 192 U/L Final     Troponin   Date Value Ref Range Status   09/06/2022 <0.01 0.00 - 0.03 ng/mL Final     Comment:     <0.030 ng/ml       No measurable cardiac damage. 0.030-0.099 ng/ml  Values of troponin in this range suggest  possible myocardial damage. Repeat assay  4 to 6 hours after the current specimen.    >= 0.100 ng/ml     Indicative of myocardial damage. Recommend continued monitoring of  patient status and cardiac markers. LIPID PANEL: No results found for: LIPIDPAN  LIVER PROFILE:   AST   Date Value Ref Range Status   09/15/2022 24 5 - 32 U/L Final     ALT   Date Value Ref Range Status   09/15/2022 44 (H) 5 - 33 U/L Final     Albumin   Date Value Ref Range Status   09/15/2022 3.8 3.5 - 5.2 g/dL Final            ASSESSMENT and PLAN:      61-year-old female with multiple cardiovascular risk factors status post recent hospitalization for chest pain and right-sided weakness. Chest pain has resolved. Recent echocardiogram largely unchanged from prior. MRI of the brain without any acute changes. Her main complaint at this time is that of generalized weakness and bradycardia. She has been off metoprolol XL 50 mg once daily for at least 1 week. Heart rate today in the 50 bpm range in sinus rhythm. She is known to have paroxysmal atrial fibrillation, on Xarelto for thromboembolic risk reduction. Will observe off Cardizem, discontinue today. Amlodipine 2.5 mg p.o. daily started to prevent concomitant worsening of hypertension. Patient has been instructed to monitor her vital signs at home and to bring in her home sphygmomanometer at the next office visit which will be scheduled in 1 month. Patient has not yet done previously recommended stress test from the time of hospitalization. Reiterated importance of completing same. Further recommendations will follow.           Electronically signed by Yonis Espinoza MD on 9/29/2022 at 1:38

## 2022-10-04 ENCOUNTER — TELEPHONE (OUTPATIENT)
Dept: PRIMARY CARE CLINIC | Age: 87
End: 2022-10-04

## 2022-10-06 ENCOUNTER — OFFICE VISIT (OUTPATIENT)
Dept: NEUROLOGY | Age: 87
End: 2022-10-06
Payer: MEDICARE

## 2022-10-06 VITALS
BODY MASS INDEX: 24.27 KG/M2 | DIASTOLIC BLOOD PRESSURE: 75 MMHG | SYSTOLIC BLOOD PRESSURE: 153 MMHG | HEIGHT: 63 IN | HEART RATE: 94 BPM | WEIGHT: 137 LBS

## 2022-10-06 DIAGNOSIS — G45.9 TIA (TRANSIENT ISCHEMIC ATTACK): Primary | ICD-10-CM

## 2022-10-06 DIAGNOSIS — Z09 HOSPITAL DISCHARGE FOLLOW-UP: ICD-10-CM
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
## 2022-10-06 DIAGNOSIS — R53.1 WEAKNESS: ICD-10-CM

## 2022-10-06 DIAGNOSIS — Z86.79 HISTORY OF ATRIAL FIBRILLATION: ICD-10-CM

## 2022-10-06 DIAGNOSIS — E11.69 TYPE 2 DIABETES MELLITUS WITH OTHER SPECIFIED COMPLICATION, UNSPECIFIED WHETHER LONG TERM INSULIN USE (HCC): ICD-10-CM
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
## 2022-10-06 PROCEDURE — 99214 OFFICE O/P EST MOD 30 MIN: CPT | Performed by: PSYCHIATRY & NEUROLOGY

## 2022-10-06 PROCEDURE — 1111F DSCHRG MED/CURRENT MED MERGE: CPT | Performed by: PSYCHIATRY & NEUROLOGY

## 2022-10-06 PROCEDURE — 1123F ACP DISCUSS/DSCN MKR DOCD: CPT | Performed by: PSYCHIATRY & NEUROLOGY

## 2022-10-06 PROCEDURE — 3044F HG A1C LEVEL LT 7.0%: CPT | Performed by: PSYCHIATRY & NEUROLOGY

## 2022-10-06 NOTE — PROGRESS NOTES
Chief Complaint   Patient presents with    Follow-Up from [facility] is a 82y.o. year old female with a past medical history significant for atrial fibrillation on Xarelto history of DVT, hypertension, diabetes and neuropathy seen in the hospital in September 2022 for evaluation of possible right-sided weakness. The patient was a poor historian. History was obtained from the chart patient indicated she got up from a chair to go to the kitchen and her right leg gave out. She indicated there was some shaking of the right leg. She came in for evaluation. There was some chest pain as well. The patient denied any previous history of stroke or TIA. She denied diplopia, dysarthria, dysphagia. 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 She denied missing any doses of Xarelto. The patient is admitted to the hospital and underwent extensive testing. Her MRI of the brain had no acute ischemic changes but did have chronic white matter change. Her carotid ultrasound had evidence of 50 to 69% stenosis in the left carotid artery possibly false elevation due to tortuosity along with less than 50% stenosis of the right internal carotid. Her 2D echocardiogram was unremarkable with a left ventricular ejection fraction of 55 to 60%. She was continued medically on her Xarelto and aspirin along with statin. She follows up today. No new issues.     Active Ambulatory Problems     Diagnosis Date Noted    Personal history of colon cancer 11/30/2011    Family history of esophageal cancer 11/30/2011    Fibromyalgia 11/02/2012    Renal bruit 11/02/2012    Varicose veins of left lower extremity with inflammation, 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 ([address] 02/17/2014    Venous insufficiency 03/20/2014    Hypertriglyceridemia 08/15/2014    Dysphagia 10/21/2014    Paroxysmal supraventricular tachycardia ([address] 02/18/2015    Vitamin D deficiency 03/19/2015 Encounter for care related to Port-a-Cath 08/11/2015    Essential hypertension 08/25/2015    History of colon polyps 03/11/2016    Port-A-Cath in place 02/19/2018    History of DVT (deep vein thrombosis) 12/18/2018    Avascular necrosis ([address] 04/08/2019    Pain in both lower extremities     Numbness and tingling of both feet     Acute deep vein thrombosis (DVT) of femoral vein of left lower extremity ([address] 06/12/2019    Cellulitis of left lower limb 06/29/2019    Fracture of right foot 06/29/2019    Hypochloremia 06/29/2019    CKD (chronic kidney disease) stage 3, GFR 30-59 ml/min ([facility]) 06/29/2019    Malignant neoplasm of colon ([address] 04/20/2020    Mixed simple and mucopurulent chronic bronchitis ([address] 05/04/2020    Fungal dermatitis 05/07/2020    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 05/07/2020    Depression 04/20/2021    History of esophageal stricture 08/24/2021    Atrial fibrillation with RVR ([address] 09/16/2021    Type 2 diabetes mellitus 10/25/2021    Iron deficiency anemia 01/13/2022    COVID-19 virus infection 01/20/2022    History of atrial fibrillation 01/20/2022    COVID-19 01/20/2022    Acute hypoxemic respiratory failure due to COVID-19 [facility]) 02/01/2022    Palliative care patient 02/02/2022    Chest pain 09/06/2022    Weakness 09/06/2022    Fall 09/06/2022    TIA (transient ischemic attack) 09/07/2022     Resolved Ambulatory Problems     Diagnosis Date Noted    No Resolved Ambulatory Problems     Past Medical History:   Diagnosis Date    Afib ([address] 2021    Bronchitis     Colon cancer ([address]     Colon polyps     Constipation     Deep vein blood clot of left lower extremity (HCC)     DVT (deep venous thrombosis) (HCC)     Fibrocystic breast disease     GERD (gastroesophageal reflux disease)     Headache(784.0)     History of colon cancer 2000    Hormone replacement therapy (postmenopausal)     Hypertension     Neuropathy of foot     Osteoarthritis     Restless legs syndrome Past Surgical History:   Procedure Laterality Date    APPENDECTOMY      BREAST BIOPSY      CATARACT REMOVAL       SECTION      CHOLECYSTECTOMY      COLECTOMY  partial    COLON SURGERY      COLONOSCOPY  2010    COLONOSCOPY  2012    Dr James Olivo: unremarkable enterocolonic anastamosis; hyperplastic polyps    COLONOSCOPY  2013    [place]: unremarkable post-surgical colon    COLONOSCOPY  2016    Dr Jina Carson colon anastomosis, 5 yr recall    COLONOSCOPY N/A 2021    Dr Elder Maldonado and patent anastomosis in the right side-BCM, prn (age)    COLONOSCOPY  2021    Dr Elder Maldonado and patent anastomosis in the right side-BCM, prn (age)    FOOT SURGERY  right foot    HAND SURGERY Right     Dr Dolly Vega ([address] N/A 2020    KYPHOPLASTY L5 performed by Hector Zendejas DO at [address] [facility] Right 2020    RIGHT L 4-5 DECOMPRESSIVE LAMINECTOMY performed by Hector Zendejas DO at [address] [facility] ENDOSCOPY  10/16/2009    UPPER GASTROINTESTINAL ENDOSCOPY  2013    [place]: peptic stricture dilated to 48F; HH; small antral mucosal elevation    UPPER GASTROINTESTINAL ENDOSCOPY  2014    [place]: dilation of esophageal stricture    VARICOSE VEIN SURGERY Left 2014  [facility] & [address]    Left GSV Ablation    VARICOSE VEIN SURGERY Right 2014  [facility] [place]    Right GSV Ablation       Family History   Problem Relation Age of Onset    Cancer Mother         Cervical Cancer    No Known Problems Father     Diabetes Sister     No Known Problems Sister     No Known Problems Sister     Diabetes Brother     Esophageal Cancer Brother     No Known Problems Brother     No Known Problems Brother     Cancer Son     Other Daughter         still born    Colon Cancer Neg Hx     Colon Polyps Neg Hx     Liver Cancer Neg Hx     Liver Disease Neg Hx Insecurity Present    Worried About Running Out of Food in the Last Year: Sometimes true    Ran Out of Food in the Last Year: Sometimes true   Transportation Needs: Not on file   Physical Activity: Inactive    Days of Exercise per Week: 0 days    Minutes of Exercise per Session: 0 min   Stress: Not on file   Social Connections: Not on file   Intimate Partner Violence: Not on file   Housing Stability: Not on file       Review of Systems     Constitutional - No fever or chills. No diaphoresis or significant fatigue. HENT -  No tinnitus or significant hearing loss. Eyes - no sudden vision change or eye pain  Respiratory - no significant shortness of breath or cough  Cardiovascular - no chest pain No palpitations or significant leg swelling  Gastrointestinal - no abdominal swelling or pain. Genitourinary - No difficulty urinating, dysuria  Musculoskeletal - no back pain or myalgia. Skin - no color change or rash  Neurologic - No seizures. No lateralizing weakness. Hematologic - no easy bruising or excessive bleeding. Psychiatric - no severe anxiety or nervousness. All other review of systems are negative.       Current Outpatient Medications   Medication Sig Dispense Refill    amLODIPine (NORVASC) 2.5 MG tablet Take 1 tablet by mouth daily 30 tablet 5    DULoxetine (CYMBALTA) 60 MG extended release capsule Take 1 capsule by mouth daily , increase in dose 30 capsule 0    aspirin 81 MG EC tablet Take 1 tablet by mouth daily 30 tablet 0    atorvastatin (LIPITOR) 80 MG tablet Take 1 tablet by mouth nightly 30 tablet 0    XARELTO 20 MG TABS tablet TAKE 1 TABLET BY MOUTH DAILY WITH BREAKFAST 30 tablet 5    valACYclovir (VALTREX) 1 g tablet TAKE 1 TABLET BY MOUTH TWICE DAILY      metFORMIN (GLUCOPHAGE-XR) 500 MG extended release tablet TAKE 1 TABLET BY MOUTH DAILY WITH BREAKFAST 30 tablet 3    ciprofloxacin (CILOXAN) 0.3 % ophthalmic solution INSTILL 1 DROP IN RIGHT EYE TWICE DAILY      omeprazole (PRILOSEC) 40 MG delayed release capsule TAKE 1 CAPSULE BY MOUTH DAILY 90 capsule 3    HYDROcodone-acetaminophen (NORCO)  MG per tablet TAKE 1 TABLET BY MOUTH EVERY 6 HOURS AS NEEDED      pregabalin (LYRICA) 150 MG capsule TAKE 1 CAPSULE BY MOUTH EVERY 12 HOURS AS NEEDED      furosemide (LASIX) 20 MG tablet Take 1 tablet by mouth daily as needed (swelling) 30 tablet 3    zinc sulfate (ZINCATE) 220 (50 Zn) MG capsule Take 1 capsule by mouth daily 30 capsule 0    vitamin D (ERGOCALCIFEROL) 1.25 MG (72775 UT) CAPS capsule TAKE 1 CAPSULE BY MOUTH 1 TIME A WEEK 12 capsule 1    irbesartan (AVAPRO) 300 MG tablet Take 1 tablet by mouth daily 30 tablet 5    nystatin (MYCOSTATIN) 702742 UNIT/GM powder For rash under breast. Apply 3 times daily. 1 Bottle 1    Cyanocobalamin (B-12) 500 MCG TABS TAKE 1 TABLET BY MOUTH DAILY 30 tablet 11    Hospital Bed MISC by Does not apply route 1 each 0    melatonin 5 MG TBDP disintegrating tablet Take 1 tablet by mouth nightly 30 tablet 0    ascorbic acid (VITAMIN C) 500 MG tablet Take 1 tablet by mouth 2 times daily 60 tablet 0     No current facility-administered medications for this visit. BP (!) 153/75   Pulse 94   Ht 5' 3\" (1.6 m)   Wt 137 lb (62.1 kg)   BMI 24.27 kg/m²     Constitutional - well developed, well nourished. Eyes - conjunctiva normal.  Ear, nose, throat - No scars, masses, or lesions over external nose or ears, no atrophy of tongue  Neck-symmetric, no masses noted, no jugular vein distension  Respiration- chest wall appears symmetric, good expansion,   normal effort without use of accessory muscles  Musculoskeletal - no significant wasting of muscles noted, no bony deformities  Extremities-no clubbing, cyanosis or edema  Skin - warm, dry, and intact. No rash, erythema, or pallor.   Psychiatric - mood, affect, and behavior appear normal.      Neurological exam  Awake, alert, fluent oriented  appropriate affect  Attention and concentration appear appropriate  Recent and remote memory appears unremarkable  Speech normal without dysarthria  No clear issues with language of fund of knowledge    Cranial Nerve Exam     CN III, IV,VI-EOMI, No nystagmus, conjugate eye movements, no ptosis  CN VII-no facial assymetry        Motor Exam  antigravity throughout upper and lower extremities bilaterally    Tremors- no tremors in hands or head noted    Gait  Walking with walker      Lab Results   Component Value Date    [id number] 602 01/20/2022     Lab Results   Component Value Date    WBC 10.7 09/07/2022    HGB 10.8 (L) 09/07/2022    HCT 36.5 (L) 09/07/2022    MCV 75.7 (L) 09/07/2022     09/07/2022     Lab Results   Component Value Date     09/15/2022    K 4.4 09/15/2022     09/15/2022    CO2 27 09/15/2022    BUN 23 09/15/2022    CREATININE 0.9 09/15/2022    GLUCOSE 171 (H) 09/15/2022    CALCIUM 9.2 09/15/2022    PROT 6.6 09/15/2022    LABALBU 3.8 09/15/2022    BILITOT 0.3 09/15/2022    ALKPHOS 135 (H) 09/15/2022    AST 24 09/15/2022    ALT 44 (H) 09/15/2022    LABGLOM 59 (A) 09/15/2022    GFRAA >59 09/15/2022    GLOB 2.5 10/12/2016           Assessment    ICD-10-CM    1. TIA (transient ischemic attack)  G45.9       2. Weakness  R53.1       3. Fall, sequela  W19. XXXS       4. History of atrial fibrillation  Z86.79       5. Type 2 diabetes mellitus with other specified complication, unspecified whether long term insulin use (HCC)  E11.69       6.  Hospital discharge follow-up  Z09 LA DISCHARGE MEDS RECONCILED W/ CURRENT OUTPATIENT MED LIST        Possible right-sided weakness with a history of atrial fibrillation on chronic anticoagulation with Xarelto-TIA/stroke certainly possible     Initial examination in hospital  Awake, alert, oriented  Chronic blindness of the right eye  Generalized giveaway weakness       MRI of the brain-no acute changes-chronic white matter changes   Carotid ultrasound-50 to 69% left carotid possibly false elevation due to tortuosity-less than 50% of the right internal carotid  2D echo-unremarkable-left ventricular ejection fraction 55 to 60%/mildly dilated left atrium       On aspirin/Xarelto/statin  Hyperlipidemia-on statin LDL-68  Diabetes-hemoglobin A1c 6.2  Hypertension-on medications monitor  Fibromyalgia/neuropathy-Cymbalta/Lyrica  Atrial fibrillation-Xarelto/Cardizem/Toprol   GI-Protonix  Mood disorder-Cymbalta        Follow up 6 months    Plan    Orders Placed This Encounter   Procedures    ID DISCHARGE MEDS RECONCILED W/ CURRENT OUTPATIENT MED LIST         No orders of the defined types were placed in this encounter. Return if symptoms worsen or fail to improve. EMR Dragon/transcription disclaimer:Significant part of this  encounter note is electronic transcription/translation of spoken language to printed text. The electronic translation of spoken language may be erroneous, or at times, nonsensical words or phrases may be inadvertently transcribed.  Although I have reviewed the note for such errors, some may still exist.

## 2022-10-10 ENCOUNTER — HOSPITAL ENCOUNTER (OUTPATIENT)
Dept: NUCLEAR MEDICINE | Age: 87
Discharge: HOME | End: 2022-10-12
Payer: MEDICARE

## 2022-10-10 DIAGNOSIS — I48.0 PAF (PAROXYSMAL ATRIAL FIBRILLATION) (HCC): ICD-10-CM

## 2022-10-10 DIAGNOSIS — R07.9 CHEST PAIN IN ADULT: ICD-10-CM

## 2022-10-10 PROCEDURE — 3430000000 HC RX DIAGNOSTIC RADIOPHARMACEUTICAL: Performed by: INTERNAL MEDICINE

## 2022-10-10 PROCEDURE — A9502 TC99M TETROFOSMIN: HCPCS | Performed by: INTERNAL MEDICINE

## 2022-10-10 PROCEDURE — 93017 CV STRESS TEST TRACING ONLY: CPT

## 2022-10-10 PROCEDURE — 6360000002 HC RX W HCPCS: Performed by: INTERNAL MEDICINE

## 2022-10-10 RX ADMIN — REGADENOSON 0.4 MG: 0.08 INJECTION, SOLUTION INTRAVENOUS at 08:43

## 2022-10-10 RX ADMIN — TETROFOSMIN 8 MILLICURIE: 1.38 INJECTION, POWDER, LYOPHILIZED, FOR SOLUTION INTRAVENOUS at 07:40

## 2022-10-10 RX ADMIN — TETROFOSMIN 24 MILLICURIE: 1.38 INJECTION, POWDER, LYOPHILIZED, FOR SOLUTION INTRAVENOUS at 10:59

## 2022-10-11 LAB
LV EF: 78 %
LVEF MODALITY: NORMAL

## 2022-10-11 PROCEDURE — 93018 CV STRESS TEST I&R ONLY: CPT | Performed by: INTERNAL MEDICINE

## 2022-10-11 PROCEDURE — 93016 CV STRESS TEST SUPVJ ONLY: CPT | Performed by: INTERNAL MEDICINE

## 2022-10-11 PROCEDURE — 78452 HT MUSCLE IMAGE SPECT MULT: CPT | Performed by: INTERNAL MEDICINE

## 2022-10-14 RX ORDER — ATORVASTATIN CALCIUM 80 MG/1
80 TABLET, FILM COATED ORAL NIGHTLY
Qty: 30 TABLET | Refills: 0 | Status: SHIPPED | OUTPATIENT
Start: 2022-10-14 | End: 2022-11-01

## 2022-10-24 RX ORDER — METFORMIN HYDROCHLORIDE 500 MG/1
TABLET, EXTENDED RELEASE ORAL
Qty: 30 TABLET | Refills: 3 | Status: SHIPPED | OUTPATIENT
Start: 2022-10-24

## 2022-10-31 ENCOUNTER — TELEPHONE (OUTPATIENT)
Dept: CARDIOLOGY CLINIC | Age: 87
End: 2022-10-31

## 2022-11-01 ENCOUNTER — OFFICE VISIT (OUTPATIENT)
Dept: CARDIOLOGY CLINIC | Age: 87
End: 2022-11-01
Payer: MEDICARE

## 2022-11-01 VITALS
WEIGHT: 136 LBS | BODY MASS INDEX: 24.1 KG/M2 | HEIGHT: 63 IN | HEART RATE: 91 BPM | DIASTOLIC BLOOD PRESSURE: 68 MMHG | SYSTOLIC BLOOD PRESSURE: 128 MMHG

## 2022-11-01 DIAGNOSIS — R30.0 DYSURIA: ICD-10-CM

## 2022-11-01 DIAGNOSIS — R30.0 DYSURIA: Primary | ICD-10-CM

## 2022-11-01 LAB
BACTERIA: NEGATIVE /HPF
BILIRUBIN URINE: NEGATIVE
BLOOD, URINE: NEGATIVE
CLARITY: ABNORMAL
COLOR: YELLOW
CRYSTALS, UA: ABNORMAL /HPF
EPITHELIAL CELLS, UA: 7 /HPF (ref 0–5)
GLUCOSE URINE: NEGATIVE MG/DL
HYALINE CASTS: 3 /HPF (ref 0–8)
KETONES, URINE: ABNORMAL MG/DL
LEUKOCYTE ESTERASE, URINE: ABNORMAL
NITRITE, URINE: NEGATIVE
PH UA: 5.5 (ref 5–8)
PROTEIN UA: NEGATIVE MG/DL
RBC UA: 3 /HPF (ref 0–4)
SPECIFIC GRAVITY UA: 1.02 (ref 1–1.03)
UROBILINOGEN, URINE: 0.2 E.U./DL
WBC UA: 12 /HPF (ref 0–5)

## 2022-11-01 PROCEDURE — 99214 OFFICE O/P EST MOD 30 MIN: CPT | Performed by: INTERNAL MEDICINE

## 2022-11-01 PROCEDURE — 1123F ACP DISCUSS/DSCN MKR DOCD: CPT | Performed by: INTERNAL MEDICINE

## 2022-11-01 PROCEDURE — 3078F DIAST BP <80 MM HG: CPT | Performed by: INTERNAL MEDICINE

## 2022-11-01 PROCEDURE — 3074F SYST BP LT 130 MM HG: CPT | Performed by: INTERNAL MEDICINE

## 2022-11-01 RX ORDER — AMLODIPINE BESYLATE 2.5 MG/1
2.5 TABLET ORAL 2 TIMES DAILY
Qty: 180 TABLET | Refills: 3 | Status: SHIPPED | OUTPATIENT
Start: 2022-11-01 | End: 2023-01-30

## 2022-11-01 RX ORDER — ASPIRIN 81 MG/1
81 TABLET ORAL EVERY OTHER DAY
Qty: 30 TABLET | Refills: 0 | Status: SHIPPED | OUTPATIENT
Start: 2022-11-01

## 2022-11-01 RX ORDER — M-VIT,TX,IRON,MINS/CALC/FOLIC 27MG-0.4MG
1 TABLET ORAL DAILY
COMMUNITY

## 2022-11-01 ASSESSMENT — ENCOUNTER SYMPTOMS
CONSTIPATION: 0
COUGH: 0
EYE DISCHARGE: 0
WHEEZING: 0
ABDOMINAL DISTENTION: 0
EYE REDNESS: 0
SHORTNESS OF BREATH: 0
ABDOMINAL PAIN: 0
FACIAL SWELLING: 0
SORE THROAT: 0
EYE PAIN: 0
CHEST TIGHTNESS: 0
APNEA: 0
BLOOD IN STOOL: 0
NAUSEA: 0
VOMITING: 0
DIARRHEA: 0

## 2022-11-01 NOTE — PROGRESS NOTES
Cardiology Office Visit Note  Joni Chávez[address]em[address]  [id number]  Phone: [phone]  Fax: [phone]                            Date:  11/1/2022  Patient: Kofi Anand  Age:  82 y.o., 1935    Referral: No ref.  provider found    REASON FOR VISIT:  Follow-up         PROBLEM LIST:    Patient Active Problem List    Diagnosis Date Noted    TIA (transient ischemic attack) 09/07/2022     Priority: High    Chest pain 09/06/2022     Priority: Medium    Weakness 09/06/2022     Priority: Medium    Palliative care patient 02/02/2022     Priority: Low    Acute hypoxemic respiratory failure due to COVID-19 ([facility] 02/01/2022     Priority: Low    COVID-19 virus infection 01/20/2022     Priority: Low    History of atrial fibrillation 01/20/2022     Priority: Low    COVID-19 01/20/2022     Priority: Low    Iron deficiency anemia 01/13/2022     Priority: Low    Type 2 diabetes mellitus 10/25/2021     Priority: Low    Atrial fibrillation with RVR ([facility] 09/16/2021     Priority: Low    History of esophageal stricture 08/24/2021     Priority: Low    Depression 04/20/2021     Priority: Low    Fungal dermatitis 05/07/2020     Priority: Low    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 05/07/2020     Priority: Low    Mixed simple and mucopurulent chronic bronchitis ([facility] 05/04/2020     Priority: Low    Malignant neoplasm of colon ([facility] 04/20/2020     Priority: Low    Cellulitis of left lower limb 06/29/2019     Priority: Low    Fracture of right foot 06/29/2019     Priority: Low    Hypochloremia 06/29/2019     Priority: Low    CKD (chronic kidney disease) stage 3, GFR 30-59 ml/min ([facility] 06/29/2019     Priority: Low    Acute deep vein thrombosis (DVT) of femoral vein of left lower extremity ([facility] 06/12/2019     Priority: Low    Pain in both lower extremities      Priority: Low    Numbness and tingling of both feet      Priority: Low    Avascular necrosis ([facility] 04/08/2019     Priority: Low History of DVT (deep vein thrombosis) 12/18/2018     Priority: Low    Port-A-Cath in place 02/19/2018     Priority: Low     Overview Note:     Replacing Deprecated Diagnoses      History of colon polyps 03/11/2016     Priority: Low    Essential hypertension 08/25/2015     Priority: Low    Encounter for care related to Port-a-Cath 08/11/2015     Priority: Low    Vitamin D deficiency 03/19/2015     Priority: Low    Paroxysmal supraventricular tachycardia ([address] 02/18/2015     Priority: Low    Dysphagia 10/21/2014     Priority: Low    Hypertriglyceridemia 08/15/2014     Priority: Low    Venous insufficiency 03/20/2014     Priority: Low    Varicose veins of left lower extremity with inflammation, 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 ([address] 02/17/2014     Priority: Low    Fibromyalgia 11/02/2012     Priority: Low    Renal bruit 11/02/2012     Priority: Low     Overview Note:     bilateral      Personal history of colon cancer 11/30/2011     Priority: Low    Family history of esophageal cancer 11/30/2011     Priority: Low         PRESENTATION: Cal Reardon is a 82y.o. year old female is seen today for routine cardiology follow-up appointment. Her past medical history significant for COVID-19 infection, hypertension, DVT on chronic anticoagulation therapy, type 2 diabetes mellitus, fibromyalgia, dyslipidemia and paroxysmal atrial fibrillation. She also had an hospitalization related to TIA in the recent past.  At the last office visit her main complaint was that of fatigue associated with bradycardia. Her medications were reviewed and Cardizem was discontinued. Amlodipine was started to prevent concomitant worsening of hypertension. She was asked to monitor her blood pressure/heart rate at home. She reports no that her vital signs have been more favorable. She is overall feeling better. REVIEW OF SYSTEMS:  Review of Systems   Constitutional:  Negative for chills, fatigue and fever.    HENT: Negative for congestion, facial swelling, hearing loss and sore throat. Eyes:  Negative for pain, discharge, redness and visual disturbance. Respiratory:  Negative for apnea, cough, chest tightness, shortness of breath and wheezing. Cardiovascular:  Negative for chest pain, palpitations and leg swelling. Gastrointestinal:  Negative for abdominal distention, abdominal pain, blood in stool, constipation, diarrhea, nausea and vomiting. Endocrine: Negative for polydipsia, polyphagia and polyuria. Genitourinary:  Positive for dysuria and frequency. Negative for flank pain and hematuria. Musculoskeletal:  Negative for joint swelling, myalgias and neck pain. Skin:  Negative for pallor and rash. Neurological:  Negative for dizziness, syncope, speech difficulty, light-headedness, numbness and headaches. Psychiatric/Behavioral:  Negative for confusion, hallucinations and sleep disturbance.          Complaining of memory impairment     Past Medical History:      Diagnosis Date    Afib ([facility]     Bronchitis     Colon cancer ([facility]     Colon polyps     Constipation     Deep vein blood clot of left lower extremity (HCC)     Left leg     Depression     DVT (deep venous thrombosis) (HCC)     Dysphagia     Fibrocystic breast disease     Fibromyalgia     GERD (gastroesophageal reflux disease)     reflux with hx of esophageal stricture    Headache(784.0)     History of colon cancer     Hormone replacement therapy (postmenopausal)     Hypertension     Neuropathy of foot     In both feet    Osteoarthritis     left knee pain    Palliative care patient 2022    Restless legs syndrome     Type 2 diabetes mellitus 10/25/2021    Vitamin D deficiency        Past Surgical History:      Procedure Laterality Date    APPENDECTOMY      BREAST BIOPSY      CATARACT REMOVAL       SECTION      CHOLECYSTECTOMY      COLECTOMY  partial    COLON SURGERY      COLONOSCOPY  2010    COLONOSCOPY  2012     Bodskipselvin: unremarkable enterocolonic anastamosis; hyperplastic polyps    COLONOSCOPY  03/01/2013    [place]z: unremarkable post-surgical colon    COLONOSCOPY  03/11/2016    Dr Nusrat Syed colon anastomosis, 5 yr recall    COLONOSCOPY N/A 03/29/2021    Dr Ayan Gomes and patent anastomosis in the right side-BCM, prn (age)    COLONOSCOPY  03/29/2021    Dr Ayan Gomes and patent anastomosis in the right side-BCM, prn (age)    FOOT SURGERY  right foot    HAND SURGERY Right     Dr Amairani Martinez ([facility])      [facility] N/A 07/06/2020    KYPHOPLASTY L5 performed by Marlon Alfaro DO at [address] [facility] Right 12/14/2020    RIGHT L 4-5 DECOMPRESSIVE LAMINECTOMY performed by Marlon Alfaro DO at [address] [facility] ENDOSCOPY  10/16/2009    UPPER GASTROINTESTINAL ENDOSCOPY  03/01/2013    Anshulselvin: peptic stricture dilated to 48F; HH; small antral mucosal elevation    UPPER GASTROINTESTINAL ENDOSCOPY  12/01/2014    Maurilio: dilation of esophageal stricture    VARICOSE VEIN SURGERY Left 03/14/2014  [facility]    Left GSV Ablation    VARICOSE VEIN SURGERY Right 04/04/2014  [facility]    Right GSV Ablation       Medications:  Current Outpatient Medications   Medication Sig Dispense Refill    Multiple Vitamins-Minerals (THERAPEUTIC MULTIVITAMIN-MINERALS) tablet Take 1 tablet by mouth daily      amLODIPine (NORVASC) 2.5 MG tablet Take 1 tablet by mouth in the morning and at bedtime 180 tablet 3    aspirin 81 MG EC tablet Take 1 tablet by mouth every other day 30 tablet 0    metFORMIN (GLUCOPHAGE-XR) 500 MG extended release tablet TAKE 1 TABLET BY MOUTH DAILY WITH BREAKFAST 30 tablet 3    DULoxetine (CYMBALTA) 60 MG extended release capsule Take 1 capsule by mouth daily , increase in dose 30 capsule 0    XARELTO 20 MG TABS tablet TAKE 1 TABLET BY MOUTH DAILY WITH BREAKFAST 30 tablet 5    ciprofloxacin (CILOXAN) 0.3 % ophthalmic solution INSTILL 1 DROP IN RIGHT EYE TWICE DAILY      omeprazole (PRILOSEC) 40 MG delayed release capsule TAKE 1 CAPSULE BY MOUTH DAILY 90 capsule 3    HYDROcodone-acetaminophen (NORCO)  MG per tablet TAKE 1 TABLET BY MOUTH EVERY 6 HOURS AS NEEDED      pregabalin (LYRICA) 150 MG capsule TAKE 1 CAPSULE BY MOUTH EVERY 12 HOURS AS NEEDED      furosemide (LASIX) 20 MG tablet Take 1 tablet by mouth daily as needed (swelling) 30 tablet 3    melatonin 5 MG TBDP disintegrating tablet Take 1 tablet by mouth nightly 30 tablet 0    ascorbic acid (VITAMIN C) 500 MG tablet Take 1 tablet by mouth 2 times daily 60 tablet 0    vitamin D (ERGOCALCIFEROL) 1.25 MG (68024 UT) CAPS capsule TAKE 1 CAPSULE BY MOUTH 1 TIME A WEEK 12 capsule 1    Cyanocobalamin (B-12) 500 MCG TABS TAKE 1 TABLET BY MOUTH DAILY 30 tablet 11    Hospital Bed MISC by Does not apply route 1 each 0     No current facility-administered medications for this visit.        Allergies:  Gabapentin and Zoloft [sertraline hcl]    Social History:  Social History     Occupational History    Occupation: raised kids and took care of a home, did do some work in [employer] and other work in a factory   Tobacco Use    Smoking status: Never    Smokeless tobacco: Never   Vaping Use    Vaping Use: Never used   Substance and Sexual Activity    Alcohol use: Never     Comment: \"did not like it\"    Drug use: Never     Comment: only pain medicine from the doctor    Sexual activity: Yes     Partners: Male     Comment: had 2 kids (they both passed)         Family History:       Problem Relation Age of Onset    Cancer Mother         Cervical Cancer    No Known Problems Father     Diabetes Sister     No Known Problems Sister     No Known Problems Sister     Diabetes Brother     Esophageal Cancer Brother     No Known Problems Brother     No Known Problems Brother     Cancer Son     Other Daughter         still born    Colon Cancer Neg Hx     Colon Polyps Neg Hx     Liver Cancer Neg Hx     Liver Disease Neg Hx     Rectal Cancer Neg Hx     Stomach Cancer Neg Hx          Physical Examination:  /68   Pulse 91   Ht 5' 3\" (1.6 m)   Wt 136 lb (61.7 kg)   BMI 24.09 kg/m²   Physical Exam  Vitals reviewed. Constitutional:       General: She is not in acute distress. Appearance: Normal appearance. She is not ill-appearing, toxic-appearing or diaphoretic. HENT:      Head: Normocephalic and atraumatic. Eyes:      General: No scleral icterus. Right eye: No discharge. Left eye: No discharge. Conjunctiva/sclera: Conjunctivae normal.   Neck:      Vascular: No carotid bruit. Cardiovascular:      Rate and Rhythm: Normal rate and regular rhythm. No extrasystoles are present. Chest Wall: PMI is not displaced. No thrill. Heart sounds: S1 normal and S2 normal. No murmur heard. No friction rub. No gallop. Pulmonary:      Effort: Pulmonary effort is normal. No tachypnea or respiratory distress. Breath sounds: Normal breath sounds. No stridor. No wheezing, rhonchi or rales. Chest:      Chest wall: No tenderness. Abdominal:      General: Bowel sounds are normal. There is no distension. Palpations: Abdomen is soft. There is no mass. Tenderness: There is no abdominal tenderness. There is no guarding. Musculoskeletal:         General: No swelling. Cervical back: Normal range of motion and neck supple. No rigidity. Right lower leg: No edema. Left lower leg: No edema. Skin:     General: Skin is warm and dry. Coloration: Skin is not jaundiced. Findings: No erythema or rash. Neurological:      General: No focal deficit present. Mental Status: She is alert and oriented to person, place, and time. Mental status is at baseline. Psychiatric:         Mood and Affect: Mood normal.         Behavior: Behavior normal.         Thought Content:  Thought content normal.         Labs:     PT/INR:   Prothrombin Time Date Value Ref Range Status   12/02/2011 15.92 (H) 9.7 - 12.5 SEC Final     Protime   Date Value Ref Range Status   09/05/2022 12.6 12.0 - 14.6 sec Final     INR   Date Value Ref Range Status   09/05/2022 0.96 0.88 - 1.18 Final     Comment:     INR  < or = 1.3  Normal  INR = 2.0 - 3.0  Therapeutic  INR = 2.5 - 3.5  Therapeutic for patients with mechanical  prosthetic heart valve & MI prophylaxis  INR  > or = 3.5  Abnormal/Elevated  INR  > or = 5.0  Critical (requires immediate physician  notification)         APTT:    aPTT   Date Value Ref Range Status   12/09/2020 23.6 (L) 26.0 - 36.2 sec Final      CARDIAC ENZYMES:   Total CK   Date Value Ref Range Status   09/10/2020 33 26 - 192 U/L Final     Troponin   Date Value Ref Range Status   09/06/2022 <0.01 0.00 - 0.03 ng/mL Final     Comment:     <0.030 ng/ml       No measurable cardiac damage. 0.030-0.099 ng/ml  Values of troponin in this range suggest  possible myocardial damage. Repeat assay  4 to 6 hours after the current specimen.    >= 0.100 ng/ml     Indicative of myocardial damage. Recommend continued monitoring of  patient status and cardiac markers. LIPID PANEL: No results found for: LIPIDPAN  LIVER PROFILE:   AST   Date Value Ref Range Status   09/15/2022 24 5 - 32 U/L Final     ALT   Date Value Ref Range Status   09/15/2022 44 (H) 5 - 33 U/L Final     Albumin   Date Value Ref Range Status   09/15/2022 3.8 3.5 - 5.2 g/dL Final              Imaging:    Echo 9/2022:  Conclusions   Mitral valve leaflets are mildly thickened with preserved leaflet   mobility. Mitral annular calcification is present. Mild to moderate mitral regurgitation is present. Mildly thickened aortic valve leaflets with preserved leaflet mobility. Tricuspid valve is structurally normal.   Mild tricuspid regurgitation with estimated RVSP of 39 mm Hg. The pulmonic valve was not well visualized . Mildly dilated left atrium.    Normal left ventricular size with preserved LV function and an estimated   ejection fraction of approximately 55-60%. Mild concentric left ventricular hypertrophy. No regional wall motion abnormalities. Grade II diastolic dysfunction      Signature   ----------------------------------------------------------------   Electronically signed by Marlon Garza MD(Interpreting   physician) on 09/06/2022 02:37 PM      Nuclear medicine stress test 9/2022  1. Ejection fraction 78%   2. Wall motion study unremarkable   3. Myocardial perfusion imaging demonstrated homogeneous uptake of the   tracer in all visualized segments no areas of ischemia or infarction   are identified. Summary impressions:   Normal ejection fraction normal perfusion study without evidence of   ischemia or prior infarction. Signed by Dr Cyndi Bentley and PLAN:    Paroxysmal atrial fibrillation  Chronic anticoagulation therapy  Course complicated by symptomatic bradycardia, stable off Cardizem and metoprolol    Essential hypertension  Goal blood pressure less than 130/80, at goal  Continue amlodipine and furosemide  Low-sodium diet, 2 g per 24 hours    Valvular heart disease  Mild to moderate mitral regurgitation. Normal ejection fraction. RVSP 39 mmHg. Surveillance monitoring of structural heart disease by echocardiogram in 2 years or earlier should her clinical condition change. Last stress test negative for inducible ischemia. History of recurrent DVT  On chronic anticoagulation therapy    History of transient ischemic attack  Currently on Xarelto and aspirin (TIA occurred on Xarelto, aspirin added later)    Dyslipidemia, goal LDL less than 100  On high intensity statin, discontinued at today's visit with plan to reassess later inpatient complain of memory impairment. Memory impairment  Observe off high-dose atorvastatin  Reevaluate for improvement in symptoms in 2 to 3 months  Recheck lipids at that time.   We will discuss alternative options for lipid management at next visit. Dysuria and increased frequency, evaluate for urinary tract infection  Urinalysis and urine culture requested. urinalysis negative for bacteria. We will follow-up on culture. Further recommendations will follow including following up with primary care provider. Noninsulin-dependent diabetes mellitus  Last known hemoglobin A1c 6.2  Currently on metformin, actively managed by primary care provider    History of COVID-19 infection, resolved      Return in about 3 months (around 2/1/2023). Electronically signed by Luann Sainz MD on 11/1/2022 at 4:59 PM    Luann Sainz MD, [place], [facility] [place]  Noninvasive Cardiology Consultant    This dictation was generated by voice recognition computer software. Although all attempts are made to edit the dictation for accuracy, there may be errors in the transcription that are not intended.

## 2022-11-04 ENCOUNTER — OFFICE VISIT (OUTPATIENT)
Dept: PRIMARY CARE CLINIC | Age: 87
End: 2022-11-04
Payer: MEDICARE

## 2022-11-04 VITALS
SYSTOLIC BLOOD PRESSURE: 130 MMHG | OXYGEN SATURATION: 98 % | HEART RATE: 78 BPM | HEIGHT: 63 IN | BODY MASS INDEX: 24.09 KG/M2 | TEMPERATURE: 98.1 F | DIASTOLIC BLOOD PRESSURE: 70 MMHG

## 2022-11-04 DIAGNOSIS — S51.812A SKIN TEAR OF FOREARM WITHOUT COMPLICATION, LEFT, INITIAL ENCOUNTER: Primary | ICD-10-CM

## 2022-11-04 DIAGNOSIS — Z23 NEEDS FLU SHOT: ICD-10-CM

## 2022-11-04 LAB
ORGANISM: ABNORMAL
ORGANISM: ABNORMAL
URINE CULTURE, ROUTINE: ABNORMAL

## 2022-11-04 PROCEDURE — 3078F DIAST BP <80 MM HG: CPT | Performed by: NURSE PRACTITIONER

## 2022-11-04 PROCEDURE — 90694 VACC AIIV4 NO PRSRV 0.5ML IM: CPT | Performed by: NURSE PRACTITIONER

## 2022-11-04 PROCEDURE — G0008 ADMIN INFLUENZA VIRUS VAC: HCPCS | Performed by: NURSE PRACTITIONER

## 2022-11-04 PROCEDURE — 90471 IMMUNIZATION ADMIN: CPT | Performed by: NURSE PRACTITIONER

## 2022-11-04 PROCEDURE — 99213 OFFICE O/P EST LOW 20 MIN: CPT | Performed by: NURSE PRACTITIONER

## 2022-11-04 PROCEDURE — 90714 TD VACC NO PRESV 7 YRS+ IM: CPT | Performed by: NURSE PRACTITIONER

## 2022-11-04 PROCEDURE — 1123F ACP DISCUSS/DSCN MKR DOCD: CPT | Performed by: NURSE PRACTITIONER

## 2022-11-04 PROCEDURE — 3074F SYST BP LT 130 MM HG: CPT | Performed by: NURSE PRACTITIONER

## 2022-11-04 ASSESSMENT — PATIENT HEALTH QUESTIONNAIRE - PHQ9
SUM OF ALL RESPONSES TO PHQ9 QUESTIONS 1 & 2: 0
10. IF YOU CHECKED OFF ANY PROBLEMS, HOW DIFFICULT HAVE THESE PROBLEMS MADE IT FOR YOU TO DO YOUR WORK, TAKE CARE OF THINGS AT HOME, OR GET ALONG WITH OTHER PEOPLE: 0
SUM OF ALL RESPONSES TO PHQ QUESTIONS 1-9: 8
5. POOR APPETITE OR OVEREATING: 3
7. TROUBLE CONCENTRATING ON THINGS, SUCH AS READING THE NEWSPAPER OR WATCHING TELEVISION: 2
SUM OF ALL RESPONSES TO PHQ QUESTIONS 1-9: 8
2. FEELING DOWN, DEPRESSED OR HOPELESS: 0
SUM OF ALL RESPONSES TO PHQ QUESTIONS 1-9: 8
4. FEELING TIRED OR HAVING LITTLE ENERGY: 1
8. MOVING OR SPEAKING SO SLOWLY THAT OTHER PEOPLE COULD HAVE NOTICED. OR THE OPPOSITE, BEING SO FIGETY OR RESTLESS THAT YOU HAVE BEEN MOVING AROUND A LOT MORE THAN USUAL: 2
9. THOUGHTS THAT YOU WOULD BE BETTER OFF DEAD, OR OF HURTING YOURSELF: 0
1. LITTLE INTEREST OR PLEASURE IN DOING THINGS: 0
SUM OF ALL RESPONSES TO PHQ QUESTIONS 1-9: 8
3. TROUBLE FALLING OR STAYING ASLEEP: 0
6. FEELING BAD ABOUT YOURSELF - OR THAT YOU ARE A FAILURE OR HAVE LET YOURSELF OR YOUR FAMILY DOWN: 0

## 2022-11-08 ENCOUNTER — TELEPHONE (OUTPATIENT)
Dept: PRIMARY CARE CLINIC | Age: 87
End: 2022-11-08

## 2022-11-09 ENCOUNTER — OFFICE VISIT (OUTPATIENT)
Dept: PRIMARY CARE CLINIC | Age: 87
End: 2022-11-09
Payer: MEDICARE

## 2022-11-09 VITALS
BODY MASS INDEX: 24.1 KG/M2 | HEART RATE: 54 BPM | DIASTOLIC BLOOD PRESSURE: 60 MMHG | SYSTOLIC BLOOD PRESSURE: 132 MMHG | TEMPERATURE: 96.9 F | RESPIRATION RATE: 18 BRPM | HEIGHT: 63 IN | WEIGHT: 136 LBS | OXYGEN SATURATION: 95 %

## 2022-11-09 DIAGNOSIS — S51.812S SKIN TEAR OF LEFT FOREARM WITHOUT COMPLICATION, SEQUELA: Primary | ICD-10-CM

## 2022-11-09 DIAGNOSIS — R21 RASH AND NONSPECIFIC SKIN ERUPTION: ICD-10-CM

## 2022-11-09 PROCEDURE — 3078F DIAST BP <80 MM HG: CPT | Performed by: NURSE PRACTITIONER

## 2022-11-09 PROCEDURE — 99214 OFFICE O/P EST MOD 30 MIN: CPT | Performed by: NURSE PRACTITIONER

## 2022-11-09 PROCEDURE — 1123F ACP DISCUSS/DSCN MKR DOCD: CPT | Performed by: NURSE PRACTITIONER

## 2022-11-09 PROCEDURE — 3074F SYST BP LT 130 MM HG: CPT | Performed by: NURSE PRACTITIONER

## 2022-11-09 RX ORDER — PREDNISONE 10 MG/1
10 TABLET ORAL DAILY
Qty: 14 TABLET | Refills: 0 | Status: SHIPPED | OUTPATIENT
Start: 2022-11-09 | End: 2022-11-23

## 2022-11-09 NOTE — PROGRESS NOTES
[address] [facility] [address]  [address] [address]  Dept: [phone]  Dept Fax: [phone]  Loc: [phone]    Sandra Moya is a 82 y.o. female who presents today for her medical conditions/complaints as noted below. Sandra Moya is c/o of Skin Problem        HPI:     HPI   Chief Complaint   Patient presents with    Skin Problem   She was seen about 5 days ago with a skin tear on her left forearm. She has been changing the dressing every day but the swelling has gone down towards her wrist.  She did hold her Xarelto for 2 days and then started it back and the swelling seemed to worsen. She has been off the Xarelto yesterday and today. She would also like me to look at the rash on her leg. She generally sees dermatology for this and she just saw them about a month ago and it was looking good but now it is red.   Past Medical History:   Diagnosis Date    Afib ([address]     Bronchitis     Colon cancer ([address]     Colon polyps     Constipation     Deep vein blood clot of left lower extremity (HCC)     Left leg     Depression     DVT (deep venous thrombosis) (HCC)     Dysphagia     Fibrocystic breast disease     Fibromyalgia     GERD (gastroesophageal reflux disease)     reflux with hx of esophageal stricture    Headache(784.0)     History of colon cancer     Hormone replacement therapy (postmenopausal)     Hypertension     Neuropathy of foot     In both feet    Osteoarthritis     left knee pain    Palliative care patient 2022    Restless legs syndrome     Type 2 diabetes mellitus 10/25/2021    Vitamin D deficiency       Past Surgical History:   Procedure Laterality Date    APPENDECTOMY      BREAST BIOPSY      CATARACT REMOVAL       SECTION      CHOLECYSTECTOMY      COLECTOMY  partial    COLON SURGERY      COLONOSCOPY  2010    COLONOSCOPY  2012    Dr Erwin Au: unremarkable enterocolonic anastamosis; hyperplastic polyps    COLONOSCOPY 03/01/2013    Bondlaureanouk: unremarkable post-surgical colon    COLONOSCOPY  03/11/2016    Dr Scotty Perkins colon anastomosis, 5 yr recall    COLONOSCOPY N/A 03/29/2021    Dr Marlon Camargo and patent anastomosis in the right side-BCM, prn (age)    COLONOSCOPY  03/29/2021    Dr Marlon Camargo and patent anastomosis in the right side-BCM, prn (age)    FOOT SURGERY  right foot    HAND SURGERY Right     Dr Piotr rOtiz ([address] N/A 07/06/2020    KYPHOPLASTY L5 performed by Yoselin Ryan DO at [address] [facility] Right 12/14/2020    RIGHT L 4-5 DECOMPRESSIVE LAMINECTOMY performed by Yoselin Ryan DO at [address] [facility] ENDOSCOPY  10/16/2009    UPPER GASTROINTESTINAL ENDOSCOPY  03/01/2013    Bradley: peptic stricture dilated to 48F; HH; small antral mucosal elevation    UPPER GASTROINTESTINAL ENDOSCOPY  12/01/2014    Maurilio: dilation of esophageal stricture    VARICOSE VEIN SURGERY Left 03/14/2014  [address]    Left GSV Ablation    VARICOSE VEIN SURGERY Right 04/04/2014  [address]    Right GSV Ablation       Vitals 11/9/2022 11/4/2022 11/1/2022 10/6/2022 10/6/2022 3/01/3867   SYSTOLIC 561 446 106 477 092 511   DIASTOLIC 60 70 68 75 73 56   Pulse 54 78 91 - 94 49   Temp 96.9 98.1 - - - -   Resp 18 - - - - -   SpO2 95 98 - - - 98   Weight 136 lb - 136 lb - 137 lb 137 lb   Height 5' 3\" 5' 3\" 5' 3\" - 5' 3\" 5' 3\"   Body mass index 24.09 kg/m2 - 24.09 kg/m2 - 24.27 kg/m2 24.27 kg/m2   Some recent data might be hidden       Family History   Problem Relation Age of Onset    Cancer Mother         Cervical Cancer    No Known Problems Father     Diabetes Sister     No Known Problems Sister     No Known Problems Sister     Diabetes Brother     Esophageal Cancer Brother     No Known Problems Brother     No Known Problems Brother     Cancer Son     Other Daughter         still born    Colon Cancer Neg Hx     Colon Polyps Neg Hx     Liver Cancer Neg Hx     Liver Disease Neg Hx     Rectal Cancer Neg Hx     Stomach Cancer Neg Hx        Social History     Tobacco Use    Smoking status: Never    Smokeless tobacco: Never   Substance Use Topics    Alcohol use: Never     Comment: \"did not like it\"      Current Outpatient Medications on File Prior to Visit   Medication Sig Dispense Refill    Multiple Vitamins-Minerals (THERAPEUTIC MULTIVITAMIN-MINERALS) tablet Take 1 tablet by mouth daily      amLODIPine (NORVASC) 2.5 MG tablet Take 1 tablet by mouth in the morning and at bedtime 180 tablet 3    aspirin 81 MG EC tablet Take 1 tablet by mouth every other day 30 tablet 0    metFORMIN (GLUCOPHAGE-XR) 500 MG extended release tablet TAKE 1 TABLET BY MOUTH DAILY WITH BREAKFAST 30 tablet 3    DULoxetine (CYMBALTA) 60 MG extended release capsule Take 1 capsule by mouth daily , increase in dose 30 capsule 0    XARELTO 20 MG TABS tablet TAKE 1 TABLET BY MOUTH DAILY WITH BREAKFAST 30 tablet 5    ciprofloxacin (CILOXAN) 0.3 % ophthalmic solution INSTILL 1 DROP IN RIGHT EYE TWICE DAILY      omeprazole (PRILOSEC) 40 MG delayed release capsule TAKE 1 CAPSULE BY MOUTH DAILY 90 capsule 3    HYDROcodone-acetaminophen (NORCO)  MG per tablet TAKE 1 TABLET BY MOUTH EVERY 6 HOURS AS NEEDED      pregabalin (LYRICA) 150 MG capsule TAKE 1 CAPSULE BY MOUTH EVERY 12 HOURS AS NEEDED      furosemide (LASIX) 20 MG tablet Take 1 tablet by mouth daily as needed (swelling) 30 tablet 3    melatonin 5 MG TBDP disintegrating tablet Take 1 tablet by mouth nightly 30 tablet 0    ascorbic acid (VITAMIN C) 500 MG tablet Take 1 tablet by mouth 2 times daily 60 tablet 0    vitamin D (ERGOCALCIFEROL) 1.25 MG (40057 UT) CAPS capsule TAKE 1 CAPSULE BY MOUTH 1 TIME A WEEK 12 capsule 1    Cyanocobalamin (B-12) 500 MCG TABS TAKE 1 TABLET BY MOUTH DAILY 30 tablet 11    Hospital Bed MISC by Does not apply route 1 each 0     No current facility-administered medications on file prior to visit. Allergies   Allergen Reactions    Gabapentin Rash     Pt weaning off due to rash and hot flashes    Zoloft [Sertraline Hcl] Other (See Comments)     Patient states that she doesn't want this medication anymore because she hallucinated and saw spiders. Patient weaned herself off and after she quit taking the medication, the hallucinations stopped. Patient states that she doesn't want this medication anymore because she hallucinated and saw spiders. Patient weaned herself off and after she quit taking the medication, the hallucinations stopped. Health Maintenance   Topic Date Due    Shingles vaccine (1 of 2) Never done    COVID-19 Vaccine (4 - Booster for Moderna series) 12/10/2021    Annual Wellness Visit (AWV)  09/02/2023    Depression Monitoring  11/04/2023    DTaP/Tdap/Td vaccine (4 - Td or Tdap) 11/04/2032    DEXA (modify frequency per FRAX score)  Completed    Colorectal Cancer Screen  Completed    Flu vaccine  Completed    Pneumococcal 65+ years Vaccine  Completed    Hepatitis A vaccine  Aged Out    Hib vaccine  Aged Out    Meningococcal (ACWY) vaccine  Aged Out       Subjective:      Review of Systems   Constitutional:  Positive for activity change. Skin:  Positive for rash and wound. Objective:     Physical Exam  Vitals and nursing note reviewed. Constitutional:       Appearance: Normal appearance. She is well-developed. HENT:      Head: Normocephalic. Eyes:      Pupils: Pupils are equal, round, and reactive to light. Cardiovascular:      Rate and Rhythm: Normal rate and regular rhythm. Pulmonary:      Effort: Pulmonary effort is normal.      Breath sounds: Normal breath sounds. Musculoskeletal:      Cervical back: Normal range of motion. Skin:     General: Skin is warm and dry. Capillary Refill: Capillary refill takes less than 2 seconds. Neurological:      General: No focal deficit present.       Mental Status: She is alert and oriented to person, place, and time. Mental status is at baseline. Psychiatric:         Mood and Affect: Mood normal.         Behavior: Behavior normal.         Thought Content: Thought content normal.         Judgment: Judgment normal.     /60   Pulse 54   Temp 96.9 °F (36.1 °C) (Temporal)   Resp 18   Ht 5' 3\" (1.6 m)   Wt 136 lb (61.7 kg)   SpO2 95%   BMI 24.09 kg/m²   The outer bandage was removed and the area was cleansed with Hibiclens and irrigated with sterile saline by myself. With debridement of some of the dead skin and tissue. The nonadherent dressing with Polysporin was applied with a Kerlix and Coban wrap starting from the wrist to the elbow. Her family member was with her and on instructed on wrapping from the wrist to the elbow towards the heart. They are going to change this daily and hold the Xarelto for 3 more days. Assessment:       Diagnosis Orders   1. Skin tear of left forearm without complication, sequela        2. Rash and nonspecific skin eruption              Plan:   More than 50% of the time was spent counseling and coordinating care for a total time of 30min face to face. PDMP Monitoring:    Last PDMP Gertrude Garduno as Reviewed:  Review User Review Instant Review Result          Last Controlled Substance Monitoring Documentation      [address] Office Visit from 3/4/2020 in [facility]   Attestation The Prescription Monitoring Report for this patient was reviewed today. filed at 03/04/2020 1048   Periodic Controlled Substance Monitoring Possible medication side effects, risk of tolerance/dependence & alternative treatments discussed., No signs of potential drug abuse or diversion identified.  filed at 03/04/2020 1048          Urine Drug Screenings (1 yr)       POCT Rapid Drug Screen  Collected: 3/4/2020 11:37 AM (Final result)                  Medication Contract and Consent for Opioid Use Documents Filed       Patient Documents       Type of Document Status Date Received Received By Description    Medication Contract Signed 3/19/2019  2:29 PM Franco hicks neuro    Medication Contract Received 2/1/2021  1:35 PM EMMA BEVERLY Medication Contract Gabapentin                     Patient given educational materials -see patient instructions. Discussed use, benefit, and side effects of prescribed medications. All patient questions answered. Pt voiced understanding. Reviewed health maintenance. Instructed to continue currentmedications, diet and exercise. Patient agreed with treatment plan. Follow up as directed. MEDICATIONS:  Orders Placed This Encounter   Medications    predniSONE (DELTASONE) 10 MG tablet     Sig: Take 1 tablet by mouth daily for 14 days     Dispense:  14 tablet     Refill:  0         ORDERS:  No orders of the defined types were placed in this encounter. Follow-up:  No follow-ups on file. PATIENT INSTRUCTIONS:  Patient Instructions   If redness not gone out of left leg by Friday am call for antibiotic  Continue to change the dressing daily on your arm use poly sporin and hold the xarelto 3 more days. May do ice to your arm and elevate. Electronically signed by EARLE Martinez CNP on 11/9/2022 at 10:34 AM    EMR Dragon/transcription disclaimer:  Much of thisencounter note is electronic transcription/translation of spoken language to printed texts. The electronic translation of spoken language may be erroneous, or at times, nonsensical words or phrases may be inadvertentlytranscribed.   Although I have reviewed the note for such errors, some may still exist.

## 2022-11-09 NOTE — PATIENT INSTRUCTIONS
If redness not gone out of left leg by Friday am call for antibiotic  Continue to change the dressing daily on your arm use poly sporin and hold the xarelto 3 more days. May do ice to your arm and elevate.

## 2022-11-11 ENCOUNTER — TELEPHONE (OUTPATIENT)
Dept: PRIMARY CARE CLINIC | Age: 87
End: 2022-11-11

## 2022-11-11 RX ORDER — CLINDAMYCIN HYDROCHLORIDE 300 MG/1
300 CAPSULE ORAL 2 TIMES DAILY
Qty: 20 CAPSULE | Refills: 0 | Status: SHIPPED | OUTPATIENT
Start: 2022-11-11 | End: 2022-11-21

## 2022-11-15 ENCOUNTER — OFFICE VISIT (OUTPATIENT)
Dept: PRIMARY CARE CLINIC | Age: 87
End: 2022-11-15
Payer: MEDICARE

## 2022-11-15 VITALS
TEMPERATURE: 97.2 F | BODY MASS INDEX: 24.45 KG/M2 | WEIGHT: 138 LBS | HEART RATE: 84 BPM | DIASTOLIC BLOOD PRESSURE: 70 MMHG | OXYGEN SATURATION: 95 % | HEIGHT: 63 IN | RESPIRATION RATE: 16 BRPM | SYSTOLIC BLOOD PRESSURE: 133 MMHG

## 2022-11-15 DIAGNOSIS — E11.69 TYPE 2 DIABETES MELLITUS WITH OTHER SPECIFIED COMPLICATION, WITHOUT LONG-TERM CURRENT USE OF INSULIN (HCC): ICD-10-CM

## 2022-11-15 DIAGNOSIS — N18.32 STAGE 3B CHRONIC KIDNEY DISEASE (HCC): ICD-10-CM

## 2022-11-15 DIAGNOSIS — N39.0 E. COLI UTI: Primary | ICD-10-CM

## 2022-11-15 DIAGNOSIS — B96.20 E. COLI UTI: Primary | ICD-10-CM

## 2022-11-15 DIAGNOSIS — Z45.2 ENCOUNTER FOR CARE RELATED TO PORT-A-CATH: ICD-10-CM

## 2022-11-15 DIAGNOSIS — Z95.828 PORT-A-CATH IN PLACE: ICD-10-CM

## 2022-11-15 DIAGNOSIS — M25.551 RIGHT HIP PAIN: ICD-10-CM

## 2022-11-15 PROCEDURE — 1123F ACP DISCUSS/DSCN MKR DOCD: CPT | Performed by: NURSE PRACTITIONER

## 2022-11-15 PROCEDURE — 96523 IRRIG DRUG DELIVERY DEVICE: CPT | Performed by: NURSE PRACTITIONER

## 2022-11-15 PROCEDURE — 3074F SYST BP LT 130 MM HG: CPT | Performed by: NURSE PRACTITIONER

## 2022-11-15 PROCEDURE — 99213 OFFICE O/P EST LOW 20 MIN: CPT | Performed by: NURSE PRACTITIONER

## 2022-11-15 PROCEDURE — 3078F DIAST BP <80 MM HG: CPT | Performed by: NURSE PRACTITIONER

## 2022-11-15 PROCEDURE — 3044F HG A1C LEVEL LT 7.0%: CPT | Performed by: NURSE PRACTITIONER

## 2022-11-15 PROCEDURE — 96372 THER/PROPH/DIAG INJ SC/IM: CPT | Performed by: NURSE PRACTITIONER

## 2022-11-15 RX ORDER — ACYCLOVIR 400 MG/1
400 TABLET ORAL 2 TIMES DAILY
Qty: 180 TABLET | Refills: 2 | Status: SHIPPED | OUTPATIENT
Start: 2022-11-15

## 2022-11-15 RX ORDER — ACYCLOVIR 400 MG/1
400 TABLET ORAL 2 TIMES DAILY
COMMUNITY
End: 2022-11-15 | Stop reason: SDUPTHER

## 2022-11-15 RX ORDER — KETOROLAC TROMETHAMINE 30 MG/ML
60 INJECTION, SOLUTION INTRAMUSCULAR; INTRAVENOUS ONCE
Status: COMPLETED | OUTPATIENT
Start: 2022-11-15 | End: 2022-11-15

## 2022-11-15 RX ORDER — KETOROLAC TROMETHAMINE 30 MG/ML
60 INJECTION, SOLUTION INTRAMUSCULAR; INTRAVENOUS ONCE
Status: SHIPPED | OUTPATIENT
Start: 2022-11-15 | End: 2022-11-20

## 2022-11-15 RX ORDER — METOPROLOL SUCCINATE 50 MG/1
TABLET, EXTENDED RELEASE ORAL
COMMUNITY
Start: 2022-10-07 | End: 2022-11-15

## 2022-11-15 RX ADMIN — KETOROLAC TROMETHAMINE 60 MG: 30 INJECTION, SOLUTION INTRAMUSCULAR; INTRAVENOUS at 16:35

## 2022-11-16 NOTE — PROGRESS NOTES
[address] [facility] [address]  [address] [address]  Dept: [phone]  Dept Fax: [phone]  Loc: [phone]    Jessica Long is a 82 y.o. female who presents today for her medical conditions/complaints as noted below. Jessica Long is [facility] of [facility] (Patient presents today for port flush./), Hip Pain, and Dysuria        HPI:     HPI   Chief Complaint   Patient presents with    Mediport     Patient presents today for port flush. Hip Pain    Dysuria   Was seen by cardiology 2 weeks ago and did urine and found out showed infection and no one called her. She is still burning. She is having pain in her right hip and wants a toradol shot. Generally when she is here and we give her a shot that really helped she would like 1 today. She was actually here for a med port flush as well.   Past Medical History:   Diagnosis Date    Afib ([address]     Bronchitis     Colon cancer ([facility]     Colon polyps     Constipation     Deep vein blood clot of left lower extremity (HCC)     Left leg     Depression     DVT (deep venous thrombosis) (HCC)     Dysphagia     Fibrocystic breast disease     Fibromyalgia     GERD (gastroesophageal reflux disease)     reflux with hx of esophageal stricture    Headache(784.0)     History of colon cancer     Hormone replacement therapy (postmenopausal)     Hypertension     Neuropathy of foot     In both feet    Osteoarthritis     left knee pain    Palliative care patient 2022    Restless legs syndrome     Type 2 diabetes mellitus 10/25/2021    Vitamin D deficiency       Past Surgical History:   Procedure Laterality Date    APPENDECTOMY      BREAST BIOPSY      CATARACT REMOVAL       SECTION      CHOLECYSTECTOMY      COLECTOMY  partial    COLON SURGERY      COLONOSCOPY  2010    COLONOSCOPY  2012    Dr Vance Spencer: unremarkable enterocolonic anastamosis; hyperplastic polyps    [facility]    COLONOSCOPY  03/11/2016    Dr Casi Mckenna colon anastomosis, 5 yr recall    COLONOSCOPY N/A 03/29/2021    Dr Zara Crouch and patent anastomosis in the right side-BCM, prn (age)    COLONOSCOPY  03/29/2021    Dr Zara Crouch and patent anastomosis in the right side-BCM, prn (age)    FOOT SURGERY  right foot    HAND SURGERY Right     Dr Meaghan Lima ([address] N/A 07/06/2020    KYPHOPLASTY L5 performed by Tami Acosta DO at [address] [facility] Right 12/14/2020    RIGHT L 4-5 DECOMPRESSIVE LAMINECTOMY performed by Tami Acosta DO at [address] ENDOSCOPY  10/16/2009    UPPER GASTROINTESTINAL ENDOSCOPY  03/01/2013    Bodkarina: peptic stricture dilated to 48F; HH; small antral mucosal elevation    UPPER GASTROINTESTINAL ENDOSCOPY  12/01/2014    Maurilio: dilation of esophageal stricture    VARICOSE VEIN SURGERY Left 03/14/2014  [address]    Left GSV Ablation    VARICOSE VEIN SURGERY Right 04/04/2014  [address]    Right GSV Ablation       Vitals 11/15/2022 11/9/2022 11/4/2022 11/1/2022 10/6/2022 24/4/7923   SYSTOLIC 456 547 680 108 274 964   DIASTOLIC 70 60 70 68 75 73   Pulse 84 54 78 91 - 94   Temp 97.2 96.9 98.1 - - -   Resp 16 18 - - - -   SpO2 95 95 98 - - -   Weight 138 lb 136 lb - 136 lb - 137 lb   Height 5' 3\" 5' 3\" 5' 3\" 5' 3\" - 5' 3\"   Body mass index 24.44 kg/m2 24.09 kg/m2 - 24.09 kg/m2 - 24.27 kg/m2   Some recent data might be hidden       Family History   Problem Relation Age of Onset    Cancer Mother         Cervical Cancer    No Known Problems Father     Diabetes Sister     No Known Problems Sister     No Known Problems Sister     Diabetes Brother     Esophageal Cancer Brother     No Known Problems Brother     No Known Problems Brother     Cancer Son     Other Daughter         still born    Colon Cancer Neg Hx     Colon Polyps Neg Hx     Liver Cancer Neg Hx     Liver Disease Neg Hx     Rectal Cancer Neg Hx     Stomach Cancer Neg Hx        Social History     Tobacco Use    Smoking status: Never    Smokeless tobacco: Never   Substance Use Topics    Alcohol use: Never     Comment: \"did not like it\"      Current Outpatient Medications on File Prior to Visit   Medication Sig Dispense Refill    Multiple Vitamins-Minerals (THERAPEUTIC MULTIVITAMIN-MINERALS) tablet Take 1 tablet by mouth daily      amLODIPine (NORVASC) 2.5 MG tablet Take 1 tablet by mouth in the morning and at bedtime 180 tablet 3    aspirin 81 MG EC tablet Take 1 tablet by mouth every other day 30 tablet 0    metFORMIN (GLUCOPHAGE-XR) 500 MG extended release tablet TAKE 1 TABLET BY MOUTH DAILY WITH BREAKFAST 30 tablet 3    DULoxetine (CYMBALTA) 60 MG extended release capsule Take 1 capsule by mouth daily , increase in dose 30 capsule 0    XARELTO 20 MG TABS tablet TAKE 1 TABLET BY MOUTH DAILY WITH BREAKFAST 30 tablet 5    ciprofloxacin (CILOXAN) 0.3 % ophthalmic solution INSTILL 1 DROP IN RIGHT EYE TWICE DAILY      omeprazole (PRILOSEC) 40 MG delayed release capsule TAKE 1 CAPSULE BY MOUTH DAILY 90 capsule 3    HYDROcodone-acetaminophen (NORCO)  MG per tablet TAKE 1 TABLET BY MOUTH EVERY 6 HOURS AS NEEDED      pregabalin (LYRICA) 150 MG capsule TAKE 1 CAPSULE BY MOUTH EVERY 12 HOURS AS NEEDED      furosemide (LASIX) 20 MG tablet Take 1 tablet by mouth daily as needed (swelling) 30 tablet 3    melatonin 5 MG TBDP disintegrating tablet Take 1 tablet by mouth nightly 30 tablet 0    vitamin D (ERGOCALCIFEROL) 1.25 MG (28798 UT) CAPS capsule TAKE 1 CAPSULE BY MOUTH 1 TIME A WEEK 12 capsule 1    Cyanocobalamin (B-12) 500 MCG TABS TAKE 1 TABLET BY MOUTH DAILY 30 tablet 11    Hospital Bed MISC by Does not apply route 1 each 0    ascorbic acid (VITAMIN C) 500 MG tablet Take 1 tablet by mouth 2 times daily 60 tablet 0     No current facility-administered medications on file prior to visit.      Allergies Allergen Reactions    Gabapentin Rash     Pt weaning off due to rash and hot flashes    Zoloft [Sertraline Hcl] Other (See Comments)     Patient states that she doesn't want this medication anymore because she hallucinated and saw spiders. Patient weaned herself off and after she quit taking the medication, the hallucinations stopped. Patient states that she doesn't want this medication anymore because she hallucinated and saw spiders. Patient weaned herself off and after she quit taking the medication, the hallucinations stopped. Health Maintenance   Topic Date Due    Shingles vaccine (1 of 2) Never done    COVID-19 Vaccine (4 - Booster for Moderna series) 12/10/2021    Annual Wellness Visit (AWV)  09/02/2023    Depression Monitoring  11/04/2023    DTaP/Tdap/Td vaccine (4 - Td or Tdap) 11/04/2032    DEXA (modify frequency per FRAX score)  Completed    Colorectal Cancer Screen  Completed    Flu vaccine  Completed    Pneumococcal 65+ years Vaccine  Completed    Hepatitis A vaccine  Aged Out    Hib vaccine  Aged Out    Meningococcal (ACWY) vaccine  Aged Out       Subjective:      Review of Systems   Constitutional:  Positive for activity change. HENT: Negative. Musculoskeletal:  Positive for arthralgias. Hip pain   Psychiatric/Behavioral:  Positive for dysphoric mood. The patient is nervous/anxious. Objective:     Physical Exam  Constitutional:       Appearance: Normal appearance. She is well-developed. Comments: Sitting up in wheelchair. HENT:      Head: Normocephalic. Eyes:      Conjunctiva/sclera: Conjunctivae normal.      Pupils: Pupils are equal, round, and reactive to light. Pulmonary:      Effort: Pulmonary effort is normal.   Musculoskeletal:      Cervical back: Normal range of motion. Right hip: Tenderness present. No deformity, lacerations, bony tenderness or crepitus. Decreased range of motion. Normal strength. Skin:     General: Skin is warm and dry. Neurological:      General: No focal deficit present. Mental Status: She is alert and oriented to person, place, and time. Psychiatric:         Mood and Affect: Mood normal.         Behavior: Behavior normal.         Thought Content: Thought content normal.         Judgment: Judgment normal.     /70   Pulse 84   Temp 97.2 °F (36.2 °C) (Temporal)   Resp 16   Ht 5' 3\" (1.6 m)   Wt 138 lb (62.6 kg)   SpO2 95%   BMI 24.45 kg/m²   Venous Access Procedure Note  Indication: maintenance flush    Procedure: The patient was placed in the appropriate position and the skin over the puncture site was prepped with betadine and draped in a sterile fashion. Intravenous access was obtained in right chest port with a Bliss needle and the site was secured appropriately. 3 cc of blood with withdrawn and discarded. The port was flushed with 10cc NS and then 3 cc heparin flush. The patient tolerated the procedure well. Complications: None   Assessment:       Diagnosis Orders   1. E. coli UTI  Culture, Urine      2. Right hip pain  ketorolac (TORADOL) injection 60 mg      3. Port-A-Cath in place  [facility] IRRIG IMPLANTED DRUG DELIVERY DEVICE      4. Type 2 diabetes mellitus with other specified complication, without long-term current use of insulin (HCC)        5. Stage 3b chronic kidney disease ([facility] [address] [address]        6. Encounter for care related to Port-a-Cath  [facility] [place]            Plan:   More than 50% of the time was spent counseling and coordinating care for a total time of 25 min face to face. PDMP Monitoring:    Last PDMP Wagner Garcia as Reviewed:  Review User Review Instant Review Result          Last Controlled Substance Monitoring Documentation      [address] Office Visit from 3/4/2020 in [facility]   Attestation The Prescription Monitoring Report for this patient was reviewed today.  filed at 03/04/2020 1048   Periodic Controlled Substance Monitoring Possible medication side effects, risk of tolerance/dependence & alternative treatments discussed., No signs of potential drug abuse or diversion identified. filed at 03/04/2020 1048          Urine Drug Screenings (1 yr)       POCT Rapid Drug Screen  Collected: 3/4/2020 11:37 AM (Final result)                  Medication Contract and Consent for Opioid Use Documents Filed       Patient Documents       Type of Document Status Date Received Received By Description    Medication Contract Signed 3/19/2019  2:29 PM Lennie hicks neuro    Medication Contract Received 2/1/2021  1:35 PM EMMA BEVERLY Medication Contract Gabapentin                     Patient given educational materials -see patient instructions. Discussed use, benefit, and side effects of prescribed medications. All patient questions answered. Pt voiced understanding. Reviewed health maintenance. Instructed to continue currentmedications, diet and exercise. Patient agreed with treatment plan. Follow up as directed. MEDICATIONS:  Orders Placed This Encounter   Medications    acyclovir (ZOVIRAX) 400 MG tablet     Sig: Take 1 tablet by mouth 2 times daily Take one tablet by mouth twice a day. Dispense:  180 tablet     Refill:  2    ketorolac (TORADOL) injection 60 mg    ketorolac (TORADOL) injection 60 mg         ORDERS:  Orders Placed This Encounter   Procedures    Culture, Urine    [place] IRRIG IMPLANTED DRUG DELIVERY DEVICE       Follow-up:  No follow-ups on file. PATIENT INSTRUCTIONS:  Patient Instructions   Come by and leave a urine sample when you finish the antibiotics. Electronically signed by EARLE Barakat CNP on 12/15/2022 at 1:54 PM    EMR Dragon/transcription disclaimer:  Much of thisencounter note is electronic transcription/translation of spoken language to printed texts. The electronic translation of spoken language may be erroneous, or at times, nonsensical words or phrases may be inadvertentlytranscribed.   Although I have reviewed the note for such errors, some may still exist.

## 2022-11-28 ENCOUNTER — NURSE ONLY (OUTPATIENT)
Dept: PRIMARY CARE CLINIC | Age: 87
End: 2022-11-28
Payer: MEDICARE

## 2022-11-28 DIAGNOSIS — R30.0 DYSURIA: Primary | ICD-10-CM

## 2022-11-28 LAB
APPEARANCE FLUID: CLEAR
BILIRUBIN, POC: NORMAL
BLOOD URINE, POC: NORMAL
CLARITY, POC: CLEAR
COLOR, POC: YELLOW
GLUCOSE URINE, POC: NORMAL
KETONES, POC: NORMAL
LEUKOCYTE EST, POC: NORMAL
NITRITE, POC: NORMAL
PH, POC: NORMAL
PROTEIN, POC: NORMAL
SPECIFIC GRAVITY, POC: NORMAL
UROBILINOGEN, POC: NORMAL

## 2022-11-28 PROCEDURE — 81002 URINALYSIS NONAUTO W/O SCOPE: CPT | Performed by: NURSE PRACTITIONER

## 2022-11-30 LAB — URINE CULTURE, ROUTINE: NORMAL

## 2022-12-29 ENCOUNTER — PATIENT MESSAGE (OUTPATIENT)
Dept: PRIMARY CARE CLINIC | Age: 87
End: 2022-12-29

## 2022-12-29 RX ORDER — PREDNISONE 10 MG/1
10 TABLET ORAL DAILY
Qty: 14 TABLET | Refills: 0 | Status: SHIPPED | OUTPATIENT
Start: 2022-12-29 | End: 2023-01-12

## 2022-12-29 RX ORDER — CEPHALEXIN 500 MG/1
500 CAPSULE ORAL 3 TIMES DAILY
Qty: 21 CAPSULE | Refills: 0 | Status: SHIPPED | OUTPATIENT
Start: 2022-12-29 | End: 2023-01-05

## 2022-12-29 NOTE — TELEPHONE ENCOUNTER
From: Alexis Funk  To: Feliz Johns  Sent: 12/29/2022 2:20 PM CST  Subject: Left lower leg redness    Hi,  I am messaging on Esther's behalf. Esther's lower leg is red again with some clear fluid seepage. She said it had dry flakes of skin previously which she has been applying Gold Bond diabetic cream and she did a soft scrub of skin to remove flakes of skin. She has an appointment with you on the 4th for port flush but I feel this should not wait until then - Can you prescribe the antiobiitic and steroids you have prescribed in the past? have attached pictures of her leg.   Lucy Rojas

## 2023-01-04 ENCOUNTER — OFFICE VISIT (OUTPATIENT)
Dept: PRIMARY CARE CLINIC | Age: 88
End: 2023-01-04
Payer: MEDICARE

## 2023-01-04 VITALS
DIASTOLIC BLOOD PRESSURE: 80 MMHG | HEIGHT: 63 IN | BODY MASS INDEX: 24.8 KG/M2 | WEIGHT: 140 LBS | HEART RATE: 82 BPM | SYSTOLIC BLOOD PRESSURE: 132 MMHG | TEMPERATURE: 97.2 F | OXYGEN SATURATION: 98 % | RESPIRATION RATE: 16 BRPM

## 2023-01-04 DIAGNOSIS — Z95.828 PORT-A-CATH IN PLACE: ICD-10-CM

## 2023-01-04 DIAGNOSIS — Z45.2 ENCOUNTER FOR CARE RELATED TO PORT-A-CATH: ICD-10-CM

## 2023-01-04 DIAGNOSIS — E11.69 TYPE 2 DIABETES MELLITUS WITH OTHER SPECIFIED COMPLICATION, WITHOUT LONG-TERM CURRENT USE OF INSULIN (HCC): Primary | ICD-10-CM

## 2023-01-04 PROCEDURE — 3075F SYST BP GE 130 - 139MM HG: CPT | Performed by: NURSE PRACTITIONER

## 2023-01-04 PROCEDURE — G8399 PT W/DXA RESULTS DOCUMENT: HCPCS | Performed by: NURSE PRACTITIONER

## 2023-01-04 PROCEDURE — G8427 DOCREV CUR MEDS BY ELIG CLIN: HCPCS | Performed by: NURSE PRACTITIONER

## 2023-01-04 PROCEDURE — 1036F TOBACCO NON-USER: CPT | Performed by: NURSE PRACTITIONER

## 2023-01-04 PROCEDURE — 99212 OFFICE O/P EST SF 10 MIN: CPT | Performed by: NURSE PRACTITIONER

## 2023-01-04 PROCEDURE — 1090F PRES/ABSN URINE INCON ASSESS: CPT | Performed by: NURSE PRACTITIONER

## 2023-01-04 PROCEDURE — 1123F ACP DISCUSS/DSCN MKR DOCD: CPT | Performed by: NURSE PRACTITIONER

## 2023-01-04 PROCEDURE — G8420 CALC BMI NORM PARAMETERS: HCPCS | Performed by: NURSE PRACTITIONER

## 2023-01-04 PROCEDURE — 3079F DIAST BP 80-89 MM HG: CPT | Performed by: NURSE PRACTITIONER

## 2023-01-04 PROCEDURE — G8484 FLU IMMUNIZE NO ADMIN: HCPCS | Performed by: NURSE PRACTITIONER

## 2023-01-04 ASSESSMENT — PATIENT HEALTH QUESTIONNAIRE - PHQ9
SUM OF ALL RESPONSES TO PHQ QUESTIONS 1-9: 5
8. MOVING OR SPEAKING SO SLOWLY THAT OTHER PEOPLE COULD HAVE NOTICED. OR THE OPPOSITE, BEING SO FIGETY OR RESTLESS THAT YOU HAVE BEEN MOVING AROUND A LOT MORE THAN USUAL: 1
7. TROUBLE CONCENTRATING ON THINGS, SUCH AS READING THE NEWSPAPER OR WATCHING TELEVISION: 1
SUM OF ALL RESPONSES TO PHQ QUESTIONS 1-9: 5
10. IF YOU CHECKED OFF ANY PROBLEMS, HOW DIFFICULT HAVE THESE PROBLEMS MADE IT FOR YOU TO DO YOUR WORK, TAKE CARE OF THINGS AT HOME, OR GET ALONG WITH OTHER PEOPLE: 1
3. TROUBLE FALLING OR STAYING ASLEEP: 2
5. POOR APPETITE OR OVEREATING: 0
6. FEELING BAD ABOUT YOURSELF - OR THAT YOU ARE A FAILURE OR HAVE LET YOURSELF OR YOUR FAMILY DOWN: 0
2. FEELING DOWN, DEPRESSED OR HOPELESS: 0
4. FEELING TIRED OR HAVING LITTLE ENERGY: 0
SUM OF ALL RESPONSES TO PHQ QUESTIONS 1-9: 5
9. THOUGHTS THAT YOU WOULD BE BETTER OFF DEAD, OR OF HURTING YOURSELF: 0
SUM OF ALL RESPONSES TO PHQ9 QUESTIONS 1 & 2: 1
SUM OF ALL RESPONSES TO PHQ QUESTIONS 1-9: 5
1. LITTLE INTEREST OR PLEASURE IN DOING THINGS: 1

## 2023-01-04 ASSESSMENT — ENCOUNTER SYMPTOMS
EYES NEGATIVE: 1
ROS SKIN COMMENTS: TOE NAIL
GASTROINTESTINAL NEGATIVE: 1
RESPIRATORY NEGATIVE: 1

## 2023-01-04 NOTE — PROGRESS NOTES
[address] [facility] [address]  [address] [address]  Dept: [phone]  Dept Fax: [phone]  Loc: [phone]    Beckie Soto is a 82 y.o. female who presents today for her medical conditions/complaints as noted below. Beckie Soto is c/o of [facility]        HPI:     HPI   Chief Complaint   Patient presents with    [facility]   Here for her port flush she gets it every 8 weeks. She also would like me to trim her great toenail while she is here she is diabetic and scared to trim it herself because of her eyesight. She had sent me a message about a week ago about the rash on her lower leg that flares up was red and infected and she is finished her antibiotics and it is much better.   Lab Results   Component Value Date/Time    GLUCOSE 171 09/15/2022 01:14 PM    GLUCOSE 97 2022 08:38 AM    GLUCOSE 111 2022 10:27 PM    LABA1C 6.2 2022 10:27 PM    LABA1C 5.8 2022 10:50 AM    LABA1C 6.1 2022 04:34 AM     Past Medical History:   Diagnosis Date    Afib ([facility] [address]     Bronchitis     Colon cancer ([facility] [address]     Colon polyps     Constipation     Deep vein blood clot of left lower extremity (HCC)     Left leg     Depression     DVT (deep venous thrombosis) (HCC)     Dysphagia     Fibrocystic breast disease     Fibromyalgia     GERD (gastroesophageal reflux disease)     reflux with hx of esophageal stricture    Headache(784.0)     History of colon cancer     Hormone replacement therapy (postmenopausal)     Hypertension     Neuropathy of foot     In both feet    Osteoarthritis     left knee pain    Palliative care patient 2022    Restless legs syndrome     Type 2 diabetes mellitus 10/25/2021    Vitamin D deficiency       Past Surgical History:   Procedure Laterality Date    APPENDECTOMY      BREAST BIOPSY      CATARACT REMOVAL       SECTION      CHOLECYSTECTOMY      COLECTOMY  partial    COLON SURGERY      COLONOSCOPY  2010    COLONOSCOPY 02/27/2012    Dr Aldo Olivera: unremarkable enterocolonic anastamosis; hyperplastic polyps    COLONOSCOPY  03/01/2013    [facility]: unremarkable post-surgical colon    COLONOSCOPY  03/11/2016    Dr Sarah Beth Machuca colon anastomosis, 5 yr recall    COLONOSCOPY N/A 03/29/2021    Dr Raina Altamirano and patent anastomosis in the right side-BCM, prn (age)    COLONOSCOPY  03/29/2021    Dr Raina Altamirano and patent anastomosis in the right side-BCM, prn (age)    FOOT SURGERY  right foot    HAND SURGERY Right     Dr Rosemary Miranda ([address] N/A 07/06/2020    KYPHOPLASTY L5 performed by Jose Francisco Willard DO at [address] [facility] Right 12/14/2020    RIGHT L 4-5 DECOMPRESSIVE LAMINECTOMY performed by Jose Francisco Willard DO at [address] [facility] ENDOSCOPY  10/16/2009    UPPER GASTROINTESTINAL ENDOSCOPY  03/01/2013    Bodnicole: peptic stricture dilated to 48F; HH; small antral mucosal elevation    UPPER GASTROINTESTINAL ENDOSCOPY  12/01/2014    Maurilio: dilation of esophageal stricture    VARICOSE VEIN SURGERY Left 03/14/2014  [address]    Left GSV Ablation    VARICOSE VEIN SURGERY Right 04/04/2014  [address]    Right GSV Ablation       Vitals 1/4/2023 11/15/2022 11/9/2022 11/4/2022 11/1/2022 39/1/6484   SYSTOLIC 715 907 197 078 048 970   DIASTOLIC 80 70 60 70 68 75   Pulse 82 84 54 78 91 -   Temp 97.2 97.2 96.9 98.1 - -   Resp 16 16 18 - - -   SpO2 98 95 95 98 - -   Weight 140 lb 138 lb 136 lb - 136 lb -   Height 5' 3\" 5' 3\" 5' 3\" 5' 3\" 5' 3\" -   Body mass index 24.8 kg/m2 24.44 kg/m2 24.09 kg/m2 - 24.09 kg/m2 -   Some recent data might be hidden       Family History   Problem Relation Age of Onset    Cancer Mother         Cervical Cancer    No Known Problems Father     Diabetes Sister     No Known Problems Sister     No Known Problems Sister     Diabetes Brother     Esophageal Cancer Brother     No Known Problems Brother No Known Problems Brother     Cancer Son     Other Daughter         still born    Colon Cancer Neg Hx     Colon Polyps Neg Hx     Liver Cancer Neg Hx     Liver Disease Neg Hx     Rectal Cancer Neg Hx     Stomach Cancer Neg Hx        Social History     Tobacco Use    Smoking status: Never    Smokeless tobacco: Never   Substance Use Topics    Alcohol use: Never     Comment: \"did not like it\"      Current Outpatient Medications on File Prior to Visit   Medication Sig Dispense Refill    predniSONE (DELTASONE) 10 MG tablet Take 1 tablet by mouth daily for 14 days 14 tablet 0    cephALEXin (KEFLEX) 500 MG capsule Take 1 capsule by mouth 3 times daily for 7 days 21 capsule 0    acyclovir (ZOVIRAX) 400 MG tablet Take 1 tablet by mouth 2 times daily Take one tablet by mouth twice a day.  180 tablet 2    Multiple Vitamins-Minerals (THERAPEUTIC MULTIVITAMIN-MINERALS) tablet Take 1 tablet by mouth daily      amLODIPine (NORVASC) 2.5 MG tablet Take 1 tablet by mouth in the morning and at bedtime 180 tablet 3    aspirin 81 MG EC tablet Take 1 tablet by mouth every other day 30 tablet 0    metFORMIN (GLUCOPHAGE-XR) 500 MG extended release tablet TAKE 1 TABLET BY MOUTH DAILY WITH BREAKFAST 30 tablet 3    DULoxetine (CYMBALTA) 60 MG extended release capsule Take 1 capsule by mouth daily , increase in dose 30 capsule 0    XARELTO 20 MG TABS tablet TAKE 1 TABLET BY MOUTH DAILY WITH BREAKFAST 30 tablet 5    ciprofloxacin (CILOXAN) 0.3 % ophthalmic solution INSTILL 1 DROP IN RIGHT EYE TWICE DAILY      omeprazole (PRILOSEC) 40 MG delayed release capsule TAKE 1 CAPSULE BY MOUTH DAILY 90 capsule 3    HYDROcodone-acetaminophen (NORCO)  MG per tablet TAKE 1 TABLET BY MOUTH EVERY 6 HOURS AS NEEDED      pregabalin (LYRICA) 150 MG capsule TAKE 1 CAPSULE BY MOUTH EVERY 12 HOURS AS NEEDED      furosemide (LASIX) 20 MG tablet Take 1 tablet by mouth daily as needed (swelling) 30 tablet 3    melatonin 5 MG TBDP disintegrating tablet Take 1 tablet by mouth nightly 30 tablet 0    ascorbic acid (VITAMIN C) 500 MG tablet Take 1 tablet by mouth 2 times daily 60 tablet 0    vitamin D (ERGOCALCIFEROL) 1.25 MG (22336 UT) CAPS capsule TAKE 1 CAPSULE BY MOUTH 1 TIME A WEEK 12 capsule 1    Cyanocobalamin (B-12) 500 MCG TABS TAKE 1 TABLET BY MOUTH DAILY 30 tablet 11    Hospital Bed MISC by Does not apply route 1 each 0     No current facility-administered medications on file prior to visit. Allergies   Allergen Reactions    Gabapentin Rash     Pt weaning off due to rash and hot flashes    Zoloft [Sertraline Hcl] Other (See Comments)     Patient states that she doesn't want this medication anymore because she hallucinated and saw spiders. Patient weaned herself off and after she quit taking the medication, the hallucinations stopped. Patient states that she doesn't want this medication anymore because she hallucinated and saw spiders. Patient weaned herself off and after she quit taking the medication, the hallucinations stopped. Health Maintenance   Topic Date Due    Shingles vaccine (1 of 2) Never done    COVID-19 Vaccine (4 - Booster for Moderna series) 12/10/2021    Annual Wellness Visit (AWV)  09/02/2023    Depression Monitoring  11/04/2023    DTaP/Tdap/Td vaccine (4 - Td or Tdap) 11/04/2032    DEXA (modify frequency per FRAX score)  Completed    Colorectal Cancer Screen  Completed    Flu vaccine  Completed    Pneumococcal 65+ years Vaccine  Completed    Hepatitis A vaccine  Aged Out    Hib vaccine  Aged Out    Meningococcal (ACWY) vaccine  Aged Out       Subjective:      Review of Systems   Constitutional: Negative. HENT: Negative. Eyes: Negative. Respiratory: Negative. Cardiovascular: Negative. Gastrointestinal: Negative. Endocrine:        Diabetes   Genitourinary: Negative. Musculoskeletal: Negative. Skin:  Positive for wound. Toe nail   Neurological: Negative. Psychiatric/Behavioral: Negative. Objective:     Physical Exam  Constitutional:       Appearance: Normal appearance. She is well-developed. HENT:      Head: Normocephalic. Eyes:      Conjunctiva/sclera: Conjunctivae normal.      Pupils: Pupils are equal, round, and reactive to light. Pulmonary:      Effort: Pulmonary effort is normal.   Musculoskeletal:      Cervical back: Normal range of motion. Skin:     General: Skin is warm and dry. Neurological:      General: No focal deficit present. Mental Status: She is alert and oriented to person, place, and time. Psychiatric:         Mood and Affect: Mood normal.         Behavior: Behavior normal.         Thought Content: Thought content normal.         Judgment: Judgment normal.     /80   Pulse 82   Temp 97.2 °F (36.2 °C) (Temporal)   Resp 16   Ht 5' 3\" (1.6 m)   Wt 140 lb (63.5 kg)   SpO2 98%   BMI 24.80 kg/m²   Great toenail trimmed with clippers  Venous Access Procedure Note  Indication: maintenance flush    Procedure: The patient was placed in the appropriate position and the skin over the puncture site was prepped with betadine and draped in a sterile fashion. Intravenous access was obtained in right chest port with a Bliss needle and the site was secured appropriately. Positive blood return, the port was flushed with 10cc NS and then 3 cc heparin flush. The patient tolerated the procedure well. Complications: None    Assessment:       Diagnosis Orders   1. Type 2 diabetes mellitus with other specified complication, without long-term current use of insulin (HCC)        2. Port-A-Cath in place  GA IRRIGAJ IMPLNTD VENOUS ACCESS DRUG DELIVERY SYST      3. Encounter for care related to [address] [place] [address] VENOUS ACCESS DRUG DELIVERY SYST            Plan:   More than 50% of the time was spent counseling and coordinating care for a total time of 15min face to face.     PDMP Monitoring:    Last PDMP Eleazar as Reviewed:  Review User Review Instant Review Result          Last Controlled Substance Monitoring Documentation      [address] Office Visit from 3/4/2020 in [facility]   Attestation The Prescription Monitoring Report for this patient was reviewed today. filed at 03/04/2020 1048   Periodic Controlled Substance Monitoring Possible medication side effects, risk of tolerance/dependence & alternative treatments discussed., No signs of potential drug abuse or diversion identified. filed at 03/04/2020 1048          Urine Drug Screenings (1 yr)       POCT Rapid Drug Screen  Collected: 3/4/2020 11:37 AM (Final result)                  Medication Contract and Consent for Opioid Use Documents Filed       Patient Documents       Type of Document Status Date Received Received By Description    Medication Contract Signed 3/19/2019  2:29 PM Javon Harris [facility]    Medication Contract Received 2/1/2021  1:35 PM EMMA BEVERLY Medication Contract Gabapentin                     Patient given educational materials -see patient instructions. Discussed use, benefit, and side effects of prescribed medications. All patient questions answered. Pt voiced understanding. Reviewed health maintenance. Instructed to continue currentmedications, diet and exercise. Patient agreed with treatment plan. Follow up as directed. MEDICATIONS:  No orders of the defined types were placed in this encounter. ORDERS:  Orders Placed This Encounter   Procedures    TN IRRIGAJ IMPLNTD VENOUS ACCESS DRUG DELIVERY SYST       Follow-up:  No follow-ups on file. PATIENT INSTRUCTIONS:  There are no Patient Instructions on file for this visit. Electronically signed by EARLE Stapleton CNP on 1/4/2023 at 11:45 AM    EMR Dragon/transcription disclaimer:  Much of thisencounter note is electronic transcription/translation of spoken language to printed texts.   The electronic translation of spoken language may be erroneous, or at times, nonsensical words or phrases may be inadvertentlytranscribed.   Although I have reviewed the note for such errors, some may still exist.

## 2023-01-18 ENCOUNTER — OFFICE VISIT (OUTPATIENT)
Dept: PRIMARY CARE CLINIC | Age: 88
End: 2023-01-18
Payer: MEDICARE

## 2023-01-18 VITALS
HEIGHT: 63 IN | WEIGHT: 138 LBS | SYSTOLIC BLOOD PRESSURE: 130 MMHG | RESPIRATION RATE: 16 BRPM | TEMPERATURE: 97.4 F | BODY MASS INDEX: 24.45 KG/M2 | HEART RATE: 74 BPM | OXYGEN SATURATION: 96 % | DIASTOLIC BLOOD PRESSURE: 62 MMHG

## 2023-01-18 DIAGNOSIS — R21 RASH AND NONSPECIFIC SKIN ERUPTION: ICD-10-CM

## 2023-01-18 DIAGNOSIS — S41.111A SKIN TEAR OF UPPER ARM WITHOUT COMPLICATION, RIGHT, INITIAL ENCOUNTER: Primary | ICD-10-CM

## 2023-01-18 PROCEDURE — 1036F TOBACCO NON-USER: CPT | Performed by: NURSE PRACTITIONER

## 2023-01-18 PROCEDURE — 99214 OFFICE O/P EST MOD 30 MIN: CPT | Performed by: NURSE PRACTITIONER

## 2023-01-18 PROCEDURE — 1090F PRES/ABSN URINE INCON ASSESS: CPT | Performed by: NURSE PRACTITIONER

## 2023-01-18 PROCEDURE — 1123F ACP DISCUSS/DSCN MKR DOCD: CPT | Performed by: NURSE PRACTITIONER

## 2023-01-18 PROCEDURE — 3075F SYST BP GE 130 - 139MM HG: CPT | Performed by: NURSE PRACTITIONER

## 2023-01-18 PROCEDURE — G8427 DOCREV CUR MEDS BY ELIG CLIN: HCPCS | Performed by: NURSE PRACTITIONER

## 2023-01-18 PROCEDURE — G8399 PT W/DXA RESULTS DOCUMENT: HCPCS | Performed by: NURSE PRACTITIONER

## 2023-01-18 PROCEDURE — 3078F DIAST BP <80 MM HG: CPT | Performed by: NURSE PRACTITIONER

## 2023-01-18 PROCEDURE — G8484 FLU IMMUNIZE NO ADMIN: HCPCS | Performed by: NURSE PRACTITIONER

## 2023-01-18 PROCEDURE — G8420 CALC BMI NORM PARAMETERS: HCPCS | Performed by: NURSE PRACTITIONER

## 2023-01-18 ASSESSMENT — ENCOUNTER SYMPTOMS
RESPIRATORY NEGATIVE: 1
EYES NEGATIVE: 1
GASTROINTESTINAL NEGATIVE: 1

## 2023-01-18 NOTE — PROGRESS NOTES
[facility]  [place] [id number] [facility] [place] [address]  Dept: [phone]  Dept Fax: [phone]  Loc: [phone]    Esther Schmidt is a 87 y.o. female who presents today for her medical conditions/complaints as noted below.  Esther Schmidt is c/o of Skin Lesion (Patient presents today with c/o right skin tear that happened about 2 weeks ago. She is concerned about the healing.)        HPI:     HPI   Chief Complaint   Patient presents with   • Skin Lesion     Patient presents today with c/o right skin tear that happened about 2 weeks ago. She is concerned about the healing.   Rash on her legs is back.  When she was on the antibiotic and steroids it went away.  She has battled this rash for the last couple years.  She has not seen dermatology about this rash in a while.  It is burning and itching.  When she got the skin tear on her right arm her  tried to peel the skin back over it but could not get it all.  And its not healing  Past Medical History:   Diagnosis Date   • Afib (HCC)    • Bronchitis    • Colon cancer (HCC)    • Colon polyps    • Constipation    • Deep vein blood clot of left lower extremity (HCC)     Left leg    • Depression    • DVT (deep venous thrombosis) (HCC)    • Dysphagia    • Fibrocystic breast disease    • Fibromyalgia    • GERD (gastroesophageal reflux disease)     reflux with hx of esophageal stricture   • Headache(784.0)    • History of colon cancer    • Hormone replacement therapy (postmenopausal)    • Hypertension    • Neuropathy of foot     In both feet   • Osteoarthritis     left knee pain   • Palliative care patient 2022   • Restless legs syndrome    • Type 2 diabetes mellitus 10/25/2021   • Vitamin D deficiency       Past Surgical History:   Procedure Laterality Date   • APPENDECTOMY     • BREAST BIOPSY     • CATARACT REMOVAL     •  SECTION     • CHOLECYSTECTOMY     • COLECTOMY  partial   • COLON SURGERY     • COLONOSCOPY  12/14/2010    COLONOSCOPY  02/27/2012    Dr Emelina Palmer: unremarkable enterocolonic anastamosis; hyperplastic polyps    COLONOSCOPY  03/01/2013    [facility]: unremarkable post-surgical colon    COLONOSCOPY  03/11/2016    Dr Daniel Pugh colon anastomosis, 5 yr recall    COLONOSCOPY N/A 03/29/2021    Dr Rafiq Suarez and patent anastomosis in the right side-BCM, prn (age)    COLONOSCOPY  03/29/2021    Dr Rafiq Suarez and patent anastomosis in the right side-BCM, prn (age)   [de-identified] FOOT SURGERY  right foot    HAND SURGERY Right     Dr Tamia Oates ([address] [place] [address])     [address] N/A 07/06/2020    KYPHOPLASTY L5 performed by Garry Che DO at [address] [facility]  [place] Right 12/14/2020    RIGHT L 4-5 DECOMPRESSIVE LAMINECTOMY performed by Garry Che DO at [facility]      UPPER GASTROINTESTINAL ENDOSCOPY  10/16/2009    UPPER GASTROINTESTINAL ENDOSCOPY  03/01/2013    Susanne: peptic stricture dilated to 48F; HH; small antral mucosal elevation    UPPER GASTROINTESTINAL ENDOSCOPY  12/01/2014    Maurilio: dilation of esophageal stricture    VARICOSE VEIN SURGERY Left 03/14/2014  [address]    Left GSV Ablation    VARICOSE VEIN SURGERY Right 04/04/2014  [address]    Right GSV Ablation       Vitals 1/18/2023 1/4/2023 11/15/2022 11/9/2022 11/4/2022 98/6/8648   SYSTOLIC 286 865 197 916 603 506   DIASTOLIC 62 80 70 60 70 68   Pulse 74 82 84 54 78 91   Temp 97.4 97.2 97.2 96.9 98.1 -   Resp 16 16 16 18 - -   SpO2 96 98 95 95 98 -   Weight 138 lb 140 lb 138 lb 136 lb - 136 lb   Height 5' 3\" 5' 3\" 5' 3\" 5' 3\" 5' 3\" 5' 3\"   Body mass index 24.44 kg/m2 24.8 kg/m2 24.44 kg/m2 24.09 kg/m2 - 24.09 kg/m2   Some recent data might be hidden       Family History   Problem Relation Age of Onset    Cancer Mother         Cervical Cancer    No Known Problems Father     Diabetes Sister     No Known Problems Sister     No Known Problems Sister    Kathie Her Diabetes Brother     Esophageal Cancer Brother     No Known Problems Brother     No Known Problems Brother     Cancer Son     Other Daughter         still born    Colon Cancer Neg Hx     Colon Polyps Neg Hx     Liver Cancer Neg Hx     Liver Disease Neg Hx     Rectal Cancer Neg Hx     Stomach Cancer Neg Hx        Social History     Tobacco Use    Smoking status: Never    Smokeless tobacco: Never   Substance Use Topics    Alcohol use: Never     Comment: \"did not like it\"      Current Outpatient Medications on File Prior to Visit   Medication Sig Dispense Refill    acyclovir (ZOVIRAX) 400 MG tablet Take 1 tablet by mouth 2 times daily Take one tablet by mouth twice a day.  180 tablet 2    Multiple Vitamins-Minerals (THERAPEUTIC MULTIVITAMIN-MINERALS) tablet Take 1 tablet by mouth daily      amLODIPine (NORVASC) 2.5 MG tablet Take 1 tablet by mouth in the morning and at bedtime 180 tablet 3    aspirin 81 MG EC tablet Take 1 tablet by mouth every other day 30 tablet 0    metFORMIN (GLUCOPHAGE-XR) 500 MG extended release tablet TAKE 1 TABLET BY MOUTH DAILY WITH BREAKFAST 30 tablet 3    DULoxetine (CYMBALTA) 60 MG extended release capsule Take 1 capsule by mouth daily , increase in dose 30 capsule 0    XARELTO 20 MG TABS tablet TAKE 1 TABLET BY MOUTH DAILY WITH BREAKFAST 30 tablet 5    ciprofloxacin (CILOXAN) 0.3 % ophthalmic solution INSTILL 1 DROP IN RIGHT EYE TWICE DAILY      omeprazole (PRILOSEC) 40 MG delayed release capsule TAKE 1 CAPSULE BY MOUTH DAILY 90 capsule 3    HYDROcodone-acetaminophen (NORCO)  MG per tablet TAKE 1 TABLET BY MOUTH EVERY 6 HOURS AS NEEDED      pregabalin (LYRICA) 150 MG capsule TAKE 1 CAPSULE BY MOUTH EVERY 12 HOURS AS NEEDED      furosemide (LASIX) 20 MG tablet Take 1 tablet by mouth daily as needed (swelling) 30 tablet 3    melatonin 5 MG TBDP disintegrating tablet Take 1 tablet by mouth nightly 30 tablet 0    ascorbic acid (VITAMIN C) 500 MG tablet Take 1 tablet by mouth 2 times daily 60 tablet 0    vitamin D (ERGOCALCIFEROL) 1.25 MG (53806 UT) CAPS capsule TAKE 1 CAPSULE BY MOUTH 1 TIME A WEEK 12 capsule 1    Cyanocobalamin (B-12) 500 MCG TABS TAKE 1 TABLET BY MOUTH DAILY 30 tablet [address] by Does not apply route 1 each 0     No current facility-administered medications on file prior to visit. Allergies   Allergen Reactions    Gabapentin Rash     Pt weaning off due to rash and hot flashes    Zoloft [Sertraline Hcl] Other (See Comments)     Patient states that she doesn't want this medication anymore because she hallucinated and saw spiders. Patient weaned herself off and after she quit taking the medication, the hallucinations stopped. Patient states that she doesn't want this medication anymore because she hallucinated and saw spiders. Patient weaned herself off and after she quit taking the medication, the hallucinations stopped. Health Maintenance   Topic Date Due    Shingles vaccine (1 of 2) Never done    COVID-19 Vaccine (4 - Booster for Moderna series) 12/10/2021    Annual Wellness Visit (AWV)  09/02/2023    Depression Monitoring  01/04/2024    DTaP/Tdap/Td vaccine (4 - Td or Tdap) 11/04/2032    DEXA (modify frequency per FRAX score)  Completed    Colorectal Cancer Screen  Completed    Flu vaccine  Completed    Pneumococcal 65+ years Vaccine  Completed    Hepatitis A vaccine  Aged Out    Hib vaccine  Aged Out    Meningococcal (ACWY) vaccine  Aged Out       Subjective:      Review of Systems   Constitutional: Negative. HENT: Negative. Eyes: Negative. Respiratory: Negative. Cardiovascular: Negative. Gastrointestinal: Negative. Genitourinary: Negative. Musculoskeletal: Negative. Skin: Negative. Neurological: Negative. Psychiatric/Behavioral: Negative. Objective:     Physical Exam  Constitutional:       Appearance: Normal appearance. She is well-developed.    HENT:      Head: Normocephalic. Eyes:      Conjunctiva/sclera: Conjunctivae normal.      Pupils: Pupils are equal, round, and reactive to light. Pulmonary:      Effort: Pulmonary effort is normal.   Musculoskeletal:      Cervical back: Normal range of motion. Skin:     General: Skin is warm and dry. Neurological:      General: No focal deficit present. Mental Status: She is alert and oriented to person, place, and time. Psychiatric:         Mood and Affect: Mood normal.         Behavior: Behavior normal.         Thought Content: Thought content normal.         Judgment: Judgment normal.     /62   Pulse 74   Temp 97.4 °F (36.3 °C) (Temporal)   Resp 16   Ht 5' 3\" (1.6 m)   Wt 138 lb (62.6 kg)   SpO2 96%   BMI 24.45 kg/m²         Rash on her lower leg is similar to what has been in the past a plaque-like rash lower left leg with some redness and scaly skin. On the right arm we irrigated it with normal saline and then I pulled the dead skin out and trimmed it off. We irrigated again scrubbed it with a Hibiclens brush irrigated again and covered with a none adhering dressing with Neosporin and a Coban dressing. I did put a slight pressure dressing to it because of her blood thinner she did bleed a little bit after the scrubbing. Assessment:       Diagnosis Orders   1. Skin tear of upper arm without complication, right, initial encounter        2. Rash and nonspecific skin eruption              Plan:   More than 50% of the time was spent counseling and coordinating care for a total time of 35min face to face. Friend is with her today we discussed wound dressing changes at least once a day to the arm. She will clean it with just plain water and Dial soap apply Neosporin and a nonstick dressing. If it looks infected she will let me know.   We discussed that the problem on the lower leg the rash is going to be an ongoing problem needs to be hydrated well with an emollient lotion and I am going to give her some steroid cream to use for 14 days. In between she can use the hydrated emollient lotion. If she is not improving we can send her back to dermatology. She does not need to stay on steroids and antibiotics all the time for this rash. PDMP Monitoring:    Last PDMP Shannon Fernandez as Reviewed:  Review User Review Instant Review Result          Last Controlled Substance Monitoring Documentation      [address] Office Visit from 3/4/2020 in [facility]   Attestation The Prescription Monitoring Report for this patient was reviewed today. filed at 03/04/2020 1048   Periodic Controlled Substance Monitoring Possible medication side effects, risk of tolerance/dependence & alternative treatments discussed., No signs of potential drug abuse or diversion identified. filed at 03/04/2020 1048          Urine Drug Screenings (1 yr)       POCT Rapid Drug Screen  Collected: 3/4/2020 11:37 AM (Final result)                  Medication Contract and Consent for Opioid Use Documents Filed       Patient Documents       Type of Document Status Date Received Received By Description    Medication Contract Signed 3/19/2019  2:29 PM Alexander hicks neuro    Medication Contract Received 2/1/2021  1:35 PM EMMA BEVERLY Medication Contract Gabapentin                     Patient given educational materials -see patient instructions. Discussed use, benefit, and side effects of prescribed medications. All patient questions answered. Pt voiced understanding. Reviewed health maintenance. Instructed to continue currentmedications, diet and exercise. Patient agreed with treatment plan. Follow up as directed. MEDICATIONS:  Orders Placed This Encounter   Medications    triamcinolone (KENALOG) 0.1 % ointment     Sig: Apply topically 2 times daily for 14 days To lower legs, may repeat once a month if needed.      Dispense:  60 g     Refill:  1         ORDERS:  No orders of the defined types were placed in this encounter. Follow-up:  No follow-ups on file. PATIENT INSTRUCTIONS:  There are no Patient Instructions on file for this visit. Electronically signed by EARLE Hernandez CNP on 1/18/2023 at 5:08 PM    EMR Dragon/transcription disclaimer:  Much of thisencounter note is electronic transcription/translation of spoken language to printed texts. The electronic translation of spoken language may be erroneous, or at times, nonsensical words or phrases may be inadvertentlytranscribed.   Although I have reviewed the note for such errors, some may still exist.

## 2023-02-06 ENCOUNTER — OFFICE VISIT (OUTPATIENT)
Dept: CARDIOLOGY CLINIC | Age: 88
End: 2023-02-06
Payer: MEDICARE

## 2023-02-06 VITALS
DIASTOLIC BLOOD PRESSURE: 72 MMHG | HEART RATE: 86 BPM | BODY MASS INDEX: 25.16 KG/M2 | WEIGHT: 142 LBS | OXYGEN SATURATION: 96 % | HEIGHT: 63 IN | SYSTOLIC BLOOD PRESSURE: 130 MMHG

## 2023-02-06 DIAGNOSIS — E78.5 DYSLIPIDEMIA: Primary | ICD-10-CM

## 2023-02-06 PROCEDURE — G8427 DOCREV CUR MEDS BY ELIG CLIN: HCPCS | Performed by: INTERNAL MEDICINE

## 2023-02-06 PROCEDURE — 1090F PRES/ABSN URINE INCON ASSESS: CPT | Performed by: INTERNAL MEDICINE

## 2023-02-06 PROCEDURE — G8417 CALC BMI ABV UP PARAM F/U: HCPCS | Performed by: INTERNAL MEDICINE

## 2023-02-06 PROCEDURE — 1123F ACP DISCUSS/DSCN MKR DOCD: CPT | Performed by: INTERNAL MEDICINE

## 2023-02-06 PROCEDURE — 99214 OFFICE O/P EST MOD 30 MIN: CPT | Performed by: INTERNAL MEDICINE

## 2023-02-06 PROCEDURE — 1036F TOBACCO NON-USER: CPT | Performed by: INTERNAL MEDICINE

## 2023-02-06 PROCEDURE — G8484 FLU IMMUNIZE NO ADMIN: HCPCS | Performed by: INTERNAL MEDICINE

## 2023-02-06 ASSESSMENT — ENCOUNTER SYMPTOMS
WHEEZING: 0
BLOOD IN STOOL: 0
CONSTIPATION: 0
ABDOMINAL PAIN: 0
SORE THROAT: 0
SHORTNESS OF BREATH: 0
VOMITING: 0
COUGH: 0
EYE PAIN: 0
EYE REDNESS: 0
APNEA: 0
FACIAL SWELLING: 0
ABDOMINAL DISTENTION: 0
NAUSEA: 0
DIARRHEA: 1
EYE DISCHARGE: 0
CHEST TIGHTNESS: 0

## 2023-02-06 NOTE — PROGRESS NOTES
Cardiology Office Visit Note  [address]  [id number]  Phone: [phone]  Fax: [phone]                            Date:  2/6/2023  Patient: Mikel Smoker  Age:  82 y.o., 1935    Referral: No ref. provider found    REASON FOR VISIT:  Follow-up (Dysuria. Patient has no cardiac changes for better or worse.  Does report blood pressure has stayed stable since increasing the amlodipine. )         PROBLEM LIST:    Patient Active Problem List    Diagnosis Date Noted    TIA (transient ischemic attack) 09/07/2022     Priority: High    Chest pain 09/06/2022     Priority: Medium    Weakness 09/06/2022     Priority: Medium    Palliative care patient 02/02/2022     Priority: Low    Acute hypoxemic respiratory failure due to COVID-19 ([facility] 02/01/2022     Priority: Low    COVID-19 virus infection 01/20/2022     Priority: Low    History of atrial fibrillation 01/20/2022     Priority: Low    COVID-19 01/20/2022     Priority: Low    Iron deficiency anemia 01/13/2022     Priority: Low    Type 2 diabetes mellitus 10/25/2021     Priority: Low    Atrial fibrillation with RVR ([facility] 09/16/2021     Priority: Low    History of esophageal stricture 08/24/2021     Priority: Low    Depression 04/20/2021     Priority: Low    Fungal dermatitis 05/07/2020     Priority: Low    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 05/07/2020     Priority: Low    Mixed simple and mucopurulent chronic bronchitis ([facility] 05/04/2020     Priority: Low    Malignant neoplasm of colon ([facility] 04/20/2020     Priority: Low    Cellulitis of left lower limb 06/29/2019     Priority: Low    Fracture of right foot 06/29/2019     Priority: Low    Hypochloremia 06/29/2019     Priority: Low    CKD (chronic kidney disease) stage 3, GFR 30-59 ml/min ([facility]) 06/29/2019     Priority: Low    Acute deep vein thrombosis (DVT) of femoral vein of left lower extremity ([facility] 06/12/2019     Priority: Low    Pain in both lower extremities      Priority: Low    Numbness and tingling of both feet      Priority: Low    Avascular necrosis (HCC) 04/08/2019     Priority: Low    History of DVT (deep vein thrombosis) 12/18/2018     Priority: Low    Port-A-Cath in place 02/19/2018     Priority: Low     Overview Note:     Replacing Deprecated Diagnoses      History of colon polyps 03/11/2016     Priority: Low    Essential hypertension 08/25/2015     Priority: Low    Encounter for care related to Port-a-Cath 08/11/2015     Priority: Low    Vitamin D deficiency 03/19/2015     Priority: Low    Paroxysmal supraventricular tachycardia ([address] 02/18/2015     Priority: Low    Dysphagia 10/21/2014     Priority: Low    Hypertriglyceridemia 08/15/2014     Priority: Low    Venous insufficiency 03/20/2014     Priority: Low    Varicose veins of left lower extremity with inflammation, 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 ([address] 02/17/2014     Priority: Low    Fibromyalgia 11/02/2012     Priority: Low    Renal bruit 11/02/2012     Priority: Low     Overview Note:     bilateral      Personal history of colon cancer 11/30/2011     Priority: Low    Family history of esophageal cancer 11/30/2011     Priority: Low         PRESENTATION: Evgeny Rodriguez is a 82y.o. year old female is seen today for routine cardiology follow-up visit. Her past medical history significant for hypertension, DVT on chronic anticoagulation therapy, COVID-19 infection, type 2 diabetes mellitus, fibromyalgia, dyslipidemia and paroxysmal atrial fibrillation. Patient's history otherwise notable for TIA. She denies any new or worsening cardiopulmonary symptoms. Her main complaint today was that of chronic diarrhea. Her history is notable for bowel cancer status post colon resection. She has yet to follow-up with her primary care provider and will be doing so in a planned follow-up in early March.     REVIEW OF SYSTEMS:  Review of Systems   Constitutional:  Negative for chills, fatigue and fever. HENT:  Negative for congestion, facial swelling, hearing loss and sore throat. Eyes:  Negative for pain, discharge, redness and visual disturbance. Respiratory:  Negative for apnea, cough, chest tightness, shortness of breath and wheezing. Cardiovascular:  Negative for chest pain, palpitations and leg swelling. Gastrointestinal:  Positive for diarrhea. Negative for abdominal distention, abdominal pain, blood in stool, constipation, nausea and vomiting. Endocrine: Negative for polydipsia, polyphagia and polyuria. Genitourinary:  Negative for dysuria, flank pain, frequency and hematuria. Musculoskeletal:  Negative for joint swelling, myalgias and neck pain. Skin:  Negative for pallor and rash. Neurological:  Negative for dizziness, syncope, speech difficulty, light-headedness, numbness and headaches. Psychiatric/Behavioral:  Negative for confusion, hallucinations and sleep disturbance.       Past Medical History:      Diagnosis Date    Afib ([facility]     Bronchitis     Colon cancer ([facility]     Colon polyps     Constipation     Deep vein blood clot of left lower extremity (HCC)     Left leg     Depression     DVT (deep venous thrombosis) (HCC)     Dysphagia     Fibrocystic breast disease     Fibromyalgia     GERD (gastroesophageal reflux disease)     reflux with hx of esophageal stricture    Headache(784.0)     History of colon cancer     Hormone replacement therapy (postmenopausal)     Hypertension     Neuropathy of foot     In both feet    Osteoarthritis     left knee pain    Palliative care patient 2022    Restless legs syndrome     Type 2 diabetes mellitus 10/25/2021    Vitamin D deficiency        Past Surgical History:      Procedure Laterality Date    APPENDECTOMY      BREAST BIOPSY      CATARACT REMOVAL       SECTION      CHOLECYSTECTOMY      COLECTOMY  partial    COLON SURGERY      COLONOSCOPY  2010    COLONOSCOPY  2012    Dr Leti Butt: unremarkable enterocolonic anastamosis; hyperplastic polyps    COLONOSCOPY  03/01/2013    Haroldo: unremarkable post-surgical colon    COLONOSCOPY  03/11/2016    Dr Chris Hernandez colon anastomosis, 5 yr recall    COLONOSCOPY N/A 03/29/2021    Dr Diane Matthews and patent anastomosis in the right side-BCM, prn (age)    COLONOSCOPY  03/29/2021    Dr Diane Matthews and patent anastomosis in the right side-BCM, prn (age)    FOOT SURGERY  right foot    HAND SURGERY Right     Dr Elodie Soulier ([address] N/A 07/06/2020    KYPHOPLASTY L5 performed by Ty Boogie DO at [id number] [facility] Right 12/14/2020    RIGHT L 4-5 DECOMPRESSIVE LAMINECTOMY performed by Ty Boogie DO at [address] [facility] ENDOSCOPY  10/16/2009    UPPER GASTROINTESTINAL ENDOSCOPY  03/01/2013    [facility]ley: peptic stricture dilated to 48F; HH; small antral mucosal elevation    UPPER GASTROINTESTINAL ENDOSCOPY  12/01/2014    Maurilio: dilation of esophageal stricture    VARICOSE VEIN SURGERY Left 03/14/2014  [facility] [address]    Left GSV Ablation    VARICOSE VEIN SURGERY Right 04/04/2014  [facility]    Right GSV Ablation       Medications:  Current Outpatient Medications   Medication Sig Dispense Refill    acyclovir (ZOVIRAX) 400 MG tablet Take 1 tablet by mouth 2 times daily Take one tablet by mouth twice a day.  180 tablet 2    Multiple Vitamins-Minerals (THERAPEUTIC MULTIVITAMIN-MINERALS) tablet Take 1 tablet by mouth daily      amLODIPine (NORVASC) 2.5 MG tablet Take 1 tablet by mouth in the morning and at bedtime 180 tablet 3    aspirin 81 MG EC tablet Take 1 tablet by mouth every other day 30 tablet 0    metFORMIN (GLUCOPHAGE-XR) 500 MG extended release tablet TAKE 1 TABLET BY MOUTH DAILY WITH BREAKFAST 30 tablet 3    DULoxetine (CYMBALTA) 60 MG extended release capsule Take 1 capsule by mouth daily , increase in dose 30 capsule 0    XARELTO 20 MG TABS tablet TAKE 1 TABLET BY MOUTH DAILY WITH BREAKFAST 30 tablet 5    ciprofloxacin (CILOXAN) 0.3 % ophthalmic solution INSTILL 1 DROP IN RIGHT EYE TWICE DAILY      omeprazole (PRILOSEC) 40 MG delayed release capsule TAKE 1 CAPSULE BY MOUTH DAILY 90 capsule 3    HYDROcodone-acetaminophen (NORCO)  MG per tablet TAKE 1 TABLET BY MOUTH EVERY 6 HOURS AS NEEDED      pregabalin (LYRICA) 150 MG capsule TAKE 1 CAPSULE BY MOUTH EVERY 12 HOURS AS NEEDED      furosemide (LASIX) 20 MG tablet Take 1 tablet by mouth daily as needed (swelling) 30 tablet 3    melatonin 5 MG TBDP disintegrating tablet Take 1 tablet by mouth nightly 30 tablet 0    ascorbic acid (VITAMIN C) 500 MG tablet Take 1 tablet by mouth 2 times daily 60 tablet 0    vitamin D (ERGOCALCIFEROL) 1.25 MG (85676 UT) CAPS capsule TAKE 1 CAPSULE BY MOUTH 1 TIME A WEEK 12 capsule 1    Cyanocobalamin (B-12) 500 MCG TABS TAKE 1 TABLET BY MOUTH DAILY 30 tablet 11    Hospital Bed MISC by Does not apply route 1 each 0     No current facility-administered medications for this visit.        Allergies:  Gabapentin and Zoloft [sertraline hcl]    Social History:  Social History     Occupational History    Occupation: raised kids and took care of a home, did do some work in [employer] and other work in a factory   Tobacco Use    Smoking status: Never    Smokeless tobacco: Never   Vaping Use    Vaping Use: Never used   Substance and Sexual Activity    Alcohol use: Never     Comment: \"did not like it\"    Drug use: Never     Comment: only pain medicine from the doctor    Sexual activity: Yes     Partners: Male     Comment: had 2 kids (they both passed)         Family History:       Problem Relation Age of Onset    Cancer Mother         Cervical Cancer    No Known Problems Father     Diabetes Sister     No Known Problems Sister     No Known Problems Sister     Diabetes Brother     Esophageal Cancer Brother     No Known Problems Brother     No Known Problems Brother     Cancer Son Other Daughter         still born    Colon Cancer Neg Hx     Colon Polyps Neg Hx     Liver Cancer Neg Hx     Liver Disease Neg Hx     Rectal Cancer Neg Hx     Stomach Cancer Neg Hx          Physical Examination:  /72   Pulse 86   Ht 5' 3\" (1.6 m)   Wt 142 lb (64.4 kg)   SpO2 96%   BMI 25.15 kg/m²   Physical Exam  Vitals reviewed. Constitutional:       General: She is not in acute distress. Appearance: She is not ill-appearing, toxic-appearing or diaphoretic. HENT:      Head: Normocephalic and atraumatic. Eyes:      General: No scleral icterus. Right eye: No discharge. Left eye: No discharge. Conjunctiva/sclera: Conjunctivae normal.   Neck:      Vascular: No carotid bruit. Cardiovascular:      Rate and Rhythm: Normal rate and regular rhythm. No extrasystoles are present. Chest Wall: PMI is not displaced. No thrill. Heart sounds: S1 normal and S2 normal. No murmur heard. No friction rub. No gallop. Pulmonary:      Effort: Pulmonary effort is normal. No tachypnea or respiratory distress. Breath sounds: Normal breath sounds. No stridor. No wheezing, rhonchi or rales. Chest:      Chest wall: No tenderness. Abdominal:      General: Bowel sounds are normal. There is no distension. Palpations: Abdomen is soft. There is no mass. Tenderness: There is no abdominal tenderness. There is no guarding. Musculoskeletal:         General: No swelling. Cervical back: Normal range of motion and neck supple. No rigidity. Right lower leg: No edema. Left lower le+ Edema present. Skin:     General: Skin is warm and dry. Coloration: Skin is not jaundiced. Findings: No erythema or rash. Neurological:      General: No focal deficit present. Mental Status: She is alert and oriented to person, place, and time. Mental status is at baseline.    Psychiatric:         Mood and Affect: Mood normal.         Behavior: Behavior normal. Thought Content: Thought content normal.         Labs:   CBC: No results found for: CBC   BMP: No results found for: BMP    BNP: No results found for: BNPINT   PT/INR:   Prothrombin Time   Date Value Ref Range Status   12/02/2011 15.92 (H) 9.7 - 12.5 SEC Final     Protime   Date Value Ref Range Status   09/05/2022 12.6 12.0 - 14.6 sec Final     INR   Date Value Ref Range Status   09/05/2022 0.96 0.88 - 1.18 Final     Comment:     INR  < or = 1.3  Normal  INR = 2.0 - 3.0  Therapeutic  INR = 2.5 - 3.5  Therapeutic for patients with mechanical  prosthetic heart valve & MI prophylaxis  INR  > or = 3.5  Abnormal/Elevated  INR  > or = 5.0  Critical (requires immediate physician  notification)         APTT:    aPTT   Date Value Ref Range Status   12/09/2020 23.6 (L) 26.0 - 36.2 sec Final      CARDIAC ENZYMES:   Total CK   Date Value Ref Range Status   09/10/2020 33 26 - 192 U/L Final     Troponin   Date Value Ref Range Status   09/06/2022 <0.01 0.00 - 0.03 ng/mL Final     Comment:     <0.030 ng/ml       No measurable cardiac damage. 0.030-0.099 ng/ml  Values of troponin in this range suggest  possible myocardial damage. Repeat assay  4 to 6 hours after the current specimen.    >= 0.100 ng/ml     Indicative of myocardial damage. Recommend continued monitoring of  patient status and cardiac markers. LIPID PANEL: No results found for: LIPIDPAN  LIVER PROFILE:   AST   Date Value Ref Range Status   09/15/2022 24 5 - 32 U/L Final     ALT   Date Value Ref Range Status   09/15/2022 44 (H) 5 - 33 U/L Final     Albumin   Date Value Ref Range Status   09/15/2022 3.8 3.5 - 5.2 g/dL Final              Echo 9/2022:  Conclusions   Mitral valve leaflets are mildly thickened with preserved leaflet   mobility. Mitral annular calcification is present. Mild to moderate mitral regurgitation is present. Mildly thickened aortic valve leaflets with preserved leaflet mobility.    Tricuspid valve is structurally normal.   Mild tricuspid regurgitation with estimated RVSP of 39 mm Hg. The pulmonic valve was not well visualized . Mildly dilated left atrium. Normal left ventricular size with preserved LV function and an estimated   ejection fraction of approximately 55-60%. Mild concentric left ventricular hypertrophy. No regional wall motion abnormalities. Grade II diastolic dysfunction      Signature   ----------------------------------------------------------------   Electronically signed by Melba Amaya MD(Interpreting   physician) on 09/06/2022 02:37 PM        Nuclear medicine stress test 9/2022  1. Ejection fraction 78%   2. Wall motion study unremarkable   3. Myocardial perfusion imaging demonstrated homogeneous uptake of the   tracer in all visualized segments no areas of ischemia or infarction   are identified. Summary impressions:   Normal ejection fraction normal perfusion study without evidence of   ischemia or prior infarction. Signed by Dr Marni Aranda and PLAN:     Paroxysmal atrial fibrillation  Chronic anticoagulation therapy  Course complicated by symptomatic bradycardia, stable off Cardizem and metoprolol  On chronic Xarelto for thromboembolic risk reduction. No reports of bleeding or falls. Essential hypertension  Goal blood pressure less than 130/80, at goal  Continue amlodipine and furosemide  Low-sodium diet, 2 g per 24 hours     Valvular heart disease  Mild to moderate mitral regurgitation. Normal ejection fraction. RVSP 39 mmHg. Surveillance monitoring of structural heart disease by echocardiogram in 2 years or earlier should her clinical condition change. Last stress test negative for inducible ischemia.     History of recurrent DVT  On chronic anticoagulation therapy    History of transient ischemic attack  Currently on Xarelto and aspirin (TIA occurred on Xarelto, aspirin added later)    Dyslipidemia, goal LDL less than 100  Statin discontinued due to complain of memory impairment which has improved off the drug. Evaluate lipids. Discuss alternative options for lipid management when labs reviewed. Noninsulin-dependent diabetes mellitus  Last known hemoglobin A1c 6.2  Currently on metformin, actively managed by primary care provider     History of COVID-19 infection, resolved        Return in about 6 months. Electronically signed by Angelica Littlejohn MD on 2/6/2023 at 10:49 AM    Angelica Littlejohn MD, [place], [address] [facility] Cardiology Consultant    This dictation was generated by voice recognition computer software. Although all attempts are made to edit the dictation for accuracy, there may be errors in the transcription that are not intended.

## 2023-02-27 RX ORDER — METFORMIN HYDROCHLORIDE 500 MG/1
TABLET, EXTENDED RELEASE ORAL
Qty: 30 TABLET | Refills: 3 | Status: SHIPPED | OUTPATIENT
Start: 2023-02-27

## 2023-03-07 ENCOUNTER — OFFICE VISIT (OUTPATIENT)
Dept: PRIMARY CARE CLINIC | Age: 88
End: 2023-03-07
Payer: MEDICARE

## 2023-03-07 VITALS
TEMPERATURE: 97.1 F | WEIGHT: 139 LBS | DIASTOLIC BLOOD PRESSURE: 80 MMHG | BODY MASS INDEX: 24.63 KG/M2 | SYSTOLIC BLOOD PRESSURE: 136 MMHG | HEART RATE: 78 BPM | HEIGHT: 63 IN | OXYGEN SATURATION: 96 %

## 2023-03-07 DIAGNOSIS — N18.32 STAGE 3B CHRONIC KIDNEY DISEASE (HCC): ICD-10-CM

## 2023-03-07 DIAGNOSIS — Z45.2 ENCOUNTER FOR CARE RELATED TO PORT-A-CATH: ICD-10-CM

## 2023-03-07 DIAGNOSIS — R10.12 LEFT UPPER QUADRANT ABDOMINAL PAIN: ICD-10-CM

## 2023-03-07 DIAGNOSIS — Z95.828 PORT-A-CATH IN PLACE: ICD-10-CM

## 2023-03-07 DIAGNOSIS — R10.9 LEFT SIDED ABDOMINAL PAIN: ICD-10-CM

## 2023-03-07 DIAGNOSIS — R19.7 DIARRHEA, UNSPECIFIED TYPE: Primary | ICD-10-CM

## 2023-03-07 DIAGNOSIS — R21 RASH AND NONSPECIFIC SKIN ERUPTION: ICD-10-CM

## 2023-03-07 PROCEDURE — G8484 FLU IMMUNIZE NO ADMIN: HCPCS | Performed by: NURSE PRACTITIONER

## 2023-03-07 PROCEDURE — G8427 DOCREV CUR MEDS BY ELIG CLIN: HCPCS | Performed by: NURSE PRACTITIONER

## 2023-03-07 PROCEDURE — G8420 CALC BMI NORM PARAMETERS: HCPCS | Performed by: NURSE PRACTITIONER

## 2023-03-07 PROCEDURE — 99214 OFFICE O/P EST MOD 30 MIN: CPT | Performed by: NURSE PRACTITIONER

## 2023-03-07 PROCEDURE — 96523 IRRIG DRUG DELIVERY DEVICE: CPT | Performed by: NURSE PRACTITIONER

## 2023-03-07 PROCEDURE — 1123F ACP DISCUSS/DSCN MKR DOCD: CPT | Performed by: NURSE PRACTITIONER

## 2023-03-07 PROCEDURE — 1036F TOBACCO NON-USER: CPT | Performed by: NURSE PRACTITIONER

## 2023-03-07 PROCEDURE — 1090F PRES/ABSN URINE INCON ASSESS: CPT | Performed by: NURSE PRACTITIONER

## 2023-03-07 SDOH — ECONOMIC STABILITY: INCOME INSECURITY: HOW HARD IS IT FOR YOU TO PAY FOR THE VERY BASICS LIKE FOOD, HOUSING, MEDICAL CARE, AND HEATING?: NOT VERY HARD

## 2023-03-07 SDOH — ECONOMIC STABILITY: FOOD INSECURITY: WITHIN THE PAST 12 MONTHS, YOU WORRIED THAT YOUR FOOD WOULD RUN OUT BEFORE YOU GOT MONEY TO BUY MORE.: NEVER TRUE

## 2023-03-07 SDOH — ECONOMIC STABILITY: FOOD INSECURITY: WITHIN THE PAST 12 MONTHS, THE FOOD YOU BOUGHT JUST DIDN'T LAST AND YOU DIDN'T HAVE MONEY TO GET MORE.: NEVER TRUE

## 2023-03-07 SDOH — ECONOMIC STABILITY: HOUSING INSECURITY
IN THE LAST 12 MONTHS, WAS THERE A TIME WHEN YOU DID NOT HAVE A STEADY PLACE TO SLEEP OR SLEPT IN A SHELTER (INCLUDING NOW)?: NO

## 2023-03-07 ASSESSMENT — ENCOUNTER SYMPTOMS
RECTAL PAIN: 0
RESPIRATORY NEGATIVE: 1
DIARRHEA: 1
ABDOMINAL DISTENTION: 0
CONSTIPATION: 0
ABDOMINAL PAIN: 1
VOMITING: 0
NAUSEA: 0
BLOOD IN STOOL: 0
ANAL BLEEDING: 0
EYES NEGATIVE: 1

## 2023-03-07 NOTE — PROGRESS NOTES
[facility]  [address]  [address] [address]  Dept: [phone]  Dept Fax: [phone]  Loc: [phone]    Delia Presley is a 82 y.o. female who presents today for her medical conditions/complaints as noted below. Delia Presley is c/o of Mediport (Port flush ), Follow-up (2-month-f/u.  ), and Diarrhea (For a pretty good while per patient, wax/wanes - has not been taking any medication for the diarrhea.  )        HPI:     HPI   Chief Complaint   Patient presents with    Mediport     Port flush     Follow-up     2-month-f/u. Diarrhea     For a pretty good while per patient, wax/wanes - has not been taking any medication for the diarrhea. Weight has been stable. She has diarrhea every day. The patient has had this for several years but she said lately it is much different it is more of fluid in her left lower quadrant that she feels sloshing around and then she will have uncontrolled diarrhea mainly incontinent of stool. She says she also has a pain up under her left rib that hurts at times. It will also occur just before she has incontinence of diarrhea. She said there is no blood in the stool. She has not done any recent antibiotics. She has ongoing rash on her lower leg that she is seeing the dermatologist for and has an appoint with a dermatologist to have a place removed off her head. She also needs samples of Xarelto today if we have any. She had a colonoscopy on 3-29-21 with biopsies. Also had an endoscopy in 8/24/2021.   Past Medical History:   Diagnosis Date    Afib ([facility] [address] 2021    Bronchitis     Colon cancer ([facility] [address]     Colon polyps     Constipation     Deep vein blood clot of left lower extremity (HCC)     Left leg     Depression     DVT (deep venous thrombosis) (HCC)     Dysphagia     Fibrocystic breast disease     Fibromyalgia     GERD (gastroesophageal reflux disease)     reflux with hx of esophageal stricture    Headache(784.0)     History of colon cancer 2000    Hormone replacement therapy (postmenopausal)     Hypertension     Neuropathy of foot     In both feet    Osteoarthritis     left knee pain    Palliative care patient 2022    Restless legs syndrome     Type 2 diabetes mellitus 10/25/2021    Vitamin D deficiency       Past Surgical History:   Procedure Laterality Date    APPENDECTOMY      BREAST BIOPSY      CATARACT REMOVAL       SECTION      CHOLECYSTECTOMY      COLECTOMY  partial    COLON SURGERY      COLONOSCOPY  2010    COLONOSCOPY  2012    Dr Renetta Purdy: unremarkable enterocolonic anastamosis; hyperplastic polyps    COLONOSCOPY  2013    Montez: unremarkable post-surgical colon    COLONOSCOPY  2016    Dr Greg Medina colon anastomosis, 5 yr recall    COLONOSCOPY N/A 2021    Dr Steve Pelayo and patent anastomosis in the right side-BCM, prn (age)    COLONOSCOPY  2021    Dr Steve Pelayo and patent anastomosis in the right side-BCM, prn (age)    FOOT SURGERY  right foot    HAND SURGERY Right     Dr Kyler Esqueda ([address] [place] [address])      KYPHOSIS SURGERY N/A 2020    KYPHOPLASTY L5 performed by Marti Murillo DO at [address] [facility] Right 2020    RIGHT L 4-5 DECOMPRESSIVE LAMINECTOMY performed by Marti Murillo DO at [facility] ENDOSCOPY  10/16/2009    UPPER GASTROINTESTINAL ENDOSCOPY  2013    Bradley: peptic stricture dilated to 48F; HH; small antral mucosal elevation    UPPER GASTROINTESTINAL ENDOSCOPY  2014    Maurilio: dilation of esophageal stricture    VARICOSE VEIN SURGERY Left 2014  [address]    Left GSV Ablation    VARICOSE VEIN SURGERY Right 2014  [address]    Right GSV Ablation       Vitals 3/7/2023 2023 2023 2023 11/15/2022 93/3/9114   SYSTOLIC 200 428 116 809 099 675   DIASTOLIC 80 72 62 80 70 60   Pulse 78 86 74 82 84 54   Temp 97.1 - 97.4 97.2 97.2 96.9   Resp - - 16 16 16 18   SpO2 96 96 96 98 95 95   Weight 139 lb 142 lb 138 lb 140 lb 138 lb 136 lb   Height 5' 3\" 5' 3\" 5' 3\" 5' 3\" 5' 3\" 5' 3\"   Body mass index 24.62 kg/m2 25.15 kg/m2 24.44 kg/m2 24.8 kg/m2 24.44 kg/m2 24.09 kg/m2   Some recent data might be hidden       Family History   Problem Relation Age of Onset    Cancer Mother         Cervical Cancer    No Known Problems Father     Diabetes Sister     No Known Problems Sister     No Known Problems Sister     Diabetes Brother     Esophageal Cancer Brother     No Known Problems Brother     No Known Problems Brother     Cancer Son     Other Daughter         still born    Colon Cancer Neg Hx     Colon Polyps Neg Hx     Liver Cancer Neg Hx     Liver Disease Neg Hx     Rectal Cancer Neg Hx     Stomach Cancer Neg Hx        Social History     Tobacco Use    Smoking status: Never    Smokeless tobacco: Never   Substance Use Topics    Alcohol use: Never     Comment: \"did not like it\"      Current Outpatient Medications on File Prior to Visit   Medication Sig Dispense Refill    metFORMIN (GLUCOPHAGE-XR) 500 MG extended release tablet TAKE 1 TABLET BY MOUTH DAILY WITH BREAKFAST 30 tablet 3    acyclovir (ZOVIRAX) 400 MG tablet Take 1 tablet by mouth 2 times daily Take one tablet by mouth twice a day.  180 tablet 2    Multiple Vitamins-Minerals (THERAPEUTIC MULTIVITAMIN-MINERALS) tablet Take 1 tablet by mouth daily      amLODIPine (NORVASC) 2.5 MG tablet Take 1 tablet by mouth in the morning and at bedtime 180 tablet 3    aspirin 81 MG EC tablet Take 1 tablet by mouth every other day 30 tablet 0    DULoxetine (CYMBALTA) 60 MG extended release capsule Take 1 capsule by mouth daily , increase in dose 30 capsule 0    XARELTO 20 MG TABS tablet TAKE 1 TABLET BY MOUTH DAILY WITH BREAKFAST 30 tablet 5    ciprofloxacin (CILOXAN) 0.3 % ophthalmic solution INSTILL 1 DROP IN RIGHT EYE TWICE DAILY      omeprazole (PRILOSEC) 40 MG delayed release capsule TAKE 1 CAPSULE BY MOUTH DAILY 90 capsule 3    HYDROcodone-acetaminophen (NORCO)  MG per tablet TAKE 1 TABLET BY MOUTH EVERY 6 HOURS AS NEEDED      pregabalin (LYRICA) 150 MG capsule TAKE 1 CAPSULE BY MOUTH EVERY 12 HOURS AS NEEDED      furosemide (LASIX) 20 MG tablet Take 1 tablet by mouth daily as needed (swelling) 30 tablet 3    melatonin 5 MG TBDP disintegrating tablet Take 1 tablet by mouth nightly 30 tablet 0    ascorbic acid (VITAMIN C) 500 MG tablet Take 1 tablet by mouth 2 times daily 60 tablet 0    vitamin D (ERGOCALCIFEROL) 1.25 MG (97665 UT) CAPS capsule TAKE 1 CAPSULE BY MOUTH 1 TIME A WEEK 12 capsule 1    Cyanocobalamin (B-12) 500 MCG TABS TAKE 1 TABLET BY MOUTH DAILY 30 tablet 11    Hospital Bed MISC by Does not apply route 1 each 0     No current facility-administered medications on file prior to visit. Allergies   Allergen Reactions    Gabapentin Rash     Pt weaning off due to rash and hot flashes    Zoloft [Sertraline Hcl] Other (See Comments)     Patient states that she doesn't want this medication anymore because she hallucinated and saw spiders. Patient weaned herself off and after she quit taking the medication, the hallucinations stopped. Patient states that she doesn't want this medication anymore because she hallucinated and saw spiders. Patient weaned herself off and after she quit taking the medication, the hallucinations stopped. Health Maintenance   Topic Date Due    Shingles vaccine (1 of 2) Never done    COVID-19 Vaccine (4 - Booster for Moderna series) 12/10/2021    Annual Wellness Visit (AWV)  09/02/2023    Depression Monitoring  01/04/2024    DTaP/Tdap/Td vaccine (4 - Td or Tdap) 11/04/2032    Colorectal Cancer Screen  Completed    Flu vaccine  Completed    Pneumococcal 65+ years Vaccine  Completed    Hepatitis A vaccine  Aged Out    Hib vaccine  Aged Out    Meningococcal (ACWY) vaccine  Aged Out       Subjective:      Review of Systems   Constitutional:  Positive for fatigue. HENT: Negative. Eyes: Negative. Respiratory: Negative. Cardiovascular: Negative. Gastrointestinal:  Positive for abdominal pain and diarrhea. Negative for abdominal distention, anal bleeding, blood in stool, constipation, nausea, rectal pain and vomiting. Genitourinary: Negative. Musculoskeletal: Negative. Skin: Negative. Neurological: Negative. Psychiatric/Behavioral: Negative. Objective:     Physical Exam  Vitals and nursing note reviewed. Constitutional:       Appearance: She is well-developed. Comments: Frail elderly female. HENT:      Head: Normocephalic. Eyes:      Pupils: Pupils are equal, round, and reactive to light. Cardiovascular:      Rate and Rhythm: Normal rate and regular rhythm. Heart sounds: Normal heart sounds. Pulmonary:      Effort: Pulmonary effort is normal.      Breath sounds: Normal breath sounds. Abdominal:      General: Abdomen is flat. Bowel sounds are normal.      Palpations: Abdomen is soft. Tenderness: There is abdominal tenderness in the left upper quadrant and left lower quadrant. There is no right CVA tenderness, left CVA tenderness, guarding or rebound. Negative signs include Bryson's sign, Rovsing's sign, McBurney's sign, psoas sign and obturator sign. Musculoskeletal:      Cervical back: Normal range of motion. Skin:     General: Skin is warm and dry. Capillary Refill: Capillary refill takes less than 2 seconds. Neurological:      General: No focal deficit present. Mental Status: She is alert and oriented to person, place, and time. Mental status is at baseline. Psychiatric:         Mood and Affect: Mood normal.         Behavior: Behavior normal.         Thought Content:  Thought content normal.         Judgment: Judgment normal.     /80   Pulse 78   Temp 97.1 °F (36.2 °C)   Ht 5' 3\" (1.6 m)   Wt 139 lb (63 kg)   SpO2 96%   BMI 24.62 kg/m²   Venous Access Procedure Note  Indication: maintenance flush    Procedure: The patient was placed in the appropriate position and the skin over the puncture site was prepped with betadine and draped in a sterile fashion. Intravenous access was obtained in right chest port with a Bliss needle and the site was secured appropriately. . The port was flushed with 10cc NS and then 3 cc heparin flush. The patient tolerated the procedure well. Complications: None    Assessment:       Diagnosis Orders   1. Diarrhea, unspecified type  CTA ABDOMEN PELVIS W WO CONTRAST      2. Port-A-Cath in place  CO IRRIGAJ IMPLNTD VENOUS ACCESS DRUG DELIVERY SYST      3. Encounter for care related to Port-a-Cath  CO IRRIGAJ IMPLNTD VENOUS ACCESS DRUG DELIVERY SYST      4. Stage 3b chronic kidney disease ([facility] [address]        5. Rash and nonspecific skin eruption        6. Left sided abdominal pain  CTA ABDOMEN PELVIS W WO CONTRAST      7. Left upper quadrant abdominal pain  CTA ABDOMEN PELVIS W WO CONTRAST            Plan:   More than 50% of the time was spent counseling and coordinating care for a total time of 30min face to face. I reviewed his CT scan from 2021 and she had some diverticulosis. We discussed diverticulitis but she does not have a fever. She does have history of a colectomy so her colon is likely smaller. I would like to get a CT scan to make sure. She does not want to go back to see Dr. Katelin Moyer. I am not sure she needs another colonoscopy at 82years old. I have asked her to try to add fiber or Metamucil to her diet  She had some fasting labs pending for the cardiologist but she is not fasting.   She can come back anytime and get those and we can get other labs as well I went ahead and ordered them today in case she comes back before her next visit with me  PDMP Monitoring:    Last PDMP Regis Linea as Reviewed:  Review User Review Instant Review Result          Last Controlled Substance Monitoring Documentation      [id number] [facility] [place] Office Visit from 3/4/2020 in [facility]   Attestation The Prescription Monitoring Report for this patient was reviewed today. filed at 03/04/2020 1048   Periodic Controlled Substance Monitoring Possible medication side effects, risk of tolerance/dependence & alternative treatments discussed., No signs of potential drug abuse or diversion identified. filed at 03/04/2020 1048          Urine Drug Screenings (1 yr)       POCT Rapid Drug Screen  Collected: 3/4/2020 11:37 AM (Final result)                  Medication Contract and Consent for Opioid Use Documents Filed       Patient Documents       Type of Document Status Date Received Received By Description    Medication Contract Signed 3/19/2019  2:29 PM Darlene hicks neuro    Medication Contract Received 2/1/2021  1:35 PM EMMA BEVERLY Medication Contract Gabapentin                     Patient given educational materials -see patient instructions. Discussed use, benefit, and side effects of prescribed medications. All patient questions answered. Pt voiced understanding. Reviewed health maintenance. Instructed to continue currentmedications, diet and exercise. Patient agreed with treatment plan. Follow up as directed. MEDICATIONS:  No orders of the defined types were placed in this encounter. ORDERS:  Orders Placed This Encounter   Procedures    CTA ABDOMEN PELVIS W WO CONTRAST    NH IRRIGAJ IMPLNTD VENOUS ACCESS DRUG DELIVERY SYST       Follow-up:  No follow-ups on file. PATIENT INSTRUCTIONS:  There are no Patient Instructions on file for this visit. Electronically signed by EARLE Osorio CNP on 3/7/2023 at 1:34 PM    EMR Dragon/transcription disclaimer:  Much of thisencounter note is electronic transcription/translation of spoken language to printed texts. The electronic translation of spoken language may be erroneous, or at times, nonsensical words or phrases may be inadvertentlytranscribed.   Although I have reviewed the note for such errors, some may still exist.

## 2023-03-20 ENCOUNTER — HOSPITAL ENCOUNTER (OUTPATIENT)
Dept: CT IMAGING | Age: 88
Discharge: HOME | End: 2023-03-20
Payer: MEDICARE

## 2023-03-20 DIAGNOSIS — R10.9 LEFT SIDED ABDOMINAL PAIN: ICD-10-CM

## 2023-03-20 DIAGNOSIS — R19.7 DIARRHEA, UNSPECIFIED TYPE: ICD-10-CM

## 2023-03-20 DIAGNOSIS — R10.12 LEFT UPPER QUADRANT ABDOMINAL PAIN: ICD-10-CM

## 2023-03-20 LAB — CREAT SERPL-MCNC: 0.9 MG/DL (ref 0.3–1.3)

## 2023-03-20 PROCEDURE — 74178 CT ABD&PLV WO CNTR FLWD CNTR: CPT

## 2023-03-20 PROCEDURE — 82565 ASSAY OF CREATININE: CPT

## 2023-03-20 PROCEDURE — 6360000004 HC RX CONTRAST MEDICATION: Performed by: NURSE PRACTITIONER

## 2023-03-20 PROCEDURE — 74178 CT ABD&PLV WO CNTR FLWD CNTR: CPT | Performed by: RADIOLOGY

## 2023-03-20 RX ADMIN — IOPAMIDOL 75 ML: 755 INJECTION, SOLUTION INTRAVENOUS at 13:43

## 2023-03-23 ENCOUNTER — PATIENT MESSAGE (OUTPATIENT)
Dept: PRIMARY CARE CLINIC | Age: 88
End: 2023-03-23

## 2023-03-23 DIAGNOSIS — N28.1 RENAL CYST: Primary | ICD-10-CM

## 2023-03-23 NOTE — TELEPHONE ENCOUNTER
From: Darryn Nur  To: Win Valencia  Sent: 3/23/2023 1:02 PM CDT  Subject: Katie Schmidt's renal cyst - specialist recommendation    Hi,  I spoke to Katie Fournier about the CT results phone call she received yesterday with someone in your office. She was on the fence regarding seeing a local urologist vs a [address] [place] [address] urologist.  I am encouraging her to see a urologist locally but she is wanting to know who you would recommend for the local urologist consult? Also, will your office make the appointment? Or will she need to schedule this?     Paddy Record

## 2023-04-06 NOTE — PROGRESS NOTES
Subjective    Ms. Murillo is 88 y.o. female    Chief Complaint: Kidney Cysts    History of Present Illness  Patient presents for bilateral renal cysts.  CT 3/20/23 revealed bilateral renal cysts.  Patient denies flank pain, gross hematuria, recurrent UTI.  + Urge incontinence.      The following portions of the patient's history were reviewed and updated as appropriate: allergies, current medications, past family history, past medical history, past social history, past surgical history and problem list.    Review of Systems      Current Outpatient Medications:   •  amoxicillin (AMOXIL) 500 MG capsule, Take 1 capsule by mouth 3 (Three) Times a Day., Disp: , Rfl:   •  ascorbic acid (VITAMIN C) 500 MG tablet, Take 1 tablet by mouth 2 (Two) Times a Day., Disp: , Rfl:   •  Cyanocobalamin (Vitamin B 12) 500 MCG tablet, Take 1 tablet by mouth Daily., Disp: , Rfl:   •  dilTIAZem CD (CARDIZEM CD) 120 MG 24 hr capsule, Take 1 capsule by mouth 2 (two) times a day., Disp: , Rfl:   •  DULoxetine (CYMBALTA) 30 MG capsule, Take 1 capsule by mouth Daily., Disp: , Rfl:   •  furosemide (LASIX) 20 MG tablet, Take 1 tablet by mouth Daily As Needed (SWELLING)., Disp: , Rfl:   •  HYDROcodone-acetaminophen (NORCO)  MG per tablet, Take 1 tablet by mouth Every 6 (Six) Hours As Needed for Moderate Pain or Severe Pain., Disp: , Rfl:   •  irbesartan (AVAPRO) 300 MG tablet, Take 1 tablet by mouth Daily., Disp: , Rfl:   •  Melatonin 5 MG tablet dispersible, Place 1 tablet on the tongue Every Night., Disp: , Rfl:   •  metFORMIN ER (GLUCOPHAGE-XR) 500 MG 24 hr tablet, Take 1 tablet by mouth Daily With Breakfast., Disp: , Rfl:   •  metoprolol succinate XL (TOPROL-XL) 50 MG 24 hr tablet, Take 1 tablet by mouth 2 (two) times a day., Disp: , Rfl:   •  nortriptyline (PAMELOR) 10 MG capsule, Take 1 capsule by mouth Every Night., Disp: , Rfl:   •  omeprazole (priLOSEC) 40 MG capsule, TAKE 1 CAPSULE BY MOUTH DAILY, Disp: , Rfl:   •  pregabalin (LYRICA)  "150 MG capsule, Take 1 capsule by mouth Daily., Disp: , Rfl:   •  rivaroxaban (XARELTO) 20 MG tablet, Take 1 tablet by mouth., Disp: , Rfl:   •  vitamin D (ERGOCALCIFEROL) 05975 units capsule capsule, TAKE 1 CAPSULE BY MOUTH 1 TIME A WEEK, Disp: , Rfl:   •  zinc sulfate (ZINCATE) 220 (50 Zn) MG capsule, Take 1 capsule by mouth Daily., Disp: , Rfl:     Past Medical History:   Diagnosis Date   • Chronic deep vein thrombosis (DVT) of proximal vein of left lower extremity 2018   • Colon cancer    • Overlapping malignant neoplasm of colon 2018       Past Surgical History:   Procedure Laterality Date   • APPENDECTOMY     •  SECTION     • CHOLECYSTECTOMY     • COLON SURGERY     • HYSTERECTOMY         Social History     Socioeconomic History   • Marital status:    Tobacco Use   • Smoking status: Never   Vaping Use   • Vaping Use: Never used   Substance and Sexual Activity   • Alcohol use: Never   • Drug use: Never       History reviewed. No pertinent family history.    Objective    Temp 97.5 °F (36.4 °C)   Ht 160 cm (63\")   Wt 62.1 kg (137 lb)   BMI 24.27 kg/m²     Physical Exam    CT independent review    The CT scan of the abdomen/pelvis done with and without contrast is available for me to review.  Treatment recommendations require an independent review.  First I scanned the liver, spleen, and bowel pattern.  The retroperitoneum including the major vessels and lymphatic packages are briefly reviewed.  This film has been reviewed by the radiologist to determine any nonurologic abnormalities that are present.  The kidneys are closely inspected for size, symmetry, contour, parenchymal thickness, perinephric reaction, presence of calcifications, and intrarenal dilation of the collecting system.  The ureters are inspected for their course, caliber, and any calcifications.  The bladder is inspected for its thickness, size, and presence of any calcifications.  This scan shows:    The right kidney " appears Bosniak I renal cyst    The left kidney appears Bosniak I renal cyst    The bladder appears normal on this non-contrasted CT scan.  The bladder appears normal in thickness.  There no masses or stones seen on this exam.         Results for orders placed or performed in visit on 04/10/23   POC Urinalysis Dipstick, Multipro    Specimen: Urine   Result Value Ref Range    Color Yellow Yellow, Straw, Dark Yellow, Gala    Clarity, UA Clear Clear    Glucose, UA Negative Negative mg/dL    Bilirubin Small (1+) (A) Negative    Ketones, UA Trace (A) Negative    Specific Gravity  1.015 1.005 - 1.030    Blood, UA Negative Negative    pH, Urine 6.0 5.0 - 8.0    Protein, POC 30 mg/dL (A) Negative mg/dL    Urobilinogen, UA 0.2 E.U./dL Normal, 0.2 E.U./dL    Nitrite, UA Negative Negative    Leukocytes Negative Negative     Assessment and Plan    Diagnoses and all orders for this visit:    1. Multiple renal cysts (Primary)  -     POC Urinalysis Dipstick, Multipro    2. Urge incontinence        Bilateral renal cysts Bosniak I with no complexity.  I do not think this is source of LUQ pain.  FU PRN.

## 2023-04-10 ENCOUNTER — PATIENT ROUNDING (BHMG ONLY) (OUTPATIENT)
Dept: UROLOGY | Facility: CLINIC | Age: 88
End: 2023-04-10
Payer: MEDICARE

## 2023-04-10 ENCOUNTER — OFFICE VISIT (OUTPATIENT)
Dept: UROLOGY | Facility: CLINIC | Age: 88
End: 2023-04-10
Payer: MEDICARE

## 2023-04-10 VITALS — WEIGHT: 137 LBS | BODY MASS INDEX: 24.27 KG/M2 | HEIGHT: 63 IN | TEMPERATURE: 97.5 F

## 2023-04-10 DIAGNOSIS — Q61.02 MULTIPLE RENAL CYSTS: Primary | ICD-10-CM

## 2023-04-10 DIAGNOSIS — N39.41 URGE INCONTINENCE: ICD-10-CM

## 2023-04-10 LAB
BILIRUB BLD-MCNC: ABNORMAL MG/DL
CLARITY, POC: CLEAR
COLOR UR: YELLOW
GLUCOSE UR STRIP-MCNC: NEGATIVE MG/DL
KETONES UR QL: ABNORMAL
LEUKOCYTE EST, POC: NEGATIVE
NITRITE UR-MCNC: NEGATIVE MG/ML
PH UR: 6 [PH] (ref 5–8)
PROT UR STRIP-MCNC: ABNORMAL MG/DL
RBC # UR STRIP: NEGATIVE /UL
SP GR UR: 1.01 (ref 1–1.03)
UROBILINOGEN UR QL: ABNORMAL

## 2023-04-10 PROCEDURE — 99203 OFFICE O/P NEW LOW 30 MIN: CPT | Performed by: UROLOGY

## 2023-04-10 PROCEDURE — 81003 URINALYSIS AUTO W/O SCOPE: CPT | Performed by: UROLOGY

## 2023-04-10 PROCEDURE — 1159F MED LIST DOCD IN RCRD: CPT | Performed by: UROLOGY

## 2023-04-10 PROCEDURE — 1160F RVW MEDS BY RX/DR IN RCRD: CPT | Performed by: UROLOGY

## 2023-04-10 NOTE — PROGRESS NOTES
April 10, 2023    Hello, may I speak with Megan Murillo?    My name is Dea      I am  with [facility] [place]  [address] [place] [address], [address]  [facility] [address] [phone].    Before we get started may I verify your date of birth? 1935    I am calling to officially welcome you to our practice and ask about your recent visit. Is this a good time to talk? yes    Tell me about your visit with us. What things went well?  Yes, this is a really nice office.  Everyone here has been very kind.       We're always looking for ways to make our patients' experiences even better. Do you have recommendations on ways we may improve?  no    Overall were you satisfied with your first visit to our practice? yes       I appreciate you taking the time to speak with me today. Is there anything else I can do for you? no      Thank you, and have a great day.

## 2023-04-19 ENCOUNTER — ANCILLARY PROCEDURE (OUTPATIENT)
Dept: PRIMARY CARE CLINIC | Age: 88
End: 2023-04-19
Payer: MEDICARE

## 2023-04-19 ENCOUNTER — OFFICE VISIT (OUTPATIENT)
Dept: PRIMARY CARE CLINIC | Age: 88
End: 2023-04-19

## 2023-04-19 VITALS
DIASTOLIC BLOOD PRESSURE: 60 MMHG | BODY MASS INDEX: 24.63 KG/M2 | HEIGHT: 63 IN | SYSTOLIC BLOOD PRESSURE: 120 MMHG | WEIGHT: 139 LBS | RESPIRATION RATE: 16 BRPM | HEART RATE: 76 BPM | TEMPERATURE: 96.5 F | OXYGEN SATURATION: 98 %

## 2023-04-19 DIAGNOSIS — M25.552 LEFT HIP PAIN: ICD-10-CM

## 2023-04-19 DIAGNOSIS — N18.32 STAGE 3B CHRONIC KIDNEY DISEASE (HCC): ICD-10-CM

## 2023-04-19 DIAGNOSIS — Z45.2 ENCOUNTER FOR CARE RELATED TO PORT-A-CATH: ICD-10-CM

## 2023-04-19 DIAGNOSIS — Z95.828 PORT-A-CATH IN PLACE: ICD-10-CM
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
## 2023-04-19 DIAGNOSIS — R10.9 LEFT SIDED ABDOMINAL PAIN: ICD-10-CM

## 2023-04-19 DIAGNOSIS — M53.3 COCCYX PAIN: ICD-10-CM

## 2023-04-19 LAB
ERYTHROCYTE [DISTWIDTH] IN BLOOD BY AUTOMATED COUNT: 19.6 % (ref 11.5–14.5)
HCT VFR BLD AUTO: 31.7 % (ref 37–47)
HGB BLD-MCNC: 8.3 G/DL (ref 12–16)
MCH RBC QN AUTO: 21.2 PG (ref 27–31)
MCHC RBC AUTO-ENTMCNC: 26.2 G/DL (ref 33–37)
MCV RBC AUTO: 80.9 FL (ref 81–99)
PLATELET # BLD AUTO: 238 K/UL (ref 130–400)
PMV BLD AUTO: 10 FL (ref 9.4–12.3)
RBC # BLD AUTO: 3.92 M/UL (ref 4.2–5.4)
WBC # BLD AUTO: 10.5 K/UL (ref 4.8–10.8)

## 2023-04-19 PROCEDURE — 73502 X-RAY EXAM HIP UNI 2-3 VIEWS: CPT | Performed by: NURSE PRACTITIONER

## 2023-04-19 PROCEDURE — 72220 X-RAY EXAM SACRUM TAILBONE: CPT | Performed by: NURSE PRACTITIONER

## 2023-04-19 RX ORDER — FERROUS SULFATE 325(65) MG
325 TABLET ORAL 2 TIMES DAILY
Qty: 60 TABLET | Refills: 5 | Status: SHIPPED | OUTPATIENT
Start: 2023-04-19

## 2023-04-19 NOTE — PROGRESS NOTES
[address] [facility] [address]  [address] [address]  Dept: [phone]  Dept Fax: [phone]  Loc: [phone]    Ro Hinkle is a 67 [facility] y.o. female who presents today for her medical conditions/complaints as noted below. Ro Hinkle is c/o of [facility] (Patient presents today for port flush. She will need labs today. Patient is wanting samples of Xarelto.)        HPI:     HPI   Chief Complaint   Patient presents with    Mediport     Patient presents today for port flush. She will need labs today. Patient is wanting samples of Xarelto. Port flush as well as some labs. She is not fasting she ate something today. She also fell about 2 weeks ago backwards and hit the back of her head and her left hip. Her tailbone has been hurting she has a bruise on the left hip. She said she just lost her balance and fell backward she is been feeling off balance for quite a while. She uses her cane mostly but she does have a walker. She has been able to walk on heels the pain mainly is in her tailbone. She has not felt any palpitations or atrial fibrillation. She feels off balance but not really dizzy.   Past Medical History:   Diagnosis Date    Afib ([facility] [address] 2021    Bronchitis     Colon cancer ([facility] [address]     Colon polyps     Constipation     Deep vein blood clot of left lower extremity (HCC)     Left leg     Depression     DVT (deep venous thrombosis) (HCC)     Dysphagia     Fibrocystic breast disease     Fibromyalgia     GERD (gastroesophageal reflux disease)     reflux with hx of esophageal stricture    Headache(784.0)     History of colon cancer 2000    Hormone replacement therapy (postmenopausal)     Hypertension     Neuropathy of foot     In both feet    Osteoarthritis     left knee pain    Palliative care patient 02/01/2022    Restless legs syndrome     Type 2 diabetes mellitus 10/25/2021    Vitamin D deficiency       Past Surgical History:   Procedure Laterality Date    APPENDECTOMY

## 2023-04-24 ENCOUNTER — PATIENT MESSAGE (OUTPATIENT)
Dept: PRIMARY CARE CLINIC | Age: 88
End: 2023-04-24

## 2023-04-24 RX ORDER — BACLOFEN 10 MG/1
5 TABLET ORAL NIGHTLY PRN
Qty: 15 TABLET | Refills: 0 | Status: SHIPPED | OUTPATIENT
Start: 2023-04-24

## 2023-04-24 NOTE — TELEPHONE ENCOUNTER
From: Kofi Anand  To: Abhay Pan  Sent: 4/24/2023 9:47 AM CDT  Subject: Leg cramps    HiKody has been experiencing some leg cramping since fracturing sacrum/coccyx. She said it starts in feet and moves up leg - it mainly occurs in the morning when she first gets up. Is there anything you recommend to ease this? Regarding iron supplement, she is tolerating the Flinstone chewables quite well (so far).    Thank you,     Jacquelyn Maldonado

## 2023-05-01 RX ORDER — DULOXETIN HYDROCHLORIDE 60 MG/1
60 CAPSULE, DELAYED RELEASE ORAL DAILY
Qty: 30 CAPSULE | Refills: 0 | Status: SHIPPED | OUTPATIENT
Start: 2023-05-01

## 2023-05-01 RX ORDER — OMEPRAZOLE 40 MG/1
40 CAPSULE, DELAYED RELEASE ORAL DAILY
Qty: 90 CAPSULE | Refills: 3 | Status: SHIPPED | OUTPATIENT
Start: 2023-05-01

## 2023-06-05 RX ORDER — DULOXETIN HYDROCHLORIDE 60 MG/1
60 CAPSULE, DELAYED RELEASE ORAL DAILY
Qty: 30 CAPSULE | Refills: 0 | Status: SHIPPED | OUTPATIENT
Start: 2023-06-05

## 2023-07-03 RX ORDER — DULOXETIN HYDROCHLORIDE 60 MG/1
60 CAPSULE, DELAYED RELEASE ORAL DAILY
Qty: 30 CAPSULE | Refills: 0 | Status: SHIPPED | OUTPATIENT
Start: 2023-07-03

## 2023-07-10 RX ORDER — METFORMIN HYDROCHLORIDE 500 MG/1
TABLET, EXTENDED RELEASE ORAL
Qty: 30 TABLET | Refills: 3 | Status: SHIPPED | OUTPATIENT
Start: 2023-07-10

## 2023-07-11 ENCOUNTER — PATIENT MESSAGE (OUTPATIENT)
Dept: PRIMARY CARE CLINIC | Age: 88
End: 2023-07-11

## 2023-07-11 RX ORDER — RIVAROXABAN 10 MG/1
10 TABLET, FILM COATED ORAL
Qty: 30 TABLET | Refills: 1 | Status: SHIPPED | OUTPATIENT
Start: 2023-07-11

## 2023-07-11 NOTE — TELEPHONE ENCOUNTER
From: Sue Fletcher  Sent: 7/11/2023 10:17 AM CDT  To: [facility] Clinical Staff  Subject: Nelson Niall - Xarelto    Yes, please. The dosage she was given last were 10 mg. Thank you.

## 2023-08-01 ENCOUNTER — OFFICE VISIT (OUTPATIENT)
Dept: PRIMARY CARE CLINIC | Age: 88
End: 2023-08-01
Payer: MEDICARE

## 2023-08-01 VITALS
BODY MASS INDEX: 24.63 KG/M2 | SYSTOLIC BLOOD PRESSURE: 138 MMHG | OXYGEN SATURATION: 98 % | TEMPERATURE: 96.8 F | DIASTOLIC BLOOD PRESSURE: 80 MMHG | WEIGHT: 139 LBS | RESPIRATION RATE: 18 BRPM | HEART RATE: 72 BPM | HEIGHT: 63 IN

## 2023-08-01 DIAGNOSIS — N18.32 STAGE 3B CHRONIC KIDNEY DISEASE (HCC): Primary | ICD-10-CM

## 2023-08-01 DIAGNOSIS — D63.1 ANEMIA DUE TO STAGE 3A CHRONIC KIDNEY DISEASE (HCC): ICD-10-CM

## 2023-08-01 DIAGNOSIS — N18.31 ANEMIA DUE TO STAGE 3A CHRONIC KIDNEY DISEASE (HCC): ICD-10-CM

## 2023-08-01 DIAGNOSIS — N28.1 KIDNEY CYSTS: ICD-10-CM

## 2023-08-01 DIAGNOSIS — Z95.828 PORT-A-CATH IN PLACE: ICD-10-CM

## 2023-08-01 DIAGNOSIS — Z45.2 ENCOUNTER FOR CARE RELATED TO PORT-A-CATH: ICD-10-CM

## 2023-08-01 LAB
ALBUMIN SERPL-MCNC: 4 G/DL (ref 3.5–5.2)
ALP SERPL-CCNC: 117 U/L (ref 35–104)
ALT SERPL-CCNC: 11 U/L (ref 5–33)
ANION GAP SERPL CALCULATED.3IONS-SCNC: 10 MMOL/L (ref 7–19)
AST SERPL-CCNC: 19 U/L (ref 5–32)
BASOPHILS # BLD: 0.1 K/UL (ref 0–0.2)
BASOPHILS NFR BLD: 1 % (ref 0–1)
BILIRUB SERPL-MCNC: <0.2 MG/DL (ref 0.2–1.2)
BUN SERPL-MCNC: 22 MG/DL (ref 8–23)
CALCIUM SERPL-MCNC: 8.8 MG/DL (ref 8.8–10.2)
CHLORIDE SERPL-SCNC: 104 MMOL/L (ref 98–111)
CO2 SERPL-SCNC: 25 MMOL/L (ref 22–29)
CREAT SERPL-MCNC: 0.8 MG/DL (ref 0.5–0.9)
EOSINOPHIL # BLD: 0.7 K/UL (ref 0–0.6)
EOSINOPHIL NFR BLD: 10 % (ref 0–5)
ERYTHROCYTE [DISTWIDTH] IN BLOOD BY AUTOMATED COUNT: 17.7 % (ref 11.5–14.5)
GLUCOSE SERPL-MCNC: 98 MG/DL (ref 74–109)
HCT VFR BLD AUTO: 30.6 % (ref 37–47)
HGB BLD-MCNC: 9 G/DL (ref 12–16)
IMM GRANULOCYTES # BLD: 0 K/UL
LYMPHOCYTES # BLD: 2.2 K/UL (ref 1.1–4.5)
LYMPHOCYTES NFR BLD: 30.7 % (ref 20–40)
MCH RBC QN AUTO: 22.5 PG (ref 27–31)
MCHC RBC AUTO-ENTMCNC: 29.4 G/DL (ref 33–37)
MCV RBC AUTO: 76.5 FL (ref 81–99)
MONOCYTES # BLD: 0.7 K/UL (ref 0–0.9)
MONOCYTES NFR BLD: 9 % (ref 0–10)
NEUTROPHILS # BLD: 3.5 K/UL (ref 1.5–7.5)
NEUTS SEG NFR BLD: 49 % (ref 50–65)
PLATELET # BLD AUTO: 319 K/UL (ref 130–400)
PMV BLD AUTO: 10.5 FL (ref 9.4–12.3)
POTASSIUM SERPL-SCNC: 4.7 MMOL/L (ref 3.5–5)
PROT SERPL-MCNC: 6.3 G/DL (ref 6.6–8.7)
RBC # BLD AUTO: 4 M/UL (ref 4.2–5.4)
SODIUM SERPL-SCNC: 139 MMOL/L (ref 136–145)
WBC # BLD AUTO: 7.2 K/UL (ref 4.8–10.8)

## 2023-08-01 PROCEDURE — 99214 OFFICE O/P EST MOD 30 MIN: CPT | Performed by: NURSE PRACTITIONER

## 2023-08-01 PROCEDURE — 1123F ACP DISCUSS/DSCN MKR DOCD: CPT | Performed by: NURSE PRACTITIONER

## 2023-08-01 PROCEDURE — 96523 IRRIG DRUG DELIVERY DEVICE: CPT | Performed by: NURSE PRACTITIONER

## 2023-08-01 RX ORDER — MOXIFLOXACIN 5 MG/ML
SOLUTION/ DROPS OPHTHALMIC
COMMUNITY
Start: 2023-07-07

## 2023-08-01 SDOH — ECONOMIC STABILITY: INCOME INSECURITY: HOW HARD IS IT FOR YOU TO PAY FOR THE VERY BASICS LIKE FOOD, HOUSING, MEDICAL CARE, AND HEATING?: NOT VERY HARD

## 2023-08-01 SDOH — ECONOMIC STABILITY: FOOD INSECURITY: WITHIN THE PAST 12 MONTHS, THE FOOD YOU BOUGHT JUST DIDN'T LAST AND YOU DIDN'T HAVE MONEY TO GET MORE.: NEVER TRUE

## 2023-08-01 SDOH — ECONOMIC STABILITY: FOOD INSECURITY: WITHIN THE PAST 12 MONTHS, YOU WORRIED THAT YOUR FOOD WOULD RUN OUT BEFORE YOU GOT MONEY TO BUY MORE.: NEVER TRUE

## 2023-08-01 ASSESSMENT — PATIENT HEALTH QUESTIONNAIRE - PHQ9
5. POOR APPETITE OR OVEREATING: 0
SUM OF ALL RESPONSES TO PHQ QUESTIONS 1-9: 0
SUM OF ALL RESPONSES TO PHQ QUESTIONS 1-9: 0
10. IF YOU CHECKED OFF ANY PROBLEMS, HOW DIFFICULT HAVE THESE PROBLEMS MADE IT FOR YOU TO DO YOUR WORK, TAKE CARE OF THINGS AT HOME, OR GET ALONG WITH OTHER PEOPLE: 0
7. TROUBLE CONCENTRATING ON THINGS, SUCH AS READING THE NEWSPAPER OR WATCHING TELEVISION: 0
1. LITTLE INTEREST OR PLEASURE IN DOING THINGS: 0
2. FEELING DOWN, DEPRESSED OR HOPELESS: 0
4. FEELING TIRED OR HAVING LITTLE ENERGY: 0
SUM OF ALL RESPONSES TO PHQ QUESTIONS 1-9: 0
3. TROUBLE FALLING OR STAYING ASLEEP: 0
SUM OF ALL RESPONSES TO PHQ QUESTIONS 1-9: 0
6. FEELING BAD ABOUT YOURSELF - OR THAT YOU ARE A FAILURE OR HAVE LET YOURSELF OR YOUR FAMILY DOWN: 0
9. THOUGHTS THAT YOU WOULD BE BETTER OFF DEAD, OR OF HURTING YOURSELF: 0
SUM OF ALL RESPONSES TO PHQ QUESTIONS 1-9: 0
SUM OF ALL RESPONSES TO PHQ QUESTIONS 1-9: 0
8. MOVING OR SPEAKING SO SLOWLY THAT OTHER PEOPLE COULD HAVE NOTICED. OR THE OPPOSITE, BEING SO FIGETY OR RESTLESS THAT YOU HAVE BEEN MOVING AROUND A LOT MORE THAN USUAL: 0
1. LITTLE INTEREST OR PLEASURE IN DOING THINGS: 0
2. FEELING DOWN, DEPRESSED OR HOPELESS: 0
SUM OF ALL RESPONSES TO PHQ9 QUESTIONS 1 & 2: 0
SUM OF ALL RESPONSES TO PHQ QUESTIONS 1-9: 0
SUM OF ALL RESPONSES TO PHQ9 QUESTIONS 1 & 2: 0
SUM OF ALL RESPONSES TO PHQ QUESTIONS 1-9: 0

## 2023-08-01 ASSESSMENT — ENCOUNTER SYMPTOMS
BACK PAIN: 1
RESPIRATORY NEGATIVE: 1
GASTROINTESTINAL NEGATIVE: 1

## 2023-08-01 NOTE — PROGRESS NOTES
[address]  [place] [id number] [place] [address]  Dept: [phone]  Dept Fax: [phone]  Loc: [phone]    Dc Monteiro is a 82 y.o. female who presents today for her medical conditions/complaints as noted below. Dc Monteiro is c/o of Hypertension (2-month-f/u. With chronic kidney disease. Here today for a port flush.  )        HPI:     HPI   Chief Complaint   Patient presents with    Hypertension     2-month-f/u. With chronic kidney disease. Here today for a port flush. She is here to have her port flushed. She is doing fairly well. She could not tolerate the [place] vitamin iron either. It upset her stomach. She did go see the urologist at [facility] and he told her the cyst or not causing her pain and he would not do anything about them at this point. She is still seeing Dr. Chuy Cedeno for pain management and they are considering doing a implanted stimulator and she would like my advice on that today. She does take pain pills twice a day due to the pain in her hip and her low back. She is not a surgical candidate.   Past Medical History:   Diagnosis Date    Afib ([address])     Bronchitis     Colon cancer ([address])     Colon polyps     Constipation     Deep vein blood clot of left lower extremity (HCC)     Left leg     Depression     DVT (deep venous thrombosis) (HCC)     Dysphagia     Fibrocystic breast disease     Fibromyalgia     GERD (gastroesophageal reflux disease)     reflux with hx of esophageal stricture    Headache(784.0)     History of colon cancer     Hormone replacement therapy (postmenopausal)     Hypertension     Neuropathy of foot     In both feet    Osteoarthritis     left knee pain    Palliative care patient 2022    Restless legs syndrome     Type 2 diabetes mellitus 10/25/2021    Vitamin D deficiency       Past Surgical History:   Procedure Laterality Date    APPENDECTOMY      BREAST BIOPSY      CATARACT REMOVAL

## 2023-08-01 NOTE — PATIENT INSTRUCTIONS
Check your iron and kidney function today you could try some slow release iron and see if this upsets her stomach  We can always send you back to hematology if you are lower and iron for possible infusions  Continue to use your walker when you are up avoiding falls  Continue with pain management the pain stimulator would probably be beneficial to you. You must stop your blood thinner prior to having an implanted.

## 2023-08-02 RX ORDER — DULOXETIN HYDROCHLORIDE 60 MG/1
60 CAPSULE, DELAYED RELEASE ORAL DAILY
Qty: 30 CAPSULE | Refills: 3 | Status: SHIPPED | OUTPATIENT
Start: 2023-08-02

## 2023-08-07 ENCOUNTER — OFFICE VISIT (OUTPATIENT)
Dept: CARDIOLOGY CLINIC | Age: 88
End: 2023-08-07
Payer: MEDICARE

## 2023-08-07 VITALS
DIASTOLIC BLOOD PRESSURE: 54 MMHG | BODY MASS INDEX: 24.8 KG/M2 | HEIGHT: 63 IN | SYSTOLIC BLOOD PRESSURE: 124 MMHG | HEART RATE: 56 BPM | WEIGHT: 140 LBS

## 2023-08-07 DIAGNOSIS — R35.0 FREQUENT URINATION: ICD-10-CM

## 2023-08-07 DIAGNOSIS — R35.0 FREQUENT URINATION: Primary | ICD-10-CM

## 2023-08-07 PROCEDURE — 1123F ACP DISCUSS/DSCN MKR DOCD: CPT | Performed by: INTERNAL MEDICINE

## 2023-08-07 PROCEDURE — 99214 OFFICE O/P EST MOD 30 MIN: CPT | Performed by: INTERNAL MEDICINE

## 2023-08-07 ASSESSMENT — ENCOUNTER SYMPTOMS
APNEA: 0
CHEST TIGHTNESS: 0
BLOOD IN STOOL: 0
EYE REDNESS: 0
ABDOMINAL DISTENTION: 0
VOMITING: 0
CONSTIPATION: 0
ABDOMINAL PAIN: 0
SHORTNESS OF BREATH: 0
NAUSEA: 0
EYE PAIN: 0
FACIAL SWELLING: 0
EYE DISCHARGE: 0
SORE THROAT: 0
DIARRHEA: 0
COUGH: 0
WHEEZING: 0

## 2023-08-07 NOTE — PROGRESS NOTES
Cardiology Office Visit Note  [address], [address] [place] [address]  Phone: [phone]  Fax: [phone]                            Date:  8/7/2023  Patient: Jaymie Leonardo  Age:  82 y.o., 1935    Referral: No ref.  provider found    REASON FOR VISIT:  6 Month Follow-Up         PROBLEM LIST:    Patient Active Problem List    Diagnosis Date Noted    TIA (transient ischemic attack) 09/07/2022     Priority: High    Chest pain 09/06/2022     Priority: Medium    Weakness 09/06/2022     Priority: Medium    Palliative care patient 02/02/2022     Priority: Low    Acute hypoxemic respiratory failure due to COVID-19 ([address]) 02/01/2022     Priority: Low    COVID-19 virus infection 01/20/2022     Priority: Low    History of atrial fibrillation 01/20/2022     Priority: Low    COVID-19 01/20/2022     Priority: Low    Iron deficiency anemia 01/13/2022     Priority: Low    Type 2 diabetes mellitus 10/25/2021     Priority: Low    Atrial fibrillation with RVR ([address]) 09/16/2021     Priority: Low    History of esophageal stricture 08/24/2021     Priority: Low    Depression 04/20/2021     Priority: Low    Fungal dermatitis 05/07/2020     Priority: Low    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 05/07/2020     Priority: Low    Mixed simple and mucopurulent chronic bronchitis ([address]) 05/04/2020     Priority: Low    Malignant neoplasm of colon ([address]) 04/20/2020     Priority: Low    Cellulitis of left lower limb 06/29/2019     Priority: Low    Fracture of right foot 06/29/2019     Priority: Low    Hypochloremia 06/29/2019     Priority: Low    CKD (chronic kidney disease) stage 3, GFR 30-59 ml/min ([address]) 06/29/2019     Priority: Low    Acute deep vein thrombosis (DVT) of femoral vein of left lower extremity ([address]) 06/12/2019     Priority: Low    Pain in both lower extremities      Priority: Low    Numbness and tingling of both feet      Priority: Low    Avascular necrosis ([address]) 04/08/2019

## 2023-08-10 DIAGNOSIS — N39.0 E. COLI UTI: ICD-10-CM

## 2023-08-10 DIAGNOSIS — B96.20 E. COLI UTI: ICD-10-CM

## 2023-08-12 LAB
BACTERIA UR CULT: ABNORMAL
BACTERIA UR CULT: ABNORMAL
ORGANISM: ABNORMAL

## 2023-08-16 RX ORDER — CIPROFLOXACIN 250 MG/1
250 TABLET, FILM COATED ORAL 2 TIMES DAILY
Qty: 10 TABLET | Refills: 0 | Status: SHIPPED | OUTPATIENT
Start: 2023-08-16 | End: 2023-08-21

## 2023-08-18 ENCOUNTER — PATIENT MESSAGE (OUTPATIENT)
Dept: PRIMARY CARE CLINIC | Age: 88
End: 2023-08-18

## 2023-08-18 RX ORDER — NITROFURANTOIN 25; 75 MG/1; MG/1
100 CAPSULE ORAL 2 TIMES DAILY
Qty: 20 CAPSULE | Refills: 0 | Status: SHIPPED | OUTPATIENT
Start: 2023-08-18 | End: 2023-08-28

## 2023-08-18 NOTE — TELEPHONE ENCOUNTER
From: Emeka Ruiz  To: Marjorie Going  Sent: 8/18/2023 1:33 PM CDT  Subject: Kvng Avalos Roxana's cipro prescription    Sallie Treviño filled the Cipro prescription, however, after reading the paragraph in the medication package insert \"What is most important information I should know about ciprofloxacin? \" and it states can cause tendon rupture or tendinitis. She asks if there is another antibiotic she can take for her bladder because she is very hesitant to take cipro.   Thank you,  Izabela العلي

## 2023-08-21 RX ORDER — OMEPRAZOLE 40 MG/1
40 CAPSULE, DELAYED RELEASE ORAL DAILY
Qty: 90 CAPSULE | Refills: 1 | Status: SHIPPED | OUTPATIENT
Start: 2023-08-21

## 2023-08-21 RX ORDER — DULOXETIN HYDROCHLORIDE 60 MG/1
60 CAPSULE, DELAYED RELEASE ORAL DAILY
Qty: 90 CAPSULE | Refills: 1 | Status: SHIPPED | OUTPATIENT
Start: 2023-08-21

## 2023-09-07 RX ORDER — ACYCLOVIR 400 MG/1
TABLET ORAL
Qty: 180 TABLET | Refills: 2 | Status: SHIPPED | OUTPATIENT
Start: 2023-09-07

## 2023-09-15 RX ORDER — RIVAROXABAN 10 MG/1
10 TABLET, FILM COATED ORAL DAILY
Qty: 30 TABLET | Refills: 1 | Status: SHIPPED | OUTPATIENT
Start: 2023-09-15

## 2023-09-25 ENCOUNTER — APPOINTMENT (OUTPATIENT)
Dept: GENERAL RADIOLOGY | Age: 88
DRG: 605 | End: 2023-09-25
Payer: MEDICARE

## 2023-09-25 ENCOUNTER — APPOINTMENT (OUTPATIENT)
Dept: CT IMAGING | Age: 88
DRG: 605 | End: 2023-09-25
Payer: MEDICARE

## 2023-09-25 ENCOUNTER — HOSPITAL ENCOUNTER (INPATIENT)
Age: 88
LOS: 7 days | Discharge: NURSING, REHAB OR LONG-TERM CARE | DRG: 605 | End: 2023-10-02
Attending: EMERGENCY MEDICINE | Admitting: STUDENT IN AN ORGANIZED HEALTH CARE EDUCATION/TRAINING PROGRAM
Payer: MEDICARE

## 2023-09-25 DIAGNOSIS — S80.02XA TRAUMATIC HEMATOMA OF LEFT KNEE, INITIAL ENCOUNTER: Primary | ICD-10-CM

## 2023-09-25 DIAGNOSIS — R20.2 NUMBNESS AND TINGLING OF BOTH FEET: ICD-10-CM

## 2023-09-25 DIAGNOSIS — R52 INTRACTABLE PAIN: ICD-10-CM

## 2023-09-25 DIAGNOSIS — S09.90XA CLOSED HEAD INJURY, INITIAL ENCOUNTER: ICD-10-CM
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
## 2023-09-25 DIAGNOSIS — R20.0 NUMBNESS AND TINGLING OF BOTH FEET: ICD-10-CM

## 2023-09-25 DIAGNOSIS — R26.9 GAIT DISTURBANCE: ICD-10-CM

## 2023-09-25 PROBLEM — R26.2 AMBULATORY DYSFUNCTION: Status: ACTIVE | Noted: 2023-09-25

## 2023-09-25 PROBLEM — M25.462 EFFUSION OF LEFT KNEE: Status: ACTIVE | Noted: 2023-09-25

## 2023-09-25 PROBLEM — M25.562 PAIN AND SWELLING OF LEFT KNEE: Status: ACTIVE | Noted: 2023-09-25

## 2023-09-25 LAB
ALBUMIN SERPL-MCNC: 3.8 G/DL (ref 3.5–5.2)
ALP SERPL-CCNC: 103 U/L (ref 35–104)
ALT SERPL-CCNC: 5 U/L (ref 5–33)
ANION GAP SERPL CALCULATED.3IONS-SCNC: 9 MMOL/L (ref 7–19)
ANISOCYTOSIS BLD QL SMEAR: ABNORMAL
APTT PPP: 32.8 SEC (ref 26–36.2)
AST SERPL-CCNC: 13 U/L (ref 5–32)
BASOPHILS # BLD: 0.1 K/UL (ref 0–0.2)
BASOPHILS NFR BLD: 0.7 % (ref 0–1)
BILIRUB SERPL-MCNC: <0.2 MG/DL (ref 0.2–1.2)
BUN SERPL-MCNC: 26 MG/DL (ref 8–23)
CALCIUM SERPL-MCNC: 8.7 MG/DL (ref 8.8–10.2)
CHLORIDE SERPL-SCNC: 103 MMOL/L (ref 98–111)
CO2 SERPL-SCNC: 26 MMOL/L (ref 22–29)
CREAT SERPL-MCNC: 0.8 MG/DL (ref 0.5–0.9)
EOSINOPHIL # BLD: 0.6 K/UL (ref 0–0.6)
EOSINOPHIL NFR BLD: 6.2 % (ref 0–5)
ERYTHROCYTE [DISTWIDTH] IN BLOOD BY AUTOMATED COUNT: 17.6 % (ref 11.5–14.5)
GLUCOSE BLD-MCNC: 214 MG/DL (ref 70–99)
GLUCOSE SERPL-MCNC: 108 MG/DL (ref 74–109)
HCT VFR BLD AUTO: 27.3 % (ref 37–47)
HGB BLD-MCNC: 7.9 G/DL (ref 12–16)
HYPOCHROMIA BLD QL SMEAR: ABNORMAL
IMM GRANULOCYTES # BLD: 0 K/UL
INR PPP: 1.58 (ref 0.88–1.18)
LYMPHOCYTES # BLD: 2.3 K/UL (ref 1.1–4.5)
LYMPHOCYTES NFR BLD: 25.5 % (ref 20–40)
MCH RBC QN AUTO: 21.9 PG (ref 27–31)
MCHC RBC AUTO-ENTMCNC: 28.9 G/DL (ref 33–37)
MCV RBC AUTO: 75.6 FL (ref 81–99)
MICROCYTES BLD QL SMEAR: ABNORMAL
MONOCYTES # BLD: 0.7 K/UL (ref 0–0.9)
MONOCYTES NFR BLD: 7.5 % (ref 0–10)
NEUTROPHILS # BLD: 5.3 K/UL (ref 1.5–7.5)
NEUTS SEG NFR BLD: 59.7 % (ref 50–65)
OVALOCYTES BLD QL SMEAR: ABNORMAL
PERFORMED ON: ABNORMAL
PLATELET # BLD AUTO: 318 K/UL (ref 130–400)
PLATELET SLIDE REVIEW: ADEQUATE
PMV BLD AUTO: 9.9 FL (ref 9.4–12.3)
POTASSIUM SERPL-SCNC: 4.2 MMOL/L (ref 3.5–5)
PROT SERPL-MCNC: 6.3 G/DL (ref 6.6–8.7)
PROTHROMBIN TIME: 18.4 SEC (ref 12–14.6)
RBC # BLD AUTO: 3.61 M/UL (ref 4.2–5.4)
SODIUM SERPL-SCNC: 138 MMOL/L (ref 136–145)
WBC # BLD AUTO: 8.9 K/UL (ref 4.8–10.8)

## 2023-09-25 PROCEDURE — 94760 N-INVAS EAR/PLS OXIMETRY 1: CPT

## 2023-09-25 PROCEDURE — 73552 X-RAY EXAM OF FEMUR 2/>: CPT

## 2023-09-25 PROCEDURE — 6360000002 HC RX W HCPCS: Performed by: EMERGENCY MEDICINE

## 2023-09-25 PROCEDURE — 80053 COMPREHEN METABOLIC PANEL: CPT

## 2023-09-25 PROCEDURE — 96374 THER/PROPH/DIAG INJ IV PUSH: CPT

## 2023-09-25 PROCEDURE — 73060 X-RAY EXAM OF HUMERUS: CPT

## 2023-09-25 PROCEDURE — 85610 PROTHROMBIN TIME: CPT

## 2023-09-25 PROCEDURE — 73560 X-RAY EXAM OF KNEE 1 OR 2: CPT

## 2023-09-25 PROCEDURE — 82962 GLUCOSE BLOOD TEST: CPT

## 2023-09-25 PROCEDURE — 72170 X-RAY EXAM OF PELVIS: CPT

## 2023-09-25 PROCEDURE — 71045 X-RAY EXAM CHEST 1 VIEW: CPT

## 2023-09-25 PROCEDURE — 99285 EMERGENCY DEPT VISIT HI MDM: CPT

## 2023-09-25 PROCEDURE — 36415 COLL VENOUS BLD VENIPUNCTURE: CPT

## 2023-09-25 PROCEDURE — 6370000000 HC RX 637 (ALT 250 FOR IP): Performed by: STUDENT IN AN ORGANIZED HEALTH CARE EDUCATION/TRAINING PROGRAM

## 2023-09-25 PROCEDURE — 73080 X-RAY EXAM OF ELBOW: CPT

## 2023-09-25 PROCEDURE — 72125 CT NECK SPINE W/O DYE: CPT

## 2023-09-25 PROCEDURE — 85730 THROMBOPLASTIN TIME PARTIAL: CPT

## 2023-09-25 PROCEDURE — 70450 CT HEAD/BRAIN W/O DYE: CPT

## 2023-09-25 PROCEDURE — 1210000000 HC MED SURG R&B

## 2023-09-25 PROCEDURE — 85025 COMPLETE CBC W/AUTO DIFF WBC: CPT

## 2023-09-25 RX ORDER — ACETAMINOPHEN 650 MG/1
650 SUPPOSITORY RECTAL EVERY 6 HOURS PRN
Status: DISCONTINUED | OUTPATIENT
Start: 2023-09-25 | End: 2023-10-02 | Stop reason: HOSPADM

## 2023-09-25 RX ORDER — DEXTROSE MONOHYDRATE 100 MG/ML
INJECTION, SOLUTION INTRAVENOUS CONTINUOUS PRN
Status: DISCONTINUED | OUTPATIENT
Start: 2023-09-25 | End: 2023-10-02 | Stop reason: HOSPADM

## 2023-09-25 RX ORDER — OMEPRAZOLE 40 MG/1
40 CAPSULE, DELAYED RELEASE ORAL DAILY
Status: DISCONTINUED | OUTPATIENT
Start: 2023-09-26 | End: 2023-09-25 | Stop reason: CLARIF

## 2023-09-25 RX ORDER — HYDROCODONE BITARTRATE AND ACETAMINOPHEN 10; 325 MG/1; MG/1
1 TABLET ORAL EVERY 4 HOURS PRN
Status: DISCONTINUED | OUTPATIENT
Start: 2023-09-25 | End: 2023-10-02 | Stop reason: HOSPADM

## 2023-09-25 RX ORDER — DULOXETIN HYDROCHLORIDE 60 MG/1
60 CAPSULE, DELAYED RELEASE ORAL DAILY
Status: DISCONTINUED | OUTPATIENT
Start: 2023-09-26 | End: 2023-10-02 | Stop reason: HOSPADM

## 2023-09-25 RX ORDER — PANTOPRAZOLE SODIUM 40 MG/1
40 TABLET, DELAYED RELEASE ORAL DAILY
Status: DISCONTINUED | OUTPATIENT
Start: 2023-09-26 | End: 2023-10-02 | Stop reason: HOSPADM

## 2023-09-25 RX ORDER — ONDANSETRON 4 MG/1
4 TABLET, ORALLY DISINTEGRATING ORAL EVERY 8 HOURS PRN
Status: DISCONTINUED | OUTPATIENT
Start: 2023-09-25 | End: 2023-10-02 | Stop reason: HOSPADM

## 2023-09-25 RX ORDER — SODIUM CHLORIDE 9 MG/ML
INJECTION, SOLUTION INTRAVENOUS PRN
Status: DISCONTINUED | OUTPATIENT
Start: 2023-09-25 | End: 2023-10-02 | Stop reason: HOSPADM

## 2023-09-25 RX ORDER — TIZANIDINE 2 MG/1
2 TABLET ORAL NIGHTLY PRN
Status: DISCONTINUED | OUTPATIENT
Start: 2023-09-25 | End: 2023-10-02 | Stop reason: HOSPADM

## 2023-09-25 RX ORDER — SODIUM CHLORIDE 0.9 % (FLUSH) 0.9 %
5-40 SYRINGE (ML) INJECTION PRN
Status: DISCONTINUED | OUTPATIENT
Start: 2023-09-25 | End: 2023-10-02 | Stop reason: HOSPADM

## 2023-09-25 RX ORDER — INSULIN LISPRO 100 [IU]/ML
0-4 INJECTION, SOLUTION INTRAVENOUS; SUBCUTANEOUS NIGHTLY
Status: DISCONTINUED | OUTPATIENT
Start: 2023-09-25 | End: 2023-10-02 | Stop reason: HOSPADM

## 2023-09-25 RX ORDER — FENTANYL CITRATE 50 UG/ML
25 INJECTION, SOLUTION INTRAMUSCULAR; INTRAVENOUS ONCE
Status: COMPLETED | OUTPATIENT
Start: 2023-09-25 | End: 2023-09-25

## 2023-09-25 RX ORDER — ACETAMINOPHEN 325 MG/1
650 TABLET ORAL EVERY 6 HOURS PRN
Status: DISCONTINUED | OUTPATIENT
Start: 2023-09-25 | End: 2023-10-02 | Stop reason: HOSPADM

## 2023-09-25 RX ORDER — POLYETHYLENE GLYCOL 3350 17 G/17G
17 POWDER, FOR SOLUTION ORAL DAILY
Status: DISCONTINUED | OUTPATIENT
Start: 2023-09-25 | End: 2023-10-02 | Stop reason: HOSPADM

## 2023-09-25 RX ORDER — HYDROMORPHONE HYDROCHLORIDE 1 MG/ML
0.25 INJECTION, SOLUTION INTRAMUSCULAR; INTRAVENOUS; SUBCUTANEOUS EVERY 4 HOURS PRN
Status: DISCONTINUED | OUTPATIENT
Start: 2023-09-25 | End: 2023-09-29

## 2023-09-25 RX ORDER — ONDANSETRON 2 MG/ML
4 INJECTION INTRAMUSCULAR; INTRAVENOUS EVERY 6 HOURS PRN
Status: DISCONTINUED | OUTPATIENT
Start: 2023-09-25 | End: 2023-10-02 | Stop reason: HOSPADM

## 2023-09-25 RX ORDER — ASPIRIN 81 MG/1
81 TABLET ORAL EVERY OTHER DAY
Status: DISCONTINUED | OUTPATIENT
Start: 2023-09-25 | End: 2023-10-02 | Stop reason: HOSPADM

## 2023-09-25 RX ORDER — MECOBALAMIN 5000 MCG
5 TABLET,DISINTEGRATING ORAL NIGHTLY
Status: DISCONTINUED | OUTPATIENT
Start: 2023-09-25 | End: 2023-10-02 | Stop reason: HOSPADM

## 2023-09-25 RX ORDER — BISACODYL 10 MG
10 SUPPOSITORY, RECTAL RECTAL DAILY PRN
Status: DISCONTINUED | OUTPATIENT
Start: 2023-09-25 | End: 2023-10-02 | Stop reason: HOSPADM

## 2023-09-25 RX ORDER — SODIUM CHLORIDE 0.9 % (FLUSH) 0.9 %
5-40 SYRINGE (ML) INJECTION EVERY 12 HOURS SCHEDULED
Status: DISCONTINUED | OUTPATIENT
Start: 2023-09-25 | End: 2023-10-02 | Stop reason: HOSPADM

## 2023-09-25 RX ORDER — PREGABALIN 50 MG/1
100 CAPSULE ORAL 2 TIMES DAILY
Status: DISCONTINUED | OUTPATIENT
Start: 2023-09-25 | End: 2023-10-02 | Stop reason: HOSPADM

## 2023-09-25 RX ORDER — INSULIN LISPRO 100 [IU]/ML
0-4 INJECTION, SOLUTION INTRAVENOUS; SUBCUTANEOUS
Status: DISCONTINUED | OUTPATIENT
Start: 2023-09-25 | End: 2023-10-02 | Stop reason: HOSPADM

## 2023-09-25 RX ADMIN — HYDROCODONE BITARTRATE AND ACETAMINOPHEN 1 TABLET: 10; 325 TABLET ORAL at 19:25

## 2023-09-25 RX ADMIN — FENTANYL CITRATE 25 MCG: 50 INJECTION, SOLUTION INTRAMUSCULAR; INTRAVENOUS at 17:20

## 2023-09-25 RX ADMIN — Medication 5 MG: at 22:05

## 2023-09-25 RX ADMIN — PREGABALIN 100 MG: 50 CAPSULE ORAL at 22:05

## 2023-09-25 RX ADMIN — TIZANIDINE 2 MG: 2 TABLET ORAL at 19:55

## 2023-09-25 ASSESSMENT — ENCOUNTER SYMPTOMS
BACK PAIN: 0
SHORTNESS OF BREATH: 0
COUGH: 0
NAUSEA: 0
ABDOMINAL PAIN: 0
DIARRHEA: 0
VOMITING: 0

## 2023-09-25 ASSESSMENT — PAIN DESCRIPTION - LOCATION
LOCATION: KNEE
LOCATION: KNEE

## 2023-09-25 ASSESSMENT — PAIN SCALES - GENERAL
PAINLEVEL_OUTOF10: 3
PAINLEVEL_OUTOF10: 10
PAINLEVEL_OUTOF10: 10

## 2023-09-25 ASSESSMENT — PAIN DESCRIPTION - DESCRIPTORS
DESCRIPTORS: STABBING;SHARP;SHOOTING
DESCRIPTORS: ACHING

## 2023-09-25 ASSESSMENT — PAIN DESCRIPTION - ORIENTATION
ORIENTATION: LEFT
ORIENTATION: LEFT

## 2023-09-25 NOTE — ED PROVIDER NOTES
[address] [facility] EMERGENCY DEPT  eMERGENCY dEPARTMENT eNCOUnter      Pt Name: Krystle Miller  MRN: [id number]  [address] [facility] [place] 1935  Date of evaluation: 9/25/2023  Provider: Brea Newell MD    [address]       Chief Complaint   Patient presents with    Fall     Left knee pain, left shoulder pain, and left hand pain, knee has doubled in size with EMS, hit head, takes xarelto, denies LOC         HISTORY OF PRESENT ILLNESS   (Location/Symptom, Timing/Onset,Context/Setting, Quality, Duration, Modifying Factors, Severity)  Note limiting factors. Krystle Miller is a 82 y.o. female who presents to the emergency department after a fall. Patient states she got tripped up on her feet ultimately falling down did hit the left side of her head denies loss of consciousness. She is anticoagulated on Xarelto. Complains of left shoulder and knee pain to me but states she hurts all over. Tetanus up-to-date. HPI    NursingNotes were reviewed. REVIEW OF SYSTEMS    (2-9 systems for level 4, 10 or more for level 5)     Review of Systems   Constitutional:  Negative for chills and fever. Respiratory:  Negative for cough and shortness of breath. Cardiovascular:  Negative for chest pain. Gastrointestinal:  Negative for abdominal pain, diarrhea, nausea and vomiting. Genitourinary:  Negative for dysuria, frequency and urgency. Musculoskeletal:  Negative for back pain and neck pain. Skin:  Positive for wound. Neurological:  Negative for dizziness and headaches. All other systems reviewed and are negative.            PAST MEDICALHISTORY     Past Medical History:   Diagnosis Date    Afib ([address]) 2021    Bronchitis     Colon cancer ([address])     Colon polyps     Constipation     Deep vein blood clot of left lower extremity (HCC)     Left leg     Depression     DVT (deep venous thrombosis) (HCC)     Dysphagia     Fibrocystic breast disease     Fibromyalgia     GERD (gastroesophageal reflux disease)     reflux with hx of esophageal stricture

## 2023-09-25 NOTE — H&P
access port catheter is unchanged ending in the mid superior vena cava. The cardiomediastinal silhouette is normal.  The pulmonary vasculature is normal.  No consolidating infiltrates are detected. There is minimal atelectasis in the left lung base. No pneumothoraces or pleural effusions. 1. Minimal atelectasis in the left lung base. .   ______________________________________ Electronically signed by: Earline Lugo D.O. Date:     09/25/2023 Time:    16:24     XR ELBOW LEFT (MIN 3 VIEWS)    Result Date: 9/25/2023  EXAM:  RADIOGRAPHS, LEFT ELBOW  HISTORY:  Left elbow injury, fall. COMPARISON:  None. TECHNIQUE:  Three views. FINDINGS:  Bone mineralization is decreased. There is no fracture or dislocation. The joint spaces are maintained. No focal soft tissue abnormality is seen. ----------------------------    No fracture or dislocation. ______________________________________ Electronically signed by: Pete Quinteros M.D. Date:     09/25/2023 Time:    16:25     XR FEMUR LEFT (MIN 2 VIEWS)    Result Date: 9/25/2023  EXAM: LEFT FEMUR RADIOGRAPH(S).  TWO VIEWS OF THE LEFT HIP, TWO LATERAL VIEWS OF THE DISTAL FEMUR, AND ONE FRONTAL VIEW OF THE DISTAL FEMUR. FIVE IMAGES TOTAL  History: Fall  Comparison: none  Findings: Postsurgical changes from knee arthroplasty without evidence of hardware failure. Normal alignment. Severe prepatellar soft tissue thickening and or bursal distension. Degenerative changes of the left hip. Impression: Postsurgical changes in the left knee without evidence of fracture or malalignment. Severe prepatellar soft tissue thickening and or bursal distension. Degenerative changes of the left hip without fracture or dislocation. ______________________________________ Electronically signed by: Epi Coleman D.O.  Date:     09/25/2023 Time:    16:18       Assessment/Plan:     Principal Problem:    Pain and swelling of left knee  Active Problems:    Fall

## 2023-09-26 LAB
25(OH)D3 SERPL-MCNC: 24.7 NG/ML
ANION GAP SERPL CALCULATED.3IONS-SCNC: 10 MMOL/L (ref 7–19)
ANISOCYTOSIS BLD QL SMEAR: ABNORMAL
BASOPHILS # BLD: 0 K/UL (ref 0–0.2)
BASOPHILS NFR BLD: 0.5 % (ref 0–1)
BUN SERPL-MCNC: 24 MG/DL (ref 8–23)
CALCIUM SERPL-MCNC: 8.9 MG/DL (ref 8.8–10.2)
CHLORIDE SERPL-SCNC: 105 MMOL/L (ref 98–111)
CO2 SERPL-SCNC: 26 MMOL/L (ref 22–29)
CREAT SERPL-MCNC: 0.8 MG/DL (ref 0.5–0.9)
EOSINOPHIL # BLD: 0.7 K/UL (ref 0–0.6)
EOSINOPHIL NFR BLD: 9.3 % (ref 0–5)
ERYTHROCYTE [DISTWIDTH] IN BLOOD BY AUTOMATED COUNT: 17.5 % (ref 11.5–14.5)
FERRITIN SERPL-MCNC: 19.1 NG/ML (ref 13–150)
FOLATE SERPL-MCNC: 17.5 NG/ML (ref 4.8–37.3)
GLUCOSE BLD-MCNC: 110 MG/DL (ref 70–99)
GLUCOSE BLD-MCNC: 117 MG/DL (ref 70–99)
GLUCOSE BLD-MCNC: 121 MG/DL (ref 70–99)
GLUCOSE BLD-MCNC: 183 MG/DL (ref 70–99)
GLUCOSE SERPL-MCNC: 108 MG/DL (ref 74–109)
HBA1C MFR BLD: 5.4 % (ref 4–6)
HCT VFR BLD AUTO: 24.8 % (ref 37–47)
HGB BLD-MCNC: 7 G/DL (ref 12–16)
HYPOCHROMIA BLD QL SMEAR: ABNORMAL
IMM GRANULOCYTES # BLD: 0 K/UL
INR PPP: 1.21 (ref 0.88–1.18)
IRON SATN MFR SERPL: 6 % (ref 14–50)
IRON SERPL-MCNC: 22 UG/DL (ref 37–145)
LYMPHOCYTES # BLD: 2 K/UL (ref 1.1–4.5)
LYMPHOCYTES NFR BLD: 27.5 % (ref 20–40)
MCH RBC QN AUTO: 21.6 PG (ref 27–31)
MCHC RBC AUTO-ENTMCNC: 28.2 G/DL (ref 33–37)
MCV RBC AUTO: 76.5 FL (ref 81–99)
MICROCYTES BLD QL SMEAR: ABNORMAL
MONOCYTES # BLD: 0.7 K/UL (ref 0–0.9)
MONOCYTES NFR BLD: 10 % (ref 0–10)
NEUTROPHILS # BLD: 3.9 K/UL (ref 1.5–7.5)
NEUTS SEG NFR BLD: 52.4 % (ref 50–65)
OVALOCYTES BLD QL SMEAR: ABNORMAL
PERFORMED ON: ABNORMAL
PLATELET # BLD AUTO: 290 K/UL (ref 130–400)
PLATELET SLIDE REVIEW: ADEQUATE
PMV BLD AUTO: 10 FL (ref 9.4–12.3)
POIKILOCYTOSIS BLD QL SMEAR: ABNORMAL
POTASSIUM SERPL-SCNC: 4.2 MMOL/L (ref 3.5–5)
PROTHROMBIN TIME: 15 SEC (ref 12–14.6)
RBC # BLD AUTO: 3.24 M/UL (ref 4.2–5.4)
RETICS # AUTO: 0.05 M/UL (ref 0.03–0.12)
RETICS/RBC NFR: 1.47 % (ref 0.5–1.5)
SODIUM SERPL-SCNC: 141 MMOL/L (ref 136–145)
TIBC SERPL-MCNC: 378 UG/DL (ref 250–400)
VIT B12 SERPL-MCNC: 176 PG/ML (ref 211–946)
WBC # BLD AUTO: 7.4 K/UL (ref 4.8–10.8)

## 2023-09-26 PROCEDURE — 85025 COMPLETE CBC W/AUTO DIFF WBC: CPT

## 2023-09-26 PROCEDURE — 82962 GLUCOSE BLOOD TEST: CPT

## 2023-09-26 PROCEDURE — 82728 ASSAY OF FERRITIN: CPT

## 2023-09-26 PROCEDURE — 82746 ASSAY OF FOLIC ACID SERUM: CPT

## 2023-09-26 PROCEDURE — 85610 PROTHROMBIN TIME: CPT

## 2023-09-26 PROCEDURE — 83540 ASSAY OF IRON: CPT

## 2023-09-26 PROCEDURE — 82607 VITAMIN B-12: CPT

## 2023-09-26 PROCEDURE — 82306 VITAMIN D 25 HYDROXY: CPT

## 2023-09-26 PROCEDURE — 80048 BASIC METABOLIC PNL TOTAL CA: CPT

## 2023-09-26 PROCEDURE — 83036 HEMOGLOBIN GLYCOSYLATED A1C: CPT

## 2023-09-26 PROCEDURE — 1210000000 HC MED SURG R&B

## 2023-09-26 PROCEDURE — 36415 COLL VENOUS BLD VENIPUNCTURE: CPT

## 2023-09-26 PROCEDURE — 2580000003 HC RX 258: Performed by: STUDENT IN AN ORGANIZED HEALTH CARE EDUCATION/TRAINING PROGRAM

## 2023-09-26 PROCEDURE — 83550 IRON BINDING TEST: CPT

## 2023-09-26 PROCEDURE — 6370000000 HC RX 637 (ALT 250 FOR IP): Performed by: STUDENT IN AN ORGANIZED HEALTH CARE EDUCATION/TRAINING PROGRAM

## 2023-09-26 PROCEDURE — 6360000002 HC RX W HCPCS: Performed by: STUDENT IN AN ORGANIZED HEALTH CARE EDUCATION/TRAINING PROGRAM

## 2023-09-26 PROCEDURE — 94760 N-INVAS EAR/PLS OXIMETRY 1: CPT

## 2023-09-26 PROCEDURE — 85045 AUTOMATED RETICULOCYTE COUNT: CPT

## 2023-09-26 RX ORDER — CYANOCOBALAMIN 1000 UG/ML
1000 INJECTION, SOLUTION INTRAMUSCULAR; SUBCUTANEOUS DAILY
Status: COMPLETED | OUTPATIENT
Start: 2023-09-26 | End: 2023-09-28

## 2023-09-26 RX ADMIN — IRON SUCROSE 400 MG: 20 INJECTION, SOLUTION INTRAVENOUS at 11:57

## 2023-09-26 RX ADMIN — CYANOCOBALAMIN 1000 MCG: 1000 INJECTION INTRAMUSCULAR; SUBCUTANEOUS at 09:10

## 2023-09-26 RX ADMIN — PANTOPRAZOLE SODIUM 40 MG: 40 TABLET, DELAYED RELEASE ORAL at 08:29

## 2023-09-26 RX ADMIN — HYDROMORPHONE HYDROCHLORIDE 0.25 MG: 1 INJECTION, SOLUTION INTRAMUSCULAR; INTRAVENOUS; SUBCUTANEOUS at 17:47

## 2023-09-26 RX ADMIN — Medication 5 MG: at 21:04

## 2023-09-26 RX ADMIN — HYDROCODONE BITARTRATE AND ACETAMINOPHEN 1 TABLET: 10; 325 TABLET ORAL at 19:47

## 2023-09-26 RX ADMIN — DULOXETINE HYDROCHLORIDE 60 MG: 60 CAPSULE, DELAYED RELEASE ORAL at 08:29

## 2023-09-26 RX ADMIN — PREGABALIN 100 MG: 50 CAPSULE ORAL at 19:47

## 2023-09-26 RX ADMIN — HYDROMORPHONE HYDROCHLORIDE 0.25 MG: 1 INJECTION, SOLUTION INTRAMUSCULAR; INTRAVENOUS; SUBCUTANEOUS at 22:20

## 2023-09-26 RX ADMIN — PREGABALIN 100 MG: 50 CAPSULE ORAL at 08:29

## 2023-09-26 RX ADMIN — SODIUM CHLORIDE, PRESERVATIVE FREE 10 ML: 5 INJECTION INTRAVENOUS at 19:47

## 2023-09-26 RX ADMIN — SODIUM CHLORIDE, PRESERVATIVE FREE 10 ML: 5 INJECTION INTRAVENOUS at 08:41

## 2023-09-26 RX ADMIN — HYDROCODONE BITARTRATE AND ACETAMINOPHEN 1 TABLET: 10; 325 TABLET ORAL at 04:45

## 2023-09-26 RX ADMIN — TIZANIDINE 2 MG: 2 TABLET ORAL at 21:04

## 2023-09-26 RX ADMIN — HYDROCODONE BITARTRATE AND ACETAMINOPHEN 1 TABLET: 10; 325 TABLET ORAL at 13:59

## 2023-09-26 ASSESSMENT — PAIN DESCRIPTION - DESCRIPTORS
DESCRIPTORS: ACHING
DESCRIPTORS: THROBBING
DESCRIPTORS: ACHING
DESCRIPTORS: ACHING
DESCRIPTORS: SHOOTING
DESCRIPTORS: THROBBING

## 2023-09-26 ASSESSMENT — PAIN DESCRIPTION - ORIENTATION
ORIENTATION: LEFT

## 2023-09-26 ASSESSMENT — PAIN DESCRIPTION - FREQUENCY
FREQUENCY: CONTINUOUS

## 2023-09-26 ASSESSMENT — PAIN SCALES - GENERAL
PAINLEVEL_OUTOF10: 7
PAINLEVEL_OUTOF10: 2
PAINLEVEL_OUTOF10: 6
PAINLEVEL_OUTOF10: 8
PAINLEVEL_OUTOF10: 8
PAINLEVEL_OUTOF10: 3
PAINLEVEL_OUTOF10: 4
PAINLEVEL_OUTOF10: 3
PAINLEVEL_OUTOF10: 3
PAINLEVEL_OUTOF10: 7

## 2023-09-26 ASSESSMENT — PAIN DESCRIPTION - DIRECTION
RADIATING_TOWARDS: DOWN LEG

## 2023-09-26 ASSESSMENT — PAIN DESCRIPTION - LOCATION
LOCATION: KNEE

## 2023-09-26 ASSESSMENT — PAIN DESCRIPTION - PAIN TYPE
TYPE: ACUTE PAIN

## 2023-09-26 ASSESSMENT — PAIN DESCRIPTION - ONSET
ONSET: ON-GOING

## 2023-09-26 ASSESSMENT — PAIN - FUNCTIONAL ASSESSMENT
PAIN_FUNCTIONAL_ASSESSMENT: PREVENTS OR INTERFERES WITH MANY ACTIVE NOT PASSIVE ACTIVITIES
PAIN_FUNCTIONAL_ASSESSMENT: PREVENTS OR INTERFERES WITH MANY ACTIVE NOT PASSIVE ACTIVITIES
PAIN_FUNCTIONAL_ASSESSMENT: PREVENTS OR INTERFERES SOME ACTIVE ACTIVITIES AND ADLS

## 2023-09-26 NOTE — CONSULTS
Palliative Care:  Pt known to palliative care. Pt presents to ed after a fall at home causing injury to her left knee, shoulder, hand. Also hit her head causing laceration and bruising to left side. This RN looked at pt knee which is bruised and very swollen. Ice pack refilled and applied. PA for ortho has evaluated pt injury. Per xrays there is  no fx or dislocation. Past Medical History:        Past Medical History:   Diagnosis Date    Afib ([address]) 2021    Bronchitis     Colon cancer ([address])     Colon polyps     Constipation     Deep vein blood clot of left lower extremity (HCC)     Left leg     Depression     DVT (deep venous thrombosis) (HCC)     Dysphagia     Fibrocystic breast disease     Fibromyalgia     GERD (gastroesophageal reflux disease)     reflux with hx of esophageal stricture    Headache(784.0)     History of colon cancer 2000    Hormone replacement therapy (postmenopausal)     Hypertension     Neuropathy of foot     In both feet    Osteoarthritis     left knee pain    Palliative care patient 02/01/2022    Restless legs syndrome     Type 2 diabetes mellitus 10/25/2021    Vitamin D deficiency        Advance Directives:   DNR no changes. Spouse is HCS. Pain/Other Symptoms:   Left knee \"8, and it's moving to severe\". Encouraged pt to request pain meds. How are symptoms affecting QOL   Inability to amb. Understands she will need to be consistent with cane/walker. Psychological/Spiritual: Pt states she has good spiritual support. Holly Spicer is still with them in their home. Limited but good family/friend support. Plan:  PT/OT to eval. Ok for weight bearing as bell, ice, elevation of knee. Patient/family discussion r/t goals:           Pt is willing to go to SNF for additional therapy at [place]. Notified CM pt would like [facility] if possible. Pt dtr in law is present.       Palliative Performance Scale:   Baseline 60    Palliative following for
MG TBDP disintegrating tablet Take 1 tablet by mouth nightly  Patient not taking: Reported on 5/31/2023 1/23/22 4/19/23  Geovanni Kaplan by Does not apply route 8/21/20   EARLE Piedra - CNP       Allergies:  Gabapentin and Zoloft Sushila Ackerly hcl]    Social History:   Social History     Socioeconomic History    Marital status:      Spouse name: Mr. 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 Leroy Orr, [phone] (cellular)    AMBULATES: uses a cane    DOMICILED: has 4-5 stairs inside of the home but she does not use them normally, lives with her  and a grand son an a pet dog        Lives single family home.  works. 15 yo grandson lives in home. [address] Pt does not drive. Must arrange appts around husbands work schedule.      Social Determinants of Health     Financial Resource Strain: Low Risk  (8/1/2023)    Overall Financial Resource Strain (CARDIA)     Difficulty of Paying Living Expenses: Not very hard   Food Insecurity: No Food Insecurity (8/1/2023)    Hunger Vital Sign     Worried About Running Out of Food in the Last Year: Never true     Ran Out of Food in the Last Year: Never true   Transportation Needs: Unknown (8/1/2023)    PRAPARE - Transportation     Lack of

## 2023-09-27 LAB
ABO + RH BLD: NORMAL
ABO/RH: NORMAL
ANION GAP SERPL CALCULATED.3IONS-SCNC: 10 MMOL/L (ref 7–19)
ANISOCYTOSIS BLD QL SMEAR: ABNORMAL
ANTIBODY SCREEN: NORMAL
BASOPHILS # BLD: 0.1 K/UL (ref 0–0.2)
BASOPHILS NFR BLD: 0.6 % (ref 0–1)
BLOOD BANK DISPENSE STATUS: NORMAL
BLOOD BANK PRODUCT CODE: NORMAL
BPU ID: NORMAL
BUN SERPL-MCNC: 15 MG/DL (ref 8–23)
CALCIUM SERPL-MCNC: 8.4 MG/DL (ref 8.8–10.2)
CHLORIDE SERPL-SCNC: 104 MMOL/L (ref 98–111)
CO2 SERPL-SCNC: 26 MMOL/L (ref 22–29)
CREAT SERPL-MCNC: 0.7 MG/DL (ref 0.5–0.9)
DESCRIPTION BLOOD BANK: NORMAL
EOSINOPHIL # BLD: 0.9 K/UL (ref 0–0.6)
EOSINOPHIL NFR BLD: 11.6 % (ref 0–5)
ERYTHROCYTE [DISTWIDTH] IN BLOOD BY AUTOMATED COUNT: 17.7 % (ref 11.5–14.5)
GLUCOSE BLD-MCNC: 117 MG/DL (ref 70–99)
GLUCOSE BLD-MCNC: 124 MG/DL (ref 70–99)
GLUCOSE BLD-MCNC: 153 MG/DL (ref 70–99)
GLUCOSE BLD-MCNC: 96 MG/DL (ref 70–99)
GLUCOSE SERPL-MCNC: 108 MG/DL (ref 74–109)
HCT VFR BLD AUTO: 21.9 % (ref 37–47)
HCT VFR BLD AUTO: 24 % (ref 37–47)
HGB BLD-MCNC: 6.2 G/DL (ref 12–16)
HGB BLD-MCNC: 7.2 G/DL (ref 12–16)
HYPOCHROMIA BLD QL SMEAR: ABNORMAL
IMM GRANULOCYTES # BLD: 0 K/UL
LYMPHOCYTES # BLD: 2.1 K/UL (ref 1.1–4.5)
LYMPHOCYTES NFR BLD: 26.8 % (ref 20–40)
MCH RBC QN AUTO: 21.5 PG (ref 27–31)
MCHC RBC AUTO-ENTMCNC: 28.3 G/DL (ref 33–37)
MCV RBC AUTO: 75.8 FL (ref 81–99)
MICROCYTES BLD QL SMEAR: ABNORMAL
MONOCYTES # BLD: 0.9 K/UL (ref 0–0.9)
MONOCYTES NFR BLD: 11.4 % (ref 0–10)
NEUTROPHILS # BLD: 3.8 K/UL (ref 1.5–7.5)
NEUTS SEG NFR BLD: 49.1 % (ref 50–65)
OVALOCYTES BLD QL SMEAR: ABNORMAL
PERFORMED ON: ABNORMAL
PERFORMED ON: NORMAL
PLATELET # BLD AUTO: 252 K/UL (ref 130–400)
PLATELET SLIDE REVIEW: ADEQUATE
PMV BLD AUTO: 9.5 FL (ref 9.4–12.3)
POIKILOCYTOSIS BLD QL SMEAR: ABNORMAL
POLYCHROMASIA BLD QL SMEAR: ABNORMAL
POTASSIUM SERPL-SCNC: 4.1 MMOL/L (ref 3.5–5)
RBC # BLD AUTO: 2.89 M/UL (ref 4.2–5.4)
REASON FOR REJECTION: NORMAL
REJECTED TEST: NORMAL
SODIUM SERPL-SCNC: 140 MMOL/L (ref 136–145)
WBC # BLD AUTO: 7.8 K/UL (ref 4.8–10.8)

## 2023-09-27 PROCEDURE — 97116 GAIT TRAINING THERAPY: CPT

## 2023-09-27 PROCEDURE — 85018 HEMOGLOBIN: CPT

## 2023-09-27 PROCEDURE — 85014 HEMATOCRIT: CPT

## 2023-09-27 PROCEDURE — 86901 BLOOD TYPING SEROLOGIC RH(D): CPT

## 2023-09-27 PROCEDURE — 36430 TRANSFUSION BLD/BLD COMPNT: CPT

## 2023-09-27 PROCEDURE — 2580000003 HC RX 258: Performed by: STUDENT IN AN ORGANIZED HEALTH CARE EDUCATION/TRAINING PROGRAM

## 2023-09-27 PROCEDURE — 85025 COMPLETE CBC W/AUTO DIFF WBC: CPT

## 2023-09-27 PROCEDURE — 97530 THERAPEUTIC ACTIVITIES: CPT

## 2023-09-27 PROCEDURE — 82962 GLUCOSE BLOOD TEST: CPT

## 2023-09-27 PROCEDURE — 36415 COLL VENOUS BLD VENIPUNCTURE: CPT

## 2023-09-27 PROCEDURE — P9016 RBC LEUKOCYTES REDUCED: HCPCS

## 2023-09-27 PROCEDURE — 6360000002 HC RX W HCPCS: Performed by: STUDENT IN AN ORGANIZED HEALTH CARE EDUCATION/TRAINING PROGRAM

## 2023-09-27 PROCEDURE — 86923 COMPATIBILITY TEST ELECTRIC: CPT

## 2023-09-27 PROCEDURE — 6370000000 HC RX 637 (ALT 250 FOR IP): Performed by: STUDENT IN AN ORGANIZED HEALTH CARE EDUCATION/TRAINING PROGRAM

## 2023-09-27 PROCEDURE — 30233N1 TRANSFUSION OF NONAUTOLOGOUS RED BLOOD CELLS INTO PERIPHERAL VEIN, PERCUTANEOUS APPROACH: ICD-10-PCS | Performed by: STUDENT IN AN ORGANIZED HEALTH CARE EDUCATION/TRAINING PROGRAM

## 2023-09-27 PROCEDURE — 86850 RBC ANTIBODY SCREEN: CPT

## 2023-09-27 PROCEDURE — 80048 BASIC METABOLIC PNL TOTAL CA: CPT

## 2023-09-27 PROCEDURE — 86900 BLOOD TYPING SEROLOGIC ABO: CPT

## 2023-09-27 PROCEDURE — 97162 PT EVAL MOD COMPLEX 30 MIN: CPT

## 2023-09-27 PROCEDURE — 94760 N-INVAS EAR/PLS OXIMETRY 1: CPT

## 2023-09-27 PROCEDURE — 1210000000 HC MED SURG R&B

## 2023-09-27 RX ORDER — SODIUM CHLORIDE 9 MG/ML
INJECTION, SOLUTION INTRAVENOUS PRN
Status: DISCONTINUED | OUTPATIENT
Start: 2023-09-27 | End: 2023-09-29

## 2023-09-27 RX ORDER — SENNA AND DOCUSATE SODIUM 50; 8.6 MG/1; MG/1
2 TABLET, FILM COATED ORAL DAILY
Status: DISCONTINUED | OUTPATIENT
Start: 2023-09-27 | End: 2023-10-02 | Stop reason: HOSPADM

## 2023-09-27 RX ADMIN — PREGABALIN 100 MG: 50 CAPSULE ORAL at 20:10

## 2023-09-27 RX ADMIN — HYDROCODONE BITARTRATE AND ACETAMINOPHEN 1 TABLET: 10; 325 TABLET ORAL at 09:06

## 2023-09-27 RX ADMIN — HYDROCODONE BITARTRATE AND ACETAMINOPHEN 1 TABLET: 10; 325 TABLET ORAL at 04:11

## 2023-09-27 RX ADMIN — SENNOSIDES AND DOCUSATE SODIUM 2 TABLET: 50; 8.6 TABLET ORAL at 20:44

## 2023-09-27 RX ADMIN — PREGABALIN 100 MG: 50 CAPSULE ORAL at 09:06

## 2023-09-27 RX ADMIN — TIZANIDINE 2 MG: 2 TABLET ORAL at 20:11

## 2023-09-27 RX ADMIN — Medication 5 MG: at 20:11

## 2023-09-27 RX ADMIN — HYDROCODONE BITARTRATE AND ACETAMINOPHEN 1 TABLET: 10; 325 TABLET ORAL at 14:57

## 2023-09-27 RX ADMIN — SODIUM CHLORIDE, PRESERVATIVE FREE 10 ML: 5 INJECTION INTRAVENOUS at 20:11

## 2023-09-27 RX ADMIN — HYDROCODONE BITARTRATE AND ACETAMINOPHEN 1 TABLET: 10; 325 TABLET ORAL at 20:10

## 2023-09-27 RX ADMIN — PANTOPRAZOLE SODIUM 40 MG: 40 TABLET, DELAYED RELEASE ORAL at 09:06

## 2023-09-27 RX ADMIN — DULOXETINE HYDROCHLORIDE 60 MG: 60 CAPSULE, DELAYED RELEASE ORAL at 09:05

## 2023-09-27 RX ADMIN — CYANOCOBALAMIN 1000 MCG: 1000 INJECTION INTRAMUSCULAR; SUBCUTANEOUS at 09:06

## 2023-09-27 ASSESSMENT — PAIN SCALES - GENERAL
PAINLEVEL_OUTOF10: 2
PAINLEVEL_OUTOF10: 6
PAINLEVEL_OUTOF10: 6
PAINLEVEL_OUTOF10: 7
PAINLEVEL_OUTOF10: 2
PAINLEVEL_OUTOF10: 7

## 2023-09-27 ASSESSMENT — PAIN DESCRIPTION - ONSET
ONSET: ON-GOING
ONSET: ON-GOING

## 2023-09-27 ASSESSMENT — PAIN DESCRIPTION - DIRECTION
RADIATING_TOWARDS: DOWN LEG
RADIATING_TOWARDS: DOWN LEG

## 2023-09-27 ASSESSMENT — PAIN DESCRIPTION - ORIENTATION
ORIENTATION: LEFT

## 2023-09-27 ASSESSMENT — PAIN DESCRIPTION - PAIN TYPE
TYPE: ACUTE PAIN
TYPE: ACUTE PAIN

## 2023-09-27 ASSESSMENT — PAIN DESCRIPTION - DESCRIPTORS
DESCRIPTORS: DISCOMFORT;ACHING
DESCRIPTORS: THROBBING
DESCRIPTORS: ACHING;THROBBING
DESCRIPTORS: ACHING;SORE

## 2023-09-27 ASSESSMENT — PAIN DESCRIPTION - LOCATION
LOCATION: KNEE

## 2023-09-27 ASSESSMENT — PAIN - FUNCTIONAL ASSESSMENT
PAIN_FUNCTIONAL_ASSESSMENT: PREVENTS OR INTERFERES WITH MANY ACTIVE NOT PASSIVE ACTIVITIES
PAIN_FUNCTIONAL_ASSESSMENT: PREVENTS OR INTERFERES SOME ACTIVE ACTIVITIES AND ADLS

## 2023-09-27 ASSESSMENT — PAIN DESCRIPTION - FREQUENCY
FREQUENCY: CONTINUOUS
FREQUENCY: CONTINUOUS

## 2023-09-28 LAB
ANION GAP SERPL CALCULATED.3IONS-SCNC: 9 MMOL/L (ref 7–19)
BASOPHILS # BLD: 0.1 K/UL (ref 0–0.2)
BASOPHILS NFR BLD: 0.6 % (ref 0–1)
BUN SERPL-MCNC: 10 MG/DL (ref 8–23)
CALCIUM SERPL-MCNC: 8.6 MG/DL (ref 8.8–10.2)
CHLORIDE SERPL-SCNC: 106 MMOL/L (ref 98–111)
CO2 SERPL-SCNC: 26 MMOL/L (ref 22–29)
CREAT SERPL-MCNC: 0.6 MG/DL (ref 0.5–0.9)
EOSINOPHIL # BLD: 0.9 K/UL (ref 0–0.6)
EOSINOPHIL NFR BLD: 10.5 % (ref 0–5)
ERYTHROCYTE [DISTWIDTH] IN BLOOD BY AUTOMATED COUNT: 20.1 % (ref 11.5–14.5)
GLUCOSE BLD-MCNC: 104 MG/DL (ref 70–99)
GLUCOSE BLD-MCNC: 111 MG/DL (ref 70–99)
GLUCOSE BLD-MCNC: 129 MG/DL (ref 70–99)
GLUCOSE BLD-MCNC: 205 MG/DL (ref 70–99)
GLUCOSE SERPL-MCNC: 100 MG/DL (ref 74–109)
HCT VFR BLD AUTO: 24.7 % (ref 37–47)
HGB BLD-MCNC: 7.4 G/DL (ref 12–16)
IMM GRANULOCYTES # BLD: 0 K/UL
LYMPHOCYTES # BLD: 2.3 K/UL (ref 1.1–4.5)
LYMPHOCYTES NFR BLD: 27.8 % (ref 20–40)
MCH RBC QN AUTO: 23.9 PG (ref 27–31)
MCHC RBC AUTO-ENTMCNC: 30 G/DL (ref 33–37)
MCV RBC AUTO: 79.9 FL (ref 81–99)
MONOCYTES # BLD: 1 K/UL (ref 0–0.9)
MONOCYTES NFR BLD: 12.5 % (ref 0–10)
NEUTROPHILS # BLD: 4 K/UL (ref 1.5–7.5)
NEUTS SEG NFR BLD: 48.2 % (ref 50–65)
PERFORMED ON: ABNORMAL
PLATELET # BLD AUTO: 251 K/UL (ref 130–400)
PMV BLD AUTO: 10.2 FL (ref 9.4–12.3)
POTASSIUM SERPL-SCNC: 3.9 MMOL/L (ref 3.5–5)
RBC # BLD AUTO: 3.09 M/UL (ref 4.2–5.4)
SODIUM SERPL-SCNC: 141 MMOL/L (ref 136–145)
WBC # BLD AUTO: 8.3 K/UL (ref 4.8–10.8)

## 2023-09-28 PROCEDURE — 97116 GAIT TRAINING THERAPY: CPT

## 2023-09-28 PROCEDURE — 2580000003 HC RX 258: Performed by: STUDENT IN AN ORGANIZED HEALTH CARE EDUCATION/TRAINING PROGRAM

## 2023-09-28 PROCEDURE — 85025 COMPLETE CBC W/AUTO DIFF WBC: CPT

## 2023-09-28 PROCEDURE — 97535 SELF CARE MNGMENT TRAINING: CPT

## 2023-09-28 PROCEDURE — 6370000000 HC RX 637 (ALT 250 FOR IP): Performed by: STUDENT IN AN ORGANIZED HEALTH CARE EDUCATION/TRAINING PROGRAM

## 2023-09-28 PROCEDURE — 6360000002 HC RX W HCPCS: Performed by: STUDENT IN AN ORGANIZED HEALTH CARE EDUCATION/TRAINING PROGRAM

## 2023-09-28 PROCEDURE — 82962 GLUCOSE BLOOD TEST: CPT

## 2023-09-28 PROCEDURE — 94760 N-INVAS EAR/PLS OXIMETRY 1: CPT

## 2023-09-28 PROCEDURE — 97165 OT EVAL LOW COMPLEX 30 MIN: CPT

## 2023-09-28 PROCEDURE — 80048 BASIC METABOLIC PNL TOTAL CA: CPT

## 2023-09-28 PROCEDURE — 1210000000 HC MED SURG R&B

## 2023-09-28 PROCEDURE — 97530 THERAPEUTIC ACTIVITIES: CPT

## 2023-09-28 RX ADMIN — HYDROCODONE BITARTRATE AND ACETAMINOPHEN 1 TABLET: 10; 325 TABLET ORAL at 08:05

## 2023-09-28 RX ADMIN — HYDROCODONE BITARTRATE AND ACETAMINOPHEN 1 TABLET: 10; 325 TABLET ORAL at 13:50

## 2023-09-28 RX ADMIN — SENNOSIDES AND DOCUSATE SODIUM 2 TABLET: 50; 8.6 TABLET ORAL at 08:07

## 2023-09-28 RX ADMIN — PREGABALIN 100 MG: 50 CAPSULE ORAL at 08:07

## 2023-09-28 RX ADMIN — HYDROCODONE BITARTRATE AND ACETAMINOPHEN 1 TABLET: 10; 325 TABLET ORAL at 19:06

## 2023-09-28 RX ADMIN — RIVAROXABAN 10 MG: 10 TABLET, FILM COATED ORAL at 10:26

## 2023-09-28 RX ADMIN — PREGABALIN 100 MG: 50 CAPSULE ORAL at 20:19

## 2023-09-28 RX ADMIN — CYANOCOBALAMIN 1000 MCG: 1000 INJECTION INTRAMUSCULAR; SUBCUTANEOUS at 08:11

## 2023-09-28 RX ADMIN — DULOXETINE HYDROCHLORIDE 60 MG: 60 CAPSULE, DELAYED RELEASE ORAL at 08:06

## 2023-09-28 RX ADMIN — SODIUM CHLORIDE, PRESERVATIVE FREE 10 ML: 5 INJECTION INTRAVENOUS at 08:14

## 2023-09-28 RX ADMIN — Medication 5 MG: at 20:19

## 2023-09-28 RX ADMIN — PANTOPRAZOLE SODIUM 40 MG: 40 TABLET, DELAYED RELEASE ORAL at 08:06

## 2023-09-28 ASSESSMENT — PAIN SCALES - GENERAL
PAINLEVEL_OUTOF10: 8
PAINLEVEL_OUTOF10: 9
PAINLEVEL_OUTOF10: 10
PAINLEVEL_OUTOF10: 7

## 2023-09-28 ASSESSMENT — PAIN DESCRIPTION - LOCATION
LOCATION: BACK;LEG;KNEE
LOCATION: KNEE
LOCATION: NECK

## 2023-09-28 ASSESSMENT — PAIN DESCRIPTION - PAIN TYPE: TYPE: ACUTE PAIN

## 2023-09-28 ASSESSMENT — PAIN - FUNCTIONAL ASSESSMENT
PAIN_FUNCTIONAL_ASSESSMENT: PREVENTS OR INTERFERES SOME ACTIVE ACTIVITIES AND ADLS
PAIN_FUNCTIONAL_ASSESSMENT: PREVENTS OR INTERFERES SOME ACTIVE ACTIVITIES AND ADLS

## 2023-09-28 ASSESSMENT — PAIN DESCRIPTION - ORIENTATION
ORIENTATION: LEFT

## 2023-09-28 ASSESSMENT — PAIN DESCRIPTION - DESCRIPTORS
DESCRIPTORS: ACHING
DESCRIPTORS: ACHING;THROBBING

## 2023-09-29 LAB
ANION GAP SERPL CALCULATED.3IONS-SCNC: 9 MMOL/L (ref 7–19)
BASOPHILS # BLD: 0 K/UL (ref 0–0.2)
BASOPHILS NFR BLD: 0.4 % (ref 0–1)
BLOOD BANK DISPENSE STATUS: NORMAL
BLOOD BANK PRODUCT CODE: NORMAL
BPU ID: NORMAL
BUN SERPL-MCNC: 15 MG/DL (ref 8–23)
CALCIUM SERPL-MCNC: 8.4 MG/DL (ref 8.8–10.2)
CHLORIDE SERPL-SCNC: 105 MMOL/L (ref 98–111)
CO2 SERPL-SCNC: 27 MMOL/L (ref 22–29)
CREAT SERPL-MCNC: 0.6 MG/DL (ref 0.5–0.9)
DESCRIPTION BLOOD BANK: NORMAL
EOSINOPHIL # BLD: 0.6 K/UL (ref 0–0.6)
EOSINOPHIL NFR BLD: 7.7 % (ref 0–5)
ERYTHROCYTE [DISTWIDTH] IN BLOOD BY AUTOMATED COUNT: 21.1 % (ref 11.5–14.5)
GLUCOSE BLD-MCNC: 127 MG/DL (ref 70–99)
GLUCOSE BLD-MCNC: 158 MG/DL (ref 70–99)
GLUCOSE BLD-MCNC: 201 MG/DL (ref 70–99)
GLUCOSE BLD-MCNC: 97 MG/DL (ref 70–99)
GLUCOSE SERPL-MCNC: 107 MG/DL (ref 74–109)
HCT VFR BLD AUTO: 22.7 % (ref 37–47)
HGB BLD-MCNC: 6.8 G/DL (ref 12–16)
HGB BLD-MCNC: 8.5 G/DL (ref 12–16)
IMM GRANULOCYTES # BLD: 0 K/UL
LYMPHOCYTES # BLD: 2.4 K/UL (ref 1.1–4.5)
LYMPHOCYTES NFR BLD: 30.9 % (ref 20–40)
MCH RBC QN AUTO: 24.2 PG (ref 27–31)
MCHC RBC AUTO-ENTMCNC: 30 G/DL (ref 33–37)
MCV RBC AUTO: 80.8 FL (ref 81–99)
MONOCYTES # BLD: 1 K/UL (ref 0–0.9)
MONOCYTES NFR BLD: 13.6 % (ref 0–10)
NEUTROPHILS # BLD: 3.6 K/UL (ref 1.5–7.5)
NEUTS SEG NFR BLD: 47 % (ref 50–65)
PERFORMED ON: ABNORMAL
PERFORMED ON: NORMAL
PLATELET # BLD AUTO: 242 K/UL (ref 130–400)
PMV BLD AUTO: 10.1 FL (ref 9.4–12.3)
POTASSIUM SERPL-SCNC: 4 MMOL/L (ref 3.5–5)
RBC # BLD AUTO: 2.81 M/UL (ref 4.2–5.4)
SODIUM SERPL-SCNC: 141 MMOL/L (ref 136–145)
WBC # BLD AUTO: 7.6 K/UL (ref 4.8–10.8)

## 2023-09-29 PROCEDURE — 85025 COMPLETE CBC W/AUTO DIFF WBC: CPT

## 2023-09-29 PROCEDURE — 2580000003 HC RX 258: Performed by: STUDENT IN AN ORGANIZED HEALTH CARE EDUCATION/TRAINING PROGRAM

## 2023-09-29 PROCEDURE — 85018 HEMOGLOBIN: CPT

## 2023-09-29 PROCEDURE — 94760 N-INVAS EAR/PLS OXIMETRY 1: CPT

## 2023-09-29 PROCEDURE — 97116 GAIT TRAINING THERAPY: CPT

## 2023-09-29 PROCEDURE — 82962 GLUCOSE BLOOD TEST: CPT

## 2023-09-29 PROCEDURE — 1210000000 HC MED SURG R&B

## 2023-09-29 PROCEDURE — 6370000000 HC RX 637 (ALT 250 FOR IP): Performed by: STUDENT IN AN ORGANIZED HEALTH CARE EDUCATION/TRAINING PROGRAM

## 2023-09-29 PROCEDURE — 80048 BASIC METABOLIC PNL TOTAL CA: CPT

## 2023-09-29 PROCEDURE — 36430 TRANSFUSION BLD/BLD COMPNT: CPT

## 2023-09-29 RX ORDER — SODIUM CHLORIDE 9 MG/ML
INJECTION, SOLUTION INTRAVENOUS PRN
Status: DISCONTINUED | OUTPATIENT
Start: 2023-09-29 | End: 2023-10-01

## 2023-09-29 RX ADMIN — SODIUM CHLORIDE, PRESERVATIVE FREE 10 ML: 5 INJECTION INTRAVENOUS at 20:07

## 2023-09-29 RX ADMIN — PREGABALIN 100 MG: 50 CAPSULE ORAL at 09:54

## 2023-09-29 RX ADMIN — HYDROCODONE BITARTRATE AND ACETAMINOPHEN 1 TABLET: 10; 325 TABLET ORAL at 05:55

## 2023-09-29 RX ADMIN — SENNOSIDES AND DOCUSATE SODIUM 2 TABLET: 50; 8.6 TABLET ORAL at 09:53

## 2023-09-29 RX ADMIN — HYDROCODONE BITARTRATE AND ACETAMINOPHEN 1 TABLET: 10; 325 TABLET ORAL at 11:22

## 2023-09-29 RX ADMIN — DULOXETINE HYDROCHLORIDE 60 MG: 60 CAPSULE, DELAYED RELEASE ORAL at 09:54

## 2023-09-29 RX ADMIN — HYDROCODONE BITARTRATE AND ACETAMINOPHEN 1 TABLET: 10; 325 TABLET ORAL at 20:07

## 2023-09-29 RX ADMIN — PANTOPRAZOLE SODIUM 40 MG: 40 TABLET, DELAYED RELEASE ORAL at 09:54

## 2023-09-29 RX ADMIN — PREGABALIN 100 MG: 50 CAPSULE ORAL at 20:07

## 2023-09-29 RX ADMIN — RIVAROXABAN 10 MG: 10 TABLET, FILM COATED ORAL at 09:53

## 2023-09-29 RX ADMIN — Medication 5 MG: at 20:07

## 2023-09-29 ASSESSMENT — PAIN SCALES - GENERAL
PAINLEVEL_OUTOF10: 8
PAINLEVEL_OUTOF10: 8
PAINLEVEL_OUTOF10: 0

## 2023-09-29 ASSESSMENT — PAIN DESCRIPTION - DESCRIPTORS: DESCRIPTORS: ACHING;THROBBING;SHOOTING

## 2023-09-29 ASSESSMENT — PAIN DESCRIPTION - LOCATION
LOCATION: BACK
LOCATION: KNEE;LEG

## 2023-09-29 ASSESSMENT — PAIN DESCRIPTION - ORIENTATION: ORIENTATION: LEFT

## 2023-09-30 LAB
ANION GAP SERPL CALCULATED.3IONS-SCNC: 9 MMOL/L (ref 7–19)
BASOPHILS # BLD: 0 K/UL (ref 0–0.2)
BASOPHILS NFR BLD: 0.4 % (ref 0–1)
BUN SERPL-MCNC: 13 MG/DL (ref 8–23)
CALCIUM SERPL-MCNC: 8.3 MG/DL (ref 8.8–10.2)
CHLORIDE SERPL-SCNC: 106 MMOL/L (ref 98–111)
CO2 SERPL-SCNC: 26 MMOL/L (ref 22–29)
CREAT SERPL-MCNC: 0.6 MG/DL (ref 0.5–0.9)
EOSINOPHIL # BLD: 0.5 K/UL (ref 0–0.6)
EOSINOPHIL NFR BLD: 7 % (ref 0–5)
ERYTHROCYTE [DISTWIDTH] IN BLOOD BY AUTOMATED COUNT: 23.9 % (ref 11.5–14.5)
GLUCOSE BLD-MCNC: 115 MG/DL (ref 70–99)
GLUCOSE BLD-MCNC: 120 MG/DL (ref 70–99)
GLUCOSE BLD-MCNC: 155 MG/DL (ref 70–99)
GLUCOSE BLD-MCNC: 156 MG/DL (ref 70–99)
GLUCOSE SERPL-MCNC: 101 MG/DL (ref 74–109)
HCT VFR BLD AUTO: 27.5 % (ref 37–47)
HGB BLD-MCNC: 8.3 G/DL (ref 12–16)
IMM GRANULOCYTES # BLD: 0 K/UL
LYMPHOCYTES # BLD: 2.4 K/UL (ref 1.1–4.5)
LYMPHOCYTES NFR BLD: 31.7 % (ref 20–40)
MCH RBC QN AUTO: 25.5 PG (ref 27–31)
MCHC RBC AUTO-ENTMCNC: 30.2 G/DL (ref 33–37)
MCV RBC AUTO: 84.4 FL (ref 81–99)
MONOCYTES # BLD: 1 K/UL (ref 0–0.9)
MONOCYTES NFR BLD: 13.9 % (ref 0–10)
NEUTROPHILS # BLD: 3.5 K/UL (ref 1.5–7.5)
NEUTS SEG NFR BLD: 46.7 % (ref 50–65)
PERFORMED ON: ABNORMAL
PLATELET # BLD AUTO: 240 K/UL (ref 130–400)
PMV BLD AUTO: 10 FL (ref 9.4–12.3)
POTASSIUM SERPL-SCNC: 3.9 MMOL/L (ref 3.5–5)
RBC # BLD AUTO: 3.26 M/UL (ref 4.2–5.4)
SODIUM SERPL-SCNC: 141 MMOL/L (ref 136–145)
WBC # BLD AUTO: 7.5 K/UL (ref 4.8–10.8)

## 2023-09-30 PROCEDURE — 85025 COMPLETE CBC W/AUTO DIFF WBC: CPT

## 2023-09-30 PROCEDURE — 1210000000 HC MED SURG R&B

## 2023-09-30 PROCEDURE — 80048 BASIC METABOLIC PNL TOTAL CA: CPT

## 2023-09-30 PROCEDURE — 2580000003 HC RX 258: Performed by: STUDENT IN AN ORGANIZED HEALTH CARE EDUCATION/TRAINING PROGRAM

## 2023-09-30 PROCEDURE — 97530 THERAPEUTIC ACTIVITIES: CPT

## 2023-09-30 PROCEDURE — 82962 GLUCOSE BLOOD TEST: CPT

## 2023-09-30 PROCEDURE — 6370000000 HC RX 637 (ALT 250 FOR IP): Performed by: STUDENT IN AN ORGANIZED HEALTH CARE EDUCATION/TRAINING PROGRAM

## 2023-09-30 PROCEDURE — 94760 N-INVAS EAR/PLS OXIMETRY 1: CPT

## 2023-09-30 RX ORDER — CEPHALEXIN 250 MG/1
250 CAPSULE ORAL EVERY 6 HOURS SCHEDULED
Status: DISCONTINUED | OUTPATIENT
Start: 2023-09-30 | End: 2023-10-02 | Stop reason: HOSPADM

## 2023-09-30 RX ADMIN — SENNOSIDES AND DOCUSATE SODIUM 2 TABLET: 50; 8.6 TABLET ORAL at 07:38

## 2023-09-30 RX ADMIN — HYDROCODONE BITARTRATE AND ACETAMINOPHEN 1 TABLET: 10; 325 TABLET ORAL at 13:12

## 2023-09-30 RX ADMIN — PANTOPRAZOLE SODIUM 40 MG: 40 TABLET, DELAYED RELEASE ORAL at 07:38

## 2023-09-30 RX ADMIN — Medication 5 MG: at 21:35

## 2023-09-30 RX ADMIN — DULOXETINE HYDROCHLORIDE 60 MG: 60 CAPSULE, DELAYED RELEASE ORAL at 07:38

## 2023-09-30 RX ADMIN — SODIUM CHLORIDE, PRESERVATIVE FREE 10 ML: 5 INJECTION INTRAVENOUS at 07:41

## 2023-09-30 RX ADMIN — CEPHALEXIN 250 MG: 250 CAPSULE ORAL at 23:31

## 2023-09-30 RX ADMIN — PREGABALIN 100 MG: 50 CAPSULE ORAL at 21:35

## 2023-09-30 RX ADMIN — HYDROCODONE BITARTRATE AND ACETAMINOPHEN 1 TABLET: 10; 325 TABLET ORAL at 07:37

## 2023-09-30 RX ADMIN — CEPHALEXIN 250 MG: 250 CAPSULE ORAL at 16:50

## 2023-09-30 RX ADMIN — RIVAROXABAN 10 MG: 10 TABLET, FILM COATED ORAL at 07:38

## 2023-09-30 RX ADMIN — TIZANIDINE 2 MG: 2 TABLET ORAL at 21:35

## 2023-09-30 RX ADMIN — POLYETHYLENE GLYCOL 3350 17 G: 17 POWDER, FOR SOLUTION ORAL at 07:38

## 2023-09-30 RX ADMIN — PREGABALIN 100 MG: 50 CAPSULE ORAL at 07:38

## 2023-09-30 RX ADMIN — CEPHALEXIN 250 MG: 250 CAPSULE ORAL at 19:38

## 2023-09-30 RX ADMIN — HYDROCODONE BITARTRATE AND ACETAMINOPHEN 1 TABLET: 10; 325 TABLET ORAL at 19:38

## 2023-09-30 ASSESSMENT — PAIN - FUNCTIONAL ASSESSMENT
PAIN_FUNCTIONAL_ASSESSMENT: PREVENTS OR INTERFERES SOME ACTIVE ACTIVITIES AND ADLS
PAIN_FUNCTIONAL_ASSESSMENT: ACTIVITIES ARE NOT PREVENTED
PAIN_FUNCTIONAL_ASSESSMENT: ACTIVITIES ARE NOT PREVENTED

## 2023-09-30 ASSESSMENT — PAIN DESCRIPTION - ORIENTATION
ORIENTATION: LEFT

## 2023-09-30 ASSESSMENT — PAIN SCALES - GENERAL
PAINLEVEL_OUTOF10: 7
PAINLEVEL_OUTOF10: 4
PAINLEVEL_OUTOF10: 4
PAINLEVEL_OUTOF10: 7
PAINLEVEL_OUTOF10: 8

## 2023-09-30 ASSESSMENT — PAIN DESCRIPTION - FREQUENCY
FREQUENCY: CONTINUOUS

## 2023-09-30 ASSESSMENT — PAIN DESCRIPTION - PAIN TYPE
TYPE: ACUTE PAIN

## 2023-09-30 ASSESSMENT — PAIN DESCRIPTION - DESCRIPTORS
DESCRIPTORS: ACHING

## 2023-09-30 ASSESSMENT — PAIN DESCRIPTION - LOCATION
LOCATION: LEG
LOCATION: KNEE
LOCATION: KNEE

## 2023-09-30 ASSESSMENT — PAIN DESCRIPTION - ONSET
ONSET: ON-GOING
ONSET: ON-GOING

## 2023-10-01 LAB
ANION GAP SERPL CALCULATED.3IONS-SCNC: 9 MMOL/L (ref 7–19)
ANISOCYTOSIS BLD QL SMEAR: ABNORMAL
BASOPHILS # BLD: 0 K/UL (ref 0–0.2)
BASOPHILS NFR BLD: 0.5 % (ref 0–1)
BUN SERPL-MCNC: 15 MG/DL (ref 8–23)
CALCIUM SERPL-MCNC: 8.3 MG/DL (ref 8.8–10.2)
CHLORIDE SERPL-SCNC: 104 MMOL/L (ref 98–111)
CO2 SERPL-SCNC: 25 MMOL/L (ref 22–29)
CREAT SERPL-MCNC: 0.6 MG/DL (ref 0.5–0.9)
EOSINOPHIL # BLD: 0.6 K/UL (ref 0–0.6)
EOSINOPHIL NFR BLD: 7.8 % (ref 0–5)
ERYTHROCYTE [DISTWIDTH] IN BLOOD BY AUTOMATED COUNT: 24.5 % (ref 11.5–14.5)
GLUCOSE BLD-MCNC: 102 MG/DL (ref 70–99)
GLUCOSE BLD-MCNC: 113 MG/DL (ref 70–99)
GLUCOSE BLD-MCNC: 118 MG/DL (ref 70–99)
GLUCOSE BLD-MCNC: 208 MG/DL (ref 70–99)
GLUCOSE SERPL-MCNC: 101 MG/DL (ref 74–109)
HCT VFR BLD AUTO: 28.6 % (ref 37–47)
HGB BLD-MCNC: 8.7 G/DL (ref 12–16)
IMM GRANULOCYTES # BLD: 0 K/UL
LYMPHOCYTES # BLD: 1.6 K/UL (ref 1.1–4.5)
LYMPHOCYTES NFR BLD: 21.3 % (ref 20–40)
MCH RBC QN AUTO: 25.8 PG (ref 27–31)
MCHC RBC AUTO-ENTMCNC: 30.4 G/DL (ref 33–37)
MCV RBC AUTO: 84.9 FL (ref 81–99)
MONOCYTES # BLD: 0.9 K/UL (ref 0–0.9)
MONOCYTES NFR BLD: 12.7 % (ref 0–10)
NEUTROPHILS # BLD: 4.2 K/UL (ref 1.5–7.5)
NEUTS SEG NFR BLD: 57.4 % (ref 50–65)
OVALOCYTES BLD QL SMEAR: ABNORMAL
PERFORMED ON: ABNORMAL
PLATELET # BLD AUTO: 248 K/UL (ref 130–400)
PLATELET SLIDE REVIEW: ADEQUATE
PMV BLD AUTO: 9.9 FL (ref 9.4–12.3)
POLYCHROMASIA BLD QL SMEAR: ABNORMAL
POTASSIUM SERPL-SCNC: 4.2 MMOL/L (ref 3.5–5)
RBC # BLD AUTO: 3.37 M/UL (ref 4.2–5.4)
SODIUM SERPL-SCNC: 138 MMOL/L (ref 136–145)
WBC # BLD AUTO: 7.3 K/UL (ref 4.8–10.8)

## 2023-10-01 PROCEDURE — 94760 N-INVAS EAR/PLS OXIMETRY 1: CPT

## 2023-10-01 PROCEDURE — 1210000000 HC MED SURG R&B

## 2023-10-01 PROCEDURE — 6370000000 HC RX 637 (ALT 250 FOR IP): Performed by: STUDENT IN AN ORGANIZED HEALTH CARE EDUCATION/TRAINING PROGRAM

## 2023-10-01 PROCEDURE — 82962 GLUCOSE BLOOD TEST: CPT

## 2023-10-01 PROCEDURE — 2580000003 HC RX 258: Performed by: STUDENT IN AN ORGANIZED HEALTH CARE EDUCATION/TRAINING PROGRAM

## 2023-10-01 PROCEDURE — 85025 COMPLETE CBC W/AUTO DIFF WBC: CPT

## 2023-10-01 PROCEDURE — 80048 BASIC METABOLIC PNL TOTAL CA: CPT

## 2023-10-01 RX ORDER — CHOLECALCIFEROL (VITAMIN D3) 125 MCG
1000 CAPSULE ORAL DAILY
Status: DISCONTINUED | OUTPATIENT
Start: 2023-10-01 | End: 2023-10-02 | Stop reason: HOSPADM

## 2023-10-01 RX ADMIN — PANTOPRAZOLE SODIUM 40 MG: 40 TABLET, DELAYED RELEASE ORAL at 09:45

## 2023-10-01 RX ADMIN — HYDROCODONE BITARTRATE AND ACETAMINOPHEN 1 TABLET: 10; 325 TABLET ORAL at 13:09

## 2023-10-01 RX ADMIN — POLYETHYLENE GLYCOL 3350 17 G: 17 POWDER, FOR SOLUTION ORAL at 09:45

## 2023-10-01 RX ADMIN — ASPIRIN 81 MG: 81 TABLET, COATED ORAL at 10:55

## 2023-10-01 RX ADMIN — DULOXETINE HYDROCHLORIDE 60 MG: 60 CAPSULE, DELAYED RELEASE ORAL at 09:44

## 2023-10-01 RX ADMIN — HYDROCODONE BITARTRATE AND ACETAMINOPHEN 1 TABLET: 10; 325 TABLET ORAL at 17:40

## 2023-10-01 RX ADMIN — Medication 5 MG: at 21:17

## 2023-10-01 RX ADMIN — PREGABALIN 100 MG: 50 CAPSULE ORAL at 21:17

## 2023-10-01 RX ADMIN — SODIUM CHLORIDE, PRESERVATIVE FREE 10 ML: 5 INJECTION INTRAVENOUS at 09:45

## 2023-10-01 RX ADMIN — CEPHALEXIN 250 MG: 250 CAPSULE ORAL at 12:38

## 2023-10-01 RX ADMIN — CYANOCOBALAMIN TAB 500 MCG 1000 MCG: 500 TAB at 10:55

## 2023-10-01 RX ADMIN — SENNOSIDES AND DOCUSATE SODIUM 2 TABLET: 50; 8.6 TABLET ORAL at 09:44

## 2023-10-01 RX ADMIN — HYDROCODONE BITARTRATE AND ACETAMINOPHEN 1 TABLET: 10; 325 TABLET ORAL at 05:02

## 2023-10-01 RX ADMIN — PREGABALIN 100 MG: 50 CAPSULE ORAL at 09:44

## 2023-10-01 RX ADMIN — CEPHALEXIN 250 MG: 250 CAPSULE ORAL at 17:41

## 2023-10-01 RX ADMIN — CEPHALEXIN 250 MG: 250 CAPSULE ORAL at 05:02

## 2023-10-01 RX ADMIN — RIVAROXABAN 10 MG: 10 TABLET, FILM COATED ORAL at 09:44

## 2023-10-01 ASSESSMENT — PAIN DESCRIPTION - FREQUENCY: FREQUENCY: CONTINUOUS

## 2023-10-01 ASSESSMENT — PAIN DESCRIPTION - ORIENTATION
ORIENTATION: LEFT
ORIENTATION: LEFT

## 2023-10-01 ASSESSMENT — PAIN SCALES - GENERAL
PAINLEVEL_OUTOF10: 3
PAINLEVEL_OUTOF10: 6
PAINLEVEL_OUTOF10: 6
PAINLEVEL_OUTOF10: 3

## 2023-10-01 ASSESSMENT — PAIN DESCRIPTION - DESCRIPTORS
DESCRIPTORS: THROBBING
DESCRIPTORS: THROBBING

## 2023-10-01 ASSESSMENT — PAIN - FUNCTIONAL ASSESSMENT
PAIN_FUNCTIONAL_ASSESSMENT: ACTIVITIES ARE NOT PREVENTED
PAIN_FUNCTIONAL_ASSESSMENT: ACTIVITIES ARE NOT PREVENTED

## 2023-10-01 ASSESSMENT — PAIN DESCRIPTION - LOCATION
LOCATION: KNEE
LOCATION: KNEE

## 2023-10-01 ASSESSMENT — PAIN DESCRIPTION - ONSET: ONSET: ON-GOING

## 2023-10-01 ASSESSMENT — PAIN DESCRIPTION - PAIN TYPE: TYPE: ACUTE PAIN

## 2023-10-02 VITALS
OXYGEN SATURATION: 96 % | DIASTOLIC BLOOD PRESSURE: 52 MMHG | HEART RATE: 78 BPM | WEIGHT: 134.2 LBS | BODY MASS INDEX: 23.78 KG/M2 | RESPIRATION RATE: 18 BRPM | HEIGHT: 63 IN | SYSTOLIC BLOOD PRESSURE: 135 MMHG | TEMPERATURE: 97.3 F

## 2023-10-02 LAB
ANION GAP SERPL CALCULATED.3IONS-SCNC: 9 MMOL/L (ref 7–19)
ANISOCYTOSIS BLD QL SMEAR: ABNORMAL
BASOPHILS # BLD: 0 K/UL (ref 0–0.2)
BASOPHILS NFR BLD: 0.5 % (ref 0–1)
BUN SERPL-MCNC: 18 MG/DL (ref 8–23)
CALCIUM SERPL-MCNC: 8.4 MG/DL (ref 8.8–10.2)
CHLORIDE SERPL-SCNC: 106 MMOL/L (ref 98–111)
CO2 SERPL-SCNC: 26 MMOL/L (ref 22–29)
CREAT SERPL-MCNC: 0.6 MG/DL (ref 0.5–0.9)
EOSINOPHIL # BLD: 0.6 K/UL (ref 0–0.6)
EOSINOPHIL NFR BLD: 7.1 % (ref 0–5)
ERYTHROCYTE [DISTWIDTH] IN BLOOD BY AUTOMATED COUNT: 24.4 % (ref 11.5–14.5)
GLUCOSE BLD-MCNC: 120 MG/DL (ref 70–99)
GLUCOSE BLD-MCNC: 141 MG/DL (ref 70–99)
GLUCOSE SERPL-MCNC: 118 MG/DL (ref 74–109)
HCT VFR BLD AUTO: 29 % (ref 37–47)
HGB BLD-MCNC: 8.7 G/DL (ref 12–16)
HYPOCHROMIA BLD QL SMEAR: ABNORMAL
IMM GRANULOCYTES # BLD: 0 K/UL
LYMPHOCYTES # BLD: 1.5 K/UL (ref 1.1–4.5)
LYMPHOCYTES NFR BLD: 19 % (ref 20–40)
MCH RBC QN AUTO: 25.6 PG (ref 27–31)
MCHC RBC AUTO-ENTMCNC: 30 G/DL (ref 33–37)
MCV RBC AUTO: 85.3 FL (ref 81–99)
MONOCYTES # BLD: 1 K/UL (ref 0–0.9)
MONOCYTES NFR BLD: 12.3 % (ref 0–10)
NEUTROPHILS # BLD: 4.8 K/UL (ref 1.5–7.5)
NEUTS SEG NFR BLD: 60.8 % (ref 50–65)
PERFORMED ON: ABNORMAL
PERFORMED ON: ABNORMAL
PLATELET # BLD AUTO: 258 K/UL (ref 130–400)
PMV BLD AUTO: 10 FL (ref 9.4–12.3)
POLYCHROMASIA BLD QL SMEAR: ABNORMAL
POTASSIUM SERPL-SCNC: 4.6 MMOL/L (ref 3.5–5)
RBC # BLD AUTO: 3.4 M/UL (ref 4.2–5.4)
SODIUM SERPL-SCNC: 141 MMOL/L (ref 136–145)
WBC # BLD AUTO: 7.9 K/UL (ref 4.8–10.8)

## 2023-10-02 PROCEDURE — 80048 BASIC METABOLIC PNL TOTAL CA: CPT

## 2023-10-02 PROCEDURE — 94760 N-INVAS EAR/PLS OXIMETRY 1: CPT

## 2023-10-02 PROCEDURE — 6370000000 HC RX 637 (ALT 250 FOR IP): Performed by: STUDENT IN AN ORGANIZED HEALTH CARE EDUCATION/TRAINING PROGRAM

## 2023-10-02 PROCEDURE — 2580000003 HC RX 258: Performed by: STUDENT IN AN ORGANIZED HEALTH CARE EDUCATION/TRAINING PROGRAM

## 2023-10-02 PROCEDURE — 82962 GLUCOSE BLOOD TEST: CPT

## 2023-10-02 PROCEDURE — 6360000002 HC RX W HCPCS: Performed by: STUDENT IN AN ORGANIZED HEALTH CARE EDUCATION/TRAINING PROGRAM

## 2023-10-02 PROCEDURE — 97530 THERAPEUTIC ACTIVITIES: CPT

## 2023-10-02 PROCEDURE — 85025 COMPLETE CBC W/AUTO DIFF WBC: CPT

## 2023-10-02 PROCEDURE — 97116 GAIT TRAINING THERAPY: CPT

## 2023-10-02 RX ORDER — POLYETHYLENE GLYCOL 3350 17 G/17G
17 POWDER, FOR SOLUTION ORAL DAILY
Qty: 527 G | Refills: 1 | Status: SHIPPED | OUTPATIENT
Start: 2023-10-03 | End: 2023-12-04

## 2023-10-02 RX ORDER — PREGABALIN 100 MG/1
100 CAPSULE ORAL 2 TIMES DAILY
Qty: 30 CAPSULE | Refills: 0 | Status: SHIPPED | OUTPATIENT
Start: 2023-10-02 | End: 2023-10-17

## 2023-10-02 RX ORDER — HEPARIN 100 UNIT/ML
300 SYRINGE INTRAVENOUS PRN
Status: DISCONTINUED | OUTPATIENT
Start: 2023-10-02 | End: 2023-10-02 | Stop reason: HOSPADM

## 2023-10-02 RX ORDER — SENNA AND DOCUSATE SODIUM 50; 8.6 MG/1; MG/1
2 TABLET, FILM COATED ORAL DAILY
Qty: 60 TABLET | Refills: 0 | Status: SHIPPED | OUTPATIENT
Start: 2023-10-03

## 2023-10-02 RX ORDER — HYDROCODONE BITARTRATE AND ACETAMINOPHEN 10; 325 MG/1; MG/1
1 TABLET ORAL EVERY 6 HOURS PRN
Qty: 12 TABLET | Refills: 0 | Status: SHIPPED | OUTPATIENT
Start: 2023-10-02 | End: 2023-10-05

## 2023-10-02 RX ORDER — CEPHALEXIN 250 MG/1
250 CAPSULE ORAL 4 TIMES DAILY
Qty: 20 CAPSULE | Refills: 0 | Status: SHIPPED | OUTPATIENT
Start: 2023-10-02 | End: 2023-10-07

## 2023-10-02 RX ORDER — BISACODYL 10 MG
10 SUPPOSITORY, RECTAL RECTAL DAILY PRN
Qty: 30 SUPPOSITORY | Refills: 0 | Status: SHIPPED | OUTPATIENT
Start: 2023-10-02 | End: 2023-11-01

## 2023-10-02 RX ADMIN — PANTOPRAZOLE SODIUM 40 MG: 40 TABLET, DELAYED RELEASE ORAL at 08:14

## 2023-10-02 RX ADMIN — CEPHALEXIN 250 MG: 250 CAPSULE ORAL at 12:11

## 2023-10-02 RX ADMIN — HYDROCODONE BITARTRATE AND ACETAMINOPHEN 1 TABLET: 10; 325 TABLET ORAL at 01:32

## 2023-10-02 RX ADMIN — POLYETHYLENE GLYCOL 3350 17 G: 17 POWDER, FOR SOLUTION ORAL at 08:08

## 2023-10-02 RX ADMIN — CEPHALEXIN 250 MG: 250 CAPSULE ORAL at 01:31

## 2023-10-02 RX ADMIN — HYDROCODONE BITARTRATE AND ACETAMINOPHEN 1 TABLET: 10; 325 TABLET ORAL at 12:13

## 2023-10-02 RX ADMIN — CEPHALEXIN 250 MG: 250 CAPSULE ORAL at 06:06

## 2023-10-02 RX ADMIN — SENNOSIDES AND DOCUSATE SODIUM 2 TABLET: 50; 8.6 TABLET ORAL at 08:07

## 2023-10-02 RX ADMIN — HYDROCODONE BITARTRATE AND ACETAMINOPHEN 1 TABLET: 10; 325 TABLET ORAL at 06:06

## 2023-10-02 RX ADMIN — RIVAROXABAN 10 MG: 10 TABLET, FILM COATED ORAL at 08:07

## 2023-10-02 RX ADMIN — PREGABALIN 100 MG: 50 CAPSULE ORAL at 08:07

## 2023-10-02 RX ADMIN — CYANOCOBALAMIN TAB 500 MCG 1000 MCG: 500 TAB at 08:07

## 2023-10-02 RX ADMIN — SODIUM CHLORIDE, PRESERVATIVE FREE 10 ML: 5 INJECTION INTRAVENOUS at 08:14

## 2023-10-02 RX ADMIN — DULOXETINE HYDROCHLORIDE 60 MG: 60 CAPSULE, DELAYED RELEASE ORAL at 08:08

## 2023-10-02 RX ADMIN — HEPARIN 300 UNITS: 100 SYRINGE at 11:20

## 2023-10-02 ASSESSMENT — PAIN SCALES - GENERAL
PAINLEVEL_OUTOF10: 2
PAINLEVEL_OUTOF10: 7

## 2023-10-02 ASSESSMENT — PAIN DESCRIPTION - LOCATION: LOCATION: KNEE

## 2023-10-02 NOTE — DISCHARGE SUMMARY
[address]    DEPARTMENT OF HOSPITALIST MEDICINE      DISCHARGE SUMMARY:      PATIENT NAME:  Alice De Jesus  :  1935  MRN:  [id number]    Admission Date:   2023  3:05 PM Attending: Ange Hall MD   Discharge Date:   10/2/2023              PCP: EARLE Benz - CNP  Length of Stay: 7 days     Chief Complaint on Admission:   Chief Complaint   Patient presents with    Fall     Left knee pain, left shoulder pain, and left hand pain, knee has doubled in size with EMS, hit head, takes xarelto, denies LOC       Consultants:     IP CONSULT TO [address] [place] [address]  COURSE  AND  TREATMENT:  Patient is a 82 y.o. female with prior DVT on Xarelto, HTN, SVT, CKD 3, T2DM who presented to [facility] ED after a fall. Pt reports she tripped over her own feet earlier today and landed on her left knee, also hit left side of her forehead. Workup in ED: CT head and C spine negative for acute findings. Plain films of left knee, elbow, humerus, femur, pelvis, and chest were negative for fractures. Left knee XR did show large anterior soft tissue swelling, hardware appears well seated. Admitted to hospitalist service. Orthopedics consulted, offered joint aspiration with pt decliend. PT/OT ordered. Managing pain with PO and IV opioids. Restarted Xarelto and ASA. She did receive blood transfusion of 1 unit on  and again . Hgb then stable at 8.7 on day of discharge. Started on Keflex for mild cellulitis of left knee which she will continue for 5 additional days at discharge. Pt discharged to SNF in stable condition. Discharge Problem List:   Principal Problem:    Pain and swelling of left knee  Active Problems:    Fall    Ambulatory dysfunction    Intractable pain  Resolved Problems:    * No resolved hospital problems. *         Last dated Assessment and Plan . .. 10/1/23  Fall  Pain and swelling of left knee  Cellulitis  -- Plain films

## 2023-10-02 NOTE — DISCHARGE INSTR - DIET
Good nutrition is important when healing from an illness, injury, or surgery. Follow any nutrition recommendations given to you during your hospital stay. If you were given an oral nutrition supplement while in the hospital, continue to take this supplement at home. You can take it with meals, in-between meals, and/or before bedtime. These supplements can be purchased at most local grocery stores, pharmacies, and chain [employer]-stores. If you have any questions about your diet or nutrition, call the hospital and ask for the dietitian.     Regular 5 carb choices/meal

## 2023-10-02 NOTE — CARE COORDINATION
2nd IMM Letter given to patient. Reviewed, discussed, answered questions, and signed by patient. Declined to stay 4 hours prior to discharge. Signed copy placed in patient's chart.      10/02/23 1256   IMM Letter   IMM Letter given to Patient/Family/Significant other/Guardian/POA/by: given to patient by Cristina Lambert RN BSN    IMM Letter date given: 10/02/23   IMM Letter time given: 1255     Electronically signed by Cristina Lambert RN, BSN on 10/2/2023 at 12:57 PM
Patient has accepted bed offer from [facility]. Patient's insurance requires a prior authorization.   will be notified when prior authorization is received    [place] [phone] [phone] J  Electronically signed by Jd Antoine RN, BSN on 9/29/2023 at 3:36 PM
Patient has requested a referral to [address] [place] [address]. Referral sent.  Awaiting acceptance/denial.    [id number]   P  [phone] F  Electronically signed by Agustina Gutierrez RN, BSN on 9/29/2023 at 10:46 AM
Pre-cert has cleared for Pt to DC to [facility] on Monday October 2nd if medically cleared. SW will update the pharmacy within the system.      [place] [phone]  F  Electronically signed by Jeri Montes on 10/2/2023 at 9:14 AM
Patient Administration Received 10/06/22 Records release form signed for Radiology reports   Patient Communication Release of Information Signed 22    Insurance Received 22 10.7 - . bcbs auth; dustin; cardio   Patient Communication Release of Information Signed 22    MyChart Adult Proxy Access Received 11/15/22 11/15/2022   Insurance Card Received () 23 [facility] advantage    Wound   Right forearm skin tear done 1.5 weeks ago   Wound   Left leg cellulitis flare up   HIM JC Authorization     ACP-Guardianship Document      Photo ID Received 23 Exp 27   Text Reminder Consent Patient Refused 23    Photo ID  (Deleted)  03/04/15   Insurance Card  (Deleted)  Medicare & Phy Life   Insurance Card Received (Deleted) 10/30/12 AARP   Insurance Card Received (Deleted) 13 [employer] supplement 13   (OLD) [facility]) Physician Consent and NPP Not Received (Deleted)     Patient Photo   Photo of Patient   Patient Photo   Photo of Patient   HIM Release of Information Output  22 Requested records   HIM Release of Information Output  22 Requested records   Photo ID  (Deleted)     External Radiology and Imaging   Scanned Document   External Radiology and Imaging   Scanned Document   External Radiology and Imaging   Scanned Document   External Radiology and Imaging   Scanned Document   External Radiology and Imaging   Scanned Document   External Radiology and Imaging   Scanned Document   External Radiology and Imaging   Scanned Document   External Radiology and Imaging   Scanned Document   HIM Release of Information Output  (Deleted) 23 Requested records   Lab Requisition Scan  (Deleted)  Document   Lab Requisition Scan  (Deleted)  Document   Lab Requisition Scan  (Deleted)  Document   HIM Release of Information Output  (Deleted) 23 Requested records   Documents for the Encounter   Medicare Important Message Received

## 2023-10-23 RX ORDER — TRIAMCINOLONE ACETONIDE 1 MG/G
OINTMENT TOPICAL
Qty: 60 G | Refills: 1 | Status: SHIPPED | OUTPATIENT
Start: 2023-10-23
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
## 2023-10-31 ENCOUNTER — OFFICE VISIT (OUTPATIENT)
Dept: PRIMARY CARE CLINIC | Age: 88
End: 2023-10-31
Payer: MEDICARE

## 2023-10-31 VITALS
TEMPERATURE: 96.8 F | DIASTOLIC BLOOD PRESSURE: 78 MMHG | SYSTOLIC BLOOD PRESSURE: 122 MMHG | WEIGHT: 136.2 LBS | RESPIRATION RATE: 16 BRPM | BODY MASS INDEX: 24.13 KG/M2 | HEIGHT: 63 IN

## 2023-10-31 DIAGNOSIS — M79.674 TOE PAIN, BILATERAL: ICD-10-CM

## 2023-10-31 DIAGNOSIS — D50.0 IRON DEFICIENCY ANEMIA DUE TO CHRONIC BLOOD LOSS: ICD-10-CM

## 2023-10-31 DIAGNOSIS — Z23 NEED FOR INFLUENZA VACCINATION: ICD-10-CM

## 2023-10-31 DIAGNOSIS — S80.02XD CONTUSION OF LEFT KNEE, SUBSEQUENT ENCOUNTER: Primary | ICD-10-CM

## 2023-10-31 DIAGNOSIS — Z91.81 AT HIGH RISK FOR FALLS: ICD-10-CM

## 2023-10-31 DIAGNOSIS — R20.0 NUMBNESS AND TINGLING OF BOTH FEET: ICD-10-CM

## 2023-10-31 DIAGNOSIS — M79.675 TOE PAIN, BILATERAL: ICD-10-CM

## 2023-10-31 DIAGNOSIS — Z95.828 PORT-A-CATH IN PLACE: ICD-10-CM

## 2023-10-31 DIAGNOSIS — Z45.2 ENCOUNTER FOR CARE RELATED TO PORT-A-CATH: ICD-10-CM

## 2023-10-31 DIAGNOSIS — R20.2 NUMBNESS AND TINGLING OF BOTH FEET: ICD-10-CM

## 2023-10-31 LAB
ALBUMIN SERPL-MCNC: 3.9 G/DL (ref 3.5–5.2)
ALP SERPL-CCNC: 131 U/L (ref 35–104)
ALT SERPL-CCNC: 11 U/L (ref 5–33)
ANION GAP SERPL CALCULATED.3IONS-SCNC: 11 MMOL/L (ref 7–19)
ANISOCYTOSIS BLD QL SMEAR: ABNORMAL
AST SERPL-CCNC: 18 U/L (ref 5–32)
BASOPHILS # BLD: 0.1 K/UL (ref 0–0.2)
BASOPHILS NFR BLD: 0.9 % (ref 0–1)
BILIRUB SERPL-MCNC: 0.3 MG/DL (ref 0.2–1.2)
BUN SERPL-MCNC: 21 MG/DL (ref 8–23)
CALCIUM SERPL-MCNC: 9.3 MG/DL (ref 8.8–10.2)
CHLORIDE SERPL-SCNC: 103 MMOL/L (ref 98–111)
CO2 SERPL-SCNC: 25 MMOL/L (ref 22–29)
CREAT SERPL-MCNC: 0.7 MG/DL (ref 0.5–0.9)
EOSINOPHIL # BLD: 0.5 K/UL (ref 0–0.6)
EOSINOPHIL NFR BLD: 7.4 % (ref 0–5)
ERYTHROCYTE [DISTWIDTH] IN BLOOD BY AUTOMATED COUNT: 22.3 % (ref 11.5–14.5)
GLUCOSE SERPL-MCNC: 141 MG/DL (ref 74–109)
HCT VFR BLD AUTO: 40.2 % (ref 37–47)
HGB BLD-MCNC: 12.3 G/DL (ref 12–16)
HYPOCHROMIA BLD QL SMEAR: ABNORMAL
IMM GRANULOCYTES # BLD: 0 K/UL
LYMPHOCYTES # BLD: 2.5 K/UL (ref 1.1–4.5)
LYMPHOCYTES NFR BLD: 35.8 % (ref 20–40)
MCH RBC QN AUTO: 27 PG (ref 27–31)
MCHC RBC AUTO-ENTMCNC: 30.6 G/DL (ref 33–37)
MCV RBC AUTO: 88.4 FL (ref 81–99)
MONOCYTES # BLD: 0.6 K/UL (ref 0–0.9)
MONOCYTES NFR BLD: 8.3 % (ref 0–10)
NEUTROPHILS # BLD: 3.3 K/UL (ref 1.5–7.5)
NEUTS SEG NFR BLD: 47.5 % (ref 50–65)
OVALOCYTES BLD QL SMEAR: ABNORMAL
PLATELET # BLD AUTO: 268 K/UL (ref 130–400)
PLATELET SLIDE REVIEW: ADEQUATE
PMV BLD AUTO: 10.4 FL (ref 9.4–12.3)
POLYCHROMASIA BLD QL SMEAR: ABNORMAL
POTASSIUM SERPL-SCNC: 3.9 MMOL/L (ref 3.5–5)
PROT SERPL-MCNC: 6.9 G/DL (ref 6.6–8.7)
RBC # BLD AUTO: 4.55 M/UL (ref 4.2–5.4)
SODIUM SERPL-SCNC: 139 MMOL/L (ref 136–145)
WBC # BLD AUTO: 7 K/UL (ref 4.8–10.8)

## 2023-10-31 PROCEDURE — 99214 OFFICE O/P EST MOD 30 MIN: CPT | Performed by: NURSE PRACTITIONER

## 2023-10-31 PROCEDURE — 96523 IRRIG DRUG DELIVERY DEVICE: CPT | Performed by: NURSE PRACTITIONER

## 2023-10-31 PROCEDURE — 1123F ACP DISCUSS/DSCN MKR DOCD: CPT | Performed by: NURSE PRACTITIONER

## 2023-10-31 PROCEDURE — 90694 VACC AIIV4 NO PRSRV 0.5ML IM: CPT | Performed by: NURSE PRACTITIONER

## 2023-10-31 PROCEDURE — G0008 ADMIN INFLUENZA VIRUS VAC: HCPCS | Performed by: NURSE PRACTITIONER

## 2023-10-31 RX ORDER — PREGABALIN 100 MG/1
100 CAPSULE ORAL 2 TIMES DAILY
Qty: 60 CAPSULE | Refills: 0 | Status: SHIPPED | OUTPATIENT
Start: 2023-10-31 | End: 2023-11-30

## 2023-10-31 ASSESSMENT — ENCOUNTER SYMPTOMS: GASTROINTESTINAL NEGATIVE: 1

## 2023-10-31 NOTE — PROGRESS NOTES
[address]  [place] [address] [place] [address]  Dept: [phone]  Dept Fax: [phone]  Loc: [phone]    Bradford Ambrocio is a 82 y.o. female who presents today for her medical conditions/complaints as noted below. Bradford Ambrocio is c/o of Follow-up (Was in [facility] for rehab from falling. Had blood blister on knee and nursing home put steri strips. Came home about 2 weeks ago.)        HPI:     HPI   Chief Complaint   Patient presents with    Follow-up     Was in [facility] for rehab from falling. Had blood blister on knee and nursing home put steri strips. Came home about 2 weeks ago. Admitted to the hospital in September 25 and discharged on October 2, 2023 to the nursing home. She got out of nursing home 2 weeks ago and home health is coming. She is at home now and home health is seeing her. She has not had any follow-up with orthopedic. Her left knee she says is smaller but remains swollen it is not that tender anymore. She has been up using her walker and physical therapy has been working with her. Have some numbness and tingling in both feet. Both of her big toes hurt her. She is seeing pain management and is supposed to be on Lyrica but is not sure if she has any refills because she missed an appointment with them when she was in the nursing home. CUMULATIVE  HOSPITAL  COURSE  AND  TREATMENT:  Patient is a 82 y.o. female with prior DVT on Xarelto, HTN, SVT, CKD 3, T2DM who presented to [address] [place] [address] ED after a fall. Pt reports she tripped over her own feet earlier today and landed on her left knee, also hit left side of her forehead. Workup in ED: CT head and C spine negative for acute findings. Plain films of left knee, elbow, humerus, femur, pelvis, and chest were negative for fractures. Left knee XR did show large anterior soft tissue swelling, hardware appears well seated. Admitted to hospitalist service.  Orthopedics consulted, offered joint aspiration

## 2023-11-03 ENCOUNTER — TELEPHONE (OUTPATIENT)
Dept: PRIMARY CARE CLINIC | Age: 88
End: 2023-11-03

## 2023-11-03 RX ORDER — PREDNISONE 10 MG/1
10 TABLET ORAL DAILY
Qty: 10 TABLET | Refills: 0 | Status: SHIPPED | OUTPATIENT
Start: 2023-11-03 | End: 2023-11-13

## 2023-11-03 NOTE — TELEPHONE ENCOUNTER
Physical therapist called me and she does not want to do physical therapy today because her back is hurting her so bad he was there to work with her knee but she does not feel like getting out of the hospital bed. He is wanting to know could she have a steroid.   She has not had 1 for me in a while she has been in the nursing home but I did go ahead and send her in prednisone once a day she is to let me know if she is not getting better

## 2023-11-07 ENCOUNTER — APPOINTMENT (OUTPATIENT)
Dept: GENERAL RADIOLOGY | Age: 88
End: 2023-11-07
Payer: MEDICARE

## 2023-11-07 ENCOUNTER — HOSPITAL ENCOUNTER (EMERGENCY)
Age: 88
Discharge: HOME | End: 2023-11-07
Payer: MEDICARE

## 2023-11-07 ENCOUNTER — APPOINTMENT (OUTPATIENT)
Dept: CT IMAGING | Age: 88
End: 2023-11-07
Payer: MEDICARE

## 2023-11-07 VITALS
SYSTOLIC BLOOD PRESSURE: 155 MMHG | OXYGEN SATURATION: 97 % | DIASTOLIC BLOOD PRESSURE: 62 MMHG | TEMPERATURE: 97.4 F | RESPIRATION RATE: 18 BRPM | HEART RATE: 83 BPM | WEIGHT: 136 LBS | BODY MASS INDEX: 24.09 KG/M2

## 2023-11-07 DIAGNOSIS — G89.29 ACUTE EXACERBATION OF CHRONIC LOW BACK PAIN: Primary | ICD-10-CM

## 2023-11-07 DIAGNOSIS — M54.50 ACUTE EXACERBATION OF CHRONIC LOW BACK PAIN: Primary | ICD-10-CM

## 2023-11-07 PROCEDURE — 6360000002 HC RX W HCPCS: Performed by: NURSE PRACTITIONER

## 2023-11-07 PROCEDURE — 6370000000 HC RX 637 (ALT 250 FOR IP): Performed by: NURSE PRACTITIONER

## 2023-11-07 PROCEDURE — 96374 THER/PROPH/DIAG INJ IV PUSH: CPT

## 2023-11-07 PROCEDURE — 96375 TX/PRO/DX INJ NEW DRUG ADDON: CPT

## 2023-11-07 PROCEDURE — 72131 CT LUMBAR SPINE W/O DYE: CPT

## 2023-11-07 PROCEDURE — 99284 EMERGENCY DEPT VISIT MOD MDM: CPT

## 2023-11-07 RX ORDER — HYDROCODONE BITARTRATE AND ACETAMINOPHEN 5; 325 MG/1; MG/1
1 TABLET ORAL EVERY 6 HOURS PRN
Status: DISCONTINUED | OUTPATIENT
Start: 2023-11-07 | End: 2023-11-07 | Stop reason: HOSPADM

## 2023-11-07 RX ORDER — MORPHINE SULFATE 4 MG/ML
2 INJECTION, SOLUTION INTRAMUSCULAR; INTRAVENOUS ONCE
Status: COMPLETED | OUTPATIENT
Start: 2023-11-07 | End: 2023-11-07

## 2023-11-07 RX ORDER — ONDANSETRON 2 MG/ML
4 INJECTION INTRAMUSCULAR; INTRAVENOUS ONCE
Status: COMPLETED | OUTPATIENT
Start: 2023-11-07 | End: 2023-11-07

## 2023-11-07 RX ADMIN — MORPHINE SULFATE 2 MG: 4 INJECTION, SOLUTION INTRAMUSCULAR; INTRAVENOUS at 14:58

## 2023-11-07 RX ADMIN — HYDROCODONE BITARTRATE AND ACETAMINOPHEN 1 TABLET: 5; 325 TABLET ORAL at 16:18

## 2023-11-07 RX ADMIN — ONDANSETRON 4 MG: 2 INJECTION INTRAMUSCULAR; INTRAVENOUS at 14:58

## 2023-11-07 ASSESSMENT — PAIN - FUNCTIONAL ASSESSMENT: PAIN_FUNCTIONAL_ASSESSMENT: 0-10

## 2023-11-07 ASSESSMENT — ENCOUNTER SYMPTOMS
BACK PAIN: 1
RESPIRATORY NEGATIVE: 1
GASTROINTESTINAL NEGATIVE: 1

## 2023-11-07 ASSESSMENT — PAIN SCALES - GENERAL
PAINLEVEL_OUTOF10: 8
PAINLEVEL_OUTOF10: 7

## 2023-11-07 ASSESSMENT — PAIN DESCRIPTION - LOCATION: LOCATION: BACK

## 2023-11-07 NOTE — ED PROVIDER NOTES
[address] [facility] EMERGENCY DEPT  EMERGENCY DEPARTMENT ENCOUNTER      Pt Name: Piper Vazquez  MRN: [id number]  [address] [facility] 1935  Date of evaluation: 11/7/2023  Provider: EARLE Bosch NP    CHIEF COMPLAINT       Chief Complaint   Patient presents with    Back Pain     Fall in September with back pain since, sent by PCP to check for compression fx         HISTORY OF PRESENT ILLNESS   (Location/Symptom, Timing/Onset,Context/Setting, Quality, Duration, Modifying Factors, Severity)  Note limiting factors. Piper Vazquez is a 82 y.o. female who presents to the emergency department with complaint of low back pain since September. She fell in September and reports that she has had back pain since. She denies any loss of bowel or bladder denies saddle anesthesia. She is currently in pain management and is prescribed hydrocodone twice daily. She took the hydrocodone at 3 AM and at 6 AM today. She has also been evaluated by her primary care provider and is currently taking prednisone. She denies any trauma. The history is provided by the patient. NursingNotes were reviewed. REVIEW OF SYSTEMS    (2-9 systems for level 4, 10 or more for level 5)     Review of Systems   Constitutional:  Positive for activity change. Negative for fatigue and fever. HENT: Negative. Respiratory: Negative. Cardiovascular: Negative. Gastrointestinal: Negative. Genitourinary:  Negative for difficulty urinating and pelvic pain. Musculoskeletal:  Positive for back pain. Neurological:  Negative for dizziness and numbness. Psychiatric/Behavioral: Negative. All other systems reviewed and are negative. A complete review of systems was performed and is negative except as noted above in the HPI.        PAST MEDICAL HISTORY     Past Medical History:   Diagnosis Date    Afib ([address]) 2021    Bronchitis     Colon cancer ([address])     Colon polyps     Constipation     Deep vein blood clot of left lower extremity ([address])

## 2023-11-07 NOTE — DISCHARGE INSTRUCTIONS
Rest and continue ice and heat. Medication as prescribed. Continue prednisone. Follow up with Dr. Alejandrina De Jesus as scheduled. Return to ER for any new, worsening, or change in condition.

## 2023-11-13 RX ORDER — AMLODIPINE BESYLATE 5 MG/1
5 TABLET ORAL DAILY
Qty: 90 TABLET | Refills: 1 | Status: SHIPPED | OUTPATIENT
Start: 2023-11-13

## 2023-11-21 ENCOUNTER — HOSPITAL ENCOUNTER (OUTPATIENT)
Dept: WOUND CARE | Age: 88
Discharge: HOME | End: 2023-11-21
Payer: MEDICARE

## 2023-11-21 VITALS
SYSTOLIC BLOOD PRESSURE: 131 MMHG | RESPIRATION RATE: 18 BRPM | WEIGHT: 136 LBS | BODY MASS INDEX: 24.1 KG/M2 | HEIGHT: 63 IN | DIASTOLIC BLOOD PRESSURE: 49 MMHG | HEART RATE: 46 BPM | TEMPERATURE: 96.7 F

## 2023-11-21 DIAGNOSIS — S81.002A: Primary | Chronic | ICD-10-CM

## 2023-11-21 PROBLEM — E11.9 TYPE 2 DIABETES MELLITUS (HCC): Chronic | Status: ACTIVE | Noted: 2021-10-25

## 2023-11-21 PROCEDURE — 11042 DBRDMT SUBQ TIS 1ST 20SQCM/<: CPT

## 2023-11-21 PROCEDURE — 11045 DBRDMT SUBQ TISS EACH ADDL: CPT

## 2023-11-21 PROCEDURE — 99214 OFFICE O/P EST MOD 30 MIN: CPT

## 2023-11-21 RX ORDER — LIDOCAINE HYDROCHLORIDE 20 MG/ML
JELLY TOPICAL ONCE
OUTPATIENT
Start: 2023-11-21 | End: 2023-11-21

## 2023-11-21 RX ORDER — IBUPROFEN 200 MG
TABLET ORAL ONCE
OUTPATIENT
Start: 2023-11-21 | End: 2023-11-21

## 2023-11-21 RX ORDER — ACYCLOVIR 400 MG/1
400 TABLET ORAL
COMMUNITY

## 2023-11-21 RX ORDER — BACITRACIN ZINC AND POLYMYXIN B SULFATE 500; 1000 [USP'U]/G; [USP'U]/G
OINTMENT TOPICAL ONCE
OUTPATIENT
Start: 2023-11-21 | End: 2023-11-21

## 2023-11-21 RX ORDER — TRIAMCINOLONE ACETONIDE 1 MG/G
OINTMENT TOPICAL ONCE
OUTPATIENT
Start: 2023-11-21 | End: 2023-11-21

## 2023-11-21 RX ORDER — GENTAMICIN SULFATE 1 MG/G
OINTMENT TOPICAL ONCE
OUTPATIENT
Start: 2023-11-21 | End: 2023-11-21

## 2023-11-21 RX ORDER — BETAMETHASONE DIPROPIONATE 0.5 MG/G
CREAM TOPICAL ONCE
OUTPATIENT
Start: 2023-11-21 | End: 2023-11-21

## 2023-11-21 RX ORDER — SODIUM CHLOR/HYPOCHLOROUS ACID 0.033 %
SOLUTION, IRRIGATION IRRIGATION ONCE
OUTPATIENT
Start: 2023-11-21 | End: 2023-11-21

## 2023-11-21 RX ORDER — GINSENG 100 MG
CAPSULE ORAL ONCE
OUTPATIENT
Start: 2023-11-21 | End: 2023-11-21

## 2023-11-21 RX ORDER — CLOBETASOL PROPIONATE 0.5 MG/G
OINTMENT TOPICAL ONCE
OUTPATIENT
Start: 2023-11-21 | End: 2023-11-21

## 2023-11-21 ASSESSMENT — PAIN DESCRIPTION - ORIENTATION: ORIENTATION: LOWER

## 2023-11-21 ASSESSMENT — PAIN DESCRIPTION - PAIN TYPE: TYPE: CHRONIC PAIN

## 2023-11-21 ASSESSMENT — PAIN SCALES - GENERAL: PAINLEVEL_OUTOF10: 7

## 2023-11-21 ASSESSMENT — PAIN DESCRIPTION - ONSET: ONSET: ON-GOING

## 2023-11-21 ASSESSMENT — PAIN DESCRIPTION - DESCRIPTORS: DESCRIPTORS: ACHING;BURNING;THROBBING

## 2023-11-21 ASSESSMENT — PAIN DESCRIPTION - FREQUENCY: FREQUENCY: CONTINUOUS

## 2023-11-21 ASSESSMENT — PAIN DESCRIPTION - LOCATION: LOCATION: BACK

## 2023-11-21 NOTE — PLAN OF CARE
Problem: Chronic Conditions and Co-morbidities  Goal: Patient's chronic conditions and co-morbidity symptoms are monitored and maintained or improved  Outcome: Progressing     Problem: Pain  Goal: Verbalizes/displays adequate comfort level or baseline comfort level  Outcome: Progressing     Problem: ABCDS Injury Assessment  Goal: Absence of physical injury  Outcome: Progressing     Problem: Wound:  Goal: Will show signs of wound healing; wound closure and no evidence of infection  Description: Will show signs of wound healing; wound closure and no evidence of infection  Outcome: Progressing     Problem: Falls - Risk of:  Goal: Will remain free from falls  Description: Will remain free from falls  Outcome: Progressing     Problem: Blood Glucose:  Goal: Ability to maintain appropriate glucose levels will improve  Description: Ability to maintain appropriate glucose levels will improve  Outcome: Progressing

## 2023-11-21 NOTE — PATIENT INSTRUCTIONS
[address] and Hyperbaric Oxygen Therapy   Physician Orders and Discharge Instructions  [address] [facility],[address], [address]  Telephone: [phone]    NAME:  Sarita Rosales OF BIRTH:  1935  MEDICAL RECORD NUMBER:  [id number]  DATE:  11/21/2023    Discharge condition: Stable    Discharge to: Home    Left via:Private automobile    Accompanied by: Family    ECF/HHA: [facility]     Dressing Orders: Left Knee  Soap and water wash  Saline moist 4x4 gauze, Cover with dry gauze, roll gauze, medipore tape and Stockinet or knee sleeve to hole in place  Change twice daily     [address] [facility] follow up visit ____________1 week_________________  (Please note your next appointment above and if you are unable to keep, kindly give a 24 hour notice. Thank you.)    If you experience any of the following, please call the [facility] during business hours:    * Increase in Pain  * Temperature over 101  * Increase in drainage from your wound  * Drainage with a foul odor  * Bleeding  * Increase in swelling  * Need for compression bandage changes due to slippage, breakthrough drainage. If you need medical attention outside of the business hours of the [facility] please contact your PCP or go to the nearest emergency room.

## 2023-11-22 RX ORDER — AMLODIPINE BESYLATE 5 MG/1
5 TABLET ORAL DAILY
Qty: 90 TABLET | Refills: 1 | Status: SHIPPED | OUTPATIENT
Start: 2023-11-22

## 2023-11-28 ENCOUNTER — HOSPITAL ENCOUNTER (OUTPATIENT)
Dept: WOUND CARE | Age: 88
Discharge: HOME | End: 2023-11-28
Payer: MEDICARE

## 2023-11-28 VITALS
DIASTOLIC BLOOD PRESSURE: 53 MMHG | HEART RATE: 49 BPM | HEIGHT: 63 IN | SYSTOLIC BLOOD PRESSURE: 143 MMHG | TEMPERATURE: 97.6 F | WEIGHT: 136 LBS | BODY MASS INDEX: 24.1 KG/M2 | RESPIRATION RATE: 18 BRPM

## 2023-11-28 DIAGNOSIS — S81.002A: Primary | ICD-10-CM

## 2023-11-28 PROCEDURE — 11042 DBRDMT SUBQ TIS 1ST 20SQCM/<: CPT | Performed by: SURGERY

## 2023-11-28 PROCEDURE — 11042 DBRDMT SUBQ TIS 1ST 20SQCM/<: CPT

## 2023-11-28 RX ORDER — SODIUM CHLOR/HYPOCHLOROUS ACID 0.033 %
SOLUTION, IRRIGATION IRRIGATION ONCE
OUTPATIENT
Start: 2023-11-28 | End: 2023-11-28

## 2023-11-28 RX ORDER — LIDOCAINE HYDROCHLORIDE 20 MG/ML
JELLY TOPICAL ONCE
OUTPATIENT
Start: 2023-11-28 | End: 2023-11-28

## 2023-11-28 RX ORDER — GENTAMICIN SULFATE 1 MG/G
OINTMENT TOPICAL ONCE
OUTPATIENT
Start: 2023-11-28 | End: 2023-11-28

## 2023-11-28 RX ORDER — BACITRACIN ZINC AND POLYMYXIN B SULFATE 500; 1000 [USP'U]/G; [USP'U]/G
OINTMENT TOPICAL ONCE
OUTPATIENT
Start: 2023-11-28 | End: 2023-11-28

## 2023-11-28 RX ORDER — CLOBETASOL PROPIONATE 0.5 MG/G
OINTMENT TOPICAL ONCE
OUTPATIENT
Start: 2023-11-28 | End: 2023-11-28

## 2023-11-28 RX ORDER — GINSENG 100 MG
CAPSULE ORAL ONCE
OUTPATIENT
Start: 2023-11-28 | End: 2023-11-28

## 2023-11-28 RX ORDER — LIDOCAINE HYDROCHLORIDE 20 MG/ML
JELLY TOPICAL ONCE
Status: COMPLETED | OUTPATIENT
Start: 2023-11-28 | End: 2023-11-28

## 2023-11-28 RX ORDER — IBUPROFEN 200 MG
TABLET ORAL ONCE
OUTPATIENT
Start: 2023-11-28 | End: 2023-11-28

## 2023-11-28 RX ORDER — TRIAMCINOLONE ACETONIDE 1 MG/G
OINTMENT TOPICAL ONCE
OUTPATIENT
Start: 2023-11-28 | End: 2023-11-28

## 2023-11-28 RX ORDER — BETAMETHASONE DIPROPIONATE 0.5 MG/G
CREAM TOPICAL ONCE
OUTPATIENT
Start: 2023-11-28 | End: 2023-11-28

## 2023-11-28 RX ADMIN — LIDOCAINE HYDROCHLORIDE: 20 JELLY TOPICAL at 11:41

## 2023-11-28 ASSESSMENT — PAIN DESCRIPTION - PAIN TYPE: TYPE: CHRONIC PAIN

## 2023-11-28 ASSESSMENT — PAIN DESCRIPTION - ORIENTATION: ORIENTATION: LOWER

## 2023-11-28 ASSESSMENT — PAIN - FUNCTIONAL ASSESSMENT: PAIN_FUNCTIONAL_ASSESSMENT: PREVENTS OR INTERFERES SOME ACTIVE ACTIVITIES AND ADLS

## 2023-11-28 ASSESSMENT — PAIN DESCRIPTION - LOCATION: LOCATION: BACK

## 2023-11-28 ASSESSMENT — PAIN DESCRIPTION - ONSET: ONSET: ON-GOING

## 2023-11-28 ASSESSMENT — PAIN SCALES - GENERAL: PAINLEVEL_OUTOF10: 7

## 2023-11-28 ASSESSMENT — PAIN DESCRIPTION - FREQUENCY: FREQUENCY: INTERMITTENT

## 2023-11-28 ASSESSMENT — PAIN DESCRIPTION - DESCRIPTORS: DESCRIPTORS: ACHING;BURNING;THROBBING

## 2023-11-28 NOTE — PATIENT INSTRUCTIONS
[address] and Hyperbaric Oxygen Therapy   Physician Orders and Discharge Instructions  [address] [facility],[address] [facility] [address], [address]  Telephone: [phone]    NAME:  Nathanael Holland OF BIRTH:  1935  MEDICAL RECORD NUMBER:  [id number]  DATE:  11/28/2023    Discharge condition: Stable    Discharge to: Home    Left via:Private automobile    Accompanied by: Family    ECF/HHA: [facility]     Dressing Orders: Left Knee  Soap and water wash  Saline moist 4x4 gauze, Cover with dry gauze, roll gauze, medipore tape and Tubigrip or knee sleeve to hold in place  Change twice daily     [facility] follow up visit ____________1 week_________________  (Please note your next appointment above and if you are unable to keep, kindly give a 24 hour notice. Thank you.)    If you experience any of the following, please call the [facility] during business hours:    * Increase in Pain  * Temperature over 101  * Increase in drainage from your wound  * Drainage with a foul odor  * Bleeding  * Increase in swelling  * Need for compression bandage changes due to slippage, breakthrough drainage. If you need medical attention outside of the business hours of the [facility] please contact your PCP or go to the nearest emergency room.

## 2023-11-28 NOTE — PROGRESS NOTES
[address]   Progress Note and Procedure Note      [address]  RECORD NUMBER:  [id number]  AGE: 82 y.o. GENDER: female  : 1935  EPISODE DATE:  2023    Subjective:     Chief Complaint   Patient presents with    Wound Check     Pt presents for recheck of left knee wound         HISTORY of PRESENT ILLNESS HPI     Maya Goldsmith is a 82 y.o. female who presents today for wound/ulcer evaluation.    Wound Context: Pt with traumatic L knee wound here for eval/treat  Wound/Ulcer Pain Timing/Severity: none  Quality of pain: N/A  Severity:  0 / 10   Modifying Factors: None  Associated Signs/Symptoms: edema    Ulcer Identification:  Ulcer Type: traumatic  Contributing Factors: edema and diabetes    Wound: Laceration        PAST MEDICAL HISTORY        Diagnosis Date    Afib ([address] [facility])     Bronchitis     Colon cancer (HCC)     Colon polyps     Constipation     Deep vein blood clot of left lower extremity (HCC)     Left leg     Depression     DVT (deep venous thrombosis) (HCC)     Dysphagia     Fibrocystic breast disease     Fibromyalgia     GERD (gastroesophageal reflux disease)     reflux with hx of esophageal stricture    Headache(784.0)     History of colon cancer     Hormone replacement therapy (postmenopausal)     Hypertension     Neuropathy of foot     In both feet    Osteoarthritis     left knee pain    Palliative care patient 2022    Restless legs syndrome     Type 2 diabetes mellitus 10/25/2021    Vitamin D deficiency        PAST SURGICAL HISTORY    Past Surgical History:   Procedure Laterality Date    APPENDECTOMY      BREAST BIOPSY      CATARACT REMOVAL       SECTION      CHOLECYSTECTOMY      COLECTOMY  partial    COLON SURGERY      COLONOSCOPY  2010    COLONOSCOPY  2012    Dr Davide Almanza: unremarkable enterocolonic anastamosis; hyperplastic polyps    COLONOSCOPY  2013    [facility]: unremarkable post-surgical colon    COLONOSCOPY

## 2023-11-29 RX ORDER — OMEPRAZOLE 40 MG/1
40 CAPSULE, DELAYED RELEASE ORAL DAILY
Qty: 100 CAPSULE | Refills: 2 | Status: SHIPPED | OUTPATIENT
Start: 2023-11-29

## 2023-12-04 RX ORDER — METFORMIN HYDROCHLORIDE 500 MG/1
500 TABLET, EXTENDED RELEASE ORAL DAILY
Qty: 30 TABLET | Refills: 3 | Status: SHIPPED | OUTPATIENT
Start: 2023-12-04

## 2023-12-05 ENCOUNTER — HOSPITAL ENCOUNTER (OUTPATIENT)
Dept: WOUND CARE | Age: 88
Discharge: HOME | End: 2023-12-05
Payer: MEDICARE

## 2023-12-05 VITALS
HEART RATE: 44 BPM | TEMPERATURE: 97.8 F | WEIGHT: 136 LBS | DIASTOLIC BLOOD PRESSURE: 67 MMHG | SYSTOLIC BLOOD PRESSURE: 125 MMHG | HEIGHT: 63 IN | RESPIRATION RATE: 18 BRPM | BODY MASS INDEX: 24.1 KG/M2

## 2023-12-05 DIAGNOSIS — S81.002A: Primary | ICD-10-CM

## 2023-12-05 PROCEDURE — 11042 DBRDMT SUBQ TIS 1ST 20SQCM/<: CPT | Performed by: SURGERY

## 2023-12-05 PROCEDURE — 11042 DBRDMT SUBQ TIS 1ST 20SQCM/<: CPT

## 2023-12-05 RX ORDER — IBUPROFEN 200 MG
TABLET ORAL ONCE
OUTPATIENT
Start: 2023-12-05 | End: 2023-12-05

## 2023-12-05 RX ORDER — LIDOCAINE HYDROCHLORIDE 20 MG/ML
JELLY TOPICAL ONCE
Status: COMPLETED | OUTPATIENT
Start: 2023-12-05 | End: 2023-12-05

## 2023-12-05 RX ORDER — GINSENG 100 MG
CAPSULE ORAL ONCE
OUTPATIENT
Start: 2023-12-05 | End: 2023-12-05

## 2023-12-05 RX ORDER — TRIAMCINOLONE ACETONIDE 1 MG/G
OINTMENT TOPICAL ONCE
OUTPATIENT
Start: 2023-12-05 | End: 2023-12-05

## 2023-12-05 RX ORDER — HYDROCODONE BITARTRATE AND ACETAMINOPHEN 10; 325 MG/1; MG/1
1 TABLET ORAL EVERY 8 HOURS PRN
COMMUNITY

## 2023-12-05 RX ORDER — GENTAMICIN SULFATE 1 MG/G
OINTMENT TOPICAL ONCE
OUTPATIENT
Start: 2023-12-05 | End: 2023-12-05

## 2023-12-05 RX ORDER — BACITRACIN ZINC AND POLYMYXIN B SULFATE 500; 1000 [USP'U]/G; [USP'U]/G
OINTMENT TOPICAL ONCE
OUTPATIENT
Start: 2023-12-05 | End: 2023-12-05

## 2023-12-05 RX ORDER — LIDOCAINE HYDROCHLORIDE 20 MG/ML
JELLY TOPICAL ONCE
OUTPATIENT
Start: 2023-12-05 | End: 2023-12-05

## 2023-12-05 RX ORDER — CLOBETASOL PROPIONATE 0.5 MG/G
OINTMENT TOPICAL ONCE
OUTPATIENT
Start: 2023-12-05 | End: 2023-12-05

## 2023-12-05 RX ORDER — BETAMETHASONE DIPROPIONATE 0.5 MG/G
CREAM TOPICAL ONCE
OUTPATIENT
Start: 2023-12-05 | End: 2023-12-05

## 2023-12-05 RX ORDER — SODIUM CHLOR/HYPOCHLOROUS ACID 0.033 %
SOLUTION, IRRIGATION IRRIGATION ONCE
OUTPATIENT
Start: 2023-12-05 | End: 2023-12-05

## 2023-12-05 RX ADMIN — LIDOCAINE HYDROCHLORIDE: 20 JELLY TOPICAL at 10:43

## 2023-12-05 ASSESSMENT — PAIN - FUNCTIONAL ASSESSMENT: PAIN_FUNCTIONAL_ASSESSMENT: PREVENTS OR INTERFERES SOME ACTIVE ACTIVITIES AND ADLS

## 2023-12-05 ASSESSMENT — PAIN DESCRIPTION - DESCRIPTORS: DESCRIPTORS: BURNING

## 2023-12-05 ASSESSMENT — PAIN SCALES - GENERAL: PAINLEVEL_OUTOF10: 6

## 2023-12-05 ASSESSMENT — PAIN DESCRIPTION - PAIN TYPE: TYPE: CHRONIC PAIN

## 2023-12-05 ASSESSMENT — PAIN DESCRIPTION - ONSET: ONSET: ON-GOING

## 2023-12-05 ASSESSMENT — PAIN DESCRIPTION - FREQUENCY: FREQUENCY: INTERMITTENT

## 2023-12-05 ASSESSMENT — PAIN DESCRIPTION - ORIENTATION: ORIENTATION: LOWER;RIGHT

## 2023-12-05 ASSESSMENT — PAIN DESCRIPTION - LOCATION: LOCATION: BACK;LEG

## 2023-12-05 NOTE — PATIENT INSTRUCTIONS
[address] and Hyperbaric Oxygen Therapy   Physician Orders and Discharge Instructions  1830 [facility],[address] [facility] [facility] [address] [address], [address]  Telephone: [phone]    NAME:  Jean Claude Phillips  YOB: 1935  MEDICAL RECORD NUMBER:  [id number]  DATE:  12/5/2023    Discharge condition: Stable    Discharge to: Home    Left via:Private automobile    Accompanied by: Spouse     ECF/HHA: [facility]    Dressing Orders: Left Knee  Soap and water wash  Saline moist 4x4 gauze, Cover with dry gauze, roll gauze, medipore tape and Tubigrip or knee sleeve to hold in place  Change twice daily    [facility] follow up visit ____________1 week_________________  (Please note your next appointment above and if you are unable to keep, kindly give a 24 hour notice. Thank you.)    If you experience any of the following, please call the [facility] during business hours:    * Increase in Pain  * Temperature over 101  * Increase in drainage from your wound  * Drainage with a foul odor  * Bleeding  * Increase in swelling  * Need for compression bandage changes due to slippage, breakthrough drainage. If you need medical attention outside of the business hours of the [facility] please contact your PCP or go to the nearest emergency room.

## 2023-12-05 NOTE — PROGRESS NOTES
normal      Assessment:      Problem List Items Addressed This Visit       * (Principal) Open wound of knee without complication, left, initial encounter - Primary (Chronic)    Relevant Orders    Initiate Outpatient Wound Care Protocol        Procedure Note  Indications:  Based on my examination of this patient's wound(s)/ulcer(s) today, debridement is required to promote healing and evaluate the wound base. Performed by: Jarad Bain MD    Consent obtained:  Yes    Time out taken:  Yes    Pain Control: Anesthetic  Anesthetic: 2% Lidocaine Gel Topical       Debridement:Excisional Debridement    Using curette the wound(s)/ulcer(s) was/were sharply debrided down through and including the removal of epidermis, dermis, and subcutaneous tissue. Devitalized Tissue Debrided:  fibrin, biofilm, slough, necrotic/eschar, and exudate      Pre Debridement Measurements:  Are located in the Wound/Ulcer Documentation Flow Sheet    Wound/Ulcer #: 1    Percent of Wound(s)/Ulcer(s) Debrided: 100%    Total Surface Area Debrided:  17 sq cm       Diabetic/Pressure/Non Pressure Ulcers only:  Ulcer: Non-Pressure ulcer, fat layer exposed           Post Debridement Measurements:    Wound/Ulcer Descriptions are Pre Debridement --EXCEPT MEASUREMENTS    Wound 11/21/23 Knee Left #! Left knee (trauma from fall 9/25/23) (Active)   Wound Image   12/05/23 1044   Wound Etiology Traumatic 12/05/23 1044   Dressing Status Old drainage noted 12/05/23 1044   Wound Cleansed Soap and water 12/05/23 1044   Dressing/Treatment Gauze dressing/dressing sponge;Moisten with saline; Roll gauze;Tape/Soft cloth adhesive tape 11/28/23 1208   Wound Length (cm) 2.8 cm 12/05/23 1044   Wound Width (cm) 6 cm 12/05/23 1044   Wound Depth (cm) 0.4 cm 12/05/23 1044   Wound Surface Area (cm^2) 16.8 cm^2 12/05/23 1044   Change in Wound Size % (l*w) 41.05 12/05/23 1044   Wound Volume (cm^3) 6.72 cm^3 12/05/23 1044   Wound Healing % 83 12/05/23 1044   Post-Procedure

## 2023-12-06 ENCOUNTER — TRANSCRIBE ORDERS (OUTPATIENT)
Dept: ADMINISTRATIVE | Age: 88
End: 2023-12-06

## 2023-12-06 DIAGNOSIS — I73.9 PERIPHERAL VASCULAR DISEASE, UNSPECIFIED (HCC): Primary | ICD-10-CM

## 2023-12-12 ENCOUNTER — HOSPITAL ENCOUNTER (OUTPATIENT)
Dept: WOUND CARE | Age: 88
Discharge: HOME | End: 2023-12-12
Payer: MEDICARE

## 2023-12-12 VITALS
SYSTOLIC BLOOD PRESSURE: 140 MMHG | TEMPERATURE: 97.7 F | DIASTOLIC BLOOD PRESSURE: 63 MMHG | RESPIRATION RATE: 18 BRPM | HEART RATE: 80 BPM

## 2023-12-12 DIAGNOSIS — S81.002A: Primary | ICD-10-CM

## 2023-12-12 PROCEDURE — 97597 DBRDMT OPN WND 1ST 20 CM/<: CPT | Performed by: SURGERY

## 2023-12-12 PROCEDURE — 97597 DBRDMT OPN WND 1ST 20 CM/<: CPT

## 2023-12-12 RX ORDER — LIDOCAINE HYDROCHLORIDE 20 MG/ML
JELLY TOPICAL ONCE
Status: COMPLETED | OUTPATIENT
Start: 2023-12-12 | End: 2023-12-12

## 2023-12-12 RX ORDER — SODIUM CHLOR/HYPOCHLOROUS ACID 0.033 %
SOLUTION, IRRIGATION IRRIGATION ONCE
OUTPATIENT
Start: 2023-12-12 | End: 2023-12-12

## 2023-12-12 RX ORDER — CLOBETASOL PROPIONATE 0.5 MG/G
OINTMENT TOPICAL ONCE
OUTPATIENT
Start: 2023-12-12 | End: 2023-12-12

## 2023-12-12 RX ORDER — IBUPROFEN 200 MG
TABLET ORAL ONCE
OUTPATIENT
Start: 2023-12-12 | End: 2023-12-12

## 2023-12-12 RX ORDER — BACITRACIN ZINC AND POLYMYXIN B SULFATE 500; 1000 [USP'U]/G; [USP'U]/G
OINTMENT TOPICAL ONCE
OUTPATIENT
Start: 2023-12-12 | End: 2023-12-12

## 2023-12-12 RX ORDER — GENTAMICIN SULFATE 1 MG/G
OINTMENT TOPICAL ONCE
OUTPATIENT
Start: 2023-12-12 | End: 2023-12-12

## 2023-12-12 RX ORDER — GINSENG 100 MG
CAPSULE ORAL ONCE
OUTPATIENT
Start: 2023-12-12 | End: 2023-12-12

## 2023-12-12 RX ORDER — TRIAMCINOLONE ACETONIDE 1 MG/G
OINTMENT TOPICAL ONCE
OUTPATIENT
Start: 2023-12-12 | End: 2023-12-12

## 2023-12-12 RX ORDER — BETAMETHASONE DIPROPIONATE 0.5 MG/G
CREAM TOPICAL ONCE
OUTPATIENT
Start: 2023-12-12 | End: 2023-12-12

## 2023-12-12 RX ORDER — LIDOCAINE HYDROCHLORIDE 20 MG/ML
JELLY TOPICAL ONCE
OUTPATIENT
Start: 2023-12-12 | End: 2023-12-12

## 2023-12-12 RX ADMIN — LIDOCAINE HYDROCHLORIDE: 20 JELLY TOPICAL at 10:51

## 2023-12-12 ASSESSMENT — PAIN DESCRIPTION - FREQUENCY: FREQUENCY: INTERMITTENT

## 2023-12-12 ASSESSMENT — PAIN DESCRIPTION - ORIENTATION: ORIENTATION: RIGHT

## 2023-12-12 ASSESSMENT — PAIN DESCRIPTION - LOCATION: LOCATION: BACK;LEG

## 2023-12-12 ASSESSMENT — PAIN DESCRIPTION - DESCRIPTORS: DESCRIPTORS: ACHING

## 2023-12-12 ASSESSMENT — PAIN - FUNCTIONAL ASSESSMENT: PAIN_FUNCTIONAL_ASSESSMENT: PREVENTS OR INTERFERES SOME ACTIVE ACTIVITIES AND ADLS

## 2023-12-12 ASSESSMENT — PAIN SCALES - GENERAL: PAINLEVEL_OUTOF10: 7

## 2023-12-12 ASSESSMENT — PAIN DESCRIPTION - PAIN TYPE: TYPE: CHRONIC PAIN

## 2023-12-12 ASSESSMENT — PAIN DESCRIPTION - ONSET: ONSET: ON-GOING

## 2023-12-12 NOTE — PROGRESS NOTES
[address]   Progress Note and Procedure Note      [address]  RECORD NUMBER:  [id number]  AGE: 82 y.o. GENDER: female  : 1935  EPISODE DATE:  2023    Subjective:     Chief Complaint   Patient presents with    Wound Check     Left knee wound         HISTORY of PRESENT ILLNESS TOMMY Sheth is a 82 y.o. female who presents today for wound/ulcer evaluation.    Wound Context: Pt with L knee wound here for eval/treat  Wound/Ulcer Pain Timing/Severity: none  Quality of pain: N/A  Severity:  0 / 10   Modifying Factors: None  Associated Signs/Symptoms: none    Ulcer Identification:  Ulcer Type: traumatic  Contributing Factors: edema    Wound: Laceration        PAST MEDICAL HISTORY        Diagnosis Date    Afib ([address])     Bronchitis     Colon cancer (HCC)     Colon polyps     Constipation     Deep vein blood clot of left lower extremity (HCC)     Left leg     Depression     DVT (deep venous thrombosis) (HCC)     Dysphagia     Fibrocystic breast disease     Fibromyalgia     GERD (gastroesophageal reflux disease)     reflux with hx of esophageal stricture    Headache(784.0)     History of colon cancer     Hormone replacement therapy (postmenopausal)     Hypertension     Neuropathy of foot     In both feet    Osteoarthritis     left knee pain    Palliative care patient 2022    Restless legs syndrome     Type 2 diabetes mellitus 10/25/2021    Vitamin D deficiency        PAST SURGICAL HISTORY    Past Surgical History:   Procedure Laterality Date    APPENDECTOMY      BREAST BIOPSY      CATARACT REMOVAL       SECTION      CHOLECYSTECTOMY      COLECTOMY  partial    COLON SURGERY      COLONOSCOPY  2010    COLONOSCOPY  2012    Dr Brandi Ramos: unremarkable enterocolonic anastamosis; hyperplastic polyps    [facility] unremarkable post[facility]surgical colon    COLONOSCOPY  2016    Dr Osman Henry colon anastomosis, 5 yr recall

## 2023-12-12 NOTE — PLAN OF CARE
Problem: Chronic Conditions and Co-morbidities  Goal: Patient's chronic conditions and co-morbidity symptoms are monitored and maintained or improved  Outcome: Progressing     Problem: Pain  Goal: Verbalizes/displays adequate comfort level or baseline comfort level  Outcome: Progressing     Problem: Wound:  Goal: Will show signs of wound healing; wound closure and no evidence of infection  Description: Will show signs of wound healing; wound closure and no evidence of infection  Outcome: Progressing     Problem: Blood Glucose:  Goal: Ability to maintain appropriate glucose levels will improve  Description: Ability to maintain appropriate glucose levels will improve  Outcome: Progressing     Problem: Falls - Risk of:  Goal: Will remain free from falls  Description: Will remain free from falls  Outcome: Progressing

## 2023-12-12 NOTE — PATIENT INSTRUCTIONS
[address] and Hyperbaric Oxygen Therapy   Physician Orders and Discharge Instructions  [address] [facility],[address] [facility] [address], [address]  Telephone: [phone]    NAME:  Krys Mullen  YOB: 1935  MEDICAL RECORD NUMBER:  [id number]  DATE:  12/12/2023    Discharge condition: Stable    Discharge to: Home    Left via:Private automobile    Accompanied by: Spouse    ECF/HHA: [facility]    Dressing Orders: Left Knee  Soap and water wash  Saline moist 4x4 gauze, Cover with dry gauze, roll gauze, medipore tape and Tubigrip or knee sleeve to hold in place  Change twice daily    [address] [facility] follow up visit ______________1 week_______________  (Please note your next appointment above and if you are unable to keep, kindly give a 24 hour notice. Thank you.)    If you experience any of the following, please call the [facility] during business hours:    * Increase in Pain  * Temperature over 101  * Increase in drainage from your wound  * Drainage with a foul odor  * Bleeding  * Increase in swelling  * Need for compression bandage changes due to slippage, breakthrough drainage. If you need medical attention outside of the business hours of the [facility] please contact your PCP or go to the nearest emergency room.

## 2023-12-13 ENCOUNTER — OFFICE VISIT (OUTPATIENT)
Dept: PRIMARY CARE CLINIC | Age: 88
End: 2023-12-13
Payer: MEDICARE

## 2023-12-13 VITALS
HEART RATE: 68 BPM | WEIGHT: 129.8 LBS | BODY MASS INDEX: 23 KG/M2 | TEMPERATURE: 97.3 F | SYSTOLIC BLOOD PRESSURE: 130 MMHG | RESPIRATION RATE: 16 BRPM | DIASTOLIC BLOOD PRESSURE: 70 MMHG | HEIGHT: 63 IN | OXYGEN SATURATION: 97 %

## 2023-12-13 DIAGNOSIS — Z91.81 AT HIGH RISK FOR FALLS: ICD-10-CM

## 2023-12-13 DIAGNOSIS — D50.0 IRON DEFICIENCY ANEMIA DUE TO CHRONIC BLOOD LOSS: ICD-10-CM

## 2023-12-13 DIAGNOSIS — N18.32 STAGE 3B CHRONIC KIDNEY DISEASE (HCC): ICD-10-CM

## 2023-12-13 DIAGNOSIS — Z95.828 PORT-A-CATH IN PLACE: ICD-10-CM

## 2023-12-13 DIAGNOSIS — Z00.00 MEDICARE ANNUAL WELLNESS VISIT, SUBSEQUENT: Primary | ICD-10-CM

## 2023-12-13 DIAGNOSIS — D63.1 ANEMIA DUE TO STAGE 3A CHRONIC KIDNEY DISEASE (HCC): ICD-10-CM

## 2023-12-13 DIAGNOSIS — N18.31 ANEMIA DUE TO STAGE 3A CHRONIC KIDNEY DISEASE (HCC): ICD-10-CM

## 2023-12-13 DIAGNOSIS — Z45.2 ENCOUNTER FOR CARE RELATED TO PORT-A-CATH: ICD-10-CM

## 2023-12-13 LAB
ALBUMIN SERPL-MCNC: 3.4 G/DL (ref 3.5–5.2)
ALP SERPL-CCNC: 116 U/L (ref 35–104)
ALT SERPL-CCNC: 9 U/L (ref 5–33)
ANION GAP SERPL CALCULATED.3IONS-SCNC: 12 MMOL/L (ref 7–19)
AST SERPL-CCNC: 12 U/L (ref 5–32)
BILIRUB SERPL-MCNC: <0.2 MG/DL (ref 0.2–1.2)
BUN SERPL-MCNC: 15 MG/DL (ref 8–23)
CALCIUM SERPL-MCNC: 9.3 MG/DL (ref 8.8–10.2)
CHLORIDE SERPL-SCNC: 101 MMOL/L (ref 98–111)
CO2 SERPL-SCNC: 27 MMOL/L (ref 22–29)
CREAT SERPL-MCNC: 0.7 MG/DL (ref 0.5–0.9)
ERYTHROCYTE [DISTWIDTH] IN BLOOD BY AUTOMATED COUNT: 18 % (ref 11.5–14.5)
GLUCOSE SERPL-MCNC: 88 MG/DL (ref 74–109)
HCT VFR BLD AUTO: 40.7 % (ref 37–47)
HGB BLD-MCNC: 12.5 G/DL (ref 12–16)
IRON SATN MFR SERPL: 12 % (ref 14–50)
IRON SERPL-MCNC: 35 UG/DL (ref 37–145)
MCH RBC QN AUTO: 27.1 PG (ref 27–31)
MCHC RBC AUTO-ENTMCNC: 30.7 G/DL (ref 33–37)
MCV RBC AUTO: 88.1 FL (ref 81–99)
PLATELET # BLD AUTO: 343 K/UL (ref 130–400)
PMV BLD AUTO: 9.7 FL (ref 9.4–12.3)
POTASSIUM SERPL-SCNC: 4.3 MMOL/L (ref 3.5–5)
PROT SERPL-MCNC: 6.9 G/DL (ref 6.6–8.7)
RBC # BLD AUTO: 4.62 M/UL (ref 4.2–5.4)
SODIUM SERPL-SCNC: 140 MMOL/L (ref 136–145)
TIBC SERPL-MCNC: 289 UG/DL (ref 250–400)
WBC # BLD AUTO: 7.6 K/UL (ref 4.8–10.8)

## 2023-12-13 PROCEDURE — 1123F ACP DISCUSS/DSCN MKR DOCD: CPT | Performed by: NURSE PRACTITIONER

## 2023-12-13 PROCEDURE — 96523 IRRIG DRUG DELIVERY DEVICE: CPT | Performed by: NURSE PRACTITIONER

## 2023-12-13 PROCEDURE — G0439 PPPS, SUBSEQ VISIT: HCPCS | Performed by: NURSE PRACTITIONER

## 2023-12-13 RX ORDER — OXYCODONE AND ACETAMINOPHEN 7.5; 325 MG/1; MG/1
1 TABLET ORAL EVERY 8 HOURS PRN
COMMUNITY
Start: 2023-11-09

## 2023-12-13 RX ORDER — OXYBUTYNIN CHLORIDE 10 MG/1
10 TABLET, EXTENDED RELEASE ORAL NIGHTLY
Qty: 30 TABLET | Refills: 3 | Status: SHIPPED | OUTPATIENT
Start: 2023-12-13

## 2023-12-13 ASSESSMENT — PATIENT HEALTH QUESTIONNAIRE - PHQ9
SUM OF ALL RESPONSES TO PHQ QUESTIONS 1-9: 6
8. MOVING OR SPEAKING SO SLOWLY THAT OTHER PEOPLE COULD HAVE NOTICED. OR THE OPPOSITE, BEING SO FIGETY OR RESTLESS THAT YOU HAVE BEEN MOVING AROUND A LOT MORE THAN USUAL: 0
3. TROUBLE FALLING OR STAYING ASLEEP: 1
7. TROUBLE CONCENTRATING ON THINGS, SUCH AS READING THE NEWSPAPER OR WATCHING TELEVISION: 0
1. LITTLE INTEREST OR PLEASURE IN DOING THINGS: 1
9. THOUGHTS THAT YOU WOULD BE BETTER OFF DEAD, OR OF HURTING YOURSELF: 0
5. POOR APPETITE OR OVEREATING: 2
6. FEELING BAD ABOUT YOURSELF - OR THAT YOU ARE A FAILURE OR HAVE LET YOURSELF OR YOUR FAMILY DOWN: 0
4. FEELING TIRED OR HAVING LITTLE ENERGY: 1
SUM OF ALL RESPONSES TO PHQ9 QUESTIONS 1 & 2: 2
SUM OF ALL RESPONSES TO PHQ QUESTIONS 1-9: 6
10. IF YOU CHECKED OFF ANY PROBLEMS, HOW DIFFICULT HAVE THESE PROBLEMS MADE IT FOR YOU TO DO YOUR WORK, TAKE CARE OF THINGS AT HOME, OR GET ALONG WITH OTHER PEOPLE: 0
2. FEELING DOWN, DEPRESSED OR HOPELESS: 1

## 2023-12-13 ASSESSMENT — COLUMBIA-SUICIDE SEVERITY RATING SCALE - C-SSRS
7. DID THIS OCCUR IN THE LAST THREE MONTHS: NO
5. HAVE YOU STARTED TO WORK OUT OR WORKED OUT THE DETAILS OF HOW TO KILL YOURSELF? DO YOU INTEND TO CARRY OUT THIS PLAN?: NO
4. HAVE YOU HAD THESE THOUGHTS AND HAD SOME INTENTION OF ACTING ON THEM?: NO
3. HAVE YOU BEEN THINKING ABOUT HOW YOU MIGHT KILL YOURSELF?: NO

## 2023-12-13 NOTE — PROGRESS NOTES
place and time, well developed , sitting up in wheelchair. Left knee with bandage. Skin: warm and dry, no rash or erythema  Head: normocephalic and atraumatic  Eyes: pupils equal, round, and reactive to light, extraocular eye movements intact, conjunctivae normal  ENT: tympanic membrane, external ear and ear canal normal bilaterally, nose without deformity, nasal mucosa and turbinates normal without polyps  Neck: supple and non-tender without mass, no thyromegaly or thyroid nodules, no cervical lymphadenopathy  Pulmonary/Chest: clear to auscultation bilaterally- no wheezes, rales or rhonchi, normal air movement, no respiratory distress  Cardiovascular: normal rate, regular rhythm, normal S1 and S2, no murmurs, rubs, clicks, or gallops, distal pulses intact, no carotid bruits  Abdomen: soft, non-tender, non-distended, normal bowel sounds, no masses or organomegaly  Extremities: no cyanosis, clubbing or edema  Musculoskeletal: pedal pushes equal,  equal. Left knee swelling. Hematoma lower left leg  Neurologic: , gait, coordination and speech normal     Venous Access Procedure Note  Indication: maintenance flush    Procedure: The patient was placed in the appropriate position and the skin over the puncture site was prepped with betadine and draped in a sterile fashion. Intravenous access was obtained in right  chest port with a Bliss needle and the site was secured appropriately. 3 cc of blood with withdrawn and discarded. Labs were drawn and sent to lab. The port was flushed with 10cc NS and then 3 cc heparin flush. The patient tolerated the procedure well. Complications: None    Allergies   Allergen Reactions    Gabapentin Rash     Pt weaning off due to rash and hot flashes    Zoloft [Sertraline Hcl] Other (See Comments)     Patient states that she doesn't want this medication anymore because she hallucinated and saw spiders.  Patient weaned herself off and after she quit taking the medication, the

## 2023-12-26 RX ORDER — DULOXETIN HYDROCHLORIDE 60 MG/1
60 CAPSULE, DELAYED RELEASE ORAL DAILY
Qty: 90 CAPSULE | Refills: 3 | Status: SHIPPED | OUTPATIENT
Start: 2023-12-26

## 2024-01-03 ENCOUNTER — HOSPITAL ENCOUNTER (OUTPATIENT)
Dept: WOUND CARE | Age: 89
Discharge: HOME | End: 2024-01-03
Payer: MEDICARE

## 2024-01-03 ENCOUNTER — OFFICE VISIT (OUTPATIENT)
Dept: NEUROSURGERY | Facility: CLINIC | Age: 89
End: 2024-01-03
Payer: MEDICARE

## 2024-01-03 VITALS
HEIGHT: 63 IN | WEIGHT: 129 LBS | SYSTOLIC BLOOD PRESSURE: 145 MMHG | TEMPERATURE: 97.4 F | BODY MASS INDEX: 22.86 KG/M2 | HEART RATE: 70 BPM | RESPIRATION RATE: 16 BRPM | DIASTOLIC BLOOD PRESSURE: 74 MMHG

## 2024-01-03 VITALS — BODY MASS INDEX: 23.92 KG/M2 | HEIGHT: 63 IN | WEIGHT: 135 LBS

## 2024-01-03 DIAGNOSIS — Z78.9 NONSMOKER: ICD-10-CM

## 2024-01-03 DIAGNOSIS — M51.36 DDD (DEGENERATIVE DISC DISEASE), LUMBAR: ICD-10-CM

## 2024-01-03 DIAGNOSIS — S81.002A: Primary | ICD-10-CM

## 2024-01-03 DIAGNOSIS — M54.41 CHRONIC MIDLINE LOW BACK PAIN WITH RIGHT-SIDED SCIATICA: Primary | ICD-10-CM

## 2024-01-03 DIAGNOSIS — L97.822 SKIN ULCER OF LEFT KNEE WITH FAT LAYER EXPOSED (HCC): ICD-10-CM

## 2024-01-03 DIAGNOSIS — G89.29 CHRONIC MIDLINE LOW BACK PAIN WITH RIGHT-SIDED SCIATICA: Primary | ICD-10-CM

## 2024-01-03 DIAGNOSIS — Z91.81 AT HIGH RISK FOR FALLS: ICD-10-CM

## 2024-01-03 PROCEDURE — 97597 DBRDMT OPN WND 1ST 20 CM/<: CPT

## 2024-01-03 PROCEDURE — 97597 DBRDMT OPN WND 1ST 20 CM/<: CPT | Performed by: NURSE PRACTITIONER

## 2024-01-03 RX ORDER — TRIAMCINOLONE ACETONIDE 1 MG/G
OINTMENT TOPICAL ONCE
OUTPATIENT
Start: 2024-01-03 | End: 2024-01-03

## 2024-01-03 RX ORDER — OXYBUTYNIN CHLORIDE 10 MG/1
1 TABLET, EXTENDED RELEASE ORAL
COMMUNITY
Start: 2023-12-13

## 2024-01-03 RX ORDER — IBUPROFEN 200 MG
TABLET ORAL ONCE
OUTPATIENT
Start: 2024-01-03 | End: 2024-01-03

## 2024-01-03 RX ORDER — ACYCLOVIR 400 MG/1
400 TABLET ORAL 2 TIMES DAILY
COMMUNITY

## 2024-01-03 RX ORDER — RIVAROXABAN 10 MG/1
10 TABLET, FILM COATED ORAL EVERY MORNING
COMMUNITY

## 2024-01-03 RX ORDER — AMLODIPINE BESYLATE 5 MG/1
1 TABLET ORAL DAILY
COMMUNITY
Start: 2023-11-22

## 2024-01-03 RX ORDER — PREGABALIN 100 MG/1
CAPSULE ORAL
COMMUNITY

## 2024-01-03 RX ORDER — CIPROFLOXACIN HYDROCHLORIDE 3.5 MG/ML
SOLUTION/ DROPS TOPICAL
COMMUNITY
Start: 2023-12-17

## 2024-01-03 RX ORDER — BACITRACIN ZINC AND POLYMYXIN B SULFATE 500; 1000 [USP'U]/G; [USP'U]/G
OINTMENT TOPICAL ONCE
OUTPATIENT
Start: 2024-01-03 | End: 2024-01-03

## 2024-01-03 RX ORDER — LIDOCAINE HYDROCHLORIDE 20 MG/ML
JELLY TOPICAL ONCE
Status: COMPLETED | OUTPATIENT
Start: 2024-01-03 | End: 2024-01-03

## 2024-01-03 RX ORDER — LIDOCAINE HYDROCHLORIDE 20 MG/ML
JELLY TOPICAL ONCE
OUTPATIENT
Start: 2024-01-03 | End: 2024-01-03

## 2024-01-03 RX ORDER — DULOXETIN HYDROCHLORIDE 60 MG/1
1 CAPSULE, DELAYED RELEASE ORAL DAILY
COMMUNITY
Start: 2023-12-26

## 2024-01-03 RX ORDER — GENTAMICIN SULFATE 1 MG/G
OINTMENT TOPICAL ONCE
OUTPATIENT
Start: 2024-01-03 | End: 2024-01-03

## 2024-01-03 RX ORDER — CLOBETASOL PROPIONATE 0.5 MG/G
OINTMENT TOPICAL ONCE
OUTPATIENT
Start: 2024-01-03 | End: 2024-01-03

## 2024-01-03 RX ORDER — BETAMETHASONE DIPROPIONATE 0.5 MG/G
CREAM TOPICAL ONCE
OUTPATIENT
Start: 2024-01-03 | End: 2024-01-03

## 2024-01-03 RX ORDER — SODIUM CHLOR/HYPOCHLOROUS ACID 0.033 %
SOLUTION, IRRIGATION IRRIGATION ONCE
OUTPATIENT
Start: 2024-01-03 | End: 2024-01-03

## 2024-01-03 RX ORDER — TRIAMCINOLONE ACETONIDE 1 MG/G
OINTMENT TOPICAL
COMMUNITY
Start: 2023-10-23

## 2024-01-03 RX ORDER — GINSENG 100 MG
CAPSULE ORAL ONCE
OUTPATIENT
Start: 2024-01-03 | End: 2024-01-03

## 2024-01-03 RX ADMIN — LIDOCAINE HYDROCHLORIDE: 20 JELLY TOPICAL at 11:32

## 2024-01-03 NOTE — PATIENT INSTRUCTIONS
[facility] Wound Care and Hyperbaric Oxygen Therapy   Physician Orders and Discharge Instructions  [address] [place] [address]  [address]  [place] [address]  Telephone: [phone]      FAX [phone]    NAME:  Esther Schmidt  YOB: 1935  MEDICAL RECORD NUMBER:  [id number]  DATE:  1/3/2024    Discharge condition: Stable    Discharge to: Home    Left via:Private automobile    Accompanied by: Spouse    [facility]F/HHA: [facility]    Dressing Orders: Left Knee  Soap and water wash  Saline moist 4x4 gauze, Cover with dry gauze, roll gauze, medipore tape and Tubigrip or knee sleeve to hold in place  Change twice daily    C follow up visit _____________2 weeks________________  (Please note your next appointment above and if you are unable to keep, kindly give a 24 hour notice. Thank you.)    If you experience any of the following, please call the [facility] Care Center during business hours:    * Increase in Pain  * Temperature over 101  * Increase in drainage from your wound  * Drainage with a foul odor  * Bleeding  * Increase in swelling  * Need for compression bandage changes due to slippage, breakthrough drainage.    If you need medical attention outside of the business hours of the [facility] Centers please contact your PCP or go to the nearest emergency room.

## 2024-01-03 NOTE — PLAN OF CARE
Problem: Chronic Conditions and Co-morbidities  Goal: Patient's chronic conditions and co-morbidity symptoms are monitored and maintained or improved  Outcome: Progressing     Problem: Wound:  Goal: Will show signs of wound healing; wound closure and no evidence of infection  Description: Will show signs of wound healing; wound closure and no evidence of infection  Outcome: Progressing     Problem: Weight control:  Goal: Ability to maintain an optimal weight for height and age will be supported  Description: Ability to maintain an optimal weight for height and age will be supported  Outcome: Progressing     Problem: Falls - Risk of:  Goal: Will remain free from falls  Description: Will remain free from falls  Outcome: Progressing     Problem: Blood Glucose:  Goal: Ability to maintain appropriate glucose levels will improve  Description: Ability to maintain appropriate glucose levels will improve  Outcome: Progressing

## 2024-01-03 NOTE — PROGRESS NOTES
Chief complaint:   Chief Complaint   Patient presents with    Back Pain     Pt states having lower back pain and right leg pain,numbness and tingling right leg. Pt stated having PT and pain management at [facility].       Subjective     HPI: Megan comes in today with her friend who assists with history as a referral from [facility] pain management.  Megan has history of chronic low back pain with radiation into the right buttock and iliac crest and into the right anterior thigh.  She states at times the pain travels all the way down to the lower leg.  Pain worsens with activity but she does have pain with sitting.  She usually can get comfortable at night if she goes to bed with an ice pack.  She has been in pain management and tried multiple injections which only lasted a short time.  She recently had a spinal cord stimulator trial and did very well with it.  She is here today hoping to get scheduled for a permanent device placement as soon as possible as she did very well with the trial. She does complain of some paresthesias to the right lower leg.  She states that her legs do feel a bit weak but she denies any focal weakness.    She is currently taking Lyrica  and Norco for pain.    She has history of DVT 4 to 5 years ago and is on blood thinners.    She currently is in wound care.  She fell a couple months ago and has a wound to the left anterior knee.  She goes weekly to wound care and changes her dressing with packing twice a day.  She indicates that it is healing and looks much better than it did.    Review of Systems     Past Medical History:   Diagnosis Date    Chronic deep vein thrombosis (DVT) of proximal vein of left lower extremity 2018    Colon cancer     Overlapping malignant neoplasm of colon 2018     Past Surgical History:   Procedure Laterality Date    APPENDECTOMY       SECTION      CHOLECYSTECTOMY      COLON SURGERY      HYSTERECTOMY       History reviewed. No pertinent family  "history.  Social History     Tobacco Use    Smoking status: Never   Vaping Use    Vaping Use: Never used   Substance Use Topics    Alcohol use: Never    Drug use: Never     (Not in a hospital admission)    Allergies:  Sertraline hcl and Gabapentin    Objective      Vital Signs  Ht 160 cm (62.99\")   Wt 61.2 kg (135 lb)   BMI 23.92 kg/m²     Physical Exam  Constitutional:       Appearance: Normal appearance. She is well-developed.   HENT:      Head: Normocephalic.   Eyes:      General: Lids are normal.      Conjunctiva/sclera: Conjunctivae normal.      Pupils: Pupils are equal, round, and reactive to light.   Cardiovascular:      Rate and Rhythm: Normal rate.   Pulmonary:      Effort: Pulmonary effort is normal.      Breath sounds: Normal breath sounds.   Musculoskeletal:         General: Tenderness (exquisite tenderness right SI joint) present. Normal range of motion.      Cervical back: Normal range of motion.      Comments: There is bulky dressing covering the left anterior knee.   Skin:     General: Skin is warm.   Neurological:      Mental Status: She is alert and oriented to person, place, and time.      GCS: GCS eye subscore is 4. GCS verbal subscore is 5. GCS motor subscore is 6.      Cranial Nerves: No cranial nerve deficit.      Sensory: No sensory deficit.      Motor: No weakness.      Gait: Gait abnormal.      Deep Tendon Reflexes: Reflexes are normal and symmetric. Reflexes normal.      Reflex Scores:       Patellar reflexes are 2+ on the right side and 2+ on the left side.       Achilles reflexes are 2+ on the right side and 2+ on the left side.  Psychiatric:         Speech: Speech normal.         Behavior: Behavior normal.         Thought Content: Thought content normal.         Neurologic Exam     Mental Status   Oriented to person, place, and time.   Speech: speech is normal   Level of consciousness: alert  Knowledge: good.     Cranial Nerves     CN III, IV, VI   Pupils are equal, round, and " reactive to light.    Motor Exam   Muscle bulk: decreased  Overall muscle tone: normal    Strength   Right iliopsoas: 5/5  Left iliopsoas: 5/5  Right quadriceps: 5/5  Left quadriceps: 5/5  Right hamstrin/5  Left hamstrin/5  Right anterior tibial: 5/5  Left anterior tibial: 5/5  Right posterior tibial: 5/5  Left posterior tibial: 5/5  Left gastroc: 5/5    Sensory Exam   Light touch normal.     Gait, Coordination, and Reflexes     Reflexes   Right patellar: 2+  Left patellar: 2+  Right achilles: 2+  Left achilles: 2+Gait is compensated secondary to generalized fragility.  She is able to get up and down fairly easily but utilized a wheelchair to get into and out of the office.       Imaging review: CT of the lumbar spine was reviewed and demonstrates kyphoplasty changes at L5.  There is multilevel spondylosis with severe disc degeneration L2-5.  No acute findings.    CT of the cervical spine was reviewed and demonstrates loss of lordosis with severe disc degeneration C4-7.  There is slight focal kyphosis C4-7 with multilevel spondylosis/facet hypertrophy.  No obvious high-grade central stenosis throughout.        Assessment/Plan: Megan  is anxious to proceed with spinal cord stimulator. She had excellent pain coverage with the trial.  She is neurologically stable.  Unfortunately, she has a slow healing wound to her left knee and is still in pain management.  I did discuss in detail with her the risk of infection associated with implantable device after conferring with Dr. Reardon.    He would like to see Megan back here in about 4 weeks and hopefully her wound will be completely closed and we can proceed with permanent spinal cord stimulator placement.  She will of course require permission from her primary to hold her blood thinners for the procedure.    I recommend she use a cane or walker to reduce fall risk.    I advised the patient to call and return sooner for new or worsening complaints of weakness,  paresthesias, gait disturbances, or any additional concerns.  Treatment options discussed in detail with Megan and the patient voiced understanding.  Ms. Murillo agrees with this plan of care.     Patient is a nonsmoker    The patient's Body mass index is 23.92 kg/m².. BMI is within normal parameters. No follow-up required.    ADVANCED CARE PLANNING  Information on advance directives provided in AVS.    IRVING Fall Risk Assessment was completed, and patient is at HIGH risk for falls. Assessment completed on:1/3/2024       Diagnoses and all orders for this visit:    1. Chronic midline low back pain with right-sided sciatica (Primary)    2. DDD (degenerative disc disease), lumbar    3. Nonsmoker    4. At high risk for falls          I discussed the patients findings and my recommendations with patient    Boyd Valladares PA-C  01/03/24  12:39 CST

## 2024-01-03 NOTE — PROGRESS NOTES
[facility]   Progress Note and Procedure Note      Esther Schmidt  MEDICAL RECORD NUMBER:  [id number]  AGE: 88 y.o.   GENDER: female  : 1935  EPISODE DATE:  1/3/2024    Subjective:     Chief Complaint   Patient presents with    Wound Check     Patient presents today for recheck left knee wound.      HISTORY of PRESENT ILLNESS HPI     Esther Schmidt is a 88 y.o. female who presents today for wound/ulcer evaluation.   History of Wound Context: left knee wound follow up/eval and treat    Ulcer Identification:  Ulcer Type: traumatic  Contributing Factors: diabetes    Wound: Abrasion        PAST MEDICAL HISTORY        Diagnosis Date    Afib (HCC)     Bronchitis     Colon cancer (HCC)     Colon polyps     Constipation     Deep vein blood clot of left lower extremity (HCC)     Left leg     Depression     DVT (deep venous thrombosis) (HCC)     Dysphagia     Fibrocystic breast disease     Fibromyalgia     GERD (gastroesophageal reflux disease)     reflux with hx of esophageal stricture    Headache(784.0)     History of colon cancer     Hormone replacement therapy (postmenopausal)     Hypertension     Neuropathy of foot     In both feet    Osteoarthritis     left knee pain    Palliative care patient 2022    Restless legs syndrome     Type 2 diabetes mellitus 10/25/2021    Vitamin D deficiency        PAST SURGICAL HISTORY    Past Surgical History:   Procedure Laterality Date    APPENDECTOMY      BREAST BIOPSY      CATARACT REMOVAL       SECTION      CHOLECYSTECTOMY      COLECTOMY  partial    COLON SURGERY      COLONOSCOPY  2010    COLONOSCOPY  2012    Dr Huerta: unremarkable enterocolonic anastamosis; hyperplastic polyps    COLONOSCOPY  2013    Montez: unremarkable post-surgical colon    COLONOSCOPY  2016    Dr Thorpe-right colon anastomosis, 5 yr recall    COLONOSCOPY N/A 2021    Dr YOLETTE Sheppard-Healthy and patent anastomosis in the

## 2024-01-03 NOTE — PATIENT INSTRUCTIONS
Fall Prevention in the Home, Adult  Falls can cause injuries and affect people of all ages. There are many simple things that you can do to make your home safe and to help prevent falls. Ask for help when making these changes, if needed.  What actions can I take to prevent falls?  General instructions  Use good lighting in all rooms. Replace any light bulbs that burn out, turn on lights if it is dark, and use night-lights.  Place frequently used items in easy-to-reach places. Lower the shelves around your home if necessary.  Set up furniture so that there are clear paths around it. Avoid moving your furniture around.  Remove throw rugs and other tripping hazards from the floor.  Avoid walking on wet floors.  Fix any uneven floor surfaces.  Add color or contrast paint or tape to grab bars and handrails in your home. Place contrasting color strips on the first and last steps of staircases.  When you use a stepladder, make sure that it is completely opened and that the sides and supports are firmly locked. Have someone hold the ladder while you are using it. Do not climb a closed stepladder.  Know where your pets are when moving through your home.  What can I do in the bathroom?         Keep the floor dry. Immediately clean up any water that is on the floor.  Remove soap buildup in the tub or shower regularly.  Use nonskid mats or decals on the floor of the tub or shower.  Attach bath mats securely with double-sided, nonslip rug tape.  If you need to sit down while you are in the shower, use a plastic, nonslip stool.  Install grab bars by the toilet and in the tub and shower. Do not use towel bars as grab bars.  What can I do in the bedroom?  Make sure that a bedside light is easy to reach.  Do not use oversized bedding that reaches the floor.  Have a firm chair that has side arms to use for getting dressed.  What can I do in the kitchen?  Clean up any spills right away.  If you need to reach for something above you,  use a sturdy step stool that has a grab bar.  Keep electrical cables out of the way.  Do not use floor polish or wax that makes floors slippery. If you must use wax, make sure that it is non-skid floor wax.  What can I do with my stairs?  Do not leave any items on the stairs.  Make sure that you have a light switch at the top and the bottom of the stairs. Have them installed if you do not have them.  Make sure that there are handrails on both sides of the stairs. Fix handrails that are broken or loose. Make sure that handrails are as long as the staircases.  Install non-slip stair treads on all stairs in your home.  Avoid having throw rugs at the top or bottom of stairs, or secure the rugs with carpet tape to prevent them from moving.  Choose a carpet design that does not hide the edge of steps on the stairs.  Check any carpeting to make sure that it is firmly attached to the stairs. Fix any carpet that is loose or worn.  What can I do on the outside of my home?  Use bright outdoor lighting.  Regularly repair the edges of walkways and driveways and fix any cracks.  Remove high doorway thresholds.  Trim any shrubbery on the main path into your home.  Regularly check that handrails are securely fastened and in good repair. Both sides of all steps should have handrails.  Install guardrails along the edges of any raised decks or porches.  Clear walkways of debris and clutter, including tools and rocks.  Have leaves, snow, and ice cleared regularly.  Use sand or salt on walkways during winter months.  In the garage, clean up any spills right away, including grease or oil spills.  What other actions can I take?  Wear closed-toe shoes that fit well and support your feet. Wear shoes that have rubber soles or low heels.  Use mobility aids as needed, such as canes, walkers, scooters, and crutches.  Review your medicines with your health care provider. Some medicines can cause dizziness or changes in blood pressure, which  increase your risk of falling.  Talk with your health care provider about other ways that you can decrease your risk of falls. This may include working with a physical therapist or  to improve your strength, balance, and endurance.  Where to find more information  Centers for Disease Control and PreventionIRVING: www.cdc.gov  [employer] [place] on Aging: www.catie.nih.gov  Contact a health care provider if:  You are afraid of falling at home.  You feel weak, drowsy, or dizzy at home.  You fall at home.  Summary  There are many simple things that you can do to make your home safe and to help prevent falls.  Ways to make your home safe include removing tripping hazards and installing grab bars in the bathroom.  Ask for help when making these changes in your home.  This information is not intended to replace advice given to you by your health care provider. Make sure you discuss any questions you have with your health care provider.  Document Revised: 09/19/2022 Document Reviewed: 07/21/2021  Elsevier Patient Education © 2023 Elsevier Inc.

## 2024-01-17 ENCOUNTER — HOSPITAL ENCOUNTER (OUTPATIENT)
Dept: WOUND CARE | Age: 89
Discharge: HOME | End: 2024-01-17
Payer: MEDICARE

## 2024-01-17 VITALS
RESPIRATION RATE: 16 BRPM | HEIGHT: 63 IN | DIASTOLIC BLOOD PRESSURE: 71 MMHG | HEART RATE: 72 BPM | SYSTOLIC BLOOD PRESSURE: 147 MMHG | WEIGHT: 129 LBS | BODY MASS INDEX: 22.86 KG/M2 | TEMPERATURE: 97.9 F

## 2024-01-17 DIAGNOSIS — L97.822 SKIN ULCER OF LEFT KNEE WITH FAT LAYER EXPOSED (HCC): Primary | ICD-10-CM

## 2024-01-17 PROCEDURE — 97597 DBRDMT OPN WND 1ST 20 CM/<: CPT | Performed by: SURGERY

## 2024-01-17 PROCEDURE — 97597 DBRDMT OPN WND 1ST 20 CM/<: CPT

## 2024-01-17 RX ORDER — RIVAROXABAN 10 MG/1
10 TABLET, FILM COATED ORAL
Qty: 30 TABLET | Refills: 1 | Status: SHIPPED | OUTPATIENT
Start: 2024-01-17

## 2024-01-17 RX ORDER — BETAMETHASONE DIPROPIONATE 0.5 MG/G
CREAM TOPICAL ONCE
OUTPATIENT
Start: 2024-01-17 | End: 2024-01-17

## 2024-01-17 RX ORDER — LIDOCAINE HYDROCHLORIDE 20 MG/ML
JELLY TOPICAL ONCE
Status: COMPLETED | OUTPATIENT
Start: 2024-01-17 | End: 2024-01-17

## 2024-01-17 RX ORDER — CLOBETASOL PROPIONATE 0.5 MG/G
OINTMENT TOPICAL ONCE
OUTPATIENT
Start: 2024-01-17 | End: 2024-01-17

## 2024-01-17 RX ORDER — IBUPROFEN 200 MG
TABLET ORAL ONCE
OUTPATIENT
Start: 2024-01-17 | End: 2024-01-17

## 2024-01-17 RX ORDER — GENTAMICIN SULFATE 1 MG/G
OINTMENT TOPICAL ONCE
OUTPATIENT
Start: 2024-01-17 | End: 2024-01-17

## 2024-01-17 RX ORDER — BACITRACIN ZINC AND POLYMYXIN B SULFATE 500; 1000 [USP'U]/G; [USP'U]/G
OINTMENT TOPICAL ONCE
OUTPATIENT
Start: 2024-01-17 | End: 2024-01-17

## 2024-01-17 RX ORDER — LIDOCAINE HYDROCHLORIDE 20 MG/ML
JELLY TOPICAL ONCE
OUTPATIENT
Start: 2024-01-17 | End: 2024-01-17

## 2024-01-17 RX ORDER — TRIAMCINOLONE ACETONIDE 1 MG/G
OINTMENT TOPICAL ONCE
OUTPATIENT
Start: 2024-01-17 | End: 2024-01-17

## 2024-01-17 RX ORDER — SODIUM CHLOR/HYPOCHLOROUS ACID 0.033 %
SOLUTION, IRRIGATION IRRIGATION ONCE
OUTPATIENT
Start: 2024-01-17 | End: 2024-01-17

## 2024-01-17 RX ORDER — GINSENG 100 MG
CAPSULE ORAL ONCE
OUTPATIENT
Start: 2024-01-17 | End: 2024-01-17

## 2024-01-17 RX ADMIN — LIDOCAINE HYDROCHLORIDE: 20 JELLY TOPICAL at 11:37

## 2024-01-17 NOTE — PROGRESS NOTES
Pt weaning off due to rash and hot flashes    Zoloft [Sertraline Hcl] Other (See Comments)     Patient states that she doesn't want this medication anymore because she hallucinated and saw spiders. Patient weaned herself off and after she quit taking the medication, the hallucinations stopped.   Patient states that she doesn't want this medication anymore because she hallucinated and saw spiders. Patient weaned herself off and after she quit taking the medication, the hallucinations stopped.          MEDICATIONS    Current Outpatient Medications on File Prior to Encounter   Medication Sig Dispense Refill    DULoxetine (CYMBALTA) 60 MG extended release capsule TAKE 1 CAPSULE BY MOUTH DAILY 90 capsule 3    oxybutynin (DITROPAN XL) 10 MG extended release tablet Take 1 tablet by mouth at bedtime 30 tablet 3    HYDROcodone-acetaminophen (NORCO)  MG per tablet Take 1 tablet by mouth every 8 hours as needed for Pain.      metFORMIN (GLUCOPHAGE-XR) 500 MG extended release tablet Take 1 tablet by mouth daily 30 tablet 3    omeprazole (PRILOSEC) 40 MG delayed release capsule TAKE 1 CAPSULE BY MOUTH DAILY 100 capsule 2    amLODIPine (NORVASC) 5 MG tablet Take 1 tablet by mouth daily 90 tablet 1    acyclovir (ZOVIRAX) 400 MG tablet Take 1 tablet by mouth every 4 hours (while awake)      pregabalin (LYRICA) 100 MG capsule Take 1 capsule by mouth 2 times daily for 30 days. Max Daily Amount: 200 mg 60 capsule 0    triamcinolone (KENALOG) 0.1 % ointment APPLY TOPICALLY TO LOWER LEG TWICE DAILY FOR 14 DAYS. MAY REPEAT 1 TIME A MONTH AS NEEDED 60 g 1    XARELTO 10 MG TABS tablet TAKE 1 TABLET BY MOUTH DAILY WITH BREAKFAST 30 tablet 1    tiZANidine (ZANAFLEX) 2 MG tablet Take 1 tablet by mouth nightly as needed (leg spasms) Replace the baclofen 30 tablet 3    aspirin 81 MG EC tablet Take 1 tablet by mouth every other day 30 tablet 0    Hospital Bed MISC by Does not apply route 1 each 0     No current facility-administered

## 2024-01-17 NOTE — PATIENT INSTRUCTIONS
[facility]   Physician Orders and Discharge Instructions  [address] [place] [address] Drive  [address]  [place] [address]  Telephone: [phone]      FAX [phone]    NAME:  Esther Schmidt  YOB: 1935  MEDICAL RECORD NUMBER:  [id number]  DATE:  1/17/2024    Discharge condition: Stable    Discharge to: Home    Left via:Private automobile    Accompanied by: Family    ECF/HHA: [facility]    Dressing Orders: Left Knee  Soap and water wash  Saline moist 4x4 gauze, Cover with dry gauze, roll gauze, medipore tape and Tubigrip or knee sleeve to hold in place  Change twice daily    C follow up visit ____________2 weeks_________________  (Please note your next appointment above and if you are unable to keep, kindly give a 24 hour notice. Thank you.)    If you experience any of the following, please call the Wound Care Center during business hours:    * Increase in Pain  * Temperature over 101  * Increase in drainage from your wound  * Drainage with a foul odor  * Bleeding  * Increase in swelling  * Need for compression bandage changes due to slippage, breakthrough drainage.    If you need medical attention outside of the business hours of the Wound Care Centers please contact your PCP or go to the nearest emergency room.

## 2024-01-24 ENCOUNTER — HOSPITAL ENCOUNTER (OUTPATIENT)
Dept: WOUND CARE | Age: 89
Discharge: HOME | End: 2024-01-24
Payer: MEDICARE

## 2024-01-24 VITALS
TEMPERATURE: 97.6 F | SYSTOLIC BLOOD PRESSURE: 136 MMHG | HEART RATE: 44 BPM | DIASTOLIC BLOOD PRESSURE: 46 MMHG | RESPIRATION RATE: 18 BRPM

## 2024-01-24 DIAGNOSIS — L97.822 SKIN ULCER OF LEFT KNEE WITH FAT LAYER EXPOSED (HCC): Primary | ICD-10-CM

## 2024-01-24 PROCEDURE — 97597 DBRDMT OPN WND 1ST 20 CM/<: CPT

## 2024-01-24 PROCEDURE — 97597 DBRDMT OPN WND 1ST 20 CM/<: CPT | Performed by: SURGERY

## 2024-01-24 RX ORDER — SODIUM CHLOR/HYPOCHLOROUS ACID 0.033 %
SOLUTION, IRRIGATION IRRIGATION ONCE
OUTPATIENT
Start: 2024-01-24 | End: 2024-01-24

## 2024-01-24 RX ORDER — CIPROFLOXACIN HYDROCHLORIDE 3.5 MG/ML
1 SOLUTION/ DROPS TOPICAL 2 TIMES DAILY
COMMUNITY

## 2024-01-24 RX ORDER — BETAMETHASONE DIPROPIONATE 0.5 MG/G
CREAM TOPICAL ONCE
OUTPATIENT
Start: 2024-01-24 | End: 2024-01-24

## 2024-01-24 RX ORDER — GINSENG 100 MG
CAPSULE ORAL ONCE
OUTPATIENT
Start: 2024-01-24 | End: 2024-01-24

## 2024-01-24 RX ORDER — TRIAMCINOLONE ACETONIDE 1 MG/G
OINTMENT TOPICAL ONCE
OUTPATIENT
Start: 2024-01-24 | End: 2024-01-24

## 2024-01-24 RX ORDER — LIDOCAINE HYDROCHLORIDE 20 MG/ML
JELLY TOPICAL ONCE
OUTPATIENT
Start: 2024-01-24 | End: 2024-01-24

## 2024-01-24 RX ORDER — CLOBETASOL PROPIONATE 0.5 MG/G
OINTMENT TOPICAL ONCE
OUTPATIENT
Start: 2024-01-24 | End: 2024-01-24

## 2024-01-24 RX ORDER — IBUPROFEN 200 MG
TABLET ORAL ONCE
OUTPATIENT
Start: 2024-01-24 | End: 2024-01-24

## 2024-01-24 RX ORDER — BACITRACIN ZINC AND POLYMYXIN B SULFATE 500; 1000 [USP'U]/G; [USP'U]/G
OINTMENT TOPICAL ONCE
OUTPATIENT
Start: 2024-01-24 | End: 2024-01-24

## 2024-01-24 RX ORDER — LIDOCAINE HYDROCHLORIDE 20 MG/ML
JELLY TOPICAL ONCE
Status: COMPLETED | OUTPATIENT
Start: 2024-01-24 | End: 2024-01-24

## 2024-01-24 RX ORDER — GENTAMICIN SULFATE 1 MG/G
OINTMENT TOPICAL ONCE
OUTPATIENT
Start: 2024-01-24 | End: 2024-01-24

## 2024-01-24 RX ADMIN — LIDOCAINE HYDROCHLORIDE: 20 JELLY TOPICAL at 11:16

## 2024-01-24 ASSESSMENT — PAIN DESCRIPTION - FREQUENCY: FREQUENCY: INTERMITTENT

## 2024-01-24 ASSESSMENT — PAIN DESCRIPTION - ONSET: ONSET: ON-GOING

## 2024-01-24 ASSESSMENT — PAIN DESCRIPTION - DESCRIPTORS: DESCRIPTORS: DISCOMFORT

## 2024-01-24 ASSESSMENT — PAIN SCALES - GENERAL: PAINLEVEL_OUTOF10: 8

## 2024-01-24 ASSESSMENT — PAIN DESCRIPTION - ORIENTATION: ORIENTATION: RIGHT

## 2024-01-24 ASSESSMENT — PAIN DESCRIPTION - LOCATION: LOCATION: BACK;HIP

## 2024-01-24 ASSESSMENT — PAIN - FUNCTIONAL ASSESSMENT: PAIN_FUNCTIONAL_ASSESSMENT: PREVENTS OR INTERFERES SOME ACTIVE ACTIVITIES AND ADLS

## 2024-01-24 ASSESSMENT — PAIN DESCRIPTION - PAIN TYPE: TYPE: CHRONIC PAIN

## 2024-01-24 NOTE — PLAN OF CARE
Problem: Chronic Conditions and Co-morbidities  Goal: Patient's chronic conditions and co-morbidity symptoms are monitored and maintained or improved  Outcome: Progressing     Problem: Pain  Goal: Verbalizes/displays adequate comfort level or baseline comfort level  Outcome: Progressing     Problem: Wound:  Goal: Will show signs of wound healing; wound closure and no evidence of infection  Description: Will show signs of wound healing; wound closure and no evidence of infection  Outcome: Progressing     Problem: Weight control:  Goal: Ability to maintain an optimal weight for height and age will be supported  Description: Ability to maintain an optimal weight for height and age will be supported  Outcome: Progressing     Problem: Falls - Risk of:  Goal: Will remain free from falls  Description: Will remain free from falls  Outcome: Progressing     Problem: Blood Glucose:  Goal: Ability to maintain appropriate glucose levels will improve  Description: Ability to maintain appropriate glucose levels will improve  Outcome: Progressing

## 2024-01-24 NOTE — PROGRESS NOTES
[facility]   Progress Note and Procedure Note      Esther Schmidt  MEDICAL RECORD NUMBER:  [id number]  AGE: 88 y.o.   GENDER: female  : 1935  EPISODE DATE:  2024    Subjective:     Chief Complaint   Patient presents with    Wound Check     Left knee wound         HISTORY of PRESENT ILLNESS HPI     Esther Schmidt is a 88 y.o. female who presents today for wound/ulcer evaluation.   Wound Context: Pt with L knee wound here for eval/treat  Wound/Ulcer Pain Timing/Severity: none  Quality of pain: N/A  Severity:  0 / 10   Modifying Factors: None  Associated Signs/Symptoms: none    Ulcer Identification:  Ulcer Type: non-healing surgical  Contributing Factors: edema    Wound: Surgical incision        PAST MEDICAL HISTORY        Diagnosis Date    Afib (HCC)     Bronchitis     Colon cancer (HCC)     Colon polyps     Constipation     Deep vein blood clot of left lower extremity (HCC)     Left leg     Depression     DVT (deep venous thrombosis) (HCC)     Dysphagia     Fibrocystic breast disease     Fibromyalgia     GERD (gastroesophageal reflux disease)     reflux with hx of esophageal stricture    Headache(784.0)     History of colon cancer     Hormone replacement therapy (postmenopausal)     Hypertension     Neuropathy of foot     In both feet    Osteoarthritis     left knee pain    Palliative care patient 2022    Restless legs syndrome     Type 2 diabetes mellitus 10/25/2021    Vitamin D deficiency        PAST SURGICAL HISTORY    Past Surgical History:   Procedure Laterality Date    APPENDECTOMY      BREAST BIOPSY      CATARACT REMOVAL       SECTION      CHOLECYSTECTOMY      COLECTOMY  partial    COLON SURGERY      COLONOSCOPY  2010    COLONOSCOPY  2012    Dr Huerta: unremarkable enterocolonic anastamosis; hyperplastic polyps    COLONOSCOPY  2013    Montez: unremarkable post-surgical colon    COLONOSCOPY  2016    Dr Thorpe-right colon

## 2024-01-24 NOTE — PATIENT INSTRUCTIONS
[facility]   Physician Orders and Discharge Instructions  [address] [place] [address] Drive  [address]  [place] [address]  Telephone: [phone]      FAX [phone]    NAME:  Esther Schmidt  YOB: 1935  MEDICAL RECORD NUMBER:  [id number]  DATE:  1/24/2024    Discharge condition: Stable    Discharge to: Home    Left via:Private automobile    Accompanied by: Family    ECF/HHA: [facility]    Dressing Orders: Left Knee  Soap and water wash  Wound Gel, Cover with dry gauze, roll gauze, medipore tape and Tubigrip or knee sleeve to hold in place  Change daily    Protein rich diet (unless restricted by your physician); Multivitamin daily; Elevate legs above the level of your heart when sitting 3-4 times daily for at least one hour each time, avoid standing for long periods of time.        [facility] follow up visit ____________2 weeks_________________  (Please note your next appointment above and if you are unable to keep, kindly give a 24 hour notice. Thank you.)    If you experience any of the following, please call the Wound Care Center during business hours:    * Increase in Pain  * Temperature over 101  * Increase in drainage from your wound  * Drainage with a foul odor  * Bleeding  * Increase in swelling  * Need for compression bandage changes due to slippage, breakthrough drainage.    If you need medical attention outside of the business hours of the Wound Care Centers please contact your PCP or go to the nearest emergency room. 0

## 2024-02-06 ENCOUNTER — OFFICE VISIT (OUTPATIENT)
Dept: CARDIOLOGY CLINIC | Age: 89
End: 2024-02-06
Payer: MEDICARE

## 2024-02-06 VITALS
SYSTOLIC BLOOD PRESSURE: 110 MMHG | BODY MASS INDEX: 22.15 KG/M2 | OXYGEN SATURATION: 96 % | DIASTOLIC BLOOD PRESSURE: 66 MMHG | HEART RATE: 74 BPM | WEIGHT: 125 LBS | HEIGHT: 63 IN

## 2024-02-06 DIAGNOSIS — Z79.01 CHRONIC ANTICOAGULATION: ICD-10-CM

## 2024-02-06 DIAGNOSIS — I10 HYPERTENSION, UNSPECIFIED TYPE: ICD-10-CM

## 2024-02-06 DIAGNOSIS — I48.0 PAF (PAROXYSMAL ATRIAL FIBRILLATION) (HCC): Primary | ICD-10-CM

## 2024-02-06 PROCEDURE — 93000 ELECTROCARDIOGRAM COMPLETE: CPT | Performed by: NURSE PRACTITIONER

## 2024-02-06 PROCEDURE — 1123F ACP DISCUSS/DSCN MKR DOCD: CPT | Performed by: NURSE PRACTITIONER

## 2024-02-06 PROCEDURE — 99214 OFFICE O/P EST MOD 30 MIN: CPT | Performed by: NURSE PRACTITIONER

## 2024-02-06 RX ORDER — AMLODIPINE BESYLATE 5 MG/1
5 TABLET ORAL DAILY
Qty: 90 TABLET | Refills: 3 | Status: SHIPPED | OUTPATIENT
Start: 2024-02-06

## 2024-02-06 NOTE — PROGRESS NOTES
[facility]  [address], [address], [place]  [address]  [phone] office  [phone] fax      OFFICE VISIT:  2024    Esther Schmidt - : 1935  Reason For Visit:  Esther is a 89 y.o. female who is here for 6 Month Follow-Up and Atrial Fibrillation (Patient denies any cardiac symptoms)    History:   Diagnosis Orders   1. PAF (paroxysmal atrial fibrillation) ([facility])  EKG 12 lead      2. Hypertension, unspecified type        3. Chronic anticoagulation      Xarelto        The patient presents today for cardiology follow up.  She is an 89 year old with the following history: chronic hypertension, DVT on chronic anticoagulation therapy, COVID-19 infection, type 2 diabetes mellitus, fibromyalgia, dyslipidemia and paroxysmal atrial fibrillation. Patient also has a history of TIA.     The patient denies symptoms to suggest myocardial ischemia, heart failure or recurrent arrhythmia.  BP is well controlled on current regimen.  The patient's PCP monitors cholesterol.  The patient reports chronic headaches and eye pain for years.  She does she eye doctor regularly.  She reports no stroke like symptoms or visual disturbance.  No report of bleeding issues or falls.     Subjective  Esther denies exertional chest pain, shortness of breath, orthopnea, paroxysmal nocturnal dyspnea, syncope, presyncope, sensed arrhythmia, edema and fatigue.  The patient denies numbness or weakness to suggest cerebrovascular accident or transient ischemic attack.     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

## 2024-02-06 NOTE — PATIENT INSTRUCTIONS
New instructions for today:  Xarelto can increase your risk of bleeding.  If you notice blood in urine or stool, bleeding gums, excessive bruising or cough productive of bloody sputum, notify the office.  Information on this blood thinner has been included in your after visit summary.    Patient Instructions:  Continue current medications as prescribed.   Always keep a current medication list. Bring your medications to every office visit.   Continue to follow up with primary care provider for non cardiac medical problems.  Call the office with any problems, questions or concerns at [phone].  If you have been asked to keep a blood pressure log, do so for 2 weeks. Call the office to report readings to the triage nurse at [phone].  Follow up with cardiologist as scheduled.  The following educational material has been included in this after visit summary for your review: Life simple 7.  Heart health.     Life simple 7  1) Manage blood pressure - high blood pressure is a major risk factor for heart disease and stroke. Keeping blood pressure in health range reduces strain on your heart, arteries and kidneys.  Blood pressure goal is less than 130/80.   2) Control cholesterol - contributes to plaque, which can clog arteries and lead to heart disease and stroke. When you control your cholesterol you are giving your arteries their best chance to remain clear.  It is recommended that you get cholesterol lab work done once a year.  3) Reduce blood sugar - most of the food we eat is turning into glucose or blood sugar that our body uses for energy.  Over time, high levels of blood sugar can damage your heart, kidneys, eyes and nerves.  4) Get active - living an active life is one of the most rewarding gifts you can give yourself and those you love.  Simply put, daily physical activity increases your length and quality of life. Strive to exercise 15 minutes most days of the week.  5)  Eat better - A healthy diet is one

## 2024-02-07 ENCOUNTER — HOSPITAL ENCOUNTER (OUTPATIENT)
Dept: WOUND CARE | Age: 89
Discharge: HOME | End: 2024-02-07
Payer: MEDICARE

## 2024-02-07 VITALS
WEIGHT: 125 LBS | HEART RATE: 72 BPM | RESPIRATION RATE: 18 BRPM | DIASTOLIC BLOOD PRESSURE: 76 MMHG | SYSTOLIC BLOOD PRESSURE: 163 MMHG | TEMPERATURE: 97.9 F | HEIGHT: 63 IN | BODY MASS INDEX: 22.15 KG/M2

## 2024-02-07 DIAGNOSIS — L97.822 SKIN ULCER OF LEFT KNEE WITH FAT LAYER EXPOSED (HCC): Primary | ICD-10-CM

## 2024-02-07 PROCEDURE — 99212 OFFICE O/P EST SF 10 MIN: CPT

## 2024-02-07 PROCEDURE — 99212 OFFICE O/P EST SF 10 MIN: CPT | Performed by: SURGERY

## 2024-02-07 RX ORDER — TRIAMCINOLONE ACETONIDE 1 MG/G
OINTMENT TOPICAL ONCE
Status: CANCELLED | OUTPATIENT
Start: 2024-02-07 | End: 2024-02-07

## 2024-02-07 RX ORDER — CLOBETASOL PROPIONATE 0.5 MG/G
OINTMENT TOPICAL ONCE
Status: CANCELLED | OUTPATIENT
Start: 2024-02-07 | End: 2024-02-07

## 2024-02-07 RX ORDER — BETAMETHASONE DIPROPIONATE 0.5 MG/G
CREAM TOPICAL ONCE
Status: CANCELLED | OUTPATIENT
Start: 2024-02-07 | End: 2024-02-07

## 2024-02-07 RX ORDER — GENTAMICIN SULFATE 1 MG/G
OINTMENT TOPICAL ONCE
Status: CANCELLED | OUTPATIENT
Start: 2024-02-07 | End: 2024-02-07

## 2024-02-07 RX ORDER — BACITRACIN ZINC AND POLYMYXIN B SULFATE 500; 1000 [USP'U]/G; [USP'U]/G
OINTMENT TOPICAL ONCE
Status: CANCELLED | OUTPATIENT
Start: 2024-02-07 | End: 2024-02-07

## 2024-02-07 RX ORDER — IBUPROFEN 200 MG
TABLET ORAL ONCE
Status: CANCELLED | OUTPATIENT
Start: 2024-02-07 | End: 2024-02-07

## 2024-02-07 RX ORDER — GINSENG 100 MG
CAPSULE ORAL ONCE
Status: CANCELLED | OUTPATIENT
Start: 2024-02-07 | End: 2024-02-07

## 2024-02-07 RX ORDER — SODIUM CHLOR/HYPOCHLOROUS ACID 0.033 %
SOLUTION, IRRIGATION IRRIGATION ONCE
Status: CANCELLED | OUTPATIENT
Start: 2024-02-07 | End: 2024-02-07

## 2024-02-07 RX ORDER — LIDOCAINE HYDROCHLORIDE 20 MG/ML
JELLY TOPICAL ONCE
Status: CANCELLED | OUTPATIENT
Start: 2024-02-07 | End: 2024-02-07

## 2024-02-07 NOTE — PLAN OF CARE
Problem: Chronic Conditions and Co-morbidities  Goal: Patient's chronic conditions and co-morbidity symptoms are monitored and maintained or improved  2/7/2024 1455 by Ajay Siddiqi RN  Outcome: Completed  2/7/2024 1448 by Amalia Mcfarland RN  Outcome: Progressing     Problem: Chronic Conditions and Co-morbidities  Goal: Patient's chronic conditions and co-morbidity symptoms are monitored and maintained or improved  2/7/2024 1455 by Ajay Siddiqi RN  Outcome: Completed  2/7/2024 1448 by Amalia Mcfarland RN  Outcome: Progressing     Problem: Wound:  Goal: Will show signs of wound healing; wound closure and no evidence of infection  Description: Will show signs of wound healing; wound closure and no evidence of infection  2/7/2024 1455 by Ajay Siddiqi RN  Outcome: Completed  2/7/2024 1448 by Amalia Mcfarland RN  Outcome: Progressing     Problem: Weight control:  Goal: Ability to maintain an optimal weight for height and age will be supported  Description: Ability to maintain an optimal weight for height and age will be supported  2/7/2024 1455 by Ajay Siddiqi RN  Outcome: Completed  2/7/2024 1448 by Amalia Mcfarland RN  Outcome: Progressing     Problem: Falls - Risk of:  Goal: Will remain free from falls  Description: Will remain free from falls  2/7/2024 1455 by Ajay Siddiqi RN  Outcome: Completed  2/7/2024 1448 by Amalia Mcfarland RN  Outcome: Progressing     Problem: Blood Glucose:  Goal: Ability to maintain appropriate glucose levels will improve  Description: Ability to maintain appropriate glucose levels will improve  2/7/2024 1455 by Ajay Siddiqi RN  Outcome: Completed  2/7/2024 1448 by Amalia Mcfarland RN  Outcome: Progressing     Problem: Pain  Goal: Verbalizes/displays adequate comfort level or baseline comfort level  2/7/2024 1455 by Ajay Siddiqi RN  Outcome: Completed  2/7/2024 1448 by Amalia Mcfarland RN  Outcome: Progressing

## 2024-02-07 NOTE — PLAN OF CARE
Problem: Chronic Conditions and Co-morbidities  Goal: Patient's chronic conditions and co-morbidity symptoms are monitored and maintained or improved  Outcome: Progressing     Problem: Wound:  Goal: Will show signs of wound healing; wound closure and no evidence of infection  Description: Will show signs of wound healing; wound closure and no evidence of infection  Outcome: Progressing     Problem: Weight control:  Goal: Ability to maintain an optimal weight for height and age will be supported  Description: Ability to maintain an optimal weight for height and age will be supported  Outcome: Progressing     Problem: Falls - Risk of:  Goal: Will remain free from falls  Description: Will remain free from falls  Outcome: Progressing     Problem: Blood Glucose:  Goal: Ability to maintain appropriate glucose levels will improve  Description: Ability to maintain appropriate glucose levels will improve  Outcome: Progressing     Problem: Chronic Conditions and Co-morbidities  Goal: Patient's chronic conditions and co-morbidity symptoms are monitored and maintained or improved  Outcome: Progressing     Problem: Pain  Goal: Verbalizes/displays adequate comfort level or baseline comfort level  Outcome: Progressing

## 2024-02-07 NOTE — PATIENT INSTRUCTIONS
[facility]   Physician Orders and Discharge Instructions  [address] [place] [address]  [address]  [place] [address]  Telephone: [phone]      FAX [phone]    NAME:  Esther Schmidt  YOB: 1935  MEDICAL RECORD NUMBER:  [id number]  DATE:  2/7/2024    Discharge condition: Stable    Discharge to: Home    Left via:Private automobile    Accompanied by: Family    ECF/[facility]: [facility]    Dressing Orders: Left Knee  Healed, Moisturize and Protect   Follow up with your Family Practitioner as Instructed    Preventing Falls: Care Instructions  Your Care Instructions  Getting around your home safely can be a challenge if you have injuries or health problems that make it easy for you to fall. Loose rugs and furniture in walkways are among the dangers for many older people who have problems walking or who have poor eyesight. People who have conditions such as arthritis, osteoporosis, or dementia also have to be careful not to fall.  You can make your home safer with a few simple measures.  Follow-up care is a key part of your treatment and safety. Be sure to make and go to all appointments, and call your doctor if you are having problems. It's also a good idea to know your test results and keep a list of the medicines you take.  How can you care for yourself at home?  Taking care of yourself  You may get dizzy if you do not drink enough water. To prevent dehydration, drink plenty of fluids, enough so that your urine is light yellow or clear like water. Choose water and other caffeine-free clear liquids. If you have kidney, heart, or liver disease and have to limit fluids, talk with your doctor before you increase the amount of fluids you drink.  Exercise regularly to improve your strength, muscle tone, and balance. Walk if you can. Swimming may be a good choice if you cannot walk easily.  Have your vision and hearing checked each year or any time you

## 2024-02-07 NOTE — PROGRESS NOTES
Does not apply route 1 each 0     No current facility-administered medications on file prior to encounter.       REVIEW OF SYSTEMS    A comprehensive review of systems was negative.    Objective:      BP (!) 163/76   Pulse 72   Temp 97.9 °F (36.6 °C) (Temporal)   Resp 18   Ht 1.6 m (5' 3\")   Wt 56.7 kg (125 lb)   BMI 22.14 kg/m²     Wt Readings from Last 3 Encounters:   02/07/24 56.7 kg (125 lb)   02/06/24 56.7 kg (125 lb)   01/17/24 58.5 kg (129 lb)       PHYSICAL EXAM    General Appearance: alert and oriented to person, place and time, well developed and well- nourished, in no acute distress  Skin: warm and dry, no rash or erythema  Head: normocephalic and atraumatic  Eyes: pupils equal, round, and reactive to light, extraocular eye movements intact, conjunctivae normal  ENT: tympanic membrane, external ear and ear canal normal bilaterally, nose without deformity, nasal mucosa and turbinates normal without polyps, lips teeth and gums normal  Neck: supple and non-tender without mass, no thyromegaly or thyroid nodules, no cervical lymphadenopathy  Pulmonary/Chest: clear to auscultation bilaterally- no wheezes, rales or rhonchi, normal air movement, no respiratory distress  Cardiovascular: normal rate, regular rhythm, normal S1 and S2, no murmurs, rubs, clicks, or gallops, distal pulses intact, no carotid bruits  Abdomen: soft, non-tender, non-distended, normal bowel sounds, no masses or organomegaly  Extremities: no cyanosis, clubbing or edema  Musculoskeletal: normal range of motion, no joint swelling, deformity or tenderness  Neurologic: reflexes normal and symmetric, no cranial nerve deficit, gait, coordination and speech normal, skin sensation normal      Assessment:      Patient Active Problem List   Diagnosis Code    Personal history of colon cancer Z85.038    Family history of esophageal cancer Z80.0    Fibromyalgia M79.7    Renal bruit R09.89    Varicose veins of left lower extremity with inflammation,

## 2024-02-13 ENCOUNTER — OFFICE VISIT (OUTPATIENT)
Dept: PRIMARY CARE CLINIC | Age: 89
End: 2024-02-13
Payer: MEDICARE

## 2024-02-13 VITALS
BODY MASS INDEX: 22.79 KG/M2 | TEMPERATURE: 98.3 F | HEART RATE: 76 BPM | WEIGHT: 128.6 LBS | OXYGEN SATURATION: 96 % | DIASTOLIC BLOOD PRESSURE: 74 MMHG | HEIGHT: 63 IN | RESPIRATION RATE: 16 BRPM | SYSTOLIC BLOOD PRESSURE: 144 MMHG

## 2024-02-13 DIAGNOSIS — N18.32 STAGE 3B CHRONIC KIDNEY DISEASE (HCC): ICD-10-CM

## 2024-02-13 DIAGNOSIS — Z71.89 ACP (ADVANCE CARE PLANNING): ICD-10-CM

## 2024-02-13 DIAGNOSIS — Z95.828 PORT-A-CATH IN PLACE: ICD-10-CM

## 2024-02-13 DIAGNOSIS — R30.0 DYSURIA: Primary | ICD-10-CM

## 2024-02-13 DIAGNOSIS — E61.1 IRON DEFICIENCY: ICD-10-CM

## 2024-02-13 DIAGNOSIS — Z45.2 ENCOUNTER FOR CARE RELATED TO PORT-A-CATH: ICD-10-CM

## 2024-02-13 DIAGNOSIS — E11.69 TYPE 2 DIABETES MELLITUS WITH OTHER SPECIFIED COMPLICATION, WITHOUT LONG-TERM CURRENT USE OF INSULIN (HCC): ICD-10-CM

## 2024-02-13 LAB
ALBUMIN SERPL-MCNC: 3.9 G/DL (ref 3.5–5.2)
ALP SERPL-CCNC: 96 U/L (ref 35–104)
ALT SERPL-CCNC: 12 U/L (ref 5–33)
ANION GAP SERPL CALCULATED.3IONS-SCNC: 9 MMOL/L (ref 7–19)
AST SERPL-CCNC: 16 U/L (ref 5–32)
BASOPHILS # BLD: 0.1 K/UL (ref 0–0.2)
BASOPHILS NFR BLD: 1 % (ref 0–1)
BILIRUB SERPL-MCNC: 0.4 MG/DL (ref 0.2–1.2)
BILIRUBIN, POC: ABNORMAL
BLOOD URINE, POC: ABNORMAL
BUN SERPL-MCNC: 20 MG/DL (ref 8–23)
CALCIUM SERPL-MCNC: 9.2 MG/DL (ref 8.8–10.2)
CHLORIDE SERPL-SCNC: 103 MMOL/L (ref 98–111)
CLARITY, POC: CLEAR
CO2 SERPL-SCNC: 27 MMOL/L (ref 22–29)
COLOR, POC: YELLOW
CREAT SERPL-MCNC: 0.9 MG/DL (ref 0.5–0.9)
EOSINOPHIL # BLD: 0.4 K/UL (ref 0–0.6)
EOSINOPHIL NFR BLD: 6.4 % (ref 0–5)
ERYTHROCYTE [DISTWIDTH] IN BLOOD BY AUTOMATED COUNT: 17.9 % (ref 11.5–14.5)
GLUCOSE SERPL-MCNC: 102 MG/DL (ref 74–109)
GLUCOSE URINE, POC: ABNORMAL
HBA1C MFR BLD: 5.4 % (ref 4–6)
HCT VFR BLD AUTO: 43.5 % (ref 37–47)
HGB BLD-MCNC: 13.7 G/DL (ref 12–16)
IMM GRANULOCYTES # BLD: 0 K/UL
IRON SATN MFR SERPL: 30 % (ref 14–50)
IRON SERPL-MCNC: 106 UG/DL (ref 37–145)
KETONES, POC: ABNORMAL
LEUKOCYTE EST, POC: ABNORMAL
LYMPHOCYTES # BLD: 1.9 K/UL (ref 1.1–4.5)
LYMPHOCYTES NFR BLD: 31.9 % (ref 20–40)
MCH RBC QN AUTO: 28.7 PG (ref 27–31)
MCHC RBC AUTO-ENTMCNC: 31.5 G/DL (ref 33–37)
MCV RBC AUTO: 91.2 FL (ref 81–99)
MONOCYTES # BLD: 0.4 K/UL (ref 0–0.9)
MONOCYTES NFR BLD: 7.4 % (ref 0–10)
NEUTROPHILS # BLD: 3.2 K/UL (ref 1.5–7.5)
NEUTS SEG NFR BLD: 53.1 % (ref 50–65)
NITRITE, POC: ABNORMAL
PH, POC: 7
PLATELET # BLD AUTO: 277 K/UL (ref 130–400)
PMV BLD AUTO: 10.5 FL (ref 9.4–12.3)
POTASSIUM SERPL-SCNC: 4.5 MMOL/L (ref 3.5–5)
PROT SERPL-MCNC: 6.8 G/DL (ref 6.6–8.7)
PROTEIN, POC: 7
RBC # BLD AUTO: 4.77 M/UL (ref 4.2–5.4)
SODIUM SERPL-SCNC: 139 MMOL/L (ref 136–145)
SPECIFIC GRAVITY, POC: 1.01
TIBC SERPL-MCNC: 351 UG/DL (ref 250–400)
UROBILINOGEN, POC: 0.2
WBC # BLD AUTO: 5.9 K/UL (ref 4.8–10.8)

## 2024-02-13 PROCEDURE — 81002 URINALYSIS NONAUTO W/O SCOPE: CPT | Performed by: NURSE PRACTITIONER

## 2024-02-13 PROCEDURE — 3044F HG A1C LEVEL LT 7.0%: CPT | Performed by: NURSE PRACTITIONER

## 2024-02-13 PROCEDURE — 99213 OFFICE O/P EST LOW 20 MIN: CPT | Performed by: NURSE PRACTITIONER

## 2024-02-13 PROCEDURE — 1123F ACP DISCUSS/DSCN MKR DOCD: CPT | Performed by: NURSE PRACTITIONER

## 2024-02-13 NOTE — PATIENT INSTRUCTIONS

## 2024-02-13 NOTE — PROGRESS NOTES
Advance Care Planning   Discussed the patient’s choices for care and treatment preferences in case of a health event that adversely affects decision-making abilities or is life-limiting. Recommended the patient document care preferences in state-specific advance directives. Also reviewed the process of designating a trusted capable adult as an Agent (or Health Care Power of ) to make health care decisions for the patient if the patient becomes unable due to incapacity. Patient was asked to complete advance directive forms, if not already done, and to provide a dated, signed and witnessed (or notarized) copy, per the forms' requirements, to the practice office.    Time spent (minutes): <16 minutes (Non-Billable)    [facility]  [address] [place] [address]  Dept: [phone]  Dept Fax: [phone]  Loc: [phone]    Esther Schmidt is a 89 y.o. female who presents today for her medical conditions/complaints as noted below.  Esther Schmidt is c/o of Follow-up (Here for a port flush, patient says her pain is bad, she was supposed to get knee taken care of then pain sensors put in but now they tell her that her health isnt good enough, she says cardiology told her she was good last week, she also says her leg is well and she is walking with her cane today)        HPI:     HPI   Chief Complaint   Patient presents with    Follow-up     Here for a port flush, patient says her pain is bad, she was supposed to get knee taken care of then pain sensors put in but now they tell her that her health isnt good enough, she says cardiology told her she was good last week, she also says her leg is well and she is walking with her cane today     Past Medical History:   Diagnosis Date    Afib (HCC) 2021    Bronchitis     Colon cancer (HCC)     Colon polyps     Constipation     Deep vein blood clot of left lower extremity (HCC)     Left leg     Depression     DVT (deep venous

## 2024-02-15 RX ORDER — CIPROFLOXACIN 250 MG/1
250 TABLET, FILM COATED ORAL 2 TIMES DAILY
Qty: 10 TABLET | Refills: 0 | Status: SHIPPED | OUTPATIENT
Start: 2024-02-15 | End: 2024-02-16 | Stop reason: ALTCHOICE

## 2024-02-16 RX ORDER — NITROFURANTOIN 25; 75 MG/1; MG/1
100 CAPSULE ORAL 2 TIMES DAILY
Qty: 10 CAPSULE | Refills: 0 | Status: SHIPPED | OUTPATIENT
Start: 2024-02-16 | End: 2024-02-21

## 2024-02-18 LAB
BACTERIA UR CULT: ABNORMAL
ORGANISM: ABNORMAL
ORGANISM: ABNORMAL

## 2024-02-19 ENCOUNTER — TELEPHONE (OUTPATIENT)
Dept: PRIMARY CARE CLINIC | Age: 89
End: 2024-02-19

## 2024-02-19 NOTE — TELEPHONE ENCOUNTER
Home health called and is concerned the nitrofurantoin that we have started the patient on for UTI may have adverse reaction to the patient's acyclovir

## 2024-03-13 RX ORDER — METFORMIN HYDROCHLORIDE 500 MG/1
500 TABLET, EXTENDED RELEASE ORAL DAILY
Qty: 30 TABLET | Refills: 3 | Status: SHIPPED | OUTPATIENT
Start: 2024-03-13

## 2024-03-18 RX ORDER — RIVAROXABAN 10 MG/1
10 TABLET, FILM COATED ORAL
Qty: 30 TABLET | Refills: 1 | Status: SHIPPED | OUTPATIENT
Start: 2024-03-18

## 2024-03-19 ENCOUNTER — OFFICE VISIT (OUTPATIENT)
Dept: NEUROSURGERY | Age: 89
End: 2024-03-19
Payer: MEDICARE

## 2024-03-19 VITALS
HEIGHT: 63 IN | DIASTOLIC BLOOD PRESSURE: 72 MMHG | BODY MASS INDEX: 22.77 KG/M2 | WEIGHT: 128.53 LBS | HEART RATE: 77 BPM | SYSTOLIC BLOOD PRESSURE: 122 MMHG

## 2024-03-19 DIAGNOSIS — M96.1 FAILED BACK SYNDROME: Primary | ICD-10-CM

## 2024-03-19 PROCEDURE — 1123F ACP DISCUSS/DSCN MKR DOCD: CPT | Performed by: NEUROLOGICAL SURGERY

## 2024-03-19 PROCEDURE — 99204 OFFICE O/P NEW MOD 45 MIN: CPT | Performed by: NEUROLOGICAL SURGERY

## 2024-03-19 ASSESSMENT — ENCOUNTER SYMPTOMS
BACK PAIN: 1
EYES NEGATIVE: 1
GASTROINTESTINAL NEGATIVE: 1

## 2024-03-19 NOTE — PROGRESS NOTES
[facility]  Office Visit        Chief Complaint   Patient presents with    New Patient     Establishing care    Results     [facility]    Consultation     Patient is here to discuss permanent SCS after [employer] trial.        HISTORY OF PRESENT ILLNESS:      The patient is a 89 y.o. female who presents as a referral from Dr. Nugent in pain management to discuss implantation of a [employer] spinal cord stimulator.  She has a longstanding history of back problems with previous kyphoplasty and L4/5 minimally-invasive laminectomy by Dr. Rodriguez in 2020 and 2021.  She continues to complain of severe pain in her left buttock and lateral thigh.  She also has numbness in her feet from neuropathy.  She is diabetic and her most recent HgbA1c was 5.4%.  She is followed by Dr. Nugent in pain management and has received multiple injections that no longer relieve her pain.  She undertook a trial of spinal cord stimulation in 9/2023 using the [employer] system and reports that she has ~80% improvement in her back and right leg pain during the trial.  Unfortunately, she had a fall and injured he right knee afterwards with a slow-healing wound and she has only recently stopped seeing wound care.  Her pain has returned to baseline since the completion of her trial.         MEDICAL HISTORY:             Past Medical History:   Diagnosis Date    Afib (HCC) 2021    Bronchitis     Colon cancer (HCC)     Colon polyps     Constipation     Deep vein blood clot of left lower extremity (HCC)     Left leg     Depression     DVT (deep venous thrombosis) (HCC)     Dysphagia     Fibrocystic breast disease     Fibromyalgia     GERD (gastroesophageal reflux disease)     reflux with hx of esophageal stricture    Headache(784.0)     History of colon cancer 2000    Hormone replacement therapy (postmenopausal)     Hypertension     Neuropathy of foot     In both feet    Osteoarthritis     left knee pain    Palliative care patient

## 2024-03-25 RX ORDER — DULOXETIN HYDROCHLORIDE 60 MG/1
60 CAPSULE, DELAYED RELEASE ORAL DAILY
Qty: 90 CAPSULE | Refills: 3 | Status: SHIPPED | OUTPATIENT
Start: 2024-03-25

## 2024-04-10 ENCOUNTER — OFFICE VISIT (OUTPATIENT)
Dept: PRIMARY CARE CLINIC | Age: 89
End: 2024-04-10
Payer: MEDICARE

## 2024-04-10 ENCOUNTER — PATIENT MESSAGE (OUTPATIENT)
Dept: PRIMARY CARE CLINIC | Age: 89
End: 2024-04-10

## 2024-04-10 VITALS
BODY MASS INDEX: 22.68 KG/M2 | SYSTOLIC BLOOD PRESSURE: 136 MMHG | OXYGEN SATURATION: 96 % | HEIGHT: 63 IN | RESPIRATION RATE: 16 BRPM | DIASTOLIC BLOOD PRESSURE: 60 MMHG | TEMPERATURE: 98.6 F | WEIGHT: 128 LBS | HEART RATE: 68 BPM

## 2024-04-10 DIAGNOSIS — C18.9 MALIGNANT NEOPLASM OF COLON, UNSPECIFIED PART OF COLON (HCC): ICD-10-CM

## 2024-04-10 DIAGNOSIS — M54.41 CHRONIC BILATERAL LOW BACK PAIN WITH BILATERAL SCIATICA: ICD-10-CM

## 2024-04-10 DIAGNOSIS — Z45.2 ENCOUNTER FOR CARE RELATED TO PORT-A-CATH: ICD-10-CM

## 2024-04-10 DIAGNOSIS — T82.529D: ICD-10-CM

## 2024-04-10 DIAGNOSIS — J41.8 MIXED SIMPLE AND MUCOPURULENT CHRONIC BRONCHITIS (HCC): ICD-10-CM

## 2024-04-10 DIAGNOSIS — G89.29 CHRONIC BILATERAL LOW BACK PAIN WITH BILATERAL SCIATICA: ICD-10-CM

## 2024-04-10 DIAGNOSIS — M54.42 CHRONIC BILATERAL LOW BACK PAIN WITH BILATERAL SCIATICA: ICD-10-CM
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
## 2024-04-10 DIAGNOSIS — N39.3 STRESS INCONTINENCE: ICD-10-CM

## 2024-04-10 PROBLEM — U07.1 COVID-19: Status: RESOLVED | Noted: 2022-01-20 | Resolved: 2024-04-10

## 2024-04-10 PROBLEM — R53.1 WEAKNESS: Status: RESOLVED | Noted: 2022-09-06 | Resolved: 2024-04-10

## 2024-04-10 PROBLEM — S92.901A FRACTURE OF RIGHT FOOT: Status: RESOLVED | Noted: 2019-06-29 | Resolved: 2024-04-10

## 2024-04-10 PROBLEM — U07.1 COVID-19 VIRUS INFECTION: Status: RESOLVED | Noted: 2022-01-20 | Resolved: 2024-04-10

## 2024-04-10 PROBLEM — L03.116 CELLULITIS OF LEFT LOWER LIMB: Status: RESOLVED | Noted: 2019-06-29 | Resolved: 2024-04-10

## 2024-04-10 PROBLEM — L97.822 SKIN ULCER OF LEFT KNEE WITH FAT LAYER EXPOSED (HCC): Chronic | Status: RESOLVED | Noted: 2023-11-21 | Resolved: 2024-04-10

## 2024-04-10 PROCEDURE — 1123F ACP DISCUSS/DSCN MKR DOCD: CPT | Performed by: NURSE PRACTITIONER

## 2024-04-10 PROCEDURE — 99214 OFFICE O/P EST MOD 30 MIN: CPT | Performed by: NURSE PRACTITIONER

## 2024-04-10 RX ORDER — OXYBUTYNIN CHLORIDE 5 MG/1
5 TABLET, EXTENDED RELEASE ORAL NIGHTLY
Qty: 30 TABLET | Refills: 3 | Status: SHIPPED | OUTPATIENT
Start: 2024-04-10

## 2024-04-10 RX ORDER — ACYCLOVIR 400 MG/1
400 TABLET ORAL
Qty: 180 TABLET | Refills: 3 | Status: SHIPPED | OUTPATIENT
Start: 2024-04-10

## 2024-04-10 ASSESSMENT — ENCOUNTER SYMPTOMS
EYES NEGATIVE: 1
RESPIRATORY NEGATIVE: 1
BACK PAIN: 1
GASTROINTESTINAL NEGATIVE: 1

## 2024-04-10 NOTE — PROGRESS NOTES
[facility]  [place] [id number] [facility]  [place] [address]  Dept: [phone]  Dept Fax: [phone]  Loc: [phone]    Esther Schmidt is a 89 y.o. female who presents today for her medical conditions/complaints as noted below.  Esther Schmidt is c/o of Fall (Pt says she doesn't remember falling or what would have caused it, she just remembers hitting the floor, happened on 4/1/24, knot still on back of head and left side of face. She says she just fell face down on floor mita she is confused as to while there is a knot on the back of her head), Mediport (We couldn't get port to flush last time and was supposed to come back on 4/16/24 can she have it done today), Incontinence (She stopped the oxybutinin due to constipation. So now she is urinating a lot again especially at night), and Back Pain (Having MRI tomorrow on her back, now she is really anxious and concerned about the surgery Dr Gonzalez is wanting to do, he is wanting to implant the neuro stimilator and may may have to trim some edges )        HPI:   She fell and did not remember falling or how she fell 7 days ago. She hit the floor and woke up and did not remember falling. She has bruising above her left eye, she reports pain there. She has also had left ear pain. Denies any unilateral weakness, denies seizure like activity, denies headaches. She does take xarelto. She stopped taking it for 2 days due to having bleeding on her forehead from her fall.   She is supposed to be having back surgery to place a pain stimulator by Dr. Licea and she is having anxiety about it. MRI scans of thoracic and lumbar tomorrow. He wants to see the scans before he makes the final decision about surgery.   She is contemplating how beneficial the surgery will be since she is 89 years old, and she is worried how much longer she may be around.  She is also worried about her financials as she is continuing to have to pay for wound care still.

## 2024-04-10 NOTE — PATIENT INSTRUCTIONS
Stop taking Xarelto, take 81 mg of aspirin every day.   We will speak to general surgery to look at your port and why it is not drawing any blood.   Decide what you want to do that is best for you financially and physically regarding your back surgery.   Call [facility] and ask how much the MRI's will cost you. If they are free for you, then go ahead and get them done. It will give you answers as well as Dr. Licea.   Take the miralax daily to help with constipation.

## 2024-04-10 NOTE — TELEPHONE ENCOUNTER
From: Esther SANTO Roxana  To: Jayna Lowery  Sent: 4/10/2024 1:54 PM CDT  Subject: Refill for Acyclovir 400 MG tablet    Robi Dorantes,  I forgot to mention today that Esther needs a refill of acyclovir 400 MG tablet sent to [facility].  Also, Esther decided to keep the MRI appt of the thoracic and lumbar for tomorrow. She called and found out the copay was not as much as she thought it would be.   Thank you for speaking with her today, she has had a lot on her mind and she is much more at ease after talking to you today.     Kayla Rincon

## 2024-04-11 ENCOUNTER — HOSPITAL ENCOUNTER (OUTPATIENT)
Dept: MRI IMAGING | Age: 89
Discharge: HOME | End: 2024-04-11
Payer: MEDICARE

## 2024-04-11 DIAGNOSIS — M96.1 FAILED BACK SYNDROME: ICD-10-CM

## 2024-04-11 PROCEDURE — A9577 INJ MULTIHANCE: HCPCS | Performed by: NEUROLOGICAL SURGERY

## 2024-04-11 PROCEDURE — 6360000004 HC RX CONTRAST MEDICATION: Performed by: NEUROLOGICAL SURGERY

## 2024-04-11 PROCEDURE — 72157 MRI CHEST SPINE W/O & W/DYE: CPT

## 2024-04-11 PROCEDURE — 72158 MRI LUMBAR SPINE W/O & W/DYE: CPT

## 2024-04-11 RX ADMIN — GADOBENATE DIMEGLUMINE 12 ML: 529 INJECTION, SOLUTION INTRAVENOUS at 12:35

## 2024-04-16 ENCOUNTER — OFFICE VISIT (OUTPATIENT)
Dept: VASCULAR SURGERY | Age: 89
End: 2024-04-16
Payer: MEDICARE

## 2024-04-16 VITALS
HEART RATE: 65 BPM | OXYGEN SATURATION: 98 % | SYSTOLIC BLOOD PRESSURE: 161 MMHG | DIASTOLIC BLOOD PRESSURE: 66 MMHG | TEMPERATURE: 97.1 F

## 2024-04-16 DIAGNOSIS — I87.8 POOR VENOUS ACCESS: Primary | ICD-10-CM

## 2024-04-16 PROCEDURE — 1123F ACP DISCUSS/DSCN MKR DOCD: CPT | Performed by: NURSE PRACTITIONER

## 2024-04-16 PROCEDURE — 99203 OFFICE O/P NEW LOW 30 MIN: CPT | Performed by: NURSE PRACTITIONER

## 2024-04-16 NOTE — PROGRESS NOTES
Esther Schmidt (:  1935) is a 89 y.o. female,New patient, here for evaluation of the following chief complaint(s):  New Patient            SUBJECTIVE/OBJECTIVE:    Esther has a history of poor venous access.  It is currently flushing but not drawing blood.  It was put in [place].  She had cancer.  This is in remission.  However, she has a potential back surgery coming up.  She wants to be able to use her port.  .  She has had this for 1 - 5 years..  We have been asked to do a port study for this reason.      I have personally reviewed the following: problem list, current meds, allergies, PMH, PSH, family hx, and social hx  Esther Schmidt is a 89 y.o. female with the following history as recorded in [facility]:  Patient Active Problem List    Diagnosis Date Noted    TIA (transient ischemic attack) 2022    Chest pain 2022    Pain and swelling of left knee 2023    Ambulatory dysfunction 2023    Intractable pain 2023    Palliative care patient 2022    History of atrial fibrillation 2022    Iron deficiency anemia 2022    Type 2 diabetes mellitus 10/25/2021    Atrial fibrillation with RVR (HCC) 2021    History of esophageal stricture 2021    Depression 2021    Fungal dermatitis 2020    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 2020    Mixed simple and mucopurulent chronic bronchitis (HCC) 2020    Malignant neoplasm of colon (HCC) 2020    Hypochloremia 2019    CKD (chronic kidney disease) stage 3, GFR 30-59 ml/min (HCC) 2019    Acute deep vein thrombosis (DVT) of femoral vein of left lower extremity (HCC) 2019    Pain in both lower extremities     Numbness and tingling of both feet     Avascular necrosis (HCC) 2019    History of DVT (deep vein thrombosis) 2018    Port-A-Cath in place 2018    History of colon polyps 2016    Essential hypertension 2015

## 2024-04-17 RX ORDER — SODIUM CHLORIDE 9 MG/ML
INJECTION, SOLUTION INTRAVENOUS PRN
OUTPATIENT
Start: 2024-04-17

## 2024-04-17 RX ORDER — SODIUM CHLORIDE 0.9 % (FLUSH) 0.9 %
5-40 SYRINGE (ML) INJECTION PRN
OUTPATIENT
Start: 2024-04-17

## 2024-04-17 RX ORDER — SODIUM CHLORIDE 0.9 % (FLUSH) 0.9 %
5-40 SYRINGE (ML) INJECTION EVERY 12 HOURS SCHEDULED
OUTPATIENT
Start: 2024-04-17

## 2024-04-17 NOTE — H&P
Esther Schmidt (:  1935) is a 89 y.o. female,New patient, here for evaluation of the following chief complaint(s):  New Patient            SUBJECTIVE/OBJECTIVE:    Esther has a history of poor venous access.  It is currently flushing but not drawing blood.  It was put in [place].  She had cancer.  This is in remission.  However, she has a potential back surgery coming up.  She wants to be able to use her port.  .  She has had this for 1 - 5 years..  We have been asked to do a port study for this reason.      I have personally reviewed the following: problem list, current meds, allergies, PMH, PSH, family hx, and social hx  Esther Schmidt is a 89 y.o. female with the following history as recorded in [facility]:  Patient Active Problem List    Diagnosis Date Noted    TIA (transient ischemic attack) 2022    Chest pain 2022    Pain and swelling of left knee 2023    Ambulatory dysfunction 2023    Intractable pain 2023    Palliative care patient 2022    History of atrial fibrillation 2022    Iron deficiency anemia 2022    Type 2 diabetes mellitus 10/25/2021    Atrial fibrillation with RVR (HCC) 2021    History of esophageal stricture 2021    Depression 2021    Fungal dermatitis 2020    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 2020    Mixed simple and mucopurulent chronic bronchitis (HCC) 2020    Malignant neoplasm of colon (HCC) 2020    Hypochloremia 2019    CKD (chronic kidney disease) stage 3, GFR 30-59 ml/min (HCC) 2019    Acute deep vein thrombosis (DVT) of femoral vein of left lower extremity (HCC) 2019    Pain in both lower extremities     Numbness and tingling of both feet     Avascular necrosis (HCC) 2019    History of DVT (deep vein thrombosis) 2018    Port-A-Cath in place 2018    History of colon polyps 2016    Essential hypertension 2015

## 2024-04-17 NOTE — H&P (VIEW-ONLY)
Esther Schmidt (:  1935) is a 89 y.o. female,New patient, here for evaluation of the following chief complaint(s):  New Patient            SUBJECTIVE/OBJECTIVE:    Esther has a history of poor venous access.  It is currently flushing but not drawing blood.  It was put in [place].  She had cancer.  This is in remission.  However, she has a potential back surgery coming up.  She wants to be able to use her port.  .  She has had this for 1 - 5 years..  We have been asked to do a port study for this reason.      I have personally reviewed the following: problem list, current meds, allergies, PMH, PSH, family hx, and social hx  Esther Schmidt is a 89 y.o. female with the following history as recorded in [facility]:  Patient Active Problem List    Diagnosis Date Noted    TIA (transient ischemic attack) 2022    Chest pain 2022    Pain and swelling of left knee 2023    Ambulatory dysfunction 2023    Intractable pain 2023    Palliative care patient 2022    History of atrial fibrillation 2022    Iron deficiency anemia 2022    Type 2 diabetes mellitus 10/25/2021    Atrial fibrillation with RVR (HCC) 2021    History of esophageal stricture 2021    Depression 2021    Fungal dermatitis 2020    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 2020    Mixed simple and mucopurulent chronic bronchitis (HCC) 2020    Malignant neoplasm of colon (HCC) 2020    Hypochloremia 2019    CKD (chronic kidney disease) stage 3, GFR 30-59 ml/min (HCC) 2019    Acute deep vein thrombosis (DVT) of femoral vein of left lower extremity (HCC) 2019    Pain in both lower extremities     Numbness and tingling of both feet     Avascular necrosis (HCC) 2019    History of DVT (deep vein thrombosis) 2018    Port-A-Cath in place 2018    History of colon polyps 2016    Essential hypertension 2015

## 2024-04-23 ENCOUNTER — HOSPITAL ENCOUNTER (OUTPATIENT)
Dept: INTERVENTIONAL RADIOLOGY/VASCULAR | Age: 89
Discharge: HOME | End: 2024-04-23
Payer: MEDICARE

## 2024-04-23 VITALS — DIASTOLIC BLOOD PRESSURE: 60 MMHG | SYSTOLIC BLOOD PRESSURE: 159 MMHG | OXYGEN SATURATION: 92 %

## 2024-04-23 DIAGNOSIS — C80.1 CANCER (HCC): ICD-10-CM

## 2024-04-23 PROCEDURE — 2709999900 IR CONTRAST INJECTION  EXISTING CV ACCESS DEVICE EVALUATION W FLUORO

## 2024-04-23 PROCEDURE — 36598 INJ W/FLUOR EVAL CV DEVICE: CPT | Performed by: SURGERY

## 2024-04-23 PROCEDURE — 6360000004 HC RX CONTRAST MEDICATION: Performed by: SURGERY

## 2024-04-23 PROCEDURE — 36598 INJ W/FLUOR EVAL CV DEVICE: CPT

## 2024-04-23 RX ORDER — IODIXANOL 320 MG/ML
INJECTION, SOLUTION INTRAVASCULAR PRN
Status: COMPLETED | OUTPATIENT
Start: 2024-04-23 | End: 2024-04-23

## 2024-04-23 RX ORDER — SODIUM CHLORIDE 9 MG/ML
INJECTION, SOLUTION INTRAVENOUS PRN
Status: DISCONTINUED | OUTPATIENT
Start: 2024-04-23 | End: 2024-04-25 | Stop reason: HOSPADM

## 2024-04-23 RX ORDER — SODIUM CHLORIDE 0.9 % (FLUSH) 0.9 %
5-40 SYRINGE (ML) INJECTION PRN
Status: DISCONTINUED | OUTPATIENT
Start: 2024-04-23 | End: 2024-04-25 | Stop reason: HOSPADM

## 2024-04-23 RX ORDER — SODIUM CHLORIDE 0.9 % (FLUSH) 0.9 %
5-40 SYRINGE (ML) INJECTION EVERY 12 HOURS SCHEDULED
Status: DISCONTINUED | OUTPATIENT
Start: 2024-04-23 | End: 2024-04-25 | Stop reason: HOSPADM

## 2024-04-23 RX ADMIN — IODIXANOL 5 ML: 320 INJECTION, SOLUTION INTRAVASCULAR at 14:05

## 2024-04-23 NOTE — INTERVAL H&P NOTE
Update History & Physical    The patient's History and Physical of April 17, 2024 was reviewed with the patient and I examined the patient. There was no change. The surgical site was confirmed by the patient and me.     Plan: The risks, benefits, expected outcome, and alternative to the recommended procedure have been discussed with the patient. Patient understands and wants to proceed with the procedure.     Electronically signed by Mey Manzo DO on 4/23/2024 at 1:57 PM

## 2024-04-23 NOTE — OP NOTE
Patient Name: Esther Schmidt   YOB: 1935   MRN: [id number]     Procedure Date: 4/23/2024     Pre Op Diagnosis: port malfunction    Post Op Diagnosis: same    Procedure: port study    Anesthesia:  none    Estimated Blood Loss: Minimal    Complications: None    Implants: none    Procedure Details: Patient was brought to the angiogram suite and placed in supine position.  The port site was already accessed in the preop area.  Fluoroscopy was performed looking at the port and that followed with the contrast injection.  Findings mentioned below.  Patient tip of tubing shows to be at the wall of the distal SVC and therefore will not be able to be used for blood draws.  It is okay to use for injections.  Patient tolerated procedure well and was transferred back to cath holding.  Patient was explained that the port is okay to use for IV injections and not probably okay to get blood return.  She has not used port for chemotherapy since 13 years ago.  Patient will think if she needs port replacement because she is planning or considering neurosurgical procedure.    Findings: Port tubing intact with tip in the distal SVC.  Course injection showed good flow in the port without evidence of clot with good flow through the SVC.  Tubing tip pointed to the wall of the distal SVC.    Disposition: taken to the recovery room     Mey Manzo DO  4/23/2024 2:07 PM

## 2024-04-30 ENCOUNTER — PREP FOR PROCEDURE (OUTPATIENT)
Dept: NEUROSURGERY | Age: 89
End: 2024-04-30

## 2024-04-30 ENCOUNTER — OFFICE VISIT (OUTPATIENT)
Dept: NEUROSURGERY | Age: 89
End: 2024-04-30
Payer: MEDICARE

## 2024-04-30 VITALS
DIASTOLIC BLOOD PRESSURE: 72 MMHG | HEIGHT: 63 IN | WEIGHT: 128.09 LBS | SYSTOLIC BLOOD PRESSURE: 110 MMHG | BODY MASS INDEX: 22.7 KG/M2 | HEART RATE: 77 BPM

## 2024-04-30 DIAGNOSIS — Z01.818 PRE-OP EXAM: Primary | ICD-10-CM

## 2024-04-30 DIAGNOSIS — M96.1 POSTLAMINECTOMY SYNDROME, THORACIC REGION: ICD-10-CM

## 2024-04-30 DIAGNOSIS — M96.1 FAILED BACK SYNDROME: Primary | ICD-10-CM

## 2024-04-30 PROCEDURE — 1123F ACP DISCUSS/DSCN MKR DOCD: CPT | Performed by: NEUROLOGICAL SURGERY

## 2024-04-30 PROCEDURE — 99215 OFFICE O/P EST HI 40 MIN: CPT | Performed by: NEUROLOGICAL SURGERY

## 2024-04-30 ASSESSMENT — ENCOUNTER SYMPTOMS
EYES NEGATIVE: 1
BACK PAIN: 1
GASTROINTESTINAL NEGATIVE: 1
RESPIRATORY NEGATIVE: 1

## 2024-04-30 NOTE — PROGRESS NOTES
[facility] Neurosurgery  Office Visit        Chief Complaint   Patient presents with    Follow-up     Patient states that her back pain is the same as last visit.     Results     MRI L 4/11/24       HISTORY OF PRESENT ILLNESS:      Interval Update:    Planned follow-up for MRI review.  Her complaints are unchanged.  She continues to complain of severe back and leg pain.      HPI:    The patient is a 89 y.o. female who presents as a referral from Dr. Nugent in pain management to discuss implantation of a [employer] spinal cord stimulator.  She has a longstanding history of back problems with previous kyphoplasty and L4/5 minimally-invasive laminectomy by Dr. Rodriguez in 2020 and 2021.  She continues to complain of severe pain in her left buttock and lateral thigh.  She also has numbness in her feet from neuropathy.  She is diabetic and her most recent HgbA1c was 5.4%.  She is followed by Dr. Nugent in pain management and has received multiple injections that no longer relieve her pain.  She undertook a trial of spinal cord stimulation in 9/2023 using the [employer] system and reports that she has ~80% improvement in her back and right leg pain during the trial.  Unfortunately, she had a fall and injured he right knee afterwards with a slow-healing wound and she has only recently stopped seeing wound care.  Her pain has returned to baseline since the completion of her trial.         MEDICAL HISTORY:             Past Medical History:   Diagnosis Date    Afib (HCC) 2021    Bronchitis     Colon cancer (HCC)     Colon polyps     Constipation     Deep vein blood clot of left lower extremity (HCC)     Left leg     Depression     DVT (deep venous thrombosis) (HCC)     Dysphagia     Fibrocystic breast disease     Fibromyalgia     GERD (gastroesophageal reflux disease)     reflux with hx of esophageal stricture    Headache(784.0)     History of colon cancer 2000    Hormone replacement therapy (postmenopausal)

## 2024-05-03 NOTE — TELEPHONE ENCOUNTER
----- Message from Cecile Sierra sent at 1/25/2022  1:35 PM CST -----  Subject: Message to Provider    QUESTIONS  Information for Provider? Nusrat Mooney wanted to provide her vital signs? Temp ?   101.5, BP? 142/60, Oxy? 97%, Heartrate? 63 She will have homecare once   weekly  ---------------------------------------------------------------------------  --------------  CALL BACK INFO  What is the best way for the office to contact you? OK to leave message on   voicemail  Preferred Call Back Phone Number? [phone]  ---------------------------------------------------------------------------  --------------  SCRIPT ANSWERS  Relationship to Patient? Third Party  Representative Name?  REINALDO JARRETT [facility] No

## 2024-05-15 ENCOUNTER — OFFICE VISIT (OUTPATIENT)
Dept: PRIMARY CARE CLINIC | Age: 89
End: 2024-05-15
Payer: MEDICARE

## 2024-05-15 VITALS
SYSTOLIC BLOOD PRESSURE: 130 MMHG | HEART RATE: 68 BPM | BODY MASS INDEX: 23.66 KG/M2 | DIASTOLIC BLOOD PRESSURE: 56 MMHG | TEMPERATURE: 98.3 F | OXYGEN SATURATION: 97 % | WEIGHT: 128.6 LBS | HEIGHT: 62 IN | RESPIRATION RATE: 16 BRPM

## 2024-05-15 DIAGNOSIS — T82.529D: Primary | ICD-10-CM

## 2024-05-15 DIAGNOSIS — M54.42 CHRONIC BILATERAL LOW BACK PAIN WITH BILATERAL SCIATICA: ICD-10-CM

## 2024-05-15 DIAGNOSIS — G89.29 CHRONIC BILATERAL LOW BACK PAIN WITH BILATERAL SCIATICA: ICD-10-CM

## 2024-05-15 DIAGNOSIS — N18.32 STAGE 3B CHRONIC KIDNEY DISEASE (HCC): ICD-10-CM

## 2024-05-15 DIAGNOSIS — M54.41 CHRONIC BILATERAL LOW BACK PAIN WITH BILATERAL SCIATICA: ICD-10-CM

## 2024-05-15 PROBLEM — I82.412 ACUTE DEEP VEIN THROMBOSIS (DVT) OF FEMORAL VEIN OF LEFT LOWER EXTREMITY (HCC): Status: RESOLVED | Noted: 2019-06-12 | Resolved: 2024-05-15

## 2024-05-15 PROCEDURE — 1123F ACP DISCUSS/DSCN MKR DOCD: CPT | Performed by: NURSE PRACTITIONER

## 2024-05-15 PROCEDURE — 99214 OFFICE O/P EST MOD 30 MIN: CPT | Performed by: NURSE PRACTITIONER

## 2024-05-15 ASSESSMENT — ENCOUNTER SYMPTOMS
GASTROINTESTINAL NEGATIVE: 1
BACK PAIN: 1
RESPIRATORY NEGATIVE: 1
EYES NEGATIVE: 1

## 2024-05-15 NOTE — PROGRESS NOTES
[facility]  [address] [id number] [facility] [place] [address]  Dept: [phone]  Dept Fax: [phone]  Loc: [phone]    Esther Schmidt is a 89 y.o. female who presents today for her medical conditions/complaints as noted below.  Esther Schmidt is c/o of Follow-up (Pt here for a check up, she implanted the nerve stimulator as a trial and it worked and she did well, She will have permenant one put in 6/13/24. They cannot use her port they can inject but not draw out of it it sticks on the wall of her vein)        HPI:     HPI   Chief Complaint   Patient presents with    Follow-up     Pt here for a check up, she implanted the nerve stimulator as a trial and it worked and she did well, She will have permenant one put in 6/13/24. They cannot use her port they can inject but not draw out of it it sticks on the wall of her vein   Had sent her for a port study below is the findings.  She did have her mri results are spinal stenosis L4-5  She has not fallen any more.  She is using a cane today but uses a walker when she is at home.    She did have her MRIs and is scheduled to have the nerve stimulator placed in June.  Procedure Details: Patient was brought to the angiogram suite and placed in supine position.  The port site was already accessed in the preop area.  Fluoroscopy was performed looking at the port and that followed with the contrast injection.  Findings mentioned below.  Patient tip of tubing shows to be at the wall of the distal SVC and therefore will not be able to be used for blood draws.  It is okay to use for injections.  Patient tolerated procedure well and was transferred back to cath holding.  Patient was explained that the port is okay to use for IV injections and not probably okay to get blood return.  She has not used port for chemotherapy since 13 years ago.  Patient will think if she needs port replacement because she is planning or considering neurosurgical

## 2024-05-29 ENCOUNTER — HOSPITAL ENCOUNTER (OUTPATIENT)
Dept: PREADMISSION TESTING | Age: 89
Discharge: HOME | End: 2024-06-02
Payer: MEDICARE

## 2024-05-29 ENCOUNTER — HOSPITAL ENCOUNTER (OUTPATIENT)
Dept: GENERAL RADIOLOGY | Age: 89
Discharge: HOME | End: 2024-05-29
Payer: MEDICARE

## 2024-05-29 ENCOUNTER — TELEPHONE (OUTPATIENT)
Dept: NEUROSURGERY | Age: 89
End: 2024-05-29

## 2024-05-29 VITALS — WEIGHT: 123 LBS | BODY MASS INDEX: 22.63 KG/M2 | HEIGHT: 62 IN

## 2024-05-29 DIAGNOSIS — Z01.818 PRE-OP EXAM: ICD-10-CM

## 2024-05-29 LAB
ABO/RH: NORMAL
ALBUMIN SERPL-MCNC: 4.3 G/DL (ref 3.5–5.2)
ALP SERPL-CCNC: 125 U/L (ref 35–104)
ALT SERPL-CCNC: 9 U/L (ref 5–33)
ANION GAP SERPL CALCULATED.3IONS-SCNC: 15 MMOL/L (ref 7–19)
ANTIBODY SCREEN: NORMAL
AST SERPL-CCNC: 19 U/L (ref 5–32)
BACTERIA URNS QL MICRO: NEGATIVE /HPF
BASOPHILS # BLD: 0.1 K/UL (ref 0–0.2)
BASOPHILS NFR BLD: 1.2 % (ref 0–1)
BILIRUB SERPL-MCNC: 0.4 MG/DL (ref 0.2–1.2)
BILIRUB UR QL STRIP: NEGATIVE
BUN SERPL-MCNC: 17 MG/DL (ref 8–23)
CALCIUM SERPL-MCNC: 9.2 MG/DL (ref 8.8–10.2)
CHLORIDE SERPL-SCNC: 99 MMOL/L (ref 98–111)
CLARITY UR: CLEAR
CO2 SERPL-SCNC: 24 MMOL/L (ref 22–29)
COLOR UR: YELLOW
CREAT SERPL-MCNC: 0.6 MG/DL (ref 0.5–0.9)
CRYSTALS URNS MICRO: ABNORMAL /HPF
EOSINOPHIL # BLD: 0.6 K/UL (ref 0–0.6)
EOSINOPHIL NFR BLD: 8.6 % (ref 0–5)
EPI CELLS #/AREA URNS AUTO: 1 /HPF (ref 0–5)
ERYTHROCYTE [DISTWIDTH] IN BLOOD BY AUTOMATED COUNT: 14 % (ref 11.5–14.5)
GLUCOSE SERPL-MCNC: 79 MG/DL (ref 74–109)
GLUCOSE UR STRIP.AUTO-MCNC: NEGATIVE MG/DL
HCT VFR BLD AUTO: 45.9 % (ref 37–47)
HGB BLD-MCNC: 14.3 G/DL (ref 12–16)
HGB UR STRIP.AUTO-MCNC: NEGATIVE MG/L
HYALINE CASTS #/AREA URNS AUTO: 0 /HPF (ref 0–8)
IMM GRANULOCYTES # BLD: 0 K/UL
KETONES UR STRIP.AUTO-MCNC: NEGATIVE MG/DL
LEUKOCYTE ESTERASE UR QL STRIP.AUTO: ABNORMAL
LYMPHOCYTES # BLD: 1.7 K/UL (ref 1.1–4.5)
LYMPHOCYTES NFR BLD: 25.7 % (ref 20–40)
MCH RBC QN AUTO: 28.3 PG (ref 27–31)
MCHC RBC AUTO-ENTMCNC: 31.2 G/DL (ref 33–37)
MCV RBC AUTO: 90.7 FL (ref 81–99)
MONOCYTES # BLD: 0.6 K/UL (ref 0–0.9)
MONOCYTES NFR BLD: 8.6 % (ref 0–10)
NEUTROPHILS # BLD: 3.6 K/UL (ref 1.5–7.5)
NEUTS SEG NFR BLD: 55.7 % (ref 50–65)
NITRITE UR QL STRIP.AUTO: NEGATIVE
PH UR STRIP.AUTO: 6.5 [PH] (ref 5–8)
PLATELET # BLD AUTO: 218 K/UL (ref 130–400)
PLATELET SLIDE REVIEW: ABNORMAL
PMV BLD AUTO: 11.2 FL (ref 9.4–12.3)
POTASSIUM SERPL-SCNC: 4.8 MMOL/L (ref 3.5–5)
PROT SERPL-MCNC: 7.1 G/DL (ref 6.6–8.7)
PROT UR STRIP.AUTO-MCNC: NEGATIVE MG/DL
RBC # BLD AUTO: 5.06 M/UL (ref 4.2–5.4)
RBC #/AREA URNS AUTO: 1 /HPF (ref 0–4)
SODIUM SERPL-SCNC: 138 MMOL/L (ref 136–145)
SP GR UR STRIP.AUTO: 1.01 (ref 1–1.03)
UROBILINOGEN UR STRIP.AUTO-MCNC: 1 E.U./DL
WBC # BLD AUTO: 6.5 K/UL (ref 4.8–10.8)
WBC #/AREA URNS AUTO: 6 /HPF (ref 0–5)

## 2024-05-29 PROCEDURE — 86900 BLOOD TYPING SEROLOGIC ABO: CPT

## 2024-05-29 PROCEDURE — 86850 RBC ANTIBODY SCREEN: CPT

## 2024-05-29 PROCEDURE — 86901 BLOOD TYPING SEROLOGIC RH(D): CPT

## 2024-05-29 PROCEDURE — 81001 URINALYSIS AUTO W/SCOPE: CPT

## 2024-05-29 PROCEDURE — 71046 X-RAY EXAM CHEST 2 VIEWS: CPT

## 2024-05-29 PROCEDURE — 80053 COMPREHEN METABOLIC PANEL: CPT

## 2024-05-29 PROCEDURE — 85025 COMPLETE CBC W/AUTO DIFF WBC: CPT

## 2024-05-29 PROCEDURE — 93005 ELECTROCARDIOGRAM TRACING: CPT

## 2024-05-29 RX ORDER — CIPROFLOXACIN 500 MG/1
500 TABLET, FILM COATED ORAL 2 TIMES DAILY
Qty: 14 TABLET | Refills: 0 | Status: SHIPPED | OUTPATIENT
Start: 2024-05-29 | End: 2024-06-05

## 2024-05-29 NOTE — TELEPHONE ENCOUNTER
----- Message from EARLE Childs sent at 5/29/2024 12:45 PM CDT -----  She has a UTI.  She is scheduled for spinal cord stimulator implantation.  I am going to send in antibiotics for her she needs to start those today.  Depending on the culture and sensitivity her antibiotic may need to be changed in the next day or 2.  I will leave this up to Dr. Licea if he would like to repeat prior to proceeding with surgery.

## 2024-05-29 NOTE — TELEPHONE ENCOUNTER
Called patient in regards to results. Let her know medication has been sent in and to start it today. Patient thanked me and voiced understanding.

## 2024-05-29 NOTE — DISCHARGE INSTRUCTIONS
The day before surgery you will receive a phone call from the surgery nurse to let you know what time to arrive on the day of surgery. This call will usually be between 2-4 PM. If you do not receive a phone call by 4 PM the day before your surgery please call [phone] and let them know you have not received an arrival time. If your surgery is on Monday, your call will be on the Friday before your Monday surgery. Please check your voicemail as they may leave a message with that information.           The morning of surgery, you may take all your prescribed medications with a sip of water unless instructed not to take.  Any exceptions to this would be listed below:  DO NOT TAKE YOUR METFORMIN THE MORNING OF SURGERY.      Always follow your surgeon or providers instructions on taking blood thinners.             PREOPERATIVE GUIDELINES WHEN RECEIVING ANESTHESIA    Do not eat or drink anything after midnight, the night before your surgery. No gum or candy the morning of surgery.  This is extremely important for your safety.    Take a bath (or shower) the night before your surgery and you may brush your teeth the morning of your surgery.    You will be scheduled to arrive at the hospital 2 hours before your surgery, or follow your surgeon's instructions.    Dress comfortably.  Wear loose clothing that will be easy to remove and comfortable for your trip home.    You may wear eyeglasses but bring your cases with you as they must be remove before your surgery. If you wear contacts they will have to be removed before your surgery.    Hearing aids and dentures will need to be removed before your surgery. If you wear dentures, do not glue them in the morning of surgery.     Do not wear any jewelry, including body jewelry.  All jewelry will need to be removed prior to your surgery. This includes wedding rings. Any metal touching your body can cause a burn or may have to be cut off due to swelling or injury.    Do not wear

## 2024-05-30 LAB
EKG P AXIS: 42 DEGREES
EKG P-R INTERVAL: 180 MS
EKG Q-T INTERVAL: 396 MS
EKG QRS DURATION: 82 MS
EKG QTC CALCULATION (BAZETT): 401 MS
EKG T AXIS: 54 DEGREES

## 2024-05-30 PROCEDURE — 93010 ELECTROCARDIOGRAM REPORT: CPT | Performed by: INTERNAL MEDICINE

## 2024-05-30 RX ORDER — OMEPRAZOLE 40 MG/1
40 CAPSULE, DELAYED RELEASE ORAL DAILY
Qty: 90 CAPSULE | Refills: 1 | Status: SHIPPED | OUTPATIENT
Start: 2024-05-30

## 2024-05-31 NOTE — PROGRESS NOTES
Dr. Elder reviewed patient's EKG done in [facility] and stated patient is ok to proceed with anesthesia for surgery.

## 2024-06-03 ENCOUNTER — TELEPHONE (OUTPATIENT)
Dept: CARDIOLOGY CLINIC | Age: 89
End: 2024-06-03

## 2024-06-03 NOTE — TELEPHONE ENCOUNTER
Дмитрий Hicks,  Yuly to hold ASA for procedure as requested from a cardiac standpoint.  Thanks Janet

## 2024-06-03 NOTE — TELEPHONE ENCOUNTER
Date: 6/13/24    Cardiologist: Chetan    Procedure: Thoracic Laminectomy for Spinal Cord Stimulator Placement    Surgeon: Vinayak    Last Office Visit: 2/6/24  Reason for office visit and medical concerns addressed at this office visit: tia, h/o dvt, afib, ckd, htn, hyperlipidemia, dm,     Testing Performed and Date of Service:  9/7/22 Britany 1. Ejection fraction 78%  2. Wall motion study unremarkable  3. Myocardial perfusion imaging demonstrated homogeneous uptake of the  tracer in all visualized segments no areas of ischemia or infarction  are identified.  Summary impressions:  Normal ejection fraction normal perfusion study without evidence of  ischemia or prior infarction.    RCRI = 6.6   METs 4    Current Medications: lyrica, oxybutynin, omeprazole, metformin, norco, cymbalta, cipro, aspirin, amlodipine, acyclovir    Is the patient currently taking an anticoagulant? If so, what is the diagnosis the patient has been given to warrant the need for the anticoagulant? aspirin    Additional Notes: requesting cardiac clearance and to hold aspirin

## 2024-06-10 ENCOUNTER — TELEPHONE (OUTPATIENT)
Dept: NEUROSURGERY | Age: 89
End: 2024-06-10

## 2024-06-12 ENCOUNTER — ANESTHESIA EVENT (OUTPATIENT)
Dept: OPERATING ROOM | Age: 89
End: 2024-06-12
Payer: MEDICARE

## 2024-06-13 ENCOUNTER — APPOINTMENT (OUTPATIENT)
Dept: GENERAL RADIOLOGY | Age: 89
End: 2024-06-13
Attending: NEUROLOGICAL SURGERY
Payer: MEDICARE

## 2024-06-13 ENCOUNTER — ANESTHESIA (OUTPATIENT)
Dept: OPERATING ROOM | Age: 89
End: 2024-06-13
Payer: MEDICARE

## 2024-06-13 ENCOUNTER — HOSPITAL ENCOUNTER (OUTPATIENT)
Age: 89
Setting detail: OUTPATIENT SURGERY
Discharge: HOME | End: 2024-06-13
Attending: NEUROLOGICAL SURGERY | Admitting: NEUROLOGICAL SURGERY
Payer: MEDICARE

## 2024-06-13 VITALS
RESPIRATION RATE: 14 BRPM | DIASTOLIC BLOOD PRESSURE: 50 MMHG | HEART RATE: 69 BPM | OXYGEN SATURATION: 98 % | WEIGHT: 125 LBS | SYSTOLIC BLOOD PRESSURE: 147 MMHG | HEIGHT: 63 IN | TEMPERATURE: 96 F | BODY MASS INDEX: 22.15 KG/M2

## 2024-06-13 DIAGNOSIS — M96.1 POSTLAMINECTOMY SYNDROME, THORACIC REGION: Primary | ICD-10-CM

## 2024-06-13 LAB
APTT PPP: 26.8 SEC (ref 26–36.2)
INR PPP: 1.01 (ref 0.88–1.18)
PROTHROMBIN TIME: 13 SEC (ref 12–14.6)

## 2024-06-13 PROCEDURE — 3600000014 HC SURGERY LEVEL 4 ADDTL 15MIN: Performed by: NEUROLOGICAL SURGERY

## 2024-06-13 PROCEDURE — 7100000000 HC PACU RECOVERY - FIRST 15 MIN: Performed by: NEUROLOGICAL SURGERY

## 2024-06-13 PROCEDURE — 2720000010 HC SURG SUPPLY STERILE: Performed by: NEUROLOGICAL SURGERY

## 2024-06-13 PROCEDURE — 3700000001 HC ADD 15 MINUTES (ANESTHESIA): Performed by: NEUROLOGICAL SURGERY

## 2024-06-13 PROCEDURE — 2709999900 HC NON-CHARGEABLE SUPPLY: Performed by: NEUROLOGICAL SURGERY

## 2024-06-13 PROCEDURE — 85730 THROMBOPLASTIN TIME PARTIAL: CPT

## 2024-06-13 PROCEDURE — 2580000003 HC RX 258: Performed by: ANESTHESIOLOGY

## 2024-06-13 PROCEDURE — 63655 IMPLANT NEUROELECTRODES: CPT | Performed by: NEUROLOGICAL SURGERY

## 2024-06-13 PROCEDURE — A4217 STERILE WATER/SALINE, 500 ML: HCPCS | Performed by: NEUROLOGICAL SURGERY

## 2024-06-13 PROCEDURE — C1820 GENERATOR NEURO RECHG BAT SY: HCPCS | Performed by: NEUROLOGICAL SURGERY

## 2024-06-13 PROCEDURE — 2580000003 HC RX 258: Performed by: NEUROLOGICAL SURGERY

## 2024-06-13 PROCEDURE — 63685 INS/RPLC SPI NPG/RCVR POCKET: CPT | Performed by: NEUROLOGICAL SURGERY

## 2024-06-13 PROCEDURE — 7100000010 HC PHASE II RECOVERY - FIRST 15 MIN: Performed by: NEUROLOGICAL SURGERY

## 2024-06-13 PROCEDURE — 6360000002 HC RX W HCPCS: Performed by: NURSE ANESTHETIST, CERTIFIED REGISTERED

## 2024-06-13 PROCEDURE — 3600000004 HC SURGERY LEVEL 4 BASE: Performed by: NEUROLOGICAL SURGERY

## 2024-06-13 PROCEDURE — 72070 X-RAY EXAM THORAC SPINE 2VWS: CPT

## 2024-06-13 PROCEDURE — 36415 COLL VENOUS BLD VENIPUNCTURE: CPT

## 2024-06-13 PROCEDURE — 2500000003 HC RX 250 WO HCPCS: Performed by: NEUROLOGICAL SURGERY

## 2024-06-13 PROCEDURE — 7100000001 HC PACU RECOVERY - ADDTL 15 MIN: Performed by: NEUROLOGICAL SURGERY

## 2024-06-13 PROCEDURE — C1787 PATIENT PROGR, NEUROSTIM: HCPCS | Performed by: NEUROLOGICAL SURGERY

## 2024-06-13 PROCEDURE — 3700000000 HC ANESTHESIA ATTENDED CARE: Performed by: NEUROLOGICAL SURGERY

## 2024-06-13 PROCEDURE — C1778 LEAD, NEUROSTIMULATOR: HCPCS | Performed by: NEUROLOGICAL SURGERY

## 2024-06-13 PROCEDURE — 2500000003 HC RX 250 WO HCPCS: Performed by: NURSE ANESTHETIST, CERTIFIED REGISTERED

## 2024-06-13 PROCEDURE — 85610 PROTHROMBIN TIME: CPT

## 2024-06-13 PROCEDURE — 7100000011 HC PHASE II RECOVERY - ADDTL 15 MIN: Performed by: NEUROLOGICAL SURGERY

## 2024-06-13 DEVICE — 50CM 4X8 SURGICAL LEAD KIT
Type: IMPLANTABLE DEVICE | Site: SPINE THORACIC | Status: FUNCTIONAL
Brand: COVEREDGE™ 32

## 2024-06-13 DEVICE — IMPLANTABLE PULSE GENERATOR KIT
Type: IMPLANTABLE DEVICE | Site: SPINE THORACIC | Status: FUNCTIONAL
Brand: WAVEWRITER ALPHA™ PRIME

## 2024-06-13 RX ORDER — ONDANSETRON 2 MG/ML
INJECTION INTRAMUSCULAR; INTRAVENOUS PRN
Status: DISCONTINUED | OUTPATIENT
Start: 2024-06-13 | End: 2024-06-13 | Stop reason: SDUPTHER

## 2024-06-13 RX ORDER — SUCCINYLCHOLINE/SOD CL,ISO/PF 100 MG/5ML
SYRINGE (ML) INTRAVENOUS PRN
Status: DISCONTINUED | OUTPATIENT
Start: 2024-06-13 | End: 2024-06-13 | Stop reason: SDUPTHER

## 2024-06-13 RX ORDER — ASPIRIN 81 MG/1
81 TABLET ORAL EVERY OTHER DAY
Qty: 1 TABLET | Refills: 0
Start: 2024-06-16

## 2024-06-13 RX ORDER — SODIUM CHLORIDE 0.9 % (FLUSH) 0.9 %
5-40 SYRINGE (ML) INJECTION PRN
Status: DISCONTINUED | OUTPATIENT
Start: 2024-06-13 | End: 2024-06-13 | Stop reason: HOSPADM

## 2024-06-13 RX ORDER — FENTANYL CITRATE 50 UG/ML
INJECTION, SOLUTION INTRAMUSCULAR; INTRAVENOUS PRN
Status: DISCONTINUED | OUTPATIENT
Start: 2024-06-13 | End: 2024-06-13 | Stop reason: SDUPTHER

## 2024-06-13 RX ORDER — LIDOCAINE HYDROCHLORIDE 10 MG/ML
INJECTION, SOLUTION INFILTRATION; PERINEURAL PRN
Status: DISCONTINUED | OUTPATIENT
Start: 2024-06-13 | End: 2024-06-13 | Stop reason: SDUPTHER

## 2024-06-13 RX ORDER — DEXAMETHASONE SODIUM PHOSPHATE 10 MG/ML
INJECTION, SOLUTION INTRAMUSCULAR; INTRAVENOUS PRN
Status: DISCONTINUED | OUTPATIENT
Start: 2024-06-13 | End: 2024-06-13 | Stop reason: SDUPTHER

## 2024-06-13 RX ORDER — MORPHINE SULFATE 2 MG/ML
2 INJECTION, SOLUTION INTRAMUSCULAR; INTRAVENOUS EVERY 5 MIN PRN
Status: DISCONTINUED | OUTPATIENT
Start: 2024-06-13 | End: 2024-06-13 | Stop reason: HOSPADM

## 2024-06-13 RX ORDER — PROPOFOL 10 MG/ML
INJECTION, EMULSION INTRAVENOUS CONTINUOUS PRN
Status: DISCONTINUED | OUTPATIENT
Start: 2024-06-13 | End: 2024-06-13 | Stop reason: SDUPTHER

## 2024-06-13 RX ORDER — NALOXONE HYDROCHLORIDE 0.4 MG/ML
INJECTION, SOLUTION INTRAMUSCULAR; INTRAVENOUS; SUBCUTANEOUS PRN
Status: DISCONTINUED | OUTPATIENT
Start: 2024-06-13 | End: 2024-06-13 | Stop reason: HOSPADM

## 2024-06-13 RX ORDER — HYDROMORPHONE HYDROCHLORIDE 1 MG/ML
0.25 INJECTION, SOLUTION INTRAMUSCULAR; INTRAVENOUS; SUBCUTANEOUS EVERY 5 MIN PRN
Status: DISCONTINUED | OUTPATIENT
Start: 2024-06-13 | End: 2024-06-13 | Stop reason: HOSPADM

## 2024-06-13 RX ORDER — SODIUM CHLORIDE, SODIUM LACTATE, POTASSIUM CHLORIDE, CALCIUM CHLORIDE 600; 310; 30; 20 MG/100ML; MG/100ML; MG/100ML; MG/100ML
INJECTION, SOLUTION INTRAVENOUS CONTINUOUS
Status: DISCONTINUED | OUTPATIENT
Start: 2024-06-13 | End: 2024-06-13 | Stop reason: HOSPADM

## 2024-06-13 RX ORDER — SODIUM CHLORIDE 0.9 % (FLUSH) 0.9 %
5-40 SYRINGE (ML) INJECTION EVERY 12 HOURS SCHEDULED
Status: DISCONTINUED | OUTPATIENT
Start: 2024-06-13 | End: 2024-06-13 | Stop reason: HOSPADM

## 2024-06-13 RX ORDER — HYDROCODONE BITARTRATE AND ACETAMINOPHEN 10; 325 MG/1; MG/1
1-2 TABLET ORAL EVERY 8 HOURS PRN
Qty: 60 TABLET | Refills: 0 | Status: SHIPPED | OUTPATIENT
Start: 2024-06-13 | End: 2024-06-27

## 2024-06-13 RX ORDER — SODIUM CHLORIDE 9 MG/ML
INJECTION, SOLUTION INTRAVENOUS PRN
Status: DISCONTINUED | OUTPATIENT
Start: 2024-06-13 | End: 2024-06-13 | Stop reason: HOSPADM

## 2024-06-13 RX ORDER — ONDANSETRON 2 MG/ML
4 INJECTION INTRAMUSCULAR; INTRAVENOUS
Status: DISCONTINUED | OUTPATIENT
Start: 2024-06-13 | End: 2024-06-13 | Stop reason: HOSPADM

## 2024-06-13 RX ADMIN — SODIUM CHLORIDE, SODIUM LACTATE, POTASSIUM CHLORIDE, AND CALCIUM CHLORIDE: 600; 310; 30; 20 INJECTION, SOLUTION INTRAVENOUS at 06:57

## 2024-06-13 RX ADMIN — SODIUM CHLORIDE, SODIUM LACTATE, POTASSIUM CHLORIDE, AND CALCIUM CHLORIDE: 600; 310; 30; 20 INJECTION, SOLUTION INTRAVENOUS at 09:35

## 2024-06-13 RX ADMIN — PHENYLEPHRINE HYDROCHLORIDE 100 MCG: 10 INJECTION INTRAVENOUS at 08:14

## 2024-06-13 RX ADMIN — Medication 200 MG: at 07:45

## 2024-06-13 RX ADMIN — FENTANYL CITRATE 50 MCG: 0.05 INJECTION, SOLUTION INTRAMUSCULAR; INTRAVENOUS at 08:53

## 2024-06-13 RX ADMIN — MORPHINE SULFATE 2 MG: 2 INJECTION, SOLUTION INTRAMUSCULAR; INTRAVENOUS at 10:29

## 2024-06-13 RX ADMIN — PROPOFOL 140 MCG/KG/MIN: 10 INJECTION, EMULSION INTRAVENOUS at 07:41

## 2024-06-13 RX ADMIN — DEXAMETHASONE SODIUM PHOSPHATE 10 MG: 10 INJECTION, SOLUTION INTRAMUSCULAR; INTRAVENOUS at 07:45

## 2024-06-13 RX ADMIN — LIDOCAINE HYDROCHLORIDE 50 MG: 10 INJECTION, SOLUTION INFILTRATION; PERINEURAL at 07:37

## 2024-06-13 RX ADMIN — HYDROMORPHONE HYDROCHLORIDE 0.2 MG: 1 INJECTION, SOLUTION INTRAMUSCULAR; INTRAVENOUS; SUBCUTANEOUS at 07:37

## 2024-06-13 RX ADMIN — ONDANSETRON 4 MG: 2 INJECTION INTRAMUSCULAR; INTRAVENOUS at 09:39

## 2024-06-13 RX ADMIN — FENTANYL CITRATE 50 MCG: 0.05 INJECTION, SOLUTION INTRAMUSCULAR; INTRAVENOUS at 08:18

## 2024-06-13 RX ADMIN — CEFAZOLIN 2 G: 10 INJECTION, POWDER, FOR SOLUTION INTRAVENOUS at 08:20

## 2024-06-13 RX ADMIN — HYDROMORPHONE HYDROCHLORIDE 0.6 MG: 1 INJECTION, SOLUTION INTRAMUSCULAR; INTRAVENOUS; SUBCUTANEOUS at 09:42

## 2024-06-13 RX ADMIN — HYDROMORPHONE HYDROCHLORIDE 0.2 MG: 1 INJECTION, SOLUTION INTRAMUSCULAR; INTRAVENOUS; SUBCUTANEOUS at 08:14

## 2024-06-13 RX ADMIN — PHENYLEPHRINE HYDROCHLORIDE 100 MCG: 10 INJECTION INTRAVENOUS at 07:58

## 2024-06-13 ASSESSMENT — PAIN - FUNCTIONAL ASSESSMENT
PAIN_FUNCTIONAL_ASSESSMENT: NONE - DENIES PAIN
PAIN_FUNCTIONAL_ASSESSMENT: 0-10
PAIN_FUNCTIONAL_ASSESSMENT: 0-10
PAIN_FUNCTIONAL_ASSESSMENT: ACTIVITIES ARE NOT PREVENTED

## 2024-06-13 ASSESSMENT — PAIN DESCRIPTION - DESCRIPTORS
DESCRIPTORS: ACHING;DISCOMFORT
DESCRIPTORS: ACHING;DISCOMFORT
DESCRIPTORS: ACHING
DESCRIPTORS: ACHING

## 2024-06-13 ASSESSMENT — PAIN SCALES - GENERAL
PAINLEVEL_OUTOF10: 3
PAINLEVEL_OUTOF10: 7

## 2024-06-13 ASSESSMENT — PAIN DESCRIPTION - ORIENTATION
ORIENTATION: LOWER
ORIENTATION: LOWER

## 2024-06-13 ASSESSMENT — PAIN DESCRIPTION - LOCATION
LOCATION: BACK
LOCATION: BACK

## 2024-06-13 ASSESSMENT — LIFESTYLE VARIABLES: SMOKING_STATUS: 0

## 2024-06-13 NOTE — ANESTHESIA POSTPROCEDURE EVALUATION
Department of Anesthesiology  Postprocedure Note    Patient: Esther Schmidt  MRN: [id number]  YOB: 1935  Date of evaluation: 6/13/2024    Procedure Summary       Date: 06/13/24 Room / Location: [address]    Anesthesia Start: 0740 Anesthesia Stop: 1002    Procedure: THORACIC LAMINECTOMY FOR SPINAL CORD STIMULATOR Diagnosis:       Postlaminectomy syndrome, thoracic region      (Postlaminectomy syndrome, thoracic region [M96.1])    Surgeons: Nayan Licea MD Responsible Provider: Jorge Harris APRN - CRNA    Anesthesia Type: general ASA Status: 3            Anesthesia Type: No value filed.    Abbie Phase I: Abbie Score: 10    Abbie Phase II:      Anesthesia Post Evaluation    Patient location during evaluation: PACU  Patient participation: waiting for patient participation  Level of consciousness: responsive to physical stimuli  Pain score: 0  Airway patency: patent  Nausea & Vomiting: no nausea and no vomiting  Cardiovascular status: hemodynamically stable  Respiratory status: spontaneous ventilation and nonlabored ventilation  Hydration status: stable  Multimodal analgesia pain management approach    No notable events documented.

## 2024-06-13 NOTE — DISCHARGE INSTRUCTIONS
Infection After Surgery: Care Instructions  Overview  After surgery, an infection is always possible. It doesn't mean that the surgery didn't go well.  Because an infection can be serious, your doctor has taken steps to manage it.  Your doctor checked the infection and cleaned it if necessary. Your doctor may have made an opening in the area so that the pus can drain out. You may have gauze in the cut so that the area will stay open and keep draining. You may need antibiotics.  You will need to follow up with your doctor to make sure the infection has gone away.  Follow-up care is a key part of your treatment and safety. Be sure to make and go to all appointments, and call your doctor if you are having problems. It's also a good idea to know your test results and keep a list of the medicines you take.  How can you care for yourself at home?  Make sure your surgeon knows about the infection, especially if you saw another doctor about your symptoms.  If your doctor prescribed antibiotics, take them as directed. Do not stop taking them just because you feel better. You need to take the full course of antibiotics.  Ask your doctor if you can take an over-the-counter pain medicine, such as acetaminophen (Tylenol), ibuprofen (Advil, Motrin), or naproxen (Aleve). Be safe with medicines. Read and follow all instructions on the label.  Do not take two or more pain medicines at the same time unless the doctor told you to. Many pain medicines have acetaminophen, which is Tylenol. Too much acetaminophen (Tylenol) can be harmful.  Prop up the area on a pillow anytime you sit or lie down during the next 3 days. Try to keep it above the level of your heart. This will help reduce swelling.  Keep the skin clean and dry.  You may have a bandage over the cut (incision). A bandage helps the incision heal and protects it. Your doctor will tell you how to take care of this. Keep it clean and dry. You may have drainage from the  wound.  If your doctor told you how to care for your incision, follow your doctor's instructions. If you did not get instructions, follow this general advice:  Wash around the incision with clean water 2 times a day. Don't use hydrogen peroxide or alcohol, which can slow healing.  When should you call for help?   Call your doctor now or seek immediate medical care if:    You have signs that your infection is getting worse, such as:  Increased pain, swelling, warmth, or redness in the area.  Red streaks leading from the area.  Pus draining from the wound.  A new or higher fever.   Watch closely for changes in your health, and be sure to contact your doctor if you have any problems.  Where can you learn more?  Go to https://www.[employer].net/patientEd and enter C340 to learn more about \"Infection After Surgery: Care Instructions.\"  Current as of: July 26, 2023  Content Version: 14.1  © 1512-6828 [employer].   Care instructions adapted under license by [employer]. If you have questions about a medical condition or this instruction, always ask your healthcare professional. [employer] disclaims any warranty or liability for your use of this information.

## 2024-06-13 NOTE — ANESTHESIA PRE PROCEDURE
I83.11, I83.12    Depression F32.A    History of esophageal stricture Z87.19    Atrial fibrillation with RVR (HCC) I48.91    Type 2 diabetes mellitus E11.9    Iron deficiency anemia D50.9    History of atrial fibrillation Z86.79    Palliative care patient Z51.5    Chest pain R07.9    TIA (transient ischemic attack) G45.9    Pain and swelling of left knee M25.562, M25.462    Ambulatory dysfunction R26.2    Intractable pain R52    Cancer (HCC) C80.1    Postlaminectomy syndrome, thoracic region M96.1       Past Medical History:        Diagnosis Date    Afib (HCC)     Bronchitis     Colon cancer (HCC)     Colon polyps     Constipation     Deep vein blood clot of left lower extremity (HCC)     Left leg     Depression     DVT (deep venous thrombosis) (HCC)     Dysphagia     Fibrocystic breast disease     Fibromyalgia     GERD (gastroesophageal reflux disease)     reflux with hx of esophageal stricture    Headache(784.0)     History of colon cancer     Hormone replacement therapy (postmenopausal)     Hypertension     Neuropathy of foot     In both feet    Osteoarthritis     left knee pain    Palliative care patient 2022    Restless legs syndrome     Type 2 diabetes mellitus 10/25/2021    Vitamin D deficiency        Past Surgical History:        Procedure Laterality Date    APPENDECTOMY      BREAST BIOPSY      CATARACT REMOVAL       SECTION      CHOLECYSTECTOMY      COLECTOMY  partial    COLON SURGERY      COLONOSCOPY  2010    COLONOSCOPY  2012    Dr Huerta: unremarkable enterocolonic anastamosis; hyperplastic polyps    COLONOSCOPY  2013    Montez: unremarkable post-surgical colon    COLONOSCOPY  2016    Dr Thorpe-right colon anastomosis, 5 yr recall    COLONOSCOPY N/A 2021    Dr YOLETTE Sheppard-Healthy and patent anastomosis in the right side-BCM, prn (age)    COLONOSCOPY  2021    Dr YOLETTE Sheppard-Healthy and patent anastomosis in the right side-BCM, prn (age)    FOOT

## 2024-06-13 NOTE — OP NOTE
NEUROSURGICAL OPERATIVE REPORT     ATTENDING SURGEON:  Nayan Licea MD, PhD     PREOPERATIVE DIAGNOSIS:  Intractable back pain.     POSTOPERATIVE DIAGNOSIS:  Intractable back pain.     PROCEDURE PERFORMED:  1.  Thoracic laminectomy for implantation of [employer] spinal cord stimulator and generator  2.  Intraoperative fluoroscopy with interpretation.  3.  Intraoperative neuromonitoring with SSEP and bilateral lower extremity EMG     ESTIMATED BLOOD LOSS:  <50 mL.     INDICATIONS:  Please see the medical record for full details.  Briefly, the patient is an 89 y.o. female with a longstanding history of chronic back pain and previous lumbar surgery.  They are followed by Pain Management and underwent a trial of dorsal column stimulation, which produced excellent pain relief.  They were referred for neurosurgical evaluation and deemed to be an appropriate candidate for permanent stimulator implant placement.  They requested that we proceed with the procedure after a full explanation of the risks, benefits, and potential complications.     PROCEDURE IN DETAIL:  The patient was identified in the preoperative area, consent for surgery was confirmed, and the site for surgery was marked.  They were transferred to the operating room by the Anesthesia team where general endotracheal anesthesia was induced without difficulty.  Electrodes for neuromonitoring were connected and the patient was then flipped into the prone position on an Axis table and all pressure points were carefully padded.  Baseline neuromonitoring SSEP waveforms were confirmed.  Fluoroscopy was used to localize the T11 level and a midline incision was planned.  A second incision was also planned just under the beltline in the right gluteal area.  The entire area was then prepped and draped in the usual aseptic fashion.  A team pause was held according to the preformatted script, all were in agreement, and the decision was made to proceed with

## 2024-06-13 NOTE — PROGRESS NOTES
Patient to room 4. She is alert and oriented with complaint of pain \"6.\" Pain located in mid and low back. Presurgical pain down right leg. Patient denies pain down her right leg. She was repositioned to her right side for comfort. Patient states that the pain in her back has improved. Now she has some pain lingering in her right side. VSS.  and friend at bedside.

## 2024-06-13 NOTE — DISCHARGE INSTR - ACTIVITY
Learning About Spinal Cord Stimulation  What is it?     Spinal cord stimulation is a treatment for chronic pain. It uses a mild electrical current. It's mostly used for low back pain, pain in the arms and legs, and pain in the trunk.  A small generator is placed in your body. It sends electrical pulses to a tiny electrode near your spinal cord. You may feel a tingle from the pulses. The pulses can help relieve pain.  Why is it done?  This treatment may be done for people with severe, chronic pain who have:  Had back surgery that didn't help their pain.  Pain from a nerve problem.  Pain that does not respond to other treatments. This includes complex regional pain syndrome.  Pain from severe peripheral vascular disease that the doctor feels cannot be treated with surgery.  If this treatment is right for you, you may have a spinal cord stimulator implanted for long-term use.  How is it done?  Spinal cord stimulation is done in two steps. Your doctor will first insert a temporary electrode through your skin. It will stay there for about a week. This first step is to see if the treatment will help your pain.  You and your doctor will test different stimulation settings and programs. Your doctor will ask you how you feel at different settings. Let your doctor know if you feel any discomfort.  You'll use a wireless remote control or other controller.  If the test works, you may get a permanent stimulator. The electrode is implanted in your spine. A lead wire runs from your spine to a small generator. It can be under the skin in your lower or upper back, buttock area, chest, or belly area.  You may get medicine that relaxes you or puts you in a light sleep. Some people may need to have general anesthesia. The areas being worked on will be numb.  After the stimulator is placed, your doctor will show you how to care for the areas where you had surgery.  What are the different types of spinal cord stimulators?  This  risks, such as the risk of bleeding, infection, or fluid buildup at the surgical site. Anesthesia also has some risks.  The stimulator device and wires can fail. The wires may also move, so the stimulator doesn't work as well as it should.  The tingling or warm feeling from the electrical current may bother you. You may need the device removed if it bothers you too much.  If you build up a tolerance to the stimulation, your doctor may need to change the amount of current or placement of the wires.  Your pain may come back, and the device may no longer work for you.  Where can you learn more?  Go to https://www.[employer].net/patientEd and enter S115 to learn more about \"Learning About Spinal Cord Stimulation.\"  Current as of: July 10, 2023  Content Version: 14.1  © 2006-2024 [employer].   Care instructions adapted under license by [employer]. If you have questions about a medical condition or this instruction, always ask your healthcare professional. [employer] disclaims any warranty or liability for your use of this information.

## 2024-06-13 NOTE — H&P
[facility] Neurosurgery  History and Physical        No chief complaint on file.      HISTORY OF PRESENT ILLNESS:      Interval Update:    Patient presents for planned surgery, Thoracic Laminectomy for implantation of spinal cord stimulator and generator.  Her complaints are unchanged.  She continues to complain of severe back and leg pain.      HPI:    The patient is a 89 y.o. female who presents as a referral from Dr. Nugent in pain management to discuss implantation of a [employer] spinal cord stimulator.  She has a longstanding history of back problems with previous kyphoplasty and L4/5 minimally-invasive laminectomy by Dr. Rodriguez in 2020 and 2021.  She continues to complain of severe pain in her left buttock and lateral thigh.  She also has numbness in her feet from neuropathy.  She is diabetic and her most recent HgbA1c was 5.4%.  She is followed by Dr. Nugent in pain management and has received multiple injections that no longer relieve her pain.  She undertook a trial of spinal cord stimulation in 9/2023 using the [employer] system and reports that she has ~80% improvement in her back and right leg pain during the trial.  Unfortunately, she had a fall and injured he right knee afterwards with a slow-healing wound and she has only recently stopped seeing wound care.  Her pain has returned to baseline since the completion of her trial.         MEDICAL HISTORY:             Past Medical History:   Diagnosis Date    Afib (HCC) 2021    Bronchitis     Colon cancer (HCC)     Colon polyps     Constipation     Deep vein blood clot of left lower extremity (HCC)     Left leg     Depression     DVT (deep venous thrombosis) (HCC)     Dysphagia     Fibrocystic breast disease     Fibromyalgia     GERD (gastroesophageal reflux disease)     reflux with hx of esophageal stricture    Headache(784.0)     History of colon cancer 2000    Hormone replacement therapy (postmenopausal)     Hypertension     Neuropathy of foot      oriented, thought content appropriate    CRANIAL NERVES: PERRL, EOMI, symmetric facies, tongue midline    MOTOR:     Right Upper Extremity:    Deltoid: 5/5  Biceps: 5/5  Triceps: 5/5  Wrist Extension: 5/5  Finger Abduction: 5/5      Left Upper Extremity:    Deltoid: 5/5  Biceps: 5/5  Triceps: 5/5  Wrist Extension: 5/5  Finger Abduction: 5/5      Right Lower Extremity:    Hip Flexion: 5/5  Knee Extension: 5/5  Ankle Plantarflexion: 5/5  Ankle Dorsiflexion: 5/5      Left Lower Extremity:    Hip Flexion: 5/5  Knee Extension: 5/5  Ankle Plantarflexion: 5/5  Ankle Dorsiflexion: 5/5      SOMATOSENSORY:     Right Upper Extremity: normal light touch sensation  Left Upper Extremity: normal light touch sensation  Right Lower Extremity: normal light touch sensation  Left Lower Extremity: normal light touch sensation      REFLEXES:    Biceps: 1+  Patella: 1+      Clark's: Negative      COORDINATION and GAIT:    Gait: Antalgic, walks with a cane        DATA:    Lab Results   Component Value Date    WBC 6.5 05/29/2024    HGB 14.3 05/29/2024    HCT 45.9 05/29/2024    MCV 90.7 05/29/2024     05/29/2024     Lab Results   Component Value Date     05/29/2024    K 4.8 05/29/2024    CL 99 05/29/2024    CO2 24 05/29/2024    BUN 17 05/29/2024    CREATININE 0.6 05/29/2024    GLUCOSE 79 05/29/2024    CALCIUM 9.2 05/29/2024    PROT 6.9 10/31/2012    BILITOT 0.4 05/29/2024    ALKPHOS 125 (H) 05/29/2024    AST 19 05/29/2024    ALT 9 05/29/2024    LABGLOM 87 05/29/2024    GFRAA >59 09/15/2022    GLOB 2.5 10/12/2016     Lab Results   Component Value Date    INR 1.01 06/13/2024    INR 1.21 (H) 09/26/2023    INR 1.58 (H) 09/25/2023    PROTIME 13.0 06/13/2024    PROTIME 15.0 (H) 09/26/2023    PROTIME 18.4 (H) 09/25/2023         IMAGING:    My interpretation of imaging studies:     MRI scans of the thoracic and lumbar spine reviewed.  There is advanced multi-level DDD throughout with significant scoliosis.  Findings are most

## 2024-06-14 NOTE — PROGRESS NOTES
CLINICAL PHARMACY NOTE: MEDS TO BEDS    Total # of Prescriptions Filled: 1   The following medications were delivered to the patient:  Discharge Medication List as of 6/13/2024 11:50 AM     Hydrocodone/acetaminophen 10/325mg- take 1 to 2 tablets by mouth every 8 hours as needed for pain for up to 14 days. Max 6 tablets daily #60         Additional Documentation:  Handed to patients family at [facility]. Paid with cash

## 2024-06-24 RX ORDER — OXYBUTYNIN CHLORIDE 5 MG/1
5 TABLET, EXTENDED RELEASE ORAL NIGHTLY
Qty: 30 TABLET | Refills: 3 | OUTPATIENT
Start: 2024-06-24

## 2024-06-24 RX ORDER — OXYBUTYNIN CHLORIDE 5 MG/1
5 TABLET, EXTENDED RELEASE ORAL NIGHTLY
Qty: 30 TABLET | Refills: 3 | Status: SHIPPED | OUTPATIENT
Start: 2024-06-24

## 2024-06-24 RX ORDER — TRIAMCINOLONE ACETONIDE 1 MG/G
OINTMENT TOPICAL
Qty: 60 G | Refills: 1 | Status: SHIPPED | OUTPATIENT
Start: 2024-06-24

## 2024-06-26 RX ORDER — METFORMIN HYDROCHLORIDE 500 MG/1
500 TABLET, EXTENDED RELEASE ORAL DAILY
Qty: 30 TABLET | Refills: 3 | Status: SHIPPED | OUTPATIENT
Start: 2024-06-26

## 2024-06-28 ENCOUNTER — OFFICE VISIT (OUTPATIENT)
Dept: NEUROSURGERY | Age: 89
End: 2024-06-28

## 2024-06-28 VITALS
WEIGHT: 129 LBS | HEIGHT: 63 IN | SYSTOLIC BLOOD PRESSURE: 142 MMHG | DIASTOLIC BLOOD PRESSURE: 62 MMHG | HEART RATE: 67 BPM | OXYGEN SATURATION: 100 % | BODY MASS INDEX: 22.86 KG/M2

## 2024-06-28 DIAGNOSIS — Z96.89 S/P INSERTION OF SPINAL CORD STIMULATOR: Primary | ICD-10-CM

## 2024-06-28 DIAGNOSIS — M96.1 FAILED BACK SYNDROME: ICD-10-CM

## 2024-06-28 PROCEDURE — 99024 POSTOP FOLLOW-UP VISIT: CPT | Performed by: NEUROLOGICAL SURGERY

## 2024-06-28 NOTE — PROGRESS NOTES
appropriate  Cranial Nerves: PERRL, EOMI, symmetric facies, tongue midline  Motor: Motor exam is symmetrical 5 out of 5 all extremities bilaterally  Somatosensory: normal light touch sensation      Imaging:    No new imaging          Assessment and Plan:    89 y.o. F returns for follow-up after implantation of a [employer] spinal cord stimulator system on 6/13/2024.  She is doing well.  Her incisions are healing appropriately and she remains at her neurologic baseline.  The [employer] team will arrange a meeting with her for additional programming of her stimulator.  I will plan to follow up with her in 1 month.        Electronically signed by Nayan Licea MD on 6/28/2024 at 9:40 AM

## 2024-07-08 RX ORDER — OXYBUTYNIN CHLORIDE 5 MG/1
5 TABLET, EXTENDED RELEASE ORAL NIGHTLY
Qty: 30 TABLET | Refills: 3 | Status: SHIPPED | OUTPATIENT
Start: 2024-07-08

## 2024-07-18 ENCOUNTER — OFFICE VISIT (OUTPATIENT)
Dept: CARDIOLOGY CLINIC | Age: 89
End: 2024-07-18
Payer: MEDICARE

## 2024-07-18 VITALS
HEART RATE: 66 BPM | OXYGEN SATURATION: 99 % | BODY MASS INDEX: 22.67 KG/M2 | DIASTOLIC BLOOD PRESSURE: 80 MMHG | WEIGHT: 128 LBS | SYSTOLIC BLOOD PRESSURE: 182 MMHG

## 2024-07-18 DIAGNOSIS — I10 ESSENTIAL HYPERTENSION: Primary | ICD-10-CM

## 2024-07-18 PROCEDURE — 1123F ACP DISCUSS/DSCN MKR DOCD: CPT | Performed by: INTERNAL MEDICINE

## 2024-07-18 PROCEDURE — 99214 OFFICE O/P EST MOD 30 MIN: CPT | Performed by: INTERNAL MEDICINE

## 2024-07-18 RX ORDER — HYDROCODONE BITARTRATE AND ACETAMINOPHEN 10; 325 MG/1; MG/1
1 TABLET ORAL EVERY 8 HOURS PRN
COMMUNITY
Start: 2024-07-11

## 2024-07-18 RX ORDER — AMLODIPINE BESYLATE 10 MG/1
10 TABLET ORAL DAILY
Qty: 90 TABLET | Refills: 3 | Status: SHIPPED | OUTPATIENT
Start: 2024-07-18 | End: 2024-10-16

## 2024-07-18 ASSESSMENT — ENCOUNTER SYMPTOMS
CONSTIPATION: 0
CHEST TIGHTNESS: 0
EYE DISCHARGE: 0
COUGH: 0
EYE REDNESS: 0
EYE PAIN: 0
SORE THROAT: 0
ABDOMINAL PAIN: 0
NAUSEA: 0
WHEEZING: 0
BLOOD IN STOOL: 0
SHORTNESS OF BREATH: 0
ABDOMINAL DISTENTION: 0
DIARRHEA: 0
FACIAL SWELLING: 0
VOMITING: 0
APNEA: 0

## 2024-07-18 NOTE — PATIENT INSTRUCTIONS
Until you  your new prescription for Amlodipine 10 mgs one tablet daily, please start taking your current pills one tablet TWICE daily for a total of 10 mgs.

## 2024-07-18 NOTE — PROGRESS NOTES
Cardiology Office Visit Note  [address] [facility] [address] [place] [address]  Phone: [phone]  Fax: [phone]                            Date:  7/18/2024  Patient: Esther Schmidt  Age:  89 y.o., 1935    Referral: No ref. provider found    REASON FOR VISIT:  No chief complaint on file.         PROBLEM LIST:    Patient Active Problem List    Diagnosis Date Noted    TIA (transient ischemic attack) 09/07/2022     Priority: High    Chest pain 09/06/2022     Priority: Medium    Palliative care patient 02/02/2022     Priority: Low    History of atrial fibrillation 01/20/2022     Priority: Low    Iron deficiency anemia 01/13/2022     Priority: Low    Type 2 diabetes mellitus 10/25/2021     Priority: Low    Atrial fibrillation with RVR (HCC) 09/16/2021     Priority: Low    History of esophageal stricture 08/24/2021     Priority: Low    Depression 04/20/2021     Priority: Low    Fungal dermatitis 05/07/2020     Priority: Low    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 05/07/2020     Priority: Low    Mixed simple and mucopurulent chronic bronchitis ([facility]) 05/04/2020     Priority: Low    Malignant neoplasm of colon ([facility]) 04/20/2020     Priority: Low    Hypochloremia 06/29/2019     Priority: Low    CKD (chronic kidney disease) stage 3, GFR 30-59 ml/min ([facility]) 06/29/2019     Priority: Low    Pain in both lower extremities      Priority: Low    Numbness and tingling of both feet      Priority: Low    Avascular necrosis ([facility]) 04/08/2019     Priority: Low    History of DVT (deep vein thrombosis) 12/18/2018     Priority: Low    Port-A-Cath in place 02/19/2018     Priority: Low     Overview Note:     Replacing Deprecated Diagnoses      History of colon polyps 03/11/2016     Priority: Low    Essential hypertension 08/25/2015     Priority: Low    Encounter for care related to Port-a-Cath 08/11/2015     Priority: Low    Vitamin D deficiency 03/19/2015     Priority: Low    Paroxysmal

## 2024-07-29 ENCOUNTER — OFFICE VISIT (OUTPATIENT)
Dept: NEUROSURGERY | Age: 89
End: 2024-07-29

## 2024-07-29 VITALS
SYSTOLIC BLOOD PRESSURE: 128 MMHG | BODY MASS INDEX: 22.7 KG/M2 | DIASTOLIC BLOOD PRESSURE: 82 MMHG | HEIGHT: 63 IN | HEART RATE: 77 BPM | WEIGHT: 128.09 LBS

## 2024-07-29 DIAGNOSIS — Z96.89 S/P INSERTION OF SPINAL CORD STIMULATOR: Primary | ICD-10-CM

## 2024-07-29 DIAGNOSIS — M96.1 FAILED BACK SYNDROME: ICD-10-CM

## 2024-07-29 PROCEDURE — 99024 POSTOP FOLLOW-UP VISIT: CPT | Performed by: NEUROLOGICAL SURGERY

## 2024-07-29 NOTE — PROGRESS NOTES
NEUROSURGERY POSTOPERATIVE FOLLOW UP NOTE      Chief Complaint:   Chief Complaint   Patient presents with    Follow-up     Patient states that she is experiencing tremors since surgery.          Date of Surgery: 6/13/2024    Procedure Performed:  Thoracic laminectomy for implantation of [employer] spinal cord stimulator and generator      Interval Update:  Planned follow-up visit after surgery.  She is doing well.  Her postoperative pain is minimal and she reports good pain relief from her stimulator.  She is having some connectivity issues with her remote.  She complains of episodes of tremulousness \"all over\" that are new for her.  She denies fevers and her incisions have fully healed.      HPI:     The patient is a 89 y.o. female who presents as a referral from Dr. Nugent in pain management to discuss implantation of a [employer] spinal cord stimulator.  She has a longstanding history of back problems with previous kyphoplasty and L4/5 minimally-invasive laminectomy by Dr. Rodriguez in 2020 and 2021.  She continues to complain of severe pain in her left buttock and lateral thigh.  She also has numbness in her feet from neuropathy.  She is diabetic and her most recent HgbA1c was 5.4%.  She is followed by Dr. Nugent in pain management and has received multiple injections that no longer relieve her pain.  She undertook a trial of spinal cord stimulation in 9/2023 using the [employer] system and reports that she has ~80% improvement in her back and right leg pain during the trial.  Unfortunately, she had a fall and injured he right knee afterwards with a slow-healing wound and she has only recently stopped seeing wound care.  Her pain has returned to baseline since the completion of her trial.         Objective:    /82   Pulse 77   Ht 1.6 m (5' 2.99\")   Wt 58.1 kg (128 lb 1.4 oz)   BMI 22.70 kg/m²         Physical Exam:    General: alert, cooperative, no distress  Cardiorespiratory: unlabored

## 2024-08-07 RX ORDER — METFORMIN HYDROCHLORIDE 500 MG/1
500 TABLET, EXTENDED RELEASE ORAL DAILY
Qty: 30 TABLET | Refills: 3 | Status: SHIPPED | OUTPATIENT
Start: 2024-08-07

## 2024-08-14 ENCOUNTER — OFFICE VISIT (OUTPATIENT)
Dept: PRIMARY CARE CLINIC | Age: 89
End: 2024-08-14

## 2024-08-14 VITALS
BODY MASS INDEX: 22.5 KG/M2 | SYSTOLIC BLOOD PRESSURE: 160 MMHG | OXYGEN SATURATION: 97 % | DIASTOLIC BLOOD PRESSURE: 76 MMHG | HEART RATE: 67 BPM | WEIGHT: 127 LBS | TEMPERATURE: 97.7 F

## 2024-08-14 DIAGNOSIS — R09.89 BRUIT OF LEFT CAROTID ARTERY: ICD-10-CM

## 2024-08-14 DIAGNOSIS — R26.89 BALANCE DISORDER: ICD-10-CM

## 2024-08-14 DIAGNOSIS — R42 VERTIGO: ICD-10-CM

## 2024-08-14 DIAGNOSIS — Z95.828 PORT-A-CATH IN PLACE: ICD-10-CM

## 2024-08-14 DIAGNOSIS — D50.0 IRON DEFICIENCY ANEMIA DUE TO CHRONIC BLOOD LOSS: ICD-10-CM

## 2024-08-14 DIAGNOSIS — R42 DIZZINESS: Primary | ICD-10-CM

## 2024-08-14 DIAGNOSIS — N18.31 STAGE 3A CHRONIC KIDNEY DISEASE (HCC): ICD-10-CM

## 2024-08-14 DIAGNOSIS — I1A.0 RESISTANT HYPERTENSION: ICD-10-CM

## 2024-08-14 DIAGNOSIS — N18.32 STAGE 3B CHRONIC KIDNEY DISEASE (HCC): ICD-10-CM

## 2024-08-14 LAB
ALBUMIN SERPL-MCNC: 3.9 G/DL (ref 3.5–5.2)
ALP SERPL-CCNC: 147 U/L (ref 35–104)
ALT SERPL-CCNC: 10 U/L (ref 5–33)
ANION GAP SERPL CALCULATED.3IONS-SCNC: 11 MMOL/L (ref 7–19)
AST SERPL-CCNC: 16 U/L (ref 5–32)
BILIRUB SERPL-MCNC: 0.3 MG/DL (ref 0.2–1.2)
BUN SERPL-MCNC: 20 MG/DL (ref 8–23)
CALCIUM SERPL-MCNC: 9.4 MG/DL (ref 8.8–10.2)
CHLORIDE SERPL-SCNC: 102 MMOL/L (ref 98–111)
CO2 SERPL-SCNC: 26 MMOL/L (ref 22–29)
CREAT SERPL-MCNC: 0.6 MG/DL (ref 0.5–0.9)
ERYTHROCYTE [DISTWIDTH] IN BLOOD BY AUTOMATED COUNT: 14.7 % (ref 11.5–14.5)
GLUCOSE SERPL-MCNC: 87 MG/DL (ref 74–109)
HCT VFR BLD AUTO: 43.8 % (ref 37–47)
HGB BLD-MCNC: 13.7 G/DL (ref 12–16)
IRON SATN MFR SERPL: 14 % (ref 14–50)
IRON SERPL-MCNC: 57 UG/DL (ref 37–145)
MCH RBC QN AUTO: 28.2 PG (ref 27–31)
MCHC RBC AUTO-ENTMCNC: 31.3 G/DL (ref 33–37)
MCV RBC AUTO: 90.1 FL (ref 81–99)
PLATELET # BLD AUTO: 278 K/UL (ref 130–400)
PMV BLD AUTO: 10.2 FL (ref 9.4–12.3)
POTASSIUM SERPL-SCNC: 4.3 MMOL/L (ref 3.5–5)
PROT SERPL-MCNC: 7.1 G/DL (ref 6.6–8.7)
RBC # BLD AUTO: 4.86 M/UL (ref 4.2–5.4)
SODIUM SERPL-SCNC: 139 MMOL/L (ref 136–145)
TIBC SERPL-MCNC: 411 UG/DL (ref 250–400)
VIT B12 SERPL-MCNC: 226 PG/ML (ref 211–946)
WBC # BLD AUTO: 7.7 K/UL (ref 4.8–10.8)

## 2024-08-14 RX ORDER — BETAMETHASONE VALERATE 0.1 %
LOTION (ML) TOPICAL
Qty: 1 EACH | Refills: 1 | Status: SHIPPED | OUTPATIENT
Start: 2024-08-14

## 2024-08-14 SDOH — ECONOMIC STABILITY: INCOME INSECURITY: HOW HARD IS IT FOR YOU TO PAY FOR THE VERY BASICS LIKE FOOD, HOUSING, MEDICAL CARE, AND HEATING?: NOT HARD AT ALL

## 2024-08-14 SDOH — ECONOMIC STABILITY: FOOD INSECURITY: WITHIN THE PAST 12 MONTHS, THE FOOD YOU BOUGHT JUST DIDN'T LAST AND YOU DIDN'T HAVE MONEY TO GET MORE.: NEVER TRUE

## 2024-08-14 SDOH — ECONOMIC STABILITY: FOOD INSECURITY: WITHIN THE PAST 12 MONTHS, YOU WORRIED THAT YOUR FOOD WOULD RUN OUT BEFORE YOU GOT MONEY TO BUY MORE.: NEVER TRUE

## 2024-08-14 ASSESSMENT — ENCOUNTER SYMPTOMS
GASTROINTESTINAL NEGATIVE: 1
BACK PAIN: 1

## 2024-08-14 ASSESSMENT — PATIENT HEALTH QUESTIONNAIRE - PHQ9
10. IF YOU CHECKED OFF ANY PROBLEMS, HOW DIFFICULT HAVE THESE PROBLEMS MADE IT FOR YOU TO DO YOUR WORK, TAKE CARE OF THINGS AT HOME, OR GET ALONG WITH OTHER PEOPLE: NOT DIFFICULT AT ALL
SUM OF ALL RESPONSES TO PHQ QUESTIONS 1-9: 0
2. FEELING DOWN, DEPRESSED OR HOPELESS: NOT AT ALL
8. MOVING OR SPEAKING SO SLOWLY THAT OTHER PEOPLE COULD HAVE NOTICED. OR THE OPPOSITE, BEING SO FIGETY OR RESTLESS THAT YOU HAVE BEEN MOVING AROUND A LOT MORE THAN USUAL: NOT AT ALL
SUM OF ALL RESPONSES TO PHQ QUESTIONS 1-9: 0
7. TROUBLE CONCENTRATING ON THINGS, SUCH AS READING THE NEWSPAPER OR WATCHING TELEVISION: NOT AT ALL
4. FEELING TIRED OR HAVING LITTLE ENERGY: NOT AT ALL
5. POOR APPETITE OR OVEREATING: NOT AT ALL
6. FEELING BAD ABOUT YOURSELF - OR THAT YOU ARE A FAILURE OR HAVE LET YOURSELF OR YOUR FAMILY DOWN: NOT AT ALL
SUM OF ALL RESPONSES TO PHQ9 QUESTIONS 1 & 2: 0
3. TROUBLE FALLING OR STAYING ASLEEP: NOT AT ALL
9. THOUGHTS THAT YOU WOULD BE BETTER OFF DEAD, OR OF HURTING YOURSELF: NOT AT ALL
1. LITTLE INTEREST OR PLEASURE IN DOING THINGS: NOT AT ALL

## 2024-08-14 NOTE — PATIENT INSTRUCTIONS
Monitor your blood pressure every a.m And once random during the day.  Record them.  The goal is top number under 140 and bottom number under 80.   Stay on the amolodipine 10mg for now  Labs today  Scan of carotids  Physical therapy thru home health balance and dizziness.  See exercise     Preventing Falls: Care Instructions  Injuries and health problems such as trouble walking or poor eyesight can increase your risk of falling. So can some medicines. But there are things you can do to help prevent falls. You can exercise to get stronger. You can also arrange your home to make it safer.    Talk to your doctor about the medicines you take. Ask if any of them increase the risk of falls and whether they can be changed or stopped.   Try to exercise regularly. It can help improve your strength and balance. This can help lower your risk of falling.         Practice fall safety and prevention.   Wear low-heeled shoes that fit well and give your feet good support. Talk to your doctor if you have foot problems that make this hard.  Carry a cellphone or wear a medical alert device that you can use to call for help.  Use stepladders instead of chairs to reach high objects. Don't climb if you're at risk for falls. Ask for help, if needed.  Wear the correct eyeglasses, if you need them.        Make your home safer.   Remove rugs, cords, clutter, and furniture from walkways.  Keep your house well lit. Use night-lights in hallways and bathrooms.  Install and use sturdy handrails on stairways.  Wear nonskid footwear, even inside. Don't walk barefoot or in socks without shoes.        Be safe outside.   Use handrails, curb cuts, and ramps whenever possible.  Keep your hands free by using a shoulder bag or backpack.  Try to walk in well-lit areas. Watch out for uneven ground, changes in pavement, and debris.  Be careful in the winter. Walk on the grass or gravel when sidewalks are slippery. Use de-icer on steps and walkways. Add

## 2024-08-14 NOTE — PROGRESS NOTES
[facility]  [place] [id number] [facility] [place] [address]  Dept: [phone]  Dept Fax: [phone]  Loc: [phone]    Esther Schmidt is a 89 y.o. female who presents today for her medical conditions/complaints as noted below.  Esther Schmidt is c/o of Follow-up (Says she is very dizzy and has a headache today. She has a new spinal cord stimulator. Thinks she may need a port flush  )        HPI:     HPI   Chief Complaint   Patient presents with    Follow-up     Says she is very dizzy and has a headache today. She has a new spinal cord stimulator. Thinks she may need a port flush     She did have stimulator and is better with pain , released from Dr Licea. Sees pain management for pain pills. I write her lyrica  Bp at home has been high on top some days and others normal. Dr olvera change norvasc on 7-15 to 10mg due to htn.  The dizziness started a little bit yesterday but really bad today.  She denies any one-sided weakness or signs of a stroke.  She is taking her aspirin every day.  Her caregiver is with her today and does all of her medications in a pill  so she is not missed or taken too much of any medications.  She is not trying anything new.  She has eaten today.  She says the dizziness is when she goes to move she feels very dizzy.  She said if she sits up quickly she feels dizzy or moves her head quickly.  She came in in a wheelchair today because she did not feel steady enough to come in on her cane or walker.  Past Medical History:   Diagnosis Date    Afib (HCC) 2021    Bronchitis     Colon cancer (HCC)     Colon polyps     Constipation     Deep vein blood clot of left lower extremity (HCC)     Left leg     Depression     DVT (deep venous thrombosis) (HCC)     Dysphagia     Fibrocystic breast disease     Fibromyalgia     GERD (gastroesophageal reflux disease)     reflux with hx of esophageal stricture    Headache(784.0)     History of colon cancer 2000    Hormone

## 2024-08-14 NOTE — PROGRESS NOTES
Venous Access Procedure Note  Indication: maintenance flush    Procedure: The patient was placed in the appropriate position and the skin over the puncture site was prepped with betadine and draped in a sterile fashion. Intravenous access was obtained in right chest port with a Bliss needle and the site was secured appropriately.  The port was flushed with 10cc NS and then 3 cc heparin flush.    The patient tolerated the procedure well.    Complications: None

## 2024-08-16 ENCOUNTER — HOSPITAL ENCOUNTER (OUTPATIENT)
Dept: VASCULAR LAB | Age: 89
End: 2024-08-16
Payer: MEDICARE

## 2024-08-16 DIAGNOSIS — R09.89 BRUIT OF LEFT CAROTID ARTERY: ICD-10-CM

## 2024-08-16 DIAGNOSIS — R42 DIZZINESS: ICD-10-CM

## 2024-08-16 PROCEDURE — 93880 EXTRACRANIAL BILAT STUDY: CPT

## 2024-08-18 LAB
VAS LEFT ARM BP: 176 MMHG
VAS LEFT CCA MID EDV: 12.4 CM/S
VAS LEFT CCA MID PSV: 85.7 CM/S
VAS LEFT CCA PROX EDV: 11.8 CM/S
VAS LEFT CCA PROX PSV: 93.2 CM/S
VAS LEFT ECA EDV: 6.83 CM/S
VAS LEFT ECA PSV: 111 CM/S
VAS LEFT ICA DIST EDV: 18.6 CM/S
VAS LEFT ICA DIST PSV: 119 CM/S
VAS LEFT ICA MID EDV: 19.3 CM/S
VAS LEFT ICA MID PSV: 106 CM/S
VAS LEFT ICA PROX EDV: 15.5 CM/S
VAS LEFT ICA PROX PSV: 74.6 CM/S
VAS LEFT VERTEBRAL EDV: 11.2 CM/S
VAS LEFT VERTEBRAL PSV: 44.7 CM/S
VAS RIGHT ARM BP: 172 MMHG
VAS RIGHT CCA MID EDV: 13.7 CM/S
VAS RIGHT CCA MID PSV: 96.9 CM/S
VAS RIGHT CCA PROX EDV: 11.8 CM/S
VAS RIGHT CCA PROX PSV: 101 CM/S
VAS RIGHT ECA EDV: 5.59 CM/S
VAS RIGHT ECA PSV: 100 CM/S
VAS RIGHT ICA DIST EDV: 17.4 CM/S
VAS RIGHT ICA DIST PSV: 91.3 CM/S
VAS RIGHT ICA MID EDV: 17.4 CM/S
VAS RIGHT ICA MID PSV: 112 CM/S
VAS RIGHT ICA PROX EDV: 14.9 CM/S
VAS RIGHT ICA PROX PSV: 104 CM/S
VAS RIGHT VERTEBRAL EDV: 15.5 CM/S
VAS RIGHT VERTEBRAL PSV: 64 CM/S

## 2024-08-18 PROCEDURE — 93880 EXTRACRANIAL BILAT STUDY: CPT | Performed by: SURGERY

## 2024-08-19 ENCOUNTER — SCHEDULED TELEPHONE ENCOUNTER (OUTPATIENT)
Dept: CARDIOLOGY CLINIC | Age: 89
End: 2024-08-19

## 2024-08-19 DIAGNOSIS — I10 ESSENTIAL HYPERTENSION: Primary | ICD-10-CM

## 2024-08-19 NOTE — PROGRESS NOTES
Esther Schmidt is a 89 y.o. female evaluated via telephone on 8/19/2024.      Consent:  She and/or health care decision maker is aware that that she may receive a bill for this telephone service, depending on her insurance coverage, and has provided verbal consent to proceed: Yes      Documentation:  I communicated with the patient and/or health care decision maker about blood pressures.   Details of this discussion including any medical advice provided: I was physically located for this telephone visit in the cardiology office in [place], [place].  Patient was physically located at her residence.    Patient with a history of hypertension, paroxysmal atrial fib on Cardizem and metoprolol and chronic Xarelto.  Patient with a history of chronic valvular heart disease with mild to moderate mitral regurg.  History of recurrent DVTs.  History of TIA.  Dyslipidemia.  Diabetes.      Patient saw Dr. Benton on 7/18/2024.  Blood pressure was elevated 180/82 and she increased patient's amlodipine to 10 mg daily.  Patient was asked to keep a blood pressure log and we would reevaluate blood pressure response.    Patient's blood pressure today 128/60.  She is on the phone with her daughter who checks her blood pressure.  She has had some blood pressures 140s to 150s over 70s.  She saw her primary care provider last week and it was 160s over 70s.  Did ask patient and patient's daughter to keep a blood pressure log so we can actually see what blood pressures are doing throughout the day.  Did instruct them to take a blood pressure reading 2 hours after taking her morning medication and then again in the evening.  May need to adjust to a second agent or a twice daily dosing.  She has no other complaints.    Review of Systems  Constitutional: Negative for fever, chills, diaphoresis, activity change, appetite change, fatigue and unexpected weight change.   Eyes: Negative for photophobia, pain, redness and visual disturbance.

## 2024-09-03 NOTE — PROGRESS NOTES
----- Message from Lyric Blackman sent at 9/1/2024  8:56 PM EDT -----  Please notify patient of results. Thank you.     Normal cholesterol labs.   [address] [facility] [address]  [address] [address]  Dept: [phone]  Dept Fax: [phone]  Loc: [phone]    Alexis Funk is a 82 y.o. female who presents today for her medical conditions/complaints as noted below. Alexis Funk is c/o of Abrasion (Pt was at Optometrist this morning and almost fell from her stool coming out from under her and the tech grabbed her RT Forearm and her skin tore. She is here to make sure it is ok after Nel Sheriff sees it.)        HPI:     HPI   Chief Complaint   Patient presents with    Abrasion     Pt was at Optometrist this morning and almost fell from her stool coming out from under her and the tech grabbed her RT Forearm and her skin tore. She is here to make sure it is ok after Nel Sheriff sees it. She is on blood thinner Xarelto and did take it this morning.   Past Medical History:   Diagnosis Date    Afib ([address]     Bronchitis     Colon cancer ([address]     Colon polyps     Constipation     Deep vein blood clot of left lower extremity (HCC)     Left leg     Depression     DVT (deep venous thrombosis) (HCC)     Dysphagia     Fibrocystic breast disease     Fibromyalgia     GERD (gastroesophageal reflux disease)     reflux with hx of esophageal stricture    Headache(784.0)     History of colon cancer     Hormone replacement therapy (postmenopausal)     Hypertension     Neuropathy of foot     In both feet    Osteoarthritis     left knee pain    Palliative care patient 2022    Restless legs syndrome     Type 2 diabetes mellitus 10/25/2021    Vitamin D deficiency       Past Surgical History:   Procedure Laterality Date    APPENDECTOMY      BREAST BIOPSY      CATARACT REMOVAL       SECTION      CHOLECYSTECTOMY      COLECTOMY  partial    COLON SURGERY      COLONOSCOPY  2010    COLONOSCOPY  2012    Dr Mikey Shultz: unremarkable enterocolonic anastamosis; hyperplastic polyps    COLONOSCOPY  2013    [place]: unremarkable post-surgical colon    COLONOSCOPY  03/11/2016    Dr Brandon Alonso colon anastomosis, 5 yr recall    COLONOSCOPY N/A 03/29/2021    Dr Jag Cooley and patent anastomosis in the right side-BCM, prn (age)    COLONOSCOPY  03/29/2021    Dr Jag Cooley and patent anastomosis in the right side-BCM, prn (age)    FOOT SURGERY  right foot    HAND SURGERY Right     Dr Sabrina Diaz ([address] N/A 07/06/2020    KYPHOPLASTY L5 performed by Louisa Leonardo DO at [address] [facility] Right 12/14/2020    RIGHT L 4-5 DECOMPRESSIVE LAMINECTOMY performed by Louisa Leonardo DO at [address] [facility] ENDOSCOPY  10/16/2009    UPPER GASTROINTESTINAL ENDOSCOPY  03/01/2013    [place]: peptic stricture dilated to 48F; HH; small antral mucosal elevation    UPPER GASTROINTESTINAL ENDOSCOPY  12/01/2014    [place]: dilation of esophageal stricture    VARICOSE VEIN SURGERY Left 03/14/2014  [address]    Left GSV Ablation    VARICOSE VEIN SURGERY Right 04/04/2014  [address]    Right GSV Ablation       Vitals 11/4/2022 11/1/2022 10/6/2022 10/6/2022 9/29/2022 5/76/0343   SYSTOLIC 723 298 819 101 611 499   DIASTOLIC 70 68 75 73 56 70   Pulse 78 91 - 94 49 72   Temp 98.1 - - - - 97.4   Resp - - - - - 18   SpO2 98 - - - 98 94   Weight - 136 lb - 137 lb 137 lb 138 lb   Height 5' 3\" 5' 3\" - 5' 3\" 5' 3\" 5' 3\"   Body mass index - 24.09 kg/m2 - 24.27 kg/m2 24.27 kg/m2 24.44 kg/m2   Some recent data might be hidden       Family History   Problem Relation Age of Onset    Cancer Mother         Cervical Cancer    No Known Problems Father     Diabetes Sister     No Known Problems Sister     No Known Problems Sister     Diabetes Brother     Esophageal Cancer Brother     No Known Problems Brother     No Known Problems Brother     Cancer Son     Other Daughter         still born    Colon Cancer Neg Hx     Colon Polyps Neg Hx     Liver Cancer Neg Hx Liver Disease Neg Hx     Rectal Cancer Neg Hx     Stomach Cancer Neg Hx        Social History     Tobacco Use    Smoking status: Never    Smokeless tobacco: Never   Substance Use Topics    Alcohol use: Never     Comment: \"did not like it\"      Current Outpatient Medications on File Prior to Visit   Medication Sig Dispense Refill    Multiple Vitamins-Minerals (THERAPEUTIC MULTIVITAMIN-MINERALS) tablet Take 1 tablet by mouth daily      amLODIPine (NORVASC) 2.5 MG tablet Take 1 tablet by mouth in the morning and at bedtime 180 tablet 3    aspirin 81 MG EC tablet Take 1 tablet by mouth every other day 30 tablet 0    metFORMIN (GLUCOPHAGE-XR) 500 MG extended release tablet TAKE 1 TABLET BY MOUTH DAILY WITH BREAKFAST 30 tablet 3    DULoxetine (CYMBALTA) 60 MG extended release capsule Take 1 capsule by mouth daily , increase in dose 30 capsule 0    XARELTO 20 MG TABS tablet TAKE 1 TABLET BY MOUTH DAILY WITH BREAKFAST 30 tablet 5    ciprofloxacin (CILOXAN) 0.3 % ophthalmic solution INSTILL 1 DROP IN RIGHT EYE TWICE DAILY      omeprazole (PRILOSEC) 40 MG delayed release capsule TAKE 1 CAPSULE BY MOUTH DAILY 90 capsule 3    HYDROcodone-acetaminophen (NORCO)  MG per tablet TAKE 1 TABLET BY MOUTH EVERY 6 HOURS AS NEEDED      pregabalin (LYRICA) 150 MG capsule TAKE 1 CAPSULE BY MOUTH EVERY 12 HOURS AS NEEDED      furosemide (LASIX) 20 MG tablet Take 1 tablet by mouth daily as needed (swelling) 30 tablet 3    vitamin D (ERGOCALCIFEROL) 1.25 MG (12758 UT) CAPS capsule TAKE 1 CAPSULE BY MOUTH 1 TIME A WEEK 12 capsule 1    Cyanocobalamin (B-12) 500 MCG TABS TAKE 1 TABLET BY MOUTH DAILY 30 tablet 11    Hospital Bed MISC by Does not apply route 1 each 0    melatonin 5 MG TBDP disintegrating tablet Take 1 tablet by mouth nightly 30 tablet 0    ascorbic acid (VITAMIN C) 500 MG tablet Take 1 tablet by mouth 2 times daily 60 tablet 0     No current facility-administered medications on file prior to visit.      Allergies   Allergen Reactions    Gabapentin Rash     Pt weaning off due to rash and hot flashes    Zoloft [Sertraline Hcl] Other (See Comments)     Patient states that she doesn't want this medication anymore because she hallucinated and saw spiders. Patient weaned herself off and after she quit taking the medication, the hallucinations stopped. Patient states that she doesn't want this medication anymore because she hallucinated and saw spiders. Patient weaned herself off and after she quit taking the medication, the hallucinations stopped. Health Maintenance   Topic Date Due    Shingles vaccine (1 of 2) Never done    COVID-19 Vaccine (4 - Booster for Moderna series) 12/10/2021    Flu vaccine (1) 08/01/2022    Depression Monitoring  09/01/2023    Annual Wellness Visit (AWV)  09/02/2023    DTaP/Tdap/Td vaccine (4 - Td or Tdap) 11/04/2032    DEXA (modify frequency per FRAX score)  Completed    Colorectal Cancer Screen  Completed    Pneumococcal 65+ years Vaccine  Completed    Hepatitis A vaccine  Aged Out    Hib vaccine  Aged Out    Meningococcal (ACWY) vaccine  Aged Out       Subjective:      Review of Systems    Objective:     Physical Exam  Constitutional:       Appearance: Normal appearance. She is well-developed. HENT:      Head: Normocephalic. Eyes:      Conjunctiva/sclera: Conjunctivae normal.      Pupils: Pupils are equal, round, and reactive to light. Pulmonary:      Effort: Pulmonary effort is normal.   Musculoskeletal:      Cervical back: Normal range of motion. Skin:     General: Skin is warm and dry. Neurological:      General: No focal deficit present. Mental Status: She is alert and oriented to person, place, and time. Psychiatric:         Mood and Affect: Mood normal.         Behavior: Behavior normal.         Thought Content:  Thought content normal.         Judgment: Judgment normal.     /70   Pulse 78   Temp 98.1 °F (36.7 °C)   Ht 5' 3\" (1.6 m)   SpO2 98%   BMI 24.09 kg/m² The area was cleaned with Hibiclens. Patted dry pressure was applied and ice applied prior to applying Steri-Strips to the skin tears and then an outer dressing with Coban compression. We discussed she can change the outer dressing if it gets saturated into continue ice today to help control the bleeding. She is to hold her Xarelto tomorrow  Assessment:       Diagnosis Orders   1. Skin tear of forearm without complication, left, initial encounter  Td, TDVAX, (age 9 yrs+), IM            Plan:   More than 50% of the time was spent counseling and coordinating care for a total time of 20min face to face. PDMP Monitoring:    Last PDMP Elaine Gray as Reviewed:  Review User Review Instant Review Result          Last Controlled Substance Monitoring Documentation      [address] Office Visit from 3/4/2020 in [facility]   Attestation The Prescription Monitoring Report for this patient was reviewed today. filed at 03/04/2020 1048   Periodic Controlled Substance Monitoring Possible medication side effects, risk of tolerance/dependence & alternative treatments discussed., No signs of potential drug abuse or diversion identified. filed at 03/04/2020 1048          Urine Drug Screenings (1 yr)       POCT Rapid Drug Screen  Collected: 3/4/2020 11:37 AM (Final result)                  Medication Contract and Consent for Opioid Use Documents Filed       Patient Documents       Type of Document Status Date Received Received By Description    Medication Contract Signed 3/19/2019  2:29 PM [facility] neuro    Medication Contract Received 2/1/2021  1:35 PM EMMA BEVERLY Medication Contract Gabapentin                     Patient given educational materials -see patient instructions. Discussed use, benefit, and side effects of prescribed medications. All patient questions answered. Pt voiced understanding. Reviewed health maintenance. Instructed to continue currentmedications, diet and exercise.   Patient agreed with treatment plan. Follow up as directed. MEDICATIONS:  No orders of the defined types were placed in this encounter. ORDERS:  Orders Placed This Encounter   Procedures    Td, TDVAX, (age 9 yrs+), IM       Follow-up:  No follow-ups on file. PATIENT INSTRUCTIONS:  There are no Patient Instructions on file for this visit. Electronically signed by EARLE Huber CNP on 11/4/2022 at 12:31 PM    EMR Dragon/transcription disclaimer:  Much of thisencounter note is electronic transcription/translation of spoken language to printed texts. The electronic translation of spoken language may be erroneous, or at times, nonsensical words or phrases may be inadvertentlytranscribed.   Although I have reviewed the note for such errors, some may still exist.

## 2024-09-04 RX ORDER — AMLODIPINE BESYLATE 5 MG/1
5 TABLET ORAL DAILY
Qty: 90 TABLET | Refills: 1 | OUTPATIENT
Start: 2024-09-04

## 2024-09-04 RX ORDER — AMLODIPINE BESYLATE 10 MG/1
10 TABLET ORAL DAILY
Qty: 90 TABLET | Refills: 2 | Status: SHIPPED | OUTPATIENT
Start: 2024-09-04 | End: 2025-06-01

## 2024-09-05 ENCOUNTER — SCHEDULED TELEPHONE ENCOUNTER (OUTPATIENT)
Dept: CARDIOLOGY CLINIC | Age: 89
End: 2024-09-05

## 2024-09-05 DIAGNOSIS — I48.0 PAF (PAROXYSMAL ATRIAL FIBRILLATION) (HCC): Primary | ICD-10-CM

## 2024-09-05 DIAGNOSIS — I10 ESSENTIAL HYPERTENSION: ICD-10-CM

## 2024-09-05 NOTE — PROGRESS NOTES
Esther Schmidt is a 89 y.o. female evaluated via telephone on 9/5/2024.      Consent:  She and/or health care decision maker is aware that that she may receive a bill for this telephone service, depending on her insurance coverage, and has provided verbal consent to proceed: Yes      Documentation:  I communicated with the patient and/or health care decision maker about blood pressure response.   Details of this discussion including any medical advice provided: I was physically located for this telephone visit in the cardiology office in [place], [place].  Patient was physically located at her residence.    Patient with a history of hypertension, paroxysmal atrial fib on Cardizem and metoprolol and chronic Xarelto.  Patient with a history of chronic valvular heart disease with mild to moderate mitral regurg.  History of recurrent DVTs.  History of TIA.  Dyslipidemia.  Diabetes.        Patient saw Dr. Benton on 7/18/2024.  Blood pressure was elevated 180/82 and she increased patient's amlodipine to 10 mg daily.  Patient was asked to keep a blood pressure log and we would reevaluate blood pressure response.     Patient's blood pressure today 128/60.  She is on the phone with her daughter who checks her blood pressure.  She has had some blood pressures 140s to 150s over 70s.  She saw her primary care provider last week and it was 160s over 70s.  Did ask patient and patient's daughter to keep a blood pressure log so we can actually see what blood pressures are doing throughout the day.  Did instruct them to take a blood pressure reading 2 hours after taking her morning medication and then again in the evening.  May need to adjust to a second agent or a twice daily dosing.  She has no other complaints.    Telephone visit today to evaluate blood pressure with the increased dose of the Norvasc to 10 mg daily.  Home blood pressure readings well-controlled.  No complaints today.    Review of Systems  Constitutional: Negative

## 2024-09-13 ENCOUNTER — TELEPHONE (OUTPATIENT)
Dept: INTERNAL MEDICINE CLINIC | Age: 89
End: 2024-09-13

## 2024-10-29 RX ORDER — METFORMIN HYDROCHLORIDE 500 MG/1
500 TABLET, EXTENDED RELEASE ORAL DAILY
Qty: 30 TABLET | Refills: 3 | Status: SHIPPED | OUTPATIENT
Start: 2024-10-29

## 2024-11-14 ENCOUNTER — OFFICE VISIT (OUTPATIENT)
Dept: PRIMARY CARE CLINIC | Age: 89
End: 2024-11-14

## 2024-11-14 ENCOUNTER — HOSPITAL ENCOUNTER (OUTPATIENT)
Dept: NON INVASIVE DIAGNOSTICS | Age: 89
Discharge: HOME | End: 2024-11-14
Payer: MEDICARE

## 2024-11-14 VITALS
DIASTOLIC BLOOD PRESSURE: 60 MMHG | TEMPERATURE: 97.4 F | BODY MASS INDEX: 22.86 KG/M2 | OXYGEN SATURATION: 100 % | SYSTOLIC BLOOD PRESSURE: 140 MMHG | WEIGHT: 129 LBS | HEART RATE: 58 BPM

## 2024-11-14 DIAGNOSIS — R20.0 NUMBNESS AND TINGLING OF BOTH FEET: ICD-10-CM

## 2024-11-14 DIAGNOSIS — N18.32 STAGE 3B CHRONIC KIDNEY DISEASE (HCC): ICD-10-CM

## 2024-11-14 DIAGNOSIS — Z86.718 HISTORY OF DVT (DEEP VEIN THROMBOSIS): Primary | ICD-10-CM

## 2024-11-14 DIAGNOSIS — R20.2 NUMBNESS AND TINGLING OF BOTH FEET: ICD-10-CM

## 2024-11-14 DIAGNOSIS — R22.41 LOCALIZED SWELLING OF RIGHT LOWER LEG: ICD-10-CM

## 2024-11-14 DIAGNOSIS — Z95.828 PORT-A-CATH IN PLACE: ICD-10-CM

## 2024-11-14 DIAGNOSIS — Z86.718 HISTORY OF DVT (DEEP VEIN THROMBOSIS): ICD-10-CM

## 2024-11-14 DIAGNOSIS — Z45.2 ENCOUNTER FOR CARE RELATED TO PORT-A-CATH: ICD-10-CM

## 2024-11-14 PROCEDURE — 93971 EXTREMITY STUDY: CPT

## 2024-11-14 RX ORDER — PREGABALIN 100 MG/1
100 CAPSULE ORAL 2 TIMES DAILY
Qty: 60 CAPSULE | Refills: 0 | Status: CANCELLED | OUTPATIENT
Start: 2024-11-14 | End: 2024-12-14

## 2024-11-14 ASSESSMENT — ENCOUNTER SYMPTOMS
BACK PAIN: 1
GASTROINTESTINAL NEGATIVE: 1

## 2024-11-14 NOTE — PROGRESS NOTES
Venous Access Procedure Note  Indication: maintenance flush    Procedure: The patient was placed in the appropriate position and the skin over the puncture site was prepped with betadine and draped in a sterile fashion. Intravenous access was obtained in right chest port with a Bliss needle and the site was secured appropriately.  The port was flushed with 10cc NS and then 3 cc heparin flush.    The patient tolerated the procedure well.    Complications: None    
  SEBASTIÁN HARRY 8/14/2024 11:02 AM Reviewed PDMP [1]     Last Controlled Substance Monitoring Documentation      Flowsheet Row Office Visit from 8/14/2024 in [facility]   Periodic Controlled Substance Monitoring Possible medication side effects, risk of tolerance/dependence & alternative treatments discussed., No signs of potential drug abuse or diversion identified. filed at 08/14/2024 1102   Chronic Pain > 80 MEDD Obtained or confirmed \"Medication Contract\" on file. filed at 08/14/2024 1102          Urine Drug Screenings (1 yr)       POCT Rapid Drug Screen  Collected: 3/4/2020 11:37 AM (Final result)                  Medication Contract and Consent for Opioid Use Documents Filed       Patient Documents       Type of Document Status Date Received Received By Description    Medication Contract Signed 3/19/2019  2:29 PM [facility] neuro    Medication Contract Received 2/1/2021  1:35 PM EMMA BEVERLY Medication Contract Gabapentin                     Patient given educational materials -see patient instructions.  Discussed use, benefit, and side effects of prescribed medications.  All patient questions answered.  Pt voiced understanding. Reviewed health maintenance.  Instructed to continue currentmedications, diet and exercise.  Patient agreed with treatment plan. Follow up as directed.   MEDICATIONS:  No orders of the defined types were placed in this encounter.        ORDERS:  Orders Placed This Encounter   Procedures    Vascular duplex lower extremity venous right       Follow-up:  Return in about 6 months (around 5/14/2025).    PATIENT INSTRUCTIONS:  Patient Instructions   Continue with Dr. Nugent for your chronic pain management  Continue with cardiology  Electronically signed by EARLE Chavez CNP on 11/14/2024 at 12:06 PM    EMR Dragon/transcription disclaimer:  Much of thisencounter note is electronic transcription/translation of spoken language to printed texts.  The

## 2024-11-22 ENCOUNTER — OFFICE VISIT (OUTPATIENT)
Dept: PRIMARY CARE CLINIC | Age: 89
End: 2024-11-22

## 2024-11-22 VITALS
DIASTOLIC BLOOD PRESSURE: 72 MMHG | HEART RATE: 73 BPM | WEIGHT: 131 LBS | BODY MASS INDEX: 23.21 KG/M2 | SYSTOLIC BLOOD PRESSURE: 140 MMHG | TEMPERATURE: 97.1 F | OXYGEN SATURATION: 95 %

## 2024-11-22 DIAGNOSIS — Z86.718 HISTORY OF DVT (DEEP VEIN THROMBOSIS): ICD-10-CM

## 2024-11-22 DIAGNOSIS — M79.604 RIGHT LEG PAIN: Primary | ICD-10-CM

## 2024-11-29 RX ORDER — ACYCLOVIR 400 MG/1
TABLET ORAL
Qty: 180 TABLET | Refills: 3 | Status: SHIPPED | OUTPATIENT
Start: 2024-11-29

## 2024-11-29 RX ORDER — TRIAMCINOLONE ACETONIDE 1 MG/G
OINTMENT TOPICAL
Qty: 60 G | Refills: 1 | Status: SHIPPED | OUTPATIENT
Start: 2024-11-29

## 2024-12-18 ENCOUNTER — OFFICE VISIT (OUTPATIENT)
Dept: VASCULAR SURGERY | Age: 89
End: 2024-12-18
Payer: MEDICARE

## 2024-12-18 VITALS
DIASTOLIC BLOOD PRESSURE: 58 MMHG | WEIGHT: 125 LBS | HEART RATE: 70 BPM | OXYGEN SATURATION: 98 % | TEMPERATURE: 96.9 F | SYSTOLIC BLOOD PRESSURE: 122 MMHG | HEIGHT: 63 IN | RESPIRATION RATE: 16 BRPM | BODY MASS INDEX: 22.15 KG/M2

## 2024-12-18 DIAGNOSIS — Z86.718 HISTORY OF DVT (DEEP VEIN THROMBOSIS): ICD-10-CM

## 2024-12-18 DIAGNOSIS — M79.604 LEG PAIN, RIGHT: Primary | ICD-10-CM

## 2024-12-18 PROCEDURE — 99213 OFFICE O/P EST LOW 20 MIN: CPT | Performed by: NURSE PRACTITIONER

## 2024-12-18 PROCEDURE — 1123F ACP DISCUSS/DSCN MKR DOCD: CPT | Performed by: NURSE PRACTITIONER

## 2024-12-18 PROCEDURE — 1159F MED LIST DOCD IN RCRD: CPT | Performed by: NURSE PRACTITIONER

## 2024-12-18 NOTE — PROGRESS NOTES
Esther Schmidt (:  1935) is a 89 y.o. female,New patient, here for evaluation of the following chief complaint(s):  Leg Pain (Right leg pain )            SUBJECTIVE/OBJECTIVE:  Esther has a history of peripheral vascular disease of the lower extremities.  She reports her right leg jerks.  She has shooting pains in the leg.  She has constant right shin pain.  Has  hx of DDD and stimulator to control pain to the thigh.  She has had this for < 1 year. Current treatment includes asa.  Esther has not had new wounds. Recently, she reports no typical claudication.0 times per night.    I have personally reviewed the following: problem list, current meds, allergies, PMH, PSH, family hx, and social hx  Esther Schmidt is a 89 y.o. female with the following history as recorded in [facility]:  Patient Active Problem List    Diagnosis Date Noted    TIA (transient ischemic attack) 2022    Chest pain 2022    Postlaminectomy syndrome, thoracic region 2024    Cancer (HCC) 2024    Pain and swelling of left knee 2023    Ambulatory dysfunction 2023    Intractable pain 2023    Palliative care patient 2022    History of atrial fibrillation 2022    Iron deficiency anemia 2022    Type 2 diabetes mellitus 10/25/2021    Atrial fibrillation with RVR (HCC) 2021    History of esophageal stricture 2021    Depression 2021    Fungal dermatitis 2020    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 2020    Mixed simple and mucopurulent chronic bronchitis (HCC) 2020    Malignant neoplasm of colon (HCC) 2020    Hypochloremia 2019    CKD (chronic kidney disease) stage 3, GFR 30-59 ml/min (HCC) 2019    Pain in both lower extremities     Numbness and tingling of both feet     Avascular necrosis 2019    History of DVT (deep vein thrombosis) 2018    Port-A-Cath in place 2018    History of colon polyps

## 2024-12-26 ENCOUNTER — PROCEDURE VISIT (OUTPATIENT)
Dept: PRIMARY CARE CLINIC | Age: 89
End: 2024-12-26

## 2024-12-26 VITALS
HEART RATE: 73 BPM | WEIGHT: 127 LBS | OXYGEN SATURATION: 97 % | RESPIRATION RATE: 16 BRPM | BODY MASS INDEX: 22.5 KG/M2 | DIASTOLIC BLOOD PRESSURE: 70 MMHG | SYSTOLIC BLOOD PRESSURE: 132 MMHG | TEMPERATURE: 97.2 F | HEIGHT: 63 IN

## 2024-12-26 DIAGNOSIS — R10.9 LEFT FLANK PAIN: Primary | ICD-10-CM

## 2024-12-26 DIAGNOSIS — R82.998 LEUKOCYTES IN URINE: ICD-10-CM

## 2024-12-26 DIAGNOSIS — Z95.828 PORT-A-CATH IN PLACE: ICD-10-CM

## 2024-12-26 DIAGNOSIS — N18.32 STAGE 3B CHRONIC KIDNEY DISEASE (HCC): ICD-10-CM

## 2024-12-26 DIAGNOSIS — Z45.2 ENCOUNTER FOR CARE RELATED TO PORT-A-CATH: ICD-10-CM

## 2024-12-26 LAB
ALBUMIN SERPL-MCNC: 4 G/DL (ref 3.5–5.2)
ALP SERPL-CCNC: 122 U/L (ref 35–104)
ALT SERPL-CCNC: 9 U/L (ref 5–33)
ANION GAP SERPL CALCULATED.3IONS-SCNC: 13 MMOL/L (ref 7–19)
APPEARANCE FLUID: ABNORMAL
AST SERPL-CCNC: 16 U/L (ref 5–32)
BASOPHILS # BLD: 0.1 K/UL (ref 0–0.2)
BASOPHILS NFR BLD: 0.9 % (ref 0–1)
BILIRUB SERPL-MCNC: 0.4 MG/DL (ref 0.2–1.2)
BILIRUBIN, POC: ABNORMAL
BLOOD URINE, POC: ABNORMAL
BUN SERPL-MCNC: 23 MG/DL (ref 8–23)
CALCIUM SERPL-MCNC: 8.9 MG/DL (ref 8.8–10.2)
CHLORIDE SERPL-SCNC: 103 MMOL/L (ref 98–111)
CLARITY, POC: ABNORMAL
CO2 SERPL-SCNC: 25 MMOL/L (ref 22–29)
COLOR, POC: YELLOW
CREAT SERPL-MCNC: 0.8 MG/DL (ref 0.5–0.9)
EOSINOPHIL # BLD: 0.3 K/UL (ref 0–0.6)
EOSINOPHIL NFR BLD: 3.6 % (ref 0–5)
ERYTHROCYTE [DISTWIDTH] IN BLOOD BY AUTOMATED COUNT: 14.6 % (ref 11.5–14.5)
GLUCOSE SERPL-MCNC: 95 MG/DL (ref 70–99)
GLUCOSE URINE, POC: ABNORMAL MG/DL
HCT VFR BLD AUTO: 42.5 % (ref 37–47)
HGB BLD-MCNC: 13.4 G/DL (ref 12–16)
IMM GRANULOCYTES # BLD: 0 K/UL
KETONES, POC: ABNORMAL MG/DL
LEUKOCYTE EST, POC: ABNORMAL
LYMPHOCYTES # BLD: 2.3 K/UL (ref 1.1–4.5)
LYMPHOCYTES NFR BLD: 29.9 % (ref 20–40)
MCH RBC QN AUTO: 28.5 PG (ref 27–31)
MCHC RBC AUTO-ENTMCNC: 31.5 G/DL (ref 33–37)
MCV RBC AUTO: 90.2 FL (ref 81–99)
MONOCYTES # BLD: 0.6 K/UL (ref 0–0.9)
MONOCYTES NFR BLD: 7.7 % (ref 0–10)
NEUTROPHILS # BLD: 4.3 K/UL (ref 1.5–7.5)
NEUTS SEG NFR BLD: 57.8 % (ref 50–65)
NITRITE, POC: ABNORMAL
PH, POC: 6.5
PLATELET # BLD AUTO: 253 K/UL (ref 130–400)
PMV BLD AUTO: 10.4 FL (ref 9.4–12.3)
POTASSIUM SERPL-SCNC: 4.5 MMOL/L (ref 3.5–5)
PROT SERPL-MCNC: 6.9 G/DL (ref 6.4–8.3)
PROTEIN, POC: ABNORMAL MG/DL
RBC # BLD AUTO: 4.71 M/UL (ref 4.2–5.4)
SODIUM SERPL-SCNC: 141 MMOL/L (ref 136–145)
SPECIFIC GRAVITY, POC: 1015
UROBILINOGEN, POC: 0.2 MG/DL
WBC # BLD AUTO: 7.5 K/UL (ref 4.8–10.8)

## 2024-12-26 ASSESSMENT — ENCOUNTER SYMPTOMS
ABDOMINAL PAIN: 1
NAUSEA: 0
BLOOD IN STOOL: 0
DIARRHEA: 0
RECTAL PAIN: 0
EYES NEGATIVE: 1
CONSTIPATION: 0
RESPIRATORY NEGATIVE: 1
VOMITING: 0
ABDOMINAL DISTENTION: 0
ANAL BLEEDING: 0

## 2024-12-26 NOTE — PATIENT INSTRUCTIONS
We will do blood work today making sure there is nothing abnormal  Urine today  If still having the pain next week we could do a CT abdomen pelvis with contrast given your history of colon cancer.  Also would recommend that you make sure your bowels are moving every day and not getting constipated.

## 2024-12-26 NOTE — PROGRESS NOTES
Venous Access Procedure Note  Indication: maintenance flush    Procedure: The patient was placed in the appropriate position and the skin over the puncture site was prepped with betadine and draped in a sterile fashion. Intravenous access was obtained in right chest port with a Bliss needle and the site was secured appropriately. 3 cc of blood with withdrawn and discarded.  Blood was drawn for labsThe port was flushed with 10cc NS and then 3 cc heparin flush.    The patient tolerated the procedure well.    Complications: None    
Negative.    Skin: Negative.    Neurological: Negative.    Psychiatric/Behavioral: Negative.         Objective:     Physical Exam  Vitals and nursing note reviewed.   Constitutional:       Appearance: Normal appearance. She is well-developed.   HENT:      Head: Normocephalic.   Eyes:      Pupils: Pupils are equal, round, and reactive to light.   Cardiovascular:      Rate and Rhythm: Normal rate and regular rhythm.      Heart sounds: Normal heart sounds.   Pulmonary:      Effort: Pulmonary effort is normal.      Breath sounds: Normal breath sounds.   Abdominal:      General: Abdomen is flat. Bowel sounds are normal.      Palpations: Abdomen is soft.      Tenderness: There is abdominal tenderness in the left upper quadrant. There is left CVA tenderness. There is no right CVA tenderness, guarding or rebound. Negative signs include Bryson's sign, Rovsing's sign, McBurney's sign, psoas sign and obturator sign.   Musculoskeletal:      Cervical back: Normal range of motion.   Skin:     General: Skin is warm and dry.      Capillary Refill: Capillary refill takes less than 2 seconds.   Neurological:      General: No focal deficit present.      Mental Status: She is alert and oriented to person, place, and time. Mental status is at baseline.   Psychiatric:         Mood and Affect: Mood normal.         Behavior: Behavior normal.         Thought Content: Thought content normal.         Judgment: Judgment normal.       /70   Pulse 73   Temp 97.2 °F (36.2 °C) (Temporal)   Resp 16   Ht 1.6 m (5' 3\")   Wt 57.6 kg (127 lb)   SpO2 97%   BMI 22.50 kg/m²     Assessment:   Assessment & Plan    Diagnosis Orders   1. Left flank pain  POCT Urinalysis no Micro    CBC with Auto Differential    Comprehensive Metabolic Panel      2. Stage 3b chronic kidney disease (HCC)        3. Port-A-Cath in place        4. Encounter for care related to Port-a-Cath              Plan:   Total time spent today caring for this patient was 30

## 2024-12-28 LAB — BACTERIA UR CULT: NORMAL

## 2024-12-31 RX ORDER — OMEPRAZOLE 40 MG/1
40 CAPSULE, DELAYED RELEASE ORAL DAILY
Qty: 90 CAPSULE | Refills: 1 | Status: SHIPPED | OUTPATIENT
Start: 2024-12-31

## 2025-01-23 ENCOUNTER — NURSE ONLY (OUTPATIENT)
Dept: PRIMARY CARE CLINIC | Age: 89
End: 2025-01-23
Payer: MEDICARE

## 2025-01-23 DIAGNOSIS — R30.0 DYSURIA: ICD-10-CM

## 2025-01-23 DIAGNOSIS — R30.0 DYSURIA: Primary | ICD-10-CM

## 2025-01-23 LAB
APPEARANCE FLUID: ABNORMAL
BILIRUBIN, POC: ABNORMAL
BLOOD URINE, POC: ABNORMAL
CLARITY, POC: ABNORMAL
COLOR, POC: YELLOW
GLUCOSE URINE, POC: ABNORMAL MG/DL
KETONES, POC: ABNORMAL MG/DL
LEUKOCYTE EST, POC: ABNORMAL
NITRITE, POC: ABNORMAL
PH, POC: 6
PROTEIN, POC: ABNORMAL MG/DL
SPECIFIC GRAVITY, POC: 1.02
UROBILINOGEN, POC: 0.2 MG/DL

## 2025-01-23 PROCEDURE — 81002 URINALYSIS NONAUTO W/O SCOPE: CPT | Performed by: NURSE PRACTITIONER

## 2025-01-25 LAB — BACTERIA UR CULT: NORMAL

## 2025-01-28 ENCOUNTER — OFFICE VISIT (OUTPATIENT)
Dept: PRIMARY CARE CLINIC | Age: 89
End: 2025-01-28
Payer: MEDICARE

## 2025-01-28 VITALS
HEIGHT: 63 IN | WEIGHT: 129 LBS | OXYGEN SATURATION: 96 % | SYSTOLIC BLOOD PRESSURE: 164 MMHG | DIASTOLIC BLOOD PRESSURE: 96 MMHG | TEMPERATURE: 97.5 F | BODY MASS INDEX: 22.86 KG/M2 | HEART RATE: 84 BPM | RESPIRATION RATE: 16 BRPM

## 2025-01-28 DIAGNOSIS — R30.0 DYSURIA: ICD-10-CM

## 2025-01-28 DIAGNOSIS — R07.9 CHEST PAIN, UNSPECIFIED TYPE: ICD-10-CM

## 2025-01-28 DIAGNOSIS — I48.0 PAF (PAROXYSMAL ATRIAL FIBRILLATION) (HCC): ICD-10-CM

## 2025-01-28 DIAGNOSIS — R20.0 NUMBNESS AND TINGLING OF BOTH FEET: ICD-10-CM

## 2025-01-28 DIAGNOSIS — N18.32 STAGE 3B CHRONIC KIDNEY DISEASE (HCC): Primary | ICD-10-CM

## 2025-01-28 DIAGNOSIS — Z86.718 HISTORY OF DVT (DEEP VEIN THROMBOSIS): ICD-10-CM

## 2025-01-28 DIAGNOSIS — R06.02 SHORTNESS OF BREATH: ICD-10-CM

## 2025-01-28 DIAGNOSIS — Z85.038 H/O MALIGNANT NEOPLASM OF COLON: ICD-10-CM

## 2025-01-28 DIAGNOSIS — E11.69 TYPE 2 DIABETES MELLITUS WITH OTHER SPECIFIED COMPLICATION, WITHOUT LONG-TERM CURRENT USE OF INSULIN (HCC): ICD-10-CM

## 2025-01-28 DIAGNOSIS — R20.2 NUMBNESS AND TINGLING OF BOTH FEET: ICD-10-CM

## 2025-01-28 DIAGNOSIS — C18.9 MALIGNANT NEOPLASM OF COLON, UNSPECIFIED PART OF COLON (HCC): ICD-10-CM

## 2025-01-28 DIAGNOSIS — R06.01 ORTHOPNEA: ICD-10-CM

## 2025-01-28 DIAGNOSIS — J41.8 MIXED SIMPLE AND MUCOPURULENT CHRONIC BRONCHITIS (HCC): ICD-10-CM

## 2025-01-28 LAB
ALBUMIN SERPL-MCNC: 4 G/DL (ref 3.5–5.2)
ALP SERPL-CCNC: 126 U/L (ref 35–104)
ALT SERPL-CCNC: 10 U/L (ref 5–33)
ANION GAP SERPL CALCULATED.3IONS-SCNC: 15 MMOL/L (ref 8–16)
APPEARANCE FLUID: CLEAR
AST SERPL-CCNC: 16 U/L (ref 5–32)
BASOPHILS # BLD: 0.1 K/UL (ref 0–0.2)
BASOPHILS NFR BLD: 0.9 % (ref 0–1)
BILIRUB SERPL-MCNC: 0.3 MG/DL (ref 0.2–1.2)
BILIRUBIN, POC: NORMAL
BLOOD URINE, POC: NORMAL
BNP BLD-MCNC: 746 PG/ML (ref 0–449)
BUN SERPL-MCNC: 22 MG/DL (ref 8–23)
CALCIUM SERPL-MCNC: 9.6 MG/DL (ref 8.8–10.2)
CHLORIDE SERPL-SCNC: 97 MMOL/L (ref 98–107)
CLARITY, POC: CLEAR
CO2 SERPL-SCNC: 26 MMOL/L (ref 22–29)
COLOR, POC: YELLOW
CREAT SERPL-MCNC: 1 MG/DL (ref 0.5–0.9)
EOSINOPHIL # BLD: 0.5 K/UL (ref 0–0.6)
EOSINOPHIL NFR BLD: 6.1 % (ref 0–5)
ERYTHROCYTE [DISTWIDTH] IN BLOOD BY AUTOMATED COUNT: 14.6 % (ref 11.5–14.5)
GLUCOSE SERPL-MCNC: 107 MG/DL (ref 70–99)
GLUCOSE URINE, POC: NORMAL MG/DL
HCT VFR BLD AUTO: 45.9 % (ref 37–47)
HGB BLD-MCNC: 14.8 G/DL (ref 12–16)
IMM GRANULOCYTES # BLD: 0 K/UL
KETONES, POC: NORMAL MG/DL
LEUKOCYTE EST, POC: NORMAL
LYMPHOCYTES # BLD: 2.9 K/UL (ref 1.1–4.5)
LYMPHOCYTES NFR BLD: 38.2 % (ref 20–40)
MCH RBC QN AUTO: 28.5 PG (ref 27–31)
MCHC RBC AUTO-ENTMCNC: 32.2 G/DL (ref 33–37)
MCV RBC AUTO: 88.3 FL (ref 81–99)
MONOCYTES # BLD: 0.7 K/UL (ref 0–0.9)
MONOCYTES NFR BLD: 9.1 % (ref 0–10)
NEUTROPHILS # BLD: 3.5 K/UL (ref 1.5–7.5)
NEUTS SEG NFR BLD: 45.4 % (ref 50–65)
NITRITE, POC: NORMAL
PH, POC: 6
PLATELET # BLD AUTO: 303 K/UL (ref 130–400)
PMV BLD AUTO: 9.8 FL (ref 9.4–12.3)
POTASSIUM SERPL-SCNC: 4.7 MMOL/L (ref 3.5–5.1)
PROT SERPL-MCNC: 7.1 G/DL (ref 6.4–8.3)
PROTEIN, POC: NORMAL MG/DL
RBC # BLD AUTO: 5.2 M/UL (ref 4.2–5.4)
SODIUM SERPL-SCNC: 138 MMOL/L (ref 136–145)
SPECIFIC GRAVITY, POC: 1.01
TROPONIN, HIGH SENSITIVITY: 16 NG/L (ref 0–14)
UROBILINOGEN, POC: 0.2 MG/DL
WBC # BLD AUTO: 7.7 K/UL (ref 4.8–10.8)

## 2025-01-28 PROCEDURE — 81002 URINALYSIS NONAUTO W/O SCOPE: CPT | Performed by: FAMILY MEDICINE

## 2025-01-28 PROCEDURE — 93000 ELECTROCARDIOGRAM COMPLETE: CPT | Performed by: FAMILY MEDICINE

## 2025-01-28 PROCEDURE — 1159F MED LIST DOCD IN RCRD: CPT | Performed by: FAMILY MEDICINE

## 2025-01-28 PROCEDURE — 1123F ACP DISCUSS/DSCN MKR DOCD: CPT | Performed by: FAMILY MEDICINE

## 2025-01-28 RX ORDER — NEOMYCIN SULFATE, POLYMYXIN B SULFATE, AND DEXAMETHASONE 3.5; 10000; 1 MG/G; [USP'U]/G; MG/G
OINTMENT OPHTHALMIC
COMMUNITY
Start: 2025-01-23

## 2025-01-28 SDOH — ECONOMIC STABILITY: FOOD INSECURITY: WITHIN THE PAST 12 MONTHS, THE FOOD YOU BOUGHT JUST DIDN'T LAST AND YOU DIDN'T HAVE MONEY TO GET MORE.: NEVER TRUE

## 2025-01-28 SDOH — ECONOMIC STABILITY: FOOD INSECURITY: WITHIN THE PAST 12 MONTHS, YOU WORRIED THAT YOUR FOOD WOULD RUN OUT BEFORE YOU GOT MONEY TO BUY MORE.: NEVER TRUE

## 2025-01-28 ASSESSMENT — PATIENT HEALTH QUESTIONNAIRE - PHQ9
1. LITTLE INTEREST OR PLEASURE IN DOING THINGS: NOT AT ALL
SUM OF ALL RESPONSES TO PHQ QUESTIONS 1-9: 2
10. IF YOU CHECKED OFF ANY PROBLEMS, HOW DIFFICULT HAVE THESE PROBLEMS MADE IT FOR YOU TO DO YOUR WORK, TAKE CARE OF THINGS AT HOME, OR GET ALONG WITH OTHER PEOPLE: NOT DIFFICULT AT ALL
5. POOR APPETITE OR OVEREATING: NOT AT ALL
SUM OF ALL RESPONSES TO PHQ QUESTIONS 1-9: 2
4. FEELING TIRED OR HAVING LITTLE ENERGY: SEVERAL DAYS
6. FEELING BAD ABOUT YOURSELF - OR THAT YOU ARE A FAILURE OR HAVE LET YOURSELF OR YOUR FAMILY DOWN: NOT AT ALL
9. THOUGHTS THAT YOU WOULD BE BETTER OFF DEAD, OR OF HURTING YOURSELF: NOT AT ALL
3. TROUBLE FALLING OR STAYING ASLEEP: SEVERAL DAYS
7. TROUBLE CONCENTRATING ON THINGS, SUCH AS READING THE NEWSPAPER OR WATCHING TELEVISION: NOT AT ALL
8. MOVING OR SPEAKING SO SLOWLY THAT OTHER PEOPLE COULD HAVE NOTICED. OR THE OPPOSITE, BEING SO FIGETY OR RESTLESS THAT YOU HAVE BEEN MOVING AROUND A LOT MORE THAN USUAL: NOT AT ALL
2. FEELING DOWN, DEPRESSED OR HOPELESS: NOT AT ALL
SUM OF ALL RESPONSES TO PHQ QUESTIONS 1-9: 2
SUM OF ALL RESPONSES TO PHQ QUESTIONS 1-9: 2
SUM OF ALL RESPONSES TO PHQ9 QUESTIONS 1 & 2: 0

## 2025-01-28 ASSESSMENT — ENCOUNTER SYMPTOMS
EYE DISCHARGE: 0
NAUSEA: 0
CHEST TIGHTNESS: 1
SHORTNESS OF BREATH: 1
WHEEZING: 0
VOMITING: 0
BACK PAIN: 0
ABDOMINAL PAIN: 0
COUGH: 0
COLOR CHANGE: 0
DIARRHEA: 0

## 2025-01-28 NOTE — PROGRESS NOTES
SUBJECTIVE:    Patient ID: Esther Schmidt is a 89 y.o. female.    History of Present Illness  The patient presents for evaluation of hot flashes, bilateral lower extremity numbness, chest pain, shortness of breath, and colon cancer.    She reports a general feeling of malaise that began approximately 2 weeks ago, characterized by hot flashes and numbness in both legs extending up to the knees. She has not experienced any fevers, chills, or body aches. Her diet and hydration levels are satisfactory, and she maintains regular urination. She has been on Lyrica for an extended period without any recent changes in her medication regimen. She reports no symptoms of nausea, vomiting, or diarrhea. She experiences fatigue and occasional loss of appetite.    She also reports experiencing shortness of breath during physical exertion, such as when working in the kitchen, and chest pain that started concurrently with her other symptoms a few weeks ago. She has a history of atrial fibrillation but has never undergone stent placement or open-heart surgery.    She has a neurostimulator implanted since June 2024, which was adjusted 1 month ago. She has a history of a blood clot in her left leg and a superficial clot in her right leg. She reports no history of pulmonary embolism.    She has a history of colon cancer and has not had a scan in a long time. She does not see oncology anymore. She had part of her colon removed. She will be doing a port flush in 2 or 3 weeks.    Her blood pressure was elevated during today's visit, although she has recorded low readings at home over the past few days, averaging around 98/60. She notes some swelling in her legs.          Past Medical History:   Diagnosis Date    Afib (HCC) 2021    Bronchitis     Colon cancer (HCC)     Colon polyps     Constipation     Deep vein blood clot of left lower extremity (HCC)     Left leg     Depression     DVT (deep venous thrombosis) (HCC)     Dysphagia

## 2025-01-29 RX ORDER — TRIAMCINOLONE ACETONIDE 1 MG/G
OINTMENT TOPICAL
Qty: 60 G | Refills: 1 | Status: SHIPPED | OUTPATIENT
Start: 2025-01-29

## 2025-01-30 LAB
BACTERIA UR CULT: ABNORMAL
BACTERIA UR CULT: ABNORMAL
ORGANISM: ABNORMAL

## 2025-02-03 RX ORDER — METFORMIN HYDROCHLORIDE 500 MG/1
500 TABLET, EXTENDED RELEASE ORAL DAILY
Qty: 30 TABLET | Refills: 3 | Status: SHIPPED | OUTPATIENT
Start: 2025-02-03

## 2025-02-10 ENCOUNTER — HOSPITAL ENCOUNTER (OUTPATIENT)
Age: 89
Discharge: HOME | End: 2025-02-12
Payer: MEDICARE

## 2025-02-10 VITALS
HEIGHT: 63 IN | WEIGHT: 128 LBS | SYSTOLIC BLOOD PRESSURE: 134 MMHG | BODY MASS INDEX: 22.68 KG/M2 | DIASTOLIC BLOOD PRESSURE: 59 MMHG

## 2025-02-10 DIAGNOSIS — R06.01 ORTHOPNEA: ICD-10-CM

## 2025-02-10 DIAGNOSIS — I48.0 PAF (PAROXYSMAL ATRIAL FIBRILLATION) (HCC): ICD-10-CM

## 2025-02-10 DIAGNOSIS — R06.02 SHORTNESS OF BREATH: ICD-10-CM

## 2025-02-10 LAB
ECHO AO ASC DIAM: 2.8 CM
ECHO AO ASCENDING AORTA INDEX: 1.75 CM/M2
ECHO AO ROOT DIAM: 1.8 CM
ECHO AO ROOT INDEX: 1.13 CM/M2
ECHO AO SINUS VALSALVA DIAM: 2.6 CM
ECHO AO SINUS VALSALVA INDEX: 1.63 CM/M2
ECHO AO ST JNCT DIAM: 2.4 CM
ECHO AV AREA PEAK VELOCITY: 1.1 CM2
ECHO AV AREA VTI: 1.3 CM2
ECHO AV AREA/BSA PEAK VELOCITY: 0.7 CM2/M2
ECHO AV AREA/BSA VTI: 0.8 CM2/M2
ECHO AV MEAN GRADIENT: 8 MMHG
ECHO AV MEAN VELOCITY: 1.3 M/S
ECHO AV PEAK GRADIENT: 13 MMHG
ECHO AV PEAK VELOCITY: 1.8 M/S
ECHO AV VELOCITY RATIO: 0.44
ECHO AV VTI: 37.3 CM
ECHO BSA: 1.61 M2
ECHO EST RA PRESSURE: 3 MMHG
ECHO IVC PROX: 2 CM
ECHO LA AREA 2C: 19.4 CM2
ECHO LA AREA 4C: 16.9 CM2
ECHO LA DIAMETER INDEX: 2.44 CM/M2
ECHO LA DIAMETER: 3.9 CM
ECHO LA MAJOR AXIS: 5.7 CM
ECHO LA MINOR AXIS: 5.7 CM
ECHO LA TO AORTIC ROOT RATIO: 2.17
ECHO LA VOL BP: 48 ML (ref 22–52)
ECHO LA VOL MOD A2C: 56 ML (ref 22–52)
ECHO LA VOL MOD A4C: 42 ML (ref 22–52)
ECHO LA VOL/BSA BIPLANE: 30 ML/M2 (ref 16–34)
ECHO LA VOLUME INDEX MOD A2C: 35 ML/M2 (ref 16–34)
ECHO LA VOLUME INDEX MOD A4C: 26 ML/M2 (ref 16–34)
ECHO LV E' LATERAL VELOCITY: 7.72 CM/S
ECHO LV E' SEPTAL VELOCITY: 5.11 CM/S
ECHO LV EDV A2C: 63 ML
ECHO LV EDV A4C: 70 ML
ECHO LV EDV INDEX A4C: 44 ML/M2
ECHO LV EDV NDEX A2C: 39 ML/M2
ECHO LV EJECTION FRACTION A2C: 50 %
ECHO LV EJECTION FRACTION A4C: 51 %
ECHO LV EJECTION FRACTION BIPLANE: 50 % (ref 55–100)
ECHO LV ESV A2C: 32 ML
ECHO LV ESV A4C: 34 ML
ECHO LV ESV INDEX A2C: 20 ML/M2
ECHO LV ESV INDEX A4C: 21 ML/M2
ECHO LV FRACTIONAL SHORTENING: 38 % (ref 28–44)
ECHO LV INTERNAL DIMENSION DIASTOLE INDEX: 2.13 CM/M2
ECHO LV INTERNAL DIMENSION DIASTOLIC: 3.4 CM (ref 3.9–5.3)
ECHO LV INTERNAL DIMENSION SYSTOLIC INDEX: 1.31 CM/M2
ECHO LV INTERNAL DIMENSION SYSTOLIC: 2.1 CM
ECHO LV ISOVOLUMETRIC RELAXATION TIME (IVRT): 92 MS
ECHO LV IVSD: 1.1 CM (ref 0.6–0.9)
ECHO LV MASS 2D: 122 G (ref 67–162)
ECHO LV MASS INDEX 2D: 76.2 G/M2 (ref 43–95)
ECHO LV POSTERIOR WALL DIASTOLIC: 1.2 CM (ref 0.6–0.9)
ECHO LV RELATIVE WALL THICKNESS RATIO: 0.71
ECHO LVOT AREA: 2.5 CM2
ECHO LVOT AV VTI INDEX: 0.51
ECHO LVOT DIAM: 1.8 CM
ECHO LVOT MEAN GRADIENT: 1 MMHG
ECHO LVOT PEAK GRADIENT: 3 MMHG
ECHO LVOT PEAK VELOCITY: 0.8 M/S
ECHO LVOT STROKE VOLUME INDEX: 30 ML/M2
ECHO LVOT SV: 48.1 ML
ECHO LVOT VTI: 18.9 CM
ECHO MV A VELOCITY: 0.43 M/S
ECHO MV ANNULUS DIAMETER: 2.5 CM
ECHO MV AREA VTI: 2 CM2
ECHO MV E DECELERATION TIME (DT): 306 MS
ECHO MV E VELOCITY: 1.12 M/S
ECHO MV E/A RATIO: 2.6
ECHO MV E/E' LATERAL: 14.51
ECHO MV E/E' RATIO (AVERAGED): 18.21
ECHO MV E/E' SEPTAL: 21.92
ECHO MV LVOT VTI INDEX: 1.26
ECHO MV MAX VELOCITY: 0.9 M/S
ECHO MV MEAN GRADIENT: 2 MMHG
ECHO MV MEAN VELOCITY: 0.6 M/S
ECHO MV PEAK GRADIENT: 3 MMHG
ECHO MV VTI: 23.8 CM
ECHO RA AREA 4C: 12.4 CM2
ECHO RA END SYSTOLIC VOLUME APICAL 4 CHAMBER INDEX BSA: 18 ML/M2
ECHO RA MAJOR AXIS INDEX: 3 CM/M2
ECHO RA MAJOR AXIS: 4.8 CM
ECHO RA MINOR AXIS INDEX: 2 CM/M2
ECHO RA MINOR AXIS: 3.2 CM
ECHO RA VOLUME: 29 ML
ECHO RIGHT VENTRICULAR SYSTOLIC PRESSURE (RVSP): 26 MMHG
ECHO RV BASAL DIMENSION: 2.6 CM
ECHO RV INTERNAL DIMENSION: 3.4 CM
ECHO RV LONGITUDINAL DIMENSION: 8.2 CM
ECHO RV MID DIMENSION: 3.1 CM
ECHO RV TAPSE: 1.7 CM (ref 1.7–?)
ECHO TV REGURGITANT MAX VELOCITY: 2.42 M/S
ECHO TV REGURGITANT PEAK GRADIENT: 23 MMHG

## 2025-02-10 PROCEDURE — 6360000004 HC RX CONTRAST MEDICATION: Performed by: FAMILY MEDICINE

## 2025-02-10 PROCEDURE — C8929 TTE W OR WO FOL WCON,DOPPLER: HCPCS

## 2025-02-10 RX ADMIN — SULFUR HEXAFLUORIDE 2 ML: KIT at 11:43

## 2025-02-11 NOTE — RESULT ENCOUNTER NOTE
Patient with known chronic valvular heart disease, largely stable.  Continue conservative treatment plan.

## 2025-02-17 ENCOUNTER — OFFICE VISIT (OUTPATIENT)
Dept: PRIMARY CARE CLINIC | Age: 89
End: 2025-02-17
Payer: MEDICARE

## 2025-02-17 VITALS
SYSTOLIC BLOOD PRESSURE: 118 MMHG | HEART RATE: 70 BPM | HEIGHT: 63 IN | BODY MASS INDEX: 23.04 KG/M2 | OXYGEN SATURATION: 95 % | TEMPERATURE: 98.3 F | WEIGHT: 130 LBS | DIASTOLIC BLOOD PRESSURE: 72 MMHG

## 2025-02-17 DIAGNOSIS — R53.83 MALAISE AND FATIGUE: ICD-10-CM

## 2025-02-17 DIAGNOSIS — I48.0 PAF (PAROXYSMAL ATRIAL FIBRILLATION) (HCC): ICD-10-CM

## 2025-02-17 DIAGNOSIS — R53.81 MALAISE AND FATIGUE: ICD-10-CM

## 2025-02-17 DIAGNOSIS — N18.32 STAGE 3B CHRONIC KIDNEY DISEASE (HCC): ICD-10-CM

## 2025-02-17 DIAGNOSIS — E11.69 TYPE 2 DIABETES MELLITUS WITH OTHER SPECIFIED COMPLICATION, WITHOUT LONG-TERM CURRENT USE OF INSULIN (HCC): ICD-10-CM

## 2025-02-17 DIAGNOSIS — R06.00 DYSPNEA, UNSPECIFIED TYPE: ICD-10-CM

## 2025-02-17 DIAGNOSIS — Z00.00 MEDICARE ANNUAL WELLNESS VISIT, SUBSEQUENT: Primary | ICD-10-CM

## 2025-02-17 DIAGNOSIS — Z85.038 H/O MALIGNANT NEOPLASM OF COLON: ICD-10-CM

## 2025-02-17 LAB
ALBUMIN SERPL-MCNC: 4 G/DL (ref 3.5–5.2)
ALP SERPL-CCNC: 121 U/L (ref 35–104)
ALT SERPL-CCNC: 10 U/L (ref 5–33)
ANION GAP SERPL CALCULATED.3IONS-SCNC: 11 MMOL/L (ref 8–16)
APPEARANCE FLUID: CLEAR
AST SERPL-CCNC: 16 U/L (ref 5–32)
BASOPHILS # BLD: 0.1 K/UL (ref 0–0.2)
BASOPHILS NFR BLD: 1 % (ref 0–1)
BILIRUB SERPL-MCNC: 0.3 MG/DL (ref 0.2–1.2)
BILIRUBIN, POC: NORMAL
BLOOD URINE, POC: NORMAL
BNP BLD-MCNC: 246 PG/ML (ref 0–449)
BUN SERPL-MCNC: 21 MG/DL (ref 8–23)
CALCIUM SERPL-MCNC: 9.4 MG/DL (ref 8.8–10.2)
CHLORIDE SERPL-SCNC: 102 MMOL/L (ref 98–107)
CLARITY, POC: CLEAR
CO2 SERPL-SCNC: 27 MMOL/L (ref 22–29)
COLOR, POC: YELLOW
CREAT SERPL-MCNC: 0.8 MG/DL (ref 0.5–0.9)
EOSINOPHIL # BLD: 0.5 K/UL (ref 0–0.6)
EOSINOPHIL NFR BLD: 7.4 % (ref 0–5)
ERYTHROCYTE [DISTWIDTH] IN BLOOD BY AUTOMATED COUNT: 14.7 % (ref 11.5–14.5)
GLUCOSE SERPL-MCNC: 95 MG/DL (ref 70–99)
GLUCOSE URINE, POC: NORMAL MG/DL
HCT VFR BLD AUTO: 43.2 % (ref 37–47)
HGB BLD-MCNC: 13.5 G/DL (ref 12–16)
IMM GRANULOCYTES # BLD: 0 K/UL
KETONES, POC: NORMAL MG/DL
LEUKOCYTE EST, POC: NORMAL
LYMPHOCYTES # BLD: 2.1 K/UL (ref 1.1–4.5)
LYMPHOCYTES NFR BLD: 34.4 % (ref 20–40)
MCH RBC QN AUTO: 28.4 PG (ref 27–31)
MCHC RBC AUTO-ENTMCNC: 31.3 G/DL (ref 33–37)
MCV RBC AUTO: 90.8 FL (ref 81–99)
MONOCYTES # BLD: 0.6 K/UL (ref 0–0.9)
MONOCYTES NFR BLD: 9 % (ref 0–10)
NEUTROPHILS # BLD: 3 K/UL (ref 1.5–7.5)
NEUTS SEG NFR BLD: 48 % (ref 50–65)
NITRITE, POC: NORMAL
PH, POC: 5.5
PLATELET # BLD AUTO: 280 K/UL (ref 130–400)
PMV BLD AUTO: 9.5 FL (ref 9.4–12.3)
POTASSIUM SERPL-SCNC: 4.6 MMOL/L (ref 3.5–5.1)
PROT SERPL-MCNC: 7 G/DL (ref 6.4–8.3)
PROTEIN, POC: NORMAL MG/DL
RBC # BLD AUTO: 4.76 M/UL (ref 4.2–5.4)
SODIUM SERPL-SCNC: 140 MMOL/L (ref 136–145)
SPECIFIC GRAVITY, POC: 1.02
UROBILINOGEN, POC: NORMAL MG/DL
WBC # BLD AUTO: 6.2 K/UL (ref 4.8–10.8)

## 2025-02-17 PROCEDURE — 1159F MED LIST DOCD IN RCRD: CPT | Performed by: FAMILY MEDICINE

## 2025-02-17 PROCEDURE — G0439 PPPS, SUBSEQ VISIT: HCPCS | Performed by: FAMILY MEDICINE

## 2025-02-17 PROCEDURE — 99214 OFFICE O/P EST MOD 30 MIN: CPT | Performed by: FAMILY MEDICINE

## 2025-02-17 PROCEDURE — 81002 URINALYSIS NONAUTO W/O SCOPE: CPT | Performed by: FAMILY MEDICINE

## 2025-02-17 PROCEDURE — 1123F ACP DISCUSS/DSCN MKR DOCD: CPT | Performed by: FAMILY MEDICINE

## 2025-02-17 ASSESSMENT — PATIENT HEALTH QUESTIONNAIRE - PHQ9
4. FEELING TIRED OR HAVING LITTLE ENERGY: SEVERAL DAYS
SUM OF ALL RESPONSES TO PHQ9 QUESTIONS 1 & 2: 0
SUM OF ALL RESPONSES TO PHQ QUESTIONS 1-9: 1
1. LITTLE INTEREST OR PLEASURE IN DOING THINGS: NOT AT ALL
5. POOR APPETITE OR OVEREATING: NOT AT ALL
SUM OF ALL RESPONSES TO PHQ QUESTIONS 1-9: 1
8. MOVING OR SPEAKING SO SLOWLY THAT OTHER PEOPLE COULD HAVE NOTICED. OR THE OPPOSITE, BEING SO FIGETY OR RESTLESS THAT YOU HAVE BEEN MOVING AROUND A LOT MORE THAN USUAL: NOT AT ALL
3. TROUBLE FALLING OR STAYING ASLEEP: NOT AT ALL
SUM OF ALL RESPONSES TO PHQ QUESTIONS 1-9: 1
SUM OF ALL RESPONSES TO PHQ QUESTIONS 1-9: 1
9. THOUGHTS THAT YOU WOULD BE BETTER OFF DEAD, OR OF HURTING YOURSELF: NOT AT ALL
10. IF YOU CHECKED OFF ANY PROBLEMS, HOW DIFFICULT HAVE THESE PROBLEMS MADE IT FOR YOU TO DO YOUR WORK, TAKE CARE OF THINGS AT HOME, OR GET ALONG WITH OTHER PEOPLE: NOT DIFFICULT AT ALL
2. FEELING DOWN, DEPRESSED OR HOPELESS: NOT AT ALL
6. FEELING BAD ABOUT YOURSELF - OR THAT YOU ARE A FAILURE OR HAVE LET YOURSELF OR YOUR FAMILY DOWN: NOT AT ALL
7. TROUBLE CONCENTRATING ON THINGS, SUCH AS READING THE NEWSPAPER OR WATCHING TELEVISION: NOT AT ALL

## 2025-02-17 NOTE — PROGRESS NOTES
Authorizing Provider   metFORMIN (GLUCOPHAGE-XR) 500 MG extended release tablet TAKE 1 TABLET BY MOUTH DAILY Yes Alcira Guzman MD   triamcinolone (KENALOG) 0.1 % ointment APPLY TOPICALLY TO LOWER LEG TWICE DAILY FOR 14 DAYS. MAY REPEAT 1 TIME A MONTH AS NEEDED Yes Alcira Guzman MD   neomycin-polymyxin-dexameth 3.5-32768-0.1 OINT APPLY A SMALL AMOUNT TO THE AFFECTED AREA OF EYE TWICE DAILY FOR 1 WEEK THEN EVERY DAY FOR 1 WEEK AS DIRECTED Yes ProviderRoberta MD   omeprazole (PRILOSEC) 40 MG delayed release capsule TAKE 1 CAPSULE BY MOUTH DAILY Yes Liliana Ross APRN - NP   acyclovir (ZOVIRAX) 400 MG tablet TAKE 1 TABLET BY MOUTH EVERY 4 HOURS WHILE AWAKE Yes Jayna Lowery APRN - CNP   amLODIPine (NORVASC) 10 MG tablet Take 1 tablet by mouth daily Yes Cristiane Black APRN   betamethasone valerate (VALISONE) 0.1 % lotion Apply topically 2 times daily. Yes Jayna Lowery APRN - CNP   HYDROcodone-acetaminophen (NORCO)  MG per tablet Take 1 tablet by mouth every 8 hours as needed. Yes Roberta Cox MD   aspirin 81 MG EC tablet Take 1 tablet by mouth every other day Yes Nayan Licea MD   DULoxetine (CYMBALTA) 60 MG extended release capsule Take 1 capsule by mouth daily Yes Liliana Ross APRN - NP   ciprofloxacin (CILOXAN) 0.3 % ophthalmic solution Place 1 drop into the right eye in the morning and 1 drop in the evening. Yes ProviderRoberta MD   pregabalin (LYRICA) 100 MG capsule Take 1 capsule by mouth 2 times daily for 30 days. Max Daily Amount: 200 mg Yes Jayna Lowery APRN - CNP       CareTeam (Including outside providers/suppliers regularly involved in providing care):   Patient Care Team:  Alcira Guzman MD as PCP - General (Family Medicine)  Alcira Guzman MD as PCP - Empaneled Provider  Beverly Caballero APRN as Advanced Practice Nurse (Neurology)  Jayna Lowery APRN - CNP (Nurse Practitioner)  Susanna Padron, APRN

## 2025-02-17 NOTE — PATIENT INSTRUCTIONS
Learning About Being Active as an Older Adult  Why is being active important as you get older?     Being active is one of the best things you can do for your health. And it's never too late to start. Being active--or getting active, if you aren't already--has definite benefits. It can:  Give you more energy,  Keep your mind sharp.  Improve balance to reduce your risk of falls.  Help you manage chronic illness with fewer medicines.  No matter how old you are, how fit you are, or what health problems you have, there is a form of activity that will work for you. And the more physical activity you can do, the better your overall health will be.  What kinds of activity can help you stay healthy?  Being more active will make your daily activities easier. Physical activity includes planned exercise and things you do in daily life. There are four types of activity:  Aerobic.  Doing aerobic activity makes your heart and lungs strong.  Includes walking, dancing, and gardening.  Aim for at least 2½ hours spread throughout the week.  It improves your energy and can help you sleep better.  Muscle-strengthening.  This type of activity can help maintain muscle and strengthen bones.  Includes climbing stairs, using resistance bands, and lifting or carrying heavy loads.  Aim for at least twice a week.  It can help protect the knees and other joints.  Stretching.  Stretching gives you better range of motion in joints and muscles.  Includes upper arm stretches, calf stretches, and gentle yoga.  Aim for at least twice a week, preferably after your muscles are warmed up from other activities.  It can help you function better in daily life.  Balancing.  This helps you stay coordinated and have good posture.  Includes heel-to-toe walking, tamika chi, and certain types of yoga.  Aim for at least 3 days a week.  It can reduce your risk of falling.  Even if you have a hard time meeting the recommendations, it's better to be more active

## 2025-02-19 LAB — BACTERIA UR CULT: NORMAL

## 2025-03-03 RX ORDER — DULOXETIN HYDROCHLORIDE 60 MG/1
60 CAPSULE, DELAYED RELEASE ORAL DAILY
Qty: 90 CAPSULE | Refills: 3 | Status: SHIPPED | OUTPATIENT
Start: 2025-03-03

## 2025-03-06 ENCOUNTER — OFFICE VISIT (OUTPATIENT)
Dept: CARDIOLOGY CLINIC | Age: 89
End: 2025-03-06

## 2025-03-06 VITALS
HEART RATE: 70 BPM | OXYGEN SATURATION: 96 % | WEIGHT: 131 LBS | BODY MASS INDEX: 23.21 KG/M2 | SYSTOLIC BLOOD PRESSURE: 118 MMHG | HEIGHT: 63 IN | DIASTOLIC BLOOD PRESSURE: 62 MMHG

## 2025-03-06 DIAGNOSIS — R09.89 SUSPECTED DVT (DEEP VEIN THROMBOSIS): Primary | ICD-10-CM

## 2025-03-06 RX ORDER — AMLODIPINE BESYLATE 2.5 MG/1
2.5 TABLET ORAL EVERY EVENING
Qty: 30 TABLET | Refills: 3 | Status: SHIPPED | OUTPATIENT
Start: 2025-03-06 | End: 2025-04-05

## 2025-03-06 RX ORDER — AMLODIPINE BESYLATE 5 MG/1
5 TABLET ORAL EVERY MORNING
Qty: 90 TABLET | Refills: 3 | Status: SHIPPED | OUTPATIENT
Start: 2025-03-06 | End: 2025-04-05

## 2025-03-06 NOTE — PROGRESS NOTES
0.03 ng/mL Final     Comment:     <0.030 ng/ml       No measurable cardiac damage.    0.030-0.099 ng/ml  Values of troponin in this range suggest  possible myocardial damage. Repeat assay  4 to 6 hours after the current specimen.    >= 0.100 ng/ml     Indicative of myocardial damage.  Recommend continued monitoring of  patient status and cardiac markers.        LIPID PANEL: No results found for: \"LIPIDPAN\"  LIVER PROFILE:   AST   Date Value Ref Range Status   02/17/2025 16 5 - 32 U/L Final     ALT   Date Value Ref Range Status   02/17/2025 10 5 - 33 U/L Final              ASSESSMENT and PLAN:    Left lower extremity pain and edema  Venous duplex Doppler lower extremity venous, left    Chronic essential hypertension, uncontrolled  Goal blood pressure less than 130/80, at goal  Adjust amlodipine, 5 mg a.m., 2.5 mg every afternoon  Home BP monitoring  Low-sodium diet, 2 g per 24 hours     Paroxysmal atrial fibrillation  Chronic anticoagulation therapy  Course complicated by symptomatic bradycardia, stable off Cardizem and metoprolol  No new reports of bleeding or falls.  Dose of Eliquis.     Chronic valvular heart disease  Mild to moderate mitral regurgitation.  Normal ejection fraction.  RVSP 39 mmHg.  Surveillance monitoring of structural heart disease by echocardiogram in 2 years or earlier should her clinical condition change. Last stress test negative for inducible ischemia.    History of recurrent DVT  On chronic anticoagulation therapy    History of transient ischemic attack  TIA occurred on Xarelto, aspirin added later  Eliquis, dose adjusted for age and weight    Chronic dyslipidemia, goal LDL less than 100  Statin discontinued due to complain of memory impairment which has improved off the drug.       Noninsulin-dependent diabetes mellitus  Last known hemoglobin A1c 6.2  Currently on metformin, managed by primary care provider     History of COVID-19 infection, resolved                  Electronically signed

## 2025-03-07 ENCOUNTER — TELEPHONE (OUTPATIENT)
Dept: CARDIOLOGY CLINIC | Age: 89
End: 2025-03-07

## 2025-03-07 ENCOUNTER — HOSPITAL ENCOUNTER (OUTPATIENT)
Dept: VASCULAR LAB | Age: 89
Discharge: HOME | End: 2025-03-09
Attending: INTERNAL MEDICINE
Payer: MEDICARE

## 2025-03-07 DIAGNOSIS — M79.606 PAIN OF LOWER EXTREMITY, UNSPECIFIED LATERALITY: ICD-10-CM

## 2025-03-07 DIAGNOSIS — R20.0 NUMBNESS: ICD-10-CM

## 2025-03-07 DIAGNOSIS — Z86.718 PERSONAL HISTORY OF DVT (DEEP VEIN THROMBOSIS): Primary | ICD-10-CM

## 2025-03-07 DIAGNOSIS — R06.02 SHORTNESS OF BREATH: ICD-10-CM

## 2025-03-07 DIAGNOSIS — R09.89 SUSPECTED DVT (DEEP VEIN THROMBOSIS): ICD-10-CM

## 2025-03-07 DIAGNOSIS — Z86.718 PERSONAL HISTORY OF DVT (DEEP VEIN THROMBOSIS): ICD-10-CM

## 2025-03-07 PROCEDURE — 93971 EXTREMITY STUDY: CPT

## 2025-03-07 NOTE — TELEPHONE ENCOUNTER
I I have called and spoke to patient as I have been trying so hard to get this test scheduled since yesterday. The code was wrong at check out and I couldn't get it in there. We are waiting on Dr. Benton to put a diagnoses code in so I can actually get it scheduled. 3/7/25 AH

## 2025-03-07 NOTE — TELEPHONE ENCOUNTER
Patient called stating she saw Dr. Benton yesterday and she was wanting her to have vascular scan and wanted her to have it soon. I didn't see testing scheduled, transferred her to scheduling. I wasn't sure if you were working on this and could follow up on testing.

## 2025-03-09 PROCEDURE — 93971 EXTREMITY STUDY: CPT | Performed by: SURGERY

## 2025-03-11 ENCOUNTER — RESULTS FOLLOW-UP (OUTPATIENT)
Dept: VASCULAR LAB | Age: 89
End: 2025-03-11

## 2025-03-11 NOTE — RESULT ENCOUNTER NOTE
No DVT of left lower extremity.  This is reassuring.  Recommend leg elevation at nights and compression stockings during the daytime.

## 2025-03-19 ASSESSMENT — ENCOUNTER SYMPTOMS
SHORTNESS OF BREATH: 0
CHEST TIGHTNESS: 0
COUGH: 0
ABDOMINAL DISTENTION: 0
BLOOD IN STOOL: 0
EYE REDNESS: 0
DIARRHEA: 0
APNEA: 0
WHEEZING: 0
NAUSEA: 0
SORE THROAT: 0
EYE DISCHARGE: 0
EYE PAIN: 0
FACIAL SWELLING: 0
CONSTIPATION: 0
ABDOMINAL PAIN: 0
VOMITING: 0

## 2025-03-31 ENCOUNTER — OFFICE VISIT (OUTPATIENT)
Dept: PRIMARY CARE CLINIC | Age: 89
End: 2025-03-31
Payer: MEDICARE

## 2025-03-31 VITALS
DIASTOLIC BLOOD PRESSURE: 80 MMHG | WEIGHT: 130 LBS | SYSTOLIC BLOOD PRESSURE: 130 MMHG | OXYGEN SATURATION: 100 % | HEIGHT: 63 IN | RESPIRATION RATE: 16 BRPM | TEMPERATURE: 97.1 F | BODY MASS INDEX: 23.04 KG/M2 | HEART RATE: 70 BPM

## 2025-03-31 DIAGNOSIS — R25.2 LEG CRAMPS: Primary | ICD-10-CM

## 2025-03-31 DIAGNOSIS — Z45.2 ENCOUNTER FOR CARE RELATED TO PORT-A-CATH: ICD-10-CM

## 2025-03-31 DIAGNOSIS — N18.32 STAGE 3B CHRONIC KIDNEY DISEASE (HCC): ICD-10-CM

## 2025-03-31 PROCEDURE — 96523 IRRIG DRUG DELIVERY DEVICE: CPT | Performed by: NURSE PRACTITIONER

## 2025-03-31 PROCEDURE — 99213 OFFICE O/P EST LOW 20 MIN: CPT | Performed by: NURSE PRACTITIONER

## 2025-03-31 PROCEDURE — 1159F MED LIST DOCD IN RCRD: CPT | Performed by: NURSE PRACTITIONER

## 2025-03-31 PROCEDURE — 1123F ACP DISCUSS/DSCN MKR DOCD: CPT | Performed by: NURSE PRACTITIONER

## 2025-03-31 SDOH — ECONOMIC STABILITY: FOOD INSECURITY: WITHIN THE PAST 12 MONTHS, THE FOOD YOU BOUGHT JUST DIDN'T LAST AND YOU DIDN'T HAVE MONEY TO GET MORE.: NEVER TRUE

## 2025-03-31 SDOH — ECONOMIC STABILITY: FOOD INSECURITY: WITHIN THE PAST 12 MONTHS, YOU WORRIED THAT YOUR FOOD WOULD RUN OUT BEFORE YOU GOT MONEY TO BUY MORE.: NEVER TRUE

## 2025-03-31 ASSESSMENT — PATIENT HEALTH QUESTIONNAIRE - PHQ9
SUM OF ALL RESPONSES TO PHQ QUESTIONS 1-9: 0
8. MOVING OR SPEAKING SO SLOWLY THAT OTHER PEOPLE COULD HAVE NOTICED. OR THE OPPOSITE, BEING SO FIGETY OR RESTLESS THAT YOU HAVE BEEN MOVING AROUND A LOT MORE THAN USUAL: NOT AT ALL
10. IF YOU CHECKED OFF ANY PROBLEMS, HOW DIFFICULT HAVE THESE PROBLEMS MADE IT FOR YOU TO DO YOUR WORK, TAKE CARE OF THINGS AT HOME, OR GET ALONG WITH OTHER PEOPLE: NOT DIFFICULT AT ALL
2. FEELING DOWN, DEPRESSED OR HOPELESS: NOT AT ALL
5. POOR APPETITE OR OVEREATING: NOT AT ALL
1. LITTLE INTEREST OR PLEASURE IN DOING THINGS: NOT AT ALL
SUM OF ALL RESPONSES TO PHQ QUESTIONS 1-9: 0
6. FEELING BAD ABOUT YOURSELF - OR THAT YOU ARE A FAILURE OR HAVE LET YOURSELF OR YOUR FAMILY DOWN: NOT AT ALL
4. FEELING TIRED OR HAVING LITTLE ENERGY: NOT AT ALL
3. TROUBLE FALLING OR STAYING ASLEEP: NOT AT ALL
SUM OF ALL RESPONSES TO PHQ QUESTIONS 1-9: 0
9. THOUGHTS THAT YOU WOULD BE BETTER OFF DEAD, OR OF HURTING YOURSELF: NOT AT ALL
7. TROUBLE CONCENTRATING ON THINGS, SUCH AS READING THE NEWSPAPER OR WATCHING TELEVISION: NOT AT ALL
SUM OF ALL RESPONSES TO PHQ QUESTIONS 1-9: 0

## 2025-03-31 NOTE — PROGRESS NOTES
30 tablet 3    triamcinolone (KENALOG) 0.1 % ointment APPLY TOPICALLY TO LOWER LEG TWICE DAILY FOR 14 DAYS. MAY REPEAT 1 TIME A MONTH AS NEEDED 60 g 1    neomycin-polymyxin-dexameth 3.5-23086-6.1 OINT APPLY A SMALL AMOUNT TO THE AFFECTED AREA OF EYE TWICE DAILY FOR 1 WEEK THEN EVERY DAY FOR 1 WEEK AS DIRECTED      omeprazole (PRILOSEC) 40 MG delayed release capsule TAKE 1 CAPSULE BY MOUTH DAILY 90 capsule 1    acyclovir (ZOVIRAX) 400 MG tablet TAKE 1 TABLET BY MOUTH EVERY 4 HOURS WHILE AWAKE 180 tablet 3    betamethasone valerate (VALISONE) 0.1 % lotion Apply topically 2 times daily. 1 each 1    HYDROcodone-acetaminophen (NORCO)  MG per tablet Take 1 tablet by mouth every 8 hours as needed.      aspirin 81 MG EC tablet Take 1 tablet by mouth every other day 1 tablet 0    ciprofloxacin (CILOXAN) 0.3 % ophthalmic solution Place 1 drop into the right eye in the morning and 1 drop in the evening.      pregabalin (LYRICA) 100 MG capsule Take 1 capsule by mouth 2 times daily for 30 days. Max Daily Amount: 200 mg 60 capsule 0     No current facility-administered medications for this visit.      Allergies   Allergen Reactions    Gabapentin Rash     Pt weaning off due to rash and hot flashes    Zoloft [Sertraline Hcl] Other (See Comments)     Patient states that she doesn't want this medication anymore because she hallucinated and saw spiders. Patient weaned herself off and after she quit taking the medication, the hallucinations stopped.   Patient states that she doesn't want this medication anymore because she hallucinated and saw spiders. Patient weaned herself off and after she quit taking the medication, the hallucinations stopped.          Health Maintenance   Topic Date Due    Shingles vaccine (1 of 2) Never done    Respiratory Syncytial Virus (RSV) Pregnant or age 60 yrs+ (1 - 1-dose 75+ series) Never done    COVID-19 Vaccine (4 - 2024-25 season) 09/01/2024    Flu vaccine (Season Ended) 08/01/2025    Depression

## 2025-04-03 ASSESSMENT — ENCOUNTER SYMPTOMS
RESPIRATORY NEGATIVE: 1
ABDOMINAL DISTENTION: 0
EYES NEGATIVE: 1
SHORTNESS OF BREATH: 0
COUGH: 0
DIARRHEA: 0
CONSTIPATION: 0
ALLERGIC/IMMUNOLOGIC NEGATIVE: 1
NAUSEA: 0
GASTROINTESTINAL NEGATIVE: 1
VOMITING: 0

## 2025-04-24 ENCOUNTER — OFFICE VISIT (OUTPATIENT)
Dept: PRIMARY CARE CLINIC | Age: 89
End: 2025-04-24

## 2025-04-24 VITALS
DIASTOLIC BLOOD PRESSURE: 84 MMHG | HEIGHT: 63 IN | BODY MASS INDEX: 23.21 KG/M2 | SYSTOLIC BLOOD PRESSURE: 136 MMHG | WEIGHT: 131 LBS | RESPIRATION RATE: 16 BRPM | OXYGEN SATURATION: 97 % | HEART RATE: 110 BPM | TEMPERATURE: 98.7 F

## 2025-04-24 DIAGNOSIS — J06.9 URI, ACUTE: ICD-10-CM

## 2025-04-24 DIAGNOSIS — J02.9 SORE THROAT: Primary | ICD-10-CM

## 2025-04-24 DIAGNOSIS — R50.9 FEVER, UNSPECIFIED FEVER CAUSE: ICD-10-CM

## 2025-04-24 LAB — S PYO AG THROAT QL: NORMAL

## 2025-04-24 ASSESSMENT — ENCOUNTER SYMPTOMS
NAUSEA: 0
COLOR CHANGE: 0
DIARRHEA: 0
BACK PAIN: 0
EYE DISCHARGE: 0
COUGH: 1
ABDOMINAL PAIN: 0
VOMITING: 0
WHEEZING: 0

## 2025-04-24 NOTE — PROGRESS NOTES
SUBJECTIVE:    Patient ID: Esther Schmidt is a 89 y.o. female.    History of Present Illness  The patient presents for evaluation of headache, fever, sore throat, and ear discomfort.    Symptoms began on Monday, including a severe headache, fever, chills, and a sore throat. The temperature has not been measured today, but she reports feeling hot. Nausea, vomiting, and diarrhea are not present. The cough is non-productive, and mild shortness of breath is reported. Weakness and ear discomfort are also noted. Nasal congestion is absent, but a runny nose is present. Appetite and hydration status are adequate. No known sick contacts are reported. A dental appointment was attended on Monday, during which tongue soreness was experienced.      Past Medical History:   Diagnosis Date    Afib (HCC) 2021    Bronchitis     Colon cancer (HCC)     Colon polyps     Constipation     Deep vein blood clot of left lower extremity (HCC)     Left leg     Depression     DVT (deep venous thrombosis) (HCC)     Dysphagia     Fibrocystic breast disease     Fibromyalgia     GERD (gastroesophageal reflux disease)     reflux with hx of esophageal stricture    Headache(784.0)     History of colon cancer 2000    Hormone replacement therapy (postmenopausal)     Hypertension     Neuropathy of foot     In both feet    Osteoarthritis     left knee pain    Palliative care patient 02/01/2022    Restless legs syndrome     Type 2 diabetes mellitus 10/25/2021    Vitamin D deficiency       Current Outpatient Medications on File Prior to Visit   Medication Sig Dispense Refill    apixaban (ELIQUIS) 2.5 MG TABS tablet Take 1 tablet by mouth 2 times daily 180 tablet 3    DULoxetine (CYMBALTA) 60 MG extended release capsule TAKE 1 CAPSULE BY MOUTH DAILY 90 capsule 3    metFORMIN (GLUCOPHAGE-XR) 500 MG extended release tablet TAKE 1 TABLET BY MOUTH DAILY 30 tablet 3    triamcinolone (KENALOG) 0.1 % ointment APPLY TOPICALLY TO LOWER LEG TWICE DAILY FOR 14 DAYS. MAY

## 2025-04-25 ENCOUNTER — RESULTS FOLLOW-UP (OUTPATIENT)
Dept: PRIMARY CARE CLINIC | Age: 89
End: 2025-04-25

## 2025-04-25 ENCOUNTER — APPOINTMENT (OUTPATIENT)
Dept: GENERAL RADIOLOGY | Age: 89
End: 2025-04-25
Payer: MEDICARE

## 2025-04-25 ENCOUNTER — HOSPITAL ENCOUNTER (EMERGENCY)
Age: 89
Discharge: HOME | End: 2025-04-25
Payer: MEDICARE

## 2025-04-25 ENCOUNTER — APPOINTMENT (OUTPATIENT)
Dept: CT IMAGING | Age: 89
End: 2025-04-25
Payer: MEDICARE

## 2025-04-25 ENCOUNTER — APPOINTMENT (OUTPATIENT)
Dept: VASCULAR LAB | Age: 89
End: 2025-04-25
Payer: MEDICARE

## 2025-04-25 VITALS
RESPIRATION RATE: 18 BRPM | TEMPERATURE: 98.1 F | HEART RATE: 97 BPM | DIASTOLIC BLOOD PRESSURE: 76 MMHG | OXYGEN SATURATION: 99 % | SYSTOLIC BLOOD PRESSURE: 150 MMHG

## 2025-04-25 DIAGNOSIS — U07.1 COVID: Primary | ICD-10-CM

## 2025-04-25 DIAGNOSIS — M79.604 RIGHT LEG PAIN: ICD-10-CM

## 2025-04-25 LAB
ALBUMIN SERPL-MCNC: 3.6 G/DL (ref 3.5–5.2)
ALP SERPL-CCNC: 102 U/L (ref 35–104)
ALT SERPL-CCNC: 9 U/L (ref 10–35)
ANION GAP SERPL CALCULATED.3IONS-SCNC: 13 MMOL/L (ref 8–16)
AST SERPL-CCNC: 19 U/L (ref 10–35)
B PARAP IS1001 DNA NPH QL NAA+NON-PROBE: NOT DETECTED
B PERT.PT PRMT NPH QL NAA+NON-PROBE: NOT DETECTED
BACTERIA URNS QL MICRO: NEGATIVE /HPF
BASOPHILS # BLD: 0 K/UL (ref 0–0.2)
BASOPHILS NFR BLD: 0.7 % (ref 0–1)
BILIRUB SERPL-MCNC: 0.3 MG/DL (ref 0.2–1.2)
BILIRUB UR QL STRIP: NEGATIVE
BUN SERPL-MCNC: 20 MG/DL (ref 8–23)
C PNEUM DNA NPH QL NAA+NON-PROBE: NOT DETECTED
CALCIUM SERPL-MCNC: 9.1 MG/DL (ref 8.8–10.2)
CHLORIDE SERPL-SCNC: 101 MMOL/L (ref 98–107)
CLARITY UR: CLEAR
CO2 SERPL-SCNC: 22 MMOL/L (ref 22–29)
COLOR UR: YELLOW
CREAT SERPL-MCNC: 0.9 MG/DL (ref 0.5–0.9)
CRYSTALS URNS MICRO: NORMAL /HPF
EOSINOPHIL # BLD: 0.1 K/UL (ref 0–0.6)
EOSINOPHIL NFR BLD: 1 % (ref 0–5)
EPI CELLS #/AREA URNS AUTO: 1 /HPF (ref 0–5)
ERYTHROCYTE [DISTWIDTH] IN BLOOD BY AUTOMATED COUNT: 14.5 % (ref 11.5–14.5)
FLUAV RNA NPH QL NAA+NON-PROBE: NOT DETECTED
FLUBV RNA NPH QL NAA+NON-PROBE: NOT DETECTED
GLUCOSE SERPL-MCNC: 100 MG/DL (ref 70–99)
GLUCOSE UR STRIP.AUTO-MCNC: NEGATIVE MG/DL
HADV DNA NPH QL NAA+NON-PROBE: NOT DETECTED
HCOV 229E RNA NPH QL NAA+NON-PROBE: NOT DETECTED
HCOV HKU1 RNA NPH QL NAA+NON-PROBE: NOT DETECTED
HCOV NL63 RNA NPH QL NAA+NON-PROBE: NOT DETECTED
HCOV OC43 RNA NPH QL NAA+NON-PROBE: NOT DETECTED
HCT VFR BLD AUTO: 40.5 % (ref 37–47)
HGB BLD-MCNC: 13 G/DL (ref 12–16)
HGB UR STRIP.AUTO-MCNC: NEGATIVE MG/L
HMPV RNA NPH QL NAA+NON-PROBE: NOT DETECTED
HPIV1 RNA NPH QL NAA+NON-PROBE: NOT DETECTED
HPIV2 RNA NPH QL NAA+NON-PROBE: NOT DETECTED
HPIV3 RNA NPH QL NAA+NON-PROBE: NOT DETECTED
HPIV4 RNA NPH QL NAA+NON-PROBE: NOT DETECTED
HYALINE CASTS #/AREA URNS AUTO: 2 /HPF (ref 0–8)
IMM GRANULOCYTES # BLD: 0 K/UL
KETONES UR STRIP.AUTO-MCNC: NEGATIVE MG/DL
LACTATE BLDV-SCNC: 1.1 MMOL/L (ref 0.5–1.9)
LEUKOCYTE ESTERASE UR QL STRIP.AUTO: ABNORMAL
LYMPHOCYTES # BLD: 1.1 K/UL (ref 1.1–4.5)
LYMPHOCYTES NFR BLD: 19.3 % (ref 20–40)
M PNEUMO DNA NPH QL NAA+NON-PROBE: NOT DETECTED
MCH RBC QN AUTO: 28.3 PG (ref 27–31)
MCHC RBC AUTO-ENTMCNC: 32.1 G/DL (ref 33–37)
MCV RBC AUTO: 88.2 FL (ref 81–99)
MONOCYTES # BLD: 0.9 K/UL (ref 0–0.9)
MONOCYTES NFR BLD: 16.2 % (ref 0–10)
NEUTROPHILS # BLD: 3.6 K/UL (ref 1.5–7.5)
NEUTS SEG NFR BLD: 62.6 % (ref 50–65)
NITRITE UR QL STRIP.AUTO: NEGATIVE
PH UR STRIP.AUTO: 5.5 [PH] (ref 5–8)
PLATELET # BLD AUTO: 195 K/UL (ref 130–400)
PMV BLD AUTO: 9.4 FL (ref 9.4–12.3)
POTASSIUM SERPL-SCNC: 3.8 MMOL/L (ref 3.5–5)
PROT SERPL-MCNC: 6.3 G/DL (ref 6.4–8.3)
PROT UR STRIP.AUTO-MCNC: NEGATIVE MG/DL
RBC # BLD AUTO: 4.59 M/UL (ref 4.2–5.4)
RBC #/AREA URNS AUTO: 1 /HPF (ref 0–4)
RSV RNA NPH QL NAA+NON-PROBE: NOT DETECTED
RV+EV RNA NPH QL NAA+NON-PROBE: NOT DETECTED
SARS-COV-2 RNA NPH QL NAA+NON-PROBE: DETECTED
SODIUM SERPL-SCNC: 136 MMOL/L (ref 136–145)
SP GR UR STRIP.AUTO: 1.01 (ref 1–1.03)
TROPONIN, HIGH SENSITIVITY: 19 NG/L (ref 0–14)
TROPONIN, HIGH SENSITIVITY: 20 NG/L (ref 0–14)
UROBILINOGEN UR STRIP.AUTO-MCNC: 0.2 E.U./DL
WBC # BLD AUTO: 5.8 K/UL (ref 4.8–10.8)
WBC #/AREA URNS AUTO: 4 /HPF (ref 0–5)

## 2025-04-25 PROCEDURE — 71275 CT ANGIOGRAPHY CHEST: CPT

## 2025-04-25 PROCEDURE — 2580000003 HC RX 258

## 2025-04-25 PROCEDURE — 81001 URINALYSIS AUTO W/SCOPE: CPT

## 2025-04-25 PROCEDURE — 6360000004 HC RX CONTRAST MEDICATION

## 2025-04-25 PROCEDURE — 73610 X-RAY EXAM OF ANKLE: CPT

## 2025-04-25 PROCEDURE — 71045 X-RAY EXAM CHEST 1 VIEW: CPT

## 2025-04-25 PROCEDURE — 36415 COLL VENOUS BLD VENIPUNCTURE: CPT

## 2025-04-25 PROCEDURE — 85025 COMPLETE CBC W/AUTO DIFF WBC: CPT

## 2025-04-25 PROCEDURE — 84484 ASSAY OF TROPONIN QUANT: CPT

## 2025-04-25 PROCEDURE — 0202U NFCT DS 22 TRGT SARS-COV-2: CPT

## 2025-04-25 PROCEDURE — 83605 ASSAY OF LACTIC ACID: CPT

## 2025-04-25 PROCEDURE — 73590 X-RAY EXAM OF LOWER LEG: CPT

## 2025-04-25 PROCEDURE — 93970 EXTREMITY STUDY: CPT

## 2025-04-25 PROCEDURE — 99285 EMERGENCY DEPT VISIT HI MDM: CPT

## 2025-04-25 PROCEDURE — 80053 COMPREHEN METABOLIC PANEL: CPT

## 2025-04-25 RX ORDER — 0.9 % SODIUM CHLORIDE 0.9 %
1000 INTRAVENOUS SOLUTION INTRAVENOUS ONCE
Status: COMPLETED | OUTPATIENT
Start: 2025-04-25 | End: 2025-04-25

## 2025-04-25 RX ORDER — IOPAMIDOL 755 MG/ML
75 INJECTION, SOLUTION INTRAVASCULAR
Status: COMPLETED | OUTPATIENT
Start: 2025-04-25 | End: 2025-04-25

## 2025-04-25 RX ADMIN — SODIUM CHLORIDE 1000 ML: 9 INJECTION, SOLUTION INTRAVENOUS at 16:38

## 2025-04-25 RX ADMIN — IOPAMIDOL 75 ML: 755 INJECTION, SOLUTION INTRAVENOUS at 17:22

## 2025-04-25 ASSESSMENT — ENCOUNTER SYMPTOMS
GASTROINTESTINAL NEGATIVE: 1
SHORTNESS OF BREATH: 1
EYES NEGATIVE: 1
CHEST TIGHTNESS: 1
COUGH: 1

## 2025-04-25 NOTE — ED PROVIDER NOTES
[facility] EMERGENCY DEPARTMENT  eMERGENCYdEPARTMENT eNCOUnter      Pt Name: Esther Schmidt  MRN: [id number]  Birthdate 1935  Date of evaluation: 4/25/2025  Provider:EARLE Perry NP    Emergency Department care of this patient was assumed at 1800 from [facility].  We have discussed the case and the plan of care.  I have seen and evaluated patient and reviewed ED course.      CHIEF COMPLAINT       Chief Complaint   Patient presents with    Leg Pain     Right lower leg pain, diagnosed with covid today concerned about potential blood clot in leg    Pleurisy     Pain under right breast form coughing         PHYSICAL EXAM    (up to 7 for level 4, 8 or more for level 5)     ED Triage Vitals [04/25/25 1501]   BP Systolic BP Percentile Diastolic BP Percentile Temp Temp src Pulse Respirations SpO2   92/75 -- -- 98.2 °F (36.8 °C) -- (!) 124 20 100 %      Height Weight         -- --             Physical Exam  Vitals and nursing note reviewed.   Constitutional:       General: She is not in acute distress.     Appearance: She is not ill-appearing.      Comments: See Carine Pérez's note for detailed H&P   HENT:      Nose: Congestion and rhinorrhea present.   Eyes:      Extraocular Movements: Extraocular movements intact.      Pupils: Pupils are equal, round, and reactive to light.   Skin:     General: Skin is warm and dry.   Neurological:      Mental Status: She is alert and oriented to person, place, and time.         DIAGNOSTIC RESULTS     EKG: All EKG's are interpreted by the Emergency Department Physician who either signs or Co-signs this chart inthe absence of a cardiologist.        RADIOLOGY:   Non-plain film imagessuch as CT, Ultrasound and MRI are read by the radiologist. Plain radiographic images are visualized and preliminarily interpreted by the emergency physician with the below findings:          XR ANKLE RIGHT (MIN 3 VIEWS)   Final Result   Bones appear demineralized.  No fracture is seen.

## 2025-04-25 NOTE — ED PROVIDER NOTES
[facility] EMERGENCY DEPARTMENT  EMERGENCY DEPARTMENT ENCOUNTER      Pt Name: Esther Schmidt  MRN: [id number]  Birthdate 1935  Date of evaluation: 4/25/2025  Provider: Wandy Pérez PA-C    CHIEF COMPLAINT       Chief Complaint   Patient presents with    Leg Pain     Right lower leg pain, diagnosed with covid today concerned about potential blood clot in leg    Pleurisy     Pain under right breast form coughing         HISTORY OF PRESENT ILLNESS   (Location/Symptom, Timing/Onset,Context/Setting, Quality, Duration, Modifying Factors, Severity)  Note limiting factors.   HPI    Esther Schmidt is a 89 y.o. female who presents to the emergency department with a chief complaint of pleurisy, right leg pain, concern for DVT.    Patient has a history of DVTs, A-fib, diabetes, CKD.  She states that she has only been taking her Eliquis once a day because it makes her bleed more if she falls.    Patient was diagnosed with COVID earlier today after a swab was collected yesterday.  She states for the past couple of days she is been having pain in her right lower leg at the calf.  She does state that her pain was preceded by a fall, she sustained contusion and a small skin tear to her left lower leg, but did not think she hurt her right lower leg, but since that time has had pain to the tibial surface, and the calf of the right leg.  Fall was mechanical in nature, she tripped over uneven ground while \"watering her chickens\".  She states it feels very similar to past DVTs.  She is on Eliquis, takes it once a day.  She has never had a pulmonary embolism.  She also complains of pain in the right lateral and anterior ribs which she attributed to coughing.  Pain is worsened by breathing.  She is tachycardic to 124.  Endorses some shortness of breath, also has some substernal chest pain, but nothing that concerned her    Nursing Notes were reviewed.    Limitations to history: None  Outside historians none    REVIEW OF SYSTEMS    (2-9

## 2025-04-25 NOTE — PROGRESS NOTES
Vascular Lab Prelim  Duplex venous scan of B/L LE shows no evidence for dvt, svt, reflux noted at this time. Final report pending.

## 2025-04-26 NOTE — DISCHARGE INSTRUCTIONS
Follow-up with your doctor for recheck.  Be sure you are drinking plenty of water.  Take your medicines as prescribed, not as you wish to take them.  Return to ER for any new, worsening, or change in condition.

## 2025-04-27 LAB
EKG P AXIS: NORMAL DEGREES
EKG P-R INTERVAL: NORMAL MS
EKG Q-T INTERVAL: 326 MS
EKG QRS DURATION: 78 MS
EKG QTC CALCULATION (BAZETT): 390 MS
EKG T AXIS: 68 DEGREES

## 2025-04-28 ENCOUNTER — CARE COORDINATION (OUTPATIENT)
Dept: CARE COORDINATION | Age: 89
End: 2025-04-28

## 2025-04-28 NOTE — CARE COORDINATION
Ambulatory Care Coordination Note     4/28/2025 2:54 PM     Patient outreach attempt by this ACM today to perform care management follow up . ACM was unable to reach the patient by telephone today;   Called party unavailable, \"please try again later\". Unable to leave a voicemail     ACM: Cherrie Baldwin RN      PCP/Specialist follow up:   Future Appointments         Provider Specialty Dept Phone    4/29/2025 10:00 AM Liliana Ross, APRN - NP Primary Care [phone]    6/17/2025 10:45 AM Chase Benton MD Cardiology [phone]            Follow Up:   Plan for next ACM outreach in approximately 1-2 days  to complete:  - hospital follow up.

## 2025-04-29 ENCOUNTER — OFFICE VISIT (OUTPATIENT)
Dept: PRIMARY CARE CLINIC | Age: 89
End: 2025-04-29
Payer: MEDICARE

## 2025-04-29 VITALS
SYSTOLIC BLOOD PRESSURE: 130 MMHG | HEIGHT: 63 IN | DIASTOLIC BLOOD PRESSURE: 70 MMHG | RESPIRATION RATE: 16 BRPM | BODY MASS INDEX: 22.39 KG/M2 | TEMPERATURE: 97.6 F | WEIGHT: 126.4 LBS | OXYGEN SATURATION: 100 % | HEART RATE: 60 BPM

## 2025-04-29 DIAGNOSIS — U07.1 COVID: ICD-10-CM

## 2025-04-29 DIAGNOSIS — Z45.2 ENCOUNTER FOR CARE RELATED TO PORT-A-CATH: Primary | ICD-10-CM

## 2025-04-29 PROCEDURE — 1123F ACP DISCUSS/DSCN MKR DOCD: CPT | Performed by: NURSE PRACTITIONER

## 2025-04-29 PROCEDURE — 96523 IRRIG DRUG DELIVERY DEVICE: CPT | Performed by: NURSE PRACTITIONER

## 2025-04-29 PROCEDURE — 1159F MED LIST DOCD IN RCRD: CPT | Performed by: NURSE PRACTITIONER

## 2025-04-29 PROCEDURE — 99213 OFFICE O/P EST LOW 20 MIN: CPT | Performed by: NURSE PRACTITIONER

## 2025-04-29 RX ORDER — AZELASTINE HYDROCHLORIDE 0.5 MG/ML
2 SOLUTION/ DROPS OPHTHALMIC 2 TIMES DAILY
COMMUNITY
Start: 2025-04-25

## 2025-04-29 ASSESSMENT — ENCOUNTER SYMPTOMS
GASTROINTESTINAL NEGATIVE: 1
COUGH: 0
ALLERGIC/IMMUNOLOGIC NEGATIVE: 1
WHEEZING: 0
ABDOMINAL PAIN: 0
ABDOMINAL DISTENTION: 0
RESPIRATORY NEGATIVE: 1
SHORTNESS OF BREATH: 0
EYES NEGATIVE: 1

## 2025-04-29 NOTE — PROGRESS NOTES
[facility]  [place] [id number]  [facility] [id number] [phone]       Esther Schmidt is a 89 y.o. female who presents today for her medical conditions/complaints as noted below.  Esther Schmidt is c/o of [facility] (Here for a port flush diagnosed with covid last week but she is finally feeling better)        HPI:     History of Present Illness  The patient presents for a port flush.    The chief complaint includes a recent COVID-19 diagnosis last week, with reported improvement in her condition today. She has a history of pleurisy but does not report any severe cough. Deep breaths can be taken without significant discomfort, although deep inhalations have been avoided. Over the weekend, emergency care was sought due to concerns about a potential blood clot, which was subsequently ruled out. A fall resulted in bruising on her leg. A dental appointment is scheduled for tomorrow, during which her throat will also be examined.       Chief Complaint   Patient presents with    [facility]     Here for a port flush diagnosed with covid last week but she is finally feeling better     Past Medical History:   Diagnosis Date    Afib (HCC) 2021    Bronchitis     Colon cancer (HCC)     Colon polyps     Constipation     Deep vein blood clot of left lower extremity (HCC)     Left leg     Depression     DVT (deep venous thrombosis) (HCC)     Dysphagia     Fibrocystic breast disease     Fibromyalgia     GERD (gastroesophageal reflux disease)     reflux with hx of esophageal stricture    Headache(784.0)     History of colon cancer 2000    Hormone replacement therapy (postmenopausal)     Hypertension     Neuropathy of foot     In both feet    Osteoarthritis     left knee pain    Palliative care patient 02/01/2022    Restless legs syndrome     Type 2 diabetes mellitus 10/25/2021    Vitamin D deficiency       Past Surgical History:   Procedure Laterality Date    APPENDECTOMY      BREAST

## 2025-04-30 ENCOUNTER — CARE COORDINATION (OUTPATIENT)
Dept: CARE COORDINATION | Age: 89
End: 2025-04-30

## 2025-04-30 NOTE — CARE COORDINATION
Ambulatory Care Coordination Note     4/30/2025 3:37 PM     patient outreach attempt by this ACM today to offer care management services and perform hospital follow up. ACM was unable to reach the patient by telephone today;   No voicemail available, unable to leave a msg.     Patient closed (unable to reach patient) from the High Risk Care Management program on 4/30/2025.  Patient has the ability to self-manage at this time..  Care management goals have been completed. No further Ambulatory Care Manager follow up scheduled.

## 2025-06-02 ENCOUNTER — TELEPHONE (OUTPATIENT)
Dept: PRIMARY CARE CLINIC | Age: 89
End: 2025-06-02

## 2025-06-02 DIAGNOSIS — L60.9 NAIL ABNORMALITY: Primary | ICD-10-CM

## 2025-06-02 NOTE — TELEPHONE ENCOUNTER
Mrs. Schmidt came into office today saying she needs to see someone for her toenails.  She said that she is unable to manage them at home anymore.

## 2025-06-13 ENCOUNTER — OFFICE VISIT (OUTPATIENT)
Dept: PRIMARY CARE CLINIC | Age: 89
End: 2025-06-13
Payer: MEDICARE

## 2025-06-13 ENCOUNTER — ANCILLARY PROCEDURE (OUTPATIENT)
Dept: PRIMARY CARE CLINIC | Age: 89
End: 2025-06-13

## 2025-06-13 ENCOUNTER — RESULTS FOLLOW-UP (OUTPATIENT)
Dept: PRIMARY CARE CLINIC | Age: 89
End: 2025-06-13

## 2025-06-13 VITALS
TEMPERATURE: 97.5 F | OXYGEN SATURATION: 98 % | WEIGHT: 131 LBS | HEIGHT: 63 IN | HEART RATE: 75 BPM | RESPIRATION RATE: 18 BRPM | SYSTOLIC BLOOD PRESSURE: 102 MMHG | DIASTOLIC BLOOD PRESSURE: 78 MMHG | BODY MASS INDEX: 23.21 KG/M2
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
## 2025-06-13 DIAGNOSIS — Z45.2 ENCOUNTER FOR CARE RELATED TO PORT-A-CATH: ICD-10-CM

## 2025-06-13 DIAGNOSIS — T14.8XXA HEMATOMA: ICD-10-CM

## 2025-06-13 DIAGNOSIS — Z95.828 PORT-A-CATH IN PLACE: ICD-10-CM
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
## 2025-06-13 PROCEDURE — 1159F MED LIST DOCD IN RCRD: CPT | Performed by: NURSE PRACTITIONER

## 2025-06-13 PROCEDURE — 1123F ACP DISCUSS/DSCN MKR DOCD: CPT | Performed by: NURSE PRACTITIONER

## 2025-06-13 PROCEDURE — 99213 OFFICE O/P EST LOW 20 MIN: CPT | Performed by: NURSE PRACTITIONER

## 2025-06-13 RX ORDER — LOTILANER OPHTHALMIC SOLUTION 2.5 MG/ML
SOLUTION/ DROPS OPHTHALMIC
COMMUNITY
Start: 2025-05-28

## 2025-06-13 NOTE — PROGRESS NOTES
[facility]  [place] [address]  [facility] [id number] [phone]       Esther Schmidt is a 89 y.o. female who presents today for her medical conditions/complaints as noted below.  Esther Schmidt is c/o of [facility] (Pt is here for a port flush.) and Fall (Pt fell about 2 weeks ago outside and landed on her right hip and hit her head. She states her hip is still hurting her. )        HPI:     History of Present Illness  The patient presents for evaluation following a fall.    She has been experiencing recurrent falls, often accompanied by dizziness. She uses a cane for mobility but has a rollator available. She reports that her physical condition has deteriorated since her last visit three weeks ago, with increased pain in her legs and hip. She mentions a nerve issue in her leg that extends down to her shin bone, which was exacerbated during therapy, resulting in a noticeable bruise. Additionally, she reports a twisted hip, which has worsened post-therapy. She is currently on Eliquis and has developed a large hematoma extending from the coccyx to the posterior knee. She expresses a strong aversion to surgical intervention. She also reports a previous injury to her hip approximately a year ago, which has since been re-injured. She does not experience any back pain but reports persistent hip pain. She recently completed a course of physical therapy.    She also reports a recent fall where she hit the back of her head but has not experienced any new symptoms such as dizziness, nausea, or changes in vision.    She has a stimulator in place, which she has had for 17 years and it does not bother her at all. Her eye doctor informed her that she has mites in her eyelashes.       Chief Complaint   Patient presents with    [facility]     Pt is here for a port flush.    Fall     Pt fell about 2 weeks ago outside and landed on her right hip and hit her head. She states her hip is

## 2025-06-17 ENCOUNTER — OFFICE VISIT (OUTPATIENT)
Dept: CARDIOLOGY CLINIC | Age: 89
End: 2025-06-17
Payer: MEDICARE

## 2025-06-17 VITALS
WEIGHT: 133 LBS | OXYGEN SATURATION: 98 % | SYSTOLIC BLOOD PRESSURE: 130 MMHG | HEART RATE: 67 BPM | HEIGHT: 63 IN | DIASTOLIC BLOOD PRESSURE: 60 MMHG | BODY MASS INDEX: 23.57 KG/M2

## 2025-06-17 DIAGNOSIS — I73.9 PVD (PERIPHERAL VASCULAR DISEASE): Primary | ICD-10-CM

## 2025-06-17 PROCEDURE — 1123F ACP DISCUSS/DSCN MKR DOCD: CPT | Performed by: INTERNAL MEDICINE

## 2025-06-17 PROCEDURE — 99214 OFFICE O/P EST MOD 30 MIN: CPT | Performed by: INTERNAL MEDICINE

## 2025-06-17 ASSESSMENT — ENCOUNTER SYMPTOMS
COUGH: 0
RESPIRATORY NEGATIVE: 1
BACK PAIN: 1
GASTROINTESTINAL NEGATIVE: 1
ALLERGIC/IMMUNOLOGIC NEGATIVE: 1
ROS SKIN COMMENTS: HEMATOMA
COLOR CHANGE: 1
WHEEZING: 0
SHORTNESS OF BREATH: 0
EYES NEGATIVE: 1

## 2025-06-19 ENCOUNTER — RESULTS FOLLOW-UP (OUTPATIENT)
Dept: PRIMARY CARE CLINIC | Age: 89
End: 2025-06-19

## 2025-06-19 ENCOUNTER — OFFICE VISIT (OUTPATIENT)
Dept: PRIMARY CARE CLINIC | Age: 89
End: 2025-06-19

## 2025-06-19 VITALS
OXYGEN SATURATION: 98 % | HEIGHT: 63 IN | RESPIRATION RATE: 16 BRPM | DIASTOLIC BLOOD PRESSURE: 70 MMHG | TEMPERATURE: 97.8 F | HEART RATE: 96 BPM | SYSTOLIC BLOOD PRESSURE: 130 MMHG | BODY MASS INDEX: 23.57 KG/M2 | WEIGHT: 133 LBS

## 2025-06-19 DIAGNOSIS — E11.69 TYPE 2 DIABETES MELLITUS WITH OTHER SPECIFIED COMPLICATION, WITHOUT LONG-TERM CURRENT USE OF INSULIN (HCC): ICD-10-CM

## 2025-06-19 DIAGNOSIS — N18.32 STAGE 3B CHRONIC KIDNEY DISEASE (HCC): ICD-10-CM

## 2025-06-19 DIAGNOSIS — Z79.899 MEDICATION MANAGEMENT: ICD-10-CM

## 2025-06-19 DIAGNOSIS — Z00.00 MEDICARE ANNUAL WELLNESS VISIT, SUBSEQUENT: Primary | ICD-10-CM

## 2025-06-19 LAB
ALBUMIN SERPL-MCNC: 3.8 G/DL (ref 3.5–5.2)
ALP SERPL-CCNC: 118 U/L (ref 35–104)
ALT SERPL-CCNC: 11 U/L (ref 10–35)
ANION GAP SERPL CALCULATED.3IONS-SCNC: 12 MMOL/L (ref 8–16)
AST SERPL-CCNC: 19 U/L (ref 10–35)
BASOPHILS # BLD: 0.1 K/UL (ref 0–0.2)
BASOPHILS NFR BLD: 1.3 % (ref 0–1)
BILIRUB SERPL-MCNC: 0.4 MG/DL (ref 0.2–1.2)
BUN SERPL-MCNC: 25 MG/DL (ref 8–23)
CALCIUM SERPL-MCNC: 9 MG/DL (ref 8.8–10.2)
CHLORIDE SERPL-SCNC: 103 MMOL/L (ref 98–107)
CO2 SERPL-SCNC: 25 MMOL/L (ref 22–29)
CREAT SERPL-MCNC: 0.9 MG/DL (ref 0.5–0.9)
EOSINOPHIL # BLD: 0.6 K/UL (ref 0–0.6)
EOSINOPHIL NFR BLD: 8.8 % (ref 0–5)
ERYTHROCYTE [DISTWIDTH] IN BLOOD BY AUTOMATED COUNT: 16.4 % (ref 11.5–14.5)
GLUCOSE SERPL-MCNC: 84 MG/DL (ref 70–99)
HBA1C MFR BLD: 6 % (ref 4–5.6)
HCT VFR BLD AUTO: 40 % (ref 37–47)
HGB BLD-MCNC: 12.5 G/DL (ref 12–16)
IMM GRANULOCYTES # BLD: 0 K/UL
LYMPHOCYTES # BLD: 2.1 K/UL (ref 1.1–4.5)
LYMPHOCYTES NFR BLD: 34.1 % (ref 20–40)
MCH RBC QN AUTO: 28.7 PG (ref 27–31)
MCHC RBC AUTO-ENTMCNC: 31.3 G/DL (ref 33–37)
MCV RBC AUTO: 91.7 FL (ref 81–99)
MONOCYTES # BLD: 0.7 K/UL (ref 0–0.9)
MONOCYTES NFR BLD: 10.9 % (ref 0–10)
NEUTROPHILS # BLD: 2.8 K/UL (ref 1.5–7.5)
NEUTS SEG NFR BLD: 44.7 % (ref 50–65)
PLATELET # BLD AUTO: 316 K/UL (ref 130–400)
PMV BLD AUTO: 9.5 FL (ref 9.4–12.3)
POTASSIUM SERPL-SCNC: 4.5 MMOL/L (ref 3.5–5.1)
PROT SERPL-MCNC: 6.7 G/DL (ref 6.4–8.3)
RBC # BLD AUTO: 4.36 M/UL (ref 4.2–5.4)
SODIUM SERPL-SCNC: 140 MMOL/L (ref 136–145)
WBC # BLD AUTO: 6.2 K/UL (ref 4.8–10.8)

## 2025-06-19 RX ORDER — AMLODIPINE BESYLATE 2.5 MG/1
2.5 TABLET ORAL EVERY EVENING
Qty: 90 TABLET | Refills: 3 | Status: SHIPPED | OUTPATIENT
Start: 2025-06-19 | End: 2025-07-19

## 2025-06-19 RX ORDER — OFLOXACIN 3 MG/ML
5 SOLUTION AURICULAR (OTIC) 2 TIMES DAILY
Qty: 10 ML | Refills: 0 | Status: SHIPPED | OUTPATIENT
Start: 2025-06-19 | End: 2025-06-19 | Stop reason: CLARIF

## 2025-06-19 RX ORDER — METFORMIN HYDROCHLORIDE 500 MG/1
500 TABLET, EXTENDED RELEASE ORAL DAILY
Qty: 90 TABLET | Refills: 3 | Status: SHIPPED | OUTPATIENT
Start: 2025-06-19

## 2025-06-19 ASSESSMENT — PATIENT HEALTH QUESTIONNAIRE - PHQ9
10. IF YOU CHECKED OFF ANY PROBLEMS, HOW DIFFICULT HAVE THESE PROBLEMS MADE IT FOR YOU TO DO YOUR WORK, TAKE CARE OF THINGS AT HOME, OR GET ALONG WITH OTHER PEOPLE: NOT DIFFICULT AT ALL
SUM OF ALL RESPONSES TO PHQ QUESTIONS 1-9: 1
9. THOUGHTS THAT YOU WOULD BE BETTER OFF DEAD, OR OF HURTING YOURSELF: NOT AT ALL
6. FEELING BAD ABOUT YOURSELF - OR THAT YOU ARE A FAILURE OR HAVE LET YOURSELF OR YOUR FAMILY DOWN: NOT AT ALL
SUM OF ALL RESPONSES TO PHQ QUESTIONS 1-9: 1
SUM OF ALL RESPONSES TO PHQ QUESTIONS 1-9: 1
4. FEELING TIRED OR HAVING LITTLE ENERGY: SEVERAL DAYS
1. LITTLE INTEREST OR PLEASURE IN DOING THINGS: NOT AT ALL
5. POOR APPETITE OR OVEREATING: NOT AT ALL
SUM OF ALL RESPONSES TO PHQ QUESTIONS 1-9: 1
2. FEELING DOWN, DEPRESSED OR HOPELESS: NOT AT ALL
8. MOVING OR SPEAKING SO SLOWLY THAT OTHER PEOPLE COULD HAVE NOTICED. OR THE OPPOSITE, BEING SO FIGETY OR RESTLESS THAT YOU HAVE BEEN MOVING AROUND A LOT MORE THAN USUAL: NOT AT ALL
7. TROUBLE CONCENTRATING ON THINGS, SUCH AS READING THE NEWSPAPER OR WATCHING TELEVISION: NOT AT ALL
3. TROUBLE FALLING OR STAYING ASLEEP: NOT AT ALL

## 2025-06-19 NOTE — PATIENT INSTRUCTIONS
doctor may have you look through special machines.  Or your doctor may simply have you stare straight ahead while they move a finger into and out of your field of vision.  Color vision test   You look at pieces of printed test patterns in various colors. You say what number or symbol you see.  Your doctor may have you trace the number or symbol using a pointer.  How do these tests feel?  There is very little chance of having a problem from this test. If dilating drops are used for a vision test, they may make the eyes sting and cause a medicine taste in the mouth.  Follow-up care is a key part of your treatment and safety. Be sure to make and go to all appointments, and call your doctor if you are having problems. It's also a good idea to know your test results and keep a list of the medicines you take.  Where can you learn more?  Go to https://www.[employer].net/patientEd and enter G551 to learn more about \"Learning About Vision Tests.\"  Current as of: July 31, 2024  Content Version: 14.5  © 2024-2025 [employer].   Care instructions adapted under license by [employer]. If you have questions about a medical condition or this instruction, always ask your healthcare professional. [employer] disclaims any warranty or liability for your use of this information.         Learning About Activities of Daily Living  What are activities of daily living?     Activities of daily living (ADLs) are the basic self-care tasks you do every day. These include eating, bathing, dressing, and moving around.  As you age, and if you have health problems, you may find that it's harder to do some of these tasks. If so, your doctor can suggest ideas that may help.  To measure what kind of help you may need, your doctor will ask how well you are able to do ADLs. Let your doctor know if there are any tasks that you are having trouble doing. This is an important first step to getting help. And when you have the help

## 2025-06-19 NOTE — PROGRESS NOTES
prescription was recently refilled.    She had a consultation with her cardiologist two days ago, during which her amlodipine prescription was renewed. She takes amlodipine 2.5 mg in the evening and 5 mg in the morning.  She is a diabetic.  She is agreeable to doing labs today to check A1c      Patient's complete Health Risk Assessment and screening values have been reviewed and are found in Flowsheets. The following problems were reviewed today and where indicated follow up appointments were made and/or referrals ordered.    Positive Risk Factor Screenings with Interventions:    Fall Risk:  Do you feel unsteady or are you worried about falling? : (!) yes  2 or more falls in past year?: (!) yes  Fall with injury in past year?: (!) yes     Interventions:    Reviewed medications, home hazards, visual acuity, and co-morbidities that can increase risk for falls         Controlled Medication Review:    Today's Pain Level: No data recorded   Opioid Risk: (Low risk score <55) Opioid risk score: 20    Patient is low risk for opioid use disorder or overdose.    Last PDMP Eleazar as Reviewed:  Review User Review Instant Review Result   FABIOKITTY MONTALVOI 8/14/2024 11:02 AM     Reviewed PDMP [1]     Last Controlled Substance Monitoring Documentation      Flowsheet Row Office Visit from 8/14/2024 in [facility]   Periodic Controlled Substance Monitoring Possible medication side effects, risk of tolerance/dependence & alternative treatments discussed., No signs of potential drug abuse or diversion identified. filed at 08/14/2024 1102   Chronic Pain > 80 MEDD Obtained or confirmed \"Medication Contract\" on file. filed at 08/14/2024 1102             Inactivity:  On average, how many days per week do you engage in moderate to strenuous exercise (like a brisk walk)?: 0 days (!) Abnormal  On average, how many minutes do you engage in exercise at this level?: 0 min  Interventions:  See AVS for additional education material

## 2025-06-25 ENCOUNTER — CLINICAL SUPPORT (OUTPATIENT)
Dept: PRIMARY CARE CLINIC | Age: 89
End: 2025-06-25

## 2025-06-25 ENCOUNTER — PATIENT MESSAGE (OUTPATIENT)
Dept: PRIMARY CARE CLINIC | Age: 89
End: 2025-06-25

## 2025-06-25 DIAGNOSIS — E11.69 TYPE 2 DIABETES MELLITUS WITH OTHER SPECIFIED COMPLICATION, WITHOUT LONG-TERM CURRENT USE OF INSULIN (HCC): Primary | ICD-10-CM

## 2025-06-25 DIAGNOSIS — L60.9 NAIL ABNORMALITY: ICD-10-CM

## 2025-06-25 LAB
CREAT UR-MCNC: 67.6 MG/DL (ref 28–217)
MICROALBUMIN UR-MCNC: <1.2 MG/DL (ref 0–1.99)
MICROALBUMIN/CREAT UR-RTO: NORMAL MG/G (ref 0–29)

## 2025-06-26 ENCOUNTER — RESULTS FOLLOW-UP (OUTPATIENT)
Dept: PRIMARY CARE CLINIC | Age: 89
End: 2025-06-26

## 2025-06-30 RX ORDER — OMEPRAZOLE 40 MG/1
40 CAPSULE, DELAYED RELEASE ORAL DAILY
Qty: 90 CAPSULE | Refills: 1 | Status: SHIPPED | OUTPATIENT
Start: 2025-06-30

## 2025-07-01 ENCOUNTER — OFFICE VISIT (OUTPATIENT)
Age: 89
End: 2025-07-01
Payer: MEDICARE

## 2025-07-01 DIAGNOSIS — L60.2 LONG TOENAIL: ICD-10-CM

## 2025-07-01 DIAGNOSIS — M79.675 PAIN AROUND TOENAIL, LEFT FOOT: ICD-10-CM

## 2025-07-01 DIAGNOSIS — M79.674 PAIN AROUND TOENAIL, RIGHT FOOT: ICD-10-CM

## 2025-07-01 DIAGNOSIS — E11.69 TYPE 2 DIABETES MELLITUS WITH OTHER SPECIFIED COMPLICATION, WITHOUT LONG-TERM CURRENT USE OF INSULIN (HCC): Primary | ICD-10-CM

## 2025-07-01 DIAGNOSIS — N18.32 STAGE 3B CHRONIC KIDNEY DISEASE (HCC): ICD-10-CM

## 2025-07-01 DIAGNOSIS — B35.1 ONYCHOMYCOSIS OF LEFT GREAT TOE: ICD-10-CM

## 2025-07-01 PROCEDURE — 3044F HG A1C LEVEL LT 7.0%: CPT | Performed by: NURSE PRACTITIONER

## 2025-07-01 PROCEDURE — 99203 OFFICE O/P NEW LOW 30 MIN: CPT | Performed by: NURSE PRACTITIONER

## 2025-07-01 PROCEDURE — 11719 TRIM NAIL(S) ANY NUMBER: CPT | Performed by: NURSE PRACTITIONER

## 2025-07-01 PROCEDURE — 1123F ACP DISCUSS/DSCN MKR DOCD: CPT | Performed by: NURSE PRACTITIONER

## 2025-07-01 PROCEDURE — 1159F MED LIST DOCD IN RCRD: CPT | Performed by: NURSE PRACTITIONER

## 2025-07-01 RX ORDER — EMOLLIENT BASE
CREAM (GRAM) TOPICAL
Qty: 60 G | Refills: 0
Start: 2025-07-01

## 2025-07-01 NOTE — PROGRESS NOTES
1 tablet by mouth 2 times daily 180 tablet 3    DULoxetine (CYMBALTA) 60 MG extended release capsule TAKE 1 CAPSULE BY MOUTH DAILY 90 capsule 3    triamcinolone (KENALOG) 0.1 % ointment APPLY TOPICALLY TO LOWER LEG TWICE DAILY FOR 14 DAYS. MAY REPEAT 1 TIME A MONTH AS NEEDED 60 g 1    neomycin-polymyxin-dexameth 3.5-19699-1.1 OINT APPLY A SMALL AMOUNT TO THE AFFECTED AREA OF EYE TWICE DAILY FOR 1 WEEK THEN EVERY DAY FOR 1 WEEK AS DIRECTED      acyclovir (ZOVIRAX) 400 MG tablet TAKE 1 TABLET BY MOUTH EVERY 4 HOURS WHILE AWAKE 180 tablet 3    betamethasone valerate (VALISONE) 0.1 % lotion Apply topically 2 times daily. 1 each 1    HYDROcodone-acetaminophen (NORCO)  MG per tablet Take 1 tablet by mouth every 8 hours as needed.      ciprofloxacin (CILOXAN) 0.3 % ophthalmic solution Place 1 drop into the right eye in the morning and 1 drop in the evening.      pregabalin (LYRICA) 100 MG capsule Take 1 capsule by mouth 2 times daily for 30 days. Max Daily Amount: 200 mg 60 capsule 0     No current facility-administered medications for this visit.        Allergies  Allergies   Allergen Reactions    Gabapentin Rash     Pt weaning off due to rash and hot flashes    Zoloft [Sertraline Hcl] Other (See Comments)     Patient states that she doesn't want this medication anymore because she hallucinated and saw spiders. Patient weaned herself off and after she quit taking the medication, the hallucinations stopped.   Patient states that she doesn't want this medication anymore because she hallucinated and saw spiders. Patient weaned herself off and after she quit taking the medication, the hallucinations stopped.           Review of Systems  System  Neg/Pos  Details  Constitutional  Negative  Chills, Fatigue, Fever and Night Sweats  Respiratory  Negative  Chest Pain, Cough and Dyspnea  Cardio   Negative  Leg Swelling  GI   Negative  Abdominal Pain, Constipation, Nausea and Vomiting     Negative  Urinary

## 2025-07-07 ENCOUNTER — OFFICE VISIT (OUTPATIENT)
Dept: PRIMARY CARE CLINIC | Age: 89
End: 2025-07-07

## 2025-07-07 VITALS
SYSTOLIC BLOOD PRESSURE: 128 MMHG | HEART RATE: 79 BPM | BODY MASS INDEX: 23.21 KG/M2 | HEIGHT: 63 IN | RESPIRATION RATE: 18 BRPM | DIASTOLIC BLOOD PRESSURE: 72 MMHG | TEMPERATURE: 97 F | OXYGEN SATURATION: 97 % | WEIGHT: 131 LBS

## 2025-07-07 DIAGNOSIS — L03.116 CELLULITIS OF LEFT LOWER EXTREMITY: Primary | ICD-10-CM

## 2025-07-07 DIAGNOSIS — Z45.2 ENCOUNTER FOR CARE RELATED TO PORT-A-CATH: ICD-10-CM

## 2025-07-07 RX ORDER — AMLODIPINE BESYLATE 10 MG/1
TABLET ORAL
COMMUNITY
Start: 2025-06-29

## 2025-07-07 RX ORDER — MUPIROCIN 2 %
OINTMENT (GRAM) TOPICAL
Qty: 1 EACH | Refills: 0 | Status: SHIPPED | OUTPATIENT
Start: 2025-07-07 | End: 2025-07-14

## 2025-07-07 RX ORDER — CEPHALEXIN 500 MG/1
500 CAPSULE ORAL 4 TIMES DAILY
Qty: 28 CAPSULE | Refills: 0 | Status: SHIPPED | OUTPATIENT
Start: 2025-07-07 | End: 2025-07-14

## 2025-07-07 NOTE — PROGRESS NOTES
care related to Port-a-Cath                   Patient given educational materials -see patient instructions.  Discussed use, benefit, and side effects of prescribed medications.  All patient questions answered.  Pt voiced understanding. Reviewed health maintenance.  Instructed to continue currentmedications, diet and exercise.  Patient agreed with treatment plan. Follow up as directed.     MEDICATIONS:  Orders Placed This Encounter   Medications    cephALEXin (KEFLEX) 500 MG capsule     Sig: Take 1 capsule by mouth 4 times daily for 7 days     Dispense:  28 capsule     Refill:  0    mupirocin (BACTROBAN) 2 % ointment     Sig: Apply topically 3 times daily.     Dispense:  1 each     Refill:  0           ORDERS:  No orders of the defined types were placed in this encounter.      Results:  Results  Labs   - Creatinine: 0.9 mg/dL   - GFR: 61 mL/min/1.73m²       Follow-up:  Return in about 1 week (around 7/14/2025), or if symptoms worsen or fail to improve.    PATIENT INSTRUCTIONS:  There are no Patient Instructions on file for this visit.  Electronically signed by EARLE Lima NP on 7/8/2025 at 7:22 AM    EMR Dragon/transcription disclaimer:  Much of thisencounter note is electronic transcription/translation of spoken language to printed texts.  The electronic translation of spoken language may be erroneous, or at times, nonsensical words or phrases may be inadvertentlytranscribed.  Although I have reviewed the note for such errors, some may still exist.    The patient (or guardian, if applicable) and other individuals in attendance with the patient were advised that Artificial Intelligence will be utilized during this visit to record, process the conversation to generate a clinical note, and support improvement of the AI technology. The patient (or guardian, if applicable) and other individuals in attendance at the appointment consented to the use of AI, including the recording.

## 2025-07-08 ASSESSMENT — ENCOUNTER SYMPTOMS
COLOR CHANGE: 1
RESPIRATORY NEGATIVE: 1
ALLERGIC/IMMUNOLOGIC NEGATIVE: 1
GASTROINTESTINAL NEGATIVE: 1
WHEEZING: 0
SHORTNESS OF BREATH: 0
COUGH: 0
EYES NEGATIVE: 1

## 2025-07-16 NOTE — PROGRESS NOTES
Cardiology Office Visit Note  [address] [facility] [address] [place] [address]  Phone: [phone]  Fax: [phone]                            Date:  7/16/2025  Patient: Esther Schmidt  Age:  89 y.o., 1935    Referral: No ref. provider found    REASON FOR VISIT:  Follow-up         PROBLEM LIST:  Patient Active Problem List    Diagnosis Date Noted    TIA (transient ischemic attack) 09/07/2022     Priority: High    Chest pain 09/06/2022     Priority: Medium    Palliative care patient 02/02/2022     Priority: Low    History of atrial fibrillation 01/20/2022     Priority: Low    Iron deficiency anemia 01/13/2022     Priority: Low    Type 2 diabetes mellitus with other specified complication, without long-term current use of insulin ([facility]) 10/25/2021     Priority: Low    Atrial fibrillation with RVR ([facility]) 09/16/2021     Priority: Low    History of esophageal stricture 08/24/2021     Priority: Low    Depression 04/20/2021     Priority: Low    Fungal dermatitis 05/07/2020     Priority: Low    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 05/07/2020     Priority: Low    Mixed simple and mucopurulent chronic bronchitis ([facility]) 05/04/2020     Priority: Low    Malignant neoplasm of colon ([facility]) 04/20/2020     Priority: Low    Cellulitis of left lower extremity 06/29/2019     Priority: Low    Hypochloremia 06/29/2019     Priority: Low    CKD (chronic kidney disease) stage 3, GFR 30-59 ml/min ([facility]) 06/29/2019     Priority: Low    Pain in both lower extremities      Priority: Low    Numbness and tingling of both feet      Priority: Low    Avascular necrosis ([facility]) 04/08/2019     Priority: Low    History of DVT (deep vein thrombosis) 12/18/2018     Priority: Low    Port-A-Cath in place 02/19/2018     Priority: Low     Overview Note:     Replacing Deprecated Diagnoses      History of colon polyps 03/11/2016     Priority: Low    Essential hypertension 08/25/2015     Priority: Low    Encounter for care

## 2025-07-18 ENCOUNTER — HOSPITAL ENCOUNTER (OUTPATIENT)
Dept: VASCULAR LAB | Age: 89
Discharge: HOME | End: 2025-07-20
Attending: INTERNAL MEDICINE
Payer: MEDICARE

## 2025-07-18 DIAGNOSIS — I73.9 PVD (PERIPHERAL VASCULAR DISEASE): ICD-10-CM

## 2025-07-18 LAB
VAS IMMEDIATE LEFT ABI: 1.14
VAS IMMEDIATE LEFT BRACHIAL BP: 141 MMHG
VAS IMMEDIATE LEFT POST TIBIAL BP: 161 MMHG
VAS IMMEDIATE RIGHT ABI: 1.25
VAS IMMEDIATE RIGHT DORSALIS PEDIS BP: 176 MMHG
VAS LEFT ABI: 1.11
VAS LEFT ARM BP: 137 MMHG
VAS LEFT CALF PRESSURE: 168 MMHG
VAS LEFT DORSALIS PEDIS BP: 135 MMHG
VAS LEFT HIGH THIGH PRESSURE: 137 MMHG
VAS LEFT LOW THIGH PRESSURE: 157 MMHG
VAS LEFT PTA BP: 152 MMHG
VAS LEFT TBI: 0.96
VAS LEFT TOE PRESSURE: 132 MMHG
VAS RIGHT ABI: 1.25
VAS RIGHT ARM BP: 135 MMHG
VAS RIGHT CALF PRESSURE: 174 MMHG
VAS RIGHT DORSALIS PEDIS BP: 171 MMHG
VAS RIGHT HIGH THIGH PRESSURE: 159 MMHG
VAS RIGHT LOW THIGH PRESSURE: 160 MMHG
VAS RIGHT PTA BP: 159 MMHG
VAS RIGHT TBI: 0.75
VAS RIGHT TOE PRESSURE: 103 MMHG

## 2025-07-18 PROCEDURE — 93923 UPR/LXTR ART STDY 3+ LVLS: CPT

## 2025-07-28 LAB
CRP SERPL-MCNC: <3 MG/L (ref 0–5)
ERYTHROCYTE [SEDIMENTATION RATE] IN BLOOD BY WESTERGREN METHOD: 8 MM/HR (ref 0–25)

## 2025-08-06 ENCOUNTER — OFFICE VISIT (OUTPATIENT)
Dept: PRIMARY CARE CLINIC | Age: 89
End: 2025-08-06

## 2025-08-06 VITALS
WEIGHT: 127 LBS | HEART RATE: 68 BPM | TEMPERATURE: 97.1 F | OXYGEN SATURATION: 95 % | BODY MASS INDEX: 22.5 KG/M2 | DIASTOLIC BLOOD PRESSURE: 68 MMHG | RESPIRATION RATE: 18 BRPM | SYSTOLIC BLOOD PRESSURE: 110 MMHG | HEIGHT: 63 IN

## 2025-08-06 DIAGNOSIS — Z45.2 ENCOUNTER FOR CARE RELATED TO PORT-A-CATH: ICD-10-CM

## 2025-08-06 DIAGNOSIS — Z95.828 PORT-A-CATH IN PLACE: Primary | ICD-10-CM

## 2025-08-06 DIAGNOSIS — Z71.89 ACP (ADVANCE CARE PLANNING): ICD-10-CM

## 2025-08-06 DIAGNOSIS — L03.116 CELLULITIS OF LEFT LOWER EXTREMITY: ICD-10-CM

## 2025-08-06 RX ORDER — MUPIROCIN 2 %
OINTMENT (GRAM) TOPICAL
Qty: 1 EACH | Refills: 0 | Status: SHIPPED | OUTPATIENT
Start: 2025-08-06 | End: 2025-08-13

## 2025-08-06 SDOH — ECONOMIC STABILITY: FOOD INSECURITY: WITHIN THE PAST 12 MONTHS, THE FOOD YOU BOUGHT JUST DIDN'T LAST AND YOU DIDN'T HAVE MONEY TO GET MORE.: NEVER TRUE

## 2025-08-06 SDOH — ECONOMIC STABILITY: FOOD INSECURITY: WITHIN THE PAST 12 MONTHS, YOU WORRIED THAT YOUR FOOD WOULD RUN OUT BEFORE YOU GOT MONEY TO BUY MORE.: NEVER TRUE

## 2025-08-06 ASSESSMENT — PATIENT HEALTH QUESTIONNAIRE - PHQ9
2. FEELING DOWN, DEPRESSED OR HOPELESS: NOT AT ALL
SUM OF ALL RESPONSES TO PHQ QUESTIONS 1-9: 0
1. LITTLE INTEREST OR PLEASURE IN DOING THINGS: NOT AT ALL
SUM OF ALL RESPONSES TO PHQ QUESTIONS 1-9: 0

## 2025-08-08 ASSESSMENT — ENCOUNTER SYMPTOMS
SHORTNESS OF BREATH: 0
GASTROINTESTINAL NEGATIVE: 1
EYES NEGATIVE: 1
RESPIRATORY NEGATIVE: 1
COLOR CHANGE: 1
COUGH: 0
WHEEZING: 0
ALLERGIC/IMMUNOLOGIC NEGATIVE: 1

## 2025-08-18 ENCOUNTER — CLINICAL DOCUMENTATION (OUTPATIENT)
Dept: CARDIOLOGY CLINIC | Age: 89
End: 2025-08-18

## (undated) DEVICE — NEEDLE SPNL 22GA L3.5IN BLK HUB S STL REG WALL FIT STYL W/

## (undated) DEVICE — CATHETER F BLLN 30CC 18FR 2 W HYDRGEL COAT LESS TRAUM LUB

## (undated) DEVICE — INSTRUMENT 8670005 350 MM GUIDEWRE SHARP

## (undated) DEVICE — SUTURE VCRL SZ 2-0 L27IN ABSRB VLT L26MM UR-6 5/8 CIR J602H

## (undated) DEVICE — BONE BIOPSY DEVICE F05A BBD SIZE 3: Brand: MEDTRONIC REUSABLE INSTRUMENTS

## (undated) DEVICE — E-Z CLEAN, NON-STICK, PTFE COATED, ELECTROSURGICAL BLADE ELECTRODE, MODIFIED EXTENDED INSULATION, 2.5 INCH (6.35 CM): Brand: MEGADYNE

## (undated) DEVICE — 3M™ STERI-STRIP™ REINFORCED ADHESIVE SKIN CLOSURES, R1547, 1/2 IN X 4 IN (12 MM X 100 MM), 6 STRIPS/ENVELOPE: Brand: 3M™ STERI-STRIP™

## (undated) DEVICE — PASSING ELEVATOR

## (undated) DEVICE — TOWEL,OR,DSP,ST,BLUE,DLX,4/PK,20PK/CS: Brand: MEDLINE

## (undated) DEVICE — TUBE ET 7MM NSL ORAL BASIC CUF INTMED MURPHY EYE RADPQ MRK

## (undated) DEVICE — MINOR CDS: Brand: MEDLINE INDUSTRIES, INC.

## (undated) DEVICE — KIT POS PT CARE KT W/ GENTLE TCH WILSON +

## (undated) DEVICE — BAG BND W36XL36IN TRNSPAR POLY GEN PURP W E BND CLSR TIDI

## (undated) DEVICE — ENDO KIT,LOURDES HOSPITAL: Brand: MEDLINE INDUSTRIES, INC.

## (undated) DEVICE — TOOL T12MH25L LGD 12CM 2.5MM MATCH LG: Brand: MIDAS REX®

## (undated) DEVICE — GLOVE SURG SZ 75 L12IN FNGR THK94MIL TRNSLUC YEL LTX

## (undated) DEVICE — SPK10281 JACKSON TABLE KIT: Brand: SPK10281 JACKSON TABLE KIT

## (undated) DEVICE — ELECTRODE ES AD PED L2.5IN TEF INSUL MOD NONCORDED BLDE TIP

## (undated) DEVICE — NEURO CDS

## (undated) DEVICE — UNDERGLOVE SURG SZ 8 FNGR THK0.21MIL GRN LTX BEAD CUF

## (undated) DEVICE — GLOVE SURG SZ 8 L12IN FNGR THK79MIL GRN LTX FREE

## (undated) DEVICE — MEDIA CONTRAST INJ VISAPAQUE 50ML 320MG

## (undated) DEVICE — SUTURE VCRL SZ 3-0 L18IN ABSRB UD L26MM SH 1/2 CIR J864D

## (undated) DEVICE — GOWN,PREVENTION PLUS,L,ST,24/CS: Brand: MEDLINE

## (undated) DEVICE — LARYNGOSCOPE BLDE MAC HNDL M SZ 35 ST CURAPLEX CURAVIEW LED

## (undated) DEVICE — REMOTE CONTROL KIT: Brand: FREELINK™

## (undated) DEVICE — AGENT HEMOSTATIC SURGIFLOW MATRIX KIT W/THROMBIN

## (undated) DEVICE — C-ARMOR C-ARM EQUIPMENT COVERS CLEAR STERILE UNIVERSAL FIT 12 PER CASE: Brand: C-ARMOR

## (undated) DEVICE — SUTURE VICRYL + SZ 2-0 L18IN ABSRB UD CT1 L36MM 1/2 CIR VCP839D

## (undated) DEVICE — DRESSING TRNSPAR W5XL4.5IN FLM SHT SEMIPERMEABLE WIND

## (undated) DEVICE — 28CM STRAW TUNNELING TOOL

## (undated) DEVICE — MICRO KOVER: Brand: UNBRANDED

## (undated) DEVICE — FORCEP BPLR IRIS

## (undated) DEVICE — NEPTUNE E-SEP SMOKE EVACUATION PENCIL, COATED, 70MM BLADE, ROCKER SWITCH: Brand: NEPTUNE E-SEP

## (undated) DEVICE — TIBURON LAPAROTOMY DRAPE: Brand: CONVERTORS

## (undated) DEVICE — SUTURE VICRYL + SZ 2-0 L27IN ABSRB VLT UR-6 5/8 CIR TAPR PNT VCP602H

## (undated) DEVICE — E-Z CLEAN, NON-STICK, PTFE COATED, ELECTROSURGICAL BLADE ELECTRODE, BAYONET, MODIFIED EXTENDED INSULATION, 6.5 INCH (16.5 CM): Brand: MEGADYNE

## (undated) DEVICE — 3M™ IOBAN™ 2 ANTIMICROBIAL INCISE DRAPE 6650EZ: Brand: IOBAN™ 2

## (undated) DEVICE — SHEET,T,THYROID,STERILE: Brand: MEDLINE

## (undated) DEVICE — BONE TAMP KIT KPX203PB FF X2 20/3 1 STP: Brand: KYPHOPAK™ TRAY

## (undated) DEVICE — GLOVE SURG SZ 8 CRM LTX FREE POLYISOPRENE POLYMER BEAD ANTI

## (undated) DEVICE — SHEET,DRAPE,53X77,STERILE: Brand: MEDLINE

## (undated) DEVICE — SUTURE VICRYL + SZ 1 L18IN ABSRB UD L36MM CT-1 1/2 CIR VCP841D

## (undated) DEVICE — MASTISOL ADHESIVE LIQ 2/3ML

## (undated) DEVICE — SEALANT TISS 10 CC FIBRIN VISTASEAL

## (undated) DEVICE — SUTURE PERMA-HAND 0 L18IN NONABSORBABLE BLK MO-7 L22MM 1/2 C041D

## (undated) DEVICE — BAND BAG 36" X 36": Brand: TIDI

## (undated) DEVICE — TOOL MR8-14MH30 MR8 14CM MATCH 3MM: Brand: MIDAS REX MR8

## (undated) DEVICE — FORCEPS BX 240CM 2.4MM L NDL RAD JAW 4 M00513334